# Patient Record
Sex: FEMALE | Race: WHITE | NOT HISPANIC OR LATINO | Employment: UNEMPLOYED | URBAN - METROPOLITAN AREA
[De-identification: names, ages, dates, MRNs, and addresses within clinical notes are randomized per-mention and may not be internally consistent; named-entity substitution may affect disease eponyms.]

---

## 2017-03-19 ENCOUNTER — HOSPITAL ENCOUNTER (OUTPATIENT)
Facility: HOSPITAL | Age: 42
Setting detail: OBSERVATION
Discharge: HOME | End: 2017-03-20
Attending: EMERGENCY MEDICINE | Admitting: INTERNAL MEDICINE

## 2017-03-19 ENCOUNTER — APPOINTMENT (OUTPATIENT)
Dept: MRI IMAGING | Facility: HOSPITAL | Age: 42
End: 2017-03-19

## 2017-03-19 ENCOUNTER — APPOINTMENT (OUTPATIENT)
Dept: GENERAL RADIOLOGY | Facility: HOSPITAL | Age: 42
End: 2017-03-19

## 2017-03-19 ENCOUNTER — APPOINTMENT (OUTPATIENT)
Dept: CT IMAGING | Facility: HOSPITAL | Age: 42
End: 2017-03-19

## 2017-03-19 DIAGNOSIS — Z74.09 IMPAIRED MOBILITY AND ADLS: ICD-10-CM

## 2017-03-19 DIAGNOSIS — R73.09 ELEVATED GLUCOSE: ICD-10-CM

## 2017-03-19 DIAGNOSIS — R53.1 ACUTE RIGHT-SIDED WEAKNESS: Primary | ICD-10-CM

## 2017-03-19 DIAGNOSIS — Z78.9 IMPAIRED MOBILITY AND ADLS: ICD-10-CM

## 2017-03-19 DIAGNOSIS — Z74.09 IMPAIRED FUNCTIONAL MOBILITY, BALANCE, GAIT, AND ENDURANCE: ICD-10-CM

## 2017-03-19 PROBLEM — K21.9 GERD (GASTROESOPHAGEAL REFLUX DISEASE): Status: ACTIVE | Noted: 2017-03-19

## 2017-03-19 PROBLEM — I34.1 MITRAL VALVE PROLAPSE: Status: ACTIVE | Noted: 2017-03-19

## 2017-03-19 PROBLEM — J45.909 ASTHMA: Status: ACTIVE | Noted: 2017-03-19

## 2017-03-19 PROBLEM — Z86.718 HISTORY OF DVT (DEEP VEIN THROMBOSIS): Status: ACTIVE | Noted: 2017-03-19

## 2017-03-19 PROBLEM — Z86.73 HISTORY OF TIA (TRANSIENT ISCHEMIC ATTACK): Status: ACTIVE | Noted: 2017-03-19

## 2017-03-19 PROBLEM — E11.9 TYPE 2 DIABETES MELLITUS: Status: ACTIVE | Noted: 2017-03-19

## 2017-03-19 LAB
ALT SERPL W P-5'-P-CCNC: 50 U/L (ref 7–40)
AMPHET+METHAMPHET UR QL: NEGATIVE
AMPHETAMINES UR QL: NEGATIVE
APTT PPP: 25 SECONDS (ref 24–31)
AST SERPL-CCNC: 22 U/L (ref 0–33)
BARBITURATES UR QL SCN: NEGATIVE
BASOPHILS # BLD AUTO: 0.05 10*3/MM3 (ref 0–0.2)
BASOPHILS NFR BLD AUTO: 0.5 % (ref 0–1)
BENZODIAZ UR QL SCN: NEGATIVE
BILIRUB UR QL STRIP: NEGATIVE
BUN BLDA-MCNC: 12 MG/DL (ref 8–26)
BUPRENORPHINE SERPL-MCNC: NEGATIVE NG/ML
CA-I BLDA-SCNC: 1.26 MMOL/L (ref 1.2–1.32)
CANNABINOIDS SERPL QL: NEGATIVE
CHLORIDE BLDA-SCNC: 101 MMOL/L (ref 98–109)
CLARITY UR: CLEAR
CO2 BLDA-SCNC: 26 MMOL/L (ref 24–29)
COCAINE UR QL: NEGATIVE
COLOR UR: YELLOW
CREAT BLDA-MCNC: 0.6 MG/DL (ref 0.6–1.3)
DEPRECATED RDW RBC AUTO: 38.6 FL (ref 37–54)
EOSINOPHIL # BLD AUTO: 0.28 10*3/MM3 (ref 0.1–0.3)
EOSINOPHIL NFR BLD AUTO: 2.6 % (ref 0–3)
ERYTHROCYTE [DISTWIDTH] IN BLOOD BY AUTOMATED COUNT: 12.8 % (ref 11.3–14.5)
GLUCOSE BLDC GLUCOMTR-MCNC: 268 MG/DL (ref 70–130)
GLUCOSE UR STRIP-MCNC: ABNORMAL MG/DL
HCT VFR BLD AUTO: 41.8 % (ref 34.5–44)
HCT VFR BLDA CALC: 43 % (ref 38–51)
HGB BLD-MCNC: 14 G/DL (ref 11.5–15.5)
HGB BLDA-MCNC: 14.6 G/DL (ref 12–17)
HGB UR QL STRIP.AUTO: NEGATIVE
HOLD SPECIMEN: NORMAL
HOLD SPECIMEN: NORMAL
IMM GRANULOCYTES # BLD: 0.02 10*3/MM3 (ref 0–0.03)
IMM GRANULOCYTES NFR BLD: 0.2 % (ref 0–0.6)
INR PPP: 1 (ref 0.8–1.2)
KETONES UR QL STRIP: NEGATIVE
LEUKOCYTE ESTERASE UR QL STRIP.AUTO: NEGATIVE
LYMPHOCYTES # BLD AUTO: 3.49 10*3/MM3 (ref 0.6–4.8)
LYMPHOCYTES NFR BLD AUTO: 32 % (ref 24–44)
MCH RBC QN AUTO: 28.2 PG (ref 27–31)
MCHC RBC AUTO-ENTMCNC: 33.5 G/DL (ref 32–36)
MCV RBC AUTO: 84.3 FL (ref 80–99)
METHADONE UR QL SCN: NEGATIVE
MONOCYTES # BLD AUTO: 0.62 10*3/MM3 (ref 0–1)
MONOCYTES NFR BLD AUTO: 5.7 % (ref 0–12)
NEUTROPHILS # BLD AUTO: 6.45 10*3/MM3 (ref 1.5–8.3)
NEUTROPHILS NFR BLD AUTO: 59 % (ref 41–71)
NITRITE UR QL STRIP: NEGATIVE
OPIATES UR QL: NEGATIVE
OXYCODONE UR QL SCN: NEGATIVE
PCP UR QL SCN: NEGATIVE
PH UR STRIP.AUTO: 6 [PH] (ref 5–8)
PLATELET # BLD AUTO: 316 10*3/MM3 (ref 150–450)
PMV BLD AUTO: 10.2 FL (ref 6–12)
POTASSIUM BLDA-SCNC: 4 MMOL/L (ref 3.5–4.9)
PROPOXYPH UR QL: NEGATIVE
PROT UR QL STRIP: NEGATIVE
PROTHROMBIN TIME: 12.3 SECONDS (ref 12.8–15.2)
RBC # BLD AUTO: 4.96 10*6/MM3 (ref 3.89–5.14)
SODIUM BLDA-SCNC: 141 MMOL/L (ref 138–146)
SP GR UR STRIP: 1.02 (ref 1–1.03)
TRICYCLICS UR QL SCN: NEGATIVE
TROPONIN I SERPL-MCNC: 0 NG/ML (ref 0–0.07)
UROBILINOGEN UR QL STRIP: ABNORMAL
WBC NRBC COR # BLD: 10.91 10*3/MM3 (ref 3.5–10.8)
WHOLE BLOOD HOLD SPECIMEN: NORMAL
WHOLE BLOOD HOLD SPECIMEN: NORMAL

## 2017-03-19 PROCEDURE — G0378 HOSPITAL OBSERVATION PER HR: HCPCS

## 2017-03-19 PROCEDURE — 96374 THER/PROPH/DIAG INJ IV PUSH: CPT

## 2017-03-19 PROCEDURE — 25010000002 ONDANSETRON PER 1 MG: Performed by: EMERGENCY MEDICINE

## 2017-03-19 PROCEDURE — 85730 THROMBOPLASTIN TIME PARTIAL: CPT | Performed by: EMERGENCY MEDICINE

## 2017-03-19 PROCEDURE — 70450 CT HEAD/BRAIN W/O DYE: CPT

## 2017-03-19 PROCEDURE — 80306 DRUG TEST PRSMV INSTRMNT: CPT | Performed by: EMERGENCY MEDICINE

## 2017-03-19 PROCEDURE — 81003 URINALYSIS AUTO W/O SCOPE: CPT | Performed by: EMERGENCY MEDICINE

## 2017-03-19 PROCEDURE — 80047 BASIC METABLC PNL IONIZED CA: CPT

## 2017-03-19 PROCEDURE — 99220 PR INITIAL OBSERVATION CARE/DAY 70 MINUTES: CPT | Performed by: INTERNAL MEDICINE

## 2017-03-19 PROCEDURE — 85610 PROTHROMBIN TIME: CPT

## 2017-03-19 PROCEDURE — 93005 ELECTROCARDIOGRAM TRACING: CPT | Performed by: EMERGENCY MEDICINE

## 2017-03-19 PROCEDURE — 85025 COMPLETE CBC W/AUTO DIFF WBC: CPT | Performed by: EMERGENCY MEDICINE

## 2017-03-19 PROCEDURE — 0042T HC CT CEREBRAL PERFUSION W/WO CONTRAST: CPT

## 2017-03-19 PROCEDURE — 70551 MRI BRAIN STEM W/O DYE: CPT

## 2017-03-19 PROCEDURE — 84450 TRANSFERASE (AST) (SGOT): CPT | Performed by: EMERGENCY MEDICINE

## 2017-03-19 PROCEDURE — 84484 ASSAY OF TROPONIN QUANT: CPT

## 2017-03-19 PROCEDURE — 99285 EMERGENCY DEPT VISIT HI MDM: CPT

## 2017-03-19 PROCEDURE — 0 IOPAMIDOL PER 1 ML: Performed by: EMERGENCY MEDICINE

## 2017-03-19 PROCEDURE — 85014 HEMATOCRIT: CPT

## 2017-03-19 PROCEDURE — 84460 ALANINE AMINO (ALT) (SGPT): CPT | Performed by: EMERGENCY MEDICINE

## 2017-03-19 PROCEDURE — 71010 HC CHEST PA OR AP: CPT

## 2017-03-19 RX ORDER — CITALOPRAM 40 MG/1
40 TABLET ORAL DAILY
Status: DISCONTINUED | OUTPATIENT
Start: 2017-03-20 | End: 2017-03-20 | Stop reason: HOSPADM

## 2017-03-19 RX ORDER — GABAPENTIN 600 MG/1
600 TABLET ORAL 3 TIMES DAILY
COMMUNITY
End: 2017-04-01 | Stop reason: HOSPADM

## 2017-03-19 RX ORDER — DEXTROSE MONOHYDRATE 25 G/50ML
25 INJECTION, SOLUTION INTRAVENOUS
Status: DISCONTINUED | OUTPATIENT
Start: 2017-03-19 | End: 2017-03-20 | Stop reason: HOSPADM

## 2017-03-19 RX ORDER — RANITIDINE 150 MG/1
150 TABLET ORAL 2 TIMES DAILY
COMMUNITY
End: 2019-02-13

## 2017-03-19 RX ORDER — ONDANSETRON 2 MG/ML
INJECTION INTRAMUSCULAR; INTRAVENOUS
Status: DISPENSED
Start: 2017-03-19 | End: 2017-03-20

## 2017-03-19 RX ORDER — SODIUM CHLORIDE 0.9 % (FLUSH) 0.9 %
1-10 SYRINGE (ML) INJECTION AS NEEDED
Status: DISCONTINUED | OUTPATIENT
Start: 2017-03-19 | End: 2017-03-20 | Stop reason: HOSPADM

## 2017-03-19 RX ORDER — ACETAMINOPHEN 325 MG/1
650 TABLET ORAL EVERY 4 HOURS PRN
Status: DISCONTINUED | OUTPATIENT
Start: 2017-03-19 | End: 2017-03-20 | Stop reason: HOSPADM

## 2017-03-19 RX ORDER — ONDANSETRON 4 MG/1
4 TABLET, FILM COATED ORAL EVERY 6 HOURS PRN
Status: DISCONTINUED | OUTPATIENT
Start: 2017-03-19 | End: 2017-03-20 | Stop reason: HOSPADM

## 2017-03-19 RX ORDER — ASPIRIN 325 MG
325 TABLET ORAL DAILY
COMMUNITY
End: 2019-09-18 | Stop reason: SDUPTHER

## 2017-03-19 RX ORDER — ASPIRIN 325 MG
325 TABLET ORAL DAILY
Status: DISCONTINUED | OUTPATIENT
Start: 2017-03-20 | End: 2017-03-20 | Stop reason: HOSPADM

## 2017-03-19 RX ORDER — ONDANSETRON 2 MG/ML
4 INJECTION INTRAMUSCULAR; INTRAVENOUS ONCE
Status: COMPLETED | OUTPATIENT
Start: 2017-03-19 | End: 2017-03-19

## 2017-03-19 RX ORDER — SODIUM CHLORIDE 0.9 % (FLUSH) 0.9 %
10 SYRINGE (ML) INJECTION AS NEEDED
Status: DISCONTINUED | OUTPATIENT
Start: 2017-03-19 | End: 2017-03-20 | Stop reason: HOSPADM

## 2017-03-19 RX ORDER — DOCUSATE SODIUM 100 MG/1
100 CAPSULE, LIQUID FILLED ORAL 2 TIMES DAILY PRN
Status: DISCONTINUED | OUTPATIENT
Start: 2017-03-19 | End: 2017-03-20 | Stop reason: HOSPADM

## 2017-03-19 RX ORDER — CALCIUM CARBONATE 200(500)MG
2 TABLET,CHEWABLE ORAL 2 TIMES DAILY PRN
Status: DISCONTINUED | OUTPATIENT
Start: 2017-03-19 | End: 2017-03-20 | Stop reason: HOSPADM

## 2017-03-19 RX ORDER — ONDANSETRON 2 MG/ML
4 INJECTION INTRAMUSCULAR; INTRAVENOUS EVERY 6 HOURS PRN
Status: DISCONTINUED | OUTPATIENT
Start: 2017-03-19 | End: 2017-03-20 | Stop reason: HOSPADM

## 2017-03-19 RX ORDER — NICOTINE POLACRILEX 4 MG
15 LOZENGE BUCCAL
Status: DISCONTINUED | OUTPATIENT
Start: 2017-03-19 | End: 2017-03-20 | Stop reason: HOSPADM

## 2017-03-19 RX ORDER — GABAPENTIN 300 MG/1
600 CAPSULE ORAL 3 TIMES DAILY
Status: DISCONTINUED | OUTPATIENT
Start: 2017-03-19 | End: 2017-03-20 | Stop reason: HOSPADM

## 2017-03-19 RX ORDER — CITALOPRAM 40 MG/1
40 TABLET ORAL DAILY
COMMUNITY
End: 2017-04-01 | Stop reason: HOSPADM

## 2017-03-19 RX ORDER — ATORVASTATIN CALCIUM 40 MG/1
80 TABLET, FILM COATED ORAL NIGHTLY
Status: DISCONTINUED | OUTPATIENT
Start: 2017-03-19 | End: 2017-03-20 | Stop reason: HOSPADM

## 2017-03-19 RX ORDER — PRAVASTATIN SODIUM 40 MG
40 TABLET ORAL DAILY
COMMUNITY
End: 2017-03-20 | Stop reason: HOSPADM

## 2017-03-19 RX ORDER — AZITHROMYCIN 250 MG/1
250 TABLET, FILM COATED ORAL DAILY
COMMUNITY
End: 2017-03-20 | Stop reason: HOSPADM

## 2017-03-19 RX ORDER — FAMOTIDINE 20 MG/1
20 TABLET, FILM COATED ORAL DAILY
Status: DISCONTINUED | OUTPATIENT
Start: 2017-03-20 | End: 2017-03-20 | Stop reason: HOSPADM

## 2017-03-19 RX ADMIN — GABAPENTIN 600 MG: 300 CAPSULE ORAL at 23:01

## 2017-03-19 RX ADMIN — ATORVASTATIN CALCIUM 80 MG: 40 TABLET, FILM COATED ORAL at 23:01

## 2017-03-19 RX ADMIN — IOPAMIDOL 40 ML: 755 INJECTION, SOLUTION INTRAVENOUS at 14:50

## 2017-03-19 RX ADMIN — ONDANSETRON 4 MG: 2 INJECTION INTRAMUSCULAR; INTRAVENOUS at 17:58

## 2017-03-19 NOTE — H&P
[facility] Medicine Services  HISTORY AND PHYSICAL    Primary Care Physician: No Known Provider    Subjective     Chief Complaint: Right-sided weakness    History of Present Illness:   Patient is a 42 year old female who was flown to [facility] Emergency Department from her work for complaints of right-sided numbness and weakness. Patient states she has had these episodes before, and has been diagnosed with four previous TIAs. Today, the patient states she began to have numbness to the right side of her face, right arm and right leg. She states she had difficulty moving her right leg, and had to drag it when she walked. The onset of symptoms were at 1200. Patient does have a past medical history positive for TIAs, Diabetes, Asthma, Mitral Valve Prolapse, GERD, and DVT. While in the Emergency Department the patient had a CT profusion and MRI, which were both normal. Patient will be admitted to the [facility] medicine service tonight for further evaluation and treatment.       Review of Systems   Constitutional: Negative for fatigue and fever.   HENT: Negative for drooling and trouble swallowing.    Respiratory: Negative for cough and shortness of breath.    Cardiovascular: Negative for chest pain, palpitations and leg swelling.   Gastrointestinal: Negative for abdominal pain, nausea and vomiting.   Genitourinary: Negative for dysuria, frequency and urgency.   Musculoskeletal: Positive for gait problem.   Skin: Negative for pallor, rash and wound.   Neurological: Positive for weakness and numbness. Negative for dizziness and syncope.   Psychiatric/Behavioral: Negative for confusion.      Otherwise complete ROS performed and negative except as mentioned in the HPI.    Past Medical History   Diagnosis Date   • Diabetes mellitus    • H/O blood clots    • Heart murmur    • Mitral valve prolapse    • TIA (transient ischemic attack)      4 TIMES       Past Surgical History   Procedure  "Laterality Date   • Knee arthroplasty     • Hysterectomy     • Appendectomy     • Cholecystectomy     • Finger fusion         History reviewed. No pertinent family history.    Social History     Social History   • Marital status:      Spouse name: N/A   • Number of children: N/A   • Years of education: N/A     Occupational History   • Not on file.     Social History Main Topics   • Smoking status: Never Smoker   • Smokeless tobacco: Not on file   • Alcohol use No   • Drug use: No   • Sexual activity: Not on file     Other Topics Concern   • Not on file     Social History Narrative    LIVES WITH HER  AND KIDS       Medications:  Reviewed on admission    Allergies:  Allergies   Allergen Reactions   • Novolog [Insulin Aspart]    • Penicillins          Objective     Physical Exam:  Vital Signs:   Visit Vitals   • /57   • Pulse 94   • Temp 98.3 °F (36.8 °C) (Oral)   • Resp 18   • Ht 64\" (162.6 cm)   • Wt 215 lb (97.5 kg)   • SpO2 96%   • BMI 36.9 kg/m2     Physical Exam   Constitutional: She is oriented to person, place, and time. She appears well-developed and well-nourished.   HENT:   Head: Normocephalic and atraumatic.   Eyes: EOM are normal. Pupils are equal, round, and reactive to light. No scleral icterus.   Neck: Normal range of motion. Neck supple. No JVD present.   Cardiovascular: Normal rate, regular rhythm and intact distal pulses.  Exam reveals no gallop and no friction rub.    Murmur heard.  Pulmonary/Chest: Effort normal and breath sounds normal. No respiratory distress. She has no wheezes. She has no rales. She exhibits no tenderness.   Abdominal: Soft. Bowel sounds are normal. She exhibits no distension. There is no tenderness.   Musculoskeletal: Normal range of motion. She exhibits no edema, tenderness or deformity.   Neurological: She is alert and oriented to person, place, and time. A sensory deficit is present. She exhibits abnormal muscle tone.   Patient has difficulty moving her " right arm and leg   Skin: Skin is warm and dry.   Psychiatric: She has a normal mood and affect. Her behavior is normal. Judgment and thought content normal.   Nursing note and vitals reviewed.    Results Reviewed:  Lab Results (last 24 hours)     Procedure Component Value Units Date/Time    POC CHEM 8 [[id number]]  (Abnormal) Collected:  03/19/17 1441    Specimen:  Blood Updated:  03/19/17 1450     Glucose 268 (H) mg/dL      BUN, Arterial 12 mg/dL      Creatinine 0.60 mg/dL      Sodium 141 mmol/L      Potassium 4.0 mmol/L      Chloride 101 mmol/L      Total CO2 26 mmol/L      Hemoglobin 14.6 g/dL       Serial Number: [id number]    : [id number]        Hematocrit 43 %      Ionized Calcium 1.26 mmol/L     CBC & Differential [[id number]] Collected:  03/19/17 1449    Specimen:  Blood Updated:  03/19/17 1457    Narrative:       The following orders were created for panel order CBC & Differential.  Procedure                               Abnormality         Status                     ---------                               -----------         ------                     CBC Auto Differential[[id number]]         Abnormal            Final result                 Please view results for these tests on the individual orders.    CBC Auto Differential [[id number]]  (Abnormal) Collected:  03/19/17 1449    Specimen:  Blood Updated:  03/19/17 1457     WBC 10.91 (H) 10*3/mm3      RBC 4.96 10*6/mm3      Hemoglobin 14.0 g/dL      Hematocrit 41.8 %      MCV 84.3 fL      MCH 28.2 pg      MCHC 33.5 g/dL      RDW 12.8 %      RDW-SD 38.6 fl      MPV 10.2 fL      Platelets 316 10*3/mm3      Neutrophil % 59.0 %      Lymphocyte % 32.0 %      Monocyte % 5.7 %      Eosinophil % 2.6 %      Basophil % 0.5 %      Immature Grans % 0.2 %      Neutrophils, Absolute 6.45 10*3/mm3      Lymphocytes, Absolute 3.49 10*3/mm3      Monocytes, Absolute 0.62 10*3/mm3      Eosinophils, Absolute 0.28 10*3/mm3      Basophils, Absolute 0.05 10*3/mm3      Immature Grans,  Absolute 0.02 10*3/mm3     POC Troponin, Rapid [[id number]]  (Normal) Collected:  03/19/17 1454    Specimen:  Blood Updated:  03/19/17 1510     Troponin I 0.00 ng/mL       Serial Number: [id number]    : [id number]       aPTT [[id number]]  (Normal) Collected:  03/19/17 1449    Specimen:  Blood Updated:  03/19/17 1514     PTT 25.0 seconds     Narrative:       PTT = The equivalent PTT values for the therapeutic range of heparin levels at 0.3 to 0.5 U/ml are 45 to 60 seconds.    AST [[id number]]  (Normal) Collected:  03/19/17 1449    Specimen:  Blood Updated:  03/19/17 1516     AST (SGOT) 22 U/L     ALT [[id number]]  (Abnormal) Collected:  03/19/17 1449    Specimen:  Blood Updated:  03/19/17 1516     ALT (SGPT) 50 (H) U/L     Urinalysis With / Culture If Indicated [[id number]]  (Abnormal) Collected:  03/19/17 1600    Specimen:  Urine from Urine, Clean Catch Updated:  03/19/17 1612     Color, UA Yellow      Appearance, UA Clear      pH, UA 6.0      Specific Gravity, UA 1.020      Glucose, UA >=1000 mg/dL (3+) (A)      Ketones, UA Negative      Bilirubin, UA Negative      Blood, UA Negative      Protein, UA Negative      Leuk Esterase, UA Negative      Nitrite, UA Negative      Urobilinogen, UA 0.2 E.U./dL     Narrative:       Urine microscopic not indicated.    Urine Drug Screen [[id number]]  (Normal) Collected:  03/19/17 1600    Specimen:  Urine from Urine, Clean Catch Updated:  03/19/17 1620     THC, Screen, Urine Negative      Phencyclidine (PCP), Urine Negative      Cocaine Screen, Urine Negative      Methamphetamine, Urine Negative      Opiate Screen Negative      Amphetamine Screen, Urine Negative      Benzodiazepine Screen, Urine Negative      Tricyclic Antidepressants Screen Negative      Methadone Screen, Urine Negative      Barbiturates Screen, Urine Negative      Oxycodone Screen, Urine Negative      Propoxyphene Screen Negative      Buprenorphine, Screen, Urine Negative     Narrative:       Cutoff For Drugs  Screened:    Amphetamines               500 ng/ml  Barbiturates               200 ng/ml  Benzodiazepines            150 ng/ml  Cocaine                    150 ng/ml  Methadone                  200 ng/ml  Opiates                    100 ng/ml  Phencyclidine               25 ng/ml  THC                            50 ng/ml  Methamphetamine            500 ng/ml  Tricyclic Antidepressants  300 ng/ml  Oxycodone                  100 ng/ml  Propoxyphene               300 ng/ml  Buprenorphine               10 ng/ml    The normal value for all drugs tested is negative. This report includes unconfirmed screening results, with the cutoff values listed, to be used for medical treatment purposes only.  Unconfirmed results must not be used for non-medical purposes such as employment or legal testing.  Clinical consideration should be applied to any drug of abuse test, particularly when unconfirmed results are used.      POC Protime / INR [[id number]]  (Abnormal) Collected:  03/19/17 1443    Specimen:  Blood Updated:  03/19/17 1850     Protime 12.3 (L) seconds      INR 1.0       Serial Number: [id number]    : [id number]       [place] Draw [[id number]] Collected:  03/19/17 1449    Specimen:  Blood Updated:  03/19/17 1901    Narrative:       The following orders were created for panel order [place] Draw.  Procedure                               Abnormality         Status                     ---------                               -----------         ------                     Light Blue Top[[id number]]                                    Final result               Green Top (Gel)[[id number]]                                   Final result               Lavender Top[[id number]]                                      Final result               Gold Top - SST[[id number]]                                    Final result               Green Top (No Gel)[[id number]]                                                             Please view results for these tests  on the individual orders.    Light Blue Top [[id number]] Collected:  03/19/17 1449    Specimen:  Blood Updated:  03/19/17 1901     Extra Tube hold for add-on       Auto resulted       Green Top (Gel) [[id number]] Collected:  03/19/17 1449    Specimen:  Blood Updated:  03/19/17 1901     Extra Tube Hold for add-ons.       Auto resulted.       Lavender Top [[id number]] Collected:  03/19/17 1449    Specimen:  Blood Updated:  03/19/17 1901     Extra Tube hold for add-on       Auto resulted       Gold Top - SST [[id number]] Collected:  03/19/17 1449    Specimen:  Blood Updated:  03/19/17 1901     Extra Tube Hold for add-ons.       Auto resulted.           Imaging Results (last 24 hours)     Procedure Component Value Units Date/Time    CT Cerebral Perfusion With & Without Contrast [[id number]] Collected:  03/19/17 1546     Updated:  03/19/17 1547    Narrative:       EXAMINATION: CT CEREBRAL PERFUSION W/WO CONTRAST - 03/19/2017      INDICATION: Stroke.      TECHNIQUE: Cerebral perfusion study, contrast enhanced with post  processing to include parametric maps of cerebral blood volume, cerebral  blood flow, mean transit time, and time to peak enhancement.     The radiation dose reduction device was turned on for each scan per the  ALARA (As Low as Reasonably Achievable) protocol.     COMPARISON: None.     FINDINGS: The cerebral blood volume and cerebral blood flow maps are  bilaterally symmetric and equally matched. There are no significant  perfusion anomalies. The mean transit time and time to peak enhancement  maps are grossly normal.       Impression:       Normal cerebral perfusion study.     DICTATED:     03/19/2017  EDITED:         03/19/2017          CT Head Without Contrast Stroke Protocol [[id number]] Collected:  03/19/17 1605     Updated:  03/19/17 1605    Narrative:       EXAMINATION: CT HEAD WO CONTRAST - 03/19/2017     INDICATION: Stroke, right-sided weakness, stroke protocol.      TECHNIQUE: Contiguous axial 5 mm sections  through the brain parenchyma  without intravenous contrast.     Code 19:  Time of Scan/Exam: 1421 hours   Interpretation Time:  1424 hours      The radiation dose reduction device was turned on for each scan per the  ALARA (As Low as Reasonably Achievable) protocol.     COMPARISON: 1/17/2015.     FINDINGS: The midline structures are central. There is no midline shift  or mass effect. The lateral, third, and fourth ventricles are grossly  unremarkable. The structures of the posterior fossa are normal. There is  no acute intra-axial or extra-axial hemorrhage.     The bony calvarium is intact. The paranasal sinuses and mastoid air  cells are well pneumatized.       Impression:       Normal brain CT.     DICTATED:     03/19/2017  EDITED:         03/19/2017          MRI Brain Without Contrast [[id number]] Collected:  03/19/17 1721     Updated:  03/19/17 1721    Narrative:       EXAMINATION: MRI BRAIN WO CONTRAST - 03/19/2017     INDICATION: Right-sided weakness.     TECHNIQUE: Multiplanar multiweighted MRI of the brain without  intravenous contrast.     COMPARISON: 1/17/2015.     FINDINGS: There are no areas of restricted diffusion. The midline  structures are central. There is no midline shift or mass effect. The  lateral, third, and fourth ventricles are grossly unremarkable. The  structures of the posterior fossa are normal.     T2 sequences reveal no abnormal areas of signal intensity. The globes  and orbits are normal. The paranasal sinuses demonstrate some mucosal  thickening on the right maxillary sinus. Normal flow voids are present  in the distal internal carotid arteries and basilar artery. The superior  sagittal sinus is widely patent.     The corpus callosum, pituitary gland, and foramen magnum are normal.       Impression:       No acute intracranial process.  Normal brain MRI.      DICTATED:     03/19/2017  EDITED:         03/19/2017       XR Chest 1 View [[id number]] Collected:  03/19/17 9952     Updated:   03/19/17 1755    Narrative:          EXAMINATION: XR CHEST, SINGLE VIEW - 03/19/2017     INDICATION: Stroke.      COMPARISON: 1/17/2015.     FINDINGS: The heart size is normal. There is no pneumothorax or  effusion. The lungs are free of opacities. The osseous structures of the  chest are normal.           Impression:       No acute chest pathology.     DICTATED:     03/19/2017  EDITED:         03/19/2017              I have personally reviewed and interpreted available lab data, radiology studies and ECG obtained at time of admission.     Assessment / Plan     Assessment/Problem List:   Principal Problem:    Acute right-sided weakness  Active Problems:    Type 2 diabetes mellitus    Asthma    Mitral valve prolapse    GERD (gastroesophageal reflux disease)    History of DVT (deep vein thrombosis)    History of TIA (transient ischemic attack)      Plan:  1) Acute Right-Sided Weakness  - Admit to telemetry  - VS q4h  - NIHSS qshift  - MRI, CT Profusion results above  - Carotid duplex tomorrow  - Echo tomorrow  - Consult to neurology  - Fall precautions  - AM labs    2) Diabetes  - A1c in AM  - FSBG AC/HS  - SSI    3) GERD  - Continue home medications as appropriate    4) Asthma  - DuoNebs PRN      DVT prophylaxis: TEDs/SCDs  Code Status: Full      Kiera DYKES Scott, APRN 03/19/17 7:39 PM      Brief Attending Note   I have seen and examined the patient, performing an independent face-to-face diagnostic evaluation with plan of care reviewed and developed with the advanced practice clinician (APC).      Brief Summary Statement/HPI:   Patient is a 42-year-old female with history of obesity, type 2 diabetes, and recurrent episodes of right-sided weakness who presents with acute onset of slurred speech, right facial droop, right-sided weakness.  This is consistent with prior episodes.  She was last in [place] 2015 at that time had a negative MRI and negative neurologic workup.  It is noted in the discharge summary that  there is concerns for somatoform disorder.  Workup in the emergency room currently does include a negative head CT, perfusion, study MRI.  Since arrival slurred speech and facial droop have improved but she still has some residual right-sided weakness.  She's been admitted for further evaluation.  She does take aspirin daily.      Attending Physical Exam:  Temp:  [98.3 °F (36.8 °C)] 98.3 °F (36.8 °C)  Heart Rate:  [92-98] 94  Resp:  [18] 18  BP: (101-130)/(57-88) 130/80  Constitutional: no acute distress, awake, alert  Eyes: PERRLA, sclerae anicteric, no conjunctival injection  Neck: supple, no thyromegaly, trachea midline  Respiratory: Clear to auscultation bilaterally, nonlabored respirations   Cardiovascular: RRR, no murmurs, rubs, or gallops,   Gastrointestinal: Positive bowel sounds, soft, nontender, nondistended, obese  Musculoskeletal: No bilateral ankle edema, no clubbing or cyanosis to bilateral lower extremities  Psychiatric: oriented x 3, appropriate affect, cooperative  Neurologic: Cranial nerves II through XII intact.  Sensation is grossly intact.  She has 3 out of 5 strength on right upper and lower extremity, 5 out of 5 on the left reflexes are normal      Brief Assessment/Plan :  Miss Bernstein presents with recurrent right-sided weakness.  At this time there is no evidence of an acute neurological event with negative MRI.  With recurrent episodes this does seem more likely to be consistent with a somatoform disorder.  However we'll complete the stroke protocol and have neurology see her in the morning.  PT and OT will be consulted.  Anticipate possible discharge home tomorrow if she is able to ambulate.  Plan of care discussed with the patient and  at bedside and all questions were answered.    See above for further detailed assessment and plan developed with APC which I have reviewed and/or edited.    I believe this patient meets OBSERVATION status, however if further evaluation or treatment  plans warrant, status may change.  Based upon current information, I predict patient's care encounter to be less than or equal to 2 midnights.      Saeid Garcia MD  03/19/17  8:30 PM

## 2017-03-19 NOTE — ED PROVIDER NOTES
Subjective   HPI Comments: Lilo Bernstein is a 42 y.o.female  with a hx of TIA x 4 who presents to the ED c/o right sided weakness and numbness, which onset suddenly at 1200. Pt reports she was at work when she developed her sx. Per EMS flight crew, pt complained of nausea and she was given zofran in flight. Upon examination in the ED, pt reports numbness to her right face, arm and leg, but denies any HA, vision change, rhinorrhea or other acute complaints. Her last TIA was in 2014. She is followed by her PCP, Dr. Garcia in [facility]. She denies any recent changes to her medication.    PSHx of hysterectomy.    PMHx of DM.    No hx of sleep apnea.    Patient is a 42 y.o. female presenting with neurologic complaint.   History provided by:  Patient and EMS personnel  Neurologic Problem   The patient's primary symptoms include focal sensory loss (Numbness in face, arm and leg.) and focal weakness. The patient's pertinent negatives include no visual change. This is a new problem. The current episode started today. The neurological problem developed suddenly. The last time the patient was known to be well was 3/19/2017 12:00 PM.  The problem has been gradually improving since onset. There was right-sided focality noted. Associated symptoms include nausea. Pertinent negatives include no headaches. Her past medical history is significant for a CVA.       Review of Systems   HENT: Negative for rhinorrhea.    Gastrointestinal: Positive for nausea.   Neurological: Positive for focal weakness. Negative for headaches.   All other systems reviewed and are negative.      Past Medical History   Diagnosis Date   • Diabetes mellitus    • H/O blood clots    • Heart murmur    • Mitral valve prolapse    • TIA (transient ischemic attack)      4 TIMES      1/18/2015  FINAL DIAGNOSES:   1. Transient ischemic attach versus somatoform disorder.   2. Leukocytosis.   3. Urinary tract infection.   4. Diabetes mellitus type 2.   5.  History of deep venous thrombosis.   6. History of methicillin resistant staphylococcus aureus abscess.     TIA versus somatoform disorder. The patient underwent MRI  and MRA of the brain as well as MRI of the neck. No acute findings were noted.  Echocardiogram showed an EF of 55% to 60% and carotid duplex showed no  significant occlusive disease. The patient progressed well during her stay and  regained significant use of her right upper and lower extremity. She was  walking unassisted, although complained of some right lower extremity weakness  at the time of discharge. The patient will follow-up with her PCP and may  follow-up with Neurology in 1-2 weeks if there are persistent symptoms.  Allergies   Allergen Reactions   • Novolog [Insulin Aspart]    • Penicillins        Past Surgical History   Procedure Laterality Date   • Knee arthroplasty     • Hysterectomy     • Appendectomy     • Cholecystectomy     • Finger fusion         History reviewed. No pertinent family history.    Social History     Social History   • Marital status:      Spouse name: N/A   • Number of children: N/A   • Years of education: N/A     Social History Main Topics   • Smoking status: Never Smoker   • Smokeless tobacco: None   • Alcohol use No   • Drug use: No   • Sexual activity: Not Asked     Other Topics Concern   • None     Social History Narrative    LIVES WITH HER  AND KIDS         Objective   Physical Exam   Constitutional: She is oriented to person, place, and time. She appears well-developed and well-nourished.   HENT:   Head: Normocephalic and atraumatic.   Right Ear: External ear normal.   Left Ear: External ear normal.   Nose: Nose normal.   Mouth/Throat: Oropharynx is clear and moist.   Eyes: Conjunctivae are normal.   Neck: Normal range of motion. Neck supple. Carotid bruit is not present.   Cardiovascular: Normal rate, regular rhythm and normal heart sounds.  Exam reveals no gallop and no friction rub.    No  murmur heard.  Pulmonary/Chest: Effort normal and breath sounds normal. No respiratory distress. She has no wheezes. She has no rales.   Abdominal: Soft. There is no tenderness.   Moderately obese.   Musculoskeletal: Normal range of motion.   Neurological: She is alert and oriented to person, place, and time. No sensory deficit. She exhibits abnormal muscle tone.   Face symmetric. Voice midline. Speech preserved. Moderate right upper extremity weakness. Her arm did drop arm once or twice but would smetimes resist movement at times. Her leg was weak as well, but not as much as her arm. Sensation is in intact. Plantar reflexes were downard bilaterally.   Skin: Skin is warm and dry.   Psychiatric: She has a normal mood and affect. Her behavior is normal. Judgment and thought content normal.   Nursing note and vitals reviewed.      Critical Care  Performed by: KAIDEN JIANG  Authorized by: KAIDEN JIANG   Total critical care time: 40 minutes  Critical care time was exclusive of separately billable procedures and treating other patients and teaching time.  Critical care was necessary to treat or prevent imminent or life-threatening deterioration of the following conditions: CNS failure or compromise.  Critical care was time spent personally by me on the following activities: blood draw for specimens, evaluation of patient's response to treatment, obtaining history from patient or surrogate, ordering and review of laboratory studies, pulse oximetry, re-evaluation of patient's condition, ordering and review of radiographic studies, examination of patient, review of old charts, ordering and performing treatments and interventions, development of treatment plan with patient or surrogate and discussions with consultants.               ED Course  ED Course   Comment By Time   Upon recheck, she remained weak on her right side. She thinks it is improved. She took an aspirin today before EMS arrival. She also took advil 30  minutes prior to onset of sx. Still no HA. Her sugars have been quite elevated. She has been taking a new insulin regiment. I discussed everything with her and family. She reports no increased stress in her life but notes she is taking long hours at work.  EIR Samuel Ag 03/19 1523       Recent Results (from the past 24 hour(s))   POC CHEM 8    Collection Time: 03/19/17  2:41 PM   Result Value Ref Range    Glucose 268 (H) 70 - 130 mg/dL    BUN, Arterial 12 8 - 26 mg/dL    Creatinine 0.60 0.60 - 1.30 mg/dL    Sodium 141 138 - 146 mmol/L    Potassium 4.0 3.5 - 4.9 mmol/L    Chloride 101 98 - 109 mmol/L    Total CO2 26 24 - 29 mmol/L    Hemoglobin 14.6 12.0 - 17.0 g/dL    Hematocrit 43 38 - 51 %    Ionized Calcium 1.26 1.20 - 1.32 mmol/L   aPTT    Collection Time: 03/19/17  2:49 PM   Result Value Ref Range    PTT 25.0 24.0 - 31.0 seconds   AST    Collection Time: 03/19/17  2:49 PM   Result Value Ref Range    AST (SGOT) 22 0 - 33 U/L   ALT    Collection Time: 03/19/17  2:49 PM   Result Value Ref Range    ALT (SGPT) 50 (H) 7 - 40 U/L   CBC Auto Differential    Collection Time: 03/19/17  2:49 PM   Result Value Ref Range    WBC 10.91 (H) 3.50 - 10.80 10*3/mm3    RBC 4.96 3.89 - 5.14 10*6/mm3    Hemoglobin 14.0 11.5 - 15.5 g/dL    Hematocrit 41.8 34.5 - 44.0 %    MCV 84.3 80.0 - 99.0 fL    MCH 28.2 27.0 - 31.0 pg    MCHC 33.5 32.0 - 36.0 g/dL    RDW 12.8 11.3 - 14.5 %    RDW-SD 38.6 37.0 - 54.0 fl    MPV 10.2 6.0 - 12.0 fL    Platelets 316 150 - 450 10*3/mm3    Neutrophil % 59.0 41.0 - 71.0 %    Lymphocyte % 32.0 24.0 - 44.0 %    Monocyte % 5.7 0.0 - 12.0 %    Eosinophil % 2.6 0.0 - 3.0 %    Basophil % 0.5 0.0 - 1.0 %    Immature Grans % 0.2 0.0 - 0.6 %    Neutrophils, Absolute 6.45 1.50 - 8.30 10*3/mm3    Lymphocytes, Absolute 3.49 0.60 - 4.80 10*3/mm3    Monocytes, Absolute 0.62 0.00 - 1.00 10*3/mm3    Eosinophils, Absolute 0.28 0.10 - 0.30 10*3/mm3    Basophils, Absolute 0.05 0.00 - 0.20 10*3/mm3     Immature Grans, Absolute 0.02 0.00 - 0.03 10*3/mm3   POC Troponin, Rapid    Collection Time: 03/19/17  2:54 PM   Result Value Ref Range    Troponin I 0.00 0.00 - 0.07 ng/mL   Urinalysis With / Culture If Indicated    Collection Time: 03/19/17  4:00 PM   Result Value Ref Range    Color, UA Yellow Yellow, Straw    Appearance, UA Clear Clear    pH, UA 6.0 5.0 - 8.0    Specific Gravity, UA 1.020 1.001 - 1.030    Glucose, UA >=1000 mg/dL (3+) (A) Negative    Ketones, UA Negative Negative    Bilirubin, UA Negative Negative    Blood, UA Negative Negative    Protein, UA Negative Negative    Leuk Esterase, UA Negative Negative    Nitrite, UA Negative Negative    Urobilinogen, UA 0.2 E.U./dL 0.2 - 1.0 E.U./dL   Urine Drug Screen    Collection Time: 03/19/17  4:00 PM   Result Value Ref Range    THC, Screen, Urine Negative Negative    Phencyclidine (PCP), Urine Negative Negative    Cocaine Screen, Urine Negative Negative    Methamphetamine, Urine Negative Negative    Opiate Screen Negative Negative    Amphetamine Screen, Urine Negative Negative    Benzodiazepine Screen, Urine Negative Negative    Tricyclic Antidepressants Screen Negative Negative    Methadone Screen, Urine Negative Negative    Barbiturates Screen, Urine Negative Negative    Oxycodone Screen, Urine Negative Negative    Propoxyphene Screen Negative Negative    Buprenorphine, Screen, Urine Negative Negative     Note: In addition to lab results from this visit, the labs listed above may include labs taken at another facility or during a different encounter within the last 24 hours. Please correlate lab times with ED admission and discharge times for further clarification of the services performed during this visit.    XR Chest 1 View   Preliminary Result   No acute chest pathology.       DICTATED:     03/19/2017   EDITED:         03/19/2017              MRI Brain Without Contrast   Preliminary Result   No acute intracranial process.   Normal brain MRI.         DICTATED:     03/19/2017   EDITED:         03/19/2017          CT Head Without Contrast Stroke Protocol   Preliminary Result   Normal brain CT.       DICTATED:     03/19/2017   EDITED:         03/19/2017              CT Cerebral Perfusion With & Without Contrast   Preliminary Result   Normal cerebral perfusion study.       DICTATED:     03/19/2017   EDITED:         03/19/2017                Vitals:    03/19/17 1701 03/19/17 1719 03/19/17 1730 03/19/17 1800   BP:   126/76 111/85   BP Location:       Patient Position:       Pulse:  98     Resp:       Temp:       TempSrc:       SpO2: 98%  96% 96%   Weight:       Height:         Medications   sodium chloride 0.9 % flush 10 mL (not administered)   iopamidol (ISOVUE-370) 76 % injection 100 mL (40 mL Intravenous Given 3/19/17 1450)   ondansetron (ZOFRAN) injection 4 mg (4 mg Intravenous Given 3/19/17 1755)     ECG/EMG Results (last 24 hours)     Procedure Component Value Units Date/Time    ECG 12 Lead [[id number]] Collected:  03/19/17 1455     Updated:  03/19/17 1500                      MDM  Number of Diagnoses or Management Options  Acute right-sided weakness:   Elevated glucose:   Diagnosis management comments:       I reviewed all available studies with the patient and her family at the bedside.  They are reassuring.  Her CT and CT perfusion scan were reviewed with Dr. Leon and are normal.    On reexamination here she continued to have right arm weakness but her exam was somewhat variable.    I personally reviewed her MRI scan. I'm waiting for Dr. Leon to review it. To my eyes she has no acute diffusion-weighted image abnormalities indicating this is not acute stroke.    She does not have a migraine headache with this.  It could be some sort of complex amigrainous migraine versus some form of conversion disorder versus a stroke that is not showing up on her MRI.  Fact she actually has had improvement since her onset of symptoms and a negative diffusion scan negative  perfusion scan makes me think that she is not a candidate for any type of acute intervention for IV TPA.    Limiting this is a bit unusual as apparently the patient does not take Advil and prior to this event about 30 minutes prior fact she took an Advil.  She recalls the last time she had something like this happen to her she also had taken an Advil for pain in her right knee.    Certainly does not seem like an allergic reaction.  Since she still has some weakness on her right side I think she'll need admission for serial exams and perhaps she'll need some therapy as well.    She had already taken aspirin before EMS transported here so I don't think she'll need additional anticoagulation.  Her blood pressure is normal and her EKG shows she is in normal sinus rhythm.  She does have difficult to control diabetes and this can be addressed by the admitting service.    I spoke with Dr. Montaño, on-call stroke, he agrees with the plan.    I spoke Dr. Nguyen, on-call hospital medicine service, she will admit the patient.  For serial exams and maximal risk factor modification.    All are agreeable with the plan       Amount and/or Complexity of Data Reviewed  Clinical lab tests: reviewed  Tests in the radiology section of CPT®: reviewed  Tests in the medicine section of CPT®: reviewed      EMR Dragon/Transcription disclaimer:   Much of this encounter note is an electronic transcription/translation of spoken language to printed text. The electronic translation of spoken language may permit erroneous, or at times, nonsensical words or phrases to be inadvertently transcribed; Although I have reviewed the note for such errors, some may still exist.     Final diagnoses:   Acute right-sided weakness   Elevated glucose       Documentation assistance provided by pb Ag.  Information recorded by the pb was done at my direction and has been verified and validated by me.     Samuel Ag  03/19/17  1527       Samuel Ag  03/19/17 1642       Isiah Sanchez MD  03/19/17 0927

## 2017-03-20 ENCOUNTER — APPOINTMENT (OUTPATIENT)
Dept: CARDIOLOGY | Facility: HOSPITAL | Age: 42
End: 2017-03-20

## 2017-03-20 VITALS
HEART RATE: 82 BPM | RESPIRATION RATE: 18 BRPM | WEIGHT: 223 LBS | OXYGEN SATURATION: 95 % | TEMPERATURE: 98.4 F | SYSTOLIC BLOOD PRESSURE: 119 MMHG | DIASTOLIC BLOOD PRESSURE: 74 MMHG | BODY MASS INDEX: 38.07 KG/M2 | HEIGHT: 64 IN

## 2017-03-20 LAB
ALBUMIN SERPL-MCNC: 3.8 G/DL (ref 3.2–4.8)
ALBUMIN/GLOB SERPL: 1.2 G/DL (ref 1.5–2.5)
ALP SERPL-CCNC: 100 U/L (ref 25–100)
ALT SERPL W P-5'-P-CCNC: 44 U/L (ref 7–40)
ANION GAP SERPL CALCULATED.3IONS-SCNC: 5 MMOL/L (ref 3–11)
ARTICHOKE IGE QN: 120 MG/DL (ref 0–130)
AST SERPL-CCNC: 18 U/L (ref 0–33)
BASOPHILS # BLD AUTO: 0.05 10*3/MM3 (ref 0–0.2)
BASOPHILS NFR BLD AUTO: 0.5 % (ref 0–1)
BH CV ECHO MEAS - AO MAX PG (FULL): 1.6 MMHG
BH CV ECHO MEAS - AO MAX PG: 8.6 MMHG
BH CV ECHO MEAS - AO ROOT AREA (BSA CORRECTED): 1.1
BH CV ECHO MEAS - AO ROOT AREA: 4 CM^2
BH CV ECHO MEAS - AO ROOT DIAM: 2.3 CM
BH CV ECHO MEAS - AO V2 MAX: 146.6 CM/SEC
BH CV ECHO MEAS - BSA(HAYCOCK): 2.2 M^2
BH CV ECHO MEAS - BSA: 2 M^2
BH CV ECHO MEAS - BZI_BMI: 38.3 KILOGRAMS/M^2
BH CV ECHO MEAS - BZI_METRIC_HEIGHT: 162.6 CM
BH CV ECHO MEAS - BZI_METRIC_WEIGHT: 101.2 KG
BH CV ECHO MEAS - CONTRAST EF (2CH): 62.8 ML/M^2
BH CV ECHO MEAS - CONTRAST EF 4CH: 65.7 ML/M^2
BH CV ECHO MEAS - EDV(CUBED): 81.9 ML
BH CV ECHO MEAS - EDV(MOD-SP2): 113 ML
BH CV ECHO MEAS - EDV(MOD-SP4): 99 ML
BH CV ECHO MEAS - EDV(TEICH): 85.1 ML
BH CV ECHO MEAS - EF(CUBED): 68.1 %
BH CV ECHO MEAS - EF(MOD-SP2): 62.8 %
BH CV ECHO MEAS - EF(MOD-SP4): 65.7 %
BH CV ECHO MEAS - EF(TEICH): 59.9 %
BH CV ECHO MEAS - ESV(CUBED): 26.2 ML
BH CV ECHO MEAS - ESV(MOD-SP2): 42 ML
BH CV ECHO MEAS - ESV(MOD-SP4): 34 ML
BH CV ECHO MEAS - ESV(TEICH): 34.1 ML
BH CV ECHO MEAS - FS: 31.7 %
BH CV ECHO MEAS - IVS/LVPW: 0.94
BH CV ECHO MEAS - IVSD: 1.2 CM
BH CV ECHO MEAS - LA DIMENSION: 3.3 CM
BH CV ECHO MEAS - LA/AO: 1.5
BH CV ECHO MEAS - LAT PEAK E' VEL: 15.9 CM/SEC
BH CV ECHO MEAS - LV DIASTOLIC VOL/BSA (35-75): 48.3 ML/M^2
BH CV ECHO MEAS - LV MASS(C)D: 174.1 GRAMS
BH CV ECHO MEAS - LV MASS(C)DI: 85 GRAMS/M^2
BH CV ECHO MEAS - LV MAX PG: 6.9 MMHG
BH CV ECHO MEAS - LV MEAN PG: 4 MMHG
BH CV ECHO MEAS - LV SYSTOLIC VOL/BSA (12-30): 16.6 ML/M^2
BH CV ECHO MEAS - LV V1 MAX: 131.8 CM/SEC
BH CV ECHO MEAS - LV V1 MEAN: 93.4 CM/SEC
BH CV ECHO MEAS - LV V1 VTI: 23.4 CM
BH CV ECHO MEAS - LVIDD: 4.5 CM
BH CV ECHO MEAS - LVIDS: 3 CM
BH CV ECHO MEAS - LVLD AP2: 7.8 CM
BH CV ECHO MEAS - LVLD AP4: 7.6 CM
BH CV ECHO MEAS - LVLS AP2: 6.1 CM
BH CV ECHO MEAS - LVLS AP4: 6 CM
BH CV ECHO MEAS - LVPWD: 1.2 CM
BH CV ECHO MEAS - MED PEAK E' VEL: 11.5 CM/SEC
BH CV ECHO MEAS - MV E MAX VEL: 86.9 CM/SEC
BH CV ECHO MEAS - PA ACC SLOPE: 655.3 CM/SEC^2
BH CV ECHO MEAS - PA ACC TIME: 0.12 SEC
BH CV ECHO MEAS - PA MAX PG: 5.7 MMHG
BH CV ECHO MEAS - PA PR(ACCEL): 23.5 MMHG
BH CV ECHO MEAS - PA V2 MAX: 118.9 CM/SEC
BH CV ECHO MEAS - RVDD: 2.5 CM
BH CV ECHO MEAS - SI(CUBED): 27.2 ML/M^2
BH CV ECHO MEAS - SI(MOD-SP2): 34.7 ML/M^2
BH CV ECHO MEAS - SI(MOD-SP4): 31.7 ML/M^2
BH CV ECHO MEAS - SI(TEICH): 24.9 ML/M^2
BH CV ECHO MEAS - SV(CUBED): 55.8 ML
BH CV ECHO MEAS - SV(MOD-SP2): 71 ML
BH CV ECHO MEAS - SV(MOD-SP4): 65 ML
BH CV ECHO MEAS - SV(TEICH): 51 ML
BH CV ECHO MEAS - TAPSE (>1.6): 2.1 CM2
BH CV XLRA - RV BASE: 3 CM
BH CV XLRA - RV LENGTH: 6.7 CM
BH CV XLRA - RV MID: 2.2 CM
BH CV XLRA - TDI S': 13 CM/SEC
BH CV XLRA MEAS LEFT CCA RATIO VEL: 78.9 CM/SEC
BH CV XLRA MEAS LEFT DIST CCA EDV: 33.6 CM/SEC
BH CV XLRA MEAS LEFT DIST CCA PSV: 79.4 CM/SEC
BH CV XLRA MEAS LEFT DIST ICA EDV: 39.2 CM/SEC
BH CV XLRA MEAS LEFT DIST ICA PSV: 97 CM/SEC
BH CV XLRA MEAS LEFT ICA RATIO VEL: 84.4 CM/SEC
BH CV XLRA MEAS LEFT ICA/CCA RATIO: 1.1
BH CV XLRA MEAS LEFT MID ICA EDV: 39.2 CM/SEC
BH CV XLRA MEAS LEFT MID ICA PSV: 84.9 CM/SEC
BH CV XLRA MEAS LEFT PROX CCA EDV: 32 CM/SEC
BH CV XLRA MEAS LEFT PROX CCA PSV: 82.7 CM/SEC
BH CV XLRA MEAS LEFT PROX ECA PSV: 109.2 CM/SEC
BH CV XLRA MEAS LEFT PROX ICA EDV: 26.5 CM/SEC
BH CV XLRA MEAS LEFT PROX ICA PSV: 59.6 CM/SEC
BH CV XLRA MEAS LEFT PROX SCLA PSV: 197.8 CM/SEC
BH CV XLRA MEAS LEFT VERTEBRAL A EDV: 17.8 CM/SEC
BH CV XLRA MEAS LEFT VERTEBRAL A PSV: 53.1 CM/SEC
BH CV XLRA MEAS RIGHT CCA RATIO VEL: 72.2 CM/SEC
BH CV XLRA MEAS RIGHT DIST CCA EDV: 26.5 CM/SEC
BH CV XLRA MEAS RIGHT DIST CCA PSV: 72.8 CM/SEC
BH CV XLRA MEAS RIGHT DIST ICA EDV: 35.3 CM/SEC
BH CV XLRA MEAS RIGHT DIST ICA PSV: 70 CM/SEC
BH CV XLRA MEAS RIGHT ICA RATIO VEL: 79.4 CM/SEC
BH CV XLRA MEAS RIGHT ICA/CCA RATIO: 1.1
BH CV XLRA MEAS RIGHT MID ICA EDV: 31.4 CM/SEC
BH CV XLRA MEAS RIGHT MID ICA PSV: 80 CM/SEC
BH CV XLRA MEAS RIGHT PROX CCA EDV: 28.1 CM/SEC
BH CV XLRA MEAS RIGHT PROX CCA PSV: 95.4 CM/SEC
BH CV XLRA MEAS RIGHT PROX ECA PSV: 90.4 CM/SEC
BH CV XLRA MEAS RIGHT PROX ICA EDV: 26.5 CM/SEC
BH CV XLRA MEAS RIGHT PROX ICA PSV: 70 CM/SEC
BH CV XLRA MEAS RIGHT PROX SCLA PSV: 111.6 CM/SEC
BH CV XLRA MEAS RIGHT VERTEBRAL A EDV: 23.2 CM/SEC
BH CV XLRA MEAS RIGHT VERTEBRAL A PSV: 46.3 CM/SEC
BILIRUB SERPL-MCNC: 0.4 MG/DL (ref 0.3–1.2)
BUN BLD-MCNC: 13 MG/DL (ref 9–23)
BUN/CREAT SERPL: 21.7 (ref 7–25)
CALCIUM SPEC-SCNC: 9.2 MG/DL (ref 8.7–10.4)
CHLORIDE SERPL-SCNC: 103 MMOL/L (ref 99–109)
CHOLEST SERPL-MCNC: 161 MG/DL (ref 0–200)
CO2 SERPL-SCNC: 30 MMOL/L (ref 20–31)
CREAT BLD-MCNC: 0.6 MG/DL (ref 0.6–1.3)
DEPRECATED RDW RBC AUTO: 39.7 FL (ref 37–54)
E/E' RATIO: 6
EOSINOPHIL # BLD AUTO: 0.29 10*3/MM3 (ref 0.1–0.3)
EOSINOPHIL NFR BLD AUTO: 2.9 % (ref 0–3)
ERYTHROCYTE [DISTWIDTH] IN BLOOD BY AUTOMATED COUNT: 12.8 % (ref 11.3–14.5)
GFR SERPL CREATININE-BSD FRML MDRD: 109 ML/MIN/1.73
GLOBULIN UR ELPH-MCNC: 3.3 GM/DL
GLUCOSE BLD-MCNC: 275 MG/DL (ref 70–100)
GLUCOSE BLDC GLUCOMTR-MCNC: 264 MG/DL (ref 70–130)
GLUCOSE BLDC GLUCOMTR-MCNC: 285 MG/DL (ref 70–130)
GLUCOSE BLDC GLUCOMTR-MCNC: 292 MG/DL (ref 70–130)
GLUCOSE BLDC GLUCOMTR-MCNC: 344 MG/DL (ref 70–130)
HBA1C MFR BLD: 10.8 % (ref 4.8–5.6)
HCT VFR BLD AUTO: 41.5 % (ref 34.5–44)
HDLC SERPL-MCNC: 40 MG/DL (ref 40–60)
HGB BLD-MCNC: 13.5 G/DL (ref 11.5–15.5)
IMM GRANULOCYTES # BLD: 0.02 10*3/MM3 (ref 0–0.03)
IMM GRANULOCYTES NFR BLD: 0.2 % (ref 0–0.6)
LEFT ATRIUM VOLUME INDEX: 18.5 ML/M2
LEFT ATRIUM VOLUME: 38 CM3
LYMPHOCYTES # BLD AUTO: 3.43 10*3/MM3 (ref 0.6–4.8)
LYMPHOCYTES NFR BLD AUTO: 33.8 % (ref 24–44)
MCH RBC QN AUTO: 27.7 PG (ref 27–31)
MCHC RBC AUTO-ENTMCNC: 32.5 G/DL (ref 32–36)
MCV RBC AUTO: 85.2 FL (ref 80–99)
MONOCYTES # BLD AUTO: 0.69 10*3/MM3 (ref 0–1)
MONOCYTES NFR BLD AUTO: 6.8 % (ref 0–12)
NEUTROPHILS # BLD AUTO: 5.68 10*3/MM3 (ref 1.5–8.3)
NEUTROPHILS NFR BLD AUTO: 55.8 % (ref 41–71)
PLATELET # BLD AUTO: 291 10*3/MM3 (ref 150–450)
PMV BLD AUTO: 10 FL (ref 6–12)
POTASSIUM BLD-SCNC: 4.3 MMOL/L (ref 3.5–5.5)
PROT SERPL-MCNC: 7.1 G/DL (ref 5.7–8.2)
RBC # BLD AUTO: 4.87 10*6/MM3 (ref 3.89–5.14)
SODIUM BLD-SCNC: 138 MMOL/L (ref 132–146)
T4 FREE SERPL-MCNC: 1.13 NG/DL (ref 0.89–1.76)
TRIGL SERPL-MCNC: 134 MG/DL (ref 0–150)
TSH SERPL DL<=0.05 MIU/L-ACNC: 0.88 MIU/ML (ref 0.35–5.35)
WBC NRBC COR # BLD: 10.16 10*3/MM3 (ref 3.5–10.8)

## 2017-03-20 PROCEDURE — 85025 COMPLETE CBC W/AUTO DIFF WBC: CPT | Performed by: NURSE PRACTITIONER

## 2017-03-20 PROCEDURE — 97165 OT EVAL LOW COMPLEX 30 MIN: CPT

## 2017-03-20 PROCEDURE — 97162 PT EVAL MOD COMPLEX 30 MIN: CPT

## 2017-03-20 PROCEDURE — G0108 DIAB MANAGE TRN  PER INDIV: HCPCS

## 2017-03-20 PROCEDURE — G8978 MOBILITY CURRENT STATUS: HCPCS

## 2017-03-20 PROCEDURE — 84443 ASSAY THYROID STIM HORMONE: CPT | Performed by: NURSE PRACTITIONER

## 2017-03-20 PROCEDURE — 99215 OFFICE O/P EST HI 40 MIN: CPT | Performed by: PSYCHIATRY & NEUROLOGY

## 2017-03-20 PROCEDURE — 93880 EXTRACRANIAL BILAT STUDY: CPT

## 2017-03-20 PROCEDURE — G0378 HOSPITAL OBSERVATION PER HR: HCPCS

## 2017-03-20 PROCEDURE — 80053 COMPREHEN METABOLIC PANEL: CPT | Performed by: NURSE PRACTITIONER

## 2017-03-20 PROCEDURE — G8987 SELF CARE CURRENT STATUS: HCPCS

## 2017-03-20 PROCEDURE — G8980 MOBILITY D/C STATUS: HCPCS

## 2017-03-20 PROCEDURE — 83036 HEMOGLOBIN GLYCOSYLATED A1C: CPT | Performed by: NURSE PRACTITIONER

## 2017-03-20 PROCEDURE — 93880 EXTRACRANIAL BILAT STUDY: CPT | Performed by: INTERNAL MEDICINE

## 2017-03-20 PROCEDURE — 97530 THERAPEUTIC ACTIVITIES: CPT

## 2017-03-20 PROCEDURE — 80061 LIPID PANEL: CPT | Performed by: NURSE PRACTITIONER

## 2017-03-20 PROCEDURE — 25010000002 SULFUR HEXAFLUORIDE MICROSPH 60.7-25 MG RECONSTITUTED SUSPENSION: Performed by: INTERNAL MEDICINE

## 2017-03-20 PROCEDURE — 84439 ASSAY OF FREE THYROXINE: CPT | Performed by: NURSE PRACTITIONER

## 2017-03-20 PROCEDURE — G8989 SELF CARE D/C STATUS: HCPCS

## 2017-03-20 PROCEDURE — 99217 PR OBSERVATION CARE DISCHARGE MANAGEMENT: CPT | Performed by: NURSE PRACTITIONER

## 2017-03-20 PROCEDURE — G8979 MOBILITY GOAL STATUS: HCPCS

## 2017-03-20 PROCEDURE — 82962 GLUCOSE BLOOD TEST: CPT

## 2017-03-20 PROCEDURE — C8929 TTE W OR WO FOL WCON,DOPPLER: HCPCS

## 2017-03-20 PROCEDURE — G8988 SELF CARE GOAL STATUS: HCPCS

## 2017-03-20 PROCEDURE — 93306 TTE W/DOPPLER COMPLETE: CPT | Performed by: INTERNAL MEDICINE

## 2017-03-20 RX ORDER — ATORVASTATIN CALCIUM 80 MG/1
80 TABLET, FILM COATED ORAL NIGHTLY
Qty: 30 TABLET | Refills: 0 | Status: SHIPPED | OUTPATIENT
Start: 2017-03-20 | End: 2018-07-10 | Stop reason: SDUPTHER

## 2017-03-20 RX ORDER — ERGOCALCIFEROL 1.25 MG/1
50000 CAPSULE ORAL WEEKLY
COMMUNITY
End: 2018-07-10

## 2017-03-20 RX ADMIN — FAMOTIDINE 20 MG: 20 TABLET ORAL at 09:51

## 2017-03-20 RX ADMIN — CITALOPRAM HYDROBROMIDE 40 MG: 40 TABLET ORAL at 09:51

## 2017-03-20 RX ADMIN — SULFUR HEXAFLUORIDE 2 ML: KIT at 09:25

## 2017-03-20 RX ADMIN — GABAPENTIN 600 MG: 300 CAPSULE ORAL at 09:51

## 2017-03-20 RX ADMIN — ASPIRIN 325 MG ORAL TABLET 325 MG: 325 PILL ORAL at 09:51

## 2017-03-20 NOTE — SIGNIFICANT NOTE
03/20/17 1549   SLP Discharge Summary   Reason for Discharge At baseline function;Patient/Caregiver request  (Order received per CVA pathway. Per chart, no evidece of CVA. Pt denied acute chngs in cog/comm skills & politely declined eval. Preparing to d/c home. Provided info re: OP SLP tx should issues arise. SLP will sign-off. Thanks.)   Discharge Destination Home

## 2017-03-20 NOTE — PLAN OF CARE
Problem: Patient Care Overview (Adult)  Goal: Plan of Care Review  Outcome: Ongoing (interventions implemented as appropriate)  Goal: Adult Individualization and Mutuality  Outcome: Ongoing (interventions implemented as appropriate)  Goal: Discharge Needs Assessment  Outcome: Ongoing (interventions implemented as appropriate)    Problem: Fall Risk (Adult)  Goal: Identify Related Risk Factors and Signs and Symptoms  Outcome: Ongoing (interventions implemented as appropriate)  Goal: Absence of Falls  Outcome: Ongoing (interventions implemented as appropriate)

## 2017-03-20 NOTE — PLAN OF CARE
"Problem: Patient Care Overview (Adult)  Goal: Plan of Care Review  Outcome: Ongoing (interventions implemented as appropriate)    03/20/17 1639   Patient Care Overview   Progress improving   Coping/Psychosocial Response Interventions   Plan Of Care Reviewed With patient   Outcome Evaluation   Outcome Summary/Follow up Plan pt reports ready to go home. pt reports \"right sided strength is better than yesterday\".        Goal: Adult Individualization and Mutuality  Outcome: Ongoing (interventions implemented as appropriate)    03/20/17 1639   Individualization   Patient Specific Preferences pt has no specific request          Problem: Fall Risk (Adult)  Goal: Identify Related Risk Factors and Signs and Symptoms  Outcome: Ongoing (interventions implemented as appropriate)    03/20/17 1639   Fall Risk   Fall Risk: Related Risk Factors history of falls;neuro disease/injury   Fall Risk: Signs and Symptoms presence of risk factors           "

## 2017-03-20 NOTE — PROGRESS NOTES
Discharge Planning Assessment  [facility]     Patient Name: Lilo Bernstein  MRN: [id number]  Today's Date: 3/20/2017    Admit Date: 3/19/2017          Discharge Needs Assessment     None            Discharge Plan       03/20/17 1054    Case Management/Social Work Plan    Plan Home    Patient/Family In Agreement With Plan yes    Additional Comments I met with pt and spouse in room, they live in [place]. Pt is independent with ADL's and mobility she still works. Pt denies having HH or DME. She does wear a right knee brace. Pt does have medicine coverage.        Discharge Placement     No information found                Demographic Summary       03/20/17 1049    Referral Information    Admission Type observation    Arrived From --   Pt was admitted from ED.    Referral Source admission list    Reason For Consult discharge planning    Record Reviewed clinical discipline documentation;history and physical    Contact Information    Permission Granted to Share Information With     Primary Care Physician Information    Name Ben Adan            Functional Status       03/20/17 1051    Functional Status Prior    Ambulation 0-->independent    Transferring 0-->independent    Toileting 0-->independent    Bathing 0-->independent    Dressing 0-->independent    Eating 0-->independent    Communication 0-->understands/communicates without difficulty    Swallowing 0-->swallows foods/liquids without difficulty    Prior Functional Level Comment --   Pt states she uses a knee brace on the right knee and still works.    IADL    Medications independent    Meal Preparation independent    Housekeeping independent    Laundry independent    Shopping independent    Oral Care independent    IADL Comments --   independe nt with ADL's and mobility.    Activity Tolerance    Usual Activity Tolerance good    Employment/Financial    Financial Concerns --   Pt has [employer] and has medicine coverage            Psychosocial      None            Abuse/Neglect     None            Legal     None            Substance Abuse     None            Patient Forms     None      Pt going home today with order for Rolling Walker, pt did not have preference I called [phone] and faxed information to [phone]. They will deliver the RW to pt's room. Pt is going to go to outpt  Physical Theraphy  Pt is going to set this up herself, she will need to take the order with her.    Laverne Willard RN

## 2017-03-20 NOTE — DISCHARGE INSTR - APPOINTMENTS
Call as soon as possible to schedule out patient physical therapy. Take PT and OT order to them. This order print out was provided with these papers.   Call as soon as possible to schedule a follow up appointment with your endocrinologist.   Call as soon as possible to schedule a follow up appointment to see your Primary Care Provider this week.

## 2017-03-20 NOTE — PLAN OF CARE
Problem: Patient Care Overview (Adult)  Goal: Plan of Care Review  Outcome: Ongoing (interventions implemented as appropriate)    03/20/17 0066   Coping/Psychosocial Response Interventions   Plan Of Care Reviewed With patient;spouse   Outcome Evaluation   Outcome Summary/Follow up Plan PT eval completed. No goals established as pt to d/c home today. Pt dem RLE weakness and gait abnormality, limiting independence with functional moblity and return to work. Pt amb 180 ft with RW with CGA, needing several standing and sitting rest breaks. PT recommends d/c home with outpatient PT services as well as RW.

## 2017-03-20 NOTE — DISCHARGE SUMMARY
[facility] Medicine Services  DISCHARGE SUMMARY       Date of Admission: 3/19/2017  Date of Discharge:  3/20/2017  Primary Care Physician: No Known Provider    Discharge Diagnoses:  Active Hospital Problems (** Indicates Principal Problem)    Diagnosis Date Noted   • **Acute right-sided weakness [M62.89] 03/19/2017   • Type 2 diabetes mellitus [E11.9] 03/19/2017   • Asthma [J45.909] 03/19/2017   • Mitral valve prolapse [I34.1] 03/19/2017   • History of DVT (deep vein thrombosis) [Z86.718] 03/19/2017   • History of TIA (transient ischemic attack) [Z86.73] 03/19/2017      Resolved Hospital Problems    Diagnosis Date Noted Date Resolved   No resolved problems to display.       Presenting Problem/History of Present Illness  Acute right-sided weakness [M62.89]     Chief Complaint on Day of Discharge:   Hospital follow up    History of Present Illness on Day of Discharge:   No issues overnight  Some improvement, per patient, in right sided weakness  Anxious for dc to return to work  Worked with PT/OT today    Hospital Course  Patient is a 42 y.o. female presented with sudden onset of right sided weakness.  She has had prior incident with same symptoms in 2014 that took her 3 months to return to baseline.  Stroke work up was unremarkable.  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  Order for outpatient PT/OT written as well as rolling walker.  Patient in agreement with this plan.  She is to follow up with PCP this week for re-eval and with her primary endocrinologist as scheduled.  A1C of 10.8 is better than it has been in the past per patient.    Procedures Performed  None    Consults:   Consults     Date and Time Order Name Status Description    3/19/2017 2108 Inpatient Consult to Neurology      3/19/2017 1445 Consult Interventional Neurologist and/or Stroke Team      3/19/2017 1449 Inpatient Consult to Neurology (on call physician/group)             Pertinent Test Results:  Component      Latest Ref Rng 3/19/2017 3/20/2017   WBC      3.50 - 10.80 10*3/mm3 10.91 (H) 10.16   RBC      3.89 - 5.14 10*6/mm3 4.96 4.87   Hemoglobin      11.5 - 15.5 g/dL 14.0 13.5   Hematocrit      34.5 - 44.0 % 41.8 41.5   MCV      80.0 - 99.0 fL 84.3 85.2   MCH      27.0 - 31.0 pg 28.2 27.7   MCHC      32.0 - 36.0 g/dL 33.5 32.5   RDW      11.3 - 14.5 % 12.8 12.8   RDW-SD      37.0 - 54.0 fl 38.6 39.7   MPV      6.0 - 12.0 fL 10.2 10.0   Platelets      150 - 450 10*3/mm3 316 291   Neutrophil %      41.0 - 71.0 % 59.0 55.8   Lymphocyte %      24.0 - 44.0 % 32.0 33.8   Monocyte %      0.0 - 12.0 % 5.7 6.8   Eosinophil %      0.0 - 3.0 % 2.6 2.9   Basophil %      0.0 - 1.0 % 0.5 0.5   Immature Grans %      0.0 - 0.6 % 0.2 0.2   Neutrophils, Absolute      1.50 - 8.30 10*3/mm3 6.45 5.68   Lymphocytes, Absolute      0.60 - 4.80 10*3/mm3 3.49 3.43   Monocytes, Absolute      0.00 - 1.00 10*3/mm3 0.62 0.69   Eosinophils, Absolute      0.10 - 0.30 10*3/mm3 0.28 0.29   Basophils, Absolute      0.00 - 0.20 10*3/mm3 0.05 0.05   Immature Grans, Absolute      0.00 - 0.03 10*3/mm3 0.02 0.02   Glucose      70 - 100 mg/dL  275 (H)   BUN      9 - 23 mg/dL  13   Creatinine      0.60 - 1.30 mg/dL  0.60   Sodium      132 - 146 mmol/L  138   Potassium      3.5 - 5.5 mmol/L  4.3   Chloride      99 - 109 mmol/L  103   CO2      20.0 - 31.0 mmol/L  30.0   Calcium      8.7 - 10.4 mg/dL  9.2   Total Protein      5.7 - 8.2 g/dL  7.1   Albumin      3.20 - 4.80 g/dL  3.80   ALT (SGPT)      7 - 40 U/L  44 (H)   AST (SGOT)      0 - 33 U/L  18   Alkaline Phosphatase      25 - 100 U/L  100   Total Bilirubin      0.3 - 1.2 mg/dL  0.4   eGFR Non African Amer      >60 mL/min/1.73  109   Globulin      gm/dL  3.3   A/G Ratio      1.5 - 2.5 g/dL  1.2 (L)   BUN/Creatinine Ratio      7.0 - 25.0  21.7   Anion Gap      3.0 - 11.0 mmol/L  5.0   THC Screen, Urine      Negative Negative    Phencyclidine (PCP), Urine       Negative Negative    Cocaine Screen, Urine      Negative Negative    Methamphetamine, Urine      Negative Negative    Opiate Screen, Urine      Negative Negative    Amphetamine Screen, Urine      Negative Negative    Benzodiazepine Screen, Urine      Negative Negative    Tricyclic Antidepressants Screen      Negative Negative    Methadone Screen , Urine      Negative Negative    Barbiturates Screen, Urine      Negative Negative    Oxycodone Screen, Urine      Negative Negative    Propoxyphene Screen      Negative Negative    Buprenorphine, Screen, Urine      Negative Negative    Color, UA      Yellow, Straw Yellow    Appearance, UA      Clear Clear    pH, UA      5.0 - 8.0 6.0    Specific Gravity, UA      1.001 - 1.030 1.020    Glucose, UA      Negative >=1000 mg/dL (3+) (A)    Ketones, UA      Negative Negative    Bilirubin, UA      Negative Negative    Blood, UA      Negative Negative    Protein, UA      Negative Negative    Leuk Esterase, UA      Negative Negative    Nitrite, UA      Negative Negative    Urobilinogen, UA      0.2 - 1.0 E.U./dL 0.2 E.U./dL    Total Cholesterol      0 - 200 mg/dL  161   Triglycerides      0 - 150 mg/dL  134   HDL Cholesterol      40 - 60 mg/dL  40   LDL Cholesterol       0 - 130 mg/dL  120   Hemoglobin A1C      4.80 - 5.60 %  10.80 (H)   Free T4      0.89 - 1.76 ng/dL  1.13   TSH Baseline      0.350 - 5.350 mIU/mL  0.877   Bilateral Carotid Duplex:  Right ICA Prox: Smooth heterogeneous plaque present.   • Right Vertebral: Antegrade flow noted.   • Left ICA Prox: Smooth heterogeneous plaque present.   • Left Vertebral: Antegrade flow noted  MRI:  No acute intracranial process.  Normal brain MRI.  CT perfusion:  Normal cerebral perfusion study.  ECHO:  · Left ventricular wall thickness is consistent with mild concentric hypertrophy.  · There is no evidence of pericardial effusion.  · Estimated right ventricular systolic pressure from tricuspid regurgitation is normal (<35  "mmHg).  · Saline test results are negative.  · Left ventricular diastolic function is normal.  · Left ventricular wall thickness is consistent with mild concentric hypertrophy  · Estimated EF appears to be in the range of 61 - 65%.  · No structural valvular abnormality demonstrated.    Condition on Discharge:  stable    Physical Exam on Discharge:  Visit Vitals   • /74 (BP Location: Left arm, Patient Position: Lying)   • Pulse 93   • Temp 98.4 °F (36.9 °C) (Oral)   • Resp 18   • Ht 64\" (162.6 cm)   • Wt 223 lb (101 kg)   • SpO2 96%   • BMI 38.28 kg/m2     Physical Exam   Constitutional: She is oriented to person, place, and time. She appears well-developed and well-nourished. No distress.   HENT:   Head: Normocephalic.   Eyes: No scleral icterus.   Neck: Neck supple.   Cardiovascular: Normal rate and regular rhythm.  Exam reveals no gallop and no friction rub.    No murmur heard.  Pulmonary/Chest: Effort normal and breath sounds normal. No respiratory distress. She has no wheezes.   Abdominal: Soft. Bowel sounds are normal.   Obese  Diffuse tenderness     Musculoskeletal: She exhibits no edema.   Neurological: She is alert and oriented to person, place, and time.   Minimal right sided weakness, lower > upper  No dysarthria   Skin: Skin is warm and dry.   Psychiatric: She has a normal mood and affect.   Vitals reviewed.        Discharge Disposition  Home or Self Care    Discharge Medications   Lilo Bernstein   Home Medication Instructions LUIZ:[id number]    Printed on:03/20/17 0546   Medication Information                      aspirin 325 MG tablet  Take 325 mg by mouth Daily.             atorvastatin (LIPITOR) 80 MG tablet  Take 1 tablet by mouth Every Night.             citalopram (CeleXA) 40 MG tablet  Take 40 mg by mouth Daily.             gabapentin (NEURONTIN) 600 MG tablet  Take 600 mg by mouth 3 (Three) Times a Day.             insulin regular (humuLIN R) 500 UNIT/ML CONCENTRATED injection  Inject  " under the skin 3 (Three) Times a Day Before Meals. 110 units am, 95 units pm, 95 units nightly             Liraglutide (VICTOZA) 18 MG/3ML solution pen-injector  Inject 1.8 mg under the skin Daily.             metFORMIN (GLUCOPHAGE) 1000 MG tablet  Take 1,000 mg by mouth 2 (Two) Times a Day With Meals.             raNITIdine (ZANTAC) 150 MG tablet  Take 150 mg by mouth 2 (Two) Times a Day.             vitamin D (ERGOCALCIFEROL) 41207 UNITS capsule capsule  Take 50,000 Units by mouth 1 (One) Time Per Week.                 Discharge Diet:   Diet Instructions     Diet: Regular, Consistent Carbohydrate, Cardiac; Thin Liquids, No Restrictions       Discharge Diet:   Regular  Consistent Carbohydrate  Cardiac      Fluid Consistency:  Thin Liquids, No Restrictions                 Discharge Care Plan / Instructions:    Activity at Discharge:   Activity Instructions     Activity as Tolerated                     Follow-up Appointments  No future appointments.  Additional Instructions for the Follow-ups that You Need to Schedule     Ambulatory Referral to Physical Therapy Evaluate and treat    As directed    Specialty modality needed?:  Evaluate and treat       Discharge Follow-Up With Specified Provider    As directed    To:  Primary endocrinologist as scheduled       Discharge Follow-up with PCP    As directed    Follow Up Details:  this week       Referral to Occupational Therapy    As directed                     NIDA Everett 03/20/17 3:31 PM    Time: Discharge 35 min    Please note that portions of this note may have been completed with a voice recognition program. Efforts were made to edit the dictations, but occasionally words are mistranscribed.

## 2017-03-20 NOTE — PLAN OF CARE
Problem: Patient Care Overview (Adult)  Goal: Plan of Care Review  Outcome: Outcome(s) achieved Date Met:  03/20/17 03/20/17 1503   Coping/Psychosocial Response Interventions   Plan Of Care Reviewed With patient   Outcome Evaluation   Outcome Summary/Follow up Plan OT ldal complete. Pt presents with decreased RUE sensation, strength and coordination. Pt and spouse educated in UE HEP to improve strength and coordination. Pt to be discharged home with spouse today. Recommend outpatient OT services to improve RUE function to support independence with ADL/IADLs         Problem: Inpatient Occupational Therapy  Goal: Patient Education Goal STG- OT  Outcome: Outcome(s) achieved Date Met:  03/20/17 03/20/17 1503   Patient Education OT STG   Patient Education OT STG, Date Established 03/20/17   Patient Education OT STG, Time to Achieve 1 day   Patient Education OT STG, Education Type written program;HEP;home safety   Patient Education OT STG, Education Understanding demonstrates adequately;verbalizes understanding   Patient Education OT STG Outcome goal met

## 2017-03-20 NOTE — CONSULTS
"Diabetes Education  Assessment/Teaching    Patient Name:  Lilo Bernstein  YOB: 1975  MRN: [id number]  Admit Date:  3/19/2017      Assessment Date:  3/20/2017       Most Recent Value    General Information      Referral From:  MD order    Height  5' 4\" (1.626 m)    Height Method  Stated    Weight  223 lb (101 kg)    Weight Method  Standing scale    Pregnancy Assessment     Diabetes History     What type of diabetes do you have?  Type 2    Length of Diabetes Diagnosis  10 + years [Dx in 1993]    Current DM knowledge  fair    Do you have any diabetes complications?  neuropathy, circulation problems    Education Preferences     What areas of diabetes would you like to learn about?  avoiding high blood sugar, avoiding low blood sugar, testing my blood sugar at home, understanding diabetes, medications for diabetes, diabetes complications    Nutrition Information     Assessment Topics     Healthy Eating - Assessment  Needs education    Being Active - Assessment  Needs education    Taking Medication - Assessment  Needs education    Problem Solving - Assessment  Needs education    Reducing Risk - Assessment  Needs education    Healthy Coping - Assessment  Competent    Monitoring - Assessment  Competent [Pt states she self-monitors 5 to 6 times per day]    DM Goals                Most Recent Value    DM Education Needs     Meter  Has own    Frequency of Testing  Other (comment)    Medication  Oral, Insulin, Other injectables, Actions, Side effects, Pen [Pt's home medications U-500, Victoza, and Metformin]    Problem Solving  Hypoglycemia, Hyperglycemia, Signs, Symptoms, Treatment    Reducing Risks  A1C testing, Eye exam, Foot care    Healthy Eating  Other (comment)    Physical Activity  -- [Encouraged pt to learn carbohydrate counting to help with her diabetes self management  and for future insulin pump use. ]    Physical Activity Frequency  Discussed exercise importance    Healthy Coping  Appropriate    " Discharge Plan  Home, Follow-up with endocrinolgoist [Pt states she see Dr. Shruthi Contreras in [facility]. ]    Motivation  Engaged    Teaching Method  Explanation, Discussion, Handouts, Teach back    Patient Response  Verbalized understanding            Other Comments:  20 minutes. Pt granted permission for education. Pt's  is present at her bedside. Pt is a 42 year old female with a history of poorly controlled Type 2 DM. Pt home meds are U-500 (approx. 1500 units per day), Vicotza 1.8mg daily, and Metformin 1000mg BID. Pt self reports she takes her insulin has prescribed and self-monitors 5 to 6 times per day. Pt is hopeful she will get an insulin pump in the future. Discussed and taught patient about type 2 diabetes self-management, and importance of blood glucose control to reduce complications. Target blood glucose readings and A1c goals per ADA were reviewed. Reviewed with patient current A1c of 10.8% and discussed its significance. Signs, symptoms and treatment of hyperglycemia and hypoglycemia were discussed. Lifestyle changes such as physical activity with MD approval and healthy eating were encouraged. S  Patient was encouraged to keep record of blood glucose readings to take to follow up appointment with PCP.  Pt was offered a free op follow up appointment however declined at this time.   Pt was then encouraged to go to her local health department to learn carbohydrate counting.         Electronically signed by:  Mariana Ag RN  03/20/17 2:49 PM

## 2017-03-20 NOTE — THERAPY DISCHARGE NOTE
Acute Care - Occupational Therapy Initial Eval/Discharge  [facility]     Patient Name: Lilo Bernstein  : 1975  MRN: [id number]  Today's Date: 3/20/2017  Onset of Illness/Injury or Date of Surgery Date: 17  Date of Referral to OT: 17  Referring Physician: NIDA Chavez      Admit Date: 3/19/2017       ICD-10-CM ICD-9-CM   1. Acute right-sided weakness M62.89 728.87   2. Elevated glucose R73.09 790.29   3. Impaired mobility and ADLs Z74.09 799.89   4. Impaired functional mobility, balance, gait, and endurance Z74.09 V49.89     Patient Active Problem List   Diagnosis   • Acute right-sided weakness   • Type 2 diabetes mellitus   • Asthma   • Mitral valve prolapse   • GERD (gastroesophageal reflux disease)   • History of DVT (deep vein thrombosis)   • History of TIA (transient ischemic attack)     Past Medical History   Diagnosis Date   • Diabetes mellitus    • H/O blood clots    • Heart murmur    • Hyperlipidemia    • Mitral valve prolapse    • Neuropathy    • TIA (transient ischemic attack)      4 TIMES     Past Surgical History   Procedure Laterality Date   • Knee arthroplasty     • Hysterectomy     • Appendectomy     • Cholecystectomy     • Finger fusion            OT ASSESSMENT FLOWSHEET (last 72 hours)      OT Evaluation       17 1509 17 1306 17 1305 17 1051 17 2107    Rehab Evaluation    Document Type  discharge evaluation/summary  -AC discharge evaluation/summary  -SJ      Subjective Information  agree to therapy;complains of;numbness  -AC no complaints;agree to therapy  -SJ      Patient Effort, Rehab Treatment  good  -AC good  -SJ      Symptoms Noted During/After Treatment   none  -SJ      General Information    Patient Profile Review  yes  -AC yes  -SJ      Onset of Illness/Injury or Date of Surgery Date  17  -AC 17  -SJ      Referring Physician  NIDA Chavez  -BRANDEN GAN      General Observations  Pt sleeping in bed.  in room.    -AC Pt supine in bed, asleep, wakes easily. Pt with tele,  present  -SJ      Pertinent History Of Current Problem  Pt admit with Acute R sided weakness/ numbness and c/o of nausea.  MRI (-) for acute infarct.  H/o  TIA x 4  -AC 42 y.o.F flown to ED due to R-sided weakness and numbness. both CT and MRI (-) for acute changes. Concern for somatoform disorder. Pt has hx of TIA's.  -SJ      Precautions/Limitations  fall precautions  -AC fall precautions;other (see comments)   wears knee brace to walk  -SJ      Prior Level of Function  independent:;all household mobility;gait;ADL's;driving;work  -AC independent:;all household mobility;community mobility;gait;transfer;bed mobility;wound care;work;driving  -SJ      Equipment Currently Used at Home  none  -AC --   knee brace  -SJ      Plans/Goals Discussed With  patient and family;agreed upon  -AC patient;spouse/S.O.;agreed upon  -SJ      Risks Reviewed  patient and family:;LOB  -AC patient:;spouse/S.O.:;LOB;dizziness;increased discomfort;change in vital signs  -SJ      Benefits Reviewed  patient and family:;improve function;increase independence;increase strength;increase balance;increase knowledge  -AC patient:;spouse/S.O.:;improve function;increase independence;increase strength;increase balance;increase knowledge  -SJ      Barriers to Rehab  --   pt reports it took 3 mo to recover from last simialr event  -AC previous functional deficit   states it took 3mos to recover after last similar event  -SJ      Living Environment    Lives With  child(romi), dependent;spouse  -AC spouse;child(romi), dependent  -SJ  spouse;child(romi), dependent  -MS    Living Arrangements  house  -AC house  -SJ  house  -MS    Home Accessibility  no concerns;ramps present at home  -AC no concerns;ramps present at home  -SJ  no concerns  -MS    Stair Railings at Home     none  -MS    Type of Financial/Environmental Concern     none  -MS    Transportation Available     car  -MS    Clinical  Impression    Date of Referral to OT  03/19/17  -       OT Diagnosis  Decreased R UE sensation, strength and coordination limting ind with self care   -       Patient/Family Goals Statement  Pt would like to return to work  -       Criteria for Skilled Therapeutic Interventions Met  yes;treatment indicated   1 visit as pt being discharged  -       Rehab Potential  good, to achieve stated therapy goals  -       Therapy Frequency  evaluation only  -       Anticipated Discharge Disposition home with assist;home with outpatient services  - home with assist;home with outpatient services  -       Functional Level Prior    Ambulation    0-->independent  -DK     Transferring    0-->independent  -DK     Toileting    0-->independent  -DK     Bathing    0-->independent  -DK     Dressing    0-->independent  -DK     Eating    0-->independent  -DK     Communication    0-->understands/communicates without difficulty  -DK     Swallowing    0-->swallows foods/liquids without difficulty  -DK     Prior Functional Level Comment    --   Pt states she uses a knee brace on the right knee and still works.  -DK     Vital Signs    Pre Systolic BP Rehab  126  -  -SJ      Pre Treatment Diastolic BP  92  -AC 92  -SJ      Post Systolic BP Rehab  109  -  -SJ      Post Treatment Diastolic BP  70  -AC 70  -SJ      Pretreatment Heart Rate (beats/min)  108  -  -SJ      Posttreatment Heart Rate (beats/min)  91  -AC 91  -SJ      Pre SpO2 (%)  97  -AC 97  -SJ      O2 Delivery Pre Treatment  room air  -AC room air  -SJ      Post SpO2 (%)  96  -AC 96  -SJ      O2 Delivery Post Treatment  room air  -AC room air  -SJ      Pre Patient Position   Supine  -SJ      Intra Patient Position   Standing  -SJ      Post Patient Position   Supine  -SJ      Pain Assessment    Pain Assessment  No/denies pain  - No/denies pain  -SJ      Vision Assessment/Intervention    Visual Impairment  WNL  -       Cognitive Assessment/Intervention     Current Cognitive/Communication Assessment  functional  -AC functional  -SJ      Orientation Status  oriented x 4  -AC oriented x 4  -SJ      Follows Commands/Answers Questions  100% of the time  -% of the time;able to follow multi-step instructions  -SJ      Personal Safety  WNL/WFL  -AC WNL/WFL  -SJ      Personal Safety Interventions  gait belt;nonskid shoes/slippers when out of bed;fall prevention program maintained  -AC gait belt;muscle strengthening facilitated;nonskid shoes/slippers when out of bed  -SJ      ROM (Range of Motion)    General ROM  upper extremity range of motion deficits identified;no range of motion deficits identified  -AC no range of motion deficits identified  -      MMT (Manual Muscle Testing)    General MMT Assessment  upper extremity strength deficits identified  -AC lower extremity strength deficits identified  -      General MMT Assessment Detail  RUE 3+/5; LUE 5/5  -AC RLE grossly 4-/5, LLE grossly 5/5  -SJ      Bed Mobility, Assessment/Treatment    Bed Mobility, Assistive Device  head of bed elevated  -AC head of bed elevated;bed rails  -SJ      Bed Mob, Supine to Sit, [place]  supervision required  -AC supervision required  -SJ      Bed Mob, Sit to Supine, [place]  supervision required  -AC supervision required  -SJ      Bed Mobility, Impairments  coordination impaired;strength decreased  -AC coordination impaired;strength decreased  -      Bed Mobility, Comment   no safety concerns  -SJ      Transfer Assessment/Treatment    Transfers, Sit-Stand [place]  supervision required  -AC supervision required  -SJ      Transfers, Stand-Sit [place]  supervision required  -AC supervision required  -SJ      Transfer, Safety Issues  weight-shifting ability decreased  -AC weight-shifting ability decreased  -      Transfer, Impairments  strength decreased;sensation decreased  -AC strength decreased;impaired balance  -      Functional Mobility    Functional  Mobility- Ind. Level  --   min without walker; CGA with walker  -       Functional Mobility- Device  rolling walker  -       Functional Mobility-Distance (Feet)  --   in hallway  -       Functional Mobility- Comment  leans toward left and drags right foot  -       Lower Body Dressing Assessment/Training    LB Dressing Assess/Train, Clothing Type  donning:;shoes   sliop on shoes  -       LB Dressing Assess/Train, Position  sitting  -       LB Dressing Assess/Train, [place]  set up required;supervision required  -       LB Dressing Assess/Train, Comment  pt used left foot to lift right off floor to place in shoe  -       Motor Skills/Interventions    Additional Documentation  Balance Skills Training (Group);Fine Motor Coordination Training (Group);Gross Motor Coordination Training (Group)  - Balance Skills Training (Group)  -      Balance Skills Training    Sitting-Level of Assistance  Independent  - Independent  -      Standing-Level of Assistance  Close supervision  - Close supervision  -      Static Standing Balance Support  assistive device  - assistive device  -      Gait Balance-Level of Assistance  Contact guard  - Contact guard  -      Gait Balance Support  assistive device   min assist without AD  - assistive device  -      Gross Motor Coordination Training    Gross Motor Skill, Impairments Detail  R UE impared with finger to nose and alternating hands  -       Fine Motor Coordination Training    Opposition  Right:;impaired  -       Sensory Assessment/Intervention    Sensory Impairment  numbness  -       Light Touch  RUE  -       RUE Light Touch  mild impairment  -       General Therapy Interventions    Planned Therapy Interventions  ADL retraining;home exercise program  -       Positioning and Restraints    Pre-Treatment Position  in bed  - in bed  -      Post Treatment Position  bed  - bed  -      In Bed  supine;call light within  reach;encouraged to call for assist;with family/caregiver   with APRN  -AC supine;call light within reach;encouraged to call for assist;with family/caregiver;with other staff;SCD pump applied  -        03/19/17 2101                General Information    Equipment Currently Used at Home other (see comments)   Knee Brace  -MS        Functional Level Prior    Ambulation 0-->independent  -MS        Transferring 0-->independent  -MS        Toileting 0-->independent  -MS        Bathing 0-->independent  -MS        Dressing 0-->independent  -MS        Eating 0-->independent  -MS        Communication 0-->understands/communicates without difficulty  -MS        Swallowing 0-->swallows foods/liquids without difficulty  -MS          User Key  (r) = Recorded By, (t) = Taken By, (c) = Cosigned By    Initials Name Effective Dates    AC Disha Cui, OT 06/23/15 -     SJ Doris Layne, PT 06/19/15 -     YOLANDE Willard, RN 03/14/16 -     MS Maliha Leon, RN 11/01/16 -           Occupational Therapy Education     Title: PT OT SLP Therapies (Done)     Topic: Occupational Therapy (Done)     Point: ADL training (Done)    Description: Instruct learner(s) on proper safety adaptation and remediation techniques during self care or transfers.   Instruct in proper use of assistive devices.    Learning Progress Summary    Learner Readiness Method Response Comment Documented by Status   Patient Acceptance E,D,H KEVIN GRIFFIN pt and spouse issued RUE strengthening and FMC/GMC HEP and pt and spouse verbalized understanding  03/20/17 1502 Done   Family Acceptance E,D,H KEVIN GRIFFIN pt and spouse issued RUE strengthening and FMC/GMC HEP and pt and spouse verbalized understanding  03/20/17 1502 Done               Point: Home exercise program (Done)    Description: Instruct learner(s) on appropriate technique for monitoring, assisting and/or progressing therapeutic exercises/activities.    Learning Progress Summary    Learner Readiness Method  Response Comment Documented by Status   Patient Acceptance E,D,H KEVIN GRIFFIN pt and spouse issued RUE strengthening and FMC/GMC HEP and pt and spouse verbalized understanding  03/20/17 1502 Done   Family Acceptance E,D,H KEVIN GRIFFIN pt and spouse issued RUE strengthening and FMC/GMC HEP and pt and spouse verbalized understanding  03/20/17 1502 Done                      User Key     Initials Effective Dates Name Provider Type Discipline     06/23/15 -  Disha Cui, OT Occupational Therapist OT                OT Recommendation and Plan  Anticipated Discharge Disposition: home with assist, home with outpatient services  Planned Therapy Interventions: ADL retraining, home exercise program  Therapy Frequency: evaluation only  Plan of Care Review  Plan Of Care Reviewed With: patient  Outcome Summary/Follow up Plan: OT eval complete.  Pt presents with decreased RUE sensation, strength and coordination.  Pt and spouse educated in UE HEP to improve strength and coordination.  Pt to be discharged home with spouse today.  Recommend outpatient OT services to  improve RUE function to support independence with ADL/IADLs           OT Goals       03/20/17 1503          Patient Education OT STG    Patient Education OT STG, Date Established 03/20/17  -      Patient Education OT STG, Time to Achieve 1 day  -      Patient Education OT STG, Education Type written program;HEP;home safety  -      Patient Education OT STG, Education Understanding demonstrates adequately;verbalizes understanding  -AC      Patient Education OT STG Outcome goal met  -AC        User Key  (r) = Recorded By, (t) = Taken By, (c) = Cosigned By    Initials Name Provider Type    AC Disha Cui, OT Occupational Therapist                Outcome Measures       03/20/17 1500 03/20/17 1306       How much help from another is currently needed...    Putting on and taking off regular lower body clothing?  3  -AC     Bathing (including washing, rinsing, and drying)  3   -AC     Toileting (which includes using toilet bed pan or urinal)  3  -AC     Putting on and taking off regular upper body clothing  3  -AC     Taking care of personal grooming (such as brushing teeth)  3  -AC     Eating meals  3  -AC     Score  18  -AC     Modified [place] Scale    Modified [place] Scale  3 - Moderate disability.  Requiring some help, but able to walk without assistance.  -AC     Functional Assessment    Outcome Measure Options AM-PAC 6 Clicks Daily Activity (OT)  -AC AM-PAC 6 Clicks Daily Activity (OT);Modified [place]  -AC       User Key  (r) = Recorded By, (t) = Taken By, (c) = Cosigned By    Initials Name Provider Type     Disha Cui OT Occupational Therapist          Time Calculation:         Time Calculation- OT       03/20/17 1509          Time Calculation- OT    OT Start Time 1306  -      Total Timed Code Minutes- OT 10 minute(s)  -      OT Received On 03/20/17  -AC        User Key  (r) = Recorded By, (t) = Taken By, (c) = Cosigned By    Initials Name Provider Type     Disha Cui OT Occupational Therapist          Therapy Charges for Today     Code Description Service Date Service Provider Modifiers Qty    42323082679  OT EVAL LOW COMPLEXITY 3 3/20/2017 Disha Cui OT GO 1    [id number]  OT THERAPEUTIC ACT EA 15 MIN 3/20/2017 Disha Cui OT GO 1    [id number]  OT SELFCARE CURRENT 3/20/2017 CARL Hwang CK 1    [id number]  OT SELFCARE PROJECTED 3/20/2017 CARL Hwang CK 1    [id number]  OT SELFCARE DISCHARGE 3/20/2017 CARL Hwang CK 1          OT G-codes  Functional Assessment Tool Used: impact 6clicks  Score: 18  Functional Limitation: Self care  Self Care Current Status (): At least 40 percent but less than 60 percent impaired, limited or restricted  Self Care Goal Status (): At least 40 percent but less than 60 percent impaired, limited or restricted  Self Care Discharge Status (): At least 40 percent but less  than 60 percent impaired, limited or restricted    OT Discharge Summary  Anticipated Discharge Disposition: home with assist, home with outpatient services  Reason for Discharge: All goals achieved  Outcomes Achieved: Able to achieve all goals within established timeline  Discharge Destination: Home with assist, Home with outpatient services    Disha Cui OT  3/20/2017

## 2017-03-20 NOTE — CONSULTS
Referring Provider: Dr Garcia  Reason for Consultation: right side weakness    Patient Care Team:  No Known Provider as PCP - General    Chief complaint right side weakness    Subjective .     History of present illness:  43 yo RH woman with diabetes, flown here yesterday after developing right side weakness and numbness while at work at about noon. Has h/o right side weakness in past, reports up to 3 months to resolve, with episode January 2015 with normal MRI and MRA with reported weakness. Had MRI yesterday (personally reviewed) that was unremarkable, and reports continued significant right side weakness now denies any headache with this episode or previous episodes (4 total).  Does feel nauseated.  Yesterday had some difficulty with slurred speech by her report, and vision seems a bit blurry throughout visual field.  Also feels numb on the right.  Had not been ill when this occurred.     Review of Systems  Pertinent items are noted in HPI, all other systems reviewed and negative    History  Past Medical History   Diagnosis Date   • Diabetes mellitus    • H/O blood clots    • Heart murmur    • Hyperlipidemia    • Mitral valve prolapse    • Neuropathy    • TIA (transient ischemic attack)      4 TIMES   ,   Past Surgical History   Procedure Laterality Date   • Knee arthroplasty     • Hysterectomy     • Appendectomy     • Cholecystectomy     • Finger fusion     , History reviewed. No pertinent family history.,   Social History   Substance Use Topics   • Smoking status: Never Smoker   • Smokeless tobacco: None   • Alcohol use No   ,   Prescriptions Prior to Admission   Medication Sig Dispense Refill Last Dose   • aspirin 325 MG tablet Take 325 mg by mouth Daily.   3/19/2017 at Unknown time   • azithromycin (ZITHROMAX) 250 MG tablet Take 250 mg by mouth Daily. Take 2 tablets the first day, then 1 tablet daily for 4 days.   3/19/2017 at Unknown time   • citalopram (CeleXA) 40 MG tablet Take 40 mg by mouth Daily.    "3/19/2017 at Unknown time   • gabapentin (NEURONTIN) 600 MG tablet Take 600 mg by mouth 3 (Three) Times a Day.   3/19/2017 at Unknown time   • insulin regular (humuLIN R) 500 UNIT/ML CONCENTRATED injection Inject  under the skin 3 (Three) Times a Day Before Meals. 110 units am, 95 units pm, 95 units nightly   3/19/2017 at Unknown time   • Liraglutide (VICTOZA) 18 MG/3ML solution pen-injector Inject 1.8 mg under the skin Daily.   3/18/2017 at Unknown time   • metFORMIN (GLUCOPHAGE) 1000 MG tablet Take 1,000 mg by mouth 2 (Two) Times a Day With Meals.   3/19/2017 at Unknown time   • pravastatin (PRAVACHOL) 40 MG tablet Take 40 mg by mouth Daily.      • raNITIdine (ZANTAC) 150 MG tablet Take 150 mg by mouth 2 (Two) Times a Day.   3/19/2017 at Unknown time   • vitamin D (ERGOCALCIFEROL) 01719 UNITS capsule capsule Take 50,000 Units by mouth 1 (One) Time Per Week.      , Scheduled Meds:    aspirin 325 mg Oral Daily   atorvastatin 80 mg Oral Nightly   citalopram 40 mg Oral Daily   famotidine 20 mg Oral Daily   gabapentin 600 mg Oral TID   , Continuous Infusions:   , PRN Meds:  •  acetaminophen  •  calcium carbonate  •  dextrose  •  dextrose  •  docusate sodium  •  glucagon (human recombinant)  •  ondansetron **OR** ondansetron  •  sodium chloride  •  sodium chloride and Allergies:  Novolog [insulin aspart] and Penicillins    Objective     Vital Signs   Blood pressure 131/85, pulse 87, temperature 98.3 °F (36.8 °C), temperature source Oral, resp. rate 18, height 64\" (162.6 cm), weight 223 lb (101 kg), SpO2 96 %.    Physical Exam:   Well-developed white woman lying comfortably in the hospital bed, fully alert, with normal language, attention, memory and fund of knowledge.  He pulse are 3 mm and reactive, optic disks benign, visual fields full to confrontation, extraocular movements intact, she reports diminished light touch in the entire right face, no facial asymmetry, hearing intact to finger rub, palate and shoulders " elevate symmetrically, tongue protrudes midline  Motor: With encouragement, right upper extremity strength is at least 4/5, she has downward drift of the arm with eyes closed but no pronation, right lower extremity can be held off the bed, proximal and distal strength at least 4 out of 5.  Left side is 5/5  Muscle tone is normal  Finger-nose-finger and heel-knee-shin are intact bilaterally  Reflexes are 2+ throughout and symmetric.  Toes downgoing on plantar stimulation  She reports diminished light touch in the entire right side and diminished vibration in right upper and lower extremity  Neck supple, no carotid bruit appreciated  Heart is regular rate and rhythm, no murmur appreciated  Abdomen soft, NT, ND    Results Review:   I reviewed the patient's new clinical results.  I reviewed the patient's new imaging results and agree with the interpretation.  I reviewed the patient's other test results and agree with the interpretation  Labs reviewed (A1c 10.8, cholesterol unremarkable except ), brain MRI reviewed as above, CT perfusion scan reviewed and agree unremarkable    Assessment/Plan     Principal Problem:    Acute right-sided weakness  Active Problems:    Type 2 diabetes mellitus    Asthma    Mitral valve prolapse    History of DVT (deep vein thrombosis)    History of TIA (transient ischemic attack)      Right side weakness-no evidence of ischemia.  Can discontinue NIH stroke scale.  With prolonged right-sided weakness and normal MRI as well as nonphysiologic elements on exam, concern for somatoform disorder rather than e.g. migraine.  Discussed with patient symptoms are likely to resolve soon.  Recommend PT and discharge home.    I discussed the patients findings and my recommendations with patient and family    Doris Bess MD  03/20/17  11:45 AM

## 2017-03-31 ENCOUNTER — APPOINTMENT (OUTPATIENT)
Dept: MRI IMAGING | Facility: HOSPITAL | Age: 42
End: 2017-03-31

## 2017-03-31 ENCOUNTER — HOSPITAL ENCOUNTER (INPATIENT)
Facility: HOSPITAL | Age: 42
LOS: 1 days | Discharge: HOME | End: 2017-04-01
Attending: INTERNAL MEDICINE | Admitting: INTERNAL MEDICINE

## 2017-03-31 DIAGNOSIS — Z86.73 HISTORY OF TIA (TRANSIENT ISCHEMIC ATTACK): ICD-10-CM

## 2017-03-31 DIAGNOSIS — Z78.9 IMPAIRED MOBILITY AND ADLS: Primary | ICD-10-CM

## 2017-03-31 DIAGNOSIS — Z74.09 IMPAIRED FUNCTIONAL MOBILITY, BALANCE, GAIT, AND ENDURANCE: ICD-10-CM

## 2017-03-31 DIAGNOSIS — Z74.09 IMPAIRED MOBILITY AND ADLS: Primary | ICD-10-CM

## 2017-03-31 DIAGNOSIS — R20.0 LEFT UPPER EXTREMITY NUMBNESS: ICD-10-CM

## 2017-03-31 PROBLEM — F32.A ANXIETY AND DEPRESSION: Status: ACTIVE | Noted: 2017-03-31

## 2017-03-31 PROBLEM — G45.9 TIA (TRANSIENT ISCHEMIC ATTACK): Status: ACTIVE | Noted: 2017-03-31

## 2017-03-31 PROBLEM — E66.9 OBESITY: Status: ACTIVE | Noted: 2017-03-31

## 2017-03-31 PROBLEM — E78.5 HYPERLIPIDEMIA: Status: ACTIVE | Noted: 2017-03-31

## 2017-03-31 PROBLEM — R11.0 NAUSEA: Status: ACTIVE | Noted: 2017-03-31

## 2017-03-31 PROBLEM — F41.9 ANXIETY AND DEPRESSION: Status: ACTIVE | Noted: 2017-03-31

## 2017-03-31 LAB
ALBUMIN SERPL-MCNC: 4 G/DL (ref 3.2–4.8)
ALBUMIN/GLOB SERPL: 1.3 G/DL (ref 1.5–2.5)
ALP SERPL-CCNC: 93 U/L (ref 25–100)
ALT SERPL W P-5'-P-CCNC: 37 U/L (ref 7–40)
ANION GAP SERPL CALCULATED.3IONS-SCNC: 6 MMOL/L (ref 3–11)
APTT PPP: 24.3 SECONDS (ref 24–31)
ARTICHOKE IGE QN: 125 MG/DL (ref 0–130)
AST SERPL-CCNC: 20 U/L (ref 0–33)
BASOPHILS # BLD AUTO: 0.05 10*3/MM3 (ref 0–0.2)
BASOPHILS NFR BLD AUTO: 0.5 % (ref 0–1)
BILIRUB SERPL-MCNC: 0.5 MG/DL (ref 0.3–1.2)
BUN BLD-MCNC: 9 MG/DL (ref 9–23)
BUN/CREAT SERPL: 15 (ref 7–25)
CALCIUM SPEC-SCNC: 9.6 MG/DL (ref 8.7–10.4)
CHLORIDE SERPL-SCNC: 101 MMOL/L (ref 99–109)
CHOLEST SERPL-MCNC: 179 MG/DL (ref 0–200)
CO2 SERPL-SCNC: 28 MMOL/L (ref 20–31)
CREAT BLD-MCNC: 0.6 MG/DL (ref 0.6–1.3)
DEPRECATED RDW RBC AUTO: 41.5 FL (ref 37–54)
EOSINOPHIL # BLD AUTO: 0.29 10*3/MM3 (ref 0.1–0.3)
EOSINOPHIL NFR BLD AUTO: 3.1 % (ref 0–3)
ERYTHROCYTE [DISTWIDTH] IN BLOOD BY AUTOMATED COUNT: 13.3 % (ref 11.3–14.5)
GFR SERPL CREATININE-BSD FRML MDRD: 109 ML/MIN/1.73
GLOBULIN UR ELPH-MCNC: 3.2 GM/DL
GLUCOSE BLD-MCNC: 317 MG/DL (ref 70–100)
GLUCOSE BLDC GLUCOMTR-MCNC: 263 MG/DL (ref 70–130)
GLUCOSE BLDC GLUCOMTR-MCNC: 263 MG/DL (ref 70–130)
GLUCOSE BLDC GLUCOMTR-MCNC: 338 MG/DL (ref 70–130)
GLUCOSE BLDC GLUCOMTR-MCNC: 351 MG/DL (ref 70–130)
GLUCOSE BLDC GLUCOMTR-MCNC: 399 MG/DL (ref 70–130)
HBA1C MFR BLD: 11.4 % (ref 4.8–5.6)
HCT VFR BLD AUTO: 40.8 % (ref 34.5–44)
HDLC SERPL-MCNC: 36 MG/DL (ref 40–60)
HGB BLD-MCNC: 13.3 G/DL (ref 11.5–15.5)
IMM GRANULOCYTES # BLD: 0.03 10*3/MM3 (ref 0–0.03)
IMM GRANULOCYTES NFR BLD: 0.3 % (ref 0–0.6)
INR PPP: 0.98
LYMPHOCYTES # BLD AUTO: 2.88 10*3/MM3 (ref 0.6–4.8)
LYMPHOCYTES NFR BLD AUTO: 31 % (ref 24–44)
MCH RBC QN AUTO: 28 PG (ref 27–31)
MCHC RBC AUTO-ENTMCNC: 32.6 G/DL (ref 32–36)
MCV RBC AUTO: 85.9 FL (ref 80–99)
MONOCYTES # BLD AUTO: 0.66 10*3/MM3 (ref 0–1)
MONOCYTES NFR BLD AUTO: 7.1 % (ref 0–12)
NEUTROPHILS # BLD AUTO: 5.37 10*3/MM3 (ref 1.5–8.3)
NEUTROPHILS NFR BLD AUTO: 58 % (ref 41–71)
PLATELET # BLD AUTO: 294 10*3/MM3 (ref 150–450)
PMV BLD AUTO: 10.2 FL (ref 6–12)
POTASSIUM BLD-SCNC: 4 MMOL/L (ref 3.5–5.5)
PROT SERPL-MCNC: 7.2 G/DL (ref 5.7–8.2)
PROTHROMBIN TIME: 10.7 SECONDS (ref 9.6–11.5)
RBC # BLD AUTO: 4.75 10*6/MM3 (ref 3.89–5.14)
SODIUM BLD-SCNC: 135 MMOL/L (ref 132–146)
TRIGL SERPL-MCNC: 133 MG/DL (ref 0–150)
TROPONIN I SERPL-MCNC: <0.006 NG/ML
TSH SERPL DL<=0.05 MIU/L-ACNC: 1.55 MIU/ML (ref 0.35–5.35)
WBC NRBC COR # BLD: 9.28 10*3/MM3 (ref 3.5–10.8)

## 2017-03-31 PROCEDURE — G0108 DIAB MANAGE TRN  PER INDIV: HCPCS | Performed by: REGISTERED NURSE

## 2017-03-31 PROCEDURE — 97166 OT EVAL MOD COMPLEX 45 MIN: CPT

## 2017-03-31 PROCEDURE — 63710000001 INSULIN REGULAR HUMAN PER 5 UNITS: Performed by: NURSE PRACTITIONER

## 2017-03-31 PROCEDURE — 92523 SPEECH SOUND LANG COMPREHEN: CPT

## 2017-03-31 PROCEDURE — 84443 ASSAY THYROID STIM HORMONE: CPT | Performed by: NURSE PRACTITIONER

## 2017-03-31 PROCEDURE — 82962 GLUCOSE BLOOD TEST: CPT

## 2017-03-31 PROCEDURE — 80053 COMPREHEN METABOLIC PANEL: CPT | Performed by: NURSE PRACTITIONER

## 2017-03-31 PROCEDURE — 63710000001 INSULIN DETEMIR PER 5 UNITS: Performed by: NURSE PRACTITIONER

## 2017-03-31 PROCEDURE — 85610 PROTHROMBIN TIME: CPT | Performed by: NURSE PRACTITIONER

## 2017-03-31 PROCEDURE — 99223 1ST HOSP IP/OBS HIGH 75: CPT | Performed by: INTERNAL MEDICINE

## 2017-03-31 PROCEDURE — 97162 PT EVAL MOD COMPLEX 30 MIN: CPT

## 2017-03-31 PROCEDURE — 83036 HEMOGLOBIN GLYCOSYLATED A1C: CPT | Performed by: NURSE PRACTITIONER

## 2017-03-31 PROCEDURE — 85730 THROMBOPLASTIN TIME PARTIAL: CPT | Performed by: NURSE PRACTITIONER

## 2017-03-31 PROCEDURE — 99254 IP/OBS CNSLTJ NEW/EST MOD 60: CPT | Performed by: PSYCHIATRY & NEUROLOGY

## 2017-03-31 PROCEDURE — 80061 LIPID PANEL: CPT | Performed by: NURSE PRACTITIONER

## 2017-03-31 PROCEDURE — 85025 COMPLETE CBC W/AUTO DIFF WBC: CPT | Performed by: NURSE PRACTITIONER

## 2017-03-31 PROCEDURE — 70551 MRI BRAIN STEM W/O DYE: CPT

## 2017-03-31 PROCEDURE — 84484 ASSAY OF TROPONIN QUANT: CPT | Performed by: NURSE PRACTITIONER

## 2017-03-31 PROCEDURE — 97530 THERAPEUTIC ACTIVITIES: CPT

## 2017-03-31 RX ORDER — SODIUM CHLORIDE 0.9 % (FLUSH) 0.9 %
1-10 SYRINGE (ML) INJECTION AS NEEDED
Status: DISCONTINUED | OUTPATIENT
Start: 2017-03-31 | End: 2017-04-01 | Stop reason: HOSPADM

## 2017-03-31 RX ORDER — ASPIRIN 300 MG/1
300 SUPPOSITORY RECTAL DAILY
Status: DISCONTINUED | OUTPATIENT
Start: 2017-04-01 | End: 2017-04-01 | Stop reason: HOSPADM

## 2017-03-31 RX ORDER — ERGOCALCIFEROL 1.25 MG/1
50000 CAPSULE ORAL WEEKLY
Status: DISCONTINUED | OUTPATIENT
Start: 2017-03-31 | End: 2017-04-01 | Stop reason: HOSPADM

## 2017-03-31 RX ORDER — GABAPENTIN 300 MG/1
600 CAPSULE ORAL EVERY 8 HOURS SCHEDULED
Status: DISCONTINUED | OUTPATIENT
Start: 2017-03-31 | End: 2017-03-31

## 2017-03-31 RX ORDER — CITALOPRAM 40 MG/1
40 TABLET ORAL DAILY
Status: DISCONTINUED | OUTPATIENT
Start: 2017-03-31 | End: 2017-04-01 | Stop reason: HOSPADM

## 2017-03-31 RX ORDER — ONDANSETRON 2 MG/ML
4 INJECTION INTRAMUSCULAR; INTRAVENOUS EVERY 6 HOURS PRN
Status: DISCONTINUED | OUTPATIENT
Start: 2017-03-31 | End: 2017-04-01 | Stop reason: HOSPADM

## 2017-03-31 RX ORDER — DULOXETIN HYDROCHLORIDE 30 MG/1
30 CAPSULE, DELAYED RELEASE ORAL EVERY 12 HOURS SCHEDULED
Status: DISCONTINUED | OUTPATIENT
Start: 2017-03-31 | End: 2017-04-01 | Stop reason: HOSPADM

## 2017-03-31 RX ORDER — NICOTINE POLACRILEX 4 MG
15 LOZENGE BUCCAL
Status: DISCONTINUED | OUTPATIENT
Start: 2017-03-31 | End: 2017-04-01 | Stop reason: HOSPADM

## 2017-03-31 RX ORDER — DOCUSATE SODIUM 100 MG/1
100 CAPSULE, LIQUID FILLED ORAL 2 TIMES DAILY PRN
Status: DISCONTINUED | OUTPATIENT
Start: 2017-03-31 | End: 2017-04-01 | Stop reason: HOSPADM

## 2017-03-31 RX ORDER — GABAPENTIN 300 MG/1
300 CAPSULE ORAL EVERY 8 HOURS SCHEDULED
Status: DISCONTINUED | OUTPATIENT
Start: 2017-03-31 | End: 2017-04-01 | Stop reason: HOSPADM

## 2017-03-31 RX ORDER — ATORVASTATIN CALCIUM 40 MG/1
80 TABLET, FILM COATED ORAL NIGHTLY
Status: DISCONTINUED | OUTPATIENT
Start: 2017-03-31 | End: 2017-04-01 | Stop reason: HOSPADM

## 2017-03-31 RX ORDER — ATORVASTATIN CALCIUM 40 MG/1
80 TABLET, FILM COATED ORAL NIGHTLY
Status: DISCONTINUED | OUTPATIENT
Start: 2017-03-31 | End: 2017-03-31

## 2017-03-31 RX ORDER — DEXTROSE MONOHYDRATE 25 G/50ML
25 INJECTION, SOLUTION INTRAVENOUS
Status: DISCONTINUED | OUTPATIENT
Start: 2017-03-31 | End: 2017-04-01 | Stop reason: HOSPADM

## 2017-03-31 RX ORDER — ASPIRIN 325 MG
325 TABLET ORAL DAILY
Status: DISCONTINUED | OUTPATIENT
Start: 2017-04-01 | End: 2017-04-01 | Stop reason: HOSPADM

## 2017-03-31 RX ORDER — ONDANSETRON 4 MG/1
4 TABLET, FILM COATED ORAL EVERY 6 HOURS PRN
Status: DISCONTINUED | OUTPATIENT
Start: 2017-03-31 | End: 2017-04-01 | Stop reason: HOSPADM

## 2017-03-31 RX ORDER — FAMOTIDINE 20 MG/1
20 TABLET, FILM COATED ORAL 2 TIMES DAILY
Status: DISCONTINUED | OUTPATIENT
Start: 2017-03-31 | End: 2017-04-01 | Stop reason: HOSPADM

## 2017-03-31 RX ORDER — ACETAMINOPHEN 325 MG/1
650 TABLET ORAL EVERY 4 HOURS PRN
Status: DISCONTINUED | OUTPATIENT
Start: 2017-03-31 | End: 2017-04-01 | Stop reason: HOSPADM

## 2017-03-31 RX ADMIN — FAMOTIDINE 20 MG: 20 TABLET, FILM COATED ORAL at 17:20

## 2017-03-31 RX ADMIN — INSULIN HUMAN 6 UNITS: 100 INJECTION, SOLUTION PARENTERAL at 12:02

## 2017-03-31 RX ADMIN — GABAPENTIN 300 MG: 300 CAPSULE ORAL at 21:56

## 2017-03-31 RX ADMIN — GABAPENTIN 600 MG: 300 CAPSULE ORAL at 06:24

## 2017-03-31 RX ADMIN — ONDANSETRON 4 MG: 4 TABLET, FILM COATED ORAL at 11:29

## 2017-03-31 RX ADMIN — GABAPENTIN 300 MG: 300 CAPSULE ORAL at 13:55

## 2017-03-31 RX ADMIN — DULOXETINE HYDROCHLORIDE 30 MG: 30 CAPSULE, DELAYED RELEASE ORAL at 12:06

## 2017-03-31 RX ADMIN — INSULIN DETEMIR 20 UNITS: 100 INJECTION, SOLUTION SUBCUTANEOUS at 21:56

## 2017-03-31 RX ADMIN — ERGOCALCIFEROL 50000 UNITS: 1.25 CAPSULE ORAL at 08:22

## 2017-03-31 RX ADMIN — INSULIN HUMAN 6 UNITS: 100 INJECTION, SOLUTION PARENTERAL at 08:22

## 2017-03-31 RX ADMIN — ATORVASTATIN CALCIUM 80 MG: 40 TABLET, FILM COATED ORAL at 21:56

## 2017-03-31 RX ADMIN — FAMOTIDINE 20 MG: 20 TABLET, FILM COATED ORAL at 08:21

## 2017-03-31 RX ADMIN — CITALOPRAM HYDROBROMIDE 40 MG: 40 TABLET ORAL at 08:21

## 2017-03-31 RX ADMIN — INSULIN HUMAN 8 UNITS: 100 INJECTION, SOLUTION PARENTERAL at 17:20

## 2017-03-31 RX ADMIN — ACETAMINOPHEN 650 MG: 325 TABLET, FILM COATED ORAL at 16:00

## 2017-03-31 RX ADMIN — DULOXETINE HYDROCHLORIDE 30 MG: 30 CAPSULE, DELAYED RELEASE ORAL at 21:56

## 2017-03-31 RX ADMIN — INSULIN HUMAN 6 UNITS: 100 INJECTION, SOLUTION PARENTERAL at 21:56

## 2017-03-31 RX ADMIN — INSULIN DETEMIR 20 UNITS: 100 INJECTION, SOLUTION SUBCUTANEOUS at 08:22

## 2017-04-01 ENCOUNTER — APPOINTMENT (OUTPATIENT)
Dept: MRI IMAGING | Facility: HOSPITAL | Age: 42
End: 2017-04-01

## 2017-04-01 VITALS
RESPIRATION RATE: 16 BRPM | TEMPERATURE: 98.1 F | SYSTOLIC BLOOD PRESSURE: 128 MMHG | OXYGEN SATURATION: 96 % | WEIGHT: 228.4 LBS | HEART RATE: 79 BPM | HEIGHT: 64 IN | DIASTOLIC BLOOD PRESSURE: 75 MMHG | BODY MASS INDEX: 38.99 KG/M2

## 2017-04-01 PROBLEM — R53.1 ACUTE LEFT-SIDED WEAKNESS: Status: ACTIVE | Noted: 2017-04-01

## 2017-04-01 PROBLEM — Z86.73 HISTORY OF TIA (TRANSIENT ISCHEMIC ATTACK): Status: RESOLVED | Noted: 2017-03-19 | Resolved: 2017-04-01

## 2017-04-01 PROBLEM — G45.9 TIA (TRANSIENT ISCHEMIC ATTACK): Status: RESOLVED | Noted: 2017-03-31 | Resolved: 2017-04-01

## 2017-04-01 LAB
GLUCOSE BLDC GLUCOMTR-MCNC: 305 MG/DL (ref 70–130)
GLUCOSE BLDC GLUCOMTR-MCNC: 347 MG/DL (ref 70–130)
GLUCOSE BLDC GLUCOMTR-MCNC: 390 MG/DL (ref 70–130)

## 2017-04-01 PROCEDURE — 72156 MRI NECK SPINE W/O & W/DYE: CPT

## 2017-04-01 PROCEDURE — 0 GADOBENATE DIMEGLUMINE 529 MG/ML SOLUTION: Performed by: INTERNAL MEDICINE

## 2017-04-01 PROCEDURE — 63710000001 INSULIN DETEMIR PER 5 UNITS: Performed by: NURSE PRACTITIONER

## 2017-04-01 PROCEDURE — 82962 GLUCOSE BLOOD TEST: CPT

## 2017-04-01 PROCEDURE — A9577 INJ MULTIHANCE: HCPCS | Performed by: INTERNAL MEDICINE

## 2017-04-01 PROCEDURE — 99232 SBSQ HOSP IP/OBS MODERATE 35: CPT | Performed by: PSYCHIATRY & NEUROLOGY

## 2017-04-01 PROCEDURE — 99239 HOSP IP/OBS DSCHRG MGMT >30: CPT | Performed by: NURSE PRACTITIONER

## 2017-04-01 RX ORDER — GABAPENTIN 300 MG/1
300 CAPSULE ORAL EVERY 8 HOURS SCHEDULED
Qty: 90 CAPSULE | Refills: 0 | Status: SHIPPED | OUTPATIENT
Start: 2017-04-01 | End: 2017-04-02

## 2017-04-01 RX ORDER — DULOXETIN HYDROCHLORIDE 30 MG/1
30 CAPSULE, DELAYED RELEASE ORAL EVERY 12 HOURS SCHEDULED
Qty: 60 CAPSULE | Refills: 0 | Status: SHIPPED | OUTPATIENT
Start: 2017-04-01 | End: 2018-07-10

## 2017-04-01 RX ADMIN — DULOXETINE HYDROCHLORIDE 30 MG: 30 CAPSULE, DELAYED RELEASE ORAL at 09:21

## 2017-04-01 RX ADMIN — GADOBENATE DIMEGLUMINE 20 ML: 529 INJECTION, SOLUTION INTRAVENOUS at 01:45

## 2017-04-01 RX ADMIN — CITALOPRAM HYDROBROMIDE 40 MG: 40 TABLET ORAL at 09:21

## 2017-04-01 RX ADMIN — ONDANSETRON 4 MG: 4 TABLET, FILM COATED ORAL at 01:32

## 2017-04-01 RX ADMIN — FAMOTIDINE 20 MG: 20 TABLET, FILM COATED ORAL at 09:22

## 2017-04-01 RX ADMIN — INSULIN HUMAN 7 UNITS: 100 INJECTION, SOLUTION PARENTERAL at 08:46

## 2017-04-01 RX ADMIN — INSULIN HUMAN 8 UNITS: 100 INJECTION, SOLUTION PARENTERAL at 11:35

## 2017-04-01 RX ADMIN — INSULIN DETEMIR 20 UNITS: 100 INJECTION, SOLUTION SUBCUTANEOUS at 08:50

## 2017-04-01 RX ADMIN — GABAPENTIN 300 MG: 300 CAPSULE ORAL at 05:49

## 2017-04-01 RX ADMIN — ASPIRIN 325 MG ORAL TABLET 325 MG: 325 PILL ORAL at 05:49

## 2017-04-01 NOTE — PROGRESS NOTES
Continued Stay Note  [facility]     Patient Name: Nivia Bernstein  MRN: [id number]  Today's Date: 4/1/2017    Admit Date: 3/31/2017          Discharge Plan       04/01/17 1247    Case Management/Social Work Plan    Plan spoke with patient and called referral to professional home health for PT/OT awaiting acceptance if not accepted will call [facility]. Patient can go home and I will follow up on Monday. RN notified and patient notified.    Patient/Family In Agreement With Plan yes    Additional Comments follow up Monday              Discharge Codes     None        Expected Discharge Date and Time     Expected Discharge Date Expected Discharge Time    Apr 1, 2017             Diann Gonzalez

## 2017-04-01 NOTE — DISCHARGE SUMMARY
[facility] Medicine Services  DISCHARGE SUMMARY       Date of Admission: 3/31/2017  Date of Discharge:  4/1/2017  Primary Care Physician: No Known Provider    Discharge Diagnoses:  Active Hospital Problems (** Indicates Principal Problem)    Diagnosis Date Noted   • **Acute left-sided weakness [M62.89] 04/01/2017   • Left upper extremity numbness [R20.0] 03/31/2017   • Hyperlipidemia [E78.5] 03/31/2017   • Anxiety and depression [F41.9, F32.9] 03/31/2017   • Obesity [E66.9] 03/31/2017   • Nausea [R11.0] 03/31/2017   • GERD (gastroesophageal reflux disease) [K21.9] 03/19/2017   • Type 2 diabetes mellitus [E11.9] 03/19/2017   • Mitral valve prolapse [I34.1] 03/19/2017      Resolved Hospital Problems    Diagnosis Date Noted Date Resolved   • TIA (transient ischemic attack) [G45.9] 03/31/2017 04/01/2017   • History of TIA (transient ischemic attack) [Z86.73] 03/19/2017 04/01/2017       Presenting Problem/History of Present Illness  TIA (transient ischemic attack) [G45.9]       History of Present Illness on Day of Discharge:   Patient is awake and alert sitting up on side of bed. Family at bedside.  Patient appears anxious for test results of neck scan.  She reports that she continues to have left sided weakness but slightly improved since yesterday.  She reports that she has been up walking with walker in hallways per PT/OT.  Denies any new or additional health concerns.    Hospital Course  Ms. Bernstein is a 42 year old female with PMH significant for multiple (4-5) previous TIAs, hx of stroke, hx of DVT, hyperlipidemia, type 2 diabetes associated with neuropathy, mitral valve prolapse, asthma, GERD, anxiety and depression who presented to [facility] on 3/31/17 as a direct admit transfer from [facility] where she initially presented there for c/o left sided weakness and numbness and nausea. Her  is present at the bedside and he reported that her symptoms started on 3/30/2017 at 1800 and  progressed to tingling of left upper extremity and pain that radiated into her shoulders. She presented to OSH at approximately 2000 on 3/30/2017. She was given 324 mg of ASA and 4 mg IV Zofran. Her CT brain without contrast showed no evidence of acute intracranial abnormality or focal parenchymal lesion. It did reveal patchy mucosal thickening in the right maxillary sinus. She was transferred to [facility] for higher level of care and to rule out TIA vs stroke.  The patient had a previous admission to [facility] on 3/19/2017 for complaints of right-sided numbness and weakness. Her stroke workup was unremarkable. 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 Her  reports that she still drags her right upper extremity and right lower extremity.      Neurology was consulted, saw the patient and ordered MRI of c-spine.  Pending mri of c-spine, neurology recommended tapering and discontinuing gabapentin and starting Cymbalta.  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  MRI revealed no abnormalities.  Labs did not reveal any significant abnormalities except A1C was significantly elevated at 11.4%.    The patient is medically stable for discharge home.  It is recommended that she followup with her PCP for further treatment of uncontrolled diabetes as well as continue PT/OT on outpatient basis and use walker for ambulation.  The patient will be discharged home via transport per family.      Procedures Performed     None    Consults:   Consults     Date and Time Order Name Status Description    3/31/2017 0439 Inpatient consult to Neurology Completed           Pertinent Test Results:  Component      Latest Ref Rng & Units 3/31/2017   Troponin I      <=0.039 ng/mL <0.006     Component      Latest Ref Rng & Units 3/31/2017   TSH Baseline       0.350 - 5.350 mIU/mL 1.549     Component      Latest Ref Rng & Units 3/31/2017   Protime      9.6 - 11.5 Seconds 10.7   INR       0.98     Component      Latest Ref Rng & Units 3/31/2017   aPTT      24.0 - 31.0 seconds 24.3     Component      Latest Ref Rng & Units 3/31/2017   Glucose      70 - 100 mg/dL 317 (H)   BUN      9 - 23 mg/dL 9   Creatinine      0.60 - 1.30 mg/dL 0.60   Sodium      132 - 146 mmol/L 135   Potassium      3.5 - 5.5 mmol/L 4.0   Chloride      99 - 109 mmol/L 101   CO2      20.0 - 31.0 mmol/L 28.0   Calcium      8.7 - 10.4 mg/dL 9.6   Total Protein      5.7 - 8.2 g/dL 7.2   Albumin      3.20 - 4.80 g/dL 4.00   ALT (SGPT)      7 - 40 U/L 37   AST (SGOT)      0 - 33 U/L 20   Alkaline Phosphatase      25 - 100 U/L 93   Total Bilirubin      0.3 - 1.2 mg/dL 0.5   eGFR Non African Amer      >60 mL/min/1.73 109   Globulin      gm/dL 3.2   A/G Ratio      1.5 - 2.5 g/dL 1.3 (L)   BUN/Creatinine Ratio      7.0 - 25.0 15.0   Anion Gap      3.0 - 11.0 mmol/L 6.0     Component      Latest Ref Rng & Units 3/31/2017   Total Cholesterol      0 - 200 mg/dL 179   Triglycerides      0 - 150 mg/dL 133   HDL Cholesterol      40 - 60 mg/dL 36 (L)   LDL Cholesterol       0 - 130 mg/dL 125     Component      Latest Ref Rng & Units 3/31/2017   Hemoglobin A1C      4.80 - 5.60 % 11.40 (H)     Component      Latest Ref Rng & Units 3/31/2017   WBC      3.50 - 10.80 10*3/mm3 9.28   RBC      3.89 - 5.14 10*6/mm3 4.75   Hemoglobin      11.5 - 15.5 g/dL 13.3   Hematocrit      34.5 - 44.0 % 40.8   MCV      80.0 - 99.0 fL 85.9   MCH      27.0 - 31.0 pg 28.0   MCHC      32.0 - 36.0 g/dL 32.6   RDW      11.3 - 14.5 % 13.3   RDW-SD      37.0 - 54.0 fl 41.5   MPV      6.0 - 12.0 fL 10.2   Platelets      150 - 450 10*3/mm3 294   Neutrophil %      41.0 - 71.0 % 58.0   Lymphocyte %      24.0 - 44.0 % 31.0   Monocyte %      0.0 - 12.0 % 7.1   Eosinophil %      0.0 - 3.0 % 3.1 (H)   Basophil %      0.0 - 1.0 % 0.5   Immature Grans %     "  0.0 - 0.6 % 0.3   Neutrophils, Absolute      1.50 - 8.30 10*3/mm3 5.37   Lymphocytes, Absolute      0.60 - 4.80 10*3/mm3 2.88   Monocytes, Absolute      0.00 - 1.00 10*3/mm3 0.66   Eosinophils, Absolute      0.10 - 0.30 10*3/mm3 0.29   Basophils, Absolute      0.00 - 0.20 10*3/mm3 0.05   Immature Grans, Absolute      0.00 - 0.03 10*3/mm3 0.03     MRI brain w/o contrast  IMPRESSION:  No evidence of acute infarct, hemorrhage, mass-effect or acute intracranial   abnormality at this time.  3/31/17    MRI cervical spine w/ & w/o contrast  IMPRESSION:  Negative MR datasets of the cervical spine without and with  contrast. Enhancing intramedullary lesion, epidural mass, pathologic  cord signal, high-grade dural sac defect or lateralizing impingement is  not identified. Little significant change has occurred since previous  datasets of 01/17/2015 and no acute findings are noted.      DICTATED: 04/01/2017  EDITED: 04/01/2017         THIS DOCUMENT HAS BEEN ELECTRONICALLY SIGNED BY TAN SRUESH JR. MD  Condition on Discharge:  Stable for discharge and further treatment on outpatient basis.     Physical Exam on Discharge:/75  Pulse 79  Temp 98.1 °F (36.7 °C) (Oral)   Resp 16  Ht 64\" (162.6 cm)  Wt 228 lb 6.4 oz (104 kg)  SpO2 96%  BMI 39.2 kg/m2  Physical Exam   Constitutional: She is oriented to person, place, and time. She appears well-developed and well-nourished. No distress.   HENT:   Head: Normocephalic.   Eyes: Pupils are equal, round, and reactive to light.   Neck: Normal range of motion.   Cardiovascular: Normal rate and regular rhythm.    Pulmonary/Chest: Effort normal and breath sounds normal. No respiratory distress. She has no wheezes. She exhibits no tenderness.   Abdominal: Soft. Bowel sounds are normal. She exhibits no distension. There is no tenderness.   Musculoskeletal: She exhibits no edema.   Neurological: She is alert and oriented to person, place, and time. She has normal reflexes.   Skin: " Skin is warm and dry.   Psychiatric: She has a normal mood and affect. Her behavior is normal.   Exhibiting anxiousness about results of c-spine scan during encounter today.          Discharge Disposition  Home or Self Care    Discharge Medications   Nivia Bernstein   Home Medication Instructions LUIZ:[id number]    Printed on:04/01/17 3235   Medication Information                      aspirin 325 MG tablet  Take 325 mg by mouth Daily.             atorvastatin (LIPITOR) 80 MG tablet  Take 1 tablet by mouth Every Night.             DULoxetine (CYMBALTA) 30 MG capsule  Take 1 capsule by mouth Every 12 (Twelve) Hours.             gabapentin (NEURONTIN) 300 MG capsule  Take 1 capsule by mouth Every 8 (Eight) Hours for 2 doses.             insulin regular (humuLIN R) 500 UNIT/ML CONCENTRATED injection  Inject  under the skin 3 (Three) Times a Day Before Meals. 110 units am, 95 units pm, 95 units nightly             Liraglutide (VICTOZA) 18 MG/3ML solution pen-injector  Inject 1.8 mg under the skin Daily.             metFORMIN (GLUCOPHAGE) 1000 MG tablet  Take 1,000 mg by mouth 2 (Two) Times a Day With Meals.             raNITIdine (ZANTAC) 150 MG tablet  Take 150 mg by mouth 2 (Two) Times a Day.             vitamin D (ERGOCALCIFEROL) 05083 UNITS capsule capsule  Take 50,000 Units by mouth 1 (One) Time Per Week.                 Discharge Diet:   Diet Instructions     Diet: Regular, Consistent Carbohydrate; Thin Liquids, No Restrictions       Discharge Diet:   Regular  Consistent Carbohydrate      Fluid Consistency:  Thin Liquids, No Restrictions                 Discharge Care Plan / Instructions:    Activity at Discharge:   Activity Instructions     Activity as Tolerated                     Follow-up Appointments  No future appointments.  Additional Instructions for the Follow-ups that You Need to Schedule     Discharge Follow-up with PCP    As directed    Follow Up Details:  followup with PCP in 1-2 weeks.  Neurology  recommend titrating cymbalta to 60mg BID                    Vicky Lindsey, NIDA 04/01/17 12:25 PM    Time: Discharge 35 min    Please note that portions of this note may have been completed with a voice recognition program. Efforts were made to edit the dictations, but occasionally words are mistranscribed.  I supervised care of the patient on day of discharge with direct care provided by the advanced care provider (APC).

## 2017-04-01 NOTE — PLAN OF CARE
Problem: Patient Care Overview (Adult)  Goal: Plan of Care Review  Outcome: Ongoing (interventions implemented as appropriate)    04/01/17 0434   Coping/Psychosocial Response Interventions   Plan Of Care Reviewed With patient;spouse   Patient Care Overview   Progress improving   Outcome Evaluation   Outcome Summary/Follow up Plan Patient went down for MRI of spine during the night. Patients NIH score was a 1 due to decreased sensation on left.          Problem: Stroke (Ischemic) (Adult)  Goal: Signs and Symptoms of Listed Potential Problems Will be Absent or Manageable (Stroke)  Outcome: Ongoing (interventions implemented as appropriate)    04/01/17 0434   Stroke (Ischemic)   Problems Assessed (Stroke (Ischemic)/TIA) all   Problems Present (Stroke (Ischemic)/TIA) motor/sensory impairment

## 2017-04-01 NOTE — PROGRESS NOTES
"   LOS: 1 day   Patient Care Team:  No Known Provider as PCP - General    Disclaimer: This dictation was created using Dragon voice recognition software.  Despite best efforts of proof reading and spellchecking, it can happen that software mis- identification of words persist. The most frequent errors concern mis-identification of \"his\" and \"her\". However contextual reading should help solve this probblem for the reader. The second most common misidentification is \"\" for \"the patient\"      Chief complaint is weakness and the patient is seen today in follow-up    The following portions of the patient's history were reviewed and updated as appropriate:  allergies, current medications, past family history, past medical history, past social history, past surgical history and problem list.  Face-to-face time spent with the patient was used to discuss the presumptive diagnosis and differential diagnosis, prognosis and treatment and management options       Interval HPI, past medical, family history, social history, ROS, general physical and neurological exam are unchanged from Dr. Garcia's note dated 03/19/2017, except as noted.      Subjective     History of Present Illness  The patient still has some subjective weakness over the right side but there has been some improvement. Her MRI of the brain and of the cervical spine were normal for age.  Subjective    History taken from: patient chart family    Objective     Vital Signs  Temp:  [97.3 °F (36.3 °C)-98.1 °F (36.7 °C)] 98.1 °F (36.7 °C)  Heart Rate:  [79-94] 79  Resp:  [16-18] 16  BP: (117-132)/(54-86) 128/75    Objective  General appearance showed the patient in no acute distress  Carotid pulses were palpable bilaterally  The ophthalmological exam showed the refractory media clear, no blurring of disc margins, there were no hemorrhages noted, blood vessels appeared normal.  The patient was alert and oriented to person, place and time  Speech was intact, memory " unimpaired  Cranial nerves II-XII appeared intact  Strength was 5/5 throughout  Reflexes were 2 throughout  There was no Babinski sign, no pathological reflexes  Muscle tone was normal  Sensation was normal for age  Station and gait were not tested    Results Review:     I reviewed the patient's new clinical results.  I reviewed the patient's new imaging results and agree with the interpretation.  I reviewed the patient's other test results and agree with the interpretation      Assessment/Plan     Principal Problem:    TIA (transient ischemic attack)  Active Problems:    Type 2 diabetes mellitus    Mitral valve prolapse    GERD (gastroesophageal reflux disease)    History of TIA (transient ischemic attack)    Left upper extremity numbness    Hyperlipidemia    Anxiety and depression    Obesity    Nausea      Assessment & Plan  The patient did not have a TIA or stroke at this point I do not see evidence for neurological disorder. In the past there was suspicion raised of a somatization disorder and I do recommend follow-up with a psychologist for evaluation and possibly cognitive behavioral therapy to try to avoid further readmissions. Since I have nothing to add from a neurological point of view the patient can be discharged from my point of view without follow-up with neurology and I will sign off.  Woody Johnston MD  04/01/17  9:53 AM

## 2017-04-01 NOTE — PLAN OF CARE
Problem: Patient Care Overview (Adult)  Goal: Plan of Care Review  Outcome: Outcome(s) achieved Date Met:  04/01/17

## 2017-04-04 NOTE — THERAPY DISCHARGE NOTE
Acute Care - Occupational Therapy Discharge Summary  [facility]     Patient Name: Nivia Bernstein  : 1975  MRN: [id number]    Today's Date: 2017  Onset of Illness/Injury or Date of Surgery Date: 17    Date of Referral to OT: 17  Referring Physician: COLTEN ALVA      Admit Date: 3/31/2017        OT Recommendation and Plan    Visit Dx:    ICD-10-CM ICD-9-CM   1. Impaired mobility and ADLs Z74.09 799.89   2. Impaired functional mobility, balance, gait, and endurance Z74.09 V49.89   3. History of TIA (transient ischemic attack) Z86.73 V12.54   4. Left upper extremity numbness R20.0 782.0                     OT Goals       17 1634          Transfer Training OT LTG    Transfer Training OT LTG, Date Established 17  -EL      Transfer Training OT LTG, Time to Achieve by discharge  -EL      Transfer Training OT LTG, Activity Type toilet;sit to stand/stand to sit  -EL      Transfer Training OT LTG, [place] Level conditional independence  -EL      Transfer Training OT LTG, Assist Device walker, rolling  -EL      Dynamic Standing Balance OT LTG    Dynamic Standing Balance OT LTG, Date Established 17  -EL      Dynamic Standing Balance OT LTG, Time to Achieve by discharge  -EL      Dynamic Standing Balance OT LTG, [place] Level supervision required  -EL      Dynamic Standing Balance OT LTG, Assist Device --   with UE support/ AD prn  -EL      Patient Education OT LTG    Patient Education OT LTG, Date Established 17  -EL      Patient Education OT LTG, Time to Achieve by discharge  -EL      Patient Education OT LTG, Education Type HEP;posture/body mechanics;home safety;aware of neuro deficits  -EL      Patient Education OT LTG, Education Understanding demonstrates adequately;verbalizes understanding  -EL      Coordination OT LTG    Coordination OT LTG, Date Established 17  -EL      Coordination OT LTG, Time to Achieve by discharge  -EL      Coordination OT LTG, Activity  Type GM written ex program;GM task  -EL      Coordination OT LTG, [place] Level independent  -EL      Functional Mobility OT LTG    Functional Mobility Goal OT LTG, Date Established 03/31/17  -EL      Functional Mobility Goal OT LTG, Time to Achieve by discharge  -EL      Functional Mobility Goal OT LTG, [place] Level supervision  -EL      Functional Mobility Goal OT LTG, Assist Device rolling walker  -EL      Functional Mobility Goal OT LTG, Distance to Achieve in hallway;to the bathroom  -EL        User Key  (r) = Recorded By, (t) = Taken By, (c) = Cosigned By    Initials Name Provider Type    YOLANDA Vieira, OT Occupational Therapist                Outcome Measures       04/04/17 1500          Modified [place] Scale    Modified [place] Scale 3 - Moderate disability.  Requiring some help, but able to walk without assistance.  -JR        User Key  (r) = Recorded By, (t) = Taken By, (c) = Cosigned By    Initials Name Provider Type    JR Rosalie Parra, OT Occupational Therapist              OT Discharge Summary  Reason for Discharge: Discharge from facility  Outcomes Achieved: Refer to plan of care for updates on goals achieved  Discharge Destination: Home      Rosalie Parra OT  4/4/2017

## 2017-04-05 NOTE — PROGRESS NOTES
Still trying to obtain precert for PT?OT from [facility] spoke with Rosalie and she is hoping to hear from [employer] today. Attempted to call to check on Lilo but no answer. Will try again later. Diann Gonzalez

## 2017-10-15 ENCOUNTER — HOSPITAL ENCOUNTER (EMERGENCY)
Facility: HOSPITAL | Age: 42
Discharge: ANOTHER HOSPITAL | End: 2017-10-15
Attending: EMERGENCY MEDICINE | Admitting: EMERGENCY MEDICINE

## 2017-10-15 ENCOUNTER — APPOINTMENT (OUTPATIENT)
Dept: CT IMAGING | Facility: HOSPITAL | Age: 42
End: 2017-10-15

## 2017-10-15 ENCOUNTER — APPOINTMENT (OUTPATIENT)
Dept: GENERAL RADIOLOGY | Facility: HOSPITAL | Age: 42
End: 2017-10-15

## 2017-10-15 VITALS
TEMPERATURE: 98.4 F | HEIGHT: 64 IN | SYSTOLIC BLOOD PRESSURE: 126 MMHG | RESPIRATION RATE: 16 BRPM | HEART RATE: 78 BPM | OXYGEN SATURATION: 98 % | BODY MASS INDEX: 34.15 KG/M2 | DIASTOLIC BLOOD PRESSURE: 82 MMHG | WEIGHT: 200 LBS

## 2017-10-15 DIAGNOSIS — F41.9 ANXIETY AND DEPRESSION: Chronic | ICD-10-CM

## 2017-10-15 DIAGNOSIS — F32.A ANXIETY AND DEPRESSION: Chronic | ICD-10-CM

## 2017-10-15 DIAGNOSIS — R41.0 ACUTE CONFUSION: Primary | ICD-10-CM

## 2017-10-15 LAB
6-ACETYL MORPHINE: NEGATIVE
ALBUMIN SERPL-MCNC: 4.2 G/DL (ref 3.5–5)
ALBUMIN/GLOB SERPL: 1.1 G/DL (ref 1.5–2.5)
ALP SERPL-CCNC: 151 U/L (ref 35–104)
ALT SERPL W P-5'-P-CCNC: 25 U/L (ref 10–36)
AMPHET+METHAMPHET UR QL: NEGATIVE
ANION GAP SERPL CALCULATED.3IONS-SCNC: 12.6 MMOL/L (ref 3.6–11.2)
APTT PPP: 24 SECONDS (ref 23.8–36.1)
AST SERPL-CCNC: 16 U/L (ref 10–30)
BARBITURATES UR QL SCN: NEGATIVE
BASOPHILS # BLD AUTO: 0.06 10*3/MM3 (ref 0–0.3)
BASOPHILS NFR BLD AUTO: 0.7 % (ref 0–2)
BENZODIAZ UR QL SCN: NEGATIVE
BILIRUB SERPL-MCNC: 0.4 MG/DL (ref 0.2–1.8)
BILIRUB UR QL STRIP: NEGATIVE
BUN BLD-MCNC: 9 MG/DL (ref 7–21)
BUN/CREAT SERPL: 10.7 (ref 7–25)
BUPRENORPHINE SERPL-MCNC: NEGATIVE NG/ML
CALCIUM SPEC-SCNC: 9.6 MG/DL (ref 7.7–10)
CANNABINOIDS SERPL QL: NEGATIVE
CHLORIDE SERPL-SCNC: 102 MMOL/L (ref 99–112)
CLARITY UR: CLEAR
CO2 SERPL-SCNC: 23.4 MMOL/L (ref 24.3–31.9)
COCAINE UR QL: NEGATIVE
COLOR UR: YELLOW
CREAT BLD-MCNC: 0.84 MG/DL (ref 0.43–1.29)
DEPRECATED RDW RBC AUTO: 37.7 FL (ref 37–54)
EOSINOPHIL # BLD AUTO: 0.15 10*3/MM3 (ref 0–0.7)
EOSINOPHIL NFR BLD AUTO: 1.7 % (ref 0–5)
ERYTHROCYTE [DISTWIDTH] IN BLOOD BY AUTOMATED COUNT: 12.8 % (ref 11.5–14.5)
ETHANOL BLD-MCNC: <10 MG/DL
ETHANOL UR QL: <0.01 %
GFR SERPL CREATININE-BSD FRML MDRD: 74 ML/MIN/1.73
GLOBULIN UR ELPH-MCNC: 3.7 GM/DL
GLUCOSE BLD-MCNC: 470 MG/DL (ref 70–110)
GLUCOSE UR STRIP-MCNC: ABNORMAL MG/DL
HCT VFR BLD AUTO: 44 % (ref 37–47)
HGB BLD-MCNC: 15.4 G/DL (ref 12–16)
HGB UR QL STRIP.AUTO: NEGATIVE
IMM GRANULOCYTES # BLD: 0.02 10*3/MM3 (ref 0–0.03)
IMM GRANULOCYTES NFR BLD: 0.2 % (ref 0–0.5)
INR PPP: 0.98 (ref 0.9–1.1)
KETONES UR QL STRIP: ABNORMAL
LEUKOCYTE ESTERASE UR QL STRIP.AUTO: NEGATIVE
LYMPHOCYTES # BLD AUTO: 3.42 10*3/MM3 (ref 1–3)
LYMPHOCYTES NFR BLD AUTO: 38.2 % (ref 21–51)
MAGNESIUM SERPL-MCNC: 2 MG/DL (ref 1.7–2.6)
MCH RBC QN AUTO: 28.5 PG (ref 27–33)
MCHC RBC AUTO-ENTMCNC: 35 G/DL (ref 33–37)
MCV RBC AUTO: 81.5 FL (ref 80–94)
METHADONE UR QL SCN: NEGATIVE
MONOCYTES # BLD AUTO: 0.45 10*3/MM3 (ref 0.1–0.9)
MONOCYTES NFR BLD AUTO: 5 % (ref 0–10)
NEUTROPHILS # BLD AUTO: 4.85 10*3/MM3 (ref 1.4–6.5)
NEUTROPHILS NFR BLD AUTO: 54.2 % (ref 30–70)
NITRITE UR QL STRIP: NEGATIVE
OPIATES UR QL: NEGATIVE
OSMOLALITY SERPL CALC.SUM OF ELEC: 295 MOSM/KG (ref 273–305)
OXYCODONE UR QL SCN: NEGATIVE
PCP UR QL SCN: NEGATIVE
PH UR STRIP.AUTO: <=5 [PH] (ref 5–8)
PLATELET # BLD AUTO: 127 10*3/MM3 (ref 130–400)
PMV BLD AUTO: 10.7 FL (ref 6–10)
POTASSIUM BLD-SCNC: 3.4 MMOL/L (ref 3.5–5.3)
PROT SERPL-MCNC: 7.9 G/DL (ref 6–8)
PROT UR QL STRIP: NEGATIVE
PROTHROMBIN TIME: 13.1 SECONDS (ref 11–15.4)
RBC # BLD AUTO: 5.4 10*6/MM3 (ref 4.2–5.4)
SODIUM BLD-SCNC: 138 MMOL/L (ref 135–153)
SP GR UR STRIP: >1.03 (ref 1–1.03)
TROPONIN I SERPL-MCNC: <0.006 NG/ML
TSH SERPL DL<=0.05 MIU/L-ACNC: 1.21 MIU/ML (ref 0.55–4.78)
UROBILINOGEN UR QL STRIP: ABNORMAL
WBC NRBC COR # BLD: 8.95 10*3/MM3 (ref 4.5–12.5)

## 2017-10-15 PROCEDURE — 81003 URINALYSIS AUTO W/O SCOPE: CPT | Performed by: EMERGENCY MEDICINE

## 2017-10-15 PROCEDURE — 80307 DRUG TEST PRSMV CHEM ANLYZR: CPT | Performed by: EMERGENCY MEDICINE

## 2017-10-15 PROCEDURE — 63710000001 INSULIN REGULAR HUMAN PER 5 UNITS: Performed by: EMERGENCY MEDICINE

## 2017-10-15 PROCEDURE — 85025 COMPLETE CBC W/AUTO DIFF WBC: CPT | Performed by: EMERGENCY MEDICINE

## 2017-10-15 PROCEDURE — 84484 ASSAY OF TROPONIN QUANT: CPT | Performed by: EMERGENCY MEDICINE

## 2017-10-15 PROCEDURE — 96361 HYDRATE IV INFUSION ADD-ON: CPT

## 2017-10-15 PROCEDURE — 84443 ASSAY THYROID STIM HORMONE: CPT | Performed by: EMERGENCY MEDICINE

## 2017-10-15 PROCEDURE — 70450 CT HEAD/BRAIN W/O DYE: CPT | Performed by: RADIOLOGY

## 2017-10-15 PROCEDURE — 93005 ELECTROCARDIOGRAM TRACING: CPT | Performed by: EMERGENCY MEDICINE

## 2017-10-15 PROCEDURE — 85610 PROTHROMBIN TIME: CPT | Performed by: EMERGENCY MEDICINE

## 2017-10-15 PROCEDURE — 93010 ELECTROCARDIOGRAM REPORT: CPT | Performed by: INTERNAL MEDICINE

## 2017-10-15 PROCEDURE — 83735 ASSAY OF MAGNESIUM: CPT | Performed by: EMERGENCY MEDICINE

## 2017-10-15 PROCEDURE — 80053 COMPREHEN METABOLIC PANEL: CPT | Performed by: EMERGENCY MEDICINE

## 2017-10-15 PROCEDURE — 71010 XR CHEST 1 VW: CPT | Performed by: RADIOLOGY

## 2017-10-15 PROCEDURE — 96360 HYDRATION IV INFUSION INIT: CPT

## 2017-10-15 PROCEDURE — 70450 CT HEAD/BRAIN W/O DYE: CPT

## 2017-10-15 PROCEDURE — 99285 EMERGENCY DEPT VISIT HI MDM: CPT

## 2017-10-15 PROCEDURE — 71010 HC CHEST PA OR AP: CPT

## 2017-10-15 PROCEDURE — 85730 THROMBOPLASTIN TIME PARTIAL: CPT | Performed by: EMERGENCY MEDICINE

## 2017-10-15 PROCEDURE — 36415 COLL VENOUS BLD VENIPUNCTURE: CPT

## 2017-10-15 RX ORDER — ASPIRIN 81 MG/1
324 TABLET, CHEWABLE ORAL ONCE
Status: COMPLETED | OUTPATIENT
Start: 2017-10-15 | End: 2017-10-15

## 2017-10-15 RX ORDER — SODIUM CHLORIDE 9 MG/ML
125 INJECTION, SOLUTION INTRAVENOUS CONTINUOUS
Status: DISCONTINUED | OUTPATIENT
Start: 2017-10-15 | End: 2017-10-16 | Stop reason: HOSPADM

## 2017-10-15 RX ADMIN — SODIUM CHLORIDE 125 ML/HR: 9 INJECTION, SOLUTION INTRAVENOUS at 19:27

## 2017-10-15 RX ADMIN — ASPIRIN 324 MG: 81 TABLET, CHEWABLE ORAL at 17:12

## 2017-10-15 RX ADMIN — HUMAN INSULIN 12 UNITS: 100 INJECTION, SOLUTION SUBCUTANEOUS at 18:18

## 2017-10-15 RX ADMIN — SODIUM CHLORIDE 1000 ML: 9 INJECTION, SOLUTION INTRAVENOUS at 17:12

## 2017-10-15 NOTE — ED PROVIDER NOTES
Subjective   HPI Comments: Patient is a 42-year-old white female brought by her  today complaining of an acute change in her mental status 1 hour prior to arriving here, consisting of an acute confusion.  She has had no numbness, weakness, any sort of focal neurological symptoms that I can ascertain from the .  The patient is not a reliable historian... In speaking with her, she does not know her name, where she is, why she is here; she is completely disoriented and she does not recognize her  or her son.  The  reports that she has a history of 14 TIAs.  She was hospitalized twice in March at [facility] with neurological symptoms and had negative neurological workups, with her symptoms felt to be due to conversion or somatoform disorder.  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    Patient is a 42 y.o. female presenting with altered mental status.   History provided by:  Spouse  Altered Mental Status   Presenting symptoms: confusion    Associated symptoms: no abdominal pain, normal movement, no bladder incontinence, no difficulty breathing, no fever, no headaches, no nausea, no seizures, no slurred speech, no suicidal behavior, no vomiting and no weakness        Review of Systems   Constitutional: Negative for chills and fever.   HENT: Negative for congestion, ear discharge, sinus pressure and sore throat.    Eyes: Negative for photophobia and pain.   Respiratory: Negative for shortness of breath, wheezing and stridor.    Cardiovascular: Negative for chest pain and leg swelling.   Gastrointestinal: Negative for abdominal pain, blood in stool, diarrhea, nausea and vomiting.   Endocrine: Negative for polydipsia and polyphagia.   Genitourinary: Negative for bladder incontinence, difficulty urinating and flank pain.   Musculoskeletal: Negative for  arthralgias, myalgias, neck pain and neck stiffness.   Skin: Negative for pallor.   Neurological: Negative for dizziness, seizures, syncope, weakness and headaches.   Psychiatric/Behavioral: Positive for confusion.   All other systems reviewed and are negative.      Past Medical History:   Diagnosis Date   • Anxiety and depression 3/31/2017   • Diabetes mellitus    • H/O blood clots    • Heart murmur    • Hyperlipidemia    • Mitral valve prolapse    • Neuropathy    • Obesity 3/31/2017   • TIA (transient ischemic attack)     4 TIMES       Allergies   Allergen Reactions   • Mobic [Meloxicam] Rash   • Novolog [Insulin Aspart] Hives   • Penicillins        Past Surgical History:   Procedure Laterality Date   • APPENDECTOMY     • CHOLECYSTECTOMY     • FINGER FUSION     • HYSTERECTOMY     • KNEE ARTHROPLASTY         History reviewed. No pertinent family history.    Social History     Social History   • Marital status:      Spouse name: N/A   • Number of children: N/A   • Years of education: N/A     Social History Main Topics   • Smoking status: Never Smoker   • Smokeless tobacco: None   • Alcohol use No   • Drug use: No   • Sexual activity: Defer     Other Topics Concern   • None     Social History Narrative    LIVES WITH HER  AND KIDS           Objective   Physical Exam   Constitutional: She appears well-developed and well-nourished. No distress.   HENT:   Head: Normocephalic and atraumatic.   Eyes: EOM are normal. Pupils are equal, round, and reactive to light. No scleral icterus.   Neck: Normal range of motion. Neck supple. No rigidity. No tracheal deviation present.   Cardiovascular: Normal rate, regular rhythm and intact distal pulses.    Pulmonary/Chest: Effort normal and breath sounds normal. No respiratory distress. She exhibits no tenderness.   Abdominal: Soft. Bowel sounds are normal. There is no tenderness. There is no rebound and no guarding.   Musculoskeletal: Normal range of motion. She exhibits  no tenderness.   Neurological: She is alert. She has normal strength. No sensory deficit. She exhibits normal muscle tone. Coordination normal. GCS eye subscore is 4. GCS verbal subscore is 5. GCS motor subscore is 6.   Patient is completely disoriented to person, place, time, situation.  Her  and her son are in the room with her; she states that she does not recognize either one of them.  No focal neurological deficits are appreciated.   Skin: Skin is warm and dry. She is not diaphoretic. No cyanosis. No pallor.   Psychiatric:   As described above, patient is completely disoriented.   Vitals reviewed.      Procedures  EKG shows sinus rhythm with a rate in 93.  No apparent acute ischemia or ectopy.  XR Chest 1 View   ED Interpretation   No apparent acute disease pending radiologist.      CT Head Without Contrast Stroke Protocol   ED Interpretation   Per virtual radiology, normal head/brain CT.        Results for orders placed or performed during the hospital encounter of 10/15/17   Comprehensive Metabolic Panel   Result Value Ref Range    Glucose 470 (H) 70 - 110 mg/dL    BUN 9 7 - 21 mg/dL    Creatinine 0.84 0.43 - 1.29 mg/dL    Sodium 138 135 - 153 mmol/L    Potassium 3.4 (L) 3.5 - 5.3 mmol/L    Chloride 102 99 - 112 mmol/L    CO2 23.4 (L) 24.3 - 31.9 mmol/L    Calcium 9.6 7.7 - 10.0 mg/dL    Total Protein 7.9 6.0 - 8.0 g/dL    Albumin 4.20 3.50 - 5.00 g/dL    ALT (SGPT) 25 10 - 36 U/L    AST (SGOT) 16 10 - 30 U/L    Alkaline Phosphatase 151 (H) 35 - 104 U/L    Total Bilirubin 0.4 0.2 - 1.8 mg/dL    eGFR Non African Amer 74 >60 mL/min/1.73    Globulin 3.7 gm/dL    A/G Ratio 1.1 (L) 1.5 - 2.5 g/dL    BUN/Creatinine Ratio 10.7 7.0 - 25.0    Anion Gap 12.6 (H) 3.6 - 11.2 mmol/L   aPTT   Result Value Ref Range    PTT 24.0 23.8 - 36.1 seconds   Protime-INR   Result Value Ref Range    Protime 13.1 11.0 - 15.4 Seconds    INR 0.98 0.90 - 1.10   Troponin   Result Value Ref Range    Troponin I <0.006 <=0.040 ng/mL    Urinalysis With / Culture If Indicated - Urine, Catheter   Result Value Ref Range    Color, UA Yellow Yellow, Straw    Appearance, UA Clear Clear    pH, UA <=5.0 5.0 - 8.0    Specific Gravity, UA >1.030 (H) 1.005 - 1.030    Glucose, UA >=1000 mg/dL (3+) (A) Negative    Ketones, UA Trace (A) Negative    Bilirubin, UA Negative Negative    Blood, UA Negative Negative    Protein, UA Negative Negative    Leuk Esterase, UA Negative Negative    Nitrite, UA Negative Negative    Urobilinogen, UA 0.2 E.U./dL 0.2 - 1.0 E.U./dL   TSH   Result Value Ref Range    TSH 1.205 0.550 - 4.780 mIU/mL   Magnesium   Result Value Ref Range    Magnesium 2.0 1.7 - 2.6 mg/dL   Ethanol   Result Value Ref Range    Ethanol <10 <=10 mg/dL    Ethanol % <0.010 %   Urine Drug Screen - Urine, Clean Catch   Result Value Ref Range    Amphetamine Screen, Urine Negative Negative    Barbiturates Screen, Urine Negative Negative    Benzodiazepine Screen, Urine Negative Negative    Cocaine Screen, Urine Negative Negative    Methadone Screen, Urine Negative Negative    Opiate Screen Negative Negative    Phencyclidine (PCP), Urine Negative Negative    THC, Screen, Urine Negative Negative    6-ACETYL MORPHINE Negative Negative    Buprenorphine, Screen, Urine Negative Negative    Oxycodone Screen, Urine Negative Negative   CBC Auto Differential   Result Value Ref Range    WBC 8.95 4.50 - 12.50 10*3/mm3    RBC 5.40 4.20 - 5.40 10*6/mm3    Hemoglobin 15.4 12.0 - 16.0 g/dL    Hematocrit 44.0 37.0 - 47.0 %    MCV 81.5 80.0 - 94.0 fL    MCH 28.5 27.0 - 33.0 pg    MCHC 35.0 33.0 - 37.0 g/dL    RDW 12.8 11.5 - 14.5 %    RDW-SD 37.7 37.0 - 54.0 fl    MPV 10.7 (H) 6.0 - 10.0 fL    Platelets 127 (L) 130 - 400 10*3/mm3    Neutrophil % 54.2 30.0 - 70.0 %    Lymphocyte % 38.2 21.0 - 51.0 %    Monocyte % 5.0 0.0 - 10.0 %    Eosinophil % 1.7 0.0 - 5.0 %    Basophil % 0.7 0.0 - 2.0 %    Immature Grans % 0.2 0.0 - 0.5 %    Neutrophils, Absolute 4.85 1.40 - 6.50 10*3/mm3     Lymphocytes, Absolute 3.42 (H) 1.00 - 3.00 10*3/mm3    Monocytes, Absolute 0.45 0.10 - 0.90 10*3/mm3    Eosinophils, Absolute 0.15 0.00 - 0.70 10*3/mm3    Basophils, Absolute 0.06 0.00 - 0.30 10*3/mm3    Immature Grans, Absolute 0.02 0.00 - 0.03 10*3/mm3   Osmolality, Calculated   Result Value Ref Range    Osmolality Calc 295.0 273.0 - 305.0 mOsm/kg              ED Course  ED Course   Comment By Time   I discussed the case with Dr. Garcia, hospitalist at [facility], at 4:45 PM... He is familiar with this patient and he accepts her in transfer.  We're working on transport arrangements; the weather is bad with low clouds and rain, so a helicopter flight is not an option currently.  [facility] is unable to provide us with an ambulance.  [facility] is unable to provide us with an ambulance.  [facility] advises us that they will have a 2-3 hours PTA.  Patient's status remains unchanged, still no focal neurological deficits, still disoriented.  I discussed all this with the , and he voices understanding and agreement with transfer to [facility]. Samuel Munoz MD 10/15 1806                  [facility]    Final diagnoses:   Acute confusion             Please note that portions of this note were completed with a voice recognition program. Efforts were made to edit the dictations, but occasionally words are mistranscribed.       Smauel Munoz MD  11/01/17 [id number]

## 2017-10-15 NOTE — ED NOTES
[facility] has no ALS truck would be availble for hours  S truck to accept but still would be a while.     Kenzie Claire  10/15/17 [id number]

## 2017-10-15 NOTE — ED NOTES
Called [facility] to see if they could do transfer. Spoke with Roland. He states yes they can take the patient BLS No ETA was given but he said they do have a free truck at this time. ESME Huitron RN is aware.      Heather Symes  10/15/17 1933

## 2017-10-15 NOTE — ED NOTES
Spoke with EMS no trucks aloud to go out of town at this time due to no medic and lack of trucks per C      Kenzie Claire  10/15/17 [id number]

## 2017-10-15 NOTE — ED NOTES
Called [facility] spoke with [place], canceled the truck for transfer to [facility].       Heather Symes  10/15/17 1933

## 2017-10-15 NOTE — NURSING NOTE
ACC REVIEW REPORT: [facility]        PATIENT NAME: Nivia Bernstein    PATIENT ID: [id number]    BED: S537    BED TYPE: Tele    BED GIVEN TO: Bonilla    HEMA BED GIVEN: 1656    YOB: 1975    AGE: 43 yo    GENDER: Female    PREVIOUS ADMIT TO [facility]:     PREVIOUS ADMISSION DATE:     PATIENT CLASS:     TODAY'S DATE: 10/15/2017    TRANSFER DATE: 10/15/2017    ETA:     TRANSFERRING FACILITY: [facility]    TRANSFERRING FACILITY PHONE # : [phone]    TRANSFERRING MD: Tammy    DATE/TIME REQUEST RECEIVED: 10/15/2017 at 1643    [facility] RN: Funmi Winn RN     REPORT FROM: Bonilla Donohue RN    TIME REPORT TAKEN: 1703    DIAGNOSIS: Acute Neuro Status Change    REASON FOR TRANSFER TO [facility]: Higher Level of Care    TRANSPORTATION: Ambulance    CLINICAL REASON FOR TRANSFER TO [facility]: Patient developed sudden onset confusion at around 1500 today. Patient brought to the ED where she still does not recognize her , know her name nor where she is. CT of Head negative for acuter process.       CLINICAL INFORMATION    HEIGHT: 64 inches    WEIGHT: 200 lbs    ALLERGIES: Mobic, Novolog, PCN    VARGAS: None    INFECTIOUS DISEASE: None    ISOLATION:     1ST VITAL SIGNS:   TIME:   TEMP:   PULSE:   B/P:   RESP:     LAST VITAL SIGNS:  TIME:   TEMP: 98.7  PULSE: 106  B/P: 130/89  RESP: 18    LAB INFORMATION: Labs pending. UDS pending    CULTURE INFORMATION:     MEDS/IV FLUIDS: See MAr sent with patient. Has a #18 LAC-SL. Received Asa 324 mg and 1 liter NS bolus.      CARDIAC SYSTEM:    CHEST PAIN:     RATE:     SCALE:     RHYTHM: SR/ST    Is patient taking or has patient been given any drugs that could increase bleeding? None  (Plavix, Brilinta, Effient, Eliquis, Xarelto, Warfarin, Integrilin, Angiomax)    DRUG:      DOSE/FREQUENCY:     CARDIAC ENZYMES:    DATE:   TIME:   CK:   CKMB:   FELICIANO:   TROP:     DATE:   TIME:   CK:   CKMB:   FELICIANO:   TROP:       HEART CATH:     HEART CATH DATE:     HEART CATH RESULTS:     LAD:   RCA:   CX:   LMAIN:    EF:     SWAN:     SITE:   SIZE:    DATE INSERTED:     ARTLINE:     SITE:   SIZE:   DATE INSERTED:     SHEATH:    SITE:   SIZE:   DATE INSERTED:         VASOSEAL:    SITE:   DATE INSERTED:     EXTERNAL PACEMAKER:     RATE:   EXT PACER ON:     MODE:    DATE INSERTED:   OUTPUT SETTING:   SENSITIVITY SETTING:   SENSITIVITY TYPE:     IABP:    SITE:   AUG PRESSURE:   DATE INSERTED:     CARDIAC NOTES:       RESPIRATORY SYSTEM:    LUNG SOUNDS:    CLEAR: Yes  CRACKLES:   WHEEZES:   RHONCHI:   DIMINISHED:     ABG DATE:         ABG TIME:     ABG RESULTS:    PH:   PO2:   PCO2:   HCO3:   O2 SAT:       ETT:     ETT SIZE:     ORAL:     NASAL:     SECURED AT MEASUREMENT (CM):     ON VENTILATOR:    TV:   FI02:   RATE:   PEEP:     OXYGEN:  RA    O2 SAT: 100%    ADMINISTRATION ROUTE:     IMAGING FINDINGS:     PNEUMO LOCATION:     PNEUMO SIZE:     PNEUMO CHEST TUBE SEAL TYPE:     RADIOLOGY RESULTS:     RESPIRATORY STATUS:       CNS/MUSCULOSKELETAL    ALERT AND ORIENTED:    PERSON: No  PLACE: No  TIME: NO    INJURY:  WHERE:     LEVI COMA SCALE:    E:   M:   V:    STROKE SCALE: NIH-2    SIZE OF HEMORRHAGE:     SYMPTOMS: (CHOOSE APPROPRIATE)    ASPHASIA:   ATAXIA:   DYSARTHRIA:   DYSPHASIA:   HEADACHE:   PARALYSIS:   SEIZURE:   SYNCOPE:   VERTIGO:   VISION CHANGE:        EXTREMITY WEAKNESS:    LEFT ARM:   RIGHT ARM:   LEFT LEG:   RIGHT LEG:     CAT SCAN RESULTS:     MRI RESULTS:     CNS/MUSCULOSKELETAL NOTES: No other neurological symptoms noted      GI//GY      ABDOMINAL PAIN:     VOMITING:     DIARRHEA:     NAUSEA:     BOWEL SOUNDS:     OCCULT STOOL:     VAGINAL BLEEDING:     TESTICULAR PAIN:     HEMATURIA:     NG TUBE:    SIZE:   DATE INSERTED:       ULTRASOUND:     ULTRASOUND RESULTS:       ACUTE ABDOMEN:     ACUTE ABDOMEN RESULTS:       CT SCAN:     CT SCAN RESULTS:       GI//GY NOTES:     PAST MEDICAL HISTORY: TIAs, DM    OTHER SYMPTOM NOTES:     ADDITIONAL NOTES: Patient complains of rt sided WIGGINS.           Funmi Winn  RN  10/15/2017  5:10 PM

## 2017-10-15 NOTE — ED NOTES
Pt accepted at [facility] currently waiting a bed      Kenzie Claire  10/15/17 4572

## 2017-10-15 NOTE — ED NOTES
[facility] call back and declined states there is no way they have too many runs from [place]     Kenzie Claire  10/15/17 2583

## 2017-10-15 NOTE — ED NOTES
Barb Mejia NP notified of pt c/o and symptoms with v/o for ct stroke protocol      Bryanna Arreguin RN  10/15/17 9346

## 2017-10-15 NOTE — ED NOTES
pt c/o headache.  states that pt became confused 1 hour before arrival to ER. and that pt has had several TIAs in the past year. reports pt had an episode like this a couple of weeks ago but improved after an hour. pt does not recognize  who is at bedside.      Bonilla Donohue RN  10/15/17 [id number]

## 2017-10-16 ENCOUNTER — HOSPITAL ENCOUNTER (OUTPATIENT)
Facility: HOSPITAL | Age: 42
Setting detail: OBSERVATION
Discharge: HOME | End: 2017-10-17
Attending: INTERNAL MEDICINE | Admitting: INTERNAL MEDICINE

## 2017-10-16 ENCOUNTER — APPOINTMENT (OUTPATIENT)
Dept: CARDIOLOGY | Facility: HOSPITAL | Age: 42
End: 2017-10-16

## 2017-10-16 ENCOUNTER — APPOINTMENT (OUTPATIENT)
Dept: MRI IMAGING | Facility: HOSPITAL | Age: 42
End: 2017-10-16

## 2017-10-16 DIAGNOSIS — Z74.09 IMPAIRED FUNCTIONAL MOBILITY, BALANCE, GAIT, AND ENDURANCE: Primary | ICD-10-CM

## 2017-10-16 DIAGNOSIS — Z74.09 IMPAIRED MOBILITY AND ADLS: ICD-10-CM

## 2017-10-16 DIAGNOSIS — Z78.9 IMPAIRED MOBILITY AND ADLS: ICD-10-CM

## 2017-10-16 PROBLEM — J45.909 ASTHMA: Chronic | Status: ACTIVE | Noted: 2017-03-19

## 2017-10-16 PROBLEM — I34.1 MITRAL VALVE PROLAPSE: Chronic | Status: ACTIVE | Noted: 2017-03-19

## 2017-10-16 PROBLEM — F42.9 OCD (OBSESSIVE COMPULSIVE DISORDER): Chronic | Status: ACTIVE | Noted: 2017-10-16

## 2017-10-16 PROBLEM — E78.5 HYPERLIPIDEMIA: Chronic | Status: ACTIVE | Noted: 2017-03-31

## 2017-10-16 PROBLEM — F41.9 ANXIETY AND DEPRESSION: Chronic | Status: ACTIVE | Noted: 2017-03-31

## 2017-10-16 PROBLEM — N28.9 RENAL INSUFFICIENCY: Status: ACTIVE | Noted: 2017-10-16

## 2017-10-16 PROBLEM — E87.6 HYPOKALEMIA: Status: ACTIVE | Noted: 2017-10-16

## 2017-10-16 PROBLEM — M79.606 LEG PAIN: Chronic | Status: ACTIVE | Noted: 2017-10-16

## 2017-10-16 PROBLEM — F32.A ANXIETY AND DEPRESSION: Chronic | Status: ACTIVE | Noted: 2017-03-31

## 2017-10-16 PROBLEM — E11.9 TYPE 2 DIABETES MELLITUS: Chronic | Status: ACTIVE | Noted: 2017-03-19

## 2017-10-16 PROBLEM — K21.9 GERD (GASTROESOPHAGEAL REFLUX DISEASE): Chronic | Status: ACTIVE | Noted: 2017-03-19

## 2017-10-16 PROBLEM — R41.82 ALTERED MENTAL STATUS: Status: ACTIVE | Noted: 2017-10-16

## 2017-10-16 PROBLEM — D69.6 THROMBOCYTOPENIA (HCC): Status: ACTIVE | Noted: 2017-10-16

## 2017-10-16 PROBLEM — R74.8 ELEVATED ALKALINE PHOSPHATASE LEVEL: Status: ACTIVE | Noted: 2017-10-16

## 2017-10-16 PROBLEM — IMO0002 UNCONTROLLED TYPE II DIABETES MELLITUS: Chronic | Status: ACTIVE | Noted: 2017-03-19

## 2017-10-16 LAB
ALBUMIN SERPL-MCNC: 3.8 G/DL (ref 3.2–4.8)
ALBUMIN/GLOB SERPL: 1.5 G/DL (ref 1.5–2.5)
ALP SERPL-CCNC: 114 U/L (ref 25–100)
ALT SERPL W P-5'-P-CCNC: 24 U/L (ref 7–40)
ANION GAP SERPL CALCULATED.3IONS-SCNC: 7 MMOL/L (ref 3–11)
ARTICHOKE IGE QN: 150 MG/DL (ref 0–130)
AST SERPL-CCNC: 17 U/L (ref 0–33)
B-OH-BUTYR SERPL-SCNC: 0.63 MMOL/L
BASOPHILS # BLD AUTO: 0.03 10*3/MM3 (ref 0–0.2)
BASOPHILS NFR BLD AUTO: 0.4 % (ref 0–1)
BH CV ECHO MEAS - AO MAX PG (FULL): 4.2 MMHG
BH CV ECHO MEAS - AO MAX PG: 9.9 MMHG
BH CV ECHO MEAS - AO MEAN PG (FULL): 2.8 MMHG
BH CV ECHO MEAS - AO MEAN PG: 5.8 MMHG
BH CV ECHO MEAS - AO ROOT AREA (BSA CORRECTED): 1.5
BH CV ECHO MEAS - AO ROOT AREA: 6.4 CM^2
BH CV ECHO MEAS - AO ROOT DIAM: 2.8 CM
BH CV ECHO MEAS - AO V2 MAX: 157.5 CM/SEC
BH CV ECHO MEAS - AO V2 MEAN: 111 CM/SEC
BH CV ECHO MEAS - AO V2 VTI: 31.3 CM
BH CV ECHO MEAS - BSA(HAYCOCK): 2.1 M^2
BH CV ECHO MEAS - BSA(HAYCOCK): 2.1 M^2
BH CV ECHO MEAS - BSA: 2 M^2
BH CV ECHO MEAS - BSA: 2 M^2
BH CV ECHO MEAS - BZI_BMI: 34.3 KILOGRAMS/M^2
BH CV ECHO MEAS - BZI_BMI: 34.3 KILOGRAMS/M^2
BH CV ECHO MEAS - BZI_METRIC_HEIGHT: 162.6 CM
BH CV ECHO MEAS - BZI_METRIC_HEIGHT: 162.6 CM
BH CV ECHO MEAS - BZI_METRIC_WEIGHT: 90.7 KG
BH CV ECHO MEAS - BZI_METRIC_WEIGHT: 90.7 KG
BH CV ECHO MEAS - EDV(CUBED): 77.5 ML
BH CV ECHO MEAS - EDV(TEICH): 81.4 ML
BH CV ECHO MEAS - EF(CUBED): 73.4 %
BH CV ECHO MEAS - EF(TEICH): 65.5 %
BH CV ECHO MEAS - ESV(CUBED): 20.6 ML
BH CV ECHO MEAS - ESV(TEICH): 28.1 ML
BH CV ECHO MEAS - FS: 35.7 %
BH CV ECHO MEAS - IVS/LVPW: 1.1
BH CV ECHO MEAS - IVSD: 0.87 CM
BH CV ECHO MEAS - LA DIMENSION: 3.5 CM
BH CV ECHO MEAS - LA/AO: 1.2
BH CV ECHO MEAS - LV MASS(C)D: 109.6 GRAMS
BH CV ECHO MEAS - LV MASS(C)DI: 56 GRAMS/M^2
BH CV ECHO MEAS - LV MAX PG: 5.7 MMHG
BH CV ECHO MEAS - LV MEAN PG: 3 MMHG
BH CV ECHO MEAS - LV V1 MAX: 119.3 CM/SEC
BH CV ECHO MEAS - LV V1 MEAN: 79.2 CM/SEC
BH CV ECHO MEAS - LV V1 VTI: 22.6 CM
BH CV ECHO MEAS - LVIDD: 4.3 CM
BH CV ECHO MEAS - LVIDS: 2.7 CM
BH CV ECHO MEAS - LVPWD: 0.79 CM
BH CV ECHO MEAS - MV A MAX VEL: 76.8 CM/SEC
BH CV ECHO MEAS - MV DEC TIME: 0.12 SEC
BH CV ECHO MEAS - MV E MAX VEL: 74 CM/SEC
BH CV ECHO MEAS - MV E/A: 0.96
BH CV ECHO MEAS - PA ACC SLOPE: 665.9 CM/SEC^2
BH CV ECHO MEAS - PA ACC TIME: 0.14 SEC
BH CV ECHO MEAS - PA MAX PG: 7.7 MMHG
BH CV ECHO MEAS - PA PR(ACCEL): 15.6 MMHG
BH CV ECHO MEAS - PA V2 MAX: 139 CM/SEC
BH CV ECHO MEAS - RVDD: 2.3 CM
BH CV ECHO MEAS - SI(AO): 101.8 ML/M^2
BH CV ECHO MEAS - SI(CUBED): 29.1 ML/M^2
BH CV ECHO MEAS - SI(TEICH): 27.3 ML/M^2
BH CV ECHO MEAS - SV(AO): 199.1 ML
BH CV ECHO MEAS - SV(CUBED): 56.9 ML
BH CV ECHO MEAS - SV(TEICH): 53.3 ML
BH CV ECHO MEAS - TAPSE (>1.6): 2.6 CM2
BH CV ECHO MEAS - TV MAX PG: 1.7 MMHG
BH CV ECHO MEAS - TV V2 MAX: 65.6 CM/SEC
BH CV XLRA - RV BASE: 2.3 CM
BH CV XLRA - RV LENGTH: 5.4 CM
BH CV XLRA - RV MID: 2.2 CM
BH CV XLRA - TDI S': 16.4 CM/SEC
BH CV XLRA MEAS LEFT CCA RATIO VEL: 85.6 CM/SEC
BH CV XLRA MEAS LEFT DIST CCA EDV: 26.2 CM/SEC
BH CV XLRA MEAS LEFT DIST CCA PSV: 86.4 CM/SEC
BH CV XLRA MEAS LEFT DIST ICA EDV: 30.6 CM/SEC
BH CV XLRA MEAS LEFT DIST ICA PSV: 88.2 CM/SEC
BH CV XLRA MEAS LEFT ICA RATIO VEL: 81.2 CM/SEC
BH CV XLRA MEAS LEFT ICA/CCA RATIO: 0.95
BH CV XLRA MEAS LEFT MID CCA EDV: 27.9 CM/SEC
BH CV XLRA MEAS LEFT MID CCA PSV: 94.3 CM/SEC
BH CV XLRA MEAS LEFT MID ICA EDV: 35.8 CM/SEC
BH CV XLRA MEAS LEFT MID ICA PSV: 82.1 CM/SEC
BH CV XLRA MEAS LEFT PROX CCA EDV: 31.4 CM/SEC
BH CV XLRA MEAS LEFT PROX CCA PSV: 107.4 CM/SEC
BH CV XLRA MEAS LEFT PROX ECA EDV: 15.7 CM/SEC
BH CV XLRA MEAS LEFT PROX ECA PSV: 76 CM/SEC
BH CV XLRA MEAS LEFT PROX ICA EDV: 26.2 CM/SEC
BH CV XLRA MEAS LEFT PROX ICA PSV: 65.5 CM/SEC
BH CV XLRA MEAS LEFT PROX SCLA PSV: 161.5 CM/SEC
BH CV XLRA MEAS LEFT VERTEBRAL A EDV: 18.3 CM/SEC
BH CV XLRA MEAS LEFT VERTEBRAL A PSV: 43.7 CM/SEC
BH CV XLRA MEAS RIGHT CCA RATIO VEL: 89.6 CM/SEC
BH CV XLRA MEAS RIGHT DIST CCA EDV: 25.1 CM/SEC
BH CV XLRA MEAS RIGHT DIST CCA PSV: 90.4 CM/SEC
BH CV XLRA MEAS RIGHT DIST ICA EDV: 32.2 CM/SEC
BH CV XLRA MEAS RIGHT DIST ICA PSV: 73.9 CM/SEC
BH CV XLRA MEAS RIGHT ICA RATIO VEL: 108 CM/SEC
BH CV XLRA MEAS RIGHT ICA/CCA RATIO: 1.2
BH CV XLRA MEAS RIGHT MID CCA EDV: 25.9 CM/SEC
BH CV XLRA MEAS RIGHT MID CCA PSV: 96.6 CM/SEC
BH CV XLRA MEAS RIGHT MID ICA EDV: 30.6 CM/SEC
BH CV XLRA MEAS RIGHT MID ICA PSV: 80.9 CM/SEC
BH CV XLRA MEAS RIGHT PROX CCA EDV: 18.9 CM/SEC
BH CV XLRA MEAS RIGHT PROX CCA PSV: 86.4 CM/SEC
BH CV XLRA MEAS RIGHT PROX ECA EDV: 18.9 CM/SEC
BH CV XLRA MEAS RIGHT PROX ECA PSV: 128.1 CM/SEC
BH CV XLRA MEAS RIGHT PROX ICA EDV: 26.7 CM/SEC
BH CV XLRA MEAS RIGHT PROX ICA PSV: 109.2 CM/SEC
BH CV XLRA MEAS RIGHT PROX SCLA PSV: 163.4 CM/SEC
BH CV XLRA MEAS RIGHT VERTEBRAL A EDV: 19.6 CM/SEC
BH CV XLRA MEAS RIGHT VERTEBRAL A PSV: 47.1 CM/SEC
BILIRUB SERPL-MCNC: 0.7 MG/DL (ref 0.3–1.2)
BNP SERPL-MCNC: 6 PG/ML (ref 0–100)
BUN BLD-MCNC: 11 MG/DL (ref 9–23)
BUN/CREAT SERPL: 15.7 (ref 7–25)
CA-I SERPL ISE-MCNC: 1.28 MMOL/L (ref 1.12–1.32)
CALCIUM SPEC-SCNC: 8.8 MG/DL (ref 8.7–10.4)
CHLORIDE SERPL-SCNC: 106 MMOL/L (ref 99–109)
CHOLEST SERPL-MCNC: 206 MG/DL (ref 0–200)
CO2 SERPL-SCNC: 27 MMOL/L (ref 20–31)
CREAT BLD-MCNC: 0.7 MG/DL (ref 0.6–1.3)
CRP SERPL-MCNC: 0.86 MG/DL (ref 0–1)
D-LACTATE SERPL-SCNC: 0.6 MMOL/L (ref 0.5–2)
DEPRECATED RDW RBC AUTO: 37.9 FL (ref 37–54)
EOSINOPHIL # BLD AUTO: 0.17 10*3/MM3 (ref 0–0.3)
EOSINOPHIL NFR BLD AUTO: 2.2 % (ref 0–3)
ERYTHROCYTE [DISTWIDTH] IN BLOOD BY AUTOMATED COUNT: 12.6 % (ref 11.3–14.5)
ERYTHROCYTE [SEDIMENTATION RATE] IN BLOOD: 11 MM/HR (ref 0–20)
FOLATE SERPL-MCNC: 15.19 NG/ML (ref 3.2–20)
GFR SERPL CREATININE-BSD FRML MDRD: 91 ML/MIN/1.73
GLOBULIN UR ELPH-MCNC: 2.6 GM/DL
GLUCOSE BLD-MCNC: 294 MG/DL (ref 70–100)
GLUCOSE BLDC GLUCOMTR-MCNC: 230 MG/DL (ref 70–130)
GLUCOSE BLDC GLUCOMTR-MCNC: 275 MG/DL (ref 70–130)
GLUCOSE BLDC GLUCOMTR-MCNC: 307 MG/DL (ref 70–130)
GLUCOSE BLDC GLUCOMTR-MCNC: 385 MG/DL (ref 70–130)
HBA1C MFR BLD: 13.9 % (ref 4.8–5.6)
HCT VFR BLD AUTO: 39.8 % (ref 34.5–44)
HDLC SERPL-MCNC: 33 MG/DL (ref 40–60)
HGB BLD-MCNC: 13.7 G/DL (ref 11.5–15.5)
HIV1+2 AB SER QL: NORMAL
IMM GRANULOCYTES # BLD: 0.01 10*3/MM3 (ref 0–0.03)
IMM GRANULOCYTES NFR BLD: 0.1 % (ref 0–0.6)
LIPASE SERPL-CCNC: 34 U/L (ref 6–51)
LYMPHOCYTES # BLD AUTO: 2.66 10*3/MM3 (ref 0.6–4.8)
LYMPHOCYTES NFR BLD AUTO: 35 % (ref 24–44)
MAGNESIUM SERPL-MCNC: 1.9 MG/DL (ref 1.3–2.7)
MCH RBC QN AUTO: 28.2 PG (ref 27–31)
MCHC RBC AUTO-ENTMCNC: 34.4 G/DL (ref 32–36)
MCV RBC AUTO: 81.9 FL (ref 80–99)
MONOCYTES # BLD AUTO: 0.54 10*3/MM3 (ref 0–1)
MONOCYTES NFR BLD AUTO: 7.1 % (ref 0–12)
NEUTROPHILS # BLD AUTO: 4.18 10*3/MM3 (ref 1.5–8.3)
NEUTROPHILS NFR BLD AUTO: 55.2 % (ref 41–71)
PLATELET # BLD AUTO: 253 10*3/MM3 (ref 150–450)
PMV BLD AUTO: 10.2 FL (ref 6–12)
POTASSIUM BLD-SCNC: 3.3 MMOL/L (ref 3.5–5.5)
POTASSIUM BLD-SCNC: 4.6 MMOL/L (ref 3.5–5.5)
PREALB SERPL-MCNC: 13.8 MG/DL (ref 10–40)
PROT SERPL-MCNC: 6.4 G/DL (ref 5.7–8.2)
RBC # BLD AUTO: 4.86 10*6/MM3 (ref 3.89–5.14)
SODIUM BLD-SCNC: 140 MMOL/L (ref 132–146)
T4 FREE SERPL-MCNC: 1.12 NG/DL (ref 0.89–1.76)
TRIGL SERPL-MCNC: 220 MG/DL (ref 0–150)
TROPONIN I SERPL-MCNC: <0.006 NG/ML
VIT B12 BLD-MCNC: 348 PG/ML (ref 211–911)
WBC NRBC COR # BLD: 7.59 10*3/MM3 (ref 3.5–10.8)

## 2017-10-16 PROCEDURE — 82962 GLUCOSE BLOOD TEST: CPT

## 2017-10-16 PROCEDURE — G0379 DIRECT REFER HOSPITAL OBSERV: HCPCS

## 2017-10-16 PROCEDURE — 99220 PR INITIAL OBSERVATION CARE/DAY 70 MINUTES: CPT | Performed by: INTERNAL MEDICINE

## 2017-10-16 PROCEDURE — 70553 MRI BRAIN STEM W/O & W/DYE: CPT

## 2017-10-16 PROCEDURE — 96372 THER/PROPH/DIAG INJ SC/IM: CPT

## 2017-10-16 PROCEDURE — 97162 PT EVAL MOD COMPLEX 30 MIN: CPT

## 2017-10-16 PROCEDURE — 0 GADOBENATE DIMEGLUMINE 529 MG/ML SOLUTION: Performed by: INTERNAL MEDICINE

## 2017-10-16 PROCEDURE — 93306 TTE W/DOPPLER COMPLETE: CPT | Performed by: INTERNAL MEDICINE

## 2017-10-16 PROCEDURE — G8999 MOTOR SPEECH CURRENT STATUS: HCPCS

## 2017-10-16 PROCEDURE — 83880 ASSAY OF NATRIURETIC PEPTIDE: CPT | Performed by: NURSE PRACTITIONER

## 2017-10-16 PROCEDURE — 96360 HYDRATION IV INFUSION INIT: CPT

## 2017-10-16 PROCEDURE — G9169 MEMORY GOAL STATUS: HCPCS

## 2017-10-16 PROCEDURE — G0378 HOSPITAL OBSERVATION PER HR: HCPCS

## 2017-10-16 PROCEDURE — 84439 ASSAY OF FREE THYROXINE: CPT | Performed by: NURSE PRACTITIONER

## 2017-10-16 PROCEDURE — 85652 RBC SED RATE AUTOMATED: CPT | Performed by: NURSE PRACTITIONER

## 2017-10-16 PROCEDURE — 93880 EXTRACRANIAL BILAT STUDY: CPT | Performed by: INTERNAL MEDICINE

## 2017-10-16 PROCEDURE — 25010000002 ENOXAPARIN PER 10 MG

## 2017-10-16 PROCEDURE — 93306 TTE W/DOPPLER COMPLETE: CPT

## 2017-10-16 PROCEDURE — 84134 ASSAY OF PREALBUMIN: CPT | Performed by: NURSE PRACTITIONER

## 2017-10-16 PROCEDURE — 83036 HEMOGLOBIN GLYCOSYLATED A1C: CPT | Performed by: NURSE PRACTITIONER

## 2017-10-16 PROCEDURE — 63710000001 INSULIN DETEMIR PER 5 UNITS: Performed by: NURSE PRACTITIONER

## 2017-10-16 PROCEDURE — 80061 LIPID PANEL: CPT | Performed by: NURSE PRACTITIONER

## 2017-10-16 PROCEDURE — G9168 MEMORY CURRENT STATUS: HCPCS

## 2017-10-16 PROCEDURE — 93880 EXTRACRANIAL BILAT STUDY: CPT

## 2017-10-16 PROCEDURE — 85025 COMPLETE CBC W/AUTO DIFF WBC: CPT | Performed by: NURSE PRACTITIONER

## 2017-10-16 PROCEDURE — 84481 FREE ASSAY (FT-3): CPT | Performed by: NURSE PRACTITIONER

## 2017-10-16 PROCEDURE — 63710000001 INSULIN REGULAR HUMAN PER 5 UNITS: Performed by: NURSE PRACTITIONER

## 2017-10-16 PROCEDURE — G9158 MOTOR SPEECH D/C STATUS: HCPCS

## 2017-10-16 PROCEDURE — 83690 ASSAY OF LIPASE: CPT | Performed by: NURSE PRACTITIONER

## 2017-10-16 PROCEDURE — 96361 HYDRATE IV INFUSION ADD-ON: CPT

## 2017-10-16 PROCEDURE — A9577 INJ MULTIHANCE: HCPCS | Performed by: INTERNAL MEDICINE

## 2017-10-16 PROCEDURE — 80053 COMPREHEN METABOLIC PANEL: CPT | Performed by: NURSE PRACTITIONER

## 2017-10-16 PROCEDURE — 84484 ASSAY OF TROPONIN QUANT: CPT | Performed by: NURSE PRACTITIONER

## 2017-10-16 PROCEDURE — 92523 SPEECH SOUND LANG COMPREHEN: CPT

## 2017-10-16 PROCEDURE — G0432 EIA HIV-1/HIV-2 SCREEN: HCPCS | Performed by: NURSE PRACTITIONER

## 2017-10-16 PROCEDURE — 83605 ASSAY OF LACTIC ACID: CPT | Performed by: NURSE PRACTITIONER

## 2017-10-16 PROCEDURE — 82010 KETONE BODYS QUAN: CPT | Performed by: NURSE PRACTITIONER

## 2017-10-16 PROCEDURE — G9186 MOTOR SPEECH GOAL STATUS: HCPCS

## 2017-10-16 PROCEDURE — 83735 ASSAY OF MAGNESIUM: CPT | Performed by: NURSE PRACTITIONER

## 2017-10-16 PROCEDURE — 82607 VITAMIN B-12: CPT | Performed by: NURSE PRACTITIONER

## 2017-10-16 PROCEDURE — 82330 ASSAY OF CALCIUM: CPT | Performed by: NURSE PRACTITIONER

## 2017-10-16 PROCEDURE — 82746 ASSAY OF FOLIC ACID SERUM: CPT | Performed by: NURSE PRACTITIONER

## 2017-10-16 PROCEDURE — 86140 C-REACTIVE PROTEIN: CPT | Performed by: NURSE PRACTITIONER

## 2017-10-16 PROCEDURE — 99244 OFF/OP CNSLTJ NEW/EST MOD 40: CPT | Performed by: PSYCHIATRY & NEUROLOGY

## 2017-10-16 PROCEDURE — 84132 ASSAY OF SERUM POTASSIUM: CPT | Performed by: FAMILY MEDICINE

## 2017-10-16 RX ORDER — SODIUM CHLORIDE 0.9 % (FLUSH) 0.9 %
1-10 SYRINGE (ML) INJECTION AS NEEDED
Status: DISCONTINUED | OUTPATIENT
Start: 2017-10-16 | End: 2017-10-17 | Stop reason: HOSPADM

## 2017-10-16 RX ORDER — LIDOCAINE 50 MG/G
3 PATCH TOPICAL DAILY PRN
Status: DISCONTINUED | OUTPATIENT
Start: 2017-10-16 | End: 2017-10-17 | Stop reason: HOSPADM

## 2017-10-16 RX ORDER — POTASSIUM CHLORIDE 7.45 MG/ML
10 INJECTION INTRAVENOUS
Status: DISCONTINUED | OUTPATIENT
Start: 2017-10-16 | End: 2017-10-17 | Stop reason: HOSPADM

## 2017-10-16 RX ORDER — ASPIRIN 325 MG
325 TABLET ORAL DAILY
Status: DISCONTINUED | OUTPATIENT
Start: 2017-10-16 | End: 2017-10-17 | Stop reason: HOSPADM

## 2017-10-16 RX ORDER — MAGNESIUM SULFATE HEPTAHYDRATE 40 MG/ML
4 INJECTION, SOLUTION INTRAVENOUS AS NEEDED
Status: DISCONTINUED | OUTPATIENT
Start: 2017-10-16 | End: 2017-10-17 | Stop reason: HOSPADM

## 2017-10-16 RX ORDER — POTASSIUM CHLORIDE 750 MG/1
40 CAPSULE, EXTENDED RELEASE ORAL AS NEEDED
Status: DISCONTINUED | OUTPATIENT
Start: 2017-10-16 | End: 2017-10-17 | Stop reason: HOSPADM

## 2017-10-16 RX ORDER — GABAPENTIN 600 MG/1
600 TABLET ORAL DAILY
COMMUNITY
End: 2017-10-17 | Stop reason: HOSPADM

## 2017-10-16 RX ORDER — DEXTROSE MONOHYDRATE 25 G/50ML
25 INJECTION, SOLUTION INTRAVENOUS
Status: DISCONTINUED | OUTPATIENT
Start: 2017-10-16 | End: 2017-10-17 | Stop reason: HOSPADM

## 2017-10-16 RX ORDER — DULOXETIN HYDROCHLORIDE 30 MG/1
30 CAPSULE, DELAYED RELEASE ORAL EVERY 12 HOURS SCHEDULED
Status: DISCONTINUED | OUTPATIENT
Start: 2017-10-16 | End: 2017-10-17 | Stop reason: HOSPADM

## 2017-10-16 RX ORDER — DOCUSATE SODIUM 100 MG/1
100 CAPSULE, LIQUID FILLED ORAL 2 TIMES DAILY PRN
Status: DISCONTINUED | OUTPATIENT
Start: 2017-10-16 | End: 2017-10-17 | Stop reason: HOSPADM

## 2017-10-16 RX ORDER — ACETAMINOPHEN 325 MG/1
650 TABLET ORAL EVERY 4 HOURS PRN
Status: DISCONTINUED | OUTPATIENT
Start: 2017-10-16 | End: 2017-10-17 | Stop reason: HOSPADM

## 2017-10-16 RX ORDER — FAMOTIDINE 20 MG/1
20 TABLET, FILM COATED ORAL 2 TIMES DAILY
Status: DISCONTINUED | OUTPATIENT
Start: 2017-10-16 | End: 2017-10-17 | Stop reason: HOSPADM

## 2017-10-16 RX ORDER — ONDANSETRON 2 MG/ML
4 INJECTION INTRAMUSCULAR; INTRAVENOUS EVERY 6 HOURS PRN
Status: DISCONTINUED | OUTPATIENT
Start: 2017-10-16 | End: 2017-10-17 | Stop reason: HOSPADM

## 2017-10-16 RX ORDER — ATORVASTATIN CALCIUM 40 MG/1
80 TABLET, FILM COATED ORAL NIGHTLY
Status: DISCONTINUED | OUTPATIENT
Start: 2017-10-16 | End: 2017-10-17 | Stop reason: HOSPADM

## 2017-10-16 RX ORDER — NICOTINE POLACRILEX 4 MG
15 LOZENGE BUCCAL
Status: DISCONTINUED | OUTPATIENT
Start: 2017-10-16 | End: 2017-10-17 | Stop reason: HOSPADM

## 2017-10-16 RX ORDER — ACETAMINOPHEN 650 MG/1
650 SUPPOSITORY RECTAL EVERY 4 HOURS PRN
Status: DISCONTINUED | OUTPATIENT
Start: 2017-10-16 | End: 2017-10-17 | Stop reason: HOSPADM

## 2017-10-16 RX ORDER — POTASSIUM CHLORIDE 1.5 G/1.77G
40 POWDER, FOR SOLUTION ORAL AS NEEDED
Status: DISCONTINUED | OUTPATIENT
Start: 2017-10-16 | End: 2017-10-17 | Stop reason: HOSPADM

## 2017-10-16 RX ORDER — MAGNESIUM SULFATE HEPTAHYDRATE 40 MG/ML
2 INJECTION, SOLUTION INTRAVENOUS AS NEEDED
Status: DISCONTINUED | OUTPATIENT
Start: 2017-10-16 | End: 2017-10-17 | Stop reason: HOSPADM

## 2017-10-16 RX ORDER — SODIUM CHLORIDE 9 MG/ML
100 INJECTION, SOLUTION INTRAVENOUS CONTINUOUS
Status: DISCONTINUED | OUTPATIENT
Start: 2017-10-16 | End: 2017-10-16

## 2017-10-16 RX ORDER — BISACODYL 5 MG/1
5 TABLET, DELAYED RELEASE ORAL DAILY PRN
Status: DISCONTINUED | OUTPATIENT
Start: 2017-10-16 | End: 2017-10-17 | Stop reason: HOSPADM

## 2017-10-16 RX ADMIN — DULOXETINE HYDROCHLORIDE 30 MG: 30 CAPSULE, DELAYED RELEASE ORAL at 20:16

## 2017-10-16 RX ADMIN — INSULIN HUMAN 7 UNITS: 100 INJECTION, SOLUTION PARENTERAL at 12:30

## 2017-10-16 RX ADMIN — GADOBENATE DIMEGLUMINE 19 ML: 529 INJECTION, SOLUTION INTRAVENOUS at 04:45

## 2017-10-16 RX ADMIN — INSULIN HUMAN 6 UNITS: 100 INJECTION, SOLUTION PARENTERAL at 06:02

## 2017-10-16 RX ADMIN — SODIUM CHLORIDE 100 ML/HR: 9 INJECTION, SOLUTION INTRAVENOUS at 05:11

## 2017-10-16 RX ADMIN — MAGNESIUM SULFATE HEPTAHYDRATE 4 G: 40 INJECTION, SOLUTION INTRAVENOUS at 10:24

## 2017-10-16 RX ADMIN — FAMOTIDINE 20 MG: 20 TABLET, FILM COATED ORAL at 17:47

## 2017-10-16 RX ADMIN — ASPIRIN 325 MG ORAL TABLET 325 MG: 325 PILL ORAL at 09:19

## 2017-10-16 RX ADMIN — POTASSIUM CHLORIDE 40 MEQ: 750 CAPSULE, EXTENDED RELEASE ORAL at 13:02

## 2017-10-16 RX ADMIN — INSULIN DETEMIR 10 UNITS: 100 INJECTION, SOLUTION SUBCUTANEOUS at 20:16

## 2017-10-16 RX ADMIN — INSULIN HUMAN 8 UNITS: 100 INJECTION, SOLUTION PARENTERAL at 17:53

## 2017-10-16 RX ADMIN — DULOXETINE HYDROCHLORIDE 30 MG: 30 CAPSULE, DELAYED RELEASE ORAL at 09:19

## 2017-10-16 RX ADMIN — POTASSIUM CHLORIDE 40 MEQ: 750 CAPSULE, EXTENDED RELEASE ORAL at 09:29

## 2017-10-16 RX ADMIN — FAMOTIDINE 20 MG: 20 TABLET, FILM COATED ORAL at 09:19

## 2017-10-16 RX ADMIN — INSULIN DETEMIR 10 UNITS: 100 INJECTION, SOLUTION SUBCUTANEOUS at 09:18

## 2017-10-16 RX ADMIN — ENOXAPARIN SODIUM 40 MG: 40 INJECTION SUBCUTANEOUS at 09:19

## 2017-10-16 RX ADMIN — ATORVASTATIN CALCIUM 80 MG: 40 TABLET, FILM COATED ORAL at 20:16

## 2017-10-16 NOTE — CONSULTS
Consult for diabetes educator noted. Patient inappropriate for education at this time. Will continue to follow.

## 2017-10-16 NOTE — THERAPY EVALUATION
Acute Care - Speech Language Pathology Initial Evaluation  [facility]   Cognitive-Communication Evaluation     Patient Name: Nivia Bernstein  : 1975  MRN: [id number]  Today's Date: 10/16/2017  Onset of Illness/Injury or Date of Surgery Date: 10/16/17            Admit Date: 10/16/2017     Visit Dx:    ICD-10-CM ICD-9-CM   1. Impaired functional mobility, balance, gait, and endurance Z74.09 V49.89     Patient Active Problem List   Diagnosis   • Acute right-sided weakness   • Uncontrolled type II diabetes mellitus   • Asthma   • Mitral valve prolapse   • GERD (gastroesophageal reflux disease)   • History of DVT (deep vein thrombosis)   • History of TIAs   • Left upper extremity numbness   • Hyperlipidemia   • Anxiety and depression   • Obesity   • Nausea   • Acute left-sided weakness   • Altered mental status   • Hypokalemia   • Elevated alkaline phosphatase level   • Leg pain   • OCD (obsessive compulsive disorder)   • Renal insufficiency   • Thrombocytopenia     Past Medical History:   Diagnosis Date   • Altered mental status 10/16/2017   • Anxiety and depression 3/31/2017   • Asthma    • CHF (congestive heart failure)     mitral valve prolapse   • Diabetes mellitus    • Elevated alkaline phosphatase level 10/16/2017   • H/O blood clots    • Heart murmur    • Hyperlipidemia    • Hypokalemia 10/16/2017   • Leg pain 10/16/2017   • Mitral valve prolapse    • Neuropathy    • Obesity 3/31/2017   • OCD (obsessive compulsive disorder) 10/16/2017   • Renal insufficiency 10/16/2017   • Thrombocytopenia 10/16/2017   • TIA (transient ischemic attack)     4 TIMES     Past Surgical History:   Procedure Laterality Date   • APPENDECTOMY     • CHOLECYSTECTOMY     • FINGER FUSION     • HYSTERECTOMY     • KNEE ARTHROPLASTY            SLP EVALUATION (last 72 hours)      SLP Evaluation       10/16/17 1445                Rehab Evaluation    Document Type evaluation  -RD        Subjective Information agree to therapy  -RD         Patient Effort, Rehab Treatment good  -RD        General Information    Patient Profile Review yes  -RD        Onset of Illness/Injury 10/16/17  -RD        Subjective Patient Observations alert, cooperative, confused  -RD        Pertinent History Of Current Problem Adm w/ AMS, Hx of TIA, GERD, OCD, DM2. Passed RN dysphagia screen. Per stroke protocol.   -RD        Current Diet Limitations regular solid;thin liquids  -RD        Precautions/Limitations, Vision corrective lenses needed for reading  -RD        Precautions/Limitations, Hearing WFL  -RD        Prior Level of Function- Communication functional in all spheres  -RD        Prior Level of Function- Swallowing no diet consistency restrictions  -RD        Plans/Goals Discussed With patient;spouse/S.O.;agreed upon  -RD        Barriers to Rehab cognitive status  -RD        Living Environment    Lives With spouse;child(romi), dependent  -RD        Living Arrangements house  -RD        Clinical Impression    SLP Diagnosis Pt complaints of difficulty w/ memory. Pt disoriented to self & family members, time, and situation. Pt knew that she was at the hospital. No evidence of expressive or receptive language deficit. Reading and reading comprehension were WFL. Pt's writing appeared functional, although reports that she cannot spell which prevented her from writing a sentence on command. Reasoning and problem solving skills were functional. Pt's only errors noted were relative to memory recall. Errors were inconsistent and did not appear consistent w/ dx of stroke. No dysarthria. Speech was 100% intelligible in conversation.   -RD        Functional Level At Time Of Evaluation impaired  -RD        Patient's Goals For Discharge patient did not state  -RD        Family Goals For Discharge patient able to return to all previous activities/roles  -RD        Criteria for Skilled Therapeutic Interventions Met skilled criteria for cognitive linguistic intervention met  -RD         Rehab Potential good, to achieve stated therapy goals  -RD        Therapy Frequency PRN  -RD        Pain Assessment    Pain Assessment No/denies pain  -RD        Cognitive Assessment/Intervention    Current Cognitive/Communication Assessment impaired  -RD        Orientation Status oriented to;place;disoriented to;person;time;situation  -RD        Follows Commands/Answers Questions 100% of the time;able to follow multi-step instructions  -RD        Short/Long Term Memory decreased recall, biographical info;decreased recall, recent events  -RD        Additional Documentation Cognitive Assessment Intervention (Group)  -RD        Cognitive Assessment Intervention    Behavior/Mood Observations alert;confused;cooperative  -RD        Attention WNL/WFL  -RD        Pragmatics WNL/WFL  -RD        Problem Solving WNL/WFL  -RD        Reasoning WNL/WFL  -RD        Diffuse Language Characteristics no concerns, diffuse language characteristics  -RD        Communication Assessment/Intervention    Additional Documentation Auditory Comprehen Assess/Intervention (Group);Reading Assessment/Intervention (Group);Writing Assessment/Intervention (Group);Motor Speech Assessment/Intervention (Group);Verbal Expression Assess/Intervention (Group)  -RD        Auditory Comprehen Assess/Intervention    Auditory Comprehension WNL/WFL  -RD        Auditory Comprehen Assess/Intervention    Able to Identify Objects WNL/WFL  -RD        Able to Identify Pictures WNL/WFL;successful, multiple pictures  -RD        Answers Yes/No Questions WNL/WFL;successful, complex questions  -RD        Able to Follow Commands WNL/WFL;success, 4 or more step commands  -RD        Verbal Expression Assess/Intervention    Automatic Speech WNL/WFL  -RD        Speech Repetition WNL/WFL  -RD        Speech Fluency fluent speech  -RD        Conversational Speech WNL/WFL  -RD        Reading Assessment/Intervention    Reading Skills WNL/WFL  -RD        Oral Reading Ability  WNL/WFL  -RD        Reading Comprehension WNL/WFL  -RD        Writing Assessment/Intervention    Writing Skills unable/difficult to assess;other (see comments)   2' pt c/o new spelling difficulty'  -RD        Writing, Copying Ability WNL/WFL  -RD        Motor Speech Assess/Intervention    Motor Speech-Apraxia Observations no concerns  -RD        Motor Speech- Apraxia WNL/WFL  -RD        Motor Speech-Dysarthria Observations no concerns  -RD        Motor Speech- Dysarthria WNL/WFL  -RD          User Key  (r) = Recorded By, (t) = Taken By, (c) = Cosigned By    Initials Name Effective Dates    ELSIE Anay MCMANUS [place], MS CCC-SLP 09/27/17 -            EDUCATION  The patient has been educated in the following areas:   Cognitive Impairment Communication Impairment.    SLP Recommendation and Plan  SLP Diagnosis: Pt complaints of difficulty w/ memory. Pt disoriented to self & family members, time, and situation. Pt knew that she was at the hospital. No evidence of expressive or receptive language deficit. Reading and reading comprehension were WFL. Pt's writing appeared functional, although reports that she cannot spell which prevented her from writing a sentence on command. Reasoning and problem solving skills were functional. Pt's only errors noted were relative to memory recall. Errors were inconsistent and did not appear consistent w/ dx of stroke. No dysarthria. Speech was 100% intelligible in conversation.      Rehab Potential: good, to achieve stated therapy goals  Criteria for Skilled Therapeutic Interventions Met: skilled criteria for cognitive linguistic intervention met        Therapy Frequency: PRN          Plan of Care Review  Plan Of Care Reviewed With: patient, spouse  Progress:  (initial eval)  Outcome Summary/Follow up Plan: Cog/comm eval complete. Per stroke protocol. Pt complaints of difficulty w/ memory. Pt disoriented to self & family members, time, and situation. Pt knew that she was at the hospital. No  evidence of expressive or receptive language deficit. Reading and reading comprehension were WFL. Pt's writing appeared functional, although reports that she cannot spell which prevented her from writing a sentence on command. Reasoning and problem solving skills were functional. Pt's only errors noted were relative to memory recall. Errors were inconsistent and did not appear consistent w/ dx of stroke. No dysarthria. Speech was 100% intelligible in conversation. Pt's spouse reports pt w/ recent loss of friend and family member and feels as though this is impacting pt's confusion. RECS: F/u x1 for final neuro dx and to ensure pt back to baseline.            SLP Outcome Measures (last 72 hours)      SLP Outcome Measures       10/16/17 1500          SLP Outcome Measures    Outcome Measure Used? Adult NOMS  -RD      FCM Scores    FCM Chosen Memory;Motor Speech  -RD      Motor Speech FCM Score 7  -RD      Memory FCM Score 6  -RD        User Key  (r) = Recorded By, (t) = Taken By, (c) = Cosigned By    Initials Name Effective Dates    ELSIE Paulino MS CCC-RENEE 09/27/17 -           Time Calculation:         Time Calculation- SLP       10/16/17 1542          Time Calculation- SLP    SLP Start Time 1445  -RD      SLP Received On 10/16/17  -RD        User Key  (r) = Recorded By, (t) = Taken By, (c) = Cosigned By    Initials Name Provider Type    ELSIE Paulino MS CCC-SLP Speech and Language Pathologist          Therapy Charges for Today     Code Description Service Date Service Provider Modifiers Qty    16555899711 HC ST EVAL SPEECH AND PROD W LANG  5 10/16/2017 Anay Paulino MS CCC-SLP GN 1    43380979680 HC ST MOTOR SPEECH CURRENT 10/16/2017 Anay Paulino MS CCC-SLP GN, CH 1    61812050433 HC ST MOTOR SPEECH GOAL STATUS 10/16/2017 Anay Paulino MS CCC-SLP GN, CH 1    12356682244 HC ST MOTOR SPEECH DISCHARGE 10/16/2017 Anay Paulino MS CCC-SLP GN, CH 1    15866014968 HC ST MEMORY CURRENT  10/16/2017 Anay Paulino MS CCC-SLP GN, CI 1    53571201874 HC ST MEMORY PROJECTED 10/16/2017 Anay Paulino MS CCC-SLP GN, CH 1             ADULT NOMS (last 72 hours)      Adult NOMS       10/16/17 1500                FCM Scores    FCM Chosen Memory;Motor Speech  -RD        Motor Speech FCM Score 7  -RD        Memory FCM Score 6  -RD          User Key  (r) = Recorded By, (t) = Taken By, (c) = Cosigned By    Initials Name Effective Dates    RD Anay Paulino MS CCC-SLP 09/27/17 -         SLP G-Codes  SLP NOMS Used?: Yes  Functional Limitations: Motor speech, Memory  Motor Speech Current Status (): 0 percent impaired, limited or restricted  Motor Speech Goal Status (): 0 percent impaired, limited or restricted  Motor Speech Discharge Status (): 0 percent impaired, limited or restricted  Memory Current Status (): At least 1 percent but less than 20 percent impaired, limited or restricted  Memory Goal Status (): 0 percent impaired, limited or restricted      Anay MCMANUS [place] MS CCC-SLP  10/16/2017

## 2017-10-16 NOTE — THERAPY DISCHARGE NOTE
Acute Care - Physical Therapy Initial Eval/Discharge  [facility]     Patient Name: Nivia Bernstein  : 1975  MRN: [id number]  Today's Date: 10/16/2017   Onset of Illness/Injury or Date of Surgery Date: 10/16/17  Date of Referral to PT: 10/16/17  Referring Physician: Donald ALVA      Admit Date: 10/16/2017    Visit Dx:    ICD-10-CM ICD-9-CM   1. Impaired functional mobility, balance, gait, and endurance Z74.09 V49.89     Patient Active Problem List   Diagnosis   • Acute right-sided weakness   • Uncontrolled type II diabetes mellitus   • Asthma   • Mitral valve prolapse   • GERD (gastroesophageal reflux disease)   • History of DVT (deep vein thrombosis)   • History of TIAs   • Left upper extremity numbness   • Hyperlipidemia   • Anxiety and depression   • Obesity   • Nausea   • Acute left-sided weakness   • Altered mental status   • Hypokalemia   • Elevated alkaline phosphatase level   • Leg pain   • OCD (obsessive compulsive disorder)   • Renal insufficiency   • Thrombocytopenia     Past Medical History:   Diagnosis Date   • Altered mental status 10/16/2017   • Anxiety and depression 3/31/2017   • Asthma    • CHF (congestive heart failure)     mitral valve prolapse   • Diabetes mellitus    • Elevated alkaline phosphatase level 10/16/2017   • H/O blood clots    • Heart murmur    • Hyperlipidemia    • Hypokalemia 10/16/2017   • Leg pain 10/16/2017   • Mitral valve prolapse    • Neuropathy    • Obesity 3/31/2017   • OCD (obsessive compulsive disorder) 10/16/2017   • Renal insufficiency 10/16/2017   • Thrombocytopenia 10/16/2017   • TIA (transient ischemic attack)     4 TIMES     Past Surgical History:   Procedure Laterality Date   • APPENDECTOMY     • CHOLECYSTECTOMY     • FINGER FUSION     • HYSTERECTOMY     • KNEE ARTHROPLASTY            PT ASSESSMENT (last 72 hours)      PT Evaluation       10/16/17 1050 10/16/17 1026    Rehab Evaluation    Document Type evaluation  -SC     Subjective Information agree to  therapy   minimal speech, but fluent when she does  -SC     Patient Effort, Rehab Treatment good  -SC     Symptoms Noted During/After Treatment none  -SC     General Information    Patient Profile Review yes  -SC     Onset of Illness/Injury or Date of Surgery Date 10/16/17  -SC     Referring Physician Donald ALVA  -SC     General Observations face symmetrical  -SC     Pertinent History Of Current Problem Admitted with AMS and Headache  -SC     Precautions/Limitations no known precautions/limitations  -SC     Prior Level of Function independent:;ADL's;bed mobility;gait;community mobility   works outside the home  -SC     Equipment Currently Used at Home none  -SC none  -AW    Risks Reviewed patient:;nausea/vomiting;dizziness  -SC     Benefits Reviewed patient:;improve function  -SC     Barriers to Rehab medically complex  -SC     Living Environment    Lives With spouse;child(romi), dependent  -SC spouse;child(romi), dependent  -AW    Home Accessibility no concerns  -SC no concerns  -AW    Clinical Impression    Date of Referral to PT 10/16/17  -SC     PT Diagnosis cognitive impaired  -SC     Patient/Family Goals Statement unable to verbalize  -SC     Criteria for Skilled Therapeutic Interventions Met no;does not meet criteria for skilled intervention  -SC     Pathology/Pathophysiology Noted (Describe Specifically for Each System) other (see comments)   cognitive  -SC     Impairments Found (describe specific impairments) other (see comments)   knees painful  -SC     Rehab Potential good, to achieve stated therapy goals  -SC     Vital Signs    Post Systolic BP Rehab 123  -SC     Post Treatment Diastolic BP 94  -SC     Posttreatment Heart Rate (beats/min) 85  -SC     Pain Assessment    Pain Assessment Dasilva-Hol[place] FACES  -SC     Dasilva-Holcomb FACES Pain Rating 2  -SC     Post Pain Score 2  -SC     Pain Type Chronic pain  -SC     Pain Location Knee  -SC     Pain Orientation Right;Left  -SC     Pain Intervention(s)  Repositioned;Ambulation/increased activity  -SC     Response to Interventions tolerated  -SC     Vision Assessment/Intervention    Visual Impairment WNL  -SC     Visual Impairment Comment able to track  -SC     Cognitive Assessment/Intervention    Current Cognitive/Communication Assessment impaired  -SC     Orientation Status oriented to;person;place;unable/difficult to assess   knows she is in the hospital  -SC     Follows Commands/Answers Questions 100% of the time  -SC     Personal Safety moderate impairment;decreased insight to deficits  -SC     Personal Safety Interventions fall prevention program maintained  -SC     ROM (Range of Motion)    General ROM no range of motion deficits identified  -SC     MMT (Manual Muscle Testing)    General MMT Assessment no strength deficits identified  -SC     Muscle Tone Assessment    Muscle Tone Assessment --   normal  -SC     Bed Mobility, Assessment/Treatment    Bed Mob, Supine to Sit, [place] independent  -SC     Bed Mob, Sit to Supine, [place] independent  -SC     Transfer Assessment/Treatment    Transfers, Sit-Stand [place] independent  -SC     Transfers, Stand-Sit [place] independent  -SC     Transfer, Comment slow to move  -SC     Gait Assessment/Treatment    Gait, [place] Level supervision required  -SC     Gait, Distance (Feet) 350  -SC     Gait, Gait Pattern Analysis swing-through gait  -SC     Gait, Gait Deviations martita decreased  -SC     Gait, Comment slow but safe  -SC     Sensory Assessment/Intervention    Light Touch --   wnl  -SC     Positioning and Restraints    Pre-Treatment Position in bed  -SC     Post Treatment Position bed  -SC     In Bed supine;call light within reach;encouraged to call for assist  -SC       10/16/17 [facility] 10/16/17 0800    Muscle Tone Assessment    Muscle Tone Assessment Bilateral Upper Extremities;Bilateral Lower Extremities  - Bilateral Upper Extremities;Bilateral Lower Extremities  -    Bilateral Upper  Extremities Muscle Tone Assessment --   WNL  -MK --   WNL  -MK    Bilateral Lower Extremities Muscle Tone Assessment --   WNL  -MK --   WNL  -MK      10/16/17 0400 10/16/17 0124    Muscle Tone Assessment    Muscle Tone Assessment Bilateral Upper Extremities;Bilateral Lower Extremities  -TC Bilateral Upper Extremities;Bilateral Lower Extremities  -TC    Bilateral Upper Extremities Muscle Tone Assessment  --   WNL  -TC    Bilateral Lower Extremities Muscle Tone Assessment  --   WNL  -TC      10/16/17 0050       Living Environment    Lives With child(romi), adult;child(romi), dependent;spouse  -TC     Living Arrangements house  -TC     Home Accessibility no concerns  -TC     Stair Railings at Home none  -TC     Type of Financial/Environmental Concern none  -TC     Transportation Available car  -TC       User Key  (r) = Recorded By, (t) = Taken By, (c) = Cosigned By    Initials Name Provider Type    ISRAEL Ramirez, PT Physical Therapist    JESSICA King, RN Registered Nurse    ALBERTO Barnett     TC Anatoliy Patton, RN Registered Nurse          Physical Therapy Education     Title: PT OT SLP Therapies (Active)     Topic: Physical Therapy (Done)     Point: Mobility training (Done)    Learning Progress Summary    Learner Readiness Method Response Comment Documented by Status   Patient Acceptance E DU reviewed benefits of activity SC 10/16/17 1138 Done               Point: Home exercise program (Done)    Learning Progress Summary    Learner Readiness Method Response Comment Documented by Status   Patient Acceptance E DU reviewed benefits of activity SC 10/16/17 1138 Done               Point: Body mechanics (Done)    Learning Progress Summary    Learner Readiness Method Response Comment Documented by Status   Patient Acceptance E DU reviewed benefits of activity SC 10/16/17 1138 Done               Point: Precautions (Done)    Learning Progress Summary    Learner Readiness Method Response Comment  Documented by Status   Patient Acceptance E DU reviewed benefits of activity SC 10/16/17 1138 Done                      User Key     Initials Effective Dates Name Provider Type Discipline    SC 06/19/15 -  Bj Ramirez, PT Physical Therapist PT                PT Recommendation and Plan  Anticipated Discharge Disposition: home with assist  Planned Therapy Interventions: patient/family education  PT Frequency: evaluation only  Plan of Care Review  Plan Of Care Reviewed With: patient  Progress: progress toward functional goals as expected  Outcome Summary/Follow up Plan: Patient is mobilizing safely on floor with stable vitals. She remains  confused with minimal speech.  No skilled PT needed at this time.          IP PT Goals       10/16/17 1138          Gait Training PT LTG    Gait Training Goal PT LTG, Date Established 10/16/17  -SC      Gait Training Goal PT LTG, Time to Achieve 5 - 7 days  -SC      Gait Training Goal PT LTG, [place] Level supervision required  -SC      Gait Training Goal PT LTG, Distance to Achieve 350  -SC      Gait Training Goal PT LTG, Outcome goal met  -SC        User Key  (r) = Recorded By, (t) = Taken By, (c) = Cosigned By    Initials Name Provider Type    SC Bj Ramirez, PT Physical Therapist                Outcome Measures       10/16/17 1100          How much help from another person do you currently need...    Turning from your back to your side while in flat bed without using bedrails? 4  -SC      Moving from lying on back to sitting on the side of a flat bed without bedrails? 4  -SC      Moving to and from a bed to a chair (including a wheelchair)? 4  -SC      Standing up from a chair using your arms (e.g., wheelchair, bedside chair)? 4  -SC      Climbing 3-5 steps with a railing? 3  -SC      To walk in hospital room? 4  -SC      AM-PAC 6 Clicks Score 23  -SC      Modified Opheim Scale    Modified Opheim Scale 1 - No significant disability despite symptoms.  Able to carry out  all usual duties and activities.  -SC      Functional Assessment    Outcome Measure Options AM-PAC 6 Clicks Basic Mobility (PT);Modified [place]  -SC        User Key  (r) = Recorded By, (t) = Taken By, (c) = Cosigned By    Initials Name Provider Type    SC Bj Ramirez, PT Physical Therapist           Time Calculation:         PT Charges       10/16/17 1142          Time Calculation    Start Time 1050  -SC      PT Received On 10/16/17  -SC        User Key  (r) = Recorded By, (t) = Taken By, (c) = Cosigned By    Initials Name Provider Type    SC Bj Ramirez, PT Physical Therapist          Therapy Charges for Today     Code Description Service Date Service Provider Modifiers Qty    89858561944 HC PT EVAL MOD COMPLEXITY 4 10/16/2017 Bj Ramirez, PT GP 1          PT G-Codes  Outcome Measure Options: AM-PAC 6 Clicks Basic Mobility (PT), Modified [place]    PT Discharge Summary  Anticipated Discharge Disposition: home with assist  Reason for Discharge: All goals achieved  Outcomes Achieved: Able to achieve all goals within established timeline  Discharge Destination: Home with assist    Bj Ramirez, PT  10/16/2017

## 2017-10-16 NOTE — PROGRESS NOTES
H&P from early today's date performed by partner, Dr. Moyer.  Agree with completion  Of CVA protocol however given hx of similar previous events and no overt ischemic insult on current imaging, likely represents recurring psychosomatic event.  Awaiting Neuro assessment and completion of w/u, plan for likely d/c in am.     Rachna Mcintyre MD  1:17 PM  10/16/17

## 2017-10-16 NOTE — PLAN OF CARE
Problem: Patient Care Overview (Adult)  Goal: Plan of Care Review  Outcome: Outcome(s) achieved Date Met:  10/16/17    10/16/17 1138   Coping/Psychosocial Response Interventions   Plan Of Care Reviewed With patient   Patient Care Overview   Progress progress toward functional goals as expected   Outcome Evaluation   Outcome Summary/Follow up Plan Patient is mobilizing safely on floor with stable vitals. She remains confused with minimal speech. No skilled PT needed at this time.         Problem: Inpatient Physical Therapy  Goal: Gait Training Goal LTG- PT  Outcome: Outcome(s) achieved Date Met:  10/16/17    10/16/17 1138   Gait Training PT LTG   Gait Training Goal PT LTG, Date Established 10/16/17   Gait Training Goal PT LTG, Time to Achieve 5 - 7 days   Gait Training Goal PT LTG, [place] Level supervision required   Gait Training Goal PT LTG, Distance to Achieve 350   Gait Training Goal PT LTG, Outcome goal met

## 2017-10-16 NOTE — PLAN OF CARE
Problem: Stroke (Ischemic) (Adult)  Goal: Signs and Symptoms of Listed Potential Problems Will be Absent or Manageable (Stroke)  Outcome: Ongoing (interventions implemented as appropriate)  NIHSS score is 2. Could not answer questions appropriately.  Does not recall her name or the names of her family. Confused to time date year, president and spouse.

## 2017-10-16 NOTE — ED NOTES
Called to check status of truck. Dispatch advised to call back in 20 minutes.      Heather Symes  10/15/17 6026

## 2017-10-16 NOTE — CONSULTS
Neurology    Referring provider:   Saeid Garcia MD  [address]  [address] [place] [address]    Reason for Consultation: Possible stroke    Chief complaint: None offered    History of present illness:  This 42-year-old woman is seems request of Dr. Mcintyre for evaluation of the speech difficulty and amnesia.    She has had episodes of being mute.  This patient's family indicates that she is highly obsessive individual who has had several losses including her best friend who  and aunt and a cousin all of whom  in the last 2 or 3 weeks.    She apparently was having the episodes of mutism.    She now does not acknowledge recognizing any of her family.    She is conversational.    She is a type II diabetic and comes in with sugar out of control as well.        Review of Systems: Patient denies suicidal ideation.    She denies recognizing her family.  She does not know where she is.    System review is suspect but all other systems reviewed and are negative.        Home meds:   Prescriptions Prior to Admission   Medication Sig Dispense Refill Last Dose   • gabapentin (NEURONTIN) 600 MG tablet Take 600 mg by mouth Daily.      • aspirin 325 MG tablet Take 325 mg by mouth Daily.   3/19/2017 at Unknown time   • atorvastatin (LIPITOR) 80 MG tablet Take 1 tablet by mouth Every Night. (Patient not taking: Reported on 10/15/2017) 30 tablet 0 Not Taking at Unknown time   • DULoxetine (CYMBALTA) 30 MG capsule Take 1 capsule by mouth Every 12 (Twelve) Hours. (Patient not taking: Reported on 10/15/2017) 60 capsule 0 Not Taking at Unknown time   • insulin regular (humuLIN R) 500 UNIT/ML CONCENTRATED injection Inject  under the skin 3 (Three) Times a Day Before Meals. 110 units am, 95 units pm, 95 units nightly   3/19/2017 at Unknown time   • Liraglutide (VICTOZA) 18 MG/3ML solution pen-injector Inject 1.8 mg under the skin Daily.   3/18/2017 at Unknown time   • metFORMIN (GLUCOPHAGE) 1000 MG tablet Take 1,000 mg by  "mouth 2 (Two) Times a Day With Meals.   3/19/2017 at Unknown time   • raNITIdine (ZANTAC) 150 MG tablet Take 150 mg by mouth 2 (Two) Times a Day.   3/19/2017 at Unknown time   • vitamin D (ERGOCALCIFEROL) 39878 UNITS capsule capsule Take 50,000 Units by mouth 1 (One) Time Per Week.          History  Past Medical History:   Diagnosis Date   • Altered mental status 10/16/2017   • Anxiety and depression 3/31/2017   • Asthma    • CHF (congestive heart failure)     mitral valve prolapse   • Diabetes mellitus    • Elevated alkaline phosphatase level 10/16/2017   • H/O blood clots    • Heart murmur    • Hyperlipidemia    • Hypokalemia 10/16/2017   • Leg pain 10/16/2017   • Mitral valve prolapse    • Neuropathy    • Obesity 3/31/2017   • OCD (obsessive compulsive disorder) 10/16/2017   • Renal insufficiency 10/16/2017   • Thrombocytopenia 10/16/2017   • TIA (transient ischemic attack)     4 TIMES   ,   Past Surgical History:   Procedure Laterality Date   • APPENDECTOMY     • CHOLECYSTECTOMY     • FINGER FUSION     • HYSTERECTOMY     • KNEE ARTHROPLASTY     ,   Family History   Problem Relation Age of Onset   • Diabetes Mother    • Diabetes Father    • Heart disease Father    • No Known Problems Brother    • No Known Problems Daughter    ,   Social History   Substance Use Topics   • Smoking status: Never Smoker   • Smokeless tobacco: Never Used   • Alcohol use No    and Allergies:  Mobic [meloxicam]; Novolog [insulin aspart]; and Penicillins,    Vital Signs   Blood pressure 124/94, pulse 83, temperature 97.5 °F (36.4 °C), temperature source Oral, resp. rate 18, height 64\" (162.6 cm), weight 200 lb 2 oz (90.8 kg), SpO2 95 %, not currently breastfeeding.  Body mass index is 34.35 kg/(m^2).    Physical Exam:   General: Obese white female in no distress              Neck: No bruits              Resp: Normal breath sounds              Cor: Regular rhythm              Extr: No edema              Skin: Warm and dry               " Neuro: Patient is awake and alert she is calm and cooperative.  She'll follow the commands sequentially with no problem.  She is oriented only to person.  She has no evidence of thought disorder.    She does not hallucinate.    Speech is articulate with no word finding problem.    Coordination is normal finger to nose testing.    Cranial nerves show benign fundi equal pupils full eye movements.  Facial sensation and movement are normal.    Palate elevates normally tongue protrudes normally.    Reflexes are 2+ and equal bilaterally.    Motor testing shows normal power and tone in all muscle groups.    Sensory testing shows decreased sensation in the feet bilaterally.            Results Review: MRI of the brain was personally reviewed and is completely normal.    Echocardiogram is unremarkable including a negative bubble study.    Carotid duplex is negative.        Labs:  Lab Results (last 72 hours)     Procedure Component Value Units Date/Time    POC Glucose Fingerstick [[id number]]  (Abnormal) Collected:  10/16/17 0036    Specimen:  Blood Updated:  10/16/17 0037     Glucose 230 (H) mg/dL     Narrative:       Meter: [id number] : [id number] Matilde Espino    POC Glucose Fingerstick [[id number]]  (Abnormal) Collected:  10/16/17 0537    Specimen:  Blood Updated:  10/16/17 0538     Glucose 275 (H) mg/dL     Narrative:       Meter: [id number] : [id number] Matilde Espino    T3, Free [[id number]] Collected:  10/16/17 0623    Specimen:  Blood Updated:  10/16/17 0656    CBC & Differential [[id number]] Collected:  10/16/17 0623    Specimen:  Blood Updated:  10/16/17 0710    Narrative:       The following orders were created for panel order CBC & Differential.  Procedure                               Abnormality         Status                     ---------                               -----------         ------                     CBC Auto Differential[[id number]]        Normal              Final result                  Please view results for these tests on the individual orders.    CBC Auto Differential [[id number]]  (Normal) Collected:  10/16/17 0623    Specimen:  Blood Updated:  10/16/17 0710     WBC 7.59 10*3/mm3      RBC 4.86 10*6/mm3      Hemoglobin 13.7 g/dL      Hematocrit 39.8 %      MCV 81.9 fL      MCH 28.2 pg      MCHC 34.4 g/dL      RDW 12.6 %      RDW-SD 37.9 fl      MPV 10.2 fL      Platelets 253 10*3/mm3      Neutrophil % 55.2 %      Lymphocyte % 35.0 %      Monocyte % 7.1 %      Eosinophil % 2.2 %      Basophil % 0.4 %      Immature Grans % 0.1 %      Neutrophils, Absolute 4.18 10*3/mm3      Lymphocytes, Absolute 2.66 10*3/mm3      Monocytes, Absolute 0.54 10*3/mm3      Eosinophils, Absolute 0.17 10*3/mm3      Basophils, Absolute 0.03 10*3/mm3      Immature Grans, Absolute 0.01 10*3/mm3     Sedimentation Rate [[id number]]  (Normal) Collected:  10/16/17 0623    Specimen:  Blood Updated:  10/16/17 0716     Sed Rate 11 mm/hr     Lactic Acid, Plasma [[id number]]  (Normal) Collected:  10/16/17 0623    Specimen:  Blood Updated:  10/16/17 0718     Lactate 0.6 mmol/L       Falsely depressed results may occur on samples drawn from patients receiving N-Acetylcysteine (NAC) or Metamizole.       Calcium, Ionized [[id number]]  (Normal) Collected:  10/16/17 0623    Specimen:  Blood Updated:  10/16/17 0723     Ionized Calcium 1.28 mmol/L     T4, Free [[id number]]  (Normal) Collected:  10/16/17 0623    Specimen:  Blood Updated:  10/16/17 0745     Free T4 1.12 ng/dL     Magnesium [[id number]]  (Normal) Collected:  10/16/17 0623    Specimen:  Blood Updated:  10/16/17 0745     Magnesium 1.9 mg/dL     Lipase [[id number]]  (Normal) Collected:  10/16/17 0623    Specimen:  Blood Updated:  10/16/17 0745     Lipase 34 U/L     Lipid Panel [[id number]]  (Abnormal) Collected:  10/16/17 0623    Specimen:  Blood Updated:  10/16/17 0746     Total Cholesterol 206 (H) mg/dL      Triglycerides 220 (H) mg/dL      HDL Cholesterol 33 (L) mg/dL      LDL  Cholesterol  150 (H) mg/dL     Narrative:       Cholesterol Reference Ranges:   Desirable       < 200 mg/dL   Borderline    200-239 mg/dL   High Risk       > 239 mg/dL    Triglyceride Reference Ranges:   Normal          < 150 mg/dL   Borderline    150-199 mg/dL   High          200-499 mg/dL   Very High       > 499 mg/dL    HDL Reference Ranges:   Low              < 40 mg/dL   High             > 59 mg/dL    LDL Reference Ranges:   Optimal         < 100 mg/dL   Near Optimal  100-129 mg/dL   Borderline    130-159 mg/dL   High          160-189 mg/dL   Very High       > 189 mg/dL    Troponin [[id number]]  (Normal) Collected:  10/16/17 0623    Specimen:  Blood Updated:  10/16/17 0746     Troponin I <0.006 ng/mL     C-reactive Protein [[id number]]  (Normal) Collected:  10/16/17 0623    Specimen:  Blood Updated:  10/16/17 0747     C-Reactive Protein 0.86 mg/dL     Comprehensive Metabolic Panel [[id number]]  (Abnormal) Collected:  10/16/17 0623    Specimen:  Blood Updated:  10/16/17 0747     Glucose 294 (H) mg/dL      BUN 11 mg/dL      Creatinine 0.70 mg/dL      Sodium 140 mmol/L      Potassium 3.3 (L) mmol/L      Chloride 106 mmol/L      CO2 27.0 mmol/L      Calcium 8.8 mg/dL      Total Protein 6.4 g/dL      Albumin 3.80 g/dL      ALT (SGPT) 24 U/L      AST (SGOT) 17 U/L      Alkaline Phosphatase 114 (H) U/L      Total Bilirubin 0.7 mg/dL      eGFR Non African Amer 91 mL/min/1.73      Globulin 2.6 gm/dL      A/G Ratio 1.5 g/dL      BUN/Creatinine Ratio 15.7     Anion Gap 7.0 mmol/L     Narrative:       National Kid[employer] Guidelines    Stage     Description        GFR  1         Normal or High     90+  2         Mild decrease      60-89  3         Moderate decrease  30-59  4         Severe decrease    15-29  5         Kidney failure     <15    Hemoglobin A1c [[id number]]  (Abnormal) Collected:  10/16/17 0623    Specimen:  Blood Updated:  10/16/17 0747     Hemoglobin A1C 13.90 (H) %     Narrative:       The American  Diabetes Association recommends maintenance of Hemoglobin A1C at 7.0% or lower. Goals for Hemoglobin A1C reduction may need to be modified if hypoglycemia is a problem.    Beta Hydroxybutyrate Quantitative [[id number]]  (Abnormal) Collected:  10/16/17 0623    Specimen:  Blood Updated:  10/16/17 0802     Beta-Hydroxybutyrate Quant 0.630 (H) mmol/L     Narrative:       In the assessment of possible diabetic ketoacidosis, the test should be interpreted along with other clinical and laboratory findings.  A level greater than 1 mmol/L should require further evaluation and levels of more than 3 mmol/L require immediate medical review.    Prealbumin [[id number]]  (Normal) Collected:  10/16/17 0623    Specimen:  Blood Updated:  10/16/17 0802     Prealbumin 13.8 mg/dL     BNP [[id number]]  (Normal) Collected:  10/16/17 0623    Specimen:  Blood Updated:  10/16/17 0838     BNP 6.0 pg/mL     Vitamin B12 [[id number]]  (Normal) Collected:  10/16/17 0623    Specimen:  Blood Updated:  10/16/17 0908     Vitamin B-12 348 pg/mL     Folate [[id number]]  (Normal) Collected:  10/16/17 0623    Specimen:  Blood Updated:  10/16/17 0908     Folate 15.19 ng/mL     Narrative:         Folate Reference Ranges:    Deficient:            Less than 1.2 ng/mL  Indeterminant:        1.2-3.1 ng/mL  Normal:               3.2-20.0 ng/mL    HIV-1 / O / 2 Ag / Antibody 4th Generation [[id number]]  (Normal) Collected:  10/16/17 0623    Specimen:  Blood Updated:  10/16/17 0945     HIV-1/ HIV-2 Non-Reactive    Narrative:       The HIV antigen/antibody combo assay is a qualitative assay for HIV that includes the p24 antigen as well as antibodies to HIV types 1 and 2.  The assay is intended to be used as a screening assay in the diagnosis of HIV infection in pediatric and adult populations, but has not been validated for children under age 2.  A reactive result does not distinguish HIV-1 p24 antigen, HIV-1 antibody, HIV-2 antibody, and HIV-1 group O antibody.  A  positive result will be reflexed to a follow up immunoassay for antibody differentiation.    POC Glucose Fingerstick [[id number]]  (Abnormal) Collected:  10/16/17 1213    Specimen:  Blood Updated:  10/16/17 1231     Glucose 307 (H) mg/dL     Narrative:       Verify with Lab Meter: [id number] : [id number] Merline Garnica          Rads:  Imaging Results (last 72 hours)     Procedure Component Value Units Date/Time    MRI Brain With & Without Contrast [[id number]] Collected:  10/16/17 0221     Updated:  10/16/17 0524    Narrative:       EXAM:    MR Head Without and With Intravenous Contrast    CLINICAL HISTORY:    42 years, female; Signs and symptoms; Altered mental status/memory loss;   Confusion or disorientation; Patient HX: R/O stroke, negative CT. R/O seizure.   Confusion. Personality and behavior changes. 40 lb wt loss 1.5 months. Patient   developed sudden onset confusion at around 1500 10/15/2017. Patient brought to   the ed where she still does not recognize her , know her name nor where   she is. No HX of cancer no surgeries on head creat: 0.84 10/15/2017 gfr: 73.655   19 ml multihance; Additional info: R/O stroke, negative CT. R/O seizure.   Confusion. Personality and behavior changes. 40 lb wt loss 1.5 months.    TECHNIQUE:    Magnetic resonance images of the head/brain without and with intravenous   contrast in multiple planes.    CONTRAST:    19 mL of multihance administered intravenously.    COMPARISON:    MRI BRAIN WO CONTRAST 3/31/2017 5:33:25 AM    FINDINGS:    Brain:  Unremarkable.  Diffusion weighted imaging is negative for acute   infarct. No intracranial mass visualized.  No evidence of hemorrhage. No   abnormal enhancement.    Ventricles:  Unremarkable.  No ventriculomegaly.    Bones/joints:  No significant abnormalities.    Sinuses:  Unremarkable as visualized.  No acute sinusitis.    Mastoid air cells:  Unremarkable as visualized.  No mastoid effusion.    Orbits:  Unremarkable as  visualized.      Impression:         No acute intracranial findings visualized.    THIS DOCUMENT HAS BEEN ELECTRONICALLY SIGNED BY JENNIFER MCKEON MD            Assessment: Memory loss secondary to the psychiatric disorder    ?  Major depression    diabetic neuropathy.           Plan:    The patient has no evidence of stroke.    Suspect that the primary issue is psychiatric.  Once her blood sugars under control she can be evaluated by [facility].  She has good family support and denies suicidal thoughts.  She is likely a candidate for outpatient therapy.    continue the Cymbalta that she's taking it.    Comment:   Recurrent  symptoms of cerebral dysfunction secondary to stress.    I discussed the patients findings and my recommendations with patient and family      rBadley Avila MD  10/16/17  2:28 PM

## 2017-10-16 NOTE — ED NOTES
PT HA SNO SIGNS OF DISTRESS AT THIS TIME BREATHING IS EQUAL AND UNLABORED SKIN PWD     Kiera Huitron, RN  10/15/17 7723

## 2017-10-16 NOTE — ED NOTES
Spoke with one of the EMT's with EMS, he states that the truck is on its way.      Heather Symes  10/15/17 4137

## 2017-10-16 NOTE — PLAN OF CARE
Problem: Patient Care Overview (Adult)  Goal: Plan of Care Review  Outcome: Ongoing (interventions implemented as appropriate)    10/16/17 6050   Coping/Psychosocial Response Interventions   Plan Of Care Reviewed With patient;spouse   Patient Care Overview   Progress (initial eval)   Outcome Evaluation   Outcome Summary/Follow up Plan Cog/comm eval complete. Per stroke protocol. Pt complaints of difficulty w/ memory. Pt disoriented to self & family members, time, and situation. Pt knew that she was at the hospital. No evidence of expressive or receptive language deficit. Reading and reading comprehension were WFL. Pt's writing appeared functional, although reports that she cannot spell which prevented her from writing a sentence on command. Reasoning and problem solving skills were functional. Pt's only errors noted were relative to memory recall related to personal/biographical information. Errors were inconsistent and did not appear consistent w/ dx of stroke. No dysarthria. Speech was 100% intelligible in conversation. Pt's spouse reports pt w/ recent loss of friend and family member and feels as though this is impacting pt's confusion. Will f/u to ensure no further SLP needs. RECS: F/u x1 for final neuro dx and to ensure pt back to baseline.

## 2017-10-16 NOTE — PROGRESS NOTES
Pharmacokinetic Consult - Enoxaparin Dosing  Nivia Bernstein is a 42 y.o. female who has been consulted for enoxaparin dosing for VTE prophylaxis    Relevant clinical data and objective history reviewed:  Creatinine   Date Value Ref Range Status   10/15/2017 0.84 0.43 - 1.29 mg/dL Final   03/19/2017 0.60 0.60 - 1.30 mg/dL Final     Estimated Creatinine Clearance: 93.2 mL/min (by C-G formula based on Cr of 0.84).     Patient weight:      Diagnosis: VTE prophylaxis  Consulting Provider: NIDA Blair      Asessment/Plan  1. Enoxaparin 40 mg subcuteaneous q24h    2. Pharmacy will continue to monitor patient's renal function for further potential dose ajdustements.        Marco Begum [facility]  10/16/2017  2:57 AM

## 2017-10-16 NOTE — H&P
"    [facility] Medicine Services  HISTORY AND PHYSICAL    Primary Care Physician: Jesse Garcia MD   Endocrinology: unknown, [place]    Subjective     Chief Complaint: confusion    History of Present Illness:     Mrs. Bernstein was brought to [facility] by her spouse for confusion. Onset first noticed 10/15/17. Spouse reports 10/15/17 around 1500 she was sitting on the porch and had conversations that did not make sense such as \"waitin on Sissy and Heraclio to pick her up to go to the grocery\" when they were sitting inside, mixing up her sons, and then when they went to her parent's house that she didn't know who they were, and then not knowing who her spouse was when they were in the car on the way to the hospital. Constant. Severe. Spouse suspect worse with three recent deaths in the family.    Spouse reports she has chronic anxiety and depression but refuses to seek help despite encouragement. 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 He states there had been concern about psychological disorders as the cause past TIA vs CVA workups in past hospital stays. Mr. Bernstein states she is under heavy stress and thinks she may be having a breakdown with the deaths of three family members in the last two weeks. An aunt passed 2-3 weeks ago, and then her cousin 2 weeks ago that was her best friend, and then 1.5 weeks ago a friend \"that she is closer to than her dad\" passed away. He states she has been devastated as well by not being able to attend his  10/14 because work would not let her off, that this is all she will talk about if she talks.    Mrs. Bernstein has limited interaction with us during visit and looks away, occasionally will answer a few yes/no questions. Spouse notes that she has " not been eating well for some time, no appetite, 40 lb wt unintentional wt loss in the last 1.2 months, that she often complains of pain/discomfort to BLE, and psychiatric complaints. He denies any known seizure hx, motor changes/deficits, speech changes, weakness/paralysis, no incontinence. He stats she sleeps well at night, and does not know of any hallucinations or reckless activity. He reports no known hx of any suicidal/homicidal thoughts have been reported by pt in past.     Patient says she has headache, when asked if this has happened before she says sometimes.    Mrs. Bernstein has been transferred to [facility] to rule out stroke.    Review of Systems   Unable to perform ROS: Mental status change      Otherwise complete 10 system ROS performed and negative except as mentioned in the HPI.    Past Medical History:   Diagnosis Date   • Altered mental status 10/16/2017   • Anxiety and depression 3/31/2017   • Asthma    • CHF (congestive heart failure)     mitral valve prolapse   • Diabetes mellitus    • Elevated alkaline phosphatase level 10/16/2017   • H/O blood clots    • Heart murmur    • Hyperlipidemia    • Hypokalemia 10/16/2017   • Leg pain 10/16/2017   • Mitral valve prolapse    • Neuropathy    • Obesity 3/31/2017   • OCD (obsessive compulsive disorder) 10/16/2017   • TIA (transient ischemic attack)     4 TIMES       Past Surgical History:   Procedure Laterality Date   • APPENDECTOMY     • CHOLECYSTECTOMY     • FINGER FUSION     • HYSTERECTOMY     • KNEE ARTHROPLASTY         Family History   Problem Relation Age of Onset   • Diabetes Mother    • Diabetes Father    • Heart disease Father    • No Known Problems Brother    • No Known Problems Daughter        Social History     Social History   • Marital status:      Spouse name: N/A   • Number of children: N/A   • Years of education: N/A     Occupational History   • Not on file.     Social History Main Topics   • Smoking status: Never Smoker   • Smokeless  tobacco: Never Used   • Alcohol use No   • Drug use: No   • Sexual activity: Defer     Other Topics Concern   • Not on file     Social History Narrative    Mrs. Bernstein is a 42 year old white  female. They live in [facility] [place] [facility] with their 4 children. She has extended family local as well. She works as a  at "[employer] in [facility]. She does not have an advanced directive.       Medications:  Prescriptions Prior to Admission   Medication Sig Dispense Refill Last Dose   • aspirin 325 MG tablet Take 325 mg by mouth Daily.   3/19/2017 at Unknown time   • atorvastatin (LIPITOR) 80 MG tablet Take 1 tablet by mouth Every Night. (Patient not taking: Reported on 10/15/2017) 30 tablet 0 Not Taking at Unknown time   • DULoxetine (CYMBALTA) 30 MG capsule Take 1 capsule by mouth Every 12 (Twelve) Hours. (Patient not taking: Reported on 10/15/2017) 60 capsule 0 Not Taking at Unknown time   • insulin regular (humuLIN R) 500 UNIT/ML CONCENTRATED injection Inject  under the skin 3 (Three) Times a Day Before Meals. 110 units am, 95 units pm, 95 units nightly   3/19/2017 at Unknown time   • Liraglutide (VICTOZA) 18 MG/3ML solution pen-injector Inject 1.8 mg under the skin Daily.   3/18/2017 at Unknown time   • metFORMIN (GLUCOPHAGE) 1000 MG tablet Take 1,000 mg by mouth 2 (Two) Times a Day With Meals.   3/19/2017 at Unknown time   • raNITIdine (ZANTAC) 150 MG tablet Take 150 mg by mouth 2 (Two) Times a Day.   3/19/2017 at Unknown time   • vitamin D (ERGOCALCIFEROL) 44217 UNITS capsule capsule Take 50,000 Units by mouth 1 (One) Time Per Week.          Allergies:  Allergies   Allergen Reactions   • Mobic [Meloxicam] Rash   • Novolog [Insulin Aspart] Hives   • Penicillins          Objective     Physical Exam:  Vital Signs: /69 (BP Location: Right arm, Patient Position: Lying)  Temp 97.3 °F (36.3 °C) (Oral)   Resp 18  Physical Exam   Constitutional: She appears well-nourished. No distress.   Reports she is  hungry and would like something to eat.   HENT:   Head: Normocephalic and atraumatic.   Mouth/Throat: Oropharynx is clear and moist. No oropharyngeal exudate.   Eyes: Conjunctivae and EOM are normal. Pupils are equal, round, and reactive to light. Right eye exhibits no discharge. Left eye exhibits no discharge. No scleral icterus.   Pupils 2-3 mm.   Neck: No JVD present. No tracheal deviation present.   Cardiovascular: Normal rate, regular rhythm, normal heart sounds and intact distal pulses.    No murmur heard.  No edema. Unable to appreciate any murmur.   Pulmonary/Chest: Effort normal and breath sounds normal. No respiratory distress. She has no wheezes. She has no rales.   Abdominal: Soft. Bowel sounds are decreased. There is no tenderness. There is no rigidity, no guarding, no CVA tenderness and negative Johnson's sign.   Genitourinary:   Genitourinary Comments: Bladder non-distended, non-tender.   Musculoskeletal: She exhibits no edema or deformity.   Neurological: She is alert.   Pt oriented to herself and birthday. Says year is 2017 but nothing else. Does not know where we are. Does not know who her  is. Alert. No facial palsy/asymmetry, no extremity asymmetry. No ataxia or drift. Speech clear, no word finding/aphasia or dysarthria. No focal deficits noted. NIHSS clear other than her disorientation as described above.   Skin: Skin is warm and dry. No rash noted. She is not diaphoretic. No erythema. No pallor.   Psychiatric:   Flat, cooperative, quiet, disengaged.       Results Reviewed:    Lab Results (last 24 hours)     Procedure Component Value Units Date/Time    CBC & Differential [[id number]] Collected:  10/15/17 1658    Specimen:  Blood Updated:  10/15/17 1715    Narrative:       The following orders were created for panel order CBC & Differential.  Procedure                               Abnormality         Status                     ---------                               -----------          ------                     CBC Auto Differential[[id number]]        Abnormal            Final result                 Please view results for these tests on the individual orders.    Comprehensive Metabolic Panel [[id number]]  (Abnormal) Collected:  10/15/17 1658    Specimen:  Blood from Hand, Left Updated:  10/15/17 1729     Glucose 470 (H) mg/dL      BUN 9 mg/dL      Creatinine 0.84 mg/dL      Sodium 138 mmol/L      Potassium 3.4 (L) mmol/L      Chloride 102 mmol/L      CO2 23.4 (L) mmol/L      Calcium 9.6 mg/dL      Total Protein 7.9 g/dL      Albumin 4.20 g/dL      ALT (SGPT) 25 U/L      AST (SGOT) 16 U/L      Alkaline Phosphatase 151 (H) U/L       Note New Reference Ranges        Total Bilirubin 0.4 mg/dL      eGFR Non African Amer 74 mL/min/1.73      Globulin 3.7 gm/dL      A/G Ratio 1.1 (L) g/dL      BUN/Creatinine Ratio 10.7     Anion Gap 12.6 (H) mmol/L     Troponin [[id number]]  (Normal) Collected:  10/15/17 1658    Specimen:  Blood from Hand, Left Updated:  10/15/17 1735     Troponin I <0.006 ng/mL     Narrative:       Ultra Troponin I Reference Range:         <=0.039 ng/mL: Negative    0.04-0.779 ng/mL: Indeterminate Range. Suspicious of MI.  Clinical correlation required.       >=0.78  ng/mL: Consistent with myocardial injury.  Clinical correlation required.    TSH [[id number]]  (Normal) Collected:  10/15/17 1658    Specimen:  Blood from Hand, Left Updated:  10/15/17 1740     TSH 1.205 mIU/mL     Magnesium [[id number]]  (Normal) Collected:  10/15/17 1658    Specimen:  Blood from Hand, Left Updated:  10/15/17 1729     Magnesium 2.0 mg/dL     Ethanol [[id number]] Collected:  10/15/17 1658    Specimen:  Blood from Hand, Left Updated:  10/15/17 1731     Ethanol <10 mg/dL      Ethanol % <0.010 %     Narrative:       >/= 80.0 legally intoxicated    CBC Auto Differential [[id number]]  (Abnormal) Collected:  10/15/17 1658    Specimen:  Blood from Hand, Left Updated:  10/15/17 1715     WBC 8.95 10*3/mm3      RBC 5.40  10*6/mm3      Hemoglobin 15.4 g/dL      Hematocrit 44.0 %      MCV 81.5 fL      MCH 28.5 pg      MCHC 35.0 g/dL      RDW 12.8 %      RDW-SD 37.7 fl      MPV 10.7 (H) fL      Platelets 127 (L) 10*3/mm3      Neutrophil % 54.2 %      Lymphocyte % 38.2 %      Monocyte % 5.0 %      Eosinophil % 1.7 %      Basophil % 0.7 %      Immature Grans % 0.2 %      Neutrophils, Absolute 4.85 10*3/mm3      Lymphocytes, Absolute 3.42 (H) 10*3/mm3      Monocytes, Absolute 0.45 10*3/mm3      Eosinophils, Absolute 0.15 10*3/mm3      Basophils, Absolute 0.06 10*3/mm3      Immature Grans, Absolute 0.02 10*3/mm3     Urinalysis With / Culture If Indicated - Urine, Catheter [[id number]]  (Abnormal) Collected:  10/15/17 1723    Specimen:  Urine from Urine, Catheter Updated:  10/15/17 1732     Color, UA Yellow     Appearance, UA Clear     pH, UA <=5.0     Specific Gravity, UA >1.030 (H)     Glucose, UA >=1000 mg/dL (3+) (A)     Ketones, UA Trace (A)     Bilirubin, UA Negative     Blood, UA Negative     Protein, UA Negative     Leuk Esterase, UA Negative     Nitrite, UA Negative     Urobilinogen, UA 0.2 E.U./dL    Narrative:       Urine microscopic not indicated.    Urine Drug Screen - Urine, Clean Catch [[id number]]  (Normal) Collected:  10/15/17 1723    Specimen:  Urine from Urine, Clean Catch Updated:  10/15/17 1752     Amphetamine Screen, Urine Negative     Barbiturates Screen, Urine Negative     Benzodiazepine Screen, Urine Negative     Cocaine Screen, Urine Negative     Methadone Screen, Urine Negative     Opiate Screen Negative     Phencyclidine (PCP), Urine Negative     THC, Screen, Urine Negative     6-ACETYL MORPHINE Negative     Buprenorphine, Screen, Urine Negative     Oxycodone Screen, Urine Negative    Narrative:       Negative Thresholds For Drugs Screened:                  Amphetamines              1000 ng/ml               Barbiturates               200 ng/ml               Benzodiazepines            200 ng/ml              Cocaine                     300 ng/ml              Methadone                  300 ng/ml              Opiates                    300 ng/ml               Phencyclidine               25 ng/ml               THC                         50 ng/ml              6-Acetyl Morphine           10 ng/ml              Buprenorphine                5 ng/ml              Oxycodone                  300 ng/ml    The reference range for all drugs tested is negative. This report includes final unconfirmed qualitative results to be used for medical treatment purposes only. Unconfirmed results must not be used for non-medical purposes such as employment or legal testing. Clinical consideration should be applied to any drug of abuse test, especially when unconfirmed quantitative results are used.      aPTT [[id number]]  (Normal) Collected:  10/15/17 1730    Specimen:  Blood from Arm, Right Updated:  10/15/17 1753     PTT 24.0 seconds       Note new Reference Range       Narrative:       PTT Heparin Therapeutic Range:  59 - 95 seconds    Protime-INR [[id number]]  (Normal) Collected:  10/15/17 1730    Specimen:  Blood from Arm, Right Updated:  10/15/17 1753     Protime 13.1 Seconds       Note new Reference Range        INR 0.98    Narrative:       Suggested INR therapeutic range for stable oral anticoagulant therapy:    Low Intensity therapy:   1.5-2.0  Moderate Intensity therapy:   2.0-3.0  High Intensity therapy:   2.5-4.0    POC Glucose Fingerstick [[id number]]  (Abnormal) Collected:  10/16/17 0036    Specimen:  Blood Updated:  10/16/17 0037     Glucose 230 (H) mg/dL     Narrative:       Meter: [id number] : [id number] Matilde Espino        EXAMINATION: CT HEAD WO CONTRAST STROKE PROTOCOL-       CLINICAL INDICATION:     altered mental status      COMPARISON:    None      Technique: Multiple CT axial images were obtained through the level of  the brain without IV contrast administration. Reformatted images in the  coronal and/or sagittal plane(s)  were generated from the axial data set  to facilitate diagnostic accuracy and/or surgical planning.      Radiation dose reduction techniques were utilized per ALARA protocol.  Automated exposure control was initiated through either or [employer] or  [employer] software packages by  protocol.        DOSE (DLP mGy-cm): 1121.7 mGy.cm      FINDINGS:   No acute intracranial abnormality. No midline shift or mass  effect. No hydrocephalus or intracranial hemorrhage. There is no CT  evidence of acute vascular territory infarct.  Bone windows show no  acute osseous abnormality.      IMPRESSION:  No CT evidence of acute intracranial abnormality.    EXAMINATION: XR CHEST 1 VW-       CLINICAL INDICATION:     ams; R41.0-Disorientation, unspecified      TECHNIQUE:  XR CHEST 1 VW-       COMPARISON: NONE       FINDINGS:   Lungs are aerated.   Heart and mediastinal contours are unremarkable.   No pneumothorax.   No pleural effusion.   No acute osseous findings.          IMPRESSION:  No radiographic evidence of acute cardiac or pulmonary  Disease.    ECG  Vent. rate 93 BPM  TN interval 180 ms  QRS duration 80 ms  QT/QTc 374/465 ms  P-R-T axes 45 35 42  Normal sinus rhythm  Normal ECG  No previous ECGs available    I have personally reviewed and interpreted available lab data, radiology studies and ECG obtained at time of admission.     Assessment / Plan     Problem List:   Hospital Problem List     * (Principal)Altered mental status    Uncontrolled type II diabetes mellitus (Chronic)    Asthma (Chronic)    Mitral valve prolapse (Chronic)    GERD (gastroesophageal reflux disease) (Chronic)    History of DVT (deep vein thrombosis)    History of TIAs    Hyperlipidemia (Chronic)    Anxiety and depression (Chronic)    Hypokalemia    Elevated alkaline phosphatase level    Leg pain (Chronic)    OCD (obsessive compulsive disorder) (Chronic)          Assessment / Plan:    1. Altered mental status / (possible history of TIAs):  - R/o  TIA/CVA. Negative CT head at OSH. Prior reported 4-5 TIAs with negative workups and consideration for conversion/somatization disorder. Today NIHSS at 2 only r/t disorientation points that may be more consistent with encephalopathy vs psychiatric vs other pathophysiology.  - Consult neuro.  - On chronic aspirin and statin. Loaded with aspirin at OSH.  - MRI brain - none in chart with contrast. Will check with contrast r/t additional rule outs, personality/behavior changes, elevated alk phos, 40 lb wt loss in 1.5 months.  - Additional diagnostics.  - Electrolyte replacement as needed.  - Negative for UTI, CXR WNL. Negative ETOH level. Negative drug screen.    2. Uncontrolled diabetes mellitus II:  - Spouse reports average GLU 600s at home. Rash/hives to humalog reported, states none to other same insulin brand name, tolerates regular at home, has tolerated basal insulin before without adverse events.  -  at OSH. Received a 1L bolus and 12 units regular insulin SQ at OSH at 1818 with recheck here 230.  - Gap only 12.6. Has glucosuria, trace ketones in urine.  - Hold oral and victoza. Start with levemir 10 units BID, and moderate dose scale regular SSI q6h.  - A1C 3/2017 10.8 and 11.4 (only other comparison is 7.7 1/2015).  - Statin.  - Mild increase in Cr/renal insuffiencey today. IV fluids.  - Lost f/u to outpatient endocrinology in [place] this year.    3. Mitral valve prolapse:  - Check BNP. ECHO.  - NSR.  - Monitor.    4. Anxiety / depression / obsessive compulsive disorder:  - Needs psychiatric care/eval. Has refused outpatient prior per spouse.  - Might need inpatient care.  - Gabapentin changed to Cymbalta last stay during r/o TIA. Had c-spine imaging at that time as well.    5. Asthma:  - Add albuterol PRN.  - Non-smoker.    6. History of deep vein thrombosis:  - Hx of one after a knee surgery, remote?  - Not on any chronic anticoagulation.    7. Gastroesophageal reflux disease:  - H2 blocker.    8.  Hypokalemia:  - Replace.  - Mg normal.    9. Chronic leg pain:  - Neuropathy?  - Weaned off gabapentin and put on cymbalta 3/2017.  - Uncontrolled DM.    10. Renal insuffiencey:  - 1L bolus at OSH, continue maintenance fluids.  - Recheck in AM.    11. Elevated alkaline phosphatase level:  - Acute.  - Liver enzymes WNL. Normal albumin. Normal PT/INT/aPTT.  - Med change in March to cymbalta.  - As above.    12. Hyperlipidemia:  - Aspirin, statin.  - Improved on 3/2017 labs compared to 2015, HDL slightly reduced and note anorexia.    13. Trace thrombocytopenia:  - Only trace at 127. Some variation from baseline.  - Monitor.    DVT prophylaxis: Teds/scuds, lovenox SQ (monitor thrombocytopenia).    Code Status: Full code / full support.    Admission Status: Patient will be admitted to OBSERVATION status, however if further evaluation or treatment plans warrant, status may change.  Based upon current information, I predict patient's care encounter to be less than or equal to 2 midnights.     Rafia Bennett, APRN 10/16/17 3:04 AM    PHYSICIAN NOTE(ADDENDUM)     PM HX  Chronic anxiety/OCD  History of TIA in the past with no positive workup-echocardiogram normal, MR brain and MR C-spine done in March both where normal.   the last admission in March.?  Somatoform disorder.  DM 2  Hyperlipidemia  Mitral valve prolapse     HPI  42-year-old female presented from [place] ER secondary to episode of confusion.  Also has complain of weight loss with decreased appetite.     A/P  Patient was transferred here for further stroke workup-her NIH score was 2.  Again today unclear whether this is really a TIA versus a conversion disorder.  We'll complete the stroke workup  Neurologic consult in a.m.  EKG reviewed by me sinus rhythm with no acute changes.  DM 2 poor control-fingerstick 470 with trace ketones and bicarbonate of 24.  Continue aggressive subcutaneous insuring coverage.              I have independently seen and examined the  patient with ARNP and the note above reflects my changes and contributions. I have discussed with findings, diagnosis and plans with the patient and family.      Dilcia Moyer MD 10/16/17 5:00 AM

## 2017-10-16 NOTE — PROGRESS NOTES
"Adult Nutrition  Assessment/PES    Patient Name:  Nivia Bernstein  YOB: 1975  MRN: [id number]  Admit Date:  10/16/2017    Assessment Date:  10/16/2017          Reason for Assessment       10/16/17 1528    Reason for Assessment    Reason For Assessment/Visit identified at risk by screening criteria;education    Time Spent (min) 45    Diagnosis Diagnosis    Cardiac CHF    Endocrine DM Type 2   uncontrolled    Neurological AMS   no evidence of CVA per neurologist    Psychosocial Depression;Other (comment)   ? major depression   PMH: She  has a past medical history of Altered mental status (10/16/2017); Anxiety and depression (3/31/2017); Asthma; CHF (congestive heart failure); Diabetes mellitus; Elevated alkaline phosphatase level (10/16/2017); H/O blood clots; Heart murmur; Hyperlipidemia; Hypokalemia (10/16/2017); Leg pain (10/16/2017); Mitral valve prolapse; Neuropathy; Obesity (3/31/2017); OCD (obsessive compulsive disorder) (10/16/2017); Renal insufficiency (10/16/2017); Thrombocytopenia (10/16/2017); and TIA (transient ischemic attack).   PSxH: She  has a past surgical history that includes Knee Arthroplasty; Hysterectomy; Appendectomy; Cholecystectomy; and Finger Fusion.              Nutrition/Diet History       10/16/17 1544    Nutrition/Diet History    Meal/Snack Patterns Breakfast: 2 eggs, sausage, 1 piece of toast. L: tenderloin, steaks, or pulled pork. Snacks: fruit (2-3 bananas/day, 1 orange, 4-5 apples/week), chips, saltines. Minimal desserts. Drinks water and Dr. Wander Lester    Reported/Observed By Family;Patient   pt's     Appetite Good;Hungry    Other  reports that pt's appetite has been good prior to hospital admission and at this time            Anthropometrics       10/16/17 1545    Anthropometrics (Special Considerations)    Height Used for Calculations 1.626 m (5' 4\")    Weight Used for Calculations 90.7 kg (200 lb)   per standing scale on (10/16)    RD Calculated BMI " (kg/m2) 34.3    Usual Body Weight (UBW)    Weight Loss 18.1 kg (40 lb)    reports that wt loss has been intentional and unintentional, has been trying to lose weight- has been eating less and been more active at work; however pt did not mean to lose as much as she has over the past 2 months.     Weight Loss Time Frame 2 months            Labs/Tests/Procedures/Meds       10/16/17 1550    Labs/Tests/Procedures/Meds    Labs/Tests Review Reviewed;Glucose;Hgb A1C   A1C= 13.9%    Medication Review Reviewed, pertinent     Results from last 7 days  Lab Units 10/16/17  0623   SODIUM mmol/L 140   POTASSIUM mmol/L 3.3*   CHLORIDE mmol/L 106   CO2 mmol/L 27.0   BUN mg/dL 11   CREATININE mg/dL 0.70   CALCIUM mg/dL 8.8   BILIRUBIN mg/dL 0.7   ALK PHOS U/L 114*   ALT (SGPT) U/L 24   AST (SGOT) U/L 17   GLUCOSE mg/dL 294*                Nutrition Prescription Ordered       10/16/17 1550    Nutrition Prescription PO    Current PO Diet Regular    Common Modifiers Cardiac;Consistent Carbohydrate            Evaluation of Received Nutrient/Fluid Intake       10/16/17 1550    PO Evaluation    Number of Meals 2    % PO Intake 100            Problem/Interventions:        Problem 1       10/16/17 1550    Nutrition Diagnoses Problem 1    Problem 1 Knowledge Deficit    Etiology (related to) --   clinical condition    Signs/Symptoms (evidenced by) Biochemical    Specific Labs Noted Glucose;HgbA1C                    Intervention Goal       10/16/17 1550    Intervention Goal    General Nutrition support treatment    PO Establish PO            Nutrition Intervention       10/16/17 1551    Nutrition Intervention    RD/Tech Action Care plan reviewd;Follow Tx progress;Encourage intake;Interview for preference              Education/Evaluation       10/16/17 1551    Education    Education Provided education regarding;Education topics   DM nutrition edu provided to pt and pt's  and handouts provided.  reports that he does the  cooking and that he tries to cook healthy foods.     Provided education regarding Nutrition related factor    Education Topics Diabetes    Monitor/Evaluation    Monitor Per protocol;PO intake;Pertinent labs        Electronically signed by:  Deborah Ramirez MS RD/RYAN [facility]  10/16/17 3:53 PM

## 2017-10-16 NOTE — PROGRESS NOTES
Discharge Planning Assessment  [facility]     Patient Name: Nivia Bernstein  MRN: [id number]  Today's Date: 10/16/2017    Admit Date: 10/16/2017          Discharge Needs Assessment       10/16/17 1026    Living Environment    Lives With spouse;child(romi), dependent    Home Accessibility no concerns    Living Environment    Quality Of Family Relationships supportive    Able to Return to Prior Living Arrangements yes    Discharge Needs Assessment    Anticipated Changes Related to Illness none    Equipment Currently Used at Home none    Equipment Needed After Discharge none            Discharge Plan       10/16/17 1027    Case Management/Social Work Plan    Plan Social work spoke with Mrs. Bernstein's spouse in the hospital room.  He said they live together with their children at home.  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    Patient/Family In Agreement With Plan yes        Discharge Placement     No information found        Expected Discharge Date and Time     Expected Discharge Date Expected Discharge Time    Oct 23, 2017               Demographic Summary       10/16/17 1025    Primary Care Physician Information    Name Jesse Garcia            Functional Status       10/16/17 1026    Functional Status Current    Ambulation 0-->independent    Transferring 0-->independent    Toileting 0-->independent    Bathing 0-->independent    Dressing 0-->independent    Eating 0-->independent    Communication 0-->understands/communicates without difficulty    Swallowing (if score 2 or more for any item, consult Rehab Services) 0-->swallows foods/liquids without difficulty    Change in Functional Status Since Onset of Current Illness/Injury no    Functional Status Prior    Ambulation 0-->independent    Transferring 0-->independent    Toileting 0-->independent    Bathing 0-->independent    Dressing 0-->independent    Eating 0-->independent     Communication 0-->understands/communicates without difficulty    Swallowing 0-->swallows foods/liquids without difficulty    IADL    Medications independent    Meal Preparation independent    Housekeeping independent    Laundry independent    Shopping independent    Oral Care independent    Activity Tolerance    Current Activity Limitations none    Usual Activity Tolerance good    Current Activity Tolerance good    Cognitive/Perceptual/Developmental    Current Mental Status/Cognitive Functioning no deficits noted    Developmental Stage (Eriksson's Stages of Development) Stage 7 (35-65 years/Middle Adulthood) Generativity vs. Stagnation            Psychosocial     None            Abuse/Neglect     None            Legal     None            Substance Abuse     None            Patient Forms     None          ALBERTO Patterson

## 2017-10-17 VITALS
RESPIRATION RATE: 16 BRPM | OXYGEN SATURATION: 96 % | HEIGHT: 64 IN | TEMPERATURE: 98.3 F | SYSTOLIC BLOOD PRESSURE: 112 MMHG | WEIGHT: 200.13 LBS | BODY MASS INDEX: 34.17 KG/M2 | HEART RATE: 84 BPM | DIASTOLIC BLOOD PRESSURE: 82 MMHG

## 2017-10-17 PROBLEM — D69.6 THROMBOCYTOPENIA (HCC): Status: RESOLVED | Noted: 2017-10-16 | Resolved: 2017-10-17

## 2017-10-17 PROBLEM — N28.9 RENAL INSUFFICIENCY: Status: RESOLVED | Noted: 2017-10-16 | Resolved: 2017-10-17

## 2017-10-17 PROBLEM — E87.6 HYPOKALEMIA: Status: RESOLVED | Noted: 2017-10-16 | Resolved: 2017-10-17

## 2017-10-17 PROBLEM — R41.82 ALTERED MENTAL STATUS: Status: RESOLVED | Noted: 2017-10-16 | Resolved: 2017-10-17

## 2017-10-17 LAB
GLUCOSE BLDC GLUCOMTR-MCNC: 299 MG/DL (ref 70–130)
GLUCOSE BLDC GLUCOMTR-MCNC: 316 MG/DL (ref 70–130)
GLUCOSE BLDC GLUCOMTR-MCNC: 388 MG/DL (ref 70–130)
MAGNESIUM SERPL-MCNC: 2.1 MG/DL (ref 1.3–2.7)
T3FREE SERPL-MCNC: 2.9 PG/ML (ref 2–4.4)

## 2017-10-17 PROCEDURE — 63710000001 INSULIN DETEMIR PER 5 UNITS: Performed by: NURSE PRACTITIONER

## 2017-10-17 PROCEDURE — G0378 HOSPITAL OBSERVATION PER HR: HCPCS

## 2017-10-17 PROCEDURE — 97165 OT EVAL LOW COMPLEX 30 MIN: CPT

## 2017-10-17 PROCEDURE — 82962 GLUCOSE BLOOD TEST: CPT

## 2017-10-17 PROCEDURE — 99217 PR OBSERVATION CARE DISCHARGE MANAGEMENT: CPT | Performed by: NURSE PRACTITIONER

## 2017-10-17 PROCEDURE — 96372 THER/PROPH/DIAG INJ SC/IM: CPT

## 2017-10-17 PROCEDURE — 63710000001 INSULIN REGULAR HUMAN PER 5 UNITS: Performed by: NURSE PRACTITIONER

## 2017-10-17 PROCEDURE — 83735 ASSAY OF MAGNESIUM: CPT | Performed by: FAMILY MEDICINE

## 2017-10-17 PROCEDURE — G8989 SELF CARE D/C STATUS: HCPCS

## 2017-10-17 PROCEDURE — 25010000002 ENOXAPARIN PER 10 MG

## 2017-10-17 PROCEDURE — G8987 SELF CARE CURRENT STATUS: HCPCS

## 2017-10-17 PROCEDURE — G8988 SELF CARE GOAL STATUS: HCPCS

## 2017-10-17 RX ORDER — LIDOCAINE 50 MG/G
3 PATCH TOPICAL DAILY PRN
Qty: 30 PATCH | Refills: 0 | Status: SHIPPED | OUTPATIENT
Start: 2017-10-17 | End: 2017-10-20

## 2017-10-17 RX ADMIN — FAMOTIDINE 20 MG: 20 TABLET, FILM COATED ORAL at 08:26

## 2017-10-17 RX ADMIN — INSULIN HUMAN 4 UNITS: 100 INJECTION, SOLUTION PARENTERAL at 12:25

## 2017-10-17 RX ADMIN — INSULIN HUMAN 7 UNITS: 100 INJECTION, SOLUTION PARENTERAL at 06:21

## 2017-10-17 RX ADMIN — DULOXETINE HYDROCHLORIDE 30 MG: 30 CAPSULE, DELAYED RELEASE ORAL at 08:26

## 2017-10-17 RX ADMIN — INSULIN DETEMIR 15 UNITS: 100 INJECTION, SOLUTION SUBCUTANEOUS at 10:21

## 2017-10-17 RX ADMIN — INSULIN HUMAN 8 UNITS: 100 INJECTION, SOLUTION PARENTERAL at 00:38

## 2017-10-17 RX ADMIN — ASPIRIN 325 MG ORAL TABLET 325 MG: 325 PILL ORAL at 08:25

## 2017-10-17 RX ADMIN — ENOXAPARIN SODIUM 40 MG: 40 INJECTION SUBCUTANEOUS at 08:25

## 2017-10-17 NOTE — PROGRESS NOTES
Continued Stay Note  [facility]     Patient Name: Nivia Bernstein  MRN: [id number]  Today's Date: 10/17/2017    Admit Date: 10/16/2017          Discharge Plan       10/17/17 1327    Case Management/Social Work Plan    Plan Social work set up an outpatient appointment for [facility] at [address] [place] [address] Phone: [phone] on Monday Oct. 23rd at 1:00 pm and notified the patient.    Patient/Family In Agreement With Plan yes      10/17/17 1304    Case Management/Social Work Plan    Plan Home    Patient/Family In Agreement With Plan yes    Additional Comments Spoke with Rosalie at [facility], a new patient appt has been scheduled for patient on Wednesday, 11/8/17 @ 1000, w/ NIDA Guerrero. They will mail patient a new patient packet w/ forms to prefill out and appt info. SW to schedule outpatient psyche appt. CM will follow.               Discharge Codes       10/17/17 1307    Discharge Codes    Discharge Codes 01  Discharge to home        Expected Discharge Date and Time     Expected Discharge Date Expected Discharge Time    Oct 17, 2017             ALBERTO aPtterson

## 2017-10-17 NOTE — PROGRESS NOTES
Continued Stay Note  [facility]     Patient Name: Nivia Bernstein  MRN: [id number]  Today's Date: 10/17/2017    Admit Date: 10/16/2017          Discharge Plan       10/17/17 1005    Case Management/Social Work Plan    Plan [facility] will evaluate Mrs. Bernstein to provide outpatient information today.  Social is providing a list of mental health counselors that take her insurance.    Patient/Family In Agreement With Plan yes              Discharge Codes     None        Expected Discharge Date and Time     Expected Discharge Date Expected Discharge Time    Oct 23, 2017             ALBERTO Patterson

## 2017-10-17 NOTE — DISCHARGE INSTR - APPOINTMENTS
You have a follow up appointment scheduled with [facility] on October 23rd , 2017 at 1:00 PM.  The number is [phone].

## 2017-10-17 NOTE — PROGRESS NOTES
Continued Stay Note  [facility]     Patient Name: Nivia Bernstein  MRN: [id number]  Today's Date: 10/17/2017    Admit Date: 10/16/2017          Discharge Plan       10/17/17 1304    Case Management/Social Work Plan    Plan Home    Patient/Family In Agreement With Plan yes    Additional Comments Spoke with Rosalie at [facility], a new patient appt has been scheduled for patient on Wednesday, 11/8/17 @ 1000, w/ NIDA Guerrero. They will mail patient a new patient packet w/ forms to fill out and appt info prior to initial appt. SW to schedule outpatient psyche appt. CM will follow.               Discharge Codes       10/17/17 1307    Discharge Codes    Discharge Codes 01  Discharge to home        Expected Discharge Date and Time     Expected Discharge Date Expected Discharge Time    Oct 17, 2017             Lena Thurston RN

## 2017-10-17 NOTE — THERAPY DISCHARGE NOTE
Acute Care - Occupational Therapy Initial Eval/Discharge  [facility]     Patient Name: Nivia Bernstein  : 1975  MRN: [id number]  Today's Date: 10/17/2017  Onset of Illness/Injury or Date of Surgery Date: 10/16/17  Date of Referral to OT: 10/16/17  Referring Physician: NIDA Bennett      Admit Date: 10/16/2017       ICD-10-CM ICD-9-CM   1. Impaired functional mobility, balance, gait, and endurance Z74.09 V49.89   2. Impaired mobility and ADLs Z74.09 799.89     Patient Active Problem List   Diagnosis   • Acute right-sided weakness   • Uncontrolled type II diabetes mellitus   • Asthma   • Mitral valve prolapse   • GERD (gastroesophageal reflux disease)   • History of DVT (deep vein thrombosis)   • History of TIAs   • Left upper extremity numbness   • Hyperlipidemia   • Anxiety and depression   • Obesity   • Nausea   • Acute left-sided weakness   • Altered mental status   • Hypokalemia   • Elevated alkaline phosphatase level   • Leg pain   • OCD (obsessive compulsive disorder)   • Renal insufficiency   • Thrombocytopenia     Past Medical History:   Diagnosis Date   • Altered mental status 10/16/2017   • Anxiety and depression 3/31/2017   • Asthma    • CHF (congestive heart failure)     mitral valve prolapse   • Diabetes mellitus    • Elevated alkaline phosphatase level 10/16/2017   • H/O blood clots    • Heart murmur    • Hyperlipidemia    • Hypokalemia 10/16/2017   • Leg pain 10/16/2017   • Mitral valve prolapse    • Neuropathy    • Obesity 3/31/2017   • OCD (obsessive compulsive disorder) 10/16/2017   • Renal insufficiency 10/16/2017   • Thrombocytopenia 10/16/2017   • TIA (transient ischemic attack)     4 TIMES     Past Surgical History:   Procedure Laterality Date   • APPENDECTOMY     • CHOLECYSTECTOMY     • FINGER FUSION     • HYSTERECTOMY     • KNEE ARTHROPLASTY            OT ASSESSMENT FLOWSHEET (last 72 hours)      OT Evaluation       10/17/17 0855 10/17/17 0827 10/16/17 2000 10/16/17 1803 10/16/17 1611     Rehab Evaluation    Document Type  discharge evaluation/summary  -AN       Subjective Information  no complaints;agree to therapy  -AN       Patient Effort, Rehab Treatment  good  -AN       Symptoms Noted During/After Treatment  none  -AN       General Information    Patient Profile Review  yes  -AN       Onset of Illness/Injury or Date of Surgery Date  10/16/17  -AN       Referring Physician  NIDA Bennett  -AN       General Observations  Pt supine in bed, spouse present  -AN       Pertinent History Of Current Problem  Pt admitted with AMS, HA. Imaging (-). Hx of TIA's and recent multiple deaths in family.  -AN       Precautions/Limitations  other (see comments)   pt with intermittent confusion, hxof syncope per pt  -AN       Prior Level of Function  independent:;all household mobility;community mobility;gait;transfer;ADL's;home management;cleaning;driving;shopping;work   recently started working at retail store  -AN       Equipment Currently Used at Home  grab bar;raised toilet;ramp  -AN       Plans/Goals Discussed With  patient and family;agreed upon  -AN       Risks Reviewed  patient and family:;LOB;dizziness;increased discomfort;change in vital signs  -AN       Benefits Reviewed  patient and family:;improve function;increase independence;increase strength;increase balance  -AN       Barriers to Rehab  cognitive status  -AN       Living Environment    Lives With  spouse;child(romi), dependent  -AN       Living Arrangements  house  -AN       Home Accessibility  no concerns;ramps present at home  -AN       Stair Railings at Home  none  -AN       Clinical Impression    Date of Referral to OT  10/16/17  -AN       OT Diagnosis  No functional deficits; cognitive deficits  -AN       Impairments Found (describe specific impairments)  arousal, attention, and cognition  -AN       Patient/Family Goals Statement  Agreeable to OT eval  -AN       Criteria for Skilled Therapeutic Interventions Met  no  -AN       Rehab  Potential  other (see comments)  -AN       Therapy Frequency  evaluation only  -AN       Anticipated Discharge Disposition other (see comments)   services for cognitive issues  -AN --   CM has referred to [facility]  -AN       Vital Signs    Pre Systolic BP Rehab  --   vitals stable  -AN       Pain Assessment    Pain Assessment  No/denies pain  -AN       Vision Assessment/Intervention    Visual Impairment  WFL  -AN       Cognitive Assessment/Intervention    Current Cognitive/Communication Assessment  impaired  -AN       Orientation Status  disoriented to;situation;time;oriented to;person;place  -AN       Follows Commands/Answers Questions  100% of the time;able to follow single-step instructions  -AN       Personal Safety  mild impairment;decreased insight to deficits  -AN       Personal Safety Interventions  fall prevention program maintained  -AN       ROM (Range of Motion)    General ROM  no range of motion deficits identified  -AN       MMT (Manual Muscle Testing)    General MMT Assessment  no strength deficits identified  -AN       Muscle Tone Assessment    Muscle Tone Assessment   Bilateral Upper Extremities;Bilateral Lower Extremities  -ZARI Bilateral Upper Extremities;Bilateral Lower Extremities  -MK Bilateral Upper Extremities;Bilateral Lower Extremities  -MK    Bilateral Upper Extremities Muscle Tone Assessment    --   WNL  -MK --   WNL  -MK    Bilateral Lower Extremities Muscle Tone Assessment    --   WNL  -MK --   WNL  -MK    Bed Mobility, Assessment/Treatment    Bed Mobility, Assistive Device  head of bed elevated  -AN       Bed Mob, Supine to Sit, [place]  conditional independence  -AN       Bed Mob, Sit to Supine, [place]  conditional independence  -AN       Transfer Assessment/Treatment    Transfers, Sit-Stand [place]  independent  -AN       Transfers, Stand-Sit [place]  independent  -AN       Walk-In Shower Transfer, [place]  independent  -AN       Functional Mobility    Functional  Mobility- Ind. Level  independent  -AN       Functional Mobility-Distance (Feet)  50  -AN       ADL Assessment/Intervention    Additional Documentation  --   simulated or expected I; no deficits sequencing, safety,coor  -AN       Motor Skills/Interventions    Additional Documentation  Balance Skills Training (Group);Fine Motor Coordination Training (Group);Gross Motor Coordination Training (Group)  -AN       Balance Skills Training    Sitting-Level of Assistance  Independent  -AN       Standing-Level of Assistance  Independent  -AN       Standing-Balance Activities  Weight Shift A-P;Weight Shift R-L;Reaching for objects  -AN       Gross Motor Coordination Training    Gross Motor Skill, Impairments Detail  wFL  -AN       Fine Motor Coordination Training    Detail (Fine Motor Coordination Training)  WFL  -AN       Sensory Assessment/Intervention    Light Touch  LUE;RUE  -AN       LUE Light Touch  WNL  -AN       Positioning and Restraints    Pre-Treatment Position  in bed  -AN       Post Treatment Position  bed  -AN       In Bed  supine;call light within reach;encouraged to call for assist;with family/caregiver  -         10/16/17 1445 10/16/17 1402 10/16/17 1206 10/16/17 1050 10/16/17 1026    Rehab Evaluation    Document Type evaluation  -RD   evaluation  -SC     Subjective Information agree to therapy  -RD   agree to therapy   minimal speech, but fluent when she does  -SC     Patient Effort, Rehab Treatment good  -RD   good  -SC     Symptoms Noted During/After Treatment    none  -SC     General Information    Patient Profile Review    yes  -SC     Onset of Illness/Injury or Date of Surgery Date    10/16/17  -SC     Referring Physician    Donald ALVA  -SC     General Observations    face symmetrical  -SC     Pertinent History Of Current Problem    Admitted with AMS and Headache  -SC     Precautions/Limitations    no known precautions/limitations  -SC     Prior Level of Function    independent:;ADL's;bed  mobility;gait;community mobility   works outside the home  -SC     Equipment Currently Used at Home    none  -SC none  -AW    Risks Reviewed    patient:;nausea/vomiting;dizziness  -SC     Benefits Reviewed    patient:;improve function  -SC     Barriers to Rehab    medically complex  -SC     Living Environment    Lives With spouse;child(romi), dependent  -RD   spouse;child(romi), dependent  -SC spouse;child(romi), dependent  -AW    Living Arrangements house  -RD        Home Accessibility    no concerns  -SC no concerns  -AW    Functional Level Prior    Ambulation     0-->independent  -AW    Transferring     0-->independent  -AW    Toileting     0-->independent  -AW    Bathing     0-->independent  -AW    Dressing     0-->independent  -AW    Eating     0-->independent  -AW    Communication     0-->understands/communicates without difficulty  -AW    Swallowing     0-->swallows foods/liquids without difficulty  -AW    Vital Signs    Post Systolic BP Rehab    123  -SC     Post Treatment Diastolic BP    94  -SC     Posttreatment Heart Rate (beats/min)    85  -SC     Pain Assessment    Pain Assessment No/denies pain  -RD   Dasilva-Baker FACES  -SC     Dasilva-Holcomb FACES Pain Rating    2  -SC     Post Pain Score    2  -SC     Pain Type    Chronic pain  -SC     Pain Location    Knee  -SC     Pain Orientation    Right;Left  -SC     Pain Intervention(s)    Repositioned;Ambulation/increased activity  -SC     Response to Interventions    tolerated  -SC     Vision Assessment/Intervention    Visual Impairment    WNL  -SC     Visual Impairment Comment    able to track  -SC     Cognitive Assessment/Intervention    Current Cognitive/Communication Assessment impaired  -RD   impaired  -SC     Orientation Status oriented to;place;disoriented to;person;time;situation  -RD   oriented to;person;place;unable/difficult to assess   knows she is in the hospital  -SC     Follows Commands/Answers Questions 100% of the time;able to follow multi-step  instructions  -RD   100% of the time  -SC     Personal Safety    moderate impairment;decreased insight to deficits  -SC     Personal Safety Interventions    fall prevention program maintained  -SC     Short/Long Term Memory decreased recall, biographical info;decreased recall, recent events  -RD        Additional Documentation Cognitive Assessment Intervention (Group)  -RD        Cognitive Assessment Intervention    Behavior/Mood Observations alert;confused;cooperative  -        Attention WNL/WFL  -RD        Pragmatics WNL/WFL  -RD        Problem Solving WNL/WFL  -RD        Reasoning WNL/WFL  -RD        Diffuse Language Characteristics no concerns, diffuse language characteristics  -RD        ROM (Range of Motion)    General ROM    no range of motion deficits identified  -SC     MMT (Manual Muscle Testing)    General MMT Assessment    no strength deficits identified  -SC     Muscle Tone Assessment    Muscle Tone Assessment  Bilateral Upper Extremities;Bilateral Lower Extremities  -MK Bilateral Upper Extremities;Bilateral Lower Extremities  -MK --   normal  -SC     Bilateral Upper Extremities Muscle Tone Assessment  --   WNL  -MK --   WNL  -      Bilateral Lower Extremities Muscle Tone Assessment  --   WNL  -MK --   WNL  -MK      Bed Mobility, Assessment/Treatment    Bed Mob, Supine to Sit, [place]    independent  -SC     Bed Mob, Sit to Supine, [place]    independent  -SC     Transfer Assessment/Treatment    Transfers, Sit-Stand [place]    independent  -SC     Transfers, Stand-Sit [place]    independent  -SC     Transfer, Comment    slow to move  -SC     Sensory Assessment/Intervention    Light Touch    --   [facility]  -SC     Positioning and Restraints    Pre-Treatment Position    in bed  -SC     Post Treatment Position    bed  -SC     In Bed    supine;call light within reach;encouraged to call for assist  -SC       10/16/17 1007 10/16/17 0800 10/16/17 0400 10/16/17 0124 10/16/17 0050    Living  Environment    Lives With     child(romi), adult;child(romi), dependent;spouse  -TC    Living Arrangements     house  -TC    Home Accessibility     no concerns  -TC    Stair Railings at Home     none  -TC    Type of Financial/Environmental Concern     none  -TC    Transportation Available     car  -TC    Functional Level Prior    Ambulation     0-->independent  -TC    Transferring     0-->independent  -TC    Toileting     0-->independent  -TC    Bathing     0-->independent  -TC    Dressing     0-->independent  -TC    Eating     0-->independent  -TC    Communication     0-->understands/communicates without difficulty  -TC    Swallowing     0-->swallows foods/liquids without difficulty  -TC    Prior Functional Level Comment     independent  -TC    Muscle Tone Assessment    Muscle Tone Assessment Bilateral Upper Extremities;Bilateral Lower Extremities  -MK Bilateral Upper Extremities;Bilateral Lower Extremities  -MK Bilateral Upper Extremities;Bilateral Lower Extremities  -TC Bilateral Upper Extremities;Bilateral Lower Extremities  -TC     Bilateral Upper Extremities Muscle Tone Assessment --   WNL  -MK --   WNL  -MK  --   WNL  -TC     Bilateral Lower Extremities Muscle Tone Assessment --   WNL  -MK --   WNL  -MK  --   WNL  -TC       User Key  (r) = Recorded By, (t) = Taken By, (c) = Cosigned By    Initials Name Effective Dates    SC Bj Ramirez, PT 06/19/15 -     CHIP Viveros, OT 06/22/15 -     JESSICA King RN 06/16/16 -     LUCÍA Mccormick, ALBERTO 03/14/16 -     ZARI Villalobos RN 08/22/16 -     ELSIE Paulino, MS CCC-SLP 09/27/17 -     MERCEDES Patton RN 10/17/16 -               OT Recommendation and Plan  Anticipated Discharge Disposition: other (see comments) (services for cognitive issues)  Therapy Frequency: evaluation only  Plan of Care Review  Plan Of Care Reviewed With: patient, spouse  Outcome Summary/Follow up Plan: Pt does not demonstrate any physical coordination, balance or  sensory deficits. Pt cleaning shave difficulty with memory and orientation. Pt is continued pt of SLP and will be assessed by the [facility] if continues to agree. No further OT warranted at this time.               Outcome Measures       10/17/17 0827 10/16/17 1100       How much help from another person do you currently need...    Turning from your back to your side while in flat bed without using bedrails?  4  -SC     Moving from lying on back to sitting on the side of a flat bed without bedrails?  4  -SC     Moving to and from a bed to a chair (including a wheelchair)?  4  -SC     Standing up from a chair using your arms (e.g., wheelchair, bedside chair)?  4  -SC     Climbing 3-5 steps with a railing?  3  -SC     To walk in hospital room?  4  -SC     AM-PAC 6 Clicks Score  23  -SC     How much help from another is currently needed...    Putting on and taking off regular lower body clothing? 4  -AN      Bathing (including washing, rinsing, and drying) 4  -AN      Toileting (which includes using toilet bed pan or urinal) 4  -AN      Putting on and taking off regular upper body clothing 4  -AN      Taking care of personal grooming (such as brushing teeth) 4  -AN      Eating meals 4  -AN      Score 24  -AN      Modified Motley Scale    Modified Motley Scale 1 - No significant disability despite symptoms.  Able to carry out all usual duties and activities.  -AN 1 - No significant disability despite symptoms.  Able to carry out all usual duties and activities.  -SC     Functional Assessment    Outcome Measure Options AM-PAC 6 Clicks Daily Activity (OT);Modified Motley  -AN AM-PAC 6 Clicks Basic Mobility (PT);Modified Rashida  -SC       User Key  (r) = Recorded By, (t) = Taken By, (c) = Cosigned By    Initials Name Provider Type    ISRAEL Ramirez, PT Physical Therapist    CHIP Viveros OT Occupational Therapist          Time Calculation:         Time Calculation- OT       10/17/17 0856          Time Calculation- OT    OT  Start Time 0827  -AN      Total Timed Code Minutes- OT 0 minute(s)  -AN      OT Received On 10/17/17  -AN        User Key  (r) = Recorded By, (t) = Taken By, (c) = Cosigned By    Initials Name Provider Type    CHIP Viveros OT Occupational Therapist          Therapy Charges for Today     Code Description Service Date Service Provider Modifiers Qty    18717595761 HC OT SELFCARE CURRENT 10/17/2017 Jodie Viveros OT GO,  1    84786047233 HC OT SELFCARE PROJECTED 10/17/2017 Jodie Viveros OT GO,  1    [id number]  OT SELFCARE DISCHARGE 10/17/2017 Jodie Viveros OT GO,  1    [id number]  OT EVAL LOW COMPLEXITY 4 10/17/2017 Jodie Viveros OT GO 1          OT G-codes  OT Functional Scales Options: AM-PAC 6 Clicks Daily Activity (OT)  Score: 24  Functional Limitation: Self care  Self Care Current Status (): 0 percent impaired, limited or restricted  Self Care Goal Status (): 0 percent impaired, limited or restricted  Self Care Discharge Status (): 0 percent impaired, limited or restricted    OT Discharge Summary  Anticipated Discharge Disposition: other (see comments) (services for cognitive issues)  Reason for Discharge: At baseline function  Outcomes Achieved: Refer to plan of care for updates on goals achieved  Discharge Destination: other (comment)    Jodie Viveros OT  10/17/2017

## 2017-10-17 NOTE — DISCHARGE SUMMARY
[facility]  DISCHARGE SUMMARY       Date of Admission: 10/16/2017  Date of Discharge:  10/17/2017  Primary Care Physician: Jesse Garcia MD  Consulting Physician(s)     Provider Relationship Specialty    Bradley Avila MD Consulting Physician Neurology          Discharge Diagnoses:  Active Hospital Problems (** Indicates Principal Problem)    Diagnosis Date Noted   • Elevated alkaline phosphatase level [R74.8] 10/16/2017   • Leg pain [M79.606] 10/16/2017   • OCD (obsessive compulsive disorder) [F42.9] 10/16/2017   • Anxiety and depression [F41.8] 03/31/2017   • Hyperlipidemia [E78.5] 03/31/2017   • Uncontrolled type II diabetes mellitus [E11.65] 03/19/2017   • GERD (gastroesophageal reflux disease) [K21.9] 03/19/2017   • Asthma [J45.909] 03/19/2017   • Mitral valve prolapse [I34.1] 03/19/2017   • History of DVT (deep vein thrombosis) [Z86.718] 03/19/2017   • History of TIAs [Z86.73] 03/19/2017      Resolved Hospital Problems    Diagnosis Date Noted Date Resolved   • **Altered mental status [R41.82] 10/16/2017 10/17/2017   • Hypokalemia [E87.6] 10/16/2017 10/17/2017   • Renal insufficiency [N28.9] 10/16/2017 10/17/2017   • Thrombocytopenia [D69.6] 10/16/2017 10/17/2017       Presenting Problem/History of Present Illness  Altered mental status [R41.82]     Chief Complaint on Day of Discharge: confusion    Hospital Course  Patient is a 42 y.o. female  brought to [facility] by her spouse for confusion. Onset first noticed 10/15/17. Spouse reports 10/15/17 around 1500 she was sitting on the porch and had conversations that did not make sense.Spouse reports she has chronic anxiety and depression but refuses to seek help despite encouragement. Patient has had increased stressors recently including 3 friends/family members.  She was transferred to [facility] 10[facility]2017 for stroke workup/rule out.    Dr. Avila, neurology was also consulted.   Throughout workup there has been no evidence of stroke this admission.  She has no evidence of carotid artery stenosis.  Suspect patient's primary issue is psychiatric in due to external stressors.  She currently denies suicidal/homicidal thoughts.  Patient to be evaluated by the [facility] today for ongoing psychiatric care and outpatient therapy.  We will continue patient's current regimen for depression/anxiety.    Patient does have noted uncontrolled type 2 diabetes.  This is been discussed at length with patient and  who is at the bedside.  Patient states she has gone to outside hospital ER for an evaluation of hyperglycemia recently and medications were changed.  She did have stable blood sugars around 164 brief period of time.  When she followed up with primary care, she was unable to get refills of new medications.  Patient currently requesting change in primary care for follow-up of this admission.  Medications have been changed prior to discharge.  Patient has met with diabetes education/nutrition.  She will start Levemir every 12 hours with metformin and regular insulin with meals.  She is previously seen endocrinology for evaluation of insulin pump.  Not sure that patient is in the correct state of mind to continue with insulin pump at this point in time.  Would continue with subcutaneous insulin/Levemir for now and help with  for management.    Social work/case management has been involved with case and has given patient and family resources for behavioral health and psychiatric care.  Case management to also discuss new primary care provider for follow-up within the week following discharge.      Procedures Performed         Consults:   Consults     Date and Time Order Name Status Description    10/16/2017 0254 Inpatient Consult to Neurology Completed           Pertinent Test Results:   Collected: 10/17/17 0531        Lab Status: Final result Specimen: Blood Updated: 10/17/17 0610         Magnesium 2.1 mg/dL        Potassium [[id number]] (Normal) Collected: 10/16/17 1712       Lab Status: Final result Specimen: Blood Updated: 10/16/17 1747        Potassium 4.6 mmol/L        Hemoglobin A1c [[id number]] (Abnormal) Collected: 10/16/17 0623       Lab Status: Final result Specimen: Blood Updated: 10/16/17 0747        Hemoglobin A1C 13.90 (H) %        Narrative:         The American Diabetes Association recommends maintenance of Hemoglobin A1C at 7.0% or lower. Goals for Hemoglobin A1C reduction may need to be modified if hypoglycemia is a problem.       Lipid Panel [[id number]] (Abnormal) Collected: 10/16/17 0623       Lab Status: Final result Specimen: Blood Updated: 10/16/17 0746        Total Cholesterol 206 (H) mg/dL         Triglycerides 220 (H) mg/dL         HDL Cholesterol 33 (L) mg/dL         LDL Cholesterol  150 (H) mg/dL        Narrative:         Cholesterol Reference Ranges:   Desirable       < 200 mg/dL   Borderline    200-239 mg/dL   High Risk       > 239 mg/dL    Triglyceride Reference Ranges:   Normal          < 150 mg/dL   Borderline    150-199 mg/dL   High          200-499 mg/dL   Very High       > 499 mg/dL    HDL Reference Ranges:   Low              < 40 mg/dL   High             > 59 mg/dL    LDL Reference Ranges:   Optimal         < 100 mg/dL   Near Optimal  100-129 mg/dL   Borderline    130-159 mg/dL   High          160-189 mg/dL   Very High       > 189 mg/dL       Comprehensive Metabolic Panel [[id number]] (Abnormal) Collected: 10/16/17 0623       Lab Status: Final result Specimen: Blood Updated: 10/16/17 0747        Glucose 294 (H) mg/dL         BUN 11 mg/dL         Creatinine 0.70 mg/dL         Sodium 140 mmol/L         Potassium 3.3 (L) mmol/L         Chloride 106 mmol/L         CO2 27.0 mmol/L         Calcium 8.8 mg/dL         Total Protein 6.4 g/dL         Albumin 3.80 g/dL         ALT (SGPT) 24 U/L         AST (SGOT) 17 U/L         Alkaline Phosphatase 114 (H) U/L         Total  Bilirubin 0.7 mg/dL         eGFR Non African Amer 91 mL/min/1.73         Globulin 2.6 gm/dL         A/G Ratio 1.5 g/dL         BUN/Creatinine Ratio 15.7        Anion Gap 7.0 mmol/L        Narrative:         National Kidney Foundation Guidelines    Stage     Description        GFR  1         Normal or High     90+  2         Mild decrease      60-89  3         Moderate decrease  30-59  4         Severe decrease    15-29  5         Kidney failure     <15       Troponin [[id number]] (Normal) Collected: 10/16/17 0623       Lab Status: Final result Specimen: Blood Updated: 10/16/17 0746        Troponin I <0.006 ng/mL        Lactic Acid, Plasma [[id number]] (Normal) Collected: 10/16/17 0623       Lab Status: Final result Specimen: Blood Updated: 10/16/17 0718        Lactate 0.6 mmol/L        Sedimentation Rate [[id number]] (Normal) Collected: 10/16/17 0623       Lab Status: Final result Specimen: Blood Updated: 10/16/17 0716        Sed Rate 11 mm/hr        C-reactive Protein [[id number]] (Normal) Collected: 10/16/17 0623       Lab Status: Final result Specimen: Blood Updated: 10/16/17 0747        C-Reactive Protein 0.86 mg/dL        T3, Free [[id number]] Collected: 10/16/17 0623       Lab Status: Final result Specimen: Blood Updated: 10/17/17 0923        T3, Free 2.9 pg/mL        Narrative:         Performed at:  [address] [place] [id number]  [id number]  : Champ Palacio PhD, Phone:  [phone]       T4, Free [[id number]] (Normal) Collected: 10/16/17 0623       Lab Status: Final result Specimen: Blood Updated: 10/16/17 0745        Free T4 1.12 ng/dL        BNP [[id number]] (Normal) Collected: 10/16/17 0623       Lab Status: Final result Specimen: Blood Updated: 10/16/17 0838        BNP 6.0 pg/mL        Calcium, Ionized [[id number]] (Normal) Collected: 10/16/17 0623       Lab Status: Final result Specimen: Blood Updated: 10/16/17 0723        Ionized Calcium 1.28 mmol/L        Magnesium  [[id number]] (Normal) Collected: 10/16/17 0623       Lab Status: Final result Specimen: Blood Updated: 10/16/17 0745        Magnesium 1.9 mg/dL        Beta Hydroxybutyrate Quantitative [[id number]] (Abnormal) Collected: 10/16/17 0623       Lab Status: Final result Specimen: Blood Updated: 10/16/17 0802        Beta-Hydroxybutyrate Quant 0.630 (H) mmol/L        Narrative:         In the assessment of possible diabetic ketoacidosis, the test should be interpreted along with other clinical and laboratory findings.  A level greater than 1 mmol/L should require further evaluation and levels of more than 3 mmol/L require immediate medical review.       CBC Auto Differential [[id number]] (Normal) Collected: 10/16/17 0623       Lab Status: Final result Specimen: Blood Updated: 10/16/17 0710        WBC 7.59 10*3/mm3         RBC 4.86 10*6/mm3         Hemoglobin 13.7 g/dL         Hematocrit 39.8 %         MCV 81.9 fL         MCH 28.2 pg         MCHC 34.4 g/dL         RDW 12.6 %         RDW-SD 37.9 fl         MPV 10.2 fL         Platelets 253 10*3/mm3         Neutrophil % 55.2 %         Lymphocyte % 35.0 %         Monocyte % 7.1 %         Eosinophil % 2.2 %         Basophil % 0.4 %         Immature Grans % 0.1 %         Neutrophils, Absolute 4.18 10*3/mm3         Lymphocytes, Absolute 2.66 10*3/mm3         Monocytes, Absolute 0.54 10*3/mm3         Eosinophils, Absolute 0.17 10*3/mm3         Basophils, Absolute 0.03 10*3/mm3         Immature Grans, Absolute 0.01 10*3/mm3        HIV-1 / O / 2 Ag / Antibody 4th Generation [[id number]] (Normal) Collected: 10/16/17 0623       Lab Status: Final result Specimen: Blood Updated: 10/16/17 0945        HIV-1/ HIV-2 Non-Reactive       Narrative:         The HIV antigen/antibody combo assay is a qualitative assay for HIV that includes the p24 antigen as well as antibodies to HIV types 1 and 2.  The assay is intended to be used as a screening assay in the diagnosis of HIV infection in pediatric  and adult populations, but has not been validated for children under age 2.  A reactive result does not distinguish HIV-1 p24 antigen, HIV-1 antibody, HIV-2 antibody, and HIV-1 group O antibody.  A positive result will be reflexed to a follow up immunoassay for antibody differentiation.       Vitamin B12 [[id number]] (Normal) Collected: 10/16/17 0623       Lab Status: Final result Specimen: Blood Updated: 10/16/17 0908        Vitamin B-12 348 pg/mL        Folate [[id number]] (Normal) Collected: 10/16/17 0623       Lab Status: Final result Specimen: Blood Updated: 10/16/17 0908        Folate 15.19 ng/mL        Narrative:           Folate Reference Ranges:    Deficient:            Less than 1.2 ng/mL  Indeterminant:        1.2-3.1 ng/mL  Normal:               3.2-20.0 ng/mL       Lipase [[id number]] (Normal) Collected: 10/16/17 0623       Lab Status: Final result Specimen: Blood Updated: 10/16/17 0745        Lipase 34 U/L        Prealbumin [[id number]] (Normal) Collected: 10/16/17 0623       Lab Status: Final result Specimen: Blood Updated: 10/16/17 0802        Prealbumin 13.8 mg/dL        aPTT [[id number]] (Normal) Collected: 10/15/17 1730       Lab Status: Final result Specimen: Blood from Arm, Right Updated: 10/15/17 1753        PTT 24.0 seconds        Narrative:         PTT Heparin Therapeutic Range:  59 - 95 seconds       Protime-INR [[id number]] (Normal) Collected: 10/15/17 1730       Lab Status: Final result Specimen: Blood from Arm, Right Updated: 10/15/17 1753        Protime 13.1 Seconds         INR 0.98       Narrative:         Collected: 10/16/17 0944         Updated: 10/16/17 1319        BSA 2.0 m^2         Prox CCA .4 cm/sec         Prox CCA PSV 86.4 cm/sec         Prox CCA EDV 31.4 cm/sec         Prox CCA EDV 18.9 cm/sec         left Mid CCA PSV 94.3 cm/sec         Right Mid CCA PSV 96.6 cm/sec         left Mid CCA EDV 27.9 cm/sec         right Mid CCA EDV 25.9 cm/sec         Dist CCA PSV 86.4 cm/sec          Dist CCA PSV 90.4 cm/sec         Dist CCA EDV 26.2 cm/sec         Dist CCA EDV 25.1 cm/sec         CCA ratio ariela 85.6 cm/sec         CCA ratio ariela 89.6 cm/sec         Prox ECA PSV 76.0 cm/sec         Prox ECA .1 cm/sec         Prox ECA EDV 15.7 cm/sec         Prox ECA EDV 18.9 cm/sec         Prox ICA PSV 65.5 cm/sec         Prox ICA .2 cm/sec         Prox ICA EDV 26.2 cm/sec         Prox ICA EDV 26.7 cm/sec         Mid ICA PSV 82.1 cm/sec         Mid ICA PSV 80.9 cm/sec         Mid ICA EDV 35.8 cm/sec         Mid ICA EDV 30.6 cm/sec         Dist ICA PSV 88.2 cm/sec         Dist ICA PSV 73.9 cm/sec         Dist ICA EDV 30.6 cm/sec         Dist ICA EDV 32.2 cm/sec         ICA ratio ariela 81.2 cm/sec         ICA ratio ariela 108.0 cm/sec         Vertebral A PSV 43.7 cm/sec         Vertebral A PSV 47.1 cm/sec         Vertebral A EDV 18.3 cm/sec         Vertebral A EDV 19.6 cm/sec         ICA/CCA ratio 0.95        ICA/CCA ratio 1.2        Prox SCLA .5 cm/sec         Prox SCLA .4 cm/sec          CV ECHO FILOMENA - BZI_BMI 34.3 kilograms/m^2          CV ECHO FILOMENA - BSA(HAYCOCK) 2.1 m^2          CV ECHO FILOMENA - BZI_METRIC_WEIGHT 90.7 kg          CV ECHO FILOMENA - BZI_METRIC_HEIGHT 162.6 cm        Narrative:         · No obstructive carotid stenosis bilaterally  · Antegrade bilateral vertebral flow.          MRI Brain With & Without Contrast [[id number]] Not Reviewed       Order Status: Completed Collected: 10/16/17 0221        Updated: 10/16/17 0524       Narrative:         EXAM:    MR Head Without and With Intravenous Contrast    CLINICAL HISTORY:    42 years, female; Signs and symptoms; Altered mental status/memory loss;   Confusion or disorientation; Patient HX: R/O stroke, negative CT. R/O seizure.   Confusion. Personality and behavior changes. 40 lb wt loss 1.5 months. Patient   developed sudden onset confusion at around 1500 10/15/2017. Patient brought to   the ed where she still does not  recognize her , know her name nor where   she is. No HX of cancer no surgeries on head creat: 0.84 10/15/2017 gfr: 73.655   19 ml multihance; Additional info: R/O stroke, negative CT. R/O seizure.   Confusion. Personality and behavior changes. 40 lb wt loss 1.5 months.    TECHNIQUE:    Magnetic resonance images of the head/brain without and with intravenous   contrast in multiple planes.    CONTRAST:    19 mL of multihance administered intravenously.    COMPARISON:    MRI BRAIN WO CONTRAST 3/31/2017 5:33:25 AM    FINDINGS:    Brain:  Unremarkable.  Diffusion weighted imaging is negative for acute   infarct. No intracranial mass visualized.  No evidence of hemorrhage. No   abnormal enhancement.    Ventricles:  Unremarkable.  No ventriculomegaly.    Bones/joints:  No significant abnormalities.    Sinuses:  Unremarkable as visualized.  No acute sinusitis.    Mastoid air cells:  Unremarkable as visualized.  No mastoid effusion.    Orbits:  Unremarkable as visualized.         Impression:           No acute intracranial findings visualized.    THIS DOCUMENT HAS BEEN ELECTRONICALLY SIGNED BY JENNIFER MCKEON MD       XR Chest 1 View [[id number]] Not Reviewed       Order Status: Completed Collected: 10/15/17 1956        Updated: 10/15/17 1958       Narrative:         EXAMINATION: XR CHEST 1 VW-      CLINICAL INDICATION:     ams; R41.0-Disorientation, unspecified     TECHNIQUE:  XR CHEST 1 VW-      COMPARISON: NONE      FINDINGS:   Lungs are aerated.   Heart and mediastinal contours are unremarkable.   No pneumothorax.   No pleural effusion.   No acute osseous findings.               Impression:         No radiographic evidence of acute cardiac or pulmonary  disease.     This report was finalized on 10/15/2017 7:56 PM by Dr. Dax Cabral MD.          CT Head Without Contrast Stroke Protocol [[id number]] Not Reviewed       Order Status: Completed Collected: 10/15/17 1956        Updated: 10/15/17 1959       Narrative:          "EXAMINATION: CT HEAD WO CONTRAST STROKE PROTOCOL-      CLINICAL INDICATION:     altered mental status     COMPARISON:    None     Technique: Multiple CT axial images were obtained through the level of  the brain without IV contrast administration. Reformatted images in the  coronal and/or sagittal plane(s) were generated from the axial data set  to facilitate diagnostic accuracy and/or surgical planning.     Radiation dose reduction techniques were utilized per ALARA protocol.  Automated exposure control was initiated through either or Company or  [employer] software packages by  protocol.       DOSE (DLP mGy-cm): 1121.7 mGy.cm     FINDINGS:   No acute intracranial abnormality. No midline shift or mass  effect. No hydrocephalus or intracranial hemorrhage. There is no CT  evidence of acute vascular territory infarct.  Bone windows show no  acute osseous abnormality.          Impression:         No CT evidence of acute intracranial abnormality.     This report was finalized on 10/15/2017 7:57 PM by Dr. Dax Cabral MD.          Review All           Condition on Discharge: stable    Physical Exam on Discharge:/82 (BP Location: Right arm, Patient Position: Lying)  Pulse 84  Temp 98.3 °F (36.8 °C) (Oral)   Resp 16  Ht 64\" (162.6 cm)  Wt 200 lb 2 oz (90.8 kg)  SpO2 96%  BMI 34.35 kg/m2  Physical Exam   Constitutional: She is oriented to person, place, and time. She appears well-developed. No distress.   HENT:   Head: Normocephalic and atraumatic.   Eyes: Pupils are equal, round, and reactive to light. No scleral icterus.   Neck: Normal range of motion. No JVD present.   Cardiovascular: Normal rate, regular rhythm, normal heart sounds and intact distal pulses.  Exam reveals no friction rub.    No murmur heard.  Pulmonary/Chest: Effort normal and breath sounds normal. No respiratory distress. She has no wheezes. She has no rales.   Abdominal: Soft. Bowel sounds are normal. She exhibits no " distension. There is no tenderness.   Musculoskeletal: Normal range of motion. She exhibits no edema.   Neurological: She is alert and oriented to person, place, and time.   Slow response, but appropriate   Skin: Skin is warm and dry.   Psychiatric: Her behavior is normal. Judgment and thought content normal.   Flat affect         Discharge Disposition  Home or Self Care    Discharge Medications   Nivia Bernstein   Home Medication Instructions LUIZ:[id number]    Printed on:10/17/17 6635   Medication Information                      aspirin 325 MG tablet  Take 325 mg by mouth Daily.             atorvastatin (LIPITOR) 80 MG tablet  Take 1 tablet by mouth Every Night.             DULoxetine (CYMBALTA) 30 MG capsule  Take 1 capsule by mouth Every 12 (Twelve) Hours.             insulin detemir (LEVEMIR) 100 UNIT/ML injection  Inject 16 Units under the skin Every 12 (Twelve) Hours for 30 days.             insulin regular (humuLIN R) 500 UNIT/ML CONCENTRATED injection  Inject 1.4 unit marking on U-100 syringe under the skin 3 (Three) Times a Day Before Meals. 110 units am, 95 units pm, 95 units nightly             lidocaine (LIDODERM) 5 %  Place 3 patches on the skin Daily As Needed for Mild Pain  (apply 1-3 patches daily for neuropathy/pain) for up to 3 days.             metFORMIN (GLUCOPHAGE) 1000 MG tablet  Take 1,000 mg by mouth 2 (Two) Times a Day With Meals.             raNITIdine (ZANTAC) 150 MG tablet  Take 150 mg by mouth 2 (Two) Times a Day.             vitamin D (ERGOCALCIFEROL) 06842 UNITS capsule capsule  Take 50,000 Units by mouth 1 (One) Time Per Week.                 Discharge Diet:   Diet Instructions     Diet: Regular, Consistent Carbohydrate; Thin       Discharge Diet:   Regular  Consistent Carbohydrate      Fluid Consistency:  Thin                 Discharge Care Plan / Instructions:    Activity at Discharge:   Activity Instructions     Activity as Tolerated                     Follow-up Appointments  No  future appointments.  Additional Instructions for the Follow-ups that You Need to Schedule     Discharge Follow-up with PCP    As directed    Follow Up Details:  1 week- needs assistance to find new PCP       Discharge Follow-up with Specialty    As directed    Specialty:  Behavioral health next available- patient to be evaluated by [facility] today                 Test Results Pending at Discharge       Fariba Manjarrez, APRN 10/17/17 11:39 AM    Time: Discharge 40 min    Please note that portions of this note may have been completed with a voice recognition program. Efforts were made to edit the dictations, but occasionally words are mistranscribed.

## 2017-10-17 NOTE — PLAN OF CARE
Problem: Stroke (Ischemic) (Adult)  Goal: Signs and Symptoms of Listed Potential Problems Will be Absent or Manageable (Stroke)  Outcome: Outcome(s) achieved Date Met:  10/17/17    10/17/17 0852   Stroke (Ischemic)   Problems Assessed (Stroke (Ischemic)/TIA) communication impairment;motor/sensory impairment;cognitive impairment;muscle tone abnormal   Problems Present (Stroke (Ischemic)/TIA) cognitive impairment

## 2017-10-17 NOTE — PLAN OF CARE
Problem: Patient Care Overview (Adult)  Goal: Plan of Care Review  Outcome: Outcome(s) achieved Date Met:  10/17/17    10/17/17 0838   Coping/Psychosocial Response Interventions   Plan Of Care Reviewed With patient;spouse   Outcome Evaluation   Outcome Summary/Follow up Plan Pt does not demonstrate any physical coordination, balance or sensory deficits. Pt cleaning shave difficulty with memory and orientation. Pt is continued pt of SLP and will be assessed by [facility] if continues to agree. No further OT warranted at this time.         Problem: Stroke (Ischemic) (Adult)  Goal: Signs and Symptoms of Listed Potential Problems Will be Absent or Manageable (Stroke)  Outcome: Ongoing (interventions implemented as appropriate)    10/17/17 0841   Stroke (Ischemic)   Problems Assessed (Stroke (Ischemic)/TIA) communication impairment;motor/sensory impairment;cognitive impairment;muscle tone abnormal   Problems Present (Stroke (Ischemic)/TIA) cognitive impairment

## 2017-10-17 NOTE — CONSULTS
Noted that patient remains confused and education is not appropriate at this time. Thank you Jagruti Turner RN

## 2018-07-10 ENCOUNTER — OFFICE VISIT (OUTPATIENT)
Dept: NEUROLOGY | Facility: CLINIC | Age: 43
End: 2018-07-10

## 2018-07-10 VITALS
HEIGHT: 64 IN | DIASTOLIC BLOOD PRESSURE: 80 MMHG | SYSTOLIC BLOOD PRESSURE: 116 MMHG | WEIGHT: 200 LBS | BODY MASS INDEX: 34.15 KG/M2

## 2018-07-10 DIAGNOSIS — F44.0: Primary | ICD-10-CM

## 2018-07-10 PROCEDURE — 99244 OFF/OP CNSLTJ NEW/EST MOD 40: CPT | Performed by: PSYCHIATRY & NEUROLOGY

## 2018-07-10 RX ORDER — PEN NEEDLE, DIABETIC 31 GX5/16"
NEEDLE, DISPOSABLE MISCELLANEOUS
Refills: 10 | COMMUNITY
Start: 2018-06-25 | End: 2020-08-14

## 2018-07-10 RX ORDER — INSULIN LISPRO 100 [IU]/ML
INJECTION, SOLUTION INTRAVENOUS; SUBCUTANEOUS
Refills: 2 | COMMUNITY
Start: 2018-05-22 | End: 2019-04-01 | Stop reason: SDUPTHER

## 2018-07-10 RX ORDER — CETIRIZINE HYDROCHLORIDE 10 MG/1
10 TABLET ORAL
Refills: 2 | COMMUNITY
Start: 2018-06-08 | End: 2019-09-18 | Stop reason: SDUPTHER

## 2018-07-10 RX ORDER — CITALOPRAM 40 MG/1
40 TABLET ORAL DAILY
Refills: 2 | COMMUNITY
Start: 2018-06-25 | End: 2019-04-17 | Stop reason: ALTCHOICE

## 2018-07-10 RX ORDER — INSULIN GLARGINE 100 [IU]/ML
INJECTION, SOLUTION SUBCUTANEOUS
Refills: 2 | COMMUNITY
Start: 2018-06-25 | End: 2019-04-01 | Stop reason: ALTCHOICE

## 2018-07-10 RX ORDER — ATORVASTATIN CALCIUM 20 MG/1
20 TABLET, FILM COATED ORAL DAILY
Refills: 2 | COMMUNITY
Start: 2018-06-30 | End: 2019-09-18 | Stop reason: ALTCHOICE

## 2018-07-10 RX ORDER — HYDROXYZINE PAMOATE 25 MG/1
CAPSULE ORAL
Refills: 1 | COMMUNITY
Start: 2018-06-22 | End: 2019-10-04

## 2018-07-10 RX ORDER — IBUPROFEN 800 MG/1
800 TABLET ORAL 3 TIMES DAILY
Refills: 2 | COMMUNITY
Start: 2018-06-25 | End: 2019-09-18 | Stop reason: ALTCHOICE

## 2018-07-10 RX ORDER — BUSPIRONE HYDROCHLORIDE 5 MG/1
5 TABLET ORAL 2 TIMES DAILY
Refills: 1 | COMMUNITY
Start: 2018-06-22 | End: 2019-07-09

## 2018-07-10 NOTE — PROGRESS NOTES
Subjective:    CC: Nivia Bernstein is seen today in consultation at the request of NIDA Rangel for Transient Ischemic Attack       HPI:  A shin is a 43-year-old female with past medical history of type 2 diabetes, anxiety, depression, history of mitral valve prolapse, OCD, dyslipidemia or to the clinic for evaluation of questionable transient ischemic attack.  She is accompanied by her  who helped with the history.  As per the , she she has had 4-5 episodes where she has sudden onset of the difficulty recognizing family members and friends.  She gets confused and does not know what is going on around her.  Typically the episode last for 24 hours and then slowly she comes back to normal.  Last episode was in June 2018 and he reports that this time even few weeks later, she is struggling to recognize friends.  No other focal neurological signs or symptoms with these episodes.  All the episodes are usually preceded by extreme amount of stress.  She has had the MRI brain ×2, 2-D echocardiogram ×2, carotid Doppler ×2 and all have been normal.  She has not had EEG.  There is no family history of seizures.  She is currently working as a  at [employer] and even though with the difficulty with memory she is able to work last 2 or 3 weeks.  In the past also,  reports that once the episode is over, she can get back to her work without any problems.  She does report vivid dreams at night.  She has been on buspirone and Celexa for a while now and not sure if it is helping.  She is scheduled to see psychiatrist this coming Friday.    The following portions of the patient's history were reviewed today and updated as of 07/10/2018  : allergies, social history and problem list.  This document will be scanned to patient's chart.      Current Outpatient Prescriptions:   •  aspirin 325 MG tablet, Take 325 mg by mouth Daily., Disp: , Rfl:   •  atorvastatin (LIPITOR) 20 MG tablet, Take 20 mg  by mouth Daily., Disp: , Rfl: 2  •  busPIRone (BUSPAR) 5 MG tablet, Take 5 mg by mouth 2 (Two) Times a Day., Disp: , Rfl: 1  •  cetirizine (zyrTEC) 10 MG tablet, Take 10 mg by mouth every night at bedtime., Disp: , Rfl: 2  •  citalopram (CeleXA) 40 MG tablet, Take 40 mg by mouth Daily., Disp: , Rfl: 2  •  GLOBAL EASE INJECT PEN NEEDLES 31G X 8 MM misc, USE AS DIRECTED TO INJECT VICTOZA AND BASAGLAR DAILY, Disp: , Rfl: 10  •  HUMALOG 100 UNIT/ML injection, INJECT 50 UNITS AT BREAKFAST,LUNCH AND SUPPER, Disp: , Rfl: 2  •  HUMALOG KWIKPEN 100 UNIT/ML solution pen-injector, INJECT 45 UNITS SUBCUTANEOUS THREE TIMES A DAY AS NEEDED, Disp: , Rfl: 2  •  hydrOXYzine (VISTARIL) 25 MG capsule, TAKE 1 CAPSULE BY RANGEL EVERY 6 HOURS AS NEEDED, Disp: , Rfl: 1  •  ibuprofen (ADVIL,MOTRIN) 800 MG tablet, Take 800 mg by mouth 3 (Three) Times a Day., Disp: , Rfl: 2  •  Insulin Glargine (BASAGLAR KWIKPEN) 100 UNIT/ML injection pen, INJECT 100 UNITS TWO TIMES A DAY, Disp: , Rfl: 2  •  insulin regular (humuLIN R) 500 UNIT/ML CONCENTRATED injection, Inject 1.4 unit marking on U-100 syringe under the skin 3 (Three) Times a Day Before Meals. 110 units am, 95 units pm, 95 units nightly, Disp: , Rfl:   •  LYRICA 100 MG capsule, Take 100 mg by mouth 2 (Two) Times a Day., Disp: , Rfl: 2  •  metFORMIN (GLUCOPHAGE) 1000 MG tablet, Take 1,000 mg by mouth 2 (Two) Times a Day With Meals., Disp: , Rfl:   •  raNITIdine (ZANTAC) 150 MG tablet, Take 150 mg by mouth 2 (Two) Times a Day., Disp: , Rfl:    Past Medical History:   Diagnosis Date   • Altered mental status 10/16/2017   • Anxiety and depression 3/31/2017   • Asthma    • CHF (congestive heart failure) (CMS/HCC)     mitral valve prolapse   • Diabetes mellitus (CMS/HCC)    • Elevated alkaline phosphatase level 10/16/2017   • H/O blood clots    • Heart murmur    • Hyperlipidemia    • Hypokalemia 10/16/2017   • Leg pain 10/16/2017   • Mitral valve prolapse    • Neuropathy    • Obesity 3/31/2017   •  "OCD (obsessive compulsive disorder) 10/16/2017   • Renal insufficiency 10/16/2017   • Thrombocytopenia (CMS/HCC) 10/16/2017   • TIA (transient ischemic attack)     4 TIMES      Past Surgical History:   Procedure Laterality Date   • APPENDECTOMY     • CHOLECYSTECTOMY     • FINGER FUSION     • HYSTERECTOMY     • KNEE ARTHROPLASTY        Family History   Problem Relation Age of Onset   • Diabetes Mother    • Diabetes Father    • Heart disease Father    • Alcohol abuse Father    • Seizures Father    • No Known Problems Brother    • No Known Problems Daughter       Review of Systems   Constitutional: Positive for fatigue.   HENT: Negative.    Eyes: Negative.    Respiratory: Negative.    Cardiovascular: Negative.    Musculoskeletal: Positive for back pain, joint swelling, myalgias, neck pain and neck stiffness.   Skin: Negative.    Allergic/Immunologic: Negative.    Neurological: Positive for dizziness, weakness, light-headedness, numbness, headache and confusion.   Psychiatric/Behavioral: Positive for decreased concentration.       All other systems reviewed and are negative     Objective:    /80   Ht 162.6 cm (64.02\")   Wt 90.7 kg (200 lb)   BMI 34.31 kg/m²     Neurology Exam:    General apperance: NAD.     Mental status: Alert, awake and oriented to time place and person.    Recent and Remote memory: Can recall 3/3 objects at 5 minutes. Can recall historical events.     Attention span and Concentration: Serial 7s: Normal.     Fund of knowledge:  Normal.     Language and Speech: No aphasia or dysarthria.    Naming , Repitition and Comprehension:  Can name objects, repeat a sentence and follow commands. Speech is clear and fluent with good repetition, comprehension, and naming.    Cranial Nerves:   CN II: Visual fields are full. Intact. Fundi - Normal, No papillederma, Pupils - KIERRA  CN III, IV and VI: Extraocular movements are intact. Normal saccades.   CN V: Facial sensation is intact.   CN VII: Muscles of " facial expression reveal no asymmetry. Intact.   CN VIII: Hearing is intact. Whispered voice intact.   CN IX and X: Palate elevates symmetrically. Intact  CN XI: Shoulder shrug is intact.   CN XII: Tongue is midline without evidence of atrophy or fasciculation.     Motor:  Right UE muscle strength 5/5. Normal tone.     Left UE muscle strength 5/5. Normal tone.      Right LE muscle strength5/5. Normal tone.     Left LE muscle strength 5/5. Normal tone.      Sensory: Normal light touch, vibration and pinprick sensation bilaterally.    DTRs: 2+ bilaterally in upper and lower extremities.    Babinski: Negative bilaterally.    Co-ordination: Normal finger-to-nose, heel to shin B/L.    Rhomberg: Negative.    Gait: Normal.    Cardiovascular: Regular rate and rhythm without murmur, gallop or rub.    Assessment and Plan:  1. Psychogenic amnesia  Patient with history of for 3-4 episodes of sudden onset of loss of memory that typically returns in 24-48 hours.  Last episode was a 3-4 weeks ago but she reports that the she is still struggling to recognize and remember some of her friends and family members.  She has had extensive neurological workup ×2 in the past and has been unremarkable.  All these episodes are preceded by extreme stress.  This likely represents conversion disorder and I have advised her to follow-up with the psychiatrist as per schedule.  Since EEG has not been performed, it will be scheduled to complete the workup.  If EEG is normal, then she has to regularly follow up with psychiatrist.  She does have vascular risk factors including dyslipidemia and diabetes and hence I have advised her to continue with aspirin and atorvastatin on a regular  basis.    Return if symptoms worsen or fail to improve.     Sotero Reyes MD

## 2018-07-24 ENCOUNTER — TRANSCRIBE ORDERS (OUTPATIENT)
Dept: INFUSION THERAPY | Facility: HOSPITAL | Age: 43
End: 2018-07-24

## 2018-08-02 ENCOUNTER — HOSPITAL ENCOUNTER (OUTPATIENT)
Dept: INFUSION THERAPY | Facility: HOSPITAL | Age: 43
Discharge: HOME | End: 2018-08-02
Admitting: PSYCHIATRY & NEUROLOGY

## 2018-08-02 DIAGNOSIS — F44.0: ICD-10-CM

## 2018-08-02 PROCEDURE — 95813 EEG EXTND MNTR 61-119 MIN: CPT

## 2018-08-08 ENCOUNTER — TELEPHONE (OUTPATIENT)
Dept: NEUROLOGY | Facility: CLINIC | Age: 43
End: 2018-08-08

## 2018-08-08 NOTE — TELEPHONE ENCOUNTER
Contacted pts spouse notified him of Dr. Reyes's results, he verbalized understanding and will cb if needed.

## 2018-08-08 NOTE — TELEPHONE ENCOUNTER
----- Message from Sotero Reyes MD sent at 8/6/2018 10:31 AM EDT -----  Inform patient normal.  EEG is normal. No evidence of seizure activity.

## 2019-02-13 ENCOUNTER — OFFICE VISIT (OUTPATIENT)
Dept: FAMILY MEDICINE CLINIC | Facility: CLINIC | Age: 44
End: 2019-02-13

## 2019-02-13 VITALS
WEIGHT: 218 LBS | RESPIRATION RATE: 14 BRPM | HEIGHT: 64 IN | HEART RATE: 91 BPM | OXYGEN SATURATION: 98 % | BODY MASS INDEX: 37.22 KG/M2 | DIASTOLIC BLOOD PRESSURE: 70 MMHG | TEMPERATURE: 99 F | SYSTOLIC BLOOD PRESSURE: 110 MMHG

## 2019-02-13 DIAGNOSIS — E78.5 HYPERLIPIDEMIA, UNSPECIFIED HYPERLIPIDEMIA TYPE: Chronic | ICD-10-CM

## 2019-02-13 DIAGNOSIS — IMO0002 UNCONTROLLED TYPE 2 DIABETES WITH NEUROPATHY: Primary | Chronic | ICD-10-CM

## 2019-02-13 DIAGNOSIS — K21.9 GASTROESOPHAGEAL REFLUX DISEASE WITHOUT ESOPHAGITIS: Chronic | ICD-10-CM

## 2019-02-13 PROBLEM — N28.9 RENAL INSUFFICIENCY: Status: ACTIVE | Noted: 2017-10-16

## 2019-02-13 PROCEDURE — 99203 OFFICE O/P NEW LOW 30 MIN: CPT | Performed by: NURSE PRACTITIONER

## 2019-02-13 RX ORDER — ERGOCALCIFEROL 1.25 MG/1
50000 CAPSULE ORAL WEEKLY
COMMUNITY
End: 2019-09-18 | Stop reason: SDUPTHER

## 2019-02-13 RX ORDER — OMEPRAZOLE 20 MG/1
20 CAPSULE, DELAYED RELEASE ORAL DAILY
COMMUNITY
End: 2019-09-18 | Stop reason: SDUPTHER

## 2019-02-13 NOTE — PATIENT INSTRUCTIONS
"Carbohydrate Counting for Diabetes Mellitus, Adult  Carbohydrate counting is a method for keeping track of how many carbohydrates you eat. Eating carbohydrates naturally increases the amount of sugar (glucose) in the blood. Counting how many carbohydrates you eat helps keep your blood glucose within normal limits, which helps you manage your diabetes (diabetes mellitus).  It is important to know how many carbohydrates you can safely have in each meal. This is different for every person. A diet and nutrition specialist (registered dietitian) can help you make a meal plan and calculate how many carbohydrates you should have at each meal and snack.  Carbohydrates are found in the following foods:  · Grains, such as breads and cereals.  · Dried beans and soy products.  · Starchy vegetables, such as potatoes, peas, and corn.  · Fruit and fruit juices.  · Milk and yogurt.  · Sweets and snack foods, such as cake, cookies, candy, chips, and soft drinks.    How do I count carbohydrates?  There are two ways to count carbohydrates in food. You can use either of the methods or a combination of both.  Reading \"Nutrition Facts\" on packaged food  The \"Nutrition Facts\" list is included on the labels of almost all packaged foods and beverages in the [place] It includes:  · The serving size.  · Information about nutrients in each serving, including the grams (g) of carbohydrate per serving.    To use the “Nutrition Facts\":  · Decide how many servings you will have.  · Multiply the number of servings by the number of carbohydrates per serving.  · The resulting number is the total amount of carbohydrates that you will be having.    Learning standard serving sizes of other foods  When you eat foods containing carbohydrates that are not packaged or do not include \"Nutrition Facts\" on the label, you need to measure the servings in order to count the amount of carbohydrates:  · Measure the foods that you will eat with a food scale or " measuring cup, if needed.  · Decide how many standard-size servings you will eat.  · Multiply the number of servings by 15. Most carbohydrate-rich foods have about 15 g of carbohydrates per serving.  ? For example, if you eat 8 oz (170 g) of strawberries, you will have eaten 2 servings and 30 g of carbohydrates (2 servings x 15 g = 30 g).  · For foods that have more than one food mixed, such as soups and casseroles, you must count the carbohydrates in each food that is included.    The following list contains standard serving sizes of common carbohydrate-rich foods. Each of these servings has about 15 g of carbohydrates:  · ½ hamburger bun or ½ English muffin.  · ½ oz (15 mL) syrup.  · ½ oz (14 g) jelly.  · 1 slice of bread.  · 1 six-inch tortilla.  · 3 oz (85 g) cooked rice or pasta.  · 4 oz (113 g) cooked dried beans.  · 4 oz (113 g) starchy vegetable, such as peas, corn, or potatoes.  · 4 oz (113 g) hot cereal.  · 4 oz (113 g) mashed potatoes or ¼ of a large baked potato.  · 4 oz (113 g) canned or frozen fruit.  · 4 oz (120 mL) fruit juice.  · 4-6 crackers.  · 6 chicken nuggets.  · 6 oz (170 g) unsweetened dry cereal.  · 6 oz (170 g) plain fat-free yogurt or yogurt sweetened with artificial sweeteners.  · 8 oz (240 mL) milk.  · 8 oz (170 g) fresh fruit or one small piece of fruit.  · 24 oz (680 g) popped popcorn.    Example of carbohydrate counting  Sample meal  · 3 oz (85 g) chicken breast.  · 6 oz (170 g) brown rice.  · 4 oz (113 g) corn.  · 8 oz (240 mL) milk.  · 8 oz (170 g) strawberries with sugar-free whipped topping.  Carbohydrate calculation  1. Identify the foods that contain carbohydrates:  ? Rice.  ? Corn.  ? Milk.  ? Strawberries.  2. Calculate how many servings you have of each food:  ? 2 servings rice.  ? 1 serving corn.  ? 1 serving milk.  ? 1 serving strawberries.  3. Multiply each number of servings by 15 g:  ? 2 servings rice x 15 g = 30 g.  ? 1 serving corn x 15 g = 15 g.  ? 1 serving milk x 15  g = 15 g.  ? 1 serving strawberries x 15 g = 15 g.  4. Add together all of the amounts to find the total grams of carbohydrates eaten:  ? 30 g + 15 g + 15 g + 15 g = 75 g of carbohydrates total.  This information is not intended to replace advice given to you by your health care provider. Make sure you discuss any questions you have with your health care provider.  Document Released: 12/18/2006 Document Revised: 07/07/2017 Document Reviewed: 05/31/2017  [employer] Interactive Patient Education © 2018 [employer]  Insulin Pumps  An insulin pump is a small, computerized, battery-powered device that steadily (continuously) delivers insulin to the body. Insulin pumps may be used to treat type 1 diabetes (type 1 diabetes mellitus) or type 2 diabetes (type 2 diabetes mellitus). They may be used with rapid-acting insulin and short-acting insulin.  An insulin pump has a container (cartridge or reservoir) of insulin that must be refilled regularly. The cartridge is connected to a needle that is placed into the skin in any of these areas:  · Abdomen. Avoid any area that is less than 2 inches (5 cm) from the belly button (navel).  · Front and outer area of the upper thighs.  · Backs of the upper arms.  · Upper buttocks.    The area where the needle is inserted is called the infusion site. The needle is surrounded by a small plastic tube (cannula). The needle and cannula are referred to as the infusion set. After the infusion set is inserted under the skin, the needle is removed and the cannula stays in place to deliver insulin to the body.  What does an insulin pump do?  Insulin pumps deliver insulin to the body in two ways:  · Basal rate. This method gives small, continuous amounts of insulin 24 hours a day.  · Bolus dose. This is a larger dose of insulin that you can give yourself immediately. You may need this after eating a meal or when you have high blood sugar (glucose).    What are some advantages of using an insulin  pump?  The advantages of using an insulin pump vary depending on the type of diabetes you have. Advantages may include:  · Reduced need to give yourself multiple insulin injections. With an insulin pump, you need to insert a needle into your body every 2-3 days instead of every time that you need insulin.  · More control over your diabetes and more flexibility for scheduling meals, snacks, and exercise.  · Reduced risk of low blood glucose (hypoglycemia).  · Better management of increases in blood glucose in the early morning (alyse phenomenon).  · More precise insulin doses, which may mean an improvement in blood glucose levels.  · More predictable insulin absorption rate.  · Easier delivery of basal insulin.    What are some disadvantages of using an insulin pump?  · An insulin pump and its supplies might cost more than supplies for injections.  · You may need to check your blood glucose level more often when you have an insulin pump than when you give yourself injections.  · You have the pump attached to you wherever you go, for 24 hours a day.  What are the risks?  · The infusion site can get irritated or infected. To avoid this, care for your skin and periodically move your infusion site to a slightly different area.  · Pump failure. This can be caused by a malfunction, such as a blockage in the tubing, the needle moving out of place, or the pump running out of insulin.  · Most pumps have alarms to warn you of a malfunction. However, if the pump fails and you are unaware that it has failed, you may develop diabetic ketoacidosis. This is a life-threatening complication of diabetes that occurs due to lack of insulin.  · Blockage (occlusion). This can prevent your pump from delivering insulin properly. This can be caused by a bent cannula or a kink in the tubing.  What are some causes of high blood glucose when using an insulin pump?  Possible causes of high blood glucose (hyperglycemia) when using an insulin pump  include:  · Tubing that breaks, leaks, bends, or moves out of place.  · Low battery.  · Infection at the infusion site. Signs of infection may include:  ? Redness, swelling, or pain.  ? Cloudy fluid or blood.  ? Warmth.  ? Pus or a bad smell.  · A cartridge that is empty or incorrectly loaded into the pump.  · A basal rate that needs to be adjusted.  · Air bubbles in the tubing.  · Illness or stress.  · Changes in hormone levels, such as during the menstrual cycle.  · The pump delivering the wrong amount of insulin.  · An interruption in insulin delivery.  · Poor absorption at the infusion site, even if the site was recently changed.  · Using ineffective insulin. Insulin can become ineffective if it is outdated or exposed to extreme heat or cold.  · A change in normal activity levels.    How to care for your insulin pump    · Refill the insulin cartridge as often as needed. Do not let the cartridge get completely empty.  · Always keep extra pump supplies, syringes, and insulin with you.  · Store your insulin according to instructions on the packaging.  · If you have questions or a problem that you cannot solve, call your health care provider or the pump ’s customer service line.  How to manage your diabetes while using an insulin pump  · Check your blood glucose at least 4 times a day, or as often as directed.  ? Always check your blood glucose before changing your basal or bolus rates, and after changing your infusion set.  · Check your A1c (hemoglobin A1c) level as often as directed.  · Continue to manage your diabetes as directed. This includes:  ? Exercising regularly.  ? Eating healthy foods.  ? Managing your weight.  · If you give yourself a correction (supplemental) insulin dose and your blood glucose stays high, check your urine for ketones.  ? If you have negative ketones, check your pump to see if it is working properly. If your pump does not seem to be working properly, change your cartridge,  infusion set, and insertion site. Recheck your blood glucose in 1 hour. If your blood glucose is still high, give yourself another supplemental insulin dose with an injection using a syringe and needle.  ? If you have moderate or large ketones in your urine, give yourself another supplemental insulin dose with an injection using a syringe and needle.  Contact a health care provider if:  · You have any of the following at your infusion site:  ? Redness, swelling, or pain.  ? Cloudy fluid or blood.  ? Warmth.  ? Pus or a bad smell.  ? A red streak on your skin that leads away from the infusion site.  · You have a fever.  · Your pump is not working properly.  · Your blood glucose is higher or lower than the target range that was set by your health care provider.  This information is not intended to replace advice given to you by your health care provider. Make sure you discuss any questions you have with your health care provider.  Document Released: 03/26/2002 Document Revised: 05/31/2017 Document Reviewed: 01/20/2017  [employer] Interactive Patient Education © 2018 [employer]  PLEASE HAVE DR HASTINGS FAX:  C PEPTIDE AND CMP/BMP OF SAME DAY  A1C  WEB SITE: [facility] Insulin Pump

## 2019-02-13 NOTE — PROGRESS NOTES
"  History of Present Illness   Nivia presents to the clinic today at the request of her PCP, Dr Garcia, in regards to her DM, type 2. Lilo reports her glycemic control is \"out of control\" and she and Dr Garcia both feel she would benefit from insulin pump therapy.In addition, she has a H/O TIA with associated memory loss, HTN, Dyslipidemia,   Her  is with her today and he is in agreement to this plan.    Diabetes   She presents for her initial diabetic visit. She has type 2 diabetes mellitus. No MedicAlert identification noted. The initial diagnosis of diabetes was made 20 years ago. Her disease course has been worsening.  Associated symptoms include blurred vision, fatigue, foot paresthesias, polydipsia, polyuria and visual change. Pertinent negatives for diabetes include no foot ulcerations and no polyphagia. Pertinent negatives for hypoglycemia complications include no required glucagon injection. Symptoms are worsening. Diabetic complications include peripheral neuropathy. Risk factors for coronary artery disease include post-menopausal, stress and dyslipidemia. Current diabetic treatment includes multiple daily insulin injections.She has tried multiple oral agents including GLP-1 Agonists and Invokana  Without successful glycemic control  She monitors her blood sugars at least four to five times daily.  She is compliant with treatment all of the time. She is following a generally healthy diet. She has had a previous visit with a dietitian. She participates in exercise three times a week (3 miles). Her home blood glucose trend is increasing steadily with ranges from above 200 mg/dL to 400 mg/dL.  An ACE inhibitor/angiotensin II receptor blocker is being taken. Eye exam is current.   Refer to ROS for additional information.    Vitals:    02/13/19 1350   BP: 110/70   Pulse: 91   Resp: 14   Temp: 99 °F (37.2 °C)   TempSrc: Temporal   SpO2: 98%   Weight: 98.9 kg (218 lb)   Height: 162.6 cm (64\")      The " following portions of the patient's history were reviewed and updated as appropriate: allergies, current medications, past family history, past medical history, past social history, past surgical history and problem list.    Review of Systems   Constitutional: Negative for activity change, appetite change, chills, fatigue, fever and unexpected weight change.   Eyes: Positive for visual disturbance.   Respiratory: Negative for cough, chest tightness, shortness of breath and wheezing.    Cardiovascular: Positive for leg swelling. Negative for chest pain and palpitations.   Gastrointestinal: Positive for nausea. Negative for abdominal pain, constipation, diarrhea and vomiting.   Endocrine: Negative for cold intolerance, heat intolerance, polydipsia, polyphagia and polyuria.   Musculoskeletal: Positive for arthralgias.   Skin: Negative for color change and rash.   Neurological: Positive for numbness and headaches. Negative for dizziness, tremors, speech difficulty, weakness and light-headedness.   Hematological: Negative for adenopathy. Bruises/bleeds easily.   Psychiatric/Behavioral: Negative for confusion, decreased concentration and suicidal ideas. The patient is not nervous/anxious.    All other systems reviewed and are negative.    Physical Exam   Constitutional: She is oriented to person, place, and time. She appears well-developed and well-nourished. No distress.   HENT:   Head: Normocephalic.   Nose: Nose normal.   Mouth/Throat: Oropharynx is clear and moist.   Eyes: Conjunctivae are normal. Pupils are equal, round, and reactive to light. Right eye exhibits no discharge. Left eye exhibits no discharge. No scleral icterus.   Neck: Neck supple. No JVD present. No thyromegaly present.   Cardiovascular: Normal rate, regular rhythm and normal heart sounds. Exam reveals no friction rub.   No murmur heard.  Pulmonary/Chest: Effort normal and breath sounds normal. No respiratory distress. She has no wheezes. She has no  rales.   Abdominal: Soft. There is no tenderness. There is no rebound and no guarding.   Musculoskeletal: She exhibits no edema.   Lymphadenopathy:     She has no cervical adenopathy.   Neurological: She is alert and oriented to person, place, and time.   Skin: Skin is warm and dry. Capillary refill takes less than 2 seconds. No rash noted. No erythema.   Psychiatric: She has a normal mood and affect. Her behavior is normal. Judgment and thought content normal.   Vitals reviewed.    Assessment/Plan   Problems Addressed this Visit        Cardiovascular and Mediastinum    Hyperlipidemia (Chronic)       Digestive    GERD (gastroesophageal reflux disease) (Chronic)    Relevant Medications    omeprazole (priLOSEC) 20 MG capsule       Endocrine    Uncontrolled type 2 diabetes with neuropathy (CMS/HCC) - Primary        Findings and recommendations discussed with Nivia and her . Treatment options reviewed. Lifestyle modifications reviewed including nutrition and activity. Discussed criteria for insulin pump therapy including nutrition knowledge, follow up and diabetes education. Nivia and her  are very motivated to proceed with transitioning Nivia to insulin pump therapy. I will proceed with the process. Encouraged her to access the [employer] web site to begin learning about the insulin pump.   Follow up will be scheduled after information about insulin pump approval is granted.

## 2019-02-18 PROBLEM — IMO0002 UNCONTROLLED TYPE 2 DIABETES WITH NEUROPATHY: Status: ACTIVE | Noted: 2017-03-19

## 2019-02-18 PROBLEM — R53.1 ACUTE LEFT-SIDED WEAKNESS: Status: RESOLVED | Noted: 2017-04-01 | Resolved: 2019-02-18

## 2019-02-18 PROBLEM — R53.1 ACUTE RIGHT-SIDED WEAKNESS: Status: RESOLVED | Noted: 2017-03-19 | Resolved: 2019-02-18

## 2019-02-28 ENCOUNTER — TELEPHONE (OUTPATIENT)
Dept: FAMILY MEDICINE CLINIC | Facility: CLINIC | Age: 44
End: 2019-02-28

## 2019-02-28 NOTE — TELEPHONE ENCOUNTER
Pt is aware of this information.   ----- Message from NIDA Betancourt sent at 2/28/2019  2:13 PM EST -----  If you would let her know her FBS was 147 and her C Peptide was 1.8 and overall her panel was good...nothing concerning. I am unsure whether her C Peptide will impact her obtaining a pump or not. I spoke with Joanna this morning and she was going to f/u regarding the status.Either someone from [employer] will let her know or I will as soon as we confirm..thanks  ----- Message -----  From: Anne Paz MA  Sent: 2/28/2019   1:35 PM  To: NIDA Betancourt    Patient wanted to know if you could look over her labs and give her the results to them. She also wanted to know if you had heard anything about her pump?

## 2019-03-07 ENCOUNTER — LAB (OUTPATIENT)
Dept: FAMILY MEDICINE CLINIC | Facility: CLINIC | Age: 44
End: 2019-03-07

## 2019-03-07 DIAGNOSIS — IMO0002 UNCONTROLLED TYPE 2 DIABETES WITH NEUROPATHY: Primary | ICD-10-CM

## 2019-03-07 DIAGNOSIS — IMO0002 UNCONTROLLED TYPE 2 DIABETES WITH NEUROPATHY: ICD-10-CM

## 2019-03-07 LAB — HBA1C MFR BLD: 12.1 % (ref 4.5–5.7)

## 2019-03-07 PROCEDURE — 36415 COLL VENOUS BLD VENIPUNCTURE: CPT | Performed by: GENERAL PRACTICE

## 2019-03-13 DIAGNOSIS — Z79.4 OTHER SPECIFIED DIABETES MELLITUS WITH HYPERGLYCEMIA, WITH LONG-TERM CURRENT USE OF INSULIN (HCC): Primary | ICD-10-CM

## 2019-03-13 DIAGNOSIS — E13.65 OTHER SPECIFIED DIABETES MELLITUS WITH HYPERGLYCEMIA, WITH LONG-TERM CURRENT USE OF INSULIN (HCC): Primary | ICD-10-CM

## 2019-03-14 ENCOUNTER — LAB (OUTPATIENT)
Dept: FAMILY MEDICINE CLINIC | Facility: CLINIC | Age: 44
End: 2019-03-14

## 2019-03-14 DIAGNOSIS — E13.65 OTHER SPECIFIED DIABETES MELLITUS WITH HYPERGLYCEMIA, WITH LONG-TERM CURRENT USE OF INSULIN (HCC): ICD-10-CM

## 2019-03-14 DIAGNOSIS — Z79.4 OTHER SPECIFIED DIABETES MELLITUS WITH HYPERGLYCEMIA, WITH LONG-TERM CURRENT USE OF INSULIN (HCC): ICD-10-CM

## 2019-03-14 LAB
ALBUMIN SERPL-MCNC: 4.6 G/DL (ref 3.5–5.2)
ALBUMIN/GLOB SERPL: 1.6 G/DL
ALP SERPL-CCNC: 127 U/L (ref 39–117)
ALT SERPL W P-5'-P-CCNC: 40 U/L (ref 1–33)
ANION GAP SERPL CALCULATED.3IONS-SCNC: 14.2 MMOL/L
AST SERPL-CCNC: 26 U/L (ref 1–32)
BILIRUB SERPL-MCNC: 0.5 MG/DL (ref 0.1–1.2)
BUN BLD-MCNC: 11 MG/DL (ref 6–20)
BUN/CREAT SERPL: 19.3 (ref 7–25)
CALCIUM SPEC-SCNC: 9.6 MG/DL (ref 8.6–10.5)
CHLORIDE SERPL-SCNC: 99 MMOL/L (ref 98–107)
CO2 SERPL-SCNC: 26.8 MMOL/L (ref 22–29)
CREAT BLD-MCNC: 0.57 MG/DL (ref 0.57–1)
GFR SERPL CREATININE-BSD FRML MDRD: 114 ML/MIN/1.73
GLOBULIN UR ELPH-MCNC: 2.8 GM/DL
GLUCOSE BLD-MCNC: 236 MG/DL (ref 65–99)
POTASSIUM BLD-SCNC: 3.7 MMOL/L (ref 3.5–5.2)
PROT SERPL-MCNC: 7.4 G/DL (ref 6–8.5)
SODIUM BLD-SCNC: 140 MMOL/L (ref 136–145)

## 2019-03-14 PROCEDURE — 80053 COMPREHEN METABOLIC PANEL: CPT | Performed by: NURSE PRACTITIONER

## 2019-03-14 PROCEDURE — 84681 ASSAY OF C-PEPTIDE: CPT | Performed by: NURSE PRACTITIONER

## 2019-03-14 PROCEDURE — 36415 COLL VENOUS BLD VENIPUNCTURE: CPT

## 2019-03-15 LAB — C PEPTIDE SERPL-MCNC: 2.3 NG/ML (ref 1.1–4.4)

## 2019-03-28 ENCOUNTER — TELEPHONE (OUTPATIENT)
Dept: FAMILY MEDICINE CLINIC | Facility: CLINIC | Age: 44
End: 2019-03-28

## 2019-03-28 NOTE — TELEPHONE ENCOUNTER
----- Message from NIDA Betancourt sent at 3/28/2019 11:18 AM EDT -----  Regarding: RE: Insulin Change  Thank you  ----- Message -----  From: Anne Paz MA  Sent: 3/28/2019  10:59 AM  To: NIDA Betancourt  Subject: RE: Insulin Change                               I called and informed the patient of this infroamtion. She is going to come by today to  the medicine.   ----- Message -----  From: Deng Guerin APRN  Sent: 3/28/2019   9:37 AM  To: Anne Paz MA  Subject: Insulin Change                                   Anne,     If you would put the Toujeo back for Lilo and call her to let her know I would like for her to try this. She should be able just to change from her Basaglar to Toujeo without a dose adjustment. If she picks up today I would have her to start tomorrow. Tell her to let us know if she has any issues. If she would keep a log and I am going to work her in on my next day, April 10th...  Also, tell her she should use this only as a pen. Do not withdraw the insulin out to inject..deng

## 2019-04-01 DIAGNOSIS — IMO0002 UNCONTROLLED TYPE 2 DIABETES WITH NEUROPATHY: Primary | ICD-10-CM

## 2019-04-01 RX ORDER — INSULIN LISPRO 100 [IU]/ML
45 INJECTION, SOLUTION INTRAVENOUS; SUBCUTANEOUS 3 TIMES DAILY PRN
Qty: 15 PEN | Refills: 3 | Status: SHIPPED | OUTPATIENT
Start: 2019-04-01 | End: 2020-08-14

## 2019-04-02 ENCOUNTER — TELEPHONE (OUTPATIENT)
Dept: FAMILY MEDICINE CLINIC | Facility: CLINIC | Age: 44
End: 2019-04-02

## 2019-04-02 ENCOUNTER — PRIOR AUTHORIZATION (OUTPATIENT)
Dept: FAMILY MEDICINE CLINIC | Facility: CLINIC | Age: 44
End: 2019-04-02

## 2019-04-02 NOTE — TELEPHONE ENCOUNTER
----- Message from NIDA Betancourt sent at 4/1/2019  8:20 PM EDT -----  Will do..The only issue is she probably will need it PA'd  ----- Message -----  From: Anne Paz MA  Sent: 4/1/2019   2:43 PM  To: NIDA Betancourt    Patient stated that's he has done better with the toujeo. She checked it a few minutes before she called back and said it was 172. Could you send her in a prescription for it please?   ----- Message -----  From: Josh Guerin APRN  Sent: 4/1/2019  10:54 AM  To: Anne Paz MA    Would you call Nivia and check to see how she is doing on the Toujeo and if she has any questions.

## 2019-04-02 NOTE — TELEPHONE ENCOUNTER
----- Message from NIDA Betancourt sent at 4/1/2019  8:19 PM EDT -----  Will do  ----- Message -----  From: Anne Paz MA  Sent: 4/1/2019   4:03 PM  To: NIDA Betancourt    Patient called and stated that her humalog is suppose to be pens instead of the vials. Could you resend it as pens please?

## 2019-04-10 ENCOUNTER — OFFICE VISIT (OUTPATIENT)
Dept: FAMILY MEDICINE CLINIC | Facility: CLINIC | Age: 44
End: 2019-04-10

## 2019-04-10 DIAGNOSIS — E66.01 CLASS 2 SEVERE OBESITY WITH SERIOUS COMORBIDITY AND BODY MASS INDEX (BMI) OF 37.0 TO 37.9 IN ADULT, UNSPECIFIED OBESITY TYPE (HCC): ICD-10-CM

## 2019-04-10 DIAGNOSIS — F32.A ANXIETY AND DEPRESSION: Chronic | ICD-10-CM

## 2019-04-10 DIAGNOSIS — M79.672 FOOT PAIN, LEFT: ICD-10-CM

## 2019-04-10 DIAGNOSIS — F41.9 ANXIETY AND DEPRESSION: Chronic | ICD-10-CM

## 2019-04-10 DIAGNOSIS — IMO0002 UNCONTROLLED TYPE 2 DIABETES WITH NEUROPATHY: Primary | Chronic | ICD-10-CM

## 2019-04-10 DIAGNOSIS — E78.5 HYPERLIPIDEMIA, UNSPECIFIED HYPERLIPIDEMIA TYPE: Chronic | ICD-10-CM

## 2019-04-10 PROCEDURE — 99214 OFFICE O/P EST MOD 30 MIN: CPT | Performed by: NURSE PRACTITIONER

## 2019-04-10 NOTE — PATIENT INSTRUCTIONS
Type 2 Diabetes Mellitus, Self Care, Adult  Caring for yourself after you have been diagnosed with type 2 diabetes (type 2 diabetes mellitus) means keeping your blood sugar (glucose) under control with a balance of:  · Nutrition.  · Exercise.  · Lifestyle changes.  · Medicines or insulin, if necessary.  · Support from your team of health care providers and others.    The following information explains what you need to know to manage your diabetes at home.  What are the risks?  Having diabetes can put you at risk for other long-term (chronic) conditions, such as heart disease and kidney disease. Your health care provider may prescribe medicines to help prevent complications from diabetes. These medicines may include:  · Aspirin.  · Medicine to lower cholesterol.  · Medicine to control blood pressure.    How to monitor blood glucose  · Check your blood glucose every day, as often as told by your health care provider.  · Have your A1c (hemoglobin A1c) level checked two or more times a year, or as often as told by your health care provider.  Your health care provider will set individualized treatment goals for you. Generally, the goal of treatment is to maintain the following blood glucose levels:  · Before meals (preprandial):  mg/dL (4.4-7.2 mmol/L).  · After meals (postprandial): below 180 mg/dL (10 mmol/L).  · A1c level: less than 7%.    How to manage hyperglycemia and hypoglycemia  Hyperglycemia symptoms  Hyperglycemia, also called high blood glucose, occurs when blood glucose is too high. Make sure you know the early signs of hyperglycemia, such as:  · Increased thirst.  · Hunger.  · Feeling very tired.  · Needing to urinate more often than usual.  · Blurry vision.    Hypoglycemia symptoms  Hypoglycemia, also called low blood glucose, occurs with a blood glucose level at or below 70 mg/dL (3.9 mmol/L). The risk for hypoglycemia increases during or after exercise, during sleep, during illness, and when  skipping meals or not eating for a long time (fasting).  It is important to know the symptoms of hypoglycemia and treat it right away. Always have a 15-gram rapid-acting carbohydrate snack with you to treat low blood glucose. Family members and close friends should also know the symptoms and should understand how to treat hypoglycemia, in case you are not able to treat yourself. Symptoms may include:  · Hunger.  · Anxiety.  · Sweating and feeling clammy.  · Confusion.  · Dizziness or feeling light-headed.  · Sleepiness.  · Nausea.  · Increased heart rate.  · Headache.  · Blurry vision.  · Irritability.  · A change in coordination.  · Tingling or numbness around the mouth, lips, or tongue.  · Restless sleep.  · Fainting.  · Seizure.    Treating hypoglycemia  If you are alert and able to swallow safely, follow the 15:15 rule:  · Take 15 grams of a rapid-acting carbohydrate. Rapid-acting options include:  ? 1 tube of glucose gel.  ? 3 glucose pills.  ? 6-8 pieces of hard candy.  ? 4 oz (120 mL) of fruit juice.  ? 4 oz (120 mL) of regular (not diet) soda.  · Check your blood glucose 15 minutes after you take the carbohydrate.  · If the repeat blood glucose level is still at or below 70 mg/dL (3.9 mmol/L), take 15 grams of a carbohydrate again.  · If your blood glucose level does not increase above 70 mg/dL (3.9 mmol/L) after 3 tries, seek emergency medical care.  · After your blood glucose level returns to normal, eat a meal or a snack within 1 hour.    Treating severe hypoglycemia  Severe hypoglycemia is when your blood glucose level is at or below 54 mg/dL (3 mmol/L). Severe hypoglycemia is an emergency. Do not wait to see if the symptoms will go away. Get medical help right away. Call your local emergency services (911 in the [place]  If you have severe hypoglycemia and you cannot eat or drink, you may need an injection of glucagon. A family member or close friend should learn how to check your blood glucose and how  to give you a glucagon injection. Ask your health care provider if you need to have an emergency glucagon injection kit available.  Severe hypoglycemia may need to be treated in a hospital. The treatment may include getting glucose through an IV. You may also need treatment for the cause of your hypoglycemia.  Follow these instructions at home:  Take diabetes medicines as told  · If your health care provider prescribed insulin or diabetes medicines, take them every day.  · Do not run out of insulin or other diabetes medicines that you take. Plan ahead so you always have these available.  · If you use insulin, adjust your dosage based on how physically active you are and what foods you eat. Your health care provider will tell you how to adjust your dosage.  Make healthy food choices  The things that you eat and drink affect your blood glucose and your insulin dosage. Making good choices helps to control your diabetes and prevent other health problems. A healthy meal plan includes eating lean proteins, complex carbohydrates, fresh fruits and vegetables, low-fat dairy products, and healthy fats.  Make an appointment to see a diet and nutrition specialist (registered dietitian) to help you create an eating plan that is right for you. Make sure that you:  · Follow instructions from your health care provider about eating or drinking restrictions.  · Drink enough fluid to keep your urine pale yellow.  · Keep a record of the carbohydrates that you eat. Do this by reading food labels and learning the standard serving sizes of foods.  · Follow your sick day plan whenever you cannot eat or drink as usual. Make this plan in advance with your health care provider.    Stay active  Exercise regularly, as told by your health care provider. This may include:  · Stretching and doing strength exercises, such as yoga or weightlifting, 2 or more times a week.  · Doing 150 minutes or more of moderate-intensity or vigorous-intensity  exercise each week. This could be brisk walking, biking, or water aerobics.  ? Spread out your activity over 3 or more days of the week.  ? Do not go more than 2 days in a row without doing some kind of physical activity.    When you start a new exercise or activity, work with your health care provider to adjust your insulin, medicines, or food intake as needed.  Make healthy lifestyle choices  · Do not use any tobacco products, such as cigarettes, chewing tobacco, and e-cigarettes. If you need help quitting, ask your health care provider.  · If your health care provider says that alcohol is safe for you, limit alcohol intake to no more than 1 drink per day for nonpregnant women and 2 drinks per day for men. One drink equals 12 oz of beer, 5 oz of wine, or 1½ oz of hard liquor.  · Learn to manage stress. If you need help with this, ask your health care provider.  Care for your body  · Keep your immunizations up to date. In addition to getting vaccinations as told by your health care provider, it is recommended that you get vaccinated against the following illnesses:  ? The flu (influenza). Get a flu shot every year.  ? Pneumonia.  ? Hepatitis B.  · Schedule an eye exam soon after your diagnosis, and then one time every year after that.  · Check your skin and feet every day for cuts, bruises, redness, blisters, or sores. Schedule a foot exam with your health care provider once every year.  · Brush your teeth and gums two times a day, and floss one or more times a day. Visit your dentist one or more times every 6 months.  · Maintain a healthy weight.  General instructions  · Take over-the-counter and prescription medicines only as told by your health care provider.  · Share your diabetes management plan with people in your workplace, school, and household.  · Carry a medical alert card or wear medical alert jewelry.  · Keep all follow-up visits as told by your health care provider. This is important.  Questions to ask  your health care provider  · Do I need to meet with a diabetes educator?  · Where can I find a support group for people with diabetes?  Where to find more information  For more information about diabetes, visit:  · American Diabetes Association (ADA): www.diabetes.org  · American Association of Diabetes Educators (AADE): www.diabeteseducator.org    Summary  · Caring for yourself after you have been diagnosed with (type 2 diabetes mellitus) means keeping your blood sugar (glucose) under control with a balance of nutrition, exercise, lifestyle changes, and medicine.  · Check your blood glucose every day, as often as told by your health care provider.  · Having diabetes can put you at risk for other long-term (chronic) conditions, such as heart disease and kidney disease. Your health care provider may prescribe medicines to help prevent complications from diabetes.  · Keep all follow-up visits as told by your health care provider. This is important.  This information is not intended to replace advice given to you by your health care provider. Make sure you discuss any questions you have with your health care provider.  Document Released: 04/10/2017 Document Revised: 07/20/2018 Document Reviewed: 01/20/2017  [employer] Interactive Patient Education © 2019 [employer]

## 2019-04-13 VITALS
HEIGHT: 64 IN | RESPIRATION RATE: 14 BRPM | TEMPERATURE: 97.7 F | BODY MASS INDEX: 37.56 KG/M2 | SYSTOLIC BLOOD PRESSURE: 122 MMHG | DIASTOLIC BLOOD PRESSURE: 70 MMHG | HEART RATE: 88 BPM | OXYGEN SATURATION: 97 % | WEIGHT: 220 LBS

## 2019-04-13 NOTE — PROGRESS NOTES
History of Present Illness   Nivia presents to the clinic today  for follow up  in regards to her DM, type 2. Lilo reports her glycemic control has improved significantly since she has been on the Toujeo. Unfortunately, her insurance denied an insulin pump at this time.  In addition, she has a H/O TIA with associated memory loss, HTN, Dyslipidemia.         Diabetes   She presents for follow up diabetes visit. She has type 2 diabetes mellitus. No MedicAlert identification noted. The initial diagnosis of diabetes was made 20 years ago. Her disease course has been improving at this time.  Associated symptoms include blurred vision, fatigue, foot paresthesias, polydipsia, polyuria and visual change. Pertinent negatives for diabetes include no foot ulcerations and no polyphagia. Pertinent negatives for hypoglycemia complications include no required glucagon injection. Diabetic complications include peripheral neuropathy. Risk factors for coronary artery disease include post-menopausal, stress and dyslipidemia. Current diabetic treatment includes multiple daily insulin injections.She has tried multiple oral agents including GLP-1 Agonists and Invokana without successful glycemic control  She monitors her blood sugars at least four to five times daily.  She is compliant with treatment all of the time. She is following a generally healthy diet. She has had a previous visit with a dietitian. She participates in exercise at this time but finding it more difficult due to her Neuropathy and she is unable to get her Lyrica refilled. She walks 3 miles on a regular basis. .Her home blood glucose trend is decreasing steadily with ranges from above 150  to 200's with the highest 329  mg/dL  An ACE inhibitor/angiotensin II receptor blocker is being taken. Eye exam is current.   Refer to ROS for additional information.  Vitals:    04/10/19 1332   BP: 122/70   Pulse: 88   Resp: 14   Temp: 97.7 °F (36.5 °C)   TempSrc: Temporal  "  SpO2: 97%   Weight: 99.8 kg (220 lb)   Height: 162.6 cm (64\")      The following portions of the patient's history were reviewed and updated as appropriate: allergies, current medications, past family history, past medical history, past social history, past surgical history and problem list.    Review of Systems   Constitutional: Negative for activity change, appetite change, chills, fatigue, fever and unexpected weight change.   HENT: Negative.    Eyes: Positive for visual disturbance (With elevated blood sugars).   Respiratory: Negative for cough, chest tightness, shortness of breath and wheezing.    Cardiovascular: Positive for leg swelling. Negative for chest pain and palpitations.   Gastrointestinal: Negative for abdominal pain, constipation, diarrhea, nausea and vomiting.   Endocrine: Positive for polydipsia (Improving) and polyuria (Improving). Negative for cold intolerance, heat intolerance and polyphagia.   Musculoskeletal: Positive for arthralgias and gait problem (Due to painful neuropathy).   Skin: Negative for color change and rash.   Neurological: Positive for numbness. Negative for dizziness, tremors, speech difficulty, weakness, light-headedness and headaches.   Hematological: Negative for adenopathy. Bruises/bleeds easily.   Psychiatric/Behavioral: Negative for confusion, decreased concentration and suicidal ideas. The patient is not nervous/anxious.    All other systems reviewed and are negative.    Physical Exam   Constitutional: She is oriented to person, place, and time. She appears well-developed and well-nourished. No distress.   HENT:   Head: Normocephalic.   Nose: Nose normal.   Mouth/Throat: Oropharynx is clear and moist.   Eyes: Conjunctivae are normal. Pupils are equal, round, and reactive to light. Right eye exhibits no discharge. Left eye exhibits no discharge. No scleral icterus.   Neck: Neck supple. No JVD present. No thyromegaly present.   Cardiovascular: Normal rate, regular " "rhythm and normal heart sounds. Exam reveals no friction rub.   No murmur heard.  Pulmonary/Chest: Effort normal and breath sounds normal. No respiratory distress. She has no wheezes. She has no rales.   Abdominal: Soft. There is no tenderness. There is no rebound and no guarding.   Musculoskeletal: She exhibits no edema or tenderness.    Nivia had a diabetic foot exam performed today.  Vascular Status -  Her right foot exhibits normal foot vasculature  and no edema. Her left foot exhibits normal foot vasculature  and no edema.  Skin Integrity  -  Her right foot skin is intact.Her left foot skin is intact..     Lymphadenopathy:     She has no cervical adenopathy.   Neurological: She is alert and oriented to person, place, and time.   Skin: Skin is warm and dry. Capillary refill takes less than 2 seconds. No rash noted. No erythema.   Psychiatric: She has a normal mood and affect. Her behavior is normal. Judgment and thought content normal.   Vitals reviewed.    Assessment/Plan   Problems Addressed this Visit        Cardiovascular and Mediastinum    Hyperlipidemia (Chronic)       Digestive    Obesity       Endocrine    Uncontrolled type 2 diabetes with neuropathy (CMS/HCC) - Primary       Other    Anxiety and depression (Chronic)      Other Visit Diagnoses     Foot pain, left        Standing left foot xray ordered    Relevant Orders    XR Foot 3+ View Left        Findings and recommendations reviewed with Nivia. She will continue her current insulin regimen and encouraged her to continue her lifestyle modifications. An order for a left foot xray was provided to her. She has not taken Lyrica in over a month and at this time would like to consider an alternative to it. Discussed changing to Cymbalta because Gabapentin \"did not work for me\". She will f/u with me in May sooner if problems/concerns occur.                     "

## 2019-04-17 ENCOUNTER — TELEPHONE (OUTPATIENT)
Dept: FAMILY MEDICINE CLINIC | Facility: CLINIC | Age: 44
End: 2019-04-17

## 2019-04-17 DIAGNOSIS — IMO0002 UNCONTROLLED TYPE 2 DIABETES WITH NEUROPATHY: Primary | ICD-10-CM

## 2019-04-17 RX ORDER — DULOXETIN HYDROCHLORIDE 30 MG/1
30 CAPSULE, DELAYED RELEASE ORAL 2 TIMES DAILY
Qty: 60 CAPSULE | Refills: 1 | Status: SHIPPED | OUTPATIENT
Start: 2019-04-17 | End: 2019-05-22 | Stop reason: SDUPTHER

## 2019-04-17 NOTE — TELEPHONE ENCOUNTER
----- Message from NIDA Betancourt sent at 4/17/2019 11:02 AM EDT -----  Regarding: RE: Xray results and Medication option  I will send Cymbalta 60 mg in for her...  ----- Message -----  From: Anne Paz MA  Sent: 4/17/2019  10:45 AM  To: NIDA Betancourt  Subject: RE: Xray results and Medication option           Patient is aware of this information and stated that the Cymbalta was okay with her.   ----- Message -----  From: Josh Guerin APRN  Sent: 4/16/2019   5:49 PM  To: Anne Paz MA  Subject: Xray results and Medication option               Please let Nivia know that her xray showed Osteoarthritis in her foot. Also, the recommendation I got for the medication change was to change the Celexa to Cymbalta 60 mg. If she would like to try this then I can send a prescription in for her..

## 2019-05-22 ENCOUNTER — OFFICE VISIT (OUTPATIENT)
Dept: FAMILY MEDICINE CLINIC | Facility: CLINIC | Age: 44
End: 2019-05-22

## 2019-05-22 VITALS
HEART RATE: 106 BPM | TEMPERATURE: 98.5 F | BODY MASS INDEX: 38.41 KG/M2 | HEIGHT: 64 IN | OXYGEN SATURATION: 97 % | SYSTOLIC BLOOD PRESSURE: 126 MMHG | WEIGHT: 225 LBS | DIASTOLIC BLOOD PRESSURE: 70 MMHG

## 2019-05-22 DIAGNOSIS — E78.5 HYPERLIPIDEMIA, UNSPECIFIED HYPERLIPIDEMIA TYPE: Chronic | ICD-10-CM

## 2019-05-22 DIAGNOSIS — E66.01 CLASS 2 SEVERE OBESITY WITH SERIOUS COMORBIDITY AND BODY MASS INDEX (BMI) OF 37.0 TO 37.9 IN ADULT, UNSPECIFIED OBESITY TYPE (HCC): ICD-10-CM

## 2019-05-22 DIAGNOSIS — IMO0002 UNCONTROLLED TYPE 2 DIABETES WITH NEUROPATHY: Primary | Chronic | ICD-10-CM

## 2019-05-22 PROCEDURE — 99214 OFFICE O/P EST MOD 30 MIN: CPT | Performed by: NURSE PRACTITIONER

## 2019-05-22 RX ORDER — DULOXETIN HYDROCHLORIDE 30 MG/1
30 CAPSULE, DELAYED RELEASE ORAL 2 TIMES DAILY
Qty: 60 CAPSULE | Refills: 2 | Status: SHIPPED | OUTPATIENT
Start: 2019-05-22 | End: 2019-09-18 | Stop reason: SDUPTHER

## 2019-05-22 NOTE — PATIENT INSTRUCTIONS
Hypoglycemia  Hypoglycemia occurs when the level of sugar (glucose) in the blood is too low. Hypoglycemia can happen in people who do or do not have diabetes. It can develop quickly, and it can be a medical emergency. For most people with diabetes, a blood glucose level below 70 mg/dL (3.9 mmol/L) is considered hypoglycemia.  Glucose is a type of sugar that provides the body's main source of energy. Certain hormones (insulin and glucagon) control the level of glucose in the blood. Insulin lowers blood glucose, and glucagon raises blood glucose. Hypoglycemia can result from having too much insulin in the bloodstream, or from not eating enough food that contains glucose. You may also have reactive hypoglycemia, which happens within 4 hours after eating a meal.  What are the causes?  Hypoglycemia occurs most often in people who have diabetes and may be caused by:  · Diabetes medicine.  · Not eating enough, or not eating often enough.  · Increased physical activity.  · Drinking alcohol on an empty stomach.    If you do not have diabetes, hypoglycemia may be caused by:  · A tumor in the pancreas.  · Not eating enough, or not eating for long periods at a time (fasting).  · A severe infection or illness.  · Certain medicines.    What increases the risk?  Hypoglycemia is more likely to develop in:  · People who have diabetes and take medicines to lower blood glucose.  · People who abuse alcohol.  · People who have a severe illness.    What are the signs or symptoms?  Mild symptoms  Mild hypoglycemia may not cause any symptoms. If you do have symptoms, they may include:  · Hunger.  · Anxiety.  · Sweating and feeling clammy.  · Dizziness or feeling light-headed.  · Sleepiness.  · Nausea.  · Increased heart rate.  · Headache.  · Blurry vision.  · Irritability.  · Tingling or numbness around the mouth, lips, or tongue.  · A change in coordination.  · Restless sleep.    Moderate symptoms  Moderate hypoglycemia can  cause:  · Mental confusion and poor judgment.  · Behavior changes.  · Weakness.  · Irregular heartbeat.    Severe symptoms  Severe hypoglycemia is a medical emergency. It can cause:  · Fainting.  · Seizures.  · Loss of consciousness (coma).  · Death.    How is this diagnosed?  Hypoglycemia is diagnosed with a blood test to measure your blood glucose level. This blood test is done while you are having symptoms. Your health care provider may also do a physical exam and review your medical history.  How is this treated?  This condition can often be treated by immediately eating or drinking something that contains sugar, such as:  · Fruit juice, 4 oz (120 mL).  · Regular soda (not diet soda), 4 oz (120 mL).  · Low-fat milk, 4 oz (120 mL).  · Several pieces of hard candy.  · Sugar or honey, 1 Tbsp.    Treating hypoglycemia if you have diabetes  If you are alert and able to swallow safely, follow the 15:15 rule:  · Take 15 grams of a rapid-acting carbohydrate. Rapid-acting options include:  ? 1 tube of glucose gel.  ? 3 glucose pills.  ? 6-8 pieces of hard candy.  ? 4 oz (120 mL) of fruit juice.  ? 4 oz (120 ml) of regular (not diet) soda.  · Check your blood glucose 15 minutes after you take the carbohydrate.  · If the repeat blood glucose level is still at or below 70 mg/dL (3.9 mmol/L), take 15 grams of a carbohydrate again.  · If your blood glucose level does not increase above 70 mg/dL (3.9 mmol/L) after 3 tries, seek emergency medical care.  · After your blood glucose level returns to normal, eat a meal or a snack within 1 hour.    Treating severe hypoglycemia  Severe hypoglycemia is when your blood glucose level is at or below 54 mg/dL (3 mmol/L). Severe hypoglycemia is a medical emergency. Get medical help right away.  If you have severe hypoglycemia and you cannot eat or drink, you may need an injection of glucagon. A family member or close friend should learn how to check your blood glucose and how to give you a  glucagon injection. Ask your health care provider if you need to have an emergency glucagon injection kit available.  Severe hypoglycemia may need to be treated in a hospital. The treatment may include getting glucose through an IV. You may also need treatment for the cause of your hypoglycemia.  Follow these instructions at home:  General instructions  · Take over-the-counter and prescription medicines only as told by your health care provider.  · Monitor your blood glucose as told by your health care provider.  · Limit alcohol intake to no more than 1 drink a day for nonpregnant women and 2 drinks a day for men. One drink equals 12 oz of beer, 5 oz of wine, or 1½ oz of hard liquor.  · Keep all follow-up visits as told by your health care provider. This is important.  If you have diabetes:    · Always have a rapid-acting carbohydrate snack with you to treat low blood glucose.  · Follow your diabetes management plan as directed. Make sure you:  ? Know the symptoms of hypoglycemia. It is important to treat it right away to prevent it from becoming severe.  ? Take your medicines as directed.  ? Follow your exercise plan.  ? Follow your meal plan. Eat on time, and do not skip meals.  ? Check your blood glucose as often as directed. Always check before and after exercise.  ? Follow your sick day plan whenever you cannot eat or drink normally. Make this plan in advance with your health care provider.  · Share your diabetes management plan with people in your workplace, school, and household.  · Check your urine for ketones when you are ill and as told by your health care provider.  · Carry a medical alert card or wear medical alert jewelry.  Contact a health care provider if:  · You have problems keeping your blood glucose in your target range.  · You have frequent episodes of hypoglycemia.  Get help right away if:  · You continue to have hypoglycemia symptoms after eating or drinking something containing  glucose.  · Your blood glucose is at or below 54 mg/dL (3 mmol/L).  · You have a seizure.  · You faint.  These symptoms may represent a serious problem that is an emergency. Do not wait to see if the symptoms will go away. Get medical help right away. Call your local emergency services (911 in the [place]  Summary  · Hypoglycemia occurs when the level of sugar (glucose) in the blood is too low.  · Hypoglycemia can happen in people who do or do not have diabetes. It can develop quickly, and it can be a medical emergency.  · Make sure you know the symptoms of hypoglycemia and how to treat it.  · Always have a rapid-acting carbohydrate snack with you to treat low blood sugar.  This information is not intended to replace advice given to you by your health care provider. Make sure you discuss any questions you have with your health care provider.  Document Released: 12/18/2006 Document Revised: 07/18/2018 Document Reviewed: 01/20/2017  [employer] Interactive Patient Education © 2019 [employer]

## 2019-05-22 NOTE — PROGRESS NOTES
History of Present Illness   Nivia presents to the clinic today  for follow up  in regards to her DM, type 2. Lilo reports her glycemic control has improved significantly since she has been on the Toujeo. She does bring in lab results which she had done on May 8th, 2019. In addition, she has a H/O TIA with associated memory loss, Dyslipidemia.      Diabetes   She presents for follow up diabetes visit. She has type 2 diabetes mellitus. No MedicAlert identification noted. The initial diagnosis of diabetes was made 20 years ago. Her disease course has been improving at this time.  Associated symptoms include blurred vision, fatigue, foot paresthesias, polydipsia, polyuria and visual change. Pertinent negatives for diabetes include no foot ulcerations and no polyphagia. Pertinent negatives for hypoglycemia complications include no required glucagon injection. She does report she has had several hypoglycemic episodes with the lowest in the 50's. She generally treats with orange juice and peanut butter crackers.   Diabetic complications include peripheral neuropathy.She was changed to Cymbalta which she reports is helping although she does have breakthrough pain. She has recently quit Neurontin due to weight gain. Risk factors for coronary artery disease include post-menopausal, stress and dyslipidemia. Current diabetic treatment includes multiple daily insulin injections.She has tried multiple oral agents including GLP-1 Agonists and Invokana without successful glycemic control  She monitors her blood sugars at least four to five times daily.  She is compliant with treatment all of the time. She is following a generally healthy diet. She has had a previous visit with a dietitian. She participates in exercise at this time but finding it more difficult due to her Neuropathy. She walks 3 miles on a regular basis. Her home blood glucose trend is decreasing steadily  An ACE inhibitor/angiotensin II receptor blocker is  "not  being taken at this time. Eye exam is current.   Refer to ROS for additional information.    Vitals:    05/22/19 0919   BP: 126/70   Pulse: 106   Temp: 98.5 °F (36.9 °C)   TempSrc: Temporal   SpO2: 97%   Weight: 102 kg (225 lb)   Height: 162.6 cm (64\")      The following portions of the patient's history were reviewed and updated as appropriate: allergies, current medications, past family history, past medical history, past social history, past surgical history and problem list.    Review of Systems   Constitutional: Negative for activity change, appetite change, chills, fatigue (Improving), fever and unexpected weight change.   HENT: Negative.    Eyes: Positive for visual disturbance (Due to see Ophthalmologist).   Respiratory: Negative for cough, chest tightness, shortness of breath and wheezing.    Cardiovascular: Negative for chest pain, palpitations and leg swelling.   Gastrointestinal: Negative for abdominal pain, nausea and vomiting.   Endocrine: Positive for heat intolerance. Negative for cold intolerance, polydipsia, polyphagia and polyuria.   Musculoskeletal: Positive for arthralgias.   Skin: Negative for color change and rash.   Neurological: Positive for numbness. Negative for dizziness, tremors, speech difficulty, weakness, light-headedness and headaches.   Hematological: Negative for adenopathy.   Psychiatric/Behavioral: Positive for confusion (Improving). Negative for decreased concentration and suicidal ideas. The patient is not nervous/anxious.    All other systems reviewed and are negative.    Physical Exam   Constitutional: She is oriented to person, place, and time. She appears well-developed and well-nourished. No distress.   HENT:   Head: Normocephalic.   Nose: Nose normal.   Mouth/Throat: Oropharynx is clear and moist.   Eyes: Conjunctivae are normal. Pupils are equal, round, and reactive to light. Right eye exhibits no discharge. Left eye exhibits no discharge. No scleral icterus. "   Neck: Neck supple. No JVD present. No thyromegaly present.   Cardiovascular: Normal rate, regular rhythm and normal heart sounds. Exam reveals no friction rub.   No murmur heard.  Pulmonary/Chest: Effort normal and breath sounds normal. No respiratory distress. She has no wheezes. She has no rales.   Abdominal: Soft. She exhibits no distension. There is no tenderness. There is no rebound and no guarding.   Musculoskeletal: She exhibits no edema.   Lymphadenopathy:     She has no cervical adenopathy.   Neurological: She is alert and oriented to person, place, and time.   Skin: Skin is warm and dry. Capillary refill takes less than 2 seconds. No rash noted. No erythema.   Psychiatric: She has a normal mood and affect. Her behavior is normal. Judgment and thought content normal.   Vitals reviewed.    Assessment/Plan   Problems Addressed this Visit        Cardiovascular and Mediastinum    Hyperlipidemia (Chronic)       Digestive    Obesity       Endocrine    Uncontrolled type 2 diabetes with neuropathy (CMS/HCC) - Primary    Relevant Medications    DULoxetine (CYMBALTA) 30 MG capsule    glucagon (GLUCAGEN) 1 MG injection    Other Relevant Orders    Comprehensive Metabolic Panel    C-Peptide        Findings and recommendations discussed with Nivia. Reviewed the lab results which were completed on May 8th and will scanned into the chart. Marked improvement of her glycemic control with an A1C of 9 which is an improvement of 3%. EAV of 212 mg/dL.  Praise and encouragement given. Lipid panel reviewed. Reinforced lifestyle modifications including nutrition and activity. She is concern about her lack of weight loss.Strategies discussed including keeping a daily food journal to identify areas which could be impacting her weight. She will f/u with me on 6/19/2019. She is to have a repeat CMP and C Peptide prior to her appt.

## 2019-06-11 ENCOUNTER — LAB (OUTPATIENT)
Dept: FAMILY MEDICINE CLINIC | Facility: CLINIC | Age: 44
End: 2019-06-11

## 2019-06-11 ENCOUNTER — TELEPHONE (OUTPATIENT)
Dept: FAMILY MEDICINE CLINIC | Facility: CLINIC | Age: 44
End: 2019-06-11

## 2019-06-11 DIAGNOSIS — IMO0002 UNCONTROLLED TYPE 2 DIABETES WITH NEUROPATHY: Chronic | ICD-10-CM

## 2019-06-11 NOTE — TELEPHONE ENCOUNTER
[facility] is aware of this information.   ----- Message from NIDA Betancourt sent at 6/10/2019  8:55 PM EDT -----  That is okay. Let me know if I need to send it in for her..  ----- Message -----  From: Anne Paz MA  Sent: 6/10/2019  11:46 AM  To: NIDA Betancourt is not covered. They want to try admelog.

## 2019-06-12 ENCOUNTER — RESULTS ENCOUNTER (OUTPATIENT)
Dept: FAMILY MEDICINE CLINIC | Facility: CLINIC | Age: 44
End: 2019-06-12

## 2019-06-12 DIAGNOSIS — IMO0002 UNCONTROLLED TYPE 2 DIABETES WITH NEUROPATHY: Chronic | ICD-10-CM

## 2019-06-12 LAB
ALBUMIN SERPL-MCNC: 4.4 G/DL (ref 3.5–5.2)
ALBUMIN/GLOB SERPL: 1.5 G/DL
ALP SERPL-CCNC: 131 U/L (ref 39–117)
ALT SERPL-CCNC: 30 U/L (ref 1–33)
AST SERPL-CCNC: 12 U/L (ref 1–32)
BILIRUB SERPL-MCNC: 0.4 MG/DL (ref 0.2–1.2)
BUN SERPL-MCNC: 10 MG/DL (ref 6–20)
BUN/CREAT SERPL: 14.5 (ref 7–25)
C PEPTIDE SERPL-MCNC: 3.9 NG/ML (ref 1.1–4.4)
CALCIUM SERPL-MCNC: 9.4 MG/DL (ref 8.6–10.5)
CHLORIDE SERPL-SCNC: 101 MMOL/L (ref 98–107)
CO2 SERPL-SCNC: 28.6 MMOL/L (ref 22–29)
CREAT SERPL-MCNC: 0.69 MG/DL (ref 0.57–1)
GLOBULIN SER CALC-MCNC: 3 GM/DL
GLUCOSE SERPL-MCNC: 200 MG/DL (ref 65–99)
POTASSIUM SERPL-SCNC: 4.3 MMOL/L (ref 3.5–5.2)
PROT SERPL-MCNC: 7.4 G/DL (ref 6–8.5)
SODIUM SERPL-SCNC: 141 MMOL/L (ref 136–145)

## 2019-06-19 ENCOUNTER — OFFICE VISIT (OUTPATIENT)
Dept: FAMILY MEDICINE CLINIC | Facility: CLINIC | Age: 44
End: 2019-06-19

## 2019-06-19 VITALS
OXYGEN SATURATION: 97 % | TEMPERATURE: 98.1 F | WEIGHT: 224 LBS | HEIGHT: 64 IN | HEART RATE: 104 BPM | BODY MASS INDEX: 38.24 KG/M2 | DIASTOLIC BLOOD PRESSURE: 70 MMHG | SYSTOLIC BLOOD PRESSURE: 105 MMHG

## 2019-06-19 DIAGNOSIS — Z23 ENCOUNTER FOR IMMUNIZATION: ICD-10-CM

## 2019-06-19 DIAGNOSIS — E78.5 HYPERLIPIDEMIA, UNSPECIFIED HYPERLIPIDEMIA TYPE: Chronic | ICD-10-CM

## 2019-06-19 DIAGNOSIS — IMO0002 UNCONTROLLED TYPE 2 DIABETES WITH NEUROPATHY: Primary | Chronic | ICD-10-CM

## 2019-06-19 DIAGNOSIS — E66.01 CLASS 2 SEVERE OBESITY WITH SERIOUS COMORBIDITY AND BODY MASS INDEX (BMI) OF 37.0 TO 37.9 IN ADULT, UNSPECIFIED OBESITY TYPE (HCC): ICD-10-CM

## 2019-06-19 DIAGNOSIS — F32.A ANXIETY AND DEPRESSION: Chronic | ICD-10-CM

## 2019-06-19 DIAGNOSIS — F41.9 ANXIETY AND DEPRESSION: Chronic | ICD-10-CM

## 2019-06-19 PROCEDURE — 90715 TDAP VACCINE 7 YRS/> IM: CPT | Performed by: NURSE PRACTITIONER

## 2019-06-19 PROCEDURE — 90471 IMMUNIZATION ADMIN: CPT | Performed by: NURSE PRACTITIONER

## 2019-06-19 PROCEDURE — 99214 OFFICE O/P EST MOD 30 MIN: CPT | Performed by: NURSE PRACTITIONER

## 2019-06-19 NOTE — PATIENT INSTRUCTIONS
Living With Depression  Everyone experiences occasional disappointment, sadness, and loss in their lives. When you are feeling down, blue, or sad for at least 2 weeks in a row, it may mean that you have depression. Depression can affect your thoughts and feelings, relationships, daily activities, and physical health. It is caused by changes in the way your brain functions. If you receive a diagnosis of depression, your health care provider will tell you which type of depression you have and what treatment options are available to you.  If you are living with depression, there are ways to help you recover from it and also ways to prevent it from coming back.  How to cope with lifestyle changes  Coping with stress  Stress is your body’s reaction to life changes and events, both good and bad. Stressful situations may include:  · Getting .  · The death of a spouse.  · Losing a job.  · Retiring.  · Having a baby.    Stress can last just a few hours or it can be ongoing. Stress can play a major role in depression, so it is important to learn both how to cope with stress and how to think about it differently.  Talk with your health care provider or a counselor if you would like to learn more about stress reduction. He or she may suggest some stress reduction techniques, such as:  · Music therapy. This can include creating music or listening to music. Choose music that you enjoy and that inspires you.  · Mindfulness-based meditation. This kind of meditation can be done while sitting or walking. It involves being aware of your normal breaths, rather than trying to control your breathing.  · Centering prayer. This is a kind of meditation that involves focusing on a spiritual word or phrase. Choose a word, phrase, or sacred image that is meaningful to you and that brings you peace.  · Deep breathing. To do this, expand your stomach and inhale slowly through your nose. Hold your breath for 3-5 seconds, then exhale  slowly, allowing your stomach muscles to relax.  · Muscle relaxation. This involves intentionally tensing muscles then relaxing them.    Choose a stress reduction technique that fits your lifestyle and personality. Stress reduction techniques take time and practice to develop. Set aside 5-15 minutes a day to do them. Therapists can offer training in these techniques. The training may be covered by some insurance plans. Other things you can do to manage stress include:  · Keeping a stress diary. This can help you learn what triggers your stress and ways to control your response.  · Understanding what your limits are and saying no to requests or events that lead to a schedule that is too full.  · Thinking about how you respond to certain situations. You may not be able to control everything, but you can control how you react.  · Adding humor to your life by watching funny films or TV shows.  · Making time for activities that help you relax and not feeling guilty about spending your time this way.    Medicines  Your health care provider may suggest certain medicines if he or she feels that they will help improve your condition. Avoid using alcohol and other substances that may prevent your medicines from working properly (may interact). It is also important to:  · Talk with your pharmacist or health care provider about all the medicines that you take, their possible side effects, and what medicines are safe to take together.  · Make it your goal to take part in all treatment decisions (shared decision-making). This includes giving input on the side effects of medicines. It is best if shared decision-making with your health care provider is part of your total treatment plan.    If your health care provider prescribes a medicine, you may not notice the full benefits of it for 4-8 weeks. Most people who are treated for depression need to be on medicine for at least 6-12 months after they feel better. If you are taking  medicines as part of your treatment, do not stop taking medicines without first talking to your health care provider. You may need to have the medicine slowly decreased (tapered) over time to decrease the risk of harmful side effects.  Relationships  Your health care provider may suggest family therapy along with individual therapy and drug therapy. While there may not be family problems that are causing you to feel depressed, it is still important to make sure your family learns as much as they can about your mental health. Having your family’s support can help make your treatment successful.  How to recognize changes in your condition  Everyone has a different response to treatment for depression. Recovery from major depression happens when you have not had signs of major depression for two months. This may mean that you will start to:  · Have more interest in doing activities.  · Feel less hopeless than you did 2 months ago.  · Have more energy.  · Overeat less often, or have better or improving appetite.  · Have better concentration.    Your health care provider will work with you to decide the next steps in your recovery. It is also important to recognize when your condition is getting worse. Watch for these signs:  · Having fatigue or low energy.  · Eating too much or too little.  · Sleeping too much or too little.  · Feeling restless, agitated, or hopeless.  · Having trouble concentrating or making decisions.  · Having unexplained physical complaints.  · Feeling irritable, angry, or aggressive.    Get help as soon as you or your family members notice these symptoms coming back.  How to get support and help from others  How to talk with friends and family members about your condition  Talking to friends and family members about your condition can provide you with one way to get support and guidance. Reach out to trusted friends or family members, explain your symptoms to them, and let them know that you are  working with a health care provider to treat your depression.  Financial resources  Not all insurance plans cover mental health care, so it is important to check with your insurance carrier. If paying for co-pays or counseling services is a problem, search for a local or [facility] mental health care center. They may be able to offer public mental health care services at low or no cost when you are not able to see a private health care provider.  If you are taking medicine for depression, you may be able to get the generic form, which may be less expensive. Some makers of prescription medicines also offer help to patients who cannot afford the medicines they need.  Follow these instructions at home:  · Get the right amount and quality of sleep.  · Cut down on using caffeine, tobacco, alcohol, and other potentially harmful substances.  · Try to exercise, such as walking or lifting small weights.  · Take over-the-counter and prescription medicines only as told by your health care provider.  · Eat a healthy diet that includes plenty of vegetables, fruits, whole grains, low-fat dairy products, and lean protein. Do not eat a lot of foods that are high in solid fats, added sugars, or salt.  · Keep all follow-up visits as told by your health care provider. This is important.  Contact a health care provider if:  · You stop taking your antidepressant medicines, and you have any of these symptoms:  ? Nausea.  ? Headache.  ? Feeling lightheaded.  ? Chills and body aches.  ? Not being able to sleep (insomnia).  · You or your friends and family think your depression is getting worse.  Get help right away if:  · You have thoughts of hurting yourself or others.  If you ever feel like you may hurt yourself or others, or have thoughts about taking your own life, get help right away. You can go to your nearest emergency department or call:  · Your local emergency services (911 in the [place]  · A suicide crisis helpline, such as the  National Suicide Prevention Lifeline at 1-461.829.5996. This is open 24-hours a day.    Summary  · If you are living with depression, there are ways to help you recover from it and also ways to prevent it from coming back.  · Work with your health care team to create a management plan that includes counseling, stress management techniques, and healthy lifestyle habits.  This information is not intended to replace advice given to you by your health care provider. Make sure you discuss any questions you have with your health care provider.  Document Released: 11/20/2017 Document Revised: 11/20/2017 Document Reviewed: 11/20/2017  [employer] Patient Education © 2019 [employer]  Type 2 Diabetes Mellitus, Self Care, Adult  Caring for yourself after you have been diagnosed with type 2 diabetes (type 2 diabetes mellitus) means keeping your blood sugar (glucose) under control with a balance of:  · Nutrition.  · Exercise.  · Lifestyle changes.  · Medicines or insulin, if necessary.  · Support from your team of health care providers and others.    The following information explains what you need to know to manage your diabetes at home.  What are the risks?  Having diabetes can put you at risk for other long-term (chronic) conditions, such as heart disease and kidney disease. Your health care provider may prescribe medicines to help prevent complications from diabetes. These medicines may include:  · Aspirin.  · Medicine to lower cholesterol.  · Medicine to control blood pressure.    How to monitor blood glucose  · Check your blood glucose every day, as often as told by your health care provider.  · Have your A1c (hemoglobin A1c) level checked two or more times a year, or as often as told by your health care provider.  Your health care provider will set individualized treatment goals for you. Generally, the goal of treatment is to maintain the following blood glucose levels:  · Before meals (preprandial):   mg/dL (4.4-7.2 mmol/L).  · After meals (postprandial): below 180 mg/dL (10 mmol/L).  · A1c level: less than 7%.    How to manage hyperglycemia and hypoglycemia  Hyperglycemia symptoms  Hyperglycemia, also called high blood glucose, occurs when blood glucose is too high. Make sure you know the early signs of hyperglycemia, such as:  · Increased thirst.  · Hunger.  · Feeling very tired.  · Needing to urinate more often than usual.  · Blurry vision.    Hypoglycemia symptoms  Hypoglycemia, also called low blood glucose, occurs with a blood glucose level at or below 70 mg/dL (3.9 mmol/L). The risk for hypoglycemia increases during or after exercise, during sleep, during illness, and when skipping meals or not eating for a long time (fasting).  It is important to know the symptoms of hypoglycemia and treat it right away. Always have a 15-gram rapid-acting carbohydrate snack with you to treat low blood glucose. Family members and close friends should also know the symptoms and should understand how to treat hypoglycemia, in case you are not able to treat yourself. Symptoms may include:  · Hunger.  · Anxiety.  · Sweating and feeling clammy.  · Confusion.  · Dizziness or feeling light-headed.  · Sleepiness.  · Nausea.  · Increased heart rate.  · Headache.  · Blurry vision.  · Irritability.  · A change in coordination.  · Tingling or numbness around the mouth, lips, or tongue.  · Restless sleep.  · Fainting.  · Seizure.    Treating hypoglycemia  If you are alert and able to swallow safely, follow the 15:15 rule:  · Take 15 grams of a rapid-acting carbohydrate. Rapid-acting options include:  ? 1 tube of glucose gel.  ? 3 glucose pills.  ? 6-8 pieces of hard candy.  ? 4 oz (120 mL) of fruit juice.  ? 4 oz (120 mL) of regular (not diet) soda.  · Check your blood glucose 15 minutes after you take the carbohydrate.  · If the repeat blood glucose level is still at or below 70 mg/dL (3.9 mmol/L), take 15 grams of a carbohydrate  again.  · If your blood glucose level does not increase above 70 mg/dL (3.9 mmol/L) after 3 tries, seek emergency medical care.  · After your blood glucose level returns to normal, eat a meal or a snack within 1 hour.    Treating severe hypoglycemia  Severe hypoglycemia is when your blood glucose level is at or below 54 mg/dL (3 mmol/L). Severe hypoglycemia is an emergency. Do not wait to see if the symptoms will go away. Get medical help right away. Call your local emergency services (911 in the [place]  If you have severe hypoglycemia and you cannot eat or drink, you may need an injection of glucagon. A family member or close friend should learn how to check your blood glucose and how to give you a glucagon injection. Ask your health care provider if you need to have an emergency glucagon injection kit available.  Severe hypoglycemia may need to be treated in a hospital. The treatment may include getting glucose through an IV. You may also need treatment for the cause of your hypoglycemia.  Follow these instructions at home:  Take diabetes medicines as told  · If your health care provider prescribed insulin or diabetes medicines, take them every day.  · Do not run out of insulin or other diabetes medicines that you take. Plan ahead so you always have these available.  · If you use insulin, adjust your dosage based on how physically active you are and what foods you eat. Your health care provider will tell you how to adjust your dosage.  Make healthy food choices  The things that you eat and drink affect your blood glucose and your insulin dosage. Making good choices helps to control your diabetes and prevent other health problems. A healthy meal plan includes eating lean proteins, complex carbohydrates, fresh fruits and vegetables, low-fat dairy products, and healthy fats.  Make an appointment to see a diet and nutrition specialist (registered dietitian) to help you create an eating plan that is right for you. Make  sure that you:  · Follow instructions from your health care provider about eating or drinking restrictions.  · Drink enough fluid to keep your urine pale yellow.  · Keep a record of the carbohydrates that you eat. Do this by reading food labels and learning the standard serving sizes of foods.  · Follow your sick day plan whenever you cannot eat or drink as usual. Make this plan in advance with your health care provider.    Stay active  Exercise regularly, as told by your health care provider. This may include:  · Stretching and doing strength exercises, such as yoga or weightlifting, 2 or more times a week.  · Doing 150 minutes or more of moderate-intensity or vigorous-intensity exercise each week. This could be brisk walking, biking, or water aerobics.  ? Spread out your activity over 3 or more days of the week.  ? Do not go more than 2 days in a row without doing some kind of physical activity.    When you start a new exercise or activity, work with your health care provider to adjust your insulin, medicines, or food intake as needed.  Make healthy lifestyle choices  · Do not use any tobacco products, such as cigarettes, chewing tobacco, and e-cigarettes. If you need help quitting, ask your health care provider.  · If your health care provider says that alcohol is safe for you, limit alcohol intake to no more than 1 drink per day for nonpregnant women and 2 drinks per day for men. One drink equals 12 oz of beer, 5 oz of wine, or 1½ oz of hard liquor.  · Learn to manage stress. If you need help with this, ask your health care provider.  Care for your body  · Keep your immunizations up to date. In addition to getting vaccinations as told by your health care provider, it is recommended that you get vaccinated against the following illnesses:  ? The flu (influenza). Get a flu shot every year.  ? Pneumonia.  ? Hepatitis B.  · Schedule an eye exam soon after your diagnosis, and then one time every year after  that.  · Check your skin and feet every day for cuts, bruises, redness, blisters, or sores. Schedule a foot exam with your health care provider once every year.  · Brush your teeth and gums two times a day, and floss one or more times a day. Visit your dentist one or more times every 6 months.  · Maintain a healthy weight.  General instructions  · Take over-the-counter and prescription medicines only as told by your health care provider.  · Share your diabetes management plan with people in your workplace, school, and household.  · Carry a medical alert card or wear medical alert jewelry.  · Keep all follow-up visits as told by your health care provider. This is important.  Questions to ask your health care provider  · Do I need to meet with a diabetes educator?  · Where can I find a support group for people with diabetes?  Where to find more information  For more information about diabetes, visit:  · American Diabetes Association (ADA): www.diabetes.org  · American Association of Diabetes Educators (AADE): www.diabeteseducator.org    Summary  · Caring for yourself after you have been diagnosed with (type 2 diabetes mellitus) means keeping your blood sugar (glucose) under control with a balance of nutrition, exercise, lifestyle changes, and medicine.  · Check your blood glucose every day, as often as told by your health care provider.  · Having diabetes can put you at risk for other long-term (chronic) conditions, such as heart disease and kidney disease. Your health care provider may prescribe medicines to help prevent complications from diabetes.  · Keep all follow-up visits as told by your health care provider. This is important.  This information is not intended to replace advice given to you by your health care provider. Make sure you discuss any questions you have with your health care provider.  Document Released: 04/10/2017 Document Revised: 07/20/2018 Document Reviewed: 01/20/2017  [employer]  Patient Education © 2019 [employer]

## 2019-06-19 NOTE — PROGRESS NOTES
History of Present Illness   Nivia presents to the clinic today  for follow up  in regards to her DM, type 2. Lilo reports her glycemic control has improved significantly since she has been on the Toujeo.She did have labs completed recently and will be reviewed with her.   In addition, she has a H/O TIA and Dyslipidemia.      Diabetes   She presents for follow up diabetes visit. She has type 2 diabetes mellitus. The initial diagnosis of diabetes was made 20 years ago. Her disease course has been improving at this time.  Associated symptoms include blurred vision, fatigue, foot paresthesias, polydipsia, polyuria and visual change. Pertinent negatives for diabetes include no foot ulcerations and no polyphagia. She does report she has had several hypoglycemic episodes with the lowest in the 50's. She generally treats with orange juice and peanut butter crackers.   Diabetic complications include peripheral neuropathy.She was changed to Cymbalta which she reports is helping although she does have breakthrough pain. She has quit Neurontin due to weight gain. Risk factors for coronary artery disease include post-menopausal, stress and dyslipidemia. Current diabetic treatment includes multiple daily insulin injections.She has tried multiple oral agents including GLP-1 Agonists and Invokana without successful glycemic control  She monitors her blood sugars at least four to five times daily.  She is compliant with treatment all of the time. She is following a generally healthy diet. She has had a previous visit with a dietitian. She participates in exercise at this time but finding it more difficult due to her Neuropathy. She walks 3 miles on a regular basis. Her home blood glucose trend is decreasing steadily  An ACE inhibitor/angiotensin II receptor blocker is not  being taken at this time. Eye exam is current.   Refer to ROS for additional information    Vitals:    06/19/19 0838   BP: 105/70   Pulse: 104   Temp: 98.1  °F (36.7 °C)   SpO2: 97%     The following portions of the patient's history were reviewed and updated as appropriate: allergies, current medications, past family history, past medical history, past social history, past surgical history and problem list.    Review of Systems   Constitutional: Positive for fatigue. Negative for activity change, appetite change, chills, fever and unexpected weight change.   HENT: Negative.    Eyes: Negative for visual disturbance.   Respiratory: Negative for cough, chest tightness, shortness of breath and wheezing.    Cardiovascular: Positive for leg swelling. Negative for chest pain and palpitations.   Gastrointestinal: Negative for abdominal pain, constipation, diarrhea, nausea and vomiting.   Endocrine: Positive for cold intolerance, heat intolerance, polydipsia, polyphagia and polyuria.   Musculoskeletal: Positive for arthralgias.   Skin: Negative for color change and rash.   Neurological: Positive for numbness. Negative for dizziness, tremors, speech difficulty, weakness, light-headedness and headaches.   Hematological: Negative for adenopathy.   Psychiatric/Behavioral: Negative for confusion, decreased concentration and suicidal ideas. The patient is not nervous/anxious.    All other systems reviewed and are negative.    Physical Exam   Constitutional: She is oriented to person, place, and time. She appears well-developed and well-nourished. No distress.   HENT:   Head: Normocephalic.   Nose: Nose normal.   Mouth/Throat: Oropharynx is clear and moist. No oropharyngeal exudate.   Eyes: Conjunctivae and EOM are normal. Pupils are equal, round, and reactive to light. Left eye exhibits no discharge. No scleral icterus.   Neck: Neck supple. No JVD present. No thyromegaly present.   Cardiovascular: Normal rate, regular rhythm, normal heart sounds and intact distal pulses. Exam reveals no friction rub.   No murmur heard.  Pulmonary/Chest: Effort normal and breath sounds normal. No  respiratory distress. She has no wheezes. She has no rales.   Abdominal: Soft. Bowel sounds are normal. She exhibits no distension. There is no tenderness. There is no rebound and no guarding.   Musculoskeletal: She exhibits no edema.   Lymphadenopathy:     She has no cervical adenopathy.   Neurological: She is alert and oriented to person, place, and time.   Skin: Skin is warm and dry. Capillary refill takes less than 2 seconds. No rash noted. No erythema.   Psychiatric: She has a normal mood and affect. Her behavior is normal. Judgment and thought content normal.   Vitals reviewed.    Assessment/Plan     PHQ-9 Depression Screening  PHQ-9 Total Score: 25     Class 2 severe obesity with serious comorbidity and body mass index (BMI) of 37.0 to 37.9 in adult, unspecified obesity type (CMS/[facility])  Patient's Body mass index is 38.45 kg/m². BMI is above normal parameters. Recommendations include: nutrition counseling.   (Normal BMI:  18.5-24.9, OW 25-29.9, Obesity 30 or greater)    Fall Risk Assessment was completed, and Nivia Bernstein is at low risk for falls.    Problems Addressed this Visit        Cardiovascular and Mediastinum    Hyperlipidemia (Chronic)       Digestive    Obesity       Endocrine    Uncontrolled type 2 diabetes with neuropathy (CMS/[facility]) - Primary    Relevant Orders    Microalbumin / Creatinine Urine Ratio - Urine, Clean Catch       Other    Anxiety and depression (Chronic)      Other Visit Diagnoses     Encounter for immunization        TDAP given today    Relevant Orders    Tdap Vaccine Greater Than or Equal To 8yo IM (Completed)        Results for orders placed or performed in visit on 06/11/19   C-Peptide   Result Value Ref Range    C-Peptide 3.9 1.1 - 4.4 ng/mL   Comprehensive metabolic panel   Result Value Ref Range    Glucose 200 (H) 65 - 99 mg/dL    BUN 10 6 - 20 mg/dL    Creatinine 0.69 0.57 - 1.00 mg/dL    eGFR Non African Am 92 >60 mL/min/1.73    eGFR African Am 111 >60 mL/min/1.73     BUN/Creatinine Ratio 14.5 7.0 - 25.0    Sodium 141 136 - 145 mmol/L    Potassium 4.3 3.5 - 5.2 mmol/L    Chloride 101 98 - 107 mmol/L    Total CO2 28.6 22.0 - 29.0 mmol/L    Calcium 9.4 8.6 - 10.5 mg/dL    Total Protein 7.4 6.0 - 8.5 g/dL    Albumin 4.40 3.50 - 5.20 g/dL    Globulin 3.0 gm/dL    A/G Ratio 1.5 g/dL    Total Bilirubin 0.4 0.2 - 1.2 mg/dL    Alkaline Phosphatase 131 (H) 39 - 117 U/L    AST (SGOT) 12 1 - 32 U/L    ALT (SGPT) 30 1 - 33 U/L       Findings and recommendations discussed with Gabrielamaria dolorescollin. Reviewed her lab results which a CMP and C Peptide. Will request a CMG for her at this time. Lifestyle modifications including nutrition and exercise reinforced.Referral to Behavioral Health made.  She will f/u with me in July; sooner if problems/concerns occur.            This document has been electronically signed by NIDA Starr, FAY-BC, MARYE  June 19, 2019 5:49 PM

## 2019-06-20 LAB
ALBUMIN/CREAT UR: <2.2 MG/G CREAT (ref 0–30)
CREAT UR-MCNC: 137.4 MG/DL
MICROALBUMIN UR-MCNC: <3 UG/ML

## 2019-06-20 NOTE — PROGRESS NOTES
Sent letter.     -- Please call to let  Nivia know her Urine Microalbumin test yesterday was within normal range.

## 2019-06-22 NOTE — PLAN OF CARE
Problem: Patient Care Overview (Adult)  Goal: Discharge Needs Assessment  Outcome: Outcome(s) achieved Date Met:  04/01/17 04/01/17 1220   Discharge Needs Assessment   Concerns To Be Addressed no discharge needs identified   Readmission Within The Last 30 Days no previous admission in last 30 days   Equipment Needed After Discharge none   Current Health   Anticipated Changes Related to Illness none   Living Environment   Transportation Available car            no

## 2019-07-09 ENCOUNTER — OFFICE VISIT (OUTPATIENT)
Dept: PSYCHIATRY | Facility: CLINIC | Age: 44
End: 2019-07-09

## 2019-07-09 VITALS
SYSTOLIC BLOOD PRESSURE: 128 MMHG | DIASTOLIC BLOOD PRESSURE: 90 MMHG | TEMPERATURE: 98 F | HEART RATE: 104 BPM | BODY MASS INDEX: 38.17 KG/M2 | WEIGHT: 222.4 LBS | OXYGEN SATURATION: 96 %

## 2019-07-09 DIAGNOSIS — F41.1 GENERALIZED ANXIETY DISORDER: ICD-10-CM

## 2019-07-09 DIAGNOSIS — F33.3 SEVERE EPISODE OF RECURRENT MAJOR DEPRESSIVE DISORDER, WITH PSYCHOTIC FEATURES (HCC): Primary | ICD-10-CM

## 2019-07-09 PROCEDURE — 90792 PSYCH DIAG EVAL W/MED SRVCS: CPT | Performed by: NURSE PRACTITIONER

## 2019-07-09 RX ORDER — FLUOXETINE 10 MG/1
10 CAPSULE ORAL DAILY
Qty: 30 CAPSULE | Refills: 0 | Status: SHIPPED | OUTPATIENT
Start: 2019-07-09 | End: 2019-08-09
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
## 2019-07-17 ENCOUNTER — OFFICE VISIT (OUTPATIENT)
Dept: FAMILY MEDICINE CLINIC | Facility: CLINIC | Age: 44
End: 2019-07-17

## 2019-07-17 VITALS
OXYGEN SATURATION: 97 % | HEART RATE: 105 BPM | DIASTOLIC BLOOD PRESSURE: 84 MMHG | SYSTOLIC BLOOD PRESSURE: 118 MMHG | BODY MASS INDEX: 37.73 KG/M2 | TEMPERATURE: 96.7 F | HEIGHT: 64 IN | WEIGHT: 221 LBS

## 2019-07-17 DIAGNOSIS — E78.5 HYPERLIPIDEMIA, UNSPECIFIED HYPERLIPIDEMIA TYPE: Chronic | ICD-10-CM

## 2019-07-17 DIAGNOSIS — IMO0002 UNCONTROLLED TYPE 2 DIABETES WITH NEUROPATHY: Primary | Chronic | ICD-10-CM

## 2019-07-17 DIAGNOSIS — E66.01 CLASS 2 SEVERE OBESITY WITH SERIOUS COMORBIDITY AND BODY MASS INDEX (BMI) OF 37.0 TO 37.9 IN ADULT, UNSPECIFIED OBESITY TYPE (HCC): ICD-10-CM

## 2019-07-17 DIAGNOSIS — F32.A ANXIETY AND DEPRESSION: Chronic | ICD-10-CM

## 2019-07-17 DIAGNOSIS — F41.9 ANXIETY AND DEPRESSION: Chronic | ICD-10-CM

## 2019-07-17 PROCEDURE — 99214 OFFICE O/P EST MOD 30 MIN: CPT | Performed by: NURSE PRACTITIONER

## 2019-07-17 NOTE — PATIENT INSTRUCTIONS
"DASH Eating Plan  DASH stands for \"Dietary Approaches to Stop Hypertension.\" The DASH eating plan is a healthy eating plan that has been shown to reduce high blood pressure (hypertension). It may also reduce your risk for type 2 diabetes, heart disease, and stroke. The DASH eating plan may also help with weight loss.  What are tips for following this plan?  General guidelines  · Avoid eating more than 2,300 mg (milligrams) of salt (sodium) a day. If you have hypertension, you may need to reduce your sodium intake to 1,500 mg a day.  · Limit alcohol intake to no more than 1 drink a day for nonpregnant women and 2 drinks a day for men. One drink equals 12 oz of beer, 5 oz of wine, or 1½ oz of hard liquor.  · Work with your health care provider to maintain a healthy body weight or to lose weight. Ask what an ideal weight is for you.  · Get at least 30 minutes of exercise that causes your heart to beat faster (aerobic exercise) most days of the week. Activities may include walking, swimming, or biking.  · Work with your health care provider or diet and nutrition specialist (dietitian) to adjust your eating plan to your individual calorie needs.  Reading food labels  · Check food labels for the amount of sodium per serving. Choose foods with less than 5 percent of the Daily Value of sodium. Generally, foods with less than 300 mg of sodium per serving fit into this eating plan.  · To find whole grains, look for the word \"whole\" as the first word in the ingredient list.  Shopping  · Buy products labeled as \"low-sodium\" or \"no salt added.\"  · Buy fresh foods. Avoid canned foods and premade or frozen meals.  Cooking  · Avoid adding salt when cooking. Use salt-free seasonings or herbs instead of table salt or sea salt. Check with your health care provider or pharmacist before using salt substitutes.  · Do not barrera foods. Cook foods using healthy methods such as baking, boiling, grilling, and broiling instead.  · Cook with " heart-healthy oils, such as olive, canola, soybean, or sunflower oil.  Meal planning    · Eat a balanced diet that includes:  ? 5 or more servings of fruits and vegetables each day. At each meal, try to fill half of your plate with fruits and vegetables.  ? Up to 6-8 servings of whole grains each day.  ? Less than 6 oz of lean meat, poultry, or fish each day. A 3-oz serving of meat is about the same size as a deck of cards. One egg equals 1 oz.  ? 2 servings of low-fat dairy each day.  ? A serving of nuts, seeds, or beans 5 times each week.  ? Heart-healthy fats. Healthy fats called Omega-3 fatty acids are found in foods such as flaxseeds and coldwater fish, like sardines, salmon, and mackerel.  · Limit how much you eat of the following:  ? Canned or prepackaged foods.  ? Food that is high in trans fat, such as fried foods.  ? Food that is high in saturated fat, such as fatty meat.  ? Sweets, desserts, sugary drinks, and other foods with added sugar.  ? Full-fat dairy products.  · Do not salt foods before eating.  · Try to eat at least 2 vegetarian meals each week.  · Eat more home-cooked food and less restaurant, buffet, and fast food.  · When eating at a restaurant, ask that your food be prepared with less salt or no salt, if possible.  What foods are recommended?  The items listed may not be a complete list. Talk with your dietitian about what dietary choices are best for you.  Grains  Whole-grain or whole-wheat bread. Whole-grain or whole-wheat pasta. Brown rice. Oatmeal. Quinoa. Bulgur. Whole-grain and low-sodium cereals. Joy bread. Low-fat, low-sodium crackers. Whole-wheat flour tortillas.  Vegetables  Fresh or frozen vegetables (raw, steamed, roasted, or grilled). Low-sodium or reduced-sodium tomato and vegetable juice. Low-sodium or reduced-sodium tomato sauce and tomato paste. Low-sodium or reduced-sodium canned vegetables.  Fruits  All fresh, dried, or frozen fruit. Canned fruit in natural juice (without  added sugar).  Meat and other protein foods  Skinless chicken or turkey. Ground chicken or turkey. Pork with fat trimmed off. Fish and seafood. Egg whites. Dried beans, peas, or lentils. Unsalted nuts, nut butters, and seeds. Unsalted canned beans. Lean cuts of beef with fat trimmed off. Low-sodium, lean deli meat.  Dairy  Low-fat (1%) or fat-free (skim) milk. Fat-free, low-fat, or reduced-fat cheeses. Nonfat, low-sodium ricotta or cottage cheese. Low-fat or nonfat yogurt. Low-fat, low-sodium cheese.  Fats and oils  Soft margarine without trans fats. Vegetable oil. Low-fat, reduced-fat, or light mayonnaise and salad dressings (reduced-sodium). Canola, safflower, olive, soybean, and sunflower oils. Avocado.  Seasoning and other foods  Herbs. Spices. Seasoning mixes without salt. Unsalted popcorn and pretzels. Fat-free sweets.  What foods are not recommended?  The items listed may not be a complete list. Talk with your dietitian about what dietary choices are best for you.  Grains  Baked goods made with fat, such as croissants, muffins, or some breads. Dry pasta or rice meal packs.  Vegetables  Creamed or fried vegetables. Vegetables in a cheese sauce. Regular canned vegetables (not low-sodium or reduced-sodium). Regular canned tomato sauce and paste (not low-sodium or reduced-sodium). Regular tomato and vegetable juice (not low-sodium or reduced-sodium). Pickles. Olives.  Fruits  Canned fruit in a light or heavy syrup. Fried fruit. Fruit in cream or butter sauce.  Meat and other protein foods  Fatty cuts of meat. Ribs. Fried meat. Carroll. Sausage. Bologna and other processed lunch meats. Salami. Fatback. Hotdogs. Bratwurst. Salted nuts and seeds. Canned beans with added salt. Canned or smoked fish. Whole eggs or egg yolks. Chicken or turkey with skin.  Dairy  Whole or 2% milk, cream, and half-and-half. Whole or full-fat cream cheese. Whole-fat or sweetened yogurt. Full-fat cheese. Nondairy creamers. Whipped toppings.  Processed cheese and cheese spreads.  Fats and oils  Butter. Stick margarine. Lard. Shortening. Ghee. Carroll fat. Tropical oils, such as coconut, palm kernel, or palm oil.  Seasoning and other foods  Salted popcorn and pretzels. Onion salt, garlic salt, seasoned salt, table salt, and sea salt. Worcestershire sauce. Tartar sauce. Barbecue sauce. Teriyaki sauce. Soy sauce, including reduced-sodium. Steak sauce. Canned and packaged gravies. Fish sauce. Oyster sauce. Cocktail sauce. Horseradish that you find on the shelf. Ketchup. Mustard. Meat flavorings and tenderizers. Bouillon cubes. Hot sauce and Tabasco sauce. Premade or packaged marinades. Premade or packaged taco seasonings. Relishes. Regular salad dressings.  Where to find more information:  · National Heart, Lung, and Blood [place]: www.nhlbi.nih.gov  · American Heart Association: www.heart.org  Summary  · The DASH eating plan is a healthy eating plan that has been shown to reduce high blood pressure (hypertension). It may also reduce your risk for type 2 diabetes, heart disease, and stroke.  · With the DASH eating plan, you should limit salt (sodium) intake to 2,300 mg a day. If you have hypertension, you may need to reduce your sodium intake to 1,500 mg a day.  · When on the DASH eating plan, aim to eat more fresh fruits and vegetables, whole grains, lean proteins, low-fat dairy, and heart-healthy fats.  · Work with your health care provider or diet and nutrition specialist (dietitian) to adjust your eating plan to your individual calorie needs.  This information is not intended to replace advice given to you by your health care provider. Make sure you discuss any questions you have with your health care provider.  Document Released: 12/06/2012 Document Revised: 12/11/2017 Document Reviewed: 12/11/2017  [employer] Interactive Patient Education © 2019 [employer]  Exercising to Lose Weight  Exercising can help you to lose weight. In order to lose weight  through exercise, you need to do vigorous-intensity exercise. You can tell that you are exercising with vigorous intensity if you are breathing very hard and fast and cannot hold a conversation while exercising.  Moderate-intensity exercise helps to maintain your current weight. You can tell that you are exercising at a moderate level if you have a higher heart rate and faster breathing, but you are still able to hold a conversation.  How often should I exercise?  Choose an activity that you enjoy and set realistic goals. Your health care provider can help you to make an activity plan that works for you. Exercise regularly as directed by your health care provider. This may include:  · Doing resistance training twice each week, such as:  ? Push-ups.  ? Sit-ups.  ? Lifting weights.  ? Using resistance bands.  · Doing a given intensity of exercise for a given amount of time. Choose from these options:  ? 150 minutes of moderate-intensity exercise every week.  ? 75 minutes of vigorous-intensity exercise every week.  ? A mix of moderate-intensity and vigorous-intensity exercise every week.    Children, pregnant women, people who are out of shape, people who are overweight, and older adults may need to consult a health care provider for individual recommendations. If you have any sort of medical condition, be sure to consult your health care provider before starting a new exercise program.  What are some activities that can help me to lose weight?  · Walking at a rate of at least 4.5 miles an hour.  · Jogging or running at a rate of 5 miles per hour.  · Biking at a rate of at least 10 miles per hour.  · Lap swimming.  · Roller-skating or in-line skating.  · Cross-country skiing.  · Vigorous competitive sports, such as football, basketball, and soccer.  · Jumping rope.  · Aerobic dancing.  How can I be more active in my day-to-day activities?  · Use the stairs instead of the elevator.  · Take a walk during your lunch  break.  · If you drive, park your car farther away from work or school.  · If you take public transportation, get off one stop early and walk the rest of the way.  · Make all of your phone calls while standing up and walking around.  · Get up, stretch, and walk around every 30 minutes throughout the day.  What guidelines should I follow while exercising?  · Do not exercise so much that you hurt yourself, feel dizzy, or get very short of breath.  · Consult your health care provider prior to starting a new exercise program.  · Wear comfortable clothes and shoes with good support.  · Drink plenty of water while you exercise to prevent dehydration or heat stroke. Body water is lost during exercise and must be replaced.  · Work out until you breathe faster and your heart beats faster.  This information is not intended to replace advice given to you by your health care provider. Make sure you discuss any questions you have with your health care provider.  Document Released: 01/20/2012 Document Revised: 05/25/2017 Document Reviewed: 05/21/2015  [employer] Interactive Patient Education © 2019 [employer]  Type 2 Diabetes Mellitus, Self Care, Adult  When you have type 2 diabetes (type 2 diabetes mellitus), you must make sure your blood sugar (glucose) stays in a healthy range. You can do this with:  · Nutrition.  · Exercise.  · Lifestyle changes.  · Medicines or insulin, if needed.  · Support from your doctors and others.    How to stay aware of blood sugar  · Check your blood sugar level every day, as often as told.  · Have your A1c (hemoglobin A1c) level checked two or more times a year. Have it checked more often if your doctor tells you to.  Your doctor will set personal treatment goals for you. Generally, you should have these blood sugar levels:  · Before meals (preprandial):  mg/dL (4.4-7.2 mmol/L).  · After meals (postprandial): below 180 mg/dL (10 mmol/L).  · A1c level: less than 7%.    How to manage high and  low blood sugar  Signs of high blood sugar  High blood sugar is called hyperglycemia. Know the signs of high blood sugar. Signs may include:  · Feeling:  ? Thirsty.  ? Hungry.  ? Very tired.  · Needing to pee (urinate) more than usual.  · Blurry vision.    Signs of low blood sugar  Low blood sugar is called hypoglycemia. This is when blood sugar is at or below 70 mg/dL (3.9 mmol/L). Signs may include:  · Feeling:  ? Hungry.  ? Worried or nervous (anxious).  ? Sweaty and clammy.  ? Confused.  ? Dizzy.  ? Sleepy.  ? Sick to your stomach (nauseous).  · Having:  ? A fast heartbeat.  ? A headache.  ? A change in your vision.  ? Jerky movements that you cannot control (seizure).  ? Tingling or no feeling (numbness) around your mouth, lips, or tongue.  · Having trouble with:  ? Moving (coordination).  ? Sleeping.  ? Passing out (fainting).  ? Getting upset easily (irritability).    Treating low blood sugar  To treat low blood sugar, eat or drink something sugary right away. If you can think clearly and swallow safely, follow the 15:15 rule:  · Take 15 grams of a fast-acting carb (carbohydrate). Some fast-acting carbs are:  ? 1 tube of glucose gel.  ? 3 sugar tablets (glucose pills).  ? 6-8 pieces of hard candy.  ? 4 oz (120 mL) of fruit juice.  ? 4 oz (120 mL) regular (not diet) soda.  · Check your blood sugar 15 minutes after you take the carb.  · If your blood sugar is still at or below 70 mg/dL (3.9 mmol/L), take 15 grams of a carb again.  · If your blood sugar does not go above 70 mg/dL (3.9 mmol/L) after 3 tries, get help right away.  · After your blood sugar goes back to normal, eat a meal or a snack within 1 hour.    Treating very low blood sugar  If your blood sugar is at or below 54 mg/dL (3 mmol/L), you have very low blood sugar (severe hypoglycemia). This is an emergency. Do not wait to see if the symptoms will go away. Get medical help right away. Call your local emergency services (911 in the [place]  If you  have very low blood sugar and you cannot eat or drink, you may need a glucagon shot (injection). A family member or friend should learn how to check your blood sugar and how to give you a glucagon shot. Ask your doctor if you need to have a glucagon shot kit at home.  Follow these instructions at home:  Medicine  · Take insulin and diabetes medicines as told.  · If your doctor says you should take more or less insulin and medicines, do this exactly as told.  · Do not run out of insulin or medicines.  Having diabetes can raise your risk for other long-term conditions. These include heart disease and kidney disease. Your doctor may prescribe medicines to help you not have these problems.  Food  · Make healthy food choices. These include:  ? Chicken, fish, egg whites, and beans.  ? Oats, whole wheat, bulgur, brown rice, quinoa, and millet.  ? Fresh fruits and vegetables.  ? Low-fat dairy products.  ? Nuts, avocado, olive oil, and canola oil.  · Meet with a  (dietitian). He or she can help you make an eating plan that is right for you.  · Follow instructions from your doctor about what you cannot eat or drink.  · Drink enough fluid to keep your pee (urine) pale yellow.  · Keep track of carbs that you eat. Do this by reading food labels and learning food serving sizes.  · Follow your sick day plan when you cannot eat or drink normally. Make this plan with your doctor so it is ready to use.  Activity  · Exercise 3 or more times a week.  · Do not go more than 2 days without exercising.  · Talk with your doctor before you start a new exercise. Your doctor may need to tell you to change:  ? How much insulin or medicines you take.  ? How much food you eat.  Lifestyle  · Do not use any tobacco products. These include cigarettes, chewing tobacco, and e-cigarettes. If you need help quitting, ask your doctor.  · Ask your doctor how much alcohol is safe for you.  · Learn to deal with stress. If you need help with  this, ask your doctor.  Body care  · Stay up to date with your shots (immunizations).  · Have your eyes and feet checked by a doctor as often as told.  · Check your skin and feet every day. Check for cuts, bruises, redness, blisters, or sores.  · Brush your teeth and gums two times a day. Floss one or more times a day.  · Go to the dentist one or more times every 6 months.  · Stay at a healthy weight.  General instructions  · Take over-the-counter and prescription medicines only as told by your doctor.  · Share your diabetes care plan with:  ? Your work or school.  ? People you live with.  · Carry a card or wear jewelry that says you have diabetes.  · Keep all follow-up visits as told by your doctor. This is important.  Questions to ask your doctor  · Do I need to meet with a diabetes educator?  · Where can I find a support group for people with diabetes?  Where to find more information  To learn more about diabetes, visit:  · American Diabetes Association: www.diabetes.org  · American Association of Diabetes Educators: www.diabeteseducator.org    Summary  · When you have type 2 diabetes, you must make sure your blood sugar (glucose) stays in a healthy range.  · Check your blood sugar every day, as often as told.  · Having diabetes can raise your risk for other conditions. Your doctor may prescribe medicines to help you not have these problems.  · Keep all follow-up visits as told by your doctor. This is important.  This information is not intended to replace advice given to you by your health care provider. Make sure you discuss any questions you have with your health care provider.  Document Released: 04/10/2017 Document Revised: 07/20/2018 Document Reviewed: 01/20/2017  [employer] Interactive Patient Education © 2019 [employer]

## 2019-07-17 NOTE — PROGRESS NOTES
"  History of Present Illness   Nivia presents to the clinic today  for follow up  in regards to her DM, type 2. Lilo reports her glycemic control has improved  since she has been on the Toujeo. In addition, she has a H/O TIA ,Dyslipidemia and Obseity. She has decided she would like to begin the process for Bariatric Surgery since she has tried \"everyting she knows to do to lose weight\" without success. She feels this would improve her quality of life and she would be able to \"come off multiple medications.\"      Diabetes   She presents for follow up diabetes visit. She has type 2 diabetes mellitus. The initial diagnosis of diabetes was made 20 years ago. Her disease course has been improving at this time.  Associated symptoms include blurred vision, fatigue, foot paresthesias, polydipsia, polyuria and visual change. Pertinent negatives for diabetes include no foot ulcerations and no polyphagia. She does report she has had several hypoglycemic episodes with the lowest in the 50's. She generally treats with orange juice and peanut butter crackers.   Diabetic complications include peripheral neuropathy.She was changed to Cymbalta which she reports is helping although she does have breakthrough pain. She has quit Neurontin due to weight gain. Risk factors for coronary artery disease include post-menopausal, stress and dyslipidemia. Current diabetic treatment includes multiple daily insulin injections.She has tried multiple oral agents including GLP-1 Agonists and Invokana without successful glycemic control  She monitors her blood sugars at least four to five times daily.  She is compliant with treatment all of the time. She is following a generally healthy diet. She has had a previous visit with a dietitian. She participates in exercise at this time but finding it more difficult due to her Neuropathy. She walks 3 miles on a regular basis. Her home blood glucose trend is decreasing steadily  An ACE " "inhibitor/angiotensin II receptor blocker is not  being taken at this time. Eye exam is current.     Refer to ROS for additional information    Vitals:    07/17/19 0829   BP: 118/84   Pulse: 105   Temp: 96.7 °F (35.9 °C)   TempSrc: Oral   SpO2: 97%   Weight: 100 kg (221 lb)   Height: 162.2 cm (63.86\")      The following portions of the patient's history were reviewed and updated as appropriate: allergies, current medications, past family history, past medical history, past social history, past surgical history and problem list.    Review of Systems   Constitutional: Positive for fatigue. Negative for activity change, appetite change and unexpected weight change.   Eyes: Positive for visual disturbance.   Respiratory: Negative for chest tightness, shortness of breath and wheezing.    Cardiovascular: Positive for leg swelling. Negative for palpitations.   Gastrointestinal: Negative for constipation and diarrhea.   Endocrine: Positive for polydipsia. Negative for cold intolerance, heat intolerance, polyphagia and polyuria.   Musculoskeletal: Positive for arthralgias.   Skin: Negative for color change.   Neurological: Positive for numbness and headaches. Negative for dizziness, tremors, speech difficulty and light-headedness.   Hematological: Negative for adenopathy.   Psychiatric/Behavioral: Positive for sleep disturbance. Negative for confusion, decreased concentration and suicidal ideas. The patient is not nervous/anxious.    All other systems reviewed and are negative.    Physical Exam   Constitutional: She is oriented to person, place, and time. She appears well-developed and well-nourished. No distress.   HENT:   Head: Normocephalic.   Nose: Nose normal.   Mouth/Throat: Oropharynx is clear and moist. No oropharyngeal exudate.   Eyes: Conjunctivae and EOM are normal. Pupils are equal, round, and reactive to light. Right eye exhibits no discharge. Left eye exhibits no discharge. No scleral icterus.   Neck: Neck " supple. No JVD present. No thyromegaly present.   Cardiovascular: Normal rate, regular rhythm and normal heart sounds. Exam reveals no friction rub.   No murmur heard.  Pulmonary/Chest: Effort normal and breath sounds normal. No respiratory distress. She has no wheezes. She has no rales.   Abdominal: Soft. Bowel sounds are normal. She exhibits no distension. There is no tenderness. There is no rebound and no guarding.   Musculoskeletal: She exhibits no edema.   Lymphadenopathy:     She has no cervical adenopathy.   Neurological: She is alert and oriented to person, place, and time.   Skin: Skin is warm and dry. Capillary refill takes less than 2 seconds. No rash noted. No erythema.   Psychiatric: She has a normal mood and affect. Her behavior is normal. Judgment and thought content normal.   Vitals reviewed.      Assessment/Plan     Patient's Body mass index is 38.1 kg/m². BMI is above normal parameters. Recommendations include: exercise counseling and nutrition counseling.   (Normal BMI:  18.5-24.9, OW 25-29.9, Obesity 30 or greater)  Assessment/Plan (cont)  Problems Addressed this Visit        Cardiovascular and Mediastinum    Hyperlipidemia (Chronic)       Digestive    Obesity       Endocrine    Uncontrolled type 2 diabetes with neuropathy (CMS/HCC) - Primary       Other    Anxiety and depression (Chronic)        Findings and recommendations discussed with Gabrielamaria dolorescollin. She has received a CGM to assist her with her glycemic control. Continue Lipitor and lifestyle modifications including nutrition and exercise.   We discussed Bariatric Surgery pros/cons and she does wish to begin the process.Her current nutrition plan and exercise was reviewed with her. Initial Visit form completed and will be scanned into her chart.   Current nutrition plan:   Breakfast 8:30  Cereal Low fat milk fruit  Lunch  [place]  Supper  Roast   Grilled bake  Exercise  2 miles walking which is very difficult for her so she will try Pool walking  in evening  Motivation: Very motivated  She will f/u with me on July 26 th for initiation of her CMG.     This document has been electronically signed by NIDA Starr, FAY-BC, DOUG

## 2019-07-23 DIAGNOSIS — F32.A ANXIETY AND DEPRESSION: Primary | Chronic | ICD-10-CM

## 2019-07-23 DIAGNOSIS — F41.9 ANXIETY AND DEPRESSION: Primary | Chronic | ICD-10-CM

## 2019-07-26 ENCOUNTER — OFFICE VISIT (OUTPATIENT)
Dept: FAMILY MEDICINE CLINIC | Facility: CLINIC | Age: 44
End: 2019-07-26

## 2019-07-26 VITALS
SYSTOLIC BLOOD PRESSURE: 120 MMHG | HEART RATE: 101 BPM | WEIGHT: 223 LBS | TEMPERATURE: 98 F | BODY MASS INDEX: 38.07 KG/M2 | DIASTOLIC BLOOD PRESSURE: 70 MMHG | HEIGHT: 64 IN | OXYGEN SATURATION: 98 %

## 2019-07-26 DIAGNOSIS — E66.01 CLASS 2 SEVERE OBESITY WITH SERIOUS COMORBIDITY AND BODY MASS INDEX (BMI) OF 37.0 TO 37.9 IN ADULT, UNSPECIFIED OBESITY TYPE (HCC): ICD-10-CM

## 2019-07-26 DIAGNOSIS — E78.5 HYPERLIPIDEMIA, UNSPECIFIED HYPERLIPIDEMIA TYPE: Chronic | ICD-10-CM

## 2019-07-26 DIAGNOSIS — IMO0002 UNCONTROLLED TYPE 2 DIABETES WITH NEUROPATHY: Primary | Chronic | ICD-10-CM

## 2019-07-26 PROCEDURE — 99214 OFFICE O/P EST MOD 30 MIN: CPT | Performed by: NURSE PRACTITIONER

## 2019-07-26 NOTE — PROGRESS NOTES
"  History of Present Illness   Nivia is a 43 y/o female who presents to the clinic today  for follow up  in regards to her DM, type 2. Nivia reports her glycemic control has improved  since she has been on the Toujeo. She did receive her Continuous Glucose Monitor. Joanna Hobbs RN from [facility] is here to educate Nivia and her  on it.   In addition, she has a H/O TIA ,Dyslipidemia and Obseity. She has decided she would like to begin the process for Bariatric Surgery since she has tried \"everyting she knows to do to lose weight\" without success. She feels this would improve her quality of life and she would be able to \"come off multiple medications.\"      Diabetes   She presents for follow up diabetes visit. She has type 2 diabetes mellitus. The initial diagnosis of diabetes was made 20 years ago. Her disease course has been improving at this time.  Associated symptoms include blurred vision, fatigue, foot paresthesias, polydipsia, polyuria and visual change. Pertinent negatives for diabetes include no foot ulcerations and no polyphagia. She does report she has had several hypoglycemic episodes with the lowest in the 50's. She generally treats with orange juice and peanut butter crackers.   Diabetic complications include peripheral neuropathy.She was changed to Cymbalta which she reports is helping although she does have breakthrough pain. She has quit Neurontin due to weight gain. Risk factors for coronary artery disease include post-menopausal, stress and dyslipidemia. Current diabetic treatment includes multiple daily insulin injections.She has tried multiple oral agents including GLP-1 Agonists and Invokana without successful glycemic control  She monitors her blood sugars at least four to five times daily.  She is compliant with treatment all of the time. She is following a generally healthy diet. She has had a previous visit with a dietitian. She participates in exercise at this time but finding " "it more difficult due to her Neuropathy. She walks 3 miles on a regular basis. Her home blood glucose trend is decreasing steadily  An ACE inhibitor/angiotensin II receptor blocker is not  being taken at this time. Eye exam is current.      Lab Results   Component Value Date    HGBA1C 9.0 (H) 05/09/2019     Refer to ROS for additional information    Vitals:    07/26/19 0940   BP: 120/70   Pulse: 101   Temp: 98 °F (36.7 °C)   TempSrc: Temporal   SpO2: 98%   Weight: 101 kg (223 lb)   Height: 162.6 cm (64\")      The following portions of the patient's history were reviewed and updated as appropriate: allergies, current medications, past family history, past medical history, past social history, past surgical history and problem list.    Review of Systems   Constitutional: Negative for activity change, appetite change, chills, fatigue, fever and unexpected weight change.   Eyes: Positive for visual disturbance (Intermittent).   Respiratory: Negative for cough, chest tightness, shortness of breath and wheezing.    Cardiovascular: Negative for chest pain, palpitations and leg swelling.   Gastrointestinal: Negative for abdominal pain, constipation, diarrhea, nausea and vomiting.   Endocrine: Negative for cold intolerance, heat intolerance, polydipsia, polyphagia and polyuria.   Skin: Negative for color change and rash.   Neurological: Positive for numbness and headaches. Negative for dizziness, tremors, speech difficulty, weakness and light-headedness.   Hematological: Negative for adenopathy.   Psychiatric/Behavioral: Negative for confusion, decreased concentration and suicidal ideas. The patient is not nervous/anxious.    All other systems reviewed and are negative.    Physical Exam   Constitutional: She is oriented to person, place, and time. She appears well-developed and well-nourished. No distress.   HENT:   Head: Normocephalic.   Nose: Nose normal.   Mouth/Throat: Oropharynx is clear and moist.   Eyes: Conjunctivae " and EOM are normal. Pupils are equal, round, and reactive to light. Right eye exhibits no discharge. Left eye exhibits no discharge.   Neck: Normal range of motion. Neck supple. No JVD present. No thyromegaly present.   Cardiovascular: Normal rate, regular rhythm and normal heart sounds. Exam reveals no friction rub.   No murmur heard.  Pulmonary/Chest: Effort normal and breath sounds normal. No respiratory distress. She has no wheezes. She has no rales.   Abdominal: Soft. Bowel sounds are normal. She exhibits no distension. There is no tenderness. There is no rebound and no guarding.   Musculoskeletal: She exhibits no edema or tenderness.   Lymphadenopathy:     She has no cervical adenopathy.   Neurological: She is alert and oriented to person, place, and time.   Skin: Skin is warm and dry. Capillary refill takes less than 2 seconds. No rash noted. No erythema.   Psychiatric: She has a normal mood and affect. Her behavior is normal. Judgment and thought content normal.   Vitals reviewed.    Assessment/Plan   Patient's Body mass index is 38.28 kg/m². BMI is above normal parameters. Recommendations include: exercise counseling and nutrition counseling.   (Normal BMI:  18.5-24.9, OW 25-29.9, Obesity 30 or greater)  Assessment/Plan (cont)  Problems Addressed this Visit        Cardiovascular and Mediastinum    Hyperlipidemia (Chronic)       Digestive    Obesity       Endocrine    Uncontrolled type 2 diabetes with neuropathy (CMS/HCC) - Primary        Findings and recommendations discussed with Nivia and her . They were educated on the invino CGM system and parameters set. She did recently receive confirmation she would be getting an insulin pump and she is very excited about this.  Encouraged her to keep a log of her insulin use to calculate her need. She will need to change infusion sets every 1 1/2 to 2 days at least at first due to her high amount of daily insulin. She will continue to monitor her blood  sugars at least two to three times daily.  Lifestyle modifications including nutrition and exercise reinforced. She is scheduled to return on Friday, August 2nd for pump education; sooner if problems/concerns occur.                        This document has been electronically signed by NIDA Starr, RADHA, DOUG  July 26, 2019 5:46 PM

## 2019-07-26 NOTE — PATIENT INSTRUCTIONS
Type 2 Diabetes Mellitus, Self Care, Adult  Caring for yourself after you have been diagnosed with type 2 diabetes (type 2 diabetes mellitus) means keeping your blood sugar (glucose) under control with a balance of:  · Nutrition.  · Exercise.  · Lifestyle changes.  · Medicines or insulin, if necessary.  · Support from your team of health care providers and others.    The following information explains what you need to know to manage your diabetes at home.  What are the risks?  Having diabetes can put you at risk for other long-term (chronic) conditions, such as heart disease and kidney disease. Your health care provider may prescribe medicines to help prevent complications from diabetes. These medicines may include:  · Aspirin.  · Medicine to lower cholesterol.  · Medicine to control blood pressure.    How to monitor blood glucose  · Check your blood glucose every day, as often as told by your health care provider.  · Have your A1c (hemoglobin A1c) level checked two or more times a year, or as often as told by your health care provider.  Your health care provider will set individualized treatment goals for you. Generally, the goal of treatment is to maintain the following blood glucose levels:  · Before meals (preprandial):  mg/dL (4.4-7.2 mmol/L).  · After meals (postprandial): below 180 mg/dL (10 mmol/L).  · A1c level: less than 7%.    How to manage hyperglycemia and hypoglycemia  Hyperglycemia symptoms  Hyperglycemia, also called high blood glucose, occurs when blood glucose is too high. Make sure you know the early signs of hyperglycemia, such as:  · Increased thirst.  · Hunger.  · Feeling very tired.  · Needing to urinate more often than usual.  · Blurry vision.    Hypoglycemia symptoms  Hypoglycemia, also called low blood glucose, occurs with a blood glucose level at or below 70 mg/dL (3.9 mmol/L). The risk for hypoglycemia increases during or after exercise, during sleep, during illness, and when  skipping meals or not eating for a long time (fasting).  It is important to know the symptoms of hypoglycemia and treat it right away. Always have a 15-gram rapid-acting carbohydrate snack with you to treat low blood glucose. Family members and close friends should also know the symptoms and should understand how to treat hypoglycemia, in case you are not able to treat yourself. Symptoms may include:  · Hunger.  · Anxiety.  · Sweating and feeling clammy.  · Confusion.  · Dizziness or feeling light-headed.  · Sleepiness.  · Nausea.  · Increased heart rate.  · Headache.  · Blurry vision.  · Irritability.  · A change in coordination.  · Tingling or numbness around the mouth, lips, or tongue.  · Restless sleep.  · Fainting.  · Seizure.    Treating hypoglycemia  If you are alert and able to swallow safely, follow the 15:15 rule:  · Take 15 grams of a rapid-acting carbohydrate. Rapid-acting options include:  ? 1 tube of glucose gel.  ? 3 glucose pills.  ? 6-8 pieces of hard candy.  ? 4 oz (120 mL) of fruit juice.  ? 4 oz (120 mL) of regular (not diet) soda.  · Check your blood glucose 15 minutes after you take the carbohydrate.  · If the repeat blood glucose level is still at or below 70 mg/dL (3.9 mmol/L), take 15 grams of a carbohydrate again.  · If your blood glucose level does not increase above 70 mg/dL (3.9 mmol/L) after 3 tries, seek emergency medical care.  · After your blood glucose level returns to normal, eat a meal or a snack within 1 hour.    Treating severe hypoglycemia  Severe hypoglycemia is when your blood glucose level is at or below 54 mg/dL (3 mmol/L). Severe hypoglycemia is an emergency. Do not wait to see if the symptoms will go away. Get medical help right away. Call your local emergency services (911 in the [place]  If you have severe hypoglycemia and you cannot eat or drink, you may need an injection of glucagon. A family member or close friend should learn how to check your blood glucose and how  to give you a glucagon injection. Ask your health care provider if you need to have an emergency glucagon injection kit available.  Severe hypoglycemia may need to be treated in a hospital. The treatment may include getting glucose through an IV. You may also need treatment for the cause of your hypoglycemia.  Follow these instructions at home:  Take diabetes medicines as told  · If your health care provider prescribed insulin or diabetes medicines, take them every day.  · Do not run out of insulin or other diabetes medicines that you take. Plan ahead so you always have these available.  · If you use insulin, adjust your dosage based on how physically active you are and what foods you eat. Your health care provider will tell you how to adjust your dosage.  Make healthy food choices  The things that you eat and drink affect your blood glucose and your insulin dosage. Making good choices helps to control your diabetes and prevent other health problems. A healthy meal plan includes eating lean proteins, complex carbohydrates, fresh fruits and vegetables, low-fat dairy products, and healthy fats.  Make an appointment to see a diet and nutrition specialist (registered dietitian) to help you create an eating plan that is right for you. Make sure that you:  · Follow instructions from your health care provider about eating or drinking restrictions.  · Drink enough fluid to keep your urine pale yellow.  · Keep a record of the carbohydrates that you eat. Do this by reading food labels and learning the standard serving sizes of foods.  · Follow your sick day plan whenever you cannot eat or drink as usual. Make this plan in advance with your health care provider.    Stay active  Exercise regularly, as told by your health care provider. This may include:  · Stretching and doing strength exercises, such as yoga or weightlifting, 2 or more times a week.  · Doing 150 minutes or more of moderate-intensity or vigorous-intensity  exercise each week. This could be brisk walking, biking, or water aerobics.  ? Spread out your activity over 3 or more days of the week.  ? Do not go more than 2 days in a row without doing some kind of physical activity.    When you start a new exercise or activity, work with your health care provider to adjust your insulin, medicines, or food intake as needed.  Make healthy lifestyle choices  · Do not use any tobacco products, such as cigarettes, chewing tobacco, and e-cigarettes. If you need help quitting, ask your health care provider.  · If your health care provider says that alcohol is safe for you, limit alcohol intake to no more than 1 drink per day for nonpregnant women and 2 drinks per day for men. One drink equals 12 oz of beer, 5 oz of wine, or 1½ oz of hard liquor.  · Learn to manage stress. If you need help with this, ask your health care provider.  Care for your body  · Keep your immunizations up to date. In addition to getting vaccinations as told by your health care provider, it is recommended that you get vaccinated against the following illnesses:  ? The flu (influenza). Get a flu shot every year.  ? Pneumonia.  ? Hepatitis B.  · Schedule an eye exam soon after your diagnosis, and then one time every year after that.  · Check your skin and feet every day for cuts, bruises, redness, blisters, or sores. Schedule a foot exam with your health care provider once every year.  · Brush your teeth and gums two times a day, and floss one or more times a day. Visit your dentist one or more times every 6 months.  · Maintain a healthy weight.  General instructions  · Take over-the-counter and prescription medicines only as told by your health care provider.  · Share your diabetes management plan with people in your workplace, school, and household.  · Carry a medical alert card or wear medical alert jewelry.  · Keep all follow-up visits as told by your health care provider. This is important.  Questions to ask  your health care provider  · Do I need to meet with a diabetes educator?  · Where can I find a support group for people with diabetes?  Where to find more information  For more information about diabetes, visit:  · American Diabetes Association (ADA): www.diabetes.org  · American Association of Diabetes Educators (AADE): www.diabeteseducator.org    Summary  · Caring for yourself after you have been diagnosed with (type 2 diabetes mellitus) means keeping your blood sugar (glucose) under control with a balance of nutrition, exercise, lifestyle changes, and medicine.  · Check your blood glucose every day, as often as told by your health care provider.  · Having diabetes can put you at risk for other long-term (chronic) conditions, such as heart disease and kidney disease. Your health care provider may prescribe medicines to help prevent complications from diabetes.  · Keep all follow-up visits as told by your health care provider. This is important.  This information is not intended to replace advice given to you by your health care provider. Make sure you discuss any questions you have with your health care provider.  Document Released: 04/10/2017 Document Revised: 07/20/2018 Document Reviewed: 01/20/2017  [employer] Interactive Patient Education © 2019 [employer]  Insulin Pumps  An insulin pump is a small, computerized, battery-powered device that steadily (continuously) delivers insulin to the body. Insulin pumps may be used to treat type 1 diabetes or type 2 diabetes. They may be used with rapid-acting insulin or short-acting insulin.  An insulin pump has a container (cartridge or reservoir) of insulin that must be refilled regularly. The cartridge is connected to a needle that is placed into the tissue between skin and muscle in any of these areas:  · Abdomen. Avoid any area that is less than 2 inches (5 cm) from the belly button (navel).  · Front of thigh.  · Upper, outer side of thigh.  · Upper, outer part of  arm.  · Upper, outer part of buttock.    The area where the needle is inserted is called the infusion site. The needle is surrounded by a small plastic tube (cannula). Together, the needle and cannula are referred to as the infusion set. After the infusion set is inserted under the skin, the needle is removed and the cannula stays in place to deliver insulin to the body.  What does an insulin pump do?  Insulin pumps deliver insulin to the body in the following ways:  · Basal rate. This method gives small, continuous amounts of insulin 24 hours a day.  · Bolus dose. This is a specific amount of insulin that you can give yourself right away. You may need this after eating a meal or when you have high blood sugar (glucose). You will be instructed on the amount to give depending on your blood glucose level reading.    What are some advantages of using an insulin pump?  The advantages of using an insulin pump vary depending on which type of diabetes you have. Advantages may include:  · Reduced need to give yourself multiple insulin injections. With an insulin pump, you need to insert a needle into your body one time every 2-3 days instead of every time that you need insulin.  · More control over your diabetes and more flexibility for scheduling meals, snacks, and exercise.  · Reduced risk of low blood glucose (hypoglycemia).  · Better management of the increases in blood glucose in the early morning (alyse phenomenon).  · More precise insulin doses, which may mean an improvement in blood glucose levels.  · A more predictable insulin absorption rate.  · Easier delivery of basal rate insulin.    What are some disadvantages of using an insulin pump?  · An insulin pump and its supplies might cost more than the supplies for injections.  · You may need to check your blood glucose level more often than when you give yourself injections.  · You have the pump attached to you wherever you go, for 24 hours a day.  What are the  risks?  · The infusion site can get irritated or infected. To avoid those risks:  ? Care for your skin.  ? Change the infusion set as directed by your health care provider.  ? Periodically move your infusion site to a slightly different area.  · Pump failure. This can be caused by a malfunction, such as a blockage in the tubing, the needle moving out of place, or the pump running out of insulin.  · Most pumps have an alarm to warn you of a malfunction. However, if the pump fails and you are unaware that it has failed, you may develop diabetic ketoacidosis. This is a life-threatening complication of diabetes that can occur due to lack of insulin.  · Blockage (occlusion). This can prevent your pump from delivering insulin properly. This can be caused by a bent cannula or a kink in the tubing.  What may cause high blood glucose when using an insulin pump?  Possible causes of high blood glucose (hyperglycemia) when using an insulin pump include:  · Tubing that breaks, leaks, bends, or moves out of place.  · Low charge in a battery.  · Infection at the infusion site. Signs of infection may include:  ? Redness, swelling, or pain.  ? Blood or cloudy fluid.  ? Warmth.  ? Pus or a bad smell.  · A cartridge that is empty or incorrectly loaded into the pump.  · A basal rate that needs to be adjusted.  · Air bubbles in the tubing.  · Illness or stress.  · Changes in hormone levels, such as during a menstrual cycle.  · The pump delivering the wrong amount of insulin.  · An interruption in insulin delivery.  · Poor absorption at the infusion site, even if you recently moved the site.  · Using ineffective insulin. Insulin can become ineffective if it is outdated or exposed to extreme heat or cold.  · A change in your normal activity level.    How to care for your insulin pump  · Refill the insulin cartridge as often as needed. Do not let the cartridge get completely empty.  · Always keep extra pump supplies, syringes, and insulin  with you.  · Store your insulin according to instructions on the packaging.  · If you have questions or a problem that you cannot solve, call your health care provider or call the pump 's customer service line.  How to manage your diabetes while using an insulin pump  · Check your blood glucose 4 or more times a day, or as often as directed.  · Always check your blood glucose at the following times:  ? Before you change your basal or bolus rates.  ? After you change your infusion set.  · Check your A1c (hemoglobin A1c) level as often as directed.  · Continue to manage your diabetes as directed. This includes:  ? Exercising regularly.  ? Eating healthy foods.  ? Managing your weight.  · If you give yourself a correction (supplemental) insulin dose and your blood glucose stays high, check your urine for ketones. A supplemental dose is a specific amount of rapid-acting or short-acting insulin that is used to lower blood glucose if it is too high. You may be instructed to check your blood glucose at certain times of the day and to use corrective insulin as needed.  ? If you have negative ketones, check your pump to see if it is working properly. If your pump does not seem to be working properly, change your cartridge, infusion set, and insertion site. Recheck your blood glucose in 1 hour. If your blood glucose is still high, give yourself an additional supplemental insulin dose with an injection using a syringe and needle.  ? If you have moderate or large ketones in your urine, give yourself an additional supplemental insulin dose with an injection using a syringe and needle. Contact your health care provider for additional instructions.  Contact a health care provider if:  · You have any of the following at your infusion site:  ? Redness, swelling, or pain.  ? Blood or cloudy fluid.  ? Warmth.  ? Pus or a bad smell.  ? A red streak on your skin that leads away from the infusion site.  · You have a  fever.  · You have moderate or large ketone levels in your urine.  · Your pump is not working properly.  · Your blood glucose is higher or lower than the target range that your health care provider gave you.  Summary  · An insulin pump is a small, computerized, battery-powered device that steadily (continuously) delivers insulin to the body.  · Insulin pumps may be used to treat type 1 diabetes (type 1 diabetes mellitus) or type 2 diabetes (type 2 diabetes mellitus).  · After the infusion set is inserted under the skin, the needle is removed and the cannula stays in place to deliver insulin to the body.  · Always check your blood glucose before you change your basal or bolus rates, and after you change your infusion set.  · Refill the insulin cartridge as often as needed. Do not let the cartridge get completely empty. Always keep extra pump supplies, syringes, and insulin with you.  This information is not intended to replace advice given to you by your health care provider. Make sure you discuss any questions you have with your health care provider.  Document Released: 03/26/2002 Document Revised: 07/11/2018 Document Reviewed: 01/20/2017  [employer] Patient Education © 2019 [employer]

## 2019-07-29 DIAGNOSIS — F41.1 GENERALIZED ANXIETY DISORDER: ICD-10-CM

## 2019-07-29 DIAGNOSIS — F33.3 SEVERE EPISODE OF RECURRENT MAJOR DEPRESSIVE DISORDER, WITH PSYCHOTIC FEATURES (HCC): Primary | ICD-10-CM

## 2019-08-02 ENCOUNTER — OFFICE VISIT (OUTPATIENT)
Dept: FAMILY MEDICINE CLINIC | Facility: CLINIC | Age: 44
End: 2019-08-02

## 2019-08-02 VITALS
SYSTOLIC BLOOD PRESSURE: 126 MMHG | TEMPERATURE: 97.7 F | HEART RATE: 102 BPM | WEIGHT: 226 LBS | BODY MASS INDEX: 38.58 KG/M2 | DIASTOLIC BLOOD PRESSURE: 86 MMHG | HEIGHT: 64 IN | RESPIRATION RATE: 16 BRPM | OXYGEN SATURATION: 98 %

## 2019-08-02 DIAGNOSIS — F32.A ANXIETY AND DEPRESSION: Chronic | ICD-10-CM

## 2019-08-02 DIAGNOSIS — F41.9 ANXIETY AND DEPRESSION: Chronic | ICD-10-CM

## 2019-08-02 DIAGNOSIS — E78.5 DYSLIPIDEMIA: Chronic | ICD-10-CM

## 2019-08-02 DIAGNOSIS — IMO0002 UNCONTROLLED TYPE 2 DIABETES WITH NEUROPATHY: Primary | Chronic | ICD-10-CM

## 2019-08-02 PROCEDURE — 99214 OFFICE O/P EST MOD 30 MIN: CPT | Performed by: NURSE PRACTITIONER

## 2019-08-02 NOTE — PATIENT INSTRUCTIONS
Hypoglycemia  Hypoglycemia is when the sugar (glucose) level in your blood is too low. Signs of low blood sugar may include:  · Feeling:  ? Hungry.  ? Worried or nervous (anxious).  ? Sweaty and clammy.  ? Confused.  ? Dizzy.  ? Sleepy.  ? Sick to your stomach (nauseous).  · Having:  ? A fast heartbeat.  ? A headache.  ? A change in your vision.  ? Tingling or no feeling (numbness) around your mouth, lips, or tongue.  ? Jerky movements that you cannot control (seizure).  · Having trouble with:  ? Moving (coordination).  ? Sleeping.  ? Passing out (fainting).  ? Getting upset easily (irritability).  Low blood sugar can happen to people who have diabetes and people who do not have diabetes. Low blood sugar can happen quickly, and it can be an emergency.  Treating low blood sugar  Low blood sugar is often treated by eating or drinking something sugary right away. If you can think clearly and swallow safely, follow the 15:15 rule:  · Take 15 grams of a fast-acting carb (carbohydrate). Some fast-acting carbs are:  ? 1 tube of glucose gel.  ? 3 sugar tablets (glucose pills).  ? 6-8 pieces of hard candy.  ? 4 oz (120 mL) of fruit juice.  ? 4 oz (120 mL) of regular (not diet) soda.  · Check your blood sugar 15 minutes after you take the carb.  · If your blood sugar is still at or below 70 mg/dL (3.9 mmol/L), take 15 grams of a carb again.  · If your blood sugar does not go above 70 mg/dL (3.9 mmol/L) after 3 tries, get help right away.  · After your blood sugar goes back to normal, eat a meal or a snack within 1 hour.    Treating very low blood sugar  If your blood sugar is at or below 54 mg/dL (3 mmol/L), you have very low blood sugar (severe hypoglycemia). This may also cause:  · Passing out.  · Jerky movements you cannot control (seizure).  · Losing consciousness (coma).  This is an emergency. Do not wait to see if the symptoms will go away. Get medical help right away. Call your local emergency services (911 in the  [place] Do not drive yourself to the hospital.  If you have very low blood sugar and you cannot eat or drink, you may need a glucagon shot (injection). A family member or friend should learn how to check your blood sugar and how to give you a glucagon shot. Ask your doctor if you need to have a glucagon shot kit at home.  Follow these instructions at home:  General instructions  · Take over-the-counter and prescription medicines only as told by your doctor.  · Stay aware of your blood sugar as told by your doctor.  · Limit alcohol intake to no more than 1 drink a day for nonpregnant women and 2 drinks a day for men. One drink equals 12 oz of beer, 5 oz of wine, or 1½ oz of hard liquor.  · Keep all follow-up visits as told by your doctor. This is important.  If you have diabetes:    · Always keep a source of fast-acting carb with you, such as:  ? Glucose gel.  ? Sugar tablets.  ? Hard candy.  ? Fruit juice.  ? Regular soda (not diet soda).  · Follow your diabetes care plan as told by your doctor. Make sure you:  ? Know the signs of low blood sugar.  ? Take your medicines as told.  ? Follow your exercise and meal plan.  ? Eat on time. Do not skip meals.  ? Check your blood sugar as often as told by your doctor. Always check it before and after exercise.  ? Follow your sick day plan when you cannot eat or drink normally. Make this plan ahead of time with your doctor.  · Share your diabetes care plan with:  ? Your work or school.  ? People you live with.  · Check your pee (urine) for ketones:  ? When you are sick.  ? As told by your doctor.  · Carry a card or wear jewelry that says you have diabetes.  Contact a doctor if:  · You have trouble keeping your blood sugar in your target range.  · You have low blood sugar often.  Get help right away if:  · You still have symptoms after you eat or drink something sugary.  · Your blood sugar is at or below 54 mg/dL (3 mmol/L).  · You have jerky movements that you cannot  control.  · You pass out.  These symptoms may be an emergency. Do not wait to see if the symptoms will go away. Get medical help right away. Call your local emergency services (911 in the [place] Do not drive yourself to the hospital.  Summary  · Hypoglycemia happens when the level of sugar (glucose) in your blood is too low.  · Low blood sugar can happen to people who have diabetes and people who do not have diabetes. Low blood sugar can happen quickly, and it can be an emergency.  · Make sure you know the signs of low blood sugar and know how to treat it.  · Always keep a source of sugar (fast-acting carb) with you to treat low blood sugar.  This information is not intended to replace advice given to you by your health care provider. Make sure you discuss any questions you have with your health care provider.  Document Released: 03/14/2011 Document Revised: 07/18/2018 Document Reviewed: 01/20/2017  [employer] Interactive Patient Education © 2019 [employer]  Type 2 Diabetes Mellitus, Self Care, Adult  Caring for yourself after you have been diagnosed with type 2 diabetes (type 2 diabetes mellitus) means keeping your blood sugar (glucose) under control with a balance of:  · Nutrition.  · Exercise.  · Lifestyle changes.  · Medicines or insulin, if necessary.  · Support from your team of health care providers and others.  The following information explains what you need to know to manage your diabetes at home.  What are the risks?  Having diabetes can put you at risk for other long-term (chronic) conditions, such as heart disease and kidney disease. Your health care provider may prescribe medicines to help prevent complications from diabetes. These medicines may include:  · Aspirin.  · Medicine to lower cholesterol.  · Medicine to control blood pressure.  How to monitor blood glucose    · Check your blood glucose every day, as often as told by your health care provider.  · Have your A1c (hemoglobin A1c) level checked  two or more times a year, or as often as told by your health care provider.  Your health care provider will set individualized treatment goals for you. Generally, the goal of treatment is to maintain the following blood glucose levels:  · Before meals (preprandial):  mg/dL (4.4-7.2 mmol/L).  · After meals (postprandial): below 180 mg/dL (10 mmol/L).  · A1c level: less than 7%.  How to manage hyperglycemia and hypoglycemia  Hyperglycemia symptoms  Hyperglycemia, also called high blood glucose, occurs when blood glucose is too high. Make sure you know the early signs of hyperglycemia, such as:  · Increased thirst.  · Hunger.  · Feeling very tired.  · Needing to urinate more often than usual.  · Blurry vision.  Hypoglycemia symptoms  Hypoglycemia, also called low blood glucose, occurs with a blood glucose level at or below 70 mg/dL (3.9 mmol/L). The risk for hypoglycemia increases during or after exercise, during sleep, during illness, and when skipping meals or not eating for a long time (fasting).  It is important to know the symptoms of hypoglycemia and treat it right away. Always have a 15-gram rapid-acting carbohydrate snack with you to treat low blood glucose. Family members and close friends should also know the symptoms and should understand how to treat hypoglycemia, in case you are not able to treat yourself. Symptoms may include:  · Hunger.  · Anxiety.  · Sweating and feeling clammy.  · Confusion.  · Dizziness or feeling light-headed.  · Sleepiness.  · Nausea.  · Increased heart rate.  · Headache.  · Blurry vision.  · Irritability.  · A change in coordination.  · Tingling or numbness around the mouth, lips, or tongue.  · Restless sleep.  · Fainting.  · Seizure.  Treating hypoglycemia  If you are alert and able to swallow safely, follow the 15:15 rule:  · Take 15 grams of a rapid-acting carbohydrate. Rapid-acting options include:  ? 1 tube of glucose gel.  ? 3 glucose pills.  ? 6-8 pieces of hard  candy.  ? 4 oz (120 mL) of fruit juice.  ? 4 oz (120 mL) of regular (not diet) soda.  · Check your blood glucose 15 minutes after you take the carbohydrate.  · If the repeat blood glucose level is still at or below 70 mg/dL (3.9 mmol/L), take 15 grams of a carbohydrate again.  · If your blood glucose level does not increase above 70 mg/dL (3.9 mmol/L) after 3 tries, seek emergency medical care.  · After your blood glucose level returns to normal, eat a meal or a snack within 1 hour.  Treating severe hypoglycemia  Severe hypoglycemia is when your blood glucose level is at or below 54 mg/dL (3 mmol/L). Severe hypoglycemia is an emergency. Do not wait to see if the symptoms will go away. Get medical help right away. Call your local emergency services (911 in the [place]  If you have severe hypoglycemia and you cannot eat or drink, you may need an injection of glucagon. A family member or close friend should learn how to check your blood glucose and how to give you a glucagon injection. Ask your health care provider if you need to have an emergency glucagon injection kit available.  Severe hypoglycemia may need to be treated in a hospital. The treatment may include getting glucose through an IV. You may also need treatment for the cause of your hypoglycemia.  Follow these instructions at home:  Take diabetes medicines as told  · If your health care provider prescribed insulin or diabetes medicines, take them every day.  · Do not run out of insulin or other diabetes medicines that you take. Plan ahead so you always have these available.  · If you use insulin, adjust your dosage based on how physically active you are and what foods you eat. Your health care provider will tell you how to adjust your dosage.  Make healthy food choices    The things that you eat and drink affect your blood glucose and your insulin dosage. Making good choices helps to control your diabetes and prevent other health problems. A healthy meal plan  includes eating lean proteins, complex carbohydrates, fresh fruits and vegetables, low-fat dairy products, and healthy fats.  Make an appointment to see a diet and nutrition specialist (registered dietitian) to help you create an eating plan that is right for you. Make sure that you:  · Follow instructions from your health care provider about eating or drinking restrictions.  · Drink enough fluid to keep your urine pale yellow.  · Keep a record of the carbohydrates that you eat. Do this by reading food labels and learning the standard serving sizes of foods.  · Follow your sick day plan whenever you cannot eat or drink as usual. Make this plan in advance with your health care provider.    Stay active  Exercise regularly, as told by your health care provider. This may include:  · Stretching and doing strength exercises, such as yoga or weightlifting, 2 or more times a week.  · Doing 150 minutes or more of moderate-intensity or vigorous-intensity exercise each week. This could be brisk walking, biking, or water aerobics.  ? Spread out your activity over 3 or more days of the week.  ? Do not go more than 2 days in a row without doing some kind of physical activity.  When you start a new exercise or activity, work with your health care provider to adjust your insulin, medicines, or food intake as needed.  Make healthy lifestyle choices  · Do not use any tobacco products, such as cigarettes, chewing tobacco, and e-cigarettes. If you need help quitting, ask your health care provider.  · If your health care provider says that alcohol is safe for you, limit alcohol intake to no more than 1 drink per day for nonpregnant women and 2 drinks per day for men. One drink equals 12 oz of beer, 5 oz of wine, or 1½ oz of hard liquor.  · Learn to manage stress. If you need help with this, ask your health care provider.  Care for your body    · Keep your immunizations up to date. In addition to getting vaccinations as told by your  health care provider, it is recommended that you get vaccinated against the following illnesses:  ? The flu (influenza). Get a flu shot every year.  ? Pneumonia.  ? Hepatitis B.  · Schedule an eye exam soon after your diagnosis, and then one time every year after that.  · Check your skin and feet every day for cuts, bruises, redness, blisters, or sores. Schedule a foot exam with your health care provider once every year.  · Brush your teeth and gums two times a day, and floss one or more times a day. Visit your dentist one or more times every 6 months.  · Maintain a healthy weight.  General instructions  · Take over-the-counter and prescription medicines only as told by your health care provider.  · Share your diabetes management plan with people in your workplace, school, and household.  · Carry a medical alert card or wear medical alert jewelry.  · Keep all follow-up visits as told by your health care provider. This is important.  Questions to ask your health care provider  · Do I need to meet with a diabetes educator?  · Where can I find a support group for people with diabetes?  Where to find more information  For more information about diabetes, visit:  · American Diabetes Association (ADA): www.diabetes.org  · American Association of Diabetes Educators (AADE): www.diabeteseducator.org  Summary  · Caring for yourself after you have been diagnosed with (type 2 diabetes mellitus) means keeping your blood sugar (glucose) under control with a balance of nutrition, exercise, lifestyle changes, and medicine.  · Check your blood glucose every day, as often as told by your health care provider.  · Having diabetes can put you at risk for other long-term (chronic) conditions, such as heart disease and kidney disease. Your health care provider may prescribe medicines to help prevent complications from diabetes.  · Keep all follow-up visits as told by your health care provider. This is important.  This information is not  intended to replace advice given to you by your health care provider. Make sure you discuss any questions you have with your health care provider.  Document Released: 04/10/2017 Document Revised: 07/20/2018 Document Reviewed: 01/20/2017  [employer] Interactive Patient Education © 2019 [employer]  Glucagon injection  What is this medicine?  GLUCAGON (GLU ka thuy) occurs naturally in the body. It increases blood sugar. This medicine is used as an emergency treatment for severe low blood sugar in diabetic patients, especially if they are not able to take sugar by mouth. It is also used during diagnostic endoscopy or radiology examinations of the stomach and other digestive organs.  This medicine may be used for other purposes; ask your health care provider or pharmacist if you have questions.  COMMON BRAND NAME(S): GlucaGen, Glucagon  What should I tell my health care provider before I take this medicine?  They need to know if you have any of these conditions:  -adrenal disease  -eating less due to illness, surgery, dieting, or any other reason  -heart disease  -malnutrition  -pancreatic tumors  -pheochromocytoma  -an unusual or allergic reaction to glucagon, other medicines, foods, dyes, or preservatives  -pregnant or trying to get pregnant  -breast-feeding  How should I use this medicine?  This medicine is for injection into a muscle or under the skin. Sometimes this medicine is for injection into a vein if it is given by a health-care professional in a hospital or clinic setting. If you will need this medicine at home, you and the person(s) most likely to give you the injection will be taught how to prepare and give this medicine. Instructions for mixing and giving the injection are included in the package. Before an emergency arises, you and the person(s) most likely to give you the injection should read these instructions carefully. Use exactly as directed. Do not take your medicine more often than directed.  It  is important that you put your used needles and syringes in a special sharps container. Do not put them in a trash can. If you do not have a sharps container, call your pharmacist or healthcare provider to get one.  Talk to your pediatrician regarding the use of this medicine in children. While this medicine may be prescribed for selected conditions, precautions do apply.  Overdosage: If you think you have taken too much of this medicine contact a poison control center or emergency room at once.  NOTE: This medicine is only for you. Do not share this medicine with others.  What if I miss a dose?  This does not apply.  What may interact with this medicine?  This medicine is only used during an emergency. Significant drug interactions are not likely during that time.  This list may not describe all possible interactions. Give your health care provider a list of all the medicines, herbs, non-prescription drugs, or dietary supplements you use. Also tell them if you smoke, drink alcohol, or use illegal drugs. Some items may interact with your medicine.  What should I watch for while using this medicine?  If you receive this medicine as part of a diagnostic test, follow your health care providers advice for eating and drinking after the procedure.  If you have this kit to help treat low blood sugar:  Keep this kit with you at all times. Wear a medical identification bracelet or chain to say you have diabetes, and carry a card that lists all your medications.  Show your family members and others where you keep this kit. Make sure that you and your family or caregiver know how to use this kit the right way before you need it. They need to know how to use it before you need it.  Learn how to check your blood sugar. Learn the symptoms of low and high blood sugar and how to manage them.  Always carry a quick-source of sugar with you in case you have symptoms of low blood sugar. Examples include hard sugar candy or glucose  tablets. Make sure others know that you can choke if you eat or drink when you develop serious symptoms of low blood sugar, such as seizures or unconsciousness. They must get medical help at once. Also, remind others that they may need to give you this medicine injection before medical help is available. A repeat injection may be needed while waiting for medical help.  Always get immediate medical help after receiving an injection of this medicine. This is very important. Do this even if you respond to the medicine and are alert.  After you are alert and can swallow after an injection of this medicine, you should eat or drink some carbohydrates to prevent continued low blood sugar.  Do not drive or operate machinery until you are alert and have eaten sugar or a sugar-sweetened product such as a regular soft drink or fruit juice.  What side effects may I notice from receiving this medicine?  Side effects that you should report to your doctor or health care professional as soon as possible:  -allergic reactions like skin rash, itching or hives, swelling of the face, lips, or tongue)  -breathing problems  -chest pain  -dizziness  -fast, irregular heartbeat that does not go away  -high or low blood pressure  -redness, blistering, peeling or loosening of the skin, including inside the mouth  -signs and symptoms of low blood sugar such as feeling anxious; confusion; dizziness; increased hunger; unusually weak or tired; sweating; shakiness; cold; irritable; headache; blurred vision; fast heartbeat; loss of consciousness  Side effects that usually do not require medical attention (report to your doctor or health care professional if they continue or are bothersome):  -nausea, vomiting  -temporary fast heartbeat  This list may not describe all possible side effects. Call your doctor for medical advice about side effects. You may report side effects to FDA at 5-077-FDA-2239.  Where should I keep my medicine?  Keep out of the  reach of children.  Store unopened at room temperature between 20 and 25 degrees C (68 and 77 degrees F) for up to 24 months (2 years). Store in the original container. Protect from light. Do not freeze. Throw away any unused medicine after the expiration date or after 2 years, whichever comes first.  After you dissolve the powder in the diluting solution, use it immediately. Do not store for later use. Throw away any unused solution.  NOTE: This sheet is a summary. It may not cover all possible information. If you have questions about this medicine, talk to your doctor, pharmacist, or health care provider.  © 2019 [employer] (2018-07-12 16:37:36)

## 2019-08-02 NOTE — PROGRESS NOTES
"  Nivia is a 43 y/o female who presents to the clinic today  for follow up  in regards to her DM, type 2.  She has worn her Continuous Glucose Monitor for appx one week and she really liked using it. Today Joanna Hobbs RN from [employer] is here to educate Nivia and her  on Nivia's insulin pump.   In addition, Nivia has a H/O TIA ,Dyslipidemia and Obesity. She has decided she would like to begin the process for Bariatric Surgery since she has tried \"everything she knows to do to lose weight\" without success. She feels this would improve her quality of life and she would be able to \"come off multiple medications.\"  We will spend time today reviewing her progress from her initial visit.     Diabetes   She presents for follow up diabetes visit. She has type 2 diabetes mellitus. The initial diagnosis of diabetes was made 20 years ago. Her disease course has been improving at this time.  Associated symptoms include blurred vision, fatigue, foot paresthesias, polydipsia, polyuria and visual change. Pertinent negatives for diabetes include no foot ulcerations and no polyphagia. She does report she has had several hypoglycemic episodes with the lowest in the 50's. She generally treats with orange juice and peanut butter crackers.   Diabetic complications include peripheral neuropathy.She was changed to Cymbalta which she reports is helping although she does have breakthrough pain. She has quit Neurontin due to weight gain. Risk factors for coronary artery disease include post-menopausal, stress and dyslipidemia. Current diabetic treatment includes multiple daily insulin injections.She has tried multiple oral agents including GLP-1 Agonists and Invokana without successful glycemic control  She monitors her blood sugars at least four to five times daily.  She is compliant with treatment all of the time. She is following a generally healthy diet. She has had a previous visit with a dietitian. She participates " "in exercise at this time but finding it more difficult due to her Neuropathy. She walks 3 miles on a regular basis. Her home blood glucose trend is decreasing steadily  An ACE inhibitor/angiotensin II receptor blocker is not  being taken at this time. Eye exam is current.   Last A1C done on 05/08/2019 was 9 which was an improvement of 2 %.     Hyperlipidemia   The current episode started more than 1 year ago. Pertinent negatives include no chest pain or shortness of breath. Her last lipid panel was done at [facility] on 5/08/2019 was reviewed. She is taking Atorvastatin 20 mg.     Vitals:    08/02/19 1343   BP: 126/86   BP Location: Left arm   Patient Position: Sitting   Pulse: 102   Resp: 16   Temp: 97.7 °F (36.5 °C)   TempSrc: Temporal   SpO2: 98%   Weight: 103 kg (226 lb)   Height: 162.6 cm (64\")   PainSc:   7   PainLoc: Head      The following portions of the patient's history were reviewed and updated as appropriate: allergies, current medications, past family history, past medical history, past social history, past surgical history and problem list.    Review of Systems   Constitutional: Positive for activity change (increasing mowing yard with push mower) and fatigue. Negative for appetite change, chills, fever and unexpected weight change.   Eyes: Negative for visual disturbance.   Respiratory: Negative for cough, chest tightness, shortness of breath and wheezing.    Cardiovascular: Negative for chest pain, palpitations and leg swelling.   Gastrointestinal: Negative for abdominal pain, constipation, diarrhea, nausea and vomiting.   Endocrine: Negative for cold intolerance, heat intolerance, polydipsia, polyphagia and polyuria.   Skin: Negative for color change and rash.   Neurological: Positive for numbness and headaches. Negative for dizziness, tremors, speech difficulty, weakness and light-headedness.   Hematological: Negative for adenopathy.   Psychiatric/Behavioral: Negative for confusion, " decreased concentration and suicidal ideas. The patient is not nervous/anxious.    All other systems reviewed and are negative.    Physical Exam   Constitutional: She is oriented to person, place, and time. She appears well-developed and well-nourished. No distress.   HENT:   Head: Normocephalic.   Nose: Nose normal.   Mouth/Throat: Oropharynx is clear and moist and mucous membranes are normal. No oropharyngeal exudate.   Eyes: Conjunctivae are normal. Pupils are equal, round, and reactive to light. Right eye exhibits no discharge. Left eye exhibits no discharge. No scleral icterus.   Neck: Neck supple. No JVD present. No thyromegaly present.   Cardiovascular: Normal rate, regular rhythm and normal heart sounds. Exam reveals no friction rub.   No murmur heard.  Pulmonary/Chest: Effort normal and breath sounds normal. No respiratory distress. She has no decreased breath sounds. She has no wheezes. She has no rales.   Abdominal: Soft. Bowel sounds are normal. She exhibits no distension. There is no tenderness. There is no rebound and no guarding.   Lymphadenopathy:     She has no cervical adenopathy.   Neurological: She is alert and oriented to person, place, and time.   Skin: Skin is warm and dry. Capillary refill takes less than 2 seconds. No rash noted. No erythema.   Psychiatric: She has a normal mood and affect. Her behavior is normal. Judgment and thought content normal.   Vitals reviewed.    Assessment/Plan     Patient's Body mass index is 38.79 kg/m². BMI is above normal parameters. Recommendations include: exercise counseling and nutrition counseling.   (Normal BMI:  18.5-24.9, OW 25-29.9, Obesity 30 or greater)    Assessment/Plan (cont)  Problems Addressed this Visit        Endocrine    Uncontrolled type 2 diabetes with neuropathy (CMS/HCC) - Primary    Relevant Medications    insulin lispro (HUMALOG) 100 UNIT/ML injection       Other    Anxiety and depression (Chronic)    Dyslipidemia    BMI 38.0-38.9,adult       Findings and recommendations reviewed with Nivia and her . Insulin pump initiation today went well. Insulin pump parameters reviewed and on order sheet. Reinforced Diabetes Self Management skills including hypoglycemia s/s and treatment. Lifestyle modifications including nutrition and exercise recommendations reinforced. Nivia will f/u on August 23 rd; sooner if problems/concerns occur.     This document has been electronically signed by NIDA Starr, FAY-BC, DOUG

## 2019-08-09 ENCOUNTER — OFFICE VISIT (OUTPATIENT)
Dept: PSYCHIATRY | Facility: CLINIC | Age: 44
End: 2019-08-09

## 2019-08-09 VITALS
BODY MASS INDEX: 38.99 KG/M2 | WEIGHT: 228.4 LBS | HEART RATE: 102 BPM | HEIGHT: 64 IN | DIASTOLIC BLOOD PRESSURE: 72 MMHG | SYSTOLIC BLOOD PRESSURE: 138 MMHG

## 2019-08-09 DIAGNOSIS — F33.3 SEVERE EPISODE OF RECURRENT MAJOR DEPRESSIVE DISORDER, WITH PSYCHOTIC FEATURES (HCC): ICD-10-CM

## 2019-08-09 DIAGNOSIS — F41.1 GENERALIZED ANXIETY DISORDER: Primary | ICD-10-CM

## 2019-08-09 PROCEDURE — 99213 OFFICE O/P EST LOW 20 MIN: CPT | Performed by: NURSE PRACTITIONER

## 2019-08-09 RX ORDER — TRAZODONE HYDROCHLORIDE 50 MG/1
25-50 TABLET ORAL NIGHTLY
Qty: 30 TABLET | Refills: 0 | Status: SHIPPED | OUTPATIENT
Start: 2019-08-09 | End: 2019-09-05 | Stop reason: SDUPTHER

## 2019-08-09 RX ORDER — LAMOTRIGINE 25 MG/1
25 TABLET ORAL DAILY
Qty: 30 TABLET | Refills: 0 | Status: SHIPPED | OUTPATIENT
Start: 2019-08-09 | End: 2019-09-05 | Stop reason: SDUPTHER

## 2019-08-09 NOTE — PROGRESS NOTES
Subjective   Nivia Bernstein is a 44 y.o. female is here today for medication management follow-up.    Chief Complaint:  Depression and anxiety     History of Present Illness:  Patient presents today with  for follow-up for medication management.  Patient states she did begin taking the Prozac 10 mg however she has had a headache the entire month that she has been on it.  Patient states the headache will be really bad in the mornings and then eased off in the evenings.  Patient does not feel her anxiety or depression is changed any.  Patient rates her depression at a 8 on a 0-to-10 scale with 10 being the worst.  Patient states she is still having periods with decreased energy, decreased motivation, irritability, and depressed mood.  Patient states her anxiety is still at an 8 on a 0-to-10 scale with 10 being the worst.  Patient states she still having her OCD signs and symptoms regarding cleaning.  Patient states she is also having symptoms of anxiety of feeling nervous and overwhelmed.  Patient denies any panic attacks.  Patient states she is still not sleeping any better and is only getting 3 to 4 hours of sleep at night.  Patient states when she lays down her mind will not shut down for her to go to sleep.  Patient denies any nightmares.  Patient states she does have good dreams.  Patient reports no change in appetite and still eating 3 meals per day.  Denies any auditory or visual hallucinations.  Patient adamantly denies any SI or HI.  Patient denies medical changes since last visit.  The following portions of the patient's history were reviewed and updated as appropriate: allergies, current medications, past family history, past medical history, past social history, past surgical history and problem list.    Review of Systems   Constitutional: Negative.    Respiratory: Negative.    Cardiovascular: Negative.    Neurological: Positive for headaches.   Psychiatric/Behavioral: Positive for agitation,  "dysphoric mood and sleep disturbance. The patient is nervous/anxious.        Objective   Physical Exam   Constitutional: Vital signs are normal. She appears well-developed and well-nourished. She is cooperative.   Neurological: She is alert.   Psychiatric: Her speech is normal and behavior is normal. Judgment and thought content normal. Cognition and memory are normal. She exhibits a depressed mood.   Vitals reviewed.    Blood pressure 138/72, pulse 102, height 162.6 cm (64\"), weight 104 kg (228 lb 6.4 oz), not currently breastfeeding.Body mass index is 39.2 kg/m².    Allergies   Allergen Reactions   • Mobic [Meloxicam] Rash   • Novolog [Insulin Aspart] Hives   • Penicillins Angioedema       Medication List:   Current Outpatient Medications   Medication Sig Dispense Refill   • Albuterol Sulfate (VENTOLIN HFA IN) Inhale.     • aspirin 325 MG tablet Take 325 mg by mouth Daily.     • atorvastatin (LIPITOR) 20 MG tablet Take 20 mg by mouth Daily.  2   • cetirizine (zyrTEC) 10 MG tablet Take 10 mg by mouth every night at bedtime.  2   • DULoxetine (CYMBALTA) 30 MG capsule Take 1 capsule by mouth 2 (Two) Times a Day. 60 capsule 2   • GLOBAL EASE INJECT PEN NEEDLES 31G X 8 MM misc USE AS DIRECTED TO INJECT VICTOZA AND BASAGLAR DAILY  10   • glucagon (GLUCAGEN) 1 MG injection Inject 1 mg under the skin into the appropriate area as directed 1 (One) Time As Needed for Low Blood Sugar for up to 1 dose. 1 kit 3   • glucose blood test strip Use tid 100 each 12   • HUMALOG KWIKPEN 100 UNIT/ML solution pen-injector Inject 45 Units under the skin into the appropriate area as directed 3 (Three) Times a Day As Needed (For elevated blood sugars). 15 pen 3   • hydrOXYzine (VISTARIL) 25 MG capsule daily  1   • ibuprofen (ADVIL,MOTRIN) 800 MG tablet Take 800 mg by mouth 3 (Three) Times a Day.  2   • Insulin Glargine (TOUJEO SOLOSTAR) 300 UNIT/ML solution pen-injector Inject 100 Units under the skin into the appropriate area as directed 2 " (Two) Times a Day. 10 pen 5   • insulin lispro (HUMALOG) 100 UNIT/ML injection Per insulin pump. Maximum insulin dose 300 units daily 6 each 12   • lamoTRIgine (LAMICTAL) 25 MG tablet Take 1 tablet by mouth Daily. 30 tablet 0   • metFORMIN (GLUCOPHAGE) 1000 MG tablet Take 1,000 mg by mouth 2 (Two) Times a Day With Meals.     • omeprazole (priLOSEC) 20 MG capsule Take 20 mg by mouth Daily.     • traZODone (DESYREL) 50 MG tablet Take 0.5-1 tablets by mouth Every Night. 30 tablet 0   • vitamin D (ERGOCALCIFEROL) 36216 units capsule capsule Take 50,000 Units by mouth 1 (One) Time Per Week.       No current facility-administered medications for this visit.        Mental Status Exam:   Hygiene:   good  Cooperation:  Cooperative  Eye Contact:  Good  Psychomotor Behavior:  Appropriate  Affect:  Appropriate  Hopelessness: Denies  Speech:  Normal  Thought Process:  Goal directed and Linear  Thought Content:  Normal  Suicidal:  None  Homicidal:  None  Hallucinations:  None  Delusion:  None  Memory:  Intact  Orientation:  Person, Place, Time and Situation  Reliability:  fair  Insight:  Fair  Judgement:  Fair  Impulse Control:  Fair  Physical/Medical Issues:  No              Assessment/Plan   Diagnoses and all orders for this visit:    Generalized anxiety disorder  -     traZODone (DESYREL) 50 MG tablet; Take 0.5-1 tablets by mouth Every Night.    Severe episode of recurrent major depressive disorder, with psychotic features (CMS/HCC)  -     traZODone (DESYREL) 50 MG tablet; Take 0.5-1 tablets by mouth Every Night.    Other orders  -     lamoTRIgine (LAMICTAL) 25 MG tablet; Take 1 tablet by mouth Daily.        Discussed medication options with patient.  Discontinue Prozac.  Start Lamictal 25 mg.  Start trazodone 50 mg.  Discussed with patient and  risks, benefits, and side effects medications.  Patient and  acknowledged and verbally consented.  Discussed with patient and  risk of Beckford-Frankie syndrome.  Discussed with patient and  if patient develops any rash to notify the office immediately and stop medication.  Discussed with patient if having any questions or concerns to call the office.  Discussed with patient if having any SI or HI to call 911 or go to the nearest ER.  Patient and  agreeable to current treatment plan.       Follow up in four weeks.     Errors in dictation may reflect use of voice recognition software and not all errors in transcription may have been detected prior to signing.            This document has been electronically signed by NIDA Vasquez   August 9, 2019 11:09 AM

## 2019-08-23 ENCOUNTER — PRIOR AUTHORIZATION (OUTPATIENT)
Dept: FAMILY MEDICINE CLINIC | Facility: CLINIC | Age: 44
End: 2019-08-23

## 2019-08-23 ENCOUNTER — OFFICE VISIT (OUTPATIENT)
Dept: FAMILY MEDICINE CLINIC | Facility: CLINIC | Age: 44
End: 2019-08-23

## 2019-08-23 VITALS
HEIGHT: 64 IN | SYSTOLIC BLOOD PRESSURE: 120 MMHG | OXYGEN SATURATION: 96 % | DIASTOLIC BLOOD PRESSURE: 72 MMHG | HEART RATE: 98 BPM | TEMPERATURE: 97.8 F | BODY MASS INDEX: 39.13 KG/M2 | WEIGHT: 229.2 LBS

## 2019-08-23 DIAGNOSIS — K21.9 GASTROESOPHAGEAL REFLUX DISEASE WITHOUT ESOPHAGITIS: Chronic | ICD-10-CM

## 2019-08-23 DIAGNOSIS — N28.9 RENAL INSUFFICIENCY: ICD-10-CM

## 2019-08-23 DIAGNOSIS — J45.20 MILD INTERMITTENT ASTHMA WITHOUT COMPLICATION: Chronic | ICD-10-CM

## 2019-08-23 DIAGNOSIS — F41.9 ANXIETY AND DEPRESSION: Chronic | ICD-10-CM

## 2019-08-23 DIAGNOSIS — F32.A ANXIETY AND DEPRESSION: Chronic | ICD-10-CM

## 2019-08-23 DIAGNOSIS — E78.5 DYSLIPIDEMIA: Chronic | ICD-10-CM

## 2019-08-23 DIAGNOSIS — IMO0002 UNCONTROLLED TYPE 2 DIABETES WITH NEUROPATHY: Primary | Chronic | ICD-10-CM

## 2019-08-23 PROCEDURE — 99214 OFFICE O/P EST MOD 30 MIN: CPT | Performed by: NURSE PRACTITIONER

## 2019-08-23 NOTE — PROGRESS NOTES
"    History of Present Illness   Nivia is a 43 y/o female who presents to the clinic today  for follow up  in regards to her DM, type 2  with neuropathy and renal insufficiency treated  with an insulin pump. In addition, Nivia has  Dyslipidemia, GERD, and  Obesity. She decided she would like to have Bariatric Surgery since she has tried \"everything she knows to do to lose weight\" without success. She feels this would improve her quality of life and she would be able to \"come off multiple medications.\"  We will spend time today reviewing her progress from her initial visit.     Diabetes   She presents for follow up diabetes visit. She has type 2 diabetes mellitus. The initial diagnosis of diabetes was made 20 years ago. Her disease course has been improving at this time.  Associated symptoms include blurred vision, fatigue, foot paresthesias, polydipsia, polyuria and visual change. Pertinent negatives for diabetes include no foot ulcerations and no polyphagia. She does report she has had several hypoglycemic episodes with the lowest in the 50's. She generally treats with orange juice and peanut butter crackers.   Diabetic complications include peripheral neuropathy.She was changed to Cymbalta which she reports is helping although she does have breakthrough pain. She has quit Neurontin due to side effects. Risk factors for coronary artery disease include post-menopausal, stress and dyslipidemia. Current diabetic treatment includes insulin pump therapy. .She has tried multiple oral agents including GLP-1 Agonists and Invokana without successful glycemic control  She monitors her blood sugars at least four to five times daily.  She is compliant with treatment all of the time. She is following a generally healthy diet. She has had a previous visit with a dietitian. She participates in exercise at this time but finding it more difficult due to her Neuropathy. She walks 3 miles on a regular basis. Her home blood " "glucose trend is decreasing steadily  An ACE inhibitor/angiotensin II receptor blocker is not  being taken at this time. Eye exam is current.   Last A1C done on 05/08/2019 was 9 which was an improvement of 2 %.      Hyperlipidemia   The current episode started more than 1 year ago. Pertinent negatives include no chest pain or shortness of breath. Her last lipid panel was done at [facility] on 5/08/2019. She is taking Atorvastatin 20 mg.     Vitals:    08/23/19 0847   BP: 120/72   Pulse: 98   Temp: 97.8 °F (36.6 °C)   TempSrc: Oral   SpO2: 96%   Weight: 104 kg (229 lb 3.2 oz)   Height: 162.6 cm (64\")      The following portions of the patient's history were reviewed and updated as appropriate: allergies, current medications, past family history, past medical history, past social history, past surgical history and problem list.    Review of Systems   Constitutional: Positive for appetite change (Increasing) and fatigue. Negative for activity change, chills, fever and unexpected weight change.   HENT: Negative.    Eyes: Positive for visual disturbance.   Respiratory: Negative for cough, chest tightness, shortness of breath and wheezing.    Cardiovascular: Positive for leg swelling (Intermittent). Negative for chest pain and palpitations.   Gastrointestinal: Negative for abdominal pain, constipation, diarrhea, nausea and vomiting.   Endocrine: Negative for cold intolerance, heat intolerance, polydipsia, polyphagia and polyuria.   Musculoskeletal: Positive for arthralgias.   Skin: Negative for color change and rash.   Neurological: Positive for numbness. Negative for dizziness, tremors, speech difficulty, weakness, light-headedness and headaches.   Hematological: Negative for adenopathy.   Psychiatric/Behavioral: Positive for sleep disturbance. Negative for confusion, decreased concentration and suicidal ideas. The patient is not nervous/anxious.    All other systems reviewed and are negative.    Physical Exam "   Constitutional: She is oriented to person, place, and time. She appears well-developed and well-nourished. No distress.   HENT:   Head: Normocephalic.   Nose: Nose normal.   Mouth/Throat: Oropharynx is clear and moist and mucous membranes are normal. No oropharyngeal exudate.   Eyes: Conjunctivae and EOM are normal. Pupils are equal, round, and reactive to light. Right eye exhibits no discharge. Left eye exhibits no discharge. No scleral icterus.   Neck: Neck supple. No JVD present. No thyromegaly present.   Cardiovascular: Normal rate, regular rhythm and normal heart sounds. Exam reveals no friction rub.   No murmur heard.  Pulmonary/Chest: Effort normal and breath sounds normal. No respiratory distress. She has no decreased breath sounds. She has no wheezes. She has no rhonchi. She has no rales.   Abdominal: Soft. She exhibits no distension. There is no tenderness. There is no rebound and no guarding.   Musculoskeletal: She exhibits no edema.   Lymphadenopathy:     She has no cervical adenopathy.   Neurological: She is alert and oriented to person, place, and time. No cranial nerve deficit.   Skin: Skin is warm and dry. Capillary refill takes less than 2 seconds. No rash noted. No erythema.   Psychiatric: She has a normal mood and affect. Her speech is normal and behavior is normal. Judgment and thought content normal.   Nursing note and vitals reviewed.    Assessment/Plan     Patient's Body mass index is 39.34 kg/m². BMI is above normal parameters. Recommendations include: educational material, exercise counseling and nutrition counseling.   (Normal BMI:  18.5-24.9, OW 25-29.9, Obesity 30 or greater)    Problems Addressed this Visit        Respiratory    Asthma (Chronic)    Relevant Orders    CBC & Differential       Digestive    GERD (gastroesophageal reflux disease) (Chronic)    Relevant Orders    CBC & Differential       Endocrine    Uncontrolled type 2 diabetes with neuropathy (CMS/HCC) - Primary     Relevant Orders    Comprehensive Metabolic Panel    TSH    Hemoglobin A1c    Vitamin D 25 Hydroxy    MicroAlbumin, Urine, Random - Urine, Clean Catch    Vitamin B12       Genitourinary    Renal insufficiency    Relevant Orders    Comprehensive Metabolic Panel       Other    Anxiety and depression (Chronic)    Dyslipidemia    Relevant Orders    Lipid Panel    BMI 38.0-38.9,adult    Relevant Orders    TSH    Uric Acid        Findings and recommendations discussed with Nivia. Her insulin pump was downloaded and interpreted with the following changes to her parameters:  Sensitivity changed to 10  Target: 120-50 mg/dL  She was advised if she had any problems to contact me. We will confirm her previous Pneumococcal Vaccine history from Dr Garcia's notes and if needed will administer in September.  Lifestyle modifications including nutrition and exercise reinforced. May consider Ozempic to assist with glycemic control and weight loss  She will continue to f/u with Behavioral Health for Anxiety/Depression.  Ventolin Inhaler as needed will be continued for Asthma. Lifestyle modifications including nutrition and exercise reinforced.She is discouraged regarding her weight gain and  may consider Ozempic to assist with glycemic control and weight loss at next appointment. Prilosec will be continued for GERD.  Labs ordered for her to have done prior to her next appointment which will be September 18 th; sooner if problems/concerns occur.        This document has been electronically signed by NIDA Starr, FAY-BC, DOUG

## 2019-08-23 NOTE — PATIENT INSTRUCTIONS
Exercising to Lose Weight  Exercise is structured, repetitive physical activity to improve fitness and health. Getting regular exercise is important for everyone. It is especially important if you are overweight. Being overweight increases your risk of heart disease, stroke, diabetes, high blood pressure, and several types of cancer. Reducing your calorie intake and exercising can help you lose weight.  Exercise is usually categorized as moderate or vigorous intensity. To lose weight, most people need to do a certain amount of moderate-intensity or vigorous-intensity exercise each week.  Moderate-intensity exercise    Moderate-intensity exercise is any activity that gets you moving enough to burn at least three times more energy (calories) than if you were sitting.  Examples of moderate exercise include:  · Walking a mile in 15 minutes.  · Doing light yard work.  · Biking at an easy pace.  Most people should get at least 150 minutes (2 hours and 30 minutes) a week of moderate-intensity exercise to maintain their body weight.  Vigorous-intensity exercise  Vigorous-intensity exercise is any activity that gets you moving enough to burn at least six times more calories than if you were sitting. When you exercise at this intensity, you should be working hard enough that you are not able to carry on a conversation.  Examples of vigorous exercise include:  · Running.  · Playing a team sport, such as football, basketball, and soccer.  · Jumping rope.  Most people should get at least 75 minutes (1 hour and 15 minutes) a week of vigorous-intensity exercise to maintain their body weight.  How can exercise affect me?  When you exercise enough to burn more calories than you eat, you lose weight. Exercise also reduces body fat and builds muscle. The more muscle you have, the more calories you burn. Exercise also:  · Improves mood.  · Reduces stress and tension.  · Improves your overall fitness, flexibility, and  endurance.  · Increases bone strength.  The amount of exercise you need to lose weight depends on:  · Your age.  · The type of exercise.  · Any health conditions you have.  · Your overall physical ability.  Talk to your health care provider about how much exercise you need and what types of activities are safe for you.  What actions can I take to lose weight?  Nutrition    · Make changes to your diet as told by your health care provider or diet and nutrition specialist (dietitian). This may include:  ? Eating fewer calories.  ? Eating more protein.  ? Eating less unhealthy fats.  ? Eating a diet that includes fresh fruits and vegetables, whole grains, low-fat dairy products, and lean protein.  ? Avoiding foods with added fat, salt, and sugar.  · Drink plenty of water while you exercise to prevent dehydration or heat stroke.  Activity  · Choose an activity that you enjoy and set realistic goals. Your health care provider can help you make an exercise plan that works for you.  · Exercise at a moderate or vigorous intensity most days of the week.  ? The intensity of exercise may vary from person to person. You can tell how intense a workout is for you by paying attention to your breathing and heartbeat. Most people will notice their breathing and heartbeat get faster with more intense exercise.  · Do resistance training twice each week, such as:  ? Push-ups.  ? Sit-ups.  ? Lifting weights.  ? Using resistance bands.  · Getting short amounts of exercise can be just as helpful as long structured periods of exercise. If you have trouble finding time to exercise, try to include exercise in your daily routine.  ? Get up, stretch, and walk around every 30 minutes throughout the day.  ? Go for a walk during your lunch break.  ? Park your car farther away from your destination.  ? If you take public transportation, get off one stop early and walk the rest of the way.  ? Make phone calls while standing up and walking  around.  ? Take the stairs instead of elevators or escalators.  · Wear comfortable clothes and shoes with good support.  · Do not exercise so much that you hurt yourself, feel dizzy, or get very short of breath.  Where to find more information  · [place] Department of Health and Human Services: www.hhs.gov  · Centers for Disease Control and Prevention (CDC): www.cdc.gov  Contact a health care provider:  · Before starting a new exercise program.  · If you have questions or concerns about your weight.  · If you have a medical problem that keeps you from exercising.  Get help right away if you have any of the following while exercising:  · Injury.  · Dizziness.  · Difficulty breathing or shortness of breath that does not go away when you stop exercising.  · Chest pain.  · Rapid heartbeat.  Summary  · Being overweight increases your risk of heart disease, stroke, diabetes, high blood pressure, and several types of cancer.  · Losing weight happens when you burn more calories than you eat.  · Reducing the amount of calories you eat in addition to getting regular moderate or vigorous exercise each week helps you lose weight.  This information is not intended to replace advice given to you by your health care provider. Make sure you discuss any questions you have with your health care provider.  Document Released: 01/20/2012 Document Revised: 12/31/2018 Document Reviewed: 12/31/2018  [employer] Interactive Patient Education © 2019 [employer]  Type 2 Diabetes Mellitus, Self Care, Adult  Caring for yourself after you have been diagnosed with type 2 diabetes (type 2 diabetes mellitus) means keeping your blood sugar (glucose) under control with a balance of:  · Nutrition.  · Exercise.  · Lifestyle changes.  · Medicines or insulin, if necessary.  · Support from your team of health care providers and others.  The following information explains what you need to know to manage your diabetes at home.  What are the risks?  Having  diabetes can put you at risk for other long-term (chronic) conditions, such as heart disease and kidney disease. Your health care provider may prescribe medicines to help prevent complications from diabetes. These medicines may include:  · Aspirin.  · Medicine to lower cholesterol.  · Medicine to control blood pressure.  How to monitor blood glucose    · Check your blood glucose every day, as often as told by your health care provider.  · Have your A1c (hemoglobin A1c) level checked two or more times a year, or as often as told by your health care provider.  Your health care provider will set individualized treatment goals for you. Generally, the goal of treatment is to maintain the following blood glucose levels:  · Before meals (preprandial):  mg/dL (4.4-7.2 mmol/L).  · After meals (postprandial): below 180 mg/dL (10 mmol/L).  · A1c level: less than 7%.  How to manage hyperglycemia and hypoglycemia  Hyperglycemia symptoms  Hyperglycemia, also called high blood glucose, occurs when blood glucose is too high. Make sure you know the early signs of hyperglycemia, such as:  · Increased thirst.  · Hunger.  · Feeling very tired.  · Needing to urinate more often than usual.  · Blurry vision.  Hypoglycemia symptoms  Hypoglycemia, also called low blood glucose, occurs with a blood glucose level at or below 70 mg/dL (3.9 mmol/L). The risk for hypoglycemia increases during or after exercise, during sleep, during illness, and when skipping meals or not eating for a long time (fasting).  It is important to know the symptoms of hypoglycemia and treat it right away. Always have a 15-gram rapid-acting carbohydrate snack with you to treat low blood glucose. Family members and close friends should also know the symptoms and should understand how to treat hypoglycemia, in case you are not able to treat yourself. Symptoms may include:  · Hunger.  · Anxiety.  · Sweating and feeling clammy.  · Confusion.  · Dizziness or feeling  light-headed.  · Sleepiness.  · Nausea.  · Increased heart rate.  · Headache.  · Blurry vision.  · Irritability.  · A change in coordination.  · Tingling or numbness around the mouth, lips, or tongue.  · Restless sleep.  · Fainting.  · Seizure.  Treating hypoglycemia  If you are alert and able to swallow safely, follow the 15:15 rule:  · Take 15 grams of a rapid-acting carbohydrate. Rapid-acting options include:  ? 1 tube of glucose gel.  ? 3 glucose pills.  ? 6-8 pieces of hard candy.  ? 4 oz (120 mL) of fruit juice.  ? 4 oz (120 mL) of regular (not diet) soda.  · Check your blood glucose 15 minutes after you take the carbohydrate.  · If the repeat blood glucose level is still at or below 70 mg/dL (3.9 mmol/L), take 15 grams of a carbohydrate again.  · If your blood glucose level does not increase above 70 mg/dL (3.9 mmol/L) after 3 tries, seek emergency medical care.  · After your blood glucose level returns to normal, eat a meal or a snack within 1 hour.  Treating severe hypoglycemia  Severe hypoglycemia is when your blood glucose level is at or below 54 mg/dL (3 mmol/L). Severe hypoglycemia is an emergency. Do not wait to see if the symptoms will go away. Get medical help right away. Call your local emergency services (911 in the [place]  If you have severe hypoglycemia and you cannot eat or drink, you may need an injection of glucagon. A family member or close friend should learn how to check your blood glucose and how to give you a glucagon injection. Ask your health care provider if you need to have an emergency glucagon injection kit available.  Severe hypoglycemia may need to be treated in a hospital. The treatment may include getting glucose through an IV. You may also need treatment for the cause of your hypoglycemia.  Follow these instructions at home:  Take diabetes medicines as told  · If your health care provider prescribed insulin or diabetes medicines, take them every day.  · Do not run out of  insulin or other diabetes medicines that you take. Plan ahead so you always have these available.  · If you use insulin, adjust your dosage based on how physically active you are and what foods you eat. Your health care provider will tell you how to adjust your dosage.  Make healthy food choices    The things that you eat and drink affect your blood glucose and your insulin dosage. Making good choices helps to control your diabetes and prevent other health problems. A healthy meal plan includes eating lean proteins, complex carbohydrates, fresh fruits and vegetables, low-fat dairy products, and healthy fats.  Make an appointment to see a diet and nutrition specialist (registered dietitian) to help you create an eating plan that is right for you. Make sure that you:  · Follow instructions from your health care provider about eating or drinking restrictions.  · Drink enough fluid to keep your urine pale yellow.  · Keep a record of the carbohydrates that you eat. Do this by reading food labels and learning the standard serving sizes of foods.  · Follow your sick day plan whenever you cannot eat or drink as usual. Make this plan in advance with your health care provider.    Stay active  Exercise regularly, as told by your health care provider. This may include:  · Stretching and doing strength exercises, such as yoga or weightlifting, 2 or more times a week.  · Doing 150 minutes or more of moderate-intensity or vigorous-intensity exercise each week. This could be brisk walking, biking, or water aerobics.  ? Spread out your activity over 3 or more days of the week.  ? Do not go more than 2 days in a row without doing some kind of physical activity.  When you start a new exercise or activity, work with your health care provider to adjust your insulin, medicines, or food intake as needed.  Make healthy lifestyle choices  · Do not use any tobacco products, such as cigarettes, chewing tobacco, and e-cigarettes. If you need  help quitting, ask your health care provider.  · If your health care provider says that alcohol is safe for you, limit alcohol intake to no more than 1 drink per day for nonpregnant women and 2 drinks per day for men. One drink equals 12 oz of beer, 5 oz of wine, or 1½ oz of hard liquor.  · Learn to manage stress. If you need help with this, ask your health care provider.  Care for your body    · Keep your immunizations up to date. In addition to getting vaccinations as told by your health care provider, it is recommended that you get vaccinated against the following illnesses:  ? The flu (influenza). Get a flu shot every year.  ? Pneumonia.  ? Hepatitis B.  · Schedule an eye exam soon after your diagnosis, and then one time every year after that.  · Check your skin and feet every day for cuts, bruises, redness, blisters, or sores. Schedule a foot exam with your health care provider once every year.  · Brush your teeth and gums two times a day, and floss one or more times a day. Visit your dentist one or more times every 6 months.  · Maintain a healthy weight.  General instructions  · Take over-the-counter and prescription medicines only as told by your health care provider.  · Share your diabetes management plan with people in your workplace, school, and household.  · Carry a medical alert card or wear medical alert jewelry.  · Keep all follow-up visits as told by your health care provider. This is important.  Questions to ask your health care provider  · Do I need to meet with a diabetes educator?  · Where can I find a support group for people with diabetes?  Where to find more information  For more information about diabetes, visit:  · American Diabetes Association (ADA): www.diabetes.org  · American Association of Diabetes Educators (AADE): www.diabeteseducator.org  Summary  · Caring for yourself after you have been diagnosed with (type 2 diabetes mellitus) means keeping your blood sugar (glucose) under control  with a balance of nutrition, exercise, lifestyle changes, and medicine.  · Check your blood glucose every day, as often as told by your health care provider.  · Having diabetes can put you at risk for other long-term (chronic) conditions, such as heart disease and kidney disease. Your health care provider may prescribe medicines to help prevent complications from diabetes.  · Keep all follow-up visits as told by your health care provider. This is important.  This information is not intended to replace advice given to you by your health care provider. Make sure you discuss any questions you have with your health care provider.  Document Released: 04/10/2017 Document Revised: 07/20/2018 Document Reviewed: 01/20/2017  [employer] Interactive Patient Education © 2019 [employer]

## 2019-08-26 PROBLEM — R20.0 LEFT UPPER EXTREMITY NUMBNESS: Status: RESOLVED | Noted: 2017-03-31 | Resolved: 2019-08-26

## 2019-08-26 PROBLEM — R11.0 NAUSEA: Status: RESOLVED | Noted: 2017-03-31 | Resolved: 2019-08-26

## 2019-08-26 PROBLEM — M79.606 LEG PAIN: Chronic | Status: RESOLVED | Noted: 2017-10-16 | Resolved: 2019-08-26

## 2019-08-31 ENCOUNTER — RESULTS ENCOUNTER (OUTPATIENT)
Dept: FAMILY MEDICINE CLINIC | Facility: CLINIC | Age: 44
End: 2019-08-31

## 2019-08-31 DIAGNOSIS — K21.9 GASTROESOPHAGEAL REFLUX DISEASE WITHOUT ESOPHAGITIS: Chronic | ICD-10-CM

## 2019-08-31 DIAGNOSIS — J45.20 MILD INTERMITTENT ASTHMA WITHOUT COMPLICATION: Chronic | ICD-10-CM

## 2019-09-05 DIAGNOSIS — F33.3 SEVERE EPISODE OF RECURRENT MAJOR DEPRESSIVE DISORDER, WITH PSYCHOTIC FEATURES (HCC): ICD-10-CM

## 2019-09-05 DIAGNOSIS — F41.1 GENERALIZED ANXIETY DISORDER: ICD-10-CM

## 2019-09-05 RX ORDER — LAMOTRIGINE 25 MG/1
25 TABLET ORAL DAILY
Qty: 30 TABLET | Refills: 0 | OUTPATIENT
Start: 2019-09-05 | End: 2020-09-04

## 2019-09-05 RX ORDER — TRAZODONE HYDROCHLORIDE 50 MG/1
TABLET ORAL
Qty: 30 TABLET | Refills: 0 | OUTPATIENT
Start: 2019-09-05

## 2019-09-05 RX ORDER — LAMOTRIGINE 25 MG/1
25 TABLET ORAL DAILY
Qty: 30 TABLET | Refills: 0 | Status: SHIPPED | OUTPATIENT
Start: 2019-09-05 | End: 2019-09-06 | Stop reason: SDUPTHER

## 2019-09-05 RX ORDER — TRAZODONE HYDROCHLORIDE 50 MG/1
25-50 TABLET ORAL NIGHTLY
Qty: 30 TABLET | Refills: 0 | Status: SHIPPED | OUTPATIENT
Start: 2019-09-05 | End: 2019-09-06 | Stop reason: SDUPTHER

## 2019-09-06 ENCOUNTER — OFFICE VISIT (OUTPATIENT)
Dept: PSYCHIATRY | Facility: CLINIC | Age: 44
End: 2019-09-06

## 2019-09-06 VITALS
DIASTOLIC BLOOD PRESSURE: 70 MMHG | SYSTOLIC BLOOD PRESSURE: 134 MMHG | WEIGHT: 229.2 LBS | HEART RATE: 98 BPM | BODY MASS INDEX: 39.34 KG/M2

## 2019-09-06 DIAGNOSIS — F41.1 GENERALIZED ANXIETY DISORDER: ICD-10-CM

## 2019-09-06 DIAGNOSIS — F33.3 SEVERE EPISODE OF RECURRENT MAJOR DEPRESSIVE DISORDER, WITH PSYCHOTIC FEATURES (HCC): ICD-10-CM

## 2019-09-06 PROCEDURE — 99213 OFFICE O/P EST LOW 20 MIN: CPT | Performed by: NURSE PRACTITIONER

## 2019-09-06 RX ORDER — LAMOTRIGINE 25 MG/1
50 TABLET ORAL DAILY
Qty: 30 TABLET | Refills: 0 | Status: SHIPPED | OUTPATIENT
Start: 2019-09-06 | End: 2019-09-27 | Stop reason: SDUPTHER

## 2019-09-06 RX ORDER — TRAZODONE HYDROCHLORIDE 50 MG/1
50-100 TABLET ORAL NIGHTLY
Qty: 60 TABLET | Refills: 0 | Status: SHIPPED | OUTPATIENT
Start: 2019-09-06 | End: 2019-10-04

## 2019-09-06 NOTE — PROGRESS NOTES
Subjective   Nivia Bernstein is a 44 y.o. female is here today for medication management follow-up.    Chief Complaint:  Depression and anxiety     History of Present Illness:   Patient presents today with  for follow-up for medication management.  Patient states as far as the Lamictal goes she cannot really tell a huge difference.  Patient states she still having depression and rates her depression at a 8 on a 0-to-10 scale with 10 being the worst.  Patient states symptoms of depression of depressed mood, decreased energy, decreased motivation, and irritable.  Patient states her anxiety is about the same as well and rates it at a 9 or 10 on a 0-to-10 scale with 10 being the worst.  Patient states symptoms of anxiety of feeling nervous all the time.  Patient denies any panic attacks.  Patient reports her sleep has gotten some better and she is sleeping at least 6 hours a night.  Patient denies any nightmares.  Patient states she has been taking a whole pill of trazodone and it seems to be helping her.  Patient reports she still feels tired though and feels like she can get more sleep.  Patient states she has noticed an increase in her appetite but states she started a new insulin pump and they warned her that that could happen.  Patient denies any auditory or visual hallucinations.  Patient adamantly denies any SI or HI.  Patient states she had occasional passive thoughts a few days ago however no plan.  Patient denies any medical changes since last visit.  Patient denies any side effects to Lamictal or trazodone.  The following portions of the patient's history were reviewed and updated as appropriate: allergies, current medications, past family history, past medical history, past social history, past surgical history and problem list.    Review of Systems   Constitutional: Positive for appetite change.   Respiratory: Negative.    Cardiovascular: Negative.    Psychiatric/Behavioral: Positive for  agitation, dysphoric mood and sleep disturbance. The patient is nervous/anxious.        Objective   Physical Exam   Constitutional: Vital signs are normal. She appears well-developed and well-nourished. She is cooperative.   Neurological: She is alert.   Psychiatric: Her speech is normal. Judgment and thought content normal. She is agitated. Cognition and memory are normal. She exhibits a depressed mood.   Vitals reviewed.    Blood pressure 134/70, pulse 98, weight 104 kg (229 lb 3.2 oz), not currently breastfeeding. Body mass index is 39.34 kg/m².    Allergies   Allergen Reactions   • Mobic [Meloxicam] Rash   • Novolog [Insulin Aspart] Hives   • Penicillins Angioedema       Medication List:   Current Outpatient Medications   Medication Sig Dispense Refill   • Albuterol Sulfate (VENTOLIN HFA IN) Inhale.     • aspirin 325 MG tablet Take 325 mg by mouth Daily.     • atorvastatin (LIPITOR) 20 MG tablet Take 20 mg by mouth Daily.  2   • cetirizine (zyrTEC) 10 MG tablet Take 10 mg by mouth every night at bedtime.  2   • DULoxetine (CYMBALTA) 30 MG capsule Take 1 capsule by mouth 2 (Two) Times a Day. 60 capsule 2   • GLOBAL EASE INJECT PEN NEEDLES 31G X 8 MM misc USE AS DIRECTED TO INJECT VICTOZA AND BASAGLAR DAILY  10   • glucagon (GLUCAGEN) 1 MG injection Inject 1 mg under the skin into the appropriate area as directed 1 (One) Time As Needed for Low Blood Sugar for up to 1 dose. 1 kit 3   • glucose blood test strip Use tid 100 each 12   • HUMALOG KWIKPEN 100 UNIT/ML solution pen-injector Inject 45 Units under the skin into the appropriate area as directed 3 (Three) Times a Day As Needed (For elevated blood sugars). 15 pen 3   • hydrOXYzine (VISTARIL) 25 MG capsule daily  1   • ibuprofen (ADVIL,MOTRIN) 800 MG tablet Take 800 mg by mouth 3 (Three) Times a Day.  2   • Insulin Glargine (TOUJEO SOLOSTAR) 300 UNIT/ML solution pen-injector Inject 100 Units under the skin into the appropriate area as directed 2 (Two) Times a  Day. 10 pen 5   • insulin lispro (HUMALOG) 100 UNIT/ML injection Per insulin pump. Maximum insulin dose 300 units daily 6 each 12   • lamoTRIgine (LAMICTAL) 25 MG tablet Take 2 tablets by mouth Daily. 30 tablet 0   • metFORMIN (GLUCOPHAGE) 1000 MG tablet Take 1,000 mg by mouth 2 (Two) Times a Day With Meals.     • omeprazole (priLOSEC) 20 MG capsule Take 20 mg by mouth Daily.     • traZODone (DESYREL) 50 MG tablet Take 1-2 tablets by mouth Every Night. 60 tablet 0   • vitamin D (ERGOCALCIFEROL) 53856 units capsule capsule Take 50,000 Units by mouth 1 (One) Time Per Week.       No current facility-administered medications for this visit.        Mental Status Exam:   Hygiene:   good  Cooperation:  Cooperative  Eye Contact:  Good  Psychomotor Behavior:  Appropriate  Affect:  Appropriate  Hopelessness: Denies  Speech:  Normal and Minimal  Thought Process:  Goal directed and Linear  Thought Content:  Mood Congruent   Suicidal:  None  Homicidal:  None  Hallucinations:  None  Delusion:  None  Memory:  Intact  Orientation:  Person, Place, Time and Situation  Reliability:  fair  Insight:  Fair  Judgement:  Fair  Impulse Control:  Fair  Physical/Medical Issues:  No                  Assessment/Plan   Diagnoses and all orders for this visit:    Generalized anxiety disorder  -     traZODone (DESYREL) 50 MG tablet; Take 1-2 tablets by mouth Every Night.    Severe episode of recurrent major depressive disorder, with psychotic features (CMS/HCC)  -     traZODone (DESYREL) 50 MG tablet; Take 1-2 tablets by mouth Every Night.  -     lamoTRIgine (LAMICTAL) 25 MG tablet; Take 2 tablets by mouth Daily.            Discussed medication options.  Increase trazodone to 50 to100 mg.  Increase Lamictal to 50 mg for 2 weeks.  Discussed with patient if no side effects then will increase Lamictal to 100mg after 2 weeks.  Reviewed the risks, benefits, and side effects of the medications; patient and  acknowledged and verbally consented.   Discussed with patient risk of Beckford-Frankie syndrome with Lamictal and to monitor for any skin irritation or rash.  Patient is agreeable to call the office if having any questions, concerns, or worsening of symptoms.  Patient is aware to call 911 or go to the nearest ER should begin having SI/HI. Patient and  agreeable to current plan.        Follow up in four weeks.     Errors in dictation may reflect use of voice recognition software and not all errors in transcription may have been detected prior to signing.              This document has been electronically signed by NIDA Vasquez   September 6, 2019 8:49 AM

## 2019-09-10 ENCOUNTER — LAB (OUTPATIENT)
Dept: FAMILY MEDICINE CLINIC | Facility: CLINIC | Age: 44
End: 2019-09-10

## 2019-09-10 DIAGNOSIS — E78.5 DYSLIPIDEMIA: Chronic | ICD-10-CM

## 2019-09-10 DIAGNOSIS — N28.9 RENAL INSUFFICIENCY: ICD-10-CM

## 2019-09-10 DIAGNOSIS — E78.5 HYPERLIPIDEMIA, UNSPECIFIED HYPERLIPIDEMIA TYPE: Primary | ICD-10-CM

## 2019-09-10 DIAGNOSIS — IMO0002 UNCONTROLLED TYPE 2 DIABETES WITH NEUROPATHY: Chronic | ICD-10-CM

## 2019-09-10 LAB
BASOPHILS # BLD AUTO: 0.07 10*3/MM3 (ref 0–0.2)
BASOPHILS NFR BLD AUTO: 0.8 % (ref 0–1.5)
EOSINOPHIL # BLD AUTO: 0.15 10*3/MM3 (ref 0–0.4)
EOSINOPHIL NFR BLD AUTO: 1.8 % (ref 0.3–6.2)
ERYTHROCYTE [DISTWIDTH] IN BLOOD BY AUTOMATED COUNT: 13.4 % (ref 12.3–15.4)
HCT VFR BLD AUTO: 43.4 % (ref 34–46.6)
HGB BLD-MCNC: 13.7 G/DL (ref 12–15.9)
IMM GRANULOCYTES # BLD AUTO: 0.02 10*3/MM3 (ref 0–0.05)
IMM GRANULOCYTES NFR BLD AUTO: 0.2 % (ref 0–0.5)
LYMPHOCYTES # BLD AUTO: 2.83 10*3/MM3 (ref 0.7–3.1)
LYMPHOCYTES NFR BLD AUTO: 33.4 % (ref 19.6–45.3)
MCH RBC QN AUTO: 27.3 PG (ref 26.6–33)
MCHC RBC AUTO-ENTMCNC: 31.6 G/DL (ref 31.5–35.7)
MCV RBC AUTO: 86.6 FL (ref 79–97)
MONOCYTES # BLD AUTO: 0.66 10*3/MM3 (ref 0.1–0.9)
MONOCYTES NFR BLD AUTO: 7.8 % (ref 5–12)
NEUTROPHILS # BLD AUTO: 4.74 10*3/MM3 (ref 1.7–7)
NEUTROPHILS NFR BLD AUTO: 56 % (ref 42.7–76)
NRBC BLD AUTO-RTO: 0 /100 WBC (ref 0–0.2)
PLATELET # BLD AUTO: 385 10*3/MM3 (ref 140–450)
RBC # BLD AUTO: 5.01 10*6/MM3 (ref 3.77–5.28)
WBC # BLD AUTO: 8.47 10*3/MM3 (ref 3.4–10.8)

## 2019-09-17 LAB
25(OH)D3+25(OH)D2 SERPL-MCNC: 35.8 NG/ML (ref 30–100)
ALBUMIN SERPL-MCNC: 4.4 G/DL (ref 3.5–5.2)
ALBUMIN/GLOB SERPL: 1.6 G/DL
ALP SERPL-CCNC: 102 U/L (ref 39–117)
ALT SERPL-CCNC: 22 U/L (ref 1–33)
AST SERPL-CCNC: 14 U/L (ref 1–32)
BILIRUB SERPL-MCNC: 0.4 MG/DL (ref 0.2–1.2)
BUN SERPL-MCNC: 12 MG/DL (ref 6–20)
BUN/CREAT SERPL: 18.5 (ref 7–25)
CALCIUM SERPL-MCNC: 9.2 MG/DL (ref 8.6–10.5)
CHLORIDE SERPL-SCNC: 101 MMOL/L (ref 98–107)
CHOLEST SERPL-MCNC: 181 MG/DL (ref 0–200)
CO2 SERPL-SCNC: 25.1 MMOL/L (ref 22–29)
CREAT SERPL-MCNC: 0.65 MG/DL (ref 0.57–1)
GLOBULIN SER CALC-MCNC: 2.8 GM/DL
GLUCOSE SERPL-MCNC: 172 MG/DL (ref 65–99)
HBA1C MFR BLD: 9 % (ref 4.8–5.6)
HDLC SERPL-MCNC: 46 MG/DL (ref 40–60)
LDLC SERPL CALC-MCNC: 112 MG/DL (ref 0–100)
MICROALBUMIN UR-MCNC: 3.7 UG/ML
POTASSIUM SERPL-SCNC: 4.5 MMOL/L (ref 3.5–5.2)
PROT SERPL-MCNC: 7.2 G/DL (ref 6–8.5)
SODIUM SERPL-SCNC: 141 MMOL/L (ref 136–145)
TRIGL SERPL-MCNC: 113 MG/DL (ref 0–150)
TSH SERPL DL<=0.005 MIU/L-ACNC: 2 UIU/ML (ref 0.27–4.2)
URATE SERPL-MCNC: 5.2 MG/DL (ref 2.4–5.7)
VIT B12 SERPL-MCNC: 324 PG/ML (ref 211–946)
VLDLC SERPL CALC-MCNC: 22.6 MG/DL

## 2019-09-18 ENCOUNTER — OFFICE VISIT (OUTPATIENT)
Dept: FAMILY MEDICINE CLINIC | Facility: CLINIC | Age: 44
End: 2019-09-18

## 2019-09-18 VITALS
TEMPERATURE: 98.1 F | OXYGEN SATURATION: 96 % | SYSTOLIC BLOOD PRESSURE: 120 MMHG | WEIGHT: 232.8 LBS | BODY MASS INDEX: 39.75 KG/M2 | HEART RATE: 99 BPM | DIASTOLIC BLOOD PRESSURE: 78 MMHG | HEIGHT: 64 IN

## 2019-09-18 DIAGNOSIS — K21.9 GASTROESOPHAGEAL REFLUX DISEASE WITHOUT ESOPHAGITIS: Chronic | ICD-10-CM

## 2019-09-18 DIAGNOSIS — IMO0002 UNCONTROLLED TYPE 2 DIABETES WITH NEUROPATHY: Primary | Chronic | ICD-10-CM

## 2019-09-18 DIAGNOSIS — E78.5 DYSLIPIDEMIA: Chronic | ICD-10-CM

## 2019-09-18 DIAGNOSIS — E66.01 CLASS 2 SEVERE OBESITY WITH SERIOUS COMORBIDITY AND BODY MASS INDEX (BMI) OF 38.0 TO 38.9 IN ADULT, UNSPECIFIED OBESITY TYPE (HCC): ICD-10-CM

## 2019-09-18 PROCEDURE — 99214 OFFICE O/P EST MOD 30 MIN: CPT | Performed by: NURSE PRACTITIONER

## 2019-09-18 PROCEDURE — 95251 CONT GLUC MNTR ANALYSIS I&R: CPT | Performed by: NURSE PRACTITIONER

## 2019-09-18 RX ORDER — CETIRIZINE HYDROCHLORIDE 10 MG/1
10 TABLET ORAL
Qty: 30 TABLET | Refills: 5 | Status: SHIPPED | OUTPATIENT
Start: 2019-09-18 | End: 2022-01-20

## 2019-09-18 RX ORDER — ERGOCALCIFEROL 1.25 MG/1
50000 CAPSULE ORAL WEEKLY
Qty: 12 CAPSULE | Refills: 0 | Status: SHIPPED | OUTPATIENT
Start: 2019-09-18 | End: 2020-03-23

## 2019-09-18 RX ORDER — ASPIRIN 325 MG
325 TABLET ORAL DAILY
Qty: 30 TABLET | Refills: 5 | Status: SHIPPED | OUTPATIENT
Start: 2019-09-18 | End: 2019-10-18

## 2019-09-18 RX ORDER — DULOXETIN HYDROCHLORIDE 30 MG/1
30 CAPSULE, DELAYED RELEASE ORAL 2 TIMES DAILY
Qty: 60 CAPSULE | Refills: 5 | Status: SHIPPED | OUTPATIENT
Start: 2019-09-18 | End: 2020-01-14 | Stop reason: SDUPTHER

## 2019-09-18 RX ORDER — ROSUVASTATIN CALCIUM 20 MG/1
20 TABLET, COATED ORAL NIGHTLY
Qty: 30 TABLET | Refills: 5 | Status: SHIPPED | OUTPATIENT
Start: 2019-09-18 | End: 2020-01-28 | Stop reason: SDUPTHER

## 2019-09-18 RX ORDER — OMEPRAZOLE 20 MG/1
20 CAPSULE, DELAYED RELEASE ORAL 2 TIMES DAILY
Qty: 60 CAPSULE | Refills: 5 | Status: SHIPPED | OUTPATIENT
Start: 2019-09-18 | End: 2019-10-18

## 2019-09-18 NOTE — PROGRESS NOTES
"    History of Present Illness   Nivia is a 45 y/o female who presents to the clinic today  for follow up and to review labs pertaining to her DM, type 2  with neuropathy and treated  with an insulin pump. In addition, Nivia has  Dyslipidemia, GERD, Asthma and Obesity. She has been hospitalized for DVTs and TIAs.   She has decided she would like to have Bariatric Surgery since she has tried \"everything she knows to do to lose weight\" without success. She feels this would improve her quality of life and she would be able to \"come off multiple medications.\"  We will spend time today reviewing her progress from her initial visit. She is currently seeing NIDA Vasquez for anxiety/depression and OCD.   Lilo reports she went to [facility] ED recently were she was diagnosed with a UTI. Those records including her Urine Culture have been requested.      Diabetes   She presents for follow up diabetes visit. She has type 2 diabetes mellitus. The initial diagnosis of diabetes was made 20 years ago. Her disease course has been improving at this time.  Associated symptoms include blurred vision, fatigue, foot paresthesias, polydipsia, polyuria and visual change. Pertinent negatives for diabetes include no foot ulcerations and no polyphagia. She does report she has had several hypoglycemic episodes with the lowest in the 50's. She generally treats with orange juice and peanut butter crackers.   Diabetic complications include peripheral neuropathy.She was changed to Cymbalta which she reports is helping although she does have breakthrough pain. She has quit Neurontin due to side effects. Risk factors for coronary artery disease include post-menopausal, stress and dyslipidemia. Current diabetic treatment includes insulin pump therapy. .She has tried multiple oral agents including GLP-1 Agonists and Invokana without successful glycemic control  She monitors her blood sugars at least four to five times daily. She is currently " "utilizing insulin pump therapy and CGM which has greatly improved her glycemic management.  She is compliant with treatment all of the time. She is following a generally healthy diet. She has had a previous visit with a dietitian. She participates in exercise at this time but finding it more difficult due to her Neuropathy. She walks 3 miles on a regular basis. Her home blood glucose trend is decreasing steadily  An ACE inhibitor/angiotensin II receptor blocker is not  being taken at this time. Eye exam is current. Last A1C done on 09/10/2019 was 9 which was an improvement of 2 %.     Lab Results   Component Value Date    HGBA1C 9.00 (H) 09/10/2019      Hyperlipidemia   The current episode started more than 1 year ago. Pertinent negatives include no chest pain or shortness of breath. She is taking Atorvastatin 20 mg.   Lab Results   Component Value Date    CHOL 206 (H) 10/16/2017    CHLPL 181 09/10/2019    TRIG 113 09/10/2019    HDL 46 09/10/2019     (H) 09/10/2019     Vitals:    09/18/19 0928   BP: 120/78   Pulse: 99   Temp: 98.1 °F (36.7 °C)   TempSrc: Temporal   SpO2: 96%   Weight: 106 kg (232 lb 12.8 oz)   Height: 162.6 cm (64\")      The following portions of the patient's history were reviewed and updated as appropriate: allergies, current medications, past family history, past medical history, past social history, past surgical history and problem list.    Review of Systems   Constitutional: Positive for appetite change and fatigue. Negative for activity change, chills, fever and unexpected weight change.   HENT: Negative.    Eyes: Positive for visual disturbance.   Respiratory: Negative for cough, chest tightness, shortness of breath and wheezing.    Cardiovascular: Positive for leg swelling. Negative for chest pain and palpitations.   Gastrointestinal: Negative for abdominal pain, constipation, diarrhea, nausea and vomiting.   Endocrine: Positive for polydipsia and polyuria. Negative for cold " intolerance, heat intolerance and polyphagia.   Musculoskeletal: Positive for arthralgias.   Skin: Negative for color change and rash.   Neurological: Positive for numbness. Negative for dizziness, tremors, speech difficulty, weakness, light-headedness and headaches.   Hematological: Negative for adenopathy.   Psychiatric/Behavioral: Positive for sleep disturbance. Negative for confusion, decreased concentration and suicidal ideas. The patient is not nervous/anxious.    All other systems reviewed and are negative.    Physical Exam   Constitutional: She is oriented to person, place, and time. She appears well-developed and well-nourished. No distress.   HENT:   Head: Normocephalic.   Nose: Nose normal.   Mouth/Throat: Oropharynx is clear and moist.   Eyes: Conjunctivae and EOM are normal. Pupils are equal, round, and reactive to light. Right eye exhibits no discharge. Left eye exhibits no discharge.   Neck: Normal range of motion. Neck supple. No JVD present. No thyromegaly present.   Cardiovascular: Normal rate, regular rhythm and normal heart sounds. Exam reveals no friction rub.   No murmur heard.  Pulmonary/Chest: Effort normal and breath sounds normal. No respiratory distress. She has no wheezes. She has no rales.   Abdominal: Soft. Bowel sounds are normal. She exhibits no distension. There is no tenderness. There is no rebound and no guarding.   Musculoskeletal: She exhibits no edema.   Lymphadenopathy:     She has no cervical adenopathy.   Neurological: She is alert and oriented to person, place, and time.   Skin: Skin is warm and dry. Capillary refill takes less than 2 seconds. No rash noted. No erythema.   Psychiatric: She has a normal mood and affect. Her behavior is normal. Judgment and thought content normal.   Vitals reviewed.    Results for orders placed or performed in visit on 09/10/19   Lipid Panel   Result Value Ref Range    Total Cholesterol 181 0 - 200 mg/dL    Triglycerides 113 0 - 150 mg/dL     HDL Cholesterol 46 40 - 60 mg/dL    VLDL Cholesterol 22.6 mg/dL    LDL Cholesterol  112 (H) 0 - 100 mg/dL   Uric Acid   Result Value Ref Range    Uric Acid 5.2 2.4 - 5.7 mg/dL   Vitamin B12   Result Value Ref Range    Vitamin B-12 324 211 - 946 pg/mL   MicroAlbumin, Urine, Random -   Result Value Ref Range    Microalbumin, Urine 3.7 Not Estab. ug/mL   Vitamin D 25 Hydroxy   Result Value Ref Range    25 Hydroxy, Vitamin D 35.8 30.0 - 100.0 ng/ml   Hemoglobin A1c   Result Value Ref Range    Hemoglobin A1C 9.00 (H) 4.80 - 5.60 %   TSH   Result Value Ref Range    TSH 2.000 0.270 - 4.200 uIU/mL   Comprehensive Metabolic Panel   Result Value Ref Range    Glucose 172 (H) 65 - 99 mg/dL    BUN 12 6 - 20 mg/dL    Creatinine 0.65 0.57 - 1.00 mg/dL    eGFR Non African Am 98 >60 mL/min/1.73    eGFR African Am 119 >60 mL/min/1.73    BUN/Creatinine Ratio 18.5 7.0 - 25.0    Sodium 141 136 - 145 mmol/L    Potassium 4.5 3.5 - 5.2 mmol/L    Chloride 101 98 - 107 mmol/L    Total CO2 25.1 22.0 - 29.0 mmol/L    Calcium 9.2 8.6 - 10.5 mg/dL    Total Protein 7.2 6.0 - 8.5 g/dL    Albumin 4.40 3.50 - 5.20 g/dL    Globulin 2.8 gm/dL    A/G Ratio 1.6 g/dL    Total Bilirubin 0.4 0.2 - 1.2 mg/dL    Alkaline Phosphatase 102 39 - 117 U/L    AST (SGOT) 14 1 - 32 U/L    ALT (SGPT) 22 1 - 33 U/L   CBC w AUTO Differential   Result Value Ref Range    WBC 8.47 3.40 - 10.80 10*3/mm3    RBC 5.01 3.77 - 5.28 10*6/mm3    Hemoglobin 13.7 12.0 - 15.9 g/dL    Hematocrit 43.4 34.0 - 46.6 %    MCV 86.6 79.0 - 97.0 fL    MCH 27.3 26.6 - 33.0 pg    MCHC 31.6 31.5 - 35.7 g/dL    RDW 13.4 12.3 - 15.4 %    Platelets 385 140 - 450 10*3/mm3    Neutrophil Rel % 56.0 42.7 - 76.0 %    Lymphocyte Rel % 33.4 19.6 - 45.3 %    Monocyte Rel % 7.8 5.0 - 12.0 %    Eosinophil Rel % 1.8 0.3 - 6.2 %    Basophil Rel % 0.8 0.0 - 1.5 %    Neutrophils Absolute 4.74 1.70 - 7.00 10*3/mm3    Lymphocytes Absolute 2.83 0.70 - 3.10 10*3/mm3    Monocytes Absolute 0.66 0.10 - 0.90 10*3/mm3     Eosinophils Absolute 0.15 0.00 - 0.40 10*3/mm3    Basophils Absolute 0.07 0.00 - 0.20 10*3/mm3    Immature Granulocyte Rel % 0.2 0.0 - 0.5 %    Immature Grans Absolute 0.02 0.00 - 0.05 10*3/mm3    nRBC 0.0 0.0 - 0.2 /100 WBC     Assessment/Plan     Patient's Body mass index is 39.96 kg/m². BMI is above normal parameters. Recommendations include: educational material, exercise counseling and nutrition counseling.   (Normal BMI:  18.5-24.9, OW 25-29.9, Obesity 30 or greater)    Problems Addressed this Visit        Digestive    GERD (gastroesophageal reflux disease) (Chronic)    Relevant Medications    omeprazole (priLOSEC) 20 MG capsule    Class 2 severe obesity with serious comorbidity and body mass index (BMI) of 38.0 to 38.9 in adult (CMS/[facility])    Relevant Orders    Ambulatory Referral to Bariatric Surgery       Endocrine    Uncontrolled type 2 diabetes with neuropathy (CMS/[facility]) - Primary    Relevant Medications    metFORMIN (GLUCOPHAGE) 1000 MG tablet    DULoxetine (CYMBALTA) 30 MG capsule       Other    Dyslipidemia      Findings and recommendations discussed with Nivia. Reviewed the above labs with her which included an A1C of 9.Her insulin pump was downloaded, reviewed, and interpreted. Reinforced the need to enter meal bolus information. No changes made to her insulin pump parameters.   Referral made to Dr Barragan for Bariatric Evaluation.Lifestyle modifications reviewed including nutrition and exercise. Bariatric progress note completed.  Reviewed lipid panel and change Atorvastatin to Crestor. Continue Prilosec and lifestyle modifications for her GERD.  She will return on October 16 th sooner if problems/concerns occur.        This document has been electronically signed by NIDA Starr, FAY-BC, DOUG

## 2019-09-18 NOTE — PATIENT INSTRUCTIONS
"Carbohydrate Counting for Diabetes Mellitus, Adult    Carbohydrate counting is a method of keeping track of how many carbohydrates you eat. Eating carbohydrates naturally increases the amount of sugar (glucose) in the blood. Counting how many carbohydrates you eat helps keep your blood glucose within normal limits, which helps you manage your diabetes (diabetes mellitus).  It is important to know how many carbohydrates you can safely have in each meal. This is different for every person. A diet and nutrition specialist (registered dietitian) can help you make a meal plan and calculate how many carbohydrates you should have at each meal and snack.  Carbohydrates are found in the following foods:  · Grains, such as breads and cereals.  · Dried beans and soy products.  · Starchy vegetables, such as potatoes, peas, and corn.  · Fruit and fruit juices.  · Milk and yogurt.  · Sweets and snack foods, such as cake, cookies, candy, chips, and soft drinks.  How do I count carbohydrates?  There are two ways to count carbohydrates in food. You can use either of the methods or a combination of both.  Reading \"Nutrition Facts\" on packaged food  The \"Nutrition Facts\" list is included on the labels of almost all packaged foods and beverages in the [place] It includes:  · The serving size.  · Information about nutrients in each serving, including the grams (g) of carbohydrate per serving.  To use the “Nutrition Facts\":  · Decide how many servings you will have.  · Multiply the number of servings by the number of carbohydrates per serving.  · The resulting number is the total amount of carbohydrates that you will be having.  Learning standard serving sizes of other foods  When you eat carbohydrate foods that are not packaged or do not include \"Nutrition Facts\" on the label, you need to measure the servings in order to count the amount of carbohydrates:  · Measure the foods that you will eat with a food scale or measuring cup, if " needed.  · Decide how many standard-size servings you will eat.  · Multiply the number of servings by 15. Most carbohydrate-rich foods have about 15 g of carbohydrates per serving.  ? For example, if you eat 8 oz (170 g) of strawberries, you will have eaten 2 servings and 30 g of carbohydrates (2 servings x 15 g = 30 g).  · For foods that have more than one food mixed, such as soups and casseroles, you must count the carbohydrates in each food that is included.  The following list contains standard serving sizes of common carbohydrate-rich foods. Each of these servings has about 15 g of carbohydrates:  · ½ hamburger bun or ½ English muffin.  · ½ oz (15 mL) syrup.  · ½ oz (14 g) jelly.  · 1 slice of bread.  · 1 six-inch tortilla.  · 3 oz (85 g) cooked rice or pasta.  · 4 oz (113 g) cooked dried beans.  · 4 oz (113 g) starchy vegetable, such as peas, corn, or potatoes.  · 4 oz (113 g) hot cereal.  · 4 oz (113 g) mashed potatoes or ¼ of a large baked potato.  · 4 oz (113 g) canned or frozen fruit.  · 4 oz (120 mL) fruit juice.  · 4-6 crackers.  · 6 chicken nuggets.  · 6 oz (170 g) unsweetened dry cereal.  · 6 oz (170 g) plain fat-free yogurt or yogurt sweetened with artificial sweeteners.  · 8 oz (240 mL) milk.  · 8 oz (170 g) fresh fruit or one small piece of fruit.  · 24 oz (680 g) popped popcorn.  Example of carbohydrate counting  Sample meal  · 3 oz (85 g) chicken breast.  · 6 oz (170 g) brown rice.  · 4 oz (113 g) corn.  · 8 oz (240 mL) milk.  · 8 oz (170 g) strawberries with sugar-free whipped topping.  Carbohydrate calculation  1. Identify the foods that contain carbohydrates:  ? Rice.  ? Corn.  ? Milk.  ? Strawberries.  2. Calculate how many servings you have of each food:  ? 2 servings rice.  ? 1 serving corn.  ? 1 serving milk.  ? 1 serving strawberries.  3. Multiply each number of servings by 15 g:  ? 2 servings rice x 15 g = 30 g.  ? 1 serving corn x 15 g = 15 g.  ? 1 serving milk x 15 g = 15 g.  ? 1  serving strawberries x 15 g = 15 g.  4. Add together all of the amounts to find the total grams of carbohydrates eaten:  ? 30 g + 15 g + 15 g + 15 g = 75 g of carbohydrates total.  Summary  · Carbohydrate counting is a method of keeping track of how many carbohydrates you eat.  · Eating carbohydrates naturally increases the amount of sugar (glucose) in the blood.  · Counting how many carbohydrates you eat helps keep your blood glucose within normal limits, which helps you manage your diabetes.  · A diet and nutrition specialist (registered dietitian) can help you make a meal plan and calculate how many carbohydrates you should have at each meal and snack.  This information is not intended to replace advice given to you by your health care provider. Make sure you discuss any questions you have with your health care provider.  Document Released: 12/18/2006 Document Revised: 06/27/2018 Document Reviewed: 05/31/2017  [employer] Interactive Patient Education © 2019 [employer]

## 2019-09-21 PROBLEM — E66.812 CLASS 2 SEVERE OBESITY WITH SERIOUS COMORBIDITY AND BODY MASS INDEX (BMI) OF 38.0 TO 38.9 IN ADULT: Status: ACTIVE | Noted: 2017-03-31

## 2019-09-21 PROBLEM — E66.01 CLASS 2 SEVERE OBESITY WITH SERIOUS COMORBIDITY AND BODY MASS INDEX (BMI) OF 38.0 TO 38.9 IN ADULT: Status: ACTIVE | Noted: 2017-03-31

## 2019-09-27 ENCOUNTER — CLINICAL SUPPORT (OUTPATIENT)
Dept: FAMILY MEDICINE CLINIC | Facility: CLINIC | Age: 44
End: 2019-09-27

## 2019-09-27 ENCOUNTER — LAB (OUTPATIENT)
Dept: FAMILY MEDICINE CLINIC | Facility: CLINIC | Age: 44
End: 2019-09-27

## 2019-09-27 DIAGNOSIS — N39.0 URINARY TRACT INFECTION WITH HEMATURIA, SITE UNSPECIFIED: Primary | ICD-10-CM

## 2019-09-27 DIAGNOSIS — R31.9 URINARY TRACT INFECTION WITH HEMATURIA, SITE UNSPECIFIED: Primary | ICD-10-CM

## 2019-09-27 DIAGNOSIS — R30.0 DYSURIA: Primary | ICD-10-CM

## 2019-09-27 DIAGNOSIS — F33.3 SEVERE EPISODE OF RECURRENT MAJOR DEPRESSIVE DISORDER, WITH PSYCHOTIC FEATURES (HCC): ICD-10-CM

## 2019-09-27 LAB
BILIRUB BLD-MCNC: ABNORMAL MG/DL
CLARITY, POC: ABNORMAL
COLOR UR: ABNORMAL
GLUCOSE UR STRIP-MCNC: NEGATIVE MG/DL
KETONES UR QL: ABNORMAL
LEUKOCYTE EST, POC: ABNORMAL
NITRITE UR-MCNC: POSITIVE MG/ML
PH UR: 5.5 [PH] (ref 5–8)
PROT UR STRIP-MCNC: ABNORMAL MG/DL
RBC # UR STRIP: ABNORMAL /UL
SP GR UR: 1.03 (ref 1–1.03)
UROBILINOGEN UR QL: NORMAL

## 2019-09-27 PROCEDURE — 87086 URINE CULTURE/COLONY COUNT: CPT | Performed by: NURSE PRACTITIONER

## 2019-09-27 PROCEDURE — 81015 MICROSCOPIC EXAM OF URINE: CPT | Performed by: NURSE PRACTITIONER

## 2019-09-27 PROCEDURE — 96372 THER/PROPH/DIAG INJ SC/IM: CPT | Performed by: NURSE PRACTITIONER

## 2019-09-27 PROCEDURE — 81003 URINALYSIS AUTO W/O SCOPE: CPT | Performed by: NURSE PRACTITIONER

## 2019-09-27 RX ORDER — LAMOTRIGINE 25 MG/1
50 TABLET ORAL DAILY
Qty: 30 TABLET | Refills: 0 | Status: SHIPPED | OUTPATIENT
Start: 2019-09-27 | End: 2019-10-04 | Stop reason: SDUPTHER

## 2019-09-27 RX ORDER — SULFAMETHOXAZOLE AND TRIMETHOPRIM 800; 160 MG/1; MG/1
1 TABLET ORAL EVERY 12 HOURS
Qty: 20 TABLET | Refills: 0 | Status: SHIPPED | OUTPATIENT
Start: 2019-09-27 | End: 2019-10-07

## 2019-09-27 RX ORDER — GENTAMICIN SULFATE 40 MG/ML
80 INJECTION, SOLUTION INTRAMUSCULAR; INTRAVENOUS ONCE
Status: COMPLETED | OUTPATIENT
Start: 2019-09-27 | End: 2019-09-27

## 2019-09-27 RX ADMIN — GENTAMICIN SULFATE 80 MG: 40 INJECTION, SOLUTION INTRAMUSCULAR; INTRAVENOUS at 15:44

## 2019-09-27 NOTE — PROGRESS NOTES
Patient came by the office for a urine dip. She has a UTI and was treated with Macrobid but it did not help. The patient's dip showed that the UTI was still there. I showed rl, she explained to give the patient a gentamycin shot and that she would send her in some bactrim. I also told anupama that if her urine did not improve by tomorrow she would need to go to the ER.

## 2019-09-28 LAB
BACTERIA SPEC AEROBE CULT: NO GROWTH
BACTERIA UR QL AUTO: ABNORMAL /HPF
HYALINE CASTS UR QL AUTO: ABNORMAL /LPF
RBC # UR: ABNORMAL /HPF
REF LAB TEST METHOD: ABNORMAL
SQUAMOUS #/AREA URNS HPF: ABNORMAL /HPF
WBC UR QL AUTO: ABNORMAL /HPF

## 2019-09-30 ENCOUNTER — TELEPHONE (OUTPATIENT)
Dept: FAMILY MEDICINE CLINIC | Facility: CLINIC | Age: 44
End: 2019-09-30

## 2019-09-30 DIAGNOSIS — R31.9 URINARY TRACT INFECTION WITH HEMATURIA, SITE UNSPECIFIED: Primary | ICD-10-CM

## 2019-09-30 DIAGNOSIS — N39.0 URINARY TRACT INFECTION WITH HEMATURIA, SITE UNSPECIFIED: Primary | ICD-10-CM

## 2019-10-04 ENCOUNTER — OFFICE VISIT (OUTPATIENT)
Dept: PSYCHIATRY | Facility: CLINIC | Age: 44
End: 2019-10-04

## 2019-10-04 VITALS
HEART RATE: 100 BPM | SYSTOLIC BLOOD PRESSURE: 126 MMHG | BODY MASS INDEX: 40.2 KG/M2 | DIASTOLIC BLOOD PRESSURE: 74 MMHG | WEIGHT: 234.2 LBS

## 2019-10-04 DIAGNOSIS — F33.3 SEVERE EPISODE OF RECURRENT MAJOR DEPRESSIVE DISORDER, WITH PSYCHOTIC FEATURES (HCC): ICD-10-CM

## 2019-10-04 PROCEDURE — 99213 OFFICE O/P EST LOW 20 MIN: CPT | Performed by: NURSE PRACTITIONER

## 2019-10-04 RX ORDER — LAMOTRIGINE 100 MG/1
100 TABLET ORAL DAILY
Qty: 30 TABLET | Refills: 0 | Status: SHIPPED | OUTPATIENT
Start: 2019-10-04 | End: 2019-10-29 | Stop reason: SDUPTHER

## 2019-10-04 RX ORDER — FLUVOXAMINE MALEATE 50 MG/1
50 TABLET, COATED ORAL NIGHTLY
Qty: 30 TABLET | Refills: 0 | Status: SHIPPED | OUTPATIENT
Start: 2019-10-04 | End: 2019-10-29

## 2019-10-04 NOTE — PROGRESS NOTES
"      Raquel Bernstein is a 44 y.o. female is here today for medication management follow-up.    Chief Complaint:  Depression and anxiety     History of Present Illness:   Patient presents today for follow-up for medication management with .  Patient states she has still not noticed any difference with the Lamictal and has been taking the 50 mg.  Patient denies any side effects.  Patient states she is still very aggravated as well as frustrated constantly.  Patient states she is very depressed and there has been no change.  Patient still rates her depression at a 8 on a 0-to-10 scale with 10 being the worst.  Patient still has symptoms of depression of depressed mood, decreased energy, decreased motivation, and irritability.  Patient states her anxiety is still the same and is very high.  Patient still rates it at a 9 on a 0-to-10 scale with 10 being the worst.  Patient states symptoms of anxiety of feeling nervous all the time and constantly worrying.  Patient states she is still exhibiting symptoms of OCD with cleaning specifically.  Patient denies any panic attacks.  Patient states she has been taking the trazodone however it is not helping with her sleep.  Patient states her mind will not stop when she goes to lay down as well as waking up frequently throughout the night.  Patient states she does have a lot of dreams and some of them are good dreams while others are nightmares.  Patient states she does not want to start anything for the dreams due to enjoying the memories that she gets through some of the dreams.  Patient states she has noticed an increase in her appetite however she feels it is due to the insulin pump.  Patient states she does tend to \"emotional eat \"at times.  Patient denies any auditory or visual hallucinations.  Patient adamantly denies any SI or HI.  Patient denies any side effects to medications.  Patient denies any medical changes since last visit.    The following " portions of the patient's history were reviewed and updated as appropriate: allergies, current medications, past family history, past medical history, past social history, past surgical history and problem list.    Review of Systems   Constitutional: Positive for appetite change.   Respiratory: Negative.    Cardiovascular: Negative.    Gastrointestinal: Negative.    Neurological: Negative.    Psychiatric/Behavioral: Positive for agitation, dysphoric mood and sleep disturbance. The patient is nervous/anxious.        Objective   Physical Exam   Constitutional: Vital signs are normal. She appears well-developed and well-nourished. She is cooperative.   Neurological: She is alert.   Psychiatric: Her speech is normal. Judgment and thought content normal. Her mood appears anxious. She is agitated. Cognition and memory are normal. She exhibits a depressed mood.   Vitals reviewed.    Blood pressure 126/74, pulse 100, weight 106 kg (234 lb 3.2 oz), not currently breastfeeding. Body mass index is 40.2 kg/m².    Allergies   Allergen Reactions   • Mobic [Meloxicam] Rash   • Novolog [Insulin Aspart] Hives   • Penicillins Angioedema       Medication List:   Current Outpatient Medications   Medication Sig Dispense Refill   • Albuterol Sulfate (VENTOLIN HFA IN) Inhale.     • aspirin 325 MG tablet Take 1 tablet by mouth Daily for 30 days. 30 tablet 5   • cetirizine (zyrTEC) 10 MG tablet Take 1 tablet by mouth every night at bedtime for 30 days. 30 tablet 5   • DULoxetine (CYMBALTA) 30 MG capsule Take 1 capsule by mouth 2 (Two) Times a Day. 60 capsule 5   • fluvoxaMINE (LUVOX) 50 MG tablet Take 1 tablet by mouth Every Night. 30 tablet 0   • GLOBAL EASE INJECT PEN NEEDLES 31G X 8 MM misc USE AS DIRECTED TO INJECT VICTOZA AND BASAGLAR DAILY  10   • glucagon (GLUCAGEN) 1 MG injection Inject 1 mg under the skin into the appropriate area as directed 1 (One) Time As Needed for Low Blood Sugar for up to 1 dose. 1 kit 3   • glucose blood  test strip Use tid 100 each 12   • HUMALOG KWIKPEN 100 UNIT/ML solution pen-injector Inject 45 Units under the skin into the appropriate area as directed 3 (Three) Times a Day As Needed (For elevated blood sugars). 15 pen 3   • insulin lispro (HUMALOG) 100 UNIT/ML injection Per insulin pump. Maximum insulin dose 300 units daily 6 each 12   • lamoTRIgine (LaMICtal) 100 MG tablet Take 1 tablet by mouth Daily. 30 tablet 0   • metFORMIN (GLUCOPHAGE) 1000 MG tablet Take 1 tablet by mouth 2 (Two) Times a Day With Meals. 60 tablet 5   • omeprazole (priLOSEC) 20 MG capsule Take 1 capsule by mouth 2 (Two) Times a Day for 30 days. 60 capsule 5   • rosuvastatin (CRESTOR) 20 MG tablet Take 1 tablet by mouth Every Night. 30 tablet 5   • sulfamethoxazole-trimethoprim (BACTRIM DS,SEPTRA DS) 800-160 MG per tablet Take 1 tablet by mouth Every 12 (Twelve) Hours for 10 days. 20 tablet 0   • vitamin D (ERGOCALCIFEROL) 01718 units capsule capsule Take 1 capsule by mouth 1 (One) Time Per Week. 12 capsule 0     No current facility-administered medications for this visit.        Mental Status Exam:   Hygiene:   good  Cooperation:  Cooperative  Eye Contact:  Good  Psychomotor Behavior:  Aggitated  Affect:  Appropriate  Hopelessness: Denies  Speech:  Normal  Thought Process:  Goal directed and Linear  Thought Content:  Mood congruent  Suicidal:  None  Homicidal:  None  Hallucinations:  None  Delusion:  None  Memory:  Intact  Orientation:  Person, Place, Time and Situation  Reliability:  fair  Insight:  Fair  Judgement:  Fair  Impulse Control:  Fair  Physical/Medical Issues:  No                Assessment/Plan   Diagnoses and all orders for this visit:    Severe episode of recurrent major depressive disorder, with psychotic features (CMS/HCC)  -     lamoTRIgine (LaMICtal) 100 MG tablet; Take 1 tablet by mouth Daily.    Other orders  -     fluvoxaMINE (LUVOX) 50 MG tablet; Take 1 tablet by mouth Every Night.            Discussed medication  options with patient and .  Increase Lamictal to 100 mg.  Start Luvox 50 mg.  Discontinue trazodone.  Reviewed the risks, benefits, and side effects of the medications; patient and  acknowledged and verbally consented.  Discussed with patient to monitor for signs and symptoms of serotonin syndrome such as hand tremors, confusion, increased sweating, hyperthermia, and hyperreflexia.  Discussed with patient to continue to watch for any rash and discussed risk of Beckford-Frankie syndrome.  Discussed with patient if noticed any rash or skin irritation to stop the medication and notify the office.  Patient is agreeable to call the office with any questions, concerns, or worsening of symptoms.  Patient is aware to call 911 or go to the nearest ER should begin having SI/HI.          Follow up in four weeks.       Errors in dictation may reflect use of voice recognition software and not all errors in transcription may have been detected prior to signing.              This document has been electronically signed by NIDA Vasquez   October 4, 2019 9:28 AM

## 2019-10-15 ENCOUNTER — OFFICE VISIT (OUTPATIENT)
Dept: UROLOGY | Facility: CLINIC | Age: 44
End: 2019-10-15

## 2019-10-15 VITALS — HEIGHT: 64 IN | WEIGHT: 236 LBS | BODY MASS INDEX: 40.29 KG/M2

## 2019-10-15 DIAGNOSIS — N39.0 URINARY TRACT INFECTION WITHOUT HEMATURIA, SITE UNSPECIFIED: Primary | ICD-10-CM

## 2019-10-15 LAB
BILIRUB BLD-MCNC: NEGATIVE MG/DL
CLARITY, POC: CLEAR
COLOR UR: YELLOW
GLUCOSE UR STRIP-MCNC: NEGATIVE MG/DL
KETONES UR QL: NEGATIVE
LEUKOCYTE EST, POC: NEGATIVE
NITRITE UR-MCNC: NEGATIVE MG/ML
PH UR: 6 [PH] (ref 5–8)
PROT UR STRIP-MCNC: NEGATIVE MG/DL
RBC # UR STRIP: NEGATIVE /UL
SP GR UR: 1.02 (ref 1–1.03)
UROBILINOGEN UR QL: NORMAL

## 2019-10-15 PROCEDURE — 99203 OFFICE O/P NEW LOW 30 MIN: CPT | Performed by: UROLOGY

## 2019-10-15 RX ORDER — NITROFURANTOIN 25; 75 MG/1; MG/1
100 CAPSULE ORAL DAILY
Qty: 56 CAPSULE | Refills: 3 | Status: SHIPPED | OUTPATIENT
Start: 2019-10-15 | End: 2020-06-30

## 2019-10-15 NOTE — PROGRESS NOTES
Chief Complaint:          Chief Complaint   Patient presents with   • Urinary Tract Infection       HPI:   44 y.o. female.  Referred with recurrent urinary tract infections.  Apparently she developed a urinary tract infection with elevated white blood cell too numerous to count white cells with bacteria was placed on antibiotics and did great she is been on Macrobid, Bactrim, currently she has no dysuria, no frequency, no nocturia, no concerns I recommend probiotics.  We discussed this with her at length her last culture was negative.  She has normal bowel movements she is a poorly controlled diabetic on insulin pump A1c was 14 and now is 9 she has mitral valvular prolapse.  Exam is unremarkable with a soft flank I put her on Macrodantin prophylaxis I will see her back based on this if she breaks through she is to call immediately.    Past Medical History:        Past Medical History:   Diagnosis Date   • Altered mental status 10/16/2017   • Anxiety and depression 3/31/2017   • Asthma    • CHF (congestive heart failure) (CMS/[facility])     mitral valve prolapse   • Diabetes mellitus (CMS/[facility])    • Elevated alkaline phosphatase level 10/16/2017   • H/O blood clots    • Heart murmur    • Hyperlipidemia    • Hypokalemia 10/16/2017   • Leg pain 10/16/2017   • Mitral valve prolapse    • Neuropathy    • Obesity 3/31/2017   • OCD (obsessive compulsive disorder) 10/16/2017   • Renal insufficiency 10/16/2017   • Thrombocytopenia (CMS/HCC) 10/16/2017   • TIA (transient ischemic attack)     4 TIMES         Current Meds:     Current Outpatient Medications   Medication Sig Dispense Refill   • Albuterol Sulfate (VENTOLIN HFA IN) Inhale.     • aspirin 325 MG tablet Take 1 tablet by mouth Daily for 30 days. 30 tablet 5   • cetirizine (zyrTEC) 10 MG tablet Take 1 tablet by mouth every night at bedtime for 30 days. 30 tablet 5   • DULoxetine (CYMBALTA) 30 MG capsule Take 1 capsule by mouth 2 (Two) Times a Day. 60 capsule 5   • fluvoxaMINE  (LUVOX) 50 MG tablet Take 1 tablet by mouth Every Night. 30 tablet 0   • GLOBAL EASE INJECT PEN NEEDLES 31G X 8 MM misc USE AS DIRECTED TO INJECT VICTOZA AND BASAGLAR DAILY  10   • glucagon (GLUCAGEN) 1 MG injection Inject 1 mg under the skin into the appropriate area as directed 1 (One) Time As Needed for Low Blood Sugar for up to 1 dose. 1 kit 3   • glucose blood test strip Use tid 100 each 12   • HUMALOG KWIKPEN 100 UNIT/ML solution pen-injector Inject 45 Units under the skin into the appropriate area as directed 3 (Three) Times a Day As Needed (For elevated blood sugars). 15 pen 3   • insulin lispro (HUMALOG) 100 UNIT/ML injection Per insulin pump. Maximum insulin dose 300 units daily 6 each 12   • lamoTRIgine (LaMICtal) 100 MG tablet Take 1 tablet by mouth Daily. 30 tablet 0   • metFORMIN (GLUCOPHAGE) 1000 MG tablet Take 1 tablet by mouth 2 (Two) Times a Day With Meals. 60 tablet 5   • omeprazole (priLOSEC) 20 MG capsule Take 1 capsule by mouth 2 (Two) Times a Day for 30 days. 60 capsule 5   • rosuvastatin (CRESTOR) 20 MG tablet Take 1 tablet by mouth Every Night. 30 tablet 5   • vitamin D (ERGOCALCIFEROL) 25292 units capsule capsule Take 1 capsule by mouth 1 (One) Time Per Week. 12 capsule 0     No current facility-administered medications for this visit.         Allergies:      Allergies   Allergen Reactions   • Mobic [Meloxicam] Rash   • Novolog [Insulin Aspart] Hives   • Penicillins Angioedema        Past Surgical History:     Past Surgical History:   Procedure Laterality Date   • APPENDECTOMY     • CARPAL TUNNEL RELEASE     • CHOLECYSTECTOMY     • FINGER FUSION     • HYSTERECTOMY     • KNEE ARTHROPLASTY           Social History:     Social History     Socioeconomic History   • Marital status:      Spouse name: Not on file   • Number of children: Not on file   • Years of education: Not on file   • Highest education level: Not on file   Occupational History   • Occupation: Not Working   Social Needs    • Financial resource strain: Not hard at all   • Food insecurity:     Worry: Never true     Inability: Never true   • Transportation needs:     Medical: No     Non-medical: No   Tobacco Use   • Smoking status: Never Smoker   • Smokeless tobacco: Never Used   Substance and Sexual Activity   • Alcohol use: No   • Drug use: No   • Sexual activity: Yes     Partners: Male   Lifestyle   • Physical activity:     Days per week: 3 days     Minutes per session: 100 min   • Stress: Very much   Relationships   • Social connections:     Talks on phone: More than three times a week     Gets together: More than three times a week     Attends [employer] service: Never     Active member of club or organization: No     Attends meetings of clubs or organizations: Never     Relationship status:    Social History Narrative    Mrs. Bernstein is a 42 year old white  female. They live in [facility] [place] with their 4 children. She has extended family local as well. She works as a  at [employer] in [place]. She does not have an advanced directive.       Family History:     Family History   Problem Relation Age of Onset   • Diabetes Mother    • Diabetes Father    • Heart disease Father    • Alcohol abuse Father    • Seizures Father    • No Known Problems Brother    • No Known Problems Daughter    • Suicide Attempts Cousin        Review of Systems:     Review of Systems   Constitutional: Negative.  Negative for activity change, appetite change, chills, diaphoresis, fatigue and unexpected weight change.   HENT: Negative for congestion, dental problem, drooling, ear discharge, ear pain, facial swelling, hearing loss, mouth sores, nosebleeds, postnasal drip, rhinorrhea, sinus pressure, sneezing, sore throat, tinnitus, trouble swallowing and voice change.    Eyes: Negative.  Negative for photophobia, pain, discharge, redness, itching and visual disturbance.   Respiratory: Negative.  Negative for apnea, cough, choking,  chest tightness, shortness of breath, wheezing and stridor.    Cardiovascular: Negative.  Negative for chest pain, palpitations and leg swelling.   Gastrointestinal: Negative.  Negative for abdominal distention, abdominal pain, anal bleeding, blood in stool, constipation, diarrhea, nausea, rectal pain and vomiting.   Endocrine: Negative.  Negative for cold intolerance, heat intolerance, polydipsia, polyphagia and polyuria.   Musculoskeletal: Negative.  Negative for arthralgias, back pain, gait problem, joint swelling, myalgias, neck pain and neck stiffness.   Skin: Negative.  Negative for color change, pallor, rash and wound.   Allergic/Immunologic: Negative.  Negative for environmental allergies, food allergies and immunocompromised state.   Neurological: Negative.  Negative for dizziness, tremors, seizures, syncope, facial asymmetry, speech difficulty, weakness, light-headedness, numbness and headaches.   Hematological: Negative.  Negative for adenopathy. Does not bruise/bleed easily.   Psychiatric/Behavioral: Negative for agitation, behavioral problems, confusion, decreased concentration, dysphoric mood, hallucinations, self-injury, sleep disturbance and suicidal ideas. The patient is not nervous/anxious and is not hyperactive.    All other systems reviewed and are negative.      Physical Exam:     Physical Exam   Constitutional: She appears well-developed and well-nourished.   HENT:   Head: Normocephalic and atraumatic.   Right Ear: External ear normal.   Left Ear: External ear normal.   Mouth/Throat: Oropharynx is clear and moist.   Eyes: Conjunctivae are normal. Pupils are equal, round, and reactive to light.   Cardiovascular: Normal rate, regular rhythm, normal heart sounds and intact distal pulses.   Pulmonary/Chest: Effort normal and breath sounds normal.   Abdominal: Soft. Bowel sounds are normal. She exhibits no distension and no mass. There is no tenderness. There is no rebound and no guarding.    Genitourinary: No vaginal discharge found.   Musculoskeletal: Normal range of motion.   Neurological: She is alert. She has normal reflexes.   Skin: Skin is warm and dry.   Psychiatric: She has a normal mood and affect. Her behavior is normal. Judgment and thought content normal.       I have reviewed the following portions of the patient's history: allergies, current medications, past family history, past medical history, past social history, past surgical history, problem list and ROS and confirm it's accurate.      Procedure:       Assessment/Plan:   Recurrent urinary tract infections-patient has been referred and diagnosed with recurrent urinary tract infections.  We discussed the types of organisms that are found in the urinary tract indicating that the vast majority are results of the patient's own gastrointestinal lupe.  We discussed how many of the antibiotics that are utilized can actually exacerbate these infections by creating resistant organisms and there is only a very few antibiotics that are concentrated in the urine and do not affect the rectal reservoir nor cause recurrent yeast vaginitis.  We discussed the risk factors for recurrent infections being intercourse in younger patients and atrophic changes in older patients.  We discussed the symptoms that are found including pain, pressure, burning, frequency, urgency suprapubic pain and painful intercourse.  I discussed upper tract symptoms including fevers, chills, and indicated the workup would be much more aggressive if the patient were to present with recurrent infections in the face of upper tract symptomatology such as fever.  I discussed the history of vesicoureteral reflux in young patients and finally chronic renal scarring as a result of such.  I recommend concomitant probiotics with treatment with antibiotics to protect the rectal reservoir including over-the-counter yogurt preparations to jb oral pills containing the appropriate  probiotics.            Patient's Body mass index is 40.2 kg/m². BMI is above normal parameters. Recommendations include: educational material.              This document has been electronically signed by MARLEE LEACH MD October 15, 2019 8:11 AM

## 2019-10-16 ENCOUNTER — OFFICE VISIT (OUTPATIENT)
Dept: FAMILY MEDICINE CLINIC | Facility: CLINIC | Age: 44
End: 2019-10-16

## 2019-10-16 VITALS
OXYGEN SATURATION: 96 % | BODY MASS INDEX: 40.19 KG/M2 | SYSTOLIC BLOOD PRESSURE: 130 MMHG | RESPIRATION RATE: 14 BRPM | DIASTOLIC BLOOD PRESSURE: 70 MMHG | HEART RATE: 119 BPM | HEIGHT: 64 IN | TEMPERATURE: 97.7 F | WEIGHT: 235.4 LBS

## 2019-10-16 DIAGNOSIS — E66.01 CLASS 2 SEVERE OBESITY WITH SERIOUS COMORBIDITY AND BODY MASS INDEX (BMI) OF 38.0 TO 38.9 IN ADULT, UNSPECIFIED OBESITY TYPE (HCC): ICD-10-CM

## 2019-10-16 DIAGNOSIS — H66.90 EAR INFECTION: ICD-10-CM

## 2019-10-16 DIAGNOSIS — E78.5 DYSLIPIDEMIA: ICD-10-CM

## 2019-10-16 DIAGNOSIS — IMO0002 UNCONTROLLED TYPE 2 DIABETES WITH NEUROPATHY: Primary | ICD-10-CM

## 2019-10-16 DIAGNOSIS — J30.2 SEASONAL ALLERGIC RHINITIS, UNSPECIFIED TRIGGER: ICD-10-CM

## 2019-10-16 PROBLEM — N39.0 URINARY TRACT INFECTION WITHOUT HEMATURIA: Status: ACTIVE | Noted: 2019-10-16

## 2019-10-16 PROCEDURE — 99214 OFFICE O/P EST MOD 30 MIN: CPT | Performed by: NURSE PRACTITIONER

## 2019-10-16 RX ORDER — FLUTICASONE PROPIONATE 50 MCG
1 SPRAY, SUSPENSION (ML) NASAL 2 TIMES DAILY
Qty: 1 BOTTLE | Refills: 3 | Status: SHIPPED | OUTPATIENT
Start: 2019-10-16 | End: 2020-08-14

## 2019-10-16 NOTE — PATIENT INSTRUCTIONS
"DASH Eating Plan  DASH stands for \"Dietary Approaches to Stop Hypertension.\" The DASH eating plan is a healthy eating plan that has been shown to reduce high blood pressure (hypertension). It may also reduce your risk for type 2 diabetes, heart disease, and stroke. The DASH eating plan may also help with weight loss.  What are tips for following this plan?    General guidelines  · Avoid eating more than 2,300 mg (milligrams) of salt (sodium) a day. If you have hypertension, you may need to reduce your sodium intake to 1,500 mg a day.  · Limit alcohol intake to no more than 1 drink a day for nonpregnant women and 2 drinks a day for men. One drink equals 12 oz of beer, 5 oz of wine, or 1½ oz of hard liquor.  · Work with your health care provider to maintain a healthy body weight or to lose weight. Ask what an ideal weight is for you.  · Get at least 30 minutes of exercise that causes your heart to beat faster (aerobic exercise) most days of the week. Activities may include walking, swimming, or biking.  · Work with your health care provider or diet and nutrition specialist (dietitian) to adjust your eating plan to your individual calorie needs.  Reading food labels    · Check food labels for the amount of sodium per serving. Choose foods with less than 5 percent of the Daily Value of sodium. Generally, foods with less than 300 mg of sodium per serving fit into this eating plan.  · To find whole grains, look for the word \"whole\" as the first word in the ingredient list.  Shopping  · Buy products labeled as \"low-sodium\" or \"no salt added.\"  · Buy fresh foods. Avoid canned foods and premade or frozen meals.  Cooking  · Avoid adding salt when cooking. Use salt-free seasonings or herbs instead of table salt or sea salt. Check with your health care provider or pharmacist before using salt substitutes.  · Do not barrera foods. Cook foods using healthy methods such as baking, boiling, grilling, and broiling instead.  · Cook with " heart-healthy oils, such as olive, canola, soybean, or sunflower oil.  Meal planning  · Eat a balanced diet that includes:  ? 5 or more servings of fruits and vegetables each day. At each meal, try to fill half of your plate with fruits and vegetables.  ? Up to 6-8 servings of whole grains each day.  ? Less than 6 oz of lean meat, poultry, or fish each day. A 3-oz serving of meat is about the same size as a deck of cards. One egg equals 1 oz.  ? 2 servings of low-fat dairy each day.  ? A serving of nuts, seeds, or beans 5 times each week.  ? Heart-healthy fats. Healthy fats called Omega-3 fatty acids are found in foods such as flaxseeds and coldwater fish, like sardines, salmon, and mackerel.  · Limit how much you eat of the following:  ? Canned or prepackaged foods.  ? Food that is high in trans fat, such as fried foods.  ? Food that is high in saturated fat, such as fatty meat.  ? Sweets, desserts, sugary drinks, and other foods with added sugar.  ? Full-fat dairy products.  · Do not salt foods before eating.  · Try to eat at least 2 vegetarian meals each week.  · Eat more home-cooked food and less restaurant, buffet, and fast food.  · When eating at a restaurant, ask that your food be prepared with less salt or no salt, if possible.  What foods are recommended?  The items listed may not be a complete list. Talk with your dietitian about what dietary choices are best for you.  Grains  Whole-grain or whole-wheat bread. Whole-grain or whole-wheat pasta. Brown rice. Oatmeal. Quinoa. Bulgur. Whole-grain and low-sodium cereals. Joy bread. Low-fat, low-sodium crackers. Whole-wheat flour tortillas.  Vegetables  Fresh or frozen vegetables (raw, steamed, roasted, or grilled). Low-sodium or reduced-sodium tomato and vegetable juice. Low-sodium or reduced-sodium tomato sauce and tomato paste. Low-sodium or reduced-sodium canned vegetables.  Fruits  All fresh, dried, or frozen fruit. Canned fruit in natural juice (without  added sugar).  Meat and other protein foods  Skinless chicken or turkey. Ground chicken or turkey. Pork with fat trimmed off. Fish and seafood. Egg whites. Dried beans, peas, or lentils. Unsalted nuts, nut butters, and seeds. Unsalted canned beans. Lean cuts of beef with fat trimmed off. Low-sodium, lean deli meat.  Dairy  Low-fat (1%) or fat-free (skim) milk. Fat-free, low-fat, or reduced-fat cheeses. Nonfat, low-sodium ricotta or cottage cheese. Low-fat or nonfat yogurt. Low-fat, low-sodium cheese.  Fats and oils  Soft margarine without trans fats. Vegetable oil. Low-fat, reduced-fat, or light mayonnaise and salad dressings (reduced-sodium). Canola, safflower, olive, soybean, and sunflower oils. Avocado.  Seasoning and other foods  Herbs. Spices. Seasoning mixes without salt. Unsalted popcorn and pretzels. Fat-free sweets.  What foods are not recommended?  The items listed may not be a complete list. Talk with your dietitian about what dietary choices are best for you.  Grains  Baked goods made with fat, such as croissants, muffins, or some breads. Dry pasta or rice meal packs.  Vegetables  Creamed or fried vegetables. Vegetables in a cheese sauce. Regular canned vegetables (not low-sodium or reduced-sodium). Regular canned tomato sauce and paste (not low-sodium or reduced-sodium). Regular tomato and vegetable juice (not low-sodium or reduced-sodium). Pickles. Olives.  Fruits  Canned fruit in a light or heavy syrup. Fried fruit. Fruit in cream or butter sauce.  Meat and other protein foods  Fatty cuts of meat. Ribs. Fried meat. Carroll. Sausage. Bologna and other processed lunch meats. Salami. Fatback. Hotdogs. Bratwurst. Salted nuts and seeds. Canned beans with added salt. Canned or smoked fish. Whole eggs or egg yolks. Chicken or turkey with skin.  Dairy  Whole or 2% milk, cream, and half-and-half. Whole or full-fat cream cheese. Whole-fat or sweetened yogurt. Full-fat cheese. Nondairy creamers. Whipped toppings.  Processed cheese and cheese spreads.  Fats and oils  Butter. Stick margarine. Lard. Shortening. Ghee. Carroll fat. Tropical oils, such as coconut, palm kernel, or palm oil.  Seasoning and other foods  Salted popcorn and pretzels. Onion salt, garlic salt, seasoned salt, table salt, and sea salt. Worcestershire sauce. Tartar sauce. Barbecue sauce. Teriyaki sauce. Soy sauce, including reduced-sodium. Steak sauce. Canned and packaged gravies. Fish sauce. Oyster sauce. Cocktail sauce. Horseradish that you find on the shelf. Ketchup. Mustard. Meat flavorings and tenderizers. Bouillon cubes. Hot sauce and Tabasco sauce. Premade or packaged marinades. Premade or packaged taco seasonings. Relishes. Regular salad dressings.  Where to find more information:  · National Heart, Lung, and Blood [place]: www.nhlbi.nih.gov  · American Heart Association: www.heart.org  Summary  · The DASH eating plan is a healthy eating plan that has been shown to reduce high blood pressure (hypertension). It may also reduce your risk for type 2 diabetes, heart disease, and stroke.  · With the DASH eating plan, you should limit salt (sodium) intake to 2,300 mg a day. If you have hypertension, you may need to reduce your sodium intake to 1,500 mg a day.  · When on the DASH eating plan, aim to eat more fresh fruits and vegetables, whole grains, lean proteins, low-fat dairy, and heart-healthy fats.  · Work with your health care provider or diet and nutrition specialist (dietitian) to adjust your eating plan to your individual calorie needs.  This information is not intended to replace advice given to you by your health care provider. Make sure you discuss any questions you have with your health care provider.  Document Released: 12/06/2012 Document Revised: 12/11/2017 Document Reviewed: 12/11/2017  [employer] Patient Education © 2019 [employer]  Diabetes Mellitus and Nutrition, Adult  When you have diabetes (diabetes mellitus), it is very  important to have healthy eating habits because your blood sugar (glucose) levels are greatly affected by what you eat and drink. Eating healthy foods in the appropriate amounts, at about the same times every day, can help you:  · Control your blood glucose.  · Lower your risk of heart disease.  · Improve your blood pressure.  · Reach or maintain a healthy weight.  Every person with diabetes is different, and each person has different needs for a meal plan. Your health care provider may recommend that you work with a diet and nutrition specialist (dietitian) to make a meal plan that is best for you. Your meal plan may vary depending on factors such as:  · The calories you need.  · The medicines you take.  · Your weight.  · Your blood glucose, blood pressure, and cholesterol levels.  · Your activity level.  · Other health conditions you have, such as heart or kidney disease.  How do carbohydrates affect me?  Carbohydrates, also called carbs, affect your blood glucose level more than any other type of food. Eating carbs naturally raises the amount of glucose in your blood. Carb counting is a method for keeping track of how many carbs you eat. Counting carbs is important to keep your blood glucose at a healthy level, especially if you use insulin or take certain oral diabetes medicines.  It is important to know how many carbs you can safely have in each meal. This is different for every person. Your dietitian can help you calculate how many carbs you should have at each meal and for each snack.  Foods that contain carbs include:  · Bread, cereal, rice, pasta, and crackers.  · Potatoes and corn.  · Peas, beans, and lentils.  · Milk and yogurt.  · Fruit and juice.  · Desserts, such as cakes, cookies, ice cream, and candy.  How does alcohol affect me?  Alcohol can cause a sudden decrease in blood glucose (hypoglycemia), especially if you use insulin or take certain oral diabetes medicines. Hypoglycemia can be a  "life-threatening condition. Symptoms of hypoglycemia (sleepiness, dizziness, and confusion) are similar to symptoms of having too much alcohol.  If your health care provider says that alcohol is safe for you, follow these guidelines:  · Limit alcohol intake to no more than 1 drink per day for nonpregnant women and 2 drinks per day for men. One drink equals 12 oz of beer, 5 oz of wine, or 1½ oz of hard liquor.  · Do not drink on an empty stomach.  · Keep yourself hydrated with water, diet soda, or unsweetened iced tea.  · Keep in mind that regular soda, juice, and other mixers may contain a lot of sugar and must be counted as carbs.  What are tips for following this plan?    Reading food labels  · Start by checking the serving size on the \"Nutrition Facts\" label of packaged foods and drinks. The amount of calories, carbs, fats, and other nutrients listed on the label is based on one serving of the item. Many items contain more than one serving per package.  · Check the total grams (g) of carbs in one serving. You can calculate the number of servings of carbs in one serving by dividing the total carbs by 15. For example, if a food has 30 g of total carbs, it would be equal to 2 servings of carbs.  · Check the number of grams (g) of saturated and trans fats in one serving. Choose foods that have low or no amount of these fats.  · Check the number of milligrams (mg) of salt (sodium) in one serving. Most people should limit total sodium intake to less than 2,300 mg per day.  · Always check the nutrition information of foods labeled as \"low-fat\" or \"nonfat\". These foods may be higher in added sugar or refined carbs and should be avoided.  · Talk to your dietitian to identify your daily goals for nutrients listed on the label.  Shopping  · Avoid buying canned, premade, or processed foods. These foods tend to be high in fat, sodium, and added sugar.  · Shop around the outside edge of the grocery store. This includes fresh " fruits and vegetables, bulk grains, fresh meats, and fresh dairy.  Cooking  · Use low-heat cooking methods, such as baking, instead of high-heat cooking methods like deep frying.  · Cook using healthy oils, such as olive, canola, or sunflower oil.  · Avoid cooking with butter, cream, or high-fat meats.  Meal planning  · Eat meals and snacks regularly, preferably at the same times every day. Avoid going long periods of time without eating.  · Eat foods high in fiber, such as fresh fruits, vegetables, beans, and whole grains. Talk to your dietitian about how many servings of carbs you can eat at each meal.  · Eat 4-6 ounces (oz) of lean protein each day, such as lean meat, chicken, fish, eggs, or tofu. One oz of lean protein is equal to:  ? 1 oz of meat, chicken, or fish.  ? 1 egg.  ? ¼ cup of tofu.  · Eat some foods each day that contain healthy fats, such as avocado, nuts, seeds, and fish.  Lifestyle  · Check your blood glucose regularly.  · Exercise regularly as told by your health care provider. This may include:  ? 150 minutes of moderate-intensity or vigorous-intensity exercise each week. This could be brisk walking, biking, or water aerobics.  ? Stretching and doing strength exercises, such as yoga or weightlifting, at least 2 times a week.  · Take medicines as told by your health care provider.  · Do not use any products that contain nicotine or tobacco, such as cigarettes and e-cigarettes. If you need help quitting, ask your health care provider.  · Work with a counselor or diabetes educator to identify strategies to manage stress and any emotional and social challenges.  Questions to ask a health care provider  · Do I need to meet with a diabetes educator?  · Do I need to meet with a dietitian?  · What number can I call if I have questions?  · When are the best times to check my blood glucose?  Where to find more information:  · American Diabetes Association: diabetes.org  · Academy of Nutrition and  Dietetics: www.eatright.org  · National [place] of Diabetes and Digestive and Kidney Diseases (NIH): www.niddk.nih.gov  Summary  · A healthy meal plan will help you control your blood glucose and maintain a healthy lifestyle.  · Working with a diet and nutrition specialist (dietitian) can help you make a meal plan that is best for you.  · Keep in mind that carbohydrates (carbs) and alcohol have immediate effects on your blood glucose levels. It is important to count carbs and to use alcohol carefully.  This information is not intended to replace advice given to you by your health care provider. Make sure you discuss any questions you have with your health care provider.  Document Released: 09/14/2006 Document Revised: 07/18/2018 Document Reviewed: 01/22/2018  [employer] Patient Education © 2019 [employer]

## 2019-10-16 NOTE — PROGRESS NOTES
"  History of Present Illness   Nivia is a 45 y/o female who presents to the clinic today for follow up pertaining to her DM, type 2  with neuropathy and treated with an insulin pump. In addition, Nivia has  Dyslipidemia, GERD, Asthma and Obesity. She has been hospitalized for DVTs and TIAs.   She has decided she would like to have Bariatric Surgery since she has tried \"everything she knows to do to lose weight\" without success. She feels this would improve her quality of life and she would be able to \"come off multiple medications.\"  We will spend time today reviewing her progress from her previous visit.    In addition, Nivia is c/o upper respiratory symptoms which started appx one week ago and worsening. Symptoms include congestion, ear pain, cough and fatigue. She has tried her routine allergy medication without relief.      Diabetes   She presents for follow up diabetes visit. She has type 2 diabetes mellitus. The initial diagnosis of diabetes was made 20 years ago. Her disease course has been improving at this time.  Associated symptoms include blurred vision, fatigue, foot paresthesias, polydipsia, polyuria and visual change. Pertinent negatives for diabetes include no foot ulcerations and no polyphagia. She does report she has had several hypoglycemic episodes with the lowest in the 50's. She generally treats with orange juice and peanut butter crackers.   Diabetic complications include peripheral neuropathy.She was changed to Cymbalta which she reports is helping although she does have breakthrough pain. She has quit Neurontin due to side effects. Risk factors for coronary artery disease include post-menopausal, stress and dyslipidemia. Current diabetic treatment includes insulin pump therapy. .She has tried multiple oral agents including GLP-1 Agonists and Invokana without successful glycemic control  She monitors her blood sugars at least four to five times daily. She is currently utilizing insulin " "pump therapy and CGM which has greatly improved her glycemic management.  She is compliant with treatment all of the time. She is following a generally healthy diet. She has had a previous visit with a dietitian. She participates in exercise at this time but finding it more difficult due to her Neuropathy. She walks 3 miles on a regular basis. Her home blood glucose trend is decreasing steadily  An ACE inhibitor/angiotensin II receptor blocker is not  being taken at this time. Eye exam is current. Last A1C done on 09/10/2019 was 9 which was an improvement of 2 %.     Lab Results   Component Value Date    HGBA1C 9.00 (H) 09/10/2019      Hyperlipidemia   The current episode started more than 1 year ago. Pertinent negatives include no chest pain or shortness of breath. She is taking Atorvastatin 20 mg.   Lab Results   Component Value Date    CHOL 206 (H) 10/16/2017    CHLPL 181 09/10/2019    TRIG 113 09/10/2019    HDL 46 09/10/2019     (H) 09/10/2019     Vitals:    10/16/19 0858 10/16/19 0955   BP: 148/80 130/70   BP Location: Right arm    Patient Position: Sitting    Cuff Size: Adult    Pulse: 119    Resp: 14    Temp: 97.7 °F (36.5 °C)    TempSrc: Temporal    SpO2: 96%    Weight: 107 kg (235 lb 6.4 oz)    Height: 162.6 cm (64\")         The following portions of the patient's history were reviewed and updated as appropriate: allergies, current medications, past family history, past medical history, past social history, past surgical history and problem list.    Review of Systems   Constitutional: Positive for activity change. Negative for appetite change, chills, fatigue, fever and unexpected weight change.   HENT: Positive for congestion, ear pain, postnasal drip, rhinorrhea and sinus pressure. Negative for ear discharge, hearing loss, sinus pain, sore throat, tinnitus and trouble swallowing.    Eyes: Negative for discharge, redness and visual disturbance.   Respiratory: Positive for cough. Negative for chest " tightness, shortness of breath and wheezing.    Cardiovascular: Negative for chest pain, palpitations and leg swelling.   Gastrointestinal: Negative for abdominal pain, constipation, diarrhea, nausea and vomiting.   Endocrine: Negative for cold intolerance, heat intolerance, polydipsia, polyphagia and polyuria.   Skin: Negative for color change and rash.   Neurological: Positive for numbness. Negative for dizziness, tremors, speech difficulty, weakness, light-headedness and headaches.   Hematological: Negative for adenopathy.   Psychiatric/Behavioral: Negative for confusion, decreased concentration and suicidal ideas. The patient is not nervous/anxious.    All other systems reviewed and are negative.    Physical Exam   Constitutional: She is oriented to person, place, and time. She appears well-developed and well-nourished. No distress.   HENT:   Head: Normocephalic.   Right Ear: Tympanic membrane is erythematous and bulging. A middle ear effusion is present.   Left Ear: Tympanic membrane is erythematous and bulging. A middle ear effusion is present.   Nose: Rhinorrhea present. Right sinus exhibits maxillary sinus tenderness. Right sinus exhibits no frontal sinus tenderness. Left sinus exhibits maxillary sinus tenderness. Left sinus exhibits no frontal sinus tenderness.   Mouth/Throat: Oropharynx is clear and moist and mucous membranes are normal. No oropharyngeal exudate.   Eyes: Conjunctivae and EOM are normal. Pupils are equal, round, and reactive to light. Right eye exhibits no discharge. Left eye exhibits no discharge. No scleral icterus.   Neck: Normal range of motion. Neck supple. No JVD present. No thyromegaly present.   Cardiovascular: Normal rate, regular rhythm and normal heart sounds. Exam reveals no friction rub.   No murmur heard.  Pulmonary/Chest: Effort normal and breath sounds normal. No respiratory distress. She has no wheezes. She has no rales.   Abdominal: Soft. Bowel sounds are normal. She  exhibits no distension. There is no tenderness. There is no rebound and no guarding.   Musculoskeletal: She exhibits no edema or tenderness.   Lymphadenopathy:     She has no cervical adenopathy.   Neurological: She is alert and oriented to person, place, and time.   Skin: Skin is warm and dry. No rash noted. No erythema.   Psychiatric: She has a normal mood and affect. Her behavior is normal. Judgment and thought content normal.   Vitals reviewed.    Assessment/Plan     Patient's Body mass index is 40.41 kg/m². BMI is above normal parameters. Recommendations include: educational material, exercise counseling and nutrition counseling.   (Normal BMI:  18.5-24.9, OW 25-29.9, Obesity 30 or greater)    Problems Addressed this Visit        Digestive    Class 2 severe obesity with serious comorbidity and body mass index (BMI) of 38.0 to 38.9 in adult (CMS/[facility])       Endocrine    Uncontrolled type 2 diabetes with neuropathy (CMS/[facility]) - Primary    Relevant Medications    exenatide er (BYDUREON) 2 MG pen-injector injection       Other    Dyslipidemia      Other Visit Diagnoses     Seasonal allergic rhinitis, unspecified trigger        Relevant Medications    fluticasone (FLONASE) 50 MCG/ACT nasal spray    Ear infection        Relevant Medications    cefdinir (OMNICEF) 300 MG capsule        Findings and recommendations discussed with Gabrielamaria dolorescollin. Her insulin pump was downloaded, reviewed and  Interpreted.  Treatment options reviewed. At this time, will add Bydureon weekly to her diabetes regimen in hopes it will improve her glycemic control and reduce insulin need.No insulin pump changes were made due to this. Lifestyle modifications including nutrition and exercise reinforced. Lipid level is not at goal with an elevated LDL. At this time, reinforced lifestyle modifications and she will continue Crestor. She will f/u with me in November, sooner if problems/ concerns occur.     This document has been electronically signed by  NIDA Starr, FNP-BC, CDE  October 19, 2019 4:05 PM

## 2019-10-18 RX ORDER — CEFDINIR 300 MG/1
300 CAPSULE ORAL 2 TIMES DAILY
Qty: 20 CAPSULE | Refills: 0 | Status: SHIPPED | OUTPATIENT
Start: 2019-10-18 | End: 2019-10-28

## 2019-10-29 ENCOUNTER — OFFICE VISIT (OUTPATIENT)
Dept: PSYCHIATRY | Facility: CLINIC | Age: 44
End: 2019-10-29

## 2019-10-29 VITALS — BODY MASS INDEX: 40.85 KG/M2 | WEIGHT: 238 LBS | SYSTOLIC BLOOD PRESSURE: 130 MMHG | DIASTOLIC BLOOD PRESSURE: 76 MMHG

## 2019-10-29 DIAGNOSIS — Z02.83 ENCOUNTER FOR DRUG SCREENING: Primary | ICD-10-CM

## 2019-10-29 DIAGNOSIS — F33.3 SEVERE EPISODE OF RECURRENT MAJOR DEPRESSIVE DISORDER, WITH PSYCHOTIC FEATURES (HCC): ICD-10-CM

## 2019-10-29 PROCEDURE — 99213 OFFICE O/P EST LOW 20 MIN: CPT | Performed by: NURSE PRACTITIONER

## 2019-10-29 RX ORDER — DIAZEPAM 2 MG/1
2 TABLET ORAL EVERY 12 HOURS PRN
Qty: 30 TABLET | Refills: 0 | Status: SHIPPED | OUTPATIENT
Start: 2019-10-29 | End: 2019-11-12 | Stop reason: SDUPTHER

## 2019-10-29 RX ORDER — GLUCOSAM/CHON-MSM1/C/MANG/BOSW 500-416.6
1 TABLET ORAL 3 TIMES DAILY
Refills: 0 | COMMUNITY
Start: 2019-09-25 | End: 2020-08-14

## 2019-10-29 RX ORDER — LAMOTRIGINE 100 MG/1
50 TABLET ORAL DAILY
Qty: 4 TABLET | Refills: 0
Start: 2019-10-29 | End: 2019-11-12

## 2019-10-29 NOTE — PROGRESS NOTES
"      Raquel Bernstein is a 44 y.o. female is here today for medication management follow-up.    Chief Complaint:  Depression, anxiety, irritability     History of Present Illness:    Patient presents today for follow up for medication management with . Patient reports the new medication that she started at bedtime has not worked and she has still been up every hour or so throughout the night. Patient reports she is only getting about 2-3 hours of sleep a night. Patient states she is having frequent nightmares and good dreams. Patient states she is now having more nightmares than good dreams.  reports he can tell when she has a nightmare due to her waking up in a bad mood.  reports she has not woke up in a good mood in 3 weeks. Patient reports her anxiety is at a 10 on a 0-10 scale with 10 being the worst. Patient reports symptoms of anxiety of feeling overwhelmed and constantly worrying. Patient denies any panic attacks but states her anxiety gets so high that she gets very mad and irritable. Patient and  deny any physical aggression, but patient reports she is very irritable most of the time. Patient reports her depression is at a 10 on a 0-10 scale with 10 being the worst. Patient reports symptoms of depression of decreased energy, decreased motivation, irritability, and depressed mood. Patient denies any changes in appetite. Patient reports she has noticed no difference in her mood or anxiety since starting the Lamictal. Patient denies any auditory hallucinations, but states she has had a few visual hallucinations still of memories or recognizable things. Patient denies any current SI or HI. Patient reports she has had several random thoughts of \"it would be better off if I wasn't here\". Patient denies any plan. Patient denies any side effects to medications. Patient denies any medical changes since last visit.   The following portions of the patient's history were reviewed " and updated as appropriate: allergies, current medications, past family history, past medical history, past social history, past surgical history and problem list.    Review of Systems   Constitutional: Positive for fatigue.   Respiratory: Negative.    Cardiovascular: Negative.    Psychiatric/Behavioral: Positive for agitation, dysphoric mood, hallucinations and sleep disturbance. The patient is nervous/anxious.        Objective   Physical Exam   Constitutional: Vital signs are normal. She appears well-developed and well-nourished. She is cooperative.   Neurological: She is alert.   Psychiatric: Her speech is normal. Judgment and thought content normal. Her mood appears anxious. Her affect is angry. She is agitated. Cognition and memory are impaired.   Vitals reviewed.    Blood pressure 130/76, weight 108 kg (238 lb), not currently breastfeeding. Body mass index is 40.85 kg/m².    Allergies   Allergen Reactions   • Mobic [Meloxicam] Rash   • Novolog [Insulin Aspart] Hives   • Penicillins Angioedema       Medication List:   Current Outpatient Medications   Medication Sig Dispense Refill   • Albuterol Sulfate (VENTOLIN HFA IN) Inhale.     • DULoxetine (CYMBALTA) 30 MG capsule Take 1 capsule by mouth 2 (Two) Times a Day. 60 capsule 5   • exenatide er (BYDUREON) 2 MG pen-injector injection Inject 1 pen under the skin into the appropriate area as directed 1 (One) Time Per Week. 2 each 0   • fluticasone (FLONASE) 50 MCG/ACT nasal spray 1 spray into the nostril(s) as directed by provider 2 (Two) Times a Day. 1 bottle 3   • fluvoxaMINE (LUVOX) 50 MG tablet Take 1 tablet by mouth Every Night. 30 tablet 0   • GLOBAL EASE INJECT PEN NEEDLES 31G X 8 MM misc USE AS DIRECTED TO INJECT VICTOZA AND BASAGLAR DAILY  10   • glucagon (GLUCAGEN) 1 MG injection Inject 1 mg under the skin into the appropriate area as directed 1 (One) Time As Needed for Low Blood Sugar for up to 1 dose. 1 kit 3   • glucose blood test strip Use tid 100 each  12   • HUMALOG KWIKPEN 100 UNIT/ML solution pen-injector Inject 45 Units under the skin into the appropriate area as directed 3 (Three) Times a Day As Needed (For elevated blood sugars). 15 pen 3   • insulin lispro (HUMALOG) 100 UNIT/ML injection Per insulin pump. Maximum insulin dose 300 units daily 6 each 12   • lamoTRIgine (LaMICtal) 100 MG tablet Take 1 tablet by mouth Daily. 30 tablet 0   • metFORMIN (GLUCOPHAGE) 1000 MG tablet Take 1 tablet by mouth 2 (Two) Times a Day With Meals. 60 tablet 5   • nitrofurantoin, macrocrystal-monohydrate, (MACROBID) 100 MG capsule Take 1 capsule by mouth Daily. 56 capsule 3   • rosuvastatin (CRESTOR) 20 MG tablet Take 1 tablet by mouth Every Night. 30 tablet 5   • vitamin D (ERGOCALCIFEROL) 11137 units capsule capsule Take 1 capsule by mouth 1 (One) Time Per Week. 12 capsule 0     No current facility-administered medications for this visit.        Mental Status Exam:   Hygiene:   good  Cooperation:  Cooperative  Eye Contact:  Fair  Psychomotor Behavior:  Aggitated  Affect:  Appropriate  Hopelessness: Denies  Speech:  Normal and Minimal  Thought Process:  Goal directed and Linear  Thought Content:  Normal  Suicidal:  None  Homicidal:  None  Hallucinations:  None  Delusion:  None  Memory:  Intact  Orientation:  Person, Place, Time and Situation  Reliability:  fair  Insight:  Fair  Judgement:  Fair  Impulse Control:  Fair  Physical/Medical Issues:  No                Assessment/Plan   Diagnoses and all orders for this visit:    Encounter for drug screening  -     Urine Drug Screen - Urine, Clean Catch; Future    Severe episode of recurrent major depressive disorder, with psychotic features (CMS/HCC)  -     lamoTRIgine (LaMICtal) 100 MG tablet; Take 0.5 tablets by mouth Daily. For one week then discontinue    Other orders  -     diazePAM (VALIUM) 2 MG tablet; Take 1 tablet by mouth Every 12 (Twelve) Hours As Needed for Anxiety.  -     Vilazodone HCl (VIIBRYD STARTER PACK) 10 & 20 MG  kit; Take 10 mg by mouth Daily.            Discussed medication options with patient and . Decrease Lamictal to 50mg for one week then discontinue. Discontinue Luvox. Start Viibryd 10mg and Valium 2mg as needed. Reviewed the risks, benefits, and side effects of the medications; patient and  acknowledged and verbally consented.  Patient is being prescribed a controlled substance as part of treatment plan. Patient has been educated of appropriate use of the medications, including risk of somnolence, limited ability to drive and/or work safely, and potential for dependence, respiratory depression and overdose. Patient is also informed that the medication are to be used by the patient only- avoid any combined use of ETOH or other substances unless prescribed. AIDAN report reviewed # [id number]. UDS ordered. Patient is agreeable to call the office with any questions, concerns, or worsening of symptoms.  Patient is aware to call 911 or go to the nearest ER should begin having SI/HI.          Follow up in two weeks.       Errors in dictation may reflect use of voice recognition software and not all errors in transcription may have been detected prior to signing.              This document has been electronically signed by NIDA Vasquez   October 29, 2019 8:18 AM

## 2019-11-01 ENCOUNTER — TELEPHONE (OUTPATIENT)
Dept: FAMILY MEDICINE CLINIC | Facility: CLINIC | Age: 44
End: 2019-11-01

## 2019-11-01 NOTE — TELEPHONE ENCOUNTER
PA is complete and approved for bydureon.    ----- Message from Lilo Henson, [facility] sent at 10/29/2019  9:47 AM EDT -----  bydureon injection requires a PA with her ins... She is due for a shot  Tomorrow morning at 10 am

## 2019-11-12 ENCOUNTER — OFFICE VISIT (OUTPATIENT)
Dept: PSYCHIATRY | Facility: CLINIC | Age: 44
End: 2019-11-12

## 2019-11-12 VITALS
BODY MASS INDEX: 40.68 KG/M2 | DIASTOLIC BLOOD PRESSURE: 62 MMHG | SYSTOLIC BLOOD PRESSURE: 140 MMHG | WEIGHT: 237 LBS | HEART RATE: 106 BPM

## 2019-11-12 DIAGNOSIS — F41.1 GENERALIZED ANXIETY DISORDER: ICD-10-CM

## 2019-11-12 DIAGNOSIS — F33.3 SEVERE EPISODE OF RECURRENT MAJOR DEPRESSIVE DISORDER, WITH PSYCHOTIC FEATURES (HCC): Primary | ICD-10-CM

## 2019-11-12 PROCEDURE — 99213 OFFICE O/P EST LOW 20 MIN: CPT | Performed by: NURSE PRACTITIONER

## 2019-11-12 RX ORDER — DIAZEPAM 5 MG/1
5 TABLET ORAL EVERY 12 HOURS PRN
Qty: 30 TABLET | Refills: 0 | Status: SHIPPED | OUTPATIENT
Start: 2019-11-12 | End: 2019-11-27 | Stop reason: SDUPTHER

## 2019-11-12 RX ORDER — OMEPRAZOLE 20 MG/1
CAPSULE, DELAYED RELEASE ORAL
Refills: 5 | COMMUNITY
Start: 2019-11-04 | End: 2020-05-28

## 2019-11-12 RX ORDER — ASPIRIN 325 MG
325 TABLET, DELAYED RELEASE (ENTERIC COATED) ORAL DAILY
Refills: 5 | Status: ON HOLD | COMMUNITY
Start: 2019-11-04 | End: 2020-08-18

## 2019-11-12 NOTE — PROGRESS NOTES
Subjective   Nivia Bernstein is a 44 y.o. female is here today for medication management follow-up.    Chief Complaint:  Depression, anxiety, irritability    History of Present Illness:   Patient presents today for follow up for medication management for depression and anxiety with . Patient states her memory is starting to fade again and is having increased difficulty remembering things.  Patient reports currently depression is at a 10 on a 0-10 scale with 10 being the worst. Patient reports symptoms of depression of no energy, no motivation, irritability, and feeling aggravated. Patient reports anxiety is at a 10 on a 0-10 scale with 10 being the worst. Patient states anxiety is extremely high and feels like she is ready to explode. Patient states Valium doesn't work at all. Patient reports she did try taking two and nothing changed.  Patient reports having a couple  panic attacks. Patient reports panic attacks lasted more than an hour and during an attacks patient has high irritability. Patient reports sleeping at least 3-4 hours a night and patient denies any nightmares, but having crazy dreams. Patient reports appetite is good and eating at least 3 meals a day. Patient denies any auditory or visual hallucinations. Patient adamantly denies any SI or HI. Patient denies any side effects to medications. Patient denies any medical changes since last visit.   The following portions of the patient's history were reviewed and updated as appropriate: allergies, current medications, past family history, past medical history, past social history, past surgical history and problem list.    Review of Systems   Constitutional: Negative.    Respiratory: Negative.    Cardiovascular: Negative.    Gastrointestinal: Negative.    Psychiatric/Behavioral: Positive for agitation, dysphoric mood and sleep disturbance. The patient is nervous/anxious.        Objective   Physical Exam   Constitutional: Vital signs are normal.  She appears well-developed and well-nourished. She is cooperative.   Neurological: She is alert.   Psychiatric: Her speech is normal and behavior is normal. Judgment and thought content normal. Her mood appears anxious. Cognition and memory are normal. She exhibits a depressed mood.   Vitals reviewed.    Blood pressure 140/62, pulse 106, weight 108 kg (237 lb), not currently breastfeeding. Body mass index is 40.68 kg/m².    Allergies   Allergen Reactions   • Mobic [Meloxicam] Rash   • Novolog [Insulin Aspart] Hives   • Penicillins Angioedema       Medication List:   Current Outpatient Medications   Medication Sig Dispense Refill   • Albuterol Sulfate (VENTOLIN HFA IN) Inhale.     • aspirin  MG tablet   5   • diazePAM (VALIUM) 5 MG tablet Take 1 tablet by mouth Every 12 (Twelve) Hours As Needed for Anxiety. 30 tablet 0   • DULoxetine (CYMBALTA) 30 MG capsule Take 1 capsule by mouth 2 (Two) Times a Day. 60 capsule 5   • exenatide er (BYDUREON) 2 MG pen-injector injection Inject 1 pen under the skin into the appropriate area as directed 1 (One) Time Per Week. 2 each 0   • fluticasone (FLONASE) 50 MCG/ACT nasal spray 1 spray into the nostril(s) as directed by provider 2 (Two) Times a Day. 1 bottle 3   • GLOBAL EASE INJECT PEN NEEDLES 31G X 8 MM misc USE AS DIRECTED TO INJECT VICTOZA AND BASAGLAR DAILY  10   • glucagon (GLUCAGEN) 1 MG injection Inject 1 mg under the skin into the appropriate area as directed 1 (One) Time As Needed for Low Blood Sugar for up to 1 dose. 1 kit 3   • glucose blood test strip Use tid 100 each 12   • HUMALOG KWIKPEN 100 UNIT/ML solution pen-injector Inject 45 Units under the skin into the appropriate area as directed 3 (Three) Times a Day As Needed (For elevated blood sugars). 15 pen 3   • insulin lispro (HUMALOG) 100 UNIT/ML injection Per insulin pump. Maximum insulin dose 300 units daily 6 each 12   • metFORMIN (GLUCOPHAGE) 1000 MG tablet Take 1 tablet by mouth 2 (Two) Times a Day With  Meals. 60 tablet 5   • nitrofurantoin, macrocrystal-monohydrate, (MACROBID) 100 MG capsule Take 1 capsule by mouth Daily. 56 capsule 3   • omeprazole (priLOSEC) 20 MG capsule   5   • rosuvastatin (CRESTOR) 20 MG tablet Take 1 tablet by mouth Every Night. 30 tablet 5   • TRUEPLUS LANCETS 33G misc Apply 1 each topically to the appropriate area as directed 3 (Three) Times a Day. Test blood glucose  0   • Vilazodone HCl (VIIBRYD STARTER PACK) 10 & 20 MG kit Take 10 mg by mouth Daily. For one week then stop 1 kit 0   • vitamin D (ERGOCALCIFEROL) 39401 units capsule capsule Take 1 capsule by mouth 1 (One) Time Per Week. 12 capsule 0   • Vortioxetine HBr (TRINTELLIX) 10 MG tablet Take 10 mg by mouth Daily. 30 tablet 0     No current facility-administered medications for this visit.        Mental Status Exam:   Hygiene:   good  Cooperation:  Cooperative  Eye Contact:  Fair  Psychomotor Behavior:  Aggitated  Affect:  Angry  Hopelessness: Denies  Speech:  Normal and Minimal  Thought Process:  Goal directed and Linear  Thought Content:  Normal  Suicidal:  None  Homicidal:  None  Hallucinations:  None  Delusion:  None  Memory:  Intact  Orientation:  Person, Place, Time and Situation  Reliability:  fair  Insight:  Fair  Judgement:  Fair  Impulse Control:  Fair  Physical/Medical Issues:  No                  Assessment/Plan   Diagnoses and all orders for this visit:    Severe episode of recurrent major depressive disorder, with psychotic features (CMS/HCC)  -     Vortioxetine HBr (TRINTELLIX) 10 MG tablet; Take 10 mg by mouth Daily.  -     Vilazodone HCl (VIIBRYD STARTER PACK) 10 & 20 MG kit; Take 10 mg by mouth Daily. For one week then stop    Generalized anxiety disorder  -     diazePAM (VALIUM) 5 MG tablet; Take 1 tablet by mouth Every 12 (Twelve) Hours As Needed for Anxiety.            Discussed medication options with patient and . Decrease Viibryd to 10mg for one week then discontinue. Start Trintellix 10mg daily  after stopping Viibryd. Increase Valium to 5mg as needed. Reviewed the risks, benefits, and side effects of the medications; patient and  acknowledged and verbally consented. Patient is being prescribed a controlled substance as part of treatment plan. Patient has been educated of appropriate use of the medications, including risk of somnolence, limited ability to drive and/or work safely, and potential for dependence, respiratory depression and overdose. Patient is also informed that the medication are to be used by the patient only- avoid any combined use of ETOH or other substances unless prescribed.   Patient is agreeable to call the office with any questions, concerns, or worsening of symptoms.  Patient is aware to call 911 or go to the nearest ER should begin having SI/HI.        Follow up in four weeks.       Errors in dictation may reflect use of voice recognition software and not all errors in transcription may have been detected prior to signing.                This document has been electronically signed by NIDA Vasquez   November 12, 2019 10:03 AM

## 2019-11-13 ENCOUNTER — OFFICE VISIT (OUTPATIENT)
Dept: FAMILY MEDICINE CLINIC | Facility: CLINIC | Age: 44
End: 2019-11-13

## 2019-11-13 VITALS
HEART RATE: 105 BPM | BODY MASS INDEX: 40.56 KG/M2 | DIASTOLIC BLOOD PRESSURE: 72 MMHG | WEIGHT: 237.6 LBS | RESPIRATION RATE: 16 BRPM | TEMPERATURE: 99 F | SYSTOLIC BLOOD PRESSURE: 110 MMHG | OXYGEN SATURATION: 98 % | HEIGHT: 64 IN

## 2019-11-13 DIAGNOSIS — IMO0002 UNCONTROLLED TYPE 2 DIABETES WITH NEUROPATHY: Primary | Chronic | ICD-10-CM

## 2019-11-13 DIAGNOSIS — F41.9 ANXIETY AND DEPRESSION: Chronic | ICD-10-CM

## 2019-11-13 DIAGNOSIS — E78.5 DYSLIPIDEMIA: Chronic | ICD-10-CM

## 2019-11-13 DIAGNOSIS — F32.A ANXIETY AND DEPRESSION: Chronic | ICD-10-CM

## 2019-11-13 DIAGNOSIS — K21.9 GASTROESOPHAGEAL REFLUX DISEASE WITHOUT ESOPHAGITIS: Chronic | ICD-10-CM

## 2019-11-13 DIAGNOSIS — Z23 ENCOUNTER FOR IMMUNIZATION: ICD-10-CM

## 2019-11-13 DIAGNOSIS — E66.01 CLASS 2 SEVERE OBESITY WITH SERIOUS COMORBIDITY AND BODY MASS INDEX (BMI) OF 37.0 TO 37.9 IN ADULT, UNSPECIFIED OBESITY TYPE (HCC): ICD-10-CM

## 2019-11-13 DIAGNOSIS — H69.83 ACUTE DYSFUNCTION OF EUSTACHIAN TUBE, BILATERAL: ICD-10-CM

## 2019-11-13 PROCEDURE — 90471 IMMUNIZATION ADMIN: CPT | Performed by: NURSE PRACTITIONER

## 2019-11-13 PROCEDURE — 99214 OFFICE O/P EST MOD 30 MIN: CPT | Performed by: NURSE PRACTITIONER

## 2019-11-13 PROCEDURE — 90674 CCIIV4 VAC NO PRSV 0.5 ML IM: CPT | Performed by: NURSE PRACTITIONER

## 2019-11-13 PROCEDURE — 95251 CONT GLUC MNTR ANALYSIS I&R: CPT | Performed by: NURSE PRACTITIONER

## 2019-11-13 NOTE — PROGRESS NOTES
Nivia is a 45 y/o female who presents to the clinic today for follow up pertaining to her DM, type 2  with neuropathy and treated with an insulin pump. In addition, Nivia has  Dyslipidemia, GERD, Asthma and Obesity. She has been hospitalized for DVTs and TIAs.   She does continue to c/o ear pressure/pain which started weeks ago. She was prescribed  Cefdinir and Flonase which provided mild relief.   History of Present Illness  Diabetes   She presents for follow up diabetes visit. She has type 2 diabetes mellitus. The initial diagnosis of diabetes was made 20 years ago. Her disease course has been improving at this time.  Associated symptoms include blurred vision, fatigue, foot paresthesias, polydipsia, polyuria and visual change. Pertinent negatives for diabetes include no foot ulcerations and no polyphagia. She does reports she has had no hypoglycemic episodes. She generally treats lows with orange juice and peanut butter crackers.   Diabetic complications include peripheral neuropathy.She was changed to Cymbalta which she reports is helping although she does have breakthrough pain. She has quit Neurontin due to side effects. Risk factors for coronary artery disease include post-menopausal, stress and dyslipidemia. Current diabetic treatment includes insulin pump therapy. .She monitors her blood sugars at least four to five times daily. She is currently utilizing insulin pump therapy and CGM which has greatly improved her glycemic management. At her last visit, Bydureon was added to her regimen with the goal of improving her glycemic control. She shares she can not see a change. She is compliant with treatment all of the time. She is following a generally healthy diet.She does report she has been under a great deal of stress for the past month with several close friends/family going through health problems.  She has had a previous visit with a dietitian. She participates in exercise at this time but finding it  "more difficult due to her Neuropathy. She walks 3 miles on a regular basis. Her home blood glucose trend is decreasing steadily  An ACE inhibitor/angiotensin II receptor blocker is not  being taken at this time. Eye exam is current. Last A1C done on 09/10/2019 was 9 which was an improvement of 2 %.     Lab Results   Component Value Date    HGBA1C 9.00 (H) 09/10/2019      Hyperlipidemia   The current episode started more than 1 year ago. Pertinent negatives include no chest pain or shortness of breath. She is taking Atorvastatin 20 mg.   Lab Results   Component Value Date    CHLPL 181 09/10/2019    TRIG 113 09/10/2019    HDL 46 09/10/2019     (H) 09/10/2019   Obesity   The current episode started more than 1 year ago. The problem has been gradually worsening. Associated symptoms include arthralgias, coughing, fatigue, headaches, numbness and weakness. Pertinent negatives include no abdominal pain, anorexia, change in bowel habit, chest pain, chills, fever, nausea, rash or vomiting. Treatments tried: Exercise, Lifestyle changes.   She has decided she would like to have Bariatric Surgery. She has tried \"everything she knows to do to lose weight\" without success. She feels this would improve her quality of life and she would be able to \"come off multiple medications.\"  We will spend time today reviewing her progress from her previous visit.    Lab Results   Component Value Date    TSH 2.000 09/10/2019     Lab Results   Component Value Date    WBC 8.47 09/10/2019    HGB 13.7 09/10/2019    HCT 43.4 09/10/2019    MCV 86.6 09/10/2019     09/10/2019     Vitals:    11/13/19 0858   BP: 110/72   Pulse: 105   Resp: 16   Temp: 99 °F (37.2 °C)   TempSrc: Temporal   SpO2: 98%   Weight: 108 kg (237 lb 9.6 oz)   Height: 162.6 cm (64\")        The following portions of the patient's history were reviewed and updated as appropriate: allergies, current medications, past family history, past medical history, past social " history, past surgical history and problem list.    Review of Systems   Constitutional: Positive for activity change (Increase walking). Negative for appetite change and unexpected weight change.   Eyes: Negative for visual disturbance.   Respiratory: Negative for chest tightness, shortness of breath and wheezing.    Cardiovascular: Positive for leg swelling. Negative for palpitations.   Gastrointestinal: Negative for constipation and diarrhea.   Endocrine: Positive for polydipsia. Negative for cold intolerance, heat intolerance, polyphagia and polyuria.   Skin: Negative for color change.   Neurological: Negative for dizziness, tremors, speech difficulty and light-headedness.   Hematological: Negative for adenopathy. Bruises/bleeds easily.   Psychiatric/Behavioral: Positive for sleep disturbance. Negative for confusion, decreased concentration and suicidal ideas. The patient is not nervous/anxious.    All other systems reviewed and are negative.    Physical Exam   Constitutional: She is oriented to person, place, and time. She appears well-developed and well-nourished. No distress.   HENT:   Head: Normocephalic.   Right Ear: Tympanic membrane is bulging. Tympanic membrane is not erythematous and not retracted. A middle ear effusion is present.   Left Ear: Tympanic membrane is bulging. Tympanic membrane is not erythematous and not retracted. A middle ear effusion is present.   Nose: Mucosal edema and rhinorrhea present. Right sinus exhibits no maxillary sinus tenderness and no frontal sinus tenderness. Left sinus exhibits no maxillary sinus tenderness and no frontal sinus tenderness.   Mouth/Throat: Mucous membranes are normal. Oropharyngeal exudate (PND) and posterior oropharyngeal erythema present.   Eyes: Conjunctivae and EOM are normal. Pupils are equal, round, and reactive to light. Right eye exhibits no discharge. Left eye exhibits no discharge. No scleral icterus.   Neck: Neck supple. No JVD present. No  thyromegaly present.   Cardiovascular: Normal rate, regular rhythm and normal heart sounds. Exam reveals no friction rub.   No murmur heard.  Pulmonary/Chest: Effort normal and breath sounds normal. No respiratory distress. She has no decreased breath sounds. She has no wheezes. She has no rhonchi. She has no rales.   Abdominal: Soft. She exhibits no distension. There is no tenderness. There is no rebound and no guarding.   Musculoskeletal: She exhibits no edema.   Lymphadenopathy:     She has no cervical adenopathy.   Neurological: She is alert and oriented to person, place, and time.   Skin: Skin is warm and dry. Capillary refill takes less than 2 seconds. No rash noted. No erythema.   Psychiatric: She has a normal mood and affect. Her speech is normal and behavior is normal. Judgment and thought content normal. Cognition and memory are normal.   Nursing note and vitals reviewed.    Assessment/Plan     Patient's Body mass index is 40.78 kg/m². BMI is above normal parameters. Recommendations include: educational material, exercise counseling, nutrition counseling and Bariatric Surgical Consult.   (Normal BMI:  18.5-24.9, OW 25-29.9, Obesity 30 or greater)    Problems Addressed this Visit        Digestive    GERD (gastroesophageal reflux disease) (Chronic)    Relevant Orders    CBC & Differential       Endocrine    Uncontrolled type 2 diabetes with neuropathy (CMS/[facility]) - Primary    Relevant Medications    Semaglutide,0.25 or 0.5MG/DOS, (OZEMPIC, 0.25 OR 0.5 MG/DOSE,) 2 MG/1.5ML solution pen-injector    Other Relevant Orders    Comprehensive Metabolic Panel    TSH    Lipid Panel    Hemoglobin A1c    Vitamin D 25 Hydroxy    MicroAlbumin, Urine, Random - Urine, Clean Catch    Vitamin B12       Other    Anxiety and depression (Chronic)    Relevant Orders    CBC & Differential    Comprehensive Metabolic Panel    TSH    Dyslipidemia    Relevant Orders    Comprehensive Metabolic Panel    Lipid Panel      Other Visit  "Diagnoses     Class 2 severe obesity with serious comorbidity and body mass index (BMI) of 37.0 to 37.9 in adult, unspecified obesity type (CMS/[facility])        Referral to Bariatric Surgery    Relevant Orders    Ambulatory Referral to Bariatric Surgery    Acute dysfunction of Eustachian tube, bilateral        Referral to ENT    Relevant Orders    Ambulatory Referral to ENT (Otolaryngology)    Encounter for immunization        Influenza vaccination administered    Relevant Orders    Flucelvax Quad=>4Years ([id number]) (Completed)        Findings and recommendations discussed with Nivia. Her insulin pump was uploaded, reviewed and interpreted. Scanned into her chart. Her insulin pump parameters were changed.and placed in Medication section. Reinforced lifestyle modifications. She does report she has been under a great deal of stress over the past month and has turned to \"comfort foods\". Strategies reviewed for her to consider. Will refer to ENT for ongoing issues with her ears. She will f/u with NIDA Vasquez for Anxiety/Depression. Routine labs ordered for her to have done prior to next visit which will be in December; sooner if problems/concerns occur.         This document has been electronically signed by NIDA Starr, FAY-BC, MARYE  November 13, 2019 2:29 PM                       "

## 2019-11-13 NOTE — PATIENT INSTRUCTIONS
"DASH Eating Plan  DASH stands for \"Dietary Approaches to Stop Hypertension.\" The DASH eating plan is a healthy eating plan that has been shown to reduce high blood pressure (hypertension). It may also reduce your risk for type 2 diabetes, heart disease, and stroke. The DASH eating plan may also help with weight loss.  What are tips for following this plan?    General guidelines  · Avoid eating more than 2,300 mg (milligrams) of salt (sodium) a day. If you have hypertension, you may need to reduce your sodium intake to 1,500 mg a day.  · Limit alcohol intake to no more than 1 drink a day for nonpregnant women and 2 drinks a day for men. One drink equals 12 oz of beer, 5 oz of wine, or 1½ oz of hard liquor.  · Work with your health care provider to maintain a healthy body weight or to lose weight. Ask what an ideal weight is for you.  · Get at least 30 minutes of exercise that causes your heart to beat faster (aerobic exercise) most days of the week. Activities may include walking, swimming, or biking.  · Work with your health care provider or diet and nutrition specialist (dietitian) to adjust your eating plan to your individual calorie needs.  Reading food labels    · Check food labels for the amount of sodium per serving. Choose foods with less than 5 percent of the Daily Value of sodium. Generally, foods with less than 300 mg of sodium per serving fit into this eating plan.  · To find whole grains, look for the word \"whole\" as the first word in the ingredient list.  Shopping  · Buy products labeled as \"low-sodium\" or \"no salt added.\"  · Buy fresh foods. Avoid canned foods and premade or frozen meals.  Cooking  · Avoid adding salt when cooking. Use salt-free seasonings or herbs instead of table salt or sea salt. Check with your health care provider or pharmacist before using salt substitutes.  · Do not barrera foods. Cook foods using healthy methods such as baking, boiling, grilling, and broiling instead.  · Cook with " heart-healthy oils, such as olive, canola, soybean, or sunflower oil.  Meal planning  · Eat a balanced diet that includes:  ? 5 or more servings of fruits and vegetables each day. At each meal, try to fill half of your plate with fruits and vegetables.  ? Up to 6-8 servings of whole grains each day.  ? Less than 6 oz of lean meat, poultry, or fish each day. A 3-oz serving of meat is about the same size as a deck of cards. One egg equals 1 oz.  ? 2 servings of low-fat dairy each day.  ? A serving of nuts, seeds, or beans 5 times each week.  ? Heart-healthy fats. Healthy fats called Omega-3 fatty acids are found in foods such as flaxseeds and coldwater fish, like sardines, salmon, and mackerel.  · Limit how much you eat of the following:  ? Canned or prepackaged foods.  ? Food that is high in trans fat, such as fried foods.  ? Food that is high in saturated fat, such as fatty meat.  ? Sweets, desserts, sugary drinks, and other foods with added sugar.  ? Full-fat dairy products.  · Do not salt foods before eating.  · Try to eat at least 2 vegetarian meals each week.  · Eat more home-cooked food and less restaurant, buffet, and fast food.  · When eating at a restaurant, ask that your food be prepared with less salt or no salt, if possible.  What foods are recommended?  The items listed may not be a complete list. Talk with your dietitian about what dietary choices are best for you.  Grains  Whole-grain or whole-wheat bread. Whole-grain or whole-wheat pasta. Brown rice. Oatmeal. Quinoa. Bulgur. Whole-grain and low-sodium cereals. Joy bread. Low-fat, low-sodium crackers. Whole-wheat flour tortillas.  Vegetables  Fresh or frozen vegetables (raw, steamed, roasted, or grilled). Low-sodium or reduced-sodium tomato and vegetable juice. Low-sodium or reduced-sodium tomato sauce and tomato paste. Low-sodium or reduced-sodium canned vegetables.  Fruits  All fresh, dried, or frozen fruit. Canned fruit in natural juice (without  added sugar).  Meat and other protein foods  Skinless chicken or turkey. Ground chicken or turkey. Pork with fat trimmed off. Fish and seafood. Egg whites. Dried beans, peas, or lentils. Unsalted nuts, nut butters, and seeds. Unsalted canned beans. Lean cuts of beef with fat trimmed off. Low-sodium, lean deli meat.  Dairy  Low-fat (1%) or fat-free (skim) milk. Fat-free, low-fat, or reduced-fat cheeses. Nonfat, low-sodium ricotta or cottage cheese. Low-fat or nonfat yogurt. Low-fat, low-sodium cheese.  Fats and oils  Soft margarine without trans fats. Vegetable oil. Low-fat, reduced-fat, or light mayonnaise and salad dressings (reduced-sodium). Canola, safflower, olive, soybean, and sunflower oils. Avocado.  Seasoning and other foods  Herbs. Spices. Seasoning mixes without salt. Unsalted popcorn and pretzels. Fat-free sweets.  What foods are not recommended?  The items listed may not be a complete list. Talk with your dietitian about what dietary choices are best for you.  Grains  Baked goods made with fat, such as croissants, muffins, or some breads. Dry pasta or rice meal packs.  Vegetables  Creamed or fried vegetables. Vegetables in a cheese sauce. Regular canned vegetables (not low-sodium or reduced-sodium). Regular canned tomato sauce and paste (not low-sodium or reduced-sodium). Regular tomato and vegetable juice (not low-sodium or reduced-sodium). Pickles. Olives.  Fruits  Canned fruit in a light or heavy syrup. Fried fruit. Fruit in cream or butter sauce.  Meat and other protein foods  Fatty cuts of meat. Ribs. Fried meat. Carroll. Sausage. Bologna and other processed lunch meats. Salami. Fatback. Hotdogs. Bratwurst. Salted nuts and seeds. Canned beans with added salt. Canned or smoked fish. Whole eggs or egg yolks. Chicken or turkey with skin.  Dairy  Whole or 2% milk, cream, and half-and-half. Whole or full-fat cream cheese. Whole-fat or sweetened yogurt. Full-fat cheese. Nondairy creamers. Whipped toppings.  Processed cheese and cheese spreads.  Fats and oils  Butter. Stick margarine. Lard. Shortening. Ghee. Carroll fat. Tropical oils, such as coconut, palm kernel, or palm oil.  Seasoning and other foods  Salted popcorn and pretzels. Onion salt, garlic salt, seasoned salt, table salt, and sea salt. Worcestershire sauce. Tartar sauce. Barbecue sauce. Teriyaki sauce. Soy sauce, including reduced-sodium. Steak sauce. Canned and packaged gravies. Fish sauce. Oyster sauce. Cocktail sauce. Horseradish that you find on the shelf. Ketchup. Mustard. Meat flavorings and tenderizers. Bouillon cubes. Hot sauce and Tabasco sauce. Premade or packaged marinades. Premade or packaged taco seasonings. Relishes. Regular salad dressings.  Where to find more information:  · National Heart, Lung, and Blood [place]: www.nhlbi.nih.gov  · American Heart Association: www.heart.org  Summary  · The DASH eating plan is a healthy eating plan that has been shown to reduce high blood pressure (hypertension). It may also reduce your risk for type 2 diabetes, heart disease, and stroke.  · With the DASH eating plan, you should limit salt (sodium) intake to 2,300 mg a day. If you have hypertension, you may need to reduce your sodium intake to 1,500 mg a day.  · When on the DASH eating plan, aim to eat more fresh fruits and vegetables, whole grains, lean proteins, low-fat dairy, and heart-healthy fats.  · Work with your health care provider or diet and nutrition specialist (dietitian) to adjust your eating plan to your individual calorie needs.  This information is not intended to replace advice given to you by your health care provider. Make sure you discuss any questions you have with your health care provider.  Document Released: 12/06/2012 Document Revised: 12/11/2017 Document Reviewed: 12/11/2017  [employer] Patient Education © 2019 [employer]  Carbohydrate Counting for Diabetes Mellitus, Adult    Carbohydrate counting is a method of  "keeping track of how many carbohydrates you eat. Eating carbohydrates naturally increases the amount of sugar (glucose) in the blood. Counting how many carbohydrates you eat helps keep your blood glucose within normal limits, which helps you manage your diabetes (diabetes mellitus).  It is important to know how many carbohydrates you can safely have in each meal. This is different for every person. A diet and nutrition specialist (registered dietitian) can help you make a meal plan and calculate how many carbohydrates you should have at each meal and snack.  Carbohydrates are found in the following foods:  · Grains, such as breads and cereals.  · Dried beans and soy products.  · Starchy vegetables, such as potatoes, peas, and corn.  · Fruit and fruit juices.  · Milk and yogurt.  · Sweets and snack foods, such as cake, cookies, candy, chips, and soft drinks.  How do I count carbohydrates?  There are two ways to count carbohydrates in food. You can use either of the methods or a combination of both.  Reading \"Nutrition Facts\" on packaged food  The \"Nutrition Facts\" list is included on the labels of almost all packaged foods and beverages in the [place] It includes:  · The serving size.  · Information about nutrients in each serving, including the grams (g) of carbohydrate per serving.  To use the “Nutrition Facts\":  · Decide how many servings you will have.  · Multiply the number of servings by the number of carbohydrates per serving.  · The resulting number is the total amount of carbohydrates that you will be having.  Learning standard serving sizes of other foods  When you eat carbohydrate foods that are not packaged or do not include \"Nutrition Facts\" on the label, you need to measure the servings in order to count the amount of carbohydrates:  · Measure the foods that you will eat with a food scale or measuring cup, if needed.  · Decide how many standard-size servings you will eat.  · Multiply the number of servings " by 15. Most carbohydrate-rich foods have about 15 g of carbohydrates per serving.  ? For example, if you eat 8 oz (170 g) of strawberries, you will have eaten 2 servings and 30 g of carbohydrates (2 servings x 15 g = 30 g).  · For foods that have more than one food mixed, such as soups and casseroles, you must count the carbohydrates in each food that is included.  The following list contains standard serving sizes of common carbohydrate-rich foods. Each of these servings has about 15 g of carbohydrates:  · ½ hamburger bun or ½ English muffin.  · ½ oz (15 mL) syrup.  · ½ oz (14 g) jelly.  · 1 slice of bread.  · 1 six-inch tortilla.  · 3 oz (85 g) cooked rice or pasta.  · 4 oz (113 g) cooked dried beans.  · 4 oz (113 g) starchy vegetable, such as peas, corn, or potatoes.  · 4 oz (113 g) hot cereal.  · 4 oz (113 g) mashed potatoes or ¼ of a large baked potato.  · 4 oz (113 g) canned or frozen fruit.  · 4 oz (120 mL) fruit juice.  · 4-6 crackers.  · 6 chicken nuggets.  · 6 oz (170 g) unsweetened dry cereal.  · 6 oz (170 g) plain fat-free yogurt or yogurt sweetened with artificial sweeteners.  · 8 oz (240 mL) milk.  · 8 oz (170 g) fresh fruit or one small piece of fruit.  · 24 oz (680 g) popped popcorn.  Example of carbohydrate counting  Sample meal  · 3 oz (85 g) chicken breast.  · 6 oz (170 g) brown rice.  · 4 oz (113 g) corn.  · 8 oz (240 mL) milk.  · 8 oz (170 g) strawberries with sugar-free whipped topping.  Carbohydrate calculation  1. Identify the foods that contain carbohydrates:  ? Rice.  ? Corn.  ? Milk.  ? Strawberries.  2. Calculate how many servings you have of each food:  ? 2 servings rice.  ? 1 serving corn.  ? 1 serving milk.  ? 1 serving strawberries.  3. Multiply each number of servings by 15 g:  ? 2 servings rice x 15 g = 30 g.  ? 1 serving corn x 15 g = 15 g.  ? 1 serving milk x 15 g = 15 g.  ? 1 serving strawberries x 15 g = 15 g.  4. Add together all of the amounts to find the total grams of  carbohydrates eaten:  ? 30 g + 15 g + 15 g + 15 g = 75 g of carbohydrates total.  Summary  · Carbohydrate counting is a method of keeping track of how many carbohydrates you eat.  · Eating carbohydrates naturally increases the amount of sugar (glucose) in the blood.  · Counting how many carbohydrates you eat helps keep your blood glucose within normal limits, which helps you manage your diabetes.  · A diet and nutrition specialist (registered dietitian) can help you make a meal plan and calculate how many carbohydrates you should have at each meal and snack.  This information is not intended to replace advice given to you by your health care provider. Make sure you discuss any questions you have with your health care provider.  Document Released: 12/18/2006 Document Revised: 06/27/2018 Document Reviewed: 05/31/2017  [employer] Interactive Patient Education © 2019 [employer]

## 2019-11-27 DIAGNOSIS — F41.1 GENERALIZED ANXIETY DISORDER: ICD-10-CM

## 2019-11-27 RX ORDER — DIAZEPAM 5 MG/1
5 TABLET ORAL EVERY 12 HOURS PRN
Qty: 30 TABLET | Refills: 0 | Status: SHIPPED | OUTPATIENT
Start: 2019-11-27 | End: 2019-12-11 | Stop reason: SDUPTHER

## 2019-12-03 ENCOUNTER — TELEPHONE (OUTPATIENT)
Dept: FAMILY MEDICINE CLINIC | Facility: CLINIC | Age: 44
End: 2019-12-03

## 2019-12-03 NOTE — TELEPHONE ENCOUNTER
Patient called and stated that she got stuck with a dirty needle the other day. She went to [facility] and the doctor there gave her a prescription for medicine to take as a preventive measure and told her to follow up with her PCP in 10 days. She wanted to verify with Josh that this would be okay. Josh stated that she did need to follow the recommendations of the ER provider and then just follow up with her. Nivia is aware of this information.
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
## 2019-12-11 ENCOUNTER — OFFICE VISIT (OUTPATIENT)
Dept: PSYCHIATRY | Facility: CLINIC | Age: 44
End: 2019-12-11

## 2019-12-11 VITALS
BODY MASS INDEX: 40.47 KG/M2 | DIASTOLIC BLOOD PRESSURE: 88 MMHG | HEART RATE: 113 BPM | WEIGHT: 235.8 LBS | SYSTOLIC BLOOD PRESSURE: 142 MMHG

## 2019-12-11 DIAGNOSIS — F41.1 GENERALIZED ANXIETY DISORDER: ICD-10-CM

## 2019-12-11 PROCEDURE — 99213 OFFICE O/P EST LOW 20 MIN: CPT | Performed by: NURSE PRACTITIONER

## 2019-12-11 RX ORDER — DIAZEPAM 10 MG/1
10 TABLET ORAL EVERY 12 HOURS PRN
Qty: 30 TABLET | Refills: 0 | Status: SHIPPED | OUTPATIENT
Start: 2019-12-11 | End: 2020-01-02 | Stop reason: SDUPTHER

## 2019-12-11 RX ORDER — ARIPIPRAZOLE 5 MG/1
5 TABLET ORAL DAILY
Qty: 30 TABLET | Refills: 0 | Status: SHIPPED | OUTPATIENT
Start: 2019-12-11 | End: 2020-01-02 | Stop reason: SDUPTHER

## 2019-12-11 NOTE — PROGRESS NOTES
Subjective   Nivia Bernstein is a 44 y.o. female is here today for medication management follow-up.    Chief Complaint:  Mood swings, anger, anxiety    History of Present Illness:    Patient presents today for follow up for medication management for depression and anxiety. Patient states last week the valium seemed to help, but this week they havent helped as much. Patient reports still having a lot of aggitation and anger. Patient reports still having mood swings frequently. Patient denies any side effects from Trintellix, however no difference with the medication. Patient reports currently depression is at a 10 on a 0-10 scale with 10 being the worst. Patient reports symptoms of depression of irrtability, anger, mood swings, low mood, and no energy. Patient reports anxiety is at a 10 on a 0-10 scale with 10 being the worst. Patient reports having one panic attack. Patient reports panic attacks last 30 minutes and during an attacks patient has chest pain and trouble breathing. Patient reports sleeping at least 5-6 hours a night and patient reports having good and bad dreams.  reports waking up frequently throughout the night. Patient reports appetite is good  and eating at least 3 meals a day. Patient denies any auditory or visual hallucinations. Patient adamantly denies any SI or HI. Patient states she has had thoughts however no plan. Patient denies any side effects to medications. Patient denies any medical changes since last visit.   The following portions of the patient's history were reviewed and updated as appropriate: allergies, current medications, past family history, past medical history, past social history, past surgical history and problem list.    Review of Systems   Constitutional: Negative.    Respiratory: Negative.    Cardiovascular: Negative.    Gastrointestinal: Negative.    Psychiatric/Behavioral: Positive for agitation, dysphoric mood and sleep disturbance. The patient is  nervous/anxious.        Objective   Physical Exam   Constitutional: Vital signs are normal. She appears well-developed and well-nourished. She is cooperative.   Neurological: She is alert.   Psychiatric: Her speech is normal. Judgment and thought content normal. She is agitated. Cognition and memory are normal. She exhibits a depressed mood.   Vitals reviewed.    Blood pressure 142/88, pulse 113, weight 107 kg (235 lb 12.8 oz), not currently breastfeeding. Body mass index is 40.47 kg/m².    Allergies   Allergen Reactions   • Mobic [Meloxicam] Rash   • Novolog [Insulin Aspart] Hives   • Penicillins Angioedema       Medication List:   Current Outpatient Medications   Medication Sig Dispense Refill   • Albuterol Sulfate (VENTOLIN HFA IN) Inhale.     • ARIPiprazole (ABILIFY) 5 MG tablet Take 1 tablet by mouth Daily. 30 tablet 0   • aspirin  MG tablet   5   • diazePAM (VALIUM) 10 MG tablet Take 1 tablet by mouth Every 12 (Twelve) Hours As Needed for Anxiety. 30 tablet 0   • DULoxetine (CYMBALTA) 30 MG capsule Take 1 capsule by mouth 2 (Two) Times a Day. 60 capsule 5   • fluticasone (FLONASE) 50 MCG/ACT nasal spray 1 spray into the nostril(s) as directed by provider 2 (Two) Times a Day. 1 bottle 3   • GLOBAL EASE INJECT PEN NEEDLES 31G X 8 MM misc USE AS DIRECTED TO INJECT VICTOZA AND BASAGLAR DAILY  10   • glucagon (GLUCAGEN) 1 MG injection Inject 1 mg under the skin into the appropriate area as directed 1 (One) Time As Needed for Low Blood Sugar for up to 1 dose. 1 kit 3   • glucose blood test strip Use tid 100 each 12   • HUMALOG KWIKPEN 100 UNIT/ML solution pen-injector Inject 45 Units under the skin into the appropriate area as directed 3 (Three) Times a Day As Needed (For elevated blood sugars). 15 pen 3   • insulin lispro (HUMALOG) 100 UNIT/ML injection Per insulin pump. Maximum insulin dose 300 units daily 6 each 12   • metFORMIN (GLUCOPHAGE) 1000 MG tablet Take 1 tablet by mouth 2 (Two) Times a Day With  Meals. 60 tablet 5   • nitrofurantoin, macrocrystal-monohydrate, (MACROBID) 100 MG capsule Take 1 capsule by mouth Daily. 56 capsule 3   • omeprazole (priLOSEC) 20 MG capsule   5   • rosuvastatin (CRESTOR) 20 MG tablet Take 1 tablet by mouth Every Night. 30 tablet 5   • TRUEPLUS LANCETS 33G misc Apply 1 each topically to the appropriate area as directed 3 (Three) Times a Day. Test blood glucose  0   • vitamin D (ERGOCALCIFEROL) 75403 units capsule capsule Take 1 capsule by mouth 1 (One) Time Per Week. 12 capsule 0     No current facility-administered medications for this visit.        Mental Status Exam:   Hygiene:   good  Cooperation:  Guarded  Eye Contact:  Fair  Psychomotor Behavior:  Aggitated  Affect:  Appropriate  Hopelessness: Denies  Speech:  Normal  Thought Process:  Goal directed and Linear  Thought Content:  Normal  Suicidal:  None  Homicidal:  None  Hallucinations:  None  Delusion:  None  Memory:  Intact  Orientation:  Person, Place, Time and Situation  Reliability:  fair  Insight:  Fair  Judgement:  Fair  Impulse Control:  Fair  Physical/Medical Issues:  Yes DM               Assessment/Plan   Diagnoses and all orders for this visit:    Generalized anxiety disorder  -     diazePAM (VALIUM) 10 MG tablet; Take 1 tablet by mouth Every 12 (Twelve) Hours As Needed for Anxiety.    Other orders  -     ARIPiprazole (ABILIFY) 5 MG tablet; Take 1 tablet by mouth Daily.            Discussed medication options with patient. Discont. Trintellix 5mg. Start Abilify 5mg daily. Increase Valium to 10mg twice daily as needed. Reviewed the risks, benefits, and side effects of the medications; patient acknowledged and verbally consented. Patient was instructed on medication side effects, benefits, and also of no treatment.  Patient was given an explanation regarding potential for increased risk of diabetes, lipids, and weight gain.  Labs will be assessed as clinically indicated.  Diet was discussed especially healthy diet  choices and increasing activity and exercise.  Patient was strongly urged to continue weight maintance or weight loss efforts.  Patient reported verbalized understanding of instructions. Patient is being prescribed a controlled substance as part of treatment plan. Patient has been educated of appropriate use of the medications, including risk of somnolence, limited ability to drive and/or work safely, and potential for dependence, respiratory depression and overdose. Patient is also informed that the medication are to be used by the patient only- avoid any combined use of ETOH or other substances unless prescribed.      Patient is agreeable to call the office with any questions, concerns, or worsening of symptoms.  Patient is aware to call 911 or go to the nearest ER should begin having SI/HI.          Follow up in four weeks      Errors in dictation may reflect use of voice recognition software and not all errors in transcription may have been detected prior to signing.                This document has been electronically signed by NIDA Vasquez   December 12, 2019 3:12 PM

## 2019-12-12 ENCOUNTER — OFFICE VISIT (OUTPATIENT)
Dept: PSYCHIATRY | Facility: CLINIC | Age: 44
End: 2019-12-12

## 2019-12-12 DIAGNOSIS — F41.1 GENERALIZED ANXIETY DISORDER: Primary | ICD-10-CM

## 2019-12-12 DIAGNOSIS — F33.3 SEVERE EPISODE OF RECURRENT MAJOR DEPRESSIVE DISORDER, WITH PSYCHOTIC FEATURES (HCC): ICD-10-CM

## 2019-12-12 PROCEDURE — 99213 OFFICE O/P EST LOW 20 MIN: CPT | Performed by: NURSE PRACTITIONER

## 2019-12-12 NOTE — PROGRESS NOTES
Subjective   Nivia Bernstein is a 44 y.o. female is here for an urgent visit.    Chief Complaint:  Severe depression    History of Present Illness:    Patient presents today by  due to worsening depression and feeling of overwhelmed.  reports he is worried about her and feels she needs to go to a facility for a break. Patient states she feels depressed and very aggravated. Patient reports increase in irritability and feels like she hates the world. Patient and  deny any aggression or anger outbursts. Patient reports her depression is the same as it was yesterday and at a 10 on a 0-10 scale with 10 being the worst. Patient reports symptoms of depression of don't care anymore, stressed, irritable, and no energy. Patient states anxiety is the same as yesterday as well.  Patient reports anxiety is still at a 10 on a 0-to-10 scale with 10 being the worst. Patient reports she did take the higher dose of Valium this morning and has noticed no difference. Patient states she also started the Abilify today. Patient denies any side effects to either medication.  Patient denies any panic attacks.  Patient states she just feels like she needs a break and needs to get away.  Patient states she does not know if she needs to go to a facility or to go stay with her brother for a little while.  Patient reports no changes in appetite.  Patient states sleep is the same and still having frequent dreams.  Patient states some dreams are good dreams and some more bad however she does not want to lose the dreams due to those are the only good memories she has left.  Patient denies any auditory or visual hallucinations.  Patient adamantly denies any suicidal plan or intent.  Patient denies any SI or HI.  Patient states she does have frequent thoughts of not wanting to be here anymore.  Patient denies any changes since last visit. Patient refused to have vital signs obtained.   The following portions of the patient's  history were reviewed and updated as appropriate: allergies, current medications, past family history, past medical history, past social history, past surgical history and problem list.    Review of Systems   Constitutional: Negative.    Respiratory: Negative.    Cardiovascular: Negative.    Gastrointestinal: Negative.    Neurological: Negative.    Psychiatric/Behavioral: Positive for agitation, dysphoric mood and sleep disturbance. The patient is nervous/anxious.        Objective   Physical Exam   Constitutional: Vital signs are normal. She appears well-developed and well-nourished. She is cooperative.   Neurological: She is alert.   Psychiatric: Her speech is normal. Judgment and thought content normal. Her affect is angry. She is agitated. Cognition and memory are impaired.   Vitals reviewed.    not currently breastfeeding. There is no height or weight on file to calculate BMI.     Allergies   Allergen Reactions   • Mobic [Meloxicam] Rash   • Novolog [Insulin Aspart] Hives   • Penicillins Angioedema       Medication List:   Current Outpatient Medications   Medication Sig Dispense Refill   • Albuterol Sulfate (VENTOLIN HFA IN) Inhale.     • ARIPiprazole (ABILIFY) 5 MG tablet Take 1 tablet by mouth Daily. 30 tablet 0   • aspirin  MG tablet   5   • diazePAM (VALIUM) 10 MG tablet Take 1 tablet by mouth Every 12 (Twelve) Hours As Needed for Anxiety. 30 tablet 0   • DULoxetine (CYMBALTA) 30 MG capsule Take 1 capsule by mouth 2 (Two) Times a Day. 60 capsule 5   • fluticasone (FLONASE) 50 MCG/ACT nasal spray 1 spray into the nostril(s) as directed by provider 2 (Two) Times a Day. 1 bottle 3   • GLOBAL EASE INJECT PEN NEEDLES 31G X 8 MM misc USE AS DIRECTED TO INJECT VICTOZA AND BASAGLAR DAILY  10   • glucagon (GLUCAGEN) 1 MG injection Inject 1 mg under the skin into the appropriate area as directed 1 (One) Time As Needed for Low Blood Sugar for up to 1 dose. 1 kit 3   • glucose blood test strip Use tid 100 each 12    • HUMALOG KWIKPEN 100 UNIT/ML solution pen-injector Inject 45 Units under the skin into the appropriate area as directed 3 (Three) Times a Day As Needed (For elevated blood sugars). 15 pen 3   • insulin lispro (HUMALOG) 100 UNIT/ML injection Per insulin pump. Maximum insulin dose 300 units daily 6 each 12   • metFORMIN (GLUCOPHAGE) 1000 MG tablet Take 1 tablet by mouth 2 (Two) Times a Day With Meals. 60 tablet 5   • nitrofurantoin, macrocrystal-monohydrate, (MACROBID) 100 MG capsule Take 1 capsule by mouth Daily. 56 capsule 3   • omeprazole (priLOSEC) 20 MG capsule   5   • rosuvastatin (CRESTOR) 20 MG tablet Take 1 tablet by mouth Every Night. 30 tablet 5   • TRUEPLUS LANCETS 33G misc Apply 1 each topically to the appropriate area as directed 3 (Three) Times a Day. Test blood glucose  0   • vitamin D (ERGOCALCIFEROL) 53974 units capsule capsule Take 1 capsule by mouth 1 (One) Time Per Week. 12 capsule 0     No current facility-administered medications for this visit.        Mental Status Exam:   Hygiene:   good  Cooperation:  Cooperative  Eye Contact:  Fair  Psychomotor Behavior:  Aggitated  Affect:  Blunted  Hopelessness: Denies  Speech:  Normal  Thought Process:  Goal directed and Linear  Thought Content:  Normal  Suicidal:  None  Homicidal:  None  Hallucinations:  None  Delusion:  None  Memory:  Intact  Orientation:  Person, Place, Time and Situation  Reliability:  fair  Insight:  Fair  Judgement:  Fair  Impulse Control:  Fair  Physical/Medical Issues:  No                  Assessment/Plan   Diagnoses and all orders for this visit:    Generalized anxiety disorder    Severe episode of recurrent major depressive disorder, with psychotic features (CMS/HCC)            Discussed medication options with patient. Discussed with patient options regarding facilities such as [facility]. Patient agreed she would go to [facility] if accepted and have bed. Discussed with patient process regarding placement into [facility]  house.  and patient acknowledged and agreed.     Completed referral paperwork for [facility] and faxed 12/12/19 at 3pm. Spoke with Coordinator from [facility] 12/12/19 at 3:23pm and patient was accepted for placement for 12/13/19 at 10 am.     12/12/19 at 3:30pm contacted patient and . Spoke with Andrew regarding patient being able to operate Insulin pump, bringing appropriate testing for glucose, bringing all medications, guardianship paperwork, and 2-3 days worth of clothing and toiletry items.  agreed to have her at [facility] at 10 am on 12/13/19.    Patient and  is agreeable to call the office with any questions, concerns, or worsening of symptoms.  Patient is aware to call 911 or go to the nearest ER should begin having SI/HI.          Follow up in four weeks or sooner if needed    Errors in dictation may reflect use of voice recognition software and not all errors in transcription may have been detected prior to signing.                 This document has been electronically signed by NIDA Vasquez   December 12, 2019 3:42 PM

## 2019-12-13 ENCOUNTER — LAB (OUTPATIENT)
Dept: FAMILY MEDICINE CLINIC | Facility: CLINIC | Age: 44
End: 2019-12-13
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
## 2019-12-13 DIAGNOSIS — F41.9 ANXIETY AND DEPRESSION: Chronic | ICD-10-CM

## 2019-12-13 DIAGNOSIS — F32.A ANXIETY AND DEPRESSION: Chronic | ICD-10-CM

## 2019-12-13 DIAGNOSIS — IMO0002 UNCONTROLLED TYPE 2 DIABETES WITH NEUROPATHY: Chronic | ICD-10-CM

## 2019-12-13 DIAGNOSIS — E78.5 DYSLIPIDEMIA: Chronic | ICD-10-CM

## 2019-12-13 DIAGNOSIS — K21.9 GASTROESOPHAGEAL REFLUX DISEASE WITHOUT ESOPHAGITIS: Chronic | ICD-10-CM

## 2019-12-15 LAB
25(OH)D3+25(OH)D2 SERPL-MCNC: 38.1 NG/ML (ref 30–100)
ALBUMIN SERPL-MCNC: 4.4 G/DL (ref 3.5–5.2)
ALBUMIN/GLOB SERPL: 1.4 G/DL
ALP SERPL-CCNC: 105 U/L (ref 39–117)
ALT SERPL-CCNC: 19 U/L (ref 1–33)
AST SERPL-CCNC: 15 U/L (ref 1–32)
BASOPHILS # BLD AUTO: 0.07 10*3/MM3 (ref 0–0.2)
BASOPHILS NFR BLD AUTO: 0.6 % (ref 0–1.5)
BILIRUB SERPL-MCNC: 0.2 MG/DL (ref 0.2–1.2)
BUN SERPL-MCNC: 12 MG/DL (ref 6–20)
BUN/CREAT SERPL: 18.5 (ref 7–25)
CALCIUM SERPL-MCNC: 9.3 MG/DL (ref 8.6–10.5)
CHLORIDE SERPL-SCNC: 103 MMOL/L (ref 98–107)
CHOLEST SERPL-MCNC: 176 MG/DL (ref 0–200)
CO2 SERPL-SCNC: 24.8 MMOL/L (ref 22–29)
CREAT SERPL-MCNC: 0.65 MG/DL (ref 0.57–1)
EOSINOPHIL # BLD AUTO: 0.18 10*3/MM3 (ref 0–0.4)
EOSINOPHIL NFR BLD AUTO: 1.4 % (ref 0.3–6.2)
ERYTHROCYTE [DISTWIDTH] IN BLOOD BY AUTOMATED COUNT: 12.7 % (ref 12.3–15.4)
GLOBULIN SER CALC-MCNC: 3.2 GM/DL
GLUCOSE SERPL-MCNC: 187 MG/DL (ref 65–99)
HBA1C MFR BLD: 7.5 % (ref 4.8–5.6)
HCT VFR BLD AUTO: 40.1 % (ref 34–46.6)
HDLC SERPL-MCNC: 37 MG/DL (ref 40–60)
HGB BLD-MCNC: 13.6 G/DL (ref 12–15.9)
HIV1 RNA SERPL QL NAA+PROBE: NEGATIVE
IMM GRANULOCYTES # BLD AUTO: 0.05 10*3/MM3 (ref 0–0.05)
IMM GRANULOCYTES NFR BLD AUTO: 0.4 % (ref 0–0.5)
LDLC SERPL CALC-MCNC: 102 MG/DL (ref 0–100)
LYMPHOCYTES # BLD AUTO: 3.52 10*3/MM3 (ref 0.7–3.1)
LYMPHOCYTES NFR BLD AUTO: 28.3 % (ref 19.6–45.3)
MCH RBC QN AUTO: 27.8 PG (ref 26.6–33)
MCHC RBC AUTO-ENTMCNC: 33.9 G/DL (ref 31.5–35.7)
MCV RBC AUTO: 82 FL (ref 79–97)
MICROALBUMIN UR-MCNC: 13.6 UG/ML
MONOCYTES # BLD AUTO: 0.51 10*3/MM3 (ref 0.1–0.9)
MONOCYTES NFR BLD AUTO: 4.1 % (ref 5–12)
NEUTROPHILS # BLD AUTO: 8.1 10*3/MM3 (ref 1.7–7)
NEUTROPHILS NFR BLD AUTO: 65.2 % (ref 42.7–76)
NRBC BLD AUTO-RTO: 0 /100 WBC (ref 0–0.2)
PLATELET # BLD AUTO: 394 10*3/MM3 (ref 140–450)
POTASSIUM SERPL-SCNC: 4.2 MMOL/L (ref 3.5–5.2)
PROT SERPL-MCNC: 7.6 G/DL (ref 6–8.5)
RBC # BLD AUTO: 4.89 10*6/MM3 (ref 3.77–5.28)
SODIUM SERPL-SCNC: 141 MMOL/L (ref 136–145)
TRIGL SERPL-MCNC: 186 MG/DL (ref 0–150)
TSH SERPL DL<=0.005 MIU/L-ACNC: 2.02 UIU/ML (ref 0.27–4.2)
VIT B12 SERPL-MCNC: 411 PG/ML (ref 211–946)
VLDLC SERPL CALC-MCNC: 37.2 MG/DL
WBC # BLD AUTO: 12.43 10*3/MM3 (ref 3.4–10.8)

## 2019-12-18 ENCOUNTER — TELEPHONE (OUTPATIENT)
Dept: FAMILY MEDICINE CLINIC | Facility: CLINIC | Age: 44
End: 2019-12-18

## 2019-12-18 ENCOUNTER — OFFICE VISIT (OUTPATIENT)
Dept: FAMILY MEDICINE CLINIC | Facility: CLINIC | Age: 44
End: 2019-12-18

## 2019-12-18 DIAGNOSIS — Z77.21 ACCIDENTAL HYPODERMIC NEEDLESTICK INJURY WITH EXPOSURE TO BODY FLUID: ICD-10-CM

## 2019-12-18 DIAGNOSIS — E66.01 CLASS 2 SEVERE OBESITY WITH SERIOUS COMORBIDITY AND BODY MASS INDEX (BMI) OF 39.0 TO 39.9 IN ADULT, UNSPECIFIED OBESITY TYPE (HCC): ICD-10-CM

## 2019-12-18 DIAGNOSIS — F41.9 ANXIETY AND DEPRESSION: Chronic | ICD-10-CM

## 2019-12-18 DIAGNOSIS — F32.A ANXIETY AND DEPRESSION: Chronic | ICD-10-CM

## 2019-12-18 DIAGNOSIS — E11.40 CONTROLLED TYPE 2 DIABETES WITH NEUROPATHY (HCC): Primary | Chronic | ICD-10-CM

## 2019-12-18 DIAGNOSIS — E78.5 DYSLIPIDEMIA: Chronic | ICD-10-CM

## 2019-12-18 DIAGNOSIS — K21.9 GASTROESOPHAGEAL REFLUX DISEASE WITHOUT ESOPHAGITIS: ICD-10-CM
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
## 2019-12-18 DIAGNOSIS — M25.562 POSTERIOR LEFT KNEE PAIN: ICD-10-CM

## 2019-12-18 PROCEDURE — 95251 CONT GLUC MNTR ANALYSIS I&R: CPT | Performed by: NURSE PRACTITIONER

## 2019-12-18 PROCEDURE — 99214 OFFICE O/P EST MOD 30 MIN: CPT | Performed by: NURSE PRACTITIONER

## 2019-12-18 RX ORDER — EMTRICITABINE AND TENOFOVIR DISOPROXIL FUMARATE 200; 300 MG/1; MG/1
TABLET, FILM COATED ORAL DAILY
Refills: 0 | COMMUNITY
Start: 2019-12-03 | End: 2020-01-28

## 2019-12-18 NOTE — PATIENT INSTRUCTIONS
Insulin Pumps    An insulin pump is a small, computerized, battery-powered device that steadily (continuously) delivers insulin to the body. Insulin pumps may be used to treat type 1 diabetes or type 2 diabetes. They may be used with rapid-acting insulin or short-acting insulin.  An insulin pump has a container (cartridge or reservoir) of insulin that must be refilled regularly. The cartridge is connected to a needle that is placed into the tissue between skin and muscle in any of these areas:  · Abdomen. Avoid any area that is less than 2 inches (5 cm) from the belly button (navel).  · Front of thigh.  · Upper, outer side of thigh.  · Upper, outer part of arm.  · Upper, outer part of buttock.  The area where the needle is inserted is called the infusion site. The needle is surrounded by a small plastic tube (cannula). Together, the needle and cannula are referred to as the infusion set. After the infusion set is inserted under the skin, the needle is removed and the cannula stays in place to deliver insulin to the body.  What does an insulin pump do?  Insulin pumps deliver insulin to the body in the following ways:  · Basal rate. This method gives small, continuous amounts of insulin 24 hours a day.  · Bolus dose. This is a specific amount of insulin that you can give yourself right away. You may need this after eating a meal or when you have high blood sugar (glucose). You will be instructed on the amount to give depending on your blood glucose level reading.  What are some advantages of using an insulin pump?  The advantages of using an insulin pump vary depending on which type of diabetes you have. Advantages may include:  · Reduced need to give yourself multiple insulin injections. With an insulin pump, you need to insert a needle into your body one time every 2-3 days instead of every time that you need insulin.  · More control over your diabetes and more flexibility for scheduling meals, snacks, and  exercise.  · Reduced risk of low blood glucose (hypoglycemia).  · Better management of the increases in blood glucose in the early morning (alyse phenomenon).  · More precise insulin doses, which may mean an improvement in blood glucose levels.  · A more predictable insulin absorption rate.  · Easier delivery of basal rate insulin.  What are some disadvantages of using an insulin pump?  · An insulin pump and its supplies might cost more than the supplies for injections.  · You may need to check your blood glucose level more often than when you give yourself injections.  · You have the pump attached to you wherever you go, for 24 hours a day.  What are the risks?  · The infusion site can get irritated or infected. To avoid those risks:  ? Care for your skin.  ? Change the infusion set as directed by your health care provider.  ? Periodically move your infusion site to a slightly different area.  · Pump failure. This can be caused by a malfunction, such as a blockage in the tubing, the needle moving out of place, or the pump running out of insulin.  · Most pumps have an alarm to warn you of a malfunction. However, if the pump fails and you are unaware that it has failed, you may develop diabetic ketoacidosis. This is a life-threatening complication of diabetes that can occur due to lack of insulin.  · Blockage (occlusion). This can prevent your pump from delivering insulin properly. This can be caused by a bent cannula or a kink in the tubing.  What may cause high blood glucose when using an insulin pump?  Possible causes of high blood glucose (hyperglycemia) when using an insulin pump include:  · Tubing that breaks, leaks, bends, or moves out of place.  · Low charge in a battery.  · Infection at the infusion site. Signs of infection may include:  ? Redness, swelling, or pain.  ? Blood or cloudy fluid.  ? Warmth.  ? Pus or a bad smell.  · A cartridge that is empty or incorrectly loaded into the pump.  · A basal rate  that needs to be adjusted.  · Air bubbles in the tubing.  · Illness or stress.  · Changes in hormone levels, such as during a menstrual cycle.  · The pump delivering the wrong amount of insulin.  · An interruption in insulin delivery.  · Poor absorption at the infusion site, even if you recently moved the site.  · Using ineffective insulin. Insulin can become ineffective if it is outdated or exposed to extreme heat or cold.  · A change in your normal activity level.  How to care for your insulin pump  · Refill the insulin cartridge as often as needed. Do not let the cartridge get completely empty.  · Always keep extra pump supplies, syringes, and insulin with you.  · Store your insulin according to instructions on the packaging.  · If you have questions or a problem that you cannot solve, call your health care provider or call the pump 's customer service line.  How to manage your diabetes while using an insulin pump    · Check your blood glucose 4 or more times a day, or as often as directed.  · Always check your blood glucose at the following times:  ? Before you change your basal or bolus rates.  ? After you change your infusion set.  · Check your A1c (hemoglobin A1c) level as often as directed.  · Continue to manage your diabetes as directed. This includes:  ? Exercising regularly.  ? Eating healthy foods.  ? Managing your weight.  · If you give yourself a correction (supplemental) insulin dose and your blood glucose stays high, check your urine for ketones. A supplemental dose is a specific amount of rapid-acting or short-acting insulin that is used to lower blood glucose if it is too high. You may be instructed to check your blood glucose at certain times of the day and to use corrective insulin as needed.  ? If you have negative ketones, check your pump to see if it is working properly. If your pump does not seem to be working properly, change your cartridge, infusion set, and insertion site.  "Recheck your blood glucose in 1 hour. If your blood glucose is still high, give yourself an additional supplemental insulin dose with an injection using a syringe and needle.  ? If you have moderate or large ketones in your urine, give yourself an additional supplemental insulin dose with an injection using a syringe and needle. Contact your health care provider for additional instructions.  Contact a health care provider if:  · You have any of the following at your infusion site:  ? Redness, swelling, or pain.  ? Blood or cloudy fluid.  ? Warmth.  ? Pus or a bad smell.  ? A red streak on your skin that leads away from the infusion site.  · You have a fever.  · You have moderate or large ketone levels in your urine.  · Your pump is not working properly.  · Your blood glucose is higher or lower than the target range that your health care provider gave you.  Summary  · An insulin pump is a small, computerized, battery-powered device that steadily (continuously) delivers insulin to the body.  · Insulin pumps may be used to treat type 1 diabetes (type 1 diabetes mellitus) or type 2 diabetes (type 2 diabetes mellitus).  · After the infusion set is inserted under the skin, the needle is removed and the cannula stays in place to deliver insulin to the body.  · Always check your blood glucose before you change your basal or bolus rates, and after you change your infusion set.  · Refill the insulin cartridge as often as needed. Do not let the cartridge get completely empty. Always keep extra pump supplies, syringes, and insulin with you.  This information is not intended to replace advice given to you by your health care provider. Make sure you discuss any questions you have with your health care provider.  Document Released: 03/26/2002 Document Revised: 07/11/2018 Document Reviewed: 01/20/2017  [employer] Interactive Patient Education © 2019 [employer]  DASH Eating Plan  DASH stands for \"Dietary Approaches to Stop " "Hypertension.\" The DASH eating plan is a healthy eating plan that has been shown to reduce high blood pressure (hypertension). It may also reduce your risk for type 2 diabetes, heart disease, and stroke. The DASH eating plan may also help with weight loss.  What are tips for following this plan?    General guidelines  · Avoid eating more than 2,300 mg (milligrams) of salt (sodium) a day. If you have hypertension, you may need to reduce your sodium intake to 1,500 mg a day.  · Limit alcohol intake to no more than 1 drink a day for nonpregnant women and 2 drinks a day for men. One drink equals 12 oz of beer, 5 oz of wine, or 1½ oz of hard liquor.  · Work with your health care provider to maintain a healthy body weight or to lose weight. Ask what an ideal weight is for you.  · Get at least 30 minutes of exercise that causes your heart to beat faster (aerobic exercise) most days of the week. Activities may include walking, swimming, or biking.  · Work with your health care provider or diet and nutrition specialist (dietitian) to adjust your eating plan to your individual calorie needs.  Reading food labels    · Check food labels for the amount of sodium per serving. Choose foods with less than 5 percent of the Daily Value of sodium. Generally, foods with less than 300 mg of sodium per serving fit into this eating plan.  · To find whole grains, look for the word \"whole\" as the first word in the ingredient list.  Shopping  · Buy products labeled as \"low-sodium\" or \"no salt added.\"  · Buy fresh foods. Avoid canned foods and premade or frozen meals.  Cooking  · Avoid adding salt when cooking. Use salt-free seasonings or herbs instead of table salt or sea salt. Check with your health care provider or pharmacist before using salt substitutes.  · Do not barrera foods. Cook foods using healthy methods such as baking, boiling, grilling, and broiling instead.  · Cook with heart-healthy oils, such as olive, canola, soybean, or " sunflower oil.  Meal planning  · Eat a balanced diet that includes:  ? 5 or more servings of fruits and vegetables each day. At each meal, try to fill half of your plate with fruits and vegetables.  ? Up to 6-8 servings of whole grains each day.  ? Less than 6 oz of lean meat, poultry, or fish each day. A 3-oz serving of meat is about the same size as a deck of cards. One egg equals 1 oz.  ? 2 servings of low-fat dairy each day.  ? A serving of nuts, seeds, or beans 5 times each week.  ? Heart-healthy fats. Healthy fats called Omega-3 fatty acids are found in foods such as flaxseeds and coldwater fish, like sardines, salmon, and mackerel.  · Limit how much you eat of the following:  ? Canned or prepackaged foods.  ? Food that is high in trans fat, such as fried foods.  ? Food that is high in saturated fat, such as fatty meat.  ? Sweets, desserts, sugary drinks, and other foods with added sugar.  ? Full-fat dairy products.  · Do not salt foods before eating.  · Try to eat at least 2 vegetarian meals each week.  · Eat more home-cooked food and less restaurant, buffet, and fast food.  · When eating at a restaurant, ask that your food be prepared with less salt or no salt, if possible.  What foods are recommended?  The items listed may not be a complete list. Talk with your dietitian about what dietary choices are best for you.  Grains  Whole-grain or whole-wheat bread. Whole-grain or whole-wheat pasta. Brown rice. Oatmeal. Quinoa. Bulgur. Whole-grain and low-sodium cereals. Joy bread. Low-fat, low-sodium crackers. Whole-wheat flour tortillas.  Vegetables  Fresh or frozen vegetables (raw, steamed, roasted, or grilled). Low-sodium or reduced-sodium tomato and vegetable juice. Low-sodium or reduced-sodium tomato sauce and tomato paste. Low-sodium or reduced-sodium canned vegetables.  Fruits  All fresh, dried, or frozen fruit. Canned fruit in natural juice (without added sugar).  Meat and other protein foods  Skinless  chicken or turkey. Ground chicken or turkey. Pork with fat trimmed off. Fish and seafood. Egg whites. Dried beans, peas, or lentils. Unsalted nuts, nut butters, and seeds. Unsalted canned beans. Lean cuts of beef with fat trimmed off. Low-sodium, lean deli meat.  Dairy  Low-fat (1%) or fat-free (skim) milk. Fat-free, low-fat, or reduced-fat cheeses. Nonfat, low-sodium ricotta or cottage cheese. Low-fat or nonfat yogurt. Low-fat, low-sodium cheese.  Fats and oils  Soft margarine without trans fats. Vegetable oil. Low-fat, reduced-fat, or light mayonnaise and salad dressings (reduced-sodium). Canola, safflower, olive, soybean, and sunflower oils. Avocado.  Seasoning and other foods  Herbs. Spices. Seasoning mixes without salt. Unsalted popcorn and pretzels. Fat-free sweets.  What foods are not recommended?  The items listed may not be a complete list. Talk with your dietitian about what dietary choices are best for you.  Grains  Baked goods made with fat, such as croissants, muffins, or some breads. Dry pasta or rice meal packs.  Vegetables  Creamed or fried vegetables. Vegetables in a cheese sauce. Regular canned vegetables (not low-sodium or reduced-sodium). Regular canned tomato sauce and paste (not low-sodium or reduced-sodium). Regular tomato and vegetable juice (not low-sodium or reduced-sodium). Pickles. Olives.  Fruits  Canned fruit in a light or heavy syrup. Fried fruit. Fruit in cream or butter sauce.  Meat and other protein foods  Fatty cuts of meat. Ribs. Fried meat. Carroll. Sausage. Bologna and other processed lunch meats. Salami. Fatback. Hotdogs. Bratwurst. Salted nuts and seeds. Canned beans with added salt. Canned or smoked fish. Whole eggs or egg yolks. Chicken or turkey with skin.  Dairy  Whole or 2% milk, cream, and half-and-half. Whole or full-fat cream cheese. Whole-fat or sweetened yogurt. Full-fat cheese. Nondairy creamers. Whipped toppings. Processed cheese and cheese spreads.  Fats and  oils  Butter. Stick margarine. Lard. Shortening. Ghee. Carroll fat. Tropical oils, such as coconut, palm kernel, or palm oil.  Seasoning and other foods  Salted popcorn and pretzels. Onion salt, garlic salt, seasoned salt, table salt, and sea salt. Worcestershire sauce. Tartar sauce. Barbecue sauce. Teriyaki sauce. Soy sauce, including reduced-sodium. Steak sauce. Canned and packaged gravies. Fish sauce. Oyster sauce. Cocktail sauce. Horseradish that you find on the shelf. Ketchup. Mustard. Meat flavorings and tenderizers. Bouillon cubes. Hot sauce and Tabasco sauce. Premade or packaged marinades. Premade or packaged taco seasonings. Relishes. Regular salad dressings.  Where to find more information:  · National Heart, Lung, and Blood [place]: www.nhlbi.nih.gov  · American Heart Association: www.heart.org  Summary  · The DASH eating plan is a healthy eating plan that has been shown to reduce high blood pressure (hypertension). It may also reduce your risk for type 2 diabetes, heart disease, and stroke.  · With the DASH eating plan, you should limit salt (sodium) intake to 2,300 mg a day. If you have hypertension, you may need to reduce your sodium intake to 1,500 mg a day.  · When on the DASH eating plan, aim to eat more fresh fruits and vegetables, whole grains, lean proteins, low-fat dairy, and heart-healthy fats.  · Work with your health care provider or diet and nutrition specialist (dietitian) to adjust your eating plan to your individual calorie needs.  This information is not intended to replace advice given to you by your health care provider. Make sure you discuss any questions you have with your health care provider.  Document Released: 12/06/2012 Document Revised: 12/11/2017 Document Reviewed: 12/11/2017  [employer] Interactive Patient Education © 2019 [employer]  Type 2 Diabetes Mellitus, Self Care, Adult  When you have type 2 diabetes (type 2 diabetes mellitus), you must make sure your blood sugar  (glucose) stays in a healthy range. You can do this with:  · Nutrition.  · Exercise.  · Lifestyle changes.  · Medicines or insulin, if needed.  · Support from your doctors and others.  How to stay aware of blood sugar    · Check your blood sugar level every day, as often as told.  · Have your A1c (hemoglobin A1c) level checked two or more times a year. Have it checked more often if your doctor tells you to.  Your doctor will set personal treatment goals for you. Generally, you should have these blood sugar levels:  · Before meals (preprandial):  mg/dL (4.4-7.2 mmol/L).  · After meals (postprandial): below 180 mg/dL (10 mmol/L).  · A1c level: less than 7%.  How to manage high and low blood sugar  Signs of high blood sugar  High blood sugar is called hyperglycemia. Know the signs of high blood sugar. Signs may include:  · Feeling:  ? Thirsty.  ? Hungry.  ? Very tired.  · Needing to pee (urinate) more than usual.  · Blurry vision.  Signs of low blood sugar  Low blood sugar is called hypoglycemia. This is when blood sugar is at or below 70 mg/dL (3.9 mmol/L). Signs may include:  · Feeling:  ? Hungry.  ? Worried or nervous (anxious).  ? Sweaty and clammy.  ? Confused.  ? Dizzy.  ? Sleepy.  ? Sick to your stomach (nauseous).  · Having:  ? A fast heartbeat.  ? A headache.  ? A change in your vision.  ? Jerky movements that you cannot control (seizure).  ? Tingling or no feeling (numbness) around your mouth, lips, or tongue.  · Having trouble with:  ? Moving (coordination).  ? Sleeping.  ? Passing out (fainting).  ? Getting upset easily (irritability).  Treating low blood sugar  To treat low blood sugar, eat or drink something sugary right away. If you can think clearly and swallow safely, follow the 15:15 rule:  · Take 15 grams of a fast-acting carb (carbohydrate). Talk with your doctor about how much you should take.  · Some fast-acting carbs are:  ? Sugar tablets (glucose pills). Take 3-4 pills.  ? 6-8 pieces of  hard candy.  ? 4-6 oz (120-150 mL) of fruit juice.  ? 4-6 oz (120-150 mL) of regular (not diet) soda.  ? 1 Tbsp (15 mL) honey or sugar.  · Check your blood sugar 15 minutes after you take the carb.  · If your blood sugar is still at or below 70 mg/dL (3.9 mmol/L), take 15 grams of a carb again.  · If your blood sugar does not go above 70 mg/dL (3.9 mmol/L) after 3 tries, get help right away.  · After your blood sugar goes back to normal, eat a meal or a snack within 1 hour.  Treating very low blood sugar  If your blood sugar is at or below 54 mg/dL (3 mmol/L), you have very low blood sugar (severe hypoglycemia). This is an emergency. Do not wait to see if the symptoms will go away. Get medical help right away. Call your local emergency services (911 in the [place]  If you have very low blood sugar and you cannot eat or drink, you may need a glucagon shot (injection). A family member or friend should learn how to check your blood sugar and how to give you a glucagon shot. Ask your doctor if you need to have a glucagon shot kit at home.  Follow these instructions at home:  Medicine  · Take insulin and diabetes medicines as told.  · If your doctor says you should take more or less insulin and medicines, do this exactly as told.  · Do not run out of insulin or medicines.  Having diabetes can raise your risk for other long-term conditions. These include heart disease and kidney disease. Your doctor may prescribe medicines to help you not have these problems.  Food    · Make healthy food choices. These include:  ? Chicken, fish, egg whites, and beans.  ? Oats, whole wheat, bulgur, brown rice, quinoa, and millet.  ? Fresh fruits and vegetables.  ? Low-fat dairy products.  ? Nuts, avocado, olive oil, and canola oil.  · Meet with a  (dietitian). He or she can help you make an eating plan that is right for you.  · Follow instructions from your doctor about what you cannot eat or drink.  · Drink enough fluid to  keep your pee (urine) pale yellow.  · Keep track of carbs that you eat. Do this by reading food labels and learning food serving sizes.  · Follow your sick day plan when you cannot eat or drink normally. Make this plan with your doctor so it is ready to use.  Activity  · Exercise 3 or more times a week.  · Do not go more than 2 days without exercising.  · Talk with your doctor before you start a new exercise. Your doctor may need to tell you to change:  ? How much insulin or medicines you take.  ? How much food you eat.  Lifestyle  · Do not use any tobacco products. These include cigarettes, chewing tobacco, and e-cigarettes. If you need help quitting, ask your doctor.  · Ask your doctor how much alcohol is safe for you.  · Learn to deal with stress. If you need help with this, ask your doctor.  Body care    · Stay up to date with your shots (immunizations).  · Have your eyes and feet checked by a doctor as often as told.  · Check your skin and feet every day. Check for cuts, bruises, redness, blisters, or sores.  · Brush your teeth and gums two times a day. Floss one or more times a day.  · Go to the dentist one or more times every 6 months.  · Stay at a healthy weight.  General instructions  · Take over-the-counter and prescription medicines only as told by your doctor.  · Share your diabetes care plan with:  ? Your work or school.  ? People you live with.  · Carry a card or wear jewelry that says you have diabetes.  · Keep all follow-up visits as told by your doctor. This is important.  Questions to ask your doctor  · Do I need to meet with a diabetes educator?  · Where can I find a support group for people with diabetes?  Where to find more information  To learn more about diabetes, visit:  · American Diabetes Association: www.diabetes.org  · American Association of Diabetes Educators: www.diabeteseducator.org  Summary  · When you have type 2 diabetes, you must make sure your blood sugar (glucose) stays in a  healthy range.  · Check your blood sugar every day, as often as told.  · Having diabetes can raise your risk for other conditions. Your doctor may prescribe medicines to help you not have these problems.  · Keep all follow-up visits as told by your doctor. This is important.  This information is not intended to replace advice given to you by your health care provider. Make sure you discuss any questions you have with your health care provider.  Document Released: 04/10/2017 Document Revised: 06/10/2019 Document Reviewed: 01/20/2017  [employer] Interactive Patient Education © 2019 [employer]

## 2019-12-18 NOTE — TELEPHONE ENCOUNTER
Spoke w/ pt regarding her xray and doppler. Let her know that both of them looked good and no indication of a Holcomb's cyst. Josh will be referring her to PT.

## 2019-12-18 NOTE — PROGRESS NOTES
"   Nivia is a 43 y/o female who presents to the clinic today for follow up and to review lab results pertaining to her DM, type 2,  Dyslipidemia, GERD, Asthma and Obesity. She has been hospitalized for DVTs and TIAs in th past.  She has decided she would like to have Bariatric Surgery since she has tried \"everything she knows to do to lose weight\" without success. She feels this would improve her quality of life and she would be able to \"come off multiple medications.\"  We will spend time today reviewing her progress from her previous visits. She is c/o left posterior knee pain which started two to three days ago.     Diabetes   She presents for follow up diabetes visit. She has type 2 diabetes mellitus. The initial diagnosis of diabetes was made 20 years ago. Her disease course has been improving at this time.  Associated symptoms include blurred vision, fatigue, peripheral neuropathy, and visual change. Pertinent negatives for diabetes include no foot ulcerations and no polyphagia.   Diabetic complications include peripheral neuropathy. She has quit Neurontin due to side effects and is taking Cymbalta.  Risk factors for coronary artery disease include post-menopausal, stress and dyslipidemia.  She monitors her blood sugars at least four to five times daily. She is currently utilizing insulin pump therapy and CGM which has greatly improved her glycemic management. In addition, Ozempic was recently added which she reports has made an additional difference.  She is compliant with treatment all of the time. She is following a generally healthy diet. She has had a previous visit with a dietitian. She participates in exercise at this time but finding it more difficult due to her Neuropathy. She walks 3 miles on a regular basis. Her home blood glucose trend is decreasing steadily  An ACE inhibitor/angiotensin II receptor blocker is not  being taken at this time. Eye exam is current.      Lab Results   Component Value " "Date    HGBA1C 7.50 (H) 12/13/2019        Hyperlipidemia   The current episode started more than 1 year ago. Pertinent negatives include no chest pain or shortness of breath. She is taking Atorvastatin 20 mg.   Lab Results   Component Value Date    CHLPL 176 12/13/2019    TRIG 186 (H) 12/13/2019    HDL 37 (L) 12/13/2019     (H) 12/13/2019     Obesity   The current episode started more than 1 year ago. The problem has been waxing and waning. Associated symptoms include arthralgias, congestion, fatigue, headaches and weakness. Pertinent negatives include no chest pain, chills, coughing, fever, nausea, numbness, rash or vomiting. She has tried walking (\"Everything\") for the symptoms.   Knee Pain    There was no injury mechanism. The pain is present in the left knee. The quality of the pain is described as aching. The pain is moderate. Pertinent negatives include no inability to bear weight, loss of motion, loss of sensation, muscle weakness, numbness or tingling. She reports no foreign bodies present. The symptoms are aggravated by movement. She has tried acetaminophen, NSAIDs and heat for the symptoms. The treatment provided mild relief.      Vitals:    12/18/19 0850 12/18/19 0931   BP: 140/82 122/78   BP Location: Left leg Left arm   Patient Position: Sitting Sitting   Cuff Size: Adult Adult   Pulse: 117 92   Resp:  14   Temp: 98 °F (36.7 °C) 98 °F (36.7 °C)   TempSrc: Temporal Temporal   SpO2: 93% 95%   Weight: 105 kg (232 lb) 105 kg (232 lb)   Height: 162.6 cm (64\") 162.6 cm (64\")      The following portions of the patient's history were reviewed and updated as appropriate: allergies, current medications, past family history, past medical history, past social history, past surgical history and problem list.    Review of Systems   Constitutional: Positive for appetite change (Ozempic is helping) and fatigue. Negative for activity change, chills, fever and unexpected weight change.   HENT: Positive for " congestion and rhinorrhea. Negative for ear pain, sinus pressure and sinus pain.    Eyes: Positive for visual disturbance. Negative for discharge and redness.   Respiratory: Negative for cough, chest tightness, shortness of breath and wheezing.    Cardiovascular: Positive for leg swelling. Negative for chest pain and palpitations.   Gastrointestinal: Negative for nausea and vomiting.   Endocrine: Negative for cold intolerance, heat intolerance, polydipsia, polyphagia and polyuria.   Musculoskeletal: Positive for arthralgias.   Skin: Negative for color change and rash.   Neurological: Positive for weakness and headaches. Negative for dizziness, tingling, tremors, speech difficulty, light-headedness and numbness.   Hematological: Negative for adenopathy.   Psychiatric/Behavioral: Positive for sleep disturbance. Negative for confusion, decreased concentration and suicidal ideas. The patient is not nervous/anxious.    All other systems reviewed and are negative.    Physical Exam   Constitutional: She is oriented to person, place, and time. She appears well-developed and well-nourished. No distress.   HENT:   Head: Normocephalic.   Mouth/Throat: Oropharynx is clear and moist. No oropharyngeal exudate.   Eyes: Pupils are equal, round, and reactive to light. Conjunctivae are normal. Right eye exhibits no discharge. Left eye exhibits no discharge. No scleral icterus.   Neck: Neck supple. No JVD present. No thyromegaly present.   Cardiovascular: Normal rate, regular rhythm and normal heart sounds. Exam reveals no friction rub.   No murmur heard.  Pulmonary/Chest: Effort normal and breath sounds normal. No respiratory distress. She has no wheezes. She has no rales.   Abdominal: Soft. Bowel sounds are normal. She exhibits no distension. There is no tenderness. There is no rebound and no guarding.   Musculoskeletal: She exhibits no edema.        Left knee: She exhibits no swelling, no effusion, no ecchymosis, no erythema and  normal patellar mobility. Tenderness found.   Lymphadenopathy:     She has no cervical adenopathy.   Neurological: She is alert and oriented to person, place, and time.   Skin: Skin is warm and dry. Capillary refill takes less than 2 seconds. No rash noted. No erythema.   Psychiatric: She has a normal mood and affect. Her behavior is normal. Judgment and thought content normal.   Nursing note and vitals reviewed.    Results for orders placed or performed in visit on 12/13/19   Vitamin B12   Result Value Ref Range    Vitamin B-12 411 211 - 946 pg/mL   MicroAlbumin, Urine, Random -   Result Value Ref Range    Microalbumin, Urine 13.6 Not Estab. ug/mL   Vitamin D 25 Hydroxy   Result Value Ref Range    25 Hydroxy, Vitamin D 38.1 30.0 - 100.0 ng/ml   Hemoglobin A1c   Result Value Ref Range    Hemoglobin A1C 7.50 (H) 4.80 - 5.60 %   Lipid Panel   Result Value Ref Range    Total Cholesterol 176 0 - 200 mg/dL    Triglycerides 186 (H) 0 - 150 mg/dL    HDL Cholesterol 37 (L) 40 - 60 mg/dL    VLDL Cholesterol 37.2 mg/dL    LDL Cholesterol  102 (H) 0 - 100 mg/dL   TSH   Result Value Ref Range    TSH 2.020 0.270 - 4.200 uIU/mL   Comprehensive Metabolic Panel   Result Value Ref Range    Glucose 187 (H) 65 - 99 mg/dL    BUN 12 6 - 20 mg/dL    Creatinine 0.65 0.57 - 1.00 mg/dL    eGFR Non African Am 98 >60 mL/min/1.73    eGFR African Am 119 >60 mL/min/1.73    BUN/Creatinine Ratio 18.5 7.0 - 25.0    Sodium 141 136 - 145 mmol/L    Potassium 4.2 3.5 - 5.2 mmol/L    Chloride 103 98 - 107 mmol/L    Total CO2 24.8 22.0 - 29.0 mmol/L    Calcium 9.3 8.6 - 10.5 mg/dL    Total Protein 7.6 6.0 - 8.5 g/dL    Albumin 4.40 3.50 - 5.20 g/dL    Globulin 3.2 gm/dL    A/G Ratio 1.4 g/dL    Total Bilirubin 0.2 0.2 - 1.2 mg/dL    Alkaline Phosphatase 105 39 - 117 U/L    AST (SGOT) 15 1 - 32 U/L    ALT (SGPT) 19 1 - 33 U/L   HIV 1 RNA Qualitative   Result Value Ref Range    HIV-1 RNA Qualitative Negative Negative   CBC & Differential   Result Value Ref  Range    WBC 12.43 (H) 3.40 - 10.80 10*3/mm3    RBC 4.89 3.77 - 5.28 10*6/mm3    Hemoglobin 13.6 12.0 - 15.9 g/dL    Hematocrit 40.1 34.0 - 46.6 %    MCV 82.0 79.0 - 97.0 fL    MCH 27.8 26.6 - 33.0 pg    MCHC 33.9 31.5 - 35.7 g/dL    RDW 12.7 12.3 - 15.4 %    Platelets 394 140 - 450 10*3/mm3    Neutrophil Rel % 65.2 42.7 - 76.0 %    Lymphocyte Rel % 28.3 19.6 - 45.3 %    Monocyte Rel % 4.1 (L) 5.0 - 12.0 %    Eosinophil Rel % 1.4 0.3 - 6.2 %    Basophil Rel % 0.6 0.0 - 1.5 %    Neutrophils Absolute 8.10 (H) 1.70 - 7.00 10*3/mm3    Lymphocytes Absolute 3.52 (H) 0.70 - 3.10 10*3/mm3    Monocytes Absolute 0.51 0.10 - 0.90 10*3/mm3    Eosinophils Absolute 0.18 0.00 - 0.40 10*3/mm3    Basophils Absolute 0.07 0.00 - 0.20 10*3/mm3    Immature Granulocyte Rel % 0.4 0.0 - 0.5 %    Immature Grans Absolute 0.05 0.00 - 0.05 10*3/mm3    nRBC 0.0 0.0 - 0.2 /100 WBC     Assessment/Plan     Patient's Body mass index is 39.82 kg/m². BMI is above normal parameters. Recommendations include: educational material, exercise counseling and nutrition counseling.  Problems Addressed this Visit        Digestive    GERD (gastroesophageal reflux disease) (Chronic)    Class 2 severe obesity with serious comorbidity and body mass index (BMI) of 38.0 to 38.9 in adult (CMS/[facility])       Endocrine    Uncontrolled type 2 diabetes with neuropathy (CMS/[facility]) - Primary    Relevant Medications    Semaglutide, 1 MG/DOSE, (OZEMPIC, 1 MG/DOSE,) 2 MG/1.5ML solution pen-injector       Other    Anxiety and depression (Chronic)    Dyslipidemia      Other Visit Diagnoses     Posterior left knee pain        Xray and Venous Doppler ordered    Relevant Orders    XR Knee 3 View Left    Ultrasound Doppler Venous Leg - Left    Ambulatory Referral to Physical Therapy Evaluate and treat      Findings and recommendations discussed with Nivia. Reviewed above lab results with her including an A1C of 7.5!. Praise given to her for such an improvement. Her insulin pump was  uploaded, reviewed and interpreted. No changes at this time. She will be increasing Ozempic to 1 mg weekly. Encouraged her to  Call the office if she has any issues with this increase. Reviewed lipid panel with elevated Triglycerides and LDL. Lifestyle modifications including nutrition and exercise recommendations reinforced. She will continue to f/u with Behavioral Health. Venous Doppler and Left knee x ray were ordered and results discussed with Nivia. She would like a referral for Physical Therapy and referral was made. She will f/u with me on 01/15/2020 sooner if problems/concerns occur.       This document has been electronically signed by NIDA Starr, FAY-BC, CDE  December 19, 2019 1:28 PM

## 2019-12-19 VITALS
BODY MASS INDEX: 39.61 KG/M2 | WEIGHT: 232 LBS | OXYGEN SATURATION: 95 % | DIASTOLIC BLOOD PRESSURE: 78 MMHG | TEMPERATURE: 98 F | SYSTOLIC BLOOD PRESSURE: 122 MMHG | RESPIRATION RATE: 14 BRPM | HEART RATE: 92 BPM | HEIGHT: 64 IN
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
## 2019-12-19 PROBLEM — Z77.21: Status: ACTIVE | Noted: 2019-12-19

## 2019-12-31 ENCOUNTER — TELEPHONE (OUTPATIENT)
Dept: FAMILY MEDICINE CLINIC | Facility: CLINIC | Age: 44
End: 2019-12-31

## 2019-12-31 NOTE — TELEPHONE ENCOUNTER
----- Message from NIDA Vasquez sent at 12/31/2019  8:58 AM EST -----  Ok thank you I will change it to something else  ----- Message -----  From: Anne Paz MA  Sent: 12/30/2019   1:49 PM EST  To: NIDA Vasquez    Patient called and stated that the valium and Abilify are no longer working for her and wanted to know if you could change those to something different. I did let anupama know that you were out of the office until Thursday and that it may be then before I could get back to her with a answer. She stated that would be okay.

## 2020-01-02 ENCOUNTER — TELEPHONE (OUTPATIENT)
Dept: PSYCHIATRY | Facility: CLINIC | Age: 45
End: 2020-01-02

## 2020-01-02 ENCOUNTER — TELEPHONE (OUTPATIENT)
Dept: FAMILY MEDICINE CLINIC | Facility: CLINIC | Age: 45
End: 2020-01-02

## 2020-01-02 DIAGNOSIS — F33.3 SEVERE EPISODE OF RECURRENT MAJOR DEPRESSIVE DISORDER, WITH PSYCHOTIC FEATURES (HCC): Primary | ICD-10-CM

## 2020-01-02 DIAGNOSIS — F41.1 GENERALIZED ANXIETY DISORDER: ICD-10-CM

## 2020-01-02 RX ORDER — ARIPIPRAZOLE 10 MG/1
10 TABLET ORAL DAILY
Qty: 30 TABLET | Refills: 0 | Status: SHIPPED | OUTPATIENT
Start: 2020-01-02 | End: 2020-01-14 | Stop reason: SDUPTHER

## 2020-01-02 RX ORDER — DIAZEPAM 10 MG/1
5 TABLET ORAL EVERY 12 HOURS PRN
Qty: 15 TABLET | Refills: 0 | Status: SHIPPED | OUTPATIENT
Start: 2020-01-02 | End: 2020-01-14

## 2020-01-02 NOTE — TELEPHONE ENCOUNTER
CALLED AND MADE PATIENT AWARE THAT NIDA MELVIN HAD CHANGED HER VALIUM TO A LOWER DOSE OF HALF A TABLET Q 12 HOURS AS NEEDED. ALSO MADE PT AWARE HER ABILIFY DOSE HAD BEEN RAISED AND THAT SHE WOULD NEED TO  A NEW RX.    PT STATED SHE UNDERSTOOD. MADE PT AWARE TO SEEK ED FOR ANY EMERGENT OR AFTER HOURS SYMPTOMS OF SI OR HI.

## 2020-01-02 NOTE — TELEPHONE ENCOUNTER
----- Message from NIDA Vasquez sent at 1/2/2020  1:06 PM EST -----  Yes I will look over the chart and send in medication  ----- Message -----  From: Anne Paz MA  Sent: 1/2/2020  12:16 PM EST  To: NIDA Vasquez    Patient had to leave before you could see her due to personal reason's today. Is there anyway you could change her medicine still?

## 2020-01-14 ENCOUNTER — OFFICE VISIT (OUTPATIENT)
Dept: PSYCHIATRY | Facility: CLINIC | Age: 45
End: 2020-01-14

## 2020-01-14 VITALS
BODY MASS INDEX: 40.17 KG/M2 | DIASTOLIC BLOOD PRESSURE: 84 MMHG | SYSTOLIC BLOOD PRESSURE: 128 MMHG | WEIGHT: 234 LBS | HEART RATE: 97 BPM

## 2020-01-14 DIAGNOSIS — IMO0002 UNCONTROLLED TYPE 2 DIABETES WITH NEUROPATHY: Chronic | ICD-10-CM

## 2020-01-14 DIAGNOSIS — F41.1 GENERALIZED ANXIETY DISORDER: Primary | ICD-10-CM

## 2020-01-14 DIAGNOSIS — F33.3 SEVERE EPISODE OF RECURRENT MAJOR DEPRESSIVE DISORDER, WITH PSYCHOTIC FEATURES (HCC): ICD-10-CM

## 2020-01-14 PROCEDURE — 99213 OFFICE O/P EST LOW 20 MIN: CPT | Performed by: NURSE PRACTITIONER

## 2020-01-14 RX ORDER — DESVENLAFAXINE SUCCINATE 50 MG/1
50 TABLET, EXTENDED RELEASE ORAL DAILY
Qty: 30 TABLET | Refills: 0 | Status: SHIPPED | OUTPATIENT
Start: 2020-01-14 | End: 2020-01-23 | Stop reason: RX

## 2020-01-14 RX ORDER — DULOXETIN HYDROCHLORIDE 30 MG/1
30 CAPSULE, DELAYED RELEASE ORAL DAILY
Qty: 60 CAPSULE | Refills: 0
Start: 2020-01-14 | End: 2020-01-28

## 2020-01-14 RX ORDER — ARIPIPRAZOLE 10 MG/1
5 TABLET ORAL DAILY
Qty: 30 TABLET | Refills: 0
Start: 2020-01-14 | End: 2020-01-16

## 2020-01-14 NOTE — PROGRESS NOTES
Subjective   Nivia Bernstein is a 44 y.o. female is here today for medication management follow-up.    Chief Complaint:  Severe depression and anxiety     History of Present Illness:    Patient presents today for follow up for medication management for depression and anxiety with . Patient reports she thought the valium was causing her to be too low so she wean off it and then we increased the abilify. Patient reports since starting abilify she has been really tired and had issues with being quick to snap. Patient reports a lot of irritability. Patient states she stayed 3-4 days in [facility] and did well with it. Patient states during the stay she realized she has issues getting over her uncle and grandfathers death.  Patient reports she is having difficulty with trying to stop the dreams and memories due to that is the only memory she has of her uncle and grandfather.   reports due to her stay in [facility] the children are now coming in calling more and this is helped some. Patient states her kids are coming around now and talking to her more. Patient reports currently depression is at a 8-9 on a 0-10 scale with 10 being the worst. Patient reports symptoms of depression of no energy, no interest in things, out of control, and down mood. Patient reports anxiety is at a 8-9 on a 0-10 scale with 10 being the worst.  Patient reports symptoms of anxiety of constantly worrying mainly regarding her children.  Patient denies any panic attacks. Patient reports sleeping at least 3-4 hours a night and patient reports still having some nightmares. Patient reports appetite is about the same and eating at least 2-3 meals a day. Patient denies any auditory or visual hallucinations. Patient adamantly denies any SI or HI. Patient denies any side effects to medications. Patient denies any medical changes since last visit.   The following portions of the patient's history were reviewed and updated as  appropriate: allergies, current medications, past family history, past medical history, past social history, past surgical history and problem list.    Review of Systems   Constitutional: Positive for fatigue.   Respiratory: Negative.    Cardiovascular: Negative.    Gastrointestinal: Negative.    Psychiatric/Behavioral: Positive for agitation, dysphoric mood and sleep disturbance. The patient is nervous/anxious.        Objective   Physical Exam   Constitutional: Vital signs are normal. She appears well-developed and well-nourished. She is cooperative.   Neurological: She is alert.   Psychiatric: Her speech is normal. Judgment and thought content normal. Her affect is angry. She is agitated. Cognition and memory are normal. She exhibits a depressed mood.   Vitals reviewed.    Blood pressure 128/84, pulse 97, weight 106 kg (234 lb), not currently breastfeeding. Body mass index is 40.17 kg/m².    Allergies   Allergen Reactions   • Mobic [Meloxicam] Rash   • Novolog [Insulin Aspart] Hives   • Penicillins Angioedema       Medication List:   Current Outpatient Medications   Medication Sig Dispense Refill   • Albuterol Sulfate (VENTOLIN HFA IN) Inhale.     • ARIPiprazole (ABILIFY) 10 MG tablet Take 0.5 tablets by mouth Daily. For one week then discontinue 30 tablet 0   • aspirin  MG tablet   5   • desvenlafaxine (PRISTIQ) 50 MG 24 hr tablet Take 1 tablet by mouth Daily. 30 tablet 0   • DULoxetine (CYMBALTA) 30 MG capsule Take 1 capsule by mouth Daily. For one week then stop 60 capsule 0   • fluticasone (FLONASE) 50 MCG/ACT nasal spray 1 spray into the nostril(s) as directed by provider 2 (Two) Times a Day. 1 bottle 3   • GLOBAL EASE INJECT PEN NEEDLES 31G X 8 MM misc USE AS DIRECTED TO INJECT VICTOZA AND BASAGLAR DAILY  10   • glucagon (GLUCAGEN) 1 MG injection Inject 1 mg under the skin into the appropriate area as directed 1 (One) Time As Needed for Low Blood Sugar for up to 1 dose. 1 kit 3   • glucose blood test  strip Use tid 100 each 12   • HUMALOG KWIKPEN 100 UNIT/ML solution pen-injector Inject 45 Units under the skin into the appropriate area as directed 3 (Three) Times a Day As Needed (For elevated blood sugars). 15 pen 3   • insulin lispro (HUMALOG) 100 UNIT/ML injection Per insulin pump. Maximum insulin dose 300 units daily 6 each 12   • metFORMIN (GLUCOPHAGE) 1000 MG tablet Take 1 tablet by mouth 2 (Two) Times a Day With Meals. 60 tablet 5   • nitrofurantoin, macrocrystal-monohydrate, (MACROBID) 100 MG capsule Take 1 capsule by mouth Daily. 56 capsule 3   • omeprazole (priLOSEC) 20 MG capsule   5   • ondansetron (ZOFRAN) 4 MG tablet Take 1 tablet by mouth Every 8 (Eight) Hours As Needed for Nausea or Vomiting. 39 tablet 1   • rosuvastatin (CRESTOR) 20 MG tablet Take 1 tablet by mouth Every Night. 30 tablet 5   • TRUEPLUS LANCETS 33G misc Apply 1 each topically to the appropriate area as directed 3 (Three) Times a Day. Test blood glucose  0   • TRUVADA 200-300 MG per tablet Take  by mouth Daily.  0   • vitamin D (ERGOCALCIFEROL) 68935 units capsule capsule Take 1 capsule by mouth 1 (One) Time Per Week. 12 capsule 0     No current facility-administered medications for this visit.        Mental Status Exam:   Hygiene:   good  Cooperation:  Cooperative  Eye Contact:  Fair  Psychomotor Behavior:  Slow  Affect:  Appropriate  Hopelessness: Denies  Speech:  Normal  Thought Process:  Goal directed and Linear  Thought Content:  Normal  Suicidal:  None  Homicidal:  None  Hallucinations:  None  Delusion:  None  Memory:  Intact  Orientation:  Person, Place, Time and Situation  Reliability:  fair  Insight:  Fair  Judgement:  Fair  Impulse Control:  Fair  Physical/Medical Issues:  No                   Assessment/Plan   Diagnoses and all orders for this visit:    Generalized anxiety disorder  -     desvenlafaxine (PRISTIQ) 50 MG 24 hr tablet; Take 1 tablet by mouth Daily.    Severe episode of recurrent major depressive disorder,  with psychotic features (CMS/HCC)  -     ARIPiprazole (ABILIFY) 10 MG tablet; Take 0.5 tablets by mouth Daily. For one week then discontinue  -     desvenlafaxine (PRISTIQ) 50 MG 24 hr tablet; Take 1 tablet by mouth Daily.    Uncontrolled type 2 diabetes with neuropathy (CMS/HCC)  Comments:  Insulin pump downloaded and reviewed with her. Will continue Cymbalta for now.   Orders:  -     DULoxetine (CYMBALTA) 30 MG capsule; Take 1 capsule by mouth Daily. For one week then stop            Discussed medication options with patient. Discont. Valium. Decrease Abilify to 5mg daily for one week then discont. Decrease Cymbalta 30mg daily for one week then stop. Start Pristiq 50mg daily for worsening depression and anxiety. Reviewed the risks, benefits, and side effects of the medications; patient acknowledged and verbally consented. Patient was instructed on medication side effects, benefits, and also of no treatment.  Patient was given an explanation regarding potential for increased risk of diabetes, lipids, and weight gain.  Labs will be assessed as clinically indicated.  Diet was discussed especially healthy diet choices and increasing activity and exercise.  Patient was strongly urged to continue weight maintance or weight loss efforts.  Patient reported verbalized understanding of instructions.   Patient is agreeable to call the office with any questions, concerns, or worsening of symptoms.  Patient is aware to call 911 or go to the nearest ER should begin having SI/HI.          Follow up in two weeks      Errors in dictation may reflect use of voice recognition software and not all errors in transcription may have been detected prior to signing.                This document has been electronically signed by NIDA Vasquez   January 16, 2020 10:04 AM   no

## 2020-01-15 ENCOUNTER — OFFICE VISIT (OUTPATIENT)
Dept: FAMILY MEDICINE CLINIC | Facility: CLINIC | Age: 45
End: 2020-01-15

## 2020-01-15 VITALS
HEART RATE: 92 BPM | DIASTOLIC BLOOD PRESSURE: 70 MMHG | TEMPERATURE: 97.5 F | HEIGHT: 64 IN | WEIGHT: 234.2 LBS | BODY MASS INDEX: 39.98 KG/M2 | SYSTOLIC BLOOD PRESSURE: 120 MMHG | OXYGEN SATURATION: 95 %

## 2020-01-15 DIAGNOSIS — K21.9 GASTROESOPHAGEAL REFLUX DISEASE WITHOUT ESOPHAGITIS: Chronic | ICD-10-CM

## 2020-01-15 DIAGNOSIS — R11.0 NAUSEA: ICD-10-CM

## 2020-01-15 DIAGNOSIS — F32.A ANXIETY AND DEPRESSION: Chronic | ICD-10-CM

## 2020-01-15 DIAGNOSIS — Z77.21 ACCIDENTAL HYPODERMIC NEEDLESTICK INJURY WITH EXPOSURE TO BODY FLUID: ICD-10-CM

## 2020-01-15 DIAGNOSIS — H65.493 CHRONIC MIDDLE EAR EFFUSION, BILATERAL: Chronic | ICD-10-CM

## 2020-01-15 DIAGNOSIS — F41.9 ANXIETY AND DEPRESSION: Chronic | ICD-10-CM
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
## 2020-01-15 DIAGNOSIS — E66.01 CLASS 2 SEVERE OBESITY WITH SERIOUS COMORBIDITY AND BODY MASS INDEX (BMI) OF 38.0 TO 38.9 IN ADULT, UNSPECIFIED OBESITY TYPE (HCC): ICD-10-CM

## 2020-01-15 DIAGNOSIS — E78.5 DYSLIPIDEMIA: Chronic | ICD-10-CM

## 2020-01-15 DIAGNOSIS — J45.20 MILD INTERMITTENT ASTHMA WITHOUT COMPLICATION: Chronic | ICD-10-CM

## 2020-01-15 DIAGNOSIS — M25.562 ACUTE PAIN OF LEFT KNEE: ICD-10-CM

## 2020-01-15 DIAGNOSIS — E11.42 DM TYPE 2 WITH DIABETIC PERIPHERAL NEUROPATHY (HCC): Primary | Chronic | ICD-10-CM

## 2020-01-15 PROCEDURE — 95251 CONT GLUC MNTR ANALYSIS I&R: CPT | Performed by: NURSE PRACTITIONER

## 2020-01-15 PROCEDURE — 99214 OFFICE O/P EST MOD 30 MIN: CPT | Performed by: NURSE PRACTITIONER

## 2020-01-15 RX ORDER — ONDANSETRON 4 MG/1
4 TABLET, FILM COATED ORAL EVERY 8 HOURS PRN
Qty: 39 TABLET | Refills: 1 | Status: SHIPPED | OUTPATIENT
Start: 2020-01-15 | End: 2020-09-24 | Stop reason: DRUGHIGH

## 2020-01-15 NOTE — PROGRESS NOTES
"  History of Present Illness      Nivia is a 43 y/o female who presents to the clinic today for follow up pertaining to her DM, type 2,  Dyslipidemia, GERD, Asthma and Obesity. She has been hospitalized for DVTs and TIAs in th past.  She has decided she would like to have Bariatric Surgery since she has tried \"everything she knows to do to lose weight\" without success. She feels this would improve her quality of life and she would be able to \"come off multiple medications.\"  We will spend time today reviewing her progress from her previous visits. Also, she continue to  c/o left posterior knee pain which started last month. She was sent to [facility] for Venous Doppler and X-ray which were unremarkable.     Diabetes   She presents for follow up diabetes visit. She has type 2 diabetes mellitus. The initial diagnosis of diabetes was made 20 years ago. Her disease course has been improving at this time.  Associated symptoms include blurred vision, fatigue, peripheral neuropathy, and visual change. Pertinent negatives for diabetes include no foot ulcerations. Diabetic complications include peripheral neuropathy. She has quit Neurontin due to side effects and is currently reducing her Cymbalta due to a medication change.  Risk factors for coronary artery disease include post-menopausal, stress and dyslipidemia.  She monitors her blood sugars at least four to five times daily. She is currently utilizing insulin pump therapy and CGM which has greatly improved her glycemic management. In addition, she is taking weekly Ozempic.  She is compliant with treatment all of the time. She is following a generally healthy diet. She has had a previous visit with a dietitian. She participates in exercise at this time but finding it more difficult due to her Neuropathy. She walks 3 miles on a regular basis. Her home blood glucose trend is decreasing steadily  An ACE inhibitor/angiotensin II receptor blocker is not  being taken at this " "time. Eye exam is current.      Lab Results   Component Value Date    HGBA1C 7.50 (H) 12/13/2019      Hyperlipidemia   The current episode started more than 1 year ago. Pertinent negatives include no chest pain or shortness of breath. She is taking Atorvastatin 20 mg.   Lab Results   Component Value Date    CHLPL 176 12/13/2019    TRIG 186 (H) 12/13/2019    HDL 37 (L) 12/13/2019     (H) 12/13/2019     Obesity   The current episode started more than 1 year ago. The problem has been waxing and waning. Associated symptoms include arthralgias, congestion, fatigue, headaches and weakness. Pertinent negatives include no chest pain, chills, coughing, fever, nausea, numbness, rash or vomiting. She has tried walking (\"Everything\") for the symptoms.   Lab Results   Component Value Date    TSH 2.020 12/13/2019     Vitals:    01/15/20 0837 01/15/20 0906   BP: 130/84 120/70   BP Location:  Left arm   Patient Position:  Sitting   Cuff Size:  Adult   Pulse: 109 92   Temp: 97.5 °F (36.4 °C)    TempSrc: Temporal    SpO2: 95%    Weight: 106 kg (234 lb 3.2 oz)    Height: 162.6 cm (64\")         The following portions of the patient's history were reviewed and updated as appropriate: allergies, current medications, past family history, past medical history, past social history, past surgical history and problem list.    Review of Systems   Constitutional: Positive for fatigue. Negative for activity change, appetite change, chills, fever and unexpected weight change.   Eyes: Positive for visual disturbance.   Respiratory: Negative for cough, chest tightness, shortness of breath and wheezing.    Cardiovascular: Positive for leg swelling. Negative for chest pain and palpitations.   Gastrointestinal: Negative for abdominal pain, constipation, diarrhea, nausea and vomiting.   Endocrine: Negative for cold intolerance, heat intolerance, polydipsia, polyphagia and polyuria.   Musculoskeletal: Positive for arthralgias.   Skin: Negative " for color change and rash.   Neurological: Positive for numbness and headaches. Negative for dizziness, tremors, speech difficulty, weakness and light-headedness.   Hematological: Negative for adenopathy.   Psychiatric/Behavioral: Positive for sleep disturbance. Negative for confusion, decreased concentration and suicidal ideas. The patient is not nervous/anxious.    All other systems reviewed and are negative.    Physical Exam   Constitutional: She is oriented to person, place, and time. She appears well-developed and well-nourished. No distress.   HENT:   Head: Normocephalic.   Right Ear: Tympanic membrane is not perforated and not bulging. A middle ear effusion is present.   Left Ear: Tympanic membrane is not perforated and not bulging. A middle ear effusion is present.   Nose: Nose normal.   Mouth/Throat: Oropharynx is clear and moist and mucous membranes are normal. No oropharyngeal exudate.   Eyes: Pupils are equal, round, and reactive to light. Conjunctivae are normal. Right eye exhibits no discharge. Left eye exhibits no discharge. No scleral icterus.   Neck: Neck supple. No JVD present.   Cardiovascular: Normal rate, regular rhythm and normal heart sounds. Exam reveals no friction rub.   No murmur heard.  Pulmonary/Chest: Effort normal and breath sounds normal. No respiratory distress. She has no wheezes. She has no rales.   Abdominal: Soft. Bowel sounds are normal. She exhibits no distension. There is no tenderness. There is no rebound and no guarding.   Musculoskeletal: She exhibits no edema.   Lymphadenopathy:     She has no cervical adenopathy.   Neurological: She is alert and oriented to person, place, and time.   Skin: Skin is warm and dry. Capillary refill takes less than 2 seconds. No rash noted. No erythema.   Psychiatric: She has a normal mood and affect. Her behavior is normal. Judgment and thought content normal.   Nursing note and vitals reviewed.    Assessment/Plan     Problems Addressed this  Visit        Respiratory    Asthma (Chronic)       Digestive    GERD (gastroesophageal reflux disease) (Chronic)    Class 2 severe obesity with serious comorbidity and body mass index (BMI) of 38.0 to 38.9 in adult (CMS/[facility])       Endocrine    DM type 2 with diabetic peripheral neuropathy (CMS/[facility]) - Primary       Musculoskeletal and Integument    Chronic pain of left knee (Chronic)       Other    Anxiety and depression (Chronic)    Dyslipidemia    Accidental hypodermic needlestick injury with exposure to body fluid    Relevant Orders    HIV-1 / O / 2 Ag / Antibody 4th Generation      Other Visit Diagnoses     Chronic middle ear effusion, bilateral  (Chronic)       Missed ENT appt. to reschedule    Nausea        Zofran prescription sent    Relevant Medications    ondansetron (ZOFRAN) 4 MG tablet        Findings and recommendations discussed with Nivia. Her insulin pump was uploaded, reviewed and interpreted. Noted a very low daily Carbohydrate amount. Discussed importance  accurate food boluses and fingerstick checks with her Sensor. She had been without her Sensor for several days due to a battery issue. She is now up and running and blood sugars are stabilizing.Lifestyle modifications including nutrition and activity encouraged. She will continue prn Ventolin HFA for Asthma management.  Behavioral Health for Anxiety/Depression.   Continue Prilosec for GERD.Was referred to PT which she has not received an appt. Referral resent .Lifestyle modifications including nutrition and activity encouraged. Has had Hep B vaccinations. HIV f/u testing ordered. Continue to monitor        This document has been electronically signed by NIDA Starr, FAY-BC, CDE  January 16, 2020 2:10 PM

## 2020-01-15 NOTE — PATIENT INSTRUCTIONS
"DASH Eating Plan  DASH stands for \"Dietary Approaches to Stop Hypertension.\" The DASH eating plan is a healthy eating plan that has been shown to reduce high blood pressure (hypertension). It may also reduce your risk for type 2 diabetes, heart disease, and stroke. The DASH eating plan may also help with weight loss.  What are tips for following this plan?    General guidelines  · Avoid eating more than 2,300 mg (milligrams) of salt (sodium) a day. If you have hypertension, you may need to reduce your sodium intake to 1,500 mg a day.  · Limit alcohol intake to no more than 1 drink a day for nonpregnant women and 2 drinks a day for men. One drink equals 12 oz of beer, 5 oz of wine, or 1½ oz of hard liquor.  · Work with your health care provider to maintain a healthy body weight or to lose weight. Ask what an ideal weight is for you.  · Get at least 30 minutes of exercise that causes your heart to beat faster (aerobic exercise) most days of the week. Activities may include walking, swimming, or biking.  · Work with your health care provider or diet and nutrition specialist (dietitian) to adjust your eating plan to your individual calorie needs.  Reading food labels    · Check food labels for the amount of sodium per serving. Choose foods with less than 5 percent of the Daily Value of sodium. Generally, foods with less than 300 mg of sodium per serving fit into this eating plan.  · To find whole grains, look for the word \"whole\" as the first word in the ingredient list.  Shopping  · Buy products labeled as \"low-sodium\" or \"no salt added.\"  · Buy fresh foods. Avoid canned foods and premade or frozen meals.  Cooking  · Avoid adding salt when cooking. Use salt-free seasonings or herbs instead of table salt or sea salt. Check with your health care provider or pharmacist before using salt substitutes.  · Do not barrera foods. Cook foods using healthy methods such as baking, boiling, grilling, and broiling instead.  · Cook with " heart-healthy oils, such as olive, canola, soybean, or sunflower oil.  Meal planning  · Eat a balanced diet that includes:  ? 5 or more servings of fruits and vegetables each day. At each meal, try to fill half of your plate with fruits and vegetables.  ? Up to 6-8 servings of whole grains each day.  ? Less than 6 oz of lean meat, poultry, or fish each day. A 3-oz serving of meat is about the same size as a deck of cards. One egg equals 1 oz.  ? 2 servings of low-fat dairy each day.  ? A serving of nuts, seeds, or beans 5 times each week.  ? Heart-healthy fats. Healthy fats called Omega-3 fatty acids are found in foods such as flaxseeds and coldwater fish, like sardines, salmon, and mackerel.  · Limit how much you eat of the following:  ? Canned or prepackaged foods.  ? Food that is high in trans fat, such as fried foods.  ? Food that is high in saturated fat, such as fatty meat.  ? Sweets, desserts, sugary drinks, and other foods with added sugar.  ? Full-fat dairy products.  · Do not salt foods before eating.  · Try to eat at least 2 vegetarian meals each week.  · Eat more home-cooked food and less restaurant, buffet, and fast food.  · When eating at a restaurant, ask that your food be prepared with less salt or no salt, if possible.  What foods are recommended?  The items listed may not be a complete list. Talk with your dietitian about what dietary choices are best for you.  Grains  Whole-grain or whole-wheat bread. Whole-grain or whole-wheat pasta. Brown rice. Oatmeal. Quinoa. Bulgur. Whole-grain and low-sodium cereals. Joy bread. Low-fat, low-sodium crackers. Whole-wheat flour tortillas.  Vegetables  Fresh or frozen vegetables (raw, steamed, roasted, or grilled). Low-sodium or reduced-sodium tomato and vegetable juice. Low-sodium or reduced-sodium tomato sauce and tomato paste. Low-sodium or reduced-sodium canned vegetables.  Fruits  All fresh, dried, or frozen fruit. Canned fruit in natural juice (without  added sugar).  Meat and other protein foods  Skinless chicken or turkey. Ground chicken or turkey. Pork with fat trimmed off. Fish and seafood. Egg whites. Dried beans, peas, or lentils. Unsalted nuts, nut butters, and seeds. Unsalted canned beans. Lean cuts of beef with fat trimmed off. Low-sodium, lean deli meat.  Dairy  Low-fat (1%) or fat-free (skim) milk. Fat-free, low-fat, or reduced-fat cheeses. Nonfat, low-sodium ricotta or cottage cheese. Low-fat or nonfat yogurt. Low-fat, low-sodium cheese.  Fats and oils  Soft margarine without trans fats. Vegetable oil. Low-fat, reduced-fat, or light mayonnaise and salad dressings (reduced-sodium). Canola, safflower, olive, soybean, and sunflower oils. Avocado.  Seasoning and other foods  Herbs. Spices. Seasoning mixes without salt. Unsalted popcorn and pretzels. Fat-free sweets.  What foods are not recommended?  The items listed may not be a complete list. Talk with your dietitian about what dietary choices are best for you.  Grains  Baked goods made with fat, such as croissants, muffins, or some breads. Dry pasta or rice meal packs.  Vegetables  Creamed or fried vegetables. Vegetables in a cheese sauce. Regular canned vegetables (not low-sodium or reduced-sodium). Regular canned tomato sauce and paste (not low-sodium or reduced-sodium). Regular tomato and vegetable juice (not low-sodium or reduced-sodium). Pickles. Olives.  Fruits  Canned fruit in a light or heavy syrup. Fried fruit. Fruit in cream or butter sauce.  Meat and other protein foods  Fatty cuts of meat. Ribs. Fried meat. Carroll. Sausage. Bologna and other processed lunch meats. Salami. Fatback. Hotdogs. Bratwurst. Salted nuts and seeds. Canned beans with added salt. Canned or smoked fish. Whole eggs or egg yolks. Chicken or turkey with skin.  Dairy  Whole or 2% milk, cream, and half-and-half. Whole or full-fat cream cheese. Whole-fat or sweetened yogurt. Full-fat cheese. Nondairy creamers. Whipped toppings.  Processed cheese and cheese spreads.  Fats and oils  Butter. Stick margarine. Lard. Shortening. Ghee. Carroll fat. Tropical oils, such as coconut, palm kernel, or palm oil.  Seasoning and other foods  Salted popcorn and pretzels. Onion salt, garlic salt, seasoned salt, table salt, and sea salt. Worcestershire sauce. Tartar sauce. Barbecue sauce. Teriyaki sauce. Soy sauce, including reduced-sodium. Steak sauce. Canned and packaged gravies. Fish sauce. Oyster sauce. Cocktail sauce. Horseradish that you find on the shelf. Ketchup. Mustard. Meat flavorings and tenderizers. Bouillon cubes. Hot sauce and Tabasco sauce. Premade or packaged marinades. Premade or packaged taco seasonings. Relishes. Regular salad dressings.  Where to find more information:  · National Heart, Lung, and Blood [place]: www.nhlbi.nih.gov  · American Heart Association: www.heart.org  Summary  · The DASH eating plan is a healthy eating plan that has been shown to reduce high blood pressure (hypertension). It may also reduce your risk for type 2 diabetes, heart disease, and stroke.  · With the DASH eating plan, you should limit salt (sodium) intake to 2,300 mg a day. If you have hypertension, you may need to reduce your sodium intake to 1,500 mg a day.  · When on the DASH eating plan, aim to eat more fresh fruits and vegetables, whole grains, lean proteins, low-fat dairy, and heart-healthy fats.  · Work with your health care provider or diet and nutrition specialist (dietitian) to adjust your eating plan to your individual calorie needs.  This information is not intended to replace advice given to you by your health care provider. Make sure you discuss any questions you have with your health care provider.  Document Released: 12/06/2012 Document Revised: 12/11/2017 Document Reviewed: 12/11/2017  [employer] Interactive Patient Education © 2019 [employer]  Carbohydrate Counting for Diabetes Mellitus, Adult    Carbohydrate counting is a method of  "keeping track of how many carbohydrates you eat. Eating carbohydrates naturally increases the amount of sugar (glucose) in the blood. Counting how many carbohydrates you eat helps keep your blood glucose within normal limits, which helps you manage your diabetes (diabetes mellitus).  It is important to know how many carbohydrates you can safely have in each meal. This is different for every person. A diet and nutrition specialist (registered dietitian) can help you make a meal plan and calculate how many carbohydrates you should have at each meal and snack.  Carbohydrates are found in the following foods:  · Grains, such as breads and cereals.  · Dried beans and soy products.  · Starchy vegetables, such as potatoes, peas, and corn.  · Fruit and fruit juices.  · Milk and yogurt.  · Sweets and snack foods, such as cake, cookies, candy, chips, and soft drinks.  How do I count carbohydrates?  There are two ways to count carbohydrates in food. You can use either of the methods or a combination of both.  Reading \"Nutrition Facts\" on packaged food  The \"Nutrition Facts\" list is included on the labels of almost all packaged foods and beverages in the [place] It includes:  · The serving size.  · Information about nutrients in each serving, including the grams (g) of carbohydrate per serving.  To use the “Nutrition Facts\":  · Decide how many servings you will have.  · Multiply the number of servings by the number of carbohydrates per serving.  · The resulting number is the total amount of carbohydrates that you will be having.  Learning standard serving sizes of other foods  When you eat carbohydrate foods that are not packaged or do not include \"Nutrition Facts\" on the label, you need to measure the servings in order to count the amount of carbohydrates:  · Measure the foods that you will eat with a food scale or measuring cup, if needed.  · Decide how many standard-size servings you will eat.  · Multiply the number of servings " by 15. Most carbohydrate-rich foods have about 15 g of carbohydrates per serving.  ? For example, if you eat 8 oz (170 g) of strawberries, you will have eaten 2 servings and 30 g of carbohydrates (2 servings x 15 g = 30 g).  · For foods that have more than one food mixed, such as soups and casseroles, you must count the carbohydrates in each food that is included.  The following list contains standard serving sizes of common carbohydrate-rich foods. Each of these servings has about 15 g of carbohydrates:  · ½ hamburger bun or ½ English muffin.  · ½ oz (15 mL) syrup.  · ½ oz (14 g) jelly.  · 1 slice of bread.  · 1 six-inch tortilla.  · 3 oz (85 g) cooked rice or pasta.  · 4 oz (113 g) cooked dried beans.  · 4 oz (113 g) starchy vegetable, such as peas, corn, or potatoes.  · 4 oz (113 g) hot cereal.  · 4 oz (113 g) mashed potatoes or ¼ of a large baked potato.  · 4 oz (113 g) canned or frozen fruit.  · 4 oz (120 mL) fruit juice.  · 4-6 crackers.  · 6 chicken nuggets.  · 6 oz (170 g) unsweetened dry cereal.  · 6 oz (170 g) plain fat-free yogurt or yogurt sweetened with artificial sweeteners.  · 8 oz (240 mL) milk.  · 8 oz (170 g) fresh fruit or one small piece of fruit.  · 24 oz (680 g) popped popcorn.  Example of carbohydrate counting  Sample meal  · 3 oz (85 g) chicken breast.  · 6 oz (170 g) brown rice.  · 4 oz (113 g) corn.  · 8 oz (240 mL) milk.  · 8 oz (170 g) strawberries with sugar-free whipped topping.  Carbohydrate calculation  1. Identify the foods that contain carbohydrates:  ? Rice.  ? Corn.  ? Milk.  ? Strawberries.  2. Calculate how many servings you have of each food:  ? 2 servings rice.  ? 1 serving corn.  ? 1 serving milk.  ? 1 serving strawberries.  3. Multiply each number of servings by 15 g:  ? 2 servings rice x 15 g = 30 g.  ? 1 serving corn x 15 g = 15 g.  ? 1 serving milk x 15 g = 15 g.  ? 1 serving strawberries x 15 g = 15 g.  4. Add together all of the amounts to find the total grams of  carbohydrates eaten:  ? 30 g + 15 g + 15 g + 15 g = 75 g of carbohydrates total.  Summary  · Carbohydrate counting is a method of keeping track of how many carbohydrates you eat.  · Eating carbohydrates naturally increases the amount of sugar (glucose) in the blood.  · Counting how many carbohydrates you eat helps keep your blood glucose within normal limits, which helps you manage your diabetes.  · A diet and nutrition specialist (registered dietitian) can help you make a meal plan and calculate how many carbohydrates you should have at each meal and snack.  This information is not intended to replace advice given to you by your health care provider. Make sure you discuss any questions you have with your health care provider.  Document Released: 12/18/2006 Document Revised: 06/27/2018 Document Reviewed: 05/31/2017  [employer] Interactive Patient Education © 2019 [employer]

## 2020-01-16 PROBLEM — E11.42 DM TYPE 2 WITH DIABETIC PERIPHERAL NEUROPATHY: Status: ACTIVE | Noted: 2017-03-19

## 2020-01-16 PROBLEM — G89.29 CHRONIC PAIN OF LEFT KNEE: Chronic | Status: ACTIVE | Noted: 2017-10-16

## 2020-01-16 PROBLEM — M25.562 CHRONIC PAIN OF LEFT KNEE: Chronic | Status: ACTIVE | Noted: 2017-10-16
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
## 2020-01-18 DIAGNOSIS — Z77.21 ACCIDENTAL HYPODERMIC NEEDLESTICK INJURY WITH EXPOSURE TO BODY FLUID: Primary | ICD-10-CM

## 2020-01-20 ENCOUNTER — TELEPHONE (OUTPATIENT)
Dept: FAMILY MEDICINE CLINIC | Facility: CLINIC | Age: 45
End: 2020-01-20

## 2020-01-20 ENCOUNTER — LAB (OUTPATIENT)
Dept: FAMILY MEDICINE CLINIC | Facility: CLINIC | Age: 45
End: 2020-01-20
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
## 2020-01-20 DIAGNOSIS — Z77.21 ACCIDENTAL HYPODERMIC NEEDLESTICK INJURY WITH EXPOSURE TO BODY FLUID: ICD-10-CM

## 2020-01-20 DIAGNOSIS — F33.3 SEVERE EPISODE OF RECURRENT MAJOR DEPRESSIVE DISORDER, WITH PSYCHOTIC FEATURES (HCC): Primary | ICD-10-CM

## 2020-01-20 DIAGNOSIS — Z77.21 EXPOSURE TO BLOODBORNE PATHOGEN: Primary | ICD-10-CM

## 2020-01-20 RX ORDER — BUPROPION HYDROCHLORIDE 75 MG/1
75 TABLET ORAL DAILY
Qty: 30 TABLET | Refills: 0 | Status: SHIPPED | OUTPATIENT
Start: 2020-01-20 | End: 2020-01-23 | Stop reason: SINTOL

## 2020-01-20 NOTE — TELEPHONE ENCOUNTER
----- Message from NIDA Vasquez sent at 1/20/2020 10:20 AM EST -----  Yes I will look through and see what I can do  ----- Message -----  From: Anne Paz MA  Sent: 1/20/2020  10:12 AM EST  To: NIDA Vasquez    Patient called back and stated that she was unable to pay for the medicine. She wanted to know if you could just change it to something different?

## 2020-01-20 NOTE — TELEPHONE ENCOUNTER
Prescription has been canceled through [facility].     ----- Message from NIDA Vasquez sent at 1/20/2020  4:36 PM EST -----  Ok will you call the pharmacy and cancel the prescription. I will look over and evaluate the next step in medication.   ----- Message -----  From: Anne Paz MA  Sent: 1/20/2020   4:25 PM EST  To: NIDA Vasquez    Patient called back and stated that she has taken the WELLBUTRIN in the past and it did not help her. She said she was willing to try it again if you wanted her to.

## 2020-01-20 NOTE — TELEPHONE ENCOUNTER
Pt is aware of this information.   ----- Message from NIDA Vasquez sent at 1/20/2020 11:26 AM EST -----  Let patient know I will send in some Wellbutrin for her to take instead of the pristiq. Tell the patient to take in the morning and to notify the office if she has any increased agitation, anxiety, or panic. If she begins having any SI or HI to go to the hospital. Let her know to keep the scheduled appointment in two weeks.

## 2020-01-23 ENCOUNTER — TELEPHONE (OUTPATIENT)
Dept: PSYCHIATRY | Facility: CLINIC | Age: 45
End: 2020-01-23

## 2020-01-23 DIAGNOSIS — F33.3 SEVERE EPISODE OF RECURRENT MAJOR DEPRESSIVE DISORDER, WITH PSYCHOTIC FEATURES (HCC): Primary | ICD-10-CM

## 2020-01-23 LAB
HAV AB SER QL IA: NEGATIVE
HBV CORE AB SERPL QL IA: NEGATIVE
HBV SURFACE AB SER QL: NON REACTIVE
HBV SURFACE AG SERPL QL IA: NEGATIVE
HCV AB S/CO SERPL IA: <0.1 S/CO RATIO (ref 0–0.9)
HIV 1+2 AB+HIV1 P24 AG SERPL QL IA: NON REACTIVE
HIV1 RNA SERPL QL NAA+PROBE: NEGATIVE

## 2020-01-23 RX ORDER — DEXTROAMPHETAMINE SACCHARATE, AMPHETAMINE ASPARTATE, DEXTROAMPHETAMINE SULFATE AND AMPHETAMINE SULFATE 1.25; 1.25; 1.25; 1.25 MG/1; MG/1; MG/1; MG/1
5 TABLET ORAL DAILY
Qty: 15 TABLET | Refills: 0 | Status: SHIPPED | OUTPATIENT
Start: 2020-01-23 | End: 2020-01-31 | Stop reason: SDUPTHER

## 2020-01-23 RX ORDER — NYSTATIN 100000 [USP'U]/G
POWDER TOPICAL 3 TIMES DAILY
Qty: 60 G | Refills: 1 | Status: SHIPPED | OUTPATIENT
Start: 2020-01-23 | End: 2020-03-06

## 2020-01-23 NOTE — TELEPHONE ENCOUNTER
SPOKE WITH PT AND MADE HER AWARE THAT [facility] HAD SENT IN NEW MEDICATION. TOLD PT TO MAKE SURE SHE MONITORED HER BP AND PULSE AND TO DISCONTINUE MEDICATION IF SHE BEGAN HAVING TACHYCARDIA OR HYPERTENSION.     MADE PT AWARE THAT SHE WOULD NEED TO SEEK ED IF SHE BEGAN TO HAVE CHEST PAINS ON THIS MEDICATION. PT UNDERSTOOD AND AGREED SHE WOULD KEEP HER FOLLOW UP APPOINTMENT SCHEDULED FOR 01/31/2020

## 2020-01-28 ENCOUNTER — OFFICE VISIT (OUTPATIENT)
Dept: FAMILY MEDICINE CLINIC | Facility: CLINIC | Age: 45
End: 2020-01-28

## 2020-01-28 DIAGNOSIS — Z86.718 HISTORY OF DVT (DEEP VEIN THROMBOSIS): ICD-10-CM

## 2020-01-28 DIAGNOSIS — K21.9 GASTROESOPHAGEAL REFLUX DISEASE WITHOUT ESOPHAGITIS: Chronic | ICD-10-CM

## 2020-01-28 DIAGNOSIS — I65.23 ATHEROSCLEROSIS OF BOTH CAROTID ARTERIES: ICD-10-CM

## 2020-01-28 DIAGNOSIS — J30.9 CHRONIC ALLERGIC RHINITIS: ICD-10-CM

## 2020-01-28 DIAGNOSIS — J45.20 MILD INTERMITTENT ASTHMA WITHOUT COMPLICATION: ICD-10-CM

## 2020-01-28 DIAGNOSIS — E78.2 MIXED HYPERLIPIDEMIA: ICD-10-CM

## 2020-01-28 DIAGNOSIS — Z00.00 HEALTHCARE MAINTENANCE: ICD-10-CM

## 2020-01-28 DIAGNOSIS — Z12.31 ENCOUNTER FOR SCREENING MAMMOGRAM FOR BREAST CANCER: ICD-10-CM

## 2020-01-28 DIAGNOSIS — I34.1 MITRAL VALVE PROLAPSE: Primary | Chronic | ICD-10-CM

## 2020-01-28 DIAGNOSIS — E11.42 DM TYPE 2 WITH DIABETIC PERIPHERAL NEUROPATHY (HCC): ICD-10-CM

## 2020-01-28 DIAGNOSIS — Z86.73 HISTORY OF TIAS: ICD-10-CM

## 2020-01-28 PROBLEM — N39.0 URINARY TRACT INFECTION WITHOUT HEMATURIA: Status: RESOLVED | Noted: 2019-10-16 | Resolved: 2020-01-28

## 2020-01-28 PROCEDURE — 90472 IMMUNIZATION ADMIN EACH ADD: CPT | Performed by: GENERAL PRACTICE

## 2020-01-28 PROCEDURE — 90471 IMMUNIZATION ADMIN: CPT | Performed by: GENERAL PRACTICE

## 2020-01-28 PROCEDURE — 90732 PPSV23 VACC 2 YRS+ SUBQ/IM: CPT | Performed by: GENERAL PRACTICE

## 2020-01-28 PROCEDURE — 90746 HEPB VACCINE 3 DOSE ADULT IM: CPT | Performed by: GENERAL PRACTICE

## 2020-01-28 PROCEDURE — 99214 OFFICE O/P EST MOD 30 MIN: CPT | Performed by: GENERAL PRACTICE

## 2020-01-28 RX ORDER — ROSUVASTATIN CALCIUM 40 MG/1
40 TABLET, COATED ORAL NIGHTLY
Qty: 30 TABLET | Refills: 5 | Status: SHIPPED | OUTPATIENT
Start: 2020-01-28 | End: 2020-08-14

## 2020-01-28 RX ORDER — SEMAGLUTIDE 1.34 MG/ML
INJECTION, SOLUTION SUBCUTANEOUS
COMMUNITY
Start: 2020-01-16 | End: 2020-03-06

## 2020-01-29 VITALS
HEART RATE: 94 BPM | HEIGHT: 64 IN | TEMPERATURE: 98.2 F | SYSTOLIC BLOOD PRESSURE: 105 MMHG | RESPIRATION RATE: 12 BRPM | WEIGHT: 231 LBS | OXYGEN SATURATION: 90 % | BODY MASS INDEX: 39.44 KG/M2 | DIASTOLIC BLOOD PRESSURE: 62 MMHG

## 2020-01-29 PROBLEM — J30.9 CHRONIC ALLERGIC RHINITIS: Status: ACTIVE | Noted: 2020-01-29

## 2020-01-29 PROBLEM — J45.20 MILD INTERMITTENT ASTHMA WITHOUT COMPLICATION: Status: ACTIVE | Noted: 2017-03-19

## 2020-01-29 PROBLEM — I34.1 MITRAL VALVE PROLAPSE: Chronic | Status: RESOLVED | Noted: 2017-03-19 | Resolved: 2020-01-29

## 2020-01-29 NOTE — PROGRESS NOTES
Subjective   Nivia Bernstein is a 44 y.o. female.     History of Present Illness     History of TIA  She gives a history of previous TIA.  MRI of the brain performed on 10/16/2017 was unremarkable.  Duplex Doppler ultrasound of the carotid arteries performed the same day revealed homogenous plaque bilaterally but no evidence of stenosis.  Echocardiogram performed the same day was reported as showing trace mitral, aortic, and pulmonic valve regurgitation but no significant abnormalities with an estimated EF of 56 to 60% and a negative saline test for intracardiac shunting.  She has been maintained on  daily.  She gives a history of a previous right DVT following arthroscopic knee surgery on that side.  There is no family history of DVT or PE    Diabetes  Current symptoms include paresthesia of the feet and hypoglycemia - occasional mild. Patient denies visual disturbances, polydipsia, polyuria and foot ulcerations. Evaluation to date has been: fasting blood sugar and hemoglobin A1C. Home sugars: BGs consistently in an acceptable range. Current treatments: metformin, GLP agonist - semaglutide, and an insulin pump.  Within the last month she has developed a sense of postprandial fullness and nausea.  There is no history of any difficulty swallowing, vomiting, or abdominal pain.  She has longstanding constipation but this has not changed and she denies any hematochezia, or melena.  She was tried on empagliflozin in the past with an apparent lack of effect.  Most recent hemoglobin A1c   Lab Results   Component Value Date    HGBA1C 7.50 (H) 12/13/2019    HGBA1C 9.00 (H) 09/10/2019    HGBA1C 12.10 (H) 03/07/2019    HGBA1C 13.90 (H) 10/16/2017    HGBA1C 11.40 (H) 03/31/2017    HGBA1C 10.80 (H) 03/20/2017      Dyslipidemia  Compliance with treatment has been good. The patient exercises intermittently. She is currently being prescribed the following medication for her dyslipidemia - rosuvastatin. Patient denies side  effects associated with her medications. Most recent lipids include  Lab Results   Component Value Date    TRIG 186 (H) 12/13/2019    TRIG 113 09/10/2019    TRIG 220 (H) 10/16/2017    TRIG 133 03/31/2017    HDL 37 (L) 12/13/2019    HDL 46 09/10/2019    HDL 33 (L) 10/16/2017    HDL 36 (L) 03/31/2017     (H) 12/13/2019     (H) 09/10/2019     (H) 10/16/2017     03/31/2017     The following portions of the patient's history were reviewed and updated as appropriate: allergies, current medications, past family history, past medical history, past social history, past surgical history and problem list.    Review of Systems   Constitutional: Positive for appetite change (decreased) and fatigue. Negative for activity change, chills, fever and unexpected weight change.   HENT: Positive for congestion and rhinorrhea. Negative for ear pain, sneezing, sore throat and voice change.    Eyes: Positive for visual disturbance.   Respiratory: Negative for cough, chest tightness, shortness of breath and wheezing.    Cardiovascular: Positive for leg swelling. Negative for chest pain and palpitations.   Gastrointestinal: Positive for constipation and nausea. Negative for abdominal pain, blood in stool, diarrhea and vomiting.   Endocrine: Negative for polydipsia and polyuria.   Genitourinary: Negative for difficulty urinating, dysuria, frequency, hematuria and urgency.   Musculoskeletal: Positive for arthralgias. Negative for back pain, joint swelling, myalgias and neck pain.   Skin: Negative for rash.   Neurological: Positive for numbness and headaches. Negative for dizziness, speech difficulty, weakness and light-headedness.   Psychiatric/Behavioral: Positive for sleep disturbance. Negative for dysphoric mood. The patient is not nervous/anxious.      Objective   Physical Exam   Constitutional: She is oriented to person, place, and time. No distress.   Accompanied by her  and their granddaughter.  Bright  and in good spirits. No apparent distress. No pallor, jaundice, diaphoresis, or cyanosis.     HENT:   Head: Atraumatic.   Right Ear: Tympanic membrane, external ear and ear canal normal.   Left Ear: Tympanic membrane, external ear and ear canal normal.   Nose: Nose normal.   Mouth/Throat: Oropharynx is clear and moist. Mucous membranes are not pale and not cyanotic.   Eyes: Pupils are equal, round, and reactive to light. EOM are normal. No scleral icterus.   Neck: No JVD present. Carotid bruit is not present. No tracheal deviation present. No thyromegaly present.   Cardiovascular: Normal rate, regular rhythm, S1 normal, S2 normal and intact distal pulses. Exam reveals no gallop, no S3 and no S4.   No murmur heard.  Pulses:       Dorsalis pedis pulses are 2+ on the right side, and 2+ on the left side.        Posterior tibial pulses are 2+ on the right side, and 2+ on the left side.   Pulmonary/Chest: Breath sounds normal. No stridor. No respiratory distress.   Abdominal: Soft. Normal aorta and bowel sounds are normal. She exhibits no distension, no abdominal bruit and no mass. There is no hepatosplenomegaly. There is no tenderness. No hernia.   Musculoskeletal: She exhibits no tenderness or deformity.    Nivia had a diabetic foot exam performed today.  Vascular Status -  Her right foot exhibits no edema. Her left foot exhibits no edema.  Lymphadenopathy:        Head (right side): No submandibular adenopathy present.        Head (left side): No submandibular adenopathy present.     She has no cervical adenopathy.   Neurological: She is alert and oriented to person, place, and time. A sensory deficit (decreased vibration sense both feet) is present. No cranial nerve deficit. She exhibits normal muscle tone. Coordination normal.   Skin: Skin is warm and dry. No rash noted. She is not diaphoretic. No cyanosis. No pallor. Nails show no clubbing.   Psychiatric: She has a normal mood and affect.     Assessment/Plan    Problems Addressed this Visit        Cardiovascular and Mediastinum        Mixed hyperlipidemia  With LDL goal of less than 70   Encouraged to continue to work on her diet and exercise plan.  Rosuvastatin will be titrated to 40 daily    Relevant Medications    rosuvastatin (CRESTOR) 40 MG tablet    Atherosclerosis of both carotid arteries  Reminded regarding the importance of risk factor modification.  Continue current dose of ASA for now       Respiratory    Mild intermittent asthma without complication    Chronic allergic rhinitis       Digestive    GERD (gastroesophageal reflux disease) (Chronic)   Symptoms are currently well controlled.  Reminded regarding lifestyle modification.  Continue current medication.       Endocrine    DM type 2 with diabetic peripheral neuropathy (CMS/HCC)  Diabetes mellitus Type II, under much better control.  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  Semaglutide will be reduced to 0.5 weekly as this is likely the cause of her GI symptoms.  Patient will report if any worse or if any new symptoms in the meantime  We will consider another trial of empagliflozin given her history of atherosclerosis  Follow up with MELL Starr    Relevant Medications    OZEMPIC, 1 MG/DOSE, 2 MG/1.5ML solution pen-injector    Other Relevant Orders    Pneumococcal Polysaccharide Vaccine 23-Valent Greater Than or Equal To 3yo Subcutaneous / IM (Completed)    Hepatitis B Vaccine Adult IM (Completed)       Other    History of DVT (deep vein thrombosis)  Following arthroscopic surgery of the right knee    History of TIAs      Other Visit Diagnoses     Healthcare maintenance      Recommended a Pneumovax 23 along with hepatitis B immunization  Patient has received hepatitis A #1 and is aware to obtain a second dose six months afterward.  We will arrange an  updated mammogram along with a DEXA scan    Relevant Orders    Mammo Screening Digital Tomosynthesis Bilateral With CAD    DEXA Bone Density Axial    Pneumococcal Polysaccharide Vaccine 23-Valent Greater Than or Equal To 3yo Subcutaneous / IM (Completed)    Hepatitis B Vaccine Adult IM (Completed)

## 2020-01-31 DIAGNOSIS — F33.3 SEVERE EPISODE OF RECURRENT MAJOR DEPRESSIVE DISORDER, WITH PSYCHOTIC FEATURES (HCC): ICD-10-CM

## 2020-01-31 RX ORDER — DEXTROAMPHETAMINE SACCHARATE, AMPHETAMINE ASPARTATE, DEXTROAMPHETAMINE SULFATE AND AMPHETAMINE SULFATE 2.5; 2.5; 2.5; 2.5 MG/1; MG/1; MG/1; MG/1
10 TABLET ORAL DAILY
Qty: 30 TABLET | Refills: 0 | Status: SHIPPED | OUTPATIENT
Start: 2020-01-31 | End: 2020-02-17 | Stop reason: SDUPTHER

## 2020-02-08 ENCOUNTER — OFFICE VISIT (OUTPATIENT)
Dept: FAMILY MEDICINE CLINIC | Facility: CLINIC | Age: 45
End: 2020-02-08

## 2020-02-08 VITALS
HEIGHT: 64 IN | DIASTOLIC BLOOD PRESSURE: 68 MMHG | TEMPERATURE: 98.6 F | BODY MASS INDEX: 39.44 KG/M2 | SYSTOLIC BLOOD PRESSURE: 120 MMHG | WEIGHT: 231 LBS | HEART RATE: 102 BPM | OXYGEN SATURATION: 98 % | RESPIRATION RATE: 14 BRPM

## 2020-02-08 DIAGNOSIS — Z20.828 EXPOSURE TO INFLUENZA: ICD-10-CM

## 2020-02-08 DIAGNOSIS — E11.42 DM TYPE 2 WITH DIABETIC PERIPHERAL NEUROPATHY (HCC): Chronic | ICD-10-CM

## 2020-02-08 DIAGNOSIS — J06.9 UPPER RESPIRATORY TRACT INFECTION, UNSPECIFIED TYPE: ICD-10-CM

## 2020-02-08 DIAGNOSIS — J02.9 SORE THROAT: ICD-10-CM

## 2020-02-08 DIAGNOSIS — H66.001 ACUTE SUPPURATIVE OTITIS MEDIA OF RIGHT EAR WITHOUT SPONTANEOUS RUPTURE OF TYMPANIC MEMBRANE, RECURRENCE NOT SPECIFIED: Primary | ICD-10-CM

## 2020-02-08 LAB
EXPIRATION DATE: NORMAL
EXPIRATION DATE: NORMAL
FLUAV AG NPH QL: NEGATIVE
FLUBV AG NPH QL: NEGATIVE
INTERNAL CONTROL: NORMAL
INTERNAL CONTROL: NORMAL
Lab: NORMAL
Lab: NORMAL
S PYO AG THROAT QL: NEGATIVE

## 2020-02-08 PROCEDURE — 87804 INFLUENZA ASSAY W/OPTIC: CPT | Performed by: NURSE PRACTITIONER

## 2020-02-08 PROCEDURE — 99214 OFFICE O/P EST MOD 30 MIN: CPT | Performed by: NURSE PRACTITIONER

## 2020-02-08 PROCEDURE — 87880 STREP A ASSAY W/OPTIC: CPT | Performed by: NURSE PRACTITIONER

## 2020-02-08 RX ORDER — OFLOXACIN 3 MG/ML
5 SOLUTION AURICULAR (OTIC) 2 TIMES DAILY
Qty: 5 ML | Refills: 0 | Status: SHIPPED | OUTPATIENT
Start: 2020-02-08 | End: 2020-02-15

## 2020-02-08 RX ORDER — CEFDINIR 300 MG/1
300 CAPSULE ORAL 2 TIMES DAILY
Qty: 20 CAPSULE | Refills: 0 | Status: SHIPPED | OUTPATIENT
Start: 2020-02-08 | End: 2020-02-18

## 2020-02-08 RX ORDER — OSELTAMIVIR PHOSPHATE 75 MG/1
75 CAPSULE ORAL DAILY
Qty: 10 CAPSULE | Refills: 0 | Status: SHIPPED | OUTPATIENT
Start: 2020-02-08 | End: 2020-02-18

## 2020-02-08 RX ORDER — FLUCONAZOLE 150 MG/1
150 TABLET ORAL DAILY
Qty: 2 TABLET | Refills: 0 | Status: SHIPPED | OUTPATIENT
Start: 2020-02-08 | End: 2020-03-06

## 2020-02-08 NOTE — PROGRESS NOTES
Nivia Bernstein is a 45 y/o female who presents to the clinic today c/o upper respiratory symptoms which started yesterday and rapidly worsening. Associated symptoms include congestion, coughing, ear pain, headaches, nausea, rhinorrhea and a sore throat. Pertinent negatives include no chest pain, sinus pain, sneezing, swollen glands, vomiting or wheezing. She has tried nothing for the symptoms. She does have Diabetes which is treated with an insulin pump and CGM. She reports her blood sugars have continued to be close to target.      URI    The current episode started yesterday. The problem has been rapidly worsening. Maximum temperature: Unsure. Associated symptoms include congestion, coughing, ear pain, headaches, nausea, rhinorrhea and a sore throat. Pertinent negatives include no chest pain, sinus pain, sneezing, swollen glands, vomiting or wheezing. She has tried nothing for the symptoms.        The following portions of the patient's history were reviewed and updated as appropriate: allergies, current medications, past family history, past medical history, past social history, past surgical history and problem list.    Current Outpatient Medications:   •  Albuterol Sulfate (VENTOLIN HFA IN), Inhale., Disp: , Rfl:   •  amphetamine-dextroamphetamine (ADDERALL) 10 MG tablet, Take 1 tablet by mouth Daily., Disp: 30 tablet, Rfl: 0  •  aspirin  MG tablet, , Disp: , Rfl: 5  •  fluticasone (FLONASE) 50 MCG/ACT nasal spray, 1 spray into the nostril(s) as directed by provider 2 (Two) Times a Day., Disp: 1 bottle, Rfl: 3  •  GLOBAL EASE INJECT PEN NEEDLES 31G X 8 MM misc, USE AS DIRECTED TO INJECT VICTOZA AND BASAGLAR DAILY, Disp: , Rfl: 10  •  glucagon (GLUCAGEN) 1 MG injection, Inject 1 mg under the skin into the appropriate area as directed 1 (One) Time As Needed for Low Blood Sugar for up to 1 dose., Disp: 1 kit, Rfl: 3  •  glucose blood test strip, Use tid, Disp: 100 each, Rfl: 12  •  HUMALOG KWIKPEN 100  UNIT/ML solution pen-injector, Inject 45 Units under the skin into the appropriate area as directed 3 (Three) Times a Day As Needed (For elevated blood sugars)., Disp: 15 pen, Rfl: 3  •  insulin lispro (HUMALOG) 100 UNIT/ML injection, Per insulin pump. Maximum insulin dose 300 units daily, Disp: 6 each, Rfl: 12  •  metFORMIN (GLUCOPHAGE) 1000 MG tablet, Take 1 tablet by mouth 2 (Two) Times a Day With Meals., Disp: 60 tablet, Rfl: 5  •  nitrofurantoin, macrocrystal-monohydrate, (MACROBID) 100 MG capsule, Take 1 capsule by mouth Daily., Disp: 56 capsule, Rfl: 3  •  nystatin (MYCOSTATIN) 458994 UNIT/GM powder, Apply  topically to the appropriate area as directed 3 (Three) Times a Day., Disp: 60 g, Rfl: 1  •  omeprazole (priLOSEC) 20 MG capsule, , Disp: , Rfl: 5  •  ondansetron (ZOFRAN) 4 MG tablet, Take 1 tablet by mouth Every 8 (Eight) Hours As Needed for Nausea or Vomiting., Disp: 39 tablet, Rfl: 1  •  OZEMPIC, 1 MG/DOSE, 2 MG/1.5ML solution pen-injector, , Disp: , Rfl:   •  rosuvastatin (CRESTOR) 40 MG tablet, Take 1 tablet by mouth Every Night., Disp: 30 tablet, Rfl: 5  •  TRUEPLUS LANCETS 33G misc, Apply 1 each topically to the appropriate area as directed 3 (Three) Times a Day. Test blood glucose, Disp: , Rfl: 0  •  vitamin D (ERGOCALCIFEROL) 32227 units capsule capsule, Take 1 capsule by mouth 1 (One) Time Per Week., Disp: 12 capsule, Rfl: 0  •  cefdinir (OMNICEF) 300 MG capsule, Take 1 capsule by mouth 2 (Two) Times a Day for 10 days., Disp: 20 capsule, Rfl: 0  •  fluconazole (DIFLUCAN) 150 MG tablet, Take 1 tablet by mouth Daily., Disp: 2 tablet, Rfl: 0  •  ofloxacin (FLOXIN OTIC) 0.3 % otic solution, Administer 5 drops to the right ear 2 (Two) Times a Day for 7 days., Disp: 5 mL, Rfl: 0  •  oseltamivir (TAMIFLU) 75 MG capsule, Take 1 capsule by mouth Daily for 10 days., Disp: 10 capsule, Rfl: 0    Allergies   Allergen Reactions   • Mobic [Meloxicam] Rash   • Novolog [Insulin Aspart] Hives   • Penicillins  "Angioedema       Review of Systems   Constitutional: Positive for activity change, appetite change and fatigue.   HENT: Positive for congestion, ear pain, postnasal drip, rhinorrhea and sore throat. Negative for sinus pressure, sinus pain, sneezing and trouble swallowing.    Eyes: Negative for discharge and redness.   Respiratory: Positive for cough and chest tightness. Negative for shortness of breath and wheezing.    Cardiovascular: Negative for chest pain.   Gastrointestinal: Positive for nausea. Negative for vomiting.   Musculoskeletal: Positive for myalgias.   Neurological: Positive for headaches.   Hematological: Negative for adenopathy.   All other systems reviewed and are negative.    Visit Vitals  /68   Pulse 102   Temp 98.6 °F (37 °C) (Temporal)   Resp 14   Ht 162.6 cm (64\")   Wt 105 kg (231 lb)   SpO2 98%   BMI 39.65 kg/m²     Physical Exam   Constitutional: She is oriented to person, place, and time. She appears well-developed and well-nourished. No distress.   HENT:   Head: Normocephalic.   Right Ear: Ear canal normal. There is swelling and tenderness. There is mastoid tenderness. Tympanic membrane is bulging. Tympanic membrane is not erythematous. A middle ear effusion is present.   Left Ear: Ear canal normal. No swelling or tenderness. No mastoid tenderness. Tympanic membrane is not bulging. A middle ear effusion is present.   Nose: Nose normal.   Mouth/Throat: Oropharynx is clear and moist and mucous membranes are normal. No oropharyngeal exudate.   Eyes: Pupils are equal, round, and reactive to light. Conjunctivae are normal. Right eye exhibits no discharge. Left eye exhibits no discharge. No scleral icterus.   Neck: Neck supple.   Cardiovascular: Normal rate, regular rhythm and normal heart sounds. Exam reveals no friction rub.   No murmur heard.  Pulmonary/Chest: Effort normal and breath sounds normal. No respiratory distress. She has no decreased breath sounds. She has no wheezes. She has " no rhonchi. She has no rales.   Musculoskeletal: She exhibits no edema.   Lymphadenopathy:        Head (right side): No tonsillar adenopathy present.        Head (left side): No tonsillar adenopathy present.     She has no cervical adenopathy.   Neurological: She is alert and oriented to person, place, and time.   Skin: Skin is warm and dry. Capillary refill takes less than 2 seconds. No rash noted. No erythema.   Psychiatric: She has a normal mood and affect. Her behavior is normal. Judgment and thought content normal.   Nursing note and vitals reviewed.      Lab Results (last 24 hours)     Procedure Component Value Units Date/Time    POCT rapid strep A [[id number]] Collected:  02/08/20 1021    Specimen:  Swab Updated:  02/08/20 1021     Rapid Strep A Screen Negative     Internal Control Passed     Lot Number [id number]     Expiration Date 02/28/2021    POCT Influenza A/B [[id number]] Collected:  02/08/20 1021    Specimen:  Swab Updated:  02/08/20 1022     Rapid Influenza A Ag Negative     Rapid Influenza B Ag Negative     Internal Control Passed     Lot Number [id number]     Expiration Date 09/30/2020        Assessment/Plan   Diagnoses and all orders for this visit:    Acute suppurative otitis media of right ear without spontaneous rupture of tympanic membrane, recurrence not specified  Comments:  Findings and recommendations discussed with Nivia. Treatment options reviewed.   Orders:  -     POCT Influenza A/B    DM type 2 with diabetic peripheral neuropathy (CMS/HCC)  Comments:  Diabetes sick day management reviewed.     Upper respiratory tract infection, unspecified type  Comments:  Reviewed results of her Influenza A&B and Strep tests which were negative.  Orders:  -     POCT rapid strep A  -     POCT Influenza A/B    Sore throat  Comments:  Reviewed results of her Strep test which was negative.  Orders:  -     POCT rapid strep A    Exposure to influenza  Comments:  Tamiflu daily prescribed.  Orders:  -      oseltamivir (TAMIFLU) 75 MG capsule; Take 1 capsule by mouth Daily for 10 days.    Other orders  -     cefdinir (OMNICEF) 300 MG capsule; Take 1 capsule by mouth 2 (Two) Times a Day for 10 days.  -     ofloxacin (FLOXIN OTIC) 0.3 % otic solution; Administer 5 drops to the right ear 2 (Two) Times a Day for 7 days.  -     fluconazole (DIFLUCAN) 150 MG tablet; Take 1 tablet by mouth Daily.      Findings and recommendations discussed with Nivia. Reviewed results of her Influenza A&B and Strep tests which were negative. Treatment options reviewed. Counseled regarding supportive care measures. Diabetes sick day management reviewed.  Encouraged her to seek further medical evaluation if symptoms worsen or do not improve within 48-72 hours.          This document has been electronically signed by NIDA Starr, RADHA, DOUG  February 8, 2020 3:16 PM

## 2020-02-08 NOTE — PATIENT INSTRUCTIONS
Otitis Media, Adult    Otitis media means that the middle ear is red and swollen (inflamed) and full of fluid. The condition usually goes away on its own.  Follow these instructions at home:  · Take over-the-counter and prescription medicines only as told by your doctor.  · If you were prescribed an antibiotic medicine, take it as told by your doctor. Do not stop taking the antibiotic even if you start to feel better.  · Keep all follow-up visits as told by your doctor. This is important.  Contact a doctor if:  · You have bleeding from your nose.  · There is a lump on your neck.  · You are not getting better in 5 days.  · You feel worse instead of better.  Get help right away if:  · You have pain that is not helped with medicine.  · You have swelling, redness, or pain around your ear.  · You get a stiff neck.  · You cannot move part of your face (paralyzed).  · You notice that the bone behind your ear hurts when you touch it.  · You get a very bad headache.  Summary  · Otitis media means that the middle ear is red, swollen, and full of fluid.  · This condition usually goes away on its own. In some cases, treatment may be needed.  · If you were prescribed an antibiotic medicine, take it as told by your doctor.  This information is not intended to replace advice given to you by your health care provider. Make sure you discuss any questions you have with your health care provider.  Document Released: 06/05/2009 Document Revised: 01/08/2018 Document Reviewed: 01/08/2018  "[employer] Interactive Patient Education © 2019 "[employer] Inc.  Diabetes Mellitus and Sick Day Management  Blood sugar (glucose) can be difficult to control when you are sick. Common illnesses that can cause problems for people with diabetes (diabetes mellitus) include colds, fever, flu (influenza), nausea, vomiting, and diarrhea. These illnesses can cause stress and loss of body fluids (dehydration), and those issues can cause blood glucose levels to  increase. Because of this, it is very important to take your insulin and diabetes medicines and eat some form of carbohydrate when you are sick.  You should make a plan for days when you are sick (sick day plan) as part of your diabetes management plan. You and your health care provider should make this plan in advance. The following guidelines are intended to help you manage an illness that lasts for about 24 hours or less. Your health care provider may also give you more specific instructions.  What do I need to do to manage my blood glucose?    · Check your blood glucose every 2-4 hours, or as often as told by your health care provider.  · Know your sick day treatment goals. Your target blood glucose levels may be different when you are sick.  · If you use insulin, take your usual dose.  ? If your blood glucose continues to be too high, you may need to take an additional insulin dose as told by your health care provider.  · If you use oral diabetes medicine, you may need to stop taking it if you are not able to eat or drink normally. Ask your health care provider about whether you need to stop taking these medicines while you are sick.  · If you use injectable hormone medicines other than insulin to control your diabetes, ask your health care provider about whether you need to stop taking these medicines while you are sick.  What else can I do to manage my diabetes when I am sick?  Check your ketones  · If you have type 1 diabetes, check your urine ketones every 4 hours.  · If you have type 2 diabetes, check your urine ketones as often as told by your health care provider.  Drink fluids  · Drink enough fluid to keep your urine clear or pale yellow. This is especially important if you have a fever, vomiting, or diarrhea. Those symptoms can lead to dehydration.  · Follow any instructions from your health care provider about beverages to avoid.  ? Do not drink alcohol, caffeine, or drinks that contain a lot of  sugar.  Take medicines as directed  · Take-over-the-counter and prescription medicines only as told by your health care provider.  · Check medicine labels for added sugars. Some medicines may contain sugar or types of sugars that can raise your blood glucose level.  What foods can I eat when I am sick?    You need to eat some form of carbohydrates when you are sick. You should eat 45-50 grams (45-50 g) of carbohydrates every 3-4 hours until you feel better.  All of the food choices below contain about 15 g of carbohydrates. Plan ahead and keep some of these foods around so you have them if you get sick.  · 4-6 oz (120-177 mL) carbonated beverage that contains sugar, such as regular (not diet) soda. You may be able to drink carbonated beverages more easily if you open the beverage and let it sit at room temperature for a few minutes before drinking.  · ½ of a twin frozen ice pop.  · 4 oz (120 g) regular gelatin.  · 4 oz (120 mL) fruit juice.  · 4 oz (120 g) ice cream or frozen yogurt.  · 2 oz (60 g) sherbet.  · 8 oz (240 mL) clear broth or soup.  · 4 oz (120 g) regular custard.  · 4 oz (120 g) regular pudding.  · 8 oz (240 g) plain yogurt.  · 1 slice bread or toast.  · 6 saltine crackers.  · 5 vanilla wafers.  Questions to ask your health care provider  Consider asking the following questions so you know what to do on days when you are sick:  · Should I adjust my diabetes medicines?  · How often do I need to check my blood glucose?  · What supplies do I need to manage my diabetes at home when I am sick?  · What number can I call if I have questions?  · What foods and drinks should I avoid?  Contact a health care provider if:  · You develop symptoms of diabetic ketoacidosis, such as:  ? Fatigue.  ? Weight loss.  ? Excessive thirst.  ? Light-headedness.  ? Fruity or sweet-smelling breath.  ? Excessive urination.  ? Vision changes.  ? Confusion or irritability.  ? Nausea.  ? Vomiting.  ? Rapid breathing.  ? Pain in the  abdomen.  ? Feeling flushed.  · You are unable to drink fluids without vomiting.  · You have any of the following for more than 6 hours:  ? Nausea.  ? Vomiting.  ? Diarrhea.  · Your blood glucose is at or above 240 mg/dL (13.3 mmol/L), even after you take an additional insulin dose.  · You have a change in how you think, feel, or act (mental status).  · You develop another serious illness.  · You have been sick or have had a fever for 2 days or longer and you are not getting better.  Get help right away if:  · Your blood glucose is lower than 54 mg/dL (3.0 mmol/L).  · You have difficulty breathing.  · You have moderate or high ketone levels in your urine.  · You used emergency glucagon to treat low blood glucose.  Summary  · Blood sugar (glucose) can be difficult to control when you are sick. Common illnesses that can cause problems for people with diabetes (diabetes mellitus) include colds, fever, flu (influenza), nausea, vomiting, and diarrhea.  · Illnesses can cause stress and loss of body fluids (dehydration), and those issues can cause blood glucose levels to increase.  · Make a plan for days when you are sick (sick day plan) as part of your diabetes management plan. You and your health care provider should make this plan in advance.  · It is very important to take your insulin and diabetes medicines and to eat some form of carbohydrate when you are sick.  · Contact your health care provider if have problems managing your blood glucose levels when you are sick, or if you have been sick or had a fever for 2 days or longer and are not getting better.  This information is not intended to replace advice given to you by your health care provider. Make sure you discuss any questions you have with your health care provider.  Document Released: 12/20/2004 Document Revised: 09/15/2017 Document Reviewed: 09/15/2017  [employer] Interactive Patient Education © 2019 [employer]

## 2020-02-17 ENCOUNTER — OFFICE VISIT (OUTPATIENT)
Dept: PSYCHIATRY | Facility: CLINIC | Age: 45
End: 2020-02-17

## 2020-02-17 VITALS
OXYGEN SATURATION: 97 % | WEIGHT: 230 LBS | BODY MASS INDEX: 39.27 KG/M2 | DIASTOLIC BLOOD PRESSURE: 80 MMHG | HEART RATE: 92 BPM | SYSTOLIC BLOOD PRESSURE: 102 MMHG | HEIGHT: 64 IN

## 2020-02-17 DIAGNOSIS — F33.3 SEVERE EPISODE OF RECURRENT MAJOR DEPRESSIVE DISORDER, WITH PSYCHOTIC FEATURES (HCC): ICD-10-CM

## 2020-02-17 PROCEDURE — 99213 OFFICE O/P EST LOW 20 MIN: CPT | Performed by: NURSE PRACTITIONER

## 2020-02-17 RX ORDER — DEXTROAMPHETAMINE SACCHARATE, AMPHETAMINE ASPARTATE, DEXTROAMPHETAMINE SULFATE AND AMPHETAMINE SULFATE 2.5; 2.5; 2.5; 2.5 MG/1; MG/1; MG/1; MG/1
TABLET ORAL
Qty: 45 TABLET | Refills: 0 | Status: SHIPPED | OUTPATIENT
Start: 2020-02-17 | End: 2020-03-12 | Stop reason: SDUPTHER

## 2020-02-17 NOTE — PROGRESS NOTES
Subjective   Nivia Bernstein is a 44 y.o. female is here today for medication management follow-up.    Chief Complaint:  depression    History of Present Illness:    Patient presents today for follow up for medication management for depression and anxiety. Patient states she notices some difference in the morning after she takes the medication. Patient states still having some nervousness and irritable. Patient states the medication is not making the anxiety any worse. Patient states it takes about an hour after she takes it then her mood levels out. Patient states it is wearing off in the afternoon though and by the evening she is back to being irritable and hateful. Patient reports currently depression is at a 7 on a 0-10 scale with 10 being the worst with the medication. Patient states after medicine wears off she the depression gets worse. Patient reports symptoms of depression of crying spells, irritability, decreased energy, and depressed mood. Patient reports anxiety is at a 10 on a 0-10 scale with 10 being the worst. Patient states the smallest thing can set her off. Patient denies any panic attacks. Patient states sleep is all over the place due to mind doesn't want to shut down. Patient reports sleeping at least 4-5 hours a night and patient reports having some nightmares that are from past. Patient reports appetite is decreased some and eating at least 2-3 meals a day. Patient denies any auditory or visual hallucinations. Patient adamantly denies any SI or HI. Patient denies any side effects to medications. Patient denies any medical changes since last visit.   The following portions of the patient's history were reviewed and updated as appropriate: allergies, current medications, past family history, past medical history, past social history, past surgical history and problem list.    Review of Systems   Constitutional: Positive for appetite change.   Respiratory: Negative.    Cardiovascular:  "Negative.    Gastrointestinal: Negative.    Neurological: Negative.    Psychiatric/Behavioral: Positive for agitation, dysphoric mood and sleep disturbance. The patient is nervous/anxious.        Objective   Physical Exam   Constitutional: Vital signs are normal. She appears well-developed and well-nourished. She is cooperative.   Neurological: She is alert.   Psychiatric: Her speech is normal and behavior is normal. Judgment and thought content normal. Her mood appears anxious. Cognition and memory are normal.   Vitals reviewed.    Blood pressure 102/80, pulse 92, height 162.6 cm (64\"), weight 104 kg (230 lb), SpO2 97 %, not currently breastfeeding. Body mass index is 39.48 kg/m².    Allergies   Allergen Reactions   • Mobic [Meloxicam] Rash   • Novolog [Insulin Aspart] Hives   • Penicillins Angioedema       Medication List:   Current Outpatient Medications   Medication Sig Dispense Refill   • Albuterol Sulfate (VENTOLIN HFA IN) Inhale.     • amphetamine-dextroamphetamine (ADDERALL) 10 MG tablet Take 1 tablet by mouth Daily. 30 tablet 0   • aspirin  MG tablet   5   • cefdinir (OMNICEF) 300 MG capsule Take 1 capsule by mouth 2 (Two) Times a Day for 10 days. 20 capsule 0   • fluconazole (DIFLUCAN) 150 MG tablet Take 1 tablet by mouth Daily. 2 tablet 0   • fluticasone (FLONASE) 50 MCG/ACT nasal spray 1 spray into the nostril(s) as directed by provider 2 (Two) Times a Day. 1 bottle 3   • GLOBAL EASE INJECT PEN NEEDLES 31G X 8 MM misc USE AS DIRECTED TO INJECT VICTOZA AND BASAGLAR DAILY  10   • glucagon (GLUCAGEN) 1 MG injection Inject 1 mg under the skin into the appropriate area as directed 1 (One) Time As Needed for Low Blood Sugar for up to 1 dose. 1 kit 3   • glucose blood test strip Use tid 100 each 12   • HUMALOG KWIKPEN 100 UNIT/ML solution pen-injector Inject 45 Units under the skin into the appropriate area as directed 3 (Three) Times a Day As Needed (For elevated blood sugars). 15 pen 3   • insulin " lispro (HUMALOG) 100 UNIT/ML injection Per insulin pump. Maximum insulin dose 300 units daily 6 each 12   • metFORMIN (GLUCOPHAGE) 1000 MG tablet Take 1 tablet by mouth 2 (Two) Times a Day With Meals. 60 tablet 5   • nitrofurantoin, macrocrystal-monohydrate, (MACROBID) 100 MG capsule Take 1 capsule by mouth Daily. 56 capsule 3   • nystatin (MYCOSTATIN) 847179 UNIT/GM powder Apply  topically to the appropriate area as directed 3 (Three) Times a Day. 60 g 1   • omeprazole (priLOSEC) 20 MG capsule   5   • ondansetron (ZOFRAN) 4 MG tablet Take 1 tablet by mouth Every 8 (Eight) Hours As Needed for Nausea or Vomiting. 39 tablet 1   • oseltamivir (TAMIFLU) 75 MG capsule Take 1 capsule by mouth Daily for 10 days. 10 capsule 0   • OZEMPIC, 1 MG/DOSE, 2 MG/1.5ML solution pen-injector      • rosuvastatin (CRESTOR) 40 MG tablet Take 1 tablet by mouth Every Night. 30 tablet 5   • TRUEPLUS LANCETS 33G misc Apply 1 each topically to the appropriate area as directed 3 (Three) Times a Day. Test blood glucose  0   • vitamin D (ERGOCALCIFEROL) 38056 units capsule capsule Take 1 capsule by mouth 1 (One) Time Per Week. 12 capsule 0     No current facility-administered medications for this visit.        Mental Status Exam:   Hygiene:   good  Cooperation:  Cooperative  Eye Contact:  Good  Psychomotor Behavior:  Appropriate  Affect:  Appropriate  Hopelessness: Denies  Speech:  Normal  Thought Process:  Goal directed and Linear  Thought Content:  Normal  Suicidal:  None  Homicidal:  None  Hallucinations:  None  Delusion:  None  Memory:  Intact  Orientation:  Person, Place, Time and Situation  Reliability:  fair  Insight:  Fair  Judgement:  Fair  Impulse Control:  Fair  Physical/Medical Issues:  No               Assessment/Plan   Diagnoses and all orders for this visit:    Severe episode of recurrent major depressive disorder, with psychotic features (CMS/HCC)  -     amphetamine-dextroamphetamine (ADDERALL) 10 MG tablet; Take 1 tablet by  mouth Every Morning AND 0.5 tablets Daily With Lunch. Second dose no later than 3pm.            Discussed medication options with patient. Increase Adderall to 10mg in am and 5mg in the afternoon for worsening depression. Reviewed the risks, benefits, and side effects of the medications; patient and guardian acknowledged and verbally consented. Patient was provided education regarding treatment and treatment options.  Extensive education is provided regarding stimulants. Cardiac risk, risk of growth delay, weight loss, and cardiac issues. Patient is being prescribed a controlled substance as part of treatment plan. [facility] report reviewed #[id number].    Patient is agreeable to call the office with any questions, concerns, or worsening of symptoms. Patient is aware to call 911 or go to the nearest ER should begin having SI/HI.          Follow up in three weeks        Errors in dictation may reflect use of voice recognition software and not all errors in transcription may have been detected prior to signing.              This document has been electronically signed by NIDA Vasquez   February 17, 2020 8:12 AM

## 2020-03-02 ENCOUNTER — APPOINTMENT (OUTPATIENT)
Dept: MAMMOGRAPHY | Facility: HOSPITAL | Age: 45
End: 2020-03-02

## 2020-03-02 ENCOUNTER — APPOINTMENT (OUTPATIENT)
Dept: BONE DENSITY | Facility: HOSPITAL | Age: 45
End: 2020-03-02

## 2020-03-06 ENCOUNTER — OFFICE VISIT (OUTPATIENT)
Dept: FAMILY MEDICINE CLINIC | Facility: CLINIC | Age: 45
End: 2020-03-06

## 2020-03-06 VITALS
DIASTOLIC BLOOD PRESSURE: 70 MMHG | HEIGHT: 64 IN | TEMPERATURE: 97.8 F | WEIGHT: 234 LBS | BODY MASS INDEX: 39.95 KG/M2 | HEART RATE: 96 BPM | OXYGEN SATURATION: 97 % | SYSTOLIC BLOOD PRESSURE: 120 MMHG

## 2020-03-06 DIAGNOSIS — N95.1 MENOPAUSAL SYMPTOMS: ICD-10-CM

## 2020-03-06 DIAGNOSIS — E11.42 DM TYPE 2 WITH DIABETIC PERIPHERAL NEUROPATHY (HCC): Primary | Chronic | ICD-10-CM

## 2020-03-06 DIAGNOSIS — J45.20 MILD INTERMITTENT ASTHMA WITHOUT COMPLICATION: Chronic | ICD-10-CM

## 2020-03-06 DIAGNOSIS — R05.9 COUGH: ICD-10-CM

## 2020-03-06 DIAGNOSIS — J30.9 CHRONIC ALLERGIC RHINITIS: Chronic | ICD-10-CM

## 2020-03-06 DIAGNOSIS — M25.50 ARTHRALGIA, UNSPECIFIED JOINT: Chronic | ICD-10-CM

## 2020-03-06 DIAGNOSIS — E78.2 MIXED HYPERLIPIDEMIA: Chronic | ICD-10-CM

## 2020-03-06 DIAGNOSIS — R60.1 GENERALIZED EDEMA: ICD-10-CM

## 2020-03-06 DIAGNOSIS — E66.01 CLASS 3 SEVERE OBESITY WITH SERIOUS COMORBIDITY AND BODY MASS INDEX (BMI) OF 40.0 TO 44.9 IN ADULT, UNSPECIFIED OBESITY TYPE (HCC): ICD-10-CM

## 2020-03-06 PROCEDURE — 99214 OFFICE O/P EST MOD 30 MIN: CPT | Performed by: NURSE PRACTITIONER

## 2020-03-06 RX ORDER — BENZONATATE 200 MG/1
200 CAPSULE ORAL 3 TIMES DAILY PRN
Qty: 30 CAPSULE | Refills: 1 | Status: SHIPPED | OUTPATIENT
Start: 2020-03-06 | End: 2020-06-30

## 2020-03-06 RX ORDER — MONTELUKAST SODIUM 10 MG/1
10 TABLET ORAL NIGHTLY
Qty: 30 TABLET | Refills: 5 | Status: SHIPPED | OUTPATIENT
Start: 2020-03-06 | End: 2020-04-05

## 2020-03-06 RX ORDER — CETIRIZINE HYDROCHLORIDE 5 MG/1
5 TABLET ORAL NIGHTLY PRN
Qty: 30 TABLET | Refills: 1 | Status: SHIPPED | OUTPATIENT
Start: 2020-03-06 | End: 2020-03-07

## 2020-03-06 NOTE — PROGRESS NOTES
"  History of Present Illness   Nivia is a 43 y/o female who presents to the clinic today for follow up pertaining to her DM, type 2,  Dyslipidemia, GERD, Asthma and Obesity. She has been hospitalized for DVTs and TIAs in th past.  She has decided she would like to have Bariatric Surgery since she has tried \"everything she knows to do to lose weight\" without success. She feels this would improve her quality of life and she would be able to \"come off multiple medications.\"  We will spend time today reviewing her progress from her previous visits    Diabetes   She presents for follow up diabetes visit. She has type 2 diabetes mellitus. The initial diagnosis of diabetes was made 20 years ago. Her disease course has been worsening.  Associated symptoms include blurred vision, fatigue, peripheral neuropathy, and visual change. Pertinent negatives for diabetes include no foot ulcerations. Diabetic complications include peripheral neuropathy.  Risk factors for coronary artery disease include post-menopausal, stress and dyslipidemia. She is currently utilizing insulin pump therapy and CGM which had greatly improved her glycemic management. She did stop weekly Ozempic due to nausea.  She is following a generally healthy diet. She has had a previous visit with a dietitian. She participates in exercise at this time but finding it more difficult due to her Neuropathy. She continues to try to walk on a regular basis. Her home blood glucose trend is increasing.  An ACE inhibitor/angiotensin II receptor blocker is not  being taken at this time. Eye exam is current.      Lab Results   Component Value Date    HGBA1C 7.50 (H) 12/13/2019      Dyslipidemia   The current episode started more than 1 year ago. Pertinent negatives include no chest pain or shortness of breath. She is taking Atorvastatin 20 mg.   Lab Results   Component Value Date    CHLPL 176 12/13/2019    TRIG 186 (H) 12/13/2019    HDL 37 (L) 12/13/2019     (H) " "12/13/2019     Obesity   The current episode started more than 1 year ago. The problem has been waxing and waning. Associated symptoms include arthralgias, fatigue, headaches and weakness. Pertinent negatives include no chest pain, chills, coughing, fever, nausea, numbness, rash or vomiting. She has tried \"Everything\" for the symptoms.   Lab Results   Component Value Date    TSH 2.020 12/13/2019     Vitals:    03/06/20 0928   BP: 120/70   Pulse: 96   Temp: 97.8 °F (36.6 °C)   TempSrc: Temporal   SpO2: 97%   Weight: 106 kg (234 lb)   Height: 162.6 cm (64\")        The following portions of the patient's history were reviewed and updated as appropriate: allergies, current medications, past family history, past medical history, past social history, past surgical history and problem list.    Review of Systems   Constitutional: Positive for activity change, appetite change and fatigue. Negative for chills, fever and unexpected weight change.   HENT: Positive for ear pain. Negative for ear discharge, hearing loss, sinus pressure, sinus pain, tinnitus and trouble swallowing.    Eyes: Positive for visual disturbance.   Respiratory: Positive for cough. Negative for chest tightness, shortness of breath and wheezing.    Cardiovascular: Positive for leg swelling. Negative for chest pain and palpitations.   Gastrointestinal: Positive for nausea. Negative for diarrhea and vomiting.   Endocrine: Positive for polydipsia. Negative for cold intolerance, heat intolerance, polyphagia and polyuria.   Musculoskeletal: Positive for arthralgias, gait problem, joint swelling and myalgias.   Skin: Negative for color change and rash.   Neurological: Positive for headaches. Negative for dizziness, tremors, speech difficulty, weakness and light-headedness.   Hematological: Bruises/bleeds easily.   Psychiatric/Behavioral: Positive for confusion and sleep disturbance. Negative for decreased concentration and suicidal ideas. The patient is not " nervous/anxious.    All other systems reviewed and are negative.    Physical Exam   Constitutional: She is oriented to person, place, and time. She appears well-developed and well-nourished. No distress.   HENT:   Head: Normocephalic.   Nose: Nose normal.   Mouth/Throat: Oropharynx is clear and moist. No oropharyngeal exudate.   Eyes: Pupils are equal, round, and reactive to light. Conjunctivae are normal. Right eye exhibits no discharge. Left eye exhibits no discharge. No scleral icterus.   Neck: Neck supple. No JVD present. No thyromegaly present.   Cardiovascular: Normal rate, regular rhythm and normal heart sounds. Exam reveals no friction rub.   No murmur heard.  Pulmonary/Chest: Effort normal and breath sounds normal. No respiratory distress. She has no wheezes. She has no rales.   Abdominal: Soft. Bowel sounds are normal. There is no tenderness. There is no guarding.   Musculoskeletal: She exhibits edema (Hands and Lower Extremities) and tenderness (Generalized).   Lymphadenopathy:     She has no cervical adenopathy.   Neurological: She is alert and oriented to person, place, and time.   Skin: Skin is warm and dry. Capillary refill takes less than 2 seconds. No rash noted. No erythema.   Psychiatric: Her speech is normal and behavior is normal. Judgment and thought content normal. Her mood appears anxious.   Nursing note and vitals reviewed.    Assessment/Plan     Patient's Body mass index is 40.17 kg/m². BMI is above normal parameters. Recommendations include: educational material, exercise counseling and nutrition counseling.   (Normal BMI:  18.5-24.9, OW 25-29.9, Obesity 30 or greater)    Problems Addressed this Visit        Cardiovascular and Mediastinum    Mixed hyperlipidemia       Respiratory    Mild intermittent asthma without complication    Relevant Medications    montelukast (SINGULAIR) 10 MG tablet    Chronic allergic rhinitis    Relevant Medications    cetirizine (zyrTEC) 5 MG tablet        Digestive    Class 3 severe obesity with serious comorbidity and body mass index (BMI) of 40.0 to 44.9 in adult (CMS/[facility])       Endocrine    DM type 2 with diabetic peripheral neuropathy (CMS/[facility]) - Primary       Genitourinary    Menopausal symptoms      Other Visit Diagnoses     Cough        Tessalon Perles prescribed    Relevant Medications    benzonatate (TESSALON) 200 MG capsule    Arthralgia, unspecified joint  (Chronic)       Labs ordered    Relevant Orders    CBC & Differential    Comprehensive Metabolic Panel    Sedimentation Rate    C-reactive Protein    Rheumatoid Factor    RANJIT    CK    Generalized edema        Labs ordered        Findings and recommendations discussed with Nivia. Her insulin pump was uploaded, reviewed and interpreted. Noted she has not been doing her Carb bolus which she reports she forgets.   Will restart Singulair for her Asthma.   Low dose antihistamine and continue Flonase. Avoidance of irritants for Chronic AR.   Labs ordered for her joint pain and edema.   Discussed nonpharmacologic treatments for her Menopausal symptoms. May consider Lexapro or Wellbutrin for her symptoms but would want to discuss with NIDA Vasquez.   She has received a packet from the [facility] and will complete Lifestyle modifications including nutrition and exercise recommendations discussed.   She will f/u with me on March 18 th ; sooner if problems/concerns occur.     This document has been electronically signed by NIDA Starr, FAY-BC, DOUG  March 7, 2020 9:09 AM

## 2020-03-06 NOTE — PATIENT INSTRUCTIONS
"Carbohydrate Counting for Diabetes Mellitus, Adult    Carbohydrate counting is a method of keeping track of how many carbohydrates you eat. Eating carbohydrates naturally increases the amount of sugar (glucose) in the blood. Counting how many carbohydrates you eat helps keep your blood glucose within normal limits, which helps you manage your diabetes (diabetes mellitus).  It is important to know how many carbohydrates you can safely have in each meal. This is different for every person. A diet and nutrition specialist (registered dietitian) can help you make a meal plan and calculate how many carbohydrates you should have at each meal and snack.  Carbohydrates are found in the following foods:  · Grains, such as breads and cereals.  · Dried beans and soy products.  · Starchy vegetables, such as potatoes, peas, and corn.  · Fruit and fruit juices.  · Milk and yogurt.  · Sweets and snack foods, such as cake, cookies, candy, chips, and soft drinks.  How do I count carbohydrates?  There are two ways to count carbohydrates in food. You can use either of the methods or a combination of both.  Reading \"Nutrition Facts\" on packaged food  The \"Nutrition Facts\" list is included on the labels of almost all packaged foods and beverages in the [place] It includes:  · The serving size.  · Information about nutrients in each serving, including the grams (g) of carbohydrate per serving.  To use the “Nutrition Facts\":  · Decide how many servings you will have.  · Multiply the number of servings by the number of carbohydrates per serving.  · The resulting number is the total amount of carbohydrates that you will be having.  Learning standard serving sizes of other foods  When you eat carbohydrate foods that are not packaged or do not include \"Nutrition Facts\" on the label, you need to measure the servings in order to count the amount of carbohydrates:  · Measure the foods that you will eat with a food scale or measuring cup, if " needed.  · Decide how many standard-size servings you will eat.  · Multiply the number of servings by 15. Most carbohydrate-rich foods have about 15 g of carbohydrates per serving.  ? For example, if you eat 8 oz (170 g) of strawberries, you will have eaten 2 servings and 30 g of carbohydrates (2 servings x 15 g = 30 g).  · For foods that have more than one food mixed, such as soups and casseroles, you must count the carbohydrates in each food that is included.  The following list contains standard serving sizes of common carbohydrate-rich foods. Each of these servings has about 15 g of carbohydrates:  · ½ hamburger bun or ½ English muffin.  · ½ oz (15 mL) syrup.  · ½ oz (14 g) jelly.  · 1 slice of bread.  · 1 six-inch tortilla.  · 3 oz (85 g) cooked rice or pasta.  · 4 oz (113 g) cooked dried beans.  · 4 oz (113 g) starchy vegetable, such as peas, corn, or potatoes.  · 4 oz (113 g) hot cereal.  · 4 oz (113 g) mashed potatoes or ¼ of a large baked potato.  · 4 oz (113 g) canned or frozen fruit.  · 4 oz (120 mL) fruit juice.  · 4-6 crackers.  · 6 chicken nuggets.  · 6 oz (170 g) unsweetened dry cereal.  · 6 oz (170 g) plain fat-free yogurt or yogurt sweetened with artificial sweeteners.  · 8 oz (240 mL) milk.  · 8 oz (170 g) fresh fruit or one small piece of fruit.  · 24 oz (680 g) popped popcorn.  Example of carbohydrate counting  Sample meal  · 3 oz (85 g) chicken breast.  · 6 oz (170 g) brown rice.  · 4 oz (113 g) corn.  · 8 oz (240 mL) milk.  · 8 oz (170 g) strawberries with sugar-free whipped topping.  Carbohydrate calculation  1. Identify the foods that contain carbohydrates:  ? Rice.  ? Corn.  ? Milk.  ? Strawberries.  2. Calculate how many servings you have of each food:  ? 2 servings rice.  ? 1 serving corn.  ? 1 serving milk.  ? 1 serving strawberries.  3. Multiply each number of servings by 15 g:  ? 2 servings rice x 15 g = 30 g.  ? 1 serving corn x 15 g = 15 g.  ? 1 serving milk x 15 g = 15 g.  ? 1  serving strawberries x 15 g = 15 g.  4. Add together all of the amounts to find the total grams of carbohydrates eaten:  ? 30 g + 15 g + 15 g + 15 g = 75 g of carbohydrates total.  Summary  · Carbohydrate counting is a method of keeping track of how many carbohydrates you eat.  · Eating carbohydrates naturally increases the amount of sugar (glucose) in the blood.  · Counting how many carbohydrates you eat helps keep your blood glucose within normal limits, which helps you manage your diabetes.  · A diet and nutrition specialist (registered dietitian) can help you make a meal plan and calculate how many carbohydrates you should have at each meal and snack.  This information is not intended to replace advice given to you by your health care provider. Make sure you discuss any questions you have with your health care provider.  Document Released: 12/18/2006 Document Revised: 01/14/2020 Document Reviewed: 05/31/2017  [employer] Patient Education © 2020 [employer]  Menopause  Menopause is the normal time of life when menstrual periods stop completely. It is usually confirmed by 12 months without a menstrual period. The transition to menopause (perimenopause) most often happens between the ages of 45 and 55. During perimenopause, hormone levels change in your body, which can cause symptoms and affect your health. Menopause may increase your risk for:  · Loss of bone (osteoporosis), which causes bone breaks (fractures).  · Depression.  · Hardening and narrowing of the arteries (atherosclerosis), which can cause heart attacks and strokes.  What are the causes?  This condition is usually caused by a natural change in hormone levels that happens as you get older. The condition may also be caused by surgery to remove both ovaries (bilateral oophorectomy).  What increases the risk?  This condition is more likely to start at an earlier age if you have certain medical conditions or treatments, including:  · A tumor  of the pituitary gland in the brain.  · A disease that affects the ovaries and hormone production.  · Radiation treatment for cancer.  · Certain cancer treatments, such as chemotherapy or hormone (anti-estrogen) therapy.  · Heavy smoking and excessive alcohol use.  · Family history of early menopause.  This condition is also more likely to develop earlier in women who are very thin.  What are the signs or symptoms?  Symptoms of this condition include:  · Hot flashes.  · Irregular menstrual periods.  · Night sweats.  · Changes in feelings about sex. This could be a decrease in sex drive or an increased comfort around your sexuality.  · Vaginal dryness and thinning of the vaginal walls. This may cause painful intercourse.  · Dryness of the skin and development of wrinkles.  · Headaches.  · Problems sleeping (insomnia).  · Mood swings or irritability.  · Memory problems.  · Weight gain.  · Hair growth on the face and chest.  · Bladder infections or problems with urinating.  How is this diagnosed?  This condition is diagnosed based on your medical history, a physical exam, your age, your menstrual history, and your symptoms. Hormone tests may also be done.  How is this treated?  In some cases, no treatment is needed. You and your health care provider should make a decision together about whether treatment is necessary. Treatment will be based on your individual condition and preferences. Treatment for this condition focuses on managing symptoms. Treatment may include:  · Menopausal hormone therapy (MHT).  · Medicines to treat specific symptoms or complications.  · Acupuncture.  · Vitamin or herbal supplements.  Before starting treatment, make sure to let your health care provider know if you have a personal or family history of:  · Heart disease.  · Breast cancer.  · Blood clots.  · Diabetes.  · Osteoporosis.  Follow these instructions at home:  Lifestyle  · Do not use any products that contain nicotine or tobacco, such  as cigarettes and e-cigarettes. If you need help quitting, ask your health care provider.  · Get at least 30 minutes of physical activity on 5 or more days each week.  · Avoid alcoholic and caffeinated beverages, as well as spicy foods. This may help prevent hot flashes.  · Get 7-8 hours of sleep each night.  · If you have hot flashes, try:  ? Dressing in layers.  ? Avoiding things that may trigger hot flashes, such as spicy food, warm places, or stress.  ? Taking slow, deep breaths when a hot flash starts.  ? Keeping a fan in your home and office.  · Find ways to manage stress, such as deep breathing, meditation, or journaling.  · Consider going to group therapy with other women who are having menopause symptoms. Ask your health care provider about recommended group therapy meetings.  Eating and drinking  · Eat a healthy, balanced diet that contains whole grains, lean protein, low-fat dairy, and plenty of fruits and vegetables.  · Your health care provider may recommend adding more soy to your diet. Foods that contain soy include tofu, tempeh, and soy milk.  · Eat plenty of foods that contain calcium and vitamin D for bone health. Items that are rich in calcium include low-fat milk, yogurt, beans, almonds, sardines, broccoli, and kale.  Medicines  · Take over-the-counter and prescription medicines only as told by your health care provider.  · Talk with your health care provider before starting any herbal supplements. If prescribed, take vitamins and supplements as told by your health care provider. These may include:  ? Calcium. Women age 51 and older should get 1,200 mg (milligrams) of calcium every day.  ? Vitamin D. Women need 600-800 International Units of vitamin D each day.  ? Vitamins B12 and B6. Aim for 50 micrograms of B12 and 1.5 mg of B6 each day.  General instructions  · Keep track of your menstrual periods, including:  ? When they occur.  ? How heavy they are and how long they last.  ? How much time  passes between periods.  · Keep track of your symptoms, noting when they start, how often you have them, and how long they last.  · Use vaginal lubricants or moisturizers to help with vaginal dryness and improve comfort during sex.  · Keep all follow-up visits as told by your health care provider. This is important. This includes any group therapy or counseling.  Contact a health care provider if:  · You are still having menstrual periods after age 55.  · You have pain during sex.  · You have not had a period for 12 months and you develop vaginal bleeding.  Get help right away if:  · You have:  ? Severe depression.  ? Excessive vaginal bleeding.  ? Pain when you urinate.  ? A fast or irregular heart beat (palpitations).  ? Severe headaches.  ? Abdomen (abdominal) pain or severe indigestion.  · You fell and you think you have a broken bone.  · You develop leg or chest pain.  · You develop vision problems.  · You feel a lump in your breast.  Summary  · Menopause is the normal time of life when menstrual periods stop completely. It is usually confirmed by 12 months without a menstrual period.  · The transition to menopause (perimenopause) most often happens between the ages of 45 and 55.  · Symptoms can be managed through medicines, lifestyle changes, and complementary therapies such as acupuncture.  · Eat a balanced diet that is rich in nutrients to promote bone health and heart health and to manage symptoms during menopause.  This information is not intended to replace advice given to you by your health care provider. Make sure you discuss any questions you have with your health care provider.  Document Released: 03/09/2005 Document Revised: 01/20/2018 Document Reviewed: 01/20/2018  [employer] Patient Education © 2020 [employer]

## 2020-03-07 ENCOUNTER — OFFICE VISIT (OUTPATIENT)
Dept: FAMILY MEDICINE CLINIC | Facility: CLINIC | Age: 45
End: 2020-03-07

## 2020-03-07 DIAGNOSIS — J30.9 CHRONIC ALLERGIC RHINITIS: Chronic | ICD-10-CM

## 2020-03-07 DIAGNOSIS — K21.9 GASTROESOPHAGEAL REFLUX DISEASE WITHOUT ESOPHAGITIS: Chronic | ICD-10-CM

## 2020-03-07 DIAGNOSIS — E78.2 MIXED HYPERLIPIDEMIA: Chronic | ICD-10-CM

## 2020-03-07 DIAGNOSIS — E66.01 CLASS 3 SEVERE OBESITY WITH SERIOUS COMORBIDITY AND BODY MASS INDEX (BMI) OF 40.0 TO 44.9 IN ADULT, UNSPECIFIED OBESITY TYPE (HCC): ICD-10-CM

## 2020-03-07 DIAGNOSIS — N95.1 MENOPAUSAL SYMPTOMS: ICD-10-CM

## 2020-03-07 DIAGNOSIS — M25.50 ARTHRALGIA, UNSPECIFIED JOINT: ICD-10-CM

## 2020-03-07 DIAGNOSIS — E11.42 DM TYPE 2 WITH DIABETIC PERIPHERAL NEUROPATHY (HCC): Primary | Chronic | ICD-10-CM

## 2020-03-07 PROBLEM — E66.813 CLASS 3 SEVERE OBESITY WITH SERIOUS COMORBIDITY AND BODY MASS INDEX (BMI) OF 40.0 TO 44.9 IN ADULT: Status: ACTIVE | Noted: 2017-03-31

## 2020-03-07 PROCEDURE — 99214 OFFICE O/P EST MOD 30 MIN: CPT | Performed by: NURSE PRACTITIONER

## 2020-03-07 RX ORDER — CETIRIZINE HYDROCHLORIDE 10 MG/1
10 TABLET ORAL DAILY PRN
Qty: 30 TABLET | Refills: 5 | Status: SHIPPED | OUTPATIENT
Start: 2020-03-07 | End: 2020-08-14

## 2020-03-07 NOTE — PATIENT INSTRUCTIONS
"Carbohydrate Counting for Diabetes Mellitus, Adult    Carbohydrate counting is a method of keeping track of how many carbohydrates you eat. Eating carbohydrates naturally increases the amount of sugar (glucose) in the blood. Counting how many carbohydrates you eat helps keep your blood glucose within normal limits, which helps you manage your diabetes (diabetes mellitus).  It is important to know how many carbohydrates you can safely have in each meal. This is different for every person. A diet and nutrition specialist (registered dietitian) can help you make a meal plan and calculate how many carbohydrates you should have at each meal and snack.  Carbohydrates are found in the following foods:  · Grains, such as breads and cereals.  · Dried beans and soy products.  · Starchy vegetables, such as potatoes, peas, and corn.  · Fruit and fruit juices.  · Milk and yogurt.  · Sweets and snack foods, such as cake, cookies, candy, chips, and soft drinks.  How do I count carbohydrates?  There are two ways to count carbohydrates in food. You can use either of the methods or a combination of both.  Reading \"Nutrition Facts\" on packaged food  The \"Nutrition Facts\" list is included on the labels of almost all packaged foods and beverages in the [place] It includes:  · The serving size.  · Information about nutrients in each serving, including the grams (g) of carbohydrate per serving.  To use the “Nutrition Facts\":  · Decide how many servings you will have.  · Multiply the number of servings by the number of carbohydrates per serving.  · The resulting number is the total amount of carbohydrates that you will be having.  Learning standard serving sizes of other foods  When you eat carbohydrate foods that are not packaged or do not include \"Nutrition Facts\" on the label, you need to measure the servings in order to count the amount of carbohydrates:  · Measure the foods that you will eat with a food scale or measuring cup, if " needed.  · Decide how many standard-size servings you will eat.  · Multiply the number of servings by 15. Most carbohydrate-rich foods have about 15 g of carbohydrates per serving.  ? For example, if you eat 8 oz (170 g) of strawberries, you will have eaten 2 servings and 30 g of carbohydrates (2 servings x 15 g = 30 g).  · For foods that have more than one food mixed, such as soups and casseroles, you must count the carbohydrates in each food that is included.  The following list contains standard serving sizes of common carbohydrate-rich foods. Each of these servings has about 15 g of carbohydrates:  · ½ hamburger bun or ½ English muffin.  · ½ oz (15 mL) syrup.  · ½ oz (14 g) jelly.  · 1 slice of bread.  · 1 six-inch tortilla.  · 3 oz (85 g) cooked rice or pasta.  · 4 oz (113 g) cooked dried beans.  · 4 oz (113 g) starchy vegetable, such as peas, corn, or potatoes.  · 4 oz (113 g) hot cereal.  · 4 oz (113 g) mashed potatoes or ¼ of a large baked potato.  · 4 oz (113 g) canned or frozen fruit.  · 4 oz (120 mL) fruit juice.  · 4-6 crackers.  · 6 chicken nuggets.  · 6 oz (170 g) unsweetened dry cereal.  · 6 oz (170 g) plain fat-free yogurt or yogurt sweetened with artificial sweeteners.  · 8 oz (240 mL) milk.  · 8 oz (170 g) fresh fruit or one small piece of fruit.  · 24 oz (680 g) popped popcorn.  Example of carbohydrate counting  Sample meal  · 3 oz (85 g) chicken breast.  · 6 oz (170 g) brown rice.  · 4 oz (113 g) corn.  · 8 oz (240 mL) milk.  · 8 oz (170 g) strawberries with sugar-free whipped topping.  Carbohydrate calculation  1. Identify the foods that contain carbohydrates:  ? Rice.  ? Corn.  ? Milk.  ? Strawberries.  2. Calculate how many servings you have of each food:  ? 2 servings rice.  ? 1 serving corn.  ? 1 serving milk.  ? 1 serving strawberries.  3. Multiply each number of servings by 15 g:  ? 2 servings rice x 15 g = 30 g.  ? 1 serving corn x 15 g = 15 g.  ? 1 serving milk x 15 g = 15 g.  ? 1  "serving strawberries x 15 g = 15 g.  4. Add together all of the amounts to find the total grams of carbohydrates eaten:  ? 30 g + 15 g + 15 g + 15 g = 75 g of carbohydrates total.  Summary  · Carbohydrate counting is a method of keeping track of how many carbohydrates you eat.  · Eating carbohydrates naturally increases the amount of sugar (glucose) in the blood.  · Counting how many carbohydrates you eat helps keep your blood glucose within normal limits, which helps you manage your diabetes.  · A diet and nutrition specialist (registered dietitian) can help you make a meal plan and calculate how many carbohydrates you should have at each meal and snack.  This information is not intended to replace advice given to you by your health care provider. Make sure you discuss any questions you have with your health care provider.  Document Released: 12/18/2006 Document Revised: 01/14/2020 Document Reviewed: 05/31/2017  [employer] Interactive Patient Education © 2020 [employer]  DASH Eating Plan  DASH stands for \"Dietary Approaches to Stop Hypertension.\" The DASH eating plan is a healthy eating plan that has been shown to reduce high blood pressure (hypertension). It may also reduce your risk for type 2 diabetes, heart disease, and stroke. The DASH eating plan may also help with weight loss.  What are tips for following this plan?    General guidelines  · Avoid eating more than 2,300 mg (milligrams) of salt (sodium) a day. If you have hypertension, you may need to reduce your sodium intake to 1,500 mg a day.  · Limit alcohol intake to no more than 1 drink a day for nonpregnant women and 2 drinks a day for men. One drink equals 12 oz of beer, 5 oz of wine, or 1½ oz of hard liquor.  · Work with your health care provider to maintain a healthy body weight or to lose weight. Ask what an ideal weight is for you.  · Get at least 30 minutes of exercise that causes your heart to beat faster (aerobic exercise) most days of the " "week. Activities may include walking, swimming, or biking.  · Work with your health care provider or diet and nutrition specialist (dietitian) to adjust your eating plan to your individual calorie needs.  Reading food labels    · Check food labels for the amount of sodium per serving. Choose foods with less than 5 percent of the Daily Value of sodium. Generally, foods with less than 300 mg of sodium per serving fit into this eating plan.  · To find whole grains, look for the word \"whole\" as the first word in the ingredient list.  Shopping  · Buy products labeled as \"low-sodium\" or \"no salt added.\"  · Buy fresh foods. Avoid canned foods and premade or frozen meals.  Cooking  · Avoid adding salt when cooking. Use salt-free seasonings or herbs instead of table salt or sea salt. Check with your health care provider or pharmacist before using salt substitutes.  · Do not barrera foods. Cook foods using healthy methods such as baking, boiling, grilling, and broiling instead.  · Cook with heart-healthy oils, such as olive, canola, soybean, or sunflower oil.  Meal planning  · Eat a balanced diet that includes:  ? 5 or more servings of fruits and vegetables each day. At each meal, try to fill half of your plate with fruits and vegetables.  ? Up to 6-8 servings of whole grains each day.  ? Less than 6 oz of lean meat, poultry, or fish each day. A 3-oz serving of meat is about the same size as a deck of cards. One egg equals 1 oz.  ? 2 servings of low-fat dairy each day.  ? A serving of nuts, seeds, or beans 5 times each week.  ? Heart-healthy fats. Healthy fats called Omega-3 fatty acids are found in foods such as flaxseeds and coldwater fish, like sardines, salmon, and mackerel.  · Limit how much you eat of the following:  ? Canned or prepackaged foods.  ? Food that is high in trans fat, such as fried foods.  ? Food that is high in saturated fat, such as fatty meat.  ? Sweets, desserts, sugary drinks, and other foods with added " sugar.  ? Full-fat dairy products.  · Do not salt foods before eating.  · Try to eat at least 2 vegetarian meals each week.  · Eat more home-cooked food and less restaurant, buffet, and fast food.  · When eating at a restaurant, ask that your food be prepared with less salt or no salt, if possible.  What foods are recommended?  The items listed may not be a complete list. Talk with your dietitian about what dietary choices are best for you.  Grains  Whole-grain or whole-wheat bread. Whole-grain or whole-wheat pasta. Brown rice. Oatmeal. Quinoa. Bulgur. Whole-grain and low-sodium cereals. Joy bread. Low-fat, low-sodium crackers. Whole-wheat flour tortillas.  Vegetables  Fresh or frozen vegetables (raw, steamed, roasted, or grilled). Low-sodium or reduced-sodium tomato and vegetable juice. Low-sodium or reduced-sodium tomato sauce and tomato paste. Low-sodium or reduced-sodium canned vegetables.  Fruits  All fresh, dried, or frozen fruit. Canned fruit in natural juice (without added sugar).  Meat and other protein foods  Skinless chicken or turkey. Ground chicken or turkey. Pork with fat trimmed off. Fish and seafood. Egg whites. Dried beans, peas, or lentils. Unsalted nuts, nut butters, and seeds. Unsalted canned beans. Lean cuts of beef with fat trimmed off. Low-sodium, lean deli meat.  Dairy  Low-fat (1%) or fat-free (skim) milk. Fat-free, low-fat, or reduced-fat cheeses. Nonfat, low-sodium ricotta or cottage cheese. Low-fat or nonfat yogurt. Low-fat, low-sodium cheese.  Fats and oils  Soft margarine without trans fats. Vegetable oil. Low-fat, reduced-fat, or light mayonnaise and salad dressings (reduced-sodium). Canola, safflower, olive, soybean, and sunflower oils. Avocado.  Seasoning and other foods  Herbs. Spices. Seasoning mixes without salt. Unsalted popcorn and pretzels. Fat-free sweets.  What foods are not recommended?  The items listed may not be a complete list. Talk with your dietitian about what  dietary choices are best for you.  Grains  Baked goods made with fat, such as croissants, muffins, or some breads. Dry pasta or rice meal packs.  Vegetables  Creamed or fried vegetables. Vegetables in a cheese sauce. Regular canned vegetables (not low-sodium or reduced-sodium). Regular canned tomato sauce and paste (not low-sodium or reduced-sodium). Regular tomato and vegetable juice (not low-sodium or reduced-sodium). Pickles. Olives.  Fruits  Canned fruit in a light or heavy syrup. Fried fruit. Fruit in cream or butter sauce.  Meat and other protein foods  Fatty cuts of meat. Ribs. Fried meat. Carroll. Sausage. Bologna and other processed lunch meats. Salami. Fatback. Hotdogs. Bratwurst. Salted nuts and seeds. Canned beans with added salt. Canned or smoked fish. Whole eggs or egg yolks. Chicken or turkey with skin.  Dairy  Whole or 2% milk, cream, and half-and-half. Whole or full-fat cream cheese. Whole-fat or sweetened yogurt. Full-fat cheese. Nondairy creamers. Whipped toppings. Processed cheese and cheese spreads.  Fats and oils  Butter. Stick margarine. Lard. Shortening. Ghee. Carroll fat. Tropical oils, such as coconut, palm kernel, or palm oil.  Seasoning and other foods  Salted popcorn and pretzels. Onion salt, garlic salt, seasoned salt, table salt, and sea salt. Worcestershire sauce. Tartar sauce. Barbecue sauce. Teriyaki sauce. Soy sauce, including reduced-sodium. Steak sauce. Canned and packaged gravies. Fish sauce. Oyster sauce. Cocktail sauce. Horseradish that you find on the shelf. Ketchup. Mustard. Meat flavorings and tenderizers. Bouillon cubes. Hot sauce and Tabasco sauce. Premade or packaged marinades. Premade or packaged taco seasonings. Relishes. Regular salad dressings.  Where to find more information:  · National Heart, Lung, and Blood [place]: www.nhlbi.nih.gov  · American Heart Association: www.heart.org  Summary  · The DASH eating plan is a healthy eating plan that has been shown to reduce  high blood pressure (hypertension). It may also reduce your risk for type 2 diabetes, heart disease, and stroke.  · With the DASH eating plan, you should limit salt (sodium) intake to 2,300 mg a day. If you have hypertension, you may need to reduce your sodium intake to 1,500 mg a day.  · When on the DASH eating plan, aim to eat more fresh fruits and vegetables, whole grains, lean proteins, low-fat dairy, and heart-healthy fats.  · Work with your health care provider or diet and nutrition specialist (dietitian) to adjust your eating plan to your individual calorie needs.  This information is not intended to replace advice given to you by your health care provider. Make sure you discuss any questions you have with your health care provider.  Document Released: 12/06/2012 Document Revised: 12/11/2017 Document Reviewed: 12/11/2017  [employer] Interactive Patient Education © 2020 [employer]

## 2020-03-07 NOTE — PROGRESS NOTES
"  History of Present Illness   Nivia is a 45 y/o female who presents to the clinic today for follow up pertaining to her DM, type 2,  Dyslipidemia, GERD, Asthma and Obesity. She has been hospitalized for DVTs and TIAs in th past.  She has decided she would like to have Bariatric Surgery since she has tried \"everything she knows to do to lose weight\" without success. She feels this would improve her quality of life and she would be able to \"come off multiple medications.\"  We will spend time today reviewing her progress from her previous visits.  She was seen yesterday for her elevated blood sugars, increase in joint pain, and edema. Labs were ordered, collected but results are not available.     Diabetes   She presents for follow up diabetes visit. She has type 2 diabetes mellitus. The initial diagnosis of diabetes was made 20 years ago. Her disease course has been worsening.  Associated symptoms include blurred vision, fatigue, peripheral neuropathy, and visual change. Pertinent negatives for diabetes include no foot ulcerations. Diabetic complications include peripheral neuropathy.  Risk factors for coronary artery disease include post-menopausal, stress and dyslipidemia. She is currently utilizing insulin pump therapy and CGM which had greatly improved her glycemic management. She did stop weekly Ozempic due to nausea.  She is following a generally healthy diet. She has had a previous visit with a dietitian. She participates in exercise at this time but finding it more difficult due to her Neuropathy. She continues to try to walk on a regular basis. Her home blood glucose trend is increasing.  An ACE inhibitor/angiotensin II receptor blocker is not  being taken at this time. Eye exam is current.      Lab Results   Component Value Date    HGBA1C 7.50 (H) 12/13/2019      Dyslipidemia   The current episode started more than 1 year ago. Pertinent negatives include no chest pain or shortness of breath. She is taking " "Atorvastatin 20 mg.   Lab Results   Component Value Date    CHLPL 176 12/13/2019    TRIG 186 (H) 12/13/2019    HDL 37 (L) 12/13/2019     (H) 12/13/2019     Obesity   The current episode started more than 1 year ago. The problem has been waxing and waning. Associated symptoms include arthralgias, fatigue, headaches and weakness. Pertinent negatives include no chest pain, chills, coughing, fever, nausea, numbness, rash or vomiting. She has tried \"Everything\"    Lab Results   Component Value Date    TSH 2.020 12/13/2019     Vitals:    03/07/20 1029   BP: 130/84   Pulse: 97   Resp: 14   Temp: 97.9 °F (36.6 °C)   TempSrc: Temporal   SpO2: 98%   Weight: 106 kg (234 lb)   Height: 162.6 cm (64\")      The following portions of the patient's history were reviewed and updated as appropriate: allergies, current medications, past family history, past medical history, past social history, past surgical history and problem list.    Review of Systems   Constitutional: Positive for fatigue. Negative for activity change, appetite change, chills, fever and unexpected weight change.   HENT: Negative.    Eyes: Negative for visual disturbance.   Respiratory: Negative for cough, chest tightness, shortness of breath and wheezing.    Cardiovascular: Negative for chest pain, palpitations and leg swelling.   Gastrointestinal: Negative for abdominal pain, constipation, diarrhea, nausea and vomiting.   Endocrine: Negative for cold intolerance, heat intolerance, polydipsia, polyphagia and polyuria.   Musculoskeletal: Positive for arthralgias, joint swelling and myalgias.   Skin: Negative for color change and rash.   Neurological: Positive for numbness. Negative for dizziness, tremors, speech difficulty, weakness, light-headedness and headaches.   Hematological: Negative for adenopathy.   Psychiatric/Behavioral: Positive for sleep disturbance. Negative for confusion, decreased concentration and suicidal ideas. The patient is not " nervous/anxious.    All other systems reviewed and are negative.    Physical Exam   Constitutional: She is oriented to person, place, and time. She appears well-developed and well-nourished. No distress.   HENT:   Head: Normocephalic.   Nose: Nose normal.   Mouth/Throat: Oropharynx is clear and moist.   Eyes: Pupils are equal, round, and reactive to light. Conjunctivae are normal. Right eye exhibits no discharge. Left eye exhibits no discharge. No scleral icterus.   Neck: Neck supple. No JVD present.   Cardiovascular: Normal rate, regular rhythm and normal heart sounds. Exam reveals no friction rub.   No murmur heard.  Pulmonary/Chest: Effort normal and breath sounds normal. No respiratory distress. She has no decreased breath sounds. She has no wheezes. She has no rales.   Abdominal: Soft. There is no tenderness. There is no rebound and no guarding.   Musculoskeletal: She exhibits tenderness (Especially hands and knees). Edema: Hands and lower extremities have improved.        Right hand: She exhibits decreased range of motion, tenderness and swelling.        Left hand: She exhibits decreased range of motion, tenderness and swelling.   Lymphadenopathy:     She has no cervical adenopathy.   Neurological: She is alert and oriented to person, place, and time.   Skin: Skin is warm and dry. Capillary refill takes less than 2 seconds. No rash noted. No erythema.   Psychiatric: She has a normal mood and affect. Her speech is normal and behavior is normal. Judgment and thought content normal. Cognition and memory are normal.   Nursing note and vitals reviewed.    Assessment/Plan   Patient's Body mass index is 40.17 kg/m². BMI is above normal parameters. Recommendations include: educational material, exercise counseling and nutrition counseling.   (Normal BMI:  18.5-24.9, OW 25-29.9, Obesity 30 or greater)    Problems Addressed this Visit        Cardiovascular and Mediastinum    Mixed hyperlipidemia       Respiratory     Chronic allergic rhinitis    Relevant Medications    cetirizine (zyrTEC) 10 MG tablet       Digestive    GERD (gastroesophageal reflux disease) (Chronic)    Class 3 severe obesity with serious comorbidity and body mass index (BMI) of 40.0 to 44.9 in adult (CMS/[facility])       Endocrine    DM type 2 with diabetic peripheral neuropathy (CMS/[facility]) - Primary       Genitourinary    Menopausal symptoms      Other Visit Diagnoses     Arthralgia, unspecified joint        Trial of Voltaren Gel     Relevant Medications    diclofenac (VOLTAREN) 1 % gel gel      Findings and recommendations discussed with Nivia. Diabetes self management skills reinforced. She will continue Crestor for cardiovascular risk reduction. For her GERD she will continue Prilosec.  Added Zyrtec 10 mg because 5 mg was not covered by her insurance for Chronic AR. For her menopausal symptoms, we discussed medications which maybe beneficial. She will continue nonpharmacologic therapies at this time. Trial of Voltaren Gel for her joint pain. In regards to Obesity, lifestyle modifications including nutrition and activity recommendations discussed. She has had to decrease her exercise due to pain. She will f/u with me on March 18th; sooner if problems/concerns occur.       This document has been electronically signed by NIDA Starr, RADHA, DOUG  March 8, 2020 3:17 PM

## 2020-03-08 VITALS
HEART RATE: 97 BPM | SYSTOLIC BLOOD PRESSURE: 130 MMHG | DIASTOLIC BLOOD PRESSURE: 84 MMHG | OXYGEN SATURATION: 98 % | WEIGHT: 234 LBS | RESPIRATION RATE: 14 BRPM | TEMPERATURE: 97.9 F | BODY MASS INDEX: 39.95 KG/M2 | HEIGHT: 64 IN

## 2020-03-08 DIAGNOSIS — F33.3 SEVERE EPISODE OF RECURRENT MAJOR DEPRESSIVE DISORDER, WITH PSYCHOTIC FEATURES (HCC): ICD-10-CM

## 2020-03-09 LAB
ALBUMIN SERPL-MCNC: 4.2 G/DL (ref 3.5–5.2)
ALBUMIN/GLOB SERPL: 1.4 G/DL
ALP SERPL-CCNC: 99 U/L (ref 39–117)
ALT SERPL-CCNC: 22 U/L (ref 1–33)
ANA SER QL: NEGATIVE
AST SERPL-CCNC: 12 U/L (ref 1–32)
BASOPHILS # BLD AUTO: 0.08 10*3/MM3 (ref 0–0.2)
BASOPHILS NFR BLD AUTO: 0.8 % (ref 0–1.5)
BILIRUB SERPL-MCNC: 0.3 MG/DL (ref 0.2–1.2)
BUN SERPL-MCNC: 12 MG/DL (ref 6–20)
BUN/CREAT SERPL: 19 (ref 7–25)
CALCIUM SERPL-MCNC: 9.3 MG/DL (ref 8.6–10.5)
CHLORIDE SERPL-SCNC: 104 MMOL/L (ref 98–107)
CK SERPL-CCNC: 75 U/L (ref 20–180)
CO2 SERPL-SCNC: 21.8 MMOL/L (ref 22–29)
CREAT SERPL-MCNC: 0.63 MG/DL (ref 0.57–1)
CRP SERPL-MCNC: 0.71 MG/DL (ref 0–0.5)
EOSINOPHIL # BLD AUTO: 0.18 10*3/MM3 (ref 0–0.4)
EOSINOPHIL NFR BLD AUTO: 1.9 % (ref 0.3–6.2)
ERYTHROCYTE [DISTWIDTH] IN BLOOD BY AUTOMATED COUNT: 12.9 % (ref 12.3–15.4)
ERYTHROCYTE [SEDIMENTATION RATE] IN BLOOD BY WESTERGREN METHOD: 18 MM/HR (ref 0–20)
GLOBULIN SER CALC-MCNC: 3 GM/DL
GLUCOSE SERPL-MCNC: 181 MG/DL (ref 65–99)
HCT VFR BLD AUTO: 37.8 % (ref 34–46.6)
HGB BLD-MCNC: 12.6 G/DL (ref 12–15.9)
IMM GRANULOCYTES # BLD AUTO: 0.02 10*3/MM3 (ref 0–0.05)
IMM GRANULOCYTES NFR BLD AUTO: 0.2 % (ref 0–0.5)
LYMPHOCYTES # BLD AUTO: 3.14 10*3/MM3 (ref 0.7–3.1)
LYMPHOCYTES NFR BLD AUTO: 32.7 % (ref 19.6–45.3)
MCH RBC QN AUTO: 26.9 PG (ref 26.6–33)
MCHC RBC AUTO-ENTMCNC: 33.3 G/DL (ref 31.5–35.7)
MCV RBC AUTO: 80.8 FL (ref 79–97)
MONOCYTES # BLD AUTO: 0.64 10*3/MM3 (ref 0.1–0.9)
MONOCYTES NFR BLD AUTO: 6.7 % (ref 5–12)
NEUTROPHILS # BLD AUTO: 5.53 10*3/MM3 (ref 1.7–7)
NEUTROPHILS NFR BLD AUTO: 57.7 % (ref 42.7–76)
NRBC BLD AUTO-RTO: 0 /100 WBC (ref 0–0.2)
PLATELET # BLD AUTO: 372 10*3/MM3 (ref 140–450)
POTASSIUM SERPL-SCNC: 3.9 MMOL/L (ref 3.5–5.2)
PROT SERPL-MCNC: 7.2 G/DL (ref 6–8.5)
RBC # BLD AUTO: 4.68 10*6/MM3 (ref 3.77–5.28)
SODIUM SERPL-SCNC: 141 MMOL/L (ref 136–145)
WBC # BLD AUTO: 9.59 10*3/MM3 (ref 3.4–10.8)

## 2020-03-12 ENCOUNTER — OFFICE VISIT (OUTPATIENT)
Dept: PSYCHIATRY | Facility: CLINIC | Age: 45
End: 2020-03-12

## 2020-03-12 ENCOUNTER — OFFICE VISIT (OUTPATIENT)
Dept: FAMILY MEDICINE CLINIC | Facility: CLINIC | Age: 45
End: 2020-03-12

## 2020-03-12 VITALS
BODY MASS INDEX: 39.95 KG/M2 | TEMPERATURE: 99 F | WEIGHT: 234 LBS | HEART RATE: 102 BPM | HEIGHT: 64 IN | OXYGEN SATURATION: 97 % | DIASTOLIC BLOOD PRESSURE: 78 MMHG | RESPIRATION RATE: 14 BRPM | SYSTOLIC BLOOD PRESSURE: 120 MMHG

## 2020-03-12 VITALS
SYSTOLIC BLOOD PRESSURE: 138 MMHG | BODY MASS INDEX: 40.51 KG/M2 | WEIGHT: 236 LBS | HEART RATE: 89 BPM | DIASTOLIC BLOOD PRESSURE: 76 MMHG

## 2020-03-12 DIAGNOSIS — R60.1 GENERALIZED EDEMA: ICD-10-CM

## 2020-03-12 DIAGNOSIS — E78.2 MIXED HYPERLIPIDEMIA: Chronic | ICD-10-CM

## 2020-03-12 DIAGNOSIS — J30.9 CHRONIC ALLERGIC RHINITIS: Chronic | ICD-10-CM

## 2020-03-12 DIAGNOSIS — F33.3 SEVERE EPISODE OF RECURRENT MAJOR DEPRESSIVE DISORDER, WITH PSYCHOTIC FEATURES (HCC): ICD-10-CM

## 2020-03-12 DIAGNOSIS — H66.003 ACUTE SUPPURATIVE OTITIS MEDIA OF BOTH EARS WITHOUT SPONTANEOUS RUPTURE OF TYMPANIC MEMBRANES, RECURRENCE NOT SPECIFIED: ICD-10-CM

## 2020-03-12 DIAGNOSIS — E11.42 DM TYPE 2 WITH DIABETIC PERIPHERAL NEUROPATHY (HCC): Chronic | ICD-10-CM

## 2020-03-12 DIAGNOSIS — J45.901 MODERATE ASTHMA WITH EXACERBATION, UNSPECIFIED WHETHER PERSISTENT: Primary | Chronic | ICD-10-CM

## 2020-03-12 DIAGNOSIS — E66.01 CLASS 3 SEVERE OBESITY WITH SERIOUS COMORBIDITY AND BODY MASS INDEX (BMI) OF 40.0 TO 44.9 IN ADULT, UNSPECIFIED OBESITY TYPE (HCC): ICD-10-CM

## 2020-03-12 PROCEDURE — 99213 OFFICE O/P EST LOW 20 MIN: CPT | Performed by: NURSE PRACTITIONER

## 2020-03-12 PROCEDURE — 99214 OFFICE O/P EST MOD 30 MIN: CPT | Performed by: NURSE PRACTITIONER

## 2020-03-12 RX ORDER — AZITHROMYCIN 250 MG/1
TABLET, FILM COATED ORAL
Qty: 6 TABLET | Refills: 0 | Status: SHIPPED | OUTPATIENT
Start: 2020-03-12 | End: 2020-04-15

## 2020-03-12 RX ORDER — HYDROCHLOROTHIAZIDE 12.5 MG/1
12.5 TABLET ORAL EVERY MORNING
Qty: 20 TABLET | Refills: 0 | Status: SHIPPED | OUTPATIENT
Start: 2020-03-12 | End: 2020-08-14

## 2020-03-12 RX ORDER — FLUCONAZOLE 150 MG/1
150 TABLET ORAL DAILY
Qty: 2 TABLET | Refills: 0 | Status: SHIPPED | OUTPATIENT
Start: 2020-03-12 | End: 2020-08-13

## 2020-03-12 RX ORDER — DEXTROAMPHETAMINE SACCHARATE, AMPHETAMINE ASPARTATE MONOHYDRATE, DEXTROAMPHETAMINE SULFATE AND AMPHETAMINE SULFATE 2.5; 2.5; 2.5; 2.5 MG/1; MG/1; MG/1; MG/1
10 CAPSULE, EXTENDED RELEASE ORAL DAILY
Qty: 30 CAPSULE | Refills: 0 | Status: SHIPPED | OUTPATIENT
Start: 2020-03-12 | End: 2020-04-09

## 2020-03-12 NOTE — PROGRESS NOTES
Subjective   Nivia Bernstein is a 45 y.o. female is here today for medication management follow-up.    Chief Complaint:  Depression     History of Present Illness:    Patient presents today for follow up for medication management for depression and anxiety. Patient reports currently depression is at a 7 on a 0-10 scale with 10 being the worst. Patient reports symptoms of depression of decreased energy, fatigue, tired, depressed mood, and irritability. Patient reports anxiety is at a 10 on a 0-10 scale with 10 being the worst just were she is sick right now. Patient states besides being sick her anxiety and mood has been better. Patient denies any panic attacks. Patient reports sleeping at least 3-4 hours a night and patient denies any nightmares. Patient reports still having difficulty with getting enough sleep. Patient reports appetite is good and eating at least 2-3 meals a day. Patient denies any auditory or visual hallucinations. Patient adamantly denies any SI or HI. Patient denies any side effects to medications. Patient denies any medical changes since last visit.   The following portions of the patient's history were reviewed and updated as appropriate: allergies, current medications, past family history, past medical history, past social history, past surgical history and problem list.    Review of Systems   Constitutional: Positive for fatigue.   Respiratory: Negative.    Cardiovascular: Negative.    Gastrointestinal: Negative.    Neurological: Negative.    Psychiatric/Behavioral: Positive for agitation, dysphoric mood and sleep disturbance. The patient is nervous/anxious.        Objective   Physical Exam   Constitutional: Vital signs are normal. She appears well-developed and well-nourished. She is cooperative.   Neurological: She is alert.   Psychiatric: Her speech is normal and behavior is normal. Judgment and thought content normal. Cognition and memory are normal. She exhibits a depressed mood.      Vitals reviewed.    Blood pressure 138/76, pulse 89, weight 107 kg (236 lb), not currently breastfeeding. Body mass index is 40.51 kg/m².    Allergies   Allergen Reactions   • Mobic [Meloxicam] Rash   • Novolog [Insulin Aspart] Hives   • Penicillins Angioedema       Medication List:   Current Outpatient Medications   Medication Sig Dispense Refill   • Albuterol Sulfate (VENTOLIN HFA IN) Inhale.     • amphetamine-dextroamphetamine XR (ADDERALL XR) 10 MG 24 hr capsule Take 1 capsule by mouth Daily 30 capsule 0   • aspirin  MG tablet   5   • azithromycin (ZITHROMAX Z-HÉCTOR) 250 MG tablet Take 2 tablets the first day, then 1 tablet daily for 4 days. 6 tablet 0   • benzonatate (TESSALON) 200 MG capsule Take 1 capsule by mouth 3 (Three) Times a Day As Needed for Cough. 30 capsule 1   • cetirizine (zyrTEC) 10 MG tablet Take 1 tablet by mouth Daily As Needed for Allergies. 30 tablet 5   • diclofenac (VOLTAREN) 1 % gel gel Apply 4 g topically to the appropriate area as directed 4 (Four) Times a Day As Needed (joint pain). 100 g 0   • fluconazole (DIFLUCAN) 150 MG tablet Take 1 tablet by mouth Daily. 2 tablet 0   • fluticasone (FLONASE) 50 MCG/ACT nasal spray 1 spray into the nostril(s) as directed by provider 2 (Two) Times a Day. 1 bottle 3   • GLOBAL EASE INJECT PEN NEEDLES 31G X 8 MM misc USE AS DIRECTED TO INJECT VICTOZA AND BASAGLAR DAILY  10   • glucagon (GLUCAGEN) 1 MG injection Inject 1 mg under the skin into the appropriate area as directed 1 (One) Time As Needed for Low Blood Sugar for up to 1 dose. 1 kit 3   • glucose blood test strip Use tid 100 each 12   • HUMALOG KWIKPEN 100 UNIT/ML solution pen-injector Inject 45 Units under the skin into the appropriate area as directed 3 (Three) Times a Day As Needed (For elevated blood sugars). 15 pen 3   • hydroCHLOROthiazide (HYDRODIURIL) 12.5 MG tablet Take 1 tablet by mouth Every Morning. 20 tablet 0   • insulin lispro (HUMALOG) 100 UNIT/ML injection Per insulin  pump. Maximum insulin dose 300 units daily 6 each 12   • ipratropium-albuterol (COMBIVENT RESPIMAT)  MCG/ACT inhaler Inhale 1 puff 4 (Four) Times a Day As Needed for Wheezing. 1 inhaler 2   • metFORMIN (GLUCOPHAGE) 1000 MG tablet Take 1 tablet by mouth 2 (Two) Times a Day With Meals. 60 tablet 5   • montelukast (SINGULAIR) 10 MG tablet Take 1 tablet by mouth Every Night for 30 days. 30 tablet 5   • nitrofurantoin, macrocrystal-monohydrate, (MACROBID) 100 MG capsule Take 1 capsule by mouth Daily. 56 capsule 3   • omeprazole (priLOSEC) 20 MG capsule   5   • ondansetron (ZOFRAN) 4 MG tablet Take 1 tablet by mouth Every 8 (Eight) Hours As Needed for Nausea or Vomiting. 39 tablet 1   • rosuvastatin (CRESTOR) 40 MG tablet Take 1 tablet by mouth Every Night. 30 tablet 5   • TRUEPLUS LANCETS 33G misc Apply 1 each topically to the appropriate area as directed 3 (Three) Times a Day. Test blood glucose  0   • vitamin D (ERGOCALCIFEROL) 38810 units capsule capsule Take 1 capsule by mouth 1 (One) Time Per Week. 12 capsule 0     No current facility-administered medications for this visit.        Mental Status Exam:   Hygiene:   good  Cooperation:  Cooperative  Eye Contact:  Good  Psychomotor Behavior:  Appropriate  Affect:  Appropriate  Hopelessness: Denies  Speech:  Normal  Thought Process:  Goal directed and Linear  Thought Content:  Normal  Suicidal:  None  Homicidal:  None  Hallucinations:  None  Delusion:  None  Memory:  Intact  Orientation:  Person, Place, Time and Situation  Reliability:  fair  Insight:  Fair  Judgement:  Fair  Impulse Control:  Fair  Physical/Medical Issues:  No                 Assessment/Plan   Diagnoses and all orders for this visit:    Severe episode of recurrent major depressive disorder, with psychotic features (CMS/HCC)  -     amphetamine-dextroamphetamine XR (ADDERALL XR) 10 MG 24 hr capsule; Take 1 capsule by mouth Daily            Discussed medication options with patient. Increase Adderall  to XR 10mg daily for worsening of treatment resistant depression. Reviewed the risks, benefits, and side effects of the medications; patient acknowledged and verbally consented. Patient was provided education regarding treatment and treatment options.  Extensive education is provided regarding stimulants. Cardiac risk, risk of growth delay, weight loss, and cardiac issues.Patient is being prescribed a controlled substance as part of treatment plan.  Nishant report reviewed #[id number].   Patient is agreeable to call the office with any questions, concerns, or worsening of symptoms. Patient is aware to call 911 or go to the nearest ER should begin having SI/HI.          Follow up in four weeks      Errors in dictation may reflect use of voice recognition software and not all errors in transcription may have been detected prior to signing.              This document has been electronically signed by NIDA Vasquez   March 18, 2020 14:07

## 2020-03-12 NOTE — PATIENT INSTRUCTIONS
Asthma, Adult    Asthma is a long-term (chronic) condition in which the airways get tight and narrow. The airways are the breathing passages that lead from the nose and mouth down into the lungs. A person with asthma will have times when symptoms get worse. These are called asthma attacks. They can cause coughing, whistling sounds when you breathe (wheezing), shortness of breath, and chest pain. They can make it hard to breathe. There is no cure for asthma, but medicines and lifestyle changes can help control it.  There are many things that can bring on an asthma attack or make asthma symptoms worse (triggers). Common triggers include:  · Mold.  · Dust.  · Cigarette smoke.  · Cockroaches.  · Things that can cause allergy symptoms (allergens). These include animal skin flakes (dander) and pollen from trees or grass.  · Things that pollute the air. These may include household , wood smoke, smog, or chemical odors.  · Cold air, weather changes, and wind.  · Crying or laughing hard.  · Stress.  · Certain medicines or drugs.  · Certain foods such as dried fruit, potato chips, and grape juice.  · Infections, such as a cold or the flu.  · Certain medical conditions or diseases.  · Exercise or tiring activities.  Asthma may be treated with medicines and by staying away from the things that cause asthma attacks. Types of medicines may include:  · Controller medicines. These help prevent asthma symptoms. They are usually taken every day.  · Fast-acting reliever or rescue medicines. These quickly relieve asthma symptoms. They are used as needed and provide short-term relief.  · Allergy medicines if your attacks are brought on by allergens.  · Medicines to help control the body's defense (immune) system.  Follow these instructions at home:  Avoiding triggers in your home  · Change your heating and air conditioning filter often.  · Limit your use of fireplaces and wood stoves.  · Get rid of pests (such as roaches and  mice) and their droppings.  · Throw away plants if you see mold on them.  · Clean your floors. Dust regularly. Use cleaning products that do not smell.  · Have someone vacuum when you are not home. Use a vacuum  with a HEPA filter if possible.  · Replace carpet with wood, tile, or vinyl dominique. Carpet can trap animal skin flakes and dust.  · Use allergy-proof pillows, mattress covers, and box spring covers.  · Wash bed sheets and blankets every week in hot water. Dry them in a dryer.  · Keep your bedroom free of any triggers.   · Avoid pets and keep windows closed when things that cause allergy symptoms are in the air.  · Use blankets that are made of polyester or cotton.  · Clean bathrooms and sabine with bleach. If possible, have someone repaint the walls in these rooms with mold-resistant paint. Keep out of the rooms that are being cleaned and painted.  · Wash your hands often with soap and water. If soap and water are not available, use hand .  · Do not allow anyone to smoke in your home.  General instructions  · Take over-the-counter and prescription medicines only as told by your doctor.  ? Talk with your doctor if you have questions about how or when to take your medicines.  ? Make note if you need to use your medicines more often than usual.  · Do not use any products that contain nicotine or tobacco, such as cigarettes and e-cigarettes. If you need help quitting, ask your doctor.  · Stay away from secondhand smoke.  · Avoid doing things outdoors when allergen counts are high and when air quality is low.  · Wear a ski mask when doing outdoor activities in the winter. The mask should cover your nose and mouth. Exercise indoors on cold days if you can.  · Warm up before you exercise. Take time to cool down after exercise.  · Use a peak flow meter as told by your doctor. A peak flow meter is a tool that measures how well the lungs are working.  · Keep track of the peak flow meter's readings.  Write them down.  · Follow your asthma action plan. This is a written plan for taking care of your asthma and treating your attacks.  · Make sure you get all the shots (vaccines) that your doctor recommends. Ask your doctor about a flu shot and a pneumonia shot.  · Keep all follow-up visits as told by your doctor. This is important.  Contact a doctor if:  · You have wheezing, shortness of breath, or a cough even while taking medicine to prevent attacks.  · The mucus you cough up (sputum) is thicker than usual.  · The mucus you cough up changes from clear or white to yellow, green, gray, or bloody.  · You have problems from the medicine you are taking, such as:  ? A rash.  ? Itching.  ? Swelling.  ? Trouble breathing.  · You need reliever medicines more than 2-3 times a week.  · Your peak flow reading is still at 50-79% of your personal best after following the action plan for 1 hour.  · You have a fever.  Get help right away if:  · You seem to be worse and are not responding to medicine during an asthma attack.  · You are short of breath even at rest.  · You get short of breath when doing very little activity.  · You have trouble eating, drinking, or talking.  · You have chest pain or tightness.  · You have a fast heartbeat.  · Your lips or fingernails start to turn blue.  · You are light-headed or dizzy, or you faint.  · Your peak flow is less than 50% of your personal best.  · You feel too tired to breathe normally.  Summary  · Asthma is a long-term (chronic) condition in which the airways get tight and narrow. An asthma attack can make it hard to breathe.  · Asthma cannot be cured, but medicines and lifestyle changes can help control it.  · Make sure you understand how to avoid triggers and how and when to use your medicines.  This information is not intended to replace advice given to you by your health care provider. Make sure you discuss any questions you have with your health care provider.  Document  Released: 06/05/2009 Document Revised: 01/22/2018 Document Reviewed: 01/22/2018  [employer] Interactive Patient Education © 2020 [employer]    Type 2 Diabetes Mellitus, Self Care, Adult  When you have type 2 diabetes (type 2 diabetes mellitus), you must make sure your blood sugar (glucose) stays in a healthy range. You can do this with:  · Nutrition.  · Exercise.  · Lifestyle changes.  · Medicines or insulin, if needed.  · Support from your doctors and others.  How to stay aware of blood sugar    · Check your blood sugar level every day, as often as told.  · Have your A1c (hemoglobin A1c) level checked two or more times a year. Have it checked more often if your doctor tells you to.  Your doctor will set personal treatment goals for you. Generally, you should have these blood sugar levels:  · Before meals (preprandial):  mg/dL (4.4-7.2 mmol/L).  · After meals (postprandial): below 180 mg/dL (10 mmol/L).  · A1c level: less than 7%.  How to manage high and low blood sugar  Signs of high blood sugar  High blood sugar is called hyperglycemia. Know the signs of high blood sugar. Signs may include:  · Feeling:  ? Thirsty.  ? Hungry.  ? Very tired.  · Needing to pee (urinate) more than usual.  · Blurry vision.  Signs of low blood sugar  Low blood sugar is called hypoglycemia. This is when blood sugar is at or below 70 mg/dL (3.9 mmol/L). Signs may include:  · Feeling:  ? Hungry.  ? Worried or nervous (anxious).  ? Sweaty and clammy.  ? Confused.  ? Dizzy.  ? Sleepy.  ? Sick to your stomach (nauseous).  · Having:  ? A fast heartbeat.  ? A headache.  ? A change in your vision.  ? Jerky movements that you cannot control (seizure).  ? Tingling or no feeling (numbness) around your mouth, lips, or tongue.  · Having trouble with:  ? Moving (coordination).  ? Sleeping.  ? Passing out (fainting).  ? Getting upset easily (irritability).  Treating low blood sugar  To treat low blood sugar, eat or drink something sugary right  away. If you can think clearly and swallow safely, follow the 15:15 rule:  · Take 15 grams of a fast-acting carb (carbohydrate). Talk with your doctor about how much you should take.  · Some fast-acting carbs are:  ? Sugar tablets (glucose pills). Take 3-4 pills.  ? 6-8 pieces of hard candy.  ? 4-6 oz (120-150 mL) of fruit juice.  ? 4-6 oz (120-150 mL) of regular (not diet) soda.  ? 1 Tbsp (15 mL) honey or sugar.  · Check your blood sugar 15 minutes after you take the carb.  · If your blood sugar is still at or below 70 mg/dL (3.9 mmol/L), take 15 grams of a carb again.  · If your blood sugar does not go above 70 mg/dL (3.9 mmol/L) after 3 tries, get help right away.  · After your blood sugar goes back to normal, eat a meal or a snack within 1 hour.  Treating very low blood sugar  If your blood sugar is at or below 54 mg/dL (3 mmol/L), you have very low blood sugar (severe hypoglycemia). This is an emergency. Do not wait to see if the symptoms will go away. Get medical help right away. Call your local emergency services (911 in the [place]  If you have very low blood sugar and you cannot eat or drink, you may need a glucagon shot (injection). A family member or friend should learn how to check your blood sugar and how to give you a glucagon shot. Ask your doctor if you need to have a glucagon shot kit at home.  Follow these instructions at home:  Medicine  · Take insulin and diabetes medicines as told.  · If your doctor says you should take more or less insulin and medicines, do this exactly as told.  · Do not run out of insulin or medicines.  Having diabetes can raise your risk for other long-term conditions. These include heart disease and kidney disease. Your doctor may prescribe medicines to help you not have these problems.  Food    · Make healthy food choices. These include:  ? Chicken, fish, egg whites, and beans.  ? Oats, whole wheat, bulgur, brown rice, quinoa, and millet.  ? Fresh fruits and  vegetables.  ? Low-fat dairy products.  ? Nuts, avocado, olive oil, and canola oil.  · Meet with a  (dietitian). He or she can help you make an eating plan that is right for you.  · Follow instructions from your doctor about what you cannot eat or drink.  · Drink enough fluid to keep your pee (urine) pale yellow.  · Keep track of carbs that you eat. Do this by reading food labels and learning food serving sizes.  · Follow your sick day plan when you cannot eat or drink normally. Make this plan with your doctor so it is ready to use.  Activity  · Exercise 3 or more times a week.  · Do not go more than 2 days without exercising.  · Talk with your doctor before you start a new exercise. Your doctor may need to tell you to change:  ? How much insulin or medicines you take.  ? How much food you eat.  Lifestyle  · Do not use any tobacco products. These include cigarettes, chewing tobacco, and e-cigarettes. If you need help quitting, ask your doctor.  · Ask your doctor how much alcohol is safe for you.  · Learn to deal with stress. If you need help with this, ask your doctor.  Body care    · Stay up to date with your shots (immunizations).  · Have your eyes and feet checked by a doctor as often as told.  · Check your skin and feet every day. Check for cuts, bruises, redness, blisters, or sores.  · Brush your teeth and gums two times a day. Floss one or more times a day.  · Go to the dentist one or more times every 6 months.  · Stay at a healthy weight.  General instructions  · Take over-the-counter and prescription medicines only as told by your doctor.  · Share your diabetes care plan with:  ? Your work or school.  ? People you live with.  · Carry a card or wear jewelry that says you have diabetes.  · Keep all follow-up visits as told by your doctor. This is important.  Questions to ask your doctor  · Do I need to meet with a diabetes educator?  · Where can I find a support group for people with  diabetes?  Where to find more information  To learn more about diabetes, visit:  · American Diabetes Association: www.diabetes.org  · American Association of Diabetes Educators: www.diabeteseducator.org  Summary  · When you have type 2 diabetes, you must make sure your blood sugar (glucose) stays in a healthy range.  · Check your blood sugar every day, as often as told.  · Having diabetes can raise your risk for other conditions. Your doctor may prescribe medicines to help you not have these problems.  · Keep all follow-up visits as told by your doctor. This is important.  This information is not intended to replace advice given to you by your health care provider. Make sure you discuss any questions you have with your health care provider.  Document Released: 04/10/2017 Document Revised: 06/10/2019 Document Reviewed: 01/20/2017  [employer] Interactive Patient Education © 2020 [employer]

## 2020-03-16 NOTE — PROGRESS NOTES
History of Present Illness   Nivia is a 45 y/o female who presents to the clinic today c/o upper respiratory symptoms which started over a week ago.   In addition, she has DM, type 2,  Dyslipidemia, GERD, Asthma and Obesity. She has been hospitalized for DVTs and TIAs in th past.      Diabetes   She has type 2 diabetes mellitus. The initial diagnosis of diabetes was made 20 years ago. Her disease course has been worsening.  Associated symptoms include blurred vision, fatigue, peripheral neuropathy, and visual change. Pertinent negatives for diabetes include no foot ulcerations. Diabetic complications include peripheral neuropathy.  Risk factors for coronary artery disease include post-menopausal, stress and dyslipidemia. She is currently utilizing insulin pump therapy and CGM which had greatly improved her glycemic management. She did stop weekly Ozempic due to nausea.  She is following a generally healthy diet. She has had a previous visit with a dietitian. She participates in exercise at this time but finding it more difficult due to her Neuropathy. She continues to try to walk on a regular basis. Her home blood glucose trend is increasing.  An ACE inhibitor/angiotensin II receptor blocker is not  being taken at this time. Eye exam is current.      Lab Results   Component Value Date    HGBA1C 7.50 (H) 12/13/2019      Dyslipidemia   The current episode started more than 1 year ago. Pertinent negatives include no chest pain or shortness of breath. She is taking Atorvastatin 20 mg.   Lab Results   Component Value Date    CHLPL 176 12/13/2019    TRIG 186 (H) 12/13/2019    HDL 37 (L) 12/13/2019     (H) 12/13/2019     Obesity   The current episode started more than 1 year ago. The problem has been waxing and waning. Associated symptoms include arthralgias, fatigue, headaches and weakness. Pertinent negatives include no chest pain, chills, coughing, fever, nausea, numbness, rash or vomiting. She has tried  "\"Everything\"    Lab Results   Component Value Date    TSH 2.020 12/13/2019     Vitals:    03/12/20 1239   BP: 120/78   Pulse: 102   Resp: 14   Temp: 99 °F (37.2 °C)   TempSrc: Temporal   SpO2: 97%   Weight: 106 kg (234 lb)   Height: 162.6 cm (64\")        The following portions of the patient's history were reviewed and updated as appropriate: allergies, current medications, past family history, past medical history, past social history, past surgical history and problem list.    Review of Systems   Constitutional: Positive for activity change and fatigue. Negative for appetite change, chills, fever (Low grade) and unexpected weight change.   HENT: Positive for congestion, ear pain and postnasal drip. Negative for ear discharge, rhinorrhea, sinus pressure, sinus pain, sneezing, sore throat, tinnitus and trouble swallowing.    Eyes: Negative for discharge and visual disturbance.   Respiratory: Positive for cough, chest tightness and wheezing. Negative for shortness of breath.    Cardiovascular: Negative for chest pain, palpitations and leg swelling.   Gastrointestinal: Negative for nausea and vomiting.   Endocrine: Negative for cold intolerance, heat intolerance, polydipsia, polyphagia and polyuria.   Skin: Negative for color change and rash.   Neurological: Positive for numbness and headaches. Negative for dizziness, tremors, speech difficulty, weakness and light-headedness.   Hematological: Negative for adenopathy.   Psychiatric/Behavioral: Positive for sleep disturbance. Negative for confusion, decreased concentration and suicidal ideas. The patient is not nervous/anxious.    All other systems reviewed and are negative.    Physical Exam   Constitutional: She is oriented to person, place, and time. She appears well-developed and well-nourished. No distress.   HENT:   Head: Normocephalic.   Right Ear: Tympanic membrane is erythematous (Mild) and bulging. A middle ear effusion is present.   Left Ear: Tympanic membrane " is erythematous (Mild) and bulging. A middle ear effusion is present.   Nose: Mucosal edema and rhinorrhea present. Right sinus exhibits no maxillary sinus tenderness and no frontal sinus tenderness. Left sinus exhibits no maxillary sinus tenderness and no frontal sinus tenderness.   Mouth/Throat: Oropharynx is clear and moist and mucous membranes are normal. No oropharyngeal exudate.   Eyes: Pupils are equal, round, and reactive to light. Conjunctivae and EOM are normal. Right eye exhibits no discharge. Left eye exhibits no discharge. No scleral icterus.   Neck: Neck supple. No JVD present. No thyromegaly present.   Cardiovascular: Normal rate, regular rhythm and normal heart sounds. Exam reveals no friction rub.   No murmur heard.  Pulmonary/Chest: Effort normal. No respiratory distress. She has no wheezes. She has no rales.   Abdominal: Soft. Bowel sounds are normal. There is no tenderness. There is no guarding.   Musculoskeletal: She exhibits edema (Lower extremities).   Lymphadenopathy:     She has no cervical adenopathy.   Neurological: She is alert and oriented to person, place, and time.   Skin: Skin is warm and dry. Capillary refill takes less than 2 seconds. No rash noted. No erythema.   Psychiatric: She has a normal mood and affect. Her behavior is normal. Judgment and thought content normal.   Nursing note and vitals reviewed.    Assessment/Plan   Patient's Body mass index is 40.17 kg/m². BMI is above normal parameters. Recommendations include: educational material, exercise counseling and nutrition counseling.   (Normal BMI:  18.5-24.9, OW 25-29.9, Obesity 30 or greater)    Problems Addressed this Visit        Cardiovascular and Mediastinum    Mixed hyperlipidemia       Respiratory    Chronic allergic rhinitis       Digestive    Class 3 severe obesity with serious comorbidity and body mass index (BMI) of 40.0 to 44.9 in adult (CMS/[facility])       Endocrine    DM type 2 with diabetic peripheral neuropathy  (CMS/[facility])      Other Visit Diagnoses     Moderate asthma with exacerbation, unspecified whether persistent  (Chronic)   -  Primary    Findings and recommendations discussed with Nivia. Treatment options reviewed.  We would like to avoid steroids if possible    Relevant Medications    ipratropium-albuterol (COMBIVENT RESPIMAT)  MCG/ACT inhaler    Acute suppurative otitis media of both ears without spontaneous rupture of tympanic membranes, recurrence not specified        Recently given Cefdinir; Will prescribe Zithromax. Had referred to ENT but she could not keep her appt. She will reschedule    Relevant Medications    azithromycin (ZITHROMAX Z-HÉCTOR) 250 MG tablet    Generalized edema        Counseled regarding supportive care measures. HCTZ prescribed    Relevant Medications    hydroCHLOROthiazide (HYDRODIURIL) 12.5 MG tablet      Findings and recommendations discussed with Nivia. Treatment options reviewed.  Counseled regarding supportive care measures. Recently has taken Cefdinir;I will prescribe Zithromax. She will continue Singulair and Flonase  Had referred to ENT but she could not keep her appt. She will reschedule this appointment. Diabetes sick day management. She will continue Crestor for cardiovascular risk reduction. S/S of concern reviewed and if occur to seek further medical evaluation. She will f/u with me on 03/18/2020; sooner if problems/concerns occur.     This document has been electronically signed by NIDA Starr, RADHA, DOUG  March 12, 2020 17:13

## 2020-03-18 NOTE — TREATMENT PLAN
Multi-Disciplinary Problems (from Behavioral Health Treatment Plan)    Active Problems     Problem: Assessment/EAP  Start Date: 03/18/20    Problem Details:  The patient self-scales this problem as a 3 with 10 being the worst.       Goal Priority Start Date Expected End Date End Date    Patient will utilize appropriate treatment services. -- 03/18/20 -- --    Goal Details:  Progress toward goal:  Not appropriate to rate progress toward goal since this is the initial treatment plan.         Goal Intervention Frequency Start Date End Date    Assist patient in developing a plan of action Weekly 03/18/20 --    Intervention Details:  Duration of treatment until until remission of symptoms.             Problem: Anxiety  Start Date: 03/18/20    Problem Details:  The patient self-scales this problem as a 10 with 10 being the worst.       Goal Priority Start Date Expected End Date End Date    Patient will develop and implement behavioral and cognitive strategies to reduce anxiety and irrational fears. -- 03/18/20 -- --    Goal Details:  Progress toward goal:  Not appropriate to rate progress toward goal since this is the initial treatment plan.       Goal Intervention Frequency Start Date End Date    Help patient explore past emotional issues in relation to present anxiety. Weekly 03/18/20 --    Intervention Details:  Duration of treatment until until remission of symptoms.       Goal Intervention Frequency Start Date End Date    Help patient develop an awareness of their cognitive and physical responses to anxiety. Weekly 03/18/20 --    Intervention Details:  Duration of treatment until until remission of symptoms.             Problem: Depression  Start Date: 03/18/20    Problem Details:  The patient self-scales this problem as a 7 with 10 being the worst.       Goal Priority Start Date Expected End Date End Date    Patient will demonstrate the ability to initiate new constructive life skills outside of sessions on a  consistent basis. -- 03/18/20 -- --    Goal Details:  Progress toward goal:  Not appropriate to rate progress toward goal since this is the initial treatment plan.       Goal Intervention Frequency Start Date End Date    Assist patient in setting attainable activities of daily living goals. PRN 03/18/20 --    Goal Intervention Frequency Start Date End Date    Provide education about depression Weekly 03/18/20 --    Intervention Details:  Duration of treatment until until remission of symptoms.       Goal Intervention Frequency Start Date End Date    Assist patient in developing healthy coping strategies. Weekly 03/18/20 --    Intervention Details:  Duration of treatment until until remission of symptoms.                          I have discussed and reviewed this treatment plan with the patient.

## 2020-03-23 RX ORDER — ERGOCALCIFEROL 1.25 MG/1
CAPSULE ORAL
Qty: 12 CAPSULE | Refills: 0 | Status: SHIPPED | OUTPATIENT
Start: 2020-03-23 | End: 2020-05-28

## 2020-04-01 DIAGNOSIS — M25.50 ARTHRALGIA, UNSPECIFIED JOINT: ICD-10-CM

## 2020-04-02 ENCOUNTER — TELEPHONE (OUTPATIENT)
Dept: FAMILY MEDICINE CLINIC | Facility: CLINIC | Age: 45
End: 2020-04-02

## 2020-04-02 NOTE — TELEPHONE ENCOUNTER
Pt is aware of this information.       ----- Message from NIDA Betancourt sent at 4/1/2020  5:51 PM EDT -----  I have put Estrellitadianna at the  and would you call her in a refill of the [place] Cough syrup. please  ----- Message -----  From: Anne Paz MA  Sent: 4/1/2020   8:24 AM EDT  To: NIDA Betancourt    Patient called and stated that she still has a bad cough. No other symptoms with it. She has taken the cough medicine that you prescribed but it has not helped. Is there anything else she can do?

## 2020-04-09 ENCOUNTER — OFFICE VISIT (OUTPATIENT)
Dept: PSYCHIATRY | Facility: CLINIC | Age: 45
End: 2020-04-09

## 2020-04-09 DIAGNOSIS — F33.3 SEVERE EPISODE OF RECURRENT MAJOR DEPRESSIVE DISORDER, WITH PSYCHOTIC FEATURES (HCC): ICD-10-CM

## 2020-04-09 PROCEDURE — 99212 OFFICE O/P EST SF 10 MIN: CPT | Performed by: NURSE PRACTITIONER

## 2020-04-09 RX ORDER — DEXTROAMPHETAMINE SACCHARATE, AMPHETAMINE ASPARTATE, DEXTROAMPHETAMINE SULFATE AND AMPHETAMINE SULFATE 2.5; 2.5; 2.5; 2.5 MG/1; MG/1; MG/1; MG/1
10 TABLET ORAL 2 TIMES DAILY
Qty: 60 TABLET | Refills: 0 | Status: SHIPPED | OUTPATIENT
Start: 2020-04-09 | End: 2020-05-07 | Stop reason: SDUPTHER

## 2020-04-09 RX ORDER — PROMETHAZINE HYDROCHLORIDE 6.25 MG/5ML
SYRUP ORAL
COMMUNITY
Start: 2020-03-12 | End: 2020-08-14

## 2020-04-09 RX ORDER — DEXTROMETHORPHAN HBR, GUAIFENESIN 20; 200 MG/10ML; MG/10ML
SOLUTION ORAL
COMMUNITY
Start: 2020-03-12 | End: 2020-04-15

## 2020-04-09 NOTE — PROGRESS NOTES
You have chosen to receive care through a telephone visit today. Do you consent to use a telephone visit for your medical care today? Yes        Subjective   Nivia Bernstein is a 45 y.o. female is here today for medication management follow-up.    This provider is located at [facility] at [address] [place] [address]. The provider identified herself as well as her credentials. The Patient and  is at/in home by herself/himself, using her/his phone because problems with video connection. The patient's condition being diagnosed/treated is appropriate for telemedicine. The patient gave consent to be seen remotely, and when consent is given they understand that the consent allows for patient identifiable information to be sent to a third party as needed.   They may refuse to be seen remotely at any time. The electronic data is encrypted and password protected, and the patient has been advised of the potential risks to privacy not withstanding such measures.      Chief Complaint:  Severe depression    History of Present Illness:   Patient presents today for follow up for medication management for severe depression with . Patient states the XR did not work and feels worse again. Patient states mind wont stop racing and slow down. Patient reports currently depression is at a 10 on a 0-10 scale with 10 being the worst. Patient reports symptoms of depression of irritability, depressed mood, decreased energy, insomnia, and decreased motivation. Patient reports anxiety is at a 10 on a 0-10 scale with 10 being the worst. Patient denies any panic attacks. Patient reports sleeping at least 3-3.5 hours a night and patient reports having nightmares almost every night. Patient reports appetite is good and eating at least 2-3 meals a day. Patient denies any auditory or visual hallucinations. Patient adamantly denies any SI or HI. Patient denies any side effects to medications. Patient denies any medical  changes since last visit.   The following portions of the patient's history were reviewed and updated as appropriate: allergies, current medications, past family history, past medical history, past social history, past surgical history and problem list.    Review of Systems   Constitutional: Negative.    Respiratory: Negative.    Cardiovascular: Negative.    Gastrointestinal: Negative.    Neurological: Negative.    Psychiatric/Behavioral: Positive for agitation, dysphoric mood and sleep disturbance. The patient is nervous/anxious.        Objective   Physical Exam   Constitutional: She is cooperative.   Neurological: She is alert.   Psychiatric: Her speech is normal. She exhibits abnormal remote memory.     not currently breastfeeding. There is no height or weight on file to calculate BMI.  Unable to obtain vitals due to telephone visit.     Allergies   Allergen Reactions   • Mobic [Meloxicam] Rash   • Novolog [Insulin Aspart] Hives   • Penicillins Angioedema       Medication List:   Current Outpatient Medications   Medication Sig Dispense Refill   • Albuterol Sulfate (VENTOLIN HFA IN) Inhale.     • amphetamine-dextroamphetamine XR (ADDERALL XR) 10 MG 24 hr capsule Take 1 capsule by mouth Daily 30 capsule 0   • aspirin  MG tablet   5   • azithromycin (ZITHROMAX Z-HÉCTOR) 250 MG tablet Take 2 tablets the first day, then 1 tablet daily for 4 days. 6 tablet 0   • benzonatate (TESSALON) 200 MG capsule Take 1 capsule by mouth 3 (Three) Times a Day As Needed for Cough. 30 capsule 1   • cetirizine (zyrTEC) 10 MG tablet Take 1 tablet by mouth Daily As Needed for Allergies. 30 tablet 5   • diclofenac (VOLTAREN) 1 % gel gel Apply 4 g topically to the appropriate area as directed 4 (Four) Times a Day As Needed (joint pain). 500 g 5   • fluconazole (DIFLUCAN) 150 MG tablet Take 1 tablet by mouth Daily. 2 tablet 0   • fluticasone (FLONASE) 50 MCG/ACT nasal spray 1 spray into the nostril(s) as directed by provider 2 (Two) Times  a Day. 1 bottle 3   • GLOBAL EASE INJECT PEN NEEDLES 31G X 8 MM misc USE AS DIRECTED TO INJECT VICTOZA AND BASAGLAR DAILY  10   • glucagon (GLUCAGEN) 1 MG injection Inject 1 mg under the skin into the appropriate area as directed 1 (One) Time As Needed for Low Blood Sugar for up to 1 dose. 1 kit 3   • glucose blood test strip Use tid 100 each 12   • HUMALOG KWIKPEN 100 UNIT/ML solution pen-injector Inject 45 Units under the skin into the appropriate area as directed 3 (Three) Times a Day As Needed (For elevated blood sugars). 15 pen 3   • hydroCHLOROthiazide (HYDRODIURIL) 12.5 MG tablet Take 1 tablet by mouth Every Morning. 20 tablet 0   • insulin lispro (HUMALOG) 100 UNIT/ML injection Per insulin pump. Maximum insulin dose 300 units daily 6 each 12   • ipratropium-albuterol (COMBIVENT RESPIMAT)  MCG/ACT inhaler Inhale 1 puff 4 (Four) Times a Day As Needed for Wheezing. 1 inhaler 2   • metFORMIN (GLUCOPHAGE) 1000 MG tablet Take 1 tablet by mouth 2 (Two) Times a Day With Meals. 60 tablet 5   • nitrofurantoin, macrocrystal-monohydrate, (MACROBID) 100 MG capsule Take 1 capsule by mouth Daily. 56 capsule 3   • omeprazole (priLOSEC) 20 MG capsule   5   • ondansetron (ZOFRAN) 4 MG tablet Take 1 tablet by mouth Every 8 (Eight) Hours As Needed for Nausea or Vomiting. 39 tablet 1   • rosuvastatin (CRESTOR) 40 MG tablet Take 1 tablet by mouth Every Night. 30 tablet 5   • TRUEPLUS LANCETS 33G misc Apply 1 each topically to the appropriate area as directed 3 (Three) Times a Day. Test blood glucose  0   • vitamin D (ERGOCALCIFEROL) 1.25 MG (90994 UT) capsule capsule TAKE 1 CAPSULE BY MOUTH ONCE WEEKLY 12 capsule 0     No current facility-administered medications for this visit.        Mental Status Exam:   Hygiene:   unable to evaluate  Cooperation:  Cooperative  Eye Contact:  unable to evaluate  Psychomotor Behavior:  unable to evaluate  Affect:  unable to evaluate  Hopelessness: Denies  Speech:  Normal  Thought Process:   Goal directed and Linear  Thought Content:  Normal  Suicidal:  None  Homicidal:  None  Hallucinations:  None  Delusion:  None  Memory:  Intact  Orientation:  Person, Place, Time and Situation  Reliability:  fair  Insight:  Fair  Judgement:  Fair  Impulse Control:  Fair  Physical/Medical Issues:  Yes DM            Assessment/Plan   Diagnoses and all orders for this visit:    Severe episode of recurrent major depressive disorder, with psychotic features (CMS/HCC)  -     amphetamine-dextroamphetamine (Adderall) 10 MG tablet; Take 1 tablet by mouth 2 (Two) Times a Day. Second dose no later than 3pm.            Discussed medication options with patient and . Discont. Adderall XR. Restart Adderall 10mg one tablet twice daily with second dose no later than 3 PM for treatment resistant depression.  Reviewed the risks, benefits, and side effects of the medications; patient and  acknowledged and verbally consented. Patient was provided education regarding treatment and treatment options.  Extensive education is provided regarding stimulants and cardiac risk, risk of growth delay, weight loss, and cardiac issues. Patient is being prescribed a controlled substance as part of treatment plan.     Patient and  is agreeable to call the office with any questions, concerns, or worsening of symptoms.  Patient and  is aware to call 911 or go to the nearest ER should begin having SI/HI.        Follow up in four weeks.       Errors in dictation may reflect use of voice recognition software and not all errors in transcription may have been detected prior to signing.        This visit has been rescheduled as a phone visit to comply with patient safety concerns in accordance with CDC recommendations. Total time of discussion was 11 minutes.                 This document has been electronically signed by NIDA Vasquez   April 9, 2020 08:02

## 2020-04-15 ENCOUNTER — OFFICE VISIT (OUTPATIENT)
Dept: FAMILY MEDICINE CLINIC | Facility: CLINIC | Age: 45
End: 2020-04-15

## 2020-04-15 DIAGNOSIS — E53.8 VITAMIN B 12 DEFICIENCY: ICD-10-CM

## 2020-04-15 DIAGNOSIS — E78.2 MIXED HYPERLIPIDEMIA: Chronic | ICD-10-CM

## 2020-04-15 DIAGNOSIS — F41.9 ANXIETY AND DEPRESSION: Chronic | ICD-10-CM

## 2020-04-15 DIAGNOSIS — J30.9 CHRONIC ALLERGIC RHINITIS: Chronic | ICD-10-CM

## 2020-04-15 DIAGNOSIS — F32.A ANXIETY AND DEPRESSION: Chronic | ICD-10-CM

## 2020-04-15 DIAGNOSIS — E11.42 DM TYPE 2 WITH DIABETIC PERIPHERAL NEUROPATHY (HCC): Primary | Chronic | ICD-10-CM

## 2020-04-15 DIAGNOSIS — E55.9 VITAMIN D DEFICIENCY: ICD-10-CM

## 2020-04-15 DIAGNOSIS — J45.20 MILD INTERMITTENT ASTHMA WITHOUT COMPLICATION: Chronic | ICD-10-CM

## 2020-04-15 DIAGNOSIS — E66.01 CLASS 3 SEVERE OBESITY WITH SERIOUS COMORBIDITY AND BODY MASS INDEX (BMI) OF 40.0 TO 44.9 IN ADULT, UNSPECIFIED OBESITY TYPE (HCC): ICD-10-CM

## 2020-04-15 PROCEDURE — 99443 PR PHYS/QHP TELEPHONE EVALUATION 21-30 MIN: CPT | Performed by: NURSE PRACTITIONER

## 2020-04-15 PROCEDURE — 95251 CONT GLUC MNTR ANALYSIS I&R: CPT | Performed by: NURSE PRACTITIONER

## 2020-04-15 RX ORDER — MAGNESIUM OXIDE 400 MG/1
400 TABLET ORAL DAILY
Qty: 30 TABLET | Refills: 0 | Status: SHIPPED | OUTPATIENT
Start: 2020-04-15 | End: 2020-05-13

## 2020-04-15 NOTE — PROGRESS NOTES
You have chosen to receive care through a telephone visit. Do you consent to use a telephone visit for your medical care today? Yes  History of Present Illness   Nivia is a 46 y/o female who has DM, type 2,  Dyslipidemia, GERD, Asthma and Obesity. She has been hospitalized for DVTs and TIAs in th past.    She has recently had worsening Asthma symptoms and was started on Breo which she reports has helped her significantly.    Diabetes   She has type 2 diabetes mellitus. The initial diagnosis of diabetes was made 20 years ago. Her disease course has been fluctuating.  Associated symptoms include blurred vision, fatigue, peripheral neuropathy, and visual change. Pertinent negatives for diabetes include no foot ulcerations. Diabetic complications include peripheral neuropathy.  Risk factors for coronary artery disease include post-menopausal, stress and dyslipidemia. She is currently utilizing insulin pump therapy and CGM which had greatly improved her glycemic management. She did stop weekly Ozempic due to nausea. She is following a generally healthy diet and is Carb Counting.  She has had a previous visit with a dietitian. She participates in exercise at this time but finding it more difficult due to her Neuropathy.  Her home blood glucose trend is increasing.  An ACE inhibitor/angiotensin II receptor blocker is not  being taken at this time. Eye exam is current.      Lab Results   Component Value Date    HGBA1C 7.50 (H) 12/13/2019      Dyslipidemia   The current episode started more than 1 year ago. Pertinent negatives include no chest pain or shortness of breath. She is taking Crestor 40 mg.   Lab Results   Component Value Date    CHLPL 176 12/13/2019    TRIG 186 (H) 12/13/2019    HDL 37 (L) 12/13/2019     (H) 12/13/2019     Obesity   The current episode started more than 1 year ago. The problem has been waxing and waning. Associated symptoms include arthralgias, fatigue, headaches and weakness. Pertinent  "negatives include no chest pain, chills, coughing, fever, nausea, numbness, rash or vomiting. She has tried \"Everything\"  She is awaiting Bariatric Surgical consult.   Lab Results   Component Value Date    TSH 2.020 12/13/2019     The following portions of the patient's history were reviewed and updated as appropriate: allergies, current medications, past family history, past medical history, past social history, past surgical history and problem list.    Review of Systems   Constitutional: Positive for activity change, appetite change and fatigue. Negative for chills, fever and unexpected weight change.   HENT: Negative.    Eyes: Negative for visual disturbance.   Respiratory: Negative for cough, shortness of breath and wheezing.    Cardiovascular: Negative for chest pain, palpitations and leg swelling.   Gastrointestinal: Negative for nausea and vomiting.   Endocrine: Positive for polyphagia. Negative for cold intolerance, heat intolerance, polydipsia and polyuria.   Musculoskeletal: Positive for arthralgias.   Skin: Negative for color change and rash.   Allergic/Immunologic: Positive for environmental allergies.   Neurological: Positive for numbness. Negative for dizziness, tremors, speech difficulty, weakness, light-headedness and headaches.   Hematological: Negative for adenopathy.   Psychiatric/Behavioral: Negative for confusion, decreased concentration and suicidal ideas. The patient is nervous/anxious.    All other systems reviewed and are negative.    Assessment/Plan     Problems Addressed this Visit        Cardiovascular and Mediastinum    Mixed hyperlipidemia       Respiratory    Mild intermittent asthma without complication    Relevant Medications    Fluticasone Furoate-Vilanterol (Breo Ellipta) 200-25 MCG/INH inhaler    Chronic allergic rhinitis       Digestive    Class 3 severe obesity with serious comorbidity and body mass index (BMI) of 40.0 to 44.9 in adult (CMS/[facility])    Vitamin D deficiency       " Nervous and Auditory    DM type 2 with diabetic peripheral neuropathy (CMS/HCC) - Primary    Relevant Medications    Semaglutide,0.25 or 0.5MG/DOS, (Ozempic, 0.25 or 0.5 MG/DOSE,) 2 MG/1.5ML solution pen-injector       Other    Anxiety and depression (Chronic)      Other Visit Diagnoses     Vitamin B 12 deficiency        Low Vit B12 with neuropathy. Trial of Vit B12 injections.    Relevant Medications    Cyanocobalamin 1000 MCG/ML kit      Spent time discussing her symptoms and treatment options. She is in agreement to restart Ozempic at starting dose of 0.25 mg for one month. She will discontinue if nausea returns. If she is tolerating it, she will increase to 0.5 mg weekly.Her  did bring her insulin pump in for an upload. This was reviewed and interpreted. Noted generally less than 100 G of CHO daily and 14 units of insulin per Food intake average daily. Reinforced the need to enter food at every meal and with intake of over 15 Grams. At this time, will not make changes. She also is restarting Ozempic. Her labs were ordered and I reviewed these with her. A copy was scanned into her chart.  Noted increase of A1C to 8.5. Lipid panel reviewed which showed an improvement.She continue Rosuvastatin and cardiovascular risk reduction. For her Asthma, she will continue Breo and rescue inhaler/neb treatments as needed. May consider adding Singulair Lifestyle joanna cations including nutrition and exercise recommendations reinforced. Low Vit B12 of 223 with neuropathy. Trial of weekly  Vit B12 injections and will recheck in four weeks..Continue weekly Vit D for now. In preparation for her Bariatric Surgical evaluation, she will continue to focus on lifestyle joanna cations including nutrition and exercise recommendations reinforced.Follow up appointment with me will be May 13 th, 2020; sooner if problems/concerns occur.      This visit has been scheduled as a phone visit to comply with patient safety concerns in  accordance with CDC recommendations. Total time of discussion was 25 minutes.      This document has been electronically signed by NIDA Starr, FAY-BC, DOUG  April 15, 2020 08:27

## 2020-04-28 RX ORDER — IBUPROFEN 800 MG/1
TABLET ORAL
Qty: 30 TABLET | Refills: 2 | Status: SHIPPED | OUTPATIENT
Start: 2020-04-28 | End: 2020-05-13

## 2020-04-29 ENCOUNTER — TELEPHONE (OUTPATIENT)
Dept: FAMILY MEDICINE CLINIC | Facility: CLINIC | Age: 45
End: 2020-04-29

## 2020-04-29 NOTE — TELEPHONE ENCOUNTER
Left message for the patient with this information.     ----- Message from NIDA Betancourt sent at 4/28/2020  4:36 PM EDT -----  I will...She should take with food and do not take asa when taking Take cautiously..  ----- Message -----  From: Anne Paz MA  Sent: 4/28/2020   4:12 PM EDT  To: NIDA Betancourt    Pt called and stated that her legs have been hurting so bad the last week or so. She wanted to know if you could send her in some ibuprofen 800?

## 2020-05-07 ENCOUNTER — OFFICE VISIT (OUTPATIENT)
Dept: PSYCHIATRY | Facility: CLINIC | Age: 45
End: 2020-05-07

## 2020-05-07 DIAGNOSIS — F33.3 SEVERE EPISODE OF RECURRENT MAJOR DEPRESSIVE DISORDER, WITH PSYCHOTIC FEATURES (HCC): ICD-10-CM

## 2020-05-07 PROCEDURE — 99442 PR PHYS/QHP TELEPHONE EVALUATION 11-20 MIN: CPT | Performed by: NURSE PRACTITIONER

## 2020-05-07 RX ORDER — CYANOCOBALAMIN 1000 UG/ML
INJECTION, SOLUTION INTRAMUSCULAR; SUBCUTANEOUS
COMMUNITY
Start: 2020-04-18 | End: 2020-05-07 | Stop reason: SDUPTHER

## 2020-05-07 RX ORDER — DEXTROAMPHETAMINE SACCHARATE, AMPHETAMINE ASPARTATE, DEXTROAMPHETAMINE SULFATE AND AMPHETAMINE SULFATE 2.5; 2.5; 2.5; 2.5 MG/1; MG/1; MG/1; MG/1
TABLET ORAL
Qty: 60 TABLET | Refills: 0 | Status: SHIPPED | OUTPATIENT
Start: 2020-05-07 | End: 2020-05-28

## 2020-05-07 NOTE — PROGRESS NOTES
You have chosen to receive care through a telephone visit. Do you consent to use a telephone visit for your medical care today? Yes        Subjective   Nivia Bernstein is a 45 y.o. female is here today for medication management follow-up.    This provider is located at [facility] at [address] [place] [address]. The provider identified herself as well as her credentials. The Patient is at/in home by herself/himself, using her/his phone because problems with video connection. The patient's condition being diagnosed/treated is appropriate for telemedicine. The patient gave consent to be seen remotely, and when consent is given they understand that the consent allows for patient identifiable information to be sent to a third party as needed. They may refuse to be seen remotely at any time. The electronic data is encrypted and password protected, and the patient has been advised of the potential risks to privacy not withstanding such measures.      Chief Complaint:  Severe depression     History of Present Illness:   Patient presents today for follow up for medication management for depression. Patient states mind wont stop and not been able to sleep much. Patient states only getting about 3 hours a night. Patient states didn't go to sleep until 4 am last night then up around 7-8 am. Patient still having bad dreams. Patient states unable to fall asleep when lay down. Patient states dealing with a lot of pain as well. Patient states taking the last dose of adderall at 1pm. Patient states she has been irritable but depression isn't as bad as it was. Patient reports currently depression is at a 7-8 on a 0-10 scale with 10 being the worst. Patient reports symptoms of depression of irritable, one crying spell, depressed mood, and decreased energy. Patient reports anxiety is at a 7-8 on a 0-10 scale with 10 being the worst. Patient denies any panic attacks. Patient reports appetite is decreased some and eating  at least 2-3 meals a day. Patient denies any auditory or visual hallucinations. Patient adamantly denies any SI or HI. Patient denies any side effects to medications. Patient states her blood glucose has been running low as well. Patient states there have been a couple times it has dropped into the 30s. Patient states she has an upcoming appointment with PCP regarding diabetes.   The following portions of the patient's history were reviewed and updated as appropriate: allergies, current medications, past family history, past medical history, past social history, past surgical history and problem list.    Review of Systems   Constitutional: Positive for appetite change and fatigue.   Respiratory: Negative.    Cardiovascular: Negative.    Gastrointestinal: Negative.    Neurological: Negative.    Psychiatric/Behavioral: Positive for agitation, dysphoric mood and sleep disturbance. The patient is nervous/anxious.        Objective   Physical Exam   Constitutional: She is cooperative.   Neurological: She is alert.   Psychiatric: Her speech is normal. Cognition and memory are normal.     not currently breastfeeding. There is no height or weight on file to calculate BMI.  Unable to obtain vitals due to telephone visit.     Allergies   Allergen Reactions   • Mobic [Meloxicam] Rash   • Novolog [Insulin Aspart] Hives   • Penicillins Angioedema       Medication List:   Current Outpatient Medications   Medication Sig Dispense Refill   • Albuterol Sulfate (VENTOLIN HFA IN) Inhale.     • amphetamine-dextroamphetamine (Adderall) 10 MG tablet Take 1 tablet by mouth 2 (Two) Times a Day. Second dose no later than 3pm. 60 tablet 0   • aspirin  MG tablet   5   • benzonatate (TESSALON) 200 MG capsule Take 1 capsule by mouth 3 (Three) Times a Day As Needed for Cough. 30 capsule 1   • cetirizine (zyrTEC) 10 MG tablet Take 1 tablet by mouth Daily As Needed for Allergies. 30 tablet 5   • Cyanocobalamin 1000 MCG/ML kit Inject 1 mL as  directed 1 (One) Time Per Week for 10 doses. 1 kit 2   • diclofenac (VOLTAREN) 1 % gel gel Apply 4 g topically to the appropriate area as directed 4 (Four) Times a Day As Needed (joint pain). 500 g 5   • fluconazole (DIFLUCAN) 150 MG tablet Take 1 tablet by mouth Daily. 2 tablet 0   • fluticasone (FLONASE) 50 MCG/ACT nasal spray 1 spray into the nostril(s) as directed by provider 2 (Two) Times a Day. 1 bottle 3   • Fluticasone Furoate-Vilanterol (Breo Ellipta) 200-25 MCG/INH inhaler Inhale 1 puff Daily for 30 days. Rinse mouth well after using 1 inhaler 3   • GLOBAL EASE INJECT PEN NEEDLES 31G X 8 MM misc USE AS DIRECTED TO INJECT VICTOZA AND BASAGLAR DAILY  10   • glucagon (GLUCAGEN) 1 MG injection Inject 1 mg under the skin into the appropriate area as directed 1 (One) Time As Needed for Low Blood Sugar for up to 1 dose. 1 kit 3   • glucose blood test strip Use tid 100 each 12   • HUMALOG KWIKPEN 100 UNIT/ML solution pen-injector Inject 45 Units under the skin into the appropriate area as directed 3 (Three) Times a Day As Needed (For elevated blood sugars). 15 pen 3   • hydroCHLOROthiazide (HYDRODIURIL) 12.5 MG tablet Take 1 tablet by mouth Every Morning. 20 tablet 0   • ibuprofen (ADVIL,MOTRIN) 800 MG tablet Take 1/2 to 1 tablet every 8 hours with food if needed for pain. 30 tablet 2   • insulin lispro (HUMALOG) 100 UNIT/ML injection Per insulin pump. Maximum insulin dose 300 units daily 6 each 12   • magnesium oxide (MAG-OX) 400 MG tablet Take 1 tablet by mouth Daily. 30 tablet 0   • metFORMIN (GLUCOPHAGE) 1000 MG tablet Take 1 tablet by mouth 2 (Two) Times a Day With Meals. 60 tablet 5   • nitrofurantoin, macrocrystal-monohydrate, (MACROBID) 100 MG capsule Take 1 capsule by mouth Daily. 56 capsule 3   • omeprazole (priLOSEC) 20 MG capsule   5   • ondansetron (ZOFRAN) 4 MG tablet Take 1 tablet by mouth Every 8 (Eight) Hours As Needed for Nausea or Vomiting. 39 tablet 1   • promethazine (PHENERGAN) 6.25 MG/5ML  syrup      • rosuvastatin (CRESTOR) 40 MG tablet Take 1 tablet by mouth Every Night. 30 tablet 5   • Semaglutide,0.25 or 0.5MG/DOS, (Ozempic, 0.25 or 0.5 MG/DOSE,) 2 MG/1.5ML solution pen-injector Inject 0.25 mg under the skin into the appropriate area as directed 1 (One) Time Per Week for 30 days. 1 pen 0   • TRUEPLUS LANCETS 33G misc Apply 1 each topically to the appropriate area as directed 3 (Three) Times a Day. Test blood glucose  0   • vitamin D (ERGOCALCIFEROL) 1.25 MG (57945 UT) capsule capsule TAKE 1 CAPSULE BY MOUTH ONCE WEEKLY 12 capsule 0     No current facility-administered medications for this visit.        Mental Status Exam:   Hygiene:   [place]  Cooperation:  Cooperative  Eye Contact:  [place]  Psychomotor Behavior:  [place]  Affect:  [place]  Hopelessness: Denies  Speech:  Normal  Thought Process:  Goal directed and Linear  Thought Content:  Normal  Suicidal:  None  Homicidal:  None  Hallucinations:  None  Delusion:  None  Memory:  Deficits  Orientation:  Person, Place, Time and Situation  Reliability:  fair  Insight:  Fair  Judgement:  Fair  Impulse Control:  Fair  Physical/Medical Issues:  Yes DM              Assessment/Plan   Diagnoses and all orders for this visit:    Severe episode of recurrent major depressive disorder, with psychotic features (CMS/HCC)  -     amphetamine-dextroamphetamine (Adderall) 10 MG tablet; Take 1.5 tablets by mouth Every Morning AND 0.5 tablets Daily With Lunch. Second dose no later than 3pm.            Discussed medication options with patient. Change Adderall to 15mg daily in the morning and 5mg in the afternoon with lunch. Reviewed the risks, benefits, and side effects of the medications; patient acknowledged and verbally consented. Patient was provided education regarding treatment and treatment options.  Extensive education is provided regarding stimulants. Cardiac risk, risk of growth delay, weight loss, and cardiac issues. Patient is being prescribed a controlled substance as  part of treatment plan.  Nishant report reviewed #[id number].   Patient is agreeable to call the office with any questions, concerns, or worsening of symptoms. Patient is aware to call 911 or go to the nearest ER should begin having SI/HI.        Follow up in four weeks      Errors in dictation may reflect use of voice recognition software and not all errors in transcription may have been detected prior to signing.        This visit has been rescheduled as a phone visit to comply with patient safety concerns in accordance with CDC recommendations. Total time of discussion was 13 minutes.              This document has been electronically signed by NIDA Vasquez   May 7, 2020 08:59

## 2020-05-11 ENCOUNTER — APPOINTMENT (OUTPATIENT)
Dept: BONE DENSITY | Facility: HOSPITAL | Age: 45
End: 2020-05-11

## 2020-05-11 ENCOUNTER — APPOINTMENT (OUTPATIENT)
Dept: MAMMOGRAPHY | Facility: HOSPITAL | Age: 45
End: 2020-05-11

## 2020-05-13 ENCOUNTER — PRIOR AUTHORIZATION (OUTPATIENT)
Dept: FAMILY MEDICINE CLINIC | Facility: CLINIC | Age: 45
End: 2020-05-13

## 2020-05-13 ENCOUNTER — OFFICE VISIT (OUTPATIENT)
Dept: FAMILY MEDICINE CLINIC | Facility: CLINIC | Age: 45
End: 2020-05-13

## 2020-05-13 VITALS
OXYGEN SATURATION: 97 % | TEMPERATURE: 97.1 F | HEART RATE: 89 BPM | SYSTOLIC BLOOD PRESSURE: 120 MMHG | BODY MASS INDEX: 40.46 KG/M2 | WEIGHT: 237 LBS | HEIGHT: 64 IN | RESPIRATION RATE: 14 BRPM | DIASTOLIC BLOOD PRESSURE: 80 MMHG

## 2020-05-13 DIAGNOSIS — E66.01 CLASS 3 SEVERE OBESITY WITH SERIOUS COMORBIDITY AND BODY MASS INDEX (BMI) OF 40.0 TO 44.9 IN ADULT, UNSPECIFIED OBESITY TYPE (HCC): ICD-10-CM

## 2020-05-13 DIAGNOSIS — E11.42 DM TYPE 2 WITH DIABETIC PERIPHERAL NEUROPATHY (HCC): Primary | Chronic | ICD-10-CM

## 2020-05-13 DIAGNOSIS — F32.A ANXIETY AND DEPRESSION: Chronic | ICD-10-CM

## 2020-05-13 DIAGNOSIS — E53.8 VITAMIN B 12 DEFICIENCY: ICD-10-CM

## 2020-05-13 DIAGNOSIS — K21.9 GASTROESOPHAGEAL REFLUX DISEASE WITHOUT ESOPHAGITIS: Chronic | ICD-10-CM

## 2020-05-13 DIAGNOSIS — F41.9 ANXIETY AND DEPRESSION: Chronic | ICD-10-CM

## 2020-05-13 DIAGNOSIS — E78.2 MIXED HYPERLIPIDEMIA: Chronic | ICD-10-CM

## 2020-05-13 DIAGNOSIS — E55.9 VITAMIN D DEFICIENCY: ICD-10-CM

## 2020-05-13 DIAGNOSIS — J45.20 MILD INTERMITTENT ASTHMA WITHOUT COMPLICATION: Chronic | ICD-10-CM

## 2020-05-13 PROCEDURE — 95251 CONT GLUC MNTR ANALYSIS I&R: CPT | Performed by: NURSE PRACTITIONER

## 2020-05-13 PROCEDURE — 99214 OFFICE O/P EST MOD 30 MIN: CPT | Performed by: NURSE PRACTITIONER

## 2020-05-13 RX ORDER — MONTELUKAST SODIUM 10 MG/1
10 TABLET ORAL NIGHTLY
COMMUNITY
End: 2020-08-14

## 2020-05-13 NOTE — PATIENT INSTRUCTIONS
"DASH Eating Plan  DASH stands for \"Dietary Approaches to Stop Hypertension.\" The DASH eating plan is a healthy eating plan that has been shown to reduce high blood pressure (hypertension). It may also reduce your risk for type 2 diabetes, heart disease, and stroke. The DASH eating plan may also help with weight loss.  What are tips for following this plan?    General guidelines  · Avoid eating more than 2,300 mg (milligrams) of salt (sodium) a day. If you have hypertension, you may need to reduce your sodium intake to 1,500 mg a day.  · Limit alcohol intake to no more than 1 drink a day for nonpregnant women and 2 drinks a day for men. One drink equals 12 oz of beer, 5 oz of wine, or 1½ oz of hard liquor.  · Work with your health care provider to maintain a healthy body weight or to lose weight. Ask what an ideal weight is for you.  · Get at least 30 minutes of exercise that causes your heart to beat faster (aerobic exercise) most days of the week. Activities may include walking, swimming, or biking.  · Work with your health care provider or diet and nutrition specialist (dietitian) to adjust your eating plan to your individual calorie needs.  Reading food labels    · Check food labels for the amount of sodium per serving. Choose foods with less than 5 percent of the Daily Value of sodium. Generally, foods with less than 300 mg of sodium per serving fit into this eating plan.  · To find whole grains, look for the word \"whole\" as the first word in the ingredient list.  Shopping  · Buy products labeled as \"low-sodium\" or \"no salt added.\"  · Buy fresh foods. Avoid canned foods and premade or frozen meals.  Cooking  · Avoid adding salt when cooking. Use salt-free seasonings or herbs instead of table salt or sea salt. Check with your health care provider or pharmacist before using salt substitutes.  · Do not barrera foods. Cook foods using healthy methods such as baking, boiling, grilling, and broiling instead.  · Cook with " heart-healthy oils, such as olive, canola, soybean, or sunflower oil.  Meal planning  · Eat a balanced diet that includes:  ? 5 or more servings of fruits and vegetables each day. At each meal, try to fill half of your plate with fruits and vegetables.  ? Up to 6-8 servings of whole grains each day.  ? Less than 6 oz of lean meat, poultry, or fish each day. A 3-oz serving of meat is about the same size as a deck of cards. One egg equals 1 oz.  ? 2 servings of low-fat dairy each day.  ? A serving of nuts, seeds, or beans 5 times each week.  ? Heart-healthy fats. Healthy fats called Omega-3 fatty acids are found in foods such as flaxseeds and coldwater fish, like sardines, salmon, and mackerel.  · Limit how much you eat of the following:  ? Canned or prepackaged foods.  ? Food that is high in trans fat, such as fried foods.  ? Food that is high in saturated fat, such as fatty meat.  ? Sweets, desserts, sugary drinks, and other foods with added sugar.  ? Full-fat dairy products.  · Do not salt foods before eating.  · Try to eat at least 2 vegetarian meals each week.  · Eat more home-cooked food and less restaurant, buffet, and fast food.  · When eating at a restaurant, ask that your food be prepared with less salt or no salt, if possible.  What foods are recommended?  The items listed may not be a complete list. Talk with your dietitian about what dietary choices are best for you.  Grains  Whole-grain or whole-wheat bread. Whole-grain or whole-wheat pasta. Brown rice. Oatmeal. Quinoa. Bulgur. Whole-grain and low-sodium cereals. Joy bread. Low-fat, low-sodium crackers. Whole-wheat flour tortillas.  Vegetables  Fresh or frozen vegetables (raw, steamed, roasted, or grilled). Low-sodium or reduced-sodium tomato and vegetable juice. Low-sodium or reduced-sodium tomato sauce and tomato paste. Low-sodium or reduced-sodium canned vegetables.  Fruits  All fresh, dried, or frozen fruit. Canned fruit in natural juice (without  added sugar).  Meat and other protein foods  Skinless chicken or turkey. Ground chicken or turkey. Pork with fat trimmed off. Fish and seafood. Egg whites. Dried beans, peas, or lentils. Unsalted nuts, nut butters, and seeds. Unsalted canned beans. Lean cuts of beef with fat trimmed off. Low-sodium, lean deli meat.  Dairy  Low-fat (1%) or fat-free (skim) milk. Fat-free, low-fat, or reduced-fat cheeses. Nonfat, low-sodium ricotta or cottage cheese. Low-fat or nonfat yogurt. Low-fat, low-sodium cheese.  Fats and oils  Soft margarine without trans fats. Vegetable oil. Low-fat, reduced-fat, or light mayonnaise and salad dressings (reduced-sodium). Canola, safflower, olive, soybean, and sunflower oils. Avocado.  Seasoning and other foods  Herbs. Spices. Seasoning mixes without salt. Unsalted popcorn and pretzels. Fat-free sweets.  What foods are not recommended?  The items listed may not be a complete list. Talk with your dietitian about what dietary choices are best for you.  Grains  Baked goods made with fat, such as croissants, muffins, or some breads. Dry pasta or rice meal packs.  Vegetables  Creamed or fried vegetables. Vegetables in a cheese sauce. Regular canned vegetables (not low-sodium or reduced-sodium). Regular canned tomato sauce and paste (not low-sodium or reduced-sodium). Regular tomato and vegetable juice (not low-sodium or reduced-sodium). Pickles. Olives.  Fruits  Canned fruit in a light or heavy syrup. Fried fruit. Fruit in cream or butter sauce.  Meat and other protein foods  Fatty cuts of meat. Ribs. Fried meat. [place]. Sausage. Bologna and other processed lunch meats. Salami. Fatback. Hotdogs. Bratwurst. Salted nuts and seeds. Canned beans with added salt. Canned or smoked fish. Whole eggs or egg yolks. Chicken or turkey with skin.  Dairy  Whole or 2% milk, cream, and half-and-half. Whole or full-fat cream cheese. Whole-fat or sweetened yogurt. Full-fat cheese. Nondairy creamers. Whipped toppings.  Processed cheese and cheese spreads.  Fats and oils  Butter. Stick margarine. Lard. Shortening. Ghee. Carroll fat. Tropical oils, such as coconut, palm kernel, or palm oil.  Seasoning and other foods  Salted popcorn and pretzels. Onion salt, garlic salt, seasoned salt, table salt, and sea salt. Worcestershire sauce. Tartar sauce. Barbecue sauce. Teriyaki sauce. Soy sauce, including reduced-sodium. Steak sauce. Canned and packaged gravies. Fish sauce. Oyster sauce. Cocktail sauce. Horseradish that you find on the shelf. Ketchup. Mustard. Meat flavorings and tenderizers. Bouillon cubes. Hot sauce and Tabasco sauce. Premade or packaged marinades. Premade or packaged taco seasonings. Relishes. Regular salad dressings.  Where to find more information:  · National Heart, Lung, and Blood [employer]: www.nhlbi.nih.gov  · American Heart Association: www.heart.org  Summary  · The DASH eating plan is a healthy eating plan that has been shown to reduce high blood pressure (hypertension). It may also reduce your risk for type 2 diabetes, heart disease, and stroke.  · With the DASH eating plan, you should limit salt (sodium) intake to 2,300 mg a day. If you have hypertension, you may need to reduce your sodium intake to 1,500 mg a day.  · When on the DASH eating plan, aim to eat more fresh fruits and vegetables, whole grains, lean proteins, low-fat dairy, and heart-healthy fats.  · Work with your health care provider or diet and nutrition specialist (dietitian) to adjust your eating plan to your individual calorie needs.  This information is not intended to replace advice given to you by your health care provider. Make sure you discuss any questions you have with your health care provider.  Document Released: 12/06/2012 Document Revised: 12/11/2017 Document Reviewed: 12/11/2017  [employer] Interactive Patient Education © 2020 [employer]

## 2020-05-13 NOTE — PROGRESS NOTES
"  History of Present Illness   Nivia is a 46 y/o female who has DM, type 2,  Dyslipidemia, GERD, Asthma and Obesity. She has been hospitalized for DVTs and TIAs in the past.  She has recently had worsening Asthma symptoms and was started on Breo which she reports has helped her significantly.    Diabetes   She has type 2 diabetes mellitus. The initial diagnosis of diabetes was made 20 years ago. Her disease course has been fluctuating.  Associated symptoms include blurred vision, fatigue, peripheral neuropathy, and visual change. Pertinent negatives for diabetes include no foot ulcerations. Diabetic complications include peripheral neuropathy.  Risk factors for coronary artery disease include post-menopausal, stress and dyslipidemia. She is currently utilizing insulin pump therapy and CGM which had greatly improved her glycemic management. She did stop weekly Ozempic due to nausea. She is following a generally healthy diet and is Carb Counting.  She has had a previous visit with a dietitian. She participates in exercise at this time but finding it more difficult due to her Neuropathy.  Her home blood glucose trend is increasing.  An ACE inhibitor/angiotensin II receptor blocker is not  being taken at this time. Eye exam is current.      Lab Results   Component Value Date    HGBA1C 8.20 (H) 05/13/2020      Dyslipidemia   The current episode started more than 1 year ago. Pertinent negatives include no chest pain or shortness of breath. She is taking Crestor 40 mg.   Lab Results   Component Value Date    CHLPL 168 05/13/2020    TRIG 130 05/13/2020    HDL 39 (L) 05/13/2020     (H) 05/13/2020     Obesity   The current episode started more than 1 year ago. The problem has been waxing and waning. Associated symptoms include arthralgias, fatigue, headaches and weakness. Pertinent negatives include no chest pain, chills, coughing, fever, nausea, numbness, rash or vomiting. She has tried \"Everything\"  She is awaiting " "Bariatric Surgical consult.   Lab Results   Component Value Date    TSH 1.730 05/13/2020     Vitals:    05/13/20 1003   BP: 120/80   Pulse: 89   Resp: 14   Temp: 97.1 °F (36.2 °C)   TempSrc: Temporal   SpO2: 97%   Weight: 108 kg (237 lb)   Height: 162.6 cm (64\")        The following portions of the patient's history were reviewed and updated as appropriate: allergies, current medications, past family history, past medical history, past social history, past surgical history and problem list.    Review of Systems   Constitutional: Positive for activity change (Back to work) and fatigue. Negative for appetite change, chills, fever and unexpected weight change.   HENT: Positive for congestion, postnasal drip and rhinorrhea.    Eyes: Positive for visual disturbance. Negative for discharge and redness.   Respiratory: Positive for cough. Negative for shortness of breath and wheezing.         Much improved   Cardiovascular: Positive for leg swelling (Improving). Negative for chest pain and palpitations.   Gastrointestinal: Positive for constipation and nausea (Even with low dose of Ozempic). Negative for vomiting.   Endocrine: Negative for cold intolerance, heat intolerance, polydipsia, polyphagia and polyuria.   Musculoskeletal: Positive for arthralgias.   Skin: Negative for color change and rash.   Neurological: Positive for numbness and headaches. Negative for dizziness, tremors, speech difficulty, weakness and light-headedness.   Hematological: Negative for adenopathy.   Psychiatric/Behavioral: Positive for sleep disturbance. Negative for confusion, decreased concentration and suicidal ideas. The patient is not nervous/anxious.    All other systems reviewed and are negative.    Physical Exam   Constitutional: She is oriented to person, place, and time. She appears well-developed and well-nourished. No distress.   HENT:   Head: Normocephalic.   Nose: Nose normal.   Eyes: Pupils are equal, round, and reactive to light. " Conjunctivae are normal. Right eye exhibits no discharge. Left eye exhibits no discharge. No scleral icterus.   Neck: Neck supple. No JVD present. No thyromegaly present.   Cardiovascular: Normal rate, regular rhythm and normal heart sounds. Exam reveals no friction rub.   No murmur heard.  Pulmonary/Chest: Effort normal and breath sounds normal. No respiratory distress. She has no wheezes. She has no rales.   Abdominal: Soft. Bowel sounds are normal. She exhibits no distension. There is no tenderness. There is no rebound and no guarding.   Musculoskeletal: She exhibits edema (Trace bilateral lower extremities) and tenderness.   Lymphadenopathy:     She has no cervical adenopathy.   Neurological: She is alert and oriented to person, place, and time.   Skin: Skin is warm and dry. Capillary refill takes less than 2 seconds. No rash noted. No erythema.   Psychiatric: She has a normal mood and affect. Her behavior is normal. Judgment and thought content normal.   Nursing note and vitals reviewed.    Results for orders placed or performed in visit on 05/13/20   Comprehensive Metabolic Panel   Result Value Ref Range    Glucose 130 (H) 65 - 99 mg/dL    BUN 12 6 - 20 mg/dL    Creatinine 0.59 0.57 - 1.00 mg/dL    eGFR Non African Am 109 >60 mL/min/1.73    eGFR African Am 132 >60 mL/min/1.73    BUN/Creatinine Ratio 20.3 7.0 - 25.0    Sodium 143 136 - 145 mmol/L    Potassium 4.3 3.5 - 5.2 mmol/L    Chloride 106 98 - 107 mmol/L    Total CO2 25.2 22.0 - 29.0 mmol/L    Calcium 9.3 8.6 - 10.5 mg/dL    Total Protein 7.0 6.0 - 8.5 g/dL    Albumin 4.30 3.50 - 5.20 g/dL    Globulin 2.7 gm/dL    A/G Ratio 1.6 g/dL    Total Bilirubin 0.3 0.2 - 1.2 mg/dL    Alkaline Phosphatase 89 39 - 117 U/L    AST (SGOT) 14 1 - 32 U/L    ALT (SGPT) 20 1 - 33 U/L   Lipid Panel   Result Value Ref Range    Total Cholesterol 168 0 - 200 mg/dL    Triglycerides 130 0 - 150 mg/dL    HDL Cholesterol 39 (L) 40 - 60 mg/dL    VLDL Cholesterol 26 5 - 40 mg/dL     LDL Cholesterol  103 (H) 0 - 100 mg/dL   TSH   Result Value Ref Range    TSH 1.730 0.270 - 4.200 uIU/mL   Hemoglobin A1c   Result Value Ref Range    Hemoglobin A1C 8.20 (H) 4.80 - 5.60 %   Vitamin D 25 Hydroxy   Result Value Ref Range    25 Hydroxy, Vitamin D 40.9 30.0 - 100.0 ng/ml   Vitamin B12   Result Value Ref Range    Vitamin B-12 411 211 - 946 pg/mL   CBC & Differential   Result Value Ref Range    WBC 9.21 3.40 - 10.80 10*3/mm3    RBC 4.69 3.77 - 5.28 10*6/mm3    Hemoglobin 12.5 12.0 - 15.9 g/dL    Hematocrit 38.1 34.0 - 46.6 %    MCV 81.2 79.0 - 97.0 fL    MCH 26.7 26.6 - 33.0 pg    MCHC 32.8 31.5 - 35.7 g/dL    RDW 13.4 12.3 - 15.4 %    Platelets 332 140 - 450 10*3/mm3    Neutrophil Rel % 53.7 42.7 - 76.0 %    Lymphocyte Rel % 37.6 19.6 - 45.3 %    Monocyte Rel % 5.8 5.0 - 12.0 %    Eosinophil Rel % 2.0 0.3 - 6.2 %    Basophil Rel % 0.8 0.0 - 1.5 %    Neutrophils Absolute 4.96 1.70 - 7.00 10*3/mm3    Lymphocytes Absolute 3.46 (H) 0.70 - 3.10 10*3/mm3    Monocytes Absolute 0.53 0.10 - 0.90 10*3/mm3    Eosinophils Absolute 0.18 0.00 - 0.40 10*3/mm3    Basophils Absolute 0.07 0.00 - 0.20 10*3/mm3    Immature Granulocyte Rel % 0.1 0.0 - 0.5 %    Immature Grans Absolute 0.01 0.00 - 0.05 10*3/mm3    nRBC 0.1 0.0 - 0.2 /100 WBC     Assessment/Plan     Patient's Body mass index is 40.68 kg/m². BMI is above normal parameters. Recommendations include: educational material, exercise counseling and nutrition counseling.   (Normal BMI:  18.5-24.9, OW 25-29.9, Obesity 30 or greater) Referral to Bariatric Surgery is pending    Problems Addressed this Visit        Cardiovascular and Mediastinum    Mixed hyperlipidemia    Relevant Orders    Comprehensive Metabolic Panel (Completed)    Lipid Panel (Completed)       Respiratory    Mild intermittent asthma without complication    Relevant Medications    montelukast (SINGULAIR) 10 MG tablet    Other Relevant Orders    CBC & Differential (Completed)       Digestive    GERD  (gastroesophageal reflux disease) (Chronic)    Class 3 severe obesity with serious comorbidity and body mass index (BMI) of 40.0 to 44.9 in adult (CMS/[facility])    Vitamin D deficiency    Relevant Orders    Vitamin D 25 Hydroxy (Completed)       Nervous and Auditory    DM type 2 with diabetic peripheral neuropathy (CMS/[facility]) - Primary    Relevant Medications    Dulaglutide (Trulicity) 0.75 MG/0.5ML solution pen-injector    Other Relevant Orders    Comprehensive Metabolic Panel (Completed)    Lipid Panel (Completed)    TSH (Completed)    Hemoglobin A1c (Completed)       Other    Anxiety and depression (Chronic)      Other Visit Diagnoses     Vitamin B 12 deficiency        Continue Vit B12 injections    Relevant Orders    Vitamin B12 (Completed)      Findings and recommendations discussed with Gabrielamaria dolorescollin. Reviewed above labs with her with noted slightly increase in her A1C. 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 She does feel the Ozempic even at lowest dose is causing her nausea. Will change to Trulicity. Provided her four 0.75 mg pens for her to try. Her insulin pump was uploaded/interpreted and reviewed with her. No changes to her parameters at this time. Lifestyle modifications including nutrition and exercise reinforced. She will f/u with me in June; sooner if problems/concerns occur.       This document has been electronically signed by NIDA Starr, FAY-BC, MRAYE  May 15, 2020 11:10

## 2020-05-14 LAB
25(OH)D3+25(OH)D2 SERPL-MCNC: 40.9 NG/ML (ref 30–100)
ALBUMIN SERPL-MCNC: 4.3 G/DL (ref 3.5–5.2)
ALBUMIN/GLOB SERPL: 1.6 G/DL
ALP SERPL-CCNC: 89 U/L (ref 39–117)
ALT SERPL-CCNC: 20 U/L (ref 1–33)
AST SERPL-CCNC: 14 U/L (ref 1–32)
BASOPHILS # BLD AUTO: 0.07 10*3/MM3 (ref 0–0.2)
BASOPHILS NFR BLD AUTO: 0.8 % (ref 0–1.5)
BILIRUB SERPL-MCNC: 0.3 MG/DL (ref 0.2–1.2)
BUN SERPL-MCNC: 12 MG/DL (ref 6–20)
BUN/CREAT SERPL: 20.3 (ref 7–25)
CALCIUM SERPL-MCNC: 9.3 MG/DL (ref 8.6–10.5)
CHLORIDE SERPL-SCNC: 106 MMOL/L (ref 98–107)
CHOLEST SERPL-MCNC: 168 MG/DL (ref 0–200)
CO2 SERPL-SCNC: 25.2 MMOL/L (ref 22–29)
CREAT SERPL-MCNC: 0.59 MG/DL (ref 0.57–1)
EOSINOPHIL # BLD AUTO: 0.18 10*3/MM3 (ref 0–0.4)
EOSINOPHIL NFR BLD AUTO: 2 % (ref 0.3–6.2)
ERYTHROCYTE [DISTWIDTH] IN BLOOD BY AUTOMATED COUNT: 13.4 % (ref 12.3–15.4)
GLOBULIN SER CALC-MCNC: 2.7 GM/DL
GLUCOSE SERPL-MCNC: 130 MG/DL (ref 65–99)
HBA1C MFR BLD: 8.2 % (ref 4.8–5.6)
HCT VFR BLD AUTO: 38.1 % (ref 34–46.6)
HDLC SERPL-MCNC: 39 MG/DL (ref 40–60)
HGB BLD-MCNC: 12.5 G/DL (ref 12–15.9)
IMM GRANULOCYTES # BLD AUTO: 0.01 10*3/MM3 (ref 0–0.05)
IMM GRANULOCYTES NFR BLD AUTO: 0.1 % (ref 0–0.5)
LDLC SERPL CALC-MCNC: 103 MG/DL (ref 0–100)
LYMPHOCYTES # BLD AUTO: 3.46 10*3/MM3 (ref 0.7–3.1)
LYMPHOCYTES NFR BLD AUTO: 37.6 % (ref 19.6–45.3)
MCH RBC QN AUTO: 26.7 PG (ref 26.6–33)
MCHC RBC AUTO-ENTMCNC: 32.8 G/DL (ref 31.5–35.7)
MCV RBC AUTO: 81.2 FL (ref 79–97)
MONOCYTES # BLD AUTO: 0.53 10*3/MM3 (ref 0.1–0.9)
MONOCYTES NFR BLD AUTO: 5.8 % (ref 5–12)
NEUTROPHILS # BLD AUTO: 4.96 10*3/MM3 (ref 1.7–7)
NEUTROPHILS NFR BLD AUTO: 53.7 % (ref 42.7–76)
NRBC BLD AUTO-RTO: 0.1 /100 WBC (ref 0–0.2)
PLATELET # BLD AUTO: 332 10*3/MM3 (ref 140–450)
POTASSIUM SERPL-SCNC: 4.3 MMOL/L (ref 3.5–5.2)
PROT SERPL-MCNC: 7 G/DL (ref 6–8.5)
RBC # BLD AUTO: 4.69 10*6/MM3 (ref 3.77–5.28)
SODIUM SERPL-SCNC: 143 MMOL/L (ref 136–145)
TRIGL SERPL-MCNC: 130 MG/DL (ref 0–150)
TSH SERPL DL<=0.005 MIU/L-ACNC: 1.73 UIU/ML (ref 0.27–4.2)
VIT B12 SERPL-MCNC: 411 PG/ML (ref 211–946)
VLDLC SERPL CALC-MCNC: 26 MG/DL (ref 5–40)
WBC # BLD AUTO: 9.21 10*3/MM3 (ref 3.4–10.8)

## 2020-05-21 ENCOUNTER — TELEPHONE (OUTPATIENT)
Dept: PSYCHIATRY | Facility: CLINIC | Age: 45
End: 2020-05-21

## 2020-05-21 ENCOUNTER — PRIOR AUTHORIZATION (OUTPATIENT)
Dept: FAMILY MEDICINE CLINIC | Facility: CLINIC | Age: 45
End: 2020-05-21

## 2020-05-21 NOTE — TELEPHONE ENCOUNTER
"Patient called back just a few minutes after initial call and stated that she \"lookked up\" the side effects of the medication and did not feel that she wanted to start it. Tried to reassure patient that Gale felt the benefit of the medicine was stronger than risk of side effects. Patient however stated she did not want to start it for fear that she would experience side effects. Gale was made aware.   "

## 2020-05-22 DIAGNOSIS — M25.50 ARTHRALGIA, UNSPECIFIED JOINT: Primary | ICD-10-CM

## 2020-05-28 ENCOUNTER — TELEPHONE (OUTPATIENT)
Dept: PSYCHIATRY | Facility: CLINIC | Age: 45
End: 2020-05-28

## 2020-05-28 DIAGNOSIS — E53.8 VITAMIN B 12 DEFICIENCY: ICD-10-CM

## 2020-05-28 DIAGNOSIS — F41.1 GENERALIZED ANXIETY DISORDER: Primary | ICD-10-CM

## 2020-05-28 DIAGNOSIS — F33.3 SEVERE EPISODE OF RECURRENT MAJOR DEPRESSIVE DISORDER, WITH PSYCHOTIC FEATURES (HCC): ICD-10-CM

## 2020-05-28 RX ORDER — OMEPRAZOLE 20 MG/1
CAPSULE, DELAYED RELEASE ORAL
Qty: 60 CAPSULE | Refills: 5 | Status: SHIPPED | OUTPATIENT
Start: 2020-05-28 | End: 2020-11-13 | Stop reason: SDUPTHER

## 2020-05-28 RX ORDER — VENLAFAXINE 37.5 MG/1
37.5 TABLET ORAL DAILY
Qty: 30 TABLET | Refills: 0 | Status: SHIPPED | OUTPATIENT
Start: 2020-05-28 | End: 2020-06-05 | Stop reason: SINTOL

## 2020-05-28 RX ORDER — CYANOCOBALAMIN 1000 UG/ML
INJECTION, SOLUTION INTRAMUSCULAR; SUBCUTANEOUS
Qty: 1 ML | Refills: 2 | Status: ON HOLD | OUTPATIENT
Start: 2020-05-28 | End: 2020-08-18

## 2020-05-28 RX ORDER — ERGOCALCIFEROL 1.25 MG/1
CAPSULE ORAL
Qty: 12 CAPSULE | Refills: 0 | Status: SHIPPED | OUTPATIENT
Start: 2020-05-28 | End: 2020-08-14

## 2020-05-28 NOTE — TELEPHONE ENCOUNTER
Called to make patient aware that she did not need to take the the Effexor and the adderall together.    Patient did not answer so left her a message letting her know she need to STOP the adderall.

## 2020-06-05 ENCOUNTER — OFFICE VISIT (OUTPATIENT)
Dept: PSYCHIATRY | Facility: CLINIC | Age: 45
End: 2020-06-05

## 2020-06-05 VITALS
WEIGHT: 235 LBS | HEIGHT: 64 IN | OXYGEN SATURATION: 96 % | TEMPERATURE: 96.9 F | DIASTOLIC BLOOD PRESSURE: 72 MMHG | HEART RATE: 84 BPM | SYSTOLIC BLOOD PRESSURE: 110 MMHG | BODY MASS INDEX: 40.12 KG/M2

## 2020-06-05 DIAGNOSIS — F41.1 GENERALIZED ANXIETY DISORDER: Primary | ICD-10-CM

## 2020-06-05 DIAGNOSIS — F33.3 SEVERE EPISODE OF RECURRENT MAJOR DEPRESSIVE DISORDER, WITH PSYCHOTIC FEATURES (HCC): ICD-10-CM

## 2020-06-05 PROCEDURE — 99213 OFFICE O/P EST LOW 20 MIN: CPT | Performed by: NURSE PRACTITIONER

## 2020-06-05 PROCEDURE — 90833 PSYTX W PT W E/M 30 MIN: CPT | Performed by: NURSE PRACTITIONER

## 2020-06-05 RX ORDER — AMITRIPTYLINE HYDROCHLORIDE 25 MG/1
25 TABLET, FILM COATED ORAL NIGHTLY
Qty: 15 TABLET | Refills: 0 | Status: SHIPPED | OUTPATIENT
Start: 2020-06-05 | End: 2020-06-19 | Stop reason: SDUPTHER

## 2020-06-05 NOTE — PROGRESS NOTES
Subjective   Nivia Bernstein is a 45 y.o. female is here today for medication management follow-up.    Chief Complaint:  Severe depression     History of Present Illness:    Patient presents today for follow up for medication management for severe depression with . Patient states she did try the Effexor but unable to take it. Patient states she got sick to stomach and heart rate increased. Patient states she did vomit. Patient states she took the Effexor for two days. Patient states the second day she did not feel good at all. Patient states her irritability has been the worst. Patient states she did start working again to help keep her busy some. Patient states she just gets aggravated easily and has no energy. Patient reports currently depression is at a 8 on a 0-10 scale with 10 being the worst. Patient reports symptoms of depression of irritability, decreased energy, insomnia, depressed mood, and occasional crying spell. Patient reports anxiety is at a 8 on a 0-10 scale with 10 being the worst. Patient denies any panic attacks. Patient reports sleeping not the best and patient denies any nightmares. Patient states she is up and down all night. Patient states a lot of leg pain. Patient reports appetite is still decreased and eating at least 2-3 meals a day. Patient denies any auditory or visual hallucinations. Patient adamantly denies any SI or HI. Patient denies any side effects to medications. Patient denies any medical changes since last visit.   The following portions of the patient's history were reviewed and updated as appropriate: allergies, current medications, past family history, past medical history, past social history, past surgical history and problem list.    Review of Systems   Constitutional: Positive for appetite change.   Respiratory: Negative.    Cardiovascular: Negative.    Gastrointestinal: Negative.    Neurological: Negative.    Psychiatric/Behavioral: Positive for agitation,  "dysphoric mood and sleep disturbance. The patient is nervous/anxious.        Objective   Physical Exam   Constitutional: Vital signs are normal. She appears well-developed and well-nourished. She is cooperative.   Neurological: She is alert.   Psychiatric: She has a normal mood and affect. Her speech is normal. Judgment and thought content normal. She is agitated. Cognition and memory are normal.   Vitals reviewed.    Blood pressure 110/72, pulse 84, temperature 96.9 °F (36.1 °C), height 162.6 cm (64\"), weight 107 kg (235 lb), SpO2 96 %, not currently breastfeeding. Body mass index is 40.34 kg/m².     Allergies   Allergen Reactions   • Mobic [Meloxicam] Rash   • Novolog [Insulin Aspart] Hives   • Penicillins Angioedema       Medication List:   Current Outpatient Medications   Medication Sig Dispense Refill   • Albuterol Sulfate (VENTOLIN HFA IN) Inhale.     • amitriptyline (ELAVIL) 25 MG tablet Take 1 tablet by mouth Every Night. 15 tablet 0   • aspirin  MG tablet   5   • benzonatate (TESSALON) 200 MG capsule Take 1 capsule by mouth 3 (Three) Times a Day As Needed for Cough. 30 capsule 1   • cetirizine (zyrTEC) 10 MG tablet Take 1 tablet by mouth Daily As Needed for Allergies. 30 tablet 5   • cyanocobalamin 1000 MCG/ML injection INJECT 1ML ONCE WEEKLY FOR 10 DOSES 1 mL 2   • diclofenac (VOLTAREN) 1 % gel gel Apply 4 g topically to the appropriate area as directed 4 (Four) Times a Day As Needed (joint pain). 500 g 5   • fluconazole (DIFLUCAN) 150 MG tablet Take 1 tablet by mouth Daily. 2 tablet 0   • fluticasone (FLONASE) 50 MCG/ACT nasal spray 1 spray into the nostril(s) as directed by provider 2 (Two) Times a Day. 1 bottle 3   • GLOBAL EASE INJECT PEN NEEDLES 31G X 8 MM misc USE AS DIRECTED TO INJECT VICTOZA AND BASAGLAR DAILY  10   • glucagon (GLUCAGEN) 1 MG injection Inject 1 mg under the skin into the appropriate area as directed 1 (One) Time As Needed for Low Blood Sugar for up to 1 dose. 1 kit 3   • " glucose blood test strip Use tid 100 each 12   • HUMALOG KWIKPEN 100 UNIT/ML solution pen-injector Inject 45 Units under the skin into the appropriate area as directed 3 (Three) Times a Day As Needed (For elevated blood sugars). 15 pen 3   • hydroCHLOROthiazide (HYDRODIURIL) 12.5 MG tablet Take 1 tablet by mouth Every Morning. 20 tablet 0   • insulin lispro (HUMALOG) 100 UNIT/ML injection Per insulin pump. Maximum insulin dose 300 units daily 6 each 12   • metFORMIN (GLUCOPHAGE) 1000 MG tablet Take 1 tablet by mouth 2 (Two) Times a Day With Meals. 60 tablet 5   • montelukast (SINGULAIR) 10 MG tablet Take 10 mg by mouth Every Night.     • nitrofurantoin, macrocrystal-monohydrate, (MACROBID) 100 MG capsule Take 1 capsule by mouth Daily. 56 capsule 3   • omeprazole (priLOSEC) 20 MG capsule TAKE 1 CAPSULE BY MOUTH TWO TIMES A DAY 60 capsule 5   • ondansetron (ZOFRAN) 4 MG tablet Take 1 tablet by mouth Every 8 (Eight) Hours As Needed for Nausea or Vomiting. 39 tablet 1   • promethazine (PHENERGAN) 6.25 MG/5ML syrup      • rosuvastatin (CRESTOR) 40 MG tablet Take 1 tablet by mouth Every Night. 30 tablet 5   • TRUEPLUS LANCETS 33G misc Apply 1 each topically to the appropriate area as directed 3 (Three) Times a Day. Test blood glucose  0   • vitamin D (ERGOCALCIFEROL) 1.25 MG (08995 UT) capsule capsule TAKE ONE CAPSULE BY MOUTH ONCE WEEKLY 12 capsule 0     No current facility-administered medications for this visit.        Mental Status Exam:   Hygiene:   good  Cooperation:  Cooperative  Eye Contact:  Good  Psychomotor Behavior:  Appropriate  Affect:  Appropriate  Hopelessness: Denies  Speech:  Normal  Thought Process:  Goal directed and Linear  Thought Content:  Normal  Suicidal:  None  Homicidal:  None  Hallucinations:  None  Delusion:  None  Memory:  Intact  Orientation:  Person, Place, Time and Situation  Reliability:  good  Insight:  Fair  Judgement:  Fair  Impulse Control:  Fair  Physical/Medical Issues:  Yes  DM                Assessment/Plan   Diagnoses and all orders for this visit:    Generalized anxiety disorder  -     amitriptyline (ELAVIL) 25 MG tablet; Take 1 tablet by mouth Every Night.    Severe episode of recurrent major depressive disorder, with psychotic features (CMS/HCC)  -     amitriptyline (ELAVIL) 25 MG tablet; Take 1 tablet by mouth Every Night.            SUPPORTIVE PSYCHOTHERAPY for TRISH and MDD severe 08:15- 08:35: continuing efforts to promote the therapeutic alliance, address the patient’s issues, and strengthen self awareness, insights, and coping skills. Assisted patient in processing above session content; acknowledged and normalized patient’s thoughts, feelings, and concerns.  Applied  positive coping skills and behavior management in session.  Allowed patient to freely discuss issues without interruption or judgment. Provided safe, confidential environment to facilitate the development of positive therapeutic relationship and encourage open, honest communication. Assisted patient in identifying risk factors which would indicate the need for higher level of care including thoughts to harm self or others and/or self-harming behavior and encouraged patient to contact this office, call 911, or present to the nearest emergency room should any of these events occur. Discussed crisis intervention services and means to access.  Patient adamantly and convincingly denies current suicidal or homicidal ideation or perceptual disturbance.        Discussed medication options with patient.  Discontinue Effexor. Start amitriptyline 25 mg at night for depression and anxiety.  Reviewed the risks, benefits, and side effects of the medications; patient and  acknowledged and verbally consented.  Nishant report reviewed #[id number]. Patient and  is agreeable to call the office with any questions, concerns, or worsening of symptoms.  Patient is aware to call 911 or go to the nearest ER should begin having  SI/HI.            Follow up in four weeks      Errors in dictation may reflect use of voice recognition software and not all errors in transcription may have been detected prior to signing.              This document has been electronically signed by NIDA Vasquez   June 12, 2020 14:25

## 2020-06-18 ENCOUNTER — APPOINTMENT (OUTPATIENT)
Dept: BONE DENSITY | Facility: HOSPITAL | Age: 45
End: 2020-06-18

## 2020-06-18 ENCOUNTER — APPOINTMENT (OUTPATIENT)
Dept: MAMMOGRAPHY | Facility: HOSPITAL | Age: 45
End: 2020-06-18

## 2020-06-19 ENCOUNTER — HOSPITAL ENCOUNTER (OUTPATIENT)
Dept: BONE DENSITY | Facility: HOSPITAL | Age: 45
Discharge: HOME | End: 2020-06-19

## 2020-06-19 ENCOUNTER — HOSPITAL ENCOUNTER (OUTPATIENT)
Dept: MAMMOGRAPHY | Facility: HOSPITAL | Age: 45
Discharge: HOME | End: 2020-06-19
Admitting: GENERAL PRACTICE

## 2020-06-19 DIAGNOSIS — F41.1 GENERALIZED ANXIETY DISORDER: ICD-10-CM

## 2020-06-19 DIAGNOSIS — Z00.00 HEALTHCARE MAINTENANCE: ICD-10-CM

## 2020-06-19 DIAGNOSIS — Z12.31 ENCOUNTER FOR SCREENING MAMMOGRAM FOR BREAST CANCER: ICD-10-CM

## 2020-06-19 DIAGNOSIS — F33.3 SEVERE EPISODE OF RECURRENT MAJOR DEPRESSIVE DISORDER, WITH PSYCHOTIC FEATURES (HCC): ICD-10-CM

## 2020-06-19 PROCEDURE — 77063 BREAST TOMOSYNTHESIS BI: CPT

## 2020-06-19 PROCEDURE — 77080 DXA BONE DENSITY AXIAL: CPT

## 2020-06-19 PROCEDURE — 77067 SCR MAMMO BI INCL CAD: CPT

## 2020-06-19 PROCEDURE — 77067 SCR MAMMO BI INCL CAD: CPT | Performed by: RADIOLOGY

## 2020-06-19 PROCEDURE — 77063 BREAST TOMOSYNTHESIS BI: CPT | Performed by: RADIOLOGY

## 2020-06-19 PROCEDURE — 77080 DXA BONE DENSITY AXIAL: CPT | Performed by: RADIOLOGY

## 2020-06-19 RX ORDER — AMITRIPTYLINE HYDROCHLORIDE 25 MG/1
25 TABLET, FILM COATED ORAL NIGHTLY
Qty: 15 TABLET | Refills: 0 | Status: SHIPPED | OUTPATIENT
Start: 2020-06-19 | End: 2020-07-13 | Stop reason: SDUPTHER

## 2020-06-30 ENCOUNTER — OFFICE VISIT (OUTPATIENT)
Dept: FAMILY MEDICINE CLINIC | Facility: CLINIC | Age: 45
End: 2020-06-30

## 2020-06-30 ENCOUNTER — LAB (OUTPATIENT)
Dept: LAB | Facility: HOSPITAL | Age: 45
End: 2020-06-30

## 2020-06-30 DIAGNOSIS — R50.9 FEVER, UNSPECIFIED FEVER CAUSE: Primary | ICD-10-CM

## 2020-06-30 DIAGNOSIS — R50.9 FEVER, UNSPECIFIED FEVER CAUSE: ICD-10-CM

## 2020-06-30 DIAGNOSIS — H66.93 ACUTE INFECTION OF BOTH EARS: ICD-10-CM

## 2020-06-30 PROCEDURE — U0003 INFECTIOUS AGENT DETECTION BY NUCLEIC ACID (DNA OR RNA); SEVERE ACUTE RESPIRATORY SYNDROME CORONAVIRUS 2 (SARS-COV-2) (CORONAVIRUS DISEASE [COVID-19]), AMPLIFIED PROBE TECHNIQUE, MAKING USE OF HIGH THROUGHPUT TECHNOLOGIES AS DESCRIBED BY CMS-2020-01-R: HCPCS

## 2020-06-30 PROCEDURE — 99442 PR PHYS/QHP TELEPHONE EVALUATION 11-20 MIN: CPT | Performed by: NURSE PRACTITIONER

## 2020-06-30 RX ORDER — CEFDINIR 300 MG/1
300 CAPSULE ORAL 2 TIMES DAILY
Qty: 20 CAPSULE | Refills: 0 | Status: SHIPPED | OUTPATIENT
Start: 2020-06-30 | End: 2020-08-13

## 2020-06-30 NOTE — PROGRESS NOTES
You have chosen to receive care through a telephone visit. Do you consent to use a telephone visit for your medical care today? Yes  Nivia Bernstein is a 45 y.o. female who is c/o upper respiratory symptoms which started two days ago and worsening. .      URI    The current episode started in the past 7 days. The problem has been gradually worsening. The maximum temperature recorded prior to her arrival was 102 - 102.9 F ([place]). The fever has been present for 1 to 2 days. Associated symptoms include congestion, coughing, ear pain, nausea, sinus pain and a sore throat. Pertinent negatives include no chest pain, rhinorrhea or wheezing. She has tried NSAIDs and acetaminophen (Flonase) for the symptoms.      The following portions of the patient's history were reviewed and updated as appropriate: allergies, current medications, past family history, past medical history, past social history, past surgical history and problem list.    Current Outpatient Medications:   •  Albuterol Sulfate (VENTOLIN HFA IN), Inhale., Disp: , Rfl:   •  amitriptyline (ELAVIL) 25 MG tablet, Take 1 tablet by mouth Every Night., Disp: 15 tablet, Rfl: 0  •  aspirin  MG tablet, , Disp: , Rfl: 5  •  cetirizine (zyrTEC) 10 MG tablet, Take 1 tablet by mouth Daily As Needed for Allergies., Disp: 30 tablet, Rfl: 5  •  cyanocobalamin 1000 MCG/ML injection, INJECT 1ML ONCE WEEKLY FOR 10 DOSES, Disp: 1 mL, Rfl: 2  •  diclofenac (VOLTAREN) 1 % gel gel, Apply 4 g topically to the appropriate area as directed 4 (Four) Times a Day As Needed (joint pain)., Disp: 500 g, Rfl: 5  •  fluconazole (DIFLUCAN) 150 MG tablet, Take 1 tablet by mouth Daily., Disp: 2 tablet, Rfl: 0  •  fluticasone (FLONASE) 50 MCG/ACT nasal spray, 1 spray into the nostril(s) as directed by provider 2 (Two) Times a Day., Disp: 1 bottle, Rfl: 3  •  GLOBAL EASE INJECT PEN NEEDLES 31G X 8 MM misc, USE AS DIRECTED TO INJECT VICTOZA AND BASAGLAR DAILY, Disp: , Rfl: 10  •  glucagon  (GLUCAGEN) 1 MG injection, Inject 1 mg under the skin into the appropriate area as directed 1 (One) Time As Needed for Low Blood Sugar for up to 1 dose., Disp: 1 kit, Rfl: 3  •  glucose blood test strip, Use tid, Disp: 100 each, Rfl: 12  •  HUMALOG KWIKPEN 100 UNIT/ML solution pen-injector, Inject 45 Units under the skin into the appropriate area as directed 3 (Three) Times a Day As Needed (For elevated blood sugars)., Disp: 15 pen, Rfl: 3  •  hydroCHLOROthiazide (HYDRODIURIL) 12.5 MG tablet, Take 1 tablet by mouth Every Morning., Disp: 20 tablet, Rfl: 0  •  insulin lispro (HUMALOG) 100 UNIT/ML injection, Per insulin pump. Maximum insulin dose 300 units daily, Disp: 6 each, Rfl: 12  •  metFORMIN (GLUCOPHAGE) 1000 MG tablet, Take 1 tablet by mouth 2 (Two) Times a Day With Meals., Disp: 60 tablet, Rfl: 5  •  montelukast (SINGULAIR) 10 MG tablet, Take 10 mg by mouth Every Night., Disp: , Rfl:   •  omeprazole (priLOSEC) 20 MG capsule, TAKE 1 CAPSULE BY MOUTH TWO TIMES A DAY, Disp: 60 capsule, Rfl: 5  •  ondansetron (ZOFRAN) 4 MG tablet, Take 1 tablet by mouth Every 8 (Eight) Hours As Needed for Nausea or Vomiting., Disp: 39 tablet, Rfl: 1  •  promethazine (PHENERGAN) 6.25 MG/5ML syrup, , Disp: , Rfl:   •  rosuvastatin (CRESTOR) 40 MG tablet, Take 1 tablet by mouth Every Night., Disp: 30 tablet, Rfl: 5  •  TRUEPLUS LANCETS 33G misc, Apply 1 each topically to the appropriate area as directed 3 (Three) Times a Day. Test blood glucose, Disp: , Rfl: 0  •  vitamin D (ERGOCALCIFEROL) 1.25 MG (13745 UT) capsule capsule, TAKE ONE CAPSULE BY MOUTH ONCE WEEKLY, Disp: 12 capsule, Rfl: 0  •  cefdinir (OMNICEF) 300 MG capsule, Take 1 capsule by mouth 2 (Two) Times a Day., Disp: 20 capsule, Rfl: 0    Allergies   Allergen Reactions   • Mobic [Meloxicam] Rash   • Novolog [Insulin Aspart] Hives   • Penicillins Angioedema     Review of Systems   Constitutional: Positive for appetite change, fatigue and fever.   HENT: Positive for  congestion, ear pain, postnasal drip, sinus pressure, sinus pain and sore throat. Negative for rhinorrhea.    Respiratory: Positive for cough and chest tightness. Negative for wheezing.    Cardiovascular: Negative for chest pain.   Gastrointestinal: Positive for nausea.   Hematological: Positive for adenopathy.   All other systems reviewed and are negative.    Physical Exam  Deferred    Assessment/Plan   Nivia was seen today for fever, diarrhea and cough.    Diagnoses and all orders for this visit:    Fever, unspecified fever cause  -     COVID-19,[facility] ROUTINE, NP/OP SWAB IN TRANSPORT MEDIA OR ESWAB 72 HR TAT - Swab, Nasopharynx; Future    Acute infection of both ears  -     cefdinir (OMNICEF) 300 MG capsule; Take 1 capsule by mouth 2 (Two) Times a Day.    Discussed Nivia's symptoms and treatment options. Recommended she be COVID 19 tested. She will be notified when results are available. Counseled her regarding Self Quarantine until test results are available. Order for her son, Kaushal, to be tested due to his fever.  Will start her on Cefdinir as well due to ear and sinus pain. Counseled regarding supportive care measures. Encouraged her to seek further medical evaluation if symptoms worsen or do not improve within 48-72 hours.   This visit has been scheduled as a phone visit to comply with patient safety concerns in accordance with CDC recommendations. Total time of discussion was 15 minutes.        This document has been electronically signed by NIDA Starr, RADHA, DOUG  June 30, 2020 14:18

## 2020-07-03 ENCOUNTER — TELEPHONE (OUTPATIENT)
Dept: FAMILY MEDICINE CLINIC | Facility: CLINIC | Age: 45
End: 2020-07-03

## 2020-07-03 LAB — SARS-COV-2 RNA RESP QL NAA+PROBE: NOT DETECTED

## 2020-07-03 NOTE — TELEPHONE ENCOUNTER
Discussed results of her and Amandeep's COVID 19 test results which were negative and that she may return to work. She report she is off until Monday. Letter for her to return to work on Monday will be done. She does report she continues to have temperature of above 100 with myalgias. Offered to order labs for her to have done at [facility]. At this time, she would like to postpone. S/S of concern reviewed and if occur to seek further medical evaluation.

## 2020-07-13 DIAGNOSIS — F33.3 SEVERE EPISODE OF RECURRENT MAJOR DEPRESSIVE DISORDER, WITH PSYCHOTIC FEATURES (HCC): ICD-10-CM

## 2020-07-13 DIAGNOSIS — F41.1 GENERALIZED ANXIETY DISORDER: ICD-10-CM

## 2020-07-13 RX ORDER — AMITRIPTYLINE HYDROCHLORIDE 25 MG/1
TABLET, FILM COATED ORAL
Qty: 45 TABLET | Refills: 0 | Status: SHIPPED | OUTPATIENT
Start: 2020-07-13 | End: 2020-08-10

## 2020-07-14 ENCOUNTER — HOSPITAL ENCOUNTER (OUTPATIENT)
Dept: MAMMOGRAPHY | Facility: HOSPITAL | Age: 45
Discharge: HOME | End: 2020-07-14
Admitting: RADIOLOGY

## 2020-07-14 ENCOUNTER — HOSPITAL ENCOUNTER (OUTPATIENT)
Dept: ULTRASOUND IMAGING | Facility: HOSPITAL | Age: 45
Discharge: HOME | End: 2020-07-14

## 2020-07-14 DIAGNOSIS — R92.8 ABNORMAL MAMMOGRAM: ICD-10-CM

## 2020-07-14 DIAGNOSIS — R92.8 ABNORMAL MAMMOGRAM OF RIGHT BREAST: ICD-10-CM

## 2020-07-14 PROCEDURE — 77065 DX MAMMO INCL CAD UNI: CPT

## 2020-07-14 PROCEDURE — G0279 TOMOSYNTHESIS, MAMMO: HCPCS

## 2020-07-14 PROCEDURE — 77061 BREAST TOMOSYNTHESIS UNI: CPT | Performed by: RADIOLOGY

## 2020-07-14 PROCEDURE — 77065 DX MAMMO INCL CAD UNI: CPT | Performed by: RADIOLOGY

## 2020-07-14 PROCEDURE — 76642 ULTRASOUND BREAST LIMITED: CPT

## 2020-07-21 ENCOUNTER — HOSPITAL ENCOUNTER (OUTPATIENT)
Dept: ULTRASOUND IMAGING | Facility: HOSPITAL | Age: 45
Discharge: HOME | End: 2020-07-21
Admitting: RADIOLOGY

## 2020-07-21 ENCOUNTER — HOSPITAL ENCOUNTER (OUTPATIENT)
Dept: MAMMOGRAPHY | Facility: HOSPITAL | Age: 45
Discharge: HOME | End: 2020-07-21

## 2020-07-21 DIAGNOSIS — R92.8 ABNORMAL MAMMOGRAM OF RIGHT BREAST: ICD-10-CM

## 2020-07-21 PROCEDURE — A4648 IMPLANTABLE TISSUE MARKER: HCPCS

## 2020-07-21 PROCEDURE — 19083 BX BREAST 1ST LESION US IMAG: CPT | Performed by: RADIOLOGY

## 2020-07-21 PROCEDURE — 77065 DX MAMMO INCL CAD UNI: CPT | Performed by: RADIOLOGY

## 2020-07-27 ENCOUNTER — TELEPHONE (OUTPATIENT)
Dept: MAMMOGRAPHY | Facility: HOSPITAL | Age: 45
End: 2020-07-27

## 2020-07-27 LAB
LAB AP CASE REPORT: NORMAL
PATH REPORT.FINAL DX SPEC: NORMAL

## 2020-07-27 NOTE — TELEPHONE ENCOUNTER
Patient given biopsy results and surgical consultation appointment made with Dr Garza for 7/31 @ 11am    ----- Message from Johana Gamboa MD sent at 7/27/2020 11:19 AM EDT -----  PATHOLOGY:    Invasive ductal carcinoma.    The pathology result is concordant with the imaging findings. Recommend surgical consultation.    The patient will be notified of the results and recommendations by our breast care nurse.

## 2020-07-31 ENCOUNTER — OFFICE VISIT (OUTPATIENT)
Dept: SURGERY | Facility: CLINIC | Age: 45
End: 2020-07-31

## 2020-07-31 ENCOUNTER — DOCUMENTATION (OUTPATIENT)
Dept: ONCOLOGY | Facility: HOSPITAL | Age: 45
End: 2020-07-31

## 2020-07-31 VITALS
BODY MASS INDEX: 40.84 KG/M2 | HEART RATE: 93 BPM | HEIGHT: 64 IN | WEIGHT: 239.2 LBS | DIASTOLIC BLOOD PRESSURE: 92 MMHG | SYSTOLIC BLOOD PRESSURE: 122 MMHG

## 2020-07-31 DIAGNOSIS — Z17.1 MALIGNANT NEOPLASM OF UPPER-OUTER QUADRANT OF RIGHT BREAST IN FEMALE, ESTROGEN RECEPTOR NEGATIVE (HCC): Primary | ICD-10-CM

## 2020-07-31 DIAGNOSIS — C50.111 MALIGNANT NEOPLASM OF CENTRAL PORTION OF RIGHT FEMALE BREAST, UNSPECIFIED ESTROGEN RECEPTOR STATUS (HCC): Primary | ICD-10-CM

## 2020-07-31 DIAGNOSIS — C50.411 MALIGNANT NEOPLASM OF UPPER-OUTER QUADRANT OF RIGHT BREAST IN FEMALE, ESTROGEN RECEPTOR NEGATIVE (HCC): Primary | ICD-10-CM

## 2020-07-31 PROCEDURE — 93702 BIS XTRACELL FLUID ANALYSIS: CPT | Performed by: SURGERY

## 2020-07-31 PROCEDURE — 99203 OFFICE O/P NEW LOW 30 MIN: CPT | Performed by: SURGERY

## 2020-07-31 RX ORDER — IBUPROFEN 800 MG/1
800 TABLET ORAL EVERY 8 HOURS PRN
COMMUNITY
Start: 2020-06-30 | End: 2020-11-13

## 2020-07-31 NOTE — PROGRESS NOTES
Subjective   Nivia Bernstein is a 45 y.o. female is being seen for consultation today for possible breast cancer self referral.    History of Present Illness  Ms. Bernstein was seen in the office along with her  for her new diagnosis of right breast cancer.  The patient underwent a screening mammogram on 6/19/2020 which demonstrated a density in the right breast.  On 7/14/2020 she underwent a diagnostic right mammogram and ultrasound which demonstrated a suspicious hypoechoic nodule.  She underwent a core biopsy on 7/21/2020 which demonstrated invasive ductal carcinoma, ER negative, [place] positive, HER-2 negative.  Patient denies palpable abnormality in the breast.  She denies nipple discharge.  There is no prior history of breast biopsy or cyst aspiration.  As far as risk factors go, Lilo was 19 at the time that she had her first child, with an onset of menses at age 9.  Patient reports family history of breast cancer in an aunt diagnosed in her late 50s, a maternal and paternal aunt with ovarian cancer as well as other malignancies within the family.  The patient was postmenopausal in 2013 and was never on hormone replacement therapy.    Allergies   Allergen Reactions   • Mobic [Meloxicam] Rash   • Novolog [Insulin Aspart] Hives   • Penicillins Angioedema     Current Outpatient Medications   Medication Sig Dispense Refill   • Albuterol Sulfate (VENTOLIN HFA IN) Inhale.     • amitriptyline (ELAVIL) 25 MG tablet Take 1 tablet by mouth Every Night for 7 days, THEN 2 tablets Every Night for 21 days. 45 tablet 0   • aspirin  MG tablet   5   • cetirizine (zyrTEC) 10 MG tablet Take 1 tablet by mouth Daily As Needed for Allergies. 30 tablet 5   • cyanocobalamin 1000 MCG/ML injection INJECT 1ML ONCE WEEKLY FOR 10 DOSES 1 mL 2   • diclofenac (VOLTAREN) 1 % gel gel Apply 4 g topically to the appropriate area as directed 4 (Four) Times a Day As Needed (joint pain). 500 g 5   • GLOBAL EASE INJECT PEN NEEDLES 31G X 8 MM  misc USE AS DIRECTED TO INJECT VICTOZA AND BASAGLAR DAILY  10   • glucagon (GLUCAGEN) 1 MG injection Inject 1 mg under the skin into the appropriate area as directed 1 (One) Time As Needed for Low Blood Sugar for up to 1 dose. 1 kit 3   • glucose blood test strip Use tid 100 each 12   • insulin lispro (HUMALOG) 100 UNIT/ML injection Per insulin pump. Maximum insulin dose 300 units daily 6 each 12   • omeprazole (priLOSEC) 20 MG capsule TAKE 1 CAPSULE BY MOUTH TWO TIMES A DAY 60 capsule 5   • ondansetron (ZOFRAN) 4 MG tablet Take 1 tablet by mouth Every 8 (Eight) Hours As Needed for Nausea or Vomiting. 39 tablet 1   • rosuvastatin (CRESTOR) 40 MG tablet Take 1 tablet by mouth Every Night. 30 tablet 5   • TRUEPLUS LANCETS 33G misc Apply 1 each topically to the appropriate area as directed 3 (Three) Times a Day. Test blood glucose  0   • vitamin D (ERGOCALCIFEROL) 1.25 MG (70295 UT) capsule capsule TAKE ONE CAPSULE BY MOUTH ONCE WEEKLY 12 capsule 0   • BREO ELLIPTA 200-25 MCG/INH inhaler      • cefdinir (OMNICEF) 300 MG capsule Take 1 capsule by mouth 2 (Two) Times a Day. 20 capsule 0   • fluconazole (DIFLUCAN) 150 MG tablet Take 1 tablet by mouth Daily. 2 tablet 0   • fluticasone (FLONASE) 50 MCG/ACT nasal spray 1 spray into the nostril(s) as directed by provider 2 (Two) Times a Day. 1 bottle 3   • HUMALOG KWIKPEN 100 UNIT/ML solution pen-injector Inject 45 Units under the skin into the appropriate area as directed 3 (Three) Times a Day As Needed (For elevated blood sugars). 15 pen 3   • hydroCHLOROthiazide (HYDRODIURIL) 12.5 MG tablet Take 1 tablet by mouth Every Morning. 20 tablet 0   • ibuprofen (ADVIL,MOTRIN) 800 MG tablet      • metFORMIN (GLUCOPHAGE) 1000 MG tablet Take 1 tablet by mouth 2 (Two) Times a Day With Meals. 60 tablet 5   • montelukast (SINGULAIR) 10 MG tablet Take 10 mg by mouth Every Night.     • promethazine (PHENERGAN) 6.25 MG/5ML syrup        No current facility-administered medications for this  "visit.      Past Medical History:   Diagnosis Date   • Altered mental status 10/16/2017   • Anxiety and depression 3/31/2017   • Asthma    • CHF (congestive heart failure) (CMS/HCC)     mitral valve prolapse   • Diabetes mellitus (CMS/HCC)    • Elevated alkaline phosphatase level 10/16/2017   • H/O blood clots    • Heart murmur    • Hyperlipidemia    • Hypokalemia 10/16/2017   • Leg pain 10/16/2017   • Mitral valve prolapse    • Neuropathy    • Obesity 3/31/2017   • OCD (obsessive compulsive disorder) 10/16/2017   • Renal insufficiency 10/16/2017   • Thrombocytopenia (CMS/HCC) 10/16/2017   • TIA (transient ischemic attack)     4 TIMES     Past Surgical History:   Procedure Laterality Date   • APPENDECTOMY     • CARPAL TUNNEL RELEASE     • CHOLECYSTECTOMY     • FINGER FUSION     • HYSTERECTOMY     • KNEE ARTHROPLASTY       Review of Systems  General: negative  Integumentary: negative  Eyes: negative  ENT: negative  Respiratory: negative  Gastrointestinal: nausea/vomiting, heartburn and diarrhea  Cardiovascular: negative  Neurological: numbness and headaches  Psychiatric: anxiety and depression  Hematologic/Lymphatic: history of blood clots  Genitourinary: negative  Musculoskeletal: painful joints, back pain and joint stiffness  Endocrine: hot flashes  Breasts: breast pain        Objective   Ht 162.6 cm (64\")   Wt 109 kg (239 lb 3.2 oz)   BMI 41.06 kg/m²   Physical Exam  General:  This is a WD WN morbidly obese female in no acute distress  Vital signs stable, afebrile  HEENT exam:  WNL. Sclera are anicteric.  EOMI  Neck:  supple, FROM.  No JVD.  Trachea midline.  No palpable thyroid nodules. No supraclavicular adenopathy  Lungs:  Respiratory effort normal. Auscultation: Clear, without wheezes, rhonchi, rales  Heart:  Regular rate and rhythm, without murmur, gallop, rub.  No pedal edema  Breasts: On visual inspection the breasts are symmetrical.  Examination of the right breast demonstrates no discrete mass, skin " change, or axillary adenopathy.  Examination of the left breast demonstrates no discrete mass, skin change, or axillary adenopathy.  Abdomen: Nontender, without hepatosplenomegaly  Musculoskeletal:  muscle strength/tone is normal.    Psyc:  alert, oriented x 3.  Mood and affect are appropriate  skin:  Warm with good turgor.  Without rash or lesion  extremities:  Examination of the extremities revealed no cyanosis, clubbing or edema.    Results/Data  Mammogram and ultrasound reports and images as well as pathology report were reviewed.  I agree with the assessment.    Procedures   L Dex was performed and was -2.1    Assessment/Plan   Clinical T1N0 mammary carcinoma of the right breast, ER negative, CA positive, HER-2 negative.    Plan: Genetic testing drawn today.  MRI, bilateral  Return to office post MRI and genetic testing available to discuss results and schedule surgery.       Discussion/Summary: Nurse navigator Kailee also spent time with patient and  and provided additional information.    Patient's Body mass index is 41.06 kg/m². BMI is above normal parameters. Recommendations include: educational material.       Future Appointments   Date Time Provider Department Center   8/7/2020  8:00 AM Gale Aguilar APRN [facility] None         Please note that portions of this note were completed with a voice recognition program.

## 2020-07-31 NOTE — PROGRESS NOTES
Nurse Navigator met with patient and patient's spouse during office visit with Dr. Garza.  Breast Cancer Treatment Handbook and educational material provided.  Questions encouraged and answered.  Nurse Navigator contact information given, pt aware to call with any questions or concerns.  Nurse Navigator will follow and assist as needed.

## 2020-08-03 DIAGNOSIS — IMO0002 UNCONTROLLED TYPE 2 DIABETES WITH NEUROPATHY: Chronic | ICD-10-CM

## 2020-08-06 ENCOUNTER — TELEPHONE (OUTPATIENT)
Dept: SURGERY | Facility: CLINIC | Age: 45
End: 2020-08-06

## 2020-08-06 NOTE — TELEPHONE ENCOUNTER
ZULAY CALLED THE OFFICE THIS MORNING TO SEE IF HER GENERIC TESTING WAS BACK. SHE STATED THAT MEDICAL RECORDS TOLD HER THAT HER GENETIC TESTING WAS BACK BUT THEY WASN'T ABLE TO SEE IT.    I LET HER KNOW THAT HER GENETIC TESTING WAS NOT BACK YET AND HOPEFULLY IT WOULD BE BACK BEFORE HER FOLLOW-UP APPOINTMENT WITH  ON AUGUST 14TH. I DID LET HER KNOW THAT IT USUALLY TAKES 2 WEEKS FOR THE RESULTS TO COME BACK.      SHE VERBALIZED UNDERSTANDING.      I SPOKE WITH G ABOUT THIS MATTER.         DL 8/6/2020

## 2020-08-11 ENCOUNTER — HOSPITAL ENCOUNTER (OUTPATIENT)
Dept: MRI IMAGING | Facility: HOSPITAL | Age: 45
Discharge: HOME | End: 2020-08-11
Admitting: SURGERY

## 2020-08-11 DIAGNOSIS — C50.111 MALIGNANT NEOPLASM OF CENTRAL PORTION OF RIGHT FEMALE BREAST, UNSPECIFIED ESTROGEN RECEPTOR STATUS (HCC): ICD-10-CM

## 2020-08-11 LAB — CREAT BLDA-MCNC: 0.6 MG/DL (ref 0.6–1.3)

## 2020-08-11 PROCEDURE — 0 GADOBENATE DIMEGLUMINE 529 MG/ML SOLUTION: Performed by: SURGERY

## 2020-08-11 PROCEDURE — 77049 MRI BREAST C-+ W/CAD BI: CPT | Performed by: RADIOLOGY

## 2020-08-11 PROCEDURE — 82565 ASSAY OF CREATININE: CPT

## 2020-08-11 PROCEDURE — A9577 INJ MULTIHANCE: HCPCS | Performed by: SURGERY

## 2020-08-11 PROCEDURE — 77049 MRI BREAST C-+ W/CAD BI: CPT

## 2020-08-11 RX ADMIN — GADOBENATE DIMEGLUMINE 20 ML: 529 INJECTION, SOLUTION INTRAVENOUS at 10:40

## 2020-08-12 ENCOUNTER — TELEPHONE (OUTPATIENT)
Dept: FAMILY MEDICINE CLINIC | Facility: CLINIC | Age: 45
End: 2020-08-12

## 2020-08-12 NOTE — TELEPHONE ENCOUNTER
Called pt and let her know. She will try to be seen when Gale comes back.     ----- Message from NIDA Vasquez sent at 8/11/2020 10:47 PM EDT -----  Let patient and  know they can stop Elavil however I will have to discuss the Valium with them at next appointment. I can not send Valium in until I see them in office.   ----- Message -----  From: Ginette Ramachandran LPN  Sent: 8/11/2020   4:44 PM EDT  To: NIDA Vasquez    The call earlier from Andrew was actually regarding Lilo. He says she is not doing well on Elavil. They are wanting to know if she can be switched back over to valium. He says she still had some left over from the last prescription she was given and has stopped taking the Elavil to finish the valium. He says she is crazy acting on the Elavil.

## 2020-08-13 ENCOUNTER — OFFICE VISIT (OUTPATIENT)
Dept: FAMILY MEDICINE CLINIC | Facility: CLINIC | Age: 45
End: 2020-08-13

## 2020-08-13 ENCOUNTER — PRIOR AUTHORIZATION (OUTPATIENT)
Dept: FAMILY MEDICINE CLINIC | Facility: CLINIC | Age: 45
End: 2020-08-13

## 2020-08-13 DIAGNOSIS — F32.A ANXIETY AND DEPRESSION: Chronic | ICD-10-CM

## 2020-08-13 DIAGNOSIS — Z86.718 HISTORY OF DVT (DEEP VEIN THROMBOSIS): ICD-10-CM

## 2020-08-13 DIAGNOSIS — F41.9 ANXIETY AND DEPRESSION: Chronic | ICD-10-CM

## 2020-08-13 DIAGNOSIS — Z17.1 MALIGNANT NEOPLASM OF UPPER-OUTER QUADRANT OF RIGHT BREAST IN FEMALE, ESTROGEN RECEPTOR NEGATIVE (HCC): ICD-10-CM

## 2020-08-13 DIAGNOSIS — C50.411 MALIGNANT NEOPLASM OF UPPER-OUTER QUADRANT OF RIGHT BREAST IN FEMALE, ESTROGEN RECEPTOR NEGATIVE (HCC): ICD-10-CM

## 2020-08-13 DIAGNOSIS — J45.20 MILD INTERMITTENT ASTHMA WITHOUT COMPLICATION: ICD-10-CM

## 2020-08-13 DIAGNOSIS — M85.89 OSTEOPENIA OF MULTIPLE SITES: ICD-10-CM

## 2020-08-13 DIAGNOSIS — J30.9 CHRONIC ALLERGIC RHINITIS: ICD-10-CM

## 2020-08-13 DIAGNOSIS — E11.42 DM TYPE 2 WITH DIABETIC PERIPHERAL NEUROPATHY (HCC): ICD-10-CM

## 2020-08-13 DIAGNOSIS — Z00.00 HEALTHCARE MAINTENANCE: ICD-10-CM

## 2020-08-13 DIAGNOSIS — E66.01 CLASS 3 SEVERE OBESITY WITH SERIOUS COMORBIDITY AND BODY MASS INDEX (BMI) OF 40.0 TO 44.9 IN ADULT, UNSPECIFIED OBESITY TYPE (HCC): ICD-10-CM

## 2020-08-13 DIAGNOSIS — E11.42 DM TYPE 2 WITH DIABETIC PERIPHERAL NEUROPATHY (HCC): Primary | ICD-10-CM

## 2020-08-13 DIAGNOSIS — Z86.73 HISTORY OF TIAS: ICD-10-CM

## 2020-08-13 DIAGNOSIS — I65.23 ATHEROSCLEROSIS OF BOTH CAROTID ARTERIES: ICD-10-CM

## 2020-08-13 DIAGNOSIS — E78.2 MIXED HYPERLIPIDEMIA: Primary | ICD-10-CM

## 2020-08-13 DIAGNOSIS — E55.9 VITAMIN D DEFICIENCY: ICD-10-CM

## 2020-08-13 DIAGNOSIS — K21.9 GASTROESOPHAGEAL REFLUX DISEASE WITHOUT ESOPHAGITIS: Chronic | ICD-10-CM

## 2020-08-13 DIAGNOSIS — N28.9 RENAL INSUFFICIENCY: ICD-10-CM

## 2020-08-13 PROBLEM — C50.419 MALIGNANT NEOPLASM OF UPPER OUTER QUADRANT OF BREAST IN FEMALE, ESTROGEN RECEPTOR NEGATIVE: Status: ACTIVE | Noted: 2020-08-13

## 2020-08-13 PROBLEM — C50.911 RIGHT BREAST CANCER WITH MALIGNANT CELLS IN REGIONAL LYMPH NODES NO GREATER THAN 0.2 MM AND NO MORE THAN 200 CELLS: Status: ACTIVE | Noted: 2020-08-13

## 2020-08-13 PROBLEM — C77.9 RIGHT BREAST CANCER WITH MALIGNANT CELLS IN REGIONAL LYMPH NODES NO GREATER THAN 0.2 MM AND NO MORE THAN 200 CELLS: Status: ACTIVE | Noted: 2020-08-13

## 2020-08-13 PROCEDURE — 99214 OFFICE O/P EST MOD 30 MIN: CPT | Performed by: GENERAL PRACTICE

## 2020-08-13 RX ORDER — DIAZEPAM 5 MG/1
5 TABLET ORAL DAILY
Qty: 30 TABLET | Refills: 0 | Status: SHIPPED | OUTPATIENT
Start: 2020-08-13 | End: 2020-09-15 | Stop reason: SDUPTHER

## 2020-08-13 NOTE — PROGRESS NOTES
Subjective   Nivia Bernstein is a 45 y.o. female.     History of Present Illness     You have chosen to receive care through a telehealth visit.  Do you consent to use a video/audio connection for your medical care today? Yes    I did not complete this video visit with the patient using [employer] but rather FaceTime    Breast Cancer  Underwent an ultrasound guided biopsy of a right upper outer quadrant breast mass on 7/21/2019.  Pathology was consistent with an invasive ductal carcinoma and hormonal markers eventually returned ER negative [place] positive and HER-2 negative.  Genetic testing is pending and she has an appointment with Dr. Garza tomorrow.    Chronic Anxiety  She has a history of OCD and admits to increased nervousness, worrying, and difficulty sleeping.  While greenberg at times she denies any persistent depression or loss of interest in activities and has had no suicidal ideation.  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  She is scheduled to undergo a reassessment with Gale ALVA on 8/24/2020 and would like to resume diazepam in the meantime.    History of TIA  She gives a history of previous TIA.  MRI of the brain performed on 10/16/2017 was unremarkable.  Duplex Doppler ultrasound of the carotid arteries performed the same day revealed homogenous plaque bilaterally but no evidence of stenosis.  Echocardiogram performed the same day was reported as showing trace mitral, aortic, and pulmonic valve regurgitation but no significant abnormalities with an estimated EF of 56 to 60% and a negative saline test for intracardiac shunting.  She remains on  daily.  She gives a history of a previous right DVT following arthroscopic knee surgery on that side.  There is no family history of DVT or PE    Diabetes  Current symptoms include paresthesia of the feet and hypoglycemia - occasional mild. Patient denies visual disturbances, polydipsia, polyuria or  foot ulcerations. Evaluation to date has been: fasting blood sugar and hemoglobin A1C. Home sugars: BGs consistently in an acceptable range. Current treatments: metformin, and an insulin pump.  She had to discontinue semaglutide due to postprandial fullness and nausea with a prompt resolution of the symptoms.  There is no history of any difficulty swallowing, vomiting, or abdominal pain.  She has longstanding constipation but this has not changed and she denies any hematochezia, or melena.  She was tried on empagliflozin in the past with an apparent lack of effect.    Lab Results   Component Value Date    HGBA1C 8.20 (H) 05/13/2020      Dyslipidemia  Compliance with treatment has been good. The patient exercises intermittently. She is currently being prescribed the following medication for her dyslipidemia - rosuvastatin. Patient denies side effects associated with her medications.   Lab Results   Component Value Date    CHOL 206 (H) 10/16/2017    CHLPL 168 05/13/2020    TRIG 130 05/13/2020    HDL 39 (L) 05/13/2020     (H) 05/13/2020     Lab Results   Component Value Date    GLUCOSE 236 (H) 03/14/2019    BUN 12 05/13/2020    CREATININE 0.60 08/11/2020    EGFRIFNONA 109 05/13/2020    EGFRIFAFRI 132 05/13/2020    BCR 20.3 05/13/2020    K 4.3 05/13/2020    CO2 25.2 05/13/2020    CALCIUM 9.3 05/13/2020    PROTENTOTREF 7.0 05/13/2020    ALBUMIN 4.30 05/13/2020    LABIL2 1.6 05/13/2020    AST 14 05/13/2020    ALT 20 05/13/2020     Labs  Most recent vitamin D 40.9 with a B12 of 411    Imaging  DEXA performed on 6/19/20 returned with a T score a slow as -1.9    The following portions of the patient's history were reviewed and updated as appropriate: allergies, current medications, past medical history, past social history and problem list.    Review of Systems   Constitutional: Positive for fatigue. Negative for activity change, appetite change, chills, fever and unexpected weight change.   HENT: Positive for  congestion and rhinorrhea. Negative for ear pain, sneezing and sore throat.    Eyes: Positive for visual disturbance.   Respiratory: Negative for cough, chest tightness, shortness of breath and wheezing.    Cardiovascular: Positive for leg swelling. Negative for chest pain and palpitations.   Gastrointestinal: Positive for constipation and nausea. Negative for abdominal pain, blood in stool, diarrhea and vomiting.   Endocrine: Negative for polydipsia and polyuria.   Genitourinary: Negative for difficulty urinating, dysuria, frequency, hematuria and urgency.   Musculoskeletal: Positive for arthralgias and back pain. Negative for joint swelling, myalgias and neck pain.   Skin: Negative for rash.   Neurological: Positive for numbness and headaches. Negative for dizziness, speech difficulty, weakness and light-headedness.   Psychiatric/Behavioral: Positive for sleep disturbance. Negative for dysphoric mood. The patient is nervous/anxious.      Objective   Physical Exam   Constitutional:   Alert and in fair spirits. No apparent distress. No pallor, jaundice, diaphoresis, or cyanosis.   Pulmonary/Chest:   Normal respiratory effort   Skin: No rash noted.   Psychiatric: She has a normal mood and affect. Her speech is normal and behavior is normal. Thought content normal. She expresses no suicidal ideation.     Assessment/Plan   Problems Addressed this Visit        Cardiovascular and Mediastinum    Atherosclerosis of both carotid arteries  With history of TIA  Reminded regarding the importance of risk factor modification  Continue ASA    Mixed hyperlipidemia   Encouraged to continue to work on her diet and exercise plan.  Continue current medication  If her next LDL is above 70 will add ezetimibe       Respiratory    Chronic allergic rhinitis    Mild intermittent asthma without complication       Digestive    GERD (gastroesophageal reflux disease) (Chronic)    Vitamin D deficiency  Continue supplementation with monitoring.        Endocrine    Type 2 diabetes mellitus  Diabetes mellitus Type II, under fair control.   Encouraged to continue to pursue ADA diet  Encouraged aerobic exercise.  If her insurance permits will replace metformin with trijardy       Genitourinary    Renal insufficiency       Other    Osteopenia  Advised regarding the importance of weightbearing exercise  Will plan on updating a DEXA scan in 1 to 2 years  Anxiety and depression (Chronic)  Significant situational component.   Supportive therapy.   We will resume diazepam short-term  Follow up with psychiatry    Relevant Medications    diazePAM (VALIUM) 5 MG tablet    Class 3 severe obesity with serious comorbidity and body mass index (BMI) of 40.0 to 44.9 in adult (CMS/[facility])    Healthcare maintenance  Patient received hepatitis A #2 at her pharmacy  Reminded to get a flu shot when available    History of DVT (deep vein thrombosis)    History of TIAs    Malignant neoplasm of upper-outer quadrant of right breast in female, estrogen receptor negative (CMS/[facility])  Clinically T1N0 ER negative [place] positive HER-2 negative  Supportive therapy  Follow-up with general surgery

## 2020-08-14 ENCOUNTER — APPOINTMENT (OUTPATIENT)
Dept: PREADMISSION TESTING | Facility: HOSPITAL | Age: 45
End: 2020-08-14

## 2020-08-14 ENCOUNTER — LAB (OUTPATIENT)
Dept: LAB | Facility: HOSPITAL | Age: 45
End: 2020-08-14

## 2020-08-14 ENCOUNTER — OFFICE VISIT (OUTPATIENT)
Dept: SURGERY | Facility: CLINIC | Age: 45
End: 2020-08-14

## 2020-08-14 ENCOUNTER — TELEPHONE (OUTPATIENT)
Dept: FAMILY MEDICINE CLINIC | Facility: CLINIC | Age: 45
End: 2020-08-14

## 2020-08-14 VITALS
DIASTOLIC BLOOD PRESSURE: 77 MMHG | SYSTOLIC BLOOD PRESSURE: 119 MMHG | HEIGHT: 64 IN | BODY MASS INDEX: 41.18 KG/M2 | HEART RATE: 77 BPM | WEIGHT: 241.2 LBS

## 2020-08-14 DIAGNOSIS — Z17.1 MALIGNANT NEOPLASM OF UPPER-OUTER QUADRANT OF RIGHT BREAST IN FEMALE, ESTROGEN RECEPTOR NEGATIVE (HCC): Primary | ICD-10-CM

## 2020-08-14 DIAGNOSIS — C50.411 MALIGNANT NEOPLASM OF UPPER-OUTER QUADRANT OF RIGHT BREAST IN FEMALE, ESTROGEN RECEPTOR NEGATIVE (HCC): ICD-10-CM

## 2020-08-14 DIAGNOSIS — C50.411 MALIGNANT NEOPLASM OF UPPER-OUTER QUADRANT OF RIGHT BREAST IN FEMALE, ESTROGEN RECEPTOR NEGATIVE (HCC): Primary | ICD-10-CM

## 2020-08-14 DIAGNOSIS — Z17.1 MALIGNANT NEOPLASM OF UPPER-OUTER QUADRANT OF RIGHT BREAST IN FEMALE, ESTROGEN RECEPTOR NEGATIVE (HCC): ICD-10-CM

## 2020-08-14 LAB
ALBUMIN SERPL-MCNC: 4.09 G/DL (ref 3.5–5.2)
ALBUMIN/GLOB SERPL: 1.3 G/DL
ALP SERPL-CCNC: 100 U/L (ref 39–117)
ALT SERPL W P-5'-P-CCNC: 23 U/L (ref 1–33)
ANION GAP SERPL CALCULATED.3IONS-SCNC: 10.1 MMOL/L (ref 5–15)
AST SERPL-CCNC: 15 U/L (ref 1–32)
BASOPHILS # BLD AUTO: 0.06 10*3/MM3 (ref 0–0.2)
BASOPHILS NFR BLD AUTO: 0.6 % (ref 0–1.5)
BILIRUB SERPL-MCNC: 0.3 MG/DL (ref 0–1.2)
BUN SERPL-MCNC: 14 MG/DL (ref 6–20)
BUN/CREAT SERPL: 21.9 (ref 7–25)
CALCIUM SPEC-SCNC: 8.9 MG/DL (ref 8.6–10.5)
CHLORIDE SERPL-SCNC: 105 MMOL/L (ref 98–107)
CO2 SERPL-SCNC: 25.9 MMOL/L (ref 22–29)
CREAT SERPL-MCNC: 0.64 MG/DL (ref 0.57–1)
DEPRECATED RDW RBC AUTO: 39.6 FL (ref 37–54)
EOSINOPHIL # BLD AUTO: 0.21 10*3/MM3 (ref 0–0.4)
EOSINOPHIL NFR BLD AUTO: 2.2 % (ref 0.3–6.2)
ERYTHROCYTE [DISTWIDTH] IN BLOOD BY AUTOMATED COUNT: 13.1 % (ref 12.3–15.4)
GFR SERPL CREATININE-BSD FRML MDRD: 99 ML/MIN/1.73
GLOBULIN UR ELPH-MCNC: 3.1 GM/DL
GLUCOSE SERPL-MCNC: 167 MG/DL (ref 65–99)
HCT VFR BLD AUTO: 39.4 % (ref 34–46.6)
HGB BLD-MCNC: 12.3 G/DL (ref 12–15.9)
IMM GRANULOCYTES # BLD AUTO: 0.02 10*3/MM3 (ref 0–0.05)
IMM GRANULOCYTES NFR BLD AUTO: 0.2 % (ref 0–0.5)
LYMPHOCYTES # BLD AUTO: 3.76 10*3/MM3 (ref 0.7–3.1)
LYMPHOCYTES NFR BLD AUTO: 39.5 % (ref 19.6–45.3)
MCH RBC QN AUTO: 26.5 PG (ref 26.6–33)
MCHC RBC AUTO-ENTMCNC: 31.2 G/DL (ref 31.5–35.7)
MCV RBC AUTO: 84.7 FL (ref 79–97)
MONOCYTES # BLD AUTO: 0.62 10*3/MM3 (ref 0.1–0.9)
MONOCYTES NFR BLD AUTO: 6.5 % (ref 5–12)
NEUTROPHILS NFR BLD AUTO: 4.86 10*3/MM3 (ref 1.7–7)
NEUTROPHILS NFR BLD AUTO: 51 % (ref 42.7–76)
NRBC BLD AUTO-RTO: 0 /100 WBC (ref 0–0.2)
PLATELET # BLD AUTO: 286 10*3/MM3 (ref 140–450)
PMV BLD AUTO: 9.9 FL (ref 6–12)
POTASSIUM SERPL-SCNC: 4.1 MMOL/L (ref 3.5–5.2)
PROT SERPL-MCNC: 7.2 G/DL (ref 6–8.5)
RBC # BLD AUTO: 4.65 10*6/MM3 (ref 3.77–5.28)
SODIUM SERPL-SCNC: 141 MMOL/L (ref 136–145)
WBC # BLD AUTO: 9.53 10*3/MM3 (ref 3.4–10.8)

## 2020-08-14 PROCEDURE — 85025 COMPLETE CBC W/AUTO DIFF WBC: CPT | Performed by: SURGERY

## 2020-08-14 PROCEDURE — U0002 COVID-19 LAB TEST NON-CDC: HCPCS

## 2020-08-14 PROCEDURE — 36415 COLL VENOUS BLD VENIPUNCTURE: CPT

## 2020-08-14 PROCEDURE — 93010 ELECTROCARDIOGRAM REPORT: CPT | Performed by: SPECIALIST

## 2020-08-14 PROCEDURE — 99213 OFFICE O/P EST LOW 20 MIN: CPT | Performed by: SURGERY

## 2020-08-14 PROCEDURE — U0004 COV-19 TEST NON-CDC HGH THRU: HCPCS

## 2020-08-14 PROCEDURE — 80053 COMPREHEN METABOLIC PANEL: CPT | Performed by: SURGERY

## 2020-08-14 PROCEDURE — 93005 ELECTROCARDIOGRAM TRACING: CPT

## 2020-08-14 PROCEDURE — C9803 HOPD COVID-19 SPEC COLLECT: HCPCS | Performed by: SURGERY

## 2020-08-14 RX ORDER — KETOROLAC TROMETHAMINE 10 MG/1
10 TABLET, FILM COATED ORAL EVERY 6 HOURS PRN
Status: ON HOLD | COMMUNITY
End: 2020-08-18

## 2020-08-14 RX ORDER — FLUTICASONE PROPIONATE 50 MCG
2 SPRAY, SUSPENSION (ML) NASAL DAILY PRN
COMMUNITY
End: 2020-11-13 | Stop reason: SDUPTHER

## 2020-08-14 RX ORDER — CLINDAMYCIN PHOSPHATE 900 MG/50ML
900 INJECTION INTRAVENOUS ONCE
Status: CANCELLED | OUTPATIENT
Start: 2020-08-18 | End: 2020-08-14

## 2020-08-14 RX ORDER — ERGOCALCIFEROL 1.25 MG/1
50000 CAPSULE ORAL WEEKLY
COMMUNITY
End: 2020-10-05

## 2020-08-14 NOTE — H&P (VIEW-ONLY)
Subjective   Nivia Bernstein is a 45 y.o. female is here today for follow-up for MRI results.    History of Present Illness  Ms. Bernstein was seen in the office today for breast cancer follow-up.  She was initially seen on 7/31/2020.  She presents today to discuss the results of her MRI as well as genetic testing.  The MRI did not show any contralateral disease or other abnormalities in the right breast.  Genetic testing should reported out today.  Patient denies any changes in her history or exam since her last visit of 7/31/2020  Allergies   Allergen Reactions   • Mobic [Meloxicam] Rash   • Novolog [Insulin Aspart] Hives   • Penicillins Angioedema       Current Outpatient Medications   Medication Sig Dispense Refill   • Albuterol Sulfate (VENTOLIN HFA IN) Inhale.     • aspirin  MG tablet   5   • BREO ELLIPTA 200-25 MCG/INH inhaler      • cetirizine (zyrTEC) 10 MG tablet Take 1 tablet by mouth Daily As Needed for Allergies. 30 tablet 5   • cyanocobalamin 1000 MCG/ML injection INJECT 1ML ONCE WEEKLY FOR 10 DOSES 1 mL 2   • diclofenac (VOLTAREN) 1 % gel gel Apply 4 g topically to the appropriate area as directed 4 (Four) Times a Day As Needed (joint pain). 500 g 5   • Empagliflozin-Linaglip-Metform 12.5-2.5-1000 MG tablet sustained-release 24 hour Take 2 tablets by mouth Daily. 60 tablet 5   • fluticasone (FLONASE) 50 MCG/ACT nasal spray 1 spray into the nostril(s) as directed by provider 2 (Two) Times a Day. 1 bottle 3   • GLOBAL EASE INJECT PEN NEEDLES 31G X 8 MM misc USE AS DIRECTED TO INJECT VICTOZA AND BASAGLAR DAILY  10   • glucagon (GLUCAGEN) 1 MG injection Inject 1 mg under the skin into the appropriate area as directed 1 (One) Time As Needed for Low Blood Sugar for up to 1 dose. 1 kit 3   • glucose blood test strip Use tid 100 each 12   • HUMALOG KWIKPEN 100 UNIT/ML solution pen-injector Inject 45 Units under the skin into the appropriate area as directed 3 (Three) Times a Day As Needed (For elevated blood  sugars). 15 pen 3   • hydroCHLOROthiazide (HYDRODIURIL) 12.5 MG tablet Take 1 tablet by mouth Every Morning. 20 tablet 0   • ibuprofen (ADVIL,MOTRIN) 800 MG tablet      • insulin lispro (humaLOG) 100 UNIT/ML injection USE PER INSULIN PUMP. UNITS 60 mL 12   • montelukast (SINGULAIR) 10 MG tablet Take 10 mg by mouth Every Night.     • omeprazole (priLOSEC) 20 MG capsule TAKE 1 CAPSULE BY MOUTH TWO TIMES A DAY 60 capsule 5   • ondansetron (ZOFRAN) 4 MG tablet Take 1 tablet by mouth Every 8 (Eight) Hours As Needed for Nausea or Vomiting. 39 tablet 1   • promethazine (PHENERGAN) 6.25 MG/5ML syrup      • rosuvastatin (CRESTOR) 40 MG tablet Take 1 tablet by mouth Every Night. 30 tablet 5   • TRUEPLUS LANCETS 33G misc Apply 1 each topically to the appropriate area as directed 3 (Three) Times a Day. Test blood glucose  0   • vitamin D (ERGOCALCIFEROL) 1.25 MG (10642 UT) capsule capsule TAKE ONE CAPSULE BY MOUTH ONCE WEEKLY 12 capsule 0   • diazePAM (VALIUM) 5 MG tablet Take 1 tablet by mouth Daily. 30 tablet 0     No current facility-administered medications for this visit.      Past Medical History:   Diagnosis Date   • Altered mental status 10/16/2017   • Anxiety and depression 3/31/2017   • Asthma    • CHF (congestive heart failure) (CMS/HCC)     mitral valve prolapse   • Diabetes mellitus (CMS/HCC)    • Elevated alkaline phosphatase level 10/16/2017   • H/O blood clots    • Heart murmur    • Hyperlipidemia    • Hypokalemia 10/16/2017   • Leg pain 10/16/2017   • Mitral valve prolapse    • Neuropathy    • Obesity 3/31/2017   • OCD (obsessive compulsive disorder) 10/16/2017   • Renal insufficiency 10/16/2017   • Right breast cancer with malignant cells in regional lymph nodes no greater than 0.2 mm and no more than 200 cells (CMS/HCC) 8/13/2020   • Thrombocytopenia (CMS/HCC) 10/16/2017   • TIA (transient ischemic attack)     4 TIMES     Past Surgical History:   Procedure Laterality Date   • APPENDECTOMY     • CARPAL  "TUNNEL RELEASE     • CHOLECYSTECTOMY     • FINGER FUSION     • HYSTERECTOMY     • KNEE ARTHROPLASTY           The following portions of the patient's history were reviewed and updated as appropriate: allergies, current medications, past family history, past medical history, past social history, past surgical history and problem list.    Review of systems:  Unchanged from prior except HPI    Objective   /77 (BP Location: Left arm)   Pulse 77   Ht 162.6 cm (64\")   Wt 109 kg (241 lb 3.2 oz)   BMI 41.40 kg/m²    Physical Exam  General:  This is a WD WN morbidly obese female in no acute distress  Vital signs stable, afebrile  HEENT exam:  WNL. Sclera are anicteric.  EOMI  Neck:  supple, FROM.  No JVD.  Trachea midline.  No palpable thyroid nodules. No supraclavicular adenopathy  Lungs:  Respiratory effort normal. Auscultation: Clear, without wheezes, rhonchi, rales  Heart:  Regular rate and rhythm, without murmur, gallop, rub.  No pedal edema  Breasts: On visual inspection the breasts are symmetrical.  Examination of the right breast demonstrates no discrete mass, skin change, or axillary adenopathy.  Examination of the left breast demonstrates no discrete mass, skin change, or axillary adenopathy.  Abdomen: Nontender, without hepatosplenomegaly  Musculoskeletal:  muscle strength/tone is normal.    Psyc:  alert, oriented x 3.  Mood and affect are appropriate  skin:  Warm with good turgor.  Without rash or lesion  extremities:  Examination of the extremities revealed no cyanosis, clubbing or edema.    Results/Data  MRI reports and images were reviewed and discussed with the patient and     Procedures     Assessment/Plan   Clinical T1N0 mammary carcinoma of the right breast, ER negative, [place] positive, HER-2 negative.     Proceed with right mastectomy with sentinel node biopsy, contralateral left mastectomy           Discussion/Summary: The patient states that she has read the information provided and " thought about the issues discussed.  She understands the results of the MRI.  She did have a perception about local recurrence based upon anecdotal information that friends and family provided her and we discussed this.  Even after understanding that the risk of local recurrence is very low the patient states that she wants to proceed with mastectomy rather than breast conservation.  She is aware that this does not affect her local or distant recurrence rate.  She is aware that it will likely allow her to avoid radiation but that the decision regarding chemotherapy is independent.  Based upon the patient's large breast size and being lopsided with mastectomy she has also decided to proceed with contralateral mastectomy.  20 minutes spent with patient and  in discussion of the above issues.    Patient's Body mass index is 41.4 kg/m². BMI is above normal parameters. Recommendations include: educational material.         Future Appointments   Date Time Provider Department Center   8/14/2020 10:00 AM Sheila Garza MD [facility] None   8/24/2020  8:00 AM Gale Aguilar APRN [facility] None   11/13/2020 11:30 AM Markus Menjivar MD [facility] None         Please note that portions of this note were completed with a voice recognition program.

## 2020-08-14 NOTE — PROGRESS NOTES
Subjective   Nivia Bernstein is a 45 y.o. female is here today for follow-up for MRI results.    History of Present Illness  Ms. Bernstein was seen in the office today for breast cancer follow-up.  She was initially seen on 7/31/2020.  She presents today to discuss the results of her MRI as well as genetic testing.  The MRI did not show any contralateral disease or other abnormalities in the right breast.  Genetic testing should reported out today.  Patient denies any changes in her history or exam since her last visit of 7/31/2020  Allergies   Allergen Reactions   • Mobic [Meloxicam] Rash   • Novolog [Insulin Aspart] Hives   • Penicillins Angioedema       Current Outpatient Medications   Medication Sig Dispense Refill   • Albuterol Sulfate (VENTOLIN HFA IN) Inhale.     • aspirin  MG tablet   5   • BREO ELLIPTA 200-25 MCG/INH inhaler      • cetirizine (zyrTEC) 10 MG tablet Take 1 tablet by mouth Daily As Needed for Allergies. 30 tablet 5   • cyanocobalamin 1000 MCG/ML injection INJECT 1ML ONCE WEEKLY FOR 10 DOSES 1 mL 2   • diclofenac (VOLTAREN) 1 % gel gel Apply 4 g topically to the appropriate area as directed 4 (Four) Times a Day As Needed (joint pain). 500 g 5   • Empagliflozin-Linaglip-Metform 12.5-2.5-1000 MG tablet sustained-release 24 hour Take 2 tablets by mouth Daily. 60 tablet 5   • fluticasone (FLONASE) 50 MCG/ACT nasal spray 1 spray into the nostril(s) as directed by provider 2 (Two) Times a Day. 1 bottle 3   • GLOBAL EASE INJECT PEN NEEDLES 31G X 8 MM misc USE AS DIRECTED TO INJECT VICTOZA AND BASAGLAR DAILY  10   • glucagon (GLUCAGEN) 1 MG injection Inject 1 mg under the skin into the appropriate area as directed 1 (One) Time As Needed for Low Blood Sugar for up to 1 dose. 1 kit 3   • glucose blood test strip Use tid 100 each 12   • HUMALOG KWIKPEN 100 UNIT/ML solution pen-injector Inject 45 Units under the skin into the appropriate area as directed 3 (Three) Times a Day As Needed (For elevated blood  sugars). 15 pen 3   • hydroCHLOROthiazide (HYDRODIURIL) 12.5 MG tablet Take 1 tablet by mouth Every Morning. 20 tablet 0   • ibuprofen (ADVIL,MOTRIN) 800 MG tablet      • insulin lispro (humaLOG) 100 UNIT/ML injection USE PER INSULIN PUMP. UNITS 60 mL 12   • montelukast (SINGULAIR) 10 MG tablet Take 10 mg by mouth Every Night.     • omeprazole (priLOSEC) 20 MG capsule TAKE 1 CAPSULE BY MOUTH TWO TIMES A DAY 60 capsule 5   • ondansetron (ZOFRAN) 4 MG tablet Take 1 tablet by mouth Every 8 (Eight) Hours As Needed for Nausea or Vomiting. 39 tablet 1   • promethazine (PHENERGAN) 6.25 MG/5ML syrup      • rosuvastatin (CRESTOR) 40 MG tablet Take 1 tablet by mouth Every Night. 30 tablet 5   • TRUEPLUS LANCETS 33G misc Apply 1 each topically to the appropriate area as directed 3 (Three) Times a Day. Test blood glucose  0   • vitamin D (ERGOCALCIFEROL) 1.25 MG (20408 UT) capsule capsule TAKE ONE CAPSULE BY MOUTH ONCE WEEKLY 12 capsule 0   • diazePAM (VALIUM) 5 MG tablet Take 1 tablet by mouth Daily. 30 tablet 0     No current facility-administered medications for this visit.      Past Medical History:   Diagnosis Date   • Altered mental status 10/16/2017   • Anxiety and depression 3/31/2017   • Asthma    • CHF (congestive heart failure) (CMS/HCC)     mitral valve prolapse   • Diabetes mellitus (CMS/HCC)    • Elevated alkaline phosphatase level 10/16/2017   • H/O blood clots    • Heart murmur    • Hyperlipidemia    • Hypokalemia 10/16/2017   • Leg pain 10/16/2017   • Mitral valve prolapse    • Neuropathy    • Obesity 3/31/2017   • OCD (obsessive compulsive disorder) 10/16/2017   • Renal insufficiency 10/16/2017   • Right breast cancer with malignant cells in regional lymph nodes no greater than 0.2 mm and no more than 200 cells (CMS/HCC) 8/13/2020   • Thrombocytopenia (CMS/HCC) 10/16/2017   • TIA (transient ischemic attack)     4 TIMES     Past Surgical History:   Procedure Laterality Date   • APPENDECTOMY     • CARPAL  "TUNNEL RELEASE     • CHOLECYSTECTOMY     • FINGER FUSION     • HYSTERECTOMY     • KNEE ARTHROPLASTY           The following portions of the patient's history were reviewed and updated as appropriate: allergies, current medications, past family history, past medical history, past social history, past surgical history and problem list.    Review of systems:  Unchanged from prior except HPI    Objective   /77 (BP Location: Left arm)   Pulse 77   Ht 162.6 cm (64\")   Wt 109 kg (241 lb 3.2 oz)   BMI 41.40 kg/m²    Physical Exam  General:  This is a WD WN morbidly obese female in no acute distress  Vital signs stable, afebrile  HEENT exam:  WNL. Sclera are anicteric.  EOMI  Neck:  supple, FROM.  No JVD.  Trachea midline.  No palpable thyroid nodules. No supraclavicular adenopathy  Lungs:  Respiratory effort normal. Auscultation: Clear, without wheezes, rhonchi, rales  Heart:  Regular rate and rhythm, without murmur, gallop, rub.  No pedal edema  Breasts: On visual inspection the breasts are symmetrical.  Examination of the right breast demonstrates no discrete mass, skin change, or axillary adenopathy.  Examination of the left breast demonstrates no discrete mass, skin change, or axillary adenopathy.  Abdomen: Nontender, without hepatosplenomegaly  Musculoskeletal:  muscle strength/tone is normal.    Psyc:  alert, oriented x 3.  Mood and affect are appropriate  skin:  Warm with good turgor.  Without rash or lesion  extremities:  Examination of the extremities revealed no cyanosis, clubbing or edema.    Results/Data  MRI reports and images were reviewed and discussed with the patient and     Procedures     Assessment/Plan   Clinical T1N0 mammary carcinoma of the right breast, ER negative, [place] positive, HER-2 negative.     Proceed with right mastectomy with sentinel node biopsy, contralateral left mastectomy           Discussion/Summary: The patient states that she has read the information provided and " thought about the issues discussed.  She understands the results of the MRI.  She did have a perception about local recurrence based upon anecdotal information that friends and family provided her and we discussed this.  Even after understanding that the risk of local recurrence is very low the patient states that she wants to proceed with mastectomy rather than breast conservation.  She is aware that this does not affect her local or distant recurrence rate.  She is aware that it will likely allow her to avoid radiation but that the decision regarding chemotherapy is independent.  Based upon the patient's large breast size and being lopsided with mastectomy she has also decided to proceed with contralateral mastectomy.  20 minutes spent with patient and  in discussion of the above issues.    Patient's Body mass index is 41.4 kg/m². BMI is above normal parameters. Recommendations include: educational material.         Future Appointments   Date Time Provider Department Center   8/14/2020 10:00 AM Sheila Garza MD [facility] None   8/24/2020  8:00 AM Gale Aguilar APRN [facility] None   11/13/2020 11:30 AM Markus Menjivar MD [facility] None         Please note that portions of this note were completed with a voice recognition program.

## 2020-08-14 NOTE — TELEPHONE ENCOUNTER
Spoke with pt about the following per Dr. Menjivar.       ----- Message from Markus Menjivar MD sent at 8/13/2020  4:55 PM EDT -----  Perfect  Please let her know that this will take the place of her metformin and that julia emailed a script to [facility]    ----- Message -----  From: Trinity Mejia MA  Sent: 8/13/2020   3:36 PM EDT  To: Markus Menjivar MD    Approved       ----- Message -----  From: Markus Menjivar MD  Sent: 8/13/2020  10:16 AM EDT  To: Trinity Mejia MA    Please try to pa trijardy  Without it I will have to start her on jardiance and probably either tradjenta or rybelsus separately

## 2020-08-14 NOTE — DISCHARGE INSTRUCTIONS

## 2020-08-16 LAB
REF LAB TEST METHOD: NORMAL
SARS-COV-2 RNA RESP QL NAA+PROBE: NOT DETECTED

## 2020-08-17 ENCOUNTER — TELEPHONE (OUTPATIENT)
Dept: SURGERY | Facility: CLINIC | Age: 45
End: 2020-08-17

## 2020-08-18 ENCOUNTER — ANESTHESIA EVENT (OUTPATIENT)
Dept: PERIOP | Facility: HOSPITAL | Age: 45
End: 2020-08-18

## 2020-08-18 ENCOUNTER — HOSPITAL ENCOUNTER (OUTPATIENT)
Facility: HOSPITAL | Age: 45
Discharge: HOME | End: 2020-08-19
Attending: SURGERY | Admitting: SURGERY

## 2020-08-18 ENCOUNTER — HOSPITAL ENCOUNTER (OUTPATIENT)
Dept: NUCLEAR MEDICINE | Facility: HOSPITAL | Age: 45
Discharge: HOME | End: 2020-08-18

## 2020-08-18 ENCOUNTER — ANESTHESIA (OUTPATIENT)
Dept: PERIOP | Facility: HOSPITAL | Age: 45
End: 2020-08-18

## 2020-08-18 ENCOUNTER — DOCUMENTATION (OUTPATIENT)
Dept: ONCOLOGY | Facility: HOSPITAL | Age: 45
End: 2020-08-18

## 2020-08-18 DIAGNOSIS — Z17.1 MALIGNANT NEOPLASM OF UPPER-OUTER QUADRANT OF RIGHT BREAST IN FEMALE, ESTROGEN RECEPTOR NEGATIVE (HCC): ICD-10-CM

## 2020-08-18 DIAGNOSIS — C50.411 MALIGNANT NEOPLASM OF UPPER-OUTER QUADRANT OF RIGHT BREAST IN FEMALE, ESTROGEN RECEPTOR NEGATIVE (HCC): ICD-10-CM

## 2020-08-18 LAB
GLUCOSE BLDC GLUCOMTR-MCNC: 163 MG/DL (ref 70–130)
GLUCOSE BLDC GLUCOMTR-MCNC: 175 MG/DL (ref 70–130)
GLUCOSE BLDC GLUCOMTR-MCNC: 176 MG/DL (ref 70–130)
GLUCOSE BLDC GLUCOMTR-MCNC: 215 MG/DL (ref 70–130)
GLUCOSE BLDC GLUCOMTR-MCNC: 233 MG/DL (ref 70–130)

## 2020-08-18 PROCEDURE — 38525 BIOPSY/REMOVAL LYMPH NODES: CPT | Performed by: SURGERY

## 2020-08-18 PROCEDURE — 82962 GLUCOSE BLOOD TEST: CPT

## 2020-08-18 PROCEDURE — 25010000002 DEXAMETHASONE PER 1 MG: Performed by: ANESTHESIOLOGY

## 2020-08-18 PROCEDURE — 25010000002 SUCCINYLCHOLINE PER 20 MG: Performed by: NURSE ANESTHETIST, CERTIFIED REGISTERED

## 2020-08-18 PROCEDURE — 88342 IMHCHEM/IMCYTCHM 1ST ANTB: CPT | Performed by: SURGERY

## 2020-08-18 PROCEDURE — 25010000002 ROPIVACAINE PER 1 MG: Performed by: ANESTHESIOLOGY

## 2020-08-18 PROCEDURE — 25010000002 MIDAZOLAM PER 1 MG: Performed by: NURSE ANESTHETIST, CERTIFIED REGISTERED

## 2020-08-18 PROCEDURE — 38792 RA TRACER ID OF SENTINL NODE: CPT

## 2020-08-18 PROCEDURE — 25010000002 KETOROLAC TROMETHAMINE PER 15 MG: Performed by: SURGERY

## 2020-08-18 PROCEDURE — 88341 IMHCHEM/IMCYTCHM EA ADD ANTB: CPT | Performed by: SURGERY

## 2020-08-18 PROCEDURE — 25010000002 ONDANSETRON PER 1 MG: Performed by: NURSE ANESTHETIST, CERTIFIED REGISTERED

## 2020-08-18 PROCEDURE — 38900 IO MAP OF SENT LYMPH NODE: CPT | Performed by: SURGERY

## 2020-08-18 PROCEDURE — 25010000002 FENTANYL CITRATE (PF) 100 MCG/2ML SOLUTION: Performed by: NURSE ANESTHETIST, CERTIFIED REGISTERED

## 2020-08-18 PROCEDURE — G0378 HOSPITAL OBSERVATION PER HR: HCPCS

## 2020-08-18 PROCEDURE — 88331 PATH CONSLTJ SURG 1 BLK 1SPC: CPT | Performed by: SURGERY

## 2020-08-18 PROCEDURE — 19303 MAST SIMPLE COMPLETE: CPT | Performed by: SURGERY

## 2020-08-18 PROCEDURE — 88307 TISSUE EXAM BY PATHOLOGIST: CPT | Performed by: SURGERY

## 2020-08-18 PROCEDURE — 25010000002 PROPOFOL 10 MG/ML EMULSION: Performed by: NURSE ANESTHETIST, CERTIFIED REGISTERED

## 2020-08-18 PROCEDURE — 0 TECHNETIUM FILTERED SULFUR COLLOID: Performed by: SURGERY

## 2020-08-18 PROCEDURE — A9541 TC99M SULFUR COLLOID: HCPCS | Performed by: SURGERY

## 2020-08-18 DEVICE — LIGACLIP EXTRA LIGATING CLIP CARTRIDGES: 6 TITANIUM CLIPS/ CARTRIDGE (SMALL)
Type: IMPLANTABLE DEVICE | Status: FUNCTIONAL
Brand: LIGACLIP

## 2020-08-18 DEVICE — LIGACLIP EXTRA LIGATING CLIP CARTRIDGES: 6 TITANIUM CLIPS/ CARTRIDGE (MEDIUM)
Type: IMPLANTABLE DEVICE | Status: FUNCTIONAL
Brand: LIGACLIP

## 2020-08-18 RX ORDER — IPRATROPIUM BROMIDE AND ALBUTEROL SULFATE 2.5; .5 MG/3ML; MG/3ML
3 SOLUTION RESPIRATORY (INHALATION) ONCE AS NEEDED
Status: DISCONTINUED | OUTPATIENT
Start: 2020-08-18 | End: 2020-08-18 | Stop reason: HOSPADM

## 2020-08-18 RX ORDER — SODIUM CHLORIDE 0.9 % (FLUSH) 0.9 %
3 SYRINGE (ML) INJECTION EVERY 12 HOURS SCHEDULED
Status: DISCONTINUED | OUTPATIENT
Start: 2020-08-18 | End: 2020-08-19 | Stop reason: HOSPADM

## 2020-08-18 RX ORDER — MIDAZOLAM HYDROCHLORIDE 1 MG/ML
1 INJECTION INTRAMUSCULAR; INTRAVENOUS
Status: DISCONTINUED | OUTPATIENT
Start: 2020-08-18 | End: 2020-08-18 | Stop reason: HOSPADM

## 2020-08-18 RX ORDER — SUCCINYLCHOLINE CHLORIDE 20 MG/ML
INJECTION INTRAMUSCULAR; INTRAVENOUS AS NEEDED
Status: DISCONTINUED | OUTPATIENT
Start: 2020-08-18 | End: 2020-08-18 | Stop reason: SURG

## 2020-08-18 RX ORDER — SCOLOPAMINE TRANSDERMAL SYSTEM 1 MG/1
PATCH, EXTENDED RELEASE TRANSDERMAL AS NEEDED
Status: DISCONTINUED | OUTPATIENT
Start: 2020-08-18 | End: 2020-08-18 | Stop reason: SURG

## 2020-08-18 RX ORDER — KETOROLAC TROMETHAMINE 30 MG/ML
15 INJECTION, SOLUTION INTRAMUSCULAR; INTRAVENOUS EVERY 6 HOURS
Status: DISCONTINUED | OUTPATIENT
Start: 2020-08-18 | End: 2020-08-19 | Stop reason: HOSPADM

## 2020-08-18 RX ORDER — POLYETHYLENE GLYCOL 3350 17 G/17G
17 POWDER, FOR SOLUTION ORAL 2 TIMES DAILY
Status: DISCONTINUED | OUTPATIENT
Start: 2020-08-18 | End: 2020-08-19 | Stop reason: HOSPADM

## 2020-08-18 RX ORDER — HEPARIN SODIUM 5000 [USP'U]/ML
5000 INJECTION, SOLUTION INTRAVENOUS; SUBCUTANEOUS EVERY 12 HOURS SCHEDULED
Status: DISCONTINUED | OUTPATIENT
Start: 2020-08-19 | End: 2020-08-19 | Stop reason: HOSPADM

## 2020-08-18 RX ORDER — ISOSULFAN BLUE 50 MG/5ML
INJECTION, SOLUTION SUBCUTANEOUS AS NEEDED
Status: DISCONTINUED | OUTPATIENT
Start: 2020-08-18 | End: 2020-08-18 | Stop reason: HOSPADM

## 2020-08-18 RX ORDER — OXYCODONE HYDROCHLORIDE 5 MG/1
5 TABLET ORAL EVERY 4 HOURS PRN
Status: DISCONTINUED | OUTPATIENT
Start: 2020-08-18 | End: 2020-08-19 | Stop reason: HOSPADM

## 2020-08-18 RX ORDER — KETAMINE HYDROCHLORIDE 100 MG/ML
INJECTION INTRAMUSCULAR; INTRAVENOUS AS NEEDED
Status: DISCONTINUED | OUTPATIENT
Start: 2020-08-18 | End: 2020-08-18 | Stop reason: SURG

## 2020-08-18 RX ORDER — DIAZEPAM 5 MG/1
5 TABLET ORAL DAILY
Status: DISCONTINUED | OUTPATIENT
Start: 2020-08-18 | End: 2020-08-19 | Stop reason: HOSPADM

## 2020-08-18 RX ORDER — MEPERIDINE HYDROCHLORIDE 25 MG/ML
12.5 INJECTION INTRAMUSCULAR; INTRAVENOUS; SUBCUTANEOUS
Status: DISCONTINUED | OUTPATIENT
Start: 2020-08-18 | End: 2020-08-18 | Stop reason: HOSPADM

## 2020-08-18 RX ORDER — ONDANSETRON 4 MG/1
4 TABLET, FILM COATED ORAL EVERY 8 HOURS PRN
Status: DISCONTINUED | OUTPATIENT
Start: 2020-08-18 | End: 2020-08-19 | Stop reason: HOSPADM

## 2020-08-18 RX ORDER — MAGNESIUM HYDROXIDE 1200 MG/15ML
LIQUID ORAL AS NEEDED
Status: DISCONTINUED | OUTPATIENT
Start: 2020-08-18 | End: 2020-08-18 | Stop reason: HOSPADM

## 2020-08-18 RX ORDER — SODIUM CHLORIDE, SODIUM LACTATE, POTASSIUM CHLORIDE, CALCIUM CHLORIDE 600; 310; 30; 20 MG/100ML; MG/100ML; MG/100ML; MG/100ML
100 INJECTION, SOLUTION INTRAVENOUS CONTINUOUS
Status: DISCONTINUED | OUTPATIENT
Start: 2020-08-18 | End: 2020-08-19 | Stop reason: HOSPADM

## 2020-08-18 RX ORDER — BUDESONIDE AND FORMOTEROL FUMARATE DIHYDRATE 160; 4.5 UG/1; UG/1
2 AEROSOL RESPIRATORY (INHALATION)
Status: DISCONTINUED | OUTPATIENT
Start: 2020-08-18 | End: 2020-08-19 | Stop reason: HOSPADM

## 2020-08-18 RX ORDER — CLINDAMYCIN PHOSPHATE 900 MG/50ML
900 INJECTION INTRAVENOUS ONCE
Status: COMPLETED | OUTPATIENT
Start: 2020-08-18 | End: 2020-08-18

## 2020-08-18 RX ORDER — CYANOCOBALAMIN 1000 UG/ML
1000 INJECTION, SOLUTION INTRAMUSCULAR; SUBCUTANEOUS WEEKLY
Status: CANCELLED | OUTPATIENT
Start: 2020-08-18

## 2020-08-18 RX ORDER — DOCUSATE SODIUM 100 MG/1
100 CAPSULE, LIQUID FILLED ORAL 2 TIMES DAILY
Status: DISCONTINUED | OUTPATIENT
Start: 2020-08-18 | End: 2020-08-19 | Stop reason: HOSPADM

## 2020-08-18 RX ORDER — ACETAMINOPHEN 500 MG
1000 TABLET ORAL EVERY 6 HOURS
Status: DISCONTINUED | OUTPATIENT
Start: 2020-08-18 | End: 2020-08-19 | Stop reason: HOSPADM

## 2020-08-18 RX ORDER — ROCURONIUM BROMIDE 10 MG/ML
INJECTION, SOLUTION INTRAVENOUS AS NEEDED
Status: DISCONTINUED | OUTPATIENT
Start: 2020-08-18 | End: 2020-08-18 | Stop reason: SURG

## 2020-08-18 RX ORDER — LIDOCAINE HYDROCHLORIDE 20 MG/ML
INJECTION, SOLUTION INFILTRATION; PERINEURAL AS NEEDED
Status: DISCONTINUED | OUTPATIENT
Start: 2020-08-18 | End: 2020-08-18 | Stop reason: SURG

## 2020-08-18 RX ORDER — PANTOPRAZOLE SODIUM 40 MG/1
40 TABLET, DELAYED RELEASE ORAL EVERY MORNING
Status: DISCONTINUED | OUTPATIENT
Start: 2020-08-18 | End: 2020-08-19 | Stop reason: HOSPADM

## 2020-08-18 RX ORDER — DEXTROSE MONOHYDRATE 25 G/50ML
25 INJECTION, SOLUTION INTRAVENOUS
Status: DISCONTINUED | OUTPATIENT
Start: 2020-08-18 | End: 2020-08-19 | Stop reason: HOSPADM

## 2020-08-18 RX ORDER — NICOTINE POLACRILEX 4 MG
15 LOZENGE BUCCAL
Status: DISCONTINUED | OUTPATIENT
Start: 2020-08-18 | End: 2020-08-19 | Stop reason: HOSPADM

## 2020-08-18 RX ORDER — ROPIVACAINE HYDROCHLORIDE 5 MG/ML
INJECTION, SOLUTION EPIDURAL; INFILTRATION; PERINEURAL
Status: COMPLETED | OUTPATIENT
Start: 2020-08-18 | End: 2020-08-18

## 2020-08-18 RX ORDER — MORPHINE SULFATE 2 MG/ML
2 INJECTION, SOLUTION INTRAMUSCULAR; INTRAVENOUS
Status: DISCONTINUED | OUTPATIENT
Start: 2020-08-18 | End: 2020-08-19 | Stop reason: HOSPADM

## 2020-08-18 RX ORDER — FAMOTIDINE 10 MG/ML
INJECTION, SOLUTION INTRAVENOUS AS NEEDED
Status: DISCONTINUED | OUTPATIENT
Start: 2020-08-18 | End: 2020-08-18 | Stop reason: SURG

## 2020-08-18 RX ORDER — ONDANSETRON 2 MG/ML
INJECTION INTRAMUSCULAR; INTRAVENOUS AS NEEDED
Status: DISCONTINUED | OUTPATIENT
Start: 2020-08-18 | End: 2020-08-18 | Stop reason: SURG

## 2020-08-18 RX ORDER — FENTANYL CITRATE 50 UG/ML
50 INJECTION, SOLUTION INTRAMUSCULAR; INTRAVENOUS
Status: DISCONTINUED | OUTPATIENT
Start: 2020-08-18 | End: 2020-08-18 | Stop reason: HOSPADM

## 2020-08-18 RX ORDER — SODIUM CHLORIDE, SODIUM LACTATE, POTASSIUM CHLORIDE, CALCIUM CHLORIDE 600; 310; 30; 20 MG/100ML; MG/100ML; MG/100ML; MG/100ML
125 INJECTION, SOLUTION INTRAVENOUS CONTINUOUS
Status: DISCONTINUED | OUTPATIENT
Start: 2020-08-18 | End: 2020-08-18 | Stop reason: HOSPADM

## 2020-08-18 RX ORDER — ONDANSETRON 2 MG/ML
4 INJECTION INTRAMUSCULAR; INTRAVENOUS AS NEEDED
Status: DISCONTINUED | OUTPATIENT
Start: 2020-08-18 | End: 2020-08-18 | Stop reason: HOSPADM

## 2020-08-18 RX ORDER — IBUPROFEN 800 MG/1
800 TABLET ORAL EVERY 8 HOURS PRN
Status: CANCELLED | OUTPATIENT
Start: 2020-08-18

## 2020-08-18 RX ORDER — CYANOCOBALAMIN 1000 UG/ML
1000 INJECTION, SOLUTION INTRAMUSCULAR; SUBCUTANEOUS WEEKLY
COMMUNITY
End: 2020-11-13

## 2020-08-18 RX ORDER — PROPOFOL 10 MG/ML
VIAL (ML) INTRAVENOUS AS NEEDED
Status: DISCONTINUED | OUTPATIENT
Start: 2020-08-18 | End: 2020-08-18 | Stop reason: SURG

## 2020-08-18 RX ORDER — ONDANSETRON 2 MG/ML
4 INJECTION INTRAMUSCULAR; INTRAVENOUS EVERY 4 HOURS PRN
Status: DISCONTINUED | OUTPATIENT
Start: 2020-08-18 | End: 2020-08-19 | Stop reason: HOSPADM

## 2020-08-18 RX ORDER — SODIUM CHLORIDE 0.9 % (FLUSH) 0.9 %
10 SYRINGE (ML) INJECTION EVERY 12 HOURS SCHEDULED
Status: DISCONTINUED | OUTPATIENT
Start: 2020-08-18 | End: 2020-08-18 | Stop reason: HOSPADM

## 2020-08-18 RX ORDER — MIDAZOLAM HYDROCHLORIDE 1 MG/ML
INJECTION INTRAMUSCULAR; INTRAVENOUS AS NEEDED
Status: DISCONTINUED | OUTPATIENT
Start: 2020-08-18 | End: 2020-08-18 | Stop reason: SURG

## 2020-08-18 RX ORDER — SODIUM CHLORIDE 0.9 % (FLUSH) 0.9 %
10 SYRINGE (ML) INJECTION AS NEEDED
Status: DISCONTINUED | OUTPATIENT
Start: 2020-08-18 | End: 2020-08-18 | Stop reason: HOSPADM

## 2020-08-18 RX ORDER — FLUTICASONE PROPIONATE 50 MCG
2 SPRAY, SUSPENSION (ML) NASAL DAILY PRN
Status: DISCONTINUED | OUTPATIENT
Start: 2020-08-18 | End: 2020-08-19 | Stop reason: HOSPADM

## 2020-08-18 RX ORDER — FENTANYL CITRATE 50 UG/ML
INJECTION, SOLUTION INTRAMUSCULAR; INTRAVENOUS AS NEEDED
Status: DISCONTINUED | OUTPATIENT
Start: 2020-08-18 | End: 2020-08-18 | Stop reason: SURG

## 2020-08-18 RX ORDER — CLINDAMYCIN PHOSPHATE 900 MG/50ML
900 INJECTION INTRAVENOUS EVERY 8 HOURS
Status: COMPLETED | OUTPATIENT
Start: 2020-08-18 | End: 2020-08-19

## 2020-08-18 RX ORDER — SODIUM CHLORIDE 0.9 % (FLUSH) 0.9 %
10 SYRINGE (ML) INJECTION AS NEEDED
Status: DISCONTINUED | OUTPATIENT
Start: 2020-08-18 | End: 2020-08-19 | Stop reason: HOSPADM

## 2020-08-18 RX ORDER — OXYCODONE HYDROCHLORIDE AND ACETAMINOPHEN 5; 325 MG/1; MG/1
1 TABLET ORAL ONCE AS NEEDED
Status: DISCONTINUED | OUTPATIENT
Start: 2020-08-18 | End: 2020-08-18 | Stop reason: HOSPADM

## 2020-08-18 RX ORDER — DEXAMETHASONE SODIUM PHOSPHATE 4 MG/ML
INJECTION, SOLUTION INTRA-ARTICULAR; INTRALESIONAL; INTRAMUSCULAR; INTRAVENOUS; SOFT TISSUE
Status: COMPLETED | OUTPATIENT
Start: 2020-08-18 | End: 2020-08-18

## 2020-08-18 RX ORDER — ALBUTEROL SULFATE 90 UG/1
2 AEROSOL, METERED RESPIRATORY (INHALATION) EVERY 4 HOURS PRN
Status: DISCONTINUED | OUTPATIENT
Start: 2020-08-18 | End: 2020-08-19 | Stop reason: HOSPADM

## 2020-08-18 RX ADMIN — DIAZEPAM 5 MG: 5 TABLET ORAL at 16:46

## 2020-08-18 RX ADMIN — SUCCINYLCHOLINE CHLORIDE 100 MG: 20 INJECTION, SOLUTION INTRAMUSCULAR; INTRAVENOUS at 08:10

## 2020-08-18 RX ADMIN — SODIUM CHLORIDE, POTASSIUM CHLORIDE, SODIUM LACTATE AND CALCIUM CHLORIDE 125 ML/HR: 600; 310; 30; 20 INJECTION, SOLUTION INTRAVENOUS at 07:37

## 2020-08-18 RX ADMIN — MIDAZOLAM HYDROCHLORIDE 1 MG: 1 INJECTION, SOLUTION INTRAMUSCULAR; INTRAVENOUS at 09:47

## 2020-08-18 RX ADMIN — ACETAMINOPHEN 1000 MG: 500 TABLET ORAL at 16:45

## 2020-08-18 RX ADMIN — ROCURONIUM BROMIDE 10 MG: 10 SOLUTION INTRAVENOUS at 08:10

## 2020-08-18 RX ADMIN — PROPOFOL 200 MG: 10 INJECTION, EMULSION INTRAVENOUS at 08:10

## 2020-08-18 RX ADMIN — FENTANYL CITRATE 50 MCG: 50 INJECTION INTRAMUSCULAR; INTRAVENOUS at 10:53

## 2020-08-18 RX ADMIN — KETOROLAC TROMETHAMINE 15 MG: 30 INJECTION, SOLUTION INTRAMUSCULAR; INTRAVENOUS at 14:54

## 2020-08-18 RX ADMIN — ONDANSETRON 4 MG: 2 INJECTION INTRAMUSCULAR; INTRAVENOUS at 11:48

## 2020-08-18 RX ADMIN — CLINDAMYCIN PHOSPHATE 900 MG: 900 INJECTION, SOLUTION INTRAVENOUS at 19:54

## 2020-08-18 RX ADMIN — CLINDAMYCIN PHOSPHATE 900 MG: 900 INJECTION, SOLUTION INTRAVENOUS at 08:06

## 2020-08-18 RX ADMIN — PANTOPRAZOLE SODIUM 40 MG: 40 TABLET, DELAYED RELEASE ORAL at 17:58

## 2020-08-18 RX ADMIN — TECHNETIUM TC 99M SULFUR COLLOID 1 DOSE: KIT at 08:12

## 2020-08-18 RX ADMIN — MIDAZOLAM HYDROCHLORIDE 1 MG: 1 INJECTION, SOLUTION INTRAMUSCULAR; INTRAVENOUS at 08:06

## 2020-08-18 RX ADMIN — SCOPALAMINE 1 PATCH: 1 PATCH, EXTENDED RELEASE TRANSDERMAL at 08:06

## 2020-08-18 RX ADMIN — ROPIVACAINE HYDROCHLORIDE 250 MG: 5 INJECTION, SOLUTION EPIDURAL; INFILTRATION; PERINEURAL at 09:12

## 2020-08-18 RX ADMIN — PROPOFOL 75 MCG/KG/MIN: 10 INJECTION, EMULSION INTRAVENOUS at 08:46

## 2020-08-18 RX ADMIN — ACETAMINOPHEN 1000 MG: 500 TABLET ORAL at 21:06

## 2020-08-18 RX ADMIN — FENTANYL CITRATE 50 MCG: 50 INJECTION INTRAMUSCULAR; INTRAVENOUS at 08:06

## 2020-08-18 RX ADMIN — KETOROLAC TROMETHAMINE 15 MG: 30 INJECTION, SOLUTION INTRAMUSCULAR; INTRAVENOUS at 21:06

## 2020-08-18 RX ADMIN — KETAMINE HYDROCHLORIDE 5 MG: 100 INJECTION, SOLUTION, CONCENTRATE INTRAMUSCULAR; INTRAVENOUS at 10:24

## 2020-08-18 RX ADMIN — FENTANYL CITRATE 50 MCG: 50 INJECTION INTRAMUSCULAR; INTRAVENOUS at 09:56

## 2020-08-18 RX ADMIN — FAMOTIDINE 20 MG: 10 INJECTION INTRAVENOUS at 09:47

## 2020-08-18 RX ADMIN — LIDOCAINE HYDROCHLORIDE 40 MG: 20 INJECTION, SOLUTION INFILTRATION; PERINEURAL at 08:10

## 2020-08-18 RX ADMIN — DEXAMETHASONE SODIUM PHOSPHATE 2 MG: 4 INJECTION, SOLUTION INTRAMUSCULAR; INTRAVENOUS at 09:12

## 2020-08-18 RX ADMIN — ONDANSETRON 4 MG: 2 INJECTION INTRAMUSCULAR; INTRAVENOUS at 13:26

## 2020-08-18 RX ADMIN — FENTANYL CITRATE 50 MCG: 50 INJECTION INTRAMUSCULAR; INTRAVENOUS at 08:38

## 2020-08-18 RX ADMIN — SODIUM CHLORIDE, POTASSIUM CHLORIDE, SODIUM LACTATE AND CALCIUM CHLORIDE 100 ML/HR: 600; 310; 30; 20 INJECTION, SOLUTION INTRAVENOUS at 17:56

## 2020-08-18 NOTE — ANESTHESIA PROCEDURE NOTES
Airway  Urgency: elective    Date/Time: 8/18/2020 8:10 AM  End Time:8/18/2020 8:10 AM  Airway not difficult    General Information and Staff    Patient location during procedure: OR  Anesthesiologist: Lane Sunshine DO  CRNA: Nat Pina CRNA    Indications and Patient Condition  Indications for airway management: airway protection    Preoxygenated: yes  MILS maintained throughout  Mask difficulty assessment: 1 - vent by mask    Final Airway Details  Final airway type: endotracheal airway      Successful airway: ETT  Cuffed: yes   Successful intubation technique: direct laryngoscopy  Facilitating devices/methods: intubating stylet and cricoid pressure (removed prior to moving through cords)  Endotracheal tube insertion site: oral  Blade: Edward  Blade size: 3  ETT size (mm): 7.0  Cormack-Lehane Classification: grade I - full view of glottis  Placement verified by: chest auscultation and capnometry   Cuff volume (mL): 10  Measured from: teeth  ETT/EBT  to teeth (cm): 21  Number of attempts at approach: 1  Assessment: lips, teeth, and gum same as pre-op and atraumatic intubation    Additional Comments  AOI x 1 PASS, +ETCO2, +R/F/C. MOUTH TEETH GUMS SAME AS PREOP

## 2020-08-18 NOTE — ANESTHESIA PREPROCEDURE EVALUATION
Anesthesia Evaluation     Patient summary reviewed and Nursing notes reviewed   history of anesthetic complications:               Airway   Mallampati: II  TM distance: >3 FB  Neck ROM: limited  No difficulty expected  Dental - normal exam     Pulmonary - normal exam   (+) asthma,  Cardiovascular - normal exam  Exercise tolerance: good (4-7 METS)    NYHA Classification: II    (+) valvular problems/murmurs, hyperlipidemia,  carotid artery disease      Neuro/Psych  (+) TIA, numbness, psychiatric history,     GI/Hepatic/Renal/Endo    (+) obesity, morbid obesity, GERD,  liver disease fatty liver disease, renal disease, diabetes mellitus,     Musculoskeletal     Abdominal  - normal exam    Bowel sounds: normal.   Substance History - negative use     OB/GYN negative ob/gyn ROS         Other   arthritis,    history of cancer                    Anesthesia Plan    ASA 3     general     intravenous induction     Anesthetic plan, all risks, benefits, and alternatives have been provided, discussed and informed consent has been obtained with: patient.

## 2020-08-18 NOTE — PROGRESS NOTES
Nurse Navigator at bedside, patient status post bilateral breast mastectomy.  Pt groggy, opens eyes to verbal stimuli.  Pt's  at bedside.  Pt denies any complaints.  Provided patient with Mastectomy, Lymphedema and exercises after breast surgery educational material.  Two support pillows also provided for comfort.  Pt and pt's  has Nurse Navigator contact information.  Nurse Navigator will follow and assist as needed.

## 2020-08-18 NOTE — OP NOTE
Pre-op:  right breast carcinoma    Post-op:  Same    Procedure:  Isotope and lymphazurin injection  [place] node bx with frozen section  right MASTECTOMY    Surgeon:  Sheila Garza M.D., ELISHA    Assistant;  Benny Arciniega    Anesthesia:  general    Indications: right breast carcinoma       Procedure Details   After obtaining informed consent and with venous compression boots in place and receiving preoperative antibiotics the patient was taken to the operating room and placed under anesthesia.  In the retro-areola location approximately 500 µCi of filtered technetium labeled sulfur colloid were injected and the breast was massaged.  5 ml of Lymphazurin blue was then injected into the subcutaneous areola lymphatic plexus and the breast was again massaged.  The right breast and axilla were prepped and draped in a sterile fashion.    An elliptical incision was made in the breast.  The superior lateral aspect of the incision was utilized to perform the sentinel node.  With use of the visual of the blue dye, the neoprobe, and palpation, sentinel lymph nodes were identified and sent to pathology for frozen section evaluation. All sentinel nodes were deep axillary lymph nodes.  Hemostasis was obtained with the cautery and with hemoclips.  Following identification of the sentinel lymph nodes counts were obtained of the nodes and the tumor bed.  After obtaining hemostasis the mastectomy was performed. Flaps were raised superiorly toward the clavicle, medially towards the sternum and inferiorly toward the inframammary fold.  Once the borders of dissection were delineated the breast was freed off the pectoralis fascia taking care to excise the pectoralis fascia.  The breast was freed off the axillary tissue removed and orientation stitch was placed.  DEREK drain was placed under the breast flap and the breast was closed in a 2 layer closure of interrupted 3-0 Vicryl subdermal sutures followed by 4-0 Monocryl sutures.     Steri-Strips, dressing, Surgi-Bra were placed.  Patient tolerated the procedure well and was taken to the recovery room in stable condition    Findings:  Tumor bed  [id number]  [place] node #1, count of 2, negative for malignancy  [place] node #2, count of 240, negative for malignancy  [place] node #3, count of 367, negative for malignancy  [place] node #4, count of 1693, negative for malignancy  [place] node #5, count of 0, negative for malignancy  [place] node #6, count of 49, negative for malignancy  [place] node #7, count of 1, negative for malignancy  [place] node #8, count of 2, negative for malignancy  [place] node #9, count of 3, negative for malignancy    Estimated Blood Loss:  Minimal    Blood administered:  none           Drains: 10 flat DEREK           Specimens:   ID Type Source Tests Collected by Time   A : RIGHT #1 Tissue [place] Lymph Node SURGICAL PATHOLOGY EXAM Sheila Garza MD 8/18/2020 0908   B : RIGHT #2 Tissue [place] Lymph Node SURGICAL PATHOLOGY EXAM Sheila Garza MD 8/18/2020 0909   C : RIGHT #3 Tissue [place] Lymph Node SURGICAL PATHOLOGY EXAM Sheila Garza MD 8/18/2020 0909   D : RIGHT #4 Tissue [place] Lymph Node SURGICAL PATHOLOGY EXAM Sheila Garza MD 8/18/2020 0910   E : RIGHT #5 Tissue [place] Lymph Node SURGICAL PATHOLOGY EXAM Sheila Garza MD 8/18/2020 0913   F : RIGHT #6 Tissue [place] Lymph Node SURGICAL PATHOLOGY EXAM Sheila Garza MD 8/18/2020 0917   G : RIGHT #7 Tissue [place] Lymph Node SURGICAL PATHOLOGY EXAM Sheila Garza MD 8/18/2020 0917   H : RIGHT #8 Tissue [place] Lymph Node SURGICAL PATHOLOGY EXAM Sheila Garza MD 8/18/2020 0922   I : RIGHT #9 Tissue [place] Lymph Node SURGICAL PATHOLOGY EXAM Sheila Garza MD 8/18/2020 0923   J : STITCH @ 12 O'CLOCK Tissue Breast, Right TISSUE PATHOLOGY EXAM Sheila Garza MD 8/18/2020 1001   K : STITCH @ 12 O'CLOCK Tissue Breast, Left TISSUE PATHOLOGY EXAM Sheila Garza  MD 8/18/2020 1108        Grafts and Implants: None        Complications:  none           Disposition: PACU - hemodynamically stable.           Condition: stable

## 2020-08-18 NOTE — OP NOTE
Left mastectomy    Pre-op: Right breast cancer    Post-op:  Same    Surgeon:  Sheila Garza M.D., F.A.C.S.    Assistant:  Benny Arciniega    Anesthesia:  general      Indications: Patient with right breast malignancy.  Negative genetic testing.  Patient has selected right mastectomy and due to large breasted size and concerns were being lopsided wishes to have the left breast removed at the same time       Procedure Details   After obtaining informed consent and receiving preoperative antibiotics and with venous compression boots in place, the patient was taken to the operating room and placed under anesthesia. The left breast was prepped and draped in sterile fashion.  An elliptical incision was made in the breast and carried down into the breast tissue.  Flaps were raised superiorly towards the clavicle, medially to the sternum and inferiorly to the inframammary fold.  Once the borders of dissection were completed the breast was freed off the pectoralis fascia.  Dissection was carried out between the axilla and the breast.  The breast was removed and marking stitch was placed at 12:00.  The wound was irrigated and hemostasis was obtained.  A 10 flat DEREK drain was placed and sutured with a 2-0 silk suture.  The incision was closed in a 2 layer closure of interrupted 3-0 Vicryl subdermal sutures, followed by 4-0 Monocryl subcuticular stitch.  Steri-Strips, dressing, surgical bra were placed.  Patient tolerated the procedure well and was taken to the recovery room in stable condition      Findings:    Estimated Blood Loss:  Minimal    Blood Administered: none           Drains: none           Specimens:   ID Type Source Tests Collected by Time   A : RIGHT #1 Tissue [facility] Lymph Node SURGICAL PATHOLOGY EXAM Sheila Garza MD 8/18/2020 0908   B : RIGHT #2 Tissue [facility] SURGICAL PATHOLOGY EXAM Sheila Garza MD 8/18/2020 0909   C : RIGHT #3 Tissue [facility] SURGICAL PATHOLOGY EXAM Greg  Sheila HINES MD 8/18/2020 0909   D : RIGHT #4 Tissue [place] [facility] Node SURGICAL PATHOLOGY EXAM Sheila Garza MD 8/18/2020 0910   E : RIGHT #5 Tissue [place] [facility] Node SURGICAL PATHOLOGY EXAM Sheila Garza MD 8/18/2020 0913   F : RIGHT #6 Tissue [place] [facility] Node SURGICAL PATHOLOGY EXAM Sheila Garza MD 8/18/2020 0917   G : RIGHT #7 Tissue [place] [facility] Node SURGICAL PATHOLOGY EXAM Sheila Garza MD 8/18/2020 0917   H : RIGHT #8 Tissue [place] [facility] Node SURGICAL PATHOLOGY EXAM Sheila Garza MD 8/18/2020 0922   I : RIGHT #9 Tissue [place] [facility] Node SURGICAL PATHOLOGY EXAM Sheila Garza MD 8/18/2020 0923   J : STITCH @ 12 O'CLOCK Tissue Breast, Right TISSUE PATHOLOGY EXAM Sheila Garza MD 8/18/2020 1001   K : STITCH @ 12 O'CLOCK Tissue Breast, Left TISSUE PATHOLOGY EXAM Sheila Garza MD 8/18/2020 1108        Grafts and Implants: No       Complications:  none           Disposition: PACU - hemodynamically stable.           Condition: stable

## 2020-08-18 NOTE — ANESTHESIA POSTPROCEDURE EVALUATION
Patient: Nivia Bernstein    Procedure Summary     Date:  08/18/20 Room / Location:  [facility] OR  /  [facility] OR    Anesthesia Start:  0806 Anesthesia Stop:  1246    Procedures:       Right BREAST MASTECTOMY WITH SENTINEL NODE BIOPSY (Right Breast)      Left mastectomy (Left Breast) Diagnosis:       Malignant neoplasm of upper-outer quadrant of right breast in female, estrogen receptor negative (CMS/HCC)      (Malignant neoplasm of upper-outer quadrant of right breast in female, estrogen receptor negative (CMS/HCC) [C50.411, Z17.1])    Surgeon:  Sheila Garza MD Provider:  Lane Sunshine DO    Anesthesia Type:  general ASA Status:  3          Anesthesia Type: general    Vitals  Vitals Value Taken Time   /76 8/18/2020  1:46 PM   Temp 97.8 °F (36.6 °C) 8/18/2020  1:26 PM   Pulse 76 8/18/2020  1:46 PM   Resp 20 8/18/2020  1:46 PM   SpO2 100 % 8/18/2020  1:36 PM           Post Anesthesia Care and Evaluation    Patient location during evaluation: PHASE II  Patient participation: complete - patient participated  Level of consciousness: awake and alert  Pain score: 1  Pain management: adequate  Airway patency: patent  Anesthetic complications: No anesthetic complications  PONV Status: controlled  Cardiovascular status: acceptable  Respiratory status: acceptable  Hydration status: acceptable

## 2020-08-18 NOTE — PLAN OF CARE
Received from pacu after bilateral mastectomy to room 233. VSS seen and assessed as charted, island dsg site clean and intact. Bilateral laurie drains in place to gravity sx. Pt resting well with no c/o at this time.  at bedside.

## 2020-08-18 NOTE — ANESTHESIA PROCEDURE NOTES
Peripheral Block      Patient location during procedure: OR  Start time: 8/18/2020 8:34 AM  Stop time: 8/18/2020 8:40 AM  Reason for block: at surgeon's request and post-op pain management  Performed by  Anesthesiologist: Lane Sunshine DO  CRNA: Nat Pina CRNA  Assisted by: Deion Zhu CRNA  Preanesthetic Checklist  Completed: patient identified, site marked, surgical consent, pre-op evaluation, timeout performed, IV checked, risks and benefits discussed and monitors and equipment checked  Prep:  Pt Position: supine  Sterile barriers:cap, gloves, mask and sterile barriers  Prep: DuraPrep  Patient monitoring: blood pressure monitoring, continuous pulse oximetry and EKG  Procedure  Sedation:yes (General Anesthesia)  Performed under: general  Guidance:ultrasound guided and landmark technique  Images:still images obtained, printed/placed on chart    Laterality:Bilateral  Block Type:PECS I and PECS II (PECS)  Injection Technique:single-shot  Needle Type:short-bevel  Needle Gauge:20 G  Resistance on Injection: none    Medications Used: dexamethasone (DECADRON) injection, 2 mg  ropivacaine (NAROPIN) injection 0.5 %, 250 mg      Medications  Preservative Free Saline:10ml  Comment:Block Injection: 20ml of LA mix injected at each PECS 2 site, 10ml of LA mix injected at each PECS 1 site          Post Assessment  Injection Assessment: negative aspiration for heme, incremental injection and no paresthesia on injection  Patient Tolerance:comfortable throughout block  Complications:no  Additional Notes  The pt. was placed in the Supine Position and was placed under GA.     The insertion site was prepped  and Ultrasound guidance with In-Plane technique was  a 4inch BBraun 360 degree echogenic needle was visualized.  Normal Saline PSF was utilized for hydrodissection of tissue. PECS 2-  Pectoralis Minor and Serratus muscle where identified and the needle was advanced laterally in-plane with the 4th rib as a  backstop, pleura was monitored.  LA was injected between SA and PmM at the level of 4th rib( 20ml). PECS 2 Block- Pectoralis Major and Minor where identified and LA was injected between PMM and PmM at the level of the 3rd Rib(10ml)  LA injection spread was visualized, injection was incremental 1-5ml, normal or low injection pressure, no intravascular injection, no pneumothorax appreciated. Thank You.  Right side performed by Bonilla Zhu CRNA and Left side performed by Nat Pina CRNA

## 2020-08-18 NOTE — ADDENDUM NOTE
Addendum  created 08/18/20 1416 by Nat Pina CRNA    Visit Navigator Flowsheet section accepted

## 2020-08-19 ENCOUNTER — READMISSION MANAGEMENT (OUTPATIENT)
Dept: CALL CENTER | Facility: HOSPITAL | Age: 45
End: 2020-08-19

## 2020-08-19 VITALS
DIASTOLIC BLOOD PRESSURE: 58 MMHG | OXYGEN SATURATION: 99 % | HEIGHT: 64 IN | HEART RATE: 74 BPM | BODY MASS INDEX: 40.29 KG/M2 | RESPIRATION RATE: 18 BRPM | TEMPERATURE: 98.3 F | WEIGHT: 236 LBS | SYSTOLIC BLOOD PRESSURE: 96 MMHG

## 2020-08-19 LAB — GLUCOSE BLDC GLUCOMTR-MCNC: 167 MG/DL (ref 70–130)

## 2020-08-19 PROCEDURE — 82962 GLUCOSE BLOOD TEST: CPT

## 2020-08-19 PROCEDURE — 25010000003 HEPARIN LOCK FLUSH PER 10 UNITS

## 2020-08-19 PROCEDURE — 25010000002 HEPARIN (PORCINE) PER 1000 UNITS: Performed by: SURGERY

## 2020-08-19 PROCEDURE — 99024 POSTOP FOLLOW-UP VISIT: CPT | Performed by: SURGERY

## 2020-08-19 PROCEDURE — G0378 HOSPITAL OBSERVATION PER HR: HCPCS

## 2020-08-19 PROCEDURE — 25010000002 KETOROLAC TROMETHAMINE PER 15 MG: Performed by: SURGERY

## 2020-08-19 RX ORDER — SULFAMETHOXAZOLE AND TRIMETHOPRIM 800; 160 MG/1; MG/1
1 TABLET ORAL 2 TIMES DAILY
Qty: 20 TABLET | Refills: 0 | Status: SHIPPED | OUTPATIENT
Start: 2020-08-19 | End: 2020-08-29

## 2020-08-19 RX ORDER — HEPARIN SODIUM,PORCINE/PF 1 UNIT/ML
500 SYRINGE (ML) INTRAVENOUS ONCE
Status: DISCONTINUED | OUTPATIENT
Start: 2020-08-19 | End: 2020-08-19 | Stop reason: HOSPADM

## 2020-08-19 RX ORDER — HEPARIN SODIUM (PORCINE) LOCK FLUSH IV SOLN 100 UNIT/ML 100 UNIT/ML
SOLUTION INTRAVENOUS
Status: COMPLETED
Start: 2020-08-19 | End: 2020-08-19

## 2020-08-19 RX ORDER — PSEUDOEPHEDRINE HCL 30 MG
100 TABLET ORAL 2 TIMES DAILY
COMMUNITY
Start: 2020-08-19 | End: 2022-05-18

## 2020-08-19 RX ORDER — POLYETHYLENE GLYCOL 3350 17 G/17G
17 POWDER, FOR SOLUTION ORAL 2 TIMES DAILY
Qty: 510 G | Refills: 0 | Status: SHIPPED | OUTPATIENT
Start: 2020-08-19 | End: 2020-09-03

## 2020-08-19 RX ORDER — OXYCODONE HYDROCHLORIDE AND ACETAMINOPHEN 5; 325 MG/1; MG/1
1 TABLET ORAL EVERY 6 HOURS PRN
Qty: 20 TABLET | Refills: 0 | Status: SHIPPED | OUTPATIENT
Start: 2020-08-19 | End: 2020-11-25

## 2020-08-19 RX ADMIN — KETOROLAC TROMETHAMINE 15 MG: 30 INJECTION, SOLUTION INTRAMUSCULAR; INTRAVENOUS at 03:12

## 2020-08-19 RX ADMIN — POLYETHYLENE GLYCOL 3350 17 G: 17 POWDER, FOR SOLUTION ORAL at 08:10

## 2020-08-19 RX ADMIN — KETOROLAC TROMETHAMINE 15 MG: 30 INJECTION, SOLUTION INTRAMUSCULAR; INTRAVENOUS at 08:10

## 2020-08-19 RX ADMIN — ACETAMINOPHEN 1000 MG: 500 TABLET ORAL at 08:10

## 2020-08-19 RX ADMIN — PANTOPRAZOLE SODIUM 40 MG: 40 TABLET, DELAYED RELEASE ORAL at 08:28

## 2020-08-19 RX ADMIN — ACETAMINOPHEN 1000 MG: 500 TABLET ORAL at 03:12

## 2020-08-19 RX ADMIN — DOCUSATE SODIUM 100 MG: 100 CAPSULE ORAL at 08:10

## 2020-08-19 RX ADMIN — HEPARIN SODIUM 5000 UNITS: 5000 INJECTION INTRAVENOUS; SUBCUTANEOUS at 11:31

## 2020-08-19 RX ADMIN — Medication 500 UNITS: at 11:31

## 2020-08-19 RX ADMIN — CLINDAMYCIN PHOSPHATE 900 MG: 900 INJECTION, SOLUTION INTRAVENOUS at 04:00

## 2020-08-19 NOTE — ANESTHESIA POSTPROCEDURE EVALUATION
Patient: Nivia Bernstein    Procedure Summary     Date:  08/18/20 Room / Location:  [facility] OR  /  [facility] OR    Anesthesia Start:  0806 Anesthesia Stop:  1246    Procedures:       Right BREAST MASTECTOMY WITH SENTINEL NODE BIOPSY (Right Breast)      Left mastectomy (Left Breast) Diagnosis:       Malignant neoplasm of upper-outer quadrant of right breast in female, estrogen receptor negative (CMS/HCC)      (Malignant neoplasm of upper-outer quadrant of right breast in female, estrogen receptor negative (CMS/HCC) [C50.411, Z17.1])    Surgeon:  Sheila Garza MD Provider:  Lane Sunshine DO    Anesthesia Type:  general ASA Status:  3          Anesthesia Type: general    Vitals  Vitals Value Taken Time   /76 8/18/2020  1:46 PM   Temp 97.8 °F (36.6 °C) 8/18/2020  1:26 PM   Pulse 76 8/18/2020  1:46 PM   Resp 20 8/18/2020  1:46 PM   SpO2 100 % 8/18/2020  1:36 PM           Post Anesthesia Care and Evaluation    Patient location during evaluation: bedside  Patient participation: complete - patient cannot participate  Level of consciousness: awake and alert  Pain score: 3  Pain management: adequate  Airway patency: patent  Anesthetic complications: No anesthetic complications  PONV Status: none  Cardiovascular status: acceptable  Respiratory status: acceptable  Hydration status: acceptable  Post Neuraxial Block status: Motor and sensory function returned to baseline and No signs or symptoms of PDPH  Comments: Saw patient 8/18 about 0845 pm.  Awake, Alert in bed.  Minimal pain, nausea.  Sleepy.  Been up to bathroom a couple times.  Ate full dinner without nausea.  Drinking.  NARC

## 2020-08-19 NOTE — DISCHARGE SUMMARY
[facility] GENERAL SURGERY DISCHARGE SUMMARY < 48 HOURS    Patient Identification:  Name:  Nivia Bernstein  Age:  45 y.o.  Sex:  female  :  1975  MRN:  [id number]    Date of Admission: 2020  Date of Discharge:  2020     ADMISSION DIAGNOSIS: right breast cancer    DISCHARGE DIAGNOSIS:  Same    PROCEDURES PERFORMED:  Procedure(s):  Right BREAST MASTECTOMY WITH SENTINEL NODE BIOPSY  Left mastectomy       HOSPITAL COURSE  Patient is a 45 y.o. female admitted to [facility] for post op care after surgery.  Please see the admitting history and physical for further details.  Postop the patient did well.  She is voiding and tolerating a diet.  Glucose is under good control.  Patient states she is sore but her pain has actually been reasonably well-controlled with the scheduled Toradol and Tylenol  On inspection site JPs are draining well.  No evidence of hematoma.    CONDITION AT DISCHARGE:  Improved    DISCHARGE DISPOSITION   Home    DISCHARGE MEDICATIONS:     Discharge Medications      New Medications      Instructions Start Date   docusate sodium 100 MG capsule   100 mg, Oral, 2 Times Daily      oxyCODONE-acetaminophen 5-325 MG per tablet  Commonly known as:  Percocet   1 tablet, Oral, Every 6 Hours PRN      polyethylene glycol 17 g pack packet  Commonly known as:  MIRALAX   17 g, Oral, 2 Times Daily      sulfamethoxazole-trimethoprim 800-160 MG per tablet  Commonly known as:  Bactrim DS   160 mg, Oral, 2 Times Daily         Continue These Medications      Instructions Start Date   Breo Ellipta 200-25 MCG/INH inhaler  Generic drug:  Fluticasone Furoate-Vilanterol   1 puff, Inhalation, Daily PRN      cyanocobalamin 1000 MCG/ML injection   1,000 mcg, Intramuscular, Weekly      diazePAM 5 MG tablet  Commonly known as:  VALIUM   5 mg, Oral, Daily      diclofenac 1 % gel gel  Commonly known as:  VOLTAREN   4 g, Topical, 4 Times Daily PRN      Empagliflozin-Linaglip-Metform 12.5-2.5-1000 MG  tablet sustained-release 24 hour   2 tablets, Oral, Daily      fluticasone 50 MCG/ACT nasal spray  Commonly known as:  FLONASE   2 sprays, Nasal, Daily PRN      ibuprofen 800 MG tablet  Commonly known as:  ADVIL,MOTRIN   800 mg, Oral, Every 8 Hours PRN      insulin lispro 100 UNIT/ML patient supplied pump  Commonly known as:  HumaLOG   Subcutaneous, Continuous, Basil rate: 2.95/hr Carb ratio: 4.0       omeprazole 20 MG capsule  Commonly known as:  priLOSEC   TAKE 1 CAPSULE BY MOUTH TWO TIMES A DAY      ondansetron 4 MG tablet  Commonly known as:  ZOFRAN   4 mg, Oral, Every 8 Hours PRN      VENTOLIN HFA IN   2 puffs, Inhalation, Every 4 Hours PRN      vitamin D 1.25 MG (61809 UT) capsule capsule  Commonly known as:  ERGOCALCIFEROL   50,000 Units, Oral, Weekly, Prior to [facility] Admission, Patient was on: wednesdays              FOLLOW-UP:  Dr. Garza in 8 days    Activity and diet instructions given to the patient

## 2020-08-19 NOTE — OUTREACH NOTE
Prep Survey      Responses   [facility] patient discharged from?  [place]   Is LACE score < 7 ?  No   Eligibility  [facility]   Hospital  [place]   Date of Admission  08/18/20   Date of Discharge  08/19/20   Discharge Disposition  Home or Self Care   Discharge diagnosis  Right breast mastectomy this visit   COVID-19 Test Status  Negative   Does the patient have one of the following disease processes/diagnoses(primary or secondary)?  General Surgery   Does the patient have Home health ordered?  No   Is there a DME ordered?  No   Prep survey completed?  Yes          Phoebe Shea RN

## 2020-08-19 NOTE — PLAN OF CARE
Problem: Patient Care Overview  Goal: Plan of Care Review  Outcome: Ongoing (interventions implemented as appropriate)  Flowsheets  Taken 8/19/2020 0421  Progress: improving  Taken 8/18/2020 2000  Plan of Care Reviewed With: patient  Goal: Discharge Needs Assessment  Outcome: Ongoing (interventions implemented as appropriate)  Flowsheets (Taken 8/19/2020 0421)  Equipment Needed After Discharge: none  Equipment Currently Used at Home: none  Anticipated Changes Related to Illness: none  Transportation Concerns: car, none  Concerns to be Addressed: no discharge needs identified  Readmission Within the Last 30 Days: no previous admission in last 30 days  Patient/Family Anticipated Services at Transition: none  Patient/Family Anticipates Transition to: home     Problem: Breast Surgery/Reconstruction (Adult)  Goal: Signs and Symptoms of Listed Potential Problems Will be Absent, Minimized or Managed (Breast Surgery/Reconstruction)  Outcome: Ongoing (interventions implemented as appropriate)  Flowsheets (Taken 8/19/2020 0421)  Problems Assessed (Breast Surgery (Including Reconstruction)): all  Problems Present (Breast Surgery/Recon): none  Note:   Patient vitals wnl, [place] drains in place to gravity, pt resting no complaints at this time

## 2020-08-19 NOTE — PLAN OF CARE
Problem: Patient Care Overview  Goal: Plan of Care Review  Outcome: Outcome(s) achieved  Flowsheets  Taken 8/19/2020 0421 by Bryanna De La Fuente RN  Progress: improving  Taken 8/19/2020 0810 by Lizbeth Parish RN  Plan of Care Reviewed With: patient  Taken 8/18/2020 1234 by Nika Molina RN  Outcome Summary: INTRA OP CARE COMPLETE  Goal: Individualization and Mutuality  Outcome: Outcome(s) achieved  Goal: Discharge Needs Assessment  Outcome: Outcome(s) achieved  Goal: Interprofessional Rounds/Family Conf  Outcome: Outcome(s) achieved     Problem: Breast Surgery/Reconstruction (Adult)  Goal: Signs and Symptoms of Listed Potential Problems Will be Absent, Minimized or Managed (Breast Surgery/Reconstruction)  Outcome: Outcome(s) achieved  Flowsheets (Taken 8/19/2020 1200)  Problems Assessed (Breast Surgery (Including Reconstruction)): all  Problems Present (Breast Surgery/Recon): none  Note:   Patient is doing well, PO medication for discomfort.   Goal: Anesthesia/Sedation Recovery  Outcome: Outcome(s) achieved

## 2020-08-20 ENCOUNTER — TRANSITIONAL CARE MANAGEMENT TELEPHONE ENCOUNTER (OUTPATIENT)
Dept: CALL CENTER | Facility: HOSPITAL | Age: 45
End: 2020-08-20

## 2020-08-20 DIAGNOSIS — E11.42 DM TYPE 2 WITH DIABETIC PERIPHERAL NEUROPATHY (HCC): ICD-10-CM

## 2020-08-20 NOTE — OUTREACH NOTE
Call Center TCM Note      Responses   [facility] patient discharged from?  [place]   Does the patient have one of the following disease processes/diagnoses(primary or secondary)?  General Surgery   TCM attempt successful?  Yes   Call start time  0905   Call end time  0909   Discharge diagnosis  Right breast mastectomy this visit   Is patient permission given to speak with other caregiver?  Yes   Person spoke with today (if not patient) and relationship  / Andrew   Meds reviewed with patient/caregiver?  Yes   Is the patient having any side effects they believe may be caused by any medication additions or changes?  No   Does the patient have all medications related to this admission filled (includes all antibiotics, pain medications, etc.)  Yes   Is the patient taking all medications as directed (includes completed medication regime)?  Yes   Does the patient have a follow up appointment scheduled with their surgeon?  Yes   Has home health visited the patient within 72 hours of discharge?  N/A   Psychosocial issues?  No   Comments   reports she is having some pain today. Usinf pin meds as needed.   Did the patient receive a copy of their discharge instructions?  Yes   Nursing interventions  Reviewed instructions with patient   What is the patient's perception of their health status since discharge?  Improving   Nursing interventions  Nurse provided patient education   Is the patient /caregiver able to teach back basic post-op care?  Practice 'cough and deep breath', Continue use of incentive spirometry at least 1 week post discharge, Drive as instructed by MD in discharge instructions, Take showers only when approved by MD-sponge bathe until then, No tub bath, swimming, or hot tub until instructed by MD, Keep incision areas clean,dry and protected, Lifting as instructed by MD in discharge instructions   Is the patient/caregiver able to teach back signs and symptoms of incisional infection?  Increased  redness, swelling or pain at the incisonal site, Fever, Incisional warmth, Increased drainage or bleeding, Pus or odor from incision   Is the patient/caregiver able to teach back steps to recovery at home?  Set small, achievable goals for return to baseline health, Rest and rebuild strength, gradually increase activity, Make a list of questions for surgeon's appointment   Is the patient/caregiver able to teach back the hierarchy of who to call/visit for symptoms/problems? PCP, Specialist, Home health nurse, Urgent Care, ED, 911  Yes   TCM call completed?  Yes          Conner Mast RN    8/20/2020, 09:10

## 2020-08-21 ENCOUNTER — TELEPHONE (OUTPATIENT)
Dept: ONCOLOGY | Facility: HOSPITAL | Age: 45
End: 2020-08-21

## 2020-08-21 LAB
LAB AP CASE REPORT: NORMAL
Lab: NORMAL
PATH REPORT.FINAL DX SPEC: NORMAL
PATH REPORT.GROSS SPEC: NORMAL

## 2020-08-21 NOTE — TELEPHONE ENCOUNTER
Nurse Navigator spoke with patient on this date to follow up, Bilateral Breast Mastectomy performed on 8/18/2020.  Pt stated she was doing well.  Pt c/o soreness and occasional pain, denies any other complaints.  Pt aware of Post-op  Appointment on 8/28/20 with Dr. Garza.  Pt aware to call the Nurse navigator with any questions or concerns.  Nurse Navigator will follow up and assist as needed.

## 2020-08-24 ENCOUNTER — OFFICE VISIT (OUTPATIENT)
Dept: PSYCHIATRY | Facility: CLINIC | Age: 45
End: 2020-08-24

## 2020-08-24 DIAGNOSIS — F41.1 GENERALIZED ANXIETY DISORDER: Primary | ICD-10-CM

## 2020-08-24 DIAGNOSIS — F33.3 SEVERE EPISODE OF RECURRENT MAJOR DEPRESSIVE DISORDER, WITH PSYCHOTIC FEATURES (HCC): ICD-10-CM

## 2020-08-24 PROCEDURE — 99213 OFFICE O/P EST LOW 20 MIN: CPT | Performed by: NURSE PRACTITIONER

## 2020-08-24 NOTE — PROGRESS NOTES
You have chosen to receive care through a telephone visit. Do you consent to use a telephone visit for your medical care today? Yes    Subjective   Nivia Bernstein is a 45 y.o. female is here today for medication management follow-up.    This provider is located at [facility] at [address] [place] [address]. The provider identified herself as well as her credentials. The Patient is at/in home by herself/himself, using her/his phone because problems with video connection. The patient's condition being diagnosed/treated is appropriate for telemedicine. The patient gave consent to be seen remotely, and when consent is given they understand that the consent allows for patient identifiable information to be sent to a third party as needed.   They may refuse to be seen remotely at any time. The electronic data is encrypted and password protected, and the patient has been advised of the potential risks to privacy not withstanding such measures.      Chief Complaint: depression anxiety     History of Present Illness:   Patient presents today for follow up for medication management for depression and anxiety. Patient states not taking the amitriptyline. Patient states she had some of her valium left and took them when stopping the amitriptyline and found out she had cancer. Patient reports she started the Valium back due to getting so overwhelmed with everything and irritability had gotten a lot worse. Patient states she did run out of it and took a few of her husbands due to running out and unable to come into the office. Patient states mood has not been doing good right now. Patient states she just had double mastectomy due to cancer. Patient states had her surgery last Tuesday. Patient states she goes back for follow up on the 27th. Patient reports currently depression is at a 10 on a 0-10 scale with 10 being the worst. Patient reports symptoms of depression of irritability, mood swings, decreased energy,  decreased motivation, and depressed mood. Patient reports anxiety is at a 10 on a 0-10 scale with 10 being the worst. Patient denies any panic attacks. Patient states unable to sleep due to can't sleep on her back and with drains in unable to sleep on side. Patient reports sleeping 3 hours a night and patient denies any nightmares. Patient reports appetite is good and eating 2 meals a day. Patient states getting sick to her stomach after eat but no vomiting. Patient denies any auditory or visual hallucinations. Patient adamantly denies any SI or HI. Patient denies any side effects to medications. Patient denies any medical changes since last visit.   The following portions of the patient's history were reviewed and updated as appropriate: allergies, current medications, past family history, past medical history, past social history, past surgical history and problem list.    Review of Systems   Constitutional: Negative.    Respiratory: Negative.    Cardiovascular: Negative.    Gastrointestinal: Negative.    Neurological: Negative.    Psychiatric/Behavioral: Positive for agitation, dysphoric mood and sleep disturbance. The patient is nervous/anxious.        Objective   Physical Exam   Constitutional: She is cooperative.   Neurological: She is alert.   Psychiatric: Her speech is normal. Cognition and memory are normal.     not currently breastfeeding. There is no height or weight on file to calculate BMI.  Unable to obtain vitals due to telephone visit.     Allergies   Allergen Reactions   • Mobic [Meloxicam] Rash   • Novolog [Insulin Aspart] Hives   • Penicillins Angioedema       Medication List:   Current Outpatient Medications   Medication Sig Dispense Refill   • Albuterol Sulfate (VENTOLIN HFA IN) Inhale 2 puffs Every 4 (Four) Hours As Needed (shortness of breath).     • BREO ELLIPTA 200-25 MCG/INH inhaler Inhale 1 puff Daily As Needed (shortness of air).     • cyanocobalamin 1000 MCG/ML injection Inject 1,000 mcg  into the appropriate muscle as directed by prescriber 1 (One) Time Per Week.     • diazePAM (VALIUM) 5 MG tablet Take 1 tablet by mouth Daily. 30 tablet 0   • diclofenac (VOLTAREN) 1 % gel gel Apply 4 g topically to the appropriate area as directed 4 (Four) Times a Day As Needed (joint pain). 500 g 5   • docusate sodium 100 MG capsule Take 100 mg by mouth 2 (Two) Times a Day.     • Empagliflozin-Linaglip-Metform 12.5-2.5-1000 MG tablet sustained-release 24 hour Take 2 tablets by mouth Daily. 60 tablet 5   • fluticasone (FLONASE) 50 MCG/ACT nasal spray 2 sprays into the nostril(s) as directed by provider Daily As Needed for Rhinitis or Allergies.     • ibuprofen (ADVIL,MOTRIN) 800 MG tablet Take 800 mg by mouth Every 8 (Eight) Hours As Needed for Mild Pain .     • insulin lispro (HumaLOG) 100 UNIT/ML patient supplied pump Inject  under the skin into the appropriate area as directed Continuous. Basil rate: 2.95/hr  Carb ratio: 4.0     • omeprazole (priLOSEC) 20 MG capsule TAKE 1 CAPSULE BY MOUTH TWO TIMES A DAY 60 capsule 5   • ondansetron (ZOFRAN) 4 MG tablet Take 1 tablet by mouth Every 8 (Eight) Hours As Needed for Nausea or Vomiting. 39 tablet 1   • oxyCODONE-acetaminophen (Percocet) 5-325 MG per tablet Take 1 tablet by mouth Every 6 (Six) Hours As Needed for Moderate Pain  for up to 20 doses. 20 tablet 0   • polyethylene glycol (MIRALAX) 17 GM/SCOOP powder Mix 1 capful (17 g) in 4 to 8 ounces liquid and drink by mouth 2 (Two) Times a Day for 6 days. 510 g 0   • sulfamethoxazole-trimethoprim (Bactrim DS) 800-160 MG per tablet Take 1 tablet by mouth 2 (Two) Times a Day for 10 days. 20 tablet 0   • vitamin D (ERGOCALCIFEROL) 1.25 MG (46784 UT) capsule capsule Take 50,000 Units by mouth 1 (One) Time Per Week. Prior to [facility] Admission, Patient was on: wednesdays       No current facility-administered medications for this visit.        Mental Status Exam:   Hygiene:   STEPHANIE  Cooperation:  Cooperative  Eye Contact:   [place]  Psychomotor Behavior:  [place]  Affect:  [place]  Hopelessness: Denies  Speech:  Normal  Thought Process:  Goal directed and Linear  Thought Content:  Mood congruent  Suicidal:  None  Homicidal:  None  Hallucinations:  None  Delusion:  None  Memory:  Intact  Orientation:  Person, Place, Time and Situation  Reliability:  fair  Insight:  Poor  Judgement:  Fair  Impulse Control:  Fair  Physical/Medical Issues:  Yes breast cancer, DM              Assessment/Plan   Diagnoses and all orders for this visit:    Generalized anxiety disorder    Severe episode of recurrent major depressive disorder, with psychotic features (CMS/HCC)            Discussed medication options with patient. Discussed with patient will look through history regarding medication failures. Discussed with patient will try a mood stabilizer as next option and will call patient to discuss over the phone the best option due to side effect risks. Patient acknowledged and agreed. Reviewed the risks, benefits, and side effects of the medications; patient acknowledged and verbally consented. AIDAN report reviewed #[id number]. Patient is agreeable to call the office with any questions, concerns, or worsening of symptoms. Patient is aware to call 911 or go to the nearest ER should begin having SI/HI.        Follow up in four weeks       Errors in dictation may reflect use of voice recognition software and not all errors in transcription may have been detected prior to signing.      This visit has been rescheduled as a phone visit to comply with patient safety concerns in accordance with CDC recommendations. Total time of discussion was 15 minutes.                This document has been electronically signed by NIDA Vasquez   August 24, 2020 08:09

## 2020-08-27 ENCOUNTER — OFFICE VISIT (OUTPATIENT)
Dept: SURGERY | Facility: CLINIC | Age: 45
End: 2020-08-27

## 2020-08-27 VITALS
HEIGHT: 55 IN | BODY MASS INDEX: 54.62 KG/M2 | DIASTOLIC BLOOD PRESSURE: 82 MMHG | SYSTOLIC BLOOD PRESSURE: 125 MMHG | HEART RATE: 86 BPM | WEIGHT: 236 LBS

## 2020-08-27 DIAGNOSIS — C50.411 MALIGNANT NEOPLASM OF UPPER-OUTER QUADRANT OF RIGHT BREAST IN FEMALE, ESTROGEN RECEPTOR NEGATIVE (HCC): Primary | ICD-10-CM

## 2020-08-27 DIAGNOSIS — Z09 POSTOP CHECK: ICD-10-CM

## 2020-08-27 DIAGNOSIS — Z17.1 MALIGNANT NEOPLASM OF UPPER-OUTER QUADRANT OF RIGHT BREAST IN FEMALE, ESTROGEN RECEPTOR NEGATIVE (HCC): Primary | ICD-10-CM

## 2020-08-27 PROCEDURE — 99024 POSTOP FOLLOW-UP VISIT: CPT | Performed by: SURGERY

## 2020-08-27 NOTE — PROGRESS NOTES
Subjective   Nivia Bernstein is a 45 y.o. female here today for post op and pathology report.    History of Present Illness  Ms. Bernstein was seen in the office today for her first postoperative visit following right mastectomy with sentinel node biopsy and a contralateral left mastectomy on 8/18/2020 final pathology demonstrated a T1BN0 grade 2 ductal carcinoma of the right breast, ER negative, PA positive, HER-2 negative.  No disease identified in the left breast.  Patient reports postoperative discomfort but overall is doing well.  Allergies   Allergen Reactions   • Mobic [Meloxicam] Rash   • Novolog [Insulin Aspart] Hives   • Penicillins Angioedema         Current Outpatient Medications   Medication Sig Dispense Refill   • Albuterol Sulfate (VENTOLIN HFA IN) Inhale 2 puffs Every 4 (Four) Hours As Needed (shortness of breath).     • BREO ELLIPTA 200-25 MCG/INH inhaler Inhale 1 puff Daily As Needed (shortness of air).     • cyanocobalamin 1000 MCG/ML injection Inject 1,000 mcg into the appropriate muscle as directed by prescriber 1 (One) Time Per Week.     • diazePAM (VALIUM) 5 MG tablet Take 1 tablet by mouth Daily. 30 tablet 0   • diclofenac (VOLTAREN) 1 % gel gel Apply 4 g topically to the appropriate area as directed 4 (Four) Times a Day As Needed (joint pain). 500 g 5   • docusate sodium 100 MG capsule Take 100 mg by mouth 2 (Two) Times a Day.     • Empagliflozin-Linaglip-Metform 12.5-2.5-1000 MG tablet sustained-release 24 hour Take 2 tablets by mouth Daily. 60 tablet 5   • fluticasone (FLONASE) 50 MCG/ACT nasal spray 2 sprays into the nostril(s) as directed by provider Daily As Needed for Rhinitis or Allergies.     • ibuprofen (ADVIL,MOTRIN) 800 MG tablet Take 800 mg by mouth Every 8 (Eight) Hours As Needed for Mild Pain .     • insulin lispro (HumaLOG) 100 UNIT/ML patient supplied pump Inject  under the skin into the appropriate area as directed Continuous. Basil rate: 2.95/hr  Carb ratio: 4.0     • omeprazole  "(priLOSEC) 20 MG capsule TAKE 1 CAPSULE BY MOUTH TWO TIMES A DAY 60 capsule 5   • ondansetron (ZOFRAN) 4 MG tablet Take 1 tablet by mouth Every 8 (Eight) Hours As Needed for Nausea or Vomiting. 39 tablet 1   • oxyCODONE-acetaminophen (Percocet) 5-325 MG per tablet Take 1 tablet by mouth Every 6 (Six) Hours As Needed for Moderate Pain  for up to 20 doses. 20 tablet 0   • polyethylene glycol (MIRALAX) 17 GM/SCOOP powder Mix 1 capful (17 g) in 4 to 8 ounces liquid and drink by mouth 2 (Two) Times a Day for 6 days. 510 g 0   • vitamin D (ERGOCALCIFEROL) 1.25 MG (27594 UT) capsule capsule Take 50,000 Units by mouth 1 (One) Time Per Week. Prior to [facility] Admission, Patient was on: wednesdays       No current facility-administered medications for this visit.        Objective   Ht 64 \"    Wt 107 kg (236 lb)   BMI 41.40 kg/m2    Physical Exam  On examination this is a well-developed well-nourished female in no acute distress  HEENT examination: Within normal limits.  Conjunctiva pink.  Nose and ears appear normal.  Neck: Supple, full range of motion.  No JVD.  Musculoskeletal: Full range of motion all extremities without focal weakness. Normal gait. No digital cyanosis.  Psych: Patient is alert, oriented x3. Mood and affect are appropriate.  Chest: Healing mastectomy incisions without erythema or drainage.  After review of DEREK output the DEREK drains were removed.    Results/Data  Pathology result was reviewed and discussed with the patient    Procedures     Assessment/Plan   Right breast T1BN0 grade 2 mammary carcinoma, ER negative, CT positive, HER-2 negative, status post left mastectomy with sentinel node biopsy  Left mastectomy    Plan: MammaPrint evaluation of tumor  Follow-up in 1 week       Discussion/Summary  Patient's Body mass index is 41.40 kg/m². BMI is above normal parameters. Recommendations include: educational material.    Future Appointments   Date Time Provider Department Center   9/3/2020  9:55 AM Greg, " Sheila HINES MD [facility] None   9/23/2020  8:00 AM Gale Aguilar APRN [facility] None   11/13/2020 11:30 AM Markus Menjivar MD [facility] None         Please note that portions of this note were completed with a voice recognition program.

## 2020-09-03 ENCOUNTER — OFFICE VISIT (OUTPATIENT)
Dept: SURGERY | Facility: CLINIC | Age: 45
End: 2020-09-03

## 2020-09-03 VITALS
BODY MASS INDEX: 40.26 KG/M2 | DIASTOLIC BLOOD PRESSURE: 91 MMHG | HEIGHT: 64 IN | HEART RATE: 90 BPM | WEIGHT: 235.8 LBS | SYSTOLIC BLOOD PRESSURE: 140 MMHG

## 2020-09-03 DIAGNOSIS — Z17.1 MALIGNANT NEOPLASM OF UPPER-OUTER QUADRANT OF RIGHT BREAST IN FEMALE, ESTROGEN RECEPTOR NEGATIVE (HCC): Primary | ICD-10-CM

## 2020-09-03 DIAGNOSIS — Z09 POSTOP CHECK: ICD-10-CM

## 2020-09-03 DIAGNOSIS — C50.411 MALIGNANT NEOPLASM OF UPPER-OUTER QUADRANT OF RIGHT BREAST IN FEMALE, ESTROGEN RECEPTOR NEGATIVE (HCC): Primary | ICD-10-CM

## 2020-09-03 PROCEDURE — 99024 POSTOP FOLLOW-UP VISIT: CPT | Performed by: SURGERY

## 2020-09-03 NOTE — PROGRESS NOTES
Subjective   Nivia Bernstein is a 45 y.o. female here today for follow up post op.    History of Present Illness  Ms. Bernstein was seen in the office today for her first postoperative visit following right mastectomy with sentinel node biopsy and a contralateral left mastectomy on 8/18/2020 final pathology demonstrated a T1BN0 grade 2 ductal carcinoma of the right breast, ER negative, UT positive, HER-2 negative.  No disease identified in the left breast.  Patient reports postoperative discomfort but overall is doing well.  Drains were removed last week.  Allergies   Allergen Reactions   • Mobic [Meloxicam] Rash   • Novolog [Insulin Aspart] Hives   • Penicillins Angioedema         Current Outpatient Medications   Medication Sig Dispense Refill   • Albuterol Sulfate (VENTOLIN HFA IN) Inhale 2 puffs Every 4 (Four) Hours As Needed (shortness of breath).     • BREO ELLIPTA 200-25 MCG/INH inhaler Inhale 1 puff Daily As Needed (shortness of air).     • cyanocobalamin 1000 MCG/ML injection Inject 1,000 mcg into the appropriate muscle as directed by prescriber 1 (One) Time Per Week.     • diazePAM (VALIUM) 5 MG tablet Take 1 tablet by mouth Daily. 30 tablet 0   • diclofenac (VOLTAREN) 1 % gel gel Apply 4 g topically to the appropriate area as directed 4 (Four) Times a Day As Needed (joint pain). 500 g 5   • docusate sodium 100 MG capsule Take 100 mg by mouth 2 (Two) Times a Day.     • Empagliflozin-Linaglip-Metform 12.5-2.5-1000 MG tablet sustained-release 24 hour Take 2 tablets by mouth Daily. 60 tablet 5   • fluticasone (FLONASE) 50 MCG/ACT nasal spray 2 sprays into the nostril(s) as directed by provider Daily As Needed for Rhinitis or Allergies.     • ibuprofen (ADVIL,MOTRIN) 800 MG tablet Take 800 mg by mouth Every 8 (Eight) Hours As Needed for Mild Pain .     • insulin lispro (HumaLOG) 100 UNIT/ML patient supplied pump Inject  under the skin into the appropriate area as directed Continuous. Basil rate: 2.95/hr  Carb ratio:  "4.0     • omeprazole (priLOSEC) 20 MG capsule TAKE 1 CAPSULE BY MOUTH TWO TIMES A DAY 60 capsule 5   • ondansetron (ZOFRAN) 4 MG tablet Take 1 tablet by mouth Every 8 (Eight) Hours As Needed for Nausea or Vomiting. 39 tablet 1   • oxyCODONE-acetaminophen (Percocet) 5-325 MG per tablet Take 1 tablet by mouth Every 6 (Six) Hours As Needed for Moderate Pain  for up to 20 doses. 20 tablet 0   • vitamin D (ERGOCALCIFEROL) 1.25 MG (19486 UT) capsule capsule Take 50,000 Units by mouth 1 (One) Time Per Week. Prior to [facility] Admission, Patient was on: wednesdays       No current facility-administered medications for this visit.              Objective   /91 (BP Location: Left arm)   Pulse 90   Ht 162.6 cm (64\")   Wt 107 kg (235 lb 12.8 oz)   BMI 40.47 kg/m²    Physical Exam  On examination this is a well-developed well-nourished female in no acute distress  HEENT examination: Within normal limits.  Conjunctiva pink.  Nose and ears appear normal.  Neck: Supple, full range of motion.  No JVD.  Musculoskeletal: Full range of motion all extremities without focal weakness. Normal gait. No digital cyanosis.  Psych: Patient is alert, oriented x3. Mood and affect are appropriate.  Chest wall: Healing mastectomy scars.  No hematoma or seroma    Results/Data      Procedures     Assessment/Plan   Right breast T1BN0 grade 2 mammary carcinoma, ER negative, AR positive, HER-2 negative, status post left mastectomy with sentinel node biopsy  Left mastectomy    Plan: Follow-up in 1 week to review MammaPrint results       Discussion/Summary  Patient's Body mass index is 40.47 kg/m². BMI is above normal parameters. Recommendations include: educational material.    Future Appointments   Date Time Provider Department Center   9/10/2020 10:00 AM Sheila Garza MD [facility] None   9/30/2020  8:00 AM Gale Aguilar APRN [facility] None   11/13/2020 11:30 AM Markus Menjivar MD [facility] None         Please note that " portions of this note were completed with a voice recognition program.

## 2020-09-04 LAB
LAB AP CASE REPORT: NORMAL
PATH REPORT.FINAL DX SPEC: NORMAL

## 2020-09-09 DIAGNOSIS — C50.411 MALIGNANT NEOPLASM OF UPPER-OUTER QUADRANT OF RIGHT BREAST IN FEMALE, ESTROGEN RECEPTOR NEGATIVE (HCC): Primary | ICD-10-CM

## 2020-09-09 DIAGNOSIS — Z17.1 MALIGNANT NEOPLASM OF UPPER-OUTER QUADRANT OF RIGHT BREAST IN FEMALE, ESTROGEN RECEPTOR NEGATIVE (HCC): Primary | ICD-10-CM

## 2020-09-10 ENCOUNTER — OFFICE VISIT (OUTPATIENT)
Dept: SURGERY | Facility: CLINIC | Age: 45
End: 2020-09-10

## 2020-09-10 VITALS
BODY MASS INDEX: 40.12 KG/M2 | HEART RATE: 96 BPM | WEIGHT: 235 LBS | DIASTOLIC BLOOD PRESSURE: 93 MMHG | HEIGHT: 64 IN | SYSTOLIC BLOOD PRESSURE: 142 MMHG

## 2020-09-10 DIAGNOSIS — C50.411 MALIGNANT NEOPLASM OF UPPER-OUTER QUADRANT OF RIGHT BREAST IN FEMALE, ESTROGEN RECEPTOR NEGATIVE (HCC): Primary | ICD-10-CM

## 2020-09-10 DIAGNOSIS — Z17.1 MALIGNANT NEOPLASM OF UPPER-OUTER QUADRANT OF RIGHT BREAST IN FEMALE, ESTROGEN RECEPTOR NEGATIVE (HCC): Primary | ICD-10-CM

## 2020-09-10 DIAGNOSIS — Z09 POSTOP CHECK: ICD-10-CM

## 2020-09-10 PROCEDURE — 99024 POSTOP FOLLOW-UP VISIT: CPT | Performed by: SURGERY

## 2020-09-10 NOTE — PROGRESS NOTES
Subjective   Nivia Bernstein is a 45 y.o. female here today for post op.    History of Present Illness  Ms. Bernstein was seen in the office today for her third postoperative visit following right mastectomy with sentinel node biopsy and a contralateral left mastectomy on 8/18/2020 final pathology demonstrated a T1BN0 grade 2 ductal carcinoma of the right breast, ER negative, TN positive, HER-2 negative.  No disease identified in the left breast.  Patient reports postoperative discomfort but overall is doing well.  Drains were removed 2 weeks ago.  She comes in today to review the results of her MammaPrint and for a wound check.    Allergies   Allergen Reactions   • Mobic [Meloxicam] Rash   • Novolog [Insulin Aspart] Hives   • Penicillins Angioedema         Current Outpatient Medications   Medication Sig Dispense Refill   • Albuterol Sulfate (VENTOLIN HFA IN) Inhale 2 puffs Every 4 (Four) Hours As Needed (shortness of breath).     • BREO ELLIPTA 200-25 MCG/INH inhaler Inhale 1 puff Daily As Needed (shortness of air).     • cyanocobalamin 1000 MCG/ML injection Inject 1,000 mcg into the appropriate muscle as directed by prescriber 1 (One) Time Per Week.     • diazePAM (VALIUM) 5 MG tablet Take 1 tablet by mouth Daily. 30 tablet 0   • diclofenac (VOLTAREN) 1 % gel gel Apply 4 g topically to the appropriate area as directed 4 (Four) Times a Day As Needed (joint pain). 500 g 5   • docusate sodium 100 MG capsule Take 100 mg by mouth 2 (Two) Times a Day.     • Empagliflozin-Linaglip-Metform 12.5-2.5-1000 MG tablet sustained-release 24 hour Take 2 tablets by mouth Daily. 60 tablet 5   • fluticasone (FLONASE) 50 MCG/ACT nasal spray 2 sprays into the nostril(s) as directed by provider Daily As Needed for Rhinitis or Allergies.     • ibuprofen (ADVIL,MOTRIN) 800 MG tablet Take 800 mg by mouth Every 8 (Eight) Hours As Needed for Mild Pain .     • insulin lispro (HumaLOG) 100 UNIT/ML patient supplied pump Inject  under the skin into  "the appropriate area as directed Continuous. Basil rate: 2.95/hr  Carb ratio: 4.0     • omeprazole (priLOSEC) 20 MG capsule TAKE 1 CAPSULE BY MOUTH TWO TIMES A DAY 60 capsule 5   • ondansetron (ZOFRAN) 4 MG tablet Take 1 tablet by mouth Every 8 (Eight) Hours As Needed for Nausea or Vomiting. 39 tablet 1   • oxyCODONE-acetaminophen (Percocet) 5-325 MG per tablet Take 1 tablet by mouth Every 6 (Six) Hours As Needed for Moderate Pain  for up to 20 doses. 20 tablet 0   • vitamin D (ERGOCALCIFEROL) 1.25 MG (85302 UT) capsule capsule Take 50,000 Units by mouth 1 (One) Time Per Week. Prior to [facility] Admission, Patient was on: wednesdays       No current facility-administered medications for this visit.        Objective   Ht 162.6 cm (64\")   Wt 107 kg (235 lb)   BMI 40.34 kg/m²    Physical Exam  On examination this is a morbidly obese female in no acute distress  HEENT examination: Within normal limits.  Conjunctiva pink.  Nose and ears appear normal.  Neck: Supple, full range of motion.  No JVD.  Musculoskeletal: Full range of motion all extremities without focal weakness. Normal gait. No digital cyanosis.  Psych: Patient is alert, oriented x3. Mood and affect are appropriate.  Chest wall: Healing mastectomy incisions.  Left-sided seroma which was aspirated under sterile conditions.  Approximately 200 cc fluid were obtained    Results/Data  MammaPrint results which define the tumor as high risk were reviewed with the patient    Procedures     Assessment/Plan   Right breast T1BN0 grade 2 mammary carcinoma, ER negative, CO positive, HER-2 negative, status post left mastectomy with sentinel node biopsy  Left mastectomy    Medical oncology consultation to discuss consideration/need for chemotherapy       Discussion/Summary  Patient's Body mass index is 40.34 kg/m². BMI is above normal parameters. Recommendations include: educational material.    Future Appointments   Date Time Provider Department Center   9/10/2020 10:00 AM " Sheila Garza MD [facility] None   9/11/2020  9:30 AM Dejah Loco MD [facility]   9/30/2020  8:00 AM Gale Aguilar APRN [facility] None   11/13/2020 11:30 AM Markus Menjivar MD [facility] None         Please note that portions of this note were completed with a voice recognition program.

## 2020-09-11 ENCOUNTER — CONSULT (OUTPATIENT)
Dept: ONCOLOGY | Facility: CLINIC | Age: 45
End: 2020-09-11

## 2020-09-11 ENCOUNTER — DOCUMENTATION (OUTPATIENT)
Dept: ONCOLOGY | Facility: HOSPITAL | Age: 45
End: 2020-09-11

## 2020-09-11 VITALS
BODY MASS INDEX: 39.78 KG/M2 | DIASTOLIC BLOOD PRESSURE: 74 MMHG | RESPIRATION RATE: 18 BRPM | OXYGEN SATURATION: 98 % | HEIGHT: 64 IN | TEMPERATURE: 97.3 F | SYSTOLIC BLOOD PRESSURE: 117 MMHG | WEIGHT: 233 LBS | HEART RATE: 94 BPM

## 2020-09-11 DIAGNOSIS — Z17.1 MALIGNANT NEOPLASM OF UPPER-OUTER QUADRANT OF RIGHT BREAST IN FEMALE, ESTROGEN RECEPTOR NEGATIVE (HCC): Primary | ICD-10-CM

## 2020-09-11 DIAGNOSIS — C50.411 MALIGNANT NEOPLASM OF UPPER-OUTER QUADRANT OF RIGHT BREAST IN FEMALE, ESTROGEN RECEPTOR NEGATIVE (HCC): Primary | ICD-10-CM

## 2020-09-11 LAB
ALBUMIN SERPL-MCNC: 4.73 G/DL (ref 3.5–5.2)
ALBUMIN/GLOB SERPL: 1.5 G/DL
ALP SERPL-CCNC: 103 U/L (ref 39–117)
ALT SERPL W P-5'-P-CCNC: 24 U/L (ref 1–33)
ANION GAP SERPL CALCULATED.3IONS-SCNC: 16 MMOL/L (ref 5–15)
AST SERPL-CCNC: 16 U/L (ref 1–32)
BASOPHILS # BLD AUTO: 0.07 10*3/MM3 (ref 0–0.2)
BASOPHILS NFR BLD AUTO: 0.7 % (ref 0–1.5)
BILIRUB SERPL-MCNC: 0.2 MG/DL (ref 0–1.2)
BUN SERPL-MCNC: 20 MG/DL (ref 6–20)
BUN/CREAT SERPL: 27.8 (ref 7–25)
CALCIUM SPEC-SCNC: 9.5 MG/DL (ref 8.6–10.5)
CANCER AG15-3 SERPL-ACNC: 8.7 U/ML
CHLORIDE SERPL-SCNC: 105 MMOL/L (ref 98–107)
CO2 SERPL-SCNC: 21 MMOL/L (ref 22–29)
CREAT SERPL-MCNC: 0.72 MG/DL (ref 0.57–1)
DEPRECATED RDW RBC AUTO: 41.1 FL (ref 37–54)
EOSINOPHIL # BLD AUTO: 0.4 10*3/MM3 (ref 0–0.4)
EOSINOPHIL NFR BLD AUTO: 3.8 % (ref 0.3–6.2)
ERYTHROCYTE [DISTWIDTH] IN BLOOD BY AUTOMATED COUNT: 13.4 % (ref 12.3–15.4)
GFR SERPL CREATININE-BSD FRML MDRD: 87 ML/MIN/1.73
GLOBULIN UR ELPH-MCNC: 3.2 GM/DL
GLUCOSE SERPL-MCNC: 163 MG/DL (ref 65–99)
HCT VFR BLD AUTO: 43.5 % (ref 34–46.6)
HGB BLD-MCNC: 13.7 G/DL (ref 12–15.9)
IMM GRANULOCYTES # BLD AUTO: 0.02 10*3/MM3 (ref 0–0.05)
IMM GRANULOCYTES NFR BLD AUTO: 0.2 % (ref 0–0.5)
LYMPHOCYTES # BLD AUTO: 3.52 10*3/MM3 (ref 0.7–3.1)
LYMPHOCYTES NFR BLD AUTO: 33.5 % (ref 19.6–45.3)
MCH RBC QN AUTO: 26.4 PG (ref 26.6–33)
MCHC RBC AUTO-ENTMCNC: 31.5 G/DL (ref 31.5–35.7)
MCV RBC AUTO: 83.8 FL (ref 79–97)
MONOCYTES # BLD AUTO: 0.69 10*3/MM3 (ref 0.1–0.9)
MONOCYTES NFR BLD AUTO: 6.6 % (ref 5–12)
NEUTROPHILS NFR BLD AUTO: 5.81 10*3/MM3 (ref 1.7–7)
NEUTROPHILS NFR BLD AUTO: 55.2 % (ref 42.7–76)
NRBC BLD AUTO-RTO: 0 /100 WBC (ref 0–0.2)
PLATELET # BLD AUTO: 349 10*3/MM3 (ref 140–450)
PMV BLD AUTO: 9.7 FL (ref 6–12)
POTASSIUM SERPL-SCNC: 3.8 MMOL/L (ref 3.5–5.2)
PROT SERPL-MCNC: 7.9 G/DL (ref 6–8.5)
RBC # BLD AUTO: 5.19 10*6/MM3 (ref 3.77–5.28)
SODIUM SERPL-SCNC: 142 MMOL/L (ref 136–145)
WBC # BLD AUTO: 10.51 10*3/MM3 (ref 3.4–10.8)

## 2020-09-11 PROCEDURE — 85025 COMPLETE CBC W/AUTO DIFF WBC: CPT | Performed by: INTERNAL MEDICINE

## 2020-09-11 PROCEDURE — 90670 PCV13 VACCINE IM: CPT | Performed by: INTERNAL MEDICINE

## 2020-09-11 PROCEDURE — 80053 COMPREHEN METABOLIC PANEL: CPT | Performed by: INTERNAL MEDICINE

## 2020-09-11 PROCEDURE — 86300 IMMUNOASSAY TUMOR CA 15-3: CPT | Performed by: INTERNAL MEDICINE

## 2020-09-11 PROCEDURE — 90471 IMMUNIZATION ADMIN: CPT | Performed by: INTERNAL MEDICINE

## 2020-09-11 PROCEDURE — 99215 OFFICE O/P EST HI 40 MIN: CPT | Performed by: INTERNAL MEDICINE

## 2020-09-11 NOTE — PROGRESS NOTES
The patient and I discussed the results of her PHQ depression screening.    PHQ-9 Total Score:  13    Patient seen in office visit by provider.     Patient left without being seen by .    SS will follow.

## 2020-09-12 LAB — CANCER AG27-29 SERPL-ACNC: 4.2 U/ML (ref 0–38.6)

## 2020-09-14 DIAGNOSIS — F41.9 ANXIETY AND DEPRESSION: Chronic | ICD-10-CM

## 2020-09-14 DIAGNOSIS — F32.A ANXIETY AND DEPRESSION: Chronic | ICD-10-CM

## 2020-09-14 RX ORDER — DIPHENHYDRAMINE HYDROCHLORIDE 50 MG/ML
50 INJECTION INTRAMUSCULAR; INTRAVENOUS AS NEEDED
Status: CANCELLED | OUTPATIENT
Start: 2020-09-24

## 2020-09-14 RX ORDER — PALONOSETRON 0.05 MG/ML
0.25 INJECTION, SOLUTION INTRAVENOUS ONCE
Status: CANCELLED | OUTPATIENT
Start: 2020-09-24

## 2020-09-14 RX ORDER — DIAZEPAM 5 MG/1
5 TABLET ORAL DAILY
Qty: 30 TABLET | Refills: 0 | Status: CANCELLED | OUTPATIENT
Start: 2020-09-14

## 2020-09-14 RX ORDER — SODIUM CHLORIDE 9 MG/ML
250 INJECTION, SOLUTION INTRAVENOUS ONCE
Status: CANCELLED | OUTPATIENT
Start: 2020-09-24

## 2020-09-14 RX ORDER — FAMOTIDINE 10 MG/ML
20 INJECTION, SOLUTION INTRAVENOUS AS NEEDED
Status: CANCELLED | OUTPATIENT
Start: 2020-09-24

## 2020-09-15 NOTE — TELEPHONE ENCOUNTER
Gale is currently out of the office due and will not be back unless released by her physician. Could you refill this patient's medicine until we know for sure if gale will be returning?

## 2020-09-16 RX ORDER — DIAZEPAM 5 MG/1
5 TABLET ORAL DAILY
Qty: 30 TABLET | Refills: 0 | Status: SHIPPED | OUTPATIENT
Start: 2020-09-16 | End: 2020-10-16 | Stop reason: SDUPTHER

## 2020-09-17 ENCOUNTER — OFFICE VISIT (OUTPATIENT)
Dept: SURGERY | Facility: CLINIC | Age: 45
End: 2020-09-17

## 2020-09-17 VITALS
WEIGHT: 236.4 LBS | HEIGHT: 64 IN | SYSTOLIC BLOOD PRESSURE: 146 MMHG | HEART RATE: 89 BPM | DIASTOLIC BLOOD PRESSURE: 98 MMHG | BODY MASS INDEX: 40.36 KG/M2

## 2020-09-17 DIAGNOSIS — Z17.1 MALIGNANT NEOPLASM OF UPPER-OUTER QUADRANT OF RIGHT BREAST IN FEMALE, ESTROGEN RECEPTOR NEGATIVE (HCC): Primary | ICD-10-CM

## 2020-09-17 DIAGNOSIS — C50.411 MALIGNANT NEOPLASM OF UPPER-OUTER QUADRANT OF RIGHT BREAST IN FEMALE, ESTROGEN RECEPTOR NEGATIVE (HCC): Primary | ICD-10-CM

## 2020-09-17 DIAGNOSIS — Z09 POSTOP CHECK: ICD-10-CM

## 2020-09-17 PROCEDURE — 99024 POSTOP FOLLOW-UP VISIT: CPT | Performed by: SURGERY

## 2020-09-17 NOTE — PROGRESS NOTES
Subjective   Nivia Bernstein is a 45 y.o. female here today for follow up wound check.    History of Present Illness  Ms. Bernstein was seen in the office today for a postoperative visit following right mastectomy with sentinel node biopsy and a contralateral left mastectomy on 8/18/2020 final pathology demonstrated a T1BN0 grade 2 ductal carcinoma of the right breast, ER negative, CT positive, HER-2 negative.  No disease identified in the left breast.  Patient had a left seroma aspirated last week.  She did have a high risk MammaPrint and saw medical oncology who has recommended chemotherapy.  Fortunately patient has an existing port.    Allergies   Allergen Reactions   • Mobic [Meloxicam] Rash   • Penicillins Angioedema   • Novolog [Insulin Aspart] Hives         Current Outpatient Medications   Medication Sig Dispense Refill   • Albuterol Sulfate (VENTOLIN HFA IN) Inhale 2 puffs Every 4 (Four) Hours As Needed (shortness of breath).     • BREO ELLIPTA 200-25 MCG/INH inhaler Inhale 1 puff Daily As Needed (shortness of air).     • cyanocobalamin 1000 MCG/ML injection Inject 1,000 mcg into the appropriate muscle as directed by prescriber 1 (One) Time Per Week.     • diazePAM (VALIUM) 5 MG tablet Take 1 tablet by mouth Daily. 30 tablet 0   • diclofenac (VOLTAREN) 1 % gel gel Apply 4 g topically to the appropriate area as directed 4 (Four) Times a Day As Needed (joint pain). 500 g 5   • docusate sodium 100 MG capsule Take 100 mg by mouth 2 (Two) Times a Day.     • Empagliflozin-Linaglip-Metform 12.5-2.5-1000 MG tablet sustained-release 24 hour Take 2 tablets by mouth Daily. 60 tablet 5   • fluticasone (FLONASE) 50 MCG/ACT nasal spray 2 sprays into the nostril(s) as directed by provider Daily As Needed for Rhinitis or Allergies.     • ibuprofen (ADVIL,MOTRIN) 800 MG tablet Take 800 mg by mouth Every 8 (Eight) Hours As Needed for Mild Pain .     • insulin lispro (HumaLOG) 100 UNIT/ML patient supplied pump Inject  under the skin  "into the appropriate area as directed Continuous. Basil rate: 2.95/hr  Carb ratio: 4.0     • omeprazole (priLOSEC) 20 MG capsule TAKE 1 CAPSULE BY MOUTH TWO TIMES A DAY 60 capsule 5   • ondansetron (ZOFRAN) 4 MG tablet Take 1 tablet by mouth Every 8 (Eight) Hours As Needed for Nausea or Vomiting. 39 tablet 1   • oxyCODONE-acetaminophen (Percocet) 5-325 MG per tablet Take 1 tablet by mouth Every 6 (Six) Hours As Needed for Moderate Pain  for up to 20 doses. 20 tablet 0   • vitamin D (ERGOCALCIFEROL) 1.25 MG (21067 UT) capsule capsule Take 50,000 Units by mouth 1 (One) Time Per Week. Prior to [facility] Admission, Patient was on: wednesdays       No current facility-administered medications for this visit.          Objective   /98 (BP Location: Left arm)   Pulse 89   Ht 162.6 cm (64\")   Wt 107 kg (236 lb 6.4 oz)   BMI 40.58 kg/m²    Physical Exam  On examination this is a morbidly obese white female in no acute distress  Chest wall: Healing mastectomy scars.  Approximately 25 cc of serous fluid were aspirated from the left chest wall.    Results/Data      Procedures     Assessment/Plan   Right breast T1BN0 grade 2 mammary carcinoma, ER negative, OR positive, HER-2 negative, status post left mastectomy with sentinel node biopsy  Left mastectomy    Follow-up in 2 weeks       Discussion/Summary    Future Appointments   Date Time Provider Department Center   9/30/2020  8:00 AM Gale Aguilar APRN [facility] None   10/1/2020 10:00 AM Sheila Garza MD [facility] None   11/13/2020 11:30 AM Markus Menjivar MD [facility] None         Please note that portions of this note were completed with a voice recognition program.  "

## 2020-09-18 ENCOUNTER — TELEPHONE (OUTPATIENT)
Dept: ONCOLOGY | Facility: HOSPITAL | Age: 45
End: 2020-09-18

## 2020-09-18 RX ORDER — DEXAMETHASONE 4 MG/1
TABLET ORAL
Qty: 6 TABLET | Refills: 5 | Status: SHIPPED | OUTPATIENT
Start: 2020-09-18 | End: 2020-10-06 | Stop reason: ALTCHOICE

## 2020-09-21 RX ORDER — SULFAMETHOXAZOLE AND TRIMETHOPRIM 800; 160 MG/1; MG/1
1 TABLET ORAL 2 TIMES DAILY
Qty: 14 TABLET | Refills: 0 | Status: SHIPPED | OUTPATIENT
Start: 2020-09-21 | End: 2020-09-28

## 2020-09-22 ENCOUNTER — TELEPHONE (OUTPATIENT)
Dept: FAMILY MEDICINE CLINIC | Facility: CLINIC | Age: 45
End: 2020-09-22

## 2020-09-22 NOTE — TELEPHONE ENCOUNTER
Pt is aware of this information.       ----- Message from Markus Menjivar MD sent at 9/21/2020  6:31 PM EDT -----  Emailed script for bactrim  ----- Message -----  From: Anne Paz MA  Sent: 9/21/2020   3:32 PM EDT  To: Markus Menjivar MD    Patient called and wanted to know if you could send her in something for a UTI infection. She said she blood in her urine since she has let it go for so long. Cancer doctor does not want her to come in unless necessary.

## 2020-09-24 ENCOUNTER — INFUSION (OUTPATIENT)
Dept: ONCOLOGY | Facility: HOSPITAL | Age: 45
End: 2020-09-24

## 2020-09-24 ENCOUNTER — OFFICE VISIT (OUTPATIENT)
Dept: ONCOLOGY | Facility: CLINIC | Age: 45
End: 2020-09-24

## 2020-09-24 ENCOUNTER — DOCUMENTATION (OUTPATIENT)
Dept: ONCOLOGY | Facility: HOSPITAL | Age: 45
End: 2020-09-24

## 2020-09-24 VITALS
SYSTOLIC BLOOD PRESSURE: 141 MMHG | HEART RATE: 94 BPM | BODY MASS INDEX: 39.96 KG/M2 | OXYGEN SATURATION: 95 % | TEMPERATURE: 97.3 F | RESPIRATION RATE: 18 BRPM | DIASTOLIC BLOOD PRESSURE: 83 MMHG | WEIGHT: 232.8 LBS

## 2020-09-24 DIAGNOSIS — C50.411 MALIGNANT NEOPLASM OF UPPER-OUTER QUADRANT OF RIGHT BREAST IN FEMALE, ESTROGEN RECEPTOR NEGATIVE (HCC): Primary | ICD-10-CM

## 2020-09-24 DIAGNOSIS — C50.411 MALIGNANT NEOPLASM OF UPPER-OUTER QUADRANT OF RIGHT BREAST IN FEMALE, ESTROGEN RECEPTOR NEGATIVE (HCC): ICD-10-CM

## 2020-09-24 DIAGNOSIS — N39.0 URINARY TRACT INFECTION WITHOUT HEMATURIA, SITE UNSPECIFIED: ICD-10-CM

## 2020-09-24 DIAGNOSIS — Z51.81 ENCOUNTER FOR MONITORING CARDIOTOXIC DRUG THERAPY: Primary | ICD-10-CM

## 2020-09-24 DIAGNOSIS — T78.40XA ALLERGIC REACTION TO DRUG, INITIAL ENCOUNTER: ICD-10-CM

## 2020-09-24 DIAGNOSIS — Z17.1 MALIGNANT NEOPLASM OF UPPER-OUTER QUADRANT OF RIGHT BREAST IN FEMALE, ESTROGEN RECEPTOR NEGATIVE (HCC): ICD-10-CM

## 2020-09-24 DIAGNOSIS — Z79.899 ENCOUNTER FOR MONITORING CARDIOTOXIC DRUG THERAPY: Primary | ICD-10-CM

## 2020-09-24 DIAGNOSIS — Z17.1 MALIGNANT NEOPLASM OF UPPER-OUTER QUADRANT OF RIGHT BREAST IN FEMALE, ESTROGEN RECEPTOR NEGATIVE (HCC): Primary | ICD-10-CM

## 2020-09-24 DIAGNOSIS — Z95.828 PORT-A-CATH IN PLACE: ICD-10-CM

## 2020-09-24 LAB
ALBUMIN SERPL-MCNC: 4.43 G/DL (ref 3.5–5.2)
ALBUMIN/GLOB SERPL: 1.4 G/DL
ALP SERPL-CCNC: 110 U/L (ref 39–117)
ALT SERPL W P-5'-P-CCNC: 24 U/L (ref 1–33)
ANION GAP SERPL CALCULATED.3IONS-SCNC: 13.9 MMOL/L (ref 5–15)
AST SERPL-CCNC: 11 U/L (ref 1–32)
BASOPHILS # BLD AUTO: 0.02 10*3/MM3 (ref 0–0.2)
BASOPHILS NFR BLD AUTO: 0.1 % (ref 0–1.5)
BILIRUB SERPL-MCNC: 0.2 MG/DL (ref 0–1.2)
BUN SERPL-MCNC: 19 MG/DL (ref 6–20)
BUN/CREAT SERPL: 24.1 (ref 7–25)
CALCIUM SPEC-SCNC: 9.8 MG/DL (ref 8.6–10.5)
CHLORIDE SERPL-SCNC: 103 MMOL/L (ref 98–107)
CO2 SERPL-SCNC: 22.1 MMOL/L (ref 22–29)
CREAT SERPL-MCNC: 0.79 MG/DL (ref 0.57–1)
DEPRECATED RDW RBC AUTO: 40 FL (ref 37–54)
EOSINOPHIL # BLD AUTO: 0.02 10*3/MM3 (ref 0–0.4)
EOSINOPHIL NFR BLD AUTO: 0.1 % (ref 0.3–6.2)
ERYTHROCYTE [DISTWIDTH] IN BLOOD BY AUTOMATED COUNT: 13 % (ref 12.3–15.4)
GFR SERPL CREATININE-BSD FRML MDRD: 78 ML/MIN/1.73
GLOBULIN UR ELPH-MCNC: 3.2 GM/DL
GLUCOSE SERPL-MCNC: 223 MG/DL (ref 65–99)
HCT VFR BLD AUTO: 40.7 % (ref 34–46.6)
HGB BLD-MCNC: 12.6 G/DL (ref 12–15.9)
IMM GRANULOCYTES # BLD AUTO: 0.06 10*3/MM3 (ref 0–0.05)
IMM GRANULOCYTES NFR BLD AUTO: 0.3 % (ref 0–0.5)
LYMPHOCYTES # BLD AUTO: 2.13 10*3/MM3 (ref 0.7–3.1)
LYMPHOCYTES NFR BLD AUTO: 12 % (ref 19.6–45.3)
MCH RBC QN AUTO: 26 PG (ref 26.6–33)
MCHC RBC AUTO-ENTMCNC: 31 G/DL (ref 31.5–35.7)
MCV RBC AUTO: 84.1 FL (ref 79–97)
MONOCYTES # BLD AUTO: 0.85 10*3/MM3 (ref 0.1–0.9)
MONOCYTES NFR BLD AUTO: 4.8 % (ref 5–12)
NEUTROPHILS NFR BLD AUTO: 14.69 10*3/MM3 (ref 1.7–7)
NEUTROPHILS NFR BLD AUTO: 82.7 % (ref 42.7–76)
NRBC BLD AUTO-RTO: 0 /100 WBC (ref 0–0.2)
PLATELET # BLD AUTO: 366 10*3/MM3 (ref 140–450)
PMV BLD AUTO: 9.8 FL (ref 6–12)
POTASSIUM SERPL-SCNC: 4.2 MMOL/L (ref 3.5–5.2)
PROT SERPL-MCNC: 7.6 G/DL (ref 6–8.5)
RBC # BLD AUTO: 4.84 10*6/MM3 (ref 3.77–5.28)
SODIUM SERPL-SCNC: 139 MMOL/L (ref 136–145)
WBC # BLD AUTO: 17.77 10*3/MM3 (ref 3.4–10.8)

## 2020-09-24 PROCEDURE — 25010000002 DOCETAXEL 20 MG/ML SOLUTION 4 ML VIAL: Performed by: INTERNAL MEDICINE

## 2020-09-24 PROCEDURE — 96409 CHEMO IV PUSH SNGL DRUG: CPT

## 2020-09-24 PROCEDURE — 85025 COMPLETE CBC W/AUTO DIFF WBC: CPT

## 2020-09-24 PROCEDURE — 87077 CULTURE AEROBIC IDENTIFY: CPT | Performed by: NURSE PRACTITIONER

## 2020-09-24 PROCEDURE — 25010000002 DIPHENHYDRAMINE PER 50 MG

## 2020-09-24 PROCEDURE — 80053 COMPREHEN METABOLIC PANEL: CPT

## 2020-09-24 PROCEDURE — 87186 SC STD MICRODIL/AGAR DIL: CPT | Performed by: NURSE PRACTITIONER

## 2020-09-24 PROCEDURE — 25010000003 HEPARIN LOCK FLUSH PER 10 UNITS: Performed by: INTERNAL MEDICINE

## 2020-09-24 PROCEDURE — 81001 URINALYSIS AUTO W/SCOPE: CPT | Performed by: NURSE PRACTITIONER

## 2020-09-24 PROCEDURE — 96375 TX/PRO/DX INJ NEW DRUG ADDON: CPT

## 2020-09-24 PROCEDURE — 25010000002 PALONOSETRON PER 25 MCG: Performed by: INTERNAL MEDICINE

## 2020-09-24 PROCEDURE — 99215 OFFICE O/P EST HI 40 MIN: CPT | Performed by: NURSE PRACTITIONER

## 2020-09-24 PROCEDURE — 87086 URINE CULTURE/COLONY COUNT: CPT | Performed by: NURSE PRACTITIONER

## 2020-09-24 PROCEDURE — 25010000002 METHYLPREDNISOLONE PER 125 MG

## 2020-09-24 RX ORDER — METHYLPREDNISOLONE SODIUM SUCCINATE 125 MG/2ML
INJECTION, POWDER, LYOPHILIZED, FOR SOLUTION INTRAMUSCULAR; INTRAVENOUS
Status: COMPLETED
Start: 2020-09-24 | End: 2020-09-24

## 2020-09-24 RX ORDER — HEPARIN SODIUM (PORCINE) LOCK FLUSH IV SOLN 100 UNIT/ML 100 UNIT/ML
500 SOLUTION INTRAVENOUS AS NEEDED
Status: DISCONTINUED | OUTPATIENT
Start: 2020-09-24 | End: 2020-09-24 | Stop reason: HOSPADM

## 2020-09-24 RX ORDER — SODIUM CHLORIDE 0.9 % (FLUSH) 0.9 %
10 SYRINGE (ML) INJECTION AS NEEDED
Status: CANCELLED | OUTPATIENT
Start: 2020-10-06

## 2020-09-24 RX ORDER — DIPHENHYDRAMINE HYDROCHLORIDE 50 MG/ML
INJECTION INTRAMUSCULAR; INTRAVENOUS
Status: COMPLETED
Start: 2020-09-24 | End: 2020-09-24

## 2020-09-24 RX ORDER — METHYLPREDNISOLONE SODIUM SUCCINATE 125 MG/2ML
125 INJECTION, POWDER, LYOPHILIZED, FOR SOLUTION INTRAMUSCULAR; INTRAVENOUS ONCE
Status: COMPLETED | OUTPATIENT
Start: 2020-09-24 | End: 2020-09-24

## 2020-09-24 RX ORDER — SODIUM CHLORIDE 0.9 % (FLUSH) 0.9 %
10 SYRINGE (ML) INJECTION AS NEEDED
Status: DISCONTINUED | OUTPATIENT
Start: 2020-09-24 | End: 2020-09-24 | Stop reason: HOSPADM

## 2020-09-24 RX ORDER — DIPHENHYDRAMINE HYDROCHLORIDE 50 MG/ML
25 INJECTION INTRAMUSCULAR; INTRAVENOUS ONCE
Status: COMPLETED | OUTPATIENT
Start: 2020-09-24 | End: 2020-09-24

## 2020-09-24 RX ORDER — PROCHLORPERAZINE MALEATE 10 MG
10 TABLET ORAL EVERY 6 HOURS PRN
Qty: 30 TABLET | Refills: 5 | Status: SHIPPED | OUTPATIENT
Start: 2020-09-24 | End: 2022-05-18

## 2020-09-24 RX ORDER — HEPARIN SODIUM (PORCINE) LOCK FLUSH IV SOLN 100 UNIT/ML 100 UNIT/ML
500 SOLUTION INTRAVENOUS AS NEEDED
Status: CANCELLED | OUTPATIENT
Start: 2020-10-06

## 2020-09-24 RX ORDER — PALONOSETRON 0.05 MG/ML
0.25 INJECTION, SOLUTION INTRAVENOUS ONCE
Status: COMPLETED | OUTPATIENT
Start: 2020-09-24 | End: 2020-09-24

## 2020-09-24 RX ORDER — CIPROFLOXACIN 500 MG/1
500 TABLET, FILM COATED ORAL 2 TIMES DAILY
Qty: 20 TABLET | Refills: 0 | Status: SHIPPED | OUTPATIENT
Start: 2020-09-24 | End: 2020-10-04

## 2020-09-24 RX ORDER — SODIUM CHLORIDE 9 MG/ML
250 INJECTION, SOLUTION INTRAVENOUS ONCE
Status: COMPLETED | OUTPATIENT
Start: 2020-09-24 | End: 2020-09-24

## 2020-09-24 RX ORDER — ONDANSETRON HYDROCHLORIDE 8 MG/1
8 TABLET, FILM COATED ORAL 3 TIMES DAILY PRN
Qty: 30 TABLET | Refills: 5 | Status: SHIPPED | OUTPATIENT
Start: 2020-09-24 | End: 2021-02-12

## 2020-09-24 RX ADMIN — DIPHENHYDRAMINE HYDROCHLORIDE 25 MG: 50 INJECTION INTRAMUSCULAR; INTRAVENOUS at 10:43

## 2020-09-24 RX ADMIN — METHYLPREDNISOLONE SODIUM SUCCINATE 125 MG: 125 INJECTION, POWDER, FOR SOLUTION INTRAMUSCULAR; INTRAVENOUS at 10:46

## 2020-09-24 RX ADMIN — DOCETAXEL 155 MG: 20 INJECTION, SOLUTION, CONCENTRATE INTRAVENOUS at 10:30

## 2020-09-24 RX ADMIN — SODIUM CHLORIDE, PRESERVATIVE FREE 10 ML: 5 INJECTION INTRAVENOUS at 12:23

## 2020-09-24 RX ADMIN — SODIUM CHLORIDE 250 ML: 9 INJECTION, SOLUTION INTRAVENOUS at 09:59

## 2020-09-24 RX ADMIN — Medication 500 UNITS: at 12:24

## 2020-09-24 RX ADMIN — METHYLPREDNISOLONE SODIUM SUCCINATE 125 MG: 125 INJECTION, POWDER, LYOPHILIZED, FOR SOLUTION INTRAMUSCULAR; INTRAVENOUS at 10:46

## 2020-09-24 RX ADMIN — PALONOSETRON HYDROCHLORIDE 0.25 MG: 0.25 INJECTION INTRAVENOUS at 09:59

## 2020-09-24 RX ADMIN — DIPHENHYDRAMINE HYDROCHLORIDE 25 MG: 50 INJECTION, SOLUTION INTRAMUSCULAR; INTRAVENOUS at 10:43

## 2020-09-24 NOTE — PROGRESS NOTES
DATE:  9/24/2020    DIAGNOSIS:  Right breast cancer    CHIEF COMPLAINT:  Acute chest/lower back pain    Interval History:  Ms. Bernstein presented to clinic today for her first cycle of adjuvant chemotherapy (TC). Prior to starting treatment, noted WBC of 17.77. She is currently being treated for a UTI per PCP with Bactrim and has 3 days of antibiotic remaining. She reports having improvement in dysuria and hematuria. She denies any fevers/chills. Obtained U/A to further evaluate. Bilateral mastectomy sites are healed well.  We proceeded with treatment and approximately 8 minutes after starting Taxotere, patient developed sudden, severe pain to lower back, hips and chest 10/10. Taxotere was stopped and continued with IVF. O2 sat dropped to 83% on RA. She was placed 2L per NC and O2 sat improved to 98%. She was given Benadryl 25 mg IVP along with solumedrol 125 mg IVP. Her symptoms improved and vital signs were stable. Dr. Loco was then made aware.     PAST MEDICAL HISTORY:  Past Medical History:   Diagnosis Date   • Altered mental status 10/16/2017   • Anxiety and depression 3/31/2017   • Arthritis    • Asthma    • Diabetes mellitus (CMS/[facility])     wears insulin pump.    • DVT (deep venous thrombosis) (CMS/[facility]) 2003    x 1 in RLE following fall & surgery   • Elevated alkaline phosphatase level 10/16/2017   • Elevated cholesterol    • Enlarged liver    • GERD (gastroesophageal reflux disease)    • H/O blood clots    • Heart murmur    • Hyperlipidemia    • Hypokalemia 10/16/2017   • Leg pain 10/16/2017   • Mitral valve prolapse    • Neuropathy     related to diabetes   • Obesity 3/31/2017   • OCD (obsessive compulsive disorder) 10/16/2017   • Osteoporosis    • PONV (postoperative nausea and vomiting)    • Renal insufficiency 10/16/2017   • Right breast cancer with malignant cells in regional lymph nodes no greater than 0.2 mm and no more than 200 cells (CMS/HCC) 8/13/2020   • Thrombocytopenia (CMS/HCC) 10/16/2017   • TIA  (transient ischemic attack)     4 TIMES       PAST SURGICAL HISTORY:  Past Surgical History:   Procedure Laterality Date   • APPENDECTOMY     • CARPAL TUNNEL RELEASE     • CHOLECYSTECTOMY     • FINGER FUSION Left     3rd   • HYSTERECTOMY  2011    removed due to an ovarian cyst and dysmenorrhia. No cancer noted. Complete   • KNEE ARTHROSCOPY Right    • MASTECTOMY Left 8/18/2020    Procedure: Left mastectomy;  Surgeon: Sheila Garza MD;  Location: [facility];  Service: General;  Laterality: Left;   • MASTECTOMY WITH SENTINEL NODE BIOPSY AND AXILLARY NODE DISSECTION Right 8/18/2020    Procedure: Right BREAST MASTECTOMY WITH SENTINEL NODE BIOPSY;  Surgeon: Sheila Garza MD;  Location: [facility] OR;  Service: General;  Laterality: Right;   • PORTACATH PLACEMENT         SOCIAL HISTORY:  Social History     Socioeconomic History   • Marital status:      Spouse name: Not on file   • Number of children: Not on file   • Years of education: Not on file   • Highest education level: Not on file   Occupational History   • Occupation: Not Working   Social Needs   • Financial resource strain: Not hard at all   • Food insecurity     Worry: Never true     Inability: Never true   • Transportation needs     Medical: No     Non-medical: No   Tobacco Use   • Smoking status: Never Smoker   • Smokeless tobacco: Never Used   Substance and Sexual Activity   • Alcohol use: No   • Drug use: No   • Sexual activity: Yes     Partners: Male   Lifestyle   • Physical activity     Days per week: 3 days     Minutes per session: 100 min   • Stress: Very much   Relationships   • Social connections     Talks on phone: More than three times a week     Gets together: More than three times a week     Attends Taoist service: Never     Active member of club or organization: No     Attends meetings of clubs or organizations: Never     Relationship status:    Social History Narrative    Mrs. Bernstein is a 42 year old white  female.  They live in [facility] [place] [facility] [place] with their 4 children. She has extended family local as well. She works as a  at [employer] in [facility]. She does not have an advanced directive.       FAMILY HISTORY:  Family History   Problem Relation Age of Onset   • Diabetes Mother    • Hypertension Mother    • Diabetes Father    • Heart disease Father    • Alcohol abuse Father    • Seizures Father    • Hypertension Father    • No Known Problems Brother    • No Known Problems Daughter    • Bone cancer Son    • Suicide Attempts Cousin    • Stomach cancer Cousin         maternal first cousin   • Breast cancer Paternal Aunt 52   • Diabetes Maternal Grandmother    • Diabetes Maternal Grandfather    • Lung cancer Maternal Grandfather    • Heart disease Paternal Grandmother    • Diabetes Paternal Grandmother    • Heart disease Paternal Grandfather    • Diabetes Paternal Grandfather    • Ovarian cancer Maternal Aunt    • Lung cancer Maternal Uncle    • Colon cancer Maternal Uncle    • Cancer Maternal Uncle         unknown type       MEDICATIONS:  The current medication list was reviewed in the EMR    Current Outpatient Medications:   •  Albuterol Sulfate (VENTOLIN HFA IN), Inhale 2 puffs Every 4 (Four) Hours As Needed (shortness of breath)., Disp: , Rfl:   •  BREO ELLIPTA 200-25 MCG/INH inhaler, Inhale 1 puff Daily As Needed (shortness of air)., Disp: , Rfl:   •  ciprofloxacin (CIPRO) 500 MG tablet, Take 1 tablet by mouth 2 (Two) Times a Day for 10 days., Disp: 20 tablet, Rfl: 0  •  cyanocobalamin 1000 MCG/ML injection, Inject 1,000 mcg into the appropriate muscle as directed by prescriber 1 (One) Time Per Week., Disp: , Rfl:   •  dexamethasone (DECADRON) 4 MG tablet, Take 2 tabs by mouth, twice a day; the day before chemo, the day of chemo and the day after chemo, Disp: 6 tablet, Rfl: 5  •  diazePAM (VALIUM) 5 MG tablet, Take 1 tablet by mouth Daily., Disp: 30 tablet, Rfl: 0  •  diclofenac (VOLTAREN) 1 % gel gel, Apply 4  g topically to the appropriate area as directed 4 (Four) Times a Day As Needed (joint pain)., Disp: 500 g, Rfl: 5  •  docusate sodium 100 MG capsule, Take 100 mg by mouth 2 (Two) Times a Day., Disp: , Rfl:   •  Empagliflozin-Linaglip-Metform 12.5-2.5-1000 MG tablet sustained-release 24 hour, Take 2 tablets by mouth Daily., Disp: 60 tablet, Rfl: 5  •  fluticasone (FLONASE) 50 MCG/ACT nasal spray, 2 sprays into the nostril(s) as directed by provider Daily As Needed for Rhinitis or Allergies., Disp: , Rfl:   •  ibuprofen (ADVIL,MOTRIN) 800 MG tablet, Take 800 mg by mouth Every 8 (Eight) Hours As Needed for Mild Pain ., Disp: , Rfl:   •  insulin lispro (HumaLOG) 100 UNIT/ML patient supplied pump, Inject  under the skin into the appropriate area as directed Continuous. Basil rate: 2.95/hr Carb ratio: 4.0, Disp: , Rfl:   •  omeprazole (priLOSEC) 20 MG capsule, TAKE 1 CAPSULE BY MOUTH TWO TIMES A DAY, Disp: 60 capsule, Rfl: 5  •  ondansetron (ZOFRAN) 4 MG tablet, Take 1 tablet by mouth Every 8 (Eight) Hours As Needed for Nausea or Vomiting., Disp: 39 tablet, Rfl: 1  •  oxyCODONE-acetaminophen (Percocet) 5-325 MG per tablet, Take 1 tablet by mouth Every 6 (Six) Hours As Needed for Moderate Pain  for up to 20 doses., Disp: 20 tablet, Rfl: 0  •  sulfamethoxazole-trimethoprim (BACTRIM DS,SEPTRA DS) 800-160 MG per tablet, Take 1 tablet by mouth 2 (Two) Times a Day for 7 days., Disp: 14 tablet, Rfl: 0  •  vitamin D (ERGOCALCIFEROL) 1.25 MG (48955 UT) capsule capsule, Take 50,000 Units by mouth 1 (One) Time Per Week. Prior to [facility] Admission, Patient was on: wednesdays, Disp: , Rfl:   No current facility-administered medications for this visit.     Facility-Administered Medications Ordered in Other Visits:   •  diphenhydrAMINE (BENADRYL) 50 MG/ML injection  - ADS Override Pull, , , ,   •  heparin injection 500 Units, 500 Units, Intravenous, PRN, Dejah Loco MD  •  methylPREDNISolone sodium succinate (SOLU-Medrol) 125 MG  injection  - ADS Override Pull, , , ,   •  sodium chloride 0.9 % flush 10 mL, 10 mL, Intravenous, PRN, Dejah Loco MD    ALLERGIES:    Allergies   Allergen Reactions   • Mobic [Meloxicam] Rash   • Penicillins Angioedema   • Taxotere [Docetaxel] Anaphylaxis     9 minutes into 1st infusion   • Novolog [Insulin Aspart] Hives       REVIEW OF SYSTEMS:    A comprehensive 14 point review of systems was performed.  Significant findings as mentioned above.  All other systems reviewed and are negative.        Physical Exam   Vital Signs: T 97.3, HR 94, RR 18, /83, O2 sat 98%-Please see RN note for further documentation of VS during reaction to Taxotere.   General:  Awake, alert and oriented, sl anxious  HEENT:  Pupils are equal, round and reactive to light and accommodation, Extra-ocular movements full, Oropharyx clear, mucous membranes moist  Neck:  No JVD, thyromegaly or lymphadenopathy  CV:  Regular rate and rhythm, no murmurs, rubs or gallops  Resp:  Lungs are clear to auscultation bilaterally  Breast:  S/p bilateral mastectomy and RSLNBx.  Surgical incisions c/d/i.  Small seroma present on Left.  No axillary adenopathy on either side.  Abd:  Soft, non-tender, non-distended, bowel sounds present, no organomegaly or masses  Ext:  No clubbing, cyanosis or edema  Neuro:  grossly non-focal exam     RECENT LABS:  Lab Results   Component Value Date    WBC 17.77 (H) 09/24/2020    HGB 12.6 09/24/2020    HCT 40.7 09/24/2020    MCV 84.1 09/24/2020    RDW 13.0 09/24/2020     09/24/2020    NEUTRORELPCT 82.7 (H) 09/24/2020    LYMPHORELPCT 12.0 (L) 09/24/2020    MONORELPCT 4.8 (L) 09/24/2020    EOSRELPCT 0.1 (L) 09/24/2020    BASORELPCT 0.1 09/24/2020    NEUTROABS 14.69 (H) 09/24/2020    LYMPHSABS 2.13 09/24/2020       Lab Results   Component Value Date     09/24/2020    K 4.2 09/24/2020    CO2 22.1 09/24/2020     09/24/2020    BUN 19 09/24/2020    CREATININE 0.79 09/24/2020    EGFRIFNONA 78 09/24/2020     EGFRIFAFRI 132 05/13/2020    GLUCOSE 223 (H) 09/24/2020    CALCIUM 9.8 09/24/2020    ALKPHOS 110 09/24/2020    AST 11 09/24/2020    ALT 24 09/24/2020    BILITOT 0.2 09/24/2020    ALBUMIN 4.43 09/24/2020    PROTEINTOT 7.6 09/24/2020    MG 2.1 10/17/2017     RBC, UA   None Seen, 0-2 /HPF 6-12Abnormal     WBC, UA   None Seen, 0-2 /HPF Too Numerous to CountAbnormal     Bacteria, UA   None Seen /HPF 4+Abnormal     Squamous Epithelial Cells, UA   None Seen, 0-2 /HPF 0-2    Hyaline Casts, UA   None Seen /LPF None Seen      Color, UA   Yellow, Straw Yellow    Appearance, UA   Clear CloudyAbnormal     pH, UA   5.0 - 8.0 6.0    Specific Gravity, UA   1.005 - 1.030 1.015    Glucose, UA   Negative >=1000 mg/dL (3+)Abnormal     Ketones, UA   Negative 15 mg/dL (1+)Abnormal     Bilirubin, UA   Negative Small (1+)Abnormal     Blood, UA   Negative Moderate (2+)Abnormal     Protein, UA   Negative Negative    Leuk Esterase, UA   Negative Moderate (2+)Abnormal     Nitrite, UA   Negative PositiveAbnormal     Urobilinogen, UA   0.2 - 1.0 E.U./dL 0.2 E.U./dL           ASSESSMENT & PLAN:  Nivia Bernstein is a very pleasant 45 y.o. female with    1.  Right breast cancer:  -Please see Dr. Loco' recent consultation note from 9/11/20 for full details.   -She was seen today in the chemo suite for an allergic reaction to Taxotere (see below).   -Changing treatment to DD AC as below. Will follow up with NP on day of C2. MD follow up with C3.     2. Allergic reaction to Taxotere:  -Approximately 8 minutes after starting Taxotere, patient developed sudden, severe pain to lower back, hips and chest 10/10. Taxotere was stopped and continued with IVF. O2 sat dropped to 83% on RA. She was placed 2L per NC and O2 sat improved to 98%. She was given Benadryl 25 mg IVP along with solumedrol 125 mg IVP. Her symptoms improved and vital signs were stable. Dr. Loco was made aware.  -Will discontinue Taxotere given allergic reaction and will change  treatment to DD AC. Dr. Loco d/w patient new treatment plan and potential side effects. Will start once approved by insurance. In the meantime, will obtain baseline ECHO.     3. UTI:  -Patient has been taking Bactrim per PCP with improvement in dysuria and hematuria. However, WBC is elevated today. Obtained repeat U/A today and shows 4+ bacteria (see above). Will give Rx for Cipro today and await c/s.     ACO / NII/Other  Quality measures  -  Nivia Bernstein did not receive 2020 flu vaccine. this was recommended.   -  Nivia Bernstein reports a pain score of 0.    -  Current outpatient and discharge medications have been reconciled for the patient.  Reviewed by: NIDA Driscoll      A total of 40 minutes were spent coordinating this patient’s care in clinic today; more than 50% of this time was face-to-face with the patient during chemo reaction/monitoring reviewing and counseling on the current treatment and followup plan. All questions were answered to her satisfaction.      Electronically Signed by: NIDA Driscoll ,  September 24, 2020 11:15 EDT       CC:   Markus Menjivar MD

## 2020-09-24 NOTE — PROGRESS NOTES
"SS initiated  assessment with patient.  Patient is 46 Y/O white female diagnosed with Breast Cancer. SS spoke with patient and spouse Andrew.  Patient lives at home with spouse and plans to return home with spouse.    Patient doesn't utilize any home health.      Patient has rolator with wheels and straight cane per unknown provider.    Patient has three children: Tj 25 Y/O, Kingston 27 Y/O, and Karla 21 Y/O.    Patient employed at [employer] as manager.  Patient currently not working and states that her job is being held for her if she is able to return to work.    Spouse provides transportation as needed.    The patient and I discussed the results of her PHQ depression screening.    PHQ-9 Total Score:  13    Patient completed NCCN Distress Screening and scored herself a 9 out of 10.    Patient states that she takes Valium.  Patient to be seen tomorrow in [place] at [facility] by Gale Aguilar [phone].    Patient declines boost due to being a diabetic.    Advance Care Planning:  The patient and I discussed care planning \"Conversations that Matter.\" This service was offered, free of charge, for development of advance directives with a certified ACP facilitator. The patient does not have an up-to-date advance directive. The patient is not interested in an appointment with one of our facilitators to create or update their advanced directives.    Spouse states that he is patient's legal guardian through the state due to her memory loss last year.    SS will follow as needed.    "

## 2020-09-24 NOTE — PROGRESS NOTES
"1030 - First dose Taxotere started.   1038 - Pt c/o sudden, severe pain to lower back, hips, and mid-chest, 10/10.  Taxotere stopped.  NS IV line made to wide open flush.   1041 - NIDA Tarango at chair side.  VS: 168/95, HR 99, 83% RA.  Pt placed on O2 2LNC.  NS 1Liter started.   1043 - Benadryl 25mg given IVP.  VS: 144/84, 96 HR, 98% O2 2LNC.   1045 - Pt states pain is \"gone\" and \"better\".  VS: 147/94, HR 97, 98% on O2 2LNC.   1046 - Solumedrol 125mg given IVP.    1048 - VS: 153/67, HR 98, 98% O2 2LNC.  Pt reports she feels \"completely better\".  Oxygen removed.   1055 - VS: 129/75, HS 93, 98% RA.     "

## 2020-09-24 NOTE — PROGRESS NOTES
1100 - Dr. Loco at chairside.  Orders received to hold remainder of Taxotere and full Cytoxan doses today.  (Pt received 32ml of total volume 258ml/155mg).  Pt's chemotherapy regimen will be changed to DD AC.  Orders to be placed for Echo to be completed asap, and begin new regimen there after.  Cipro antibiotic to be e-prescribed to pt's pharmacy for UTI.  Pt to be d/c'd to home after one more hour of monitoring.  1107 - IVF slowed.  VS - 97% RA, HR 95, 124/75.  Pt denies any complaints.  1115 - VS - 97% RA, 90 HR, 128/80.   1220 - VS - 97% RA, 96 HR, 148/80.  Pt is w/o any complaint.   1224 - Pt d/c'd to home, accompanied by her .

## 2020-09-25 ENCOUNTER — APPOINTMENT (OUTPATIENT)
Dept: ONCOLOGY | Facility: HOSPITAL | Age: 45
End: 2020-09-25

## 2020-09-25 ENCOUNTER — OFFICE VISIT (OUTPATIENT)
Dept: PSYCHIATRY | Facility: CLINIC | Age: 45
End: 2020-09-25

## 2020-09-25 DIAGNOSIS — F41.1 GENERALIZED ANXIETY DISORDER: Primary | ICD-10-CM

## 2020-09-25 DIAGNOSIS — F33.3 SEVERE EPISODE OF RECURRENT MAJOR DEPRESSIVE DISORDER, WITH PSYCHOTIC FEATURES (HCC): ICD-10-CM

## 2020-09-25 PROCEDURE — 99213 OFFICE O/P EST LOW 20 MIN: CPT | Performed by: NURSE PRACTITIONER

## 2020-09-25 RX ORDER — PAROXETINE 10 MG/1
10 TABLET, FILM COATED ORAL DAILY
Qty: 30 TABLET | Refills: 0 | Status: SHIPPED | OUTPATIENT
Start: 2020-09-25 | End: 2020-10-26 | Stop reason: SDUPTHER

## 2020-09-25 RX ORDER — ROSUVASTATIN CALCIUM 40 MG/1
TABLET, COATED ORAL
COMMUNITY
Start: 2020-08-27 | End: 2020-11-13 | Stop reason: SDUPTHER

## 2020-09-29 ENCOUNTER — HOSPITAL ENCOUNTER (OUTPATIENT)
Dept: CARDIOLOGY | Facility: HOSPITAL | Age: 45
Discharge: HOME | End: 2020-09-29
Admitting: NURSE PRACTITIONER

## 2020-09-29 DIAGNOSIS — Z79.899 ENCOUNTER FOR MONITORING CARDIOTOXIC DRUG THERAPY: ICD-10-CM

## 2020-09-29 DIAGNOSIS — Z51.81 ENCOUNTER FOR MONITORING CARDIOTOXIC DRUG THERAPY: ICD-10-CM

## 2020-09-29 LAB
BH CV ECHO MEAS - % IVS THICK: 14.9 %
BH CV ECHO MEAS - % LVPW THICK: 4 %
BH CV ECHO MEAS - ACS: 1.9 CM
BH CV ECHO MEAS - AO MAX PG: 12.1 MMHG
BH CV ECHO MEAS - AO MEAN PG: 5 MMHG
BH CV ECHO MEAS - AO ROOT AREA (BSA CORRECTED): 1.5
BH CV ECHO MEAS - AO ROOT AREA: 7.5 CM^2
BH CV ECHO MEAS - AO ROOT DIAM: 3.1 CM
BH CV ECHO MEAS - AO V2 MAX: 174 CM/SEC
BH CV ECHO MEAS - AO V2 MEAN: 104 CM/SEC
BH CV ECHO MEAS - AO V2 VTI: 25.4 CM
BH CV ECHO MEAS - BSA(HAYCOCK): 2.2 M^2
BH CV ECHO MEAS - BSA: 2.1 M^2
BH CV ECHO MEAS - BZI_BMI: 39.8 KILOGRAMS/M^2
BH CV ECHO MEAS - BZI_METRIC_HEIGHT: 162.6 CM
BH CV ECHO MEAS - BZI_METRIC_WEIGHT: 105.2 KG
BH CV ECHO MEAS - EDV(CUBED): 125.3 ML
BH CV ECHO MEAS - EDV(MOD-SP4): 65.4 ML
BH CV ECHO MEAS - EDV(TEICH): 118.4 ML
BH CV ECHO MEAS - EF(CUBED): 68.5 %
BH CV ECHO MEAS - EF(MOD-SP4): 67.3 %
BH CV ECHO MEAS - EF(TEICH): 59.9 %
BH CV ECHO MEAS - ESV(CUBED): 39.4 ML
BH CV ECHO MEAS - ESV(MOD-SP4): 21.4 ML
BH CV ECHO MEAS - ESV(TEICH): 47.5 ML
BH CV ECHO MEAS - FS: 32 %
BH CV ECHO MEAS - IVS/LVPW: 0.93
BH CV ECHO MEAS - IVSD: 1.1 CM
BH CV ECHO MEAS - IVSS: 1.3 CM
BH CV ECHO MEAS - LA DIMENSION: 3 CM
BH CV ECHO MEAS - LA/AO: 0.95
BH CV ECHO MEAS - LV DIASTOLIC VOL/BSA (35-75): 31.4 ML/M^2
BH CV ECHO MEAS - LV MASS(C)D: 216.5 GRAMS
BH CV ECHO MEAS - LV MASS(C)DI: 103.9 GRAMS/M^2
BH CV ECHO MEAS - LV MASS(C)S: 137.2 GRAMS
BH CV ECHO MEAS - LV MASS(C)SI: 65.9 GRAMS/M^2
BH CV ECHO MEAS - LV SYSTOLIC VOL/BSA (12-30): 10.3 ML/M^2
BH CV ECHO MEAS - LVIDD: 5 CM
BH CV ECHO MEAS - LVIDS: 3.4 CM
BH CV ECHO MEAS - LVLD AP4: 6.7 CM
BH CV ECHO MEAS - LVLS AP4: 5.1 CM
BH CV ECHO MEAS - LVOT AREA (M): 2.5 CM^2
BH CV ECHO MEAS - LVOT AREA: 2.5 CM^2
BH CV ECHO MEAS - LVOT DIAM: 1.8 CM
BH CV ECHO MEAS - LVPWD: 1.2 CM
BH CV ECHO MEAS - LVPWS: 1.2 CM
BH CV ECHO MEAS - MV A MAX VEL: 84.4 CM/SEC
BH CV ECHO MEAS - MV E MAX VEL: 68.1 CM/SEC
BH CV ECHO MEAS - MV E/A: 0.81
BH CV ECHO MEAS - PA ACC TIME: 0.1 SEC
BH CV ECHO MEAS - PA PR(ACCEL): 34.5 MMHG
BH CV ECHO MEAS - SI(AO): 92 ML/M^2
BH CV ECHO MEAS - SI(CUBED): 41.2 ML/M^2
BH CV ECHO MEAS - SI(MOD-SP4): 21.1 ML/M^2
BH CV ECHO MEAS - SI(TEICH): 34 ML/M^2
BH CV ECHO MEAS - SV(AO): 191.7 ML
BH CV ECHO MEAS - SV(CUBED): 85.8 ML
BH CV ECHO MEAS - SV(MOD-SP4): 44 ML
BH CV ECHO MEAS - SV(TEICH): 70.9 ML
MAXIMAL PREDICTED HEART RATE: 173 BPM
STRESS TARGET HR: 147 BPM

## 2020-09-29 PROCEDURE — 93306 TTE W/DOPPLER COMPLETE: CPT

## 2020-09-29 PROCEDURE — 93306 TTE W/DOPPLER COMPLETE: CPT | Performed by: INTERNAL MEDICINE

## 2020-10-01 ENCOUNTER — TELEPHONE (OUTPATIENT)
Dept: ONCOLOGY | Facility: HOSPITAL | Age: 45
End: 2020-10-01

## 2020-10-01 ENCOUNTER — OFFICE VISIT (OUTPATIENT)
Dept: SURGERY | Facility: CLINIC | Age: 45
End: 2020-10-01

## 2020-10-01 VITALS
BODY MASS INDEX: 35.16 KG/M2 | WEIGHT: 232 LBS | HEIGHT: 68 IN | SYSTOLIC BLOOD PRESSURE: 143 MMHG | HEART RATE: 83 BPM | DIASTOLIC BLOOD PRESSURE: 85 MMHG

## 2020-10-01 DIAGNOSIS — C50.411 MALIGNANT NEOPLASM OF UPPER-OUTER QUADRANT OF RIGHT BREAST IN FEMALE, ESTROGEN RECEPTOR NEGATIVE (HCC): Primary | ICD-10-CM

## 2020-10-01 DIAGNOSIS — Z17.1 MALIGNANT NEOPLASM OF UPPER-OUTER QUADRANT OF RIGHT BREAST IN FEMALE, ESTROGEN RECEPTOR NEGATIVE (HCC): Primary | ICD-10-CM

## 2020-10-01 DIAGNOSIS — Z09 POSTOP CHECK: ICD-10-CM

## 2020-10-01 PROCEDURE — 99024 POSTOP FOLLOW-UP VISIT: CPT | Performed by: SURGERY

## 2020-10-01 NOTE — TELEPHONE ENCOUNTER
Patient made aware she does have a UTI, however, it is susceptible to the Cipro she is taking, patient instructed to continue cipro.  Patient expresses understanding and agrees with plan of care.

## 2020-10-01 NOTE — TELEPHONE ENCOUNTER
----- Message from Ricardo Henson MA sent at 10/1/2020  9:23 AM EDT -----  Pt was wanting to know about her urine culture.  Thank you!!!

## 2020-10-01 NOTE — PROGRESS NOTES
Subjective   Nivia Bernstein is a 45 y.o. female here today for fluid check.    History of Present Illness  Ms. Bernstein was seen in the office today for a postoperative visit following right mastectomy with sentinel node biopsy and a contralateral left mastectomy on 8/18/2020 final pathology demonstrated a T1BN0 grade 2 ductal carcinoma of the right breast, ER negative, DE positive, HER-2 negative.  No disease identified in the left breast.  Patient did have a left-sided seroma and now has a small 2 to 3 mm area of skin breakdown through which seroma fluid is draining on the left.  Patient has had no seroma on the right.  She did have a high risk MammaPrint and has started chemotherapy.    Allergies   Allergen Reactions   • Mobic [Meloxicam] Rash   • Penicillins Angioedema   • Taxotere [Docetaxel] Anaphylaxis     9 minutes into 1st infusion   • Novolog [Insulin Aspart] Hives         Current Outpatient Medications   Medication Sig Dispense Refill   • Albuterol Sulfate (VENTOLIN HFA IN) Inhale 2 puffs Every 4 (Four) Hours As Needed (shortness of breath).     • BREO ELLIPTA 200-25 MCG/INH inhaler Inhale 1 puff Daily As Needed (shortness of air).     • ciprofloxacin (CIPRO) 500 MG tablet Take 1 tablet by mouth 2 (Two) Times a Day for 10 days. 20 tablet 0   • cyanocobalamin 1000 MCG/ML injection Inject 1,000 mcg into the appropriate muscle as directed by prescriber 1 (One) Time Per Week.     • dexamethasone (DECADRON) 4 MG tablet Take 2 tabs by mouth, twice a day; the day before chemo, the day of chemo and the day after chemo 6 tablet 5   • diazePAM (VALIUM) 5 MG tablet Take 1 tablet by mouth Daily. 30 tablet 0   • diclofenac (VOLTAREN) 1 % gel gel Apply 4 g topically to the appropriate area as directed 4 (Four) Times a Day As Needed (joint pain). 500 g 5   • docusate sodium 100 MG capsule Take 100 mg by mouth 2 (Two) Times a Day.     • Empagliflozin-Linaglip-Metform 12.5-2.5-1000 MG tablet sustained-release 24 hour Take 2  "tablets by mouth Daily. 60 tablet 5   • fluticasone (FLONASE) 50 MCG/ACT nasal spray 2 sprays into the nostril(s) as directed by provider Daily As Needed for Rhinitis or Allergies.     • ibuprofen (ADVIL,MOTRIN) 800 MG tablet Take 800 mg by mouth Every 8 (Eight) Hours As Needed for Mild Pain .     • insulin lispro (HumaLOG) 100 UNIT/ML patient supplied pump Inject  under the skin into the appropriate area as directed Continuous. Basil rate: 2.95/hr  Carb ratio: 4.0     • omeprazole (priLOSEC) 20 MG capsule TAKE 1 CAPSULE BY MOUTH TWO TIMES A DAY 60 capsule 5   • ondansetron (ZOFRAN) 8 MG tablet Take 1 tablet by mouth 3 (Three) Times a Day As Needed for Nausea or Vomiting. 30 tablet 5   • oxyCODONE-acetaminophen (Percocet) 5-325 MG per tablet Take 1 tablet by mouth Every 6 (Six) Hours As Needed for Moderate Pain  for up to 20 doses. 20 tablet 0   • PARoxetine (Paxil) 10 MG tablet Take 1 tablet by mouth Daily. 30 tablet 0   • prochlorperazine (COMPAZINE) 10 MG tablet Take 1 tablet by mouth Every 6 (Six) Hours As Needed for Nausea or Vomiting. 30 tablet 5   • rosuvastatin (CRESTOR) 40 MG tablet      • vitamin D (ERGOCALCIFEROL) 1.25 MG (04894 UT) capsule capsule Take 50,000 Units by mouth 1 (One) Time Per Week. Prior to [facility] Admission, Patient was on: wednesdays       No current facility-administered medications for this visit.        Objective   Ht 172.7 cm (68\")   Wt 105 kg (232 lb)   BMI 35.28 kg/m²    Physical Exam  On examination this is a well-developed well-nourished female in no acute distress  HEENT examination: Within normal limits.  Conjunctiva pink.  Nose and ears appear normal.  Neck: Supple, full range of motion.  No JVD.  Musculoskeletal: Full range of motion all extremities without focal weakness. Normal gait. No digital cyanosis.  Psych: Patient is alert, oriented x3. Mood and affect are appropriate.  Chest wall: Healing right and left mastectomy scars.  There is a small opening in the skin in the " left anterior axillary line at the incision site which is allowing seroma fluid to drain.  By history this is soaking 1 or two 4 x 4's a day.  There is no cellulitis.  Results/Data      Procedures     Assessment/Plan   Right breast T1BN0 grade 2 mammary carcinoma, ER negative, CA positive, HER-2 negative, status post left mastectomy with sentinel node biopsy  Left mastectomy with small postoperative seroma which is resolving    Follow-up in 3 months       Discussion/Summary  Patient's Body mass index is 35.28 kg/m². BMI is above normal parameters. Recommendations include: educational material.    Future Appointments   Date Time Provider Department Center   10/15/2020  9:00 AM Kenzie Churchill APRN [facility]   10/23/2020  8:30 AM Gale Aguilar APRN [facility] None   11/5/2020  8:30 AM [facility] NURSE [facility]   11/5/2020  9:00 AM Dejah Loco MD [facility]   11/13/2020 11:30 AM Markus Menjivar MD [facility] None         Please note that portions of this note were completed with a voice recognition program.

## 2020-10-05 RX ORDER — ERGOCALCIFEROL 1.25 MG/1
CAPSULE ORAL
Qty: 12 CAPSULE | Refills: 0 | Status: SHIPPED | OUTPATIENT
Start: 2020-10-05 | End: 2020-12-31

## 2020-10-06 ENCOUNTER — DOCUMENTATION (OUTPATIENT)
Dept: ONCOLOGY | Facility: HOSPITAL | Age: 45
End: 2020-10-06

## 2020-10-06 ENCOUNTER — INFUSION (OUTPATIENT)
Dept: ONCOLOGY | Facility: HOSPITAL | Age: 45
End: 2020-10-06

## 2020-10-06 VITALS
BODY MASS INDEX: 40.07 KG/M2 | OXYGEN SATURATION: 96 % | WEIGHT: 234.7 LBS | SYSTOLIC BLOOD PRESSURE: 136 MMHG | DIASTOLIC BLOOD PRESSURE: 82 MMHG | HEART RATE: 89 BPM | RESPIRATION RATE: 18 BRPM | HEIGHT: 64 IN | TEMPERATURE: 98.2 F

## 2020-10-06 DIAGNOSIS — C50.411 MALIGNANT NEOPLASM OF UPPER-OUTER QUADRANT OF RIGHT BREAST IN FEMALE, ESTROGEN RECEPTOR NEGATIVE (HCC): Primary | ICD-10-CM

## 2020-10-06 DIAGNOSIS — Z17.1 MALIGNANT NEOPLASM OF UPPER-OUTER QUADRANT OF RIGHT BREAST IN FEMALE, ESTROGEN RECEPTOR NEGATIVE (HCC): Primary | ICD-10-CM

## 2020-10-06 DIAGNOSIS — Z17.1 MALIGNANT NEOPLASM OF UPPER-OUTER QUADRANT OF RIGHT BREAST IN FEMALE, ESTROGEN RECEPTOR NEGATIVE (HCC): ICD-10-CM

## 2020-10-06 DIAGNOSIS — C50.411 MALIGNANT NEOPLASM OF UPPER-OUTER QUADRANT OF RIGHT BREAST IN FEMALE, ESTROGEN RECEPTOR NEGATIVE (HCC): ICD-10-CM

## 2020-10-06 DIAGNOSIS — Z95.828 PORT-A-CATH IN PLACE: ICD-10-CM

## 2020-10-06 LAB
ALBUMIN SERPL-MCNC: 4.13 G/DL (ref 3.5–5.2)
ALBUMIN/GLOB SERPL: 1.4 G/DL
ALP SERPL-CCNC: 113 U/L (ref 39–117)
ALT SERPL W P-5'-P-CCNC: 11 U/L (ref 1–33)
ANION GAP SERPL CALCULATED.3IONS-SCNC: 12.1 MMOL/L (ref 5–15)
AST SERPL-CCNC: 11 U/L (ref 1–32)
BASOPHILS # BLD AUTO: 0.07 10*3/MM3 (ref 0–0.2)
BASOPHILS NFR BLD AUTO: 0.6 % (ref 0–1.5)
BILIRUB SERPL-MCNC: 0.2 MG/DL (ref 0–1.2)
BUN SERPL-MCNC: 15 MG/DL (ref 6–20)
BUN/CREAT SERPL: 14.6 (ref 7–25)
CALCIUM SPEC-SCNC: 8.8 MG/DL (ref 8.6–10.5)
CHLORIDE SERPL-SCNC: 106 MMOL/L (ref 98–107)
CO2 SERPL-SCNC: 20.9 MMOL/L (ref 22–29)
CREAT SERPL-MCNC: 1.03 MG/DL (ref 0.57–1)
DEPRECATED RDW RBC AUTO: 38.1 FL (ref 37–54)
EOSINOPHIL # BLD AUTO: 0.21 10*3/MM3 (ref 0–0.4)
EOSINOPHIL NFR BLD AUTO: 1.7 % (ref 0.3–6.2)
ERYTHROCYTE [DISTWIDTH] IN BLOOD BY AUTOMATED COUNT: 12.7 % (ref 12.3–15.4)
GFR SERPL CREATININE-BSD FRML MDRD: 57 ML/MIN/1.73
GLOBULIN UR ELPH-MCNC: 2.9 GM/DL
GLUCOSE SERPL-MCNC: 305 MG/DL (ref 65–99)
HCT VFR BLD AUTO: 42.1 % (ref 34–46.6)
HGB BLD-MCNC: 13 G/DL (ref 12–15.9)
IMM GRANULOCYTES # BLD AUTO: 0.03 10*3/MM3 (ref 0–0.05)
IMM GRANULOCYTES NFR BLD AUTO: 0.2 % (ref 0–0.5)
LYMPHOCYTES # BLD AUTO: 2.77 10*3/MM3 (ref 0.7–3.1)
LYMPHOCYTES NFR BLD AUTO: 22.7 % (ref 19.6–45.3)
MCH RBC QN AUTO: 25.8 PG (ref 26.6–33)
MCHC RBC AUTO-ENTMCNC: 30.9 G/DL (ref 31.5–35.7)
MCV RBC AUTO: 83.5 FL (ref 79–97)
MONOCYTES # BLD AUTO: 0.66 10*3/MM3 (ref 0.1–0.9)
MONOCYTES NFR BLD AUTO: 5.4 % (ref 5–12)
NEUTROPHILS NFR BLD AUTO: 69.4 % (ref 42.7–76)
NEUTROPHILS NFR BLD AUTO: 8.48 10*3/MM3 (ref 1.7–7)
NRBC BLD AUTO-RTO: 0 /100 WBC (ref 0–0.2)
PLATELET # BLD AUTO: 357 10*3/MM3 (ref 140–450)
PMV BLD AUTO: 9.5 FL (ref 6–12)
POTASSIUM SERPL-SCNC: 4 MMOL/L (ref 3.5–5.2)
PROT SERPL-MCNC: 7 G/DL (ref 6–8.5)
RBC # BLD AUTO: 5.04 10*6/MM3 (ref 3.77–5.28)
SODIUM SERPL-SCNC: 139 MMOL/L (ref 136–145)
WBC # BLD AUTO: 12.22 10*3/MM3 (ref 3.4–10.8)

## 2020-10-06 PROCEDURE — 96411 CHEMO IV PUSH ADDL DRUG: CPT

## 2020-10-06 PROCEDURE — 25010000002 CYCLOPHOSPHAMIDE PER 100 MG: Performed by: INTERNAL MEDICINE

## 2020-10-06 PROCEDURE — 96375 TX/PRO/DX INJ NEW DRUG ADDON: CPT

## 2020-10-06 PROCEDURE — 96367 TX/PROPH/DG ADDL SEQ IV INF: CPT

## 2020-10-06 PROCEDURE — 96413 CHEMO IV INFUSION 1 HR: CPT

## 2020-10-06 PROCEDURE — 80053 COMPREHEN METABOLIC PANEL: CPT

## 2020-10-06 PROCEDURE — 25010000002 FOSAPREPITANT PER 1 MG: Performed by: INTERNAL MEDICINE

## 2020-10-06 PROCEDURE — 25010000002 DOXORUBICIN PER 10 MG: Performed by: INTERNAL MEDICINE

## 2020-10-06 PROCEDURE — 96417 CHEMO IV INFUS EACH ADDL SEQ: CPT

## 2020-10-06 PROCEDURE — 25010000002 PALONOSETRON PER 25 MCG: Performed by: INTERNAL MEDICINE

## 2020-10-06 PROCEDURE — 85025 COMPLETE CBC W/AUTO DIFF WBC: CPT

## 2020-10-06 PROCEDURE — 25010000002 DEXAMETHASONE SODIUM PHOSPHATE 20 MG/5ML SOLUTION: Performed by: INTERNAL MEDICINE

## 2020-10-06 RX ORDER — DEXAMETHASONE 4 MG/1
TABLET ORAL
Qty: 6 TABLET | Refills: 3 | Status: SHIPPED | OUTPATIENT
Start: 2020-10-06 | End: 2020-10-06 | Stop reason: SDUPTHER

## 2020-10-06 RX ORDER — PALONOSETRON 0.05 MG/ML
0.25 INJECTION, SOLUTION INTRAVENOUS ONCE
Status: COMPLETED | OUTPATIENT
Start: 2020-10-06 | End: 2020-10-06

## 2020-10-06 RX ORDER — SODIUM CHLORIDE 9 MG/ML
250 INJECTION, SOLUTION INTRAVENOUS ONCE
Status: CANCELLED | OUTPATIENT
Start: 2020-10-06

## 2020-10-06 RX ORDER — SODIUM CHLORIDE 9 MG/ML
250 INJECTION, SOLUTION INTRAVENOUS ONCE
Status: COMPLETED | OUTPATIENT
Start: 2020-10-06 | End: 2020-10-06

## 2020-10-06 RX ORDER — SODIUM CHLORIDE 0.9 % (FLUSH) 0.9 %
10 SYRINGE (ML) INJECTION AS NEEDED
Status: DISCONTINUED | OUTPATIENT
Start: 2020-10-06 | End: 2020-10-06 | Stop reason: HOSPADM

## 2020-10-06 RX ORDER — HEPARIN SODIUM (PORCINE) LOCK FLUSH IV SOLN 100 UNIT/ML 100 UNIT/ML
500 SOLUTION INTRAVENOUS AS NEEDED
Status: CANCELLED | OUTPATIENT
Start: 2020-10-07

## 2020-10-06 RX ORDER — DEXAMETHASONE 4 MG/1
TABLET ORAL
Qty: 6 TABLET | Refills: 3 | Status: SHIPPED | OUTPATIENT
Start: 2020-10-06 | End: 2020-11-25

## 2020-10-06 RX ORDER — HEPARIN SODIUM (PORCINE) LOCK FLUSH IV SOLN 100 UNIT/ML 100 UNIT/ML
500 SOLUTION INTRAVENOUS AS NEEDED
Status: DISCONTINUED | OUTPATIENT
Start: 2020-10-06 | End: 2020-10-06 | Stop reason: HOSPADM

## 2020-10-06 RX ORDER — PALONOSETRON 0.05 MG/ML
0.25 INJECTION, SOLUTION INTRAVENOUS ONCE
Status: CANCELLED | OUTPATIENT
Start: 2020-10-06

## 2020-10-06 RX ORDER — SODIUM CHLORIDE 0.9 % (FLUSH) 0.9 %
10 SYRINGE (ML) INJECTION AS NEEDED
Status: CANCELLED | OUTPATIENT
Start: 2020-10-07

## 2020-10-06 RX ORDER — DOXORUBICIN HYDROCHLORIDE 2 MG/ML
60 INJECTION, SOLUTION INTRAVENOUS ONCE
Status: CANCELLED | OUTPATIENT
Start: 2020-10-06

## 2020-10-06 RX ORDER — DOXORUBICIN HYDROCHLORIDE 2 MG/ML
60 INJECTION, SOLUTION INTRAVENOUS ONCE
Status: COMPLETED | OUTPATIENT
Start: 2020-10-06 | End: 2020-10-06

## 2020-10-06 RX ADMIN — DOXORUBICIN HYDROCHLORIDE 126 MG: 2 INJECTION, SOLUTION INTRAVENOUS at 11:21

## 2020-10-06 RX ADMIN — PALONOSETRON HYDROCHLORIDE 0.25 MG: 0.25 INJECTION INTRAVENOUS at 10:21

## 2020-10-06 RX ADMIN — SODIUM CHLORIDE 250 ML: 9 INJECTION, SOLUTION INTRAVENOUS at 10:20

## 2020-10-06 RX ADMIN — SODIUM CHLORIDE 150 MG: 9 INJECTION, SOLUTION INTRAVENOUS at 10:41

## 2020-10-06 RX ADMIN — DEXAMETHASONE SODIUM PHOSPHATE 12 MG: 4 INJECTION, SOLUTION INTRAMUSCULAR; INTRAVENOUS at 10:23

## 2020-10-06 RX ADMIN — CYCLOPHOSPHAMIDE 1250 MG: 1 INJECTION, POWDER, FOR SOLUTION INTRAVENOUS; ORAL at 11:48

## 2020-10-06 NOTE — PROGRESS NOTES
SS received call from patient's nurse BHARGAVI Saavedra who states that patient is here today for chemotherapy treatment.    The patient and I discussed the results of her PHQ depression screening.    PHQ-9 Total Score:  19    Patient completed [facility] Distress Thermometer and scored herself a 8 out of 10.      Patient recently started taking anti-depressant and it hasn't had time to start working yet.    SS will follow as needed.

## 2020-10-07 ENCOUNTER — INFUSION (OUTPATIENT)
Dept: ONCOLOGY | Facility: HOSPITAL | Age: 45
End: 2020-10-07

## 2020-10-07 ENCOUNTER — DOCUMENTATION (OUTPATIENT)
Dept: ONCOLOGY | Facility: HOSPITAL | Age: 45
End: 2020-10-07

## 2020-10-07 VITALS
OXYGEN SATURATION: 97 % | RESPIRATION RATE: 18 BRPM | WEIGHT: 234.2 LBS | BODY MASS INDEX: 40.2 KG/M2 | SYSTOLIC BLOOD PRESSURE: 134 MMHG | DIASTOLIC BLOOD PRESSURE: 79 MMHG | HEART RATE: 101 BPM | TEMPERATURE: 98.2 F

## 2020-10-07 DIAGNOSIS — Z17.1 MALIGNANT NEOPLASM OF UPPER-OUTER QUADRANT OF RIGHT BREAST IN FEMALE, ESTROGEN RECEPTOR NEGATIVE (HCC): Primary | ICD-10-CM

## 2020-10-07 DIAGNOSIS — C50.411 MALIGNANT NEOPLASM OF UPPER-OUTER QUADRANT OF RIGHT BREAST IN FEMALE, ESTROGEN RECEPTOR NEGATIVE (HCC): Primary | ICD-10-CM

## 2020-10-07 PROCEDURE — 96372 THER/PROPH/DIAG INJ SC/IM: CPT

## 2020-10-07 PROCEDURE — 25010000002 PEGFILGRASTIM-JMDB 6 MG/0.6ML SOLUTION PREFILLED SYRINGE: Performed by: INTERNAL MEDICINE

## 2020-10-07 RX ADMIN — PEGFILGRASTIM 6 MG: 6 INJECTION SUBCUTANEOUS at 12:58

## 2020-10-07 NOTE — PROGRESS NOTES
SS completed application for [facility] and mailed it this date with office visit note.  SS will follow.

## 2020-10-12 ENCOUNTER — DOCUMENTATION (OUTPATIENT)
Dept: ONCOLOGY | Facility: HOSPITAL | Age: 45
End: 2020-10-12

## 2020-10-12 RX ORDER — NITROFURANTOIN 25; 75 MG/1; MG/1
100 CAPSULE ORAL 2 TIMES DAILY
Qty: 20 CAPSULE | Refills: 0 | Status: SHIPPED | OUTPATIENT
Start: 2020-10-12 | End: 2020-11-13

## 2020-10-12 NOTE — PROGRESS NOTES
SS contacted patient at home [phone] to make aware of support group on October 20 th at 2:00 on second floor.  Patient states that she will be receiving chemotherapy that day and doesn't know if she will feel like coming or not.  SS will follow.

## 2020-10-13 ENCOUNTER — TELEPHONE (OUTPATIENT)
Dept: FAMILY MEDICINE CLINIC | Facility: CLINIC | Age: 45
End: 2020-10-13

## 2020-10-13 ENCOUNTER — TELEPHONE (OUTPATIENT)
Dept: ONCOLOGY | Facility: HOSPITAL | Age: 45
End: 2020-10-13

## 2020-10-13 NOTE — TELEPHONE ENCOUNTER
Nurse Navigator spoke with patient on this date regarding Cancer Support Group on 10/27/20 at 10:00.  Pt stated she as interested and would try to come if she felt like it.  Pt has Nurse Navigator contact information and is aware to call with any questions or concerns.

## 2020-10-13 NOTE — TELEPHONE ENCOUNTER
----- Message from Markus Menjivar MD sent at 10/12/2020  6:31 PM EDT -----  Emailed  ----- Message -----  From: Anne Paz MA  Sent: 10/12/2020   4:20 PM EDT  To: Markus Menjivar MD    Pt called and wanted to know if you could send her in some macrobid for a UTI infection.

## 2020-10-14 DIAGNOSIS — J32.0 MAXILLARY SINUSITIS, UNSPECIFIED CHRONICITY: Primary | ICD-10-CM

## 2020-10-14 DIAGNOSIS — R05.9 COUGH: ICD-10-CM

## 2020-10-14 RX ORDER — CEFDINIR 300 MG/1
300 CAPSULE ORAL 2 TIMES DAILY
Qty: 20 CAPSULE | Refills: 0 | Status: SHIPPED | OUTPATIENT
Start: 2020-10-14 | End: 2020-11-13

## 2020-10-15 ENCOUNTER — APPOINTMENT (OUTPATIENT)
Dept: ONCOLOGY | Facility: HOSPITAL | Age: 45
End: 2020-10-15

## 2020-10-16 DIAGNOSIS — F41.9 ANXIETY AND DEPRESSION: Chronic | ICD-10-CM

## 2020-10-16 DIAGNOSIS — F32.A ANXIETY AND DEPRESSION: Chronic | ICD-10-CM

## 2020-10-19 DIAGNOSIS — Z17.1 MALIGNANT NEOPLASM OF UPPER-OUTER QUADRANT OF RIGHT BREAST IN FEMALE, ESTROGEN RECEPTOR NEGATIVE (HCC): Primary | ICD-10-CM

## 2020-10-19 DIAGNOSIS — C50.411 MALIGNANT NEOPLASM OF UPPER-OUTER QUADRANT OF RIGHT BREAST IN FEMALE, ESTROGEN RECEPTOR NEGATIVE (HCC): Primary | ICD-10-CM

## 2020-10-19 RX ORDER — PALONOSETRON 0.05 MG/ML
0.25 INJECTION, SOLUTION INTRAVENOUS ONCE
Status: CANCELLED | OUTPATIENT
Start: 2020-10-20

## 2020-10-19 RX ORDER — DOXORUBICIN HYDROCHLORIDE 2 MG/ML
60 INJECTION, SOLUTION INTRAVENOUS ONCE
Status: CANCELLED | OUTPATIENT
Start: 2020-10-20

## 2020-10-19 RX ORDER — SODIUM CHLORIDE 9 MG/ML
250 INJECTION, SOLUTION INTRAVENOUS ONCE
Status: CANCELLED | OUTPATIENT
Start: 2020-10-20

## 2020-10-19 RX ORDER — DIAZEPAM 5 MG/1
5 TABLET ORAL DAILY
Qty: 30 TABLET | Refills: 0 | Status: SHIPPED | OUTPATIENT
Start: 2020-10-19 | End: 2020-11-13

## 2020-10-20 ENCOUNTER — OFFICE VISIT (OUTPATIENT)
Dept: ONCOLOGY | Facility: CLINIC | Age: 45
End: 2020-10-20

## 2020-10-20 ENCOUNTER — APPOINTMENT (OUTPATIENT)
Dept: ONCOLOGY | Facility: HOSPITAL | Age: 45
End: 2020-10-20

## 2020-10-20 ENCOUNTER — DOCUMENTATION (OUTPATIENT)
Dept: ONCOLOGY | Facility: HOSPITAL | Age: 45
End: 2020-10-20

## 2020-10-20 ENCOUNTER — INFUSION (OUTPATIENT)
Dept: ONCOLOGY | Facility: HOSPITAL | Age: 45
End: 2020-10-20

## 2020-10-20 VITALS
RESPIRATION RATE: 18 BRPM | DIASTOLIC BLOOD PRESSURE: 80 MMHG | WEIGHT: 232.4 LBS | DIASTOLIC BLOOD PRESSURE: 80 MMHG | TEMPERATURE: 97.5 F | WEIGHT: 232.4 LBS | RESPIRATION RATE: 18 BRPM | SYSTOLIC BLOOD PRESSURE: 134 MMHG | OXYGEN SATURATION: 97 % | OXYGEN SATURATION: 97 % | HEART RATE: 85 BPM | SYSTOLIC BLOOD PRESSURE: 134 MMHG | HEART RATE: 85 BPM | TEMPERATURE: 97.5 F | BODY MASS INDEX: 39.89 KG/M2 | BODY MASS INDEX: 39.89 KG/M2

## 2020-10-20 DIAGNOSIS — C50.411 MALIGNANT NEOPLASM OF UPPER-OUTER QUADRANT OF RIGHT BREAST IN FEMALE, ESTROGEN RECEPTOR NEGATIVE (HCC): ICD-10-CM

## 2020-10-20 DIAGNOSIS — Z17.1 MALIGNANT NEOPLASM OF UPPER-OUTER QUADRANT OF RIGHT BREAST IN FEMALE, ESTROGEN RECEPTOR NEGATIVE (HCC): Primary | ICD-10-CM

## 2020-10-20 DIAGNOSIS — Z17.1 MALIGNANT NEOPLASM OF UPPER-OUTER QUADRANT OF RIGHT BREAST IN FEMALE, ESTROGEN RECEPTOR NEGATIVE (HCC): ICD-10-CM

## 2020-10-20 DIAGNOSIS — C50.411 MALIGNANT NEOPLASM OF UPPER-OUTER QUADRANT OF RIGHT BREAST IN FEMALE, ESTROGEN RECEPTOR NEGATIVE (HCC): Primary | ICD-10-CM

## 2020-10-20 DIAGNOSIS — Z95.828 PORT-A-CATH IN PLACE: ICD-10-CM

## 2020-10-20 LAB
ALBUMIN SERPL-MCNC: 4.11 G/DL (ref 3.5–5.2)
ALBUMIN/GLOB SERPL: 1.5 G/DL
ALP SERPL-CCNC: 134 U/L (ref 39–117)
ALT SERPL W P-5'-P-CCNC: 29 U/L (ref 1–33)
ANION GAP SERPL CALCULATED.3IONS-SCNC: 9.8 MMOL/L (ref 5–15)
AST SERPL-CCNC: 17 U/L (ref 1–32)
BASOPHILS # BLD AUTO: 0.07 10*3/MM3 (ref 0–0.2)
BASOPHILS NFR BLD AUTO: 0.8 % (ref 0–1.5)
BILIRUB SERPL-MCNC: <0.2 MG/DL (ref 0–1.2)
BUN SERPL-MCNC: 12 MG/DL (ref 6–20)
BUN/CREAT SERPL: 18.5 (ref 7–25)
CALCIUM SPEC-SCNC: 8.8 MG/DL (ref 8.6–10.5)
CHLORIDE SERPL-SCNC: 107 MMOL/L (ref 98–107)
CO2 SERPL-SCNC: 25.2 MMOL/L (ref 22–29)
CREAT SERPL-MCNC: 0.65 MG/DL (ref 0.57–1)
DEPRECATED RDW RBC AUTO: 40.3 FL (ref 37–54)
EOSINOPHIL # BLD AUTO: 0.09 10*3/MM3 (ref 0–0.4)
EOSINOPHIL NFR BLD AUTO: 1 % (ref 0.3–6.2)
ERYTHROCYTE [DISTWIDTH] IN BLOOD BY AUTOMATED COUNT: 14 % (ref 12.3–15.4)
GFR SERPL CREATININE-BSD FRML MDRD: 98 ML/MIN/1.73
GLOBULIN UR ELPH-MCNC: 2.7 GM/DL
GLUCOSE SERPL-MCNC: 221 MG/DL (ref 65–99)
HCT VFR BLD AUTO: 38.4 % (ref 34–46.6)
HGB BLD-MCNC: 11.9 G/DL (ref 12–15.9)
IMM GRANULOCYTES # BLD AUTO: 0.35 10*3/MM3 (ref 0–0.05)
IMM GRANULOCYTES NFR BLD AUTO: 3.8 % (ref 0–0.5)
LYMPHOCYTES # BLD AUTO: 2.02 10*3/MM3 (ref 0.7–3.1)
LYMPHOCYTES NFR BLD AUTO: 21.7 % (ref 19.6–45.3)
MCH RBC QN AUTO: 25.9 PG (ref 26.6–33)
MCHC RBC AUTO-ENTMCNC: 31 G/DL (ref 31.5–35.7)
MCV RBC AUTO: 83.5 FL (ref 79–97)
MONOCYTES # BLD AUTO: 0.65 10*3/MM3 (ref 0.1–0.9)
MONOCYTES NFR BLD AUTO: 7 % (ref 5–12)
NEUTROPHILS NFR BLD AUTO: 6.14 10*3/MM3 (ref 1.7–7)
NEUTROPHILS NFR BLD AUTO: 65.7 % (ref 42.7–76)
NRBC BLD AUTO-RTO: 0 /100 WBC (ref 0–0.2)
PLATELET # BLD AUTO: 207 10*3/MM3 (ref 140–450)
PMV BLD AUTO: 9.7 FL (ref 6–12)
POTASSIUM SERPL-SCNC: 3.7 MMOL/L (ref 3.5–5.2)
PROT SERPL-MCNC: 6.8 G/DL (ref 6–8.5)
RBC # BLD AUTO: 4.6 10*6/MM3 (ref 3.77–5.28)
SODIUM SERPL-SCNC: 142 MMOL/L (ref 136–145)
WBC # BLD AUTO: 9.32 10*3/MM3 (ref 3.4–10.8)

## 2020-10-20 PROCEDURE — 25010000002 DOXORUBICIN PER 10 MG: Performed by: INTERNAL MEDICINE

## 2020-10-20 PROCEDURE — 25010000003 HEPARIN LOCK FLUSH PER 10 UNITS: Performed by: INTERNAL MEDICINE

## 2020-10-20 PROCEDURE — 25010000002 PALONOSETRON PER 25 MCG: Performed by: INTERNAL MEDICINE

## 2020-10-20 PROCEDURE — 96413 CHEMO IV INFUSION 1 HR: CPT

## 2020-10-20 PROCEDURE — 99214 OFFICE O/P EST MOD 30 MIN: CPT | Performed by: NURSE PRACTITIONER

## 2020-10-20 PROCEDURE — 80053 COMPREHEN METABOLIC PANEL: CPT | Performed by: INTERNAL MEDICINE

## 2020-10-20 PROCEDURE — 25010000002 DEXAMETHASONE SODIUM PHOSPHATE 20 MG/5ML SOLUTION: Performed by: INTERNAL MEDICINE

## 2020-10-20 PROCEDURE — 96367 TX/PROPH/DG ADDL SEQ IV INF: CPT

## 2020-10-20 PROCEDURE — 85025 COMPLETE CBC W/AUTO DIFF WBC: CPT | Performed by: INTERNAL MEDICINE

## 2020-10-20 PROCEDURE — 25010000002 FOSAPREPITANT PER 1 MG: Performed by: INTERNAL MEDICINE

## 2020-10-20 PROCEDURE — 25010000002 CYCLOPHOSPHAMIDE PER 100 MG: Performed by: INTERNAL MEDICINE

## 2020-10-20 PROCEDURE — 96375 TX/PRO/DX INJ NEW DRUG ADDON: CPT

## 2020-10-20 PROCEDURE — 96411 CHEMO IV PUSH ADDL DRUG: CPT

## 2020-10-20 RX ORDER — SODIUM CHLORIDE 0.9 % (FLUSH) 0.9 %
10 SYRINGE (ML) INJECTION AS NEEDED
Status: CANCELLED | OUTPATIENT
Start: 2020-10-21

## 2020-10-20 RX ORDER — DOXORUBICIN HYDROCHLORIDE 2 MG/ML
60 INJECTION, SOLUTION INTRAVENOUS ONCE
Status: COMPLETED | OUTPATIENT
Start: 2020-10-20 | End: 2020-10-20

## 2020-10-20 RX ORDER — SODIUM CHLORIDE 0.9 % (FLUSH) 0.9 %
10 SYRINGE (ML) INJECTION AS NEEDED
Status: DISCONTINUED | OUTPATIENT
Start: 2020-10-20 | End: 2020-10-20 | Stop reason: HOSPADM

## 2020-10-20 RX ORDER — SODIUM CHLORIDE 9 MG/ML
250 INJECTION, SOLUTION INTRAVENOUS ONCE
Status: COMPLETED | OUTPATIENT
Start: 2020-10-20 | End: 2020-10-20

## 2020-10-20 RX ORDER — HEPARIN SODIUM (PORCINE) LOCK FLUSH IV SOLN 100 UNIT/ML 100 UNIT/ML
500 SOLUTION INTRAVENOUS AS NEEDED
Status: DISCONTINUED | OUTPATIENT
Start: 2020-10-20 | End: 2020-10-20 | Stop reason: HOSPADM

## 2020-10-20 RX ORDER — PALONOSETRON 0.05 MG/ML
0.25 INJECTION, SOLUTION INTRAVENOUS ONCE
Status: COMPLETED | OUTPATIENT
Start: 2020-10-20 | End: 2020-10-20

## 2020-10-20 RX ORDER — AMOXICILLIN 250 MG
2 CAPSULE ORAL 2 TIMES DAILY
Qty: 120 TABLET | Refills: 4 | Status: SHIPPED | OUTPATIENT
Start: 2020-10-20 | End: 2022-05-18

## 2020-10-20 RX ORDER — HEPARIN SODIUM (PORCINE) LOCK FLUSH IV SOLN 100 UNIT/ML 100 UNIT/ML
500 SOLUTION INTRAVENOUS AS NEEDED
Status: CANCELLED | OUTPATIENT
Start: 2020-10-21

## 2020-10-20 RX ADMIN — Medication 500 UNITS: at 12:14

## 2020-10-20 RX ADMIN — SODIUM CHLORIDE 150 MG: 9 INJECTION, SOLUTION INTRAVENOUS at 10:05

## 2020-10-20 RX ADMIN — PALONOSETRON HYDROCHLORIDE 0.25 MG: 0.25 INJECTION INTRAVENOUS at 09:45

## 2020-10-20 RX ADMIN — DOXORUBICIN HYDROCHLORIDE 126 MG: 2 INJECTION, SOLUTION INTRAVENOUS at 10:45

## 2020-10-20 RX ADMIN — CYCLOPHOSPHAMIDE 1250 MG: 1 INJECTION, POWDER, FOR SOLUTION INTRAVENOUS; ORAL at 11:01

## 2020-10-20 RX ADMIN — SODIUM CHLORIDE 250 ML: 9 INJECTION, SOLUTION INTRAVENOUS at 09:44

## 2020-10-20 RX ADMIN — DEXAMETHASONE SODIUM PHOSPHATE 12 MG: 4 INJECTION, SOLUTION INTRAMUSCULAR; INTRAVENOUS at 09:47

## 2020-10-20 RX ADMIN — SODIUM CHLORIDE, PRESERVATIVE FREE 10 ML: 5 INJECTION INTRAVENOUS at 12:13

## 2020-10-20 NOTE — PROGRESS NOTES
SS received call from patient's nurse Emily who states that patient rated her distress a 10 out of 10.    SS spoke with patient this date to provide comfort and support.  Patient states that her father is with hospice care and has been given about a week to live.      Patient currently started taking Paxil but it hasn't had time to be effective.    SS will follow as needed.

## 2020-10-20 NOTE — PROGRESS NOTES
DATE:  10/20/2020    DIAGNOSIS: Breast cancer    CHIEF COMPLAINT:  Follow up Breast cancer/Toxicity check    TREATMENT HISTORY:  1. Initially planned to give 4 cycles to Taxotere/Cytoxan, during 1st infusion of Taxotere on 09/24/2020 patient developed allergic reaction. Therefore, Taxotere was discontinued and regimen changed to DD AC.    2.      HISTORY OF PRESENT ILLNESS:   Nivia Bernstein is a very pleasant 45 y.o. female who who is being seen today at the request of Sheila Garza MD for evaluation and treatment of breast cancer. She did not notice a palpable lump, but was recommended to have a screening mammogram by her PCP. A nodule in the right upper quadrant of the R breast was noted. The mass measured 0.8 cm on ultrasound. Biopsy was consistent with a moderately differentiated invasive ductal carcinoma, ER negative, UT weakly (15%) positive, and HER-2/cat negative. She underwent bilateral mastectomy with sentinel lymph node biopsy with Dr. Garza on 8-18-20. Pathology from this showed a moderately differentiated invasive ductal carcinoma with associated intermediate grade DCIS, negative margins.  0/10 /SLN involved.  Mammaprint was high risk. She was referred to our clinic and is here today with her  for discussion of further treatment options.      Ms. Bernstein is healing well from the surgery.  She did develop a seroma, which was drained by Dr. Garza yesterday. She reports muscular chest discomfort r/t the procedure, but otherwise denies any other symptoms including fevers, chills, significant weight changes, shortness of breath, abdominal pain, n/v/d/c, bony pain or headaches.    Interval History:  Ms. Bernstein presents today for follow up breast cancer and toxicity check. She has completed one cycle of DD AC to date and tolerated fairly well, with mild nausea as her only noticeable side effect. She does report after finishing up the Cytoxan with her initial treatment that she noticed some discomfort  and burning with her nose, this resolved within 15-20 minutes after completion of the treatment. She reports a fair appetite and hydrating well. She reports that her nausea is well controlled with zofran and compazine as needed. She does report that she has been struggling with constipation. She uses Miralax as needed which is helpful. She is tearful at times today and relates this to her father now being under the care of hospice due to complete organ failure. She is currently on Paxil per psych and Valium per PCP which helps. She is otherwise well and denies any specific complaints today.    The following portions of the patient's history were reviewed and updated as appropriate: allergies, current medications, past family history, past medical history, past social history, past surgical history and problem list.    PAST MEDICAL HISTORY:  Past Medical History:   Diagnosis Date   • Altered mental status 10/16/2017   • Anxiety and depression 3/31/2017   • Arthritis    • Asthma    • Diabetes mellitus (CMS/[facility])     wears insulin pump.    • DVT (deep venous thrombosis) (CMS/[facility]) 2003    x 1 in RLE following fall & surgery   • Elevated alkaline phosphatase level 10/16/2017   • Elevated cholesterol    • Enlarged liver    • GERD (gastroesophageal reflux disease)    • H/O blood clots    • Heart murmur    • Hyperlipidemia    • Hypokalemia 10/16/2017   • Leg pain 10/16/2017   • Mitral valve prolapse    • Neuropathy     related to diabetes   • Obesity 3/31/2017   • OCD (obsessive compulsive disorder) 10/16/2017   • Osteoporosis    • PONV (postoperative nausea and vomiting)    • Renal insufficiency 10/16/2017   • Right breast cancer with malignant cells in regional lymph nodes no greater than 0.2 mm and no more than 200 cells (CMS/HCC) 8/13/2020   • Thrombocytopenia (CMS/HCC) 10/16/2017   • TIA (transient ischemic attack)     4 TIMES       PAST SURGICAL HISTORY:  Past Surgical History:   Procedure Laterality Date   •  APPENDECTOMY     • CARPAL TUNNEL RELEASE     • CHOLECYSTECTOMY     • FINGER FUSION Left     3rd   • HYSTERECTOMY  2011    removed due to an ovarian cyst and dysmenorrhia. No cancer noted. Complete   • KNEE ARTHROSCOPY Right    • MASTECTOMY Left 8/18/2020    Procedure: Left mastectomy;  Surgeon: Sheila Garza MD;  Location: [facility];  Service: General;  Laterality: Left;   • MASTECTOMY WITH SENTINEL NODE BIOPSY AND AXILLARY NODE DISSECTION Right 8/18/2020    Procedure: Right BREAST MASTECTOMY WITH SENTINEL NODE BIOPSY;  Surgeon: Sheila Garza MD;  Location: [facility];  Service: General;  Laterality: Right;   • PORTACATH PLACEMENT         SOCIAL HISTORY:  Social History     Socioeconomic History   • Marital status:      Spouse name: Not on file   • Number of children: Not on file   • Years of education: Not on file   • Highest education level: Not on file   Occupational History   • Occupation: Not Working   Social Needs   • Financial resource strain: Not hard at all   • Food insecurity     Worry: Never true     Inability: Never true   • Transportation needs     Medical: No     Non-medical: No   Tobacco Use   • Smoking status: Never Smoker   • Smokeless tobacco: Never Used   Substance and Sexual Activity   • Alcohol use: No   • Drug use: No   • Sexual activity: Yes     Partners: Male   Lifestyle   • Physical activity     Days per week: 3 days     Minutes per session: 100 min   • Stress: Very much   Relationships   • Social connections     Talks on phone: More than three times a week     Gets together: More than three times a week     Attends Sikh service: Never     Active member of club or organization: No     Attends meetings of clubs or organizations: Never     Relationship status:    Social History Narrative    Mrs. Bernstein is a 42 year old white  female. They live in [facility] with their 4 children. She has extended family local as well. She works as a  at  [employer] in [employer] [facility]. She does not have an advanced directive.         FAMILY HISTORY:  Family History   Problem Relation Age of Onset   • Diabetes Mother    • Hypertension Mother    • Diabetes Father    • Heart disease Father    • Alcohol abuse Father    • Seizures Father    • Hypertension Father    • No Known Problems Brother    • No Known Problems Daughter    • Bone cancer Son    • Suicide Attempts Cousin    • Stomach cancer Cousin         maternal first cousin   • Breast cancer Paternal Aunt 52   • Diabetes Maternal Grandmother    • Diabetes Maternal Grandfather    • Lung cancer Maternal Grandfather    • Heart disease Paternal Grandmother    • Diabetes Paternal Grandmother    • Heart disease Paternal Grandfather    • Diabetes Paternal Grandfather    • Ovarian cancer Maternal Aunt    • Lung cancer Maternal Uncle    • Colon cancer Maternal Uncle    • Cancer Maternal Uncle         unknown type         MEDICATIONS:  The current medication list was reviewed in the EMR    Current Outpatient Medications:   •  albuterol (PROVENTIL,VENTOLIN) 2 MG/5ML syrup, Take 5-10 mL by mouth Every 6 (Six) Hours As Needed (cough). Mix with Tussin DM and promethazine syrup for 3 way cough, Disp: 60 mL, Rfl: 0  •  Albuterol Sulfate (VENTOLIN HFA IN), Inhale 2 puffs Every 4 (Four) Hours As Needed (shortness of breath)., Disp: , Rfl:   •  BREO ELLIPTA 200-25 MCG/INH inhaler, Inhale 1 puff Daily As Needed (shortness of air)., Disp: , Rfl:   •  cefdinir (OMNICEF) 300 MG capsule, Take 1 capsule by mouth 2 (Two) Times a Day., Disp: 20 capsule, Rfl: 0  •  cyanocobalamin 1000 MCG/ML injection, Inject 1,000 mcg into the appropriate muscle as directed by prescriber 1 (One) Time Per Week., Disp: , Rfl:   •  dexamethasone (DECADRON) 4 MG tablet, Take 2 tablets in the morning daily on Days 2, 3 & 4.  Take with food., Disp: 6 tablet, Rfl: 3  •  diazePAM (VALIUM) 5 MG tablet, Take 1 tablet by mouth Daily., Disp: 30 tablet, Rfl: 0  •  diclofenac  (VOLTAREN) 1 % gel gel, Apply 4 g topically to the appropriate area as directed 4 (Four) Times a Day As Needed (joint pain)., Disp: 500 g, Rfl: 5  •  docusate sodium 100 MG capsule, Take 100 mg by mouth 2 (Two) Times a Day., Disp: , Rfl:   •  Empagliflozin-Linaglip-Metform 12.5-2.5-1000 MG tablet sustained-release 24 hour, Take 2 tablets by mouth Daily., Disp: 60 tablet, Rfl: 5  •  fluticasone (FLONASE) 50 MCG/ACT nasal spray, 2 sprays into the nostril(s) as directed by provider Daily As Needed for Rhinitis or Allergies., Disp: , Rfl:   •  ibuprofen (ADVIL,MOTRIN) 800 MG tablet, Take 800 mg by mouth Every 8 (Eight) Hours As Needed for Mild Pain ., Disp: , Rfl:   •  insulin lispro (HumaLOG) 100 UNIT/ML patient supplied pump, Inject  under the skin into the appropriate area as directed Continuous. Basil rate: 2.95/hr Carb ratio: 4.0, Disp: , Rfl:   •  nitrofurantoin, macrocrystal-monohydrate, (MACROBID) 100 MG capsule, Take 1 capsule by mouth 2 (Two) Times a Day., Disp: 20 capsule, Rfl: 0  •  omeprazole (priLOSEC) 20 MG capsule, TAKE 1 CAPSULE BY MOUTH TWO TIMES A DAY, Disp: 60 capsule, Rfl: 5  •  ondansetron (ZOFRAN) 8 MG tablet, Take 1 tablet by mouth 3 (Three) Times a Day As Needed for Nausea or Vomiting., Disp: 30 tablet, Rfl: 5  •  oxyCODONE-acetaminophen (Percocet) 5-325 MG per tablet, Take 1 tablet by mouth Every 6 (Six) Hours As Needed for Moderate Pain  for up to 20 doses., Disp: 20 tablet, Rfl: 0  •  PARoxetine (Paxil) 10 MG tablet, Take 1 tablet by mouth Daily., Disp: 30 tablet, Rfl: 0  •  prochlorperazine (COMPAZINE) 10 MG tablet, Take 1 tablet by mouth Every 6 (Six) Hours As Needed for Nausea or Vomiting., Disp: 30 tablet, Rfl: 5  •  rosuvastatin (CRESTOR) 40 MG tablet, , Disp: , Rfl:   •  vitamin D (ERGOCALCIFEROL) 1.25 MG (01344 UT) capsule capsule, TAKE ONE CAPSULE BY MOUTH ONCE WEEKLY, Disp: 12 capsule, Rfl: 0  •  sennosides-docusate (senna-docusate sodium) 8.6-50 MG per tablet, Take 2 tablets by  mouth 2 (Two) Times a Day., Disp: 120 tablet, Rfl: 4  No current facility-administered medications for this visit.     Facility-Administered Medications Ordered in Other Visits:   •  heparin injection 500 Units, 500 Units, Intravenous, PRN, Dejah Loco MD  •  sodium chloride 0.9 % flush 10 mL, 10 mL, Intravenous, PRN, Dejah Loco MD    ALLERGIES:    Allergies   Allergen Reactions   • Mobic [Meloxicam] Rash   • Penicillins Angioedema   • Taxotere [Docetaxel] Anaphylaxis     9 minutes into 1st infusion   • Novolog [Insulin Aspart] Hives         REVIEW OF SYSTEMS:    A comprehensive 14 point review of systems was performed.  Significant findings as mentioned above.  All other systems reviewed and are negative.        Physical Exam   Vital Signs:   Vitals:    10/20/20 0837   BP: 134/80   Pulse: 85   Resp: 18   Temp: 97.5 °F (36.4 °C)   SpO2: 97%    Patient's Body mass index is 39.89 kg/m².     General: Well developed, well nourished, alert and oriented x 3, in no acute distress.   Head: ATNC   Eyes: PERRL, No evidence of conjunctivitis.   Nose: No nasal discharge.   Mouth: Oral mucosal membranes moist. No oral ulceration or hemorrhages.   Neck: Neck supple. No thyromegaly. No JVD.   Lungs: Clear in all fields to A&P without rales, rhonchi or wheezing.   Heart: S1, S2. Regular rate and rhythm. No murmurs, rubs, or gallops.   Breast: S/p bilateral mastectomy and RSLNBx.  Surgical incisions c/d/i.  Small seroma present on Left, no signs of infection.  No axillary adenopathy on either side.  Abdomen: Soft. Bowel sounds are normoactive. Nontender with palpation. No Hepatosplenomegaly can be appreciated.   Extremities: No cyanosis or edema.   Integumentary: Warm and dry.  Lymph Nodes: No palpable cervical, submandibular, supraclavicular, axillary or inguinal lymphadenopathy noted.   Neurologic: Grossly non-focal exam.    Pain Score:  Pain Score    10/20/20 0837   PainSc: 0-No pain      PATHOLOGY:              IMAGING:  Bilateral screening mammogram 06-19-20  FINDINGS:    The breast tissue is heterogeneously dense.  New focal nodularity right upper outer breast that is about 14 cm from  the nipple.  Otherwise, no suspicious findings.     IMPRESSION:  New focal nodularity right upper outer breast that is about  14 cm from the nipple.     RECOMMENDATION:  Spot compression imaging.     BI-RADS CAT 0, INCOMPLETE.  The patient needs additional imaging.        Diagnostic R mammogram with R breast US 07-14-20  FINDINGS:  Additional mammographic imaging images of persistent  partially obscured oval mass in the posterior right upper outer  quadrant.     Right breast ultrasound: Focused sonographic evaluation of the right  breast demonstrates a 0.8 cm irregular hypoechoic mass with ill-defined  borders located at 10:00, 13 cm from the nipple. An additional area was  initially noted by the technologist in the 9:00 region which represents  an isolated fat lobule.           IMPRESSION:  0.8 cm irregular mass in the right 10:00 region. Recommend  ultrasound-guided core biopsy.        BI-RADS CATEGORY:  4, SUSPICIOUS        RECOMMENDATION:  Recommend ultrasound guided core biopsy of the right  breast.        Bilateral breast MRI w/wo contrast 08-11-20  FINDINGS: There is minimal bilateral background contrast enhancement and  normal vascular enhancement.     There are no worrisome areas of contrast enhancement in the left breast.     In the right breast correlating to the biopsy proven malignancy is an  approximately 0.7 cm irregular rapidly enhancing mass in the posterior  right 10:00 region. Artifact from a postbiopsy marking clip is located  adjacent to this mass. A small linear area of enhancement extends  posterior to this mass approximately 1 cm. There are no additional  worrisome areas of contrast enhancement in the right breast.     There is no axillary adenopathy by MRI criteria and no chest  wall  abnormality is seen.        IMPRESSION:  1. Negative left breast MRI.        2. Biopsy-proven malignancy in the right 10:00 region with a small  linear area of enhancement extending posterior to the 0.7 cm mass.     RECOMMENDATIONS: Recommend surgical follow-up.     BI-RADS CATEGORY: 6, KNOWN BIOPSY PROVEN MALIGNANCY    ECHO 09/29/2020  · Poor quality echo and Doppler study  · Normal left ventricular cavity size and wall thickness noted.  · Left ventricular ejection fraction appears to be 61 - 65%. Left ventricular systolic function is normal.  · Left ventricular diastolic function is consistent with (grade I) impaired relaxation.  · Aortic and mitral valve are poorly visualized but appear to be normal,  · Tricuspid and pulmonic valve echoes are not visualized.  · There is no pericardial effusion noted.      RECENT LABS:  Lab Results   Component Value Date    WBC 9.32 10/20/2020    HGB 11.9 (L) 10/20/2020    HCT 38.4 10/20/2020    MCV 83.5 10/20/2020    RDW 14.0 10/20/2020     10/20/2020    NEUTRORELPCT 65.7 10/20/2020    LYMPHORELPCT 21.7 10/20/2020    MONORELPCT 7.0 10/20/2020    EOSRELPCT 1.0 10/20/2020    BASORELPCT 0.8 10/20/2020    NEUTROABS 6.14 10/20/2020    LYMPHSABS 2.02 10/20/2020       Lab Results   Component Value Date     10/20/2020    K 3.7 10/20/2020    CO2 25.2 10/20/2020     10/20/2020    BUN 12 10/20/2020    CREATININE 0.65 10/20/2020    EGFRIFNONA 98 10/20/2020    EGFRIFAFRI 132 05/13/2020    GLUCOSE 221 (H) 10/20/2020    CALCIUM 8.8 10/20/2020    ALKPHOS 134 (H) 10/20/2020    AST 17 10/20/2020    ALT 29 10/20/2020    BILITOT <0.2 10/20/2020    ALBUMIN 4.11 10/20/2020    PROTEINTOT 6.8 10/20/2020    MG 2.1 10/17/2017       Lab Results   Component Value Date    URICACID 5.2 09/10/2019       Lab Results   Component Value Date    IEFYDWHQ29 411 05/13/2020    FOLATE 15.19 10/16/2017      ASSESSMENT & PLAN:  Nivia Bernstein is a very pleasant 45 y.o. female with Stage IA  ([id number]) moderately differentiated invasive ductal carcinoma of the R breast with associated DCIS.  Tumor was 10 mm in maximal dimension.  0/10 SLN involved. ER negative, [place] 15% + (1+), HER-2/cat negative. Mammaprint high risk.     1.  R breast cancer:   -  S/p R mastectomy and SLNBx as well as prophylactic contralateral mastectomy performed y Dr. Garza 8-18-20.  - She is healing well from bilateral mastectomy. This was complicated by left seroma, drained by Dr. Garza yesterday. Incisions are healed well.   - Given high risk Mammaprint, Dr. Loco recommended adjuvant chemotherapy.  We discussed different possibilities for adjuvant therapy. Given high risk Mammaprint but early stage, node negative disease as well as comorbidities, recommended Taxotere/Cytoxan Q21d x 4 cycles with growth factor support. Discussed potential risks, benefits, and side effects and she would like to proceed.   - She had port placed several years ago due to poor peripheral access. This has been well cared for and maintained. Will continue to use this for her treatments.   - C1 Taxotere/Cytoxan was planned for 09/24/2020. During the initial infusion of Taxotere, approximately 8 minutes after starting Taxotere, patient developed sudden, severe pain to lower back, hips and chest 10/10. Taxotere was stopped and continued with IVF. O2 sat dropped to 83% on RA. She was placed on 2L per NC and O2 sat improved to 98%. She was given Benadryl 25 mg IVP along with solumedrol 125 mg IVP. Her symptoms improved and vital signs were stable. Dr. Loco was made aware and plan was to discontinue Taxotere given allergic reaction and will change treatment to DD AC. Dr. Loco d/w patient new treatment plan and potential side effects.   - Baseline ECHO obtained on 09/29/2020 and shows intact LVEF of 61-65%.  - She has received one cycle of DD AC to date and tolerated well with mild nausea as her only noticeable side effect. She does report that she  "noticed a burning pain and discomfort in her nose just prior to completion of Cytoxan with C1 which resolved within 15-20 minutes after completion of treatment. Discussed with patient that this burning pain and discomfort that she is experiencing with her nose can be a common side effect of Cytoxan and is termed \"Wasabi nose\". Spoke with pharmacy and they advised to lengthen the time of infusion to 45 minutes. If patient experiences \"Wasabi nose\" with today's treatment pharmacy will further dilute Cytoxan and extend infusion time to 2 hours with subsequent cycles. Exam and labs from today without cause for concern. Will proceed with C2 DD AC today as planned with extended infusion time of Cytoxan.      2. Extensive family history of malignancy:   - genetic testing was ordered by Dr. Garza and performed by [facility] with no mutations, specifically including BRCA 1/2, CHKE2, CDH1, PALB2 and others.    3. Nausea:  - Mild. Continue zofran and compazine as needed. Will monitor.    4. Constipation:  - RX provided today for Senna colace 2 tabs BID. Continue Miralax as needed. Will monitor.     5. Anxiety/Depression:  - Her father has recently been admitted to hospice for organ failure and dealing with her own diagnosis of breast cancer has exacerbated her anxiety/depression.  - She was recently started on Paxil 10 mg daily per Psych and she takes Valium per PCP.  - Although she is tearful at times today she reports that her mood has improved since starting the Paxil. She will notify Psych is symptoms worsen.      6. Prophylaxis:   -  She received Prevnar 13 vaccine.  -  She had 2020 flu vaccine per [facility].  -  M. Uncle had colon cancer at age 50.  Will need Colonoscopy once treatment for breast cancer is complete.    7. Follow up:  - With Dr. Loco and C3 DD AC as scheduled.    ACO / NII/Other  Quality measures  -  Nivia Bernstein received 2020 flu vaccine.  -  Nivia Bernstein reports a pain score of 0.  "   -  Current outpatient and discharge medications have been reconciled for the patient.  Reviewed by: NIDA Corbin          A total of 25 minutes were spent coordinating this patient’s care in clinic today; more than 50% of this time was face-to-face with the patient, reviewing her interim medical history and counseling on the current treatment and followup plan. All questions were answered to her satisfaction.          Electronically Signed by: NIDA Forrest APRN October 20, 2020 11:48 EDT       CC:   MD Otto Hager, Markus Garcia MD

## 2020-10-21 ENCOUNTER — INFUSION (OUTPATIENT)
Dept: ONCOLOGY | Facility: HOSPITAL | Age: 45
End: 2020-10-21

## 2020-10-21 VITALS
SYSTOLIC BLOOD PRESSURE: 134 MMHG | TEMPERATURE: 97.5 F | BODY MASS INDEX: 39.99 KG/M2 | HEART RATE: 100 BPM | DIASTOLIC BLOOD PRESSURE: 77 MMHG | RESPIRATION RATE: 18 BRPM | WEIGHT: 233 LBS | OXYGEN SATURATION: 97 %

## 2020-10-21 DIAGNOSIS — C50.411 MALIGNANT NEOPLASM OF UPPER-OUTER QUADRANT OF RIGHT BREAST IN FEMALE, ESTROGEN RECEPTOR NEGATIVE (HCC): Primary | ICD-10-CM

## 2020-10-21 DIAGNOSIS — Z17.1 MALIGNANT NEOPLASM OF UPPER-OUTER QUADRANT OF RIGHT BREAST IN FEMALE, ESTROGEN RECEPTOR NEGATIVE (HCC): Primary | ICD-10-CM

## 2020-10-21 PROCEDURE — 96372 THER/PROPH/DIAG INJ SC/IM: CPT

## 2020-10-21 PROCEDURE — 25010000002 PEGFILGRASTIM-JMDB 6 MG/0.6ML SOLUTION PREFILLED SYRINGE: Performed by: INTERNAL MEDICINE

## 2020-10-21 RX ADMIN — PEGFILGRASTIM 6 MG: 6 INJECTION SUBCUTANEOUS at 13:07

## 2020-10-26 DIAGNOSIS — F41.1 GENERALIZED ANXIETY DISORDER: ICD-10-CM

## 2020-10-26 DIAGNOSIS — F33.3 SEVERE EPISODE OF RECURRENT MAJOR DEPRESSIVE DISORDER, WITH PSYCHOTIC FEATURES (HCC): ICD-10-CM

## 2020-10-26 RX ORDER — PAROXETINE 10 MG/1
10 TABLET, FILM COATED ORAL DAILY
Qty: 30 TABLET | Refills: 0 | Status: SHIPPED | OUTPATIENT
Start: 2020-10-26 | End: 2020-11-13 | Stop reason: SDUPTHER

## 2020-10-30 ENCOUNTER — OFFICE VISIT (OUTPATIENT)
Dept: SURGERY | Facility: CLINIC | Age: 45
End: 2020-10-30

## 2020-10-30 VITALS
BODY MASS INDEX: 39.78 KG/M2 | HEIGHT: 64 IN | SYSTOLIC BLOOD PRESSURE: 125 MMHG | DIASTOLIC BLOOD PRESSURE: 90 MMHG | HEART RATE: 91 BPM | WEIGHT: 233 LBS

## 2020-10-30 DIAGNOSIS — L02.419 CUTANEOUS ABSCESS OF AXILLA, UNSPECIFIED LATERALITY: Primary | ICD-10-CM

## 2020-10-30 PROCEDURE — 87205 SMEAR GRAM STAIN: CPT | Performed by: SURGERY

## 2020-10-30 PROCEDURE — 87186 SC STD MICRODIL/AGAR DIL: CPT | Performed by: SURGERY

## 2020-10-30 PROCEDURE — 10060 I&D ABSCESS SIMPLE/SINGLE: CPT | Performed by: SURGERY

## 2020-10-30 PROCEDURE — S0260 H&P FOR SURGERY: HCPCS | Performed by: SURGERY

## 2020-10-30 PROCEDURE — 87077 CULTURE AEROBIC IDENTIFY: CPT | Performed by: SURGERY

## 2020-10-30 PROCEDURE — 87070 CULTURE OTHR SPECIMN AEROBIC: CPT | Performed by: SURGERY

## 2020-10-30 RX ORDER — DOXYCYCLINE HYCLATE 100 MG/1
100 CAPSULE ORAL 2 TIMES DAILY
Qty: 20 CAPSULE | Refills: 0 | Status: SHIPPED | OUTPATIENT
Start: 2020-10-30 | End: 2020-11-09

## 2020-11-01 LAB
BACTERIA SPEC AEROBE CULT: ABNORMAL
GRAM STN SPEC: ABNORMAL
GRAM STN SPEC: ABNORMAL

## 2020-11-02 DIAGNOSIS — Z17.1 MALIGNANT NEOPLASM OF UPPER-OUTER QUADRANT OF RIGHT BREAST IN FEMALE, ESTROGEN RECEPTOR NEGATIVE (HCC): Primary | ICD-10-CM

## 2020-11-02 DIAGNOSIS — C50.411 MALIGNANT NEOPLASM OF UPPER-OUTER QUADRANT OF RIGHT BREAST IN FEMALE, ESTROGEN RECEPTOR NEGATIVE (HCC): Primary | ICD-10-CM

## 2020-11-02 RX ORDER — DOXORUBICIN HYDROCHLORIDE 2 MG/ML
60 INJECTION, SOLUTION INTRAVENOUS ONCE
Status: CANCELLED | OUTPATIENT
Start: 2020-11-03

## 2020-11-02 RX ORDER — SODIUM CHLORIDE 9 MG/ML
250 INJECTION, SOLUTION INTRAVENOUS ONCE
Status: CANCELLED | OUTPATIENT
Start: 2020-11-03

## 2020-11-02 RX ORDER — PALONOSETRON 0.05 MG/ML
0.25 INJECTION, SOLUTION INTRAVENOUS ONCE
Status: CANCELLED | OUTPATIENT
Start: 2020-11-03

## 2020-11-03 ENCOUNTER — APPOINTMENT (OUTPATIENT)
Dept: ONCOLOGY | Facility: HOSPITAL | Age: 45
End: 2020-11-03

## 2020-11-03 ENCOUNTER — INFUSION (OUTPATIENT)
Dept: ONCOLOGY | Facility: HOSPITAL | Age: 45
End: 2020-11-03

## 2020-11-03 ENCOUNTER — OFFICE VISIT (OUTPATIENT)
Dept: ONCOLOGY | Facility: CLINIC | Age: 45
End: 2020-11-03

## 2020-11-03 VITALS
SYSTOLIC BLOOD PRESSURE: 134 MMHG | HEART RATE: 91 BPM | TEMPERATURE: 97.3 F | OXYGEN SATURATION: 97 % | WEIGHT: 235.8 LBS | BODY MASS INDEX: 40.47 KG/M2 | DIASTOLIC BLOOD PRESSURE: 80 MMHG | RESPIRATION RATE: 18 BRPM

## 2020-11-03 VITALS
HEART RATE: 91 BPM | WEIGHT: 235 LBS | TEMPERATURE: 97.3 F | OXYGEN SATURATION: 97 % | BODY MASS INDEX: 40.34 KG/M2 | RESPIRATION RATE: 18 BRPM | SYSTOLIC BLOOD PRESSURE: 134 MMHG | DIASTOLIC BLOOD PRESSURE: 80 MMHG

## 2020-11-03 DIAGNOSIS — Z51.81 ENCOUNTER FOR MONITORING CARDIOTOXIC DRUG THERAPY: Primary | ICD-10-CM

## 2020-11-03 DIAGNOSIS — Z95.828 PORT-A-CATH IN PLACE: ICD-10-CM

## 2020-11-03 DIAGNOSIS — Z79.899 ENCOUNTER FOR MONITORING CARDIOTOXIC DRUG THERAPY: Primary | ICD-10-CM

## 2020-11-03 DIAGNOSIS — Z17.1 MALIGNANT NEOPLASM OF UPPER-OUTER QUADRANT OF RIGHT BREAST IN FEMALE, ESTROGEN RECEPTOR NEGATIVE (HCC): Primary | ICD-10-CM

## 2020-11-03 DIAGNOSIS — Z17.1 MALIGNANT NEOPLASM OF UPPER-OUTER QUADRANT OF RIGHT BREAST IN FEMALE, ESTROGEN RECEPTOR NEGATIVE (HCC): ICD-10-CM

## 2020-11-03 DIAGNOSIS — C50.411 MALIGNANT NEOPLASM OF UPPER-OUTER QUADRANT OF RIGHT BREAST IN FEMALE, ESTROGEN RECEPTOR NEGATIVE (HCC): ICD-10-CM

## 2020-11-03 DIAGNOSIS — C50.411 MALIGNANT NEOPLASM OF UPPER-OUTER QUADRANT OF RIGHT BREAST IN FEMALE, ESTROGEN RECEPTOR NEGATIVE (HCC): Primary | ICD-10-CM

## 2020-11-03 LAB
ALBUMIN SERPL-MCNC: 3.98 G/DL (ref 3.5–5.2)
ALBUMIN/GLOB SERPL: 1.5 G/DL
ALP SERPL-CCNC: 150 U/L (ref 39–117)
ALT SERPL W P-5'-P-CCNC: 20 U/L (ref 1–33)
ANION GAP SERPL CALCULATED.3IONS-SCNC: 14.5 MMOL/L (ref 5–15)
AST SERPL-CCNC: 14 U/L (ref 1–32)
BASOPHILS # BLD AUTO: 0.08 10*3/MM3 (ref 0–0.2)
BASOPHILS NFR BLD AUTO: 0.8 % (ref 0–1.5)
BILIRUB SERPL-MCNC: <0.2 MG/DL (ref 0–1.2)
BUN SERPL-MCNC: 13 MG/DL (ref 6–20)
BUN/CREAT SERPL: 17.8 (ref 7–25)
CALCIUM SPEC-SCNC: 8.6 MG/DL (ref 8.6–10.5)
CHLORIDE SERPL-SCNC: 105 MMOL/L (ref 98–107)
CO2 SERPL-SCNC: 20.5 MMOL/L (ref 22–29)
CREAT SERPL-MCNC: 0.73 MG/DL (ref 0.57–1)
DEPRECATED RDW RBC AUTO: 42.5 FL (ref 37–54)
EOSINOPHIL # BLD AUTO: 0.04 10*3/MM3 (ref 0–0.4)
EOSINOPHIL NFR BLD AUTO: 0.4 % (ref 0.3–6.2)
ERYTHROCYTE [DISTWIDTH] IN BLOOD BY AUTOMATED COUNT: 14.6 % (ref 12.3–15.4)
GFR SERPL CREATININE-BSD FRML MDRD: 85 ML/MIN/1.73
GLOBULIN UR ELPH-MCNC: 2.6 GM/DL
GLUCOSE SERPL-MCNC: 272 MG/DL (ref 65–99)
HCT VFR BLD AUTO: 37.4 % (ref 34–46.6)
HGB BLD-MCNC: 11.5 G/DL (ref 12–15.9)
IMM GRANULOCYTES # BLD AUTO: 0.37 10*3/MM3 (ref 0–0.05)
IMM GRANULOCYTES NFR BLD AUTO: 3.8 % (ref 0–0.5)
LYMPHOCYTES # BLD AUTO: 2.1 10*3/MM3 (ref 0.7–3.1)
LYMPHOCYTES NFR BLD AUTO: 21.3 % (ref 19.6–45.3)
MCH RBC QN AUTO: 25.4 PG (ref 26.6–33)
MCHC RBC AUTO-ENTMCNC: 30.7 G/DL (ref 31.5–35.7)
MCV RBC AUTO: 82.7 FL (ref 79–97)
MONOCYTES # BLD AUTO: 0.7 10*3/MM3 (ref 0.1–0.9)
MONOCYTES NFR BLD AUTO: 7.1 % (ref 5–12)
NEUTROPHILS NFR BLD AUTO: 6.55 10*3/MM3 (ref 1.7–7)
NEUTROPHILS NFR BLD AUTO: 66.6 % (ref 42.7–76)
NRBC BLD AUTO-RTO: 0 /100 WBC (ref 0–0.2)
PLATELET # BLD AUTO: 230 10*3/MM3 (ref 140–450)
PMV BLD AUTO: 9.3 FL (ref 6–12)
POTASSIUM SERPL-SCNC: 3.7 MMOL/L (ref 3.5–5.2)
PROT SERPL-MCNC: 6.6 G/DL (ref 6–8.5)
RBC # BLD AUTO: 4.52 10*6/MM3 (ref 3.77–5.28)
SODIUM SERPL-SCNC: 140 MMOL/L (ref 136–145)
WBC # BLD AUTO: 9.84 10*3/MM3 (ref 3.4–10.8)

## 2020-11-03 PROCEDURE — 25010000003 HEPARIN LOCK FLUSH PER 10 UNITS: Performed by: INTERNAL MEDICINE

## 2020-11-03 PROCEDURE — 80053 COMPREHEN METABOLIC PANEL: CPT | Performed by: INTERNAL MEDICINE

## 2020-11-03 PROCEDURE — 25010000002 FOSAPREPITANT PER 1 MG: Performed by: INTERNAL MEDICINE

## 2020-11-03 PROCEDURE — 99214 OFFICE O/P EST MOD 30 MIN: CPT | Performed by: INTERNAL MEDICINE

## 2020-11-03 PROCEDURE — 96413 CHEMO IV INFUSION 1 HR: CPT

## 2020-11-03 PROCEDURE — 25010000002 CYCLOPHOSPHAMIDE PER 100 MG: Performed by: INTERNAL MEDICINE

## 2020-11-03 PROCEDURE — 96367 TX/PROPH/DG ADDL SEQ IV INF: CPT

## 2020-11-03 PROCEDURE — 96415 CHEMO IV INFUSION ADDL HR: CPT

## 2020-11-03 PROCEDURE — 36415 COLL VENOUS BLD VENIPUNCTURE: CPT | Performed by: INTERNAL MEDICINE

## 2020-11-03 PROCEDURE — 25010000002 PALONOSETRON PER 25 MCG: Performed by: INTERNAL MEDICINE

## 2020-11-03 PROCEDURE — 25010000002 DOXORUBICIN PER 10 MG: Performed by: INTERNAL MEDICINE

## 2020-11-03 PROCEDURE — 96411 CHEMO IV PUSH ADDL DRUG: CPT

## 2020-11-03 PROCEDURE — 25010000002 DEXAMETHASONE SODIUM PHOSPHATE 20 MG/5ML SOLUTION: Performed by: INTERNAL MEDICINE

## 2020-11-03 PROCEDURE — 96375 TX/PRO/DX INJ NEW DRUG ADDON: CPT

## 2020-11-03 PROCEDURE — 85025 COMPLETE CBC W/AUTO DIFF WBC: CPT | Performed by: INTERNAL MEDICINE

## 2020-11-03 RX ORDER — DOXYCYCLINE HYCLATE 100 MG/1
100 CAPSULE ORAL 2 TIMES DAILY
Qty: 20 CAPSULE | Refills: 0 | Status: SHIPPED | OUTPATIENT
Start: 2020-11-03 | End: 2020-11-13

## 2020-11-03 RX ORDER — DOXORUBICIN HYDROCHLORIDE 2 MG/ML
60 INJECTION, SOLUTION INTRAVENOUS ONCE
Status: COMPLETED | OUTPATIENT
Start: 2020-11-03 | End: 2020-11-03

## 2020-11-03 RX ORDER — HEPARIN SODIUM (PORCINE) LOCK FLUSH IV SOLN 100 UNIT/ML 100 UNIT/ML
500 SOLUTION INTRAVENOUS AS NEEDED
Status: DISCONTINUED | OUTPATIENT
Start: 2020-11-03 | End: 2020-11-03 | Stop reason: HOSPADM

## 2020-11-03 RX ORDER — PALONOSETRON 0.05 MG/ML
0.25 INJECTION, SOLUTION INTRAVENOUS ONCE
Status: COMPLETED | OUTPATIENT
Start: 2020-11-03 | End: 2020-11-03

## 2020-11-03 RX ORDER — HEPARIN SODIUM (PORCINE) LOCK FLUSH IV SOLN 100 UNIT/ML 100 UNIT/ML
500 SOLUTION INTRAVENOUS AS NEEDED
Status: CANCELLED | OUTPATIENT
Start: 2020-11-04

## 2020-11-03 RX ORDER — SODIUM CHLORIDE 9 MG/ML
250 INJECTION, SOLUTION INTRAVENOUS ONCE
Status: COMPLETED | OUTPATIENT
Start: 2020-11-03 | End: 2020-11-03

## 2020-11-03 RX ORDER — SODIUM CHLORIDE 0.9 % (FLUSH) 0.9 %
10 SYRINGE (ML) INJECTION AS NEEDED
Status: CANCELLED | OUTPATIENT
Start: 2020-11-04

## 2020-11-03 RX ADMIN — CYCLOPHOSPHAMIDE 1250 MG: 1 INJECTION, POWDER, FOR SOLUTION INTRAVENOUS; ORAL at 11:10

## 2020-11-03 RX ADMIN — SODIUM CHLORIDE 250 ML: 9 INJECTION, SOLUTION INTRAVENOUS at 10:05

## 2020-11-03 RX ADMIN — SODIUM CHLORIDE 150 MG: 9 INJECTION, SOLUTION INTRAVENOUS at 10:20

## 2020-11-03 RX ADMIN — PALONOSETRON HYDROCHLORIDE 0.25 MG: 0.25 INJECTION INTRAVENOUS at 10:05

## 2020-11-03 RX ADMIN — DOXORUBICIN HYDROCHLORIDE 126 MG: 2 INJECTION, SOLUTION INTRAVENOUS at 10:55

## 2020-11-03 RX ADMIN — SODIUM CHLORIDE, PRESERVATIVE FREE 500 UNITS: 5 INJECTION INTRAVENOUS at 13:35

## 2020-11-03 RX ADMIN — DEXAMETHASONE SODIUM PHOSPHATE 12 MG: 4 INJECTION INTRA-ARTICULAR; INTRALESIONAL; INTRAMUSCULAR; INTRAVENOUS; SOFT TISSUE at 10:05

## 2020-11-03 NOTE — PROGRESS NOTES
NAME: Nivia Bernstein    : 1975    DATE:  11/3/2020    DIAGNOSIS: Breast cancer    CHIEF COMPLAINT:  Follow up Breast cancer    TREATMENT HISTORY:  1. Initially planned to give 4 cycles to Taxotere/Cytoxan, during 1st infusion of Taxotere on 2020 patient developed allergic reaction. Therefore, Taxotere was discontinued and regimen changed to DD AC.    2.      HISTORY OF PRESENT ILLNESS:   Nivia Bernstein is a very pleasant 45 y.o. female who who is being seen today at the request of Sheila Garza MD for evaluation and treatment of breast cancer. She did not notice a palpable lump, but was recommended to have a screening mammogram by her PCP. A nodule in the right upper quadrant of the R breast was noted. The mass measured 0.8 cm on ultrasound. Biopsy was consistent with a moderately differentiated invasive ductal carcinoma, ER negative, OK weakly (15%) positive, and HER-2/cat negative. She underwent bilateral mastectomy with sentinel lymph node biopsy with Dr. Garza on 20. Pathology from this showed a moderately differentiated invasive ductal carcinoma with associated intermediate grade DCIS, negative margins.  0/10 /SLN involved.  Mammaprint was high risk. She was referred to our clinic and is here today with her  for discussion of further treatment options.      Ms. Bernstein is healing well from the surgery.  She did develop a seroma, which was drained by Dr. Garza yesterday. She reports muscular chest discomfort r/t the procedure, but otherwise denies any other symptoms including fevers, chills, significant weight changes, shortness of breath, abdominal pain, n/v/d/c, bony pain or headaches.    Interval History:  Ms. Bernstein is here today with her  for f/u of breast cancer.  She is overall doing well with her treatment. She did develop Wasabi nose with C2 treatment.  She continues to struggle with constipation.  She has been using Miralax once/day and other things sporadically trying  "to find something that works for her.  She lost her father to multisystem organ failure on 10-21 and fears her paternal aunt will die today.  It has been a hard year for her. She continues to take Paxil and Valium which are helping her. She is fatigued from the chemotherapy but denies other side effects. She saw Dr. Garza about a week ago with \"knots\" under her L arm which were draining.  This has cleared up with Doxycycline.  She has about 3-4 days left.          PAST MEDICAL HISTORY:  Past Medical History:   Diagnosis Date   • Altered mental status 10/16/2017   • Anxiety and depression 3/31/2017   • Arthritis    • Asthma    • Diabetes mellitus (CMS/HCC)     wears insulin pump.    • DVT (deep venous thrombosis) (CMS/HCC) 2003    x 1 in RLE following fall & surgery   • Elevated alkaline phosphatase level 10/16/2017   • Elevated cholesterol    • Enlarged liver    • GERD (gastroesophageal reflux disease)    • H/O blood clots    • Heart murmur    • Hyperlipidemia    • Hypokalemia 10/16/2017   • Leg pain 10/16/2017   • Mitral valve prolapse    • Neuropathy     related to diabetes   • Obesity 3/31/2017   • OCD (obsessive compulsive disorder) 10/16/2017   • Osteoporosis    • PONV (postoperative nausea and vomiting)    • Renal insufficiency 10/16/2017   • Right breast cancer with malignant cells in regional lymph nodes no greater than 0.2 mm and no more than 200 cells (CMS/HCC) 8/13/2020   • Thrombocytopenia (CMS/HCC) 10/16/2017   • TIA (transient ischemic attack)     4 TIMES       PAST SURGICAL HISTORY:  Past Surgical History:   Procedure Laterality Date   • APPENDECTOMY     • CARPAL TUNNEL RELEASE     • CHOLECYSTECTOMY     • FINGER FUSION Left     3rd   • HYSTERECTOMY  2011    removed due to an ovarian cyst and dysmenorrhia. No cancer noted. Complete   • KNEE ARTHROSCOPY Right    • MASTECTOMY Left 8/18/2020    Procedure: Left mastectomy;  Surgeon: Sheila Garza MD;  Location: [facility];  Service: General;  " Laterality: Left;   • MASTECTOMY WITH SENTINEL NODE BIOPSY AND AXILLARY NODE DISSECTION Right 8/18/2020    Procedure: Right BREAST MASTECTOMY WITH SENTINEL NODE BIOPSY;  Surgeon: Sheila Garza MD;  Location: [facility];  Service: General;  Laterality: Right;   • PORTACATH PLACEMENT         SOCIAL HISTORY:  Social History     Socioeconomic History   • Marital status:      Spouse name: Not on file   • Number of children: Not on file   • Years of education: Not on file   • Highest education level: Not on file   Occupational History   • Occupation: Not Working   Social Needs   • Financial resource strain: Not hard at all   • Food insecurity     Worry: Never true     Inability: Never true   • Transportation needs     Medical: No     Non-medical: No   Tobacco Use   • Smoking status: Never Smoker   • Smokeless tobacco: Never Used   Substance and Sexual Activity   • Alcohol use: No   • Drug use: No   • Sexual activity: Yes     Partners: Male   Lifestyle   • Physical activity     Days per week: 3 days     Minutes per session: 100 min   • Stress: Very much   Relationships   • Social connections     Talks on phone: More than three times a week     Gets together: More than three times a week     Attends [employer] service: Never     Active member of club or organization: No     Attends meetings of clubs or organizations: Never     Relationship status:    Social History Narrative    Mrs. Bernstein is a 42 year old white  female. They live in [place] with their 4 children. She has extended family local as well. She works as a  at [employer] in [facility] [facility] [employer]. She does not have an advanced directive.         FAMILY HISTORY:  Family History   Problem Relation Age of Onset   • Diabetes Mother    • Hypertension Mother    • Diabetes Father    • Heart disease Father    • Alcohol abuse Father    • Seizures Father    • Hypertension Father    • No Known Problems Brother    • No Known Problems  Daughter    • Bone cancer Son    • Suicide Attempts Cousin    • Stomach cancer Cousin         maternal first cousin   • Breast cancer Paternal Aunt 52   • Diabetes Maternal Grandmother    • Diabetes Maternal Grandfather    • Lung cancer Maternal Grandfather    • Heart disease Paternal Grandmother    • Diabetes Paternal Grandmother    • Heart disease Paternal Grandfather    • Diabetes Paternal Grandfather    • Ovarian cancer Maternal Aunt    • Lung cancer Maternal Uncle    • Colon cancer Maternal Uncle    • Cancer Maternal Uncle         unknown type         MEDICATIONS:  The current medication list was reviewed in the EMR    Current Outpatient Medications:   •  albuterol (PROVENTIL,VENTOLIN) 2 MG/5ML syrup, Take 5-10 mL by mouth Every 6 (Six) Hours As Needed (cough). Mix with Tussin DM and promethazine syrup for 3 way cough, Disp: 60 mL, Rfl: 0  •  Albuterol Sulfate (VENTOLIN HFA IN), Inhale 2 puffs Every 4 (Four) Hours As Needed (shortness of breath)., Disp: , Rfl:   •  BREO ELLIPTA 200-25 MCG/INH inhaler, Inhale 1 puff Daily As Needed (shortness of air)., Disp: , Rfl:   •  cefdinir (OMNICEF) 300 MG capsule, Take 1 capsule by mouth 2 (Two) Times a Day., Disp: 20 capsule, Rfl: 0  •  cyanocobalamin 1000 MCG/ML injection, Inject 1,000 mcg into the appropriate muscle as directed by prescriber 1 (One) Time Per Week., Disp: , Rfl:   •  dexamethasone (DECADRON) 4 MG tablet, Take 2 tablets in the morning daily on Days 2, 3 & 4.  Take with food., Disp: 6 tablet, Rfl: 3  •  diazePAM (VALIUM) 5 MG tablet, Take 1 tablet by mouth Daily., Disp: 30 tablet, Rfl: 0  •  diclofenac (VOLTAREN) 1 % gel gel, Apply 4 g topically to the appropriate area as directed 4 (Four) Times a Day As Needed (joint pain)., Disp: 500 g, Rfl: 5  •  docusate sodium 100 MG capsule, Take 100 mg by mouth 2 (Two) Times a Day., Disp: , Rfl:   •  doxycycline (VIBRAMYCIN) 100 MG capsule, Take 1 capsule by mouth 2 (Two) Times a Day for 10 days., Disp: 20 capsule,  Rfl: 0  •  Empagliflozin-Linaglip-Metform 12.5-2.5-1000 MG tablet sustained-release 24 hour, Take 2 tablets by mouth Daily., Disp: 60 tablet, Rfl: 5  •  fluticasone (FLONASE) 50 MCG/ACT nasal spray, 2 sprays into the nostril(s) as directed by provider Daily As Needed for Rhinitis or Allergies., Disp: , Rfl:   •  ibuprofen (ADVIL,MOTRIN) 800 MG tablet, Take 800 mg by mouth Every 8 (Eight) Hours As Needed for Mild Pain ., Disp: , Rfl:   •  insulin lispro (HumaLOG) 100 UNIT/ML patient supplied pump, Inject  under the skin into the appropriate area as directed Continuous. Basil rate: 2.95/hr Carb ratio: 4.0, Disp: , Rfl:   •  nitrofurantoin, macrocrystal-monohydrate, (MACROBID) 100 MG capsule, Take 1 capsule by mouth 2 (Two) Times a Day., Disp: 20 capsule, Rfl: 0  •  omeprazole (priLOSEC) 20 MG capsule, TAKE 1 CAPSULE BY MOUTH TWO TIMES A DAY, Disp: 60 capsule, Rfl: 5  •  ondansetron (ZOFRAN) 8 MG tablet, Take 1 tablet by mouth 3 (Three) Times a Day As Needed for Nausea or Vomiting., Disp: 30 tablet, Rfl: 5  •  oxyCODONE-acetaminophen (Percocet) 5-325 MG per tablet, Take 1 tablet by mouth Every 6 (Six) Hours As Needed for Moderate Pain  for up to 20 doses., Disp: 20 tablet, Rfl: 0  •  PARoxetine (Paxil) 10 MG tablet, Take 1 tablet by mouth Daily., Disp: 30 tablet, Rfl: 0  •  prochlorperazine (COMPAZINE) 10 MG tablet, Take 1 tablet by mouth Every 6 (Six) Hours As Needed for Nausea or Vomiting., Disp: 30 tablet, Rfl: 5  •  rosuvastatin (CRESTOR) 40 MG tablet, , Disp: , Rfl:   •  sennosides-docusate (senna-docusate sodium) 8.6-50 MG per tablet, Take 2 tablets by mouth 2 (Two) Times a Day., Disp: 120 tablet, Rfl: 4  •  vitamin D (ERGOCALCIFEROL) 1.25 MG (51147 UT) capsule capsule, TAKE ONE CAPSULE BY MOUTH ONCE WEEKLY, Disp: 12 capsule, Rfl: 0    ALLERGIES:    Allergies   Allergen Reactions   • Mobic [Meloxicam] Rash   • Penicillins Angioedema   • Taxotere [Docetaxel] Anaphylaxis     9 minutes into 1st infusion   • Novolog  [Insulin Aspart] Hives         REVIEW OF SYSTEMS:    A comprehensive 14 point review of systems was performed.  Significant findings as mentioned above.  All other systems reviewed and are negative.        Physical Exam  Vital Signs:   Visit Vitals  /80   Pulse 91   Temp 97.3 °F (36.3 °C) (Temporal)   Resp 18   Wt 107 kg (235 lb 12.8 oz)   SpO2 97%   BMI 40.47 kg/m²     Pain Score    11/03/20 0900   PainSc: 0-No pain     General: Well developed, well nourished, alert and oriented x 3, in no acute distress.   HEENT:  PERRLA, EOM full, OP clear  Eyes: PERRL, No evidence of conjunctivitis.   Neck: No thyromegaly. No JVD.   Lungs: Lungs are clear to auscultation bilaterally   Heart:  Regular rate and rhythm. No murmurs, rubs, or gallops.   Breast: S/p bilateral mastectomy and RSLNBx.  Surgical scars smooth and intact.  No axillary adenopathy on either side.  In the L axilla there is evidence of healed skin where pustules were previously present. No fluctuance, erythema or tenderness.   Abdomen: Soft, Non tender, non-distended, bowel sounds present. No organomegaly    Extremities: No cyanosis or edema.   Lymph Nodes: No palpable cervical, submandibular, supraclavicular, axillary or inguinal lymphadenopathy    Neurologic: Grossly non-focal exam.    PATHOLOGY:              IMAGING:  Bilateral screening mammogram 06-19-20  FINDINGS:    The breast tissue is heterogeneously dense.  New focal nodularity right upper outer breast that is about 14 cm from  the nipple.  Otherwise, no suspicious findings.     IMPRESSION:  New focal nodularity right upper outer breast that is about  14 cm from the nipple.     RECOMMENDATION:  Spot compression imaging.     BI-RADS CAT 0, INCOMPLETE.  The patient needs additional imaging.        Diagnostic R mammogram with R breast US 07-14-20  FINDINGS:  Additional mammographic imaging images of persistent  partially obscured oval mass in the posterior right upper outer  quadrant.     Right  breast ultrasound: Focused sonographic evaluation of the right  breast demonstrates a 0.8 cm irregular hypoechoic mass with ill-defined  borders located at 10:00, 13 cm from the nipple. An additional area was  initially noted by the technologist in the 9:00 region which represents  an isolated fat lobule.           IMPRESSION:  0.8 cm irregular mass in the right 10:00 region. Recommend  ultrasound-guided core biopsy.        BI-RADS CATEGORY:  4, SUSPICIOUS        RECOMMENDATION:  Recommend ultrasound guided core biopsy of the right  breast.        Bilateral breast MRI w/wo contrast 08-11-20  FINDINGS: There is minimal bilateral background contrast enhancement and  normal vascular enhancement.     There are no worrisome areas of contrast enhancement in the left breast.     In the right breast correlating to the biopsy proven malignancy is an  approximately 0.7 cm irregular rapidly enhancing mass in the posterior  right 10:00 region. Artifact from a postbiopsy marking clip is located  adjacent to this mass. A small linear area of enhancement extends  posterior to this mass approximately 1 cm. There are no additional  worrisome areas of contrast enhancement in the right breast.     There is no axillary adenopathy by MRI criteria and no chest wall  abnormality is seen.        IMPRESSION:  1. Negative left breast MRI.        2. Biopsy-proven malignancy in the right 10:00 region with a small  linear area of enhancement extending posterior to the 0.7 cm mass.     RECOMMENDATIONS: Recommend surgical follow-up.     BI-RADS CATEGORY: 6, KNOWN BIOPSY PROVEN MALIGNANCY    ECHO 09/29/2020  · Poor quality echo and Doppler study  · Normal left ventricular cavity size and wall thickness noted.  · Left ventricular ejection fraction appears to be 61 - 65%. Left ventricular systolic function is normal.  · Left ventricular diastolic function is consistent with (grade I) impaired relaxation.  · Aortic and mitral valve are poorly  visualized but appear to be normal,  · Tricuspid and pulmonic valve echoes are not visualized.  · There is no pericardial effusion noted.      RECENT LABS:  Lab Results   Component Value Date    WBC 9.32 10/20/2020    HGB 11.9 (L) 10/20/2020    HCT 38.4 10/20/2020    MCV 83.5 10/20/2020    RDW 14.0 10/20/2020     10/20/2020    NEUTRORELPCT 65.7 10/20/2020    LYMPHORELPCT 21.7 10/20/2020    MONORELPCT 7.0 10/20/2020    EOSRELPCT 1.0 10/20/2020    BASORELPCT 0.8 10/20/2020    NEUTROABS 6.14 10/20/2020    LYMPHSABS 2.02 10/20/2020       Lab Results   Component Value Date     10/20/2020    K 3.7 10/20/2020    CO2 25.2 10/20/2020     10/20/2020    BUN 12 10/20/2020    CREATININE 0.65 10/20/2020    EGFRIFNONA 98 10/20/2020    EGFRIFAFRI 132 05/13/2020    GLUCOSE 221 (H) 10/20/2020    CALCIUM 8.8 10/20/2020    ALKPHOS 134 (H) 10/20/2020    AST 17 10/20/2020    ALT 29 10/20/2020    BILITOT <0.2 10/20/2020    ALBUMIN 4.11 10/20/2020    PROTEINTOT 6.8 10/20/2020    MG 2.1 10/17/2017       Lab Results   Component Value Date    URICACID 5.2 09/10/2019       Lab Results   Component Value Date    [id number] 411 05/13/2020    FOLATE 15.19 10/16/2017      ASSESSMENT & PLAN:  Nivia Bernstein is a very pleasant 45 y.o. female with Stage IA ([id number]) moderately differentiated invasive ductal carcinoma of the R breast with associated DCIS.  Tumor was 10 mm in maximal dimension.  0/10 SLN involved. ER negative, MT 15% + (1+), HER-2/cat negative. Mammaprint high risk.     1.  R breast cancer:   -  S/p R mastectomy and SLNBx as well as prophylactic contralateral mastectomy performed y Dr. Garza 8-18-20.  - She is healing well from bilateral mastectomy. This was complicated by left seroma, drained by Dr. Garza yesterday. Incisions are healed well.   - Given high risk Mammaprint, Dr. Loco recommended adjuvant chemotherapy.  We discussed different possibilities for adjuvant therapy. Given high risk Mammaprint but early  stage, node negative disease as well as comorbidities, recommended Taxotere/Cytoxan Q21d x 4 cycles with growth factor support. Discussed potential risks, benefits, and side effects and she would like to proceed.   - She had port placed several years ago due to poor peripheral access. This has been well cared for and maintained. Will continue to use this for her treatments.   - C1 Taxotere/Cytoxan was planned for 09/24/2020. During the initial infusion of Taxotere, approximately 8 minutes after starting Taxotere, patient developed sudden, severe pain to lower back, hips and chest 10/10. Taxotere was stopped and continued with IVF. O2 sat dropped to 83% on RA. She was placed on 2L per NC and O2 sat improved to 98%. She was given Benadryl 25 mg IVP along with solumedrol 125 mg IVP. Her symptoms improved and vital signs were stable. Dr. oLco was made aware and plan was to discontinue Taxotere given allergic reaction and will change treatment to DD AC.   - Baseline ECHO obtained on 09/29/2020 and shows intact LVEF of 61-65%.  - She has received 2 cycles of DD AC to date and tolerated well with mild nausea as her only noticeable side effect. She developed Wasabi nose just prior to completion of Cytoxan with C1 which resolved within 15-20 minutes after completion of treatment.We  lengthened the time of infusion to 45 minutes. Will proceed with C3 today.      2. Extensive family history of malignancy:   - genetic testing was ordered by Dr. Garza and [email or web address] with no mutations, specifically including BRCA 1/2, CHKE2, CDH1, PALB2 and others.    3. Nausea:  - Mild. Continue zofran and compazine as needed. Will monitor.    4. Constipation:  - Recommended she take Senna colace 2 tabs BID and use Miralax to titrate to effect.  May use Magnesium Citrate one time to relieve refractory constipaton.    5. Anxiety/Depression:  - She lost her father on 10/21 and her paternal aunt is actively dying.  With this and her  cancer, she has been struggling with anxiety/depression.  -Her psychiatrist put her on  Paxil 10 mg daily and she takes Valium per PCP.  - She is doing as well as can be expected under the circumstances.     6. Prophylaxis:   -  She received Prevnar 13 vaccine.  -  She had 2020 flu vaccine per [facility].  -  M. Uncle had colon cancer at age 50.  Will need Colonoscopy once treatment for breast cancer is complete.    7. Follow up:  - NP toxicity check with cycle 4 of DD AC  - MD Follow up 4 weeks after she completes chemotherapy with echocardiogram prior.     8.  ACO / NII/Other  Quality measures  -  Nivia Bernstein received 2020 flu vaccine.  -  Nivia Bernstein reports a pain score of 0.    -  Current outpatient and discharge medications have been reconciled for the patient.  Reviewed by: Dejah Loco MD     This note was scribed for Dejah Loco MD by Chantell Rahman RN.    I, Dejah Loco MD, personally performed the services described in this documentation as scribed by the above named individual in my presence, and it is both accurate and complete.  11/03/2020         A total of 25 minutes were spent coordinating this patient’s care in clinic today; more than 50% of this time was face-to-face with the patient, reviewing her interim medical history and counseling on the current treatment and followup plan. All questions were answered to her satisfaction.          Electronically Signed by: Dejah Loco MD  November 3, 2020 08:23 EST       CC:   MD Otto Hager Paul Christen, MD

## 2020-11-04 ENCOUNTER — APPOINTMENT (OUTPATIENT)
Dept: ONCOLOGY | Facility: HOSPITAL | Age: 45
End: 2020-11-04

## 2020-11-04 ENCOUNTER — INFUSION (OUTPATIENT)
Dept: ONCOLOGY | Facility: HOSPITAL | Age: 45
End: 2020-11-04

## 2020-11-04 ENCOUNTER — TELEPHONE (OUTPATIENT)
Dept: SURGERY | Facility: CLINIC | Age: 45
End: 2020-11-04

## 2020-11-04 VITALS
SYSTOLIC BLOOD PRESSURE: 126 MMHG | DIASTOLIC BLOOD PRESSURE: 75 MMHG | BODY MASS INDEX: 40.61 KG/M2 | HEART RATE: 82 BPM | WEIGHT: 236.6 LBS | RESPIRATION RATE: 18 BRPM | OXYGEN SATURATION: 98 % | TEMPERATURE: 97.1 F

## 2020-11-04 DIAGNOSIS — C50.411 MALIGNANT NEOPLASM OF UPPER-OUTER QUADRANT OF RIGHT BREAST IN FEMALE, ESTROGEN RECEPTOR NEGATIVE (HCC): Primary | ICD-10-CM

## 2020-11-04 DIAGNOSIS — Z17.1 MALIGNANT NEOPLASM OF UPPER-OUTER QUADRANT OF RIGHT BREAST IN FEMALE, ESTROGEN RECEPTOR NEGATIVE (HCC): Primary | ICD-10-CM

## 2020-11-04 PROCEDURE — 25010000002 PEGFILGRASTIM-JMDB 6 MG/0.6ML SOLUTION PREFILLED SYRINGE: Performed by: INTERNAL MEDICINE

## 2020-11-04 PROCEDURE — 96372 THER/PROPH/DIAG INJ SC/IM: CPT

## 2020-11-04 PROCEDURE — 96411 CHEMO IV PUSH ADDL DRUG: CPT

## 2020-11-04 RX ADMIN — PEGFILGRASTIM 6 MG: 6 INJECTION SUBCUTANEOUS at 13:20

## 2020-11-04 NOTE — TELEPHONE ENCOUNTER
Call in Levofloxacin 750mg one po daily #7 no refills. Then called patient and told her Dr. Garza had checked her culture and wanted her to start this.

## 2020-11-13 ENCOUNTER — OFFICE VISIT (OUTPATIENT)
Dept: FAMILY MEDICINE CLINIC | Facility: CLINIC | Age: 45
End: 2020-11-13

## 2020-11-13 DIAGNOSIS — N28.9 RENAL INSUFFICIENCY: ICD-10-CM

## 2020-11-13 DIAGNOSIS — C50.411 MALIGNANT NEOPLASM OF UPPER-OUTER QUADRANT OF RIGHT BREAST IN FEMALE, ESTROGEN RECEPTOR NEGATIVE (HCC): ICD-10-CM

## 2020-11-13 DIAGNOSIS — N95.1 MENOPAUSAL SYMPTOMS: ICD-10-CM

## 2020-11-13 DIAGNOSIS — E11.42 DM TYPE 2 WITH DIABETIC PERIPHERAL NEUROPATHY (HCC): ICD-10-CM

## 2020-11-13 DIAGNOSIS — M85.89 OSTEOPENIA OF MULTIPLE SITES: ICD-10-CM

## 2020-11-13 DIAGNOSIS — K21.9 GASTROESOPHAGEAL REFLUX DISEASE WITHOUT ESOPHAGITIS: Chronic | ICD-10-CM

## 2020-11-13 DIAGNOSIS — Z17.1 MALIGNANT NEOPLASM OF UPPER-OUTER QUADRANT OF RIGHT BREAST IN FEMALE, ESTROGEN RECEPTOR NEGATIVE (HCC): ICD-10-CM

## 2020-11-13 DIAGNOSIS — F42.8 OTHER OBSESSIVE-COMPULSIVE DISORDERS: Chronic | ICD-10-CM

## 2020-11-13 DIAGNOSIS — F32.A ANXIETY AND DEPRESSION: Chronic | ICD-10-CM

## 2020-11-13 DIAGNOSIS — Z00.00 HEALTHCARE MAINTENANCE: ICD-10-CM

## 2020-11-13 DIAGNOSIS — F41.9 ANXIETY AND DEPRESSION: Chronic | ICD-10-CM

## 2020-11-13 DIAGNOSIS — Z86.73 HISTORY OF TIAS: ICD-10-CM

## 2020-11-13 DIAGNOSIS — E55.9 VITAMIN D DEFICIENCY: ICD-10-CM

## 2020-11-13 DIAGNOSIS — F33.3 SEVERE EPISODE OF RECURRENT MAJOR DEPRESSIVE DISORDER, WITH PSYCHOTIC FEATURES (HCC): ICD-10-CM

## 2020-11-13 DIAGNOSIS — K59.03 DRUG-INDUCED CONSTIPATION: ICD-10-CM

## 2020-11-13 DIAGNOSIS — F41.1 GENERALIZED ANXIETY DISORDER: ICD-10-CM

## 2020-11-13 DIAGNOSIS — J45.20 MILD INTERMITTENT ASTHMA WITHOUT COMPLICATION: ICD-10-CM

## 2020-11-13 DIAGNOSIS — M25.50 ARTHRALGIA, UNSPECIFIED JOINT: ICD-10-CM

## 2020-11-13 DIAGNOSIS — J30.9 CHRONIC ALLERGIC RHINITIS: ICD-10-CM

## 2020-11-13 DIAGNOSIS — Z86.718 HISTORY OF DVT (DEEP VEIN THROMBOSIS): ICD-10-CM

## 2020-11-13 DIAGNOSIS — I65.23 ATHEROSCLEROSIS OF BOTH CAROTID ARTERIES: ICD-10-CM

## 2020-11-13 DIAGNOSIS — E66.01 CLASS 3 SEVERE OBESITY WITH SERIOUS COMORBIDITY AND BODY MASS INDEX (BMI) OF 40.0 TO 44.9 IN ADULT, UNSPECIFIED OBESITY TYPE (HCC): ICD-10-CM

## 2020-11-13 DIAGNOSIS — E78.2 MIXED HYPERLIPIDEMIA: Primary | ICD-10-CM

## 2020-11-13 PROCEDURE — 99214 OFFICE O/P EST MOD 30 MIN: CPT | Performed by: GENERAL PRACTICE

## 2020-11-13 RX ORDER — PAROXETINE HYDROCHLORIDE 20 MG/1
20 TABLET, FILM COATED ORAL DAILY
Qty: 30 TABLET | Refills: 5 | Status: SHIPPED | OUTPATIENT
Start: 2020-11-13 | End: 2020-12-15

## 2020-11-13 RX ORDER — ROSUVASTATIN CALCIUM 40 MG/1
40 TABLET, COATED ORAL NIGHTLY
Qty: 30 TABLET | Refills: 5 | Status: SHIPPED | OUTPATIENT
Start: 2020-11-13 | End: 2021-10-04 | Stop reason: SDUPTHER

## 2020-11-13 RX ORDER — OMEPRAZOLE 20 MG/1
20 CAPSULE, DELAYED RELEASE ORAL 2 TIMES DAILY
Qty: 60 CAPSULE | Refills: 5 | Status: SHIPPED | OUTPATIENT
Start: 2020-11-13 | End: 2021-01-15 | Stop reason: SDUPTHER

## 2020-11-13 RX ORDER — FLUTICASONE PROPIONATE 50 MCG
2 SPRAY, SUSPENSION (ML) NASAL DAILY PRN
Qty: 1 BOTTLE | Refills: 5 | Status: SHIPPED | OUTPATIENT
Start: 2020-11-13 | End: 2021-09-10 | Stop reason: SDUPTHER

## 2020-11-13 RX ORDER — ALBUTEROL SULFATE 90 UG/1
2 AEROSOL, METERED RESPIRATORY (INHALATION) EVERY 4 HOURS PRN
Qty: 18 G | Refills: 5 | Status: SHIPPED | OUTPATIENT
Start: 2020-11-13 | End: 2021-12-03 | Stop reason: SDUPTHER

## 2020-11-13 RX ORDER — DIAZEPAM 5 MG/1
5 TABLET ORAL DAILY
Qty: 30 TABLET | Refills: 2 | Status: SHIPPED | OUTPATIENT
Start: 2020-11-13 | End: 2021-02-12

## 2020-11-13 NOTE — PROGRESS NOTES
Raquel Bernstein is a 45 y.o. female.     History of Present Illness     Breast Cancer  Underwent an ultrasound guided biopsy of a right upper outer quadrant breast mass on 7/21/2019.  Pathology was consistent with an invasive ductal carcinoma and hormonal markers eventually returned ER negative MS weakly positive and HER-2 negative.  Bilateral mastectomy with sentinel node biopsy was performed on 8/18/2020.  Pathology was consistent with a moderately differentiated invasive ductal carcinoma with associated intermediate grade DCIS but negative margins and lymph nodes.  MammaPrint was high risk.  She was started on Taxotere/Cytoxan but developed a significant reaction with the first infusion and has since been receiving DD AC every 2 weeks.  She admits to a dry mouth, constipation, and fatigue.  Echocardiogram performed on 9/29/2020 was a limited study but reported as showing grade 1 diastolic dysfunction but normal systolic function with an estimated EF of 61 to 65%    Chronic Anxiety  She has a history of OCD and admits to increased nervousness, worrying, and difficulty sleeping.  She has been under considerable stress and admits to intermittent depression.  She denies any loss of interest in activities and there is no history of any suicidal ideation.  She remains on paroxetine and diazepam.  She has a supportive family    History of TIA  She gives a history of previous TIA.  MRI of the brain performed on 10/16/2017 was unremarkable.  Duplex Doppler ultrasound of the carotid arteries performed the same day revealed homogenous plaque bilaterally but no evidence of stenosis.  Echocardiogram performed the same day was reported as showing trace mitral, aortic, and pulmonic valve regurgitation but no significant abnormalities with an estimated EF of 56 to 60% and a negative saline test for intracardiac shunting.  She is off ASA for the time being.  She gives a history of a previous right DVT following  arthroscopic knee surgery on that side.  There is no family history of DVT or PE    Diabetes  Current symptoms include paresthesia of the feet and hypoglycemia - occasional mild. Patient denies visual disturbances, polydipsia, polyuria or foot ulcerations. Evaluation to date has been: fasting blood sugar and hemoglobin A1C. Home sugars: BGs consistently in an acceptable range. Current treatments: metformin, linagliptin, empagliflozin, and an insulin pump.      Dyslipidemia  Compliance with treatment has been good. The patient exercises intermittently. She is currently being prescribed the following medication for her dyslipidemia - rosuvastatin. Patient denies side effects associated with her medications.     Labs  Lab Results   Component Value Date    WBC 9.84 11/03/2020    HGB 11.5 (L) 11/03/2020    HCT 37.4 11/03/2020    MCV 82.7 11/03/2020     11/03/2020     Lab Results   Component Value Date    GLUCOSE 272 (H) 11/03/2020    BUN 13 11/03/2020    CREATININE 0.73 11/03/2020    EGFRIFNONA 85 11/03/2020    EGFRIFAFRI 132 05/13/2020    BCR 17.8 11/03/2020    K 3.7 11/03/2020    CO2 20.5 (L) 11/03/2020    CALCIUM 8.6 11/03/2020    PROTENTOTREF 7.0 05/13/2020    ALBUMIN 3.98 11/03/2020    LABIL2 1.6 05/13/2020    AST 14 11/03/2020    ALT 20 11/03/2020     Imaging  DEXA performed on 6/19/20 returned with a T score a slow as -1.9    The following portions of the patient's history were reviewed and updated as appropriate: allergies, current medications, past medical history, past social history and problem list.    Review of Systems   Constitutional: Positive for fatigue. Negative for activity change, appetite change, chills, fever and unexpected weight change.   HENT: Positive for congestion and rhinorrhea. Negative for ear pain, sneezing and sore throat.    Eyes: Positive for visual disturbance.   Respiratory: Negative for cough, chest tightness, shortness of breath and wheezing.    Cardiovascular: Positive for  leg swelling. Negative for chest pain and palpitations.   Gastrointestinal: Positive for constipation. Negative for abdominal pain, blood in stool, diarrhea, nausea and vomiting.   Endocrine: Negative for polydipsia and polyuria.   Genitourinary: Negative for difficulty urinating, dysuria, frequency, hematuria and urgency.   Musculoskeletal: Positive for arthralgias and back pain. Negative for joint swelling, myalgias and neck pain.   Skin: Negative for rash.   Neurological: Positive for numbness and headaches. Negative for dizziness, speech difficulty, weakness and light-headedness.   Psychiatric/Behavioral: Positive for dysphoric mood and sleep disturbance. Negative for suicidal ideas. The patient is nervous/anxious.      Objective   Physical Exam  Constitutional:       General: She is not in acute distress.     Appearance: Normal appearance. She is well-developed. She is not diaphoretic.      Comments: Accompanied by her    HENT:      Head: Atraumatic.      Right Ear: Tympanic membrane, ear canal and external ear normal.      Left Ear: Tympanic membrane, ear canal and external ear normal.   Eyes:      Conjunctiva/sclera: Conjunctivae normal.   Neck:      Thyroid: No thyroid mass or thyromegaly.      Vascular: No carotid bruit or JVD.      Trachea: Trachea normal. No tracheal deviation.   Cardiovascular:      Rate and Rhythm: Normal rate and regular rhythm.      Heart sounds: Normal heart sounds, S1 normal and S2 normal. No murmur. No gallop.       Comments: No calf tenderness  Pulmonary:      Effort: Pulmonary effort is normal.      Breath sounds: Normal breath sounds.   Abdominal:      General: Bowel sounds are normal. There is no distension or abdominal bruit.      Palpations: Abdomen is soft. There is no hepatomegaly, splenomegaly or mass.      Tenderness: There is no abdominal tenderness.      Hernia: No hernia is present.   Musculoskeletal:      Right lower leg: No edema.      Left lower leg: No  edema.   Lymphadenopathy:      Head:      Right side of head: No submental, submandibular, tonsillar, preauricular, posterior auricular or occipital adenopathy.      Left side of head: No submental, submandibular, tonsillar, preauricular, posterior auricular or occipital adenopathy.      Cervical: No cervical adenopathy.      Upper Body:      Right upper body: No supraclavicular adenopathy.      Left upper body: No supraclavicular adenopathy.   Skin:     General: Skin is warm.      Coloration: Skin is not cyanotic, jaundiced or pale.      Findings: No rash.      Nails: There is no clubbing.     Neurological:      Mental Status: She is alert and oriented to person, place, and time.      Cranial Nerves: No cranial nerve deficit.      Sensory: Sensory deficit (decreased vibration sense toes of both feet) present.      Motor: No tremor.      Coordination: Coordination normal.      Gait: Gait normal.   Psychiatric:         Attention and Perception: Attention normal.         Mood and Affect: Mood normal.         Speech: Speech normal.         Behavior: Behavior normal.         Thought Content: Thought content normal. Thought content does not include suicidal ideation.       Assessment/Plan   Problems Addressed this Visit        Cardiovascular and Mediastinum    Atherosclerosis of both carotid arteries  Reminded regarding the importance of risk factor modification.    Mixed hyperlipidemia   Encouraged to continue to work on her diet and exercise plan.  Continue current medication    Relevant Medications    rosuvastatin (CRESTOR) 40 MG tablet       Respiratory    Chronic allergic rhinitis    Relevant Medications    fluticasone (FLONASE) 50 MCG/ACT nasal spray    Mild intermittent asthma without complication  Intermittent asthma. The patient is not currently having an exacerbation. In general, the patient's disease is well controlled.  Encouraged to report if this should change.  Continue current medication    Relevant  Medications    albuterol sulfate HFA (Ventolin HFA) 108 (90 Base) MCG/ACT inhaler    Breo Ellipta 200-25 MCG/INH inhaler       Digestive    Class 3 severe obesity with serious comorbidity and body mass index (BMI) of 40.0 to 44.9 in adult (CMS/[facility])    Drug-induced constipation  Reviewed options and agreed on a trial of Linzess  Encouraged to report if any worse or if any new symptoms or concerns.    Relevant Medications    linaclotide (LINZESS) 290 MCG capsule capsule    GERD (gastroesophageal reflux disease) (Chronic)    Relevant Medications    omeprazole (priLOSEC) 20 MG capsule    Vitamin D deficiency       Endocrine    DM type 2 with diabetic peripheral neuropathy (CMS/[facility])  Diabetes mellitus Type II, under unknown control.   Encouraged to continue to pursue ADA diet  Encouraged aerobic exercise.  Continue current medication  Updated labs will be drawn with those arranged by oncology    Relevant Medications    Empagliflozin-Linaglip-Metform 12.5-2.5-1000 MG tablet sustained-release 24 hour       Musculoskeletal and Integument    Osteopenia of multiple sites       Genitourinary    Menopausal symptoms    Renal insufficiency  Reminded to avoid any NSAIDs prescription or OTC  Will continue to monitor       Other    Healthcare maintenance  Patient has already received a flu shot fall.    History of DVT (deep vein thrombosis)    History of TIAs    Malignant neoplasm of upper-outer quadrant of right breast in female, estrogen receptor negative (CMS/[facility])  Supportive therapy  .fupn    OCD (obsessive compulsive disorder)  As above.  Paroxetine will be titrated to 20 daily    Relevant Medications    PARoxetine (PAXIL) 20 MG tablet      Diagnoses       Codes Comments    Mixed hyperlipidemia    -  Primary ICD-10-CM: E78.2  ICD-9-CM: 272.2     Atherosclerosis of both carotid arteries     ICD-10-CM: I65.23  ICD-9-CM: 433.10, 433.30     Mild intermittent asthma without complication     ICD-10-CM: J45.20  ICD-9-CM: 493.90      Chronic allergic rhinitis     ICD-10-CM: J30.9  ICD-9-CM: 477.9     Vitamin D deficiency     ICD-10-CM: E55.9  ICD-9-CM: 268.9     Gastroesophageal reflux disease without esophagitis     ICD-10-CM: K21.9  ICD-9-CM: 530.81     Class 3 severe obesity with serious comorbidity and body mass index (BMI) of 40.0 to 44.9 in adult, unspecified obesity type (CMS/HCC)     ICD-10-CM: E66.01, Z68.41  ICD-9-CM: 278.01, V85.41     DM type 2 with diabetic peripheral neuropathy (CMS/HCC)     ICD-10-CM: E11.42  ICD-9-CM: 250.60, 357.2     Osteopenia of multiple sites     ICD-10-CM: M85.89  ICD-9-CM: 733.90     Renal insufficiency     ICD-10-CM: N28.9  ICD-9-CM: 593.9     Menopausal symptoms     ICD-10-CM: N95.1  ICD-9-CM: 627.2     Other obsessive-compulsive disorders     ICD-10-CM: F42.8  ICD-9-CM: 300.3     Malignant neoplasm of upper-outer quadrant of right breast in female, estrogen receptor negative (CMS/HCC)     ICD-10-CM: C50.411, Z17.1  ICD-9-CM: 174.4, V86.1     History of TIAs     ICD-10-CM: Z86.73  ICD-9-CM: V12.54     History of DVT (deep vein thrombosis)     ICD-10-CM: Z86.718  ICD-9-CM: V12.51     Healthcare maintenance     ICD-10-CM: Z00.00  ICD-9-CM: V70.0     Arthralgia, unspecified joint     ICD-10-CM: M25.50  ICD-9-CM: 719.40 Trial of Voltaren Gel     Generalized anxiety disorder     ICD-10-CM: F41.1  ICD-9-CM: 300.02     Severe episode of recurrent major depressive disorder, with psychotic features (CMS/HCC)     ICD-10-CM: F33.3  ICD-9-CM: 296.34     Drug-induced constipation     ICD-10-CM: K59.03  ICD-9-CM: 564.09, E980.5

## 2020-11-14 VITALS
SYSTOLIC BLOOD PRESSURE: 122 MMHG | RESPIRATION RATE: 14 BRPM | HEART RATE: 62 BPM | HEIGHT: 64 IN | DIASTOLIC BLOOD PRESSURE: 62 MMHG | BODY MASS INDEX: 39.09 KG/M2 | WEIGHT: 229 LBS | TEMPERATURE: 97.1 F | OXYGEN SATURATION: 96 %

## 2020-11-15 RX ORDER — SODIUM CHLORIDE 9 MG/ML
250 INJECTION, SOLUTION INTRAVENOUS ONCE
Status: CANCELLED | OUTPATIENT
Start: 2020-11-17

## 2020-11-15 RX ORDER — PALONOSETRON 0.05 MG/ML
0.25 INJECTION, SOLUTION INTRAVENOUS ONCE
Status: CANCELLED | OUTPATIENT
Start: 2020-11-17

## 2020-11-15 RX ORDER — DOXORUBICIN HYDROCHLORIDE 2 MG/ML
60 INJECTION, SOLUTION INTRAVENOUS ONCE
Status: CANCELLED | OUTPATIENT
Start: 2020-11-17

## 2020-11-17 ENCOUNTER — DOCUMENTATION (OUTPATIENT)
Dept: ONCOLOGY | Facility: HOSPITAL | Age: 45
End: 2020-11-17

## 2020-11-17 ENCOUNTER — INFUSION (OUTPATIENT)
Dept: ONCOLOGY | Facility: HOSPITAL | Age: 45
End: 2020-11-17

## 2020-11-17 ENCOUNTER — RESULTS ENCOUNTER (OUTPATIENT)
Dept: FAMILY MEDICINE CLINIC | Facility: CLINIC | Age: 45
End: 2020-11-17

## 2020-11-17 ENCOUNTER — LAB (OUTPATIENT)
Dept: ONCOLOGY | Facility: CLINIC | Age: 45
End: 2020-11-17

## 2020-11-17 ENCOUNTER — OFFICE VISIT (OUTPATIENT)
Dept: ONCOLOGY | Facility: CLINIC | Age: 45
End: 2020-11-17

## 2020-11-17 VITALS
SYSTOLIC BLOOD PRESSURE: 130 MMHG | HEART RATE: 90 BPM | TEMPERATURE: 97.1 F | DIASTOLIC BLOOD PRESSURE: 81 MMHG | OXYGEN SATURATION: 98 % | WEIGHT: 231.7 LBS | RESPIRATION RATE: 18 BRPM | BODY MASS INDEX: 39.77 KG/M2

## 2020-11-17 VITALS
HEART RATE: 90 BPM | OXYGEN SATURATION: 98 % | WEIGHT: 231.7 LBS | BODY MASS INDEX: 39.77 KG/M2 | SYSTOLIC BLOOD PRESSURE: 130 MMHG | RESPIRATION RATE: 18 BRPM | DIASTOLIC BLOOD PRESSURE: 81 MMHG | TEMPERATURE: 97.1 F

## 2020-11-17 DIAGNOSIS — C50.411 MALIGNANT NEOPLASM OF UPPER-OUTER QUADRANT OF RIGHT BREAST IN FEMALE, ESTROGEN RECEPTOR NEGATIVE (HCC): Primary | ICD-10-CM

## 2020-11-17 DIAGNOSIS — K12.30 MUCOSITIS: ICD-10-CM

## 2020-11-17 DIAGNOSIS — Z17.1 MALIGNANT NEOPLASM OF UPPER-OUTER QUADRANT OF RIGHT BREAST IN FEMALE, ESTROGEN RECEPTOR NEGATIVE (HCC): ICD-10-CM

## 2020-11-17 DIAGNOSIS — Z17.1 MALIGNANT NEOPLASM OF UPPER-OUTER QUADRANT OF RIGHT BREAST IN FEMALE, ESTROGEN RECEPTOR NEGATIVE (HCC): Primary | ICD-10-CM

## 2020-11-17 DIAGNOSIS — K59.00 CONSTIPATION, UNSPECIFIED CONSTIPATION TYPE: ICD-10-CM

## 2020-11-17 DIAGNOSIS — N28.9 RENAL INSUFFICIENCY: ICD-10-CM

## 2020-11-17 DIAGNOSIS — F41.9 ANXIETY: ICD-10-CM

## 2020-11-17 DIAGNOSIS — E55.9 VITAMIN D DEFICIENCY: ICD-10-CM

## 2020-11-17 DIAGNOSIS — E78.2 MIXED HYPERLIPIDEMIA: ICD-10-CM

## 2020-11-17 DIAGNOSIS — N39.0 URINARY TRACT INFECTION WITHOUT HEMATURIA, SITE UNSPECIFIED: Primary | ICD-10-CM

## 2020-11-17 DIAGNOSIS — C50.411 MALIGNANT NEOPLASM OF UPPER-OUTER QUADRANT OF RIGHT BREAST IN FEMALE, ESTROGEN RECEPTOR NEGATIVE (HCC): ICD-10-CM

## 2020-11-17 DIAGNOSIS — Z95.828 PORT-A-CATH IN PLACE: ICD-10-CM

## 2020-11-17 DIAGNOSIS — F32.A DEPRESSION, UNSPECIFIED DEPRESSION TYPE: ICD-10-CM

## 2020-11-17 DIAGNOSIS — R11.0 NAUSEA: ICD-10-CM

## 2020-11-17 DIAGNOSIS — E11.42 DM TYPE 2 WITH DIABETIC PERIPHERAL NEUROPATHY (HCC): ICD-10-CM

## 2020-11-17 LAB
25(OH)D3 SERPL-MCNC: 41.6 NG/ML (ref 30–100)
ALBUMIN SERPL-MCNC: 4.33 G/DL (ref 3.5–5.2)
ALBUMIN UR-MCNC: <1.2 MG/DL
ALBUMIN/GLOB SERPL: 1.7 G/DL
ALP SERPL-CCNC: 168 U/L (ref 39–117)
ALT SERPL W P-5'-P-CCNC: 21 U/L (ref 1–33)
ANION GAP SERPL CALCULATED.3IONS-SCNC: 13.7 MMOL/L (ref 5–15)
AST SERPL-CCNC: 17 U/L (ref 1–32)
BASOPHILS # BLD AUTO: 0.07 10*3/MM3 (ref 0–0.2)
BASOPHILS NFR BLD AUTO: 0.8 % (ref 0–1.5)
BILIRUB SERPL-MCNC: 0.2 MG/DL (ref 0–1.2)
BUN SERPL-MCNC: 10 MG/DL (ref 6–20)
BUN/CREAT SERPL: 13.2 (ref 7–25)
CALCIUM SPEC-SCNC: 9.4 MG/DL (ref 8.6–10.5)
CHLORIDE SERPL-SCNC: 103 MMOL/L (ref 98–107)
CHOLEST SERPL-MCNC: 189 MG/DL (ref 0–200)
CO2 SERPL-SCNC: 23.3 MMOL/L (ref 22–29)
CREAT SERPL-MCNC: 0.76 MG/DL (ref 0.57–1)
DEPRECATED RDW RBC AUTO: 47.9 FL (ref 37–54)
EOSINOPHIL # BLD AUTO: 0.08 10*3/MM3 (ref 0–0.4)
EOSINOPHIL NFR BLD AUTO: 0.9 % (ref 0.3–6.2)
ERYTHROCYTE [DISTWIDTH] IN BLOOD BY AUTOMATED COUNT: 15.9 % (ref 12.3–15.4)
GFR SERPL CREATININE-BSD FRML MDRD: 82 ML/MIN/1.73
GLOBULIN UR ELPH-MCNC: 2.6 GM/DL
GLUCOSE SERPL-MCNC: 247 MG/DL (ref 65–99)
HBA1C MFR BLD: 9.6 % (ref 4.8–5.6)
HCT VFR BLD AUTO: 40.4 % (ref 34–46.6)
HDLC SERPL-MCNC: 36 MG/DL (ref 40–60)
HGB BLD-MCNC: 12.3 G/DL (ref 12–15.9)
IMM GRANULOCYTES # BLD AUTO: 0.18 10*3/MM3 (ref 0–0.05)
IMM GRANULOCYTES NFR BLD AUTO: 2.1 % (ref 0–0.5)
LDLC SERPL CALC-MCNC: 111 MG/DL (ref 0–100)
LDLC/HDLC SERPL: 2.92 {RATIO}
LYMPHOCYTES # BLD AUTO: 1.79 10*3/MM3 (ref 0.7–3.1)
LYMPHOCYTES NFR BLD AUTO: 20.6 % (ref 19.6–45.3)
MCH RBC QN AUTO: 25.7 PG (ref 26.6–33)
MCHC RBC AUTO-ENTMCNC: 30.4 G/DL (ref 31.5–35.7)
MCV RBC AUTO: 84.3 FL (ref 79–97)
MONOCYTES # BLD AUTO: 0.79 10*3/MM3 (ref 0.1–0.9)
MONOCYTES NFR BLD AUTO: 9.1 % (ref 5–12)
NEUTROPHILS NFR BLD AUTO: 5.77 10*3/MM3 (ref 1.7–7)
NEUTROPHILS NFR BLD AUTO: 66.5 % (ref 42.7–76)
NRBC BLD AUTO-RTO: 0 /100 WBC (ref 0–0.2)
PLATELET # BLD AUTO: 215 10*3/MM3 (ref 140–450)
PMV BLD AUTO: 10.2 FL (ref 6–12)
POTASSIUM SERPL-SCNC: 4 MMOL/L (ref 3.5–5.2)
PROT SERPL-MCNC: 6.9 G/DL (ref 6–8.5)
RBC # BLD AUTO: 4.79 10*6/MM3 (ref 3.77–5.28)
SODIUM SERPL-SCNC: 140 MMOL/L (ref 136–145)
TRIGL SERPL-MCNC: 240 MG/DL (ref 0–150)
TSH SERPL DL<=0.05 MIU/L-ACNC: 1.03 UIU/ML (ref 0.27–4.2)
VLDLC SERPL-MCNC: 42 MG/DL (ref 5–40)
WBC # BLD AUTO: 8.68 10*3/MM3 (ref 3.4–10.8)

## 2020-11-17 PROCEDURE — 82043 UR ALBUMIN QUANTITATIVE: CPT | Performed by: GENERAL PRACTICE

## 2020-11-17 PROCEDURE — 83036 HEMOGLOBIN GLYCOSYLATED A1C: CPT | Performed by: GENERAL PRACTICE

## 2020-11-17 PROCEDURE — 80053 COMPREHEN METABOLIC PANEL: CPT | Performed by: GENERAL PRACTICE

## 2020-11-17 PROCEDURE — 96417 CHEMO IV INFUS EACH ADDL SEQ: CPT

## 2020-11-17 PROCEDURE — 99214 OFFICE O/P EST MOD 30 MIN: CPT | Performed by: NURSE PRACTITIONER

## 2020-11-17 PROCEDURE — 96415 CHEMO IV INFUSION ADDL HR: CPT

## 2020-11-17 PROCEDURE — 25010000003 HEPARIN LOCK FLUSH PER 10 UNITS: Performed by: INTERNAL MEDICINE

## 2020-11-17 PROCEDURE — 80061 LIPID PANEL: CPT | Performed by: GENERAL PRACTICE

## 2020-11-17 PROCEDURE — 25010000002 CYCLOPHOSPHAMIDE PER 100 MG: Performed by: INTERNAL MEDICINE

## 2020-11-17 PROCEDURE — 25010000002 FOSAPREPITANT PER 1 MG: Performed by: INTERNAL MEDICINE

## 2020-11-17 PROCEDURE — 84443 ASSAY THYROID STIM HORMONE: CPT | Performed by: GENERAL PRACTICE

## 2020-11-17 PROCEDURE — 25010000002 PALONOSETRON PER 25 MCG: Performed by: INTERNAL MEDICINE

## 2020-11-17 PROCEDURE — 25010000002 DOXORUBICIN PER 10 MG: Performed by: INTERNAL MEDICINE

## 2020-11-17 PROCEDURE — 96375 TX/PRO/DX INJ NEW DRUG ADDON: CPT

## 2020-11-17 PROCEDURE — 25010000002 DEXAMETHASONE SODIUM PHOSPHATE 20 MG/5ML SOLUTION: Performed by: INTERNAL MEDICINE

## 2020-11-17 PROCEDURE — 85025 COMPLETE CBC W/AUTO DIFF WBC: CPT | Performed by: GENERAL PRACTICE

## 2020-11-17 PROCEDURE — 82306 VITAMIN D 25 HYDROXY: CPT | Performed by: GENERAL PRACTICE

## 2020-11-17 PROCEDURE — 96413 CHEMO IV INFUSION 1 HR: CPT

## 2020-11-17 RX ORDER — PALONOSETRON 0.05 MG/ML
0.25 INJECTION, SOLUTION INTRAVENOUS ONCE
Status: COMPLETED | OUTPATIENT
Start: 2020-11-17 | End: 2020-11-17

## 2020-11-17 RX ORDER — SODIUM CHLORIDE 0.9 % (FLUSH) 0.9 %
10 SYRINGE (ML) INJECTION AS NEEDED
Status: DISCONTINUED | OUTPATIENT
Start: 2020-11-17 | End: 2020-11-17 | Stop reason: HOSPADM

## 2020-11-17 RX ORDER — HEPARIN SODIUM (PORCINE) LOCK FLUSH IV SOLN 100 UNIT/ML 100 UNIT/ML
500 SOLUTION INTRAVENOUS AS NEEDED
Status: DISCONTINUED | OUTPATIENT
Start: 2020-11-17 | End: 2020-11-17 | Stop reason: HOSPADM

## 2020-11-17 RX ORDER — DOXORUBICIN HYDROCHLORIDE 2 MG/ML
60 INJECTION, SOLUTION INTRAVENOUS ONCE
Status: COMPLETED | OUTPATIENT
Start: 2020-11-17 | End: 2020-11-17

## 2020-11-17 RX ORDER — SODIUM CHLORIDE 0.9 % (FLUSH) 0.9 %
10 SYRINGE (ML) INJECTION AS NEEDED
Status: CANCELLED | OUTPATIENT
Start: 2020-11-18

## 2020-11-17 RX ORDER — SODIUM CHLORIDE 9 MG/ML
250 INJECTION, SOLUTION INTRAVENOUS ONCE
Status: COMPLETED | OUTPATIENT
Start: 2020-11-17 | End: 2020-11-17

## 2020-11-17 RX ORDER — HEPARIN SODIUM (PORCINE) LOCK FLUSH IV SOLN 100 UNIT/ML 100 UNIT/ML
500 SOLUTION INTRAVENOUS AS NEEDED
Status: CANCELLED | OUTPATIENT
Start: 2020-11-18

## 2020-11-17 RX ADMIN — Medication 500 UNITS: at 15:30

## 2020-11-17 RX ADMIN — DOXORUBICIN HYDROCHLORIDE 126 MG: 2 INJECTION, SOLUTION INTRAVENOUS at 12:50

## 2020-11-17 RX ADMIN — DEXAMETHASONE SODIUM PHOSPHATE 12 MG: 4 INJECTION, SOLUTION INTRA-ARTICULAR; INTRALESIONAL; INTRAMUSCULAR; INTRAVENOUS; SOFT TISSUE at 11:51

## 2020-11-17 RX ADMIN — SODIUM CHLORIDE 250 ML: 9 INJECTION, SOLUTION INTRAVENOUS at 11:49

## 2020-11-17 RX ADMIN — SODIUM CHLORIDE 150 MG: 9 INJECTION, SOLUTION INTRAVENOUS at 12:06

## 2020-11-17 RX ADMIN — PALONOSETRON HYDROCHLORIDE 0.25 MG: 0.25 INJECTION INTRAVENOUS at 11:50

## 2020-11-17 RX ADMIN — CYCLOPHOSPHAMIDE 1260 MG: 1 INJECTION, POWDER, FOR SOLUTION INTRAVENOUS; ORAL at 13:05

## 2020-11-17 RX ADMIN — SODIUM CHLORIDE, PRESERVATIVE FREE 10 ML: 5 INJECTION INTRAVENOUS at 15:30

## 2020-11-17 NOTE — PROGRESS NOTES
NAME: Nivia Bernstein    : 1975    DATE:  2020    DIAGNOSIS: Breast cancer    CHIEF COMPLAINT:  Follow up Breast cancer/toxicity check     TREATMENT HISTORY:  1. Initially planned to give 4 cycles to Taxotere/Cytoxan, during 1st infusion of Taxotere on 2020 patient developed allergic reaction. Therefore, Taxotere was discontinued and regimen changed to DD AC.    2.      HISTORY OF PRESENT ILLNESS:   Nivia Bernstein is a very pleasant 45 y.o. female who who is being seen today at the request of Sheila Garza MD for evaluation and treatment of breast cancer. She did not notice a palpable lump, but was recommended to have a screening mammogram by her PCP. A nodule in the right upper quadrant of the R breast was noted. The mass measured 0.8 cm on ultrasound. Biopsy was consistent with a moderately differentiated invasive ductal carcinoma, ER negative, OR weakly (15%) positive, and HER-2/cat negative. She underwent bilateral mastectomy with sentinel lymph node biopsy with Dr. Garza on 20. Pathology from this showed a moderately differentiated invasive ductal carcinoma with associated intermediate grade DCIS, negative margins.  0/10 /SLN involved.  Mammaprint was high risk. She was referred to our clinic and is here today with her  for discussion of further treatment options.      Ms. Bernstein is healing well from the surgery.  She did develop a seroma, which was drained by Dr. Garza yesterday. She reports muscular chest discomfort r/t the procedure, but otherwise denies any other symptoms including fevers, chills, significant weight changes, shortness of breath, abdominal pain, n/v/d/c, bony pain or headaches.    Interval History:  Ms. Bernstein is here today for follow up of breast cancer and toxicity check. She continues to receive DD AC and has completed three cycles to date. Overall, she is tolerating her treatment well. With cycle 3, she reports having fatigue, nausea (controlled with  zofran and compazine prn) and mucositis. She continues to struggle with intermittent constipation and takes senna colace BID and Miralax prn. She continues to take Paxil and Valium which is helping. She denies any other complaints today.     PAST MEDICAL HISTORY:  Past Medical History:   Diagnosis Date   • Altered mental status 10/16/2017   • Anxiety and depression 3/31/2017   • Arthritis    • Asthma    • Diabetes mellitus (CMS/HCC)     wears insulin pump.    • DVT (deep venous thrombosis) (CMS/HCC) 2003    x 1 in RLE following fall & surgery   • Elevated alkaline phosphatase level 10/16/2017   • Elevated cholesterol    • Enlarged liver    • GERD (gastroesophageal reflux disease)    • H/O blood clots    • Heart murmur    • Hyperlipidemia    • Hypokalemia 10/16/2017   • Leg pain 10/16/2017   • Mitral valve prolapse    • Neuropathy     related to diabetes   • Obesity 3/31/2017   • OCD (obsessive compulsive disorder) 10/16/2017   • Osteoporosis    • PONV (postoperative nausea and vomiting)    • Renal insufficiency 10/16/2017   • Right breast cancer with malignant cells in regional lymph nodes no greater than 0.2 mm and no more than 200 cells (CMS/HCC) 8/13/2020   • Thrombocytopenia (CMS/HCC) 10/16/2017   • TIA (transient ischemic attack)     4 TIMES       PAST SURGICAL HISTORY:  Past Surgical History:   Procedure Laterality Date   • APPENDECTOMY     • CARPAL TUNNEL RELEASE     • CHOLECYSTECTOMY     • FINGER FUSION Left     3rd   • HYSTERECTOMY  2011    removed due to an ovarian cyst and dysmenorrhia. No cancer noted. Complete   • KNEE ARTHROSCOPY Right    • MASTECTOMY Left 8/18/2020    Procedure: Left mastectomy;  Surgeon: Sheila Garza MD;  Location: [facility] OR;  Service: General;  Laterality: Left;   • MASTECTOMY WITH SENTINEL NODE BIOPSY AND AXILLARY NODE DISSECTION Right 8/18/2020    Procedure: Right BREAST MASTECTOMY WITH SENTINEL NODE BIOPSY;  Surgeon: Sheila Garza MD;  Location: [facility] OR;  Service:  General;  Laterality: Right;   • PORTACATH PLACEMENT         SOCIAL HISTORY:  Social History     Socioeconomic History   • Marital status:      Spouse name: Not on file   • Number of children: Not on file   • Years of education: Not on file   • Highest education level: Not on file   Occupational History   • Occupation: Not Working   Social Needs   • Financial resource strain: Not hard at all   • Food insecurity     Worry: Never true     Inability: Never true   • Transportation needs     Medical: No     Non-medical: No   Tobacco Use   • Smoking status: Never Smoker   • Smokeless tobacco: Never Used   Substance and Sexual Activity   • Alcohol use: No   • Drug use: No   • Sexual activity: Yes     Partners: Male   Lifestyle   • Physical activity     Days per week: 3 days     Minutes per session: 100 min   • Stress: Very much   Relationships   • Social connections     Talks on phone: More than three times a week     Gets together: More than three times a week     Attends Islam service: Never     Active member of club or organization: No     Attends meetings of clubs or organizations: Never     Relationship status:    Social History Narrative    Mrs. Bernstein is a 42 year old white  female. They live in [facility] with their 4 children. She has extended family local as well. She works as a  at [employer] in [facility]. She does not have an advanced directive.         FAMILY HISTORY:  Family History   Problem Relation Age of Onset   • Diabetes Mother    • Hypertension Mother    • Diabetes Father    • Heart disease Father    • Alcohol abuse Father    • Seizures Father    • Hypertension Father    • No Known Problems Brother    • No Known Problems Daughter    • Bone cancer Son    • Suicide Attempts Cousin    • Stomach cancer Cousin         maternal first cousin   • Breast cancer Paternal Aunt 52   • Diabetes Maternal Grandmother    • Diabetes Maternal Grandfather    • Lung cancer  Maternal Grandfather    • Heart disease Paternal Grandmother    • Diabetes Paternal Grandmother    • Heart disease Paternal Grandfather    • Diabetes Paternal Grandfather    • Ovarian cancer Maternal Aunt    • Lung cancer Maternal Uncle    • Colon cancer Maternal Uncle    • Cancer Maternal Uncle         unknown type     MEDICATIONS:  The current medication list was reviewed in the EMR    Current Outpatient Medications:   •  albuterol sulfate HFA (Ventolin HFA) 108 (90 Base) MCG/ACT inhaler, Inhale 2 puffs Every 4 (Four) Hours As Needed (shortness of breath)., Disp: 18 g, Rfl: 5  •  Breo Ellipta 200-25 MCG/INH inhaler, Inhale 1 puff Daily., Disp: 1 inhaler, Rfl: 5  •  dexamethasone (DECADRON) 4 MG tablet, Take 2 tablets in the morning daily on Days 2, 3 & 4.  Take with food., Disp: 6 tablet, Rfl: 3  •  diazePAM (VALIUM) 5 MG tablet, TAKE 1 TABLET BY MOUTH DAILY., Disp: 30 tablet, Rfl: 2  •  diclofenac (VOLTAREN) 1 % gel gel, Apply 4 g topically to the appropriate area as directed 4 (Four) Times a Day As Needed (joint pain)., Disp: 500 g, Rfl: 5  •  docusate sodium 100 MG capsule, Take 100 mg by mouth 2 (Two) Times a Day., Disp: , Rfl:   •  Empagliflozin-Linaglip-Metform 12.5-2.5-1000 MG tablet sustained-release 24 hour, Take 2 tablets by mouth Daily., Disp: 60 tablet, Rfl: 5  •  fluticasone (FLONASE) 50 MCG/ACT nasal spray, 2 sprays into the nostril(s) as directed by provider Daily As Needed for Rhinitis or Allergies., Disp: 1 bottle, Rfl: 5  •  insulin lispro (HumaLOG) 100 UNIT/ML patient supplied pump, Inject  under the skin into the appropriate area as directed Continuous. Basil rate: 2.95/hr Carb ratio: 4.0, Disp: , Rfl:   •  linaclotide (LINZESS) 290 MCG capsule capsule, Take 1 capsule by mouth Every Morning Before Breakfast., Disp: 30 capsule, Rfl: 5  •  omeprazole (priLOSEC) 20 MG capsule, Take 1 capsule by mouth 2 (Two) Times a Day., Disp: 60 capsule, Rfl: 5  •  ondansetron (ZOFRAN) 8 MG tablet, Take 1 tablet  by mouth 3 (Three) Times a Day As Needed for Nausea or Vomiting., Disp: 30 tablet, Rfl: 5  •  oxyCODONE-acetaminophen (Percocet) 5-325 MG per tablet, Take 1 tablet by mouth Every 6 (Six) Hours As Needed for Moderate Pain  for up to 20 doses., Disp: 20 tablet, Rfl: 0  •  PARoxetine (PAXIL) 20 MG tablet, Take 1 tablet by mouth Daily., Disp: 30 tablet, Rfl: 5  •  prochlorperazine (COMPAZINE) 10 MG tablet, Take 1 tablet by mouth Every 6 (Six) Hours As Needed for Nausea or Vomiting., Disp: 30 tablet, Rfl: 5  •  rosuvastatin (CRESTOR) 40 MG tablet, Take 1 tablet by mouth Every Night., Disp: 30 tablet, Rfl: 5  •  sennosides-docusate (senna-docusate sodium) 8.6-50 MG per tablet, Take 2 tablets by mouth 2 (Two) Times a Day., Disp: 120 tablet, Rfl: 4  •  vitamin D (ERGOCALCIFEROL) 1.25 MG (49888 UT) capsule capsule, TAKE ONE CAPSULE BY MOUTH ONCE WEEKLY, Disp: 12 capsule, Rfl: 0  No current facility-administered medications for this visit.     Facility-Administered Medications Ordered in Other Visits:   •  cyclophosphamide (CYTOXAN) 1,260 mg in sodium chloride 0.9 % 563 mL chemo IVPB, 1,260 mg, Intravenous, Once, Dejah Loco MD  •  DOXOrubicin (ADRIAMYCIN) chemo injection 126 mg, 60 mg/m2 (Treatment Plan Recorded), Intravenous, Once, Dejah Loco MD  •  heparin injection 500 Units, 500 Units, Intravenous, PRN, Dejah Loco MD  •  sodium chloride 0.9 % flush 10 mL, 10 mL, Intravenous, PRN, Dejah Loco MD    ALLERGIES:    Allergies   Allergen Reactions   • Mobic [Meloxicam] Rash   • Penicillins Angioedema   • Taxotere [Docetaxel] Anaphylaxis     9 minutes into 1st infusion   • Novolog [Insulin Aspart] Hives     REVIEW OF SYSTEMS:    A comprehensive 14 point review of systems was performed.  Significant findings as mentioned above.  All other systems reviewed and are negative.      Physical Exam  Vital Signs:   Visit Vitals  /81   Pulse 90   Temp 97.1 °F (36.2 °C) (Temporal)   Resp 18   Wt 105 kg  (231 lb 11.2 oz)   SpO2 98%   BMI 39.77 kg/m²     Pain Score    11/17/20 0959   PainSc:   6   PainLoc: Back     General: Well developed, well nourished, alert and oriented x 3, in no acute distress.   HEENT:  PERRLA, EOM full, OP clear, mmm  Eyes: PERRL, No evidence of conjunctivitis.   Neck: No thyromegaly. No JVD.   Lungs: Lungs are clear to auscultation bilaterally   Heart:  Regular rate and rhythm. No murmurs, rubs, or gallops.   Breast: Deferred today. Previously: S/p bilateral mastectomy and RSLNBx.  Surgical scars smooth and intact.  No axillary adenopathy on either side.  In the L axilla there is evidence of healed skin where pustules were previously present. No fluctuance, erythema or tenderness.   Abdomen: Soft, Non tender, non-distended, bowel sounds present. No organomegaly    Extremities: No cyanosis or edema.   Lymph Nodes: No palpable cervical, submandibular, supraclavicular, axillary lymphadenopathy    Neurologic: Grossly non-focal exam.    PATHOLOGY:              IMAGING:  Bilateral screening mammogram 06-19-20  FINDINGS:    The breast tissue is heterogeneously dense.  New focal nodularity right upper outer breast that is about 14 cm from  the nipple.  Otherwise, no suspicious findings.     IMPRESSION:  New focal nodularity right upper outer breast that is about  14 cm from the nipple.     RECOMMENDATION:  Spot compression imaging.     BI-RADS CAT 0, INCOMPLETE.  The patient needs additional imaging.        Diagnostic R mammogram with R breast US 07-14-20  FINDINGS:  Additional mammographic imaging images of persistent  partially obscured oval mass in the posterior right upper outer  quadrant.     Right breast ultrasound: Focused sonographic evaluation of the right  breast demonstrates a 0.8 cm irregular hypoechoic mass with ill-defined  borders located at 10:00, 13 cm from the nipple. An additional area was  initially noted by the technologist in the 9:00 region which represents  an isolated fat  lobule.           IMPRESSION:  0.8 cm irregular mass in the right 10:00 region. Recommend  ultrasound-guided core biopsy.        BI-RADS CATEGORY:  4, SUSPICIOUS        RECOMMENDATION:  Recommend ultrasound guided core biopsy of the right  breast.        Bilateral breast MRI w/wo contrast 08-11-20  FINDINGS: There is minimal bilateral background contrast enhancement and  normal vascular enhancement.     There are no worrisome areas of contrast enhancement in the left breast.     In the right breast correlating to the biopsy proven malignancy is an  approximately 0.7 cm irregular rapidly enhancing mass in the posterior  right 10:00 region. Artifact from a postbiopsy marking clip is located  adjacent to this mass. A small linear area of enhancement extends  posterior to this mass approximately 1 cm. There are no additional  worrisome areas of contrast enhancement in the right breast.     There is no axillary adenopathy by MRI criteria and no chest wall  abnormality is seen.        IMPRESSION:  1. Negative left breast MRI.        2. Biopsy-proven malignancy in the right 10:00 region with a small  linear area of enhancement extending posterior to the 0.7 cm mass.     RECOMMENDATIONS: Recommend surgical follow-up.     BI-RADS CATEGORY: 6, KNOWN BIOPSY PROVEN MALIGNANCY    ECHO 09/29/2020  · Poor quality echo and Doppler study  · Normal left ventricular cavity size and wall thickness noted.  · Left ventricular ejection fraction appears to be 61 - 65%. Left ventricular systolic function is normal.  · Left ventricular diastolic function is consistent with (grade I) impaired relaxation.  · Aortic and mitral valve are poorly visualized but appear to be normal,  · Tricuspid and pulmonic valve echoes are not visualized.  · There is no pericardial effusion noted.    RECENT LABS:  Lab Results   Component Value Date    WBC 8.68 11/17/2020    HGB 12.3 11/17/2020    HCT 40.4 11/17/2020    MCV 84.3 11/17/2020    RDW 15.9 (H)  11/17/2020     11/17/2020    NEUTRORELPCT 66.5 11/17/2020    LYMPHORELPCT 20.6 11/17/2020    MONORELPCT 9.1 11/17/2020    EOSRELPCT 0.9 11/17/2020    BASORELPCT 0.8 11/17/2020    NEUTROABS 5.77 11/17/2020    LYMPHSABS 1.79 11/17/2020       Lab Results   Component Value Date     11/17/2020    K 4.0 11/17/2020    CO2 23.3 11/17/2020     11/17/2020    BUN 10 11/17/2020    CREATININE 0.76 11/17/2020    EGFRIFNONA 82 11/17/2020    EGFRIFAFRI 132 05/13/2020    GLUCOSE 247 (H) 11/17/2020    CALCIUM 9.4 11/17/2020    ALKPHOS 168 (H) 11/17/2020    AST 17 11/17/2020    ALT 21 11/17/2020    BILITOT 0.2 11/17/2020    ALBUMIN 4.33 11/17/2020    PROTEINTOT 6.9 11/17/2020    MG 2.1 10/17/2017       Lab Results   Component Value Date    URICACID 5.2 09/10/2019       Lab Results   Component Value Date    TTOKNVEN61 411 05/13/2020    FOLATE 15.19 10/16/2017      ASSESSMENT & PLAN:  Nivia Bernstein is a very pleasant 45 y.o. female with Stage IA ([id number]) moderately differentiated invasive ductal carcinoma of the R breast with associated DCIS.  Tumor was 10 mm in maximal dimension.  0/10 SLN involved. ER negative, IL 15% + (1+), HER-2/cat negative. Mammaprint high risk.     1.  R breast cancer:   -  S/p R mastectomy and SLNBx as well as prophylactic contralateral mastectomy performed y Dr. Garza 8-18-20.  - She is healing well from bilateral mastectomy. This was complicated by left seroma, drained by Dr. Garza. Incisions are healed well.   - Given high risk Mammaprint, Dr. Loco recommended adjuvant chemotherapy.  We discussed different possibilities for adjuvant therapy. Given high risk Mammaprint but early stage, node negative disease as well as comorbidities, recommended Taxotere/Cytoxan Q21d x 4 cycles with growth factor support. Discussed potential risks, benefits, and side effects and she would like to proceed.   - She had port placed several years ago due to poor peripheral access. This has been well cared  for and maintained. Will continue to use this for her treatments.   - C1 Taxotere/Cytoxan was planned for 09/24/2020. Unfortunately, this had to be discontinued due to allergic reaction to Taxotere. Recommended change of treatment to DD AC.   - Baseline ECHO obtained on 09/29/2020 and showed intact LVEF of 61-65%.  - She has received 3 cycles of DD AC to date and overall, she has been tolerating this well.   -Exam/labs from today without cause for concern. Will proceed with cycle 4, day 1 today. She will follow up with MD in 4 weeks with post treatment ECHO prior.     2. Extensive family history of malignancy:   - genetic testing was ordered by Dr. Garza and performed by [employer] with no mutations, specifically including BRCA 1/2, CHKE2, CDH1, PALB2 and others.    3. Nausea:  - Currently denies. Continue zofran and compazine as needed. Will monitor.    4. Constipation:  -Continue with Senna colace 2 tabs BID and Miralax prn. She may also use Magnesium Citrate one time to relieve refractory constipaton.    5. Anxiety/Depression:  -Her psychiatrist put her on  Paxil 10 mg daily and she takes Valium per PCP.  - She is doing as well as can be expected under the circumstances.    6. Mucositis:  - Currently resolved. Will give Rx for magic mouth wash prn.      7. Prophylaxis:   -  She received Prevnar 13 vaccine.  -  She had 2020 flu vaccine per [email or web address].  -  M. Uncle had colon cancer at age 50.  Will need Colonoscopy once treatment for breast cancer is complete.    8. Follow up:  - MD Follow up in 4 weeks with echocardiogram prior.     ACO / NII/Other  Quality measures  -  Nivia Bernstein received 2020 flu vaccine.  -  Nivia Bernstein reports a pain score of 6.  Given her pain assessment as noted, treatment options were discussed and the following options were decided upon as a follow-up plan to address the patient's pain: continuation of current treatment plan for pain.  -  Current outpatient and discharge  medications have been reconciled for the patient.  Reviewed by: NIDA Driscoll      A total of 25 minutes were spent coordinating this patient’s care in clinic today; more than 50% of this time was face-to-face with the patient, reviewing her interim medical history and counseling on the current treatment and followup plan. All questions were answered to her satisfaction.      Electronically Signed by: NIDA Tarango  November 17, 2020 12:39 EST       CC:   MD Otto Hager, Markus Garcia MD

## 2020-11-17 NOTE — PROGRESS NOTES
SS spoke with patient's nurse BHARGAVI Paulino who states that patient is rating her distress a 10/10.      SS spoke with patient who states that she recently lost her dad and then lost her aunt this weekend.  Patient states that her aunt is being buried today.    Patient states that she takes Valium and Paxil but declines counseling at this time.    Patient states the provider is going to give her a break and she will not be back until Dec. 15.and will see doctor on that day.    SS will follow as needed.

## 2020-11-18 ENCOUNTER — INFUSION (OUTPATIENT)
Dept: ONCOLOGY | Facility: HOSPITAL | Age: 45
End: 2020-11-18

## 2020-11-18 VITALS
OXYGEN SATURATION: 97 % | RESPIRATION RATE: 18 BRPM | WEIGHT: 233.9 LBS | TEMPERATURE: 97.3 F | DIASTOLIC BLOOD PRESSURE: 69 MMHG | BODY MASS INDEX: 40.15 KG/M2 | SYSTOLIC BLOOD PRESSURE: 113 MMHG | HEART RATE: 82 BPM

## 2020-11-18 DIAGNOSIS — Z17.1 MALIGNANT NEOPLASM OF UPPER-OUTER QUADRANT OF RIGHT BREAST IN FEMALE, ESTROGEN RECEPTOR NEGATIVE (HCC): Primary | ICD-10-CM

## 2020-11-18 DIAGNOSIS — C50.411 MALIGNANT NEOPLASM OF UPPER-OUTER QUADRANT OF RIGHT BREAST IN FEMALE, ESTROGEN RECEPTOR NEGATIVE (HCC): Primary | ICD-10-CM

## 2020-11-18 PROCEDURE — 25010000002 PEGFILGRASTIM-JMDB 6 MG/0.6ML SOLUTION PREFILLED SYRINGE: Performed by: INTERNAL MEDICINE

## 2020-11-18 PROCEDURE — 96372 THER/PROPH/DIAG INJ SC/IM: CPT

## 2020-11-18 RX ADMIN — PEGFILGRASTIM 6 MG: 6 INJECTION SUBCUTANEOUS at 15:13

## 2020-11-25 ENCOUNTER — OFFICE VISIT (OUTPATIENT)
Dept: FAMILY MEDICINE CLINIC | Facility: CLINIC | Age: 45
End: 2020-11-25

## 2020-11-25 DIAGNOSIS — C50.411 MALIGNANT NEOPLASM OF UPPER-OUTER QUADRANT OF RIGHT BREAST IN FEMALE, ESTROGEN RECEPTOR NEGATIVE (HCC): ICD-10-CM

## 2020-11-25 DIAGNOSIS — Z17.1 MALIGNANT NEOPLASM OF UPPER-OUTER QUADRANT OF RIGHT BREAST IN FEMALE, ESTROGEN RECEPTOR NEGATIVE (HCC): ICD-10-CM

## 2020-11-25 DIAGNOSIS — E11.42 DM TYPE 2 WITH DIABETIC PERIPHERAL NEUROPATHY (HCC): Primary | Chronic | ICD-10-CM

## 2020-11-25 DIAGNOSIS — E78.2 MIXED HYPERLIPIDEMIA: Chronic | ICD-10-CM

## 2020-11-25 PROCEDURE — 99214 OFFICE O/P EST MOD 30 MIN: CPT | Performed by: NURSE PRACTITIONER

## 2020-11-25 NOTE — PROGRESS NOTES
History of Present Illness   Nivia is a 44 y/o female who presents to the clinic today pertaining to her DM, type 2 and Dyslipidemia.. She has been hospitalized for DVTs and TIAs in th past.  She was diagnosed with Breast Cancer in July 2020 and has been receiving Chemotherapy for the past months. She was interested in Bariatric Surgery prior to her diagnosis of Breast Cancer but this has been placed on hold.     Diabetes   She has type 2 diabetes mellitus. The initial diagnosis of diabetes was made 20 years ago. Associated symptoms include  fatigue and peripheral neuropathy. Pertinent negatives for diabetes include no foot ulcerations. Diabetic complications include peripheral neuropathy.  Risk factors for coronary artery disease include post-menopausal, stress and dyslipidemia. She is currently utilizing insulin pump therapy and CGM. She did stop weekly Ozempic due to nausea. She is currently taking Trijardy.  She is following a generally healthy diet. She has had a previous visit with a dietitian. She participates in exercise at this time but finding it more difficult due to her Neuropathy.  Her home blood glucose trend is increasing at times during chemotherapy.  An ACE inhibitor/angiotensin II receptor blocker is not  being taken at this time. Eye exam is not current. .      Lab Results   Component Value Date    HGBA1C 9.60 (H) 11/17/2020      Dyslipidemia   The current episode started more than 1 year ago. Pertinent negatives include no chest pain or shortness of breath. She is taking Crestor 40 mg.     Lab Results   Component Value Date    CHOL 189 11/17/2020    TRIG 240 (H) 11/17/2020    HDL 36 (L) 11/17/2020     (H) 11/17/2020       Vitals:    11/25/20 0823 11/25/20 1100   BP: 100/62 100/62   BP Location: Left arm Left arm   Patient Position: Sitting Sitting   Cuff Size: Adult Adult   Pulse: 88 88   Resp: 14 14   Temp: 97.5 °F (36.4 °C) 97.5 °F (36.4 °C)   TempSrc: Temporal Temporal   SpO2: 96%  "96%   Weight: 106 kg (233 lb) 101 kg (223 lb)   Height: 162.6 cm (64\") 162.6 cm (64\")      The following portions of the patient's history were reviewed and updated as appropriate: allergies, current medications, past family history, past medical history, past social history, past surgical history and problem list.    Review of Systems   Constitutional: Positive for activity change, appetite change and fatigue. Negative for chills, diaphoresis and fever.   HENT: Negative.    Eyes: Positive for visual disturbance. Negative for pain, discharge, redness and itching.   Respiratory: Positive for cough. Negative for shortness of breath and wheezing.    Cardiovascular: Positive for leg swelling. Negative for chest pain and palpitations.   Gastrointestinal: Positive for nausea and GERD. Negative for blood in stool and vomiting.   Endocrine: Negative for cold intolerance, heat intolerance, polydipsia, polyphagia and polyuria.   Musculoskeletal: Positive for arthralgias.   Skin: Negative for color change and rash.   Allergic/Immunologic: Positive for environmental allergies and immunocompromised state.   Neurological: Positive for dizziness, weakness, numbness and headache. Negative for speech difficulty.   Psychiatric/Behavioral: Positive for sleep disturbance, depressed mood and stress. Negative for suicidal ideas. The patient is nervous/anxious.       Physical Exam  Vitals signs reviewed.   Constitutional:       General: She is not in acute distress.     Appearance: She is well-developed.      Comments: Pleasant; in good spirits. Wearing appropriate face covering   HENT:      Head: Normocephalic.      Nose: Nose normal.   Eyes:      General: No scleral icterus.        Right eye: No discharge.         Left eye: No discharge.      Conjunctiva/sclera: Conjunctivae normal.   Neck:      Musculoskeletal: Normal range of motion and neck supple.      Thyroid: No thyromegaly.      Vascular: No JVD.   Cardiovascular:      Rate and " Rhythm: Normal rate and regular rhythm.      Heart sounds: Normal heart sounds. No murmur. No friction rub.   Pulmonary:      Effort: Pulmonary effort is normal. No respiratory distress.      Breath sounds: Normal breath sounds. No wheezing or rales.   Abdominal:      General: Bowel sounds are normal. There is no distension.      Palpations: Abdomen is soft.      Tenderness: There is no abdominal tenderness. There is no guarding or rebound.   Musculoskeletal:         General: No tenderness.      Right lower leg: No edema.      Left lower leg: No edema.   Lymphadenopathy:      Cervical: No cervical adenopathy.   Skin:     General: Skin is warm and dry.      Findings: No erythema or rash.   Neurological:      Mental Status: She is alert and oriented to person, place, and time.      Cranial Nerves: No cranial nerve deficit.   Psychiatric:         Mood and Affect: Mood and affect normal.         Speech: Speech normal.         Behavior: Behavior is cooperative.         Thought Content: Thought content normal.         Judgment: Judgment normal.       Results for orders placed or performed in visit on 11/17/20   Comprehensive Metabolic Panel    Specimen: Blood   Result Value Ref Range    Glucose 247 (H) 65 - 99 mg/dL    BUN 10 6 - 20 mg/dL    Creatinine 0.76 0.57 - 1.00 mg/dL    Sodium 140 136 - 145 mmol/L    Potassium 4.0 3.5 - 5.2 mmol/L    Chloride 103 98 - 107 mmol/L    CO2 23.3 22.0 - 29.0 mmol/L    Calcium 9.4 8.6 - 10.5 mg/dL    Total Protein 6.9 6.0 - 8.5 g/dL    Albumin 4.33 3.50 - 5.20 g/dL    ALT (SGPT) 21 1 - 33 U/L    AST (SGOT) 17 1 - 32 U/L    Alkaline Phosphatase 168 (H) 39 - 117 U/L    Total Bilirubin 0.2 0.0 - 1.2 mg/dL    eGFR Non African Amer 82 >60 mL/min/1.73    Globulin 2.6 gm/dL    A/G Ratio 1.7 g/dL    BUN/Creatinine Ratio 13.2 7.0 - 25.0    Anion Gap 13.7 5.0 - 15.0 mmol/L   Lipid Panel    Specimen: Blood   Result Value Ref Range    Total Cholesterol 189 0 - 200 mg/dL    Triglycerides 240 (H) 0 -  150 mg/dL    HDL Cholesterol 36 (L) 40 - 60 mg/dL    LDL Cholesterol  111 (H) 0 - 100 mg/dL    VLDL Cholesterol 42 (H) 5 - 40 mg/dL    LDL/HDL Ratio 2.92    TSH    Specimen: Blood   Result Value Ref Range    TSH 1.030 0.270 - 4.200 uIU/mL   Hemoglobin A1c    Specimen: Blood   Result Value Ref Range    Hemoglobin A1C 9.60 (H) 4.80 - 5.60 %   Vitamin D 25 Hydroxy    Specimen: Blood   Result Value Ref Range    25 Hydroxy, Vitamin D 41.6 30.0 - 100.0 ng/ml   CBC Auto Differential    Specimen: Blood   Result Value Ref Range    WBC 8.68 3.40 - 10.80 10*3/mm3    RBC 4.79 3.77 - 5.28 10*6/mm3    Hemoglobin 12.3 12.0 - 15.9 g/dL    Hematocrit 40.4 34.0 - 46.6 %    MCV 84.3 79.0 - 97.0 fL    MCH 25.7 (L) 26.6 - 33.0 pg    MCHC 30.4 (L) 31.5 - 35.7 g/dL    RDW 15.9 (H) 12.3 - 15.4 %    RDW-SD 47.9 37.0 - 54.0 fl    MPV 10.2 6.0 - 12.0 fL    Platelets 215 140 - 450 10*3/mm3    Neutrophil % 66.5 42.7 - 76.0 %    Lymphocyte % 20.6 19.6 - 45.3 %    Monocyte % 9.1 5.0 - 12.0 %    Eosinophil % 0.9 0.3 - 6.2 %    Basophil % 0.8 0.0 - 1.5 %    Immature Grans % 2.1 (H) 0.0 - 0.5 %    Neutrophils, Absolute 5.77 1.70 - 7.00 10*3/mm3    Lymphocytes, Absolute 1.79 0.70 - 3.10 10*3/mm3    Monocytes, Absolute 0.79 0.10 - 0.90 10*3/mm3    Eosinophils, Absolute 0.08 0.00 - 0.40 10*3/mm3    Basophils, Absolute 0.07 0.00 - 0.20 10*3/mm3    Immature Grans, Absolute 0.18 (H) 0.00 - 0.05 10*3/mm3    nRBC 0.0 0.0 - 0.2 /100 WBC     Assessment/Plan   Problems Addressed this Visit        Cardiovascular and Mediastinum    Mixed hyperlipidemia       Endocrine    DM type 2 with diabetic peripheral neuropathy (CMS/HCC) - Primary       Other    Malignant neoplasm of upper-outer quadrant of right breast in female, estrogen receptor negative (CMS/HCC)      Diagnoses       Codes Comments    DM type 2 with diabetic peripheral neuropathy (CMS/HCC)    -  Primary ICD-10-CM: E11.42  ICD-9-CM: 250.60, 357.2 She will continue her insulin pump regimen and Trijardy.     Mixed hyperlipidemia     ICD-10-CM: E78.2  ICD-9-CM: 272.2  Cardiovascular risk reduction modifications including continuing Crestor.    Malignant neoplasm of upper-outer quadrant of right breast in female, estrogen receptor negative (CMS/HCC)     ICD-10-CM: C50.411, Z17.1  ICD-9-CM: 174.4, V86.1 Keep f/u with her Oncologist and Surgeon.        Reviewed and discussed above lab results with Nivia including the increase in her A1C which she contributes to multiple issues. She not only has gone through the diagnosis of Cancer but the death of several family members including her father during this time. Attempted to upload her pump but was unable to do so due to software issues. She will bring her pump by and allow me to upload later. Her CGM was showing controlled blood sugars and Nivia reports they have been doing a lot better. She will continue her insulin pump regimen and Trijardy. Cardiovascular risk reduction modifications including continuing Crestor. Keep f/u with her Oncologist and Surgeon. Nivia will f/u with me in December; sooner if she has any problems/concerns.       This document has been electronically signed by NIDA Starr, RADHA, DOUG  November 25, 2020 11:11 EST

## 2020-11-28 VITALS
HEIGHT: 64 IN | TEMPERATURE: 97.5 F | HEART RATE: 88 BPM | BODY MASS INDEX: 38.07 KG/M2 | WEIGHT: 223 LBS | SYSTOLIC BLOOD PRESSURE: 100 MMHG | OXYGEN SATURATION: 96 % | RESPIRATION RATE: 14 BRPM | DIASTOLIC BLOOD PRESSURE: 62 MMHG

## 2020-12-02 ENCOUNTER — CLINICAL SUPPORT (OUTPATIENT)
Dept: FAMILY MEDICINE CLINIC | Facility: CLINIC | Age: 45
End: 2020-12-02

## 2020-12-02 DIAGNOSIS — R30.0 DYSURIA: Primary | ICD-10-CM

## 2020-12-02 DIAGNOSIS — R30.0 DYSURIA: ICD-10-CM

## 2020-12-02 LAB
BILIRUB BLD-MCNC: NEGATIVE MG/DL
CLARITY, POC: ABNORMAL
COLOR UR: YELLOW
GLUCOSE UR STRIP-MCNC: ABNORMAL MG/DL
KETONES UR QL: NEGATIVE
LEUKOCYTE EST, POC: NEGATIVE
NITRITE UR-MCNC: NEGATIVE MG/ML
PH UR: 6 [PH] (ref 5–8)
PROT UR STRIP-MCNC: ABNORMAL MG/DL
RBC # UR STRIP: ABNORMAL /UL
SP GR UR: 1.02 (ref 1–1.03)
UROBILINOGEN UR QL: NORMAL

## 2020-12-02 PROCEDURE — 87186 SC STD MICRODIL/AGAR DIL: CPT

## 2020-12-02 PROCEDURE — 81003 URINALYSIS AUTO W/O SCOPE: CPT | Performed by: GENERAL PRACTICE

## 2020-12-02 PROCEDURE — 81001 URINALYSIS AUTO W/SCOPE: CPT

## 2020-12-02 PROCEDURE — 87086 URINE CULTURE/COLONY COUNT: CPT

## 2020-12-02 PROCEDURE — 87088 URINE BACTERIA CULTURE: CPT

## 2020-12-03 LAB
BACTERIA UR QL AUTO: ABNORMAL /HPF
BILIRUB UR QL STRIP: NEGATIVE
CLARITY UR: ABNORMAL
COLOR UR: YELLOW
GLUCOSE UR STRIP-MCNC: ABNORMAL MG/DL
HGB UR QL STRIP.AUTO: ABNORMAL
HYALINE CASTS UR QL AUTO: ABNORMAL /LPF
KETONES UR QL STRIP: NEGATIVE
LEUKOCYTE ESTERASE UR QL STRIP.AUTO: NEGATIVE
NITRITE UR QL STRIP: NEGATIVE
PH UR STRIP.AUTO: 6 [PH] (ref 5–8)
PROT UR QL STRIP: ABNORMAL
RBC # UR: ABNORMAL /HPF
REF LAB TEST METHOD: ABNORMAL
SP GR UR STRIP: >=1.03 (ref 1–1.03)
SQUAMOUS #/AREA URNS HPF: ABNORMAL /HPF
UROBILINOGEN UR QL STRIP: ABNORMAL
WBC UR QL AUTO: ABNORMAL /HPF

## 2020-12-03 RX ORDER — NITROFURANTOIN 25; 75 MG/1; MG/1
100 CAPSULE ORAL 2 TIMES DAILY
Qty: 20 CAPSULE | Refills: 0 | Status: SHIPPED | OUTPATIENT
Start: 2020-12-03 | End: 2021-01-20

## 2020-12-03 NOTE — PROGRESS NOTES
She is having some pressure, frequency and pain when she voids.     -- Please let patient know that her urine has blood in it but no evidence of infection   What kind of symptoms is she having

## 2020-12-03 NOTE — PROGRESS NOTES
Spoke with pt about the following per Dr. Menjivar. VH    -- Please let her know that the urine microscopy done at the hospital was more consistent with a UTI then the initial dip   I have emailed a script for nitrofurantoin while we are waiting for the culture results

## 2020-12-05 LAB — BACTERIA SPEC AEROBE CULT: ABNORMAL

## 2020-12-08 ENCOUNTER — HOSPITAL ENCOUNTER (OUTPATIENT)
Dept: CARDIOLOGY | Facility: HOSPITAL | Age: 45
Discharge: HOME | End: 2020-12-08
Admitting: INTERNAL MEDICINE

## 2020-12-08 DIAGNOSIS — Z51.81 ENCOUNTER FOR MONITORING CARDIOTOXIC DRUG THERAPY: ICD-10-CM

## 2020-12-08 DIAGNOSIS — Z79.899 ENCOUNTER FOR MONITORING CARDIOTOXIC DRUG THERAPY: ICD-10-CM

## 2020-12-08 LAB
BH CV ECHO MEAS - % IVS THICK: 26.9 %
BH CV ECHO MEAS - % LVPW THICK: 30.1 %
BH CV ECHO MEAS - AO ROOT AREA (BSA CORRECTED): 1.3
BH CV ECHO MEAS - AO ROOT AREA: 5.7 CM^2
BH CV ECHO MEAS - AO ROOT DIAM: 2.7 CM
BH CV ECHO MEAS - BSA(HAYCOCK): 2.2 M^2
BH CV ECHO MEAS - BSA: 2 M^2
BH CV ECHO MEAS - BZI_BMI: 38.3 KILOGRAMS/M^2
BH CV ECHO MEAS - BZI_METRIC_HEIGHT: 162.6 CM
BH CV ECHO MEAS - BZI_METRIC_WEIGHT: 101.2 KG
BH CV ECHO MEAS - EDV(CUBED): 113 ML
BH CV ECHO MEAS - EDV(MOD-SP4): 108 ML
BH CV ECHO MEAS - EDV(TEICH): 109.4 ML
BH CV ECHO MEAS - EF(CUBED): 68.5 %
BH CV ECHO MEAS - EF(MOD-SP4): 62.1 %
BH CV ECHO MEAS - EF(TEICH): 59.9 %
BH CV ECHO MEAS - ESV(CUBED): 35.6 ML
BH CV ECHO MEAS - ESV(MOD-SP4): 40.9 ML
BH CV ECHO MEAS - ESV(TEICH): 43.8 ML
BH CV ECHO MEAS - FS: 32 %
BH CV ECHO MEAS - IVS/LVPW: 0.93
BH CV ECHO MEAS - IVSD: 0.91 CM
BH CV ECHO MEAS - IVSS: 1.2 CM
BH CV ECHO MEAS - LV DIASTOLIC VOL/BSA (35-75): 52.7 ML/M^2
BH CV ECHO MEAS - LV MASS(C)D: 158.8 GRAMS
BH CV ECHO MEAS - LV MASS(C)DI: 77.5 GRAMS/M^2
BH CV ECHO MEAS - LV MASS(C)S: 125.9 GRAMS
BH CV ECHO MEAS - LV MASS(C)SI: 61.4 GRAMS/M^2
BH CV ECHO MEAS - LV SYSTOLIC VOL/BSA (12-30): 20 ML/M^2
BH CV ECHO MEAS - LVIDD: 4.8 CM
BH CV ECHO MEAS - LVIDS: 3.3 CM
BH CV ECHO MEAS - LVLD AP4: 7.3 CM
BH CV ECHO MEAS - LVLS AP4: 6.4 CM
BH CV ECHO MEAS - LVPWD: 0.98 CM
BH CV ECHO MEAS - LVPWS: 1.3 CM
BH CV ECHO MEAS - SI(CUBED): 37.8 ML/M^2
BH CV ECHO MEAS - SI(MOD-SP4): 32.8 ML/M^2
BH CV ECHO MEAS - SI(TEICH): 32 ML/M^2
BH CV ECHO MEAS - SV(CUBED): 77.4 ML
BH CV ECHO MEAS - SV(MOD-SP4): 67.1 ML
BH CV ECHO MEAS - SV(TEICH): 65.5 ML
MAXIMAL PREDICTED HEART RATE: 173 BPM
STRESS TARGET HR: 147 BPM

## 2020-12-08 PROCEDURE — 93321 DOPPLER ECHO F-UP/LMTD STD: CPT

## 2020-12-08 PROCEDURE — 93321 DOPPLER ECHO F-UP/LMTD STD: CPT | Performed by: INTERNAL MEDICINE

## 2020-12-08 PROCEDURE — 93308 TTE F-UP OR LMTD: CPT | Performed by: INTERNAL MEDICINE

## 2020-12-08 PROCEDURE — 93308 TTE F-UP OR LMTD: CPT

## 2020-12-15 ENCOUNTER — INFUSION (OUTPATIENT)
Dept: ONCOLOGY | Facility: HOSPITAL | Age: 45
End: 2020-12-15

## 2020-12-15 ENCOUNTER — OFFICE VISIT (OUTPATIENT)
Dept: ONCOLOGY | Facility: CLINIC | Age: 45
End: 2020-12-15

## 2020-12-15 ENCOUNTER — LAB (OUTPATIENT)
Dept: ONCOLOGY | Facility: CLINIC | Age: 45
End: 2020-12-15

## 2020-12-15 VITALS
BODY MASS INDEX: 40.94 KG/M2 | WEIGHT: 238.5 LBS | RESPIRATION RATE: 18 BRPM | OXYGEN SATURATION: 97 % | HEART RATE: 88 BPM | TEMPERATURE: 97.1 F | DIASTOLIC BLOOD PRESSURE: 73 MMHG | SYSTOLIC BLOOD PRESSURE: 113 MMHG

## 2020-12-15 VITALS
BODY MASS INDEX: 40.94 KG/M2 | OXYGEN SATURATION: 97 % | SYSTOLIC BLOOD PRESSURE: 113 MMHG | RESPIRATION RATE: 18 BRPM | HEART RATE: 88 BPM | WEIGHT: 238.5 LBS | TEMPERATURE: 97.1 F | DIASTOLIC BLOOD PRESSURE: 73 MMHG

## 2020-12-15 DIAGNOSIS — C50.411 MALIGNANT NEOPLASM OF UPPER-OUTER QUADRANT OF RIGHT BREAST IN FEMALE, ESTROGEN RECEPTOR NEGATIVE (HCC): Primary | ICD-10-CM

## 2020-12-15 DIAGNOSIS — Z17.1 MALIGNANT NEOPLASM OF UPPER-OUTER QUADRANT OF RIGHT BREAST IN FEMALE, ESTROGEN RECEPTOR NEGATIVE (HCC): ICD-10-CM

## 2020-12-15 DIAGNOSIS — C50.411 MALIGNANT NEOPLASM OF UPPER-OUTER QUADRANT OF RIGHT BREAST IN FEMALE, ESTROGEN RECEPTOR NEGATIVE (HCC): ICD-10-CM

## 2020-12-15 DIAGNOSIS — F32.A DEPRESSION, UNSPECIFIED DEPRESSION TYPE: ICD-10-CM

## 2020-12-15 DIAGNOSIS — Z95.828 PORT-A-CATH IN PLACE: Primary | ICD-10-CM

## 2020-12-15 DIAGNOSIS — M85.89 OSTEOPENIA OF MULTIPLE SITES: ICD-10-CM

## 2020-12-15 DIAGNOSIS — Z17.1 MALIGNANT NEOPLASM OF UPPER-OUTER QUADRANT OF RIGHT BREAST IN FEMALE, ESTROGEN RECEPTOR NEGATIVE (HCC): Primary | ICD-10-CM

## 2020-12-15 LAB
ALBUMIN SERPL-MCNC: 4.1 G/DL (ref 3.5–5.2)
ALBUMIN/GLOB SERPL: 1.5 G/DL
ALP SERPL-CCNC: 106 U/L (ref 39–117)
ALT SERPL W P-5'-P-CCNC: 22 U/L (ref 1–33)
ANION GAP SERPL CALCULATED.3IONS-SCNC: 9.2 MMOL/L (ref 5–15)
AST SERPL-CCNC: 12 U/L (ref 1–32)
BASOPHILS # BLD AUTO: 0.08 10*3/MM3 (ref 0–0.2)
BASOPHILS NFR BLD AUTO: 0.9 % (ref 0–1.5)
BILIRUB SERPL-MCNC: 0.2 MG/DL (ref 0–1.2)
BUN SERPL-MCNC: 10 MG/DL (ref 6–20)
BUN/CREAT SERPL: 13.9 (ref 7–25)
CALCIUM SPEC-SCNC: 8.6 MG/DL (ref 8.6–10.5)
CHLORIDE SERPL-SCNC: 102 MMOL/L (ref 98–107)
CO2 SERPL-SCNC: 25.8 MMOL/L (ref 22–29)
CREAT SERPL-MCNC: 0.72 MG/DL (ref 0.57–1)
DEPRECATED RDW RBC AUTO: 47.9 FL (ref 37–54)
EOSINOPHIL # BLD AUTO: 0.26 10*3/MM3 (ref 0–0.4)
EOSINOPHIL NFR BLD AUTO: 2.8 % (ref 0.3–6.2)
ERYTHROCYTE [DISTWIDTH] IN BLOOD BY AUTOMATED COUNT: 15.7 % (ref 12.3–15.4)
GFR SERPL CREATININE-BSD FRML MDRD: 87 ML/MIN/1.73
GLOBULIN UR ELPH-MCNC: 2.7 GM/DL
GLUCOSE SERPL-MCNC: 304 MG/DL (ref 65–99)
HCT VFR BLD AUTO: 37.8 % (ref 34–46.6)
HGB BLD-MCNC: 11.8 G/DL (ref 12–15.9)
IMM GRANULOCYTES # BLD AUTO: 0.02 10*3/MM3 (ref 0–0.05)
IMM GRANULOCYTES NFR BLD AUTO: 0.2 % (ref 0–0.5)
LYMPHOCYTES # BLD AUTO: 2.76 10*3/MM3 (ref 0.7–3.1)
LYMPHOCYTES NFR BLD AUTO: 29.6 % (ref 19.6–45.3)
MCH RBC QN AUTO: 26 PG (ref 26.6–33)
MCHC RBC AUTO-ENTMCNC: 31.2 G/DL (ref 31.5–35.7)
MCV RBC AUTO: 83.4 FL (ref 79–97)
MONOCYTES # BLD AUTO: 0.92 10*3/MM3 (ref 0.1–0.9)
MONOCYTES NFR BLD AUTO: 9.9 % (ref 5–12)
NEUTROPHILS NFR BLD AUTO: 5.28 10*3/MM3 (ref 1.7–7)
NEUTROPHILS NFR BLD AUTO: 56.6 % (ref 42.7–76)
NRBC BLD AUTO-RTO: 0 /100 WBC (ref 0–0.2)
PLATELET # BLD AUTO: 316 10*3/MM3 (ref 140–450)
PMV BLD AUTO: 9.7 FL (ref 6–12)
POTASSIUM SERPL-SCNC: 3.7 MMOL/L (ref 3.5–5.2)
PROT SERPL-MCNC: 6.8 G/DL (ref 6–8.5)
RBC # BLD AUTO: 4.53 10*6/MM3 (ref 3.77–5.28)
SODIUM SERPL-SCNC: 137 MMOL/L (ref 136–145)
WBC # BLD AUTO: 9.32 10*3/MM3 (ref 3.4–10.8)

## 2020-12-15 PROCEDURE — 99214 OFFICE O/P EST MOD 30 MIN: CPT | Performed by: INTERNAL MEDICINE

## 2020-12-15 PROCEDURE — 25010000003 HEPARIN LOCK FLUSH PER 10 UNITS: Performed by: INTERNAL MEDICINE

## 2020-12-15 PROCEDURE — 36591 DRAW BLOOD OFF VENOUS DEVICE: CPT

## 2020-12-15 PROCEDURE — 85025 COMPLETE CBC W/AUTO DIFF WBC: CPT | Performed by: INTERNAL MEDICINE

## 2020-12-15 PROCEDURE — 96523 IRRIG DRUG DELIVERY DEVICE: CPT

## 2020-12-15 PROCEDURE — 80053 COMPREHEN METABOLIC PANEL: CPT | Performed by: INTERNAL MEDICINE

## 2020-12-15 RX ORDER — TAMOXIFEN CITRATE 20 MG/1
20 TABLET ORAL DAILY
Qty: 30 TABLET | Refills: 5 | Status: SHIPPED | OUTPATIENT
Start: 2020-12-15 | End: 2021-06-16 | Stop reason: SDUPTHER

## 2020-12-15 RX ORDER — SODIUM CHLORIDE 0.9 % (FLUSH) 0.9 %
10 SYRINGE (ML) INJECTION AS NEEDED
Status: DISCONTINUED | OUTPATIENT
Start: 2020-12-15 | End: 2020-12-15 | Stop reason: HOSPADM

## 2020-12-15 RX ORDER — HEPARIN SODIUM (PORCINE) LOCK FLUSH IV SOLN 100 UNIT/ML 100 UNIT/ML
500 SOLUTION INTRAVENOUS AS NEEDED
Status: DISCONTINUED | OUTPATIENT
Start: 2020-12-15 | End: 2020-12-15 | Stop reason: HOSPADM

## 2020-12-15 RX ORDER — ESCITALOPRAM OXALATE 10 MG/1
10 TABLET ORAL DAILY
Qty: 30 TABLET | Refills: 5 | Status: SHIPPED | OUTPATIENT
Start: 2020-12-15 | End: 2021-01-26 | Stop reason: DRUGHIGH

## 2020-12-15 RX ORDER — HEPARIN SODIUM (PORCINE) LOCK FLUSH IV SOLN 100 UNIT/ML 100 UNIT/ML
500 SOLUTION INTRAVENOUS AS NEEDED
Status: CANCELLED | OUTPATIENT
Start: 2021-01-26

## 2020-12-15 RX ORDER — SODIUM CHLORIDE 0.9 % (FLUSH) 0.9 %
10 SYRINGE (ML) INJECTION AS NEEDED
Status: CANCELLED | OUTPATIENT
Start: 2021-01-26

## 2020-12-15 RX ADMIN — Medication 500 UNITS: at 15:14

## 2020-12-15 RX ADMIN — SODIUM CHLORIDE, PRESERVATIVE FREE 10 ML: 5 INJECTION INTRAVENOUS at 15:14

## 2020-12-16 ENCOUNTER — OFFICE VISIT (OUTPATIENT)
Dept: FAMILY MEDICINE CLINIC | Facility: CLINIC | Age: 45
End: 2020-12-16

## 2020-12-16 VITALS
WEIGHT: 237 LBS | DIASTOLIC BLOOD PRESSURE: 70 MMHG | TEMPERATURE: 97.1 F | RESPIRATION RATE: 14 BRPM | BODY MASS INDEX: 40.46 KG/M2 | SYSTOLIC BLOOD PRESSURE: 110 MMHG | OXYGEN SATURATION: 96 % | HEIGHT: 64 IN | HEART RATE: 92 BPM

## 2020-12-16 DIAGNOSIS — F32.A ANXIETY AND DEPRESSION: Chronic | ICD-10-CM

## 2020-12-16 DIAGNOSIS — E78.2 MIXED HYPERLIPIDEMIA: Chronic | ICD-10-CM

## 2020-12-16 DIAGNOSIS — E11.42 DM TYPE 2 WITH DIABETIC PERIPHERAL NEUROPATHY (HCC): Primary | Chronic | ICD-10-CM

## 2020-12-16 DIAGNOSIS — F41.9 ANXIETY AND DEPRESSION: Chronic | ICD-10-CM

## 2020-12-16 PROCEDURE — 99214 OFFICE O/P EST MOD 30 MIN: CPT | Performed by: NURSE PRACTITIONER

## 2020-12-16 PROCEDURE — 95251 CONT GLUC MNTR ANALYSIS I&R: CPT | Performed by: NURSE PRACTITIONER

## 2020-12-16 NOTE — PATIENT INSTRUCTIONS
Insulin Pumps    An insulin pump is a small, computerized, battery-powered device that steadily (continuously) delivers insulin to the body. Insulin pumps may be used to treat type 1 diabetes or type 2 diabetes. They may be used with rapid-acting insulin or short-acting insulin.  An insulin pump has a container (cartridge or reservoir) of insulin that must be refilled regularly. The cartridge is connected to a needle that is placed into the tissue between skin and muscle in any of these areas:  · Abdomen. Avoid any area that is less than 2 inches (5 cm) from the belly button (navel).  · Front of thigh.  · Upper, outer side of thigh.  · Upper, outer part of arm.  · Upper, outer part of buttock.  The area where the needle is inserted is called the infusion site. The needle is surrounded by a small plastic tube (cannula). Together, the needle and cannula are referred to as the infusion set. After the infusion set is inserted under the skin, the needle is removed and the cannula stays in place to deliver insulin to the body.  What does an insulin pump do?  Insulin pumps deliver insulin to the body in the following ways:  · Basal rate. This method gives small, continuous amounts of insulin 24 hours a day.  · Bolus dose. This is a specific amount of insulin that you can give yourself right away. You may need this after eating a meal or when you have high blood sugar (glucose). You will be instructed on the amount to give depending on your blood glucose level reading.  What are some advantages of using an insulin pump?  The advantages of using an insulin pump vary depending on which type of diabetes you have. Advantages may include:  · Reduced need to give yourself multiple insulin injections. With an insulin pump, you need to insert a needle into your body one time every 2-3 days instead of every time that you need insulin.  · More control over your diabetes and more flexibility for scheduling meals, snacks, and  exercise.  · Reduced risk of low blood glucose (hypoglycemia).  · Better management of the increases in blood glucose in the early morning (alyse phenomenon).  · More precise insulin doses, which may mean an improvement in blood glucose levels.  · A more predictable insulin absorption rate.  · Easier delivery of basal rate insulin.  What are some disadvantages of using an insulin pump?  · An insulin pump and its supplies might cost more than the supplies for injections.  · You may need to check your blood glucose level more often than when you give yourself injections.  · You have the pump attached to you wherever you go, for 24 hours a day.  What are the risks?  · The infusion site can get irritated or infected. To avoid those risks:  ? Care for your skin.  ? Change the infusion set as directed by your health care provider.  ? Periodically move your infusion site to a slightly different area.  · Pump failure. This can be caused by a malfunction, such as a blockage in the tubing, the needle moving out of place, or the pump running out of insulin.  · Most pumps have an alarm to warn you of a malfunction. However, if the pump fails and you are unaware that it has failed, you may develop diabetic ketoacidosis. This is a life-threatening complication of diabetes that can occur due to lack of insulin.  · Blockage (occlusion). This can prevent your pump from delivering insulin properly. This can be caused by a bent cannula or a kink in the tubing.  What may cause high blood glucose when using an insulin pump?  Possible causes of high blood glucose (hyperglycemia) when using an insulin pump include:  · Tubing that breaks, leaks, bends, or moves out of place.  · Low charge in a battery.  · Infection at the infusion site. Signs of infection may include:  ? Redness, swelling, or pain.  ? Blood or cloudy fluid.  ? Warmth.  ? Pus or a bad smell.  · A cartridge that is empty or incorrectly loaded into the pump.  · A basal rate  that needs to be adjusted.  · Air bubbles in the tubing.  · Illness or stress.  · Changes in hormone levels, such as during a menstrual cycle.  · The pump delivering the wrong amount of insulin.  · An interruption in insulin delivery.  · Poor absorption at the infusion site, even if you recently moved the site.  · Using ineffective insulin. Insulin can become ineffective if it is outdated or exposed to extreme heat or cold.  · A change in your normal activity level.  How to care for your insulin pump  · Refill the insulin cartridge as often as needed. Do not let the cartridge get completely empty.  · Always keep extra pump supplies, syringes, and insulin with you.  · Store your insulin according to instructions on the packaging.  · If you have questions or a problem that you cannot solve, call your health care provider or call the pump 's customer service line.  How to manage your diabetes while using an insulin pump    · Check your blood glucose 4 or more times a day, or as often as directed.  · Always check your blood glucose at the following times:  ? Before you change your basal or bolus rates.  ? After you change your infusion set.  · Check your A1c (hemoglobin A1c) level as often as directed.  · Continue to manage your diabetes as directed. This includes:  ? Exercising regularly.  ? Eating healthy foods.  ? Managing your weight.  · If you give yourself a correction (supplemental) insulin dose and your blood glucose stays high, check your urine for ketones. A supplemental dose is a specific amount of rapid-acting or short-acting insulin that is used to lower blood glucose if it is too high. You may be instructed to check your blood glucose at certain times of the day and to use corrective insulin as needed.  ? If you have negative ketones, check your pump to see if it is working properly. If your pump does not seem to be working properly, change your cartridge, infusion set, and insertion site.  Recheck your blood glucose in 1 hour. If your blood glucose is still high, give yourself an additional supplemental insulin dose with an injection using a syringe and needle.  ? If you have moderate or large ketones in your urine, give yourself an additional supplemental insulin dose with an injection using a syringe and needle. Contact your health care provider for additional instructions.  Contact a health care provider if:  · You have any of the following at your infusion site:  ? Redness, swelling, or pain.  ? Blood or cloudy fluid.  ? Warmth.  ? Pus or a bad smell.  ? A red streak on your skin that leads away from the infusion site.  · You have a fever.  · You have moderate or large ketone levels in your urine.  · Your pump is not working properly.  · Your blood glucose is higher or lower than the target range that your health care provider gave you.  Summary  · An insulin pump is a small, computerized, battery-powered device that steadily (continuously) delivers insulin to the body.  · Insulin pumps may be used to treat type 1 diabetes (type 1 diabetes mellitus) or type 2 diabetes (type 2 diabetes mellitus).  · After the infusion set is inserted under the skin, the needle is removed and the cannula stays in place to deliver insulin to the body.  · Always check your blood glucose before you change your basal or bolus rates, and after you change your infusion set.  · Refill the insulin cartridge as often as needed. Do not let the cartridge get completely empty. Always keep extra pump supplies, syringes, and insulin with you.  This information is not intended to replace advice given to you by your health care provider. Make sure you discuss any questions you have with your health care provider.  Document Revised: 12/21/2018 Document Reviewed: 01/20/2017  Elsevier Patient Education © 2020 [employer]

## 2020-12-16 NOTE — PROGRESS NOTES
"  History of Present Illness   Nivia is a 44 y/o female who presents to the clinic today pertaining to her DM, type 2 and Dyslipidemia.. She has been hospitalized for DVTs and TIAs in th past.  She was diagnosed with Breast Cancer in July 2020 and has been receiving Chemotherapy for the past months. She was interested in Bariatric Surgery prior to her diagnosis of Breast Cancer but this has been placed on hold at this time.     Diabetes   She has type 2 diabetes mellitus. The initial diagnosis of diabetes was made 20 years ago. Associated symptoms include  fatigue and peripheral neuropathy. Pertinent negatives for diabetes include no foot ulcerations. Diabetic complications include peripheral neuropathy.  Risk factors for coronary artery disease include post-menopausal, stress and dyslipidemia. She is currently utilizing insulin pump therapy and CGM. She did stop weekly Ozempic due to nausea. She is currently taking Trijardy.  She has had a previous visit with a dietitian  Her home blood glucose trend is increasing at times during chemotherapy especially when she is taking steroids.  An ACE inhibitor/angiotensin II receptor blocker is not  being taken at this time. Eye exam is not current. .      Lab Results   Component Value Date    HGBA1C 9.60 (H) 11/17/2020      Dyslipidemia   The current episode started more than 1 year ago. Pertinent negatives include no chest pain or shortness of breath. She is taking Crestor 40 mg.     Lab Results   Component Value Date    CHOL 189 11/17/2020    TRIG 240 (H) 11/17/2020    HDL 36 (L) 11/17/2020     (H) 11/17/2020     Vitals:    12/16/20 1017   BP: 110/70   BP Location: Left arm   Patient Position: Sitting   Cuff Size: Adult   Pulse: 92   Resp: 14   Temp: 97.1 °F (36.2 °C)   TempSrc: Temporal   SpO2: 96%   Weight: 108 kg (237 lb)   Height: 162.6 cm (64.02\")        The following portions of the patient's history were reviewed and updated as appropriate: allergies, " current medications, past family history, past medical history, past social history, past surgical history and problem list.    Review of Systems   Constitutional: Positive for activity change, appetite change and fatigue. Negative for chills, diaphoresis, unexpected weight gain and unexpected weight loss.   HENT: Negative.    Eyes: Positive for visual disturbance.   Respiratory: Positive for cough. Negative for shortness of breath and wheezing.    Cardiovascular: Positive for leg swelling. Negative for chest pain and palpitations.   Gastrointestinal: Positive for nausea and GERD.   Endocrine: Negative for cold intolerance, heat intolerance, polydipsia, polyphagia and polyuria.   Musculoskeletal: Positive for arthralgias. Negative for gait problem.   Skin: Negative for color change and rash.   Allergic/Immunologic: Positive for immunocompromised state.   Neurological: Positive for weakness, numbness and memory problem. Negative for dizziness and light-headedness.   Psychiatric/Behavioral: Positive for sleep disturbance, depressed mood and stress. Negative for self-injury and suicidal ideas. The patient is nervous/anxious.       Physical Exam  Vitals signs reviewed.   Constitutional:       General: She is not in acute distress.     Appearance: She is well-developed.      Comments: Pleasant; in good spirits. Wearing appropriated face covering   HENT:      Head: Normocephalic.      Nose: Nose normal.   Eyes:      General: No scleral icterus.        Right eye: No discharge.         Left eye: No discharge.      Conjunctiva/sclera: Conjunctivae normal.      Pupils: Pupils are equal, round, and reactive to light.   Neck:      Musculoskeletal: Neck supple.      Vascular: No JVD.   Cardiovascular:      Rate and Rhythm: Normal rate and regular rhythm.      Heart sounds: Normal heart sounds. No murmur. No friction rub.   Pulmonary:      Effort: Pulmonary effort is normal. No respiratory distress.      Breath sounds: Normal  breath sounds. No decreased breath sounds, wheezing, rhonchi or rales.   Abdominal:      General: Bowel sounds are normal.      Palpations: Abdomen is soft.      Tenderness: There is no abdominal tenderness. There is no guarding or rebound.   Musculoskeletal:      Right lower leg: No edema.      Left lower leg: No edema.   Feet:      Right foot:      Protective Sensation: 10 sites tested. 5 sites sensed.      Skin integrity: Skin integrity normal.      Left foot:      Protective Sensation: 10 sites tested. 5 sites sensed.      Skin integrity: Skin integrity normal.      Comments: Nails have polish on them  Lymphadenopathy:      Cervical: No cervical adenopathy.   Skin:     General: Skin is warm and dry.      Capillary Refill: Capillary refill takes less than 2 seconds.      Findings: No erythema or rash.   Neurological:      Mental Status: She is alert and oriented to person, place, and time.   Psychiatric:         Mood and Affect: Mood normal.         Behavior: Behavior normal.         Thought Content: Thought content normal.         Judgment: Judgment normal.       Assessment/Plan     Problems Addressed this Visit        Cardiovascular and Mediastinum    Mixed hyperlipidemia       Endocrine    DM type 2 with diabetic peripheral neuropathy (CMS/HCC) - Primary       Other    Anxiety and depression (Chronic)      Diagnoses       Codes Comments    DM type 2 with diabetic peripheral neuropathy (CMS/HCC)    -  Primary ICD-10-CM: E11.42  ICD-9-CM: 250.60, 357.2 Insulin pump uploaded; reviewed and interpreted. Discussed with Nivia. She is currently on steroids so will postpone any changes until her next visit in Jan    Mixed hyperlipidemia     ICD-10-CM: E78.2  ICD-9-CM: 272.2 Cardiovascular risk reduction modifications reinforced. She has had a great deal of loss during the past months     Anxiety and depression     ICD-10-CM: F41.9, F32.9  ICD-9-CM: 300.00, 311 Continue to f/u with Behavioral Health        Findings  and recommendations discussed with Nivia. Uploaded her insulin pump, reviewed and interpreted it. Discussed with Nivia. At this time, no changes made due to steroid impact. She is in agreement to this. Cardiovascular risk reduction modifications reinforced. She has had a great deal of loss during the past months and supportive listening provided. She will continue to f/u with Behavioral Health. She will f/u with me in January; sooner if problems/concerns occur.     This document has been electronically signed by NIDA Starr, FAY-BC, CDE  December 16, 2020 14:53 EST

## 2020-12-31 RX ORDER — ERGOCALCIFEROL 1.25 MG/1
CAPSULE ORAL
Qty: 12 CAPSULE | Refills: 0 | Status: SHIPPED | OUTPATIENT
Start: 2020-12-31 | End: 2021-05-19 | Stop reason: SDUPTHER

## 2021-01-05 NOTE — PROGRESS NOTES
NAME: Nivia Bernstein    : 1975    DATE OF CONSULTATION:  2020    REASON FOR REFERRAL: Breast Cancer    REFERRING PHYSICIAN:  Sheila Garza MD    CHIEF COMPLAINT:  Breast cancer       HISTORY OF PRESENT ILLNESS:   Nivia Bernstein is a very pleasant 45 y.o. female who is being seen today at the request of Sheila Garza MD for evaluation and treatment of breast cancer. She did not notice a palpable lump, but was recommended to have a screening mammogram by her PCP. A nodule in the right upper quadrant of the R breast was noted. The mass measured 0.8 cm on ultrasound. Biopsy was consistent with a moderately differentiated invasive ductal carcinoma, ER negative, OH weakly (15%) positive, and HER-2/cat negative. She underwent bilateral mastectomy with sentinel lymph node biopsy with Dr. Garza on 20. Pathology from this showed a moderately differentiated invasive ductal carcinoma with associated intermediate grade DCIS, negative margins.  0/10 /SLN involved.  Mammaprint was high risk. She was referred to our clinic and is here today with her  for discussion of further treatment options.     Ms. Bernstein is healing well from the surgery.  She did develop a seroma, which was drained by Dr. Garza yesterday. She reports muscular chest discomfort r/t the procedure, but otherwise denies any other symptoms including fevers, chills, significant weight changes, shortness of breath, abdominal pain, n/v/d/c, bony pain or headaches.      PAST MEDICAL HISTORY:  Past Medical History:   Diagnosis Date   • Altered mental status 10/16/2017   • Anxiety and depression 3/31/2017   • Arthritis    • Asthma    • Diabetes mellitus (CMS/[facility])     wears insulin pump.    • DVT (deep venous thrombosis) (CMS/HCC) 2003    x 1 in leg following surgery   • Elevated alkaline phosphatase level 10/16/2017   • Elevated cholesterol    • Enlarged liver    • GERD (gastroesophageal reflux disease)    • H/O blood clots    • Heart  father/Parent(s) murmur    • Hyperlipidemia    • Hypokalemia 10/16/2017   • Leg pain 10/16/2017   • Mitral valve prolapse    • Neuropathy     related to diabetes   • Obesity 3/31/2017   • OCD (obsessive compulsive disorder) 10/16/2017   • Osteoporosis    • PONV (postoperative nausea and vomiting)    • Renal insufficiency 10/16/2017   • Right breast cancer with malignant cells in regional lymph nodes no greater than 0.2 mm and no more than 200 cells (CMS/HCC) 2020   • Thrombocytopenia (CMS/HCC) 10/16/2017   • TIA (transient ischemic attack)     4 TIMES     Risk Factors:  Age of Menarche: 9  Age of Menopause: with hysterectomy (around age 38)  Prior Breast Disease: None  Pregnancies:    Age of 1st pregnancy: 19  Family History of Breast Cancer: +see below family hx   Family History of Ovarian Cancer: + see below family history  History of HRT use: none       PAST SURGICAL HISTORY:  Past Surgical History:   Procedure Laterality Date   • APPENDECTOMY     • CARPAL TUNNEL RELEASE     • CHOLECYSTECTOMY     • FINGER FUSION Left     3rd   • HYSTERECTOMY  2011    removed due to an ovarian cyst and dysmenorrhia. No cancer noted. Complete   • KNEE ARTHROSCOPY Right    • MASTECTOMY Left 2020    Procedure: Left mastectomy;  Surgeon: Sheila Garza MD;  Location: [facility];  Service: General;  Laterality: Left;   • MASTECTOMY WITH SENTINEL NODE BIOPSY AND AXILLARY NODE DISSECTION Right 2020    Procedure: Right BREAST MASTECTOMY WITH SENTINEL NODE BIOPSY;  Surgeon: Sheila Garza MD;  Location: [facility];  Service: General;  Laterality: Right;   • PORTACATH PLACEMENT         FAMILY HISTORY:  Family History   Problem Relation Age of Onset   • Diabetes Mother    • Hypertension Mother    • Diabetes Father    • Heart disease Father    • Alcohol abuse Father    • Seizures Father    • Hypertension Father    • No Known Problems Brother    • No Known Problems Daughter    • Bone cancer Son    • Suicide Attempts Cousin    •  Stomach cancer Cousin         maternal first cousin   • Breast cancer Paternal Aunt 52   • Diabetes Maternal Grandmother    • Diabetes Maternal Grandfather    • Lung cancer Maternal Grandfather    • Heart disease Paternal Grandmother    • Diabetes Paternal Grandmother    • Heart disease Paternal Grandfather    • Diabetes Paternal Grandfather    • Ovarian cancer Maternal Aunt    • Lung cancer Maternal Uncle    • Colon cancer Maternal Uncle    • Cancer Maternal Uncle         unknown type         SOCIAL HISTORY:  Social History     Socioeconomic History   • Marital status:      Spouse name: Not on file   • Number of children: Not on file   • Years of education: Not on file   • Highest education level: Not on file   Occupational History   • Occupation: Not Working   Social Needs   • Financial resource strain: Not hard at all   • Food insecurity:     Worry: Never true     Inability: Never true   • Transportation needs:     Medical: No     Non-medical: No   Tobacco Use   • Smoking status: Never Smoker   • Smokeless tobacco: Never Used   Substance and Sexual Activity   • Alcohol use: No   • Drug use: No   • Sexual activity: Yes     Partners: Male   Lifestyle   • Physical activity:     Days per week: 3 days     Minutes per session: 100 min   • Stress: Very much   Relationships   • Social connections:     Talks on phone: More than three times a week     Gets together: More than three times a week     Attends [employer] service: Never     Active member of club or organization: No     Attends meetings of clubs or organizations: Never     Relationship status:    Social History Narrative    Mrs. Bernstein is a 44 year old white  female. [place] live in [facility] [facility] [place] with their 4 children. She has extended family local as well. She works as a  at [employer] in [facility]. She does not have an advanced directive.          REVIEW OF SYSTEMS:   A comprehensive 14 point review of systems was performed.   Significant findings as mentioned above.  All other systems reviewed and are negative.      MEDICATIONS:  The current medication list was reviewed in the EMR    Current Outpatient Medications:   •  Albuterol Sulfate (VENTOLIN HFA IN), Inhale 2 puffs Every 4 (Four) Hours As Needed (shortness of breath)., Disp: , Rfl:   •  BREO ELLIPTA 200-25 MCG/INH inhaler, Inhale 1 puff Daily As Needed (shortness of air)., Disp: , Rfl:   •  cyanocobalamin 1000 MCG/ML injection, Inject 1,000 mcg into the appropriate muscle as directed by prescriber 1 (One) Time Per Week., Disp: , Rfl:   •  diazePAM (VALIUM) 5 MG tablet, Take 1 tablet by mouth Daily., Disp: 30 tablet, Rfl: 0  •  diclofenac (VOLTAREN) 1 % gel gel, Apply 4 g topically to the appropriate area as directed 4 (Four) Times a Day As Needed (joint pain)., Disp: 500 g, Rfl: 5  •  docusate sodium 100 MG capsule, Take 100 mg by mouth 2 (Two) Times a Day., Disp: , Rfl:   •  Empagliflozin-Linaglip-Metform 12.5-2.5-1000 MG tablet sustained-release 24 hour, Take 2 tablets by mouth Daily., Disp: 60 tablet, Rfl: 5  •  fluticasone (FLONASE) 50 MCG/ACT nasal spray, 2 sprays into the nostril(s) as directed by provider Daily As Needed for Rhinitis or Allergies., Disp: , Rfl:   •  ibuprofen (ADVIL,MOTRIN) 800 MG tablet, Take 800 mg by mouth Every 8 (Eight) Hours As Needed for Mild Pain ., Disp: , Rfl:   •  insulin lispro (HumaLOG) 100 UNIT/ML patient supplied pump, Inject  under the skin into the appropriate area as directed Continuous. Basil rate: 2.95/hr Carb ratio: 4.0, Disp: , Rfl:   •  omeprazole (priLOSEC) 20 MG capsule, TAKE 1 CAPSULE BY MOUTH TWO TIMES A DAY, Disp: 60 capsule, Rfl: 5  •  ondansetron (ZOFRAN) 4 MG tablet, Take 1 tablet by mouth Every 8 (Eight) Hours As Needed for Nausea or Vomiting., Disp: 39 tablet, Rfl: 1  •  oxyCODONE-acetaminophen (Percocet) 5-325 MG per tablet, Take 1 tablet by mouth Every 6 (Six) Hours As Needed for Moderate Pain  for up to 20 doses., Disp: 20  tablet, Rfl: 0  •  vitamin D (ERGOCALCIFEROL) 1.25 MG (98520 UT) capsule capsule, Take 50,000 Units by mouth 1 (One) Time Per Week. Prior to [facility], Patient was on: wednesdays, Disp: , Rfl:     ALLERGIES:    Allergies   Allergen Reactions   • Mobic [Meloxicam] Rash   • Penicillins Angioedema   • Novolog [Insulin Aspart] Hives       PHYSICAL EXAM:  Vitals:    09/11/20 0942   BP: 117/74   Pulse: 94   Resp: 18   Temp: 97.3 °F (36.3 °C)   SpO2: 98%     Pain Score    09/11/20 0942   PainSc:   5   General:  Awake, alert and oriented, appears anxious but in no distress  HEENT:  Pupils are equal, round and reactive to light and accommodation, Extra-ocular movements full, Oropharyx clear, mucous membranes moist  Neck:  No JVD, thyromegaly or lymphadenopathy  CV:  Regular rate and rhythm, no murmurs, rubs or gallops  Resp:  Lungs are clear to auscultation bilaterally  Breast:  S/p bilateral mastectomy and RSLNBx.  Surgical incisions c/d/i.  Small seroma present on Left.  No axillary adenopathy on either side.  Abd:  Soft, non-tender, non-distended, bowel sounds present, no organomegaly or masses  Ext:  No clubbing, cyanosis or edema  Lymph:  No cervical, supraclavicular, axillary, inguinal or femoral adenopathy  Neuro:  MS as above, CN II-XII intact, grossly non-focal exam      PATHOLOGY:  07-21-20 08-18-20        Mammaprint 08-18-20          Genetic testing:        ENDOSCOPY:        IMAGING:  Bilateral screening mammogram 06-19-20  FINDINGS:    The breast tissue is heterogeneously dense.  New focal nodularity right upper outer breast that is about 14 cm from  the nipple.  Otherwise, no suspicious findings.     IMPRESSION:  New focal nodularity right upper outer breast that is about  14 cm from the nipple.     RECOMMENDATION:  Spot compression imaging.     BI-RADS CAT 0, INCOMPLETE.  The patient needs additional imaging.      Diagnostic R mammogram with R breast US 07-14-20  FINDINGS:  Additional  mammographic imaging images of persistent  partially obscured oval mass in the posterior right upper outer  quadrant.     Right breast ultrasound: Focused sonographic evaluation of the right  breast demonstrates a 0.8 cm irregular hypoechoic mass with ill-defined  borders located at 10:00, 13 cm from the nipple. An additional area was  initially noted by the technologist in the 9:00 region which represents  an isolated fat lobule.           IMPRESSION:  0.8 cm irregular mass in the right 10:00 region. Recommend  ultrasound-guided core biopsy.        BI-RADS CATEGORY:  4, SUSPICIOUS        RECOMMENDATION:  Recommend ultrasound guided core biopsy of the right  breast.      Bilateral breast MRI w/wo contrast 08-11-20  FINDINGS: There is minimal bilateral background contrast enhancement and  normal vascular enhancement.     There are no worrisome areas of contrast enhancement in the left breast.     In the right breast correlating to the biopsy proven malignancy is an  approximately 0.7 cm irregular rapidly enhancing mass in the posterior  right 10:00 region. Artifact from a postbiopsy marking clip is located  adjacent to this mass. A small linear area of enhancement extends  posterior to this mass approximately 1 cm. There are no additional  worrisome areas of contrast enhancement in the right breast.     There is no axillary adenopathy by MRI criteria and no chest wall  abnormality is seen.        IMPRESSION:  1. Negative left breast MRI.        2. Biopsy-proven malignancy in the right 10:00 region with a small  linear area of enhancement extending posterior to the 0.7 cm mass.     RECOMMENDATIONS: Recommend surgical follow-up.     BI-RADS CATEGORY: 6, KNOWN BIOPSY PROVEN MALIGNANCY      RECENT LABS:  Lab Results   Component Value Date    WBC 9.53 08/14/2020    HGB 12.3 08/14/2020    HCT 39.4 08/14/2020    MCV 84.7 08/14/2020    RDW 13.1 08/14/2020     08/14/2020    NEUTRORELPCT 51.0 08/14/2020    LYMPHORELPCT  39.5 08/14/2020    MONORELPCT 6.5 08/14/2020    EOSRELPCT 2.2 08/14/2020    BASORELPCT 0.6 08/14/2020    NEUTROABS 4.86 08/14/2020    LYMPHSABS 3.76 (H) 08/14/2020       Lab Results   Component Value Date     08/14/2020    K 4.1 08/14/2020    CO2 25.9 08/14/2020     08/14/2020    BUN 14 08/14/2020    CREATININE 0.64 08/14/2020    EGFRIFNONA 99 08/14/2020    EGFRIFAFRI 132 05/13/2020    GLUCOSE 167 (H) 08/14/2020    CALCIUM 8.9 08/14/2020    ALKPHOS 100 08/14/2020    AST 15 08/14/2020    ALT 23 08/14/2020    BILITOT 0.3 08/14/2020    ALBUMIN 4.09 08/14/2020    PROTEINTOT 7.2 08/14/2020    MG 2.1 10/17/2017       Lab Results   Component Value Date/Time    URICACID 5.2 09/10/2019 08:23 AM         ASSESSMENT & PLAN:  iNvia Bernstein is a very pleasant 45 y.o. female with Stage IA ([id number]) moderately differentiated invasive ductal carcinoma of the R breast with associated DCIS.  Tumor was 10 mm in maximal dimension.  0/10 SLN involved. ER negative, SD 15% + (1+), HER-2/cat negative. Mammaprin  high risk.     1.  R breast cancer:   -  S/p R mastectomy and SLNBx as well as prophylactic contralateral mastectomy performed y Dr. Garza 8-18-20.  - She is healing well from bilateral mastectomy. This was complicated by left seroma, drained by Dr. Garza yesterday. Incisions are healing well.   - Given high risk Mammaprint, I have recommended adjuvant chemotherapy.  We discussed different possibilities for adjuvant therapy. Given high risk Mammaprint but early stage, node negative disease as well as comorbidities,I recommended Taxotere/Cytoxan Q21d x 4 cycles with growth factor support. Discussed potential risks, benefits, and side effects and she would like to proceed.   - She had port placed several years ago due to poor peripheral access. This has been well cared for and maintained. Will use this for her treatments.   -  Baseline labwork today.  - Will plan to start treatment the week of 9/21 to allow her  incisions to fully heal.     2. Extensive family history of malignancy:   - genetic testing was ordered by Dr. Garza and performed by [facility] with no mutations, specifically including BRCA 1/2, CHKE2, CDH1, PALB2 and others.    3. Prophylaxis:   -  Will give Prevnar 13 vaccine today.  -  She will get 2020 influenza vaccine from PCP next week.  -  M. Uncle had colon cancer at age 50.  Will need Colonoscopy once treatment for breast cancer is complete.    4. ACO / NII/Other  Quality measures  -  Nivia Bernstein did not receive 2020 flu vaccine. She plans to get this at a visit with her PCP next week.  -  Nivia Bernstein reports a pain score of 5.  Given her pain assessment as noted, treatment options were discussed and the following options were decided upon as a follow-up plan to address the patient's pain: continuation of current treatment plan for pain.  -  Current outpatient and discharge medications have been reconciled for the patient.  Reviewed by: Dejah Loco MD     5.  F/u:    -  She will see NP with C2 TC.  -  I will see her back with C3.    This note was scribed for Dejah Loco MD by Chantell Ramhan RN.    I, Dejah Loco MD, personally performed the services described in this documentation as scribed by the above named individual in my presence, and it is both accurate and complete.  09/11/2020         I spent 60 minutes with Nivia Bernstein today.  In the office today, more than 50% of this time was spent face-to-face with her  in counseling / coordination of care, reviewing her medical history and counseling on the current treatment plan.  All questions were answered to her satisfaction      Electronically Signed by: Dejah Loco MD      CC:     MD Markus Hager MD

## 2021-01-07 ENCOUNTER — OFFICE VISIT (OUTPATIENT)
Dept: SURGERY | Facility: CLINIC | Age: 46
End: 2021-01-07

## 2021-01-07 VITALS
HEIGHT: 64 IN | BODY MASS INDEX: 41.01 KG/M2 | HEART RATE: 97 BPM | DIASTOLIC BLOOD PRESSURE: 82 MMHG | WEIGHT: 240.2 LBS | SYSTOLIC BLOOD PRESSURE: 154 MMHG

## 2021-01-07 DIAGNOSIS — Z17.1 MALIGNANT NEOPLASM OF UPPER-OUTER QUADRANT OF RIGHT BREAST IN FEMALE, ESTROGEN RECEPTOR NEGATIVE (HCC): ICD-10-CM

## 2021-01-07 DIAGNOSIS — C50.411 MALIGNANT NEOPLASM OF UPPER-OUTER QUADRANT OF RIGHT BREAST IN FEMALE, ESTROGEN RECEPTOR NEGATIVE (HCC): ICD-10-CM

## 2021-01-07 DIAGNOSIS — M85.89 OSTEOPENIA OF MULTIPLE SITES: Primary | ICD-10-CM

## 2021-01-07 DIAGNOSIS — Z95.828 PORT-A-CATH IN PLACE: ICD-10-CM

## 2021-01-07 PROCEDURE — 99213 OFFICE O/P EST LOW 20 MIN: CPT | Performed by: SURGERY

## 2021-01-07 PROCEDURE — 93702 BIS XTRACELL FLUID ANALYSIS: CPT | Performed by: SURGERY

## 2021-01-07 NOTE — PROGRESS NOTES
Subjective   Nivia Bernstein is a 45 y.o. female is here today for follow-up breast care.    History of Present Illness  Nivia was seen in the office today for breast cancer follow-up.  She is status post a right mastectomy with sentinel node biopsy and a contralateral left mastectomy on 8/18/2020 final pathology demonstrated a T1BN0 grade 2 ductal carcinoma of the right breast, ER negative, VT positive, HER-2 negative.  No disease identified in the left breast.   She completed adjuvant chemotherapy and was started on tamoxifen in mid December.  She is tolerating this well.  She is still fatigued and recovering from her chemotherapy but overall feels okay.  She was last seen by medical oncology on 12/15/2020.  Allergies   Allergen Reactions   • Mobic [Meloxicam] Rash   • Penicillins Angioedema   • Taxotere [Docetaxel] Anaphylaxis     9 minutes into 1st infusion   • Novolog [Insulin Aspart] Hives       Current Outpatient Medications   Medication Sig Dispense Refill   • albuterol sulfate HFA (Ventolin HFA) 108 (90 Base) MCG/ACT inhaler Inhale 2 puffs Every 4 (Four) Hours As Needed (shortness of breath). 18 g 5   • Breo Ellipta 200-25 MCG/INH inhaler Inhale 1 puff Daily. 1 inhaler 5   • diazePAM (VALIUM) 5 MG tablet TAKE 1 TABLET BY MOUTH DAILY. 30 tablet 2   • diclofenac (VOLTAREN) 1 % gel gel Apply 4 g topically to the appropriate area as directed 4 (Four) Times a Day As Needed (joint pain). 500 g 5   • docusate sodium 100 MG capsule Take 100 mg by mouth 2 (Two) Times a Day.     • escitalopram (LEXAPRO) 10 MG tablet Take 1 tablet by mouth Daily. 30 tablet 5   • fluticasone (FLONASE) 50 MCG/ACT nasal spray 2 sprays into the nostril(s) as directed by provider Daily As Needed for Rhinitis or Allergies. 1 bottle 5   • insulin lispro (HumaLOG) 100 UNIT/ML patient supplied pump Inject  under the skin into the appropriate area as directed Continuous. Basil rate: 2.95/hr  Carb ratio: 4.0     • linaclotide (LINZESS) 290 MCG  capsule capsule Take 1 capsule by mouth Every Morning Before Breakfast. 30 capsule 5   • magic mouthwash oral suspension Swish and swallow 5 mL four times  a day As Needed for Mucositis. 240 mL 1   • nitrofurantoin, macrocrystal-monohydrate, (MACROBID) 100 MG capsule Take 1 capsule by mouth 2 (Two) Times a Day. 20 capsule 0   • omeprazole (priLOSEC) 20 MG capsule Take 1 capsule by mouth 2 (Two) Times a Day. 60 capsule 5   • ondansetron (ZOFRAN) 8 MG tablet Take 1 tablet by mouth 3 (Three) Times a Day As Needed for Nausea or Vomiting. 30 tablet 5   • prochlorperazine (COMPAZINE) 10 MG tablet Take 1 tablet by mouth Every 6 (Six) Hours As Needed for Nausea or Vomiting. 30 tablet 5   • rosuvastatin (CRESTOR) 40 MG tablet Take 1 tablet by mouth Every Night. 30 tablet 5   • sennosides-docusate (senna-docusate sodium) 8.6-50 MG per tablet Take 2 tablets by mouth 2 (Two) Times a Day. 120 tablet 4   • tamoxifen (NOLVADEX) 20 MG chemo tablet Take 1 tablet by mouth Daily. 30 tablet 5   • vitamin D (ERGOCALCIFEROL) 1.25 MG (69961 UT) capsule capsule TAKE ONE CAPSULE BY MOUTH ONCE WEEKLY 12 capsule 0   • Empagliflozin-Linaglip-Metform 12.5-2.5-1000 MG tablet sustained-release 24 hour Take 2 tablets by mouth Daily. 60 tablet 5     No current facility-administered medications for this visit.      Past Medical History:   Diagnosis Date   • Altered mental status 10/16/2017   • Anxiety and depression 3/31/2017   • Arthritis    • Asthma    • DVT (deep venous thrombosis) (CMS/HCC) 2003    x 1 in RLE following fall & surgery   • Elevated alkaline phosphatase level 10/16/2017   • Enlarged liver    • GERD (gastroesophageal reflux disease)    • Heart murmur    • Hypokalemia 10/16/2017   • Leg pain 10/16/2017   • Mitral valve prolapse    • Neuropathy     related to diabetes   • Obesity 3/31/2017   • OCD (obsessive compulsive disorder) 10/16/2017   • Osteoporosis    • PONV (postoperative nausea and vomiting)    • Renal insufficiency  "10/16/2017   • Right breast cancer with malignant cells in regional lymph nodes no greater than 0.2 mm and no more than 200 cells (CMS/HCC) 8/13/2020   • Thrombocytopenia (CMS/HCC) 10/16/2017   • TIA (transient ischemic attack)     4 TIMES     Past Surgical History:   Procedure Laterality Date   • APPENDECTOMY     • CARPAL TUNNEL RELEASE     • CHOLECYSTECTOMY     • FINGER FUSION Left     3rd   • HYSTERECTOMY  2011    removed due to an ovarian cyst and dysmenorrhia. No cancer noted. Complete   • KNEE ARTHROSCOPY Right    • MASTECTOMY Left 8/18/2020    Procedure: Left mastectomy;  Surgeon: Sheila Garza MD;  Location: [facility];  Service: General;  Laterality: Left;   • MASTECTOMY WITH SENTINEL NODE BIOPSY AND AXILLARY NODE DISSECTION Right 8/18/2020    Procedure: Right BREAST MASTECTOMY WITH SENTINEL NODE BIOPSY;  Surgeon: Sheila Garza MD;  Location: [facility];  Service: General;  Laterality: Right;   • PORTACATH PLACEMENT         Pertinent Review of Systems  Negative except for history of present illness    Objective   Ht 162.6 cm (64\")   Wt 109 kg (240 lb 3.2 oz)   BMI 41.23 kg/m²    Physical Exam  Resolving alopecia secondary to chemotherapy  Patient has healing bilateral mastectomy scars.  There is no palpable mass or axillary adenopathy.    Procedures   L Dex was performed and was -1.8, with the prior on 7/31/2020 being -2.1    Results/Data:  Imaging:   Notes: Most recent note from medical oncology was reviewed  Lab:   Other:     Assessment/Plan   Right breast T1BN0 grade 2 mammary carcinoma, ER negative, MS positive, HER-2 negative, status post left mastectomy with sentinel node biopsy  Left mastectomy     Plan: 6-month follow-up with no studies  Prescription for mastectomy bras and prosthesis given  Patient had port prior to surgery for poor IV access and this will be left in       Discussion/Summary:    Time spent:     Patient's Body mass index is 41.23 kg/m². BMI is above normal parameters. " Recommendations include: educational material.         Future Appointments   Date Time Provider Department Center   1/20/2021 10:30 AM Josh Guerin APRN [facility]   1/26/2021  3:30 PM [facility]URELIO NURSE LAB [facility]   1/26/2021  3:30 PM [facility] INFUS CHAIR 21  [facility]   1/26/2021  4:00 PM Dejah Loco MD [facility]   2/12/2021  3:15 PM Markus Menjivar MD [facility]   3/12/2021  8:30 AM Gale Aguilar APRN [facility] None         Please note that portions of this note were completed with a voice recognition program.

## 2021-01-15 ENCOUNTER — PRIOR AUTHORIZATION (OUTPATIENT)
Dept: FAMILY MEDICINE CLINIC | Facility: CLINIC | Age: 46
End: 2021-01-15

## 2021-01-15 DIAGNOSIS — K21.9 GASTROESOPHAGEAL REFLUX DISEASE WITHOUT ESOPHAGITIS: Primary | Chronic | ICD-10-CM

## 2021-01-15 RX ORDER — OMEPRAZOLE 40 MG/1
40 CAPSULE, DELAYED RELEASE ORAL DAILY
Qty: 30 CAPSULE | Refills: 2 | Status: SHIPPED | OUTPATIENT
Start: 2021-01-15 | End: 2021-03-13

## 2021-01-20 ENCOUNTER — OFFICE VISIT (OUTPATIENT)
Dept: FAMILY MEDICINE CLINIC | Facility: CLINIC | Age: 46
End: 2021-01-20

## 2021-01-20 VITALS
WEIGHT: 242 LBS | HEIGHT: 64 IN | TEMPERATURE: 96.6 F | OXYGEN SATURATION: 96 % | SYSTOLIC BLOOD PRESSURE: 118 MMHG | DIASTOLIC BLOOD PRESSURE: 72 MMHG | BODY MASS INDEX: 41.32 KG/M2 | RESPIRATION RATE: 14 BRPM | HEART RATE: 92 BPM

## 2021-01-20 DIAGNOSIS — E55.9 VITAMIN D DEFICIENCY: ICD-10-CM

## 2021-01-20 DIAGNOSIS — E11.42 DM TYPE 2 WITH DIABETIC PERIPHERAL NEUROPATHY (HCC): Primary | Chronic | ICD-10-CM

## 2021-01-20 DIAGNOSIS — E78.2 MIXED HYPERLIPIDEMIA: Chronic | ICD-10-CM

## 2021-01-20 DIAGNOSIS — Z96.41 INSULIN PUMP IN PLACE: ICD-10-CM

## 2021-01-20 DIAGNOSIS — E53.8 VITAMIN B 12 DEFICIENCY: ICD-10-CM

## 2021-01-20 DIAGNOSIS — K21.9 GASTROESOPHAGEAL REFLUX DISEASE WITHOUT ESOPHAGITIS: Chronic | ICD-10-CM

## 2021-01-20 PROCEDURE — 99214 OFFICE O/P EST MOD 30 MIN: CPT | Performed by: NURSE PRACTITIONER

## 2021-01-20 PROCEDURE — 95251 CONT GLUC MNTR ANALYSIS I&R: CPT | Performed by: NURSE PRACTITIONER

## 2021-01-20 NOTE — PROGRESS NOTES
"  History of Present Illness   Nivia is a 44 y/o female who presents to the clinic today pertaining to her DM, type 2 and Dyslipidemia.. She has been hospitalized for DVTs and TIAs in th past.     Diabetes   She has type 2 diabetes mellitus. The initial diagnosis of diabetes was made 20 years ago. Associated symptoms include  fatigue and peripheral neuropathy. Pertinent negatives for diabetes include no foot ulcerations. Diabetic complications include peripheral neuropathy.  Risk factors for coronary artery disease include post-menopausal, stress and dyslipidemia. She is currently utilizing insulin pump therapy and CGM. She did stop weekly Ozempic due to nausea. She is currently taking Trijardy.  She has had a previous visit with a dietitian  Her home blood glucose trend is increasing at times during chemotherapy especially when she is taking steroids.  An ACE inhibitor/angiotensin II receptor blocker is not  being taken at this time. Eye exam is not current. .      Lab Results   Component Value Date    HGBA1C 9.60 (H) 11/17/2020      Dyslipidemia   The current episode started more than 1 year ago. Pertinent negatives include no chest pain or shortness of breath. She is taking Crestor 40 mg.     Lab Results   Component Value Date    CHOL 189 11/17/2020    TRIG 240 (H) 11/17/2020    HDL 36 (L) 11/17/2020     (H) 11/17/2020     Vitals:    01/20/21 1025   BP: 118/72   BP Location: Right arm   Patient Position: Sitting   Cuff Size: Adult   Pulse: 92   Resp: 14   Temp: 96.6 °F (35.9 °C)   TempSrc: Temporal   SpO2: 96%   Weight: 110 kg (242 lb)   Height: 162.6 cm (64.02\")        The following portions of the patient's history were reviewed and updated as appropriate: allergies, current medications, past family history, past medical history, past social history, past surgical history and problem list.    Review of Systems   Constitutional: Positive for fatigue. Negative for chills, diaphoresis, unexpected weight " gain and unexpected weight loss.   HENT: Negative.    Eyes: Positive for blurred vision and visual disturbance.   Respiratory: Negative for cough, shortness of breath and wheezing.    Cardiovascular: Positive for leg swelling. Negative for chest pain and palpitations.   Gastrointestinal: Positive for nausea and GERD.   Endocrine: Positive for heat intolerance. Negative for cold intolerance, polydipsia, polyphagia and polyuria.   Musculoskeletal: Positive for arthralgias. Negative for gait problem.   Skin: Negative for color change.   Allergic/Immunologic: Positive for immunocompromised state.   Neurological: Positive for weakness, numbness and memory problem. Negative for dizziness and light-headedness.   Psychiatric/Behavioral: Positive for sleep disturbance, depressed mood and stress. Negative for self-injury and suicidal ideas.      Physical Exam  Vitals signs reviewed.   Constitutional:       General: She is not in acute distress.     Appearance: She is well-developed.      Comments: Pleasant; Wearing appropriate face covering   HENT:      Head: Normocephalic.      Nose: Nose normal.   Eyes:      General: No scleral icterus.        Right eye: No discharge.         Left eye: No discharge.      Conjunctiva/sclera: Conjunctivae normal.   Neck:      Musculoskeletal: Neck supple.      Vascular: No JVD.   Cardiovascular:      Rate and Rhythm: Normal rate and regular rhythm.      Heart sounds: Normal heart sounds. No murmur. No friction rub.   Pulmonary:      Effort: Pulmonary effort is normal. No respiratory distress.      Breath sounds: Normal breath sounds. No decreased breath sounds, wheezing, rhonchi or rales.   Abdominal:      General: There is no distension.      Palpations: Abdomen is soft.      Tenderness: There is no abdominal tenderness. There is no guarding or rebound.   Musculoskeletal:      Right lower leg: No edema.      Left lower leg: No edema.   Lymphadenopathy:      Cervical: No cervical adenopathy.    Skin:     General: Skin is warm and dry.      Capillary Refill: Capillary refill takes less than 2 seconds.      Findings: No erythema.   Neurological:      Mental Status: She is alert and oriented to person, place, and time.   Psychiatric:         Mood and Affect: Mood normal.         Speech: Speech normal.         Behavior: Behavior is cooperative.         Thought Content: Thought content normal.       Assessment/Plan     Problems Addressed this Visit        Cardiac and Vasculature    Mixed hyperlipidemia    Relevant Orders    Comprehensive Metabolic Panel    Lipid Panel       Endocrine and Metabolic    DM type 2 with diabetic peripheral neuropathy (CMS/HCC) - Primary    Relevant Orders    Comprehensive Metabolic Panel    Lipid Panel    TSH    Hemoglobin A1c    MicroAlbumin, Urine, Random - Urine, Clean Catch    Vitamin D deficiency    Relevant Orders    Vitamin D 25 Hydroxy    Insulin pump in place       Gastrointestinal Abdominal     GERD (gastroesophageal reflux disease) (Chronic)    Relevant Orders    CBC & Differential    Comprehensive Metabolic Panel      Other Visit Diagnoses     Vitamin B 12 deficiency        Vit B12 level ordered for her to have done in March    Relevant Orders    Vitamin B12      Diagnoses       Codes Comments    DM type 2 with diabetic peripheral neuropathy (CMS/HCC)    -  Primary ICD-10-CM: E11.42  ICD-9-CM: 250.60, 357.2 Findings and recommendations discussed with Lilo.     Insulin pump in place     ICD-10-CM: Z96.41  ICD-9-CM: V45.85 Uploaded her insulin pump. Reviewed and Interpreted. No changes made at this time. Continue diabetes self management skills    Mixed hyperlipidemia     ICD-10-CM: E78.2  ICD-9-CM: 272.2 Continue Crestor    Gastroesophageal reflux disease without esophagitis     ICD-10-CM: K21.9  ICD-9-CM: 530.81 Stable with Prilosec    Vitamin D deficiency     ICD-10-CM: E55.9  ICD-9-CM: 268.9 Vit D level ordered for March    Vitamin B 12 deficiency     ICD-10-CM:  E53.8  ICD-9-CM: 266.2 Vit B12 level ordered for her to have done in March        Findings and recommendations reviewed with Nivia. Insulin pump uploaded, reviewed with Nivia. No changes made to her parameters. She will f/u with Dr Menjivar in February and with me in March with her labs planned prior to her appointment with me. Sooner if problems/concerns occur.       This document has been electronically signed by NIDA Starr, FAY-BC, DOUG  January 20, 2021 11:05 EST

## 2021-01-23 PROBLEM — Z96.41 INSULIN PUMP IN PLACE: Status: ACTIVE | Noted: 2021-01-23

## 2021-01-26 ENCOUNTER — CLINICAL SUPPORT (OUTPATIENT)
Dept: ONCOLOGY | Facility: CLINIC | Age: 46
End: 2021-01-26

## 2021-01-26 ENCOUNTER — APPOINTMENT (OUTPATIENT)
Dept: ONCOLOGY | Facility: HOSPITAL | Age: 46
End: 2021-01-26

## 2021-01-26 ENCOUNTER — INFUSION (OUTPATIENT)
Dept: ONCOLOGY | Facility: HOSPITAL | Age: 46
End: 2021-01-26

## 2021-01-26 VITALS
SYSTOLIC BLOOD PRESSURE: 118 MMHG | DIASTOLIC BLOOD PRESSURE: 75 MMHG | RESPIRATION RATE: 18 BRPM | BODY MASS INDEX: 41.62 KG/M2 | TEMPERATURE: 97.5 F | WEIGHT: 242.6 LBS | HEART RATE: 90 BPM | OXYGEN SATURATION: 97 %

## 2021-01-26 DIAGNOSIS — F32.A DEPRESSION, UNSPECIFIED DEPRESSION TYPE: ICD-10-CM

## 2021-01-26 DIAGNOSIS — M85.89 OSTEOPENIA OF MULTIPLE SITES: ICD-10-CM

## 2021-01-26 DIAGNOSIS — Z17.1 MALIGNANT NEOPLASM OF UPPER-OUTER QUADRANT OF RIGHT BREAST IN FEMALE, ESTROGEN RECEPTOR NEGATIVE (HCC): Primary | ICD-10-CM

## 2021-01-26 DIAGNOSIS — C50.411 MALIGNANT NEOPLASM OF UPPER-OUTER QUADRANT OF RIGHT BREAST IN FEMALE, ESTROGEN RECEPTOR NEGATIVE (HCC): Primary | ICD-10-CM

## 2021-01-26 DIAGNOSIS — Z79.810 LONG-TERM CURRENT USE OF TAMOXIFEN: ICD-10-CM

## 2021-01-26 DIAGNOSIS — Z95.828 PORT-A-CATH IN PLACE: Primary | ICD-10-CM

## 2021-01-26 DIAGNOSIS — R51.9 NEW ONSET OF HEADACHES: ICD-10-CM

## 2021-01-26 PROCEDURE — 25010000003 HEPARIN LOCK FLUSH PER 10 UNITS: Performed by: INTERNAL MEDICINE

## 2021-01-26 PROCEDURE — 36591 DRAW BLOOD OFF VENOUS DEVICE: CPT

## 2021-01-26 PROCEDURE — 85025 COMPLETE CBC W/AUTO DIFF WBC: CPT | Performed by: INTERNAL MEDICINE

## 2021-01-26 PROCEDURE — 99214 OFFICE O/P EST MOD 30 MIN: CPT | Performed by: INTERNAL MEDICINE

## 2021-01-26 PROCEDURE — 80053 COMPREHEN METABOLIC PANEL: CPT | Performed by: INTERNAL MEDICINE

## 2021-01-26 RX ORDER — SODIUM CHLORIDE 0.9 % (FLUSH) 0.9 %
10 SYRINGE (ML) INJECTION AS NEEDED
Status: DISCONTINUED | OUTPATIENT
Start: 2021-01-26 | End: 2021-01-26 | Stop reason: HOSPADM

## 2021-01-26 RX ORDER — ESCITALOPRAM OXALATE 20 MG/1
20 TABLET ORAL DAILY
Qty: 30 TABLET | Refills: 6 | Status: SHIPPED | OUTPATIENT
Start: 2021-01-26 | End: 2021-02-12

## 2021-01-26 RX ORDER — HEPARIN SODIUM (PORCINE) LOCK FLUSH IV SOLN 100 UNIT/ML 100 UNIT/ML
500 SOLUTION INTRAVENOUS AS NEEDED
Status: DISCONTINUED | OUTPATIENT
Start: 2021-01-26 | End: 2021-01-26 | Stop reason: HOSPADM

## 2021-01-26 RX ORDER — SODIUM CHLORIDE 0.9 % (FLUSH) 0.9 %
10 SYRINGE (ML) INJECTION AS NEEDED
Status: CANCELLED | OUTPATIENT
Start: 2021-03-09

## 2021-01-26 RX ORDER — HEPARIN SODIUM (PORCINE) LOCK FLUSH IV SOLN 100 UNIT/ML 100 UNIT/ML
500 SOLUTION INTRAVENOUS AS NEEDED
Status: CANCELLED | OUTPATIENT
Start: 2021-03-09

## 2021-01-26 RX ADMIN — Medication 500 UNITS: at 15:07

## 2021-01-26 RX ADMIN — SODIUM CHLORIDE, PRESERVATIVE FREE 10 ML: 5 INJECTION INTRAVENOUS at 15:06

## 2021-01-26 NOTE — PROGRESS NOTES
NAME: Nivia Bernstein    : 1975    DATE:  2021    DIAGNOSIS:   Stage IA ([id number]) moderately differentiated invasive ductal carcinoma of the R breast with associated DCIS.  Tumor was 10 mm in maximal dimension.  0/10 SLN involved. ER negative, IN 15% + (1+), HER-2/cat negative. Mammaprint high risk.    CHIEF COMPLAINT:  Follow up Breast cancer    TREATMENT HISTORY:  1. Initially planned to give 4 cycles to Taxotere/Cytoxan, during 1st infusion of Taxotere on 2020 patient developed allergic reaction. Therefore, Taxotere was discontinued and regimen changed to DD AC.    2.      3.  Started Tamoxifen 12-    HISTORY OF PRESENT ILLNESS:   Nivia Bernstein is a very pleasant 45 y.o. female who who is being seen today at the request of Sheila Garza MD for evaluation and treatment of breast cancer. She did not notice a palpable lump, but was recommended to have a screening mammogram by her PCP. A nodule in the right upper quadrant of the R breast was noted. The mass measured 0.8 cm on ultrasound. Biopsy was consistent with a moderately differentiated invasive ductal carcinoma, ER negative, IN weakly (15%) positive, and HER-2/cat negative. She underwent bilateral mastectomy with sentinel lymph node biopsy with Dr. Garza on 20. Pathology from this showed a moderately differentiated invasive ductal carcinoma with associated intermediate grade DCIS, negative margins.  0/10 /SLN involved.  Mammaprint was high risk. She was referred to our clinic and is here today with her  for discussion of further treatment options.      Ms. Bernstein is healing well from the surgery.  She did develop a seroma, which was drained by Dr. Garza yesterday. She reports muscular chest discomfort r/t the procedure, but otherwise denies any other symptoms including fevers, chills, significant weight changes, shortness of breath, abdominal pain, n/v/d/c, bony pain or headaches.    Interval History:  Ms. Bernstein is here  today for follow up of breast cancer. She completed all chemotherapy and is slowly recovering.  She says she still feels fatigued but is otherwise doing well.  Her hair is just starting to come back in with stubble over her scalp.  She is anxious about what comes next and has questions about this today.  She is otherwise well.     Ms. Bernstein is here today for follow up of breast cancer. She reports fatigue and says she does not feel any better with Lexapro 10 mg. She sleeps poorly at night and says she worries and this keeps her up at night.  She is taking Tamoxifen and tolerating it well. She reports new onset headaches that started about 2 months ago. The pain is across the frontal area.  She says she usually has the headache on awakening and it lasts all day. She has taken Tylenol, but it does not relieve the headaches. She denies photophonophobia but has associated nausea.  She reports chronic blurry vision.  This is older and may be related to needing glasses update but this isn't entirely clear.  No diplopia or focal neurologic deficits.     PAST MEDICAL HISTORY:  Past Medical History:   Diagnosis Date   • Altered mental status 10/16/2017   • Anxiety and depression 3/31/2017   • Arthritis    • Asthma    • DVT (deep venous thrombosis) (CMS/HCC) 2003    x 1 in RLE following fall & surgery   • Elevated alkaline phosphatase level 10/16/2017   • Enlarged liver    • GERD (gastroesophageal reflux disease)    • Heart murmur    • Hypokalemia 10/16/2017   • Leg pain 10/16/2017   • Mitral valve prolapse    • Neuropathy     related to diabetes   • Obesity 3/31/2017   • OCD (obsessive compulsive disorder) 10/16/2017   • Osteoporosis    • PONV (postoperative nausea and vomiting)    • Renal insufficiency 10/16/2017   • Right breast cancer with malignant cells in regional lymph nodes no greater than 0.2 mm and no more than 200 cells (CMS/HCC) 8/13/2020   • Thrombocytopenia (CMS/HCC) 10/16/2017   • TIA (transient ischemic  attack)     4 TIMES       PAST SURGICAL HISTORY:  Past Surgical History:   Procedure Laterality Date   • APPENDECTOMY     • CARPAL TUNNEL RELEASE     • CHOLECYSTECTOMY     • FINGER FUSION Left     3rd   • HYSTERECTOMY  2011    removed due to an ovarian cyst and dysmenorrhia. No cancer noted. Complete   • KNEE ARTHROSCOPY Right    • MASTECTOMY Left 8/18/2020    Procedure: Left mastectomy;  Surgeon: Sheila Garza MD;  Location: [facility];  Service: General;  Laterality: Left;   • MASTECTOMY WITH SENTINEL NODE BIOPSY AND AXILLARY NODE DISSECTION Right 8/18/2020    Procedure: Right BREAST MASTECTOMY WITH SENTINEL NODE BIOPSY;  Surgeon: Sheila Garza MD;  Location: [facility];  Service: General;  Laterality: Right;   • PORTACATH PLACEMENT         SOCIAL HISTORY:  Social History     Socioeconomic History   • Marital status:      Spouse name: Not on file   • Number of children: Not on file   • Years of education: Not on file   • Highest education level: Not on file   Occupational History   • Occupation: Not Working   Social Needs   • Financial resource strain: Not hard at all   • Food insecurity     Worry: Never true     Inability: Never true   • Transportation needs     Medical: No     Non-medical: No   Tobacco Use   • Smoking status: Never Smoker   • Smokeless tobacco: Never Used   Substance and Sexual Activity   • Alcohol use: No   • Drug use: No   • Sexual activity: Yes     Partners: Male   Lifestyle   • Physical activity     Days per week: 3 days     Minutes per session: 100 min   • Stress: Very much   Relationships   • Social connections     Talks on phone: More than three times a week     Gets together: More than three times a week     Attends Gnosticist service: Never     Active member of club or organization: No     Attends meetings of clubs or organizations: Never     Relationship status:    Social History Narrative    Mrs. Bernstein is a 42 year old white  female. They live in  [facility] with their 4 children. She has extended family local as well. She works as a  at [employer] in [place] [facility]. She does not have an advanced directive.         FAMILY HISTORY:  Family History   Problem Relation Age of Onset   • Diabetes Mother    • Hypertension Mother    • Diabetes Father    • Heart disease Father    • Alcohol abuse Father    • Seizures Father    • Hypertension Father    • No Known Problems Brother    • No Known Problems Daughter    • Bone cancer Son    • Suicide Attempts Cousin    • Stomach cancer Cousin         maternal first cousin   • Breast cancer Paternal Aunt 52   • Diabetes Maternal Grandmother    • Diabetes Maternal Grandfather    • Lung cancer Maternal Grandfather    • Heart disease Paternal Grandmother    • Diabetes Paternal Grandmother    • Heart disease Paternal Grandfather    • Diabetes Paternal Grandfather    • Ovarian cancer Maternal Aunt    • Lung cancer Maternal Uncle    • Colon cancer Maternal Uncle    • Cancer Maternal Uncle         unknown type     MEDICATIONS:  The current medication list was reviewed in the EMR    Current Outpatient Medications:   •  albuterol sulfate HFA (Ventolin HFA) 108 (90 Base) MCG/ACT inhaler, Inhale 2 puffs Every 4 (Four) Hours As Needed (shortness of breath)., Disp: 18 g, Rfl: 5  •  Breo Ellipta 200-25 MCG/INH inhaler, Inhale 1 puff Daily., Disp: 1 inhaler, Rfl: 5  •  diazePAM (VALIUM) 5 MG tablet, TAKE 1 TABLET BY MOUTH DAILY., Disp: 30 tablet, Rfl: 2  •  diclofenac (VOLTAREN) 1 % gel gel, Apply 4 g topically to the appropriate area as directed 4 (Four) Times a Day As Needed (joint pain)., Disp: 500 g, Rfl: 5  •  docusate sodium 100 MG capsule, Take 100 mg by mouth 2 (Two) Times a Day., Disp: , Rfl:   •  Empagliflozin-Linaglip-Metform 12.5-2.5-1000 MG tablet sustained-release 24 hour, Take 2 tablets by mouth Daily., Disp: 60 tablet, Rfl: 5  •  escitalopram (LEXAPRO) 10 MG tablet, Take 1 tablet by mouth Daily., Disp: 30  tablet, Rfl: 5  •  fluticasone (FLONASE) 50 MCG/ACT nasal spray, 2 sprays into the nostril(s) as directed by provider Daily As Needed for Rhinitis or Allergies., Disp: 1 bottle, Rfl: 5  •  insulin lispro (HumaLOG) 100 UNIT/ML patient supplied pump, Inject  under the skin into the appropriate area as directed Continuous. Basil rate: 2.95/hr Carb ratio: 4.0, Disp: , Rfl:   •  linaclotide (LINZESS) 290 MCG capsule capsule, Take 1 capsule by mouth Every Morning Before Breakfast., Disp: 30 capsule, Rfl: 5  •  magic mouthwash oral suspension, Swish and swallow 5 mL four times  a day As Needed for Mucositis., Disp: 240 mL, Rfl: 1  •  omeprazole (priLOSEC) 40 MG capsule, Take 1 capsule by mouth Daily., Disp: 30 capsule, Rfl: 2  •  ondansetron (ZOFRAN) 8 MG tablet, Take 1 tablet by mouth 3 (Three) Times a Day As Needed for Nausea or Vomiting., Disp: 30 tablet, Rfl: 5  •  prochlorperazine (COMPAZINE) 10 MG tablet, Take 1 tablet by mouth Every 6 (Six) Hours As Needed for Nausea or Vomiting., Disp: 30 tablet, Rfl: 5  •  rosuvastatin (CRESTOR) 40 MG tablet, Take 1 tablet by mouth Every Night., Disp: 30 tablet, Rfl: 5  •  sennosides-docusate (senna-docusate sodium) 8.6-50 MG per tablet, Take 2 tablets by mouth 2 (Two) Times a Day., Disp: 120 tablet, Rfl: 4  •  tamoxifen (NOLVADEX) 20 MG chemo tablet, Take 1 tablet by mouth Daily., Disp: 30 tablet, Rfl: 5  •  vitamin D (ERGOCALCIFEROL) 1.25 MG (69788 UT) capsule capsule, TAKE ONE CAPSULE BY MOUTH ONCE WEEKLY, Disp: 12 capsule, Rfl: 0  No current facility-administered medications for this visit.     Facility-Administered Medications Ordered in Other Visits:   •  heparin injection 500 Units, 500 Units, Intravenous, PRN, Dejah Loco MD, 500 Units at 01/26/21 1507  •  sodium chloride 0.9 % flush 10 mL, 10 mL, Intravenous, PRN, Dejah Loco MD, 10 mL at 01/26/21 1506    ALLERGIES:    Allergies   Allergen Reactions   • Mobic [Meloxicam] Rash   • Penicillins Angioedema   •  Taxotere [Docetaxel] Anaphylaxis     9 minutes into 1st infusion   • Novolog [Insulin Aspart] Hives     REVIEW OF SYSTEMS:    A comprehensive 14 point review of systems was performed.  Significant findings as mentioned above.  All other systems reviewed and are negative.      Physical Exam  Vital Signs:   There were no vitals taken for this visit.  There were no vitals filed for this visit.       General: Awake, alert and oriented.  Appears fatigued but otherwise well.  Hair is coming back in   HEENT:  PERRLA, EOM full, OP clear, mmm  Neck: No thyromegaly. No JVD.   Lungs: Lungs are clear to auscultation bilaterally, no crackles  Heart:  Regular rate and rhythm. No murmurs, rubs, or gallops.   Breast: S/p bilateral mastectomy and RSLNBx.  Surgical scars smooth and intact without nodularity..  No axillary adenopathy on either side.    Abdomen: Soft, Non tender, non-distended, bowel sounds present. No organomegaly    Extremities: No cyanosis or edema.   Lymph Nodes: No palpable cervical, submandibular, supraclavicular, axillary lymphadenopathy    Neurologic: MS as above. CN II-XII intact, strength 5/5 throughout BUE, BLE.  Light touch diffusely intact.    PATHOLOGY:              IMAGING:  Bilateral screening mammogram 06-19-20  FINDINGS:    The breast tissue is heterogeneously dense.  New focal nodularity right upper outer breast that is about 14 cm from  the nipple.  Otherwise, no suspicious findings.     IMPRESSION:  New focal nodularity right upper outer breast that is about  14 cm from the nipple.     RECOMMENDATION:  Spot compression imaging.     BI-RADS CAT 0, INCOMPLETE.  The patient needs additional imaging.        Diagnostic R mammogram with R breast US 07-14-20  FINDINGS:  Additional mammographic imaging images of persistent  partially obscured oval mass in the posterior right upper outer  quadrant.     Right breast ultrasound: Focused sonographic evaluation of the right  breast demonstrates a 0.8 cm  irregular hypoechoic mass with ill-defined  borders located at 10:00, 13 cm from the nipple. An additional area was  initially noted by the technologist in the 9:00 region which represents  an isolated fat lobule.           IMPRESSION:  0.8 cm irregular mass in the right 10:00 region. Recommend  ultrasound-guided core biopsy.        BI-RADS CATEGORY:  4, SUSPICIOUS        RECOMMENDATION:  Recommend ultrasound guided core biopsy of the right  breast.        Bilateral breast MRI w/wo contrast 08-11-20  FINDINGS: There is minimal bilateral background contrast enhancement and  normal vascular enhancement.     There are no worrisome areas of contrast enhancement in the left breast.     In the right breast correlating to the biopsy proven malignancy is an  approximately 0.7 cm irregular rapidly enhancing mass in the posterior  right 10:00 region. Artifact from a postbiopsy marking clip is located  adjacent to this mass. A small linear area of enhancement extends  posterior to this mass approximately 1 cm. There are no additional  worrisome areas of contrast enhancement in the right breast.     There is no axillary adenopathy by MRI criteria and no chest wall  abnormality is seen.        IMPRESSION:  1. Negative left breast MRI.        2. Biopsy-proven malignancy in the right 10:00 region with a small  linear area of enhancement extending posterior to the 0.7 cm mass.     RECOMMENDATIONS: Recommend surgical follow-up.     BI-RADS CATEGORY: 6, KNOWN BIOPSY PROVEN MALIGNANCY    Echo 10-16-17:  A two-dimensional transthoracic echocardiogram with color flow and Doppler was performed.   The study is technically good for diagnosis.Verbal consent was obtained from the patient to use saline contrast in order to optimize the study.  A total of 20 mL of agitated saline was administered to assess for cardiac shunting.   No adverse reaction to contrast was noted.   Echocardiogram Findings    Left Ventricle Left ventricular systolic  function is normal. Estimated EF appears to be in the range of 56 - 60%. Normal left ventricular cavity size and wall thickness noted. All left ventricular wall segments contract normally. Left ventricular diastolic function is normal.   Right Ventricle Normal right ventricular cavity size noted.   Left Atrium Normal left atrial size noted. Saline test results are negative.   Right Atrium Normal right atrial size noted.   Aortic Valve The aortic valve is structurally normal. Trace aortic valve regurgitation is present.   Mitral Valve The mitral valve is normal in structure. Trace mitral valve regurgitation is present.   Tricuspid Valve The tricuspid valve is normal.  Trace tricuspid valve regurgitation is present.   Pulmonic Valve The pulmonic valve is structurally normal. There is trace pulmonic valve regurgitation present.   Greater Vessels No dilation of the aortic root is present. No dilation of the sinuses of Valsalva is present.   Pericardium There is no evidence of pericardial effusion.           ECHO 09/29/2020  · Poor quality echo and Doppler study  · Normal left ventricular cavity size and wall thickness noted.  · Left ventricular ejection fraction appears to be 61 - 65%. Left ventricular systolic function is normal.  · Left ventricular diastolic function is consistent with (grade I) impaired relaxation.  · Aortic and mitral valve are poorly visualized but appear to be normal,  · Tricuspid and pulmonic valve echoes are not visualized.  · There is no pericardial effusion noted.    Echocardiogram 12-08-02  · Normal left ventricular cavity size and wall thickness noted. All left ventricular wall segments contract normally.  · Left ventricular ejection fraction appears to be 56 - 60%.  · The pericardium is normal. There is no evidence of pericardial effusion. .    RECENT LABS:  Lab Results   Component Value Date    WBC 7.78 01/26/2021    HGB 12.8 01/26/2021    HCT 41.8 01/26/2021    MCV 83.1 01/26/2021    RDW  12.9 01/26/2021     01/26/2021    NEUTRORELPCT 55.1 01/26/2021    LYMPHORELPCT 34.3 01/26/2021    MONORELPCT 7.6 01/26/2021    EOSRELPCT 1.9 01/26/2021    BASORELPCT 0.8 01/26/2021    NEUTROABS 4.29 01/26/2021    LYMPHSABS 2.67 01/26/2021       Lab Results   Component Value Date     (L) 01/26/2021    K 3.3 (L) 01/26/2021    CO2 24.6 01/26/2021     01/26/2021    BUN 13 01/26/2021    CREATININE 0.78 01/26/2021    EGFRIFNONA 79 01/26/2021    EGFRIFAFRI 132 05/13/2020    GLUCOSE 181 (H) 01/26/2021    CALCIUM 9.1 01/26/2021    ALKPHOS 133 (H) 01/26/2021    AST 20 01/26/2021    ALT 23 01/26/2021    BILITOT 0.2 01/26/2021    ALBUMIN 4.02 01/26/2021    PROTEINTOT 7.3 01/26/2021    MG 2.1 10/17/2017       Lab Results   Component Value Date    URICACID 5.2 09/10/2019       Lab Results   Component Value Date    [id number] 411 05/13/2020    FOLATE 15.19 10/16/2017      ASSESSMENT & PLAN:  Nivia Bernstein is a very pleasant 45 y.o. female with Stage IA ([id number]) moderately differentiated invasive ductal carcinoma of the R breast with associated DCIS.  Tumor was 10 mm in maximal dimension.  0/10 SLN involved. ER negative, CA 15% + (1+), HER-2/cat negative. Mammaprint high risk.     1.  R breast cancer:   -  S/p R mastectomy and SLNBx as well as prophylactic contralateral mastectomy performed y Dr. Garza 8-18-20.  - She is healing well from bilateral mastectomy. This was complicated by left seroma, drained by Dr. Garza. Incisions are healed well.   - Given high risk Mammaprint, Dr. Loco recommended adjuvant chemotherapy.  We discussed different possibilities for adjuvant therapy. Given high risk Mammaprint but early stage, node negative disease as well as comorbidities, recommended Taxotere/Cytoxan Q21d x 4 cycles with growth factor support. Discussed potential risks, benefits, and side effects and she would like to proceed.   - She had port placed several years ago due to poor peripheral access. This has  been well cared for and maintained. Will continue to use this for her treatments.   - C1 Taxotere/Cytoxan was planned for 09/24/2020. Unfortunately, this had to be discontinued due to allergic reaction to Taxotere. Recommended change of treatment to DD AC.   - She tolerated treatment well and aside from fatigue is recovering well. Echo is essentially unchanged.   -  Her tumor was ER neg but did have 15% 1+ positivity for progesterone receptor.  She is s/p complete hysterectomy and had evidence of osteopenia with T score -1.9 in June 2020.  Given all of this, I have recommended treatment with Tamoxifen over aromatase inhibitor. She is tolerating Tamoxifen well.  -  She does continue to struggle with depression.  She previously had been on Paxil 20 mg, but weaned her off Paxil and started Lexapro 10 mg because of interaction w/ Tamoxifen.  She says Lexapro 10 isn't controlling her symptoms.  Will increase Lexapro to 20 mg daily.     2.  New onset headaches:  -  Migranous in character, new over the last 2 months or so.    -  Physical exam is unremarkable.  -  Will check MRI brain to further evaluate.    3. Extensive family history of malignancy:   - genetic testing was ordered by Dr. Garza and performed by [facility] with no mutations, specifically including BRCA 1/2, CHKE2, CDH1, PALB2 and others.    4. Anxiety/Depression:  -Her psychiatrist prescribed Paxil 20 mg daily and she takes Valium per PCP.  She had been doing well on this.  Unfortunately Tamoxifen interacts with Paxil.  I recommended trial of Lexapro and she is currently taking 10 mg daily with incomplete control of symptoms.  Will increase to 20 mg daily.    5.  Insomnia:  -  Will increase Lexapro as above.  -  Recommended trial of Melatonin.     6. Prophylaxis:   -  She received Prevnar 13 vaccine.  -  She had 2020 flu vaccine per [facility].  -  M. Uncle had colon cancer at age 50.  Will need Colonoscopy once treatment for breast cancer is  complete.  We discussed this today but will hold off until she is better recovered from her treatment.    7. . Follow up:  - MRI brain  -  RTC for survivorship visit following MRI.    8.   ACO / NII/Other  Quality measures  -  Nivia Bernstein received 2020 flu vaccine.  -  Nivia Bernstein reports a pain score of .  Given her pain assessment as noted, treatment options were discussed and the following options were decided upon as a follow-up plan to address the patient's pain: continuation of current treatment plan for pain.  -  Current outpatient and discharge medications have been reconciled for the patient.  Reviewed by: Dejah Loco MD      This note was scribed for Dejah Loco MD by Chantell Rahman RN.    I, Dejah Loco MD, personally performed the services described in this documentation as scribed by the above named individual in my presence, and it is both accurate and complete.  01/26/2021          A total of 30 minutes were spent coordinating this patient’s care in clinic today; more than 50% of this time was face-to-face with the patient, reviewing her interim medical history and counseling on the current treatment and followup plan. All questions were answered to her satisfaction.      Electronically Signed by: NIDA Tarango  January 26, 2021 16:14 EST       CC:   MD Otto Hager Paul Christen, MD

## 2021-01-28 ENCOUNTER — HOSPITAL ENCOUNTER (OUTPATIENT)
Dept: MRI IMAGING | Facility: HOSPITAL | Age: 46
Discharge: HOME | End: 2021-01-28
Admitting: INTERNAL MEDICINE

## 2021-01-28 DIAGNOSIS — Z17.1 MALIGNANT NEOPLASM OF UPPER-OUTER QUADRANT OF RIGHT BREAST IN FEMALE, ESTROGEN RECEPTOR NEGATIVE (HCC): ICD-10-CM

## 2021-01-28 DIAGNOSIS — C50.411 MALIGNANT NEOPLASM OF UPPER-OUTER QUADRANT OF RIGHT BREAST IN FEMALE, ESTROGEN RECEPTOR NEGATIVE (HCC): ICD-10-CM

## 2021-01-28 DIAGNOSIS — R51.9 NEW ONSET OF HEADACHES: ICD-10-CM

## 2021-01-28 PROCEDURE — 0 GADOBENATE DIMEGLUMINE 529 MG/ML SOLUTION

## 2021-01-28 PROCEDURE — 0 GADOBENATE DIMEGLUMINE 529 MG/ML SOLUTION: Performed by: INTERNAL MEDICINE

## 2021-01-28 PROCEDURE — 25010000003 HEPARIN LOCK FLUSH PER 10 UNITS

## 2021-01-28 PROCEDURE — A9577 INJ MULTIHANCE: HCPCS

## 2021-01-28 PROCEDURE — A9577 INJ MULTIHANCE: HCPCS | Performed by: INTERNAL MEDICINE

## 2021-01-28 PROCEDURE — 70553 MRI BRAIN STEM W/O & W/DYE: CPT

## 2021-01-28 PROCEDURE — 70553 MRI BRAIN STEM W/O & W/DYE: CPT | Performed by: RADIOLOGY

## 2021-01-28 RX ADMIN — GADOBENATE DIMEGLUMINE 20 ML: 529 INJECTION, SOLUTION INTRAVENOUS at 12:15

## 2021-01-29 ENCOUNTER — TELEPHONE (OUTPATIENT)
Dept: FAMILY MEDICINE CLINIC | Facility: CLINIC | Age: 46
End: 2021-01-29

## 2021-01-29 NOTE — TELEPHONE ENCOUNTER
Interval History: no events overnight.    Review of Systems   Constitutional: Negative for appetite change, chills and fever.   HENT: Negative for congestion.    Eyes: Negative for pain and itching.   Respiratory: Negative for cough, chest tightness and shortness of breath.    Cardiovascular: Negative for palpitations and leg swelling.   Gastrointestinal: Negative for abdominal pain and nausea.   Endocrine: Negative for polyphagia and polyuria.   Genitourinary: Negative for dysuria and frequency.   Musculoskeletal: Negative for back pain.   Neurological: Negative for numbness and headaches.   Psychiatric/Behavioral: Negative for agitation and behavioral problems.     Objective:     Vital Signs (Most Recent):  Temp: 98.1 °F (36.7 °C) (10/23/20 0917)  Pulse: 77 (10/23/20 0917)  Resp: 17 (10/23/20 0917)  BP: 125/71 (10/23/20 0917)  SpO2: 98 % (10/23/20 0917) Vital Signs (24h Range):  Temp:  [98.1 °F (36.7 °C)-98.4 °F (36.9 °C)] 98.1 °F (36.7 °C)  Pulse:  [64-77] 77  Resp:  [16-20] 17  SpO2:  [98 %] 98 %  BP: (106-125)/(60-71) 125/71     Weight: 67.9 kg (149 lb 11.1 oz)  Body mass index is 25.69 kg/m².    Estimated Creatinine Clearance: 54.7 mL/min (based on SCr of 1.1 mg/dL).    Physical Exam  Constitutional:       Appearance: She is well-developed.   HENT:      Head: Normocephalic.   Cardiovascular:      Rate and Rhythm: Normal rate and regular rhythm.      Heart sounds: Normal heart sounds. No murmur.   Pulmonary:      Effort: Pulmonary effort is normal.      Breath sounds: Normal breath sounds.   Abdominal:      General: Bowel sounds are normal. There is no distension.      Palpations: Abdomen is soft.      Tenderness: There is no abdominal tenderness.   Musculoskeletal: Normal range of motion.   Neurological:      Mental Status: She is alert and oriented to person, place, and time.   Psychiatric:         Behavior: Behavior normal.         Significant Labs: All pertinent labs within the past 24 hours have been  [facility] ([facility]) CALLED STATING THAT THEY ARE NEEDING THE PATIENT'S CLINICAL NOTES FOR THEIR RECORDS. THEY ARE ASKING TO BE CONTACTED BACK WHEN POSSIBLE. THEY ALSO DIDN'T CLARIFY WHAT SPECIFIC NOTES OR FROM WHEN. PLEASE ADVISE.     CONTACT: [phone]   reviewed.    Significant Imaging: I have reviewed all pertinent imaging results/findings within the past 24 hours.

## 2021-02-10 DIAGNOSIS — F32.A ANXIETY AND DEPRESSION: Chronic | ICD-10-CM

## 2021-02-10 DIAGNOSIS — F41.9 ANXIETY AND DEPRESSION: Chronic | ICD-10-CM

## 2021-02-12 ENCOUNTER — OFFICE VISIT (OUTPATIENT)
Dept: FAMILY MEDICINE CLINIC | Facility: CLINIC | Age: 46
End: 2021-02-12

## 2021-02-12 DIAGNOSIS — N28.9 RENAL INSUFFICIENCY: ICD-10-CM

## 2021-02-12 DIAGNOSIS — E66.01 CLASS 3 SEVERE OBESITY WITH SERIOUS COMORBIDITY AND BODY MASS INDEX (BMI) OF 40.0 TO 44.9 IN ADULT, UNSPECIFIED OBESITY TYPE (HCC): ICD-10-CM

## 2021-02-12 DIAGNOSIS — Z00.00 HEALTHCARE MAINTENANCE: ICD-10-CM

## 2021-02-12 DIAGNOSIS — E55.9 VITAMIN D DEFICIENCY: ICD-10-CM

## 2021-02-12 DIAGNOSIS — E11.42 DM TYPE 2 WITH DIABETIC PERIPHERAL NEUROPATHY (HCC): ICD-10-CM

## 2021-02-12 DIAGNOSIS — K21.9 GASTROESOPHAGEAL REFLUX DISEASE WITHOUT ESOPHAGITIS: Chronic | ICD-10-CM

## 2021-02-12 DIAGNOSIS — E78.2 MIXED HYPERLIPIDEMIA: ICD-10-CM

## 2021-02-12 DIAGNOSIS — Z86.73 HISTORY OF TIAS: ICD-10-CM

## 2021-02-12 DIAGNOSIS — M85.89 OSTEOPENIA OF MULTIPLE SITES: ICD-10-CM

## 2021-02-12 DIAGNOSIS — Z86.718 HISTORY OF DVT (DEEP VEIN THROMBOSIS): ICD-10-CM

## 2021-02-12 DIAGNOSIS — J30.9 CHRONIC ALLERGIC RHINITIS: Primary | ICD-10-CM

## 2021-02-12 DIAGNOSIS — N95.1 MENOPAUSAL SYMPTOMS: ICD-10-CM

## 2021-02-12 DIAGNOSIS — Z17.1 MALIGNANT NEOPLASM OF UPPER-OUTER QUADRANT OF RIGHT BREAST IN FEMALE, ESTROGEN RECEPTOR NEGATIVE (HCC): ICD-10-CM

## 2021-02-12 DIAGNOSIS — F42.8 OTHER OBSESSIVE-COMPULSIVE DISORDERS: Chronic | ICD-10-CM

## 2021-02-12 DIAGNOSIS — J45.20 MILD INTERMITTENT ASTHMA WITHOUT COMPLICATION: ICD-10-CM

## 2021-02-12 DIAGNOSIS — C50.411 MALIGNANT NEOPLASM OF UPPER-OUTER QUADRANT OF RIGHT BREAST IN FEMALE, ESTROGEN RECEPTOR NEGATIVE (HCC): ICD-10-CM

## 2021-02-12 DIAGNOSIS — I65.23 ATHEROSCLEROSIS OF BOTH CAROTID ARTERIES: ICD-10-CM

## 2021-02-12 PROCEDURE — 99214 OFFICE O/P EST MOD 30 MIN: CPT | Performed by: GENERAL PRACTICE

## 2021-02-12 RX ORDER — VENLAFAXINE HYDROCHLORIDE 37.5 MG/1
CAPSULE, EXTENDED RELEASE ORAL
Qty: 60 CAPSULE | Refills: 1 | Status: SHIPPED | OUTPATIENT
Start: 2021-02-12 | End: 2021-03-12 | Stop reason: SDUPTHER

## 2021-02-12 RX ORDER — DIAZEPAM 5 MG/1
2.5-5 TABLET ORAL DAILY PRN
Qty: 5 TABLET | Refills: 0 | Status: SHIPPED | OUTPATIENT
Start: 2021-02-12 | End: 2021-02-22

## 2021-02-12 RX ORDER — ONDANSETRON HYDROCHLORIDE 8 MG/1
TABLET, FILM COATED ORAL
Qty: 30 TABLET | Refills: 5 | Status: SHIPPED | OUTPATIENT
Start: 2021-02-12 | End: 2022-05-18 | Stop reason: SDUPTHER

## 2021-02-12 NOTE — PROGRESS NOTES
Raquel Bernstein is a 45 y.o. female.     History of Present Illness     Breast Cancer  Underwent an ultrasound guided biopsy of a right upper outer quadrant breast mass on 7/21/2019.  Pathology was consistent with an invasive ductal carcinoma and hormonal markers eventually returned ER negative TX weakly positive and HER-2 negative.  Bilateral mastectomy with sentinel node biopsy was performed on 8/18/2020.  Pathology was consistent with a moderately differentiated invasive ductal carcinoma with associated intermediate grade DCIS but negative margins and lymph nodes.  MammaPrint was high risk.  She was started on Taxotere/Cytoxan but developed a significant reaction with the first infusion and was changed to DD AC.  She is now on tamoxifen.  She is scheduled to undergo an oncology reassessment with Dr. Byrnes on 3/9/2021 and will see Dr. Garza again on 7/8/2021    Chronic Anxiety  She has a history of OCD and admits to persistent nervousness, worrying, difficulty sleeping, and daytime fatigue.  She has been under considerable stress and admits to intermittent depression.  She denies any loss of interest in activities and there is no history of any suicidal ideation.  She was changed from escitalopram to paroxetine to after starting tamoxifen. She does not feel that it has been as effective as of yet. She remains on diazepam.  She is scheduled to undergo a psychiatric reassessment with Gale ALVA on 3/12/2021    History of TIA  She gives a history of previous TIA.  MRI of the brain performed on 10/16/2017 was unremarkable.  Duplex Doppler ultrasound of the carotid arteries performed the same day revealed homogenous plaque bilaterally but no evidence of stenosis.  Echocardiogram performed the same day was reported as showing trace mitral, aortic, and pulmonic valve regurgitation but no significant abnormalities with an estimated EF of 56 to 60% and a negative saline test for intracardiac shunting.  Echocardiogram performed on 9/29/2020 was a limited study but reported as showing grade 1 diastolic dysfunction but normal systolic function with an estimated EF of 61 to 65%. She has resumed ASA.  She gives a history of a previous right DVT following arthroscopic knee surgery on that side.  There is no family history of DVT or PE    Diabetes  Current symptoms include paresthesia of the feet. Patient denies visual disturbances, polydipsia, polyuria or foot ulcerations and has had no recent hypoglycemia. Evaluation to date has been: fasting blood sugar and hemoglobin A1C. Home sugars: BGs consistently in an acceptable range. Current treatments: metformin, linagliptin, empagliflozin (together as trijardy XR), and an insulin pump.  She is scheduled to undergo a reassessment with NIDA Starr/DOUG on 3/17/2021  Lab Results   Component Value Date    HGBA1C 9.60 (H) 11/17/2020     Dyslipidemia  Compliance with treatment has been fairly good. The patient exercises intermittently. She remains on rosuvastatin with no apparent side effects  Lab Results   Component Value Date    CHOL 189 11/17/2020    CHLPL 168 05/13/2020    TRIG 240 (H) 11/17/2020    HDL 36 (L) 11/17/2020     (H) 11/17/2020     Labs  Lab Results   Component Value Date    WBC 7.78 01/26/2021    HGB 12.8 01/26/2021    HCT 41.8 01/26/2021    MCV 83.1 01/26/2021     01/26/2021     Lab Results   Component Value Date    GLUCOSE 181 (H) 01/26/2021    BUN 13 01/26/2021    CREATININE 0.78 01/26/2021    EGFRIFNONA 79 01/26/2021    EGFRIFAFRI 132 05/13/2020    BCR 16.7 01/26/2021    K 3.3 (L) 01/26/2021    CO2 24.6 01/26/2021    CALCIUM 9.1 01/26/2021    PROTENTOTREF 7.0 05/13/2020    ALBUMIN 4.02 01/26/2021    LABIL2 1.6 05/13/2020    AST 20 01/26/2021    ALT 23 01/26/2021     The following portions of the patient's history were reviewed and updated as appropriate: allergies, current medications, past medical history, past social history and  problem list.    Review of Systems   Constitutional: Positive for fatigue. Negative for activity change, appetite change, chills, fever and unexpected weight change.   HENT: Positive for congestion and rhinorrhea. Negative for ear pain, sneezing and sore throat.    Eyes: Positive for visual disturbance.   Respiratory: Negative for cough, chest tightness, shortness of breath and wheezing.    Cardiovascular: Positive for leg swelling. Negative for chest pain and palpitations.   Gastrointestinal: Positive for constipation. Negative for abdominal pain, blood in stool, diarrhea, nausea and vomiting.   Endocrine: Negative for polydipsia and polyuria.   Genitourinary: Negative for difficulty urinating, dysuria, frequency, hematuria and urgency.   Musculoskeletal: Positive for arthralgias and back pain. Negative for joint swelling, myalgias and neck pain.   Skin: Negative for rash.   Neurological: Positive for numbness and headaches. Negative for dizziness, speech difficulty, weakness and light-headedness.   Psychiatric/Behavioral: Positive for dysphoric mood and sleep disturbance. Negative for suicidal ideas. The patient is nervous/anxious.      Objective   Physical Exam  Constitutional:       General: She is not in acute distress.     Appearance: Normal appearance. She is well-developed. She is not diaphoretic.      Comments: Accompanied by her . Bright and in fair spirits. No apparent distress. No pallor, jaundice, diaphoresis, or cyanosis.   HENT:      Head: Atraumatic.      Right Ear: Tympanic membrane, ear canal and external ear normal.      Left Ear: Tympanic membrane, ear canal and external ear normal.   Eyes:      Conjunctiva/sclera: Conjunctivae normal.   Neck:      Thyroid: No thyroid mass or thyromegaly.      Vascular: No carotid bruit or JVD.      Trachea: Trachea normal. No tracheal deviation.   Cardiovascular:      Rate and Rhythm: Normal rate and regular rhythm.      Heart sounds: Normal heart sounds,  S1 normal and S2 normal. No murmur. No gallop.       Comments: No calf tenderness  Pulmonary:      Effort: Pulmonary effort is normal.      Breath sounds: Normal breath sounds.   Abdominal:      General: Bowel sounds are normal. There is no distension.   Musculoskeletal:      Right lower leg: No edema.      Left lower leg: No edema.   Lymphadenopathy:      Head:      Right side of head: No submental, submandibular, tonsillar, preauricular, posterior auricular or occipital adenopathy.      Left side of head: No submental, submandibular, tonsillar, preauricular, posterior auricular or occipital adenopathy.      Cervical: No cervical adenopathy.      Upper Body:      Right upper body: No supraclavicular adenopathy.      Left upper body: No supraclavicular adenopathy.   Skin:     General: Skin is warm.      Coloration: Skin is not cyanotic, jaundiced or pale.      Findings: No rash.      Nails: There is no clubbing.     Neurological:      Mental Status: She is alert and oriented to person, place, and time.      Cranial Nerves: No cranial nerve deficit.      Sensory: Sensory deficit (decreased vibration sense toes of both feet) present.      Motor: No tremor.      Coordination: Coordination normal.      Gait: Gait normal.   Psychiatric:         Attention and Perception: Attention normal.         Mood and Affect: Mood normal.         Speech: Speech normal.         Behavior: Behavior normal.         Thought Content: Thought content normal. Thought content does not include suicidal ideation.       Assessment/Plan   Problems Addressed this Visit        Allergies and Adverse Reactions    Chronic allergic rhinitis       Cardiac and Vasculature    Atherosclerosis of both carotid arteries  Reminded regarding the importance of risk factor modification.  Continue current medication    Mixed hyperlipidemia  Encouraged to continue to work on her diet and exercise plan.  Continue current medication       Coag and Thromboembolic     History of DVT (deep vein thrombosis)       Endocrine and Metabolic    Class 3 severe obesity with serious comorbidity and body mass index (BMI) of 40.0 to 44.9 in adult (CMS/[facility])    DM type 2 with diabetic peripheral neuropathy (CMS/[facility])  Diabetes mellitus Type II, under inadequate control.   Encouraged to continue to pursue ADA diet  Encouraged aerobic exercise.  Continue current medication  Follow up with MELL Starr    Vitamin D deficiency       Gastrointestinal Abdominal     GERD (gastroesophageal reflux disease) (Chronic)       Genitourinary and Reproductive     Menopausal symptoms    Renal insufficiency       Health Encounters    Healthcare maintenance  Encouraged to obtain a COVID-19 immunization when available.       Hematology and Neoplasia    Malignant neoplasm of upper-outer quadrant of right breast in female, estrogen receptor negative (CMS/[facility])  Supportive therapy  Follow up with oncology and general surgery       Mental Health    OCD (obsessive compulsive disorder) (Chronic)  Significant situational component  Supportive therapy  Reviewed options and agreed on a trial of venlafaxine in place of escitalopram  Follow up with psychiatry  Encouraged report if any worse or if any new symptoms or concerns in the meantime    Relevant Medications    venlafaxine XR (EFFEXOR-XR) 37.5 MG 24 hr capsule       Musculoskeletal and Injuries    Osteopenia of multiple sites       Neuro    History of TIAs  As above.   Continue current medication.       Pulmonary and Pneumonias    Mild intermittent asthma without complication  Intermittent asthma. The patient is not currently having an exacerbation. In general, the patient's disease is well controlled.  Continue current medication  Encouraged to report if this should change.      Diagnoses       Codes Comments    Chronic allergic rhinitis    -  Primary ICD-10-CM: J30.9  ICD-9-CM: 477.9     Mixed hyperlipidemia     ICD-10-CM: E78.2  ICD-9-CM: 272.2      Atherosclerosis of both carotid arteries     ICD-10-CM: I65.23  ICD-9-CM: 433.10, 433.30     History of DVT (deep vein thrombosis)     ICD-10-CM: Z86.718  ICD-9-CM: V12.51     Vitamin D deficiency     ICD-10-CM: E55.9  ICD-9-CM: 268.9     DM type 2 with diabetic peripheral neuropathy (CMS/HCC)     ICD-10-CM: E11.42  ICD-9-CM: 250.60, 357.2     Class 3 severe obesity with serious comorbidity and body mass index (BMI) of 40.0 to 44.9 in adult, unspecified obesity type (CMS/HCC)     ICD-10-CM: E66.01, Z68.41  ICD-9-CM: 278.01, V85.41     Gastroesophageal reflux disease without esophagitis     ICD-10-CM: K21.9  ICD-9-CM: 530.81     Renal insufficiency     ICD-10-CM: N28.9  ICD-9-CM: 593.9     Menopausal symptoms     ICD-10-CM: N95.1  ICD-9-CM: 627.2     Healthcare maintenance     ICD-10-CM: Z00.00  ICD-9-CM: V70.0     Malignant neoplasm of upper-outer quadrant of right breast in female, estrogen receptor negative (CMS/[facility])     ICD-10-CM: C50.411, Z17.1  ICD-9-CM: 174.4, V86.1     Other obsessive-compulsive disorders     ICD-10-CM: F42.8  ICD-9-CM: 300.3     Osteopenia of multiple sites     ICD-10-CM: M85.89  ICD-9-CM: 733.90     History of TIAs     ICD-10-CM: Z86.73  ICD-9-CM: V12.54     Mild intermittent asthma without complication     ICD-10-CM: J45.20  ICD-9-CM: 493.90

## 2021-02-13 VITALS
OXYGEN SATURATION: 95 % | TEMPERATURE: 97.1 F | SYSTOLIC BLOOD PRESSURE: 98 MMHG | WEIGHT: 242 LBS | HEIGHT: 64 IN | BODY MASS INDEX: 41.32 KG/M2 | RESPIRATION RATE: 14 BRPM | DIASTOLIC BLOOD PRESSURE: 60 MMHG | HEART RATE: 94 BPM

## 2021-02-19 DIAGNOSIS — F41.9 ANXIETY AND DEPRESSION: Chronic | ICD-10-CM

## 2021-02-19 DIAGNOSIS — F32.A ANXIETY AND DEPRESSION: Chronic | ICD-10-CM

## 2021-02-22 ENCOUNTER — TELEPHONE (OUTPATIENT)
Dept: FAMILY MEDICINE CLINIC | Facility: CLINIC | Age: 46
End: 2021-02-22

## 2021-02-22 ENCOUNTER — LAB (OUTPATIENT)
Dept: FAMILY MEDICINE CLINIC | Facility: CLINIC | Age: 46
End: 2021-02-22

## 2021-02-22 DIAGNOSIS — Z79.899 HIGH RISK MEDICATION USE: Primary | ICD-10-CM

## 2021-02-22 DIAGNOSIS — Z79.899 HIGH RISK MEDICATION USE: ICD-10-CM

## 2021-02-22 RX ORDER — DIAZEPAM 5 MG/1
TABLET ORAL
Qty: 20 TABLET | Refills: 0 | Status: SHIPPED | OUTPATIENT
Start: 2021-02-22 | End: 2021-03-12 | Stop reason: SDUPTHER

## 2021-02-22 NOTE — TELEPHONE ENCOUNTER
Called and let the patient know that she would have to come back in for another urine drug screen. The one that was collected on the 12th was not sent out due to our office being closed and then the urine was no longer valid.

## 2021-03-08 ENCOUNTER — APPOINTMENT (OUTPATIENT)
Dept: ONCOLOGY | Facility: HOSPITAL | Age: 46
End: 2021-03-08

## 2021-03-09 ENCOUNTER — CLINICAL SUPPORT (OUTPATIENT)
Dept: ONCOLOGY | Facility: CLINIC | Age: 46
End: 2021-03-09

## 2021-03-09 ENCOUNTER — INFUSION (OUTPATIENT)
Dept: ONCOLOGY | Facility: HOSPITAL | Age: 46
End: 2021-03-09

## 2021-03-09 VITALS
BODY MASS INDEX: 41.34 KG/M2 | DIASTOLIC BLOOD PRESSURE: 79 MMHG | TEMPERATURE: 97.7 F | OXYGEN SATURATION: 98 % | HEART RATE: 93 BPM | WEIGHT: 241 LBS | RESPIRATION RATE: 18 BRPM | SYSTOLIC BLOOD PRESSURE: 132 MMHG

## 2021-03-09 VITALS
HEART RATE: 93 BPM | OXYGEN SATURATION: 98 % | TEMPERATURE: 97.7 F | BODY MASS INDEX: 41.34 KG/M2 | WEIGHT: 241 LBS | DIASTOLIC BLOOD PRESSURE: 79 MMHG | SYSTOLIC BLOOD PRESSURE: 132 MMHG | RESPIRATION RATE: 18 BRPM

## 2021-03-09 DIAGNOSIS — C50.411 MALIGNANT NEOPLASM OF UPPER-OUTER QUADRANT OF RIGHT BREAST IN FEMALE, ESTROGEN RECEPTOR NEGATIVE (HCC): Primary | ICD-10-CM

## 2021-03-09 DIAGNOSIS — K59.00 CONSTIPATION, UNSPECIFIED CONSTIPATION TYPE: Primary | ICD-10-CM

## 2021-03-09 DIAGNOSIS — R51.9 NEW ONSET OF HEADACHES: ICD-10-CM

## 2021-03-09 DIAGNOSIS — M75.01 ADHESIVE CAPSULITIS OF RIGHT SHOULDER: ICD-10-CM

## 2021-03-09 DIAGNOSIS — Z80.0 FAMILY HISTORY OF COLON CANCER: ICD-10-CM

## 2021-03-09 DIAGNOSIS — Z17.1 MALIGNANT NEOPLASM OF UPPER-OUTER QUADRANT OF RIGHT BREAST IN FEMALE, ESTROGEN RECEPTOR NEGATIVE (HCC): Primary | ICD-10-CM

## 2021-03-09 DIAGNOSIS — F32.A DEPRESSION, UNSPECIFIED DEPRESSION TYPE: ICD-10-CM

## 2021-03-09 PROCEDURE — 99214 OFFICE O/P EST MOD 30 MIN: CPT | Performed by: INTERNAL MEDICINE

## 2021-03-09 NOTE — PROGRESS NOTES
NAME: Nivia Bernstein    : 1975    DATE:  3/9/2021    DIAGNOSIS:   Stage IA ([id number]) moderately differentiated invasive ductal carcinoma of the R breast with associated DCIS.  Tumor was 10 mm in maximal dimension.  0/10 SLN involved. ER negative, MN 15% + (1+), HER-2/cat negative. Mammaprint high risk.    CHIEF COMPLAINT:  Follow up Breast cancer    TREATMENT HISTORY:  1. Initially planned to give 4 cycles to Taxotere/Cytoxan, during 1st infusion of Taxotere on 2020 patient developed allergic reaction. Therefore, Taxotere was discontinued and regimen changed to DD AC.    2.      3.  Started Tamoxifen 12-    HISTORY OF PRESENT ILLNESS:   Nivia Bernstein is a very pleasant 46 y.o. female who who is being seen today at the request of Sheila Garza MD for evaluation and treatment of breast cancer. She did not notice a palpable lump, but was recommended to have a screening mammogram by her PCP. A nodule in the right upper quadrant of the R breast was noted. The mass measured 0.8 cm on ultrasound. Biopsy was consistent with a moderately differentiated invasive ductal carcinoma, ER negative, MN weakly (15%) positive, and HER-2/cat negative. She underwent bilateral mastectomy with sentinel lymph node biopsy with Dr. Garza on 20. Pathology from this showed a moderately differentiated invasive ductal carcinoma with associated intermediate grade DCIS, negative margins.  0/10 /SLN involved.  Mammaprint was high risk. She was referred to our clinic and is here today with her  for discussion of further treatment options.      Ms. Bernstein is healing well from the surgery.  She did develop a seroma, which was drained by Dr. Garza yesterday. She reports muscular chest discomfort r/t the procedure, but otherwise denies any other symptoms including fevers, chills, significant weight changes, shortness of breath, abdominal pain, n/v/d/c, bony pain or headaches.    Interval History:  Ms. Jimenez is here  "today for follow up of breast cancer. She continues to report frequent headaches. She also reports soreness to her chest wall after picking up her grandchild. She is tolerating Tamoxifen well.  She reports struggling with her \"nerves being high.\"  Lexapro was ineffective, so her PCP ordered Effexor and has titrated up to 75 mg.  She has f/u with her psychiatrist on Friday.  She complains of pain about her R shoulder. We discussed in detail her survivorship plan today.    PAST MEDICAL HISTORY:  Past Medical History:   Diagnosis Date   • Altered mental status 10/16/2017   • Anxiety and depression 3/31/2017   • Arthritis    • Asthma    • DVT (deep venous thrombosis) (CMS/HCC) 2003    x 1 in RLE following fall & surgery   • Elevated alkaline phosphatase level 10/16/2017   • Enlarged liver    • GERD (gastroesophageal reflux disease)    • Heart murmur    • Hypokalemia 10/16/2017   • Leg pain 10/16/2017   • Mitral valve prolapse    • Neuropathy     related to diabetes   • Obesity 3/31/2017   • OCD (obsessive compulsive disorder) 10/16/2017   • Osteoporosis    • PONV (postoperative nausea and vomiting)    • Renal insufficiency 10/16/2017   • Right breast cancer with malignant cells in regional lymph nodes no greater than 0.2 mm and no more than 200 cells (CMS/HCC) 8/13/2020   • Thrombocytopenia (CMS/HCC) 10/16/2017   • TIA (transient ischemic attack)     4 TIMES       PAST SURGICAL HISTORY:  Past Surgical History:   Procedure Laterality Date   • APPENDECTOMY     • CARPAL TUNNEL RELEASE     • CHOLECYSTECTOMY     • FINGER FUSION Left     3rd   • HYSTERECTOMY  2011    removed due to an ovarian cyst and dysmenorrhia. No cancer noted. Complete   • KNEE ARTHROSCOPY Right    • MASTECTOMY Left 8/18/2020    Procedure: Left mastectomy;  Surgeon: Sheila Garza MD;  Location: [facility];  Service: General;  Laterality: Left;   • MASTECTOMY WITH SENTINEL NODE BIOPSY AND AXILLARY NODE DISSECTION Right 8/18/2020    Procedure: Right " BREAST MASTECTOMY WITH SENTINEL NODE BIOPSY;  Surgeon: Sheila Garza MD;  Location: [facility];  Service: General;  Laterality: Right;   • PORTACATH PLACEMENT         SOCIAL HISTORY:  Social History     Socioeconomic History   • Marital status:      Spouse name: Not on file   • Number of children: Not on file   • Years of education: Not on file   • Highest education level: Not on file   Tobacco Use   • Smoking status: Never Smoker   • Smokeless tobacco: Never Used   Vaping Use   • Vaping Use: Never used   Substance and Sexual Activity   • Alcohol use: No   • Drug use: No   • Sexual activity: Yes     Partners: Male         FAMILY HISTORY:  Family History   Problem Relation Age of Onset   • Diabetes Mother    • Hypertension Mother    • Diabetes Father    • Heart disease Father    • Alcohol abuse Father    • Seizures Father    • Hypertension Father    • No Known Problems Brother    • No Known Problems Daughter    • Bone cancer Son    • Suicide Attempts Cousin    • Stomach cancer Cousin         maternal first cousin   • Breast cancer Paternal Aunt 52   • Diabetes Maternal Grandmother    • Diabetes Maternal Grandfather    • Lung cancer Maternal Grandfather    • Heart disease Paternal Grandmother    • Diabetes Paternal Grandmother    • Heart disease Paternal Grandfather    • Diabetes Paternal Grandfather    • Ovarian cancer Maternal Aunt    • Lung cancer Maternal Uncle    • Colon cancer Maternal Uncle    • Cancer Maternal Uncle         unknown type     MEDICATIONS:  The current medication list was reviewed in the EMR    Current Outpatient Medications:   •  albuterol sulfate HFA (Ventolin HFA) 108 (90 Base) MCG/ACT inhaler, Inhale 2 puffs Every 4 (Four) Hours As Needed (shortness of breath)., Disp: 18 g, Rfl: 5  •  Breo Ellipta 200-25 MCG/INH inhaler, Inhale 1 puff Daily., Disp: 1 inhaler, Rfl: 5  •  diazePAM (VALIUM) 5 MG tablet, TAKE 1/2-1 TABLET BY MOUTH DAILY AS NEEDED FOR ANXIETY, Disp: 20 tablet, Rfl: 0  •   diclofenac (VOLTAREN) 1 % gel gel, Apply 4 g topically to the appropriate area as directed 4 (Four) Times a Day As Needed (joint pain)., Disp: 500 g, Rfl: 5  •  docusate sodium 100 MG capsule, Take 100 mg by mouth 2 (Two) Times a Day., Disp: , Rfl:   •  Empagliflozin-Linaglip-Metform 12.5-2.5-1000 MG tablet sustained-release 24 hour, Take 2 tablets by mouth Daily., Disp: 60 tablet, Rfl: 5  •  fluticasone (FLONASE) 50 MCG/ACT nasal spray, 2 sprays into the nostril(s) as directed by provider Daily As Needed for Rhinitis or Allergies., Disp: 1 bottle, Rfl: 5  •  insulin lispro (HumaLOG) 100 UNIT/ML patient supplied pump, Inject  under the skin into the appropriate area as directed Continuous. Basil rate: 2.95/hr Carb ratio: 4.0, Disp: , Rfl:   •  linaclotide (LINZESS) 290 MCG capsule capsule, Take 1 capsule by mouth Every Morning Before Breakfast., Disp: 30 capsule, Rfl: 5  •  magic mouthwash oral suspension, Swish and swallow 5 mL four times  a day As Needed for Mucositis., Disp: 240 mL, Rfl: 1  •  omeprazole (priLOSEC) 40 MG capsule, Take 1 capsule by mouth Daily., Disp: 30 capsule, Rfl: 2  •  ondansetron (ZOFRAN) 8 MG tablet, TAKE 1 TABLET BY MOUTH 3 TIMES A DAY AS NEEDED FOR NAUSEA OR VOMITING., Disp: 30 tablet, Rfl: 5  •  prochlorperazine (COMPAZINE) 10 MG tablet, Take 1 tablet by mouth Every 6 (Six) Hours As Needed for Nausea or Vomiting., Disp: 30 tablet, Rfl: 5  •  rosuvastatin (CRESTOR) 40 MG tablet, Take 1 tablet by mouth Every Night., Disp: 30 tablet, Rfl: 5  •  sennosides-docusate (senna-docusate sodium) 8.6-50 MG per tablet, Take 2 tablets by mouth 2 (Two) Times a Day., Disp: 120 tablet, Rfl: 4  •  tamoxifen (NOLVADEX) 20 MG chemo tablet, Take 1 tablet by mouth Daily., Disp: 30 tablet, Rfl: 5  •  venlafaxine XR (EFFEXOR-XR) 37.5 MG 24 hr capsule, 1 po daily for two weeks then two po daily afterward, Disp: 60 capsule, Rfl: 1  •  vitamin D (ERGOCALCIFEROL) 1.25 MG (17490 UT) capsule capsule, TAKE ONE  CAPSULE BY MOUTH ONCE WEEKLY, Disp: 12 capsule, Rfl: 0    ALLERGIES:    Allergies   Allergen Reactions   • Mobic [Meloxicam] Rash   • Penicillins Angioedema   • Taxotere [Docetaxel] Anaphylaxis     9 minutes into 1st infusion   • Novolog [Insulin Aspart] Hives     REVIEW OF SYSTEMS:    A comprehensive 14 point review of systems was performed.  Significant findings as mentioned above.  All other systems reviewed and are negative.      Physical Exam  Vital Signs:   Visit Vitals  /79   Pulse 93   Temp 97.7 °F (36.5 °C) (Temporal)   Resp 18   Wt 109 kg (241 lb)   SpO2 98%   BMI 41.34 kg/m²     Pain Score    03/09/21 1004   PainSc: 0-No pain      ECOG score: 0       General: Awake, alert and oriented.  Appears  well.  Hair is coming back in   HEENT:  PERRLA, EOM full, OP clear, mmm  Neck: No thyromegaly. No JVD.   Lungs: Lungs are clear to auscultation bilaterally, no wheezing  Heart:  Regular rate and rhythm. No murmurs, rubs, or gallops.   Breast: S/p bilateral mastectomy and RSLNBx.  Surgical scars smooth and intact without nodularity..  No axillary adenopathy on either side.    Abdomen: Soft, Non tender, non-distended, bowel sounds present. No organomegaly    Extremities: No cyanosis or edema.  She has limited ROM about her R shoulder.   Lymph Nodes: No palpable cervical, submandibular, supraclavicular, axillary lymphadenopathy    Neurologic: MS as above. CN II-XII intact, strength 5/5 throughout BUE, BLE.  Light touch diffusely intact.    PATHOLOGY:              IMAGING:  Bilateral screening mammogram 06-19-20  FINDINGS:    The breast tissue is heterogeneously dense.  New focal nodularity right upper outer breast that is about 14 cm from  the nipple.  Otherwise, no suspicious findings.     IMPRESSION:  New focal nodularity right upper outer breast that is about  14 cm from the nipple.     RECOMMENDATION:  Spot compression imaging.     BI-RADS CAT 0, INCOMPLETE.  The patient needs additional  imaging.        Diagnostic R mammogram with R breast US 07-14-20  FINDINGS:  Additional mammographic imaging images of persistent  partially obscured oval mass in the posterior right upper outer  quadrant.     Right breast ultrasound: Focused sonographic evaluation of the right  breast demonstrates a 0.8 cm irregular hypoechoic mass with ill-defined  borders located at 10:00, 13 cm from the nipple. An additional area was  initially noted by the technologist in the 9:00 region which represents  an isolated fat lobule.           IMPRESSION:  0.8 cm irregular mass in the right 10:00 region. Recommend  ultrasound-guided core biopsy.        BI-RADS CATEGORY:  4, SUSPICIOUS        RECOMMENDATION:  Recommend ultrasound guided core biopsy of the right  breast.        Bilateral breast MRI w/wo contrast 08-11-20  FINDINGS: There is minimal bilateral background contrast enhancement and  normal vascular enhancement.     There are no worrisome areas of contrast enhancement in the left breast.     In the right breast correlating to the biopsy proven malignancy is an  approximately 0.7 cm irregular rapidly enhancing mass in the posterior  right 10:00 region. Artifact from a postbiopsy marking clip is located  adjacent to this mass. A small linear area of enhancement extends  posterior to this mass approximately 1 cm. There are no additional  worrisome areas of contrast enhancement in the right breast.     There is no axillary adenopathy by MRI criteria and no chest wall  abnormality is seen.        IMPRESSION:  1. Negative left breast MRI.        2. Biopsy-proven malignancy in the right 10:00 region with a small  linear area of enhancement extending posterior to the 0.7 cm mass.     RECOMMENDATIONS: Recommend surgical follow-up.     BI-RADS CATEGORY: 6, KNOWN BIOPSY PROVEN MALIGNANCY    Echo 10-16-17:  A two-dimensional transthoracic echocardiogram with color flow and Doppler was performed.   The study is technically good for  diagnosis.Verbal consent was obtained from the patient to use saline contrast in order to optimize the study.  A total of 20 mL of agitated saline was administered to assess for cardiac shunting.   No adverse reaction to contrast was noted.   Echocardiogram Findings    Left Ventricle Left ventricular systolic function is normal. Estimated EF appears to be in the range of 56 - 60%. Normal left ventricular cavity size and wall thickness noted. All left ventricular wall segments contract normally. Left ventricular diastolic function is normal.   Right Ventricle Normal right ventricular cavity size noted.   Left Atrium Normal left atrial size noted. Saline test results are negative.   Right Atrium Normal right atrial size noted.   Aortic Valve The aortic valve is structurally normal. Trace aortic valve regurgitation is present.   Mitral Valve The mitral valve is normal in structure. Trace mitral valve regurgitation is present.   Tricuspid Valve The tricuspid valve is normal.  Trace tricuspid valve regurgitation is present.   Pulmonic Valve The pulmonic valve is structurally normal. There is trace pulmonic valve regurgitation present.   Greater Vessels No dilation of the aortic root is present. No dilation of the sinuses of Valsalva is present.   Pericardium There is no evidence of pericardial effusion.           ECHO 09/29/2020  · Poor quality echo and Doppler study  · Normal left ventricular cavity size and wall thickness noted.  · Left ventricular ejection fraction appears to be 61 - 65%. Left ventricular systolic function is normal.  · Left ventricular diastolic function is consistent with (grade I) impaired relaxation.  · Aortic and mitral valve are poorly visualized but appear to be normal,  · Tricuspid and pulmonic valve echoes are not visualized.  · There is no pericardial effusion noted.    Echocardiogram 12-08-02  · Normal left ventricular cavity size and wall thickness noted. All left ventricular wall segments  contract normally.  · Left ventricular ejection fraction appears to be 56 - 60%.  · The pericardium is normal. There is no evidence of pericardial effusion. .      MRI Brain w/wo contrast 01-28-21     FINDINGS:    Brain:  Unremarkable.  No mass.  No hemorrhage.  No acute infarct.    Ventricles:  Unremarkable.  No ventriculomegaly.    Bones/joints:  Unremarkable.    Sinuses:  Unremarkable as visualized.  No acute sinusitis.    Mastoid air cells:  Unremarkable as visualized.  No mastoid effusion.    Orbits:  Unremarkable as visualized.     IMPRESSION:    No acute findings in the head/brain.      RECENT LABS:  Lab Results   Component Value Date    WBC 7.78 01/26/2021    HGB 12.8 01/26/2021    HCT 41.8 01/26/2021    MCV 83.1 01/26/2021    RDW 12.9 01/26/2021     01/26/2021    NEUTRORELPCT 55.1 01/26/2021    LYMPHORELPCT 34.3 01/26/2021    MONORELPCT 7.6 01/26/2021    EOSRELPCT 1.9 01/26/2021    BASORELPCT 0.8 01/26/2021    NEUTROABS 4.29 01/26/2021    LYMPHSABS 2.67 01/26/2021       Lab Results   Component Value Date     (L) 01/26/2021    K 3.3 (L) 01/26/2021    CO2 24.6 01/26/2021     01/26/2021    BUN 13 01/26/2021    CREATININE 0.78 01/26/2021    EGFRIFNONA 79 01/26/2021    EGFRIFAFRI 132 05/13/2020    GLUCOSE 181 (H) 01/26/2021    CALCIUM 9.1 01/26/2021    ALKPHOS 133 (H) 01/26/2021    AST 20 01/26/2021    ALT 23 01/26/2021    BILITOT 0.2 01/26/2021    ALBUMIN 4.02 01/26/2021    PROTEINTOT 7.3 01/26/2021    MG 2.1 10/17/2017       Lab Results   Component Value Date    URICACID 5.2 09/10/2019       Lab Results   Component Value Date    [id number] 411 05/13/2020    FOLATE 15.19 10/16/2017      ASSESSMENT & PLAN:  Nivia Bernstein is a very pleasant 46 y.o. female with Stage IA ([id number]) moderately differentiated invasive ductal carcinoma of the R breast with associated DCIS.  Tumor was 10 mm in maximal dimension.  0/10 SLN involved. ER negative, SD 15% + (1+), HER-2/cat negative. Mammaprint high risk.      1.  R breast cancer:   -  S/p R mastectomy and SLNBx as well as prophylactic contralateral mastectomy performed y Dr. Garza 8-18-20.  - She is healing well from bilateral mastectomy. This was complicated by left seroma, drained by Dr. Garza. Incisions are healed well.   - Given high risk Mammaprint, Dr. Loco recommended adjuvant chemotherapy.  We discussed different possibilities for adjuvant therapy. Given high risk Mammaprint but early stage, node negative disease as well as comorbidities, recommended Taxotere/Cytoxan Q21d x 4 cycles with growth factor support. Discussed potential risks, benefits, and side effects and she would like to proceed.   - She had port placed several years ago due to poor peripheral access. This has been well cared for and maintained. Will continue to use this for her treatments.   - C1 Taxotere/Cytoxan was planned for 09/24/2020. Unfortunately, this had to be discontinued due to allergic reaction to Taxotere. Recommended change of treatment to DD AC.   - She tolerated treatment well and aside from fatigue is recovering well. Echo is essentially unchanged.   -  Her tumor was ER neg but did have 15% 1+ positivity for progesterone receptor.  She is s/p complete hysterectomy and had evidence of osteopenia with T score -1.9 in June 2020.  Given all of this, I have recommended treatment with Tamoxifen over aromatase inhibitor. She is tolerating Tamoxifen well.  -  Reviewed survivorship plan in detail today.  -  She does continue to struggle with depression.  She previously had been on Paxil 20 mg, but weaned her off Paxil and started Lexapro 10 mg because of interaction w/ Tamoxifen.  She says Lexapro 10 wasn't controlling her symptoms even after increase to 20 mg daily, so her PCP d/c'd Lexapro and started Effexor which has been titrated up to 75 mg. She has f/u with her psychiatrist on Friday.     2.  New onset headaches:  -  Migranous in character, new over the last 2 months or so.     -  Physical exam is unremarkable.  - MRI was without cause for concern. Will place referral to Dr. Oswald of Neurology for further evaluation    3. Extensive family history of malignancy:   - genetic testing was ordered by Dr. Garza and performed by [facility] with no mutations, specifically including BRCA 1/2, CHKE2, CDH1, PALB2 and others.    4. Anxiety/Depression:  -Her psychiatrist prescribed Paxil 20 mg daily and she takes Valium per PCP.  She had been doing well on this.  Unfortunately Tamoxifen interacts with Paxil.  I recommended trial of Lexapro which didn't seem to help her, so her PCP started Effexor. She is [id number] mg daily.  She will f/u with her psychiatrist on Friday.    5. Frozen shoulder on the Right:  -  She has limitation of ROM and discomfort.  Will refer to PT.    6. Prophylaxis:   -  She received Prevnar 13 vaccine.  -  She had 2020 flu vaccine per [facility].  -  M. Uncle had colon cancer at age 50.  Will refer to Dr. Aguilera for screening colonoscopy..    7. . Follow up:  -  Refer to PT for frozen shoulder  -  F/u with psychiatry later this week  -  Refer to Dr. Aguilera for screening colonoscopy  -  Refer to Dr. Oswald for evaluation of new onset migraines  -  Return to see me in 3 months with CBCD, CMP    8.   ACO / NII/Other  Quality measures  -  Nivia Bernstein received 2020 flu vaccine.  -  Nivia Bernstein reports a pain score of 0.  Given her pain assessment as noted, treatment options were discussed and the following options were decided upon as a follow-up plan to address the patient's pain: No intervention needed at this time..  -  Current outpatient and discharge medications have been reconciled for the patient.  Reviewed by: Dejah Loco MD        This note was scribed for Dejah Loco MD by Chantell Rahman RN.    I, Dejah Loco MD, personally performed the services described in this documentation as scribed by the above named individual in my presence,  and it is both accurate and complete.  03/09/2021       A total of 35 minutes were spent coordinating this patient’s care in clinic today; more than 50% of this time was face-to-face with the patient, reviewing her interim medical history and counseling on the current treatment and followup plan. All questions were answered to her satisfaction.      Electronically Signed by: NIDA Tarango  March 9, 2021 10:10 EST       CC:   MD Otto Hager, Markus Garcia MD

## 2021-03-12 ENCOUNTER — OFFICE VISIT (OUTPATIENT)
Dept: PSYCHIATRY | Facility: CLINIC | Age: 46
End: 2021-03-12

## 2021-03-12 ENCOUNTER — LAB (OUTPATIENT)
Dept: FAMILY MEDICINE CLINIC | Facility: CLINIC | Age: 46
End: 2021-03-12

## 2021-03-12 VITALS — HEART RATE: 98 BPM | OXYGEN SATURATION: 99 % | WEIGHT: 238 LBS | BODY MASS INDEX: 40.83 KG/M2

## 2021-03-12 DIAGNOSIS — K21.9 GASTROESOPHAGEAL REFLUX DISEASE WITHOUT ESOPHAGITIS: Chronic | ICD-10-CM

## 2021-03-12 DIAGNOSIS — E11.42 DM TYPE 2 WITH DIABETIC PERIPHERAL NEUROPATHY (HCC): Chronic | ICD-10-CM

## 2021-03-12 DIAGNOSIS — E53.8 VITAMIN B 12 DEFICIENCY: ICD-10-CM

## 2021-03-12 DIAGNOSIS — F41.1 GENERALIZED ANXIETY DISORDER: ICD-10-CM

## 2021-03-12 DIAGNOSIS — F33.1 MODERATE EPISODE OF RECURRENT MAJOR DEPRESSIVE DISORDER (HCC): ICD-10-CM

## 2021-03-12 DIAGNOSIS — E55.9 VITAMIN D DEFICIENCY: ICD-10-CM

## 2021-03-12 DIAGNOSIS — F42.8 OTHER OBSESSIVE-COMPULSIVE DISORDERS: Chronic | ICD-10-CM

## 2021-03-12 DIAGNOSIS — E78.2 MIXED HYPERLIPIDEMIA: Chronic | ICD-10-CM

## 2021-03-12 DIAGNOSIS — Z79.899 HIGH RISK MEDICATION USE: Primary | ICD-10-CM

## 2021-03-12 PROCEDURE — 99214 OFFICE O/P EST MOD 30 MIN: CPT | Performed by: NURSE PRACTITIONER

## 2021-03-12 RX ORDER — NAPROXEN 500 MG/1
TABLET ORAL
COMMUNITY
Start: 2021-03-10 | End: 2021-10-05 | Stop reason: ALTCHOICE

## 2021-03-12 RX ORDER — DIAZEPAM 5 MG/1
5 TABLET ORAL DAILY
Qty: 30 TABLET | Refills: 0 | Status: SHIPPED | OUTPATIENT
Start: 2021-03-12 | End: 2021-04-09 | Stop reason: SDUPTHER

## 2021-03-12 RX ORDER — VENLAFAXINE HYDROCHLORIDE 150 MG/1
150 CAPSULE, EXTENDED RELEASE ORAL DAILY
Qty: 30 CAPSULE | Refills: 0 | Status: SHIPPED | OUTPATIENT
Start: 2021-03-12 | End: 2021-04-09 | Stop reason: SDUPTHER

## 2021-03-12 NOTE — PROGRESS NOTES
Subjective   Nivia Bernstein is a 46 y.o. female is here today for medication management follow-up.    Chief Complaint:  Anxiety depression     History of Present Illness:    Patient presents today for follow up for medication management for depression and anxiety. Patient states having a hard time with dealing with cancer. Patient states stopped Paxil due to interaction with Chemo medication. Patient states they started Lexapro but PCP stopped it and started Effexor. Patient states she has been taking the Effexor XR 75mg for about three weeks now. Patient states more depression now. Patient reports currently depression is at a 10 on a 0-10 scale with 10 being the worst. Patient reports symptoms of depression of crying spells, aggravated, depressed mood, irritability, and dwelling on things. Patient states worrying about mom a lot. Patient states getting up everyday worrying about where the cancer is now.  Patient reports anxiety is at a 10 on a 0-10 scale with 10 being the worst. Patient denies any panic attacks. Patient reports sleeping 3-4 hours a night and patient reports having daily nightmares. Patient reports appetite is good and eating 2-3 meals a day. Patient denies any auditory or visual hallucinations. Patient adamantly denies any SI or HI. Patient denies any side effects to medications.   The following portions of the patient's history were reviewed and updated as appropriate: allergies, current medications, past family history, past medical history, past social history, past surgical history and problem list.    Review of Systems   Constitutional: Negative.    Respiratory: Negative.    Cardiovascular: Negative.    Gastrointestinal: Negative.    Neurological: Negative.    Psychiatric/Behavioral: Positive for agitation, dysphoric mood and sleep disturbance. The patient is nervous/anxious.        Objective   Physical Exam  Vitals reviewed.   Constitutional:       Appearance: Normal appearance. She is  well-developed and well-groomed.   Neurological:      Mental Status: She is alert.   Psychiatric:         Attention and Perception: Attention and perception normal.         Mood and Affect: Mood is depressed. Affect is tearful.         Speech: Speech normal.         Behavior: Behavior normal. Behavior is cooperative.         Thought Content: Thought content normal.         Cognition and Memory: Cognition and memory normal.         Judgment: Judgment normal.       Pulse 98, weight 108 kg (238 lb), SpO2 99 %, not currently breastfeeding. Body mass index is 40.83 kg/m².    Allergies   Allergen Reactions   • Mobic [Meloxicam] Rash   • Penicillins Angioedema   • Taxotere [Docetaxel] Anaphylaxis     9 minutes into 1st infusion   • Novolog [Insulin Aspart] Hives       Medication List:   Current Outpatient Medications   Medication Sig Dispense Refill   • albuterol sulfate HFA (Ventolin HFA) 108 (90 Base) MCG/ACT inhaler Inhale 2 puffs Every 4 (Four) Hours As Needed (shortness of breath). 18 g 5   • Breo Ellipta 200-25 MCG/INH inhaler Inhale 1 puff Daily. 1 inhaler 5   • diazePAM (VALIUM) 5 MG tablet Take 1 tablet by mouth Daily. 30 tablet 0   • diclofenac (VOLTAREN) 1 % gel gel Apply 4 g topically to the appropriate area as directed 4 (Four) Times a Day As Needed (joint pain). 500 g 5   • docusate sodium 100 MG capsule Take 100 mg by mouth 2 (Two) Times a Day.     • Empagliflozin-Linaglip-Metform 12.5-2.5-1000 MG tablet sustained-release 24 hour Take 2 tablets by mouth Daily. 60 tablet 5   • fluticasone (FLONASE) 50 MCG/ACT nasal spray 2 sprays into the nostril(s) as directed by provider Daily As Needed for Rhinitis or Allergies. 1 bottle 5   • insulin lispro (HumaLOG) 100 UNIT/ML patient supplied pump Inject  under the skin into the appropriate area as directed Continuous. Basil rate: 2.95/hr  Carb ratio: 4.0     • linaclotide (LINZESS) 290 MCG capsule capsule Take 1 capsule by mouth Every Morning Before Breakfast. 30  capsule 5   • magic mouthwash oral suspension Swish and swallow 5 mL four times  a day As Needed for Mucositis. 240 mL 1   • naproxen (NAPROSYN) 500 MG tablet      • omeprazole (priLOSEC) 40 MG capsule Take 1 capsule by mouth Daily. 30 capsule 2   • ondansetron (ZOFRAN) 8 MG tablet TAKE 1 TABLET BY MOUTH 3 TIMES A DAY AS NEEDED FOR NAUSEA OR VOMITING. 30 tablet 5   • prochlorperazine (COMPAZINE) 10 MG tablet Take 1 tablet by mouth Every 6 (Six) Hours As Needed for Nausea or Vomiting. 30 tablet 5   • rosuvastatin (CRESTOR) 40 MG tablet Take 1 tablet by mouth Every Night. 30 tablet 5   • sennosides-docusate (senna-docusate sodium) 8.6-50 MG per tablet Take 2 tablets by mouth 2 (Two) Times a Day. 120 tablet 4   • tamoxifen (NOLVADEX) 20 MG chemo tablet Take 1 tablet by mouth Daily. 30 tablet 5   • venlafaxine XR (EFFEXOR-XR) 150 MG 24 hr capsule Take 1 capsule by mouth Daily. 30 capsule 0   • vitamin D (ERGOCALCIFEROL) 1.25 MG (42121 UT) capsule capsule TAKE ONE CAPSULE BY MOUTH ONCE WEEKLY 12 capsule 0   • Diclofenac Sodium (VOLTAREN) 1 % gel gel        No current facility-administered medications for this visit.       Mental Status Exam:   Hygiene:   good  Cooperation:  Cooperative  Eye Contact:  Good  Psychomotor Behavior:  Appropriate  Affect:  Appropriate  Hopelessness: Denies  Speech:  Normal  Thought Process:  Goal directed and Linear  Thought Content:  Normal  Suicidal:  None  Homicidal:  None  Hallucinations:  None  Delusion:  None  Memory:  Intact  Orientation:  Person, Place, Time and Situation  Reliability:  fair  Insight:  Fair  Judgement:  Fair  Impulse Control:  Fair  Physical/Medical Issues:  No                 Assessment/Plan   Diagnoses and all orders for this visit:    1. High risk medication use (Primary)  -     Urine Drug Screen - Urine, Clean Catch; Future    2. Other obsessive-compulsive disorders  -     venlafaxine XR (EFFEXOR-XR) 150 MG 24 hr capsule; Take 1 capsule by mouth Daily.  Dispense:  30 capsule; Refill: 0    3. Generalized anxiety disorder  -     diazePAM (VALIUM) 5 MG tablet; Take 1 tablet by mouth Daily.  Dispense: 30 tablet; Refill: 0  -     venlafaxine XR (EFFEXOR-XR) 150 MG 24 hr capsule; Take 1 capsule by mouth Daily.  Dispense: 30 capsule; Refill: 0    4. Moderate episode of recurrent major depressive disorder (CMS/HCC)  -     venlafaxine XR (EFFEXOR-XR) 150 MG 24 hr capsule; Take 1 capsule by mouth Daily.  Dispense: 30 capsule; Refill: 0            Discussed medication options with patient. Increase Effexor XR to 150mg daily for worsening depression and anxiety. Cont. Valium 5mg daily as needed for anxiety. Reviewed the risks, benefits, and side effects of the medications; patient acknowledged and verbally consented. Patient is being prescribed a controlled substance as part of treatment plan. Patient has been educated of appropriate use of the medications, including risk of somnolence, limited ability to drive and/or work safely, and potential for dependence, respiratory depression and overdose. Patient is also informed that the medication are to be used by the patient only- avoid any combined use of ETOH or other substances unless prescribed. UDS ordered.   [facility] Patient Controlled Substance Report (from 3/12/2020 to 3/12/2021)    Dispensed  Strength Quantity Days Supply Provider Pharmacy   02/22/2021 Diazepam 5MG 20 each 20 [facility]   02/12/2021 Diazepam 5MG 5 each 5 [facility]   01/14/2021 Diazepam 5MG 30 each 30 [facility]   12/15/2020 Diazepam 5MG 30 each 30 [facility]   11/17/2020 Diazepam 5MG 30 each 30 [facility]   10/19/2020 Diazepam 5MG 30 each 30 [facility]   09/16/2020 [facility] 30 [facility]   08/19/2020 Oxycodone/Acetaminophen 325MG/5MG 20 each 5  [facility]   08/13/2020 Diazepam 5MG 30 each 30 [facility]   04/10/2020 Amphetamine/Dextroampheta 2.5MG/2.5MG/2.5MG/2.5MG 60 each 30 [facility]            Patient is agreeable to call the office with any questions, concerns, or worsening of symptoms.  Patient is aware to call 911 or go to the nearest ER should begin having SI/HI.        Follow up in four weeks      Errors in dictation may reflect use of voice recognition software and not all errors in transcription may have been detected prior to signing.              This document has been electronically signed by NIDA Vasquez   March 12, 2021 09:14 EST

## 2021-03-13 DIAGNOSIS — E55.9 VITAMIN D DEFICIENCY: ICD-10-CM

## 2021-03-13 DIAGNOSIS — K21.9 GASTROESOPHAGEAL REFLUX DISEASE WITHOUT ESOPHAGITIS: Primary | ICD-10-CM

## 2021-03-13 DIAGNOSIS — E11.42 DM TYPE 2 WITH DIABETIC PERIPHERAL NEUROPATHY (HCC): ICD-10-CM

## 2021-03-13 DIAGNOSIS — K59.03 DRUG-INDUCED CONSTIPATION: ICD-10-CM

## 2021-03-13 LAB
25(OH)D3+25(OH)D2 SERPL-MCNC: 46 NG/ML (ref 30–100)
ALBUMIN SERPL-MCNC: 4 G/DL (ref 3.5–5.2)
ALBUMIN/GLOB SERPL: 1.4 G/DL
ALP SERPL-CCNC: 109 U/L (ref 39–117)
ALT SERPL-CCNC: 17 U/L (ref 1–33)
AST SERPL-CCNC: 12 U/L (ref 1–32)
BASOPHILS # BLD AUTO: 0.05 10*3/MM3 (ref 0–0.2)
BASOPHILS NFR BLD AUTO: 0.7 % (ref 0–1.5)
BILIRUB SERPL-MCNC: 0.2 MG/DL (ref 0–1.2)
BUN SERPL-MCNC: 12 MG/DL (ref 6–20)
BUN/CREAT SERPL: 17.4 (ref 7–25)
CALCIUM SERPL-MCNC: 8.8 MG/DL (ref 8.6–10.5)
CHLORIDE SERPL-SCNC: 105 MMOL/L (ref 98–107)
CHOLEST SERPL-MCNC: 161 MG/DL (ref 0–200)
CO2 SERPL-SCNC: 24.1 MMOL/L (ref 22–29)
CREAT SERPL-MCNC: 0.69 MG/DL (ref 0.57–1)
EOSINOPHIL # BLD AUTO: 0.13 10*3/MM3 (ref 0–0.4)
EOSINOPHIL NFR BLD AUTO: 1.8 % (ref 0.3–6.2)
ERYTHROCYTE [DISTWIDTH] IN BLOOD BY AUTOMATED COUNT: 13.6 % (ref 12.3–15.4)
GLOBULIN SER CALC-MCNC: 2.9 GM/DL
GLUCOSE SERPL-MCNC: 164 MG/DL (ref 65–99)
HBA1C MFR BLD: 8.3 % (ref 4.8–5.6)
HCT VFR BLD AUTO: 43.3 % (ref 34–46.6)
HDLC SERPL-MCNC: 32 MG/DL (ref 40–60)
HGB BLD-MCNC: 14.1 G/DL (ref 12–15.9)
IMM GRANULOCYTES # BLD AUTO: 0.01 10*3/MM3 (ref 0–0.05)
IMM GRANULOCYTES NFR BLD AUTO: 0.1 % (ref 0–0.5)
LDLC SERPL CALC-MCNC: 93 MG/DL (ref 0–100)
LYMPHOCYTES # BLD AUTO: 2.29 10*3/MM3 (ref 0.7–3.1)
LYMPHOCYTES NFR BLD AUTO: 31.6 % (ref 19.6–45.3)
MCH RBC QN AUTO: 25.7 PG (ref 26.6–33)
MCHC RBC AUTO-ENTMCNC: 32.6 G/DL (ref 31.5–35.7)
MCV RBC AUTO: 79 FL (ref 79–97)
MICROALBUMIN UR-MCNC: 8.2 UG/ML
MONOCYTES # BLD AUTO: 0.49 10*3/MM3 (ref 0.1–0.9)
MONOCYTES NFR BLD AUTO: 6.8 % (ref 5–12)
NEUTROPHILS # BLD AUTO: 4.27 10*3/MM3 (ref 1.7–7)
NEUTROPHILS NFR BLD AUTO: 59 % (ref 42.7–76)
NRBC BLD AUTO-RTO: 0.1 /100 WBC (ref 0–0.2)
PLATELET # BLD AUTO: 317 10*3/MM3 (ref 140–450)
POTASSIUM SERPL-SCNC: 4.2 MMOL/L (ref 3.5–5.2)
PROT SERPL-MCNC: 6.9 G/DL (ref 6–8.5)
RBC # BLD AUTO: 5.48 10*6/MM3 (ref 3.77–5.28)
SODIUM SERPL-SCNC: 141 MMOL/L (ref 136–145)
TRIGL SERPL-MCNC: 211 MG/DL (ref 0–150)
TSH SERPL DL<=0.005 MIU/L-ACNC: 2.34 UIU/ML (ref 0.27–4.2)
VIT B12 SERPL-MCNC: 428 PG/ML (ref 211–946)
VLDLC SERPL CALC-MCNC: 36 MG/DL (ref 5–40)
WBC # BLD AUTO: 7.24 10*3/MM3 (ref 3.4–10.8)

## 2021-03-13 RX ORDER — EMPAGLIFLOZIN, LINAGLIPTIN, METFORMIN HYDROCHLORIDE 25; 5; 1000 MG/1; MG/1; MG/1
1 TABLET, EXTENDED RELEASE ORAL EVERY MORNING
Qty: 30 TABLET | Refills: 5 | Status: SHIPPED | OUTPATIENT
Start: 2021-03-13 | End: 2021-10-04

## 2021-03-13 RX ORDER — MELATONIN
2000 DAILY
Qty: 60 TABLET | Refills: 5 | Status: SHIPPED | OUTPATIENT
Start: 2021-03-13 | End: 2022-05-18 | Stop reason: SDUPTHER

## 2021-03-13 RX ORDER — LANSOPRAZOLE 30 MG/1
30 CAPSULE, DELAYED RELEASE ORAL DAILY
Qty: 90 CAPSULE | Refills: 3 | Status: SHIPPED | OUTPATIENT
Start: 2021-03-13 | End: 2022-08-17 | Stop reason: SDUPTHER

## 2021-03-17 ENCOUNTER — OFFICE VISIT (OUTPATIENT)
Dept: FAMILY MEDICINE CLINIC | Facility: CLINIC | Age: 46
End: 2021-03-17

## 2021-03-17 DIAGNOSIS — E11.42 DM TYPE 2 WITH DIABETIC PERIPHERAL NEUROPATHY (HCC): Primary | Chronic | ICD-10-CM

## 2021-03-17 DIAGNOSIS — E78.2 MIXED HYPERLIPIDEMIA: Chronic | ICD-10-CM

## 2021-03-17 DIAGNOSIS — F41.9 ANXIETY AND DEPRESSION: Chronic | ICD-10-CM

## 2021-03-17 DIAGNOSIS — F32.A ANXIETY AND DEPRESSION: Chronic | ICD-10-CM

## 2021-03-17 DIAGNOSIS — Z96.41 INSULIN PUMP IN PLACE: ICD-10-CM

## 2021-03-17 PROCEDURE — 99214 OFFICE O/P EST MOD 30 MIN: CPT | Performed by: NURSE PRACTITIONER

## 2021-03-17 PROCEDURE — 95251 CONT GLUC MNTR ANALYSIS I&R: CPT | Performed by: NURSE PRACTITIONER

## 2021-03-17 NOTE — PROGRESS NOTES
"  History of Present Illness   Nivia is a 45 y/o female who presents to the clinic today for recheck and to review lab results pertaining to her DM, type 2 and Dyslipidemia. In addition, she has been diagnosed with right breast Cancer and undergone bilateral mastectomy and chemotherapy.     Diabetes   She has type 2 diabetes mellitus. The initial diagnosis of diabetes was made 20 years ago. Associated symptoms include  fatigue and peripheral neuropathy. Pertinent negatives for diabetes include no foot ulcerations. Diabetic complications include peripheral neuropathy.  Risk factors for coronary artery disease include post-menopausal, stress and dyslipidemia. She is currently utilizing insulin pump therapy and CGM. She did stop weekly Ozempic due to nausea. She is currently taking Trijardy.  She has had a previous visit with a dietitian An ACE inhibitor/angiotensin II receptor blocker is not  being taken at this time. Eye exam is not current. .      Lab Results   Component Value Date    HGBA1C 9.60 (H) 11/17/2020     Lab Results   Component Value Date    HGBA1C 8.30 (H) 03/12/2021      Dyslipidemia   The current episode started more than 1 year ago. Pertinent negatives include no chest pain or shortness of breath. She is taking Crestor 40 mg.     Lab Results   Component Value Date    CHOL 189 11/17/2020    TRIG 240 (H) 11/17/2020    HDL 36 (L) 11/17/2020     (H) 11/17/2020     Lab Results   Component Value Date    CHLPL 161 03/12/2021    TRIG 211 (H) 03/12/2021    HDL 32 (L) 03/12/2021    LDL 93 03/12/2021     Vitals:    03/17/21 1100   BP: 110/60   BP Location: Right arm   Patient Position: Sitting   Cuff Size: Adult   Pulse: 94   Resp: 14   Temp: 96.9 °F (36.1 °C)   TempSrc: Temporal   SpO2: 96%   Weight: 109 kg (240 lb)   Height: 162.6 cm (64.02\")        The following portions of the patient's history were reviewed and updated as appropriate: allergies, current medications, past family history, past " medical history, past social history, past surgical history and problem list.    Review of Systems   Constitutional: Positive for fatigue. Negative for chills, diaphoresis, unexpected weight gain and unexpected weight loss.   HENT: Negative.    Eyes: Positive for blurred vision and visual disturbance.   Respiratory: Negative for cough, shortness of breath and wheezing.    Cardiovascular: Positive for leg swelling. Negative for chest pain and palpitations.   Gastrointestinal: Positive for GERD.   Endocrine: Positive for heat intolerance. Negative for cold intolerance, polydipsia, polyphagia and polyuria.   Musculoskeletal: Positive for arthralgias. Negative for gait problem.   Skin: Negative for color change.   Allergic/Immunologic: Positive for environmental allergies and immunocompromised state.   Neurological: Positive for weakness, numbness and memory problem. Negative for dizziness and light-headedness.   Psychiatric/Behavioral: Positive for sleep disturbance, depressed mood and stress. Negative for self-injury and suicidal ideas.      Physical Exam  Vitals reviewed.   Constitutional:       General: She is not in acute distress.     Appearance: She is well-developed.      Comments: Pleasant; In good spirits. Wearing appropriate face covering   HENT:      Head: Normocephalic.      Nose: Nose normal.   Eyes:      General: No scleral icterus.        Right eye: No discharge.         Left eye: No discharge.      Conjunctiva/sclera: Conjunctivae normal.   Neck:      Vascular: No JVD.   Cardiovascular:      Rate and Rhythm: Normal rate and regular rhythm.      Heart sounds: Normal heart sounds. No murmur heard.   No friction rub.   Pulmonary:      Effort: Pulmonary effort is normal. No respiratory distress.      Breath sounds: Normal breath sounds. No decreased breath sounds, wheezing, rhonchi or rales.   Abdominal:      General: There is no distension.      Palpations: Abdomen is soft.      Tenderness: There is no  abdominal tenderness. There is no guarding or rebound.   Musculoskeletal:      Cervical back: Neck supple.      Right lower leg: No edema.      Left lower leg: No edema.   Lymphadenopathy:      Cervical: No cervical adenopathy.   Skin:     General: Skin is warm and dry.      Capillary Refill: Capillary refill takes less than 2 seconds.      Findings: No erythema.   Neurological:      Mental Status: She is alert and oriented to person, place, and time.   Psychiatric:         Mood and Affect: Mood normal.         Speech: Speech normal.         Behavior: Behavior is cooperative.         Thought Content: Thought content normal.       Results for orders placed or performed in visit on 03/12/21   Vitamin B12    Specimen: Arm, Right; Blood    BLOOD  MANUAL DIFFEREN   Result Value Ref Range    Vitamin B-12 428 211 - 946 pg/mL   MicroAlbumin, Urine, Random - Urine, Random Void    Specimen: Urine, Random Void    URINE   Result Value Ref Range    Microalbumin, Urine 8.2 Not Estab. ug/mL   Vitamin D 25 Hydroxy    Specimen: Arm, Right; Blood    BLOOD  MANUAL DIFFEREN   Result Value Ref Range    25 Hydroxy, Vitamin D 46.0 30.0 - 100.0 ng/ml   Hemoglobin A1c    Specimen: Arm, Right; Blood    BLOOD  MANUAL DIFFEREN   Result Value Ref Range    Hemoglobin A1C 8.30 (H) 4.80 - 5.60 %   TSH    Specimen: Arm, Right; Blood    BLOOD  MANUAL DIFFEREN   Result Value Ref Range    TSH 2.340 0.270 - 4.200 uIU/mL   Lipid Panel    Specimen: Arm, Right; Blood    BLOOD  MANUAL DIFFEREN   Result Value Ref Range    Total Cholesterol 161 0 - 200 mg/dL    Triglycerides 211 (H) 0 - 150 mg/dL    HDL Cholesterol 32 (L) 40 - 60 mg/dL    VLDL Cholesterol Harley 36 5 - 40 mg/dL    LDL Chol Calc (NIH) 93 0 - 100 mg/dL   Comprehensive Metabolic Panel    Specimen: Arm, Right; Blood    BLOOD  MANUAL DIFFEREN   Result Value Ref Range    Glucose 164 (H) 65 - 99 mg/dL    BUN 12 6 - 20 mg/dL    Creatinine 0.69 0.57 - 1.00 mg/dL    eGFR Non African Am 91 >60 mL/min/1.73     eGFR African Am 110 >60 mL/min/1.73    BUN/Creatinine Ratio 17.4 7.0 - 25.0    Sodium 141 136 - 145 mmol/L    Potassium 4.2 3.5 - 5.2 mmol/L    Chloride 105 98 - 107 mmol/L    Total CO2 24.1 22.0 - 29.0 mmol/L    Calcium 8.8 8.6 - 10.5 mg/dL    Total Protein 6.9 6.0 - 8.5 g/dL    Albumin 4.00 3.50 - 5.20 g/dL    Globulin 2.9 gm/dL    A/G Ratio 1.4 g/dL    Total Bilirubin 0.2 0.0 - 1.2 mg/dL    Alkaline Phosphatase 109 39 - 117 U/L    AST (SGOT) 12 1 - 32 U/L    ALT (SGPT) 17 1 - 33 U/L   CBC & Differential    Specimen: Arm, Right; Blood    BLOOD  MANUAL DIFFEREN   Result Value Ref Range    WBC 7.24 3.40 - 10.80 10*3/mm3    RBC 5.48 (H) 3.77 - 5.28 10*6/mm3    Hemoglobin 14.1 12.0 - 15.9 g/dL    Hematocrit 43.3 34.0 - 46.6 %    MCV 79.0 79.0 - 97.0 fL    MCH 25.7 (L) 26.6 - 33.0 pg    MCHC 32.6 31.5 - 35.7 g/dL    RDW 13.6 12.3 - 15.4 %    Platelets 317 140 - 450 10*3/mm3    Neutrophil Rel % 59.0 42.7 - 76.0 %    Lymphocyte Rel % 31.6 19.6 - 45.3 %    Monocyte Rel % 6.8 5.0 - 12.0 %    Eosinophil Rel % 1.8 0.3 - 6.2 %    Basophil Rel % 0.7 0.0 - 1.5 %    Neutrophils Absolute 4.27 1.70 - 7.00 10*3/mm3    Lymphocytes Absolute 2.29 0.70 - 3.10 10*3/mm3    Monocytes Absolute 0.49 0.10 - 0.90 10*3/mm3    Eosinophils Absolute 0.13 0.00 - 0.40 10*3/mm3    Basophils Absolute 0.05 0.00 - 0.20 10*3/mm3    Immature Granulocyte Rel % 0.1 0.0 - 0.5 %    Immature Grans Absolute 0.01 0.00 - 0.05 10*3/mm3    nRBC 0.1 0.0 - 0.2 /100 WBC       Assessment/Plan     Problems Addressed this Visit        Cardiac and Vasculature    Mixed hyperlipidemia       Endocrine and Metabolic    DM type 2 with diabetic peripheral neuropathy (CMS/HCC) - Primary    Insulin pump in place       Mental Health    Anxiety and depression (Chronic)      Diagnoses       Codes Comments    DM type 2 with diabetic peripheral neuropathy (CMS/HCC)    -  Primary ICD-10-CM: E11.42  ICD-9-CM: 250.60, 357.2 Findings and recommendations discussed with Nivia.      Insulin pump in place     ICD-10-CM: Z96.41  ICD-9-CM: V45.85 Insulin pump uploaded, reviewd. No changes made     Mixed hyperlipidemia     ICD-10-CM: E78.2  ICD-9-CM: 272.2 Lipid panel reviewed.     Anxiety and depression     ICD-10-CM: F41.9, F32.9  ICD-9-CM: 300.00, 311 Continues to follow with Behavioral Health        Findings and recommendations discussed with Nivia.  Reviewed above lab results. Lifestyle including cardiovascular  modifications reinforced including nutrition and activity recommendations. She will follow up with me in May  sooner if problems/concerns occur.          This document has been electronically signed by NIDA Starr, FAY-BC, CDE  March 17, 2021 08:31 EDT

## 2021-03-17 NOTE — PATIENT INSTRUCTIONS
Type 2 Diabetes Mellitus, Self Care  When you have type 2 diabetes (type 2 diabetes mellitus), you must make sure your blood sugar (glucose) stays in a healthy range. You can do this with:  · Nutrition.  · Exercise.  · Lifestyle changes.  · Medicines or insulin, if needed.  · Support from your doctors and others.  How to stay aware of blood sugar    · Check your blood sugar level every day, as often as told.  · Have your A1c (hemoglobin A1c) level checked two or more times a year. Have it checked more often if your doctor tells you to.  Your doctor will set personal treatment goals for you. Generally, you should have these blood sugar levels:  · Before meals (preprandial):  mg/dL (4.4-7.2 mmol/L).  · After meals (postprandial): below 180 mg/dL (10 mmol/L).  · A1c level: less than 7%.  How to manage high and low blood sugar  Signs of high blood sugar  High blood sugar is called hyperglycemia. Know the signs of high blood sugar. Signs may include:  · Feeling:  ? Thirsty.  ? Hungry.  ? Very tired.  · Needing to pee (urinate) more than usual.  · Blurry vision.  Signs of low blood sugar  Low blood sugar is called hypoglycemia. This is when blood sugar is at or below 70 mg/dL (3.9 mmol/L). Signs may include:  · Feeling:  ? Hungry.  ? Worried or nervous (anxious).  ? Sweaty and clammy.  ? Confused.  ? Dizzy.  ? Sleepy.  ? Sick to your stomach (nauseous).  · Having:  ? A fast heartbeat.  ? A headache.  ? A change in your vision.  ? Jerky movements that you cannot control (seizure).  ? Tingling or no feeling (numbness) around your mouth, lips, or tongue.  · Having trouble with:  ? Moving (coordination).  ? Sleeping.  ? Passing out (fainting).  ? Getting upset easily (irritability).  Treating low blood sugar  To treat low blood sugar, eat or drink something sugary right away. If you can think clearly and swallow safely, follow the 15:15 rule:  · Take 15 grams of a fast-acting carb (carbohydrate). Talk with your doctor  about how much you should take.  · Some fast-acting carbs are:  ? Sugar tablets (glucose pills). Take 3-4 pills.  ? 6-8 pieces of hard candy.  ? 4-6 oz (120-150 mL) of fruit juice.  ? 4-6 oz (120-150 mL) of regular (not diet) soda.  ? 1 Tbsp (15 mL) honey or sugar.  · Check your blood sugar 15 minutes after you take the carb.  · If your blood sugar is still at or below 70 mg/dL (3.9 mmol/L), take 15 grams of a carb again.  · If your blood sugar does not go above 70 mg/dL (3.9 mmol/L) after 3 tries, get help right away.  · After your blood sugar goes back to normal, eat a meal or a snack within 1 hour.  Treating very low blood sugar  If your blood sugar is at or below 54 mg/dL (3 mmol/L), you have very low blood sugar (severe hypoglycemia). This is an emergency. Do not wait to see if the symptoms will go away. Get medical help right away. Call your local emergency services (911 in the [place]  If you have very low blood sugar and you cannot eat or drink, you may need a glucagon shot (injection). A family member or friend should learn how to check your blood sugar and how to give you a glucagon shot. Ask your doctor if you need to have a glucagon shot kit at home.  Follow these instructions at home:  Medicine  · Take insulin and diabetes medicines as told.  · If your doctor says you should take more or less insulin and medicines, do this exactly as told.  · Do not run out of insulin or medicines.  Having diabetes can raise your risk for other long-term conditions. These include heart disease and kidney disease. Your doctor may prescribe medicines to help you not have these problems.  Food    · Make healthy food choices. These include:  ? Chicken, fish, egg whites, and beans.  ? Oats, whole wheat, bulgur, brown rice, quinoa, and millet.  ? Fresh fruits and vegetables.  ? Low-fat dairy products.  ? Nuts, avocado, olive oil, and canola oil.  · Meet with a  (dietitian). He or she can help you make an eating  plan that is right for you.  · Follow instructions from your doctor about what you cannot eat or drink.  · Drink enough fluid to keep your pee (urine) pale yellow.  · Keep track of carbs that you eat. Do this by reading food labels and learning food serving sizes.  · Follow your sick day plan when you cannot eat or drink normally. Make this plan with your doctor so it is ready to use.  Activity  · Exercise 3 or more times a week.  · Do not go more than 2 days without exercising.  · Talk with your doctor before you start a new exercise. Your doctor may need to tell you to change:  ? How much insulin or medicines you take.  ? How much food you eat.  Lifestyle  · Do not use any tobacco products. These include cigarettes, chewing tobacco, and e-cigarettes. If you need help quitting, ask your doctor.  · Ask your doctor how much alcohol is safe for you.  · Learn to deal with stress. If you need help with this, ask your doctor.  Body care    · Stay up to date with your shots (immunizations).  · Have your eyes and feet checked by a doctor as often as told.  · Check your skin and feet every day. Check for cuts, bruises, redness, blisters, or sores.  · Brush your teeth and gums two times a day. Floss one or more times a day.  · Go to the dentist one or more times every 6 months.  · Stay at a healthy weight.  General instructions  · Take over-the-counter and prescription medicines only as told by your doctor.  · Share your diabetes care plan with:  ? Your work or school.  ? People you live with.  · Carry a card or wear jewelry that says you have diabetes.  · Keep all follow-up visits as told by your doctor. This is important.  Questions to ask your doctor  · Do I need to meet with a diabetes educator?  · Where can I find a support group for people with diabetes?  Where to find more information  To learn more about diabetes, visit:  · American Diabetes Association: www.diabetes.org  · American Association of Diabetes Educators:  www.diabeteseducator.org  Summary  · When you have type 2 diabetes, you must make sure your blood sugar (glucose) stays in a healthy range.  · Check your blood sugar every day, as often as told.  · Having diabetes can raise your risk for other conditions. Your doctor may prescribe medicines to help you not have these problems.  · Keep all follow-up visits as told by your doctor. This is important.  This information is not intended to replace advice given to you by your health care provider. Make sure you discuss any questions you have with your health care provider.  Document Revised: 06/10/2019 Document Reviewed: 01/20/2017  [employer]vier Patient Education © 2020 [employer]

## 2021-03-18 ENCOUNTER — BULK ORDERING (OUTPATIENT)
Dept: CASE MANAGEMENT | Facility: OTHER | Age: 46
End: 2021-03-18

## 2021-03-18 DIAGNOSIS — Z23 IMMUNIZATION DUE: ICD-10-CM

## 2021-03-20 VITALS
DIASTOLIC BLOOD PRESSURE: 60 MMHG | SYSTOLIC BLOOD PRESSURE: 110 MMHG | BODY MASS INDEX: 40.97 KG/M2 | TEMPERATURE: 96.9 F | HEART RATE: 94 BPM | WEIGHT: 240 LBS | OXYGEN SATURATION: 96 % | RESPIRATION RATE: 14 BRPM | HEIGHT: 64 IN

## 2021-03-29 ENCOUNTER — PRIOR AUTHORIZATION (OUTPATIENT)
Dept: FAMILY MEDICINE CLINIC | Facility: CLINIC | Age: 46
End: 2021-03-29

## 2021-04-09 ENCOUNTER — OFFICE VISIT (OUTPATIENT)
Dept: PSYCHIATRY | Facility: CLINIC | Age: 46
End: 2021-04-09

## 2021-04-09 VITALS
HEART RATE: 94 BPM | DIASTOLIC BLOOD PRESSURE: 80 MMHG | BODY MASS INDEX: 40.49 KG/M2 | WEIGHT: 237.2 LBS | SYSTOLIC BLOOD PRESSURE: 126 MMHG | HEIGHT: 64 IN | OXYGEN SATURATION: 97 %

## 2021-04-09 DIAGNOSIS — Z79.899 HIGH RISK MEDICATION USE: Primary | ICD-10-CM

## 2021-04-09 DIAGNOSIS — F42.8 OTHER OBSESSIVE-COMPULSIVE DISORDERS: Chronic | ICD-10-CM

## 2021-04-09 DIAGNOSIS — F41.1 GENERALIZED ANXIETY DISORDER: ICD-10-CM

## 2021-04-09 DIAGNOSIS — F33.1 MODERATE EPISODE OF RECURRENT MAJOR DEPRESSIVE DISORDER (HCC): ICD-10-CM

## 2021-04-09 PROCEDURE — 99214 OFFICE O/P EST MOD 30 MIN: CPT | Performed by: NURSE PRACTITIONER

## 2021-04-09 RX ORDER — VENLAFAXINE HYDROCHLORIDE 150 MG/1
150 CAPSULE, EXTENDED RELEASE ORAL DAILY
Qty: 30 CAPSULE | Refills: 0 | Status: SHIPPED | OUTPATIENT
Start: 2021-04-09 | End: 2021-05-07 | Stop reason: SDUPTHER

## 2021-04-09 RX ORDER — DIAZEPAM 5 MG/1
5 TABLET ORAL EVERY 12 HOURS PRN
Qty: 60 TABLET | Refills: 0 | Status: SHIPPED | OUTPATIENT
Start: 2021-04-09 | End: 2021-05-07 | Stop reason: SDUPTHER

## 2021-04-09 NOTE — PROGRESS NOTES
Subjective   Nivia Bernstein is a 46 y.o. female is here today for medication management follow-up.    Chief Complaint:  Depression anxiety     History of Present Illness:   Patient presents today for follow up for medication management for depression and anxiety. Patient states she got the second COVID vaccine and has had every symptom. Patient states she had a fever but it broke last night and all her bones are hurting. Patient states she is really tired after the vaccine. Patient states still taking the chemo pill, but getting hair back. Patient states a cousin that just got diagnosed with stage 4 cancer. Patient reports currently depression is at a 10 on a 0-10 scale with 10 being the worst. Patient reports symptoms of depression of crying spells, irritability, depressed mood, and feeling tired. Patient reports anxiety is at a 10 on a 0-10 scale with 10 being the worst. Patient denies any panic attacks. Patient states worrying all day and night. Patient states going to bed worrying and then waking up worrying. Patient reports sleeping 2-3 hours a night and patient denies any nightmares. Patient reports appetite is decreased and eating at least 2 meals a day. Patient denies any auditory or visual hallucinations. Patient adamantly denies any SI or HI. Patient denies any side effects to medications. Patient states she has been taking the Effexor at bedtime.   The following portions of the patient's history were reviewed and updated as appropriate: allergies, current medications, past family history, past medical history, past social history, past surgical history and problem list.    Review of Systems   Constitutional: Positive for appetite change.   Respiratory: Negative.    Cardiovascular: Negative.    Gastrointestinal: Negative.    Neurological: Negative.    Psychiatric/Behavioral: Positive for agitation, dysphoric mood and sleep disturbance. The patient is nervous/anxious.        Objective   Physical  "Exam  Vitals reviewed.   Constitutional:       Appearance: Normal appearance. She is well-developed and well-groomed.   Neurological:      Mental Status: She is alert.   Psychiatric:         Attention and Perception: Attention and perception normal.         Mood and Affect: Affect normal. Mood is depressed.         Speech: Speech normal.         Behavior: Behavior normal. Behavior is cooperative.         Thought Content: Thought content normal.         Cognition and Memory: Cognition and memory normal.         Judgment: Judgment normal.       Blood pressure 126/80, pulse 94, height 162.6 cm (64\"), weight 108 kg (237 lb 3.2 oz), SpO2 97 %, not currently breastfeeding. Body mass index is 40.72 kg/m².    Allergies   Allergen Reactions   • Mobic [Meloxicam] Rash   • Penicillins Angioedema   • Taxotere [Docetaxel] Anaphylaxis     9 minutes into 1st infusion   • Novolog [Insulin Aspart] Hives       Medication List:   Current Outpatient Medications   Medication Sig Dispense Refill   • albuterol sulfate HFA (Ventolin HFA) 108 (90 Base) MCG/ACT inhaler Inhale 2 puffs Every 4 (Four) Hours As Needed (shortness of breath). 18 g 5   • Breo Ellipta 200-25 MCG/INH inhaler Inhale 1 puff Daily. 1 inhaler 5   • cholecalciferol (Vitamin D) 25 MCG (1000 UT) tablet Take 2 tablets by mouth Daily. 60 tablet 5   • diazePAM (VALIUM) 5 MG tablet Take 1 tablet by mouth Daily. 30 tablet 0   • diclofenac (VOLTAREN) 1 % gel gel Apply 4 g topically to the appropriate area as directed 4 (Four) Times a Day As Needed (joint pain). 500 g 5   • Diclofenac Sodium (VOLTAREN) 1 % gel gel      • docusate sodium 100 MG capsule Take 100 mg by mouth 2 (Two) Times a Day.     • Empagliflozin-Linaglip-Metform (Trijardy XR) 25-5-1000 MG tablet sustained-release 24 hour Take 1 tablet by mouth Every Morning. 30 tablet 5   • fluticasone (FLONASE) 50 MCG/ACT nasal spray 2 sprays into the nostril(s) as directed by provider Daily As Needed for Rhinitis or Allergies. 1 " bottle 5   • insulin lispro (HumaLOG) 100 UNIT/ML patient supplied pump Inject  under the skin into the appropriate area as directed Continuous. Basil rate: 2.95/hr  Carb ratio: 4.0     • lansoprazole (PREVACID) 30 MG capsule Take 1 capsule by mouth Daily. 90 capsule 3   • linaclotide (LINZESS) 290 MCG capsule capsule Take 1 capsule by mouth Every Morning Before Breakfast. 30 capsule 5   • magic mouthwash oral suspension Swish and swallow 5 mL four times  a day As Needed for Mucositis. 240 mL 1   • naproxen (NAPROSYN) 500 MG tablet      • ondansetron (ZOFRAN) 8 MG tablet TAKE 1 TABLET BY MOUTH 3 TIMES A DAY AS NEEDED FOR NAUSEA OR VOMITING. 30 tablet 5   • prochlorperazine (COMPAZINE) 10 MG tablet Take 1 tablet by mouth Every 6 (Six) Hours As Needed for Nausea or Vomiting. 30 tablet 5   • rosuvastatin (CRESTOR) 40 MG tablet Take 1 tablet by mouth Every Night. 30 tablet 5   • sennosides-docusate (senna-docusate sodium) 8.6-50 MG per tablet Take 2 tablets by mouth 2 (Two) Times a Day. 120 tablet 4   • tamoxifen (NOLVADEX) 20 MG chemo tablet Take 1 tablet by mouth Daily. 30 tablet 5   • venlafaxine XR (EFFEXOR-XR) 150 MG 24 hr capsule Take 1 capsule by mouth Daily. 30 capsule 0   • vitamin D (ERGOCALCIFEROL) 1.25 MG (96138 UT) capsule capsule TAKE ONE CAPSULE BY MOUTH ONCE WEEKLY 12 capsule 0     No current facility-administered medications for this visit.       Mental Status Exam:   Hygiene:   good  Cooperation:  Cooperative  Eye Contact:  Good  Psychomotor Behavior:  Appropriate  Affect:  Appropriate  Hopelessness: Denies  Speech:  Normal  Thought Process:  Goal directed and Linear  Thought Content:  Normal  Suicidal:  None  Homicidal:  None  Hallucinations:  None  Delusion:  None  Memory:  Intact  Orientation:  Person, Place, Time and Situation  Reliability:  fair  Insight:  Fair  Judgement:  Fair  Impulse Control:  Fair  Physical/Medical Issues:  No             Assessment/Plan   Diagnoses and all orders for this  visit:    1. High risk medication use (Primary)  -     Urine Drug Screen - Urine, Clean Catch; Future    2. Generalized anxiety disorder  -     diazePAM (VALIUM) 5 MG tablet; Take 1 tablet by mouth Every 12 (Twelve) Hours As Needed for Anxiety.  Dispense: 60 tablet; Refill: 0  -     venlafaxine XR (EFFEXOR-XR) 150 MG 24 hr capsule; Take 1 capsule by mouth Daily.  Dispense: 30 capsule; Refill: 0    3. Other obsessive-compulsive disorders  -     venlafaxine XR (EFFEXOR-XR) 150 MG 24 hr capsule; Take 1 capsule by mouth Daily.  Dispense: 30 capsule; Refill: 0    4. Moderate episode of recurrent major depressive disorder (CMS/HCC)  -     venlafaxine XR (EFFEXOR-XR) 150 MG 24 hr capsule; Take 1 capsule by mouth Daily.  Dispense: 30 capsule; Refill: 0            Discussed medication options with patient.  Increase Valium to 5 mg every 12 hours as needed for worsening anxiety.  Continue Effexor  mg daily for depression and anxiety.  Discussed with patient to switch Effexor to a.m. dosing to evaluate it if helps with insomnia. Patient acknowledged and agreed.  Reviewed the risks, benefits, and side effects of the medications; patient acknowledged and verbally consented. Patient is being prescribed a controlled substance as part of treatment plan. Patient has been educated of appropriate use of the medications, including risk of somnolence, limited ability to drive and/or work safely, and potential for dependence, respiratory depression and overdose. Patient is also informed that the medication are to be used by the patient only- avoid any combined use of ETOH or other substances unless prescribed.  UDS ordered.  [facility] Patient Controlled Substance Report (from 4/9/2020 to 4/9/2021)    Dispensed  Strength Quantity Days Supply Provider Pharmacy   03/12/2021 Diazepam 5MG 30 each 30 [facility]   02/22/2021 Diazepam 5MG 20 each 20 [facility]   02/12/2021 Diazepam  5MG 5 each 5 [facility]   01/14/2021 Diazepam 5MG 30 each 30 [facility]   12/15/2020 Diazepam 5MG 30 each 30 [facility]   11/17/2020 Diazepam 5MG 30 each 30 [facility]   10/19/2020 Diazepam 5MG 30 each 30 [facility]   09/16/2020 Diazepam 5MG 30 each 30 QUINTON, [facility]PHANIE [facility]...   08/19/2020 [facility]   08/13/2020 Diazepam 5MG 30 each 30 [facility]   04/10/2020 Amphetamine/Dextroampheta 2.5MG/2.5MG/2.5MG/2.5MG 60 each 30 [facility]           Patient is agreeable to call the office with any questions, concerns, or worsening of symptoms.  Patient is aware to call 911 or go to the nearest ER should begin having SI/HI.        Follow up in four weeks        Errors in dictation may reflect use of voice recognition software and not all errors in transcription may have been detected prior to signing.            This document has been electronically signed by NIDA Vasquez   April 9, 2021 09:43 EDT

## 2021-04-14 ENCOUNTER — CONSULT (OUTPATIENT)
Dept: GASTROENTEROLOGY | Facility: CLINIC | Age: 46
End: 2021-04-14

## 2021-04-14 VITALS
DIASTOLIC BLOOD PRESSURE: 78 MMHG | HEIGHT: 64 IN | BODY MASS INDEX: 41.01 KG/M2 | OXYGEN SATURATION: 96 % | TEMPERATURE: 97 F | SYSTOLIC BLOOD PRESSURE: 120 MMHG | HEART RATE: 92 BPM | WEIGHT: 240.2 LBS

## 2021-04-14 DIAGNOSIS — Z01.818 PREOPERATIVE CLEARANCE: Primary | ICD-10-CM

## 2021-04-14 DIAGNOSIS — K59.01 SLOW TRANSIT CONSTIPATION: ICD-10-CM

## 2021-04-14 DIAGNOSIS — K62.5 BRBPR (BRIGHT RED BLOOD PER RECTUM): ICD-10-CM

## 2021-04-14 DIAGNOSIS — K62.5 BRBPR (BRIGHT RED BLOOD PER RECTUM): Primary | ICD-10-CM

## 2021-04-14 PROCEDURE — 99204 OFFICE O/P NEW MOD 45 MIN: CPT | Performed by: INTERNAL MEDICINE

## 2021-04-14 RX ORDER — POLYETHYLENE GLYCOL 3350 17 G/17G
POWDER, FOR SOLUTION ORAL
Qty: 510 G | Refills: 0 | Status: SHIPPED | OUTPATIENT
Start: 2021-04-14 | End: 2021-05-05 | Stop reason: HOSPADM

## 2021-04-14 NOTE — PROGRESS NOTES
Subjective     Nivia Bernstein is a 46 y.o. female who presents to the office today as a consultation from Dejah Garg MD for evaluation of Rectal Bleeding        History of Present Illness:  The patient presents today for rectal bleeding.  She states the bleeding has occurred off and on for the past year.  She states it is in the commode and on the toilet tissue.  The blood is mixed with the stool.  It occurs daily.  She has a bm once to twice daily occasionally she will not have a bm.  She can go 3-4 days without a bm. Her stool is a Gary score 1-4.  There is incomplete evacuation of the bowel.  She has straining with bowel movements.  In the past, she has taken Linzess.  It worked well for her.  She would have a bm daily. No weight loss. She has had history of breast cancer August last year.  She did undergo chemotherapy and is now on Tamoxifen.  She had an maternal uncle with colon cancer.  Her father had colon polyps.       Review of Systems:  Review of Systems   Constitutional: Negative for chills, fatigue and fever.   HENT: Negative for trouble swallowing.    Eyes: Negative.    Respiratory: Negative for chest tightness.    Cardiovascular: Negative for chest pain.   Gastrointestinal: Positive for abdominal distention, abdominal pain, anal bleeding, blood in stool, constipation and nausea. Negative for diarrhea and vomiting.   Endocrine: Negative.    Genitourinary: Negative for difficulty urinating.   Musculoskeletal: Positive for back pain. Negative for neck pain.   Skin: Negative.    Allergic/Immunologic: Negative for environmental allergies and food allergies.   Neurological: Positive for headaches. Negative for dizziness and light-headedness.   Hematological: Does not bruise/bleed easily.   Psychiatric/Behavioral: Negative.        Past Medical History:  Past Medical History:   Diagnosis Date   • Altered mental status 10/16/2017   • Anxiety and depression 3/31/2017   • Arthritis    • Asthma    • DVT  (deep venous thrombosis) (CMS/HCC) 2003    x 1 in RLE following fall & surgery   • Elevated alkaline phosphatase level 10/16/2017   • Enlarged liver    • GERD (gastroesophageal reflux disease)    • Heart murmur    • Hypokalemia 10/16/2017   • Leg pain 10/16/2017   • Mitral valve prolapse    • Neuropathy     related to diabetes   • Obesity 3/31/2017   • OCD (obsessive compulsive disorder) 10/16/2017   • Osteoporosis    • PONV (postoperative nausea and vomiting)    • Renal insufficiency 10/16/2017   • Right breast cancer with malignant cells in regional lymph nodes no greater than 0.2 mm and no more than 200 cells (CMS/HCC) 8/13/2020   • Thrombocytopenia (CMS/HCC) 10/16/2017   • TIA (transient ischemic attack)     4 TIMES       Past Surgical History:  Past Surgical History:   Procedure Laterality Date   • APPENDECTOMY     • CARPAL TUNNEL RELEASE     • CHOLECYSTECTOMY     • FINGER FUSION Left     3rd   • HYSTERECTOMY  2011    removed due to an ovarian cyst and dysmenorrhia. No cancer noted. Complete   • KNEE ARTHROSCOPY Right    • MASTECTOMY Left 8/18/2020    Procedure: Left mastectomy;  Surgeon: Sheila Garza MD;  Location: [facility];  Service: General;  Laterality: Left;   • MASTECTOMY WITH SENTINEL NODE BIOPSY AND AXILLARY NODE DISSECTION Right 8/18/2020    Procedure: Right BREAST MASTECTOMY WITH SENTINEL NODE BIOPSY;  Surgeon: Sheila Garza MD;  Location: [facility];  Service: General;  Laterality: Right;   • PORTACATH PLACEMENT         Family History:  Family History   Problem Relation Age of Onset   • Diabetes Mother    • Hypertension Mother    • Diabetes Father    • Heart disease Father    • Alcohol abuse Father    • Seizures Father    • Hypertension Father    • No Known Problems Brother    • No Known Problems Daughter    • Bone cancer Son    • Suicide Attempts Cousin    • Stomach cancer Cousin         maternal first cousin   • Breast cancer Paternal Aunt 52   • Diabetes Maternal Grandmother    •  Diabetes Maternal Grandfather    • Lung cancer Maternal Grandfather    • Heart disease Paternal Grandmother    • Diabetes Paternal Grandmother    • Heart disease Paternal Grandfather    • Diabetes Paternal Grandfather    • Ovarian cancer Maternal Aunt    • Lung cancer Maternal Uncle    • Colon cancer Maternal Uncle    • Cancer Maternal Uncle         unknown type       Social History:  Social History     Socioeconomic History   • Marital status:      Spouse name: Not on file   • Number of children: Not on file   • Years of education: Not on file   • Highest education level: Not on file   Tobacco Use   • Smoking status: Never Smoker   • Smokeless tobacco: Never Used   Vaping Use   • Vaping Use: Never used   Substance and Sexual Activity   • Alcohol use: No   • Drug use: No   • Sexual activity: Yes     Partners: Male       Current Medication List:    Current Outpatient Medications:   •  albuterol sulfate HFA (Ventolin HFA) 108 (90 Base) MCG/ACT inhaler, Inhale 2 puffs Every 4 (Four) Hours As Needed (shortness of breath)., Disp: 18 g, Rfl: 5  •  Breo Ellipta 200-25 MCG/INH inhaler, Inhale 1 puff Daily., Disp: 1 inhaler, Rfl: 5  •  cholecalciferol (Vitamin D) 25 MCG (1000 UT) tablet, Take 2 tablets by mouth Daily., Disp: 60 tablet, Rfl: 5  •  diazePAM (VALIUM) 5 MG tablet, Take 1 tablet by mouth Every 12 (Twelve) Hours As Needed for Anxiety., Disp: 60 tablet, Rfl: 0  •  diclofenac (VOLTAREN) 1 % gel gel, Apply 4 g topically to the appropriate area as directed 4 (Four) Times a Day As Needed (joint pain)., Disp: 500 g, Rfl: 5  •  Diclofenac Sodium (VOLTAREN) 1 % gel gel, , Disp: , Rfl:   •  docusate sodium 100 MG capsule, Take 100 mg by mouth 2 (Two) Times a Day., Disp: , Rfl:   •  Empagliflozin-Linaglip-Metform (Trijardy XR) 25-5-1000 MG tablet sustained-release 24 hour, Take 1 tablet by mouth Every Morning., Disp: 30 tablet, Rfl: 5  •  fluticasone (FLONASE) 50 MCG/ACT nasal spray, 2 sprays into the nostril(s) as  directed by provider Daily As Needed for Rhinitis or Allergies., Disp: 1 bottle, Rfl: 5  •  insulin lispro (HumaLOG) 100 UNIT/ML patient supplied pump, Inject  under the skin into the appropriate area as directed Continuous. Basil rate: 2.95/hr Carb ratio: 4.0, Disp: , Rfl:   •  lansoprazole (PREVACID) 30 MG capsule, Take 1 capsule by mouth Daily., Disp: 90 capsule, Rfl: 3  •  magic mouthwash oral suspension, Swish and swallow 5 mL four times  a day As Needed for Mucositis., Disp: 240 mL, Rfl: 1  •  naproxen (NAPROSYN) 500 MG tablet, , Disp: , Rfl:   •  ondansetron (ZOFRAN) 8 MG tablet, TAKE 1 TABLET BY MOUTH 3 TIMES A DAY AS NEEDED FOR NAUSEA OR VOMITING., Disp: 30 tablet, Rfl: 5  •  prochlorperazine (COMPAZINE) 10 MG tablet, Take 1 tablet by mouth Every 6 (Six) Hours As Needed for Nausea or Vomiting., Disp: 30 tablet, Rfl: 5  •  rosuvastatin (CRESTOR) 40 MG tablet, Take 1 tablet by mouth Every Night., Disp: 30 tablet, Rfl: 5  •  sennosides-docusate (senna-docusate sodium) 8.6-50 MG per tablet, Take 2 tablets by mouth 2 (Two) Times a Day., Disp: 120 tablet, Rfl: 4  •  tamoxifen (NOLVADEX) 20 MG chemo tablet, Take 1 tablet by mouth Daily., Disp: 30 tablet, Rfl: 5  •  venlafaxine XR (EFFEXOR-XR) 150 MG 24 hr capsule, Take 1 capsule by mouth Daily., Disp: 30 capsule, Rfl: 0  •  vitamin D (ERGOCALCIFEROL) 1.25 MG (65616 UT) capsule capsule, TAKE ONE CAPSULE BY MOUTH ONCE WEEKLY, Disp: 12 capsule, Rfl: 0  •  linaclotide (Linzess) 145 MCG capsule capsule, Take 1 capsule by mouth Daily. 30 minutes before meals on an empty stomach., Disp: 30 capsule, Rfl: 5  •  polyethylene glycol (MIRALAX) 17 GM/SCOOP powder, Take 255 g of MiraLAX with 32 ounces liquid then repeat 8 hours later for MiraLAX cleanout., Disp: 510 g, Rfl: 0    Allergies:   Mobic [meloxicam], Penicillins, Taxotere [docetaxel], and Novolog [insulin aspart]    Vitals:  /78 (BP Location: Left arm, Patient Position: Sitting, Cuff Size: Adult)   Pulse 92    "Temp 97 °F (36.1 °C)   Ht 162.6 cm (64\")   Wt 109 kg (240 lb 3.2 oz)   SpO2 96%   BMI 41.23 kg/m²     Physical Exam:  Physical Exam  Constitutional:       Appearance: She is obese.   HENT:      Head: Normocephalic and atraumatic.      Nose: Nose normal. No congestion or rhinorrhea.   Eyes:      General: No scleral icterus.     Extraocular Movements: Extraocular movements intact.      Conjunctiva/sclera: Conjunctivae normal.      Pupils: Pupils are equal, round, and reactive to light.   Cardiovascular:      Rate and Rhythm: Normal rate and regular rhythm.      Pulses: Normal pulses.      Heart sounds: Normal heart sounds.   Pulmonary:      Effort: Pulmonary effort is normal.      Breath sounds: Normal breath sounds.   Abdominal:      General: Abdomen is flat. Bowel sounds are normal. There is no distension.      Palpations: Abdomen is soft. There is no shifting dullness, fluid wave, hepatomegaly, splenomegaly, mass or pulsatile mass.      Tenderness: There is abdominal tenderness (LUQ). There is no guarding or rebound.      Hernia: No hernia is present.   Musculoskeletal:         General: No swelling or tenderness.      Cervical back: Normal range of motion and neck supple.   Skin:     General: Skin is warm and dry.      Coloration: Skin is not jaundiced.   Neurological:      General: No focal deficit present.      Mental Status: She is alert and oriented to person, place, and time.   Psychiatric:         Mood and Affect: Mood normal.         Behavior: Behavior normal.         Results Review:  Lab Results:   No visits with results within 1 Month(s) from this visit.   Latest known visit with results is:   Lab on 03/12/2021   Component Date Value Ref Range Status   • Vitamin B-12 03/12/2021 428  211 - 946 pg/mL Final    Results may be falsely increased if patient taking Biotin.   • Microalbumin, Urine 03/12/2021 8.2  Not Estab. ug/mL Final   • 25 Hydroxy, Vitamin D 03/12/2021 46.0  30.0 - 100.0 ng/ml Final    " Comment: Results may be falsely increased if patient taking Biotin.  Reference Range for Total Vitamin D 25(OH)  Deficiency <20.0 ng/mL  Insufficiency 21-29 ng/mL  Sufficiency  ng/mL  Toxicity >100 ng/ml     • Hemoglobin A1C 03/12/2021 8.30* 4.80 - 5.60 % Final    Comment: Hemoglobin A1C Ranges:  Increased Risk for Diabetes  5.7% to 6.4%  Diabetes                     >= 6.5%  Diabetic Goal                < 7.0%     • TSH 03/12/2021 2.340  0.270 - 4.200 uIU/mL Final   • Total Cholesterol 03/12/2021 161  0 - 200 mg/dL Final    Comment: Cholesterol Reference Ranges  (U.S. Department of Health and Human Services ATP III  Classifications)  Desirable          <200 mg/dL  Borderline High    200-239 mg/dL  High Risk          >240 mg/dL  Triglyceride Reference Ranges  (U.S. Department of Health and Human Services ATP III  Classifications)  Normal           <150 mg/dL  Borderline High  150-199 mg/dL  High             200-499 mg/dL  Very High        >500 mg/dL  HDL Reference Ranges  (U.S. Department of Health and Human Services ATP III  Classifcations)  Low     <40 mg/dl (major risk factor for CHD)  High    >60 mg/dl ('negative' risk factor for CHD)  LDL Reference Ranges  (U.S. Department of Health and Human Services ATP III  Classifcations)  Optimal          <100 mg/dL  Near Optimal     100-129 mg/dL  Borderline High  130-159 mg/dL  High             160-189 mg/dL  Very High        >189 mg/dL     • Triglycerides 03/12/2021 211* 0 - 150 mg/dL Final   • HDL Cholesterol 03/12/2021 32* 40 - 60 mg/dL Final   • VLDL Cholesterol Harley 03/12/2021 36  5 - 40 mg/dL Final   • LDL Chol Calc ([facility]) 03/12/2021 93  0 - 100 mg/dL Final   • Glucose 03/12/2021 164* 65 - 99 mg/dL Final   • BUN 03/12/2021 12  6 - 20 mg/dL Final   • Creatinine 03/12/2021 0.69  0.57 - 1.00 mg/dL Final   • eGFR Non  Am 03/12/2021 91  >60 mL/min/1.73 Final    Comment: GFR Normal >60  Chronic Kidney Disease <60  Kidney Failure <15     • eGFR  Am  03/12/2021 110  >60 mL/min/1.73 Final   • BUN/Creatinine Ratio 03/12/2021 17.4  7.0 - 25.0 Final   • Sodium 03/12/2021 141  136 - 145 mmol/L Final   • Potassium 03/12/2021 4.2  3.5 - 5.2 mmol/L Final   • Chloride 03/12/2021 105  98 - 107 mmol/L Final   • Total CO2 03/12/2021 24.1  22.0 - 29.0 mmol/L Final   • Calcium 03/12/2021 8.8  8.6 - 10.5 mg/dL Final   • Total Protein 03/12/2021 6.9  6.0 - 8.5 g/dL Final   • Albumin 03/12/2021 4.00  3.50 - 5.20 g/dL Final   • Globulin 03/12/2021 2.9  gm/dL Final   • A/G Ratio 03/12/2021 1.4  g/dL Final   • Total Bilirubin 03/12/2021 0.2  0.0 - 1.2 mg/dL Final   • Alkaline Phosphatase 03/12/2021 109  39 - 117 U/L Final   • AST (SGOT) 03/12/2021 12  1 - 32 U/L Final   • ALT (SGPT) 03/12/2021 17  1 - 33 U/L Final   • WBC 03/12/2021 7.24  3.40 - 10.80 10*3/mm3 Final   • RBC 03/12/2021 5.48* 3.77 - 5.28 10*6/mm3 Final   • Hemoglobin 03/12/2021 14.1  12.0 - 15.9 g/dL Final   • Hematocrit 03/12/2021 43.3  34.0 - 46.6 % Final   • MCV 03/12/2021 79.0  79.0 - 97.0 fL Final   • MCH 03/12/2021 25.7* 26.6 - 33.0 pg Final   • MCHC 03/12/2021 32.6  31.5 - 35.7 g/dL Final   • RDW 03/12/2021 13.6  12.3 - 15.4 % Final   • Platelets 03/12/2021 317  140 - 450 10*3/mm3 Final   • Neutrophil Rel % 03/12/2021 59.0  42.7 - 76.0 % Final   • Lymphocyte Rel % 03/12/2021 31.6  19.6 - 45.3 % Final   • Monocyte Rel % 03/12/2021 6.8  5.0 - 12.0 % Final   • Eosinophil Rel % 03/12/2021 1.8  0.3 - 6.2 % Final   • Basophil Rel % 03/12/2021 0.7  0.0 - 1.5 % Final   • Neutrophils Absolute 03/12/2021 4.27  1.70 - 7.00 10*3/mm3 Final   • Lymphocytes Absolute 03/12/2021 2.29  0.70 - 3.10 10*3/mm3 Final   • Monocytes Absolute 03/12/2021 0.49  0.10 - 0.90 10*3/mm3 Final   • Eosinophils Absolute 03/12/2021 0.13  0.00 - 0.40 10*3/mm3 Final   • Basophils Absolute 03/12/2021 0.05  0.00 - 0.20 10*3/mm3 Final   • Immature Granulocyte Rel % 03/12/2021 0.1  0.0 - 0.5 % Final   • Immature Grans Absolute 03/12/2021 0.01  0.00 -  0.05 10*3/mm3 Final   • [facility] 03/12/2021 0.1  0.0 - 0.2 /100 WBC Final       Assessment/Plan     Visit Diagnoses:    ICD-10-CM ICD-9-CM   1. BRBPR (bright red blood per rectum)  K62.5 569.3   2. Slow transit constipation  K59.01 564.01       Plan:  The patient will undergo colonoscopy due to BRBPR.  She complains of constipation with incomplete evacuation of the bowel.  She has tried Linzess in the past which was helpful.  I have given her samples of Linzess 145 mcg and have sent in a prescription.      COLONOSCOPY WITH BIOPSY (N/A)      MEDICATION ISSUES:  Discussed medication options and treatment plan of prescribed medication as well as the risks, benefits, and side effects including potential falls, possible impaired driving and metabolic adversities among others. Patient is agreeable to call the office with any worsening of symptoms or onset of side effects. Patient is agreeable to call 911 or go to the nearest ER should he/she begin having SI/HI.     MEDS ORDERED DURING VISIT:  New Medications Ordered This Visit   Medications   • linaclotide (Linzess) 145 MCG capsule capsule     Sig: Take 1 capsule by mouth Daily. 30 minutes before meals on an empty stomach.     Dispense:  30 capsule     Refill:  5   • polyethylene glycol (MIRALAX) 17 GM/SCOOP powder     Sig: Take 255 g of MiraLAX with 32 ounces liquid then repeat 8 hours later for MiraLAX cleanout.     Dispense:  510 g     Refill:  0       No follow-ups on file.             This document has been electronically signed by Vickei Herrera MD   April 14, 2021 10:18 EDT        Part of this note may be an electronic transcription/translation of spoken language to printed text using the Dragon Dictation System.

## 2021-04-19 ENCOUNTER — TELEPHONE (OUTPATIENT)
Dept: FAMILY MEDICINE CLINIC | Facility: CLINIC | Age: 46
End: 2021-04-19

## 2021-04-19 ENCOUNTER — APPOINTMENT (OUTPATIENT)
Dept: ONCOLOGY | Facility: HOSPITAL | Age: 46
End: 2021-04-19

## 2021-04-19 ENCOUNTER — TELEPHONE (OUTPATIENT)
Dept: UROLOGY | Facility: CLINIC | Age: 46
End: 2021-04-19

## 2021-04-19 ENCOUNTER — PRIOR AUTHORIZATION (OUTPATIENT)
Dept: FAMILY MEDICINE CLINIC | Facility: CLINIC | Age: 46
End: 2021-04-19

## 2021-04-19 NOTE — TELEPHONE ENCOUNTER
PT CALLED TO REQUEST A PRESCRIPTION TO GET HER PORT FLUSHED AT [facility].    PLEASE ADVISE.  CALL BACK:[phone]

## 2021-04-23 ENCOUNTER — TELEPHONE (OUTPATIENT)
Dept: ONCOLOGY | Facility: HOSPITAL | Age: 46
End: 2021-04-23

## 2021-04-23 NOTE — TELEPHONE ENCOUNTER
Called pt related to missed appointment. States she has a stomach virus and will call us back to reschedule.

## 2021-04-28 PROBLEM — K62.5 BRBPR (BRIGHT RED BLOOD PER RECTUM): Status: ACTIVE | Noted: 2021-04-28

## 2021-05-03 ENCOUNTER — LAB (OUTPATIENT)
Dept: LAB | Facility: HOSPITAL | Age: 46
End: 2021-05-03

## 2021-05-03 DIAGNOSIS — Z01.818 PREOPERATIVE CLEARANCE: ICD-10-CM

## 2021-05-03 DIAGNOSIS — K62.5 BRBPR (BRIGHT RED BLOOD PER RECTUM): ICD-10-CM

## 2021-05-03 DIAGNOSIS — K59.01 SLOW TRANSIT CONSTIPATION: ICD-10-CM

## 2021-05-03 PROCEDURE — U0004 COV-19 TEST NON-CDC HGH THRU: HCPCS

## 2021-05-03 PROCEDURE — C9803 HOPD COVID-19 SPEC COLLECT: HCPCS

## 2021-05-04 LAB — SARS-COV-2 RNA NOSE QL NAA+PROBE: NOT DETECTED

## 2021-05-05 ENCOUNTER — ANESTHESIA (OUTPATIENT)
Dept: PERIOP | Facility: HOSPITAL | Age: 46
End: 2021-05-05

## 2021-05-05 ENCOUNTER — HOSPITAL ENCOUNTER (OUTPATIENT)
Facility: HOSPITAL | Age: 46
Setting detail: HOSPITAL OUTPATIENT SURGERY
Discharge: HOME | End: 2021-05-05
Attending: INTERNAL MEDICINE | Admitting: INTERNAL MEDICINE

## 2021-05-05 ENCOUNTER — ANESTHESIA EVENT (OUTPATIENT)
Dept: PERIOP | Facility: HOSPITAL | Age: 46
End: 2021-05-05

## 2021-05-05 VITALS
HEIGHT: 64 IN | DIASTOLIC BLOOD PRESSURE: 82 MMHG | TEMPERATURE: 98.1 F | RESPIRATION RATE: 18 BRPM | OXYGEN SATURATION: 94 % | BODY MASS INDEX: 40.12 KG/M2 | WEIGHT: 235 LBS | SYSTOLIC BLOOD PRESSURE: 128 MMHG | HEART RATE: 86 BPM

## 2021-05-05 LAB — GLUCOSE BLDC GLUCOMTR-MCNC: 137 MG/DL (ref 70–130)

## 2021-05-05 PROCEDURE — 25010000002 PROPOFOL 10 MG/ML EMULSION: Performed by: NURSE ANESTHETIST, CERTIFIED REGISTERED

## 2021-05-05 PROCEDURE — 25010000002 ONDANSETRON PER 1 MG: Performed by: NURSE ANESTHETIST, CERTIFIED REGISTERED

## 2021-05-05 PROCEDURE — 45378 DIAGNOSTIC COLONOSCOPY: CPT | Performed by: INTERNAL MEDICINE

## 2021-05-05 PROCEDURE — 82962 GLUCOSE BLOOD TEST: CPT

## 2021-05-05 RX ORDER — MEPERIDINE HYDROCHLORIDE 25 MG/ML
12.5 INJECTION INTRAMUSCULAR; INTRAVENOUS; SUBCUTANEOUS
Status: DISCONTINUED | OUTPATIENT
Start: 2021-05-05 | End: 2021-05-05 | Stop reason: HOSPADM

## 2021-05-05 RX ORDER — OXYCODONE HYDROCHLORIDE AND ACETAMINOPHEN 5; 325 MG/1; MG/1
1 TABLET ORAL ONCE AS NEEDED
Status: DISCONTINUED | OUTPATIENT
Start: 2021-05-05 | End: 2021-05-05 | Stop reason: HOSPADM

## 2021-05-05 RX ORDER — ONDANSETRON 2 MG/ML
4 INJECTION INTRAMUSCULAR; INTRAVENOUS AS NEEDED
Status: DISCONTINUED | OUTPATIENT
Start: 2021-05-05 | End: 2021-05-05 | Stop reason: HOSPADM

## 2021-05-05 RX ORDER — IPRATROPIUM BROMIDE AND ALBUTEROL SULFATE 2.5; .5 MG/3ML; MG/3ML
3 SOLUTION RESPIRATORY (INHALATION) ONCE AS NEEDED
Status: DISCONTINUED | OUTPATIENT
Start: 2021-05-05 | End: 2021-05-05 | Stop reason: HOSPADM

## 2021-05-05 RX ORDER — SODIUM CHLORIDE, SODIUM LACTATE, POTASSIUM CHLORIDE, CALCIUM CHLORIDE 600; 310; 30; 20 MG/100ML; MG/100ML; MG/100ML; MG/100ML
125 INJECTION, SOLUTION INTRAVENOUS ONCE
Status: COMPLETED | OUTPATIENT
Start: 2021-05-05 | End: 2021-05-05

## 2021-05-05 RX ORDER — ONDANSETRON 2 MG/ML
INJECTION INTRAMUSCULAR; INTRAVENOUS AS NEEDED
Status: DISCONTINUED | OUTPATIENT
Start: 2021-05-05 | End: 2021-05-05 | Stop reason: SURG

## 2021-05-05 RX ORDER — PROPOFOL 10 MG/ML
VIAL (ML) INTRAVENOUS AS NEEDED
Status: DISCONTINUED | OUTPATIENT
Start: 2021-05-05 | End: 2021-05-05 | Stop reason: SURG

## 2021-05-05 RX ORDER — DROPERIDOL 2.5 MG/ML
0.62 INJECTION, SOLUTION INTRAMUSCULAR; INTRAVENOUS ONCE AS NEEDED
Status: DISCONTINUED | OUTPATIENT
Start: 2021-05-05 | End: 2021-05-05 | Stop reason: HOSPADM

## 2021-05-05 RX ORDER — SODIUM CHLORIDE 0.9 % (FLUSH) 0.9 %
10 SYRINGE (ML) INJECTION EVERY 12 HOURS SCHEDULED
Status: DISCONTINUED | OUTPATIENT
Start: 2021-05-05 | End: 2021-05-05 | Stop reason: HOSPADM

## 2021-05-05 RX ORDER — SODIUM CHLORIDE 0.9 % (FLUSH) 0.9 %
10 SYRINGE (ML) INJECTION AS NEEDED
Status: DISCONTINUED | OUTPATIENT
Start: 2021-05-05 | End: 2021-05-05 | Stop reason: HOSPADM

## 2021-05-05 RX ORDER — LIDOCAINE HYDROCHLORIDE 20 MG/ML
INJECTION, SOLUTION INFILTRATION; PERINEURAL AS NEEDED
Status: DISCONTINUED | OUTPATIENT
Start: 2021-05-05 | End: 2021-05-05 | Stop reason: SURG

## 2021-05-05 RX ORDER — FENTANYL CITRATE 50 UG/ML
50 INJECTION, SOLUTION INTRAMUSCULAR; INTRAVENOUS
Status: DISCONTINUED | OUTPATIENT
Start: 2021-05-05 | End: 2021-05-05 | Stop reason: HOSPADM

## 2021-05-05 RX ORDER — HYDROCORTISONE ACETATE 25 MG/1
25 SUPPOSITORY RECTAL 2 TIMES DAILY PRN
Qty: 30 SUPPOSITORY | Refills: 5 | Status: SHIPPED | OUTPATIENT
Start: 2021-05-05 | End: 2022-05-18

## 2021-05-05 RX ORDER — MIDAZOLAM HYDROCHLORIDE 1 MG/ML
1 INJECTION INTRAMUSCULAR; INTRAVENOUS
Status: DISCONTINUED | OUTPATIENT
Start: 2021-05-05 | End: 2021-05-05 | Stop reason: HOSPADM

## 2021-05-05 RX ORDER — SODIUM CHLORIDE, SODIUM LACTATE, POTASSIUM CHLORIDE, CALCIUM CHLORIDE 600; 310; 30; 20 MG/100ML; MG/100ML; MG/100ML; MG/100ML
100 INJECTION, SOLUTION INTRAVENOUS ONCE AS NEEDED
Status: DISCONTINUED | OUTPATIENT
Start: 2021-05-05 | End: 2021-05-05 | Stop reason: HOSPADM

## 2021-05-05 RX ADMIN — LIDOCAINE HYDROCHLORIDE 40 MG: 20 INJECTION, SOLUTION INFILTRATION; PERINEURAL at 07:39

## 2021-05-05 RX ADMIN — ONDANSETRON 4 MG: 2 INJECTION INTRAMUSCULAR; INTRAVENOUS at 07:45

## 2021-05-05 RX ADMIN — PROPOFOL 30 MG: 10 INJECTION, EMULSION INTRAVENOUS at 07:40

## 2021-05-05 RX ADMIN — PROPOFOL 150 MCG/KG/MIN: 10 INJECTION, EMULSION INTRAVENOUS at 07:40

## 2021-05-05 RX ADMIN — SODIUM CHLORIDE, POTASSIUM CHLORIDE, SODIUM LACTATE AND CALCIUM CHLORIDE: 600; 310; 30; 20 INJECTION, SOLUTION INTRAVENOUS at 07:40

## 2021-05-05 NOTE — ANESTHESIA PREPROCEDURE EVALUATION
Anesthesia Evaluation     Patient summary reviewed and Nursing notes reviewed   history of anesthetic complications: PONV               Airway   Mallampati: II  TM distance: >3 FB  Neck ROM: limited  No difficulty expected  Dental - normal exam     Pulmonary - normal exam   (+) asthma,  Cardiovascular - normal exam  Exercise tolerance: good (4-7 METS)    NYHA Classification: II    (+) valvular problems/murmurs, hyperlipidemia,  carotid artery disease      Neuro/Psych  (+) TIA, numbness, psychiatric history,     GI/Hepatic/Renal/Endo    (+) obesity, morbid obesity, GERD,  liver disease fatty liver disease, renal disease, diabetes mellitus,     Musculoskeletal     Abdominal  - normal exam    Bowel sounds: normal.   Substance History - negative use     OB/GYN negative ob/gyn ROS         Other   arthritis,    history of cancer                      Anesthesia Plan    ASA 3     general     intravenous induction     Anesthetic plan, all risks, benefits, and alternatives have been provided, discussed and informed consent has been obtained with: patient.

## 2021-05-05 NOTE — ANESTHESIA POSTPROCEDURE EVALUATION
Patient: Nivia Bernstein    Procedure Summary     Date: 05/05/21 Room / Location: [facility] OR  /  [facility] OR    Anesthesia Start: 0738 Anesthesia Stop: 0802    Procedure: COLONOSCOPY WITH BIOPSY (N/A ) Diagnosis:       BRBPR (bright red blood per rectum)      (BRBPR (bright red blood per rectum) [K62.5])    Surgeons: Vickie Herrera MD Provider: Austin Pa MD    Anesthesia Type: general ASA Status: 3          Anesthesia Type: general    Vitals  Vitals Value Taken Time   /82 05/05/21 0832   Temp 98.1 °F (36.7 °C) 05/05/21 0802   Pulse 86 05/05/21 0832   Resp 18 05/05/21 0832   SpO2 94 % 05/05/21 0832           Post Anesthesia Care and Evaluation    Patient location during evaluation: PHASE II  Patient participation: complete - patient participated  Level of consciousness: awake and alert  Pain score: 1  Pain management: adequate  Airway patency: patent  Anesthetic complications: No anesthetic complications  PONV Status: controlled  Cardiovascular status: acceptable  Respiratory status: acceptable  Hydration status: acceptable

## 2021-05-07 ENCOUNTER — OFFICE VISIT (OUTPATIENT)
Dept: PSYCHIATRY | Facility: CLINIC | Age: 46
End: 2021-05-07

## 2021-05-07 VITALS
WEIGHT: 238 LBS | HEART RATE: 98 BPM | BODY MASS INDEX: 40.85 KG/M2 | DIASTOLIC BLOOD PRESSURE: 86 MMHG | SYSTOLIC BLOOD PRESSURE: 136 MMHG

## 2021-05-07 DIAGNOSIS — F42.8 OTHER OBSESSIVE-COMPULSIVE DISORDERS: Chronic | ICD-10-CM

## 2021-05-07 DIAGNOSIS — F41.1 GENERALIZED ANXIETY DISORDER: ICD-10-CM

## 2021-05-07 DIAGNOSIS — F33.1 MODERATE EPISODE OF RECURRENT MAJOR DEPRESSIVE DISORDER (HCC): ICD-10-CM

## 2021-05-07 DIAGNOSIS — Z79.899 HIGH RISK MEDICATION USE: Primary | ICD-10-CM

## 2021-05-07 PROCEDURE — 99213 OFFICE O/P EST LOW 20 MIN: CPT | Performed by: NURSE PRACTITIONER

## 2021-05-07 RX ORDER — DIAZEPAM 10 MG/1
5-10 TABLET ORAL EVERY 12 HOURS PRN
Qty: 45 TABLET | Refills: 0 | Status: SHIPPED | OUTPATIENT
Start: 2021-05-07 | End: 2021-05-25 | Stop reason: SDUPTHER

## 2021-05-07 RX ORDER — VENLAFAXINE HYDROCHLORIDE 75 MG/1
225 CAPSULE, EXTENDED RELEASE ORAL DAILY
Qty: 90 CAPSULE | Refills: 0 | Status: SHIPPED | OUTPATIENT
Start: 2021-05-07 | End: 2021-05-25 | Stop reason: SDUPTHER

## 2021-05-11 ENCOUNTER — PRIOR AUTHORIZATION (OUTPATIENT)
Dept: FAMILY MEDICINE CLINIC | Facility: CLINIC | Age: 46
End: 2021-05-11

## 2021-05-19 RX ORDER — ERGOCALCIFEROL 1.25 MG/1
CAPSULE ORAL
Qty: 4 CAPSULE | Refills: 0 | Status: SHIPPED | OUTPATIENT
Start: 2021-05-19 | End: 2021-06-30

## 2021-05-25 DIAGNOSIS — F33.1 MODERATE EPISODE OF RECURRENT MAJOR DEPRESSIVE DISORDER (HCC): ICD-10-CM

## 2021-05-25 DIAGNOSIS — F42.8 OTHER OBSESSIVE-COMPULSIVE DISORDERS: Chronic | ICD-10-CM

## 2021-05-25 DIAGNOSIS — F41.1 GENERALIZED ANXIETY DISORDER: ICD-10-CM

## 2021-05-25 RX ORDER — VENLAFAXINE HYDROCHLORIDE 75 MG/1
225 CAPSULE, EXTENDED RELEASE ORAL DAILY
Qty: 90 CAPSULE | Refills: 0 | Status: SHIPPED | OUTPATIENT
Start: 2021-05-25 | End: 2021-06-03

## 2021-05-25 RX ORDER — DIAZEPAM 10 MG/1
5-10 TABLET ORAL EVERY 12 HOURS PRN
Qty: 45 TABLET | Refills: 0 | Status: SHIPPED | OUTPATIENT
Start: 2021-05-25 | End: 2021-07-01 | Stop reason: SDUPTHER

## 2021-05-27 ENCOUNTER — OFFICE VISIT (OUTPATIENT)
Dept: FAMILY MEDICINE CLINIC | Facility: CLINIC | Age: 46
End: 2021-05-27

## 2021-05-27 VITALS
BODY MASS INDEX: 40.63 KG/M2 | TEMPERATURE: 97.1 F | OXYGEN SATURATION: 93 % | SYSTOLIC BLOOD PRESSURE: 112 MMHG | HEIGHT: 64 IN | RESPIRATION RATE: 14 BRPM | DIASTOLIC BLOOD PRESSURE: 62 MMHG | WEIGHT: 238 LBS | HEART RATE: 96 BPM

## 2021-05-27 DIAGNOSIS — M54.81 OCCIPITAL NEURALGIA OF RIGHT SIDE: ICD-10-CM

## 2021-05-27 DIAGNOSIS — Z86.718 HISTORY OF DVT (DEEP VEIN THROMBOSIS): ICD-10-CM

## 2021-05-27 DIAGNOSIS — I65.23 ATHEROSCLEROSIS OF BOTH CAROTID ARTERIES: ICD-10-CM

## 2021-05-27 DIAGNOSIS — C50.411 MALIGNANT NEOPLASM OF UPPER-OUTER QUADRANT OF RIGHT BREAST IN FEMALE, ESTROGEN RECEPTOR NEGATIVE (HCC): ICD-10-CM

## 2021-05-27 DIAGNOSIS — E78.2 MIXED HYPERLIPIDEMIA: ICD-10-CM

## 2021-05-27 DIAGNOSIS — E55.9 VITAMIN D DEFICIENCY: ICD-10-CM

## 2021-05-27 DIAGNOSIS — Z00.00 HEALTHCARE MAINTENANCE: ICD-10-CM

## 2021-05-27 DIAGNOSIS — N95.1 MENOPAUSAL SYMPTOMS: ICD-10-CM

## 2021-05-27 DIAGNOSIS — E11.42 DM TYPE 2 WITH DIABETIC PERIPHERAL NEUROPATHY (HCC): ICD-10-CM

## 2021-05-27 DIAGNOSIS — F32.A ANXIETY AND DEPRESSION: Chronic | ICD-10-CM

## 2021-05-27 DIAGNOSIS — K21.9 GASTROESOPHAGEAL REFLUX DISEASE WITHOUT ESOPHAGITIS: Chronic | ICD-10-CM

## 2021-05-27 DIAGNOSIS — F41.9 ANXIETY AND DEPRESSION: Chronic | ICD-10-CM

## 2021-05-27 DIAGNOSIS — M85.89 OSTEOPENIA OF MULTIPLE SITES: ICD-10-CM

## 2021-05-27 DIAGNOSIS — E66.01 CLASS 3 SEVERE OBESITY WITH SERIOUS COMORBIDITY AND BODY MASS INDEX (BMI) OF 40.0 TO 44.9 IN ADULT, UNSPECIFIED OBESITY TYPE (HCC): ICD-10-CM

## 2021-05-27 DIAGNOSIS — R23.2 HOT FLASHES: ICD-10-CM

## 2021-05-27 DIAGNOSIS — Z17.1 MALIGNANT NEOPLASM OF UPPER-OUTER QUADRANT OF RIGHT BREAST IN FEMALE, ESTROGEN RECEPTOR NEGATIVE (HCC): ICD-10-CM

## 2021-05-27 DIAGNOSIS — J45.20 MILD INTERMITTENT ASTHMA WITHOUT COMPLICATION: ICD-10-CM

## 2021-05-27 DIAGNOSIS — J30.9 CHRONIC ALLERGIC RHINITIS: Primary | ICD-10-CM

## 2021-05-27 DIAGNOSIS — N28.9 RENAL INSUFFICIENCY: ICD-10-CM

## 2021-05-27 PROCEDURE — 64405 NJX AA&/STRD GR OCPL NRV: CPT | Performed by: GENERAL PRACTICE

## 2021-05-27 PROCEDURE — 99214 OFFICE O/P EST MOD 30 MIN: CPT | Performed by: GENERAL PRACTICE

## 2021-05-27 RX ORDER — LIDOCAINE HYDROCHLORIDE 10 MG/ML
2 INJECTION, SOLUTION INFILTRATION; PERINEURAL ONCE
Status: COMPLETED | OUTPATIENT
Start: 2021-05-27 | End: 2021-05-27

## 2021-05-27 RX ORDER — TRIAMCINOLONE ACETONIDE 40 MG/ML
40 INJECTION, SUSPENSION INTRA-ARTICULAR; INTRAMUSCULAR ONCE
Status: COMPLETED | OUTPATIENT
Start: 2021-05-27 | End: 2021-05-27

## 2021-05-27 RX ORDER — CLONIDINE HYDROCHLORIDE 0.1 MG/1
0.1 TABLET ORAL 2 TIMES DAILY
Qty: 60 TABLET | Refills: 5 | Status: SHIPPED | OUTPATIENT
Start: 2021-05-27 | End: 2021-12-24 | Stop reason: SDUPTHER

## 2021-05-27 RX ADMIN — LIDOCAINE HYDROCHLORIDE 2 ML: 10 INJECTION, SOLUTION INFILTRATION; PERINEURAL at 15:38

## 2021-05-27 RX ADMIN — TRIAMCINOLONE ACETONIDE 40 MG: 40 INJECTION, SUSPENSION INTRA-ARTICULAR; INTRAMUSCULAR at 15:39

## 2021-05-27 NOTE — PROGRESS NOTES
Subjective   Nivia Bernstein is a 46 y.o. female.     History of Present Illness     Headaches  She gives a more than 1 year history of intermittent headaches.  She feels that these started with chemotherapy.  They have been occurring daily and are described as a right occipital throb that soon extends to the right hemicranium.  To date, she has not experienced any warning symptoms however when severe her headaches have been associated with nausea and occasionally photophobia.  She denies any other associated symptoms and there is no history of any other visual disturbances, any changes in her strength or sensation, or any difficulty with her coordination or speech.  She denies any new joint or muscle pain and has had no rash, fever, or chills.  She has been unable to identify any precipitating or exacerbating factors.  MRI of the brain performed on 1/28/2021 was unremarkable.  She is scheduled to undergo a neurology assessment with Dr. De La O on 6/11/2021.    Breast Cancer  Underwent an ultrasound guided biopsy of a right upper outer quadrant breast mass on 7/21/2019.  Pathology was consistent with an invasive ductal carcinoma and hormonal markers eventually returned ER negative LA weakly positive and HER-2 negative.  Bilateral mastectomy with sentinel node biopsy was performed on 8/18/2020.  Pathology was consistent with a moderately differentiated invasive ductal carcinoma with associated intermediate grade DCIS but negative margins and lymph nodes.  MammaPrint was high risk.  She was started on Taxotere/Cytoxan but developed a significant reaction with the first infusion and was changed to DD AC.  She is now on tamoxifen.  She has experienced persistent hot flashes that interfere with her daytime activities and sleep.  She is scheduled to undergo an oncology reassessment with Dr. Byrnes on 6/8/2021 and will see Dr. Garza again on 7/8/2021    Chronic Anxiety  She has a history of OCD and admits to persistent  nervousness, worrying, difficulty sleeping, and daytime fatigue.  She has been under considerable stress and admits to intermittent depression.  She continues to deny any loss of interest in activities and there is no history of any suicidal ideation. She remains on venlafaxine and diazepam.  She is scheduled to undergo a psychiatric reassessment with Gale ALVA on 7/1/2021    History of TIA  She gives a history of previous TIA.  MRI of the brain performed on 10/16/2017 was unremarkable.  Duplex Doppler ultrasound of the carotid arteries performed the same day revealed homogenous plaque bilaterally but no evidence of stenosis.  Echocardiogram performed the same day was reported as showing trace mitral, aortic, and pulmonic valve regurgitation but no significant abnormalities with an estimated EF of 56 to 60% and a negative saline test for intracardiac shunting. Echocardiogram performed on 9/29/2020 was a limited study but reported as showing grade 1 diastolic dysfunction but normal systolic function with an estimated EF of 61 to 65%. She remains on ASA.  She gives a history of a previous right DVT following arthroscopic knee surgery on that side.  There is no family history of DVT or PE  Lab Results   Component Value Date    WBC 7.24 03/12/2021    HGB 14.1 03/12/2021    HCT 43.3 03/12/2021    MCV 79.0 03/12/2021     03/12/2021     Diabetes  Current symptoms include paresthesia of the feet. Patient denies visual disturbances, polydipsia, polyuria or foot ulcerations and has had no recent hypoglycemia. Evaluation to date has been: fasting blood sugar and hemoglobin A1C. Home sugars: BGs consistently in an acceptable range. Current treatments: metformin, linagliptin, empagliflozin (together as trijardy XR), and an insulin pump.  She is scheduled to undergo a reassessment with NIDA Starr/DOUG on 6/16/2021  Lab Results   Component Value Date    HGBA1C 8.30 (H) 03/12/2021     Lab Results    Component Value Date    MICROALBUR 8.2 03/12/2021     Dyslipidemia  Compliance with treatment has been fairly good. The patient exercises intermittently. She remains on rosuvastatin with no apparent side effects  Lab Results   Component Value Date    CHOL 189 11/17/2020    CHLPL 161 03/12/2021    TRIG 211 (H) 03/12/2021    HDL 32 (L) 03/12/2021    LDL 93 03/12/2021     Labs  Most recent vitamin D 46 with a B12 of 428    The following portions of the patient's history were reviewed and updated as appropriate: allergies, current medications, past medical history, past social history and problem list.    Review of Systems   Constitutional: Positive for fatigue. Negative for activity change, appetite change, chills, fever and unexpected weight change.   HENT: Positive for congestion and rhinorrhea. Negative for ear pain, sneezing and sore throat.    Eyes: Negative for visual disturbance.   Respiratory: Negative for cough, chest tightness, shortness of breath and wheezing.    Cardiovascular: Positive for leg swelling. Negative for chest pain and palpitations.   Gastrointestinal: Positive for constipation. Negative for abdominal pain, blood in stool, diarrhea, nausea and vomiting.   Endocrine: Negative for polydipsia and polyuria.   Genitourinary: Negative for difficulty urinating, dysuria, frequency, hematuria and urgency.   Musculoskeletal: Positive for arthralgias and back pain. Negative for joint swelling, myalgias and neck pain.   Skin: Negative for rash.   Neurological: Positive for numbness and headaches. Negative for dizziness, speech difficulty, weakness and light-headedness.   Psychiatric/Behavioral: Positive for dysphoric mood and sleep disturbance. Negative for suicidal ideas. The patient is nervous/anxious.      Objective   Physical Exam  Constitutional:       General: She is not in acute distress.     Appearance: Normal appearance. She is well-developed. She is not diaphoretic.      Comments: Accompanied  by her . Bright and in fair spirits. No apparent distress. No pallor, jaundice, diaphoresis, or cyanosis.   HENT:      Head: Atraumatic.      Right Ear: Tympanic membrane, ear canal and external ear normal.      Left Ear: Tympanic membrane, ear canal and external ear normal.   Eyes:      Conjunctiva/sclera: Conjunctivae normal.   Neck:      Thyroid: No thyroid mass or thyromegaly.      Vascular: No carotid bruit or JVD.      Trachea: Trachea normal. No tracheal deviation.   Cardiovascular:      Rate and Rhythm: Normal rate and regular rhythm.      Heart sounds: Normal heart sounds, S1 normal and S2 normal. No murmur heard.   No gallop.    Pulmonary:      Effort: Pulmonary effort is normal.      Breath sounds: Normal breath sounds.   Abdominal:      General: Bowel sounds are normal. There is no distension.   Musculoskeletal:      Cervical back: Tenderness (at insertion of the right trapezius at the occiput -reproduces pain associated with headaches) present. No swelling or deformity. Normal range of motion.      Right lower leg: No edema.      Left lower leg: No edema.   Lymphadenopathy:      Head:      Right side of head: No submental, submandibular, tonsillar, preauricular, posterior auricular or occipital adenopathy.      Left side of head: No submental, submandibular, tonsillar, preauricular, posterior auricular or occipital adenopathy.      Cervical: No cervical adenopathy.      Upper Body:      Right upper body: No supraclavicular adenopathy.      Left upper body: No supraclavicular adenopathy.   Skin:     General: Skin is warm.      Coloration: Skin is not cyanotic, jaundiced or pale.      Findings: No rash.      Nails: There is no clubbing.   Neurological:      Mental Status: She is alert and oriented to person, place, and time.      Cranial Nerves: No cranial nerve deficit.      Sensory: Sensory deficit (decreased vibration sense toes of both feet) present.      Motor: No tremor.      Coordination:  Coordination normal.      Gait: Gait normal.   Psychiatric:         Attention and Perception: Attention normal.         Mood and Affect: Mood normal.         Speech: Speech normal.         Behavior: Behavior normal.         Thought Content: Thought content normal. Thought content does not include suicidal ideation.       Assessment/Plan   Problems Addressed this Visit        Allergies and Adverse Reactions    Chronic allergic rhinitis        Cardiac and Vasculature    Atherosclerosis of both carotid arteries  Reminded regarding the importance of risk factor modification.  Continue current medication    Mixed hyperlipidemia  Encouraged to continue to work on her diet and exercise plan.  Continue current medication  Updated labs will be arranged at return.       Coag and Thromboembolic    History of DVT (deep vein thrombosis)       Endocrine and Metabolic    Class 3 severe obesity with serious comorbidity and body mass index (BMI) of 40.0 to 44.9 in adult (CMS/[facility])    DM type 2 with diabetic peripheral neuropathy (CMS/[facility])  As above.   Continue current medication.  Follow up with MELL Starr    Vitamin D deficiency       Gastrointestinal Abdominal     GERD (gastroesophageal reflux disease) (Chronic)       Genitourinary and Reproductive     Menopausal symptoms    Renal insufficiency       Health Encounters    Healthcare maintenance  Patient has received both doses of the COVID-19 vaccine.       Hematology and Neoplasia    Malignant neoplasm of upper-outer quadrant of right breast in female, estrogen receptor negative (CMS/HCC)  Follow up with oncology and general surgery       Mental Health    Anxiety and depression (Chronic)  Follow up with psychiatry       Musculoskeletal and Injuries    Occipital neuralgia of right side  Reviewed options and agreed on a occipital nerve injection  See procedure note  Follow up with neurology   Encouraged to report if any worse or if any new symptoms or concerns in the  meantime    Relevant Medications    lidocaine (XYLOCAINE) 1 % injection 2 mL (Completed)    triamcinolone acetonide (KENALOG-40) injection 40 mg (Completed)    Osteopenia of multiple sites  Encouraged to continue to pursue weight bearing activities while exercising joint protection.         Pulmonary and Pneumonias    Mild intermittent asthma without complication       Symptoms and Signs    Hot flashes  Associated with hormonal therapy  Reviewed options and agreed on a trial of clonidine    Relevant Medications    cloNIDine (CATAPRES) 0.1 MG tablet      Diagnoses       Codes Comments    Chronic allergic rhinitis    -  Primary ICD-10-CM: J30.9  ICD-9-CM: 477.9     Mixed hyperlipidemia     ICD-10-CM: E78.2  ICD-9-CM: 272.2     Atherosclerosis of both carotid arteries     ICD-10-CM: I65.23  ICD-9-CM: 433.10, 433.30     History of DVT (deep vein thrombosis)     ICD-10-CM: Z86.718  ICD-9-CM: V12.51     Vitamin D deficiency     ICD-10-CM: E55.9  ICD-9-CM: 268.9     DM type 2 with diabetic peripheral neuropathy (CMS/HCC)     ICD-10-CM: E11.42  ICD-9-CM: 250.60, 357.2     Class 3 severe obesity with serious comorbidity and body mass index (BMI) of 40.0 to 44.9 in adult, unspecified obesity type (CMS/HCC)     ICD-10-CM: E66.01, Z68.41  ICD-9-CM: 278.01, V85.41     Gastroesophageal reflux disease without esophagitis     ICD-10-CM: K21.9  ICD-9-CM: 530.81     Renal insufficiency     ICD-10-CM: N28.9  ICD-9-CM: 593.9     Menopausal symptoms     ICD-10-CM: N95.1  ICD-9-CM: 627.2     Healthcare maintenance     ICD-10-CM: Z00.00  ICD-9-CM: V70.0     Malignant neoplasm of upper-outer quadrant of right breast in female, estrogen receptor negative (CMS/HCC)     ICD-10-CM: C50.411, Z17.1  ICD-9-CM: 174.4, V86.1     Anxiety and depression     ICD-10-CM: F41.9, F32.9  ICD-9-CM: 300.00, 311     Osteopenia of multiple sites     ICD-10-CM: M85.89  ICD-9-CM: 733.90     Mild intermittent asthma without complication     ICD-10-CM:  J45.20  ICD-9-CM: 493.90     Hot flashes     ICD-10-CM: R23.2  ICD-9-CM: 782.62     Occipital neuralgia of right side     ICD-10-CM: M54.81  ICD-9-CM: 723.8

## 2021-05-27 NOTE — PROGRESS NOTES
Nerve injection     Pre-operative diagnosis: Right occipital neuralgia    Post-operative diagnosis: Same    After risks and benefits were explained including bleeding, infection, worsening of the pain, damage to the area being injected, weakness, allergic reaction to medications, vascular injection, and nerve damage, verbal consent was obtained.  All questions were answered.      The area of maximal tenderness at the insertion of the trapezius at the right occiput was identified and the skin prepped three times with alcohol and the alcohol allowed to dry.  Using 25 gauge 1 inch needle the area was injected with 20 mg of triamcinolone and 1 mL of lidocaine 1% without.    There were no apparent complications

## 2021-06-02 DIAGNOSIS — F41.1 GENERALIZED ANXIETY DISORDER: ICD-10-CM

## 2021-06-02 DIAGNOSIS — F33.1 MODERATE EPISODE OF RECURRENT MAJOR DEPRESSIVE DISORDER (HCC): ICD-10-CM

## 2021-06-02 DIAGNOSIS — F42.8 OTHER OBSESSIVE-COMPULSIVE DISORDERS: Chronic | ICD-10-CM

## 2021-06-03 RX ORDER — VENLAFAXINE HYDROCHLORIDE 75 MG/1
225 CAPSULE, EXTENDED RELEASE ORAL DAILY
Qty: 90 CAPSULE | Refills: 0 | Status: SHIPPED | OUTPATIENT
Start: 2021-06-03 | End: 2021-07-01 | Stop reason: SDUPTHER

## 2021-06-08 ENCOUNTER — APPOINTMENT (OUTPATIENT)
Dept: ONCOLOGY | Facility: HOSPITAL | Age: 46
End: 2021-06-08

## 2021-06-09 ENCOUNTER — TELEPHONE (OUTPATIENT)
Dept: FAMILY MEDICINE CLINIC | Facility: CLINIC | Age: 46
End: 2021-06-09

## 2021-06-09 DIAGNOSIS — J45.20 MILD INTERMITTENT ASTHMA WITHOUT COMPLICATION: ICD-10-CM

## 2021-06-09 RX ORDER — DOXYCYCLINE HYCLATE 100 MG/1
100 CAPSULE ORAL 2 TIMES DAILY
Qty: 20 CAPSULE | Refills: 0 | Status: SHIPPED | OUTPATIENT
Start: 2021-06-09 | End: 2021-06-19

## 2021-06-09 NOTE — TELEPHONE ENCOUNTER
Pt is aware of this information.         ----- Message from Markus Menjivar MD sent at 6/9/2021  9:44 AM EDT -----  We had a positive this week in an immunized individual   I will go ahead and send antibiotics however  ----- Message -----  From: Anne Haynes MA  Sent: 6/9/2021   9:38 AM EDT  To: Markus Menjivar MD    She said that they both have been vaccinated and she doesn't think they have covid.  ----- Message -----  From: Markus Menjivar MD  Sent: 6/9/2021   9:34 AM EDT  To: Anne Haynes MA    She and/or jeb want to be tested for covid 19?  ----- Message -----  From: Anne Haynes MA  Sent: 6/9/2021   8:13 AM EDT  To: Markus Menjivar MD    Pt called in and stated that she thinks she has bronchitis. She wanted to know if you could send her in some antibiotics and 3 way cough syrup?

## 2021-06-15 ENCOUNTER — OFFICE VISIT (OUTPATIENT)
Dept: ONCOLOGY | Facility: CLINIC | Age: 46
End: 2021-06-15

## 2021-06-15 ENCOUNTER — INFUSION (OUTPATIENT)
Dept: ONCOLOGY | Facility: HOSPITAL | Age: 46
End: 2021-06-15

## 2021-06-15 VITALS
RESPIRATION RATE: 18 BRPM | OXYGEN SATURATION: 97 % | BODY MASS INDEX: 40.82 KG/M2 | DIASTOLIC BLOOD PRESSURE: 79 MMHG | SYSTOLIC BLOOD PRESSURE: 117 MMHG | WEIGHT: 237.8 LBS | TEMPERATURE: 97.3 F | HEART RATE: 97 BPM

## 2021-06-15 VITALS
BODY MASS INDEX: 40.82 KG/M2 | OXYGEN SATURATION: 97 % | RESPIRATION RATE: 18 BRPM | TEMPERATURE: 97.3 F | HEART RATE: 97 BPM | SYSTOLIC BLOOD PRESSURE: 117 MMHG | WEIGHT: 237.8 LBS | DIASTOLIC BLOOD PRESSURE: 79 MMHG

## 2021-06-15 DIAGNOSIS — C50.411 MALIGNANT NEOPLASM OF UPPER-OUTER QUADRANT OF RIGHT BREAST IN FEMALE, ESTROGEN RECEPTOR NEGATIVE (HCC): ICD-10-CM

## 2021-06-15 DIAGNOSIS — R23.2 HOT FLASHES DUE TO TAMOXIFEN: Primary | ICD-10-CM

## 2021-06-15 DIAGNOSIS — T45.1X5A HOT FLASHES DUE TO TAMOXIFEN: Primary | ICD-10-CM

## 2021-06-15 DIAGNOSIS — Z17.1 MALIGNANT NEOPLASM OF UPPER-OUTER QUADRANT OF RIGHT BREAST IN FEMALE, ESTROGEN RECEPTOR NEGATIVE (HCC): Primary | ICD-10-CM

## 2021-06-15 DIAGNOSIS — Z95.828 PORT-A-CATH IN PLACE: ICD-10-CM

## 2021-06-15 DIAGNOSIS — F41.9 ANXIETY: ICD-10-CM

## 2021-06-15 DIAGNOSIS — M75.01 ADHESIVE CAPSULITIS OF RIGHT SHOULDER: ICD-10-CM

## 2021-06-15 DIAGNOSIS — R51.9 NEW ONSET OF HEADACHES: ICD-10-CM

## 2021-06-15 DIAGNOSIS — C50.411 MALIGNANT NEOPLASM OF UPPER-OUTER QUADRANT OF RIGHT BREAST IN FEMALE, ESTROGEN RECEPTOR NEGATIVE (HCC): Primary | ICD-10-CM

## 2021-06-15 DIAGNOSIS — Z17.1 MALIGNANT NEOPLASM OF UPPER-OUTER QUADRANT OF RIGHT BREAST IN FEMALE, ESTROGEN RECEPTOR NEGATIVE (HCC): ICD-10-CM

## 2021-06-15 DIAGNOSIS — F32.A DEPRESSION, UNSPECIFIED DEPRESSION TYPE: ICD-10-CM

## 2021-06-15 LAB
ALBUMIN SERPL-MCNC: 3.93 G/DL (ref 3.5–5.2)
ALBUMIN/GLOB SERPL: 1.2 G/DL
ALP SERPL-CCNC: 112 U/L (ref 39–117)
ALT SERPL W P-5'-P-CCNC: 17 U/L (ref 1–33)
ANION GAP SERPL CALCULATED.3IONS-SCNC: 8.2 MMOL/L (ref 5–15)
AST SERPL-CCNC: 15 U/L (ref 1–32)
BASOPHILS # BLD AUTO: 0.06 10*3/MM3 (ref 0–0.2)
BASOPHILS NFR BLD AUTO: 0.6 % (ref 0–1.5)
BILIRUB SERPL-MCNC: 0.3 MG/DL (ref 0–1.2)
BUN SERPL-MCNC: 15 MG/DL (ref 6–20)
BUN/CREAT SERPL: 20.5 (ref 7–25)
CALCIUM SPEC-SCNC: 8.8 MG/DL (ref 8.6–10.5)
CHLORIDE SERPL-SCNC: 105 MMOL/L (ref 98–107)
CO2 SERPL-SCNC: 25.8 MMOL/L (ref 22–29)
CREAT SERPL-MCNC: 0.73 MG/DL (ref 0.57–1)
DEPRECATED RDW RBC AUTO: 42.6 FL (ref 37–54)
EOSINOPHIL # BLD AUTO: 0.14 10*3/MM3 (ref 0–0.4)
EOSINOPHIL NFR BLD AUTO: 1.4 % (ref 0.3–6.2)
ERYTHROCYTE [DISTWIDTH] IN BLOOD BY AUTOMATED COUNT: 13.6 % (ref 12.3–15.4)
GFR SERPL CREATININE-BSD FRML MDRD: 85 ML/MIN/1.73
GLOBULIN UR ELPH-MCNC: 3.4 GM/DL
GLUCOSE SERPL-MCNC: 170 MG/DL (ref 65–99)
HCT VFR BLD AUTO: 44.9 % (ref 34–46.6)
HGB BLD-MCNC: 13.7 G/DL (ref 12–15.9)
IMM GRANULOCYTES # BLD AUTO: 0.03 10*3/MM3 (ref 0–0.05)
IMM GRANULOCYTES NFR BLD AUTO: 0.3 % (ref 0–0.5)
LYMPHOCYTES # BLD AUTO: 2.59 10*3/MM3 (ref 0.7–3.1)
LYMPHOCYTES NFR BLD AUTO: 26.4 % (ref 19.6–45.3)
MCH RBC QN AUTO: 26.1 PG (ref 26.6–33)
MCHC RBC AUTO-ENTMCNC: 30.5 G/DL (ref 31.5–35.7)
MCV RBC AUTO: 85.5 FL (ref 79–97)
MONOCYTES # BLD AUTO: 0.63 10*3/MM3 (ref 0.1–0.9)
MONOCYTES NFR BLD AUTO: 6.4 % (ref 5–12)
NEUTROPHILS NFR BLD AUTO: 6.36 10*3/MM3 (ref 1.7–7)
NEUTROPHILS NFR BLD AUTO: 64.9 % (ref 42.7–76)
NRBC BLD AUTO-RTO: 0 /100 WBC (ref 0–0.2)
PLATELET # BLD AUTO: 291 10*3/MM3 (ref 140–450)
PMV BLD AUTO: 9.6 FL (ref 6–12)
POTASSIUM SERPL-SCNC: 4.1 MMOL/L (ref 3.5–5.2)
PROT SERPL-MCNC: 7.3 G/DL (ref 6–8.5)
RBC # BLD AUTO: 5.25 10*6/MM3 (ref 3.77–5.28)
SODIUM SERPL-SCNC: 139 MMOL/L (ref 136–145)
WBC # BLD AUTO: 9.81 10*3/MM3 (ref 3.4–10.8)

## 2021-06-15 PROCEDURE — 85025 COMPLETE CBC W/AUTO DIFF WBC: CPT

## 2021-06-15 PROCEDURE — 25010000002 HEPARIN LOCK FLUSH PER 10 UNITS: Performed by: INTERNAL MEDICINE

## 2021-06-15 PROCEDURE — 36591 DRAW BLOOD OFF VENOUS DEVICE: CPT

## 2021-06-15 PROCEDURE — 80053 COMPREHEN METABOLIC PANEL: CPT

## 2021-06-15 PROCEDURE — 99214 OFFICE O/P EST MOD 30 MIN: CPT | Performed by: NURSE PRACTITIONER

## 2021-06-15 RX ORDER — GABAPENTIN 100 MG/1
100 CAPSULE ORAL 2 TIMES DAILY
Qty: 60 CAPSULE | Refills: 3 | Status: SHIPPED | OUTPATIENT
Start: 2021-06-15 | End: 2021-09-15 | Stop reason: SDUPTHER

## 2021-06-15 RX ORDER — HEPARIN SODIUM (PORCINE) LOCK FLUSH IV SOLN 100 UNIT/ML 100 UNIT/ML
500 SOLUTION INTRAVENOUS AS NEEDED
Status: DISCONTINUED | OUTPATIENT
Start: 2021-06-15 | End: 2021-06-15 | Stop reason: HOSPADM

## 2021-06-15 RX ORDER — HEPARIN SODIUM (PORCINE) LOCK FLUSH IV SOLN 100 UNIT/ML 100 UNIT/ML
500 SOLUTION INTRAVENOUS AS NEEDED
Status: CANCELLED | OUTPATIENT
Start: 2021-07-27

## 2021-06-15 RX ORDER — SODIUM CHLORIDE 0.9 % (FLUSH) 0.9 %
10 SYRINGE (ML) INJECTION AS NEEDED
Status: CANCELLED | OUTPATIENT
Start: 2021-07-27

## 2021-06-15 RX ORDER — SODIUM CHLORIDE 0.9 % (FLUSH) 0.9 %
10 SYRINGE (ML) INJECTION AS NEEDED
Status: DISCONTINUED | OUTPATIENT
Start: 2021-06-15 | End: 2021-06-15 | Stop reason: HOSPADM

## 2021-06-15 RX ADMIN — SODIUM CHLORIDE, PRESERVATIVE FREE 10 ML: 5 INJECTION INTRAVENOUS at 10:05

## 2021-06-15 RX ADMIN — SODIUM CHLORIDE, PRESERVATIVE FREE 500 UNITS: 5 INJECTION INTRAVENOUS at 10:05

## 2021-06-15 NOTE — PROGRESS NOTES
NAME: Nivia Bernstein    : 1975    DATE:  6/15/2021    DIAGNOSIS:   Stage IA (tC3hR5TU) moderately differentiated invasive ductal carcinoma of the R breast with associated DCIS.  Tumor was 10 mm in maximal dimension.  0/10 SLN involved. ER negative, SD 15% + (1+), HER-2/cat negative. Mammaprint high risk.    CHIEF COMPLAINT:  Follow up Breast cancer    TREATMENT HISTORY:  1. Initially planned to give 4 cycles to Taxotere/Cytoxan, during 1st infusion of Taxotere on 2020 patient developed allergic reaction. Therefore, Taxotere was discontinued and regimen changed to DD AC.    2.      3.  Started Tamoxifen 12-    HISTORY OF PRESENT ILLNESS:   Nivia Bernstein is a very pleasant 46 y.o. female who who is being seen today at the request of Sheila Garza MD for evaluation and treatment of breast cancer. She did not notice a palpable lump, but was recommended to have a screening mammogram by her PCP. A nodule in the right upper quadrant of the R breast was noted. The mass measured 0.8 cm on ultrasound. Biopsy was consistent with a moderately differentiated invasive ductal carcinoma, ER negative, SD weakly (15%) positive, and HER-2/cat negative. She underwent bilateral mastectomy with sentinel lymph node biopsy with Dr. Garza on 20. Pathology from this showed a moderately differentiated invasive ductal carcinoma with associated intermediate grade DCIS, negative margins.  0/10 /SLN involved.  Mammaprint was high risk. She was referred to our clinic and is here today with her  for discussion of further treatment options.      Ms. Bernstein is healing well from the surgery.  She did develop a seroma, which was drained by Dr. Garza yesterday. She reports muscular chest discomfort r/t the procedure, but otherwise denies any other symptoms including fevers, chills, significant weight changes, shortness of breath, abdominal pain, n/v/d/c, bony pain or headaches.    Interval History:  Ms. Bernstein is here  today for follow up of breast cancer. She remains on Tamoxifen which has been causing hot flashes and vaginal dryness. She is also taking Effexor which was recently increased to 225 mg daily per psychiatry for depression but does not feel this has helped with hot flashes. She is also taking Valium prn. She continues with depression and will follow up with psychiatry on 7/1. She continues to report frequent headaches. Therefore, she was referred to neurology which she was seen by yesterday and started on amitriptyline. She continues to have pain in her right shoulder and was therefore referred for physical therapy which she has been doing for ~3 weeks and has had slight improvement.  She has chronic fatigue and also complains of weight gain. She denies any other complaints today.     PAST MEDICAL HISTORY:  Past Medical History:   Diagnosis Date   • Altered mental status 10/16/2017   • Anxiety and depression 3/31/2017   • Arthritis    • Asthma    • Diabetes mellitus (CMS/HCC)    • DVT (deep venous thrombosis) (CMS/HCC) 2003    x 1 in RLE following fall & surgery   • Elevated alkaline phosphatase level 10/16/2017   • Elevated cholesterol    • Enlarged liver    • GERD (gastroesophageal reflux disease)    • Heart murmur    • Hypokalemia 10/16/2017   • Leg pain 10/16/2017   • Mitral valve prolapse    • Neuropathy     related to diabetes   • Obesity 3/31/2017   • OCD (obsessive compulsive disorder) 10/16/2017   • Osteoporosis    • PONV (postoperative nausea and vomiting)    • Renal insufficiency 10/16/2017   • Right breast cancer with malignant cells in regional lymph nodes no greater than 0.2 mm and no more than 200 cells (CMS/HCC) 8/13/2020   • Thrombocytopenia (CMS/HCC) 10/16/2017   • TIA (transient ischemic attack)     4 TIMES       PAST SURGICAL HISTORY:  Past Surgical History:   Procedure Laterality Date   • APPENDECTOMY     • CARPAL TUNNEL RELEASE     • CHOLECYSTECTOMY     • COLONOSCOPY N/A 5/5/2021    Procedure:  COLONOSCOPY WITH BIOPSY;  Surgeon: Vickie Herrera MD;  Location: [facility] OR;  Service: Gastroenterology;  Laterality: N/A;   • FINGER FUSION Left     3rd   • HYSTERECTOMY  2011    removed due to an ovarian cyst and dysmenorrhia. No cancer noted. Complete   • KNEE ARTHROSCOPY Right    • MASTECTOMY Left 8/18/2020    Procedure: Left mastectomy;  Surgeon: Sheila Garza MD;  Location: [facility] OR;  Service: General;  Laterality: Left;   • MASTECTOMY WITH SENTINEL NODE BIOPSY AND AXILLARY NODE DISSECTION Right 8/18/2020    Procedure: Right BREAST MASTECTOMY WITH SENTINEL NODE BIOPSY;  Surgeon: Sheila Garza MD;  Location: [facility] OR;  Service: General;  Laterality: Right;   • PORTACATH PLACEMENT         SOCIAL HISTORY:  Social History     Socioeconomic History   • Marital status:      Spouse name: Not on file   • Number of children: Not on file   • Years of education: Not on file   • Highest education level: Not on file   Tobacco Use   • Smoking status: Never Smoker   • Smokeless tobacco: Never Used   Vaping Use   • Vaping Use: Never used   Substance and Sexual Activity   • Alcohol use: No   • Drug use: No   • Sexual activity: Yes     Partners: Male         FAMILY HISTORY:  Family History   Problem Relation Age of Onset   • Diabetes Mother    • Hypertension Mother    • Diabetes Father    • Heart disease Father    • Alcohol abuse Father    • Seizures Father    • Hypertension Father    • No Known Problems Brother    • No Known Problems Daughter    • Bone cancer Son    • Suicide Attempts Cousin    • Stomach cancer Cousin         maternal first cousin   • Breast cancer Paternal Aunt 52   • Diabetes Maternal Grandmother    • Diabetes Maternal Grandfather    • Lung cancer Maternal Grandfather    • Heart disease Paternal Grandmother    • Diabetes Paternal Grandmother    • Heart disease Paternal Grandfather    • Diabetes Paternal Grandfather    • Ovarian cancer Maternal Aunt    • Lung cancer Maternal  Uncle    • Colon cancer Maternal Uncle    • Cancer Maternal Uncle         unknown type     MEDICATIONS:  The current medication list was reviewed in the EMR    Current Outpatient Medications:   •  albuterol sulfate HFA (Ventolin HFA) 108 (90 Base) MCG/ACT inhaler, Inhale 2 puffs Every 4 (Four) Hours As Needed (shortness of breath)., Disp: 18 g, Rfl: 5  •  Breo Ellipta 200-25 MCG/INH inhaler, Inhale 1 puff Daily., Disp: 1 each, Rfl: 5  •  cholecalciferol (Vitamin D) 25 MCG (1000 UT) tablet, Take 2 tablets by mouth Daily., Disp: 60 tablet, Rfl: 5  •  cloNIDine (CATAPRES) 0.1 MG tablet, Take 1 tablet by mouth 2 (Two) Times a Day., Disp: 60 tablet, Rfl: 5  •  diazePAM (VALIUM) 10 MG tablet, Take 0.5-1 tablets by mouth Every 12 (Twelve) Hours As Needed for Anxiety., Disp: 45 tablet, Rfl: 0  •  diclofenac (VOLTAREN) 1 % gel gel, Apply 4 g topically to the appropriate area as directed 4 (Four) Times a Day As Needed (joint pain)., Disp: 500 g, Rfl: 5  •  Diclofenac Sodium (VOLTAREN) 1 % gel gel, , Disp: , Rfl:   •  docusate sodium 100 MG capsule, Take 100 mg by mouth 2 (Two) Times a Day., Disp: , Rfl:   •  doxycycline (VIBRAMYCIN) 100 MG capsule, Take 1 capsule by mouth 2 (Two) Times a Day for 10 days., Disp: 20 capsule, Rfl: 0  •  Empagliflozin-Linaglip-Metform (Trijardy XR) 25-5-1000 MG tablet sustained-release 24 hour, Take 1 tablet by mouth Every Morning., Disp: 30 tablet, Rfl: 5  •  fluticasone (FLONASE) 50 MCG/ACT nasal spray, 2 sprays into the nostril(s) as directed by provider Daily As Needed for Rhinitis or Allergies., Disp: 1 bottle, Rfl: 5  •  hydrocortisone (ANUSOL-HC) 25 MG suppository, Insert 1 suppository into the rectum 2 (Two) Times a Day As Needed for Hemorrhoids (rectal discomfort)., Disp: 30 suppository, Rfl: 5  •  insulin lispro (HumaLOG) 100 UNIT/ML patient supplied pump, Inject  under the skin into the appropriate area as directed Continuous. Basil rate: 2.95/hr Carb ratio: 4.0, Disp: , Rfl:   •   lansoprazole (PREVACID) 30 MG capsule, Take 1 capsule by mouth Daily., Disp: 90 capsule, Rfl: 3  •  linaclotide (Linzess) 145 MCG capsule capsule, Take 1 capsule by mouth Daily. 30 minutes before meals on an empty stomach., Disp: 30 capsule, Rfl: 5  •  magic mouthwash oral suspension, Swish and swallow 5 mL four times  a day As Needed for Mucositis., Disp: 240 mL, Rfl: 1  •  naproxen (NAPROSYN) 500 MG tablet, , Disp: , Rfl:   •  ondansetron (ZOFRAN) 8 MG tablet, TAKE 1 TABLET BY MOUTH 3 TIMES A DAY AS NEEDED FOR NAUSEA OR VOMITING., Disp: 30 tablet, Rfl: 5  •  prochlorperazine (COMPAZINE) 10 MG tablet, Take 1 tablet by mouth Every 6 (Six) Hours As Needed for Nausea or Vomiting., Disp: 30 tablet, Rfl: 5  •  rosuvastatin (CRESTOR) 40 MG tablet, Take 1 tablet by mouth Every Night., Disp: 30 tablet, Rfl: 5  •  sennosides-docusate (senna-docusate sodium) 8.6-50 MG per tablet, Take 2 tablets by mouth 2 (Two) Times a Day., Disp: 120 tablet, Rfl: 4  •  tamoxifen (NOLVADEX) 20 MG chemo tablet, Take 1 tablet by mouth Daily., Disp: 30 tablet, Rfl: 5  •  venlafaxine XR (EFFEXOR-XR) 75 MG 24 hr capsule, TAKE 3 CAPSULES BY MOUTH DAILY., Disp: 90 capsule, Rfl: 0  •  vitamin D (ERGOCALCIFEROL) 1.25 MG (40276 UT) capsule capsule, TAKE ONE CAPSULE BY MOUTH ONCE WEEKLY, Disp: 4 capsule, Rfl: 0  No current facility-administered medications for this visit.    Facility-Administered Medications Ordered in Other Visits:   •  heparin injection 500 Units, 500 Units, Intravenous, PRN, Dejah Loco MD  •  sodium chloride 0.9 % flush 10 mL, 10 mL, Intravenous, PRN, Dejah Loco MD    ALLERGIES:    Allergies   Allergen Reactions   • Mobic [Meloxicam] Rash   • Penicillins Angioedema   • Taxotere [Docetaxel] Anaphylaxis     9 minutes into 1st infusion   • Novolog [Insulin Aspart] Hives     REVIEW OF SYSTEMS:    A comprehensive 14 point review of systems was performed.  Significant findings as mentioned above.  All other systems  reviewed and are negative.      Physical Exam  Vital Signs:   Visit Vitals  /79   Pulse 97   Temp 97.3 °F (36.3 °C) (Temporal)   Resp 18   Wt 108 kg (237 lb 12.8 oz)   SpO2 97%   BMI 40.82 kg/m²     Pain Score    06/15/21 0957   PainSc:   6   PainLoc: Head      ECOG score: 0   General: Awake, alert and oriented, in no acute distress.   HEENT:  PERRLA, EOM full, OP clear, mmm  Neck: No thyromegaly. No JVD.   Lungs: Lungs are clear to auscultation bilaterally, no wheezing  Heart:  Regular rate and rhythm. No murmurs, rubs, or gallops.   Breast: S/p bilateral mastectomy and RSLNBx.  Surgical scars smooth and intact without nodularity..  No axillary adenopathy on either side.    Abdomen: Soft, Non tender, non-distended, bowel sounds present.  Extremities: No cyanosis or edema.  She has limited ROM about her R shoulder.   Neurologic: MS as above. Grossly non focal exam    PATHOLOGY:              IMAGING:  Bilateral screening mammogram 06-19-20  FINDINGS:    The breast tissue is heterogeneously dense.  New focal nodularity right upper outer breast that is about 14 cm from  the nipple.  Otherwise, no suspicious findings.     IMPRESSION:  New focal nodularity right upper outer breast that is about  14 cm from the nipple.     RECOMMENDATION:  Spot compression imaging.     BI-RADS CAT 0, INCOMPLETE.  The patient needs additional imaging.        Diagnostic R mammogram with R breast US 07-14-20  FINDINGS:  Additional mammographic imaging images of persistent  partially obscured oval mass in the posterior right upper outer  quadrant.     Right breast ultrasound: Focused sonographic evaluation of the right  breast demonstrates a 0.8 cm irregular hypoechoic mass with ill-defined  borders located at 10:00, 13 cm from the nipple. An additional area was  initially noted by the technologist in the 9:00 region which represents  an isolated fat lobule.           IMPRESSION:  0.8 cm irregular mass in the right 10:00 region.  Recommend  ultrasound-guided core biopsy.        BI-RADS CATEGORY:  4, SUSPICIOUS        RECOMMENDATION:  Recommend ultrasound guided core biopsy of the right  breast.        Bilateral breast MRI w/wo contrast 08-11-20  FINDINGS: There is minimal bilateral background contrast enhancement and  normal vascular enhancement.     There are no worrisome areas of contrast enhancement in the left breast.     In the right breast correlating to the biopsy proven malignancy is an  approximately 0.7 cm irregular rapidly enhancing mass in the posterior  right 10:00 region. Artifact from a postbiopsy marking clip is located  adjacent to this mass. A small linear area of enhancement extends  posterior to this mass approximately 1 cm. There are no additional  worrisome areas of contrast enhancement in the right breast.     There is no axillary adenopathy by MRI criteria and no chest wall  abnormality is seen.        IMPRESSION:  1. Negative left breast MRI.        2. Biopsy-proven malignancy in the right 10:00 region with a small  linear area of enhancement extending posterior to the 0.7 cm mass.     RECOMMENDATIONS: Recommend surgical follow-up.     BI-RADS CATEGORY: 6, KNOWN BIOPSY PROVEN MALIGNANCY    Echo 10-16-17:  A two-dimensional transthoracic echocardiogram with color flow and Doppler was performed.   The study is technically good for diagnosis.Verbal consent was obtained from the patient to use saline contrast in order to optimize the study.  A total of 20 mL of agitated saline was administered to assess for cardiac shunting.   No adverse reaction to contrast was noted.   Echocardiogram Findings    Left Ventricle Left ventricular systolic function is normal. Estimated EF appears to be in the range of 56 - 60%. Normal left ventricular cavity size and wall thickness noted. All left ventricular wall segments contract normally. Left ventricular diastolic function is normal.   Right Ventricle Normal right ventricular cavity  size noted.   Left Atrium Normal left atrial size noted. Saline test results are negative.   Right Atrium Normal right atrial size noted.   Aortic Valve The aortic valve is structurally normal. Trace aortic valve regurgitation is present.   Mitral Valve The mitral valve is normal in structure. Trace mitral valve regurgitation is present.   Tricuspid Valve The tricuspid valve is normal.  Trace tricuspid valve regurgitation is present.   Pulmonic Valve The pulmonic valve is structurally normal. There is trace pulmonic valve regurgitation present.   Greater Vessels No dilation of the aortic root is present. No dilation of the sinuses of Valsalva is present.   Pericardium There is no evidence of pericardial effusion.           ECHO 09/29/2020  · Poor quality echo and Doppler study  · Normal left ventricular cavity size and wall thickness noted.  · Left ventricular ejection fraction appears to be 61 - 65%. Left ventricular systolic function is normal.  · Left ventricular diastolic function is consistent with (grade I) impaired relaxation.  · Aortic and mitral valve are poorly visualized but appear to be normal,  · Tricuspid and pulmonic valve echoes are not visualized.  · There is no pericardial effusion noted.    Echocardiogram 12-08-02  · Normal left ventricular cavity size and wall thickness noted. All left ventricular wall segments contract normally.  · Left ventricular ejection fraction appears to be 56 - 60%.  · The pericardium is normal. There is no evidence of pericardial effusion. .      MRI Brain w/wo contrast 01-28-21     FINDINGS:    Brain:  Unremarkable.  No mass.  No hemorrhage.  No acute infarct.    Ventricles:  Unremarkable.  No ventriculomegaly.    Bones/joints:  Unremarkable.    Sinuses:  Unremarkable as visualized.  No acute sinusitis.    Mastoid air cells:  Unremarkable as visualized.  No mastoid effusion.    Orbits:  Unremarkable as visualized.     IMPRESSION:    No acute findings in the  head/brain.    ENDOSCOPY:  COLONOSCOPY PROCEDURE NOTE     Nivia Bernstein  5/5/2021     GASTROENTEROLOGIST:  Vickie Herrera MD        PRE-PROCEDURE DIAGNOSIS:   BRBPR (bright red blood per rectum) [K62.5]     POST-PROCEDURE DIAGNOSIS:  1.  Normal colon  2.  Normal terminal ileum  3.  Internal hemorrhoids     PROCEDURE:  COLONOSCOPY       ANESTHESIA:  Propofol administered by anesthesia.  See anesthesia notes for ASA classification     STAFF  Circulator: Missy Canela RN  Endo Technician: Heraclio Gaona     Findings:  1.  Normal colonoscopy with small internal hemorrhoids.  2.  Normal terminal ileum     OPERATIVE PROCEDURE   After proper informed consent was obtained, the patient was taken the operating suite and placed in left lateral decubitus position.  An Olympus video colonoscope 180 series was inserted in the rectum and advanced to the terminal ileum under direct visualization.  Cecum and terminal ileum were identified by visualization of the appendiceal orifice and ileocecal valve.  The colonoscope was then slowly withdrawn from the cecum to the rectum and passed a second time from rectum to cecum.  The colonoscope was retroflexed in the cecum and rectum. Scope was then withdrawn. Patient tolerated the procedure well. There were no immediate complications. Cecal withdrawal time was 9 minutes.     ESTIMATED BLOOD LOSS  None      COMPLICATIONS  None     RECOMMENDATIONS:  1.  Repeat colonoscopy in 10 years for screening purposes.  2.  Continue Linzess.  3.  Anusol HC suppositories as needed for rectal bleeding.    RECENT LABS:  Lab Results   Component Value Date    WBC 9.81 06/15/2021    HGB 13.7 06/15/2021    HCT 44.9 06/15/2021    MCV 85.5 06/15/2021    RDW 13.6 06/15/2021     06/15/2021    NEUTRORELPCT 64.9 06/15/2021    LYMPHORELPCT 26.4 06/15/2021    MONORELPCT 6.4 06/15/2021    EOSRELPCT 1.4 06/15/2021    BASORELPCT 0.6 06/15/2021    NEUTROABS 6.36 06/15/2021    LYMPHSABS 2.59  06/15/2021       Lab Results   Component Value Date     06/15/2021    K 4.1 06/15/2021    CO2 25.8 06/15/2021     06/15/2021    BUN 15 06/15/2021    CREATININE 0.73 06/15/2021    EGFRIFNONA 85 06/15/2021    EGFRIFAFRI 110 03/12/2021    GLUCOSE 170 (H) 06/15/2021    CALCIUM 8.8 06/15/2021    ALKPHOS 112 06/15/2021    AST 15 06/15/2021    ALT 17 06/15/2021    BILITOT 0.3 06/15/2021    ALBUMIN 3.93 06/15/2021    PROTEINTOT 7.3 06/15/2021    MG 2.1 10/17/2017       Lab Results   Component Value Date    URICACID 5.2 09/10/2019       Lab Results   Component Value Date    MCVPYYIX72 428 03/12/2021    FOLATE 15.19 10/16/2017      ASSESSMENT & PLAN:  Nivia Bernstein is a very pleasant 46 y.o. female with Stage IA (yS1qR4BG) moderately differentiated invasive ductal carcinoma of the R breast with associated DCIS.  Tumor was 10 mm in maximal dimension.  0/10 SLN involved. ER negative, OK 15% + (1+), HER-2/cat negative. Mammaprint high risk.     1.  R breast cancer:   -  S/p R mastectomy and SLNBx as well as prophylactic contralateral mastectomy performed by Dr. Garza 8-18-20.  - She healed well from bilateral mastectomy. This was complicated by left seroma, drained by Dr. Garza. Incisions are healed well.   - Given high risk Mammaprint, Dr. Loco recommended adjuvant chemotherapy. Discussed different possibilities for adjuvant therapy. Given high risk Mammaprint but early stage, node negative disease as well as comorbidities, recommended Taxotere/Cytoxan Q21d x 4 cycles with growth factor support. Discussed potential risks, benefits, and side effects and she would like to proceed.   - She had port placed several years ago due to poor peripheral access. This has been well cared for and maintained.   - C1 Taxotere/Cytoxan was planned for 09/24/2020. Unfortunately, this had to be discontinued due to allergic reaction to Taxotere. Recommended change of treatment to DD AC.   - She tolerated treatment well and aside  from fatigue and recovered well. Echo is essentially unchanged.   -  Her tumor was ER neg but did have 15% 1+ positivity for progesterone receptor.  She is s/p complete hysterectomy and had evidence of osteopenia with T score -1.9 in June 2020.  Given all of this, recommended treatment with Tamoxifen over aromatase inhibitor. She was tolerating this well. However, she now reports worsening hot flashes and vaginal dryness. D/w Dr. Loco and doubt changing her treatment will help with current symptoms. Patient is on Effexor 225 mg PO daily for issue #4. Recommended she take this at night. Will also add Neurontin 100 mg BID to help with hotflashes. Recommended she try replens cream OTC or HYALO GYN cream to help with vaginal dryness. Hopefully these  will help improve her tolerability. Will plan to follow up in 3 months with repeat labs. In in interim, she will let us know if her symptoms should worsen and we will see her sooner.     2.  New onset headaches:  -  Migranous in character.   -  Physical exam is unremarkable.  - MRI was without cause for concern. Referral placed to Dr. Oswald of Neurology for further evaluation. She was seen yesterday and started on amitriptyline. Ongoing management per neurology.     3. Extensive family history of malignancy:   - Genetic testing was ordered by Dr. Garza and performed by [facility] with no mutations, specifically including BRCA 1/2, CHKE2, CDH1, PALB2 and others.    4. Anxiety/Depression:  -Her psychiatrist prescribed Paxil 20 mg daily and she takes Valium prn.  She had been doing well on this.  Unfortunately Tamoxifen interacts with Paxil.  Dr. Loco recommended trial of Lexapro which didn't seem to help her, so her PCP started Effexor 75 mg daily. She is following with psychiatry who increased Effexor to 225 mg PO daily. She continues with Valium prn. She will follow up with psychiatry on 7/1.      5. Frozen shoulder on the Right:  -  She has limitation of ROM and  discomfort.  Referred for PT which she has been doing for ~3 weeks with some slight improvement.     6. Prophylaxis:   -  She received Prevnar 13 vaccine.  -  She had 2020 flu vaccine per [facility].  -  M. Uncle had colon cancer at age 50.  Referred to Dr. Aguilera for screening colonoscopy which was unremarkable and repeat exam recommended in 10 years.   -She has had COVID vaccine x2.     7. Follow up:  -  MD follow up in 3 months with [facility], CMP      A total of 30 minutes were spent coordinating this patient’s care in clinic today; more than 50% of this time was face-to-face with the patient, reviewing her interim medical history and counseling on the current treatment and followup plan. All questions were answered to her satisfaction.      Electronically Signed by: NIDA Tarango  Lyla 15, 2021 10:09 EDT       CC:   MD Otto Hager, Markus Garcia MD

## 2021-06-16 RX ORDER — TAMOXIFEN CITRATE 20 MG/1
20 TABLET ORAL DAILY
Qty: 30 TABLET | Refills: 5 | Status: SHIPPED | OUTPATIENT
Start: 2021-06-16 | End: 2021-11-01

## 2021-06-16 NOTE — TELEPHONE ENCOUNTER
----- Message from Zenobia Hull sent at 6/16/2021  1:23 PM EDT -----  Patient called in today to get a refill on her tamoxifen. Patient ask that the script be sent to [facility] in [place]. Her phone number is [phone].  Thank you   Ginny

## 2021-06-30 RX ORDER — ERGOCALCIFEROL 1.25 MG/1
CAPSULE ORAL
Qty: 4 CAPSULE | Refills: 0 | Status: SHIPPED | OUTPATIENT
Start: 2021-06-30 | End: 2021-10-04

## 2021-07-01 ENCOUNTER — OFFICE VISIT (OUTPATIENT)
Dept: PSYCHIATRY | Facility: CLINIC | Age: 46
End: 2021-07-01

## 2021-07-01 VITALS
BODY MASS INDEX: 40.85 KG/M2 | DIASTOLIC BLOOD PRESSURE: 76 MMHG | SYSTOLIC BLOOD PRESSURE: 132 MMHG | WEIGHT: 238 LBS | HEART RATE: 89 BPM

## 2021-07-01 DIAGNOSIS — F42.8 OTHER OBSESSIVE-COMPULSIVE DISORDERS: Chronic | ICD-10-CM

## 2021-07-01 DIAGNOSIS — F41.1 GENERALIZED ANXIETY DISORDER: ICD-10-CM

## 2021-07-01 DIAGNOSIS — Z79.899 HIGH RISK MEDICATION USE: Primary | ICD-10-CM

## 2021-07-01 DIAGNOSIS — F33.1 MODERATE EPISODE OF RECURRENT MAJOR DEPRESSIVE DISORDER (HCC): ICD-10-CM

## 2021-07-01 PROCEDURE — 99213 OFFICE O/P EST LOW 20 MIN: CPT | Performed by: NURSE PRACTITIONER

## 2021-07-01 RX ORDER — DIAZEPAM 10 MG/1
10 TABLET ORAL EVERY 12 HOURS PRN
Qty: 60 TABLET | Refills: 0 | Status: SHIPPED | OUTPATIENT
Start: 2021-07-01 | End: 2021-08-05 | Stop reason: SDUPTHER

## 2021-07-01 RX ORDER — VENLAFAXINE HYDROCHLORIDE 75 MG/1
225 CAPSULE, EXTENDED RELEASE ORAL DAILY
Qty: 90 CAPSULE | Refills: 0 | Status: SHIPPED | OUTPATIENT
Start: 2021-07-01 | End: 2021-08-05 | Stop reason: SDUPTHER

## 2021-07-01 NOTE — PROGRESS NOTES
"      Raquel Bernstein is a 46 y.o. female is here today for medication management follow-up.    Chief Complaint:  Anxiety depression     History of Present Illness:    Patient presents today for follow up for medication management for depression and anxiety. Patient states dealing with a lot of deaths in the family recently. Patient states she did stop her medication for a day but started it back. Patient states she stopped her meds because she was \"done\" with it, but decided to start taking them again. Patient reports currently depression is at a 10 on a 0-10 scale with 10 being the worst. Patient reports symptoms of depression of agitated, depressed mood, insomnia, decreased energy, and decreased appetite. Patient states can still snap  head off for little things. Patient reports anxiety is at a 10 on a 0-10 scale with 10 being the worst. Patient reports constantly worried about the cancer and if it has spread. Patient denies any panic attacks. Patient reports sleeping 2-3 hours a night and patient reports still having daily nightmares. Patient reports appetite is decreased and eating at least 1-2 meals a day. Patient denies any auditory or visual hallucinations. Patient adamantly denies any SI or HI. Patient denies any side effects to medications. Patient denies any medical changes since last visit. Patient states she was started on Amitriptyline 10mg  for migraines.   The following portions of the patient's history were reviewed and updated as appropriate: allergies, current medications, past family history, past medical history, past social history, past surgical history and problem list.    Review of Systems   Constitutional: Positive for appetite change.   Respiratory: Negative.    Cardiovascular: Negative.    Gastrointestinal: Negative.    Neurological: Negative.    Psychiatric/Behavioral: Positive for agitation, dysphoric mood and sleep disturbance. The patient is nervous/anxious.  "       Objective   Physical Exam  Vitals reviewed.   Constitutional:       Appearance: Normal appearance. She is well-developed and well-groomed.   Neurological:      Mental Status: She is alert.   Psychiatric:         Attention and Perception: Attention and perception normal.         Mood and Affect: Mood is depressed. Affect is angry.         Speech: Speech normal.         Behavior: Behavior normal. Behavior is cooperative.         Thought Content: Thought content normal.         Cognition and Memory: Cognition and memory normal.         Judgment: Judgment normal.       Blood pressure 132/76, pulse 89, weight 108 kg (238 lb), not currently breastfeeding. Body mass index is 40.85 kg/m².    Allergies   Allergen Reactions   • Mobic [Meloxicam] Rash   • Penicillins Angioedema   • Taxotere [Docetaxel] Anaphylaxis     9 minutes into 1st infusion   • Novolog [Insulin Aspart] Hives       Medication List:   Current Outpatient Medications   Medication Sig Dispense Refill   • albuterol sulfate HFA (Ventolin HFA) 108 (90 Base) MCG/ACT inhaler Inhale 2 puffs Every 4 (Four) Hours As Needed (shortness of breath). 18 g 5   • Breo Ellipta 200-25 MCG/INH inhaler Inhale 1 puff Daily. 1 each 5   • cholecalciferol (Vitamin D) 25 MCG (1000 UT) tablet Take 2 tablets by mouth Daily. 60 tablet 5   • cloNIDine (CATAPRES) 0.1 MG tablet Take 1 tablet by mouth 2 (Two) Times a Day. 60 tablet 5   • diazePAM (VALIUM) 10 MG tablet Take 1 tablet by mouth Every 12 (Twelve) Hours As Needed for Anxiety. 60 tablet 0   • diclofenac (VOLTAREN) 1 % gel gel Apply 4 g topically to the appropriate area as directed 4 (Four) Times a Day As Needed (joint pain). 500 g 5   • Diclofenac Sodium (VOLTAREN) 1 % gel gel      • docusate sodium 100 MG capsule Take 100 mg by mouth 2 (Two) Times a Day.     • Empagliflozin-Linaglip-Metform (Trijardy XR) 25-5-1000 MG tablet sustained-release 24 hour Take 1 tablet by mouth Every Morning. 30 tablet 5   • fluticasone (FLONASE)  50 MCG/ACT nasal spray 2 sprays into the nostril(s) as directed by provider Daily As Needed for Rhinitis or Allergies. 1 bottle 5   • gabapentin (Neurontin) 100 MG capsule Take 1 capsule by mouth 2 (two) times a day. 60 capsule 3   • hydrocortisone (ANUSOL-HC) 25 MG suppository Insert 1 suppository into the rectum 2 (Two) Times a Day As Needed for Hemorrhoids (rectal discomfort). 30 suppository 5   • insulin lispro (HumaLOG) 100 UNIT/ML patient supplied pump Inject  under the skin into the appropriate area as directed Continuous. Basil rate: 2.95/hr  Carb ratio: 4.0     • lansoprazole (PREVACID) 30 MG capsule Take 1 capsule by mouth Daily. 90 capsule 3   • linaclotide (Linzess) 145 MCG capsule capsule Take 1 capsule by mouth Daily. 30 minutes before meals on an empty stomach. 30 capsule 5   • magic mouthwash oral suspension Swish and swallow 5 mL four times  a day As Needed for Mucositis. 240 mL 1   • naproxen (NAPROSYN) 500 MG tablet      • ondansetron (ZOFRAN) 8 MG tablet TAKE 1 TABLET BY MOUTH 3 TIMES A DAY AS NEEDED FOR NAUSEA OR VOMITING. 30 tablet 5   • prochlorperazine (COMPAZINE) 10 MG tablet Take 1 tablet by mouth Every 6 (Six) Hours As Needed for Nausea or Vomiting. 30 tablet 5   • rosuvastatin (CRESTOR) 40 MG tablet Take 1 tablet by mouth Every Night. 30 tablet 5   • sennosides-docusate (senna-docusate sodium) 8.6-50 MG per tablet Take 2 tablets by mouth 2 (Two) Times a Day. 120 tablet 4   • tamoxifen (NOLVADEX) 20 MG chemo tablet Take 1 tablet by mouth Daily. 30 tablet 5   • venlafaxine XR (EFFEXOR-XR) 75 MG 24 hr capsule Take 3 capsules by mouth Daily. 90 capsule 0   • vitamin D (ERGOCALCIFEROL) 1.25 MG (67348 UT) capsule capsule TAKE ONE CAPSULE BY MOUTH ONCE WEEKLY 4 capsule 0     No current facility-administered medications for this visit.       Mental Status Exam:   Hygiene:   good  Cooperation:  Cooperative  Eye Contact:  Fair  Psychomotor Behavior:  Appropriate  Affect:  Appropriate  Hopelessness:  Denies  Speech:  Normal  Thought Process:  Goal directed and Linear  Thought Content:  Normal  Suicidal:  None  Homicidal:  None  Hallucinations:  None  Delusion:  None  Memory:  Intact  Orientation:  Person, Place, Time and Situation  Reliability:  fair  Insight:  Fair  Judgement:  Fair  Impulse Control:  Fair  Physical/Medical Issues:  No                 Assessment/Plan   Diagnoses and all orders for this visit:    1. High risk medication use (Primary)  -     Urine Drug Screen - Urine, Clean Catch; Future    2. Generalized anxiety disorder  -     diazePAM (VALIUM) 10 MG tablet; Take 1 tablet by mouth Every 12 (Twelve) Hours As Needed for Anxiety.  Dispense: 60 tablet; Refill: 0  -     venlafaxine XR (EFFEXOR-XR) 75 MG 24 hr capsule; Take 3 capsules by mouth Daily.  Dispense: 90 capsule; Refill: 0    3. Other obsessive-compulsive disorders  -     venlafaxine XR (EFFEXOR-XR) 75 MG 24 hr capsule; Take 3 capsules by mouth Daily.  Dispense: 90 capsule; Refill: 0    4. Moderate episode of recurrent major depressive disorder (CMS/HCC)  -     venlafaxine XR (EFFEXOR-XR) 75 MG 24 hr capsule; Take 3 capsules by mouth Daily.  Dispense: 90 capsule; Refill: 0            Discussed medication options with patient. Cont. Effexor XR 75mg three capsules daily for depression and anxiety. Cont. Valium 10mg twice daily as needed for anxiety. Reviewed the risks, benefits, and side effects of the medications; patient acknowledged and verbally consented. Patient is being prescribed a controlled substance as part of treatment plan. Patient has been educated of appropriate use of the medications, including risk of somnolence, limited ability to drive and/or work safely, and potential for dependence, respiratory depression and overdose. Patient is also informed that the medication are to be used by the patient only- avoid any combined use of ETOH or other substances unless prescribed. UDS ordered.   [facility] Patient Controlled Substance  Report (from 7/7/2020 to 7/1/2021)    Dispensed  Strength Quantity Days Supply Provider Pharmacy   06/15/2021 Gabapentin 100MG 60 each 30 [facility]   06/10/2021 Diazepam 10MG 45 each 23 [facility], [facility]   05/11/2021 Diazepam 10MG 45 each 23 [facility]   04/09/2021 Diazepam 5MG 60 each 30 [facility]   03/12/2021 Diazepam 5MG 30 each 30 [facility]   02/22/2021 Diazepam 5MG 20 each 20 [facility]   02/12/2021 Diazepam 5MG 5 each 5 CLOUD, [facility]   01/14/2021 Diazepam 5MG 30 each 30 QUINTON, STEPHANIE [facility]   12/15/2020 Diazepam 5MG 30 each 30 QUINTON, STEPH[facility]IE [facility]   11/17/2020 Diazepam 5MG 30 each 30 QUINTON, [facility]IE [facility]   10/19/2020 Diazepam 5MG 30 each 30 [facility], [facility]   09/16/2020 Diazepam 5MG 30 each 30 QUINTON, STEPHANIE [facility]   08/19/2020 Oxycodone/Acetaminophen 325MG/5MG 20 each 5 [facility], [facility]   08/13/2020 Diazepam 5MG 30 each [facility]           Patient is agreeable to call the office with any questions, concerns, or worsening of symptoms. Patient is aware to call 911 or go to the nearest ER should begin having SI/HI.           Follow up in four weeks      Errors in dictation may reflect use of voice recognition software and not all errors in transcription may have been detected prior to signing.              This document has been electronically signed by NIDA Vasquez   July 7, 2021 19:17 EDT

## 2021-07-08 ENCOUNTER — OFFICE VISIT (OUTPATIENT)
Dept: SURGERY | Facility: CLINIC | Age: 46
End: 2021-07-08

## 2021-07-08 VITALS — BODY MASS INDEX: 40.94 KG/M2 | HEIGHT: 64 IN | WEIGHT: 239.8 LBS

## 2021-07-08 DIAGNOSIS — M85.80 OSTEOPENIA, UNSPECIFIED LOCATION: ICD-10-CM

## 2021-07-08 DIAGNOSIS — G89.18 CHEST WALL PAIN FOLLOWING SURGERY: ICD-10-CM

## 2021-07-08 DIAGNOSIS — Z17.1 MALIGNANT NEOPLASM OF UPPER-OUTER QUADRANT OF RIGHT BREAST IN FEMALE, ESTROGEN RECEPTOR NEGATIVE (HCC): Primary | ICD-10-CM

## 2021-07-08 DIAGNOSIS — R07.89 CHEST WALL PAIN FOLLOWING SURGERY: ICD-10-CM

## 2021-07-08 DIAGNOSIS — C50.411 MALIGNANT NEOPLASM OF UPPER-OUTER QUADRANT OF RIGHT BREAST IN FEMALE, ESTROGEN RECEPTOR NEGATIVE (HCC): Primary | ICD-10-CM

## 2021-07-08 PROCEDURE — 93702 BIS XTRACELL FLUID ANALYSIS: CPT | Performed by: SURGERY

## 2021-07-08 PROCEDURE — 99214 OFFICE O/P EST MOD 30 MIN: CPT | Performed by: SURGERY

## 2021-07-08 RX ORDER — GABAPENTIN 300 MG/1
300 CAPSULE ORAL 3 TIMES DAILY
Qty: 90 CAPSULE | Refills: 0 | Status: SHIPPED | OUTPATIENT
Start: 2021-07-08 | End: 2021-10-05 | Stop reason: SDUPTHER

## 2021-07-08 NOTE — PROGRESS NOTES
Subjective   Nivia Bernstein is a 46 y.o. female here today for 6 month follow up with no studies.    History of Present Illness  Nivia was seen in the office today for breast cancer follow-up.  She is status post a right mastectomy with sentinel node biopsy and a contralateral left mastectomy on 8/18/2020 final pathology demonstrated a T1BN0 grade 2 ductal carcinoma of the right breast, ER negative, MD positive, HER-2 negative.  No disease identified in the left breast.   She completed adjuvant chemotherapy and was started on tamoxifen in mid December.  She is tolerating this well.    She was last seen by medical oncology on 3/9/2021.  Patient's main complaint is pain in the right lateral chest and shoulder.  This does appear to be primarily in the chest and not shoulder.  She actually did start physical therapy for this and has been undergoing it for 6 weeks but does not feel that it has helped her.  Patient does have osteopenia with her last bone density on 6/9/2020 which demonstrated a T score of -1.9.  She is on vitamin D but not calcium  Allergies   Allergen Reactions   • Mobic [Meloxicam] Rash   • Penicillins Angioedema   • Taxotere [Docetaxel] Anaphylaxis     9 minutes into 1st infusion   • Novolog [Insulin Aspart] Hives       Current Outpatient Medications   Medication Sig Dispense Refill   • albuterol sulfate HFA (Ventolin HFA) 108 (90 Base) MCG/ACT inhaler Inhale 2 puffs Every 4 (Four) Hours As Needed (shortness of breath). 18 g 5   • Breo Ellipta 200-25 MCG/INH inhaler Inhale 1 puff Daily. 1 each 5   • cholecalciferol (Vitamin D) 25 MCG (1000 UT) tablet Take 2 tablets by mouth Daily. 60 tablet 5   • cloNIDine (CATAPRES) 0.1 MG tablet Take 1 tablet by mouth 2 (Two) Times a Day. 60 tablet 5   • diazePAM (VALIUM) 10 MG tablet Take 1 tablet by mouth Every 12 (Twelve) Hours As Needed for Anxiety. 60 tablet 0   • diclofenac (VOLTAREN) 1 % gel gel Apply 4 g topically to the appropriate area as directed 4 (Four)  Times a Day As Needed (joint pain). 500 g 5   • Diclofenac Sodium (VOLTAREN) 1 % gel gel      • docusate sodium 100 MG capsule Take 100 mg by mouth 2 (Two) Times a Day.     • Empagliflozin-Linaglip-Metform (Trijardy XR) 25-5-1000 MG tablet sustained-release 24 hour Take 1 tablet by mouth Every Morning. 30 tablet 5   • fluticasone (FLONASE) 50 MCG/ACT nasal spray 2 sprays into the nostril(s) as directed by provider Daily As Needed for Rhinitis or Allergies. 1 bottle 5   • gabapentin (Neurontin) 100 MG capsule Take 1 capsule by mouth 2 (two) times a day. 60 capsule 3   • hydrocortisone (ANUSOL-HC) 25 MG suppository Insert 1 suppository into the rectum 2 (Two) Times a Day As Needed for Hemorrhoids (rectal discomfort). 30 suppository 5   • insulin lispro (HumaLOG) 100 UNIT/ML patient supplied pump Inject  under the skin into the appropriate area as directed Continuous. Basil rate: 2.95/hr  Carb ratio: 4.0     • lansoprazole (PREVACID) 30 MG capsule Take 1 capsule by mouth Daily. 90 capsule 3   • linaclotide (Linzess) 145 MCG capsule capsule Take 1 capsule by mouth Daily. 30 minutes before meals on an empty stomach. 30 capsule 5   • ondansetron (ZOFRAN) 8 MG tablet TAKE 1 TABLET BY MOUTH 3 TIMES A DAY AS NEEDED FOR NAUSEA OR VOMITING. 30 tablet 5   • prochlorperazine (COMPAZINE) 10 MG tablet Take 1 tablet by mouth Every 6 (Six) Hours As Needed for Nausea or Vomiting. 30 tablet 5   • rosuvastatin (CRESTOR) 40 MG tablet Take 1 tablet by mouth Every Night. 30 tablet 5   • sennosides-docusate (senna-docusate sodium) 8.6-50 MG per tablet Take 2 tablets by mouth 2 (Two) Times a Day. 120 tablet 4   • tamoxifen (NOLVADEX) 20 MG chemo tablet Take 1 tablet by mouth Daily. 30 tablet 5   • venlafaxine XR (EFFEXOR-XR) 75 MG 24 hr capsule Take 3 capsules by mouth Daily. 90 capsule 0   • vitamin D (ERGOCALCIFEROL) 1.25 MG (46041 UT) capsule capsule TAKE ONE CAPSULE BY MOUTH ONCE WEEKLY 4 capsule 0   • magic mouthwash oral suspension  Swish and swallow 5 mL four times  a day As Needed for Mucositis. 240 mL 1   • naproxen (NAPROSYN) 500 MG tablet        No current facility-administered medications for this visit.     Past Medical History:   Diagnosis Date   • Altered mental status 10/16/2017   • Anxiety and depression 3/31/2017   • Arthritis    • Asthma    • Diabetes mellitus (CMS/HCC)    • DVT (deep venous thrombosis) (CMS/HCC) 2003    x 1 in RLE following fall & surgery   • Elevated alkaline phosphatase level 10/16/2017   • Elevated cholesterol    • Enlarged liver    • GERD (gastroesophageal reflux disease)    • Heart murmur    • Hypokalemia 10/16/2017   • Leg pain 10/16/2017   • Mitral valve prolapse    • Neuropathy     related to diabetes   • Obesity 3/31/2017   • OCD (obsessive compulsive disorder) 10/16/2017   • Osteoporosis    • PONV (postoperative nausea and vomiting)    • Renal insufficiency 10/16/2017   • Right breast cancer with malignant cells in regional lymph nodes no greater than 0.2 mm and no more than 200 cells (CMS/HCC) 8/13/2020   • Thrombocytopenia (CMS/HCC) 10/16/2017   • TIA (transient ischemic attack)     4 TIMES     Past Surgical History:   Procedure Laterality Date   • APPENDECTOMY     • CARPAL TUNNEL RELEASE     • CHOLECYSTECTOMY     • COLONOSCOPY N/A 5/5/2021    Procedure: COLONOSCOPY WITH BIOPSY;  Surgeon: Vickie Herrera MD;  Location: [facility];  Service: Gastroenterology;  Laterality: N/A;   • FINGER FUSION Left     3rd   • HYSTERECTOMY  2011    removed due to an ovarian cyst and dysmenorrhia. No cancer noted. Complete   • KNEE ARTHROSCOPY Right    • MASTECTOMY Left 8/18/2020    Procedure: Left mastectomy;  Surgeon: Sheila Garza MD;  Location: [facility];  Service: General;  Laterality: Left;   • MASTECTOMY WITH SENTINEL NODE BIOPSY AND AXILLARY NODE DISSECTION Right 8/18/2020    Procedure: Right BREAST MASTECTOMY WITH SENTINEL NODE BIOPSY;  Surgeon: Sheila Garza MD;  Location: [facility];   "Service: General;  Laterality: Right;   • PORTACATH PLACEMENT         Pertinent Review of Systems    Objective   Ht 162.6 cm (64\")   Wt 109 kg (239 lb 12.8 oz)   BMI 41.16 kg/m²    Physical Exam  Well-developed well-nourished female no acute distress  Neck:  No supraclavicular adenopathy  Lungs:  Respiratory effort normal. Auscultation: Clear, without wheezes, rhonchi, rales  Heart:  Regular rate and rhythm, without murmur, gallop, rub.  No pedal edema  Breasts: On visual inspection the breasts are surgically absent.  Examination of the right chest wall demonstrates no palpable mass or adenopathy.  Patient does report pain to palpation in the right lateral chest.  There is no associated clinical abnormality with this..  Patient left chest wall demonstrates no palpable mass or adenopathy.   Upper extremity without edema    Procedures   L Dex was performed and was -1.4, with the prior on 1/7/2021 being -1.8  Results/Data:  Imaging:   Notes: Records from medical oncology from 3/9/2021 were reviewed.  The patient's pain does appear to be more chest wall than it does to be a frozen shoulder.  Lab:   Other:     Assessment/Plan   Right breast T1BN0 grade 2 mammary carcinoma, ER negative, MS positive, HER-2 negative, status post left mastectomy with sentinel node biopsy  Left mastectomy     Plan: 6-month follow-up with no studies  Start calcium in addition to the vitamin D  Patient is on a low-dose Neurontin for her headaches.  We will try an increased dose to see if this helps her chest wall pain.       Discussion/Summary:    Time spent:     Patient's Body mass index is 41.16 kg/m². indicating that she is overweight (BMI 25-29.9). Obesity-related health conditions include the following: none. Obesity is newly identified. BMI is is above average; BMI management plan is completed. We discussed portion control and increasing exercise..         Future Appointments   Date Time Provider Department Center   7/27/2021 12:30 PM "  [facility] INFUS CHAIR 14  [facility]   8/5/2021  8:00 AM Gale Aguilar APRN [facility] None   8/11/2021  8:00 AM Josh Guerin APRN [facility]   9/15/2021  9:00 AM [facility]IO NURSE LAB [facility]   9/15/2021  9:30 AM Dejah Loco MD [facility]   9/30/2021 11:00 AM Markus Menjivar MD [facility]         Please note that portions of this note were completed with a voice recognition program.

## 2021-07-09 RX ORDER — PHENOL 1.4 %
600 AEROSOL, SPRAY (ML) MUCOUS MEMBRANE 2 TIMES DAILY WITH MEALS
Qty: 60 TABLET | Refills: 5 | Status: SHIPPED | OUTPATIENT
Start: 2021-07-09 | End: 2021-12-24 | Stop reason: SDUPTHER

## 2021-07-12 ENCOUNTER — TELEPHONE (OUTPATIENT)
Dept: FAMILY MEDICINE CLINIC | Facility: CLINIC | Age: 46
End: 2021-07-12

## 2021-07-12 NOTE — TELEPHONE ENCOUNTER
Pt is aware of this information.     ----- Message from Markus Menjivar MD sent at 7/9/2021  1:06 PM EDT -----  Emailed to [facility]  ----- Message -----  From: Anne Haynes MA  Sent: 7/9/2021  11:51 AM EDT  To: Markus Menjivar MD    Patient called and stated that her surgeon thinks she needs to be on a calcium pill. They wanted to see if you could prescribe this?

## 2021-07-27 ENCOUNTER — TELEPHONE (OUTPATIENT)
Dept: ONCOLOGY | Facility: HOSPITAL | Age: 46
End: 2021-07-27

## 2021-07-27 NOTE — TELEPHONE ENCOUNTER
Attempted to call pt regarding not showing up as scheduled for port flush today. No answer, left voicemail.

## 2021-08-05 DIAGNOSIS — F41.1 GENERALIZED ANXIETY DISORDER: ICD-10-CM

## 2021-08-05 DIAGNOSIS — F33.1 MODERATE EPISODE OF RECURRENT MAJOR DEPRESSIVE DISORDER (HCC): ICD-10-CM

## 2021-08-05 DIAGNOSIS — F42.8 OTHER OBSESSIVE-COMPULSIVE DISORDERS: Chronic | ICD-10-CM

## 2021-08-05 RX ORDER — DIAZEPAM 10 MG/1
10 TABLET ORAL EVERY 12 HOURS PRN
Qty: 60 TABLET | Refills: 0 | Status: SHIPPED | OUTPATIENT
Start: 2021-08-05 | End: 2021-09-03

## 2021-08-05 RX ORDER — VENLAFAXINE HYDROCHLORIDE 75 MG/1
225 CAPSULE, EXTENDED RELEASE ORAL DAILY
Qty: 90 CAPSULE | Refills: 0 | Status: SHIPPED | OUTPATIENT
Start: 2021-08-05 | End: 2021-09-07 | Stop reason: SDUPTHER

## 2021-08-09 DIAGNOSIS — M53.3 COCCYGEAL PAIN: Primary | ICD-10-CM

## 2021-08-11 ENCOUNTER — OFFICE VISIT (OUTPATIENT)
Dept: FAMILY MEDICINE CLINIC | Facility: CLINIC | Age: 46
End: 2021-08-11

## 2021-08-11 VITALS
BODY MASS INDEX: 41.15 KG/M2 | TEMPERATURE: 96.8 F | OXYGEN SATURATION: 96 % | HEIGHT: 64 IN | WEIGHT: 241 LBS | SYSTOLIC BLOOD PRESSURE: 100 MMHG | RESPIRATION RATE: 14 BRPM | HEART RATE: 92 BPM | DIASTOLIC BLOOD PRESSURE: 80 MMHG

## 2021-08-11 DIAGNOSIS — M25.562 CHRONIC PAIN OF LEFT KNEE: Chronic | ICD-10-CM

## 2021-08-11 DIAGNOSIS — E53.8 VITAMIN B 12 DEFICIENCY: ICD-10-CM

## 2021-08-11 DIAGNOSIS — E55.9 VITAMIN D DEFICIENCY: ICD-10-CM

## 2021-08-11 DIAGNOSIS — K21.9 GASTROESOPHAGEAL REFLUX DISEASE WITHOUT ESOPHAGITIS: Chronic | ICD-10-CM

## 2021-08-11 DIAGNOSIS — E11.42 DM TYPE 2 WITH DIABETIC PERIPHERAL NEUROPATHY (HCC): Primary | Chronic | ICD-10-CM

## 2021-08-11 DIAGNOSIS — E78.5 DYSLIPIDEMIA: Chronic | ICD-10-CM

## 2021-08-11 DIAGNOSIS — Z96.41 INSULIN PUMP IN PLACE: ICD-10-CM

## 2021-08-11 DIAGNOSIS — G89.29 CHRONIC PAIN OF LEFT KNEE: Chronic | ICD-10-CM

## 2021-08-11 PROBLEM — K62.5 BRBPR (BRIGHT RED BLOOD PER RECTUM): Status: RESOLVED | Noted: 2021-04-28 | Resolved: 2021-08-11

## 2021-08-11 PROCEDURE — 95251 CONT GLUC MNTR ANALYSIS I&R: CPT | Performed by: NURSE PRACTITIONER

## 2021-08-11 PROCEDURE — 99214 OFFICE O/P EST MOD 30 MIN: CPT | Performed by: NURSE PRACTITIONER

## 2021-08-11 NOTE — PROGRESS NOTES
History of Present Illness  Nivia is a 47 y/o female who presents to the clinic today for recheck and to review lab results pertaining to her DM, type 2 and Dyslipidemia. In addition, she has been diagnosed with right breast Cancer and has undergone bilateral mastectomy and chemotherapy.     Diabetes   She has type 2 diabetes mellitus. The initial diagnosis of diabetes was made 20 years ago. Associated symptoms include  fatigue and peripheral neuropathy. Pertinent negatives for diabetes include no foot ulcerations. Diabetic complications include peripheral neuropathy.  Risk factors for coronary artery disease include post-menopausal, stress and dyslipidemia. She is currently utilizing insulin pump therapy and CGM. She did stop weekly Ozempic due to nausea. She is currently taking Trijardy.  She has had a previous visit with a dietitian An ACE inhibitor/angiotensin II receptor blocker is not  being taken at this time. Eye exam is not current. .      Lab Results   Component Value Date    HGBA1C 9.60 (H) 11/17/2020     Lab Results   Component Value Date    HGBA1C 8.30 (H) 03/12/2021      Dyslipidemia   The current episode started more than 1 year ago. Pertinent negatives include no chest pain or shortness of breath. She is taking Crestor 40 mg.     Lab Results   Component Value Date    CHOL 189 11/17/2020    TRIG 240 (H) 11/17/2020    HDL 36 (L) 11/17/2020     (H) 11/17/2020     Lab Results   Component Value Date    CHLPL 161 03/12/2021    TRIG 211 (H) 03/12/2021    HDL 32 (L) 03/12/2021    LDL 93 03/12/2021     The following portions of the patient's history were reviewed and updated as appropriate: allergies, current medications, past family history, past medical history, past social history, past surgical history and problem list.    Review of Systems   Constitutional: Positive for fatigue. Negative for activity change, appetite change, chills, fever and unexpected weight change.   HENT: Negative.   "  Eyes: Negative for visual disturbance.   Respiratory: Negative for cough, shortness of breath and wheezing.    Cardiovascular: Positive for leg swelling (Intermittent). Negative for chest pain and palpitations.   Gastrointestinal: Negative for abdominal pain, constipation, diarrhea, nausea and vomiting.   Endocrine: Negative for cold intolerance, heat intolerance, polydipsia, polyphagia and polyuria.   Musculoskeletal: Positive for arthralgias and back pain.   Skin: Negative for color change and rash.   Allergic/Immunologic: Positive for environmental allergies.   Neurological: Positive for numbness. Negative for dizziness, tremors, speech difficulty, weakness, light-headedness and headaches.   Hematological: Negative for adenopathy.   Psychiatric/Behavioral: Positive for sleep disturbance. Negative for confusion and decreased concentration. The patient is not nervous/anxious.    All other systems reviewed and are negative.        Vital signs:  /80 (BP Location: Left arm, Patient Position: Sitting, Cuff Size: Adult)   Pulse 92   Temp 96.8 °F (36 °C) (Temporal)   Resp 14   Ht 162.6 cm (64\")   Wt 109 kg (241 lb)   SpO2 96%   BMI 41.37 kg/m²     Physical Exam  Vitals and nursing note reviewed.   Constitutional:       General: She is not in acute distress.     Appearance: She is well-developed.      Comments: Pleasant; in good spirits.    HENT:      Head: Normocephalic.      Nose: Nose normal.   Eyes:      General: No scleral icterus.        Right eye: No discharge.         Left eye: No discharge.      Conjunctiva/sclera: Conjunctivae normal.   Neck:      Thyroid: No thyromegaly.      Vascular: No JVD.   Cardiovascular:      Rate and Rhythm: Normal rate and regular rhythm.      Heart sounds: Normal heart sounds. No murmur heard.   No friction rub.   Pulmonary:      Effort: Pulmonary effort is normal. No respiratory distress.      Breath sounds: Normal breath sounds. No decreased breath sounds, wheezing, " rhonchi or rales.   Abdominal:      General: Bowel sounds are normal. There is no distension.      Palpations: Abdomen is soft.      Tenderness: There is no abdominal tenderness. There is no guarding or rebound.   Musculoskeletal:      Cervical back: Normal range of motion and neck supple.      Left knee: Tenderness present.   Lymphadenopathy:      Cervical: No cervical adenopathy.   Skin:     General: Skin is warm and dry.      Capillary Refill: Capillary refill takes less than 2 seconds.      Findings: No erythema or rash.   Neurological:      Mental Status: She is alert and oriented to person, place, and time.      Cranial Nerves: Cranial nerves are intact.      Gait: Gait is intact.   Psychiatric:         Mood and Affect: Mood and affect normal.         Speech: Speech normal.         Behavior: Behavior is cooperative.         Thought Content: Thought content normal.         Cognition and Memory: Cognition and memory normal.       Patient's Body mass index is 41.37 kg/m². indicating that she is obese (BMI >30). Obesity-related health conditions include the following: diabetes mellitus, dyslipidemias and GERD. Obesity is unchanged. BMI is is above average; BMI management plan is completed. Provided information regarding  portion control and increasing exercise..     Assessment/Plan     Diagnoses and all orders for this visit:    1. DM type 2 with diabetic peripheral neuropathy (CMS/HCC) (Primary)  Comments:  Findings and recommendations discussed with Nivia. Labs ordered  Orders:  -     Comprehensive Metabolic Panel; Future  -     TSH; Future  -     Lipid Panel; Future  -     Hemoglobin A1c; Future  -     MicroAlbumin, Urine, Random - Urine, Clean Catch; Future    2. Insulin pump in place  Comments:  Insulin pump uploaded and reviewed. No changes made. Noted 91% in range.      3. Dyslipidemia  Comments:  Continue Crestor. Lipid panel ordered  Orders:  -     TSH; Future  -     Lipid Panel; Future    4.  Gastroesophageal reflux disease without esophagitis  Comments:  Stable with Prevacid  Orders:  -     CBC & Differential; Future    5. Chronic pain of left knee  Comments:  Uric acid level ordered  Orders:  -     Uric Acid; Future    6. Vitamin D deficiency  Comments:  Labs ordered  Orders:  -     Vitamin D 25 Hydroxy; Future    7. Vitamin B 12 deficiency  Comments:  Labs ordered.   Orders:  -     Vitamin B12; Future      Follow Up With Dr Menjivar in September with me in November  Findings and recommendations discussed with Nivia. Insulin pump uploaded and reviewed. No changes made. Noted 91% in range.  Cardiovascular risk modifications reinforced including nutrition and activity recommendations. She will  follow up with me in November sooner if problems/concerns occur.  Nivia was given instructions and counseling regarding her condition or for health maintenance advice. Please see specific information pulled into the AVS if appropriate      This document has been electronically signed by:

## 2021-08-30 ENCOUNTER — TELEMEDICINE (OUTPATIENT)
Dept: PSYCHIATRY | Facility: CLINIC | Age: 46
End: 2021-08-30

## 2021-08-30 DIAGNOSIS — F42.8 OTHER OBSESSIVE-COMPULSIVE DISORDERS: ICD-10-CM

## 2021-08-30 DIAGNOSIS — F33.1 MODERATE EPISODE OF RECURRENT MAJOR DEPRESSIVE DISORDER (HCC): ICD-10-CM

## 2021-08-30 DIAGNOSIS — F41.1 GENERALIZED ANXIETY DISORDER: Primary | ICD-10-CM

## 2021-08-30 PROCEDURE — 99213 OFFICE O/P EST LOW 20 MIN: CPT | Performed by: NURSE PRACTITIONER

## 2021-08-30 RX ORDER — SUMATRIPTAN 50 MG/1
TABLET, FILM COATED ORAL
COMMUNITY
Start: 2021-07-20 | End: 2022-02-15 | Stop reason: SDUPTHER

## 2021-08-30 RX ORDER — AMITRIPTYLINE HYDROCHLORIDE 10 MG/1
TABLET, FILM COATED ORAL
COMMUNITY
Start: 2021-07-20 | End: 2021-09-30

## 2021-08-31 NOTE — PROGRESS NOTES
08/30/21 at 04:35pm attempted to call patient at number given during visit and no answer. Patient did not have a VM set up to leave a message. Will try again at a later time.       08/31/21 at 04:20pm Attempted to call patient back regarding medication options after consulting with Dr. Longo and did not receive an answer. No VM is set up so unable to leave a message. Will try again at a later time.

## 2021-09-03 DIAGNOSIS — F41.1 GENERALIZED ANXIETY DISORDER: ICD-10-CM

## 2021-09-03 RX ORDER — DIAZEPAM 10 MG/1
TABLET ORAL
Qty: 60 TABLET | Refills: 0 | Status: SHIPPED | OUTPATIENT
Start: 2021-09-03 | End: 2021-09-28 | Stop reason: SDUPTHER

## 2021-09-07 ENCOUNTER — TELEPHONE (OUTPATIENT)
Dept: PSYCHIATRY | Facility: CLINIC | Age: 46
End: 2021-09-07

## 2021-09-07 DIAGNOSIS — F42.8 OTHER OBSESSIVE-COMPULSIVE DISORDERS: Chronic | ICD-10-CM

## 2021-09-07 DIAGNOSIS — F33.1 MODERATE EPISODE OF RECURRENT MAJOR DEPRESSIVE DISORDER (HCC): ICD-10-CM

## 2021-09-07 DIAGNOSIS — F41.1 GENERALIZED ANXIETY DISORDER: ICD-10-CM

## 2021-09-07 RX ORDER — MIRTAZAPINE 15 MG/1
15 TABLET, FILM COATED ORAL NIGHTLY
Qty: 30 TABLET | Refills: 0 | Status: SHIPPED | OUTPATIENT
Start: 2021-09-07 | End: 2021-09-23

## 2021-09-07 RX ORDER — VENLAFAXINE HYDROCHLORIDE 150 MG/1
150 CAPSULE, EXTENDED RELEASE ORAL DAILY
Qty: 30 CAPSULE | Refills: 0 | Status: SHIPPED | OUTPATIENT
Start: 2021-09-07 | End: 2021-09-28 | Stop reason: SDUPTHER

## 2021-09-07 NOTE — TELEPHONE ENCOUNTER
----- Message from NIDA Vasquez sent at 9/7/2021  4:30 PM EDT -----  Can you call the patient and  to let them know to not take the Elavil or Amitriptyline with the Remeron please? Thanks

## 2021-09-07 NOTE — PROGRESS NOTES
09/07/21 at 4:21pm Spoke with patient and patient's  regarding medication changes. Discussed with patient had consulted with Dr. Longo and decided on Remeron. Discussed with patient will start Remeron 15mg daily at night and will decrease Effexor to 150mg daily. Discussed with patient side effects, risks, and benefits of medications. Discussed with patient and  to monitor closely for suicidal ideation.   stated firearms and medications were locked up and he was staying with her throughout the day.  Patient agreeable to notify  or office if begin having any suicidal thoughts.  Discussed with patient and  if having any questions, concerns, or worsening of symptoms to notify the office.  Discussed with patient if having any SI or HI to call 911 or go to the nearest ER.  Discussed with patient will keep appointment on 9/28/2021 to monitor effects.

## 2021-09-10 DIAGNOSIS — J30.9 CHRONIC ALLERGIC RHINITIS: ICD-10-CM

## 2021-09-10 RX ORDER — FLUTICASONE PROPIONATE 50 MCG
SPRAY, SUSPENSION (ML) NASAL
Qty: 16 G | Refills: 5 | Status: SHIPPED | OUTPATIENT
Start: 2021-09-10 | End: 2021-12-03

## 2021-09-14 NOTE — PROGRESS NOTES
NAME: Nivia Bernstein    : 1975    DATE:  9/15/2021    DIAGNOSIS:   Stage IA ([id number]) moderately differentiated invasive ductal carcinoma of the R breast with associated DCIS.  Tumor was 10 mm in maximal dimension.  0/10 SLN involved. ER negative, KY 15% + (1+), HER-2/cat negative. Mammaprint high risk.    CHIEF COMPLAINT:  Follow up Breast cancer    TREATMENT HISTORY:  1. Initially planned to give 4 cycles to Taxotere/Cytoxan, during 1st infusion of Taxotere on 2020 patient developed allergic reaction. Therefore, Taxotere was discontinued and regimen changed to DD AC.    2.      3.  Started Tamoxifen 12-    HISTORY OF PRESENT ILLNESS:   Nivia Bernstein is a very pleasant 46 y.o. female who who is being seen today at the request of Sheila Garza MD for evaluation and treatment of breast cancer. She did not notice a palpable lump, but was recommended to have a screening mammogram by her PCP. A nodule in the right upper quadrant of the R breast was noted. The mass measured 0.8 cm on ultrasound. Biopsy was consistent with a moderately differentiated invasive ductal carcinoma, ER negative, KY weakly (15%) positive, and HER-2/cat negative. She underwent bilateral mastectomy with sentinel lymph node biopsy with Dr. Garza on 20. Pathology from this showed a moderately differentiated invasive ductal carcinoma with associated intermediate grade DCIS, negative margins.  0/10 /SLN involved.  Mammaprint was high risk. She was referred to our clinic and is here today with her  for discussion of further treatment options.      Ms. Bernstein is healing well from the surgery.  She did develop a seroma, which was drained by Dr. Garza yesterday. She reports muscular chest discomfort r/t the procedure, but otherwise denies any other symptoms including fevers, chills, significant weight changes, shortness of breath, abdominal pain, n/v/d/c, bony pain or headaches.    Interval History:  Ms. Bernstein is here  today for follow up of breast cancer. She reports R arm pain. She has been working with PT for pain and decreased mobility. She says she has 1 session with PT remaining but says she isn't really getting better.. She also reports fatigue but is otherwise without complaint. She continues to take Tamoxifen and is tolerating it well. She is also taking Effexor for her mood and gabapentin for hot flashes both of which are working well.     PAST MEDICAL HISTORY:  Past Medical History:   Diagnosis Date   • Altered mental status 10/16/2017   • Anxiety and depression 3/31/2017   • Arthritis    • Asthma    • Elevated alkaline phosphatase level 10/16/2017   • Enlarged liver    • GERD (gastroesophageal reflux disease)    • Heart murmur    • Hypokalemia 10/16/2017   • Mitral valve prolapse    • Neuropathy     related to diabetes   • Obesity 3/31/2017   • OCD (obsessive compulsive disorder) 10/16/2017   • Osteoporosis    • PONV (postoperative nausea and vomiting)    • Renal insufficiency 10/16/2017   • Right breast cancer with malignant cells in regional lymph nodes no greater than 0.2 mm and no more than 200 cells (CMS/HCC) 8/13/2020   • TIA (transient ischemic attack)     4 TIMES       PAST SURGICAL HISTORY:  Past Surgical History:   Procedure Laterality Date   • APPENDECTOMY     • CARPAL TUNNEL RELEASE     • CHOLECYSTECTOMY     • COLONOSCOPY N/A 5/5/2021    Procedure: COLONOSCOPY WITH BIOPSY;  Surgeon: Vickie Herrera MD;  Location: [facility];  Service: Gastroenterology;  Laterality: N/A;   • FINGER FUSION Left     3rd   • HYSTERECTOMY  2011    removed due to an ovarian cyst and dysmenorrhia. No cancer noted. Complete   • KNEE ARTHROSCOPY Right    • MASTECTOMY Left 8/18/2020    Procedure: Left mastectomy;  Surgeon: Sheila Garza MD;  Location: [facility] OR;  Service: General;  Laterality: Left;   • MASTECTOMY WITH SENTINEL NODE BIOPSY AND AXILLARY NODE DISSECTION Right 8/18/2020    Procedure: Right BREAST  MASTECTOMY WITH SENTINEL NODE BIOPSY;  Surgeon: Sheila Garza MD;  Location: [facility];  Service: General;  Laterality: Right;   • PORTACATH PLACEMENT         SOCIAL HISTORY:  Social History     Socioeconomic History   • Marital status:      Spouse name: Not on file   • Number of children: Not on file   • Years of education: Not on file   • Highest education level: Not on file   Tobacco Use   • Smoking status: Never Smoker   • Smokeless tobacco: Never Used   Vaping Use   • Vaping Use: Never used   Substance and Sexual Activity   • Alcohol use: No   • Drug use: No   • Sexual activity: Yes     Partners: Male         FAMILY HISTORY:  Family History   Problem Relation Age of Onset   • Diabetes Mother    • Hypertension Mother    • Diabetes Father    • Heart disease Father    • Alcohol abuse Father    • Seizures Father    • Hypertension Father    • No Known Problems Brother    • No Known Problems Daughter    • Bone cancer Son    • Suicide Attempts Cousin    • Stomach cancer Cousin         maternal first cousin   • Breast cancer Paternal Aunt 52   • Diabetes Maternal Grandmother    • Diabetes Maternal Grandfather    • Lung cancer Maternal Grandfather    • Heart disease Paternal Grandmother    • Diabetes Paternal Grandmother    • Heart disease Paternal Grandfather    • Diabetes Paternal Grandfather    • Ovarian cancer Maternal Aunt    • Lung cancer Maternal Uncle    • Colon cancer Maternal Uncle    • Cancer Maternal Uncle         unknown type     MEDICATIONS:  The current medication list was reviewed in the EMR    Current Outpatient Medications:   •  albuterol sulfate HFA (Ventolin HFA) 108 (90 Base) MCG/ACT inhaler, Inhale 2 puffs Every 4 (Four) Hours As Needed (shortness of breath)., Disp: 18 g, Rfl: 5  •  amitriptyline (ELAVIL) 10 MG tablet, , Disp: , Rfl:   •  Breo Ellipta 200-25 MCG/INH inhaler, Inhale 1 puff Daily., Disp: 1 each, Rfl: 5  •  calcium carbonate (OS-JERROD) 600 MG tablet, Take 1 tablet by mouth 2  (Two) Times a Day With Meals., Disp: 60 tablet, Rfl: 5  •  cholecalciferol (Vitamin D) 25 MCG (1000 UT) tablet, Take 2 tablets by mouth Daily., Disp: 60 tablet, Rfl: 5  •  cloNIDine (CATAPRES) 0.1 MG tablet, Take 1 tablet by mouth 2 (Two) Times a Day., Disp: 60 tablet, Rfl: 5  •  diazePAM (VALIUM) 10 MG tablet, TAKE 1 TABLET BY MOUTH EVERY 12 HOURS AS NEEDED FOR ANIXETY, Disp: 60 tablet, Rfl: 0  •  diclofenac (VOLTAREN) 1 % gel gel, Apply 4 g topically to the appropriate area as directed 4 (Four) Times a Day As Needed (joint pain)., Disp: 500 g, Rfl: 5  •  Diclofenac Sodium (VOLTAREN) 1 % gel gel, , Disp: , Rfl:   •  docusate sodium 100 MG capsule, Take 100 mg by mouth 2 (Two) Times a Day., Disp: , Rfl:   •  Empagliflozin-Linaglip-Metform (Trijardy XR) 25-5-1000 MG tablet sustained-release 24 hour, Take 1 tablet by mouth Every Morning., Disp: 30 tablet, Rfl: 5  •  fluticasone (FLONASE) 50 MCG/ACT nasal spray, USE 2 SPRAYS IN EACH NOSTRIL DAILY AS NEEDED, Disp: 16 g, Rfl: 5  •  gabapentin (Neurontin) 100 MG capsule, Take 1 capsule by mouth 2 (two) times a day., Disp: 60 capsule, Rfl: 3  •  gabapentin (Neurontin) 300 MG capsule, Take 1 capsule by mouth 3 (Three) Times a Day for 30 days., Disp: 90 capsule, Rfl: 0  •  hydrocortisone (ANUSOL-HC) 25 MG suppository, Insert 1 suppository into the rectum 2 (Two) Times a Day As Needed for Hemorrhoids (rectal discomfort)., Disp: 30 suppository, Rfl: 5  •  insulin lispro (HumaLOG) 100 UNIT/ML patient supplied pump, Inject  under the skin into the appropriate area as directed Continuous. Basil rate: 2.95/hr Carb ratio: 4.0, Disp: , Rfl:   •  lansoprazole (PREVACID) 30 MG capsule, Take 1 capsule by mouth Daily., Disp: 90 capsule, Rfl: 3  •  linaclotide (Linzess) 145 MCG capsule capsule, Take 1 capsule by mouth Daily. 30 minutes before meals on an empty stomach., Disp: 30 capsule, Rfl: 5  •  magic mouthwash oral suspension, Swish and swallow 5 mL four times  a day As Needed for  Mucositis., Disp: 240 mL, Rfl: 1  •  mirtazapine (Remeron) 15 MG tablet, Take 1 tablet by mouth Every Night., Disp: 30 tablet, Rfl: 0  •  naproxen (NAPROSYN) 500 MG tablet, , Disp: , Rfl:   •  ondansetron (ZOFRAN) 8 MG tablet, TAKE 1 TABLET BY MOUTH 3 TIMES A DAY AS NEEDED FOR NAUSEA OR VOMITING., Disp: 30 tablet, Rfl: 5  •  prochlorperazine (COMPAZINE) 10 MG tablet, Take 1 tablet by mouth Every 6 (Six) Hours As Needed for Nausea or Vomiting., Disp: 30 tablet, Rfl: 5  •  rosuvastatin (CRESTOR) 40 MG tablet, Take 1 tablet by mouth Every Night., Disp: 30 tablet, Rfl: 5  •  sennosides-docusate (senna-docusate sodium) 8.6-50 MG per tablet, Take 2 tablets by mouth 2 (Two) Times a Day., Disp: 120 tablet, Rfl: 4  •  SUMAtriptan (IMITREX) 50 MG tablet, , Disp: , Rfl:   •  tamoxifen (NOLVADEX) 20 MG chemo tablet, Take 1 tablet by mouth Daily., Disp: 30 tablet, Rfl: 5  •  venlafaxine XR (EFFEXOR-XR) 150 MG 24 hr capsule, Take 1 capsule by mouth Daily., Disp: 30 capsule, Rfl: 0  •  vitamin D (ERGOCALCIFEROL) 1.25 MG (86134 UT) capsule capsule, TAKE ONE CAPSULE BY MOUTH ONCE WEEKLY, Disp: 4 capsule, Rfl: 0    ALLERGIES:    Allergies   Allergen Reactions   • Mobic [Meloxicam] Rash   • Penicillins Angioedema   • Taxotere [Docetaxel] Anaphylaxis     9 minutes into 1st infusion   • Novolog [Insulin Aspart] Hives     REVIEW OF SYSTEMS:    A comprehensive 14 point review of systems was performed.  Significant findings as mentioned above.  All other systems reviewed and are negative.      Physical Exam  Vital Signs:   Visit Vitals  /71   Pulse 100   Temp 96.9 °F (36.1 °C) (Temporal)   Resp 18   Wt 110 kg (241 lb 14.4 oz)   SpO2 97%   BMI 41.52 kg/m²     Pain Score    09/15/21 0903   PainSc:   5   PainLoc: Comment: right under arm          General: Awake, alert and oriented, in no acute distress.   HEENT:  PERRLA, EOM full, OP clear, mmm  Neck: No thyromegaly. No JVD.   Lungs: Lungs are clear to auscultation bilaterally, no  crackles  Heart:  Regular rate and rhythm. No murmurs, rubs, or gallops.   Breast: S/p bilateral mastectomy and RSLNBx.  Surgical scars smooth and intact without nodularity..  No axillary adenopathy on either side.    Abdomen: Soft, Non tender, non-distended, bowel sounds present.  Extremities: No cyanosis or edema.  She has very limited ROM about her R shoulder with decreased internal, external rotation as well as elevation and abduction.  Neurologic: MS as above. Grossly non focal exam    PATHOLOGY:              IMAGING:  Bilateral screening mammogram 06-19-20  FINDINGS:    The breast tissue is heterogeneously dense.  New focal nodularity right upper outer breast that is about 14 cm from  the nipple.  Otherwise, no suspicious findings.     IMPRESSION:  New focal nodularity right upper outer breast that is about  14 cm from the nipple.     RECOMMENDATION:  Spot compression imaging.     BI-RADS CAT 0, INCOMPLETE.  The patient needs additional imaging.        Diagnostic R mammogram with R breast US 07-14-20  FINDINGS:  Additional mammographic imaging images of persistent  partially obscured oval mass in the posterior right upper outer  quadrant.     Right breast ultrasound: Focused sonographic evaluation of the right  breast demonstrates a 0.8 cm irregular hypoechoic mass with ill-defined  borders located at 10:00, 13 cm from the nipple. An additional area was  initially noted by the technologist in the 9:00 region which represents  an isolated fat lobule.           IMPRESSION:  0.8 cm irregular mass in the right 10:00 region. Recommend  ultrasound-guided core biopsy.        BI-RADS CATEGORY:  4, SUSPICIOUS        RECOMMENDATION:  Recommend ultrasound guided core biopsy of the right  breast.        Bilateral breast MRI w/wo contrast 08-11-20  FINDINGS: There is minimal bilateral background contrast enhancement and  normal vascular enhancement.     There are no worrisome areas of contrast enhancement in the left  breast.     In the right breast correlating to the biopsy proven malignancy is an  approximately 0.7 cm irregular rapidly enhancing mass in the posterior  right 10:00 region. Artifact from a postbiopsy marking clip is located  adjacent to this mass. A small linear area of enhancement extends  posterior to this mass approximately 1 cm. There are no additional  worrisome areas of contrast enhancement in the right breast.     There is no axillary adenopathy by MRI criteria and no chest wall  abnormality is seen.        IMPRESSION:  1. Negative left breast MRI.        2. Biopsy-proven malignancy in the right 10:00 region with a small  linear area of enhancement extending posterior to the 0.7 cm mass.     RECOMMENDATIONS: Recommend surgical follow-up.     BI-RADS CATEGORY: 6, KNOWN BIOPSY PROVEN MALIGNANCY    Echo 10-16-17:  A two-dimensional transthoracic echocardiogram with color flow and Doppler was performed.   The study is technically good for diagnosis.Verbal consent was obtained from the patient to use saline contrast in order to optimize the study.  A total of 20 mL of agitated saline was administered to assess for cardiac shunting.   No adverse reaction to contrast was noted.   Echocardiogram Findings    Left Ventricle Left ventricular systolic function is normal. Estimated EF appears to be in the range of 56 - 60%. Normal left ventricular cavity size and wall thickness noted. All left ventricular wall segments contract normally. Left ventricular diastolic function is normal.   Right Ventricle Normal right ventricular cavity size noted.   Left Atrium Normal left atrial size noted. Saline test results are negative.   Right Atrium Normal right atrial size noted.   Aortic Valve The aortic valve is structurally normal. Trace aortic valve regurgitation is present.   Mitral Valve The mitral valve is normal in structure. Trace mitral valve regurgitation is present.   Tricuspid Valve The tricuspid valve is normal.   Trace tricuspid valve regurgitation is present.   Pulmonic Valve The pulmonic valve is structurally normal. There is trace pulmonic valve regurgitation present.   Greater Vessels No dilation of the aortic root is present. No dilation of the sinuses of Valsalva is present.   Pericardium There is no evidence of pericardial effusion.           ECHO 09/29/2020  · Poor quality echo and Doppler study  · Normal left ventricular cavity size and wall thickness noted.  · Left ventricular ejection fraction appears to be 61 - 65%. Left ventricular systolic function is normal.  · Left ventricular diastolic function is consistent with (grade I) impaired relaxation.  · Aortic and mitral valve are poorly visualized but appear to be normal,  · Tricuspid and pulmonic valve echoes are not visualized.  · There is no pericardial effusion noted.    Echocardiogram 12-08-02  · Normal left ventricular cavity size and wall thickness noted. All left ventricular wall segments contract normally.  · Left ventricular ejection fraction appears to be 56 - 60%.  · The pericardium is normal. There is no evidence of pericardial effusion. .      MRI Brain w/wo contrast 01-28-21     FINDINGS:    Brain:  Unremarkable.  No mass.  No hemorrhage.  No acute infarct.    Ventricles:  Unremarkable.  No ventriculomegaly.    Bones/joints:  Unremarkable.    Sinuses:  Unremarkable as visualized.  No acute sinusitis.    Mastoid air cells:  Unremarkable as visualized.  No mastoid effusion.    Orbits:  Unremarkable as visualized.     IMPRESSION:    No acute findings in the head/brain.    ENDOSCOPY:  COLONOSCOPY PROCEDURE NOTE     Nivia Bernstein  5/5/2021     GASTROENTEROLOGIST:  Vickie Herrera MD        PRE-PROCEDURE DIAGNOSIS:   BRBPR (bright red blood per rectum) [K62.5]     POST-PROCEDURE DIAGNOSIS:  1.  Normal colon  2.  Normal terminal ileum  3.  Internal hemorrhoids     PROCEDURE:  COLONOSCOPY       ANESTHESIA:  Propofol administered by  anesthesia.  See anesthesia notes for ASA classification     STAFF  Circulator: Missy Canela RN  Endo Technician: Heraclio Gaona     Findings:  1.  Normal colonoscopy with small internal hemorrhoids.  2.  Normal terminal ileum     OPERATIVE PROCEDURE   After proper informed consent was obtained, the patient was taken the operating suite and placed in left lateral decubitus position.  An Olympus video colonoscope 180 series was inserted in the rectum and advanced to the terminal ileum under direct visualization.  Cecum and terminal ileum were identified by visualization of the appendiceal orifice and ileocecal valve.  The colonoscope was then slowly withdrawn from the cecum to the rectum and passed a second time from rectum to cecum.  The colonoscope was retroflexed in the cecum and rectum. Scope was then withdrawn. Patient tolerated the procedure well. There were no immediate complications. Cecal withdrawal time was 9 minutes.     ESTIMATED BLOOD LOSS  None      COMPLICATIONS  None     RECOMMENDATIONS:  1.  Repeat colonoscopy in 10 years for screening purposes.  2.  Continue Linzess.  3.  Anusol HC suppositories as needed for rectal bleeding.    RECENT LABS:  Lab Results   Component Value Date    WBC 9.91 09/15/2021    HGB 13.7 09/15/2021    HCT 43.1 09/15/2021    MCV 84.0 09/15/2021    RDW 13.4 09/15/2021     09/15/2021    NEUTRORELPCT 65.7 09/15/2021    LYMPHORELPCT 26.8 09/15/2021    MONORELPCT 5.4 09/15/2021    EOSRELPCT 1.3 09/15/2021    BASORELPCT 0.6 09/15/2021    NEUTROABS 6.50 09/15/2021    LYMPHSABS 2.66 09/15/2021       Lab Results   Component Value Date     09/15/2021    K 3.8 09/15/2021    CO2 24.6 09/15/2021     09/15/2021    BUN 14 09/15/2021    CREATININE 0.83 09/15/2021    EGFRIFNONA 73 09/15/2021    EGFRIFAFRI 110 03/12/2021    GLUCOSE 192 (H) 09/15/2021    CALCIUM 9.1 09/15/2021    ALKPHOS 118 (H) 09/15/2021    AST 16 09/15/2021    ALT 19 09/15/2021    BILITOT 0.2  09/15/2021    ALBUMIN 4.03 09/15/2021    PROTEINTOT 7.2 09/15/2021    MG 2.1 10/17/2017       Lab Results   Component Value Date    URICACID 5.2 09/10/2019       Lab Results   Component Value Date    THPUSHZK29 428 03/12/2021    FOLATE 15.19 10/16/2017      ASSESSMENT & PLAN:  Nivia Bernstein is a very pleasant 46 y.o. female with Stage IA ([id number]) moderately differentiated invasive ductal carcinoma of the R breast with associated DCIS.  Tumor was 10 mm in maximal dimension.  0/10 SLN involved. ER negative, VT 15% + (1+), HER-2/cat negative. Mammaprint high risk.     1.  R breast cancer:   -  S/p R mastectomy and SLNBx as well as prophylactic contralateral mastectomy performed by Dr. Garza 8-18-20.  - She healed well from bilateral mastectomy. This was complicated by left seroma, drained by Dr. Garza. Incisions are healed well.   - Given high risk Mammaprint, Dr. Loco recommended adjuvant chemotherapy. Discussed different possibilities for adjuvant therapy. Given high risk Mammaprint but early stage, node negative disease as well as comorbidities, recommended Taxotere/Cytoxan Q21d x 4 cycles with growth factor support. Discussed potential risks, benefits, and side effects and she would like to proceed.   - She had port placed several years ago due to poor peripheral access. This has been well cared for and maintained.   - C1 Taxotere/Cytoxan was planned for 09/24/2020. Unfortunately, this had to be discontinued due to allergic reaction to Taxotere. Recommended change of treatment to DD AC.   - She tolerated treatment well and aside from fatigue and recovered well. Echo is essentially unchanged.   -  Her tumor was ER neg but did have 15% 1+ positivity for progesterone receptor.  She is s/p complete hysterectomy and had evidence of osteopenia with T score -1.9 in June 2020.  Given all of this, recommended treatment with Tamoxifen over aromatase inhibitor.   -  She continues to tolerate Tamoxifen well.  She has  used Neurontin to help with hot flahes and uses Replens / Hyalogyn which has helped with vaginal dryness.      She was tolerating this well. However, she now reports worsening hot flashes and vaginal dryness. D/w Dr. Loco and doubt changing her treatment will help with current symptoms. Patient is on Effexor 225 mg PO daily for issue #4. Recommended she take this at night. Will also add Neurontin 100 mg BID to help with hotflashes. Recommended she try replens cream OTC or HYALO GYN cream to help with vaginal dryness. Hopefully these  will help improve her tolerability. Will plan to follow up in 3 months with repeat labs. In in interim, she will let us know if her symptoms should worsen and we will see her sooner.     2. Extensive family history of malignancy:   - Genetic testing was ordered by Dr. Garza [facility] with no mutations, specifically including BRCA 1/2, CHKE2, CDH1, PALB2 and others.    3. Anxiety/Depression:  -Her psychiatrist previously prescribed Paxil 20 mg daily and she takes Valium prn.  She had been doing well on this.  Unfortunately Tamoxifen interacts with Paxil.  Dr. Loco recommended trial of Lexapro which didn't seem to help her, so her PCP started Effexor 75 mg daily. She is following with psychiatry who increased Effexor to 225 mg PO daily. She continues with Valium prn. This has been working well for her.     4. Frozen shoulder on the Right:  -  She has limitation of ROM and discomfort.  Referred for PT but she says this really hasn't helped much. On exam she continues to have very limited ROM. Will refer to orthopedics for further evaluation.    5. Prophylaxis:   -  She received Prevnar 13 vaccine.  -  She had 2020 flu vaccine per [facility].  Will give 2021 influenza vaccine today.  -  M. Uncle had colon cancer at age 50.  Referred to Dr. Aguilera for screening colonoscopy which was unremarkable and repeat exam recommended in 10 years.   -She has had  COVID vaccine x2.     6. Follow up:  - Refer to orthopedic surgery for Bilateral frozen shoulders, R>L  - will give 2021 influenza vaccine today   - NP visit in 3 months with exam and labs including CBCD, CMP  - MD follow up in 6 months with exam and the same labs prior.     This note was scribed for Dejah Loco MD by Chantell Rahman RN.    I, Dejah Loco MD, personally performed the services described in this documentation as scribed by the above named individual in my presence, and it is both accurate and complete.  09/15/2021       A total of 30 minutes were spent coordinating this patient’s care in clinic today; more than 50% of this time was face-to-face with the patient, reviewing her interim medical history and counseling on the current treatment and followup plan. All questions were answered to her satisfaction.      Electronically Signed by: Dejah Loco MD   September 14, 2021 09:39 EDT       CC:   MD Otto Hager, Markus Garcia MD

## 2021-09-15 ENCOUNTER — INFUSION (OUTPATIENT)
Dept: ONCOLOGY | Facility: HOSPITAL | Age: 46
End: 2021-09-15

## 2021-09-15 ENCOUNTER — OFFICE VISIT (OUTPATIENT)
Dept: ONCOLOGY | Facility: CLINIC | Age: 46
End: 2021-09-15

## 2021-09-15 VITALS
SYSTOLIC BLOOD PRESSURE: 117 MMHG | OXYGEN SATURATION: 97 % | TEMPERATURE: 96.9 F | DIASTOLIC BLOOD PRESSURE: 71 MMHG | RESPIRATION RATE: 18 BRPM | WEIGHT: 241.9 LBS | BODY MASS INDEX: 41.52 KG/M2 | HEART RATE: 100 BPM

## 2021-09-15 VITALS
OXYGEN SATURATION: 97 % | RESPIRATION RATE: 18 BRPM | TEMPERATURE: 96.9 F | DIASTOLIC BLOOD PRESSURE: 71 MMHG | BODY MASS INDEX: 41.52 KG/M2 | HEART RATE: 100 BPM | SYSTOLIC BLOOD PRESSURE: 117 MMHG | WEIGHT: 241.9 LBS

## 2021-09-15 DIAGNOSIS — C50.411 MALIGNANT NEOPLASM OF UPPER-OUTER QUADRANT OF RIGHT BREAST IN FEMALE, ESTROGEN RECEPTOR NEGATIVE (HCC): Primary | ICD-10-CM

## 2021-09-15 DIAGNOSIS — Z17.1 MALIGNANT NEOPLASM OF UPPER-OUTER QUADRANT OF RIGHT BREAST IN FEMALE, ESTROGEN RECEPTOR NEGATIVE (HCC): Primary | ICD-10-CM

## 2021-09-15 DIAGNOSIS — Z95.828 PORT-A-CATH IN PLACE: ICD-10-CM

## 2021-09-15 DIAGNOSIS — R23.2 HOT FLASHES DUE TO TAMOXIFEN: ICD-10-CM

## 2021-09-15 DIAGNOSIS — T45.1X5A HOT FLASHES DUE TO TAMOXIFEN: ICD-10-CM

## 2021-09-15 DIAGNOSIS — M75.01 ADHESIVE CAPSULITIS OF RIGHT SHOULDER: ICD-10-CM

## 2021-09-15 DIAGNOSIS — F32.A DEPRESSION, UNSPECIFIED DEPRESSION TYPE: ICD-10-CM

## 2021-09-15 LAB
ALBUMIN SERPL-MCNC: 4.03 G/DL (ref 3.5–5.2)
ALBUMIN/GLOB SERPL: 1.3 G/DL
ALP SERPL-CCNC: 118 U/L (ref 39–117)
ALT SERPL W P-5'-P-CCNC: 19 U/L (ref 1–33)
ANION GAP SERPL CALCULATED.3IONS-SCNC: 10.4 MMOL/L (ref 5–15)
AST SERPL-CCNC: 16 U/L (ref 1–32)
BASOPHILS # BLD AUTO: 0.06 10*3/MM3 (ref 0–0.2)
BASOPHILS NFR BLD AUTO: 0.6 % (ref 0–1.5)
BILIRUB SERPL-MCNC: 0.2 MG/DL (ref 0–1.2)
BUN SERPL-MCNC: 14 MG/DL (ref 6–20)
BUN/CREAT SERPL: 16.9 (ref 7–25)
CALCIUM SPEC-SCNC: 9.1 MG/DL (ref 8.6–10.5)
CHLORIDE SERPL-SCNC: 102 MMOL/L (ref 98–107)
CO2 SERPL-SCNC: 24.6 MMOL/L (ref 22–29)
CREAT SERPL-MCNC: 0.83 MG/DL (ref 0.57–1)
DEPRECATED RDW RBC AUTO: 41.3 FL (ref 37–54)
EOSINOPHIL # BLD AUTO: 0.13 10*3/MM3 (ref 0–0.4)
EOSINOPHIL NFR BLD AUTO: 1.3 % (ref 0.3–6.2)
ERYTHROCYTE [DISTWIDTH] IN BLOOD BY AUTOMATED COUNT: 13.4 % (ref 12.3–15.4)
GFR SERPL CREATININE-BSD FRML MDRD: 73 ML/MIN/1.73
GLOBULIN UR ELPH-MCNC: 3.2 GM/DL
GLUCOSE SERPL-MCNC: 192 MG/DL (ref 65–99)
HCT VFR BLD AUTO: 43.1 % (ref 34–46.6)
HGB BLD-MCNC: 13.7 G/DL (ref 12–15.9)
IMM GRANULOCYTES # BLD AUTO: 0.02 10*3/MM3 (ref 0–0.05)
IMM GRANULOCYTES NFR BLD AUTO: 0.2 % (ref 0–0.5)
LYMPHOCYTES # BLD AUTO: 2.66 10*3/MM3 (ref 0.7–3.1)
LYMPHOCYTES NFR BLD AUTO: 26.8 % (ref 19.6–45.3)
MCH RBC QN AUTO: 26.7 PG (ref 26.6–33)
MCHC RBC AUTO-ENTMCNC: 31.8 G/DL (ref 31.5–35.7)
MCV RBC AUTO: 84 FL (ref 79–97)
MONOCYTES # BLD AUTO: 0.54 10*3/MM3 (ref 0.1–0.9)
MONOCYTES NFR BLD AUTO: 5.4 % (ref 5–12)
NEUTROPHILS NFR BLD AUTO: 6.5 10*3/MM3 (ref 1.7–7)
NEUTROPHILS NFR BLD AUTO: 65.7 % (ref 42.7–76)
NRBC BLD AUTO-RTO: 0 /100 WBC (ref 0–0.2)
PLATELET # BLD AUTO: 303 10*3/MM3 (ref 140–450)
PMV BLD AUTO: 9.9 FL (ref 6–12)
POTASSIUM SERPL-SCNC: 3.8 MMOL/L (ref 3.5–5.2)
PROT SERPL-MCNC: 7.2 G/DL (ref 6–8.5)
RBC # BLD AUTO: 5.13 10*6/MM3 (ref 3.77–5.28)
SODIUM SERPL-SCNC: 137 MMOL/L (ref 136–145)
WBC # BLD AUTO: 9.91 10*3/MM3 (ref 3.4–10.8)

## 2021-09-15 PROCEDURE — 85025 COMPLETE CBC W/AUTO DIFF WBC: CPT

## 2021-09-15 PROCEDURE — 36591 DRAW BLOOD OFF VENOUS DEVICE: CPT

## 2021-09-15 PROCEDURE — 99214 OFFICE O/P EST MOD 30 MIN: CPT | Performed by: INTERNAL MEDICINE

## 2021-09-15 PROCEDURE — 80053 COMPREHEN METABOLIC PANEL: CPT

## 2021-09-15 PROCEDURE — 90686 IIV4 VACC NO PRSV 0.5 ML IM: CPT | Performed by: INTERNAL MEDICINE

## 2021-09-15 PROCEDURE — 90471 IMMUNIZATION ADMIN: CPT | Performed by: INTERNAL MEDICINE

## 2021-09-15 PROCEDURE — 25010000002 HEPARIN LOCK FLUSH PER 10 UNITS: Performed by: INTERNAL MEDICINE

## 2021-09-15 RX ORDER — HEPARIN SODIUM (PORCINE) LOCK FLUSH IV SOLN 100 UNIT/ML 100 UNIT/ML
500 SOLUTION INTRAVENOUS AS NEEDED
Status: DISCONTINUED | OUTPATIENT
Start: 2021-09-15 | End: 2021-09-15 | Stop reason: HOSPADM

## 2021-09-15 RX ORDER — SODIUM CHLORIDE 0.9 % (FLUSH) 0.9 %
10 SYRINGE (ML) INJECTION AS NEEDED
Status: DISCONTINUED | OUTPATIENT
Start: 2021-09-15 | End: 2021-09-15 | Stop reason: HOSPADM

## 2021-09-15 RX ORDER — SODIUM CHLORIDE 0.9 % (FLUSH) 0.9 %
10 SYRINGE (ML) INJECTION AS NEEDED
Status: CANCELLED | OUTPATIENT
Start: 2021-10-27

## 2021-09-15 RX ORDER — HEPARIN SODIUM (PORCINE) LOCK FLUSH IV SOLN 100 UNIT/ML 100 UNIT/ML
500 SOLUTION INTRAVENOUS AS NEEDED
Status: CANCELLED | OUTPATIENT
Start: 2021-10-27

## 2021-09-15 RX ORDER — GABAPENTIN 100 MG/1
100 CAPSULE ORAL 2 TIMES DAILY
Qty: 60 CAPSULE | Refills: 3 | Status: SHIPPED | OUTPATIENT
Start: 2021-09-15 | End: 2022-01-27 | Stop reason: SDUPTHER

## 2021-09-15 RX ADMIN — SODIUM CHLORIDE, PRESERVATIVE FREE 10 ML: 5 INJECTION INTRAVENOUS at 09:15

## 2021-09-15 RX ADMIN — Medication 500 UNITS: at 09:15

## 2021-09-23 ENCOUNTER — TELEPHONE (OUTPATIENT)
Dept: FAMILY MEDICINE CLINIC | Facility: CLINIC | Age: 46
End: 2021-09-23

## 2021-09-23 DIAGNOSIS — F41.1 GENERALIZED ANXIETY DISORDER: ICD-10-CM

## 2021-09-23 DIAGNOSIS — F33.3 SEVERE EPISODE OF RECURRENT MAJOR DEPRESSIVE DISORDER, WITH PSYCHOTIC FEATURES (HCC): ICD-10-CM

## 2021-09-23 DIAGNOSIS — F33.1 MODERATE EPISODE OF RECURRENT MAJOR DEPRESSIVE DISORDER (HCC): Primary | ICD-10-CM

## 2021-09-23 RX ORDER — BREXPIPRAZOLE 0.5 MG/1
0.5 TABLET ORAL DAILY
Qty: 30 TABLET | Refills: 0 | COMMUNITY
Start: 2021-09-23 | End: 2021-09-28 | Stop reason: SDUPTHER

## 2021-09-23 NOTE — TELEPHONE ENCOUNTER
----- Message from NIDA Vasquez sent at 9/23/2021  1:41 PM EDT -----  I will call patient back regarding medications.   ----- Message -----  From: Anne Haynes MA  Sent: 9/23/2021   1:33 PM EDT  To: NIDA Vasquez    Spoke to lilo. She said the Remeron medication is not working. She said she about to her breaking point and needs something that is going to help her.  ----- Message -----  From: Lilo Henson RegSched Rep  Sent: 9/23/2021   1:26 PM EDT  To: Anne Haynes MA    Please call regarding issues with medications [phone]     thanks

## 2021-09-24 ENCOUNTER — TELEPHONE (OUTPATIENT)
Dept: FAMILY MEDICINE CLINIC | Facility: CLINIC | Age: 46
End: 2021-09-24

## 2021-09-24 RX ORDER — NITROFURANTOIN 25; 75 MG/1; MG/1
100 CAPSULE ORAL 2 TIMES DAILY
Qty: 14 CAPSULE | Refills: 0 | Status: SHIPPED | OUTPATIENT
Start: 2021-09-24 | End: 2021-12-03

## 2021-09-24 NOTE — TELEPHONE ENCOUNTER
Caller: Steff Bernsteincollin    Relationship: Self    Best call back number: [phone]    What are your current symptoms:   ITCHING AND BURNING URINATION KIDNEY INFECTION     How long have you been experiencing symptoms: TWO DAYS     Have you had these symptoms before:    [x] Yes  [] No    Have you been treated for these symptoms before:   [x] Yes  [] No    If a prescription is needed, what is your preferred pharmacy and phone number:      [facility] - [place], [place] - [address] - [phone] [facility] [phone]   [phone]    Additional notes:  PATIENT STATED THAT SHE IS UNABLE TO COME INTO THE OFFICE DUE TO BEING SICK WITH A STOMACH VIRUS. PATIENT WOULD LIKE A MEDICATION CALLED INTO THE PHARMACY FOR A KIDNEY INFECTION

## 2021-09-28 ENCOUNTER — OFFICE VISIT (OUTPATIENT)
Dept: PSYCHIATRY | Facility: CLINIC | Age: 46
End: 2021-09-28

## 2021-09-28 ENCOUNTER — LAB (OUTPATIENT)
Dept: FAMILY MEDICINE CLINIC | Facility: CLINIC | Age: 46
End: 2021-09-28

## 2021-09-28 DIAGNOSIS — F42.8 OTHER OBSESSIVE-COMPULSIVE DISORDERS: Chronic | ICD-10-CM

## 2021-09-28 DIAGNOSIS — F33.3 SEVERE EPISODE OF RECURRENT MAJOR DEPRESSIVE DISORDER, WITH PSYCHOTIC FEATURES (HCC): ICD-10-CM

## 2021-09-28 DIAGNOSIS — G89.29 CHRONIC PAIN OF LEFT KNEE: Chronic | ICD-10-CM

## 2021-09-28 DIAGNOSIS — E11.42 DM TYPE 2 WITH DIABETIC PERIPHERAL NEUROPATHY (HCC): Chronic | ICD-10-CM

## 2021-09-28 DIAGNOSIS — R23.2 HOT FLASHES DUE TO TAMOXIFEN: ICD-10-CM

## 2021-09-28 DIAGNOSIS — M75.01 ADHESIVE CAPSULITIS OF RIGHT SHOULDER: ICD-10-CM

## 2021-09-28 DIAGNOSIS — Z17.1 MALIGNANT NEOPLASM OF UPPER-OUTER QUADRANT OF RIGHT BREAST IN FEMALE, ESTROGEN RECEPTOR NEGATIVE (HCC): ICD-10-CM

## 2021-09-28 DIAGNOSIS — M25.562 CHRONIC PAIN OF LEFT KNEE: Chronic | ICD-10-CM

## 2021-09-28 DIAGNOSIS — E53.8 VITAMIN B 12 DEFICIENCY: ICD-10-CM

## 2021-09-28 DIAGNOSIS — E55.9 VITAMIN D DEFICIENCY: ICD-10-CM

## 2021-09-28 DIAGNOSIS — E78.5 DYSLIPIDEMIA: Chronic | ICD-10-CM

## 2021-09-28 DIAGNOSIS — T45.1X5A HOT FLASHES DUE TO TAMOXIFEN: ICD-10-CM

## 2021-09-28 DIAGNOSIS — F41.1 GENERALIZED ANXIETY DISORDER: ICD-10-CM

## 2021-09-28 DIAGNOSIS — F32.A DEPRESSION, UNSPECIFIED DEPRESSION TYPE: ICD-10-CM

## 2021-09-28 DIAGNOSIS — F33.1 MODERATE EPISODE OF RECURRENT MAJOR DEPRESSIVE DISORDER (HCC): ICD-10-CM

## 2021-09-28 DIAGNOSIS — C50.411 MALIGNANT NEOPLASM OF UPPER-OUTER QUADRANT OF RIGHT BREAST IN FEMALE, ESTROGEN RECEPTOR NEGATIVE (HCC): ICD-10-CM

## 2021-09-28 PROCEDURE — 99213 OFFICE O/P EST LOW 20 MIN: CPT | Performed by: NURSE PRACTITIONER

## 2021-09-28 RX ORDER — BREXPIPRAZOLE 0.5 MG/1
0.5 TABLET ORAL DAILY
Qty: 30 TABLET | Refills: 0 | COMMUNITY
Start: 2021-09-28 | End: 2021-10-05 | Stop reason: SDUPTHER

## 2021-09-28 RX ORDER — DIAZEPAM 10 MG/1
10 TABLET ORAL EVERY 12 HOURS PRN
Qty: 60 TABLET | Refills: 0 | Status: SHIPPED | OUTPATIENT
Start: 2021-09-28 | End: 2021-10-05

## 2021-09-28 RX ORDER — VENLAFAXINE HYDROCHLORIDE 150 MG/1
150 CAPSULE, EXTENDED RELEASE ORAL DAILY
Qty: 30 CAPSULE | Refills: 0 | Status: SHIPPED | OUTPATIENT
Start: 2021-09-28 | End: 2021-10-04

## 2021-09-29 LAB
25(OH)D3+25(OH)D2 SERPL-MCNC: 43.5 NG/ML (ref 30–100)
ALBUMIN SERPL-MCNC: 4.2 G/DL (ref 3.8–4.8)
ALBUMIN/GLOB SERPL: 1.4 {RATIO} (ref 1.2–2.2)
ALP SERPL-CCNC: 131 IU/L (ref 44–121)
ALT SERPL-CCNC: 21 IU/L (ref 0–32)
AST SERPL-CCNC: 15 IU/L (ref 0–40)
BILIRUB SERPL-MCNC: <0.2 MG/DL (ref 0–1.2)
BUN SERPL-MCNC: 8 MG/DL (ref 6–24)
BUN/CREAT SERPL: 10 (ref 9–23)
CALCIUM SERPL-MCNC: 8.9 MG/DL (ref 8.7–10.2)
CHLORIDE SERPL-SCNC: 104 MMOL/L (ref 96–106)
CHOLEST SERPL-MCNC: 174 MG/DL (ref 100–199)
CO2 SERPL-SCNC: 17 MMOL/L (ref 20–29)
CREAT SERPL-MCNC: 0.81 MG/DL (ref 0.57–1)
GLOBULIN SER CALC-MCNC: 3 G/DL (ref 1.5–4.5)
GLUCOSE SERPL-MCNC: 246 MG/DL (ref 65–99)
HBA1C MFR BLD: 7.7 % (ref 4.8–5.6)
HDLC SERPL-MCNC: 28 MG/DL
LDLC SERPL CALC-MCNC: 84 MG/DL (ref 0–99)
POTASSIUM SERPL-SCNC: 4.1 MMOL/L (ref 3.5–5.2)
PROT SERPL-MCNC: 7.2 G/DL (ref 6–8.5)
SODIUM SERPL-SCNC: 140 MMOL/L (ref 134–144)
TRIGL SERPL-MCNC: 382 MG/DL (ref 0–149)
TSH SERPL DL<=0.005 MIU/L-ACNC: 2.26 UIU/ML (ref 0.45–4.5)
URATE SERPL-MCNC: 4.2 MG/DL (ref 2.6–6.2)
VIT B12 SERPL-MCNC: 497 PG/ML (ref 232–1245)
VLDLC SERPL CALC-MCNC: 62 MG/DL (ref 5–40)

## 2021-09-30 ENCOUNTER — TELEMEDICINE (OUTPATIENT)
Dept: FAMILY MEDICINE CLINIC | Facility: CLINIC | Age: 46
End: 2021-09-30

## 2021-09-30 DIAGNOSIS — J45.20 MILD INTERMITTENT ASTHMA WITHOUT COMPLICATION: ICD-10-CM

## 2021-09-30 DIAGNOSIS — Z17.1 MALIGNANT NEOPLASM OF UPPER-OUTER QUADRANT OF RIGHT BREAST IN FEMALE, ESTROGEN RECEPTOR NEGATIVE (HCC): ICD-10-CM

## 2021-09-30 DIAGNOSIS — E55.9 VITAMIN D DEFICIENCY: ICD-10-CM

## 2021-09-30 DIAGNOSIS — N95.1 MENOPAUSAL SYMPTOMS: ICD-10-CM

## 2021-09-30 DIAGNOSIS — I65.23 ATHEROSCLEROSIS OF BOTH CAROTID ARTERIES: ICD-10-CM

## 2021-09-30 DIAGNOSIS — Z86.73 HISTORY OF TIAS: ICD-10-CM

## 2021-09-30 DIAGNOSIS — Z86.718 HISTORY OF DVT (DEEP VEIN THROMBOSIS): ICD-10-CM

## 2021-09-30 DIAGNOSIS — K21.9 GASTROESOPHAGEAL REFLUX DISEASE WITHOUT ESOPHAGITIS: Chronic | ICD-10-CM

## 2021-09-30 DIAGNOSIS — F42.8 OTHER OBSESSIVE-COMPULSIVE DISORDERS: Chronic | ICD-10-CM

## 2021-09-30 DIAGNOSIS — E66.01 CLASS 3 SEVERE OBESITY WITH SERIOUS COMORBIDITY AND BODY MASS INDEX (BMI) OF 40.0 TO 44.9 IN ADULT, UNSPECIFIED OBESITY TYPE (HCC): ICD-10-CM

## 2021-09-30 DIAGNOSIS — N28.9 RENAL INSUFFICIENCY: ICD-10-CM

## 2021-09-30 DIAGNOSIS — M85.89 OSTEOPENIA OF MULTIPLE SITES: ICD-10-CM

## 2021-09-30 DIAGNOSIS — J30.9 CHRONIC ALLERGIC RHINITIS: Primary | ICD-10-CM

## 2021-09-30 DIAGNOSIS — E78.2 MIXED HYPERLIPIDEMIA: ICD-10-CM

## 2021-09-30 DIAGNOSIS — K59.03 DRUG-INDUCED CONSTIPATION: ICD-10-CM

## 2021-09-30 DIAGNOSIS — C50.411 MALIGNANT NEOPLASM OF UPPER-OUTER QUADRANT OF RIGHT BREAST IN FEMALE, ESTROGEN RECEPTOR NEGATIVE (HCC): ICD-10-CM

## 2021-09-30 DIAGNOSIS — Z00.00 HEALTHCARE MAINTENANCE: ICD-10-CM

## 2021-09-30 DIAGNOSIS — E11.42 DM TYPE 2 WITH DIABETIC PERIPHERAL NEUROPATHY (HCC): ICD-10-CM

## 2021-09-30 PROCEDURE — 99214 OFFICE O/P EST MOD 30 MIN: CPT | Performed by: GENERAL PRACTICE

## 2021-09-30 NOTE — PROGRESS NOTES
Subjective   Nivia Bernstein is a 46 y.o. female.     You have chosen to receive care through a telehealth visit.  Do you consent to use a video/audio connection for your medical care today? Yes    Unable to complete visit using a video connection to the patient. A phone visit was used to complete this visits. Total time of discussion was 15 minutes.    Chief Complaint  She returns for a scheduled reassessment of multiple medical problems including breast cancer, chronic anxiety, chronic headaches, type II DM, hyperlipidemia, and essential hypertension    History of Present Illness     Breast Cancer  Underwent an ultrasound guided biopsy of a right upper outer quadrant breast mass on 7/21/2019.  Pathology was consistent with an invasive ductal carcinoma and hormonal markers eventually returned ER negative AZ weakly positive and HER-2 negative.  Bilateral mastectomy with sentinel node biopsy was performed on 8/18/2020.  Pathology was consistent with a moderately differentiated invasive ductal carcinoma with associated intermediate grade DCIS but negative margins and lymph nodes.  MammaPrint was high risk.  She was started on Taxotere/Cytoxan but developed a significant reaction with the first infusion and was changed to DD AC.  She is now on tamoxifen.  She has experienced persistent hot flashes that interfere with her daytime activities and sleep.  These have improved some with clonidine and to date she has not experienced any side effects.  She is scheduled to undergo an oncology reassessment with Dr. Byrnes on 12/15/2021 and will see Dr. Garza again on 1/6/2022    Chronic Anxiety  She has a history of OCD and admits to persistent nervousness, worrying, difficulty sleeping, and daytime fatigue.  She has been under considerable stress and admits to intermittent depression.  She continues to deny any loss of interest in activities and there is no history of any suicidal ideation. She remains on venlafaxine and  diazepam.  She is scheduled to undergo a psychiatric reassessment with Gale ALVA on 10/5/2021    Headaches  She continues to have intermittent headaches.  She feels that these started with chemotherapy.  They have been 2-3 times weekly and are described as a right occipital throb that soon extends to the right hemicranium.  To date, she has not experienced any warning symptoms however when severe her headaches have been associated with nausea and occasionally photophobia.  She denies any other associated symptoms and there is no history of any other visual disturbances, any changes in her strength or sensation, or any difficulty with her coordination or speech.  She denies any new joint or muscle pain and has had no rash, fever, or chills.  She has been unable to identify any precipitating or exacerbating factors.  MRI of the brain performed on 1/28/2021 was unremarkable.  She is currently followed by neurology and was recently started on amitriptyline but had to discontinue it due to intolerable side effects    History of TIA  She gives a history of previous TIA.  MRI of the brain performed on 1/28/2021 was unremarkable.  Duplex Doppler ultrasound of the carotid arteries performed 10/16/2017 revealed homogenous plaque bilaterally but no evidence of stenosis.  Echocardiogram performed the same day was reported as showing trace mitral, aortic, and pulmonic valve regurgitation but no significant abnormalities with an estimated EF of 56 to 60% and a negative saline test for intracardiac shunting. Echocardiogram performed on 9/29/2020 was a limited study but reported as showing grade 1 diastolic dysfunction but normal systolic function with an estimated EF of 61 to 65%. She remains on ASA.  She gives a history of a previous right DVT following arthroscopic knee surgery on that side.  There is no family history of DVT or PE   Lab Results   Component Value Date    WBC 9.91 09/15/2021    HGB 13.7 09/15/2021    HCT  43.1 09/15/2021    MCV 84.0 09/15/2021     09/15/2021     Diabetes  Current symptoms include paresthesia of the feet.  She continues to deny any visual disturbances, polydipsia, polyuria or foot ulcerations and has had no recent hypoglycemia. Evaluation to date has been: fasting blood sugar and hemoglobin A1C. Home sugars: BGs consistently in an acceptable range. Current treatments: metformin, linagliptin, empagliflozin (together as trijardy XR), and an insulin pump.  She is scheduled to undergo a reassessment with ZELALEM Starr on 11/17/2021  Lab Results   Component Value Date    HGBA1C 7.7 (H) 09/28/2021     Dyslipidemia  Compliance with treatment has been fairly good. The patient exercises intermittently. She remains on rosuvastatin with no apparent side effects  Lab Results   Component Value Date    CHOL 189 11/17/2020    CHLPL 174 09/28/2021    TRIG 382 (H) 09/28/2021    HDL 28 (L) 09/28/2021    LDL 84 09/28/2021     Labs  Most recent vitamin D 43.5 with a B12 of 497  Lab Results   Component Value Date    URICACID 4.2 09/28/2021     The following portions of the patient's history were reviewed and updated as appropriate: allergies, current medications, past medical history, past social history and problem list.    Review of Systems   Constitutional: Positive for fatigue. Negative for activity change, appetite change, chills, fever and unexpected weight change.   HENT: Positive for congestion and rhinorrhea. Negative for ear pain, sneezing and sore throat.    Eyes: Negative for visual disturbance.   Respiratory: Negative for cough, chest tightness, shortness of breath and wheezing.    Cardiovascular: Positive for leg swelling. Negative for chest pain and palpitations.   Gastrointestinal: Positive for constipation. Negative for abdominal pain, blood in stool, diarrhea, nausea and vomiting.   Endocrine: Negative for polydipsia and polyuria.   Genitourinary: Negative for difficulty urinating,  dysuria, frequency, hematuria and urgency.   Musculoskeletal: Positive for arthralgias and back pain. Negative for joint swelling, myalgias and neck pain.   Skin: Negative for rash.   Neurological: Positive for numbness and headaches. Negative for dizziness, speech difficulty, weakness and light-headedness.   Psychiatric/Behavioral: Positive for dysphoric mood and sleep disturbance. Negative for suicidal ideas. The patient is nervous/anxious.      Objective   Physical Exam  Constitutional:       Comments: Bright and in fair spirits. No apparent distress. No pallor, jaundice, diaphoresis, or cyanosis.   Pulmonary:      Comments: Normal respiratory effort.  No audible cough or wheezing  Skin:     Findings: No rash.   Psychiatric:         Attention and Perception: Attention normal.         Mood and Affect: Mood normal.         Speech: Speech normal.         Behavior: Behavior normal.         Thought Content: Thought content normal.       Assessment/Plan   Problems Addressed this Visit        Allergies and Adverse Reactions    Chronic allergic rhinitis       Cardiac and Vasculature    Atherosclerosis of both carotid arteries  Reminded regarding the importance of risk factor modification.    Mixed hyperlipidemia  Encouraged to continue to work on her diet and exercise plan.  Continue current medication       Coag and Thromboembolic    History of DVT (deep vein thrombosis)       Endocrine and Metabolic    Class 3 severe obesity with serious comorbidity and body mass index (BMI) of 40.0 to 44.9 in adult (CMS/[facility])    DM type 2 with diabetic peripheral neuropathy (CMS/[facility])  Diabetes mellitus Type II, under good control.   Encouraged to continue to pursue ADA diet  Encouraged aerobic exercise.  Continue current medication  Follow up with MELL Starr    Vitamin D deficiency       Gastrointestinal Abdominal     Drug-induced constipation    GERD (gastroesophageal reflux disease) (Chronic)       Genitourinary and  Reproductive     Menopausal symptoms    Renal insufficiency  Reminded to avoid any NSAIDs prescription or OTC  Will continue to monitor       Health Encounters    Healthcare maintenance  Patient has already received a flu shot fall.  Recommended a third dose of the Moderna vaccine as she received the first 2 while undergoing chemotherapy       Hematology and Neoplasia    Malignant neoplasm of upper-outer quadrant of right breast in female, estrogen receptor negative (CMS/HCC)  Supportive therapy  Follow-up with oncology and general surgery       Mental Health    OCD (obsessive compulsive disorder) (Chronic)  Stable.  Supportive therapy.   Continue current medication.  Follow up with psychiatry       Musculoskeletal and Injuries    Osteopenia of multiple sites  Encouraged to continue to pursue weight bearing activities while exercising joint protection.  We will plan on updating a DEXA scan next year       Neuro    History of TIAs  As above.       Pulmonary and Pneumonias    Mild intermittent asthma without complication  Intermittent asthma. The patient is not currently having an exacerbation. In general, the patient's disease is well controlled.  Encouraged to report if this should change.      Diagnoses       Codes Comments    Chronic allergic rhinitis    -  Primary ICD-10-CM: J30.9  ICD-9-CM: 477.9     Mixed hyperlipidemia     ICD-10-CM: E78.2  ICD-9-CM: 272.2     Atherosclerosis of both carotid arteries     ICD-10-CM: I65.23  ICD-9-CM: 433.10, 433.30     History of DVT (deep vein thrombosis)     ICD-10-CM: Z86.718  ICD-9-CM: V12.51     DM type 2 with diabetic peripheral neuropathy (CMS/HCC)     ICD-10-CM: E11.42  ICD-9-CM: 250.60, 357.2     Class 3 severe obesity with serious comorbidity and body mass index (BMI) of 40.0 to 44.9 in adult, unspecified obesity type (HCC)     ICD-10-CM: E66.01, Z68.41  ICD-9-CM: 278.01, V85.41     Vitamin D deficiency     ICD-10-CM: E55.9  ICD-9-CM: 268.9     Gastroesophageal reflux  disease without esophagitis     ICD-10-CM: K21.9  ICD-9-CM: 530.81     Drug-induced constipation     ICD-10-CM: K59.03  ICD-9-CM: 564.09, E980.5     Renal insufficiency     ICD-10-CM: N28.9  ICD-9-CM: 593.9     Menopausal symptoms     ICD-10-CM: N95.1  ICD-9-CM: 627.2     Healthcare maintenance     ICD-10-CM: Z00.00  ICD-9-CM: V70.0     Malignant neoplasm of upper-outer quadrant of right breast in female, estrogen receptor negative (CMS/[facility])     ICD-10-CM: C50.411, Z17.1  ICD-9-CM: 174.4, V86.1     Other obsessive-compulsive disorders     ICD-10-CM: F42.8  ICD-9-CM: 300.3     Osteopenia of multiple sites     ICD-10-CM: M85.89  ICD-9-CM: 733.90     History of TIAs     ICD-10-CM: Z86.73  ICD-9-CM: V12.54     Mild intermittent asthma without complication     ICD-10-CM: J45.20  ICD-9-CM: 493.90

## 2021-10-01 ENCOUNTER — TELEPHONE (OUTPATIENT)
Dept: FAMILY MEDICINE CLINIC | Facility: CLINIC | Age: 46
End: 2021-10-01

## 2021-10-01 RX ORDER — SULFAMETHOXAZOLE AND TRIMETHOPRIM 800; 160 MG/1; MG/1
1 TABLET ORAL 2 TIMES DAILY
Qty: 10 TABLET | Refills: 0 | Status: SHIPPED | OUTPATIENT
Start: 2021-10-01 | End: 2021-10-06

## 2021-10-01 NOTE — TELEPHONE ENCOUNTER
PATIENT CALLED TO CHECK THE STATUS OF THE ORIGINAL MEDICATION REQUEST    PLEASE CONTACT PATIENT AT:    [phone]

## 2021-10-01 NOTE — TELEPHONE ENCOUNTER
Caller: Nivia Bernstein    Relationship: Self    Best call back number: [phone]    What medication are you requesting: BACTRIM    What are your current symptoms: KIDNEY INFECTION    How long have you been experiencing symptoms:     Have you had these symptoms before:    [x] Yes  [] No    Have you been treated for these symptoms before:   [x] Yes  [] No    If a prescription is needed, what is your preferred pharmacy and phone number: [facility] - [place], [place] - [address] [place] [address] [phone]  - [phone]     Additional notes: PATIENT STATES THAT THE OTHER MEDICATION(PATIENT DOESN'T KNOW THE NAME) IS NOT WORKING AND PATIENT IS REQUESTING BACTRIM OR AN ALTERNATIVE MEDICATION

## 2021-10-04 DIAGNOSIS — F33.1 MODERATE EPISODE OF RECURRENT MAJOR DEPRESSIVE DISORDER (HCC): ICD-10-CM

## 2021-10-04 DIAGNOSIS — E55.9 VITAMIN D DEFICIENCY: Primary | ICD-10-CM

## 2021-10-04 DIAGNOSIS — F42.8 OTHER OBSESSIVE-COMPULSIVE DISORDERS: Chronic | ICD-10-CM

## 2021-10-04 DIAGNOSIS — E11.42 DM TYPE 2 WITH DIABETIC PERIPHERAL NEUROPATHY (HCC): ICD-10-CM

## 2021-10-04 DIAGNOSIS — F41.1 GENERALIZED ANXIETY DISORDER: ICD-10-CM

## 2021-10-04 DIAGNOSIS — E78.2 MIXED HYPERLIPIDEMIA: ICD-10-CM

## 2021-10-04 RX ORDER — VENLAFAXINE HYDROCHLORIDE 150 MG/1
150 CAPSULE, EXTENDED RELEASE ORAL DAILY
Qty: 30 CAPSULE | Refills: 0 | Status: SHIPPED | OUTPATIENT
Start: 2021-10-04 | End: 2021-10-05 | Stop reason: SDUPTHER

## 2021-10-04 RX ORDER — ROSUVASTATIN CALCIUM 20 MG/1
20 TABLET, COATED ORAL NIGHTLY
Qty: 30 TABLET | Refills: 3 | Status: SHIPPED | OUTPATIENT
Start: 2021-10-04 | End: 2021-12-24 | Stop reason: SDUPTHER

## 2021-10-04 RX ORDER — MIRTAZAPINE 15 MG/1
15 TABLET, FILM COATED ORAL NIGHTLY
Qty: 30 TABLET | Refills: 0 | Status: SHIPPED | OUTPATIENT
Start: 2021-10-04 | End: 2021-10-05

## 2021-10-04 RX ORDER — VENLAFAXINE HYDROCHLORIDE 75 MG/1
225 CAPSULE, EXTENDED RELEASE ORAL DAILY
Qty: 90 CAPSULE | Refills: 0 | OUTPATIENT
Start: 2021-10-04

## 2021-10-04 RX ORDER — EMPAGLIFLOZIN, LINAGLIPTIN, METFORMIN HYDROCHLORIDE 25; 5; 1000 MG/1; MG/1; MG/1
1 TABLET, EXTENDED RELEASE ORAL EVERY MORNING
Qty: 30 TABLET | Refills: 5 | Status: SHIPPED | OUTPATIENT
Start: 2021-10-04 | End: 2022-04-13 | Stop reason: SDUPTHER

## 2021-10-04 RX ORDER — ERGOCALCIFEROL 1.25 MG/1
50000 CAPSULE ORAL WEEKLY
Qty: 4 CAPSULE | Refills: 3 | Status: SHIPPED | OUTPATIENT
Start: 2021-10-04 | End: 2022-02-18

## 2021-10-04 RX ORDER — ERGOCALCIFEROL 1.25 MG/1
CAPSULE ORAL
Qty: 4 CAPSULE | Refills: 0 | Status: SHIPPED | OUTPATIENT
Start: 2021-10-04 | End: 2021-10-04 | Stop reason: SDUPTHER

## 2021-10-05 ENCOUNTER — TELEMEDICINE (OUTPATIENT)
Dept: PSYCHIATRY | Facility: CLINIC | Age: 46
End: 2021-10-05

## 2021-10-05 VITALS
DIASTOLIC BLOOD PRESSURE: 76 MMHG | HEART RATE: 78 BPM | BODY MASS INDEX: 41.2 KG/M2 | SYSTOLIC BLOOD PRESSURE: 120 MMHG | WEIGHT: 240 LBS

## 2021-10-05 DIAGNOSIS — F42.8 OTHER OBSESSIVE-COMPULSIVE DISORDERS: Chronic | ICD-10-CM

## 2021-10-05 DIAGNOSIS — F33.3 SEVERE EPISODE OF RECURRENT MAJOR DEPRESSIVE DISORDER, WITH PSYCHOTIC FEATURES (HCC): ICD-10-CM

## 2021-10-05 DIAGNOSIS — F41.1 GENERALIZED ANXIETY DISORDER: ICD-10-CM

## 2021-10-05 DIAGNOSIS — M25.511 RIGHT SHOULDER PAIN, UNSPECIFIED CHRONICITY: Primary | ICD-10-CM

## 2021-10-05 PROCEDURE — 99214 OFFICE O/P EST MOD 30 MIN: CPT | Performed by: NURSE PRACTITIONER

## 2021-10-05 RX ORDER — BREXPIPRAZOLE 2 MG/1
2 TABLET ORAL DAILY
Qty: 30 TABLET | Refills: 0
Start: 2021-10-05 | End: 2021-10-21 | Stop reason: SDUPTHER

## 2021-10-05 RX ORDER — VENLAFAXINE HYDROCHLORIDE 150 MG/1
150 CAPSULE, EXTENDED RELEASE ORAL DAILY
Qty: 30 CAPSULE | Refills: 0 | Status: SHIPPED | OUTPATIENT
Start: 2021-10-05 | End: 2021-10-21 | Stop reason: SDUPTHER

## 2021-10-05 RX ORDER — LORAZEPAM 1 MG/1
1 TABLET ORAL 2 TIMES DAILY
Qty: 30 TABLET | Refills: 0 | Status: SHIPPED | OUTPATIENT
Start: 2021-10-05 | End: 2021-10-21 | Stop reason: SDUPTHER

## 2021-10-05 NOTE — PROGRESS NOTES
Subjective   Nivia Bernstein is a 46 y.o. female is here today for medication management follow-up.    Chief Complaint:  Depression anxiety     History of Present Illness:   Patient presents today for follow up for medication management for depression and anxiety. Patient states she hasn't really noticed any difference with the Rexulti. Patient states they switched the Elavil to Topamax. Patient states a lot of stress with son as well as caring for mom. Patient states its just her and her  are the only ones taking care of her mom. Patient reports currently depression is at a 10 on a 0-10 scale with 10 being the worst. Patient reports symptoms of depression of depressed mood, decreased appetite, insomnia, irritability, and isolating from everyone. Patient reports anxiety is at a 10 on a 0-10 scale with 10 being the worst. Patient reports having quite a few panic attacks. Patient reports panic attacks last more than 30 minutes and during an attacks patient has chest pain, irritability, heart racing, and short of breath. Patient states irritable all day from when wake up until going to bed.  Patient reports sleeping 2-3 hours a night and patient reports still having nightmares. Patient states woke up the other night fighting someone. Patient reports appetite is decreased and eating 1-2 meals a day. Patient denies any auditory or visual hallucinations. Patient adamantly denies any SI or HI. Patient denies any side effects to medications. Patient denies any medical changes since last visit.   The following portions of the patient's history were reviewed and updated as appropriate: allergies, current medications, past family history, past medical history, past social history, past surgical history and problem list.    Review of Systems   Constitutional: Positive for appetite change.   Respiratory: Negative.    Cardiovascular: Negative.    Gastrointestinal: Negative.    Neurological: Negative.     Psychiatric/Behavioral: Positive for agitation, dysphoric mood and sleep disturbance. The patient is nervous/anxious.        Objective   Physical Exam  Vitals reviewed.   Constitutional:       Appearance: Normal appearance. She is well-developed and well-groomed.   Neurological:      Mental Status: She is alert.   Psychiatric:         Attention and Perception: Attention and perception normal.         Mood and Affect: Mood is depressed. Affect is angry.         Speech: Speech normal.         Behavior: Behavior is agitated. Behavior is cooperative.         Thought Content: Thought content normal.         Cognition and Memory: Cognition and memory normal.       Blood pressure 120/76, pulse 78, weight 109 kg (240 lb), not currently breastfeeding. Body mass index is 41.2 kg/m².    Allergies   Allergen Reactions   • Mobic [Meloxicam] Rash   • Penicillins Angioedema   • Taxotere [Docetaxel] Anaphylaxis     9 minutes into 1st infusion   • Novolog [Insulin Aspart] Hives       Medication List:   Current Outpatient Medications   Medication Sig Dispense Refill   • albuterol sulfate HFA (Ventolin HFA) 108 (90 Base) MCG/ACT inhaler Inhale 2 puffs Every 4 (Four) Hours As Needed (shortness of breath). 18 g 5   • Breo Ellipta 200-25 MCG/INH inhaler Inhale 1 puff Daily. 1 each 5   • Brexpiprazole (Rexulti) 2 MG tablet Take 1 tablet by mouth Daily. 30 tablet 0   • calcium carbonate (OS-JERROD) 600 MG tablet Take 1 tablet by mouth 2 (Two) Times a Day With Meals. 60 tablet 5   • cholecalciferol (Vitamin D) 25 MCG (1000 UT) tablet Take 2 tablets by mouth Daily. 60 tablet 5   • cloNIDine (CATAPRES) 0.1 MG tablet Take 1 tablet by mouth 2 (Two) Times a Day. 60 tablet 5   • diclofenac (VOLTAREN) 1 % gel gel Apply 4 g topically to the appropriate area as directed 4 (Four) Times a Day As Needed (joint pain). 500 g 5   • Diclofenac Sodium (VOLTAREN) 1 % gel gel      • docusate sodium 100 MG capsule Take 100 mg by mouth 2 (Two) Times a Day.     •  fluticasone (FLONASE) 50 MCG/ACT nasal spray USE 2 SPRAYS IN EACH NOSTRIL DAILY AS NEEDED 16 g 5   • gabapentin (Neurontin) 100 MG capsule Take 1 capsule by mouth 2 (two) times a day. 60 capsule 3   • HumaLOG 100 UNIT/ML injection USE PER INSULIN PUMP. UNITS 40 mL 0   • hydrocortisone (ANUSOL-HC) 25 MG suppository Insert 1 suppository into the rectum 2 (Two) Times a Day As Needed for Hemorrhoids (rectal discomfort). 30 suppository 5   • lansoprazole (PREVACID) 30 MG capsule Take 1 capsule by mouth Daily. 90 capsule 3   • linaclotide (Linzess) 145 MCG capsule capsule Take 1 capsule by mouth Daily. 30 minutes before meals on an empty stomach. 30 capsule 5   • LORazepam (Ativan) 1 MG tablet Take 1 tablet by mouth 2 (Two) Times a Day. 30 tablet 0   • magic mouthwash oral suspension Swish and swallow 5 mL four times  a day As Needed for Mucositis. 240 mL 1   • nitrofurantoin, macrocrystal-monohydrate, (MACROBID) 100 MG capsule Take 1 capsule by mouth 2 (Two) Times a Day. 14 capsule 0   • ondansetron (ZOFRAN) 8 MG tablet TAKE 1 TABLET BY MOUTH 3 TIMES A DAY AS NEEDED FOR NAUSEA OR VOMITING. 30 tablet 5   • prochlorperazine (COMPAZINE) 10 MG tablet Take 1 tablet by mouth Every 6 (Six) Hours As Needed for Nausea or Vomiting. 30 tablet 5   • rosuvastatin (CRESTOR) 20 MG tablet Take 1 tablet by mouth Every Night. 30 tablet 3   • sennosides-docusate (senna-docusate sodium) 8.6-50 MG per tablet Take 2 tablets by mouth 2 (Two) Times a Day. 120 tablet 4   • SUMAtriptan (IMITREX) 50 MG tablet      • tamoxifen (NOLVADEX) 20 MG chemo tablet Take 1 tablet by mouth Daily. 30 tablet 5   • Trijardy XR 25-5-1000 MG tablet sustained-release 24 hour TAKE 1 TABLET BY MOUTH EVERY MORNING. 30 tablet 5   • venlafaxine XR (EFFEXOR-XR) 150 MG 24 hr capsule Take 1 capsule by mouth Daily. 30 capsule 0   • vitamin D (ERGOCALCIFEROL) 1.25 MG (76985 UT) capsule capsule Take 1 capsule by mouth 1 (One) Time Per Week. 4 capsule 3     No current  facility-administered medications for this visit.       Mental Status Exam:   Hygiene:   good  Cooperation:  Cooperative  Eye Contact:  Good  Psychomotor Behavior:  Aggitated  Affect:  Angry  Hopelessness: Denies  Speech:  Normal  Thought Process:  Goal directed and Linear  Thought Content:  Normal  Suicidal:  None  Homicidal:  None  Hallucinations:  None  Delusion:  None  Memory:  Intact  Orientation:  Person, Place, Time and Situation  Reliability:  fair  Insight:  Poor  Judgement:  Fair  Impulse Control:  Fair  Physical/Medical Issues:  No               Assessment/Plan   Diagnoses and all orders for this visit:    1. Severe episode of recurrent major depressive disorder, with psychotic features (HCC)  -     venlafaxine XR (EFFEXOR-XR) 150 MG 24 hr capsule; Take 1 capsule by mouth Daily.  Dispense: 30 capsule; Refill: 0  -     Brexpiprazole (Rexulti) 2 MG tablet; Take 1 tablet by mouth Daily.  Dispense: 30 tablet; Refill: 0    2. Generalized anxiety disorder  -     venlafaxine XR (EFFEXOR-XR) 150 MG 24 hr capsule; Take 1 capsule by mouth Daily.  Dispense: 30 capsule; Refill: 0  -     Brexpiprazole (Rexulti) 2 MG tablet; Take 1 tablet by mouth Daily.  Dispense: 30 tablet; Refill: 0  -     LORazepam (Ativan) 1 MG tablet; Take 1 tablet by mouth 2 (Two) Times a Day.  Dispense: 30 tablet; Refill: 0    3. Other obsessive-compulsive disorders  -     venlafaxine XR (EFFEXOR-XR) 150 MG 24 hr capsule; Take 1 capsule by mouth Daily.  Dispense: 30 capsule; Refill: 0            Discussed medication options with patient.  Discontinue Valium.  Start lorazepam 1 mg twice a day as needed for worsening anxiety.  Increase Rexulti to 2 mg daily for worsening depression and anxiety.  Continue Effexor  mg daily for depression, anxiety, and OCD. Reviewed the risks, benefits, and side effects of the medications; patient acknowledged and verbally consented. Patient is being prescribed a controlled substance as part of treatment  plan. Patient has been educated of appropriate use of the medications, including risk of somnolence, limited ability to drive and/or work safely, and potential for dependence, respiratory depression and overdose. Patient is also informed that the medication are to be used by the patient only- avoid any combined use of ETOH or other substances unless prescribed.   [facility] Patient Controlled Substance Report (from 10/5/2020 to 10/4/2021)    Dispensed  Strength Quantity Days Supply Provider Pharmacy   09/16/2021 Gabapentin 100MG 60 each 30 [facility]AD[facility]OBERTO[facility]   09/09/2021 Diazepam 10MG 60 each 30 CLOUD, ALFREDO [facility]   08/12/2021 Diazepam 10MG 60 each 30 CLOUD, ALFREDO [facility]   07/12/2021 Diazepam 10MG 60 each 30 CLOUD, ALFREDO [facility]   07/08/2021 Gabapentin 300MG 90 each 30 MICHNAHARPREET [facility]   06/15/2021 Gabapentin 100MG 60 each 30 [facility]   06/10/2021 Diazepam 10MG 45 each 23 CLOUD, ALFREDO [facility]   05/11/2021 Diazepam 10MG 45 each 23 CLOUD, ALFREDO [facility]   04/09/2021 Diazepam 5MG 60 each 30 CLOUD, ALFREDO [facility]   03/12/2021 Diazepam 5MG 30 each 30 CLOUD, ALFREDO [facility]   02/22/2021 Diazepam 5MG 20 each 20 CLOUD, ALFREDO [facility]   02/12/2021 Diazepam 5MG 5 each 5 CLOUD, ALFREDO [facility]   01/14/2021 Diazepam 5MG 30 each 30 QUINTON, STEPHANIE [facility]   12/15/2020 Diazepam 5MG 30 each 30 QUINTON, STEPHANIE [facility]   11/17/2020 Diazepam 5MG 30 each 30 QUINTON, STEPHANIE [facility]   10/19/2020 Diazepam 5MG 30 each 30 QUINTON, STEPHANIE [facility]           Patient is agreeable to call the office with any questions, concerns, or worsening of symptoms.  Patient is aware to call 911 or go to the nearest ER should begin having  SI/HI.          Follow up in two weeks        Errors in dictation may reflect use of voice recognition software and not all errors in transcription may have been detected prior to signing.              This document has been electronically signed by NIDA Vasquez   October 19, 2021 13:43 EDT

## 2021-10-21 ENCOUNTER — OFFICE VISIT (OUTPATIENT)
Dept: PSYCHIATRY | Facility: CLINIC | Age: 46
End: 2021-10-21

## 2021-10-21 VITALS
DIASTOLIC BLOOD PRESSURE: 84 MMHG | WEIGHT: 241 LBS | SYSTOLIC BLOOD PRESSURE: 130 MMHG | BODY MASS INDEX: 41.37 KG/M2 | HEART RATE: 104 BPM

## 2021-10-21 DIAGNOSIS — F32.A ANXIETY AND DEPRESSION: ICD-10-CM

## 2021-10-21 DIAGNOSIS — Z79.899 HIGH RISK MEDICATION USE: Primary | ICD-10-CM

## 2021-10-21 DIAGNOSIS — F41.1 GENERALIZED ANXIETY DISORDER: ICD-10-CM

## 2021-10-21 DIAGNOSIS — F33.3 SEVERE EPISODE OF RECURRENT MAJOR DEPRESSIVE DISORDER, WITH PSYCHOTIC FEATURES (HCC): ICD-10-CM

## 2021-10-21 DIAGNOSIS — F42.8 OTHER OBSESSIVE-COMPULSIVE DISORDERS: Chronic | ICD-10-CM

## 2021-10-21 DIAGNOSIS — F41.9 ANXIETY AND DEPRESSION: ICD-10-CM

## 2021-10-21 PROCEDURE — 99213 OFFICE O/P EST LOW 20 MIN: CPT | Performed by: NURSE PRACTITIONER

## 2021-10-21 RX ORDER — LORAZEPAM 1 MG/1
1 TABLET ORAL 3 TIMES DAILY PRN
Qty: 30 TABLET | Refills: 0 | Status: SHIPPED | OUTPATIENT
Start: 2021-10-21 | End: 2021-11-01 | Stop reason: SDUPTHER

## 2021-10-21 RX ORDER — VENLAFAXINE HYDROCHLORIDE 150 MG/1
150 CAPSULE, EXTENDED RELEASE ORAL DAILY
Qty: 30 CAPSULE | Refills: 0 | Status: SHIPPED | OUTPATIENT
Start: 2021-10-21 | End: 2021-11-18 | Stop reason: SDUPTHER

## 2021-10-21 RX ORDER — BREXPIPRAZOLE 3 MG/1
3 TABLET ORAL DAILY
Qty: 30 TABLET | Refills: 0 | Status: SHIPPED | OUTPATIENT
Start: 2021-10-21 | End: 2021-11-18 | Stop reason: SDUPTHER

## 2021-10-22 DIAGNOSIS — F33.3 SEVERE EPISODE OF RECURRENT MAJOR DEPRESSIVE DISORDER, WITH PSYCHOTIC FEATURES (HCC): Primary | ICD-10-CM

## 2021-10-22 DIAGNOSIS — Z79.899 HIGH RISK MEDICATION USE: ICD-10-CM

## 2021-10-22 DIAGNOSIS — F41.9 ANXIETY AND DEPRESSION: ICD-10-CM

## 2021-10-22 DIAGNOSIS — F32.A ANXIETY AND DEPRESSION: ICD-10-CM

## 2021-10-25 ENCOUNTER — OFFICE VISIT (OUTPATIENT)
Dept: ORTHOPEDIC SURGERY | Facility: CLINIC | Age: 46
End: 2021-10-25

## 2021-10-25 ENCOUNTER — HOSPITAL ENCOUNTER (OUTPATIENT)
Dept: GENERAL RADIOLOGY | Facility: HOSPITAL | Age: 46
Discharge: HOME | End: 2021-10-25
Admitting: ORTHOPAEDIC SURGERY

## 2021-10-25 VITALS
HEART RATE: 91 BPM | WEIGHT: 240.3 LBS | DIASTOLIC BLOOD PRESSURE: 71 MMHG | HEIGHT: 64 IN | BODY MASS INDEX: 41.03 KG/M2 | SYSTOLIC BLOOD PRESSURE: 119 MMHG

## 2021-10-25 DIAGNOSIS — M75.01 ADHESIVE CAPSULITIS OF RIGHT SHOULDER: Primary | ICD-10-CM

## 2021-10-25 DIAGNOSIS — M25.511 RIGHT SHOULDER PAIN, UNSPECIFIED CHRONICITY: ICD-10-CM

## 2021-10-25 PROCEDURE — 99203 OFFICE O/P NEW LOW 30 MIN: CPT | Performed by: ORTHOPAEDIC SURGERY

## 2021-10-25 PROCEDURE — 20610 DRAIN/INJ JOINT/BURSA W/O US: CPT | Performed by: ORTHOPAEDIC SURGERY

## 2021-10-25 PROCEDURE — 73030 X-RAY EXAM OF SHOULDER: CPT

## 2021-10-25 PROCEDURE — 73030 X-RAY EXAM OF SHOULDER: CPT | Performed by: RADIOLOGY

## 2021-10-25 RX ADMIN — LIDOCAINE HYDROCHLORIDE 2 ML: 20 INJECTION, SOLUTION INFILTRATION; PERINEURAL at 14:02

## 2021-10-25 RX ADMIN — METHYLPREDNISOLONE ACETATE 80 MG: 80 INJECTION, SUSPENSION INTRA-ARTICULAR; INTRALESIONAL; INTRAMUSCULAR; SOFT TISSUE at 14:02

## 2021-10-25 NOTE — PROGRESS NOTES
New Patient Visit      Patient: Nivia Bernstein  YOB: 1975  Date of Encounter: 10/25/2021        Chief Complaint:   Chief Complaint   Patient presents with   • Right Shoulder - Initial Evaluation           HPI:   Nivia Bernstein, 46 y.o. female, referred by Markus Menjivar MD presents evaluation of right shoulder pain which began approximately 1 month following bilateral mastectomy August 18 of 2021.  She complains of pain in her axillary region anterior aspect of her right shoulder and describes limited motion and inability to lie on her right shoulder.  She has attended physical therapy but reports no improvement.  Denies weakness or numbness to her right shoulder and denies problems with her right shoulder prior to current symptoms.  Her past medical history is remarkable for insulin-dependent diabetes also has history of DVT and TIAs.    Active Problem List:  Patient Active Problem List   Diagnosis   • DM type 2 with diabetic peripheral neuropathy (HCC)   • Mild intermittent asthma without complication   • GERD (gastroesophageal reflux disease)   • History of DVT (deep vein thrombosis)   • History of TIAs   • Mixed hyperlipidemia   • Anxiety and depression   • Class 3 severe obesity with serious comorbidity and body mass index (BMI) of 40.0 to 44.9 in adult ([facility])   • Elevated alkaline phosphatase level   • Chronic pain of left knee   • OCD (obsessive compulsive disorder)   • Renal insufficiency   • Accidental hypodermic needlestick injury with exposure to body fluid   • Atherosclerosis of both carotid arteries   • Chronic allergic rhinitis   • Menopausal symptoms   • Vitamin D deficiency   • Malignant neoplasm of upper-outer quadrant of right breast in female, estrogen receptor negative ([facility])   • Healthcare maintenance   • Osteopenia of multiple sites   • Port-A-Cath in place   • Drug-induced constipation   • Insulin pump in place   • Hot flashes   • Occipital neuralgia of right side   •  Adhesive capsulitis of right shoulder         Past Medical History:  Past Medical History:   Diagnosis Date   • Altered mental status 10/16/2017   • Anxiety and depression 3/31/2017   • Arthritis    • Asthma    • Elevated alkaline phosphatase level 10/16/2017   • Enlarged liver    • GERD (gastroesophageal reflux disease)    • Heart murmur    • Hypokalemia 10/16/2017   • Mitral valve prolapse    • Neuropathy     related to diabetes   • Obesity 3/31/2017   • OCD (obsessive compulsive disorder) 10/16/2017   • Osteoporosis    • PONV (postoperative nausea and vomiting)    • Renal insufficiency 10/16/2017   • Right breast cancer with malignant cells in regional lymph nodes no greater than 0.2 mm and no more than 200 cells (HCC) 8/13/2020   • TIA (transient ischemic attack)     4 TIMES         Past Surgical History:  Past Surgical History:   Procedure Laterality Date   • APPENDECTOMY     • CARPAL TUNNEL RELEASE     • CHOLECYSTECTOMY     • COLONOSCOPY N/A 5/5/2021    Procedure: COLONOSCOPY WITH BIOPSY;  Surgeon: Vickie Herrrea MD;  Location: [facility];  Service: Gastroenterology;  Laterality: N/A;   • FINGER FUSION Left     3rd   • HYSTERECTOMY  2011    removed due to an ovarian cyst and dysmenorrhia. No cancer noted. Complete   • KNEE ARTHROSCOPY Right    • MASTECTOMY Left 8/18/2020    Procedure: Left mastectomy;  Surgeon: Sheila Garza MD;  Location: [facility];  Service: General;  Laterality: Left;   • MASTECTOMY WITH SENTINEL NODE BIOPSY AND AXILLARY NODE DISSECTION Right 8/18/2020    Procedure: Right BREAST MASTECTOMY WITH SENTINEL NODE BIOPSY;  Surgeon: Sheila Garza MD;  Location: [facility];  Service: General;  Laterality: Right;   • PORTACATH PLACEMENT           Family History:  Family History   Problem Relation Age of Onset   • Diabetes Mother    • Hypertension Mother    • Diabetes Father    • Heart disease Father    • Alcohol abuse Father    • Seizures Father    • Hypertension Father    • No  Known Problems Brother    • No Known Problems Daughter    • Bone cancer Son    • Suicide Attempts Cousin    • Stomach cancer Cousin         maternal first cousin   • Breast cancer Paternal Aunt 52   • Diabetes Maternal Grandmother    • Diabetes Maternal Grandfather    • Lung cancer Maternal Grandfather    • Heart disease Paternal Grandmother    • Diabetes Paternal Grandmother    • Heart disease Paternal Grandfather    • Diabetes Paternal Grandfather    • Ovarian cancer Maternal Aunt    • Lung cancer Maternal Uncle    • Colon cancer Maternal Uncle    • Cancer Maternal Uncle         unknown type         Social History:  Social History     Socioeconomic History   • Marital status:    Tobacco Use   • Smoking status: Never Smoker   • Smokeless tobacco: Never Used   Vaping Use   • Vaping Use: Never used   Substance and Sexual Activity   • Alcohol use: No   • Drug use: No   • Sexual activity: Yes     Partners: Male     Body mass index is 41.23 kg/m².      Medications:  Current Outpatient Medications   Medication Sig Dispense Refill   • albuterol sulfate HFA (Ventolin HFA) 108 (90 Base) MCG/ACT inhaler Inhale 2 puffs Every 4 (Four) Hours As Needed (shortness of breath). 18 g 5   • Breo Ellipta 200-25 MCG/INH inhaler Inhale 1 puff Daily. 1 each 5   • Brexpiprazole (Rexulti) 3 MG tablet Take 1 tablet by mouth Daily. 30 tablet 0   • calcium carbonate (OS-JERROD) 600 MG tablet Take 1 tablet by mouth 2 (Two) Times a Day With Meals. 60 tablet 5   • cholecalciferol (Vitamin D) 25 MCG (1000 UT) tablet Take 2 tablets by mouth Daily. 60 tablet 5   • cloNIDine (CATAPRES) 0.1 MG tablet Take 1 tablet by mouth 2 (Two) Times a Day. 60 tablet 5   • diclofenac (VOLTAREN) 1 % gel gel Apply 4 g topically to the appropriate area as directed 4 (Four) Times a Day As Needed (joint pain). 500 g 5   • Diclofenac Sodium (VOLTAREN) 1 % gel gel      • docusate sodium 100 MG capsule Take 100 mg by mouth 2 (Two) Times a Day.     • fluticasone  (FLONASE) 50 MCG/ACT nasal spray USE 2 SPRAYS IN EACH NOSTRIL DAILY AS NEEDED 16 g 5   • gabapentin (Neurontin) 100 MG capsule Take 1 capsule by mouth 2 (two) times a day. 60 capsule 3   • HumaLOG 100 UNIT/ML injection USE PER INSULIN PUMP. UNITS 40 mL 0   • hydrocortisone (ANUSOL-HC) 25 MG suppository Insert 1 suppository into the rectum 2 (Two) Times a Day As Needed for Hemorrhoids (rectal discomfort). 30 suppository 5   • lansoprazole (PREVACID) 30 MG capsule Take 1 capsule by mouth Daily. 90 capsule 3   • linaclotide (Linzess) 145 MCG capsule capsule Take 1 capsule by mouth Daily. 30 minutes before meals on an empty stomach. 30 capsule 5   • LORazepam (Ativan) 1 MG tablet Take 1 tablet by mouth 3 (Three) Times a Day As Needed for Anxiety. 30 tablet 0   • magic mouthwash oral suspension Swish and swallow 5 mL four times  a day As Needed for Mucositis. 240 mL 1   • nitrofurantoin, macrocrystal-monohydrate, (MACROBID) 100 MG capsule Take 1 capsule by mouth 2 (Two) Times a Day. 14 capsule 0   • ondansetron (ZOFRAN) 8 MG tablet TAKE 1 TABLET BY MOUTH 3 TIMES A DAY AS NEEDED FOR NAUSEA OR VOMITING. 30 tablet 5   • prochlorperazine (COMPAZINE) 10 MG tablet Take 1 tablet by mouth Every 6 (Six) Hours As Needed for Nausea or Vomiting. 30 tablet 5   • rosuvastatin (CRESTOR) 20 MG tablet Take 1 tablet by mouth Every Night. 30 tablet 3   • sennosides-docusate (senna-docusate sodium) 8.6-50 MG per tablet Take 2 tablets by mouth 2 (Two) Times a Day. 120 tablet 4   • SUMAtriptan (IMITREX) 50 MG tablet      • tamoxifen (NOLVADEX) 20 MG chemo tablet Take 1 tablet by mouth Daily. 30 tablet 5   • Trijardy XR 25-5-1000 MG tablet sustained-release 24 hour TAKE 1 TABLET BY MOUTH EVERY MORNING. 30 tablet 5   • venlafaxine XR (EFFEXOR-XR) 150 MG 24 hr capsule Take 1 capsule by mouth Daily. 30 capsule 0   • vitamin D (ERGOCALCIFEROL) 1.25 MG (56381 UT) capsule capsule Take 1 capsule by mouth 1 (One) Time Per Week. 4 capsule 3  "    No current facility-administered medications for this visit.         Allergies:  Allergies   Allergen Reactions   • Mobic [Meloxicam] Rash   • Penicillins Angioedema   • Taxotere [Docetaxel] Anaphylaxis     9 minutes into 1st infusion   • Novolog [Insulin Aspart] Hives         Review of Systems:   Review of Systems   Constitutional: Negative.    HENT: Negative.    Eyes: Negative.    Respiratory: Negative.    Cardiovascular: Negative.    Gastrointestinal: Negative.    Endocrine: Negative.    Genitourinary: Negative.    Musculoskeletal: Positive for arthralgias.   Skin: Negative.    Allergic/Immunologic: Negative.    Neurological: Negative.    Hematological: Negative.    Psychiatric/Behavioral: Negative.          Physical Exam:   Physical Exam    GENERAL: 46 y.o. female, alert and oriented X 3 in no acute distress.   Visit Vitals  /71   Pulse 91   Ht 162.6 cm (64.02\")   Wt 109 kg (240 lb 4.8 oz)   BMI 41.23 kg/m²         Musculoskeletal:   Examination shoulder reveals normal muscle contour.  She has tenderness to palpation anteriorly medial to the bicipital groove.  She also has tenderness in the axillary region and lateral aspect of her right chest.  Is able to forward elevate and abduct to approximately 60 degrees.  She has decreased internal and external rotation compared to her contralateral shoulder.        Radiology/Labs:     XR Shoulder 2+ View Right    Result Date: 10/25/2021  No acute bony abnormality.  This report was finalized on 10/25/2021 9:18 AM by Dr. Armond Wing MD.      Review of radiographs right shoulder are unremarkable.      Assessment & Plan:   46 y.o. female presents this post bilateral mastectomy with stiffness and pain in her right shoulder with her clinical impression consistent with frozen shoulder right.  We discussed her options she was then provided intra-articular steroid injection 10 minutes following this injection she demonstrated abduction to 90 degrees and described 50% " reduction in her pain.  We discussed frozen shoulder she is encouraged to continue with aggressive physical therapy and will follow-up in 8 months.        ICD-10-CM ICD-9-CM   1. Adhesive capsulitis of right shoulder  M75.01 726.0     Large Joint Arthrocentesis: R glenohumeral  Date/Time: 10/25/2021 2:02 PM  Consent given by: patient  Site marked: site marked  Supporting Documentation  Indications: pain   Procedure Details  Location: shoulder - R glenohumeral  Preparation: Patient was prepped and draped in the usual sterile fashion  Needle size: 25 G  Approach: anterior  Medications administered: 2 mL lidocaine 2%; 80 mg methylPREDNISolone acetate 80 MG/ML          Cc:   Markus Menjivar MD                This document has been electronically signed by Royal Valdes MD   October 29, 2021 14:01 EDT

## 2021-10-26 ENCOUNTER — PRIOR AUTHORIZATION (OUTPATIENT)
Dept: FAMILY MEDICINE CLINIC | Facility: CLINIC | Age: 46
End: 2021-10-26

## 2021-10-27 ENCOUNTER — INFUSION (OUTPATIENT)
Dept: ONCOLOGY | Facility: HOSPITAL | Age: 46
End: 2021-10-27

## 2021-10-27 VITALS
TEMPERATURE: 97.1 F | OXYGEN SATURATION: 98 % | DIASTOLIC BLOOD PRESSURE: 87 MMHG | RESPIRATION RATE: 18 BRPM | WEIGHT: 243.8 LBS | HEART RATE: 96 BPM | SYSTOLIC BLOOD PRESSURE: 133 MMHG | BODY MASS INDEX: 41.83 KG/M2

## 2021-10-27 DIAGNOSIS — Z95.828 PORT-A-CATH IN PLACE: Primary | ICD-10-CM

## 2021-10-27 PROCEDURE — 25010000002 HEPARIN LOCK FLUSH PER 10 UNITS

## 2021-10-27 PROCEDURE — 96523 IRRIG DRUG DELIVERY DEVICE: CPT

## 2021-10-27 RX ORDER — SODIUM CHLORIDE 0.9 % (FLUSH) 0.9 %
10 SYRINGE (ML) INJECTION AS NEEDED
Status: DISCONTINUED | OUTPATIENT
Start: 2021-10-27 | End: 2021-10-27 | Stop reason: HOSPADM

## 2021-10-27 RX ORDER — HEPARIN SODIUM (PORCINE) LOCK FLUSH IV SOLN 100 UNIT/ML 100 UNIT/ML
500 SOLUTION INTRAVENOUS AS NEEDED
Status: DISCONTINUED | OUTPATIENT
Start: 2021-10-27 | End: 2021-10-27 | Stop reason: HOSPADM

## 2021-10-27 RX ORDER — HEPARIN SODIUM (PORCINE) LOCK FLUSH IV SOLN 100 UNIT/ML 100 UNIT/ML
SOLUTION INTRAVENOUS
Status: COMPLETED
Start: 2021-10-27 | End: 2021-10-27

## 2021-10-27 RX ADMIN — HEPARIN SODIUM (PORCINE) LOCK FLUSH IV SOLN 100 UNIT/ML 500 UNITS: 100 SOLUTION at 14:46

## 2021-10-27 RX ADMIN — Medication 500 UNITS: at 14:46

## 2021-10-27 RX ADMIN — SODIUM CHLORIDE, PRESERVATIVE FREE 10 ML: 5 INJECTION INTRAVENOUS at 14:45

## 2021-10-29 ENCOUNTER — TELEPHONE (OUTPATIENT)
Dept: FAMILY MEDICINE CLINIC | Facility: CLINIC | Age: 46
End: 2021-10-29

## 2021-10-29 RX ORDER — METHYLPREDNISOLONE ACETATE 80 MG/ML
80 INJECTION, SUSPENSION INTRA-ARTICULAR; INTRALESIONAL; INTRAMUSCULAR; SOFT TISSUE
Status: COMPLETED | OUTPATIENT
Start: 2021-10-25 | End: 2021-10-25

## 2021-10-29 RX ORDER — LIDOCAINE HYDROCHLORIDE 20 MG/ML
2 INJECTION, SOLUTION INFILTRATION; PERINEURAL
Status: COMPLETED | OUTPATIENT
Start: 2021-10-25 | End: 2021-10-25

## 2021-10-29 NOTE — TELEPHONE ENCOUNTER
PATIENT HAS CALLED AND STATED THE INSULIN PUMP DID NOT SHOW UP TODAY.  PATIENT IS REQUESTING A CALL BACK.    CALL BACK NUMBER [phone]

## 2021-11-01 ENCOUNTER — TELEPHONE (OUTPATIENT)
Dept: FAMILY MEDICINE CLINIC | Facility: CLINIC | Age: 46
End: 2021-11-01

## 2021-11-01 DIAGNOSIS — F41.1 GENERALIZED ANXIETY DISORDER: ICD-10-CM

## 2021-11-01 RX ORDER — TAMOXIFEN CITRATE 20 MG/1
20 TABLET ORAL DAILY
Qty: 30 TABLET | Refills: 1 | Status: SHIPPED | OUTPATIENT
Start: 2021-11-01 | End: 2022-01-21 | Stop reason: SDUPTHER

## 2021-11-01 RX ORDER — LORAZEPAM 1 MG/1
1 TABLET ORAL 3 TIMES DAILY PRN
Qty: 90 TABLET | Refills: 0 | Status: SHIPPED | OUTPATIENT
Start: 2021-11-01 | End: 2021-11-18 | Stop reason: SDUPTHER

## 2021-11-01 NOTE — TELEPHONE ENCOUNTER
Patient's  is calling and stating that patient got her new pump but did not get a sensor.....    Please advise    Andrew Bernstein [phone]

## 2021-11-09 ENCOUNTER — OFFICE VISIT (OUTPATIENT)
Dept: UROLOGY | Facility: CLINIC | Age: 46
End: 2021-11-09

## 2021-11-09 VITALS
BODY MASS INDEX: 41.52 KG/M2 | TEMPERATURE: 98 F | WEIGHT: 242 LBS | DIASTOLIC BLOOD PRESSURE: 80 MMHG | SYSTOLIC BLOOD PRESSURE: 122 MMHG

## 2021-11-09 DIAGNOSIS — N39.0 RECURRENT UTI: Primary | ICD-10-CM

## 2021-11-09 DIAGNOSIS — K62.5 BRBPR (BRIGHT RED BLOOD PER RECTUM): ICD-10-CM

## 2021-11-09 PROCEDURE — 99213 OFFICE O/P EST LOW 20 MIN: CPT | Performed by: UROLOGY

## 2021-11-09 RX ORDER — NITROFURANTOIN 25; 75 MG/1; MG/1
100 CAPSULE ORAL DAILY
Qty: 56 CAPSULE | Refills: 3 | Status: SHIPPED | OUTPATIENT
Start: 2021-11-09 | End: 2021-12-03

## 2021-11-09 NOTE — PROGRESS NOTES
Chief Complaint:          Chief Complaint   Patient presents with   • Urinary Tract Infection     follow up       HPI:   46 y.o. female Referred with recurrent urinary tract infections.  Apparently she developed a urinary tract infection with elevated white blood cell too numerous to count white cells with bacteria was placed on antibiotics and did great she is been on Macrobid, Bactrim, currently she has no dysuria, no frequency, no nocturia, no concerns I recommend probiotics.  We discussed this with her at length her last culture was negative.  She has normal bowel movements she is a poorly controlled diabetic on insulin pump A1c was 14 and now is 9 she has mitral valvular prolapse.  Exam is unremarkable with a soft flank I put her on Macrodantin prophylaxis I will see her back based on this if she breaks through she is to call immediately.  She returns today has been 2 years her urine is negative she is done well see her back in 12 months she'll continue her medication as previously      Past Medical History:        Past Medical History:   Diagnosis Date   • Altered mental status 10/16/2017   • Anxiety and depression 3/31/2017   • Arthritis    • Asthma    • Elevated alkaline phosphatase level 10/16/2017   • Enlarged liver    • GERD (gastroesophageal reflux disease)    • Heart murmur    • Hypokalemia 10/16/2017   • Mitral valve prolapse    • Neuropathy     related to diabetes   • Obesity 3/31/2017   • OCD (obsessive compulsive disorder) 10/16/2017   • Osteoporosis    • PONV (postoperative nausea and vomiting)    • Renal insufficiency 10/16/2017   • Right breast cancer with malignant cells in regional lymph nodes no greater than 0.2 mm and no more than 200 cells ([facility]) 8/13/2020   • TIA (transient ischemic attack)     4 TIMES         Current Meds:     Current Outpatient Medications   Medication Sig Dispense Refill   • albuterol sulfate HFA (Ventolin HFA) 108 (90 Base) MCG/ACT inhaler Inhale 2 puffs Every 4 (Four)  Hours As Needed (shortness of breath). 18 g 5   • Breo Ellipta 200-25 MCG/INH inhaler Inhale 1 puff Daily. 1 each 5   • Brexpiprazole (Rexulti) 3 MG tablet Take 1 tablet by mouth Daily. 30 tablet 0   • calcium carbonate (OS-JERROD) 600 MG tablet Take 1 tablet by mouth 2 (Two) Times a Day With Meals. 60 tablet 5   • cholecalciferol (Vitamin D) 25 MCG (1000 UT) tablet Take 2 tablets by mouth Daily. 60 tablet 5   • cloNIDine (CATAPRES) 0.1 MG tablet Take 1 tablet by mouth 2 (Two) Times a Day. 60 tablet 5   • diclofenac (VOLTAREN) 1 % gel gel Apply 4 g topically to the appropriate area as directed 4 (Four) Times a Day As Needed (joint pain). 500 g 5   • Diclofenac Sodium (VOLTAREN) 1 % gel gel      • docusate sodium 100 MG capsule Take 100 mg by mouth 2 (Two) Times a Day.     • fluticasone (FLONASE) 50 MCG/ACT nasal spray USE 2 SPRAYS IN EACH NOSTRIL DAILY AS NEEDED 16 g 5   • gabapentin (Neurontin) 100 MG capsule Take 1 capsule by mouth 2 (two) times a day. 60 capsule 3   • HumaLOG 100 UNIT/ML injection USE PER INSULIN PUMP. UNITS 40 mL 0   • hydrocortisone (ANUSOL-HC) 25 MG suppository Insert 1 suppository into the rectum 2 (Two) Times a Day As Needed for Hemorrhoids (rectal discomfort). 30 suppository 5   • lansoprazole (PREVACID) 30 MG capsule Take 1 capsule by mouth Daily. 90 capsule 3   • linaclotide (Linzess) 145 MCG capsule capsule Take 1 capsule by mouth Daily. 30 minutes before meals on an empty stomach. 30 capsule 5   • LORazepam (Ativan) 1 MG tablet Take 1 tablet by mouth 3 (Three) Times a Day As Needed for Anxiety. 90 tablet 0   • magic mouthwash oral suspension Swish and swallow 5 mL four times  a day As Needed for Mucositis. 240 mL 1   • nitrofurantoin, macrocrystal-monohydrate, (MACROBID) 100 MG capsule Take 1 capsule by mouth 2 (Two) Times a Day. 14 capsule 0   • ondansetron (ZOFRAN) 8 MG tablet TAKE 1 TABLET BY MOUTH 3 TIMES A DAY AS NEEDED FOR NAUSEA OR VOMITING. 30 tablet 5   • prochlorperazine  (COMPAZINE) 10 MG tablet Take 1 tablet by mouth Every 6 (Six) Hours As Needed for Nausea or Vomiting. 30 tablet 5   • rosuvastatin (CRESTOR) 20 MG tablet Take 1 tablet by mouth Every Night. 30 tablet 3   • sennosides-docusate (senna-docusate sodium) 8.6-50 MG per tablet Take 2 tablets by mouth 2 (Two) Times a Day. 120 tablet 4   • SUMAtriptan (IMITREX) 50 MG tablet      • tamoxifen (NOLVADEX) 20 MG chemo tablet TAKE 1 TABLET BY MOUTH DAILY. 30 tablet 1   • Trijardy XR 25-5-1000 MG tablet sustained-release 24 hour TAKE 1 TABLET BY MOUTH EVERY MORNING. 30 tablet 5   • venlafaxine XR (EFFEXOR-XR) 150 MG 24 hr capsule Take 1 capsule by mouth Daily. 30 capsule 0   • vitamin D (ERGOCALCIFEROL) 1.25 MG (25150 UT) capsule capsule Take 1 capsule by mouth 1 (One) Time Per Week. 4 capsule 3     No current facility-administered medications for this visit.        Allergies:      Allergies   Allergen Reactions   • Mobic [Meloxicam] Rash   • Penicillins Angioedema   • Taxotere [Docetaxel] Anaphylaxis     9 minutes into 1st infusion   • Novolog [Insulin Aspart] Hives        Past Surgical History:     Past Surgical History:   Procedure Laterality Date   • APPENDECTOMY     • CARPAL TUNNEL RELEASE     • CHOLECYSTECTOMY     • COLONOSCOPY N/A 5/5/2021    Procedure: COLONOSCOPY WITH BIOPSY;  Surgeon: Vickie Herrera MD;  Location: [facility];  Service: Gastroenterology;  Laterality: N/A;   • FINGER FUSION Left     3rd   • HYSTERECTOMY  2011    removed due to an ovarian cyst and dysmenorrhia. No cancer noted. Complete   • KNEE ARTHROSCOPY Right    • MASTECTOMY Left 8/18/2020    Procedure: Left mastectomy;  Surgeon: Sheila Garza MD;  Location: [facility];  Service: General;  Laterality: Left;   • MASTECTOMY WITH SENTINEL NODE BIOPSY AND AXILLARY NODE DISSECTION Right 8/18/2020    Procedure: Right BREAST MASTECTOMY WITH SENTINEL NODE BIOPSY;  Surgeon: Sheila Garza MD;  Location: [facility];  Service: General;   Laterality: Right;   • PORTACATH PLACEMENT           Social History:     Social History     Socioeconomic History   • Marital status:    Tobacco Use   • Smoking status: Never Smoker   • Smokeless tobacco: Never Used   Vaping Use   • Vaping Use: Never used   Substance and Sexual Activity   • Alcohol use: No   • Drug use: No   • Sexual activity: Yes     Partners: Male       Family History:     Family History   Problem Relation Age of Onset   • Diabetes Mother    • Hypertension Mother    • Diabetes Father    • Heart disease Father    • Alcohol abuse Father    • Seizures Father    • Hypertension Father    • No Known Problems Brother    • No Known Problems Daughter    • Bone cancer Son    • Suicide Attempts Cousin    • Stomach cancer Cousin         maternal first cousin   • Breast cancer Paternal Aunt 52   • Diabetes Maternal Grandmother    • Diabetes Maternal Grandfather    • Lung cancer Maternal Grandfather    • Heart disease Paternal Grandmother    • Diabetes Paternal Grandmother    • Heart disease Paternal Grandfather    • Diabetes Paternal Grandfather    • Ovarian cancer Maternal Aunt    • Lung cancer Maternal Uncle    • Colon cancer Maternal Uncle    • Cancer Maternal Uncle         unknown type       Review of Systems:     Review of Systems   Constitutional: Negative.  Negative for activity change, appetite change, chills, diaphoresis, fatigue and unexpected weight change.   HENT: Negative for congestion, dental problem, drooling, ear discharge, ear pain, facial swelling, hearing loss, mouth sores, nosebleeds, postnasal drip, rhinorrhea, sinus pressure, sneezing, sore throat, tinnitus, trouble swallowing and voice change.    Eyes: Negative.  Negative for photophobia, pain, discharge, redness, itching and visual disturbance.   Respiratory: Negative.  Negative for apnea, cough, choking, chest tightness, shortness of breath, wheezing and stridor.    Cardiovascular: Negative.  Negative for chest pain,  palpitations and leg swelling.   Gastrointestinal: Negative.  Negative for abdominal distention, abdominal pain, anal bleeding, blood in stool, constipation, diarrhea, nausea, rectal pain and vomiting.   Endocrine: Negative.  Negative for cold intolerance, heat intolerance, polydipsia, polyphagia and polyuria.   Musculoskeletal: Negative.  Negative for arthralgias, back pain, gait problem, joint swelling, myalgias, neck pain and neck stiffness.   Skin: Negative.  Negative for color change, pallor, rash and wound.   Allergic/Immunologic: Negative.  Negative for environmental allergies, food allergies and immunocompromised state.   Neurological: Negative.  Negative for dizziness, tremors, seizures, syncope, facial asymmetry, speech difficulty, weakness, light-headedness, numbness and headaches.   Hematological: Negative.  Negative for adenopathy. Does not bruise/bleed easily.   Psychiatric/Behavioral: Negative for agitation, behavioral problems, confusion, decreased concentration, dysphoric mood, hallucinations, self-injury, sleep disturbance and suicidal ideas. The patient is not nervous/anxious and is not hyperactive.    All other systems reviewed and are negative.      Physical Exam:     Physical Exam  Constitutional:       Appearance: She is well-developed.   HENT:      Head: Normocephalic and atraumatic.      Right Ear: External ear normal.      Left Ear: External ear normal.   Eyes:      Conjunctiva/sclera: Conjunctivae normal.      Pupils: Pupils are equal, round, and reactive to light.   Cardiovascular:      Rate and Rhythm: Normal rate and regular rhythm.      Heart sounds: Normal heart sounds.   Pulmonary:      Effort: Pulmonary effort is normal.      Breath sounds: Normal breath sounds.   Abdominal:      General: Bowel sounds are normal. There is no distension.      Palpations: Abdomen is soft. There is no mass.      Tenderness: There is no abdominal tenderness. There is no guarding or rebound.    Genitourinary:     Vagina: No vaginal discharge.   Musculoskeletal:         General: Normal range of motion.   Skin:     General: Skin is warm and dry.   Neurological:      Mental Status: She is alert.      Deep Tendon Reflexes: Reflexes are normal and symmetric.   Psychiatric:         Behavior: Behavior normal.         Thought Content: Thought content normal.         Judgment: Judgment normal.         I have reviewed the following portions of the patient's history: Allergies, current medications, past family history, past medical history, past social history, past surgical history, problem list, and ROS and confirm it is accurate.      Procedure:       Assessment/Plan:   Recurrent urinary tract infections-patient has been referred and diagnosed with recurrent urinary tract infections.  We discussed the types of organisms that are found in the urinary tract indicating that the vast majority are results of the patient's own gastrointestinal lupe.  We discussed how many of the antibiotics that are utilized can actually exacerbate these infections by creating resistant organisms and there is only very few antibiotics that are concentrated in the urine and do not affect the rectal reservoir nor cause recurrent yeast vaginitis.  We discussed the risk factors for recurrent infections being intercourse in younger patients and atrophic changes in older patients.  We discussed the symptoms that are found including pain, pressure, burning, frequency, urgency, suprapubic pain, and painful intercourse.  I discussed upper tract symptoms including fevers and chills and indicated the workup would be much more aggressive if the patient were to present with recurrent infections in the face of upper tract symptomatology such as fever.  I discussed the history of vesicoureteral reflux in young patients and finally chronic renal scarring as a result of such.  I recommend concomitant probiotics with treatment with antibiotics to  protect the rectal reservoir including over-the-counter yogurt preparations to jb oral pills containing the appropriate probiotics.  She is doing great I refilled her medication                  This document has been electronically signed by MARLEE LEACH MD November 9, 2021 13:21 EST

## 2021-11-16 PROBLEM — N39.0 RECURRENT UTI: Status: ACTIVE | Noted: 2021-11-16

## 2021-11-17 ENCOUNTER — OFFICE VISIT (OUTPATIENT)
Dept: FAMILY MEDICINE CLINIC | Facility: CLINIC | Age: 46
End: 2021-11-17

## 2021-11-17 VITALS
HEART RATE: 92 BPM | OXYGEN SATURATION: 96 % | DIASTOLIC BLOOD PRESSURE: 60 MMHG | BODY MASS INDEX: 41.66 KG/M2 | HEIGHT: 64 IN | TEMPERATURE: 96.6 F | SYSTOLIC BLOOD PRESSURE: 120 MMHG | WEIGHT: 244 LBS

## 2021-11-17 DIAGNOSIS — E78.2 MIXED HYPERLIPIDEMIA: Chronic | ICD-10-CM

## 2021-11-17 DIAGNOSIS — Z96.41 INSULIN PUMP IN PLACE: ICD-10-CM

## 2021-11-17 DIAGNOSIS — E11.42 DM TYPE 2 WITH DIABETIC PERIPHERAL NEUROPATHY (HCC): Primary | Chronic | ICD-10-CM

## 2021-11-17 PROCEDURE — 99214 OFFICE O/P EST MOD 30 MIN: CPT | Performed by: NURSE PRACTITIONER

## 2021-11-17 NOTE — PROGRESS NOTES
History of Present Illness  Nivia is a 45 y/o female who presents to the clinic today for follow up pertaining to her DM, type 2 and Dyslipidemia. In addition, she has been diagnosed with right breast Cancer and has undergone bilateral mastectomy and chemotherapy.     Diabetes   She has type 2 diabetes mellitus. The initial diagnosis of diabetes was made 20 years ago. Associated symptoms include  fatigue and peripheral neuropathy. Pertinent negatives for diabetes include no foot ulcerations. Diabetic complications include peripheral neuropathy.  Risk factors for coronary artery disease include post-menopausal, stress and dyslipidemia. She is currently utilizing insulin pump therapy and CGM. She did stop weekly Ozempic due to nausea. She is currently taking Trijardy.  She has had a previous visit with a dietitian An ACE inhibitor/angiotensin II receptor blocker is not  being taken at this time..      Lab Results   Component Value Date    HGBA1C 8.30 (H) 03/12/2021      Lab Results   Component Value Date    HGBA1C 7.7 (H) 09/28/2021     Dyslipidemia   The current episode started more than 1 year ago. Pertinent negatives include no chest pain or shortness of breath. She is taking Crestor 40 mg.   Lab Results   Component Value Date    CHLPL 174 09/28/2021    CHLPL 161 03/12/2021    CHLPL 168 05/13/2020     Lab Results   Component Value Date    TRIG 382 (H) 09/28/2021    TRIG 211 (H) 03/12/2021    TRIG 240 (H) 11/17/2020     Lab Results   Component Value Date    HDL 28 (L) 09/28/2021    HDL 32 (L) 03/12/2021    HDL 36 (L) 11/17/2020     Lab Results   Component Value Date    LDL 84 09/28/2021    LDL 93 03/12/2021     (H) 11/17/2020     The following portions of the patient's history were reviewed and updated as appropriate: allergies, current medications, past family history, past medical history, past social history, past surgical history and problem list.    Review of Systems   Constitutional: Positive for  "activity change, appetite change and fatigue. Negative for chills, fever and unexpected weight change.   Eyes: Positive for visual disturbance.   Respiratory: Negative for cough, shortness of breath and wheezing.    Cardiovascular: Positive for leg swelling. Negative for chest pain and palpitations.   Gastrointestinal: Positive for nausea. Negative for constipation, diarrhea and vomiting.   Endocrine: Positive for polydipsia and polyphagia. Negative for cold intolerance, heat intolerance and polyuria.   Musculoskeletal: Positive for arthralgias.   Skin: Negative for color change and rash.   Neurological: Positive for numbness and headaches. Negative for dizziness, tremors, speech difficulty, weakness and light-headedness.   Hematological: Negative for adenopathy.   Psychiatric/Behavioral: Negative for confusion, decreased concentration and suicidal ideas. The patient is not nervous/anxious.    All other systems reviewed and are negative.    Vital signs:  /60 (BP Location: Left arm, Patient Position: Sitting, Cuff Size: Adult)   Pulse 92   Temp 96.6 °F (35.9 °C) (Temporal)   Ht 162.6 cm (64.02\")   Wt 111 kg (244 lb)   SpO2 96%   BMI 41.86 kg/m²     Physical Exam  Vitals reviewed.   Constitutional:       General: She is not in acute distress.     Appearance: She is well-developed.   HENT:      Head: Normocephalic.      Nose: Nose normal.   Eyes:      General: No scleral icterus.        Right eye: No discharge.         Left eye: No discharge.      Conjunctiva/sclera: Conjunctivae normal.   Neck:      Thyroid: No thyromegaly.      Vascular: No JVD.   Cardiovascular:      Rate and Rhythm: Normal rate and regular rhythm.      Heart sounds: Normal heart sounds. No murmur heard.  No friction rub.   Pulmonary:      Effort: Pulmonary effort is normal. No respiratory distress.      Breath sounds: Normal breath sounds. No decreased breath sounds, wheezing, rhonchi or rales.   Abdominal:      Palpations: Abdomen is " soft.      Tenderness: There is no abdominal tenderness. There is no guarding or rebound.   Musculoskeletal:      Cervical back: Neck supple.      Right lower leg: No edema.      Left lower leg: No edema.   Lymphadenopathy:      Cervical: No cervical adenopathy.   Skin:     General: Skin is warm and dry.      Capillary Refill: Capillary refill takes less than 2 seconds.   Neurological:      Mental Status: She is alert and oriented to person, place, and time.      Cranial Nerves: Cranial nerves are intact.      Gait: Gait is intact.   Psychiatric:         Mood and Affect: Mood and affect normal.         Speech: Speech normal.         Behavior: Behavior is cooperative.         Thought Content: Thought content normal.         Cognition and Memory: Cognition and memory normal.       Result Review :  Results for orders placed or performed in visit on 09/28/21   Uric Acid    Specimen: Blood    BLOOD  RELEASE TO CANDI   Result Value Ref Range    Uric Acid 4.2 2.6 - 6.2 mg/dL   Vitamin B12    Specimen: Blood    BLOOD  RELEASE TO CANDI   Result Value Ref Range    Vitamin B-12 497 232 - 1,245 pg/mL   Vitamin D 25 Hydroxy    Specimen: Blood    BLOOD  RELEASE TO CANDI   Result Value Ref Range    25 Hydroxy, Vitamin D 43.5 30.0 - 100.0 ng/mL   Hemoglobin A1c    Specimen: Blood    BLOOD  RELEASE TO CANDI   Result Value Ref Range    Hemoglobin A1C 7.7 (H) 4.8 - 5.6 %   Lipid Panel    Specimen: Blood    BLOOD  RELEASE TO CANDI   Result Value Ref Range    Total Cholesterol 174 100 - 199 mg/dL    Triglycerides 382 (H) 0 - 149 mg/dL    HDL Cholesterol 28 (L) >39 mg/dL    VLDL Cholesterol Harley 62 (H) 5 - 40 mg/dL    LDL Chol Calc (NIH) 84 0 - 99 mg/dL   TSH    Specimen: Blood    BLOOD  RELEASE TO CANDI   Result Value Ref Range    TSH 2.260 0.450 - 4.500 uIU/mL   Comprehensive Metabolic Panel    Specimen: Blood    BLOOD  RELEASE TO CANDI   Result Value Ref Range    Glucose 246 (H) 65 - 99 mg/dL    BUN 8 6 - 24 mg/dL    Creatinine 0.81 0.57 -  1.00 mg/dL    eGFR Non African Am 86 >59 mL/min/1.73    eGFR African Am 99 >59 mL/min/1.73    BUN/Creatinine Ratio 10 9 - 23    Sodium 140 134 - 144 mmol/L    Potassium 4.1 3.5 - 5.2 mmol/L    Chloride 104 96 - 106 mmol/L    Total CO2 17 (L) 20 - 29 mmol/L    Calcium 8.9 8.7 - 10.2 mg/dL    Total Protein 7.2 6.0 - 8.5 g/dL    Albumin 4.2 3.8 - 4.8 g/dL    Globulin 3.0 1.5 - 4.5 g/dL    A/G Ratio 1.4 1.2 - 2.2    Total Bilirubin <0.2 0.0 - 1.2 mg/dL    Alkaline Phosphatase 131 (H) 44 - 121 IU/L    AST (SGOT) 15 0 - 40 IU/L    ALT (SGPT) 21 0 - 32 IU/L     Patient's Body mass index is 41.86 kg/m². indicating that she is obese (BMI >30). Obesity-related health conditions include the following: hypertension, diabetes mellitus, dyslipidemias and osteoarthritis. Obesity is unchanged. BMI is is above average; BMI management plan is completed. Provided information on  portion control and increasing exercise..     Assessment/Plan     Diagnoses and all orders for this visit:    1. DM type 2 with diabetic peripheral neuropathy (HCC) (Primary)  Comments:  Findings and recommendations discussed with Nivia. Reinforced DSMS with her and strategies for the holidays    2. Insulin pump in place  Comments:  Insulin pump upload reviewed. She will continue her current parameters.     3. Mixed hyperlipidemia  Comments:  Encouraged cardiovascular risk reduction modifications including nutrition and exercise recommendations Continue Crestor      Follow Up In January   Findings and recommendations discussed with Nivia. Reviewed insulin pump upload. Continue current regimen.  Lifestyle modifications reinforced including nutrition and activity recommendations. She will follow up in January  sooner if problems/concerns occur.  Nivia was given instructions and counseling regarding her condition or for health maintenance advice. Please see specific information pulled into the AVS if appropriate      This document has been electronically  signed by:

## 2021-11-18 ENCOUNTER — PRIOR AUTHORIZATION (OUTPATIENT)
Dept: FAMILY MEDICINE CLINIC | Facility: CLINIC | Age: 46
End: 2021-11-18

## 2021-11-18 ENCOUNTER — OFFICE VISIT (OUTPATIENT)
Dept: PSYCHIATRY | Facility: CLINIC | Age: 46
End: 2021-11-18

## 2021-11-18 VITALS
HEART RATE: 88 BPM | BODY MASS INDEX: 41.69 KG/M2 | DIASTOLIC BLOOD PRESSURE: 60 MMHG | WEIGHT: 243 LBS | SYSTOLIC BLOOD PRESSURE: 118 MMHG

## 2021-11-18 DIAGNOSIS — F41.1 GENERALIZED ANXIETY DISORDER: ICD-10-CM

## 2021-11-18 DIAGNOSIS — F33.3 SEVERE EPISODE OF RECURRENT MAJOR DEPRESSIVE DISORDER, WITH PSYCHOTIC FEATURES (HCC): ICD-10-CM

## 2021-11-18 DIAGNOSIS — Z79.899 HIGH RISK MEDICATION USE: Primary | ICD-10-CM

## 2021-11-18 DIAGNOSIS — F42.8 OTHER OBSESSIVE-COMPULSIVE DISORDERS: Chronic | ICD-10-CM

## 2021-11-18 PROCEDURE — 99213 OFFICE O/P EST LOW 20 MIN: CPT | Performed by: NURSE PRACTITIONER

## 2021-11-18 RX ORDER — VENLAFAXINE HYDROCHLORIDE 150 MG/1
150 CAPSULE, EXTENDED RELEASE ORAL DAILY
Qty: 30 CAPSULE | Refills: 0 | Status: SHIPPED | OUTPATIENT
Start: 2021-11-18 | End: 2021-12-17 | Stop reason: SDUPTHER

## 2021-11-18 RX ORDER — LORAZEPAM 1 MG/1
1 TABLET ORAL 3 TIMES DAILY PRN
Qty: 90 TABLET | Refills: 0 | Status: SHIPPED | OUTPATIENT
Start: 2021-11-18 | End: 2021-12-17 | Stop reason: SDUPTHER

## 2021-11-18 RX ORDER — BREXPIPRAZOLE 3 MG/1
3 TABLET ORAL DAILY
Qty: 30 TABLET | Refills: 0 | Status: SHIPPED | OUTPATIENT
Start: 2021-11-18 | End: 2021-12-17 | Stop reason: SDUPTHER

## 2021-11-18 NOTE — PROGRESS NOTES
"      Raquel Bernstein is a 46 y.o. female is here today for medication management follow-up.    Chief Complaint:  Depression anxiety     History of Present Illness:    Patient presents today for follow up for medication management for depression and anxiety. Patient states she is doing \"fair\" and she is a little calmer. Patient states she feels like we need to go up on meds just a little more and we may have it. Patient states she did get a letter from her insurance though that stated as of December they would not be covering the Effexor so we may have to figure out what to change now. Patient states she got a new insulin pump. Patient reports currently depression is at a 10 on a 0-10 scale with 10 being the worst. Patient reports symptoms of depression of depressed mood, irritability, insomnia, decreased appetite, and decreased energy. Patient states the irritability is still at an 8 on a 0-10 scale with 10 being the worst. Patient states \"not taking everyone's head off like she was\". Patient reports anxiety is at a 10 on a 0-10 scale with 10 being the worst. Patient reports having a couple panic attacks. Patient reports panic attacks last 5-10 minutes and during an attacks patient has anger, irritability, panic feeling, and heart racing. Patient reports sleeping 2-3 hours a night and patient reports having multiple nightmares. Patient reports appetite is decreased and eating 1-2 meals a day. Patient denies any auditory or visual hallucinations. Patient adamantly denies any SI or HI. Denies any plan, but having occasionally thoughts. Patient denies any side effects to medications. Patient denies any medical changes since last visit.   The following portions of the patient's history were reviewed and updated as appropriate: allergies, current medications, past family history, past medical history, past social history, past surgical history and problem list.    Review of Systems   Constitutional: Positive " for appetite change.   Respiratory: Negative.    Cardiovascular: Negative.    Gastrointestinal: Negative.    Neurological: Negative.    Psychiatric/Behavioral: Positive for agitation, dysphoric mood and sleep disturbance. The patient is nervous/anxious.        Objective   Physical Exam  Vitals reviewed.   Constitutional:       Appearance: Normal appearance. She is well-developed and well-groomed.   Neurological:      Mental Status: She is alert.   Psychiatric:         Attention and Perception: Attention and perception normal.         Mood and Affect: Mood is depressed. Affect is angry.         Speech: Speech normal.         Behavior: Behavior normal. Behavior is cooperative.         Thought Content: Thought content normal.         Cognition and Memory: Cognition and memory normal.         Judgment: Judgment normal.       Blood pressure 118/60, pulse 88, weight 110 kg (243 lb), not currently breastfeeding. Body mass index is 41.69 kg/m².    Allergies   Allergen Reactions   • Mobic [Meloxicam] Rash   • Penicillins Angioedema   • Taxotere [Docetaxel] Anaphylaxis     9 minutes into 1st infusion   • Novolog [Insulin Aspart] Hives       Medication List:   Current Outpatient Medications   Medication Sig Dispense Refill   • albuterol sulfate HFA (Ventolin HFA) 108 (90 Base) MCG/ACT inhaler Inhale 2 puffs Every 4 (Four) Hours As Needed (shortness of breath). 18 g 5   • Breo Ellipta 200-25 MCG/INH inhaler Inhale 1 puff Daily. 1 each 5   • Brexpiprazole (Rexulti) 3 MG tablet Take 1 tablet by mouth Daily. 30 tablet 0   • calcium carbonate (OS-JERROD) 600 MG tablet Take 1 tablet by mouth 2 (Two) Times a Day With Meals. 60 tablet 5   • cholecalciferol (Vitamin D) 25 MCG (1000 UT) tablet Take 2 tablets by mouth Daily. 60 tablet 5   • cloNIDine (CATAPRES) 0.1 MG tablet Take 1 tablet by mouth 2 (Two) Times a Day. 60 tablet 5   • diclofenac (VOLTAREN) 1 % gel gel Apply 4 g topically to the appropriate area as directed 4 (Four) Times a  Day As Needed (joint pain). 500 g 5   • Diclofenac Sodium (VOLTAREN) 1 % gel gel      • docusate sodium 100 MG capsule Take 100 mg by mouth 2 (Two) Times a Day.     • fluticasone (FLONASE) 50 MCG/ACT nasal spray USE 2 SPRAYS IN EACH NOSTRIL DAILY AS NEEDED 16 g 5   • gabapentin (Neurontin) 100 MG capsule Take 1 capsule by mouth 2 (two) times a day. 60 capsule 3   • HumaLOG 100 UNIT/ML injection USE PER INSULIN PUMP. UNITS 40 mL 0   • hydrocortisone (ANUSOL-HC) 25 MG suppository Insert 1 suppository into the rectum 2 (Two) Times a Day As Needed for Hemorrhoids (rectal discomfort). 30 suppository 5   • lansoprazole (PREVACID) 30 MG capsule Take 1 capsule by mouth Daily. 90 capsule 3   • linaclotide (Linzess) 145 MCG capsule capsule Take 1 capsule by mouth Daily. 30 minutes before meals on an empty stomach. 30 capsule 5   • LORazepam (Ativan) 1 MG tablet Take 1 tablet by mouth 3 (Three) Times a Day As Needed for Anxiety. 90 tablet 0   • magic mouthwash oral suspension Swish and swallow 5 mL four times  a day As Needed for Mucositis. 240 mL 1   • nitrofurantoin, macrocrystal-monohydrate, (MACROBID) 100 MG capsule Take 1 capsule by mouth 2 (Two) Times a Day. 14 capsule 0   • nitrofurantoin, macrocrystal-monohydrate, (Macrobid) 100 MG capsule Take 1 capsule by mouth Daily. 56 capsule 3   • ondansetron (ZOFRAN) 8 MG tablet TAKE 1 TABLET BY MOUTH 3 TIMES A DAY AS NEEDED FOR NAUSEA OR VOMITING. 30 tablet 5   • prochlorperazine (COMPAZINE) 10 MG tablet Take 1 tablet by mouth Every 6 (Six) Hours As Needed for Nausea or Vomiting. 30 tablet 5   • rosuvastatin (CRESTOR) 20 MG tablet Take 1 tablet by mouth Every Night. 30 tablet 3   • sennosides-docusate (senna-docusate sodium) 8.6-50 MG per tablet Take 2 tablets by mouth 2 (Two) Times a Day. 120 tablet 4   • SUMAtriptan (IMITREX) 50 MG tablet      • tamoxifen (NOLVADEX) 20 MG chemo tablet TAKE 1 TABLET BY MOUTH DAILY. 30 tablet 1   • Trijardy XR 25-5-1000 MG tablet  sustained-release 24 hour TAKE 1 TABLET BY MOUTH EVERY MORNING. 30 tablet 5   • venlafaxine XR (EFFEXOR-XR) 150 MG 24 hr capsule Take 1 capsule by mouth Daily. 30 capsule 0   • vitamin D (ERGOCALCIFEROL) 1.25 MG (31093 UT) capsule capsule Take 1 capsule by mouth 1 (One) Time Per Week. 4 capsule 3     No current facility-administered medications for this visit.       Mental Status Exam:   Hygiene:   good  Cooperation:  Cooperative  Eye Contact:  Good  Psychomotor Behavior:  Appropriate  Affect:  Appropriate  Hopelessness: Denies  Speech:  Normal  Thought Process:  Goal directed and Linear  Thought Content:  Normal  Suicidal:  None  Homicidal:  None  Hallucinations:  None  Delusion:  None  Memory:  Intact  Orientation:  Person, Place, Time and Situation  Reliability:  fair  Insight:  Fair  Judgement:  Fair  Impulse Control:  Fair  Physical/Medical Issues:  No       PHQ-9 Depression Screening  Little interest or pleasure in doing things? 2   Feeling down, depressed, or hopeless? 2   Trouble falling or staying asleep, or sleeping too much? 3   Feeling tired or having little energy? 3   Poor appetite or overeating? 3   Feeling bad about yourself - or that you are a failure or have let yourself or your family down? 3   Trouble concentrating on things, such as reading the newspaper or watching television? 3   Moving or speaking so slowly that other people could have noticed? Or the opposite - being so fidgety or restless that you have been moving around a lot more than usual? 3   Thoughts that you would be better off dead, or of hurting yourself in some way? 3   PHQ-9 Total Score 25   If you checked off any problems, how difficult have these problems made it for you to do your work, take care of things at home, or get along with other people? Extremely dIfficult               Assessment/Plan   Diagnoses and all orders for this visit:    1. High risk medication use (Primary)  -     Urine Drug Screen - Urine, Clean Catch;  Future    2. Severe episode of recurrent major depressive disorder, with psychotic features (HCC)  -     venlafaxine XR (EFFEXOR-XR) 150 MG 24 hr capsule; Take 1 capsule by mouth Daily.  Dispense: 30 capsule; Refill: 0  -     Brexpiprazole (Rexulti) 3 MG tablet; Take 1 tablet by mouth Daily.  Dispense: 30 tablet; Refill: 0    3. Generalized anxiety disorder  -     venlafaxine XR (EFFEXOR-XR) 150 MG 24 hr capsule; Take 1 capsule by mouth Daily.  Dispense: 30 capsule; Refill: 0  -     Brexpiprazole (Rexulti) 3 MG tablet; Take 1 tablet by mouth Daily.  Dispense: 30 tablet; Refill: 0  -     LORazepam (Ativan) 1 MG tablet; Take 1 tablet by mouth 3 (Three) Times a Day As Needed for Anxiety.  Dispense: 90 tablet; Refill: 0    4. Other obsessive-compulsive disorders  -     venlafaxine XR (EFFEXOR-XR) 150 MG 24 hr capsule; Take 1 capsule by mouth Daily.  Dispense: 30 capsule; Refill: 0            Discussed medication options with patient. Cont. Effexor XR 150mg daily for depression, anxiety, and OCD. Cont. Rexulti 3mg daily for depression and anxiety. Cont. Ativan 1mg three times daily as needed for anxiety. Discussed with patient to contact insurance or bring in letter from insurance regarding Effexor and we adjust meds as we need to. Patient acknowledged and agreed. Reviewed the risks, benefits, and side effects of the medications; patient acknowledged and verbally consented. Patient is being prescribed a controlled substance as part of treatment plan. Patient has been educated of appropriate use of the medications, including risk of somnolence, limited ability to drive and/or work safely, and potential for dependence, respiratory depression and overdose. Patient is also informed that the medication are to be used by the patient only- avoid any combined use of ETOH or other substances unless prescribed. UDS destiny.   [facility] Patient Controlled Substance Report (from 11/18/2020 to 11/18/2021)    Dispensed  Strength Quantity  Days Supply Provider Pharmacy   11/01/2021 Lorazepam 1MG 90 each 30 [facility]   10/21/2021 Gabapentin 100MG 60 each 30 URMILA, CEL[facility]   10/21/2021 Lorazepam 1MG 30 each 10 [facility]   10/05/2021 Lorazepam 1MG 30 each 15 [facility]   09/16/2021 Gabapentin 100MG 60 each 30 URMILA[facility]   09/09/2021 Diazepam 10MG 60 each 30 [facility]   08/12/2021 Diazepam 10MG 60 each 30 [facility]   07/12/2021 Diazepam 10MG 60 each 30 [facility]   07/08/2021 Gabapentin 300MG 90 each 30 MICHNAHARPREET [facility]   06/15/2021 Gabapentin 100MG 60 each 30 WASHINGTON, VINCENT [facility]   06/10/2021 Diazepam 10MG 45 each 23 [facility]OUD[facility]   05/11/2021 Diazepam 10MG 45 each 23 [facility]   04/09/2021 Diazepam 5MG 60 each 30 [facility]   03/12/2021 Diazepam 5MG 30 each 30 [facility]   02/22/2021 Diazepam 5MG 20 each 20 [facility]   02/12/2021 Diazepam 5MG 5 each 5 [facility]   01/14/2021 Diazepam 5MG 30 each 30 STEPHANIE ALCANTARA [facility]   12/15/2020 Diazepam 5MG 30 each 30 STEPHANIE [facility]CANTARA [facility]           Patient is agreeable to call the office with any questions, concerns, or worsening of symptoms. Patient is aware to call 911 or go to the nearest ER should begin having SI/HI.          Follow up in four weeks      Errors in dictation may reflect use of voice recognition software and not all errors in transcription may have been detected prior to signing.              This document has been electronically signed by NIDA Vasquez   November 18, 2021 09:38 EST

## 2021-11-22 ENCOUNTER — TELEPHONE (OUTPATIENT)
Dept: FAMILY MEDICINE CLINIC | Facility: CLINIC | Age: 46
End: 2021-11-22

## 2021-11-22 RX ORDER — SULFAMETHOXAZOLE AND TRIMETHOPRIM 800; 160 MG/1; MG/1
1 TABLET ORAL 2 TIMES DAILY
Qty: 20 TABLET | Refills: 0 | Status: SHIPPED | OUTPATIENT
Start: 2021-11-22 | End: 2021-12-03

## 2021-11-22 NOTE — TELEPHONE ENCOUNTER
Pt is aware of this information.       ----- Message from Markus Menjivar MD sent at 11/22/2021  2:06 PM EST -----  Emailed script for bactrim\  ----- Message -----  From: Anne Haynes MA  Sent: 11/22/2021   1:59 PM EST  To: Markus Menjivar MD    Pt called and stated that she has a sore throat and an ear infection. She wanted to know if you could send her in some antibiotics?

## 2021-11-29 DIAGNOSIS — F33.1 MODERATE EPISODE OF RECURRENT MAJOR DEPRESSIVE DISORDER (HCC): ICD-10-CM

## 2021-11-29 DIAGNOSIS — F41.1 GENERALIZED ANXIETY DISORDER: ICD-10-CM

## 2021-11-30 RX ORDER — MIRTAZAPINE 15 MG/1
TABLET, FILM COATED ORAL
Qty: 30 TABLET | Refills: 0 | Status: SHIPPED | OUTPATIENT
Start: 2021-11-30 | End: 2021-12-17

## 2021-12-03 ENCOUNTER — OFFICE VISIT (OUTPATIENT)
Dept: FAMILY MEDICINE CLINIC | Facility: CLINIC | Age: 46
End: 2021-12-03

## 2021-12-03 ENCOUNTER — HOSPITAL ENCOUNTER (OUTPATIENT)
Dept: CARDIOLOGY | Facility: HOSPITAL | Age: 46
Discharge: HOME | End: 2021-12-03
Admitting: NURSE PRACTITIONER

## 2021-12-03 VITALS
HEIGHT: 64 IN | TEMPERATURE: 97.7 F | DIASTOLIC BLOOD PRESSURE: 70 MMHG | HEART RATE: 92 BPM | SYSTOLIC BLOOD PRESSURE: 118 MMHG | BODY MASS INDEX: 41.66 KG/M2 | OXYGEN SATURATION: 97 % | WEIGHT: 244 LBS

## 2021-12-03 DIAGNOSIS — J45.20 MILD INTERMITTENT ASTHMA WITHOUT COMPLICATION: Chronic | ICD-10-CM

## 2021-12-03 DIAGNOSIS — E11.42 DM TYPE 2 WITH DIABETIC PERIPHERAL NEUROPATHY (HCC): Chronic | ICD-10-CM

## 2021-12-03 DIAGNOSIS — J30.9 CHRONIC ALLERGIC RHINITIS: Chronic | ICD-10-CM

## 2021-12-03 DIAGNOSIS — E78.5 DYSLIPIDEMIA: Chronic | ICD-10-CM

## 2021-12-03 DIAGNOSIS — R60.0 UNILATERAL EDEMA OF LOWER EXTREMITY: ICD-10-CM

## 2021-12-03 DIAGNOSIS — J32.4 PANSINUSITIS, UNSPECIFIED CHRONICITY: ICD-10-CM

## 2021-12-03 DIAGNOSIS — N39.0 RECURRENT UTI: ICD-10-CM

## 2021-12-03 DIAGNOSIS — Z86.718 HISTORY OF DVT (DEEP VEIN THROMBOSIS): ICD-10-CM

## 2021-12-03 DIAGNOSIS — R60.0 BILATERAL LEG EDEMA: Primary | ICD-10-CM

## 2021-12-03 PROCEDURE — 99214 OFFICE O/P EST MOD 30 MIN: CPT | Performed by: NURSE PRACTITIONER

## 2021-12-03 PROCEDURE — 93970 EXTREMITY STUDY: CPT

## 2021-12-03 PROCEDURE — 93970 EXTREMITY STUDY: CPT | Performed by: RADIOLOGY

## 2021-12-03 RX ORDER — AZELASTINE 1 MG/ML
SPRAY, METERED NASAL
Qty: 1 EACH | Refills: 2 | Status: SHIPPED | OUTPATIENT
Start: 2021-12-03 | End: 2022-01-20

## 2021-12-03 RX ORDER — NITROFURANTOIN 25; 75 MG/1; MG/1
100 CAPSULE ORAL DAILY
Qty: 56 CAPSULE | Refills: 3
Start: 2021-12-03 | End: 2022-08-17

## 2021-12-03 RX ORDER — FLUCONAZOLE 150 MG/1
150 TABLET ORAL DAILY
Qty: 3 TABLET | Refills: 0 | Status: SHIPPED | OUTPATIENT
Start: 2021-12-03 | End: 2021-12-06

## 2021-12-03 RX ORDER — ASPIRIN 81 MG/1
162 TABLET ORAL DAILY
Qty: 60 TABLET | Refills: 2 | Status: SHIPPED | OUTPATIENT
Start: 2021-12-03 | End: 2022-02-18

## 2021-12-03 RX ORDER — LEVOCETIRIZINE DIHYDROCHLORIDE 5 MG/1
5 TABLET, FILM COATED ORAL EVERY EVENING
Qty: 30 TABLET | Refills: 2 | Status: SHIPPED | OUTPATIENT
Start: 2021-12-03 | End: 2022-01-20

## 2021-12-03 RX ORDER — ALBUTEROL SULFATE 90 UG/1
2 AEROSOL, METERED RESPIRATORY (INHALATION) EVERY 4 HOURS PRN
Qty: 18 G | Refills: 5 | Status: SHIPPED | OUTPATIENT
Start: 2021-12-03 | End: 2022-05-09

## 2021-12-03 RX ORDER — CEFDINIR 300 MG/1
300 CAPSULE ORAL 2 TIMES DAILY
Qty: 20 CAPSULE | Refills: 0 | Status: SHIPPED | OUTPATIENT
Start: 2021-12-03 | End: 2021-12-13

## 2021-12-03 NOTE — PROGRESS NOTES
History of Present Illness  Nivia is a 45 y/o female who presents to the clinic today for follow up pertaining her recent [facility] ED visit on 12/01/2021 for dizziness. In addition, she has been diagnosed with DM, type 2 and Dyslipidemia. In addition, she has been diagnosed with right breast Cancer and has undergone bilateral mastectomy and chemotherapy. Nivia does report ear and sinus pain/pressure. She has been taking Bactrim which is not helping her. In addition, she has noticed her bilateral  lower leg with edema with left greater than right.      Upper Respiratory ISymptoms    The current episode started 1 to 4 weeks ago. The problem has been gradually worsening. There has been no fever. Associated symptoms include congestion, coughing, ear pain, rhinorrhea and sinus pain. Pertinent negatives include no diarrhea, headaches, nausea, sore throat or wheezing. She has tried antihistamine and Bactrim for the symptoms without adequate releif.     Diabetes   She has type 2 diabetes mellitus. The initial diagnosis of diabetes was made 20 years ago. Associated symptoms include  fatigue and peripheral neuropathy. Pertinent negatives for diabetes include no foot ulcerations. Diabetic complications include peripheral neuropathy.  Risk factors for coronary artery disease include post-menopausal, stress and dyslipidemia. She is currently utilizing insulin pump therapy and CGM. She did stop weekly Ozempic due to nausea. She is currently taking Trijardy.  She has had a previous visit with a dietitian An ACE inhibitor/angiotensin II receptor blocker is not  being taken at this time..      Lab Results   Component Value Date    HGBA1C 8.30 (H) 03/12/2021      Lab Results   Component Value Date    HGBA1C 7.7 (H) 09/28/2021     Dyslipidemia   The current episode started more than 1 year ago. Pertinent negatives include no chest pain or shortness of breath. She is taking Crestor 40 mg.   Lab Results   Component Value Date    CHLPL  174 09/28/2021    CHLPL 161 03/12/2021    CHLPL 168 05/13/2020     Lab Results   Component Value Date    TRIG 382 (H) 09/28/2021    TRIG 211 (H) 03/12/2021    TRIG 240 (H) 11/17/2020     Lab Results   Component Value Date    HDL 28 (L) 09/28/2021    HDL 32 (L) 03/12/2021    HDL 36 (L) 11/17/2020     Lab Results   Component Value Date    LDL 84 09/28/2021    LDL 93 03/12/2021     (H) 11/17/2020     The following portions of the patient's history were reviewed and updated as appropriate: allergies, current medications, past family history, past medical history, past social history, past surgical history and problem list.    Review of Systems   Constitutional: Positive for fatigue. Negative for activity change, appetite change, chills, fever and unexpected weight change.   HENT: Positive for congestion, ear pain, postnasal drip, rhinorrhea, sinus pressure and sinus pain. Negative for tinnitus and trouble swallowing.    Eyes: Negative for pain, discharge, redness and itching.   Respiratory: Positive for cough. Negative for shortness of breath and wheezing.    Cardiovascular: Positive for leg swelling (Left Lower). Negative for palpitations.   Gastrointestinal: Positive for nausea. Negative for vomiting.   Endocrine: Negative for cold intolerance, heat intolerance, polydipsia, polyphagia and polyuria.   Musculoskeletal: Positive for arthralgias and back pain.   Skin: Negative for color change.   Allergic/Immunologic: Positive for environmental allergies.   Neurological: Positive for dizziness and headaches. Negative for tremors, speech difficulty, weakness and light-headedness.   Hematological: Negative for adenopathy.   Psychiatric/Behavioral: Positive for dysphoric mood and sleep disturbance. Negative for confusion and decreased concentration. The patient is not nervous/anxious.    All other systems reviewed and are negative.    Vital signs:  /70 (BP Location: Right arm, Patient Position: Sitting, Cuff  "Size: Adult)   Pulse 92   Temp 97.7 °F (36.5 °C) (Temporal)   Ht 162.6 cm (64.02\")   Wt 111 kg (244 lb)   SpO2 97%   BMI 41.86 kg/m²     Physical Exam  Vitals reviewed.   Constitutional:       General: She is not in acute distress.     Appearance: She is well-developed.      Interventions: Face mask in place.   HENT:      Head: Normocephalic.      Right Ear: A middle ear effusion is present. Tympanic membrane is bulging. Tympanic membrane is not erythematous.      Left Ear: A middle ear effusion is present. Tympanic membrane is bulging. Tympanic membrane is not erythematous.      Nose: Congestion present.      Right Turbinates: Enlarged and swollen.      Left Turbinates: Enlarged and swollen.      Right Sinus: Maxillary sinus tenderness and frontal sinus tenderness present.      Left Sinus: Maxillary sinus tenderness and frontal sinus tenderness present.   Eyes:      General:         Right eye: No discharge.         Left eye: No discharge.      Conjunctiva/sclera: Conjunctivae normal.      Pupils: Pupils are equal, round, and reactive to light.   Neck:      Vascular: No JVD.   Cardiovascular:      Rate and Rhythm: Normal rate and regular rhythm.      Heart sounds: Normal heart sounds. No murmur heard.  No friction rub.   Pulmonary:      Effort: Pulmonary effort is normal. No respiratory distress.      Breath sounds: Normal breath sounds. No decreased breath sounds, wheezing, rhonchi or rales.   Abdominal:      General: Bowel sounds are normal. There is no distension.      Palpations: Abdomen is soft.      Tenderness: There is no abdominal tenderness. There is no guarding or rebound.   Musculoskeletal:         General: No tenderness.      Cervical back: Neck supple.      Right lower leg: Edema (Mild) present.      Left lower le+ Edema present.   Lymphadenopathy:      Cervical: No cervical adenopathy.   Skin:     General: Skin is warm and dry.   Neurological:      Mental Status: She is alert and oriented to " person, place, and time.      Cranial Nerves: Cranial nerves are intact.   Psychiatric:         Mood and Affect: Mood and affect normal.         Speech: Speech normal.         Behavior: Behavior is cooperative.         Thought Content: Thought content normal.         Cognition and Memory: Cognition and memory normal.         Judgment: Judgment normal.       Result Review : [facility] ED visit 12/01/2021  Patient's Body mass index is 41.86 kg/m². indicating that she is obese (BMI >30). Obesity-related health conditions include the following: hypertension, diabetes mellitus, dyslipidemias, GERD and osteoarthritis. Obesity is unchanged. BMI is is above average; BMI management plan is completed. We discussed portion control and increasing exercise..     Assessment/Plan     Diagnoses and all orders for this visit:    1. Bilateral leg edema (Primary)  Comments:  Stat Venous Doppler ordered which was negative for DVT She will restart ASA   Orders:  -     US Venous Doppler Lower Extremity Bilateral (duplex); Future  -     aspirin (aspirin) 81 MG EC tablet; Take 2 tablets by mouth Daily.  Dispense: 60 tablet; Refill: 2    2. Pansinusitis, unspecified chronicity  Comments:  Bactrim changed to Cefdinir   Orders:  -     cefdinir (OMNICEF) 300 MG capsule; Take 1 capsule by mouth 2 (Two) Times a Day for 10 days.  Dispense: 20 capsule; Refill: 0    3. DM type 2 with diabetic peripheral neuropathy (HCC)  Comments:  Stable with insulin pump and oral agents which she will continue     4. Dyslipidemia  Comments:  Continue Crestor     5. Mild intermittent asthma without complication  Comments:  Needing refills of her asthma medications  Orders:  -     albuterol sulfate HFA (Ventolin HFA) 108 (90 Base) MCG/ACT inhaler; Inhale 2 puffs Every 4 (Four) Hours As Needed (shortness of breath).  Dispense: 18 g; Refill: 5  -     Breo Ellipta 200-25 MCG/INH inhaler; Inhale 1 puff Daily.  Dispense: 1 each; Refill: 5    6. Chronic allergic  rhinitis  Comments:  Claritin changed to Xyzal   Orders:  -     levocetirizine (Xyzal) 5 MG tablet; Take 1 tablet by mouth Every Evening.  Dispense: 30 tablet; Refill: 2    7. History of DVT (deep vein thrombosis)  Comments:  Venous Doppler ordered due to concerns and history    8. Recurrent UTI  Comments:  Urologist had restarted daily Macrobid  Orders:  -     nitrofurantoin, macrocrystal-monohydrate, (Macrobid) 100 MG capsule; Take 1 capsule by mouth Daily.  Dispense: 56 capsule; Refill: 3    Other orders  -     azelastine (ASTELIN) 0.1 % nasal spray; 2 sprays both nostrils twice daily as needed  Dispense: 1 each; Refill: 2  -     fluconazole (DIFLUCAN) 150 MG tablet; Take 1 tablet by mouth Daily for 3 doses.  Dispense: 3 tablet; Refill: 0      Follow Up In January  Findings and recommendations discussed with Nviia. Reviewed treatment options. Counseled regarding supportive care measures. She will follow up in January sooner if problems/concerns occur.  Nivia was given instructions and counseling regarding her condition or for health maintenance advice. Please see specific information pulled into the AVS if appropriate    This document has been electronically signed by:

## 2021-12-08 ENCOUNTER — APPOINTMENT (OUTPATIENT)
Dept: ONCOLOGY | Facility: HOSPITAL | Age: 46
End: 2021-12-08

## 2021-12-15 ENCOUNTER — INFUSION (OUTPATIENT)
Dept: ONCOLOGY | Facility: HOSPITAL | Age: 46
End: 2021-12-15

## 2021-12-15 ENCOUNTER — OFFICE VISIT (OUTPATIENT)
Dept: ONCOLOGY | Facility: CLINIC | Age: 46
End: 2021-12-15

## 2021-12-15 VITALS
DIASTOLIC BLOOD PRESSURE: 77 MMHG | BODY MASS INDEX: 42.2 KG/M2 | OXYGEN SATURATION: 97 % | TEMPERATURE: 97.3 F | WEIGHT: 246 LBS | SYSTOLIC BLOOD PRESSURE: 118 MMHG | RESPIRATION RATE: 18 BRPM | HEART RATE: 94 BPM

## 2021-12-15 VITALS
TEMPERATURE: 97.3 F | RESPIRATION RATE: 18 BRPM | HEART RATE: 94 BPM | WEIGHT: 246 LBS | OXYGEN SATURATION: 97 % | SYSTOLIC BLOOD PRESSURE: 118 MMHG | DIASTOLIC BLOOD PRESSURE: 77 MMHG | BODY MASS INDEX: 42.2 KG/M2

## 2021-12-15 DIAGNOSIS — Z17.1 MALIGNANT NEOPLASM OF UPPER-OUTER QUADRANT OF RIGHT BREAST IN FEMALE, ESTROGEN RECEPTOR NEGATIVE (HCC): ICD-10-CM

## 2021-12-15 DIAGNOSIS — Z80.0 FAMILY HISTORY OF COLON CANCER: ICD-10-CM

## 2021-12-15 DIAGNOSIS — R23.2 HOT FLASHES DUE TO TAMOXIFEN: ICD-10-CM

## 2021-12-15 DIAGNOSIS — C50.411 MALIGNANT NEOPLASM OF UPPER-OUTER QUADRANT OF RIGHT BREAST IN FEMALE, ESTROGEN RECEPTOR NEGATIVE (HCC): ICD-10-CM

## 2021-12-15 DIAGNOSIS — M75.01 ADHESIVE CAPSULITIS OF RIGHT SHOULDER: ICD-10-CM

## 2021-12-15 DIAGNOSIS — Z95.828 PORT-A-CATH IN PLACE: Primary | ICD-10-CM

## 2021-12-15 DIAGNOSIS — I89.0 LYMPHEDEMA: Primary | ICD-10-CM

## 2021-12-15 DIAGNOSIS — T45.1X5A HOT FLASHES DUE TO TAMOXIFEN: ICD-10-CM

## 2021-12-15 DIAGNOSIS — F32.A DEPRESSION, UNSPECIFIED DEPRESSION TYPE: ICD-10-CM

## 2021-12-15 DIAGNOSIS — L98.9 SKIN LESION: ICD-10-CM

## 2021-12-15 DIAGNOSIS — F41.9 ANXIETY: ICD-10-CM

## 2021-12-15 LAB
ALBUMIN SERPL-MCNC: 4.04 G/DL (ref 3.5–5.2)
ALBUMIN/GLOB SERPL: 1.3 G/DL
ALP SERPL-CCNC: 112 U/L (ref 39–117)
ALT SERPL W P-5'-P-CCNC: 21 U/L (ref 1–33)
ANION GAP SERPL CALCULATED.3IONS-SCNC: 11.4 MMOL/L (ref 5–15)
AST SERPL-CCNC: 19 U/L (ref 1–32)
BASOPHILS # BLD AUTO: 0.07 10*3/MM3 (ref 0–0.2)
BASOPHILS NFR BLD AUTO: 0.6 % (ref 0–1.5)
BILIRUB SERPL-MCNC: 0.2 MG/DL (ref 0–1.2)
BUN SERPL-MCNC: 13 MG/DL (ref 6–20)
BUN/CREAT SERPL: 13.4 (ref 7–25)
CALCIUM SPEC-SCNC: 8.9 MG/DL (ref 8.6–10.5)
CHLORIDE SERPL-SCNC: 108 MMOL/L (ref 98–107)
CO2 SERPL-SCNC: 21.6 MMOL/L (ref 22–29)
CREAT SERPL-MCNC: 0.97 MG/DL (ref 0.57–1)
DEPRECATED RDW RBC AUTO: 42.7 FL (ref 37–54)
EOSINOPHIL # BLD AUTO: 0.14 10*3/MM3 (ref 0–0.4)
EOSINOPHIL NFR BLD AUTO: 1.3 % (ref 0.3–6.2)
ERYTHROCYTE [DISTWIDTH] IN BLOOD BY AUTOMATED COUNT: 13.7 % (ref 12.3–15.4)
GFR SERPL CREATININE-BSD FRML MDRD: 61 ML/MIN/1.73
GLOBULIN UR ELPH-MCNC: 3.1 GM/DL
GLUCOSE SERPL-MCNC: 197 MG/DL (ref 65–99)
HCT VFR BLD AUTO: 42.4 % (ref 34–46.6)
HGB BLD-MCNC: 13.3 G/DL (ref 12–15.9)
IMM GRANULOCYTES # BLD AUTO: 0.03 10*3/MM3 (ref 0–0.05)
IMM GRANULOCYTES NFR BLD AUTO: 0.3 % (ref 0–0.5)
LYMPHOCYTES # BLD AUTO: 3.83 10*3/MM3 (ref 0.7–3.1)
LYMPHOCYTES NFR BLD AUTO: 35.2 % (ref 19.6–45.3)
MCH RBC QN AUTO: 26.9 PG (ref 26.6–33)
MCHC RBC AUTO-ENTMCNC: 31.4 G/DL (ref 31.5–35.7)
MCV RBC AUTO: 85.7 FL (ref 79–97)
MONOCYTES # BLD AUTO: 0.65 10*3/MM3 (ref 0.1–0.9)
MONOCYTES NFR BLD AUTO: 6 % (ref 5–12)
NEUTROPHILS NFR BLD AUTO: 56.6 % (ref 42.7–76)
NEUTROPHILS NFR BLD AUTO: 6.16 10*3/MM3 (ref 1.7–7)
NRBC BLD AUTO-RTO: 0 /100 WBC (ref 0–0.2)
PLATELET # BLD AUTO: 309 10*3/MM3 (ref 140–450)
PMV BLD AUTO: 9.4 FL (ref 6–12)
POTASSIUM SERPL-SCNC: 3.5 MMOL/L (ref 3.5–5.2)
PROT SERPL-MCNC: 7.1 G/DL (ref 6–8.5)
RBC # BLD AUTO: 4.95 10*6/MM3 (ref 3.77–5.28)
SODIUM SERPL-SCNC: 141 MMOL/L (ref 136–145)
WBC NRBC COR # BLD: 10.88 10*3/MM3 (ref 3.4–10.8)

## 2021-12-15 PROCEDURE — 25010000002 HEPARIN LOCK FLUSH PER 10 UNITS: Performed by: INTERNAL MEDICINE

## 2021-12-15 PROCEDURE — 36591 DRAW BLOOD OFF VENOUS DEVICE: CPT

## 2021-12-15 PROCEDURE — 85025 COMPLETE CBC W/AUTO DIFF WBC: CPT

## 2021-12-15 PROCEDURE — 80053 COMPREHEN METABOLIC PANEL: CPT

## 2021-12-15 PROCEDURE — 99214 OFFICE O/P EST MOD 30 MIN: CPT | Performed by: NURSE PRACTITIONER

## 2021-12-15 RX ORDER — SODIUM CHLORIDE 0.9 % (FLUSH) 0.9 %
10 SYRINGE (ML) INJECTION AS NEEDED
Status: DISCONTINUED | OUTPATIENT
Start: 2021-12-15 | End: 2021-12-15 | Stop reason: HOSPADM

## 2021-12-15 RX ORDER — SODIUM CHLORIDE 0.9 % (FLUSH) 0.9 %
10 SYRINGE (ML) INJECTION AS NEEDED
Status: CANCELLED | OUTPATIENT
Start: 2022-02-03

## 2021-12-15 RX ORDER — HEPARIN SODIUM (PORCINE) LOCK FLUSH IV SOLN 100 UNIT/ML 100 UNIT/ML
500 SOLUTION INTRAVENOUS AS NEEDED
Status: DISCONTINUED | OUTPATIENT
Start: 2021-12-15 | End: 2021-12-15 | Stop reason: HOSPADM

## 2021-12-15 RX ORDER — HEPARIN SODIUM (PORCINE) LOCK FLUSH IV SOLN 100 UNIT/ML 100 UNIT/ML
500 SOLUTION INTRAVENOUS AS NEEDED
Status: CANCELLED | OUTPATIENT
Start: 2022-02-03

## 2021-12-15 RX ADMIN — SODIUM CHLORIDE, PRESERVATIVE FREE 500 UNITS: 5 INJECTION INTRAVENOUS at 14:50

## 2021-12-15 RX ADMIN — SODIUM CHLORIDE, PRESERVATIVE FREE 10 ML: 5 INJECTION INTRAVENOUS at 14:50

## 2021-12-15 NOTE — PROGRESS NOTES
NAME: Nivia Bernstein    : 1975    DATE:  12/15/2021    DIAGNOSIS:   Stage IA ([id number]) moderately differentiated invasive ductal carcinoma of the R breast with associated DCIS.  Tumor was 10 mm in maximal dimension.  0/10 SLN involved. ER negative, TX 15% + (1+), HER-2/cat negative. Mammaprint high risk.    CHIEF COMPLAINT:  Follow up Breast cancer    TREATMENT HISTORY:  1. Initially planned to give 4 cycles to Taxotere/Cytoxan, during 1st infusion of Taxotere on 2020 patient developed allergic reaction. Therefore, Taxotere was discontinued and regimen changed to DD AC.    2.      3.  Started Tamoxifen 12-    HISTORY OF PRESENT ILLNESS:   Nivia Bernstein is a very pleasant 46 y.o. female who who is being seen today at the request of Sheila Garza MD for evaluation and treatment of breast cancer. She did not notice a palpable lump, but was recommended to have a screening mammogram by her PCP. A nodule in the right upper quadrant of the R breast was noted. The mass measured 0.8 cm on ultrasound. Biopsy was consistent with a moderately differentiated invasive ductal carcinoma, ER negative, TX weakly (15%) positive, and HER-2/cat negative. She underwent bilateral mastectomy with sentinel lymph node biopsy with Dr. Garza on 20. Pathology from this showed a moderately differentiated invasive ductal carcinoma with associated intermediate grade DCIS, negative margins.  0/10 /SLN involved.  Mammaprint was high risk. She was referred to our clinic and is here today with her  for discussion of further treatment options.      Ms. Bernstein is healing well from the surgery.  She did develop a seroma, which was drained by Dr. Garza yesterday. She reports muscular chest discomfort r/t the procedure, but otherwise denies any other symptoms including fevers, chills, significant weight changes, shortness of breath, abdominal pain, n/v/d/c, bony pain or headaches.    Interval History:  Ms. Bernstein  presents today for follow up of breast cancer. She continues to take Tamoxifen daily and is tolerating this well without any noticeable side effects. She struggles with bilateral arm pain and was seen by ortho who recommended PT which she reports she completed and did not help. She states that the pain feels like her skin is being pulled tight and also irritates when her arms are at her sides. She has also developed a small skin lesion on upper left arm that is itchy. She has previously followed with dermatology and is requesting a referral today. She denies any other complaints today.      PAST MEDICAL HISTORY:  Past Medical History:   Diagnosis Date   • Altered mental status 10/16/2017   • Anxiety and depression 3/31/2017   • Arthritis    • Asthma    • Elevated alkaline phosphatase level 10/16/2017   • Enlarged liver    • GERD (gastroesophageal reflux disease)    • Heart murmur    • Hypokalemia 10/16/2017   • Mitral valve prolapse    • Neuropathy     related to diabetes   • Obesity 3/31/2017   • OCD (obsessive compulsive disorder) 10/16/2017   • Osteoporosis    • PONV (postoperative nausea and vomiting)    • Renal insufficiency 10/16/2017   • Right breast cancer with malignant cells in regional lymph nodes no greater than 0.2 mm and no more than 200 cells (HCC) 8/13/2020   • TIA (transient ischemic attack)     4 TIMES       PAST SURGICAL HISTORY:  Past Surgical History:   Procedure Laterality Date   • APPENDECTOMY     • CARPAL TUNNEL RELEASE     • CHOLECYSTECTOMY     • COLONOSCOPY N/A 5/5/2021    Procedure: COLONOSCOPY WITH BIOPSY;  Surgeon: Vickie Herrera MD;  Location: [facility];  Service: Gastroenterology;  Laterality: N/A;   • FINGER FUSION Left     3rd   • HYSTERECTOMY  2011    removed due to an ovarian cyst and dysmenorrhia. No cancer noted. Complete   • KNEE ARTHROSCOPY Right    • MASTECTOMY Left 8/18/2020    Procedure: Left mastectomy;  Surgeon: Sheila Garza MD;  Location: [facility] OR;   Service: General;  Laterality: Left;   • MASTECTOMY WITH SENTINEL NODE BIOPSY AND AXILLARY NODE DISSECTION Right 8/18/2020    Procedure: Right BREAST MASTECTOMY WITH SENTINEL NODE BIOPSY;  Surgeon: Sheila Garza MD;  Location: [facility];  Service: General;  Laterality: Right;   • PORTACATH PLACEMENT         SOCIAL HISTORY:  Social History     Socioeconomic History   • Marital status:    Tobacco Use   • Smoking status: Never Smoker   • Smokeless tobacco: Never Used   Vaping Use   • Vaping Use: Never used   Substance and Sexual Activity   • Alcohol use: No   • Drug use: No   • Sexual activity: Yes     Partners: Male         FAMILY HISTORY:  Family History   Problem Relation Age of Onset   • Diabetes Mother    • Hypertension Mother    • Diabetes Father    • Heart disease Father    • Alcohol abuse Father    • Seizures Father    • Hypertension Father    • No Known Problems Brother    • No Known Problems Daughter    • Bone cancer Son    • Suicide Attempts Cousin    • Stomach cancer Cousin         maternal first cousin   • Breast cancer Paternal Aunt 52   • Diabetes Maternal Grandmother    • Diabetes Maternal Grandfather    • Lung cancer Maternal Grandfather    • Heart disease Paternal Grandmother    • Diabetes Paternal Grandmother    • Heart disease Paternal Grandfather    • Diabetes Paternal Grandfather    • Ovarian cancer Maternal Aunt    • Lung cancer Maternal Uncle    • Colon cancer Maternal Uncle    • Cancer Maternal Uncle         unknown type     MEDICATIONS:  The current medication list was reviewed in the EMR    Current Outpatient Medications:   •  albuterol sulfate HFA (Ventolin HFA) 108 (90 Base) MCG/ACT inhaler, Inhale 2 puffs Every 4 (Four) Hours As Needed (shortness of breath)., Disp: 18 g, Rfl: 5  •  aspirin (aspirin) 81 MG EC tablet, Take 2 tablets by mouth Daily., Disp: 60 tablet, Rfl: 2  •  azelastine (ASTELIN) 0.1 % nasal spray, 2 sprays both nostrils twice daily as needed, Disp: 1 each, Rfl:  2  •  Breo Ellipta 200-25 MCG/INH inhaler, Inhale 1 puff Daily., Disp: 1 each, Rfl: 5  •  Brexpiprazole (Rexulti) 3 MG tablet, Take 1 tablet by mouth Daily., Disp: 30 tablet, Rfl: 0  •  calcium carbonate (OS-JERROD) 600 MG tablet, Take 1 tablet by mouth 2 (Two) Times a Day With Meals., Disp: 60 tablet, Rfl: 5  •  cholecalciferol (Vitamin D) 25 MCG (1000 UT) tablet, Take 2 tablets by mouth Daily., Disp: 60 tablet, Rfl: 5  •  cloNIDine (CATAPRES) 0.1 MG tablet, Take 1 tablet by mouth 2 (Two) Times a Day., Disp: 60 tablet, Rfl: 5  •  diclofenac (VOLTAREN) 1 % gel gel, Apply 4 g topically to the appropriate area as directed 4 (Four) Times a Day As Needed (joint pain)., Disp: 500 g, Rfl: 5  •  Diclofenac Sodium (VOLTAREN) 1 % gel gel, , Disp: , Rfl:   •  docusate sodium 100 MG capsule, Take 100 mg by mouth 2 (Two) Times a Day., Disp: , Rfl:   •  gabapentin (Neurontin) 100 MG capsule, Take 1 capsule by mouth 2 (two) times a day., Disp: 60 capsule, Rfl: 3  •  HumaLOG 100 UNIT/ML injection, USE PER INSULIN PUMP. MAX DAILY DOSE  UNITS, Disp: 40 mL, Rfl: 0  •  hydrocortisone (ANUSOL-HC) 25 MG suppository, Insert 1 suppository into the rectum 2 (Two) Times a Day As Needed for Hemorrhoids (rectal discomfort)., Disp: 30 suppository, Rfl: 5  •  lansoprazole (PREVACID) 30 MG capsule, Take 1 capsule by mouth Daily., Disp: 90 capsule, Rfl: 3  •  levocetirizine (Xyzal) 5 MG tablet, Take 1 tablet by mouth Every Evening., Disp: 30 tablet, Rfl: 2  •  linaclotide (Linzess) 145 MCG capsule capsule, Take 1 capsule by mouth Daily. 30 minutes before meals on an empty stomach., Disp: 30 capsule, Rfl: 5  •  LORazepam (Ativan) 1 MG tablet, Take 1 tablet by mouth 3 (Three) Times a Day As Needed for Anxiety., Disp: 90 tablet, Rfl: 0  •  magic mouthwash oral suspension, Swish and swallow 5 mL four times  a day As Needed for Mucositis., Disp: 240 mL, Rfl: 1  •  mirtazapine (REMERON) 15 MG tablet, TAKE ONE TABLET BY MOUTH EVERY NIGHT, Disp: 30  tablet, Rfl: 0  •  nitrofurantoin, macrocrystal-monohydrate, (Macrobid) 100 MG capsule, Take 1 capsule by mouth Daily., Disp: 56 capsule, Rfl: 3  •  ondansetron (ZOFRAN) 8 MG tablet, TAKE 1 TABLET BY MOUTH 3 TIMES A DAY AS NEEDED FOR NAUSEA OR VOMITING., Disp: 30 tablet, Rfl: 5  •  prochlorperazine (COMPAZINE) 10 MG tablet, Take 1 tablet by mouth Every 6 (Six) Hours As Needed for Nausea or Vomiting., Disp: 30 tablet, Rfl: 5  •  rosuvastatin (CRESTOR) 20 MG tablet, Take 1 tablet by mouth Every Night., Disp: 30 tablet, Rfl: 3  •  sennosides-docusate (senna-docusate sodium) 8.6-50 MG per tablet, Take 2 tablets by mouth 2 (Two) Times a Day., Disp: 120 tablet, Rfl: 4  •  SUMAtriptan (IMITREX) 50 MG tablet, , Disp: , Rfl:   •  tamoxifen (NOLVADEX) 20 MG chemo tablet, TAKE 1 TABLET BY MOUTH DAILY., Disp: 30 tablet, Rfl: 1  •  Trijardy XR 25-5-1000 MG tablet sustained-release 24 hour, TAKE 1 TABLET BY MOUTH EVERY MORNING., Disp: 30 tablet, Rfl: 5  •  venlafaxine XR (EFFEXOR-XR) 150 MG 24 hr capsule, Take 1 capsule by mouth Daily., Disp: 30 capsule, Rfl: 0  •  vitamin D (ERGOCALCIFEROL) 1.25 MG (72366 UT) capsule capsule, Take 1 capsule by mouth 1 (One) Time Per Week., Disp: 4 capsule, Rfl: 3  No current facility-administered medications for this visit.    Facility-Administered Medications Ordered in Other Visits:   •  heparin injection 500 Units, 500 Units, Intravenous, PRN, Dejah Loco MD, 500 Units at 12/15/21 1450  •  sodium chloride 0.9 % flush 10 mL, 10 mL, Intravenous, PRN, Dejah Loco MD, 10 mL at 12/15/21 1450    ALLERGIES:    Allergies   Allergen Reactions   • Mobic [Meloxicam] Rash   • Penicillins Angioedema   • Taxotere [Docetaxel] Anaphylaxis     9 minutes into 1st infusion   • Novolog [Insulin Aspart] Hives     REVIEW OF SYSTEMS:    A comprehensive 14 point review of systems was performed.  Significant findings as mentioned above.  All other systems reviewed and are negative.      Physical  Exam  Vital Signs:   Visit Vitals  /77   Pulse 94   Temp 97.3 °F (36.3 °C) (Temporal)   Resp 18   Wt 112 kg (246 lb)   SpO2 97%   BMI 42.20 kg/m²     Pain Score    12/15/21 1445   PainSc:   6   PainLoc: Leg      ECOG score: 0   General: Awake, alert and oriented, in no acute distress.   HEENT:  PERRLA, EOM full, OP clear, mmm  Neck: No thyromegaly. No JVD.   Lungs: Lungs are clear to auscultation bilaterally, no crackles  Heart:  Regular rate and rhythm. No murmurs, rubs, or gallops.   Breast: S/p bilateral mastectomy and RSLNBx.  Surgical scars smooth and intact without nodularity. No axillary adenopathy on either side. Lymphedema bilaterally.  Abdomen: Soft, Non tender, non-distended, bowel sounds present.  Extremities: No cyanosis or edema.  She has very limited ROM bilaterally with decreased internal, external rotation as well as elevation and abduction. 1 cm x 1 cm pale red skin lesion on left upper extremity.  Neurologic: MS as above. Grossly non focal exam    PATHOLOGY:              IMAGING:  Bilateral screening mammogram 06-19-20  FINDINGS:    The breast tissue is heterogeneously dense.  New focal nodularity right upper outer breast that is about 14 cm from  the nipple.  Otherwise, no suspicious findings.     IMPRESSION:  New focal nodularity right upper outer breast that is about  14 cm from the nipple.     RECOMMENDATION:  Spot compression imaging.     BI-RADS CAT 0, INCOMPLETE.  The patient needs additional imaging.        Diagnostic R mammogram with R breast US 07-14-20  FINDINGS:  Additional mammographic imaging images of persistent  partially obscured oval mass in the posterior right upper outer  quadrant.     Right breast ultrasound: Focused sonographic evaluation of the right  breast demonstrates a 0.8 cm irregular hypoechoic mass with ill-defined  borders located at 10:00, 13 cm from the nipple. An additional area was  initially noted by the technologist in the 9:00 region which represents  an  isolated fat lobule.           IMPRESSION:  0.8 cm irregular mass in the right 10:00 region. Recommend  ultrasound-guided core biopsy.        BI-RADS CATEGORY:  4, SUSPICIOUS        RECOMMENDATION:  Recommend ultrasound guided core biopsy of the right  breast.        Bilateral breast MRI w/wo contrast 08-11-20  FINDINGS: There is minimal bilateral background contrast enhancement and  normal vascular enhancement.     There are no worrisome areas of contrast enhancement in the left breast.     In the right breast correlating to the biopsy proven malignancy is an  approximately 0.7 cm irregular rapidly enhancing mass in the posterior  right 10:00 region. Artifact from a postbiopsy marking clip is located  adjacent to this mass. A small linear area of enhancement extends  posterior to this mass approximately 1 cm. There are no additional  worrisome areas of contrast enhancement in the right breast.     There is no axillary adenopathy by MRI criteria and no chest wall  abnormality is seen.        IMPRESSION:  1. Negative left breast MRI.        2. Biopsy-proven malignancy in the right 10:00 region with a small  linear area of enhancement extending posterior to the 0.7 cm mass.     RECOMMENDATIONS: Recommend surgical follow-up.     BI-RADS CATEGORY: 6, KNOWN BIOPSY PROVEN MALIGNANCY    Echo 10-16-17:  A two-dimensional transthoracic echocardiogram with color flow and Doppler was performed.   The study is technically good for diagnosis.Verbal consent was obtained from the patient to use saline contrast in order to optimize the study.  A total of 20 mL of agitated saline was administered to assess for cardiac shunting.   No adverse reaction to contrast was noted.   Echocardiogram Findings    Left Ventricle Left ventricular systolic function is normal. Estimated EF appears to be in the range of 56 - 60%. Normal left ventricular cavity size and wall thickness noted. All left ventricular wall segments contract normally. Left  ventricular diastolic function is normal.   Right Ventricle Normal right ventricular cavity size noted.   Left Atrium Normal left atrial size noted. Saline test results are negative.   Right Atrium Normal right atrial size noted.   Aortic Valve The aortic valve is structurally normal. Trace aortic valve regurgitation is present.   Mitral Valve The mitral valve is normal in structure. Trace mitral valve regurgitation is present.   Tricuspid Valve The tricuspid valve is normal.  Trace tricuspid valve regurgitation is present.   Pulmonic Valve The pulmonic valve is structurally normal. There is trace pulmonic valve regurgitation present.   Greater Vessels No dilation of the aortic root is present. No dilation of the sinuses of Valsalva is present.   Pericardium There is no evidence of pericardial effusion.           ECHO 09/29/2020  · Poor quality echo and Doppler study  · Normal left ventricular cavity size and wall thickness noted.  · Left ventricular ejection fraction appears to be 61 - 65%. Left ventricular systolic function is normal.  · Left ventricular diastolic function is consistent with (grade I) impaired relaxation.  · Aortic and mitral valve are poorly visualized but appear to be normal,  · Tricuspid and pulmonic valve echoes are not visualized.  · There is no pericardial effusion noted.    Echocardiogram 12-08-02  · Normal left ventricular cavity size and wall thickness noted. All left ventricular wall segments contract normally.  · Left ventricular ejection fraction appears to be 56 - 60%.  · The pericardium is normal. There is no evidence of pericardial effusion. .      MRI Brain w/wo contrast 01-28-21     FINDINGS:    Brain:  Unremarkable.  No mass.  No hemorrhage.  No acute infarct.    Ventricles:  Unremarkable.  No ventriculomegaly.    Bones/joints:  Unremarkable.    Sinuses:  Unremarkable as visualized.  No acute sinusitis.    Mastoid air cells:  Unremarkable as visualized.  No mastoid effusion.     Orbits:  Unremarkable as visualized.     IMPRESSION:    No acute findings in the head/brain.    ENDOSCOPY:  COLONOSCOPY PROCEDURE NOTE     Nivia Bernstein  5/5/2021     GASTROENTEROLOGIST:  Vickie Herrera MD        PRE-PROCEDURE DIAGNOSIS:   BRBPR (bright red blood per rectum) [K62.5]     POST-PROCEDURE DIAGNOSIS:  1.  Normal colon  2.  Normal terminal ileum  3.  Internal hemorrhoids     PROCEDURE:  COLONOSCOPY       ANESTHESIA:  Propofol administered by anesthesia.  See anesthesia notes for ASA classification     STAFF  Circulator: Missy Canela RN  Endo Technician: Heraclio Gaona     Findings:  1.  Normal colonoscopy with small internal hemorrhoids.  2.  Normal terminal ileum     OPERATIVE PROCEDURE   After proper informed consent was obtained, the patient was taken the operating suite and placed in left lateral decubitus position.  An Olympus video colonoscope 180 series was inserted in the rectum and advanced to the terminal ileum under direct visualization.  Cecum and terminal ileum were identified by visualization of the appendiceal orifice and ileocecal valve.  The colonoscope was then slowly withdrawn from the cecum to the rectum and passed a second time from rectum to cecum.  The colonoscope was retroflexed in the cecum and rectum. Scope was then withdrawn. Patient tolerated the procedure well. There were no immediate complications. Cecal withdrawal time was 9 minutes.     ESTIMATED BLOOD LOSS  None      COMPLICATIONS  None     RECOMMENDATIONS:  1.  Repeat colonoscopy in 10 years for screening purposes.  2.  Continue Linzess.  3.  Anusol HC suppositories as needed for rectal bleeding.    RECENT LABS:  Lab Results   Component Value Date    WBC 10.88 (H) 12/15/2021    HGB 13.3 12/15/2021    HCT 42.4 12/15/2021    MCV 85.7 12/15/2021    RDW 13.7 12/15/2021     12/15/2021    NEUTRORELPCT 56.6 12/15/2021    LYMPHORELPCT 35.2 12/15/2021    MONORELPCT 6.0 12/15/2021    EOSRELPCT 1.3  12/15/2021    BASORELPCT 0.6 12/15/2021    NEUTROABS 6.16 12/15/2021    LYMPHSABS 3.83 (H) 12/15/2021       Lab Results   Component Value Date     12/15/2021    K 3.5 12/15/2021    CO2 21.6 (L) 12/15/2021     (H) 12/15/2021    BUN 13 12/15/2021    CREATININE 0.97 12/15/2021    EGFRIFNONA 61 12/15/2021    EGFRIFAFRI 99 09/28/2021    GLUCOSE 197 (H) 12/15/2021    CALCIUM 8.9 12/15/2021    ALKPHOS 112 12/15/2021    AST 19 12/15/2021    ALT 21 12/15/2021    BILITOT 0.2 12/15/2021    ALBUMIN 4.04 12/15/2021    PROTEINTOT 7.1 12/15/2021    MG 2.1 10/17/2017       Lab Results   Component Value Date    URICACID 4.2 09/28/2021       Lab Results   Component Value Date    UMICKLON40 497 09/28/2021    FOLATE 15.19 10/16/2017      ASSESSMENT & PLAN:  Nivia Bernstein is a very pleasant 46 y.o. female with Stage IA ([id number]) moderately differentiated invasive ductal carcinoma of the R breast with associated DCIS.  Tumor was 10 mm in maximal dimension.  0/10 SLN involved. ER negative, [place] 15% + (1+), HER-2/cat negative. Mammaprint high risk.     1.  R breast cancer:   -  S/p R mastectomy and SLNBx as well as prophylactic contralateral mastectomy performed by Dr. Garza 8-18-20.  - She healed well from bilateral mastectomy. This was complicated by left seroma, drained by Dr. Garza. Incisions are healed well.   - Given high risk Mammaprint, Dr. Loco recommended adjuvant chemotherapy. Discussed different possibilities for adjuvant therapy. Given high risk Mammaprint but early stage, node negative disease as well as comorbidities, recommended Taxotere/Cytoxan Q21d x 4 cycles with growth factor support. Discussed potential risks, benefits, and side effects and she would like to proceed.   - She had port placed several years ago due to poor peripheral access. This has been well cared for and maintained.   - C1 Taxotere/Cytoxan was planned for 09/24/2020. Unfortunately, this had to be discontinued due to allergic reaction to  Taxotere. Recommended change of treatment to DD AC.   - She tolerated treatment well and aside from fatigue and recovered well. ECHO is essentially unchanged.   -  Her tumor was ER neg but did have 15% 1+ positivity for progesterone receptor.  She is s/p complete hysterectomy and had evidence of osteopenia with T score -1.9 in June 2020.  Given all of this, recommended treatment with Tamoxifen over aromatase inhibitor.   - Exam and labs from today without cause for concern. Will continue Tamoxifen as ordered.  - She will follow up with MD in 3 month with CBCD/CMP.    2. Extensive family history of malignancy:   - Genetic testing was ordered by Dr. Garza and performed by [employer] with no mutations, specifically including BRCA 1/2, CHKE2, CDH1, PALB2 and others.    3. Anxiety/Depression:  - Her psychiatrist previously prescribed Paxil 20 mg daily and she takes Valium prn.  She had been doing well on this. Unfortunately Tamoxifen interacts with Paxil.  Dr. Loco recommended trial of Lexapro which didn't seem to help her, so her PCP started Effexor 75 mg daily. She is following with psychiatry who increased Effexor to 225 mg PO daily, which has been working well for her. Ongoing management per psych.    4. Frozen shoulder Bilateral?:  -  She has limitation of ROM and discomfort.  She was referred to ortho and they also recommended PT which she completed and reports was not successful.   - She reports that it feels like her skin is pulled tight and she has irritation with arms at her side.  - On exam today she has significant bilateral lymphedema. Will arrange for referral to lymphedema clinic.    5. Skin lesion:  - 1 cm x 1 cm pale red lesion on left upper extremity with pruritus.  - Referral placed for dermatology today.    6. Prophylaxis:   -  She received Prevnar 13 vaccine.  -  She had 2021 flu vaccine.  -  M. Uncle had colon cancer at age 50.  Referred to Dr. Aguilera for screening colonoscopy which was  unremarkable and repeat exam recommended in 10 years.   - She has had COVID vaccine x2.     7. Follow up:  - With MD in 3 months with CBCD and CMP.      ACO / NII/Other  Quality measures  -  Nivia Bernstein received 2021 flu vaccine.  -  Nivia Bernstein reports a pain score of 6.  Given her pain assessment as noted, treatment options were discussed and the following options were decided upon as a follow-up plan to address the patient's pain: continuation of current treatment plan for pain.  -  Current outpatient and discharge medications have been reconciled for the patient.  Reviewed by: NIDA Corbin          A total of 30 minutes were spent coordinating this patient’s care in clinic today; more than 50% of this time was face-to-face with the patient, reviewing her interim medical history and counseling on the current treatment and followup plan. All questions were answered to her satisfaction.      Electronically Signed by: NIDA Corbin  December 15, 2021 15:47 EST       CC:   MD Otto Hager Paul Christen, MD

## 2021-12-17 ENCOUNTER — OFFICE VISIT (OUTPATIENT)
Dept: PSYCHIATRY | Facility: CLINIC | Age: 46
End: 2021-12-17

## 2021-12-17 VITALS
WEIGHT: 243 LBS | BODY MASS INDEX: 41.69 KG/M2 | DIASTOLIC BLOOD PRESSURE: 60 MMHG | HEART RATE: 84 BPM | SYSTOLIC BLOOD PRESSURE: 124 MMHG

## 2021-12-17 DIAGNOSIS — Z79.899 HIGH RISK MEDICATION USE: Primary | ICD-10-CM

## 2021-12-17 DIAGNOSIS — F42.8 OTHER OBSESSIVE-COMPULSIVE DISORDERS: Chronic | ICD-10-CM

## 2021-12-17 DIAGNOSIS — F33.3 SEVERE EPISODE OF RECURRENT MAJOR DEPRESSIVE DISORDER, WITH PSYCHOTIC FEATURES (HCC): ICD-10-CM

## 2021-12-17 DIAGNOSIS — F41.1 GENERALIZED ANXIETY DISORDER: ICD-10-CM

## 2021-12-17 PROCEDURE — 99214 OFFICE O/P EST MOD 30 MIN: CPT | Performed by: NURSE PRACTITIONER

## 2021-12-17 RX ORDER — VENLAFAXINE HYDROCHLORIDE 150 MG/1
150 CAPSULE, EXTENDED RELEASE ORAL DAILY
Qty: 30 CAPSULE | Refills: 0 | Status: SHIPPED | OUTPATIENT
Start: 2021-12-17 | End: 2022-02-15 | Stop reason: SDUPTHER

## 2021-12-17 RX ORDER — LORAZEPAM 1 MG/1
1 TABLET ORAL 3 TIMES DAILY PRN
Qty: 90 TABLET | Refills: 0 | Status: SHIPPED | OUTPATIENT
Start: 2021-12-17 | End: 2022-01-20

## 2021-12-17 RX ORDER — BREXPIPRAZOLE 3 MG/1
3 TABLET ORAL DAILY
Qty: 30 TABLET | Refills: 0 | Status: SHIPPED | OUTPATIENT
Start: 2021-12-17 | End: 2022-01-20

## 2021-12-17 RX ORDER — VENLAFAXINE HYDROCHLORIDE 37.5 MG/1
37.5 CAPSULE, EXTENDED RELEASE ORAL DAILY
Qty: 30 CAPSULE | Refills: 0 | Status: SHIPPED | OUTPATIENT
Start: 2021-12-17 | End: 2022-01-20

## 2021-12-17 RX ORDER — TOPIRAMATE 50 MG/1
TABLET, FILM COATED ORAL
COMMUNITY
Start: 2021-11-29 | End: 2022-05-18 | Stop reason: SDUPTHER

## 2021-12-17 RX ORDER — NITROFURANTOIN MACROCRYSTALS 100 MG/1
CAPSULE ORAL
COMMUNITY
Start: 2021-11-09 | End: 2021-12-17 | Stop reason: SDUPTHER

## 2021-12-17 NOTE — PROGRESS NOTES
Subjective   Nivia Bernstein is a 46 y.o. female is here today for medication management follow-up.    Chief Complaint:  Anxiety depression    History of Present Illness:    Patient presents today for follow up for medication management for depression and anxiety. Patient states she is already taking Topamax and worried about losing weight. Patient states she does feel some better. Patient states had a good thanksgiving. Patient states her mom fell yesterday and had to go to the ER. Patient reports currently depression is at a 10 on a 0-10 scale with 10 being the worst. Patient states the depression is more tolerable and not as irritable. Patient reports symptoms of depression of depressed mood, irritability, insomnia, appetite changes, and decreased energy. Patient reports anxiety is at a 10 on a 0-10 scale with 10 being the worst. Patient states having some anxiety attacks. Patient denies any panic attacks. Patient states did sleep all night a few nights ago. Patient reports sleeping 2-3 hours a night at one time and patient reports still having some nightmares. Patient states feels like eating more. Patient reports appetite is increased and eating 2-3 meals a day. Patient denies any auditory or visual hallucinations. Patient adamantly denies any SI or HI. Patient denies any side effects to medications. Patient denies any medical changes since last visit.   The following portions of the patient's history were reviewed and updated as appropriate: allergies, current medications, past family history, past medical history, past social history, past surgical history and problem list.    Review of Systems   Constitutional: Positive for appetite change.   Respiratory: Negative.    Cardiovascular: Negative.    Gastrointestinal: Negative.    Neurological: Negative.    Psychiatric/Behavioral: Positive for agitation, dysphoric mood and sleep disturbance. The patient is nervous/anxious.        Objective   Physical  Exam  Vitals reviewed.   Constitutional:       Appearance: Normal appearance. She is well-developed and well-groomed.   Neurological:      Mental Status: She is alert.   Psychiatric:         Attention and Perception: Attention and perception normal.         Mood and Affect: Affect normal. Mood is anxious.         Speech: Speech normal.         Behavior: Behavior normal. Behavior is cooperative.         Thought Content: Thought content normal.         Cognition and Memory: Cognition and memory normal.         Judgment: Judgment normal.       Blood pressure 124/60, pulse 84, weight 110 kg (243 lb), not currently breastfeeding. Body mass index is 41.69 kg/m².    Allergies   Allergen Reactions   • Mobic [Meloxicam] Rash   • Penicillins Angioedema   • Taxotere [Docetaxel] Anaphylaxis     9 minutes into 1st infusion   • Novolog [Insulin Aspart] Hives       Medication List:   Current Outpatient Medications   Medication Sig Dispense Refill   • albuterol sulfate HFA (Ventolin HFA) 108 (90 Base) MCG/ACT inhaler Inhale 2 puffs Every 4 (Four) Hours As Needed (shortness of breath). 18 g 5   • aspirin (aspirin) 81 MG EC tablet Take 2 tablets by mouth Daily. 60 tablet 2   • azelastine (ASTELIN) 0.1 % nasal spray 2 sprays both nostrils twice daily as needed 1 each 2   • Breo Ellipta 200-25 MCG/INH inhaler Inhale 1 puff Daily. 1 each 5   • Brexpiprazole (Rexulti) 3 MG tablet Take 1 tablet by mouth Daily. 30 tablet 0   • calcium carbonate (OS-JERROD) 600 MG tablet Take 1 tablet by mouth 2 (Two) Times a Day With Meals. 60 tablet 5   • cholecalciferol (Vitamin D) 25 MCG (1000 UT) tablet Take 2 tablets by mouth Daily. 60 tablet 5   • cloNIDine (CATAPRES) 0.1 MG tablet Take 1 tablet by mouth 2 (Two) Times a Day. 60 tablet 5   • diclofenac (VOLTAREN) 1 % gel gel Apply 4 g topically to the appropriate area as directed 4 (Four) Times a Day As Needed (joint pain). 500 g 5   • Diclofenac Sodium (VOLTAREN) 1 % gel gel      • docusate sodium 100  MG capsule Take 100 mg by mouth 2 (Two) Times a Day.     • gabapentin (Neurontin) 100 MG capsule Take 1 capsule by mouth 2 (two) times a day. 60 capsule 3   • HumaLOG 100 UNIT/ML injection USE PER INSULIN PUMP. MAX DAILY DOSE  UNITS 40 mL 0   • hydrocortisone (ANUSOL-HC) 25 MG suppository Insert 1 suppository into the rectum 2 (Two) Times a Day As Needed for Hemorrhoids (rectal discomfort). 30 suppository 5   • lansoprazole (PREVACID) 30 MG capsule Take 1 capsule by mouth Daily. 90 capsule 3   • levocetirizine (Xyzal) 5 MG tablet Take 1 tablet by mouth Every Evening. 30 tablet 2   • linaclotide (Linzess) 145 MCG capsule capsule Take 1 capsule by mouth Daily. 30 minutes before meals on an empty stomach. 30 capsule 5   • LORazepam (Ativan) 1 MG tablet Take 1 tablet by mouth 3 (Three) Times a Day As Needed for Anxiety. 90 tablet 0   • magic mouthwash oral suspension Swish and swallow 5 mL four times  a day As Needed for Mucositis. 240 mL 1   • nitrofurantoin, macrocrystal-monohydrate, (Macrobid) 100 MG capsule Take 1 capsule by mouth Daily. 56 capsule 3   • ondansetron (ZOFRAN) 8 MG tablet TAKE 1 TABLET BY MOUTH 3 TIMES A DAY AS NEEDED FOR NAUSEA OR VOMITING. 30 tablet 5   • prochlorperazine (COMPAZINE) 10 MG tablet Take 1 tablet by mouth Every 6 (Six) Hours As Needed for Nausea or Vomiting. 30 tablet 5   • rosuvastatin (CRESTOR) 20 MG tablet Take 1 tablet by mouth Every Night. 30 tablet 3   • sennosides-docusate (senna-docusate sodium) 8.6-50 MG per tablet Take 2 tablets by mouth 2 (Two) Times a Day. 120 tablet 4   • SUMAtriptan (IMITREX) 50 MG tablet      • tamoxifen (NOLVADEX) 20 MG chemo tablet TAKE 1 TABLET BY MOUTH DAILY. 30 tablet 1   • topiramate (TOPAMAX) 50 MG tablet      • Trijardy XR 25-5-1000 MG tablet sustained-release 24 hour TAKE 1 TABLET BY MOUTH EVERY MORNING. 30 tablet 5   • venlafaxine XR (Effexor XR) 37.5 MG 24 hr capsule Take 1 capsule by mouth Daily. Take with the Effexor XR 150mg capsule  for a total daily dose of 187.5mg 30 capsule 0   • venlafaxine XR (EFFEXOR-XR) 150 MG 24 hr capsule Take 1 capsule by mouth Daily. Take with the Effexor XR 37.5mg capsule for a total daily dose of 187.5mg. 30 capsule 0   • vitamin D (ERGOCALCIFEROL) 1.25 MG (00152 UT) capsule capsule Take 1 capsule by mouth 1 (One) Time Per Week. 4 capsule 3     No current facility-administered medications for this visit.       Mental Status Exam:   Hygiene:   good  Cooperation:  Cooperative  Eye Contact:  Good  Psychomotor Behavior:  Appropriate  Affect:  Appropriate  Hopelessness: Denies  Speech:  Normal  Thought Process:  Goal directed and Linear  Thought Content:  Normal  Suicidal:  None  Homicidal:  None  Hallucinations:  None  Delusion:  None  Memory:  Intact  Orientation:  Person, Place, Time and Situation  Reliability:  fair  Insight:  Fair  Judgement:  Fair  Impulse Control:  Fair  Physical/Medical Issues:  No               Assessment/Plan   Diagnoses and all orders for this visit:    1. High risk medication use (Primary)  -     Urine Drug Screen - Urine, Clean Catch; Future    2. Severe episode of recurrent major depressive disorder, with psychotic features (HCC)  -     Brexpiprazole (Rexulti) 3 MG tablet; Take 1 tablet by mouth Daily.  Dispense: 30 tablet; Refill: 0  -     venlafaxine XR (EFFEXOR-XR) 150 MG 24 hr capsule; Take 1 capsule by mouth Daily. Take with the Effexor XR 37.5mg capsule for a total daily dose of 187.5mg.  Dispense: 30 capsule; Refill: 0    3. Generalized anxiety disorder  -     Brexpiprazole (Rexulti) 3 MG tablet; Take 1 tablet by mouth Daily.  Dispense: 30 tablet; Refill: 0  -     LORazepam (Ativan) 1 MG tablet; Take 1 tablet by mouth 3 (Three) Times a Day As Needed for Anxiety.  Dispense: 90 tablet; Refill: 0  -     venlafaxine XR (EFFEXOR-XR) 150 MG 24 hr capsule; Take 1 capsule by mouth Daily. Take with the Effexor XR 37.5mg capsule for a total daily dose of 187.5mg.  Dispense: 30 capsule;  Refill: 0    4. Other obsessive-compulsive disorders  -     venlafaxine XR (EFFEXOR-XR) 150 MG 24 hr capsule; Take 1 capsule by mouth Daily. Take with the Effexor XR 37.5mg capsule for a total daily dose of 187.5mg.  Dispense: 30 capsule; Refill: 0    Other orders  -     venlafaxine XR (Effexor XR) 37.5 MG 24 hr capsule; Take 1 capsule by mouth Daily. Take with the Effexor XR 150mg capsule for a total daily dose of 187.5mg  Dispense: 30 capsule; Refill: 0            Discussed medication options with patient. Increase Effexor XR to 187.5mg daily for worsening depression and anxiety. Cont. Rexulti 3mg daily for depression and anxiety. Cont. Ativan 1mg three times daily as needed for anxiety. Reviewed the risks, benefits, and side effects of the medications; patient acknowledged and verbally consented. Patient is being prescribed a controlled substance as part of treatment plan. Patient has been educated of appropriate use of the medications, including risk of somnolence, limited ability to drive and/or work safely, and potential for dependence, respiratory depression and overdose. Patient is also informed that the medication are to be used by the patient only- avoid any combined use of ETOH or other substances unless prescribed. UDS ordered.   [facility] Patient Controlled Substance Report (from 12/17/2020 to 12/17/2021)    Dispensed  Strength Quantity Days Supply Provider Pharmacy   11/29/2021 Lorazepam 1MG 90 each 30 [facility]   11/17/2021 Gabapentin 100MG 60 each 30 URM[facility]   11/01/2021 Lorazepam 1MG 90 each 30 [facility]   10/21/2021 Gabapentin 100MG 60 each 30 [facility]RM[facility]   10/21/2021 Lorazepam 1MG 30 each 10 [facility]   10/05/2021 Lorazepam 1MG 30 each 15 [facility]   09/16/2021 Gabapentin 100MG 60 each 30 [facility]   09/09/2021 Diazepam 10MG 60 each 30 [facility]   08/12/2021 Diazepam 10MG 60 each 30 [facility]   07/12/2021 Diazepam 10MG 60 each 30 [facility]   07/08/2021 Gabapentin 300MG 90 each 30 SHARMILANAHARPREET [facility]   06/15/2021 Gabapentin 100MG 60 each 30 VINCENT W[facility]   06/10/2021 Diazepam 10MG 45 each 23 [facility]   05/11/2021 Diazepam 10MG 45 each 23 [facility]   04/09/2021 Diazepam 5MG 60 each 30 [facility]   03/12/2021 Diazepam 5MG 30 each 30 [facility]   02/22/2021 Diazepam 5MG 20 each 20 [facility]   02/12/2021 Diazepam 5MG 5 each 5 [facility]   01/14/2021 Diazepam 5MG 30 each 30 STEPHANIE ALCANTARA [facility]          Patient was instructed on medication side effects, benefits, and also of no treatment.  Patient was given an explanation regarding potential for increased risk of diabetes, lipids, and weight gain.  Labs will be assessed as clinically indicated.  Diet was discussed especially healthy diet choices and increasing activity and exercise.  Patient was strongly urged to continue weight maintance or weight loss efforts.  Patient reported verbalized understanding of instructions.   Patient is agreeable to call the office with any questions, concerns, or worsening of symptoms. Patient is aware to call 911 or go to the nearest ER should begin having SI/HI.          Follow up in four weeks      Errors in dictation may reflect use of voice recognition software and not all errors in transcription may have been detected prior to signing.              This document has been electronically signed by NIDA Vasquez   December 17, 2021 14:26 EST

## 2021-12-23 DIAGNOSIS — E78.2 MIXED HYPERLIPIDEMIA: ICD-10-CM

## 2021-12-23 DIAGNOSIS — R23.2 HOT FLASHES: ICD-10-CM

## 2021-12-24 RX ORDER — CLONIDINE HYDROCHLORIDE 0.1 MG/1
TABLET ORAL
Qty: 60 TABLET | Refills: 5 | Status: SHIPPED | OUTPATIENT
Start: 2021-12-24 | End: 2022-07-05 | Stop reason: SDUPTHER

## 2021-12-24 RX ORDER — ROSUVASTATIN CALCIUM 20 MG/1
TABLET, COATED ORAL
Qty: 30 TABLET | Refills: 3 | Status: SHIPPED | OUTPATIENT
Start: 2021-12-24 | End: 2022-05-09

## 2021-12-30 DIAGNOSIS — G89.29 CHRONIC PAIN OF LEFT KNEE: ICD-10-CM

## 2021-12-30 DIAGNOSIS — E11.42 DM TYPE 2 WITH DIABETIC PERIPHERAL NEUROPATHY (HCC): Primary | ICD-10-CM

## 2021-12-30 DIAGNOSIS — M25.562 CHRONIC PAIN OF LEFT KNEE: ICD-10-CM

## 2021-12-30 DIAGNOSIS — E55.9 VITAMIN D DEFICIENCY: ICD-10-CM

## 2021-12-30 DIAGNOSIS — E78.2 MIXED HYPERLIPIDEMIA: ICD-10-CM

## 2022-01-03 ENCOUNTER — TELEPHONE (OUTPATIENT)
Dept: FAMILY MEDICINE CLINIC | Facility: CLINIC | Age: 47
End: 2022-01-03

## 2022-01-03 NOTE — TELEPHONE ENCOUNTER
S/w [facility] patients , ask she come by any day before her apt for fasting labs tm     ----- Message from NIDA Betancourt sent at 12/30/2021  2:09 PM EST -----  I have ordered labs for her to have prior to her appt with me. Thanks so much  ----- Message -----  From: iLlo Henson RegSched Rep  Sent: 12/29/2021  11:03 AM EST  To: NIDA Betancourt    Has an apt coming up in [place] will she need labs before and if so can you place the orders

## 2022-01-05 RX ORDER — LOPERAMIDE HYDROCHLORIDE 2 MG/1
2 TABLET ORAL 4 TIMES DAILY PRN
Qty: 30 TABLET | Refills: 0 | Status: SHIPPED | OUTPATIENT
Start: 2022-01-05 | End: 2022-10-12

## 2022-01-11 ENCOUNTER — LAB (OUTPATIENT)
Dept: FAMILY MEDICINE CLINIC | Facility: CLINIC | Age: 47
End: 2022-01-11

## 2022-01-11 DIAGNOSIS — Z86.718 HISTORY OF DVT (DEEP VEIN THROMBOSIS): ICD-10-CM

## 2022-01-11 DIAGNOSIS — G89.29 CHRONIC PAIN OF LEFT KNEE: ICD-10-CM

## 2022-01-11 DIAGNOSIS — C50.411 MALIGNANT NEOPLASM OF UPPER-OUTER QUADRANT OF RIGHT BREAST IN FEMALE, ESTROGEN RECEPTOR NEGATIVE: ICD-10-CM

## 2022-01-11 DIAGNOSIS — E78.2 MIXED HYPERLIPIDEMIA: ICD-10-CM

## 2022-01-11 DIAGNOSIS — M25.562 CHRONIC PAIN OF LEFT KNEE: ICD-10-CM

## 2022-01-11 DIAGNOSIS — E11.42 DM TYPE 2 WITH DIABETIC PERIPHERAL NEUROPATHY: Primary | ICD-10-CM

## 2022-01-11 DIAGNOSIS — E55.9 VITAMIN D DEFICIENCY: ICD-10-CM

## 2022-01-11 DIAGNOSIS — Z17.1 MALIGNANT NEOPLASM OF UPPER-OUTER QUADRANT OF RIGHT BREAST IN FEMALE, ESTROGEN RECEPTOR NEGATIVE: ICD-10-CM

## 2022-01-11 DIAGNOSIS — N39.0 RECURRENT UTI: ICD-10-CM

## 2022-01-11 LAB
25(OH)D3+25(OH)D2 SERPL-MCNC: 44.9 NG/ML (ref 30–100)
ALBUMIN SERPL-MCNC: 4.4 G/DL (ref 3.5–5.2)
ALBUMIN/GLOB SERPL: 1.7 G/DL
ALP SERPL-CCNC: 101 U/L (ref 39–117)
ALT SERPL-CCNC: 23 U/L (ref 1–33)
AST SERPL-CCNC: 18 U/L (ref 1–32)
BILIRUB SERPL-MCNC: 0.2 MG/DL (ref 0–1.2)
BUN SERPL-MCNC: 12 MG/DL (ref 6–20)
BUN/CREAT SERPL: 17.1 (ref 7–25)
CALCIUM SERPL-MCNC: 9 MG/DL (ref 8.6–10.5)
CHLORIDE SERPL-SCNC: 109 MMOL/L (ref 98–107)
CHOLEST SERPL-MCNC: 194 MG/DL (ref 0–200)
CO2 SERPL-SCNC: 21.4 MMOL/L (ref 22–29)
CREAT SERPL-MCNC: 0.7 MG/DL (ref 0.57–1)
GLOBULIN SER CALC-MCNC: 2.6 GM/DL
GLUCOSE SERPL-MCNC: 131 MG/DL (ref 65–99)
HBA1C MFR BLD: 7.7 % (ref 4.8–5.6)
HDLC SERPL-MCNC: 39 MG/DL (ref 40–60)
LDLC SERPL CALC-MCNC: 131 MG/DL (ref 0–100)
POTASSIUM SERPL-SCNC: 4.1 MMOL/L (ref 3.5–5.2)
PROT SERPL-MCNC: 7 G/DL (ref 6–8.5)
SODIUM SERPL-SCNC: 141 MMOL/L (ref 136–145)
TRIGL SERPL-MCNC: 133 MG/DL (ref 0–150)
TSH SERPL DL<=0.005 MIU/L-ACNC: 2.74 UIU/ML (ref 0.27–4.2)
URATE SERPL-MCNC: 4.5 MG/DL (ref 2.4–5.7)
VIT B12 SERPL-MCNC: 394 PG/ML (ref 211–946)
VLDLC SERPL CALC-MCNC: 24 MG/DL (ref 5–40)

## 2022-01-12 ENCOUNTER — TELEMEDICINE (OUTPATIENT)
Dept: FAMILY MEDICINE CLINIC | Facility: CLINIC | Age: 47
End: 2022-01-12

## 2022-01-12 VITALS — WEIGHT: 246 LBS | HEIGHT: 64 IN | BODY MASS INDEX: 42 KG/M2

## 2022-01-12 DIAGNOSIS — R60.1 GENERALIZED EDEMA: ICD-10-CM

## 2022-01-12 DIAGNOSIS — E11.42 DM TYPE 2 WITH DIABETIC PERIPHERAL NEUROPATHY: Primary | Chronic | ICD-10-CM

## 2022-01-12 DIAGNOSIS — Z20.822 CLOSE EXPOSURE TO COVID-19 VIRUS: ICD-10-CM

## 2022-01-12 DIAGNOSIS — E78.2 MIXED HYPERLIPIDEMIA: Chronic | ICD-10-CM

## 2022-01-12 DIAGNOSIS — E53.8 VITAMIN B 12 DEFICIENCY: ICD-10-CM

## 2022-01-12 PROCEDURE — 99214 OFFICE O/P EST MOD 30 MIN: CPT | Performed by: NURSE PRACTITIONER

## 2022-01-12 RX ORDER — FUROSEMIDE 20 MG/1
20 TABLET ORAL DAILY PRN
Qty: 15 TABLET | Refills: 1 | Status: SHIPPED | OUTPATIENT
Start: 2022-01-12 | End: 2022-03-18

## 2022-01-12 NOTE — PATIENT INSTRUCTIONS
"https://www.nhlbi.nih.gov/files/docs/public/heart/dash_brief.pdf\">   DASH Eating Plan  DASH stands for Dietary Approaches to Stop Hypertension. The DASH eating plan is a healthy eating plan that has been shown to:  · Reduce high blood pressure (hypertension).  · Reduce your risk for type 2 diabetes, heart disease, and stroke.  · Help with weight loss.  What are tips for following this plan?  Reading food labels  · Check food labels for the amount of salt (sodium) per serving. Choose foods with less than 5 percent of the Daily Value of sodium. Generally, foods with less than 300 milligrams (mg) of sodium per serving fit into this eating plan.  · To find whole grains, look for the word \"whole\" as the first word in the ingredient list.  Shopping  · Buy products labeled as \"low-sodium\" or \"no salt added.\"  · Buy fresh foods. Avoid canned foods and pre-made or frozen meals.  Cooking  · Avoid adding salt when cooking. Use salt-free seasonings or herbs instead of table salt or sea salt. Check with your health care provider or pharmacist before using salt substitutes.  · Do not barrera foods. Cook foods using healthy methods such as baking, boiling, grilling, roasting, and broiling instead.  · Cook with heart-healthy oils, such as olive, canola, avocado, soybean, or sunflower oil.  Meal planning    · Eat a balanced diet that includes:  ? 4 or more servings of fruits and 4 or more servings of vegetables each day. Try to fill one-half of your plate with fruits and vegetables.  ? 6-8 servings of whole grains each day.  ? Less than 6 oz (170 g) of lean meat, poultry, or fish each day. A 3-oz (85-g) serving of meat is about the same size as a deck of cards. One egg equals 1 oz (28 g).  ? 2-3 servings of low-fat dairy each day. One serving is 1 cup (237 mL).  ? 1 serving of nuts, seeds, or beans 5 times each week.  ? 2-3 servings of heart-healthy fats. Healthy fats called omega-3 fatty acids are found in foods such as walnuts, " flaxseeds, fortified milks, and eggs. These fats are also found in cold-water fish, such as sardines, salmon, and mackerel.  · Limit how much you eat of:  ? Canned or prepackaged foods.  ? Food that is high in trans fat, such as some fried foods.  ? Food that is high in saturated fat, such as fatty meat.  ? Desserts and other sweets, sugary drinks, and other foods with added sugar.  ? Full-fat dairy products.  · Do not salt foods before eating.  · Do not eat more than 4 egg yolks a week.  · Try to eat at least 2 vegetarian meals a week.  · Eat more home-cooked food and less restaurant, buffet, and fast food.    Lifestyle  · When eating at a restaurant, ask that your food be prepared with less salt or no salt, if possible.  · If you drink alcohol:  ? Limit how much you use to:  § 0-1 drink a day for women who are not pregnant.  § 0-2 drinks a day for men.  ? Be aware of how much alcohol is in your drink. In the [place] one drink equals one 12 oz bottle of beer (355 mL), one 5 oz glass of wine (148 mL), or one 1½ oz glass of hard liquor (44 mL).  General information  · Avoid eating more than 2,300 mg of salt a day. If you have hypertension, you may need to reduce your sodium intake to 1,500 mg a day.  · Work with your health care provider to maintain a healthy body weight or to lose weight. Ask what an ideal weight is for you.  · Get at least 30 minutes of exercise that causes your heart to beat faster (aerobic exercise) most days of the week. Activities may include walking, swimming, or biking.  · Work with your health care provider or dietitian to adjust your eating plan to your individual calorie needs.  What foods should I eat?  Fruits  All fresh, dried, or frozen fruit. Canned fruit in natural juice (without added sugar).  Vegetables  Fresh or frozen vegetables (raw, steamed, roasted, or grilled). Low-sodium or reduced-sodium tomato and vegetable juice. Low-sodium or reduced-sodium tomato sauce and tomato paste.  Low-sodium or reduced-sodium canned vegetables.  Grains  Whole-grain or whole-wheat bread. Whole-grain or whole-wheat pasta. Brown rice. Oatmeal. Quinoa. Bulgur. Whole-grain and low-sodium cereals. Joy bread. Low-fat, low-sodium crackers. Whole-wheat flour tortillas.  Meats and other proteins  Skinless chicken or turkey. Ground chicken or turkey. Pork with fat trimmed off. Fish and seafood. Egg whites. Dried beans, peas, or lentils. Unsalted nuts, nut butters, and seeds. Unsalted canned beans. Lean cuts of beef with fat trimmed off. Low-sodium, lean precooked or cured meat, such as sausages or meat loaves.  Dairy  Low-fat (1%) or fat-free (skim) milk. Reduced-fat, low-fat, or fat-free cheeses. Nonfat, low-sodium ricotta or cottage cheese. Low-fat or nonfat yogurt. Low-fat, low-sodium cheese.  Fats and oils  Soft margarine without trans fats. Vegetable oil. Reduced-fat, low-fat, or light mayonnaise and salad dressings (reduced-sodium). Canola, safflower, olive, avocado, soybean, and sunflower oils. Avocado.  Seasonings and condiments  Herbs. Spices. Seasoning mixes without salt.  Other foods  Unsalted popcorn and pretzels. Fat-free sweets.  The items listed above may not be a complete list of foods and beverages you can eat. Contact a dietitian for more information.  What foods should I avoid?  Fruits  Canned fruit in a light or heavy syrup. Fried fruit. Fruit in cream or butter sauce.  Vegetables  Creamed or fried vegetables. Vegetables in a cheese sauce. Regular canned vegetables (not low-sodium or reduced-sodium). Regular canned tomato sauce and paste (not low-sodium or reduced-sodium). Regular tomato and vegetable juice (not low-sodium or reduced-sodium). Pickles. Olives.  Grains  Baked goods made with fat, such as croissants, muffins, or some breads. Dry pasta or rice meal packs.  Meats and other proteins  Fatty cuts of meat. Ribs. Fried meat. Carroll. Bologna, salami, and other precooked or cured meats, such as  sausages or meat loaves. Fat from the back of a pig (fatback). Bratwurst. Salted nuts and seeds. Canned beans with added salt. Canned or smoked fish. Whole eggs or egg yolks. Chicken or turkey with skin.  Dairy  Whole or 2% milk, cream, and half-and-half. Whole or full-fat cream cheese. Whole-fat or sweetened yogurt. Full-fat cheese. Nondairy creamers. Whipped toppings. Processed cheese and cheese spreads.  Fats and oils  Butter. Stick margarine. Lard. Shortening. Ghee. Carroll fat. Tropical oils, such as coconut, palm kernel, or palm oil.  Seasonings and condiments  Onion salt, garlic salt, seasoned salt, table salt, and sea salt. Worcestershire sauce. Tartar sauce. Barbecue sauce. Teriyaki sauce. Soy sauce, including reduced-sodium. Steak sauce. Canned and packaged gravies. Fish sauce. Oyster sauce. Cocktail sauce. Store-bought horseradish. Ketchup. Mustard. Meat flavorings and tenderizers. Bouillon cubes. Hot sauces. Pre-made or packaged marinades. Pre-made or packaged taco seasonings. Relishes. Regular salad dressings.  Other foods  Salted popcorn and pretzels.  The items listed above may not be a complete list of foods and beverages you should avoid. Contact a dietitian for more information.  Where to find more information  · National Heart, Lung, and Blood [place]: www.nhlbi.nih.gov  · American Heart Association: www.heart.org  · Academy of Nutrition and Dietetics: www.eatright.org  · National Kidney Foundation: www.kidney.org  Summary  · The DASH eating plan is a healthy eating plan that has been shown to reduce high blood pressure (hypertension). It may also reduce your risk for type 2 diabetes, heart disease, and stroke.  · When on the DASH eating plan, aim to eat more fresh fruits and vegetables, whole grains, lean proteins, low-fat dairy, and heart-healthy fats.  · With the DASH eating plan, you should limit salt (sodium) intake to 2,300 mg a day. If you have hypertension, you may need to reduce your  sodium intake to 1,500 mg a day.  · Work with your health care provider or dietitian to adjust your eating plan to your individual calorie needs.  This information is not intended to replace advice given to you by your health care provider. Make sure you discuss any questions you have with your health care provider.  Document Revised: 11/20/2020 Document Reviewed: 11/20/2020  [employer] Patient Education © 2021 [employer]

## 2022-01-12 NOTE — PROGRESS NOTES
You have chosen to receive care through a telehealth visit.  Do you consent to use a video/audio connection for your medical care today? Yes   History of Present Illness  Nivia is a 47 y/o female who presents to the clinic today via video for follow up to review recent lab results pertaining to her DM, type 2 and Dyslipidemia. In addition, she has been diagnosed with right breast Cancer and has undergone bilateral mastectomy and chemotherapy.  She does share she has had a recent close COVID exposure.     Diabetes   She has type 2 diabetes mellitus. The initial diagnosis of diabetes was made 20 years ago. Associated symptoms include  fatigue and peripheral neuropathy. Pertinent negatives for diabetes include no foot ulcerations. Diabetic complications include peripheral neuropathy.  Risk factors for coronary artery disease include post-menopausal, stress and dyslipidemia. She is currently utilizing insulin pump therapy and CGM. She is currently taking Trijardy. She has had a previous visit with a dietitian An ACE inhibitor/angiotensin II receptor blocker is not  being taken at this time..      Lab Results   Component Value Date    HGBA1C 8.30 (H) 03/12/2021      Lab Results   Component Value Date    HGBA1C 7.7 (H) 09/28/2021     Lab Results   Component Value Date    HGBA1C 7.70 (H) 01/11/2022     Dyslipidemia   The current episode started more than 1 year ago. Pertinent negatives include no chest pain or shortness of breath. She is taking Crestor 40 mg.   Lab Results   Component Value Date    CHLPL 194 01/11/2022    CHLPL 174 09/28/2021    CHLPL 161 03/12/2021     Lab Results   Component Value Date    TRIG 133 01/11/2022    TRIG 382 (H) 09/28/2021    TRIG 211 (H) 03/12/2021     Lab Results   Component Value Date    HDL 39 (L) 01/11/2022    HDL 28 (L) 09/28/2021    HDL 32 (L) 03/12/2021     Lab Results   Component Value Date     (H) 01/11/2022    LDL 84 09/28/2021    LDL 93 03/12/2021       The following  "portions of the patient's history were reviewed and updated as appropriate: allergies, current medications, past family history, past medical history, past social history, past surgical history and problem list.    Review of Systems   Constitutional: Positive for fatigue. Negative for activity change, appetite change, chills, fever and unexpected weight change.   HENT: Positive for congestion.    Eyes: Negative for visual disturbance.   Respiratory: Negative for cough, shortness of breath and wheezing.    Cardiovascular: Positive for leg swelling. Negative for chest pain and palpitations.   Gastrointestinal: Negative for abdominal pain, constipation, diarrhea, nausea and vomiting.   Endocrine: Negative for cold intolerance, heat intolerance, polydipsia, polyphagia and polyuria.   Musculoskeletal: Positive for arthralgias.   Skin: Negative for color change and rash.   Allergic/Immunologic: Positive for environmental allergies.   Neurological: Positive for numbness. Negative for dizziness, tremors, speech difficulty, weakness, light-headedness and headaches.   Hematological: Negative for adenopathy.   Psychiatric/Behavioral: Negative for confusion and decreased concentration. The patient is not nervous/anxious.    All other systems reviewed and are negative.    Vital signs:  Ht 162.6 cm (64.02\")   Wt 112 kg (246 lb)   BMI 42.20 kg/m²     Physical Exam  Vitals and nursing note reviewed.   Constitutional:       General: She is not in acute distress.     Appearance: She is well-developed.   HENT:      Head: Normocephalic.      Nose: Nose normal.   Eyes:      General: No scleral icterus.        Right eye: No discharge.         Left eye: No discharge.      Conjunctiva/sclera: Conjunctivae normal.   Neck:      Thyroid: No thyromegaly.      Vascular: No JVD.   Pulmonary:      Effort: Pulmonary effort is normal.      Breath sounds: Normal breath sounds.   Musculoskeletal:      Cervical back: Neck supple.   Neurological:     "  Mental Status: She is alert.   Psychiatric:         Mood and Affect: Mood and affect normal.         Speech: Speech normal.         Behavior: Behavior is cooperative.         Cognition and Memory: Cognition and memory normal.       Result Review :  Results for orders placed or performed in visit on 01/11/22   Vitamin B12    Specimen: Arm, Right; Blood    Blood  Release to genveieve   Result Value Ref Range    Vitamin B-12 394 211 - 946 pg/mL   Uric Acid    Specimen: Arm, Right; Blood    Blood  Release to genevieve   Result Value Ref Range    Uric Acid 4.5 2.4 - 5.7 mg/dL   Vitamin D 25 Hydroxy    Specimen: Arm, Right; Blood    Blood  Release to genevieve   Result Value Ref Range    25 Hydroxy, Vitamin D 44.9 30.0 - 100.0 ng/ml   Hemoglobin A1c    Specimen: Arm, Right; Blood    Blood  Release to genevieve   Result Value Ref Range    Hemoglobin A1C 7.70 (H) 4.80 - 5.60 %   TSH    Specimen: Arm, Right; Blood    Blood  Release to genevieve   Result Value Ref Range    TSH 2.740 0.270 - 4.200 uIU/mL   Lipid Panel    Specimen: Arm, Right; Blood    Blood  Release to genevieve   Result Value Ref Range    Total Cholesterol 194 0 - 200 mg/dL    Triglycerides 133 0 - 150 mg/dL    HDL Cholesterol 39 (L) 40 - 60 mg/dL    VLDL Cholesterol Harley 24 5 - 40 mg/dL    LDL Chol Calc (NIH) 131 (H) 0 - 100 mg/dL   Comprehensive Metabolic Panel    Specimen: Arm, Right; Blood    Blood  Release to genevieve   Result Value Ref Range    Glucose 131 (H) 65 - 99 mg/dL    BUN 12 6 - 20 mg/dL    Creatinine 0.70 0.57 - 1.00 mg/dL    eGFR Non African Am 89 >60 mL/min/1.73    eGFR African Am 108 >60 mL/min/1.73    BUN/Creatinine Ratio 17.1 7.0 - 25.0    Sodium 141 136 - 145 mmol/L    Potassium 4.1 3.5 - 5.2 mmol/L    Chloride 109 (H) 98 - 107 mmol/L    Total CO2 21.4 (L) 22.0 - 29.0 mmol/L    Calcium 9.0 8.6 - 10.5 mg/dL    Total Protein 7.0 6.0 - 8.5 g/dL    Albumin 4.40 3.50 - 5.20 g/dL    Globulin 2.6 gm/dL    A/G Ratio 1.7 g/dL    Total Bilirubin 0.2 0.0 - 1.2 mg/dL    Alkaline  Phosphatase 101 39 - 117 U/L    AST (SGOT) 18 1 - 32 U/L    ALT (SGPT) 23 1 - 33 U/L     Patient's Body mass index is 42.2 kg/m². indicating that she is obese (BMI >30). Obesity-related health conditions include the following: hypertension, diabetes mellitus and dyslipidemias.      Assessment/Plan     Diagnoses and all orders for this visit:    1. DM type 2 with diabetic peripheral neuropathy (HCC) (Primary)  Comments:  Reviewed labs with her. Continue current insulin pump parameters.    2. Mixed hyperlipidemia  Comments:  Continue Crestor and cardiovascular risk reduction modifications     3. Generalized edema  Comments:  Lasix prescribed and to use cautiously   Orders:  -     furosemide (LASIX) 20 MG tablet; Take 1 tablet by mouth Daily As Needed (edema).  Dispense: 15 tablet; Refill: 1    4. Vitamin B 12 deficiency  Comments:  Vitamin B12 injections  Orders:  -     Cyanocobalamin 1000 MCG/ML kit; Inject 1 mL as directed Every 30 (Thirty) Days.  Dispense: 1 kit; Refill: 5    5. Close exposure to COVID-19 virus  Comments:  Will come in on Friday which will be the 5th day post close exposure   Orders:  -     Respiratory Panel PCR w/COVID-19(SARS-CoV-2) [facility] In-House, NP Swab in UTM/VTM, 3-4 HR TAT - Swab, Nasopharynx; Future      Follow Up February 16 th   Findings and recommendations discussed with Nivia. Reviewed test results. Lifestyle modifications reinforced including nutrition and activity recommendations. She will follow up in February sooner if problems/concerns occur.    This document has been electronically signed by:

## 2022-01-14 ENCOUNTER — LAB (OUTPATIENT)
Dept: FAMILY MEDICINE CLINIC | Facility: CLINIC | Age: 47
End: 2022-01-14

## 2022-01-14 DIAGNOSIS — Z20.822 CLOSE EXPOSURE TO COVID-19 VIRUS: ICD-10-CM

## 2022-01-14 PROCEDURE — 0202U NFCT DS 22 TRGT SARS-COV-2: CPT | Performed by: NURSE PRACTITIONER

## 2022-01-18 DIAGNOSIS — J40 BRONCHITIS: ICD-10-CM

## 2022-01-18 DIAGNOSIS — J45.20 MILD INTERMITTENT ASTHMA WITHOUT COMPLICATION: Chronic | ICD-10-CM

## 2022-01-18 DIAGNOSIS — J30.9 CHRONIC ALLERGIC RHINITIS: ICD-10-CM

## 2022-01-18 DIAGNOSIS — J32.4 PANSINUSITIS, UNSPECIFIED CHRONICITY: Primary | ICD-10-CM

## 2022-01-18 RX ORDER — AZITHROMYCIN 250 MG/1
TABLET, FILM COATED ORAL
Qty: 6 TABLET | Refills: 0 | Status: SHIPPED | OUTPATIENT
Start: 2022-01-18 | End: 2022-02-16

## 2022-01-18 RX ORDER — CETIRIZINE HYDROCHLORIDE 5 MG/1
5 TABLET ORAL DAILY
Qty: 30 TABLET | Refills: 0 | Status: SHIPPED | OUTPATIENT
Start: 2022-01-18 | End: 2022-01-20

## 2022-01-18 RX ORDER — FLUCONAZOLE 150 MG/1
150 TABLET ORAL DAILY
Qty: 3 TABLET | Refills: 0 | Status: SHIPPED | OUTPATIENT
Start: 2022-01-18 | End: 2022-01-21

## 2022-01-20 DIAGNOSIS — F33.3 SEVERE EPISODE OF RECURRENT MAJOR DEPRESSIVE DISORDER, WITH PSYCHOTIC FEATURES: ICD-10-CM

## 2022-01-20 DIAGNOSIS — J30.9 CHRONIC ALLERGIC RHINITIS: Chronic | ICD-10-CM

## 2022-01-20 DIAGNOSIS — F41.1 GENERALIZED ANXIETY DISORDER: ICD-10-CM

## 2022-01-20 RX ORDER — VENLAFAXINE HYDROCHLORIDE 37.5 MG/1
CAPSULE, EXTENDED RELEASE ORAL
Qty: 30 CAPSULE | Refills: 0 | Status: SHIPPED | OUTPATIENT
Start: 2022-01-20 | End: 2022-02-15 | Stop reason: SDUPTHER

## 2022-01-20 RX ORDER — LEVOCETIRIZINE DIHYDROCHLORIDE 5 MG/1
TABLET, FILM COATED ORAL
Qty: 30 TABLET | Refills: 2 | Status: SHIPPED | OUTPATIENT
Start: 2022-01-20 | End: 2022-05-09

## 2022-01-20 RX ORDER — LORAZEPAM 1 MG/1
TABLET ORAL
Qty: 90 TABLET | Refills: 0 | Status: SHIPPED | OUTPATIENT
Start: 2022-01-20 | End: 2022-02-15 | Stop reason: SDUPTHER

## 2022-01-20 RX ORDER — AZELASTINE 1 MG/ML
SPRAY, METERED NASAL
Qty: 30 ML | Refills: 2 | Status: SHIPPED | OUTPATIENT
Start: 2022-01-20 | End: 2022-05-10

## 2022-01-20 RX ORDER — BREXPIPRAZOLE 3 MG/1
TABLET ORAL
Qty: 30 TABLET | Refills: 0 | Status: SHIPPED | OUTPATIENT
Start: 2022-01-20 | End: 2022-02-15

## 2022-01-21 RX ORDER — TAMOXIFEN CITRATE 20 MG/1
20 TABLET ORAL DAILY
Qty: 30 TABLET | Refills: 1 | Status: SHIPPED | OUTPATIENT
Start: 2022-01-21 | End: 2022-01-27 | Stop reason: SDUPTHER

## 2022-01-27 ENCOUNTER — APPOINTMENT (OUTPATIENT)
Dept: ONCOLOGY | Facility: HOSPITAL | Age: 47
End: 2022-01-27

## 2022-01-27 DIAGNOSIS — R23.2 HOT FLASHES DUE TO TAMOXIFEN: ICD-10-CM

## 2022-01-27 DIAGNOSIS — T45.1X5A HOT FLASHES DUE TO TAMOXIFEN: ICD-10-CM

## 2022-01-27 RX ORDER — TAMOXIFEN CITRATE 20 MG/1
20 TABLET ORAL DAILY
Qty: 30 TABLET | Refills: 11 | Status: SHIPPED | OUTPATIENT
Start: 2022-01-27 | End: 2022-03-22 | Stop reason: SDUPTHER

## 2022-01-27 RX ORDER — GABAPENTIN 100 MG/1
100 CAPSULE ORAL 2 TIMES DAILY
Qty: 60 CAPSULE | Refills: 3 | Status: SHIPPED | OUTPATIENT
Start: 2022-01-27 | End: 2022-04-28 | Stop reason: SDUPTHER

## 2022-02-03 ENCOUNTER — INFUSION (OUTPATIENT)
Dept: ONCOLOGY | Facility: HOSPITAL | Age: 47
End: 2022-02-03

## 2022-02-03 VITALS
OXYGEN SATURATION: 94 % | DIASTOLIC BLOOD PRESSURE: 82 MMHG | BODY MASS INDEX: 41.86 KG/M2 | SYSTOLIC BLOOD PRESSURE: 121 MMHG | TEMPERATURE: 96.8 F | WEIGHT: 244 LBS | RESPIRATION RATE: 18 BRPM | HEART RATE: 93 BPM

## 2022-02-03 DIAGNOSIS — Z95.828 PORT-A-CATH IN PLACE: Primary | ICD-10-CM

## 2022-02-03 PROCEDURE — 96523 IRRIG DRUG DELIVERY DEVICE: CPT

## 2022-02-03 PROCEDURE — 25010000002 HEPARIN LOCK FLUSH PER 10 UNITS: Performed by: INTERNAL MEDICINE

## 2022-02-03 RX ORDER — SODIUM CHLORIDE 0.9 % (FLUSH) 0.9 %
10 SYRINGE (ML) INJECTION AS NEEDED
Status: CANCELLED | OUTPATIENT
Start: 2022-03-22

## 2022-02-03 RX ORDER — SODIUM CHLORIDE 0.9 % (FLUSH) 0.9 %
10 SYRINGE (ML) INJECTION AS NEEDED
Status: DISCONTINUED | OUTPATIENT
Start: 2022-02-03 | End: 2022-02-03 | Stop reason: HOSPADM

## 2022-02-03 RX ORDER — HEPARIN SODIUM (PORCINE) LOCK FLUSH IV SOLN 100 UNIT/ML 100 UNIT/ML
500 SOLUTION INTRAVENOUS AS NEEDED
Status: CANCELLED | OUTPATIENT
Start: 2022-03-22

## 2022-02-03 RX ORDER — HEPARIN SODIUM (PORCINE) LOCK FLUSH IV SOLN 100 UNIT/ML 100 UNIT/ML
500 SOLUTION INTRAVENOUS AS NEEDED
Status: DISCONTINUED | OUTPATIENT
Start: 2022-02-03 | End: 2022-02-03 | Stop reason: HOSPADM

## 2022-02-03 RX ADMIN — HEPARIN 500 UNITS: 100 SYRINGE at 13:20

## 2022-02-03 RX ADMIN — SODIUM CHLORIDE, PRESERVATIVE FREE 10 ML: 5 INJECTION INTRAVENOUS at 13:20

## 2022-02-07 ENCOUNTER — OFFICE VISIT (OUTPATIENT)
Dept: SURGERY | Facility: CLINIC | Age: 47
End: 2022-02-07

## 2022-02-07 VITALS
HEIGHT: 66 IN | SYSTOLIC BLOOD PRESSURE: 128 MMHG | HEART RATE: 92 BPM | WEIGHT: 243.8 LBS | DIASTOLIC BLOOD PRESSURE: 86 MMHG | BODY MASS INDEX: 39.18 KG/M2

## 2022-02-07 DIAGNOSIS — M85.80 OSTEOPENIA, UNSPECIFIED LOCATION: ICD-10-CM

## 2022-02-07 DIAGNOSIS — Z17.1 MALIGNANT NEOPLASM OF UPPER-OUTER QUADRANT OF RIGHT BREAST IN FEMALE, ESTROGEN RECEPTOR NEGATIVE: Primary | ICD-10-CM

## 2022-02-07 DIAGNOSIS — C50.411 MALIGNANT NEOPLASM OF UPPER-OUTER QUADRANT OF RIGHT BREAST IN FEMALE, ESTROGEN RECEPTOR NEGATIVE: Primary | ICD-10-CM

## 2022-02-07 PROCEDURE — 93702 BIS XTRACELL FLUID ANALYSIS: CPT | Performed by: SURGERY

## 2022-02-07 PROCEDURE — 99213 OFFICE O/P EST LOW 20 MIN: CPT | Performed by: SURGERY

## 2022-02-07 NOTE — PROGRESS NOTES
Subjective   Nivia Bernstein is a 46 y.o. female here today for 6 month follow up with no studies.    History of Present Illness  Nivia was seen in the office today for breast cancer follow-up.  She is status post a right mastectomy with sentinel node biopsy and a contralateral left mastectomy on 8/18/2020 final pathology demonstrated a T1BN0 grade 2 ductal carcinoma of the right breast, ER negative, AK positive, HER-2 negative.  No disease identified in the left breast.   She completed adjuvant chemotherapy and was started on tamoxifen in mid December.  She is tolerating this well.    She was last seen by medical oncology on  12/15/2021.  Patient's main complaint is pain in the right lateral chest and shoulder.  This does appear to be primarily in the chest and not shoulder.  She actually had physical therapy treatments for a while.  Patient does have osteopenia with her last bone density on 6/9/2020 which demonstrated a T score of -1.9.  She is on vitamin D but not calcium  Patient denies any chest pain or shortness of breath.  She denies any symptoms of metastatic disease.  Allergies   Allergen Reactions   • Mobic [Meloxicam] Rash   • Penicillins Angioedema   • Taxotere [Docetaxel] Anaphylaxis     9 minutes into 1st infusion   • Novolog [Insulin Aspart] Hives       Current Outpatient Medications   Medication Sig Dispense Refill   • albuterol (PROVENTIL,VENTOLIN) 2 MG/5ML syrup Take 5-10 mL by mouth Every 6 (Six) Hours As Needed (cough). 60 mL 0   • albuterol sulfate HFA (Ventolin HFA) 108 (90 Base) MCG/ACT inhaler Inhale 2 puffs Every 4 (Four) Hours As Needed (shortness of breath). 18 g 5   • aspirin (aspirin) 81 MG EC tablet Take 2 tablets by mouth Daily. 60 tablet 2   • azelastine (ASTELIN) 0.1 % nasal spray USE 2 SPRAYS IN BOTH NOSTRILS TWO TIMES A DAY AS NEEDED 30 mL 2   • Breo Ellipta 200-25 MCG/INH inhaler Inhale 1 puff Daily. 1 each 5   • Calcium Carbonate 1500 (600 Ca) MG tablet TAKE 1 TABLET BY MOUTH TWO  TIMES A DAY WITH A MEAL 60 tablet 5   • cholecalciferol (Vitamin D) 25 MCG (1000 UT) tablet Take 2 tablets by mouth Daily. 60 tablet 5   • cloNIDine (CATAPRES) 0.1 MG tablet TAKE 1 TABLET BY MOUTH TWO TIMES A DAY 60 tablet 5   • Cyanocobalamin 1000 MCG/ML kit Inject 1 mL as directed Every 30 (Thirty) Days. 1 kit 5   • diclofenac (VOLTAREN) 1 % gel gel Apply 4 g topically to the appropriate area as directed 4 (Four) Times a Day As Needed (joint pain). 500 g 5   • Diclofenac Sodium (VOLTAREN) 1 % gel gel      • docusate sodium 100 MG capsule Take 100 mg by mouth 2 (Two) Times a Day.     • furosemide (LASIX) 20 MG tablet Take 1 tablet by mouth Daily As Needed (edema). 15 tablet 1   • gabapentin (Neurontin) 100 MG capsule Take 1 capsule by mouth 2 (Two) Times a Day. 60 capsule 3   • HumaLOG 100 UNIT/ML injection USE PER INSULIN PUMP. MAX DAILY DOSE  UNITS 40 mL 0   • hydrocortisone (ANUSOL-HC) 25 MG suppository Insert 1 suppository into the rectum 2 (Two) Times a Day As Needed for Hemorrhoids (rectal discomfort). 30 suppository 5   • lansoprazole (PREVACID) 30 MG capsule Take 1 capsule by mouth Daily. 90 capsule 3   • levocetirizine (XYZAL) 5 MG tablet TAKE ONE TABLET BY MOUTH EVERY EVENING 30 tablet 2   • linaclotide (Linzess) 145 MCG capsule capsule Take 1 capsule by mouth Daily. 30 minutes before meals on an empty stomach. 30 capsule 5   • loperamide (IMODIUM A-D) 2 MG tablet Take 1 tablet by mouth 4 (Four) Times a Day As Needed for Diarrhea. 30 tablet 0   • LORazepam (ATIVAN) 1 MG tablet TAKE ONE TABLET BY MOUTH THREE TIMES A DAY AS NEEDED FOR ANXIETY 90 tablet 0   • nitrofurantoin, macrocrystal-monohydrate, (Macrobid) 100 MG capsule Take 1 capsule by mouth Daily. 56 capsule 3   • ondansetron (ZOFRAN) 8 MG tablet TAKE 1 TABLET BY MOUTH 3 TIMES A DAY AS NEEDED FOR NAUSEA OR VOMITING. 30 tablet 5   • prochlorperazine (COMPAZINE) 10 MG tablet Take 1 tablet by mouth Every 6 (Six) Hours As Needed for Nausea or  Vomiting. 30 tablet 5   • Rexulti 3 MG tablet TAKE ONE TABLET BY MOUTH ONCE DAILY 30 tablet 0   • rosuvastatin (CRESTOR) 20 MG tablet TAKE ONE TABLET BY MOUTH EVERY NIGHT 30 tablet 3   • sennosides-docusate (senna-docusate sodium) 8.6-50 MG per tablet Take 2 tablets by mouth 2 (Two) Times a Day. 120 tablet 4   • SUMAtriptan (IMITREX) 50 MG tablet      • tamoxifen (NOLVADEX) 20 MG chemo tablet Take 1 tablet by mouth Daily. 30 tablet 11   • topiramate (TOPAMAX) 50 MG tablet      • Trijardy XR 25-5-1000 MG tablet sustained-release 24 hour TAKE 1 TABLET BY MOUTH EVERY MORNING. 30 tablet 5   • venlafaxine XR (EFFEXOR-XR) 150 MG 24 hr capsule Take 1 capsule by mouth Daily. Take with the Effexor XR 37.5mg capsule for a total daily dose of 187.5mg. 30 capsule 0   • vitamin D (ERGOCALCIFEROL) 1.25 MG (10998 UT) capsule capsule Take 1 capsule by mouth 1 (One) Time Per Week. 4 capsule 3   • azithromycin (Zithromax Z-Norbert) 250 MG tablet Take 2 tablets by mouth on day 1, then 1 tablet daily on days 2-5 6 tablet 0   • magic mouthwash oral suspension Swish and swallow 5 mL four times  a day As Needed for Mucositis. 240 mL 1   • venlafaxine XR (EFFEXOR-XR) 37.5 MG 24 hr capsule TAKE ONE CAPSULE BY MOUTH ONCE DAILY. TAKE ALONG WITH 150MG FOR A TOTAL DOSE .5MG 30 capsule 0     No current facility-administered medications for this visit.     Past Medical History:   Diagnosis Date   • Altered mental status 10/16/2017   • Anxiety and depression 3/31/2017   • Arthritis    • Asthma    • Elevated alkaline phosphatase level 10/16/2017   • Enlarged liver    • GERD (gastroesophageal reflux disease)    • Heart murmur    • Hypokalemia 10/16/2017   • Mitral valve prolapse    • Neuropathy     related to diabetes   • Obesity 3/31/2017   • OCD (obsessive compulsive disorder) 10/16/2017   • Osteoporosis    • PONV (postoperative nausea and vomiting)    • Renal insufficiency 10/16/2017   • Right breast cancer with malignant cells in regional  "lymph nodes no greater than 0.2 mm and no more than 200 cells (HCC) 8/13/2020   • TIA (transient ischemic attack)     4 TIMES     Past Surgical History:   Procedure Laterality Date   • APPENDECTOMY     • CARPAL TUNNEL RELEASE     • CHOLECYSTECTOMY     • COLONOSCOPY N/A 5/5/2021    Procedure: COLONOSCOPY WITH BIOPSY;  Surgeon: Vickie Herrera MD;  Location: [facility] OR;  Service: Gastroenterology;  Laterality: N/A;   • FINGER FUSION Left     3rd   • HYSTERECTOMY  2011    removed due to an ovarian cyst and dysmenorrhia. No cancer noted. Complete   • KNEE ARTHROSCOPY Right    • MASTECTOMY Left 8/18/2020    Procedure: Left mastectomy;  Surgeon: Sheila Garza MD;  Location: [facility] OR;  Service: General;  Laterality: Left;   • MASTECTOMY WITH SENTINEL NODE BIOPSY AND AXILLARY NODE DISSECTION Right 8/18/2020    Procedure: Right BREAST MASTECTOMY WITH SENTINEL NODE BIOPSY;  Surgeon: Sheila aGrza MD;  Location: [facility] OR;  Service: General;  Laterality: Right;   • PORTACATH PLACEMENT         Pertinent Review of Systems      Objective   /86 (BP Location: Left arm)   Pulse 92   Ht 167.6 cm (66\")   Wt 111 kg (243 lb 12.8 oz)   BMI 39.35 kg/m²    Physical Exam  Well-developed well-nourished female no acute distress  Neck:  No supraclavicular adenopathy  Lungs:  Respiratory effort normal. Auscultation: Clear, without wheezes, rhonchi, rales  Heart:  Regular rate and rhythm, without murmur, gallop, rub.  No pedal edema  Breasts: On visual inspection the breasts are surgically absent.  Examination of the right chest wall demonstrates no palpable mass or adenopathy.   Upper extremity: Without edema    Procedures   L dex was performed and was -3.7, with the prior on 7/8/2021 being -1 point    Results/Data:  Records from medical oncology from 12/15/2021 were reviewed    Assessment/Plan   Right breast T1BN0 grade 2 mammary carcinoma, ER negative, NV positive, HER-2 negative, status post left mastectomy " with sentinel node biopsy  Left mastectomy   Osteopenia    Follow-up in 1 year       Discussion/Summary:    Time spent:     Patient's Body mass index is 39.35 kg/m². indicating that she is obese (BMI >30). Obesity-related health conditions include the following: diabetes mellitus. Obesity is improving with lifestyle modifications. BMI is is above average; BMI management plan is completed. We discussed portion control and increasing exercise..         Future Appointments   Date Time Provider Department Center   2/15/2022  8:15 AM Gale Aguilar APRN [facility] None   2/16/2022 10:00 AM Josh Guerin APRN [facility]   3/22/2022  2:00 PM [facility] NURSE LAB [facility]   3/22/2022  2:00 PM  [facility] OP INFUS CHAIR 23  [facility] INF [facility]   3/22/2022  2:30 PM Dejah Loco MD [facility]   11/8/2022 11:00 AM Simon Hernandez MD [facility] [place]   11/10/2022  1:20 PM Simon Hernandez MD [facility]         Please note that portions of this note were completed with a voice recognition program.

## 2022-02-08 DIAGNOSIS — M25.519 ACUTE SHOULDER PAIN, UNSPECIFIED LATERALITY: Primary | ICD-10-CM

## 2022-02-15 ENCOUNTER — OFFICE VISIT (OUTPATIENT)
Dept: PSYCHIATRY | Facility: CLINIC | Age: 47
End: 2022-02-15

## 2022-02-15 VITALS
DIASTOLIC BLOOD PRESSURE: 70 MMHG | SYSTOLIC BLOOD PRESSURE: 118 MMHG | WEIGHT: 242 LBS | HEART RATE: 88 BPM | BODY MASS INDEX: 39.06 KG/M2

## 2022-02-15 DIAGNOSIS — F33.3 SEVERE EPISODE OF RECURRENT MAJOR DEPRESSIVE DISORDER, WITH PSYCHOTIC FEATURES: ICD-10-CM

## 2022-02-15 DIAGNOSIS — F42.8 OTHER OBSESSIVE-COMPULSIVE DISORDERS: Chronic | ICD-10-CM

## 2022-02-15 DIAGNOSIS — Z79.899 HIGH RISK MEDICATION USE: Primary | ICD-10-CM

## 2022-02-15 DIAGNOSIS — F41.1 GENERALIZED ANXIETY DISORDER: ICD-10-CM

## 2022-02-15 PROCEDURE — 99213 OFFICE O/P EST LOW 20 MIN: CPT | Performed by: NURSE PRACTITIONER

## 2022-02-15 RX ORDER — NITROFURANTOIN MACROCRYSTALS 100 MG/1
CAPSULE ORAL
COMMUNITY
Start: 2022-01-12 | End: 2022-02-15 | Stop reason: SDUPTHER

## 2022-02-15 RX ORDER — VENLAFAXINE HYDROCHLORIDE 150 MG/1
150 CAPSULE, EXTENDED RELEASE ORAL DAILY
Qty: 30 CAPSULE | Refills: 0 | Status: SHIPPED | OUTPATIENT
Start: 2022-02-15 | End: 2022-03-15 | Stop reason: SDUPTHER

## 2022-02-15 RX ORDER — BREXPIPRAZOLE 2 MG/1
2 TABLET ORAL DAILY
Qty: 30 TABLET | Refills: 0 | Status: SHIPPED | OUTPATIENT
Start: 2022-02-15 | End: 2022-03-15 | Stop reason: SDUPTHER

## 2022-02-15 RX ORDER — SUMATRIPTAN 100 MG/1
TABLET, FILM COATED ORAL
COMMUNITY
Start: 2022-01-20 | End: 2022-08-17 | Stop reason: SDUPTHER

## 2022-02-15 RX ORDER — VENLAFAXINE HYDROCHLORIDE 37.5 MG/1
37.5 CAPSULE, EXTENDED RELEASE ORAL DAILY
Qty: 30 CAPSULE | Refills: 0 | Status: SHIPPED | OUTPATIENT
Start: 2022-02-15 | End: 2022-03-15 | Stop reason: SDUPTHER

## 2022-02-15 RX ORDER — LORAZEPAM 1 MG/1
1 TABLET ORAL 3 TIMES DAILY PRN
Qty: 90 TABLET | Refills: 0 | Status: SHIPPED | OUTPATIENT
Start: 2022-02-15 | End: 2022-03-15 | Stop reason: SDUPTHER

## 2022-02-15 RX ORDER — CYANOCOBALAMIN 1000 UG/ML
INJECTION, SOLUTION INTRAMUSCULAR; SUBCUTANEOUS
COMMUNITY
Start: 2022-01-12 | End: 2022-02-15 | Stop reason: SDUPTHER

## 2022-02-15 NOTE — PROGRESS NOTES
"      Raquel Bernstein is a 46 y.o. female is here today for medication management follow-up.    Chief Complaint:  Anxiety depression     History of Present Illness:   Patient presents today for follow up for medication management for depression and anxiety. Patient staets she ended up with covid after catching it from  then mom ended up with it. Patient states got stir crazy due to not being able to leave the house for a month. Patient states still seeing kids a lot lately. Patient states still taking medication. Patient states she has noticed she has been feeling really restless on the inside and unsure if it is medicine related or not. Patient states its mainly in the morning but feeling jittery or restless. Patient states mood has been \"fair\". Patient reports currently depression is at a 8 on a 0-10 scale with 10 being the worst. Patient reports symptoms of depression of insomnia, depressed mood, decreased energy, irritability, and crying spells. Patient reports anxiety is at a 8 on a 0-10 scale with 10 being the worst. Patient denies any panic attacks. Patient reports sleeping at least 4-5 hours a night and patient reports having a few nightmares. Patient reports appetite is good and eating 2-3 meals a day. Patient states as soon as it warms up outside will start walking. Patient denies any auditory or visual hallucinations. Patient adamantly denies any SI or HI. Patient denies any side effects to medications.   The following portions of the patient's history were reviewed and updated as appropriate: allergies, current medications, past family history, past medical history, past social history, past surgical history and problem list.    Review of Systems   Constitutional: Negative.    Respiratory: Negative.    Cardiovascular: Negative.    Gastrointestinal: Negative.    Neurological: Negative.    Psychiatric/Behavioral: Positive for agitation, dysphoric mood and sleep disturbance. The patient is " nervous/anxious.        Objective   Physical Exam  Vitals reviewed.   Constitutional:       Appearance: Normal appearance. She is well-developed and well-groomed.   Neurological:      Mental Status: She is alert.   Psychiatric:         Attention and Perception: Attention and perception normal.         Mood and Affect: Affect normal. Mood is depressed.         Speech: Speech normal.         Behavior: Behavior normal. Behavior is cooperative.         Thought Content: Thought content normal.         Cognition and Memory: Cognition and memory normal.         Judgment: Judgment normal.       Blood pressure 118/70, pulse 88, weight 110 kg (242 lb), not currently breastfeeding. Body mass index is 39.06 kg/m².    Allergies   Allergen Reactions   • Mobic [Meloxicam] Rash   • Penicillins Angioedema   • Taxotere [Docetaxel] Anaphylaxis     9 minutes into 1st infusion   • Novolog [Insulin Aspart] Hives       Medication List:   Current Outpatient Medications   Medication Sig Dispense Refill   • albuterol (PROVENTIL,VENTOLIN) 2 MG/5ML syrup Take 5-10 mL by mouth Every 6 (Six) Hours As Needed (cough). 60 mL 0   • albuterol sulfate HFA (Ventolin HFA) 108 (90 Base) MCG/ACT inhaler Inhale 2 puffs Every 4 (Four) Hours As Needed (shortness of breath). 18 g 5   • aspirin (aspirin) 81 MG EC tablet Take 2 tablets by mouth Daily. 60 tablet 2   • azelastine (ASTELIN) 0.1 % nasal spray USE 2 SPRAYS IN BOTH NOSTRILS TWO TIMES A DAY AS NEEDED 30 mL 2   • azithromycin (Zithromax Z-Norbert) 250 MG tablet Take 2 tablets by mouth on day 1, then 1 tablet daily on days 2-5 6 tablet 0   • Breo Ellipta 200-25 MCG/INH inhaler Inhale 1 puff Daily. 1 each 5   • Brexpiprazole (Rexulti) 2 MG tablet Take 1 tablet by mouth Daily. 30 tablet 0   • Calcium Carbonate 1500 (600 Ca) MG tablet TAKE 1 TABLET BY MOUTH TWO TIMES A DAY WITH A MEAL 60 tablet 5   • cholecalciferol (Vitamin D) 25 MCG (1000 UT) tablet Take 2 tablets by mouth Daily. 60 tablet 5   • cloNIDine  (CATAPRES) 0.1 MG tablet TAKE 1 TABLET BY MOUTH TWO TIMES A DAY 60 tablet 5   • Cyanocobalamin 1000 MCG/ML kit Inject 1 mL as directed Every 30 (Thirty) Days. 1 kit 5   • diclofenac (VOLTAREN) 1 % gel gel Apply 4 g topically to the appropriate area as directed 4 (Four) Times a Day As Needed (joint pain). 500 g 5   • Diclofenac Sodium (VOLTAREN) 1 % gel gel      • docusate sodium 100 MG capsule Take 100 mg by mouth 2 (Two) Times a Day.     • furosemide (LASIX) 20 MG tablet Take 1 tablet by mouth Daily As Needed (edema). 15 tablet 1   • gabapentin (Neurontin) 100 MG capsule Take 1 capsule by mouth 2 (Two) Times a Day. 60 capsule 3   • HumaLOG 100 UNIT/ML injection USE PER INSULIN PUMP. MAX DAILY DOSE  UNITS 40 mL 0   • hydrocortisone (ANUSOL-HC) 25 MG suppository Insert 1 suppository into the rectum 2 (Two) Times a Day As Needed for Hemorrhoids (rectal discomfort). 30 suppository 5   • lansoprazole (PREVACID) 30 MG capsule Take 1 capsule by mouth Daily. 90 capsule 3   • levocetirizine (XYZAL) 5 MG tablet TAKE ONE TABLET BY MOUTH EVERY EVENING 30 tablet 2   • linaclotide (Linzess) 145 MCG capsule capsule Take 1 capsule by mouth Daily. 30 minutes before meals on an empty stomach. 30 capsule 5   • loperamide (IMODIUM A-D) 2 MG tablet Take 1 tablet by mouth 4 (Four) Times a Day As Needed for Diarrhea. 30 tablet 0   • LORazepam (ATIVAN) 1 MG tablet Take 1 tablet by mouth 3 (Three) Times a Day As Needed for Anxiety. for anxiety 90 tablet 0   • magic mouthwash oral suspension Swish and swallow 5 mL four times  a day As Needed for Mucositis. 240 mL 1   • nitrofurantoin, macrocrystal-monohydrate, (Macrobid) 100 MG capsule Take 1 capsule by mouth Daily. 56 capsule 3   • ondansetron (ZOFRAN) 8 MG tablet TAKE 1 TABLET BY MOUTH 3 TIMES A DAY AS NEEDED FOR NAUSEA OR VOMITING. 30 tablet 5   • prochlorperazine (COMPAZINE) 10 MG tablet Take 1 tablet by mouth Every 6 (Six) Hours As Needed for Nausea or Vomiting. 30 tablet 5   •  rosuvastatin (CRESTOR) 20 MG tablet TAKE ONE TABLET BY MOUTH EVERY NIGHT 30 tablet 3   • sennosides-docusate (senna-docusate sodium) 8.6-50 MG per tablet Take 2 tablets by mouth 2 (Two) Times a Day. 120 tablet 4   • SUMAtriptan (IMITREX) 100 MG tablet      • tamoxifen (NOLVADEX) 20 MG chemo tablet Take 1 tablet by mouth Daily. 30 tablet 11   • topiramate (TOPAMAX) 50 MG tablet      • Trijardy XR 25-5-1000 MG tablet sustained-release 24 hour TAKE 1 TABLET BY MOUTH EVERY MORNING. 30 tablet 5   • venlafaxine XR (EFFEXOR-XR) 150 MG 24 hr capsule Take 1 capsule by mouth Daily. Take with the Effexor XR 37.5mg capsule for a total daily dose of 187.5mg. 30 capsule 0   • venlafaxine XR (EFFEXOR-XR) 37.5 MG 24 hr capsule Take 1 capsule by mouth Daily. Take with the Effexor XR 150mg capsule for a total daily dose of 187.5mg. 30 capsule 0   • vitamin D (ERGOCALCIFEROL) 1.25 MG (35965 UT) capsule capsule Take 1 capsule by mouth 1 (One) Time Per Week. 4 capsule 3     No current facility-administered medications for this visit.       Mental Status Exam:   Hygiene:   good  Cooperation:  Cooperative  Eye Contact:  Good  Psychomotor Behavior:  Appropriate  Affect:  Appropriate  Hopelessness: Denies  Speech:  Normal  Thought Process:  Goal directed and Linear  Thought Content:  Normal  Suicidal:  None  Homicidal:  None  Hallucinations:  None  Delusion:  None  Memory:  Intact  Orientation:  Person, Place, Time and Situation  Reliability:  fair  Insight:  Fair  Judgement:  Fair  Impulse Control:  Fair  Physical/Medical Issues:  No               Assessment/Plan   Diagnoses and all orders for this visit:    1. High risk medication use (Primary)  -     Urine Drug Screen - Urine, Clean Catch; Future    2. Severe episode of recurrent major depressive disorder, with psychotic features (HCC)  -     Brexpiprazole (Rexulti) 2 MG tablet; Take 1 tablet by mouth Daily.  Dispense: 30 tablet; Refill: 0  -     venlafaxine XR (EFFEXOR-XR) 150 MG 24 hr  capsule; Take 1 capsule by mouth Daily. Take with the Effexor XR 37.5mg capsule for a total daily dose of 187.5mg.  Dispense: 30 capsule; Refill: 0  -     venlafaxine XR (EFFEXOR-XR) 37.5 MG 24 hr capsule; Take 1 capsule by mouth Daily. Take with the Effexor XR 150mg capsule for a total daily dose of 187.5mg.  Dispense: 30 capsule; Refill: 0    3. Generalized anxiety disorder  -     Brexpiprazole (Rexulti) 2 MG tablet; Take 1 tablet by mouth Daily.  Dispense: 30 tablet; Refill: 0  -     venlafaxine XR (EFFEXOR-XR) 150 MG 24 hr capsule; Take 1 capsule by mouth Daily. Take with the Effexor XR 37.5mg capsule for a total daily dose of 187.5mg.  Dispense: 30 capsule; Refill: 0  -     venlafaxine XR (EFFEXOR-XR) 37.5 MG 24 hr capsule; Take 1 capsule by mouth Daily. Take with the Effexor XR 150mg capsule for a total daily dose of 187.5mg.  Dispense: 30 capsule; Refill: 0  -     LORazepam (ATIVAN) 1 MG tablet; Take 1 tablet by mouth 3 (Three) Times a Day As Needed for Anxiety. for anxiety  Dispense: 90 tablet; Refill: 0    4. Other obsessive-compulsive disorders  -     Brexpiprazole (Rexulti) 2 MG tablet; Take 1 tablet by mouth Daily.  Dispense: 30 tablet; Refill: 0  -     venlafaxine XR (EFFEXOR-XR) 150 MG 24 hr capsule; Take 1 capsule by mouth Daily. Take with the Effexor XR 37.5mg capsule for a total daily dose of 187.5mg.  Dispense: 30 capsule; Refill: 0  -     venlafaxine XR (EFFEXOR-XR) 37.5 MG 24 hr capsule; Take 1 capsule by mouth Daily. Take with the Effexor XR 150mg capsule for a total daily dose of 187.5mg.  Dispense: 30 capsule; Refill: 0            Discussed medication options with patient. Decrease Rexulti to 2mg daily for depression and anxiety due to possible side effects. Cont. Effexor .5mg daily for depression, anxiety, and OCD. Cont. Lorazepam 1mg three times daily as needed for anxiety. Reviewed the risks, benefits, and side effects of the medications; patient acknowledged and verbally consented.  Patient is being prescribed a controlled substance as part of treatment plan. Patient has been educated of appropriate use of the medications, including risk of somnolence, limited ability to drive and/or work safely, and potential for dependence, respiratory depression and overdose. Patient is also informed that the medication are to be used by the patient only- avoid any combined use of ETOH or other substances unless prescribed. UDS ordered.   [facility] Patient Controlled Substance Report (from 2/15/2021 to 2/15/2022)    Dispensed  Strength Quantity Days Supply Provider Pharmacy   01/27/2022 Gabapentin 100MG 60 each 30 [facility]   01/20/2022 Lorazepam 1MG 90 each 30 [facility]   12/23/2021 Gabapentin 100MG 60 each 30 [facility]   12/23/2021 Lorazepam 1MG 90 each 30 [facility]   11/29/2021 Lorazepam 1MG 90 each 30 [facility]   11/17/2021 Gabapentin 100MG 60 each 30 [facility]   11/01/2021 Lorazepam 1MG 90 each 30 [facility]   10/21/2021 Gabapentin 100MG 60 each 30 [facility]   10/21/2021 Lorazepam 1MG 30 each 10 [facility]   10/05/2021 Lorazepam 1MG 30 each 15 [facility]   09/16/2021 Gabapentin 100MG 60 each 30 [facility]   09/09/2021 Diazepam 10MG 60 each 30 [facility]   08/12/2021 Diazepam 10MG 60 each 30 [facility]   07/12/2021 Diazepam 10MG 60 each 30 [facility]   07/08/2021 Gabapentin 300MG 90 each 30 [facility]PRE[facility]I [facility]   06/15/2021 Gabapentin 100MG 60 each 30 [facility]   06/10/2021 Diazepam 10MG 45 each 23 [facility]   05/11/2021 Diazepam 10MG 45 each 23 [facility]   04/09/2021 Diazepam 5MG 60 each 30 [facility]   03/12/2021 Diazepam 5MG 30 each 30 [facility]   02/22/2021 Diazepam 5MG 20 each 20 [facility]           Patient is agreeable to call the office with any questions, concerns, or worsening of symptoms. Patient is aware to call 911 or go to the nearest ER should begin having SI/HI.          Follow up in four weeks        Errors in dictation may reflect use of voice recognition software and not all errors in transcription may have been detected prior to signing.              This document has been electronically signed by NIDA Vasquez   February 15, 2022 12:20 EST

## 2022-02-16 ENCOUNTER — OFFICE VISIT (OUTPATIENT)
Dept: FAMILY MEDICINE CLINIC | Facility: CLINIC | Age: 47
End: 2022-02-16

## 2022-02-16 DIAGNOSIS — E55.9 VITAMIN D DEFICIENCY: ICD-10-CM

## 2022-02-16 DIAGNOSIS — K21.9 GASTROESOPHAGEAL REFLUX DISEASE WITHOUT ESOPHAGITIS: Chronic | ICD-10-CM

## 2022-02-16 DIAGNOSIS — E78.2 MIXED DYSLIPIDEMIA: Chronic | ICD-10-CM

## 2022-02-16 DIAGNOSIS — Z96.41 INSULIN PUMP IN PLACE: ICD-10-CM

## 2022-02-16 DIAGNOSIS — M85.89 OSTEOPENIA OF MULTIPLE SITES: Chronic | ICD-10-CM

## 2022-02-16 DIAGNOSIS — E11.42 DM TYPE 2 WITH DIABETIC PERIPHERAL NEUROPATHY: Primary | Chronic | ICD-10-CM

## 2022-02-16 PROCEDURE — 99214 OFFICE O/P EST MOD 30 MIN: CPT | Performed by: NURSE PRACTITIONER

## 2022-02-16 NOTE — PROGRESS NOTES
History of Present Illness  Nivia is a 45 y/o female who presents to the clinic today for follow up pertaining to her DM, type 2 and Dyslipidemia. In addition, she has been diagnosed with right breast Cancer and has undergone bilateral mastectomy and chemotherapy.      Diabetes   She has type 2 diabetes mellitus. The initial diagnosis of diabetes was made 20 years ago. Associated symptoms include  fatigue and peripheral neuropathy. Pertinent negatives for diabetes include no foot ulcerations. Diabetic complications include peripheral neuropathy. Risk factors for coronary artery disease include post-menopausal, stress and dyslipidemia. She is currently utilizing insulin pump therapy and CGM. She did stop weekly Ozempic due to nausea. She is currently taking Trijardy.  She has had a previous visit with a dietitian An ACE inhibitor/angiotensin II receptor blocker is not  being taken at this time..       Lab Results   Component Value Date    HGBA1C 7.7 (H) 09/28/2021     Lab Results   Component Value Date    HGBA1C 7.70 (H) 01/11/2022     Dyslipidemia   The current episode started more than 1 year ago. Pertinent negatives include no chest pain or shortness of breath. She is taking Crestor 40 mg.   Lab Results   Component Value Date    CHLPL 194 01/11/2022    CHLPL 174 09/28/2021    CHLPL 161 03/12/2021     Lab Results   Component Value Date    TRIG 133 01/11/2022    TRIG 382 (H) 09/28/2021    TRIG 211 (H) 03/12/2021     Lab Results   Component Value Date    HDL 39 (L) 01/11/2022    HDL 28 (L) 09/28/2021    HDL 32 (L) 03/12/2021     Lab Results   Component Value Date     (H) 01/11/2022    LDL 84 09/28/2021    LDL 93 03/12/2021     The following portions of the patient's history were reviewed and updated as appropriate: allergies, current medications, past family history, past medical history, past social history, past surgical history and problem list.    Review of Systems   Constitutional: Positive for  "fatigue. Negative for activity change, appetite change, chills, fever and unexpected weight change.   HENT: Positive for congestion.    Eyes: Positive for visual disturbance.   Respiratory: Negative for cough, shortness of breath and wheezing.    Cardiovascular: Positive for leg swelling (Intermittently). Negative for chest pain and palpitations.   Gastrointestinal: Negative for nausea and vomiting.   Endocrine: Negative for cold intolerance, heat intolerance, polydipsia, polyphagia and polyuria.   Musculoskeletal: Positive for arthralgias and back pain.   Skin: Negative for color change and rash.   Allergic/Immunologic: Positive for environmental allergies.   Neurological: Positive for numbness. Negative for dizziness, tremors, speech difficulty, weakness, light-headedness and headaches.   Hematological: Negative for adenopathy.   Psychiatric/Behavioral: Negative for confusion and decreased concentration. The patient is not nervous/anxious.    All other systems reviewed and are negative.    Vital signs:  /70 (BP Location: Left arm, Patient Position: Sitting, Cuff Size: Adult)   Pulse 91   Temp 97.3 °F (36.3 °C) (Temporal)   Resp 14   Ht 167.6 cm (65.98\")   Wt 110 kg (242 lb)   SpO2 98%   BMI 39.08 kg/m²     Physical Exam  Vitals and nursing note reviewed.   Constitutional:       General: She is not in acute distress.     Appearance: She is well-developed.   HENT:      Head: Normocephalic.      Nose: Nose normal.   Eyes:      General: No scleral icterus.        Right eye: No discharge.         Left eye: No discharge.      Conjunctiva/sclera: Conjunctivae normal.   Neck:      Thyroid: No thyromegaly.      Vascular: No JVD.   Cardiovascular:      Rate and Rhythm: Normal rate and regular rhythm.      Heart sounds: S1 normal and S2 normal.   Pulmonary:      Effort: Pulmonary effort is normal.      Breath sounds: Normal breath sounds.   Musculoskeletal:      Cervical back: Neck supple.   Feet:      Right " foot:      Protective Sensation: 10 sites tested. 8 sites sensed.      Skin integrity: Dry skin present. No ulcer or skin breakdown.      Left foot:      Protective Sensation: 10 sites tested. 8 sites sensed.      Skin integrity: Dry skin present. No ulcer or skin breakdown.   Neurological:      Mental Status: She is alert.   Psychiatric:         Mood and Affect: Mood and affect normal.         Speech: Speech normal.         Behavior: Behavior is cooperative.         Cognition and Memory: Cognition and memory normal.       Result Review : Insulin Pump Upload  Patient's Body mass index is 39.08 kg/m². indicating that she is obese (BMI >30). Obesity-related health conditions include the following: diabetes mellitus, dyslipidemias and GERD. Obesity is unchanged. BMI is above average; BMI management plan is completed. Provided information on  portion control and increasing exercise..     Assessment/Plan     Diagnoses and all orders for this visit:    1. DM type 2 with diabetic peripheral neuropathy (HCC) (Primary)  Comments:  Findings and recommendations discussed with Nivia. Encouraged continued glycemic control   Orders:  -     Comprehensive Metabolic Panel; Future  -     Lipid Panel; Future  -     TSH; Future  -     Hemoglobin A1c; Future  -     MicroAlbumin, Urine, Random - Urine, Clean Catch; Future  -     Vitamin B12; Future    2. Insulin pump in place  Comments:  Insulin pump upload reviewed. No changes made    3. Mixed dyslipidemia  Comments:  Continue Rosuvastatin   Orders:  -     Comprehensive Metabolic Panel; Future  -     Lipid Panel; Future  -     TSH; Future    4. Osteopenia of multiple sites  Comments:  Continue lifestyle modifications including weight bearing exercise and healthy nutrition plan rich in Calcium   Orders:  -     Uric Acid; Future    5. Gastroesophageal reflux disease without esophagitis  Comments:  Continue PPI  Orders:  -     CBC & Differential; Future    6. Vitamin D  deficiency  Comments:  Weekly Vit D   Orders:  -     Vitamin D 25 Hydroxy; Future    Follow Up In April   Findings and recommendations discussed with Nivia . Reviewed insulin pump upload. Lifestyle modifications reinforced including nutrition and activity recommendations. She will follow up in April  sooner if problems/concerns occur.  Nivia was given instructions and counseling regarding her condition or for health maintenance advice. Please see specific information pulled into the AVS if appropriate    This document has been electronically signed by:

## 2022-02-17 DIAGNOSIS — R60.0 BILATERAL LEG EDEMA: ICD-10-CM

## 2022-02-17 DIAGNOSIS — E55.9 VITAMIN D DEFICIENCY: ICD-10-CM

## 2022-02-18 RX ORDER — ERGOCALCIFEROL 1.25 MG/1
CAPSULE ORAL
Qty: 4 CAPSULE | Refills: 3 | Status: SHIPPED | OUTPATIENT
Start: 2022-02-18 | End: 2022-07-05

## 2022-02-18 RX ORDER — ASPIRIN 81 MG/1
TABLET, COATED ORAL
Qty: 60 TABLET | Refills: 2 | Status: SHIPPED | OUTPATIENT
Start: 2022-02-18 | End: 2022-05-18 | Stop reason: SDUPTHER

## 2022-02-19 VITALS
RESPIRATION RATE: 14 BRPM | HEIGHT: 66 IN | SYSTOLIC BLOOD PRESSURE: 110 MMHG | OXYGEN SATURATION: 98 % | BODY MASS INDEX: 38.89 KG/M2 | TEMPERATURE: 97.3 F | HEART RATE: 91 BPM | DIASTOLIC BLOOD PRESSURE: 70 MMHG | WEIGHT: 242 LBS

## 2022-02-24 ENCOUNTER — OFFICE VISIT (OUTPATIENT)
Dept: ORTHOPEDIC SURGERY | Facility: CLINIC | Age: 47
End: 2022-02-24

## 2022-02-24 ENCOUNTER — HOSPITAL ENCOUNTER (OUTPATIENT)
Dept: GENERAL RADIOLOGY | Facility: HOSPITAL | Age: 47
Discharge: HOME | End: 2022-02-24
Admitting: FAMILY MEDICINE

## 2022-02-24 VITALS
SYSTOLIC BLOOD PRESSURE: 116 MMHG | BODY MASS INDEX: 38.89 KG/M2 | HEART RATE: 84 BPM | HEIGHT: 66 IN | DIASTOLIC BLOOD PRESSURE: 72 MMHG | WEIGHT: 242 LBS

## 2022-02-24 DIAGNOSIS — M25.511 CHRONIC PAIN OF BOTH SHOULDERS: ICD-10-CM

## 2022-02-24 DIAGNOSIS — M54.2 CHRONIC NECK PAIN: Primary | ICD-10-CM

## 2022-02-24 DIAGNOSIS — M19.011 OSTEOARTHRITIS OF RIGHT ACROMIOCLAVICULAR JOINT: ICD-10-CM

## 2022-02-24 DIAGNOSIS — G89.29 CHRONIC PAIN OF BOTH SHOULDERS: ICD-10-CM

## 2022-02-24 DIAGNOSIS — M25.512 CHRONIC PAIN OF BOTH SHOULDERS: ICD-10-CM

## 2022-02-24 DIAGNOSIS — M50.30 DDD (DEGENERATIVE DISC DISEASE), CERVICAL: ICD-10-CM

## 2022-02-24 DIAGNOSIS — M75.01 ADHESIVE CAPSULITIS OF BOTH SHOULDERS: ICD-10-CM

## 2022-02-24 DIAGNOSIS — R29.3 POOR POSTURE: ICD-10-CM

## 2022-02-24 DIAGNOSIS — M19.011 PRIMARY OSTEOARTHRITIS OF RIGHT SHOULDER: ICD-10-CM

## 2022-02-24 DIAGNOSIS — M85.80 OSTEOPENIA, UNSPECIFIED LOCATION: ICD-10-CM

## 2022-02-24 DIAGNOSIS — M75.22 BICEPS TENDONITIS OF BOTH SHOULDERS: ICD-10-CM

## 2022-02-24 DIAGNOSIS — E11.42 DM TYPE 2 WITH DIABETIC PERIPHERAL NEUROPATHY: ICD-10-CM

## 2022-02-24 DIAGNOSIS — M75.02 ADHESIVE CAPSULITIS OF BOTH SHOULDERS: ICD-10-CM

## 2022-02-24 DIAGNOSIS — M75.21 BICEPS TENDONITIS OF BOTH SHOULDERS: ICD-10-CM

## 2022-02-24 DIAGNOSIS — G25.89 SCAPULAR DYSKINESIS: ICD-10-CM

## 2022-02-24 DIAGNOSIS — C50.919 MALIGNANT NEOPLASM OF FEMALE BREAST, UNSPECIFIED ESTROGEN RECEPTOR STATUS, UNSPECIFIED LATERALITY, UNSPECIFIED SITE OF BREAST: ICD-10-CM

## 2022-02-24 DIAGNOSIS — Z90.13 S/P MASTECTOMY, BILATERAL: ICD-10-CM

## 2022-02-24 DIAGNOSIS — G89.29 CHRONIC NECK PAIN: Primary | ICD-10-CM

## 2022-02-24 PROCEDURE — 72050 X-RAY EXAM NECK SPINE 4/5VWS: CPT

## 2022-02-24 PROCEDURE — 72050 X-RAY EXAM NECK SPINE 4/5VWS: CPT | Performed by: RADIOLOGY

## 2022-02-24 PROCEDURE — 99214 OFFICE O/P EST MOD 30 MIN: CPT | Performed by: FAMILY MEDICINE

## 2022-02-24 RX ORDER — NITROFURANTOIN MACROCRYSTALS 100 MG/1
CAPSULE ORAL
COMMUNITY
Start: 2022-02-16 | End: 2022-08-17

## 2022-02-24 RX ORDER — CYANOCOBALAMIN 1000 UG/ML
INJECTION, SOLUTION INTRAMUSCULAR; SUBCUTANEOUS
COMMUNITY
Start: 2022-02-17 | End: 2022-05-18 | Stop reason: SDUPTHER

## 2022-02-24 NOTE — PROGRESS NOTES
Follow Up Visit      Patient: Nivia Bernstein  YOB: 1975  Date of Encounter: 02/24/2022  PCP: Markus Menjivar MD  Referring Provider: No ref. provider found     Subjective   Nivia Bernstein is a 47 y.o. female who presents to the office today for evaluation of Pain, Follow-up, Edema, Numbness, and Tingling of the Right Shoulder      Chief Complaint   Patient presents with   • Right Shoulder - Pain, Follow-up, Edema, Numbness, Tingling       HPI    Patient is here for a follow-up. She is established patient to the office because she has seen Dr. Valdes before but has he never seen me before. Patient was seen on 10/25/2021 by Dr. Valdes for right shoulder pain and adhesive capsulitis after having a bilateral mastectomy and was given a glenohumeral shoulder joint injection in the office. Patient continue follows up for continued issues with the right shoulder. Notes having bilateral shoulder pain but the right is worse than the left. Her shoulder pain began after undergoing a complete mastectomy in 2020. Patient currently being treated for the cancer with tamoxifen. Still has a port in place. Did not have any radiation treatment. Notes that she has an area on the top of her right shoulder that usually swells and burns. Notes having stiffness in bilateral shoulders in all directions. Reports pain at rest but worse with movement. Reports having bilateral shoulder pain when sleeping. Also notes having parasthesias to bilateral fingers but attributes this to neuropathy due to diabetes. Takes gabapentin for the neuropathy prescribed by Dr. Garza. Does have weakness in the arms as well but improving. Patient has tried physical therapy prior to and after her steroid injections. Does not think the therapy helped her pain. Had to stop physical therapy in 12/2021 due to her mother breaking her hip. 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 Has diclofenac  cream which notes helps her pain a bit.     Reports having chronic neck pain but thinks it is due to osteoporosis in her spine. Denies having neck x-ray or workup in the past. Not being treated for the osteoporosis currently, however, is on calcium and vitamin D supplements. Had a bone density scan done when she was diagnosed with cancer. Patient had the scan on 06/19/2020 which showed osteopenia and mild arthritis. She will be having another DEXA scan done soon.     Her diabetes is well controlled. Most recent A1c was 7.7 Denies any kidney related issues with the diabetes.     Patient Active Problem List   Diagnosis   • DM type 2 with diabetic peripheral neuropathy ([facility])   • Mild intermittent asthma without complication   • GERD (gastroesophageal reflux disease)   • History of DVT (deep vein thrombosis)   • History of TIAs   • Mixed hyperlipidemia   • Anxiety and depression   • Class 3 severe obesity with serious comorbidity and body mass index (BMI) of 40.0 to 44.9 in adult ([facility])   • Elevated alkaline phosphatase level   • Chronic pain of left knee   • OCD (obsessive compulsive disorder)   • Renal insufficiency   • Accidental hypodermic needlestick injury with exposure to body fluid   • Atherosclerosis of both carotid arteries   • Chronic allergic rhinitis   • Menopausal symptoms   • Vitamin D deficiency   • Malignant neoplasm of upper-outer quadrant of right breast in female, estrogen receptor negative ([facility])   • Healthcare maintenance   • Osteopenia of multiple sites   • Port-A-Cath in place   • Drug-induced constipation   • Insulin pump in place   • Hot flashes   • Occipital neuralgia of right side   • Adhesive capsulitis of right shoulder   • Recurrent UTI   • S/P mastectomy, bilateral   • Osteoarthritis of right acromioclavicular joint   • Primary osteoarthritis of right shoulder   • Chronic pain of both shoulders   • Chronic neck pain       Past Medical History:   Diagnosis Date   • Altered mental status  "10/16/2017   • Anxiety and depression 3/31/2017   • Arthritis    • Asthma    • Elevated alkaline phosphatase level 10/16/2017   • Enlarged liver    • GERD (gastroesophageal reflux disease)    • Heart murmur    • Hypokalemia 10/16/2017   • Mitral valve prolapse    • Neuropathy     related to diabetes   • Obesity 3/31/2017   • OCD (obsessive compulsive disorder) 10/16/2017   • Osteoporosis    • PONV (postoperative nausea and vomiting)    • Renal insufficiency 10/16/2017   • Right breast cancer with malignant cells in regional lymph nodes no greater than 0.2 mm and no more than 200 cells (HCC) 8/13/2020   • TIA (transient ischemic attack)     4 TIMES       Allergies   Allergen Reactions   • Mobic [Meloxicam] Rash   • Penicillins Angioedema   • Taxotere [Docetaxel] Anaphylaxis     9 minutes into 1st infusion   • Novolog [Insulin Aspart] Hives       Review of Systems   Constitutional: Positive for activity change. Negative for fever.   Respiratory: Negative for shortness of breath and wheezing.    Cardiovascular: Negative for chest pain.   Musculoskeletal: Positive for arthralgias (bilateral shoulder pain ), myalgias and neck pain.   Skin: Negative for color change and wound.   Neurological: Negative for weakness and numbness.       Visit Vitals  /72   Pulse 84   Ht 167.6 cm (65.98\")   Wt 110 kg (242 lb)   BMI 39.08 kg/m²     47 y.o.female  Physical Exam  Vitals and nursing note reviewed.   Constitutional:       General: She is not in acute distress.     Appearance: Normal appearance.   Pulmonary:      Effort: Pulmonary effort is normal. No respiratory distress.   Musculoskeletal:      Right shoulder: Tenderness present. Decreased range of motion. Normal strength.      Left shoulder: Tenderness present. Decreased range of motion. Normal strength.      Cervical back: Tenderness and bony tenderness present.      Comments: Neck:  Tenderness to C-spine   Tenderness to cervical paraspinals and trapezius  Restricted " turning and side bending with pain in both directions  Patient cannot perform Spurling due to stiffness and pain    Right shoulder:  Tender across the collarbone, AC, biceps tendon, anterior joint, posterior joint and shoulder blade  2+ pulses bilaterally  Good  and pinch strength  Intact deep tendon reflexes  Pain on biceps testing without weakness worse on the right  No pain on infraspinatus, subscapularis, or triceps  Positive Speed's test  Pain on [place] test  Pain on supraspinatus/empty can test without weakness  Patient cannot elevate the arms up to 90 degrees  Passively can get the patient to 90 degrees of flexion with pain  Approximately 60 degrees of abduction actively 75 degrees passively with pain, slightly worse on the right  Extremely limited internal and external rotation with pain actively and passively, worse on the right  Patient cannot perform actively scratch   Patient cannot perform AC crossover on the right side due to pain  Positive impingement  Positive Neer worse on the right    Left shoulder:  Tenderness across the collarbone, AC, biceps tendon  2+ pulses bilaterally  Good  and pinch strength  Intact deep tendon reflexes  Pain on biceps testing without weakness worse on the right  No pain on infraspinatus, subscapularis, or triceps  Positive Speed's test  Pain on [place] test  Pain on supraspinatus/empty can test without weakness  Patient cannot elevate the arms up to 90 degrees   Passively can get the patient to 90 degrees of flexion with pain  Approximately 60 degrees of abduction actively 75 degrees passively with pain, slightly worse on the right  Extremely limited internal and external rotation with pain actively and passively, worse on the right  Patient cannot perform Apley's scratch   Patient can perform AC crossover on the left side with pain  Positive impingement  Positive Neer's worse on the right         Skin:     General: Skin is warm and dry.      Findings: No  erythema.   Neurological:      General: No focal deficit present.      Mental Status: She is alert.      Sensory: No sensory deficit.      Motor: No weakness.         Radiology Results:    XR Spine Cervical Complete 4 or 5 View    Result Date: 2/24/2022  Mild degenerative disc change throughout the cervical disc spaces with moderate facet joint osteoarthritis.  This report was finalized on 2/24/2022 11:01 AM by Dr. Kingston Slade II, MD.      XR SHOULDER 2+ VIEWS RIGHT-      CLINICAL INDICATION: Shoulder pain right; M25.511-Pain in right  shoulder.        COMPARISON: None available.     FINDINGS:  2 views of the right shoulder demonstrate no fracture or dislocation.     Arthritic change at the acromioclavicular joint.     IMPRESSION:  No acute bony abnormality.      This report was finalized on 10/25/2021 9:18 AM by Dr. Armond Wing MD.  Mild degenerative spurring at acromion and humeral head      DEXA Bone Density Axial - 06/19/2020    EXAMINATION: DEXA BONE DENSITY AXIAL-    Technique: Dual Femur FRAX system was utilized to calculate bone  density.      CLINICAL INDICATION:  screening; Z00.00-Encounter for general adult medical examination without abnormal findings     COMPARISON:NONE     FINDINGS:   The BMD measured at the right femur neck  is 0.771 gm/cm2 with a T-score of -1.9.      IMPRESSION:  The patient is considered to be osteopenic according to the World Health Organization criteria. Fracture risk is moderate.      This report was finalized on 6/19/2020 9:17 AM by Dr. Christian Alarcon MD.       Assessment/Plan   Diagnoses and all orders for this visit:    1. Chronic neck pain (Primary)  -     XR Spine Cervical Complete 4 or 5 View    2. Adhesive capsulitis of both shoulders  -     FL Guide For Pain Meds Inj; Future    3. Chronic pain of both shoulders  -     FL Guide For Pain Meds Inj; Future    4. Osteopenia, unspecified location    5. Biceps tendonitis of both shoulders    6. Primary osteoarthritis of  right shoulder  -     FL Guide For Pain Meds Inj; Future    7. Osteoarthritis of right acromioclavicular joint    8. DM type 2 with diabetic peripheral neuropathy (HCC)    9. Malignant neoplasm of female breast, unspecified estrogen receptor status, unspecified laterality, unspecified site of breast (HCC)    10. S/P mastectomy, bilateral    11. DDD (degenerative disc disease), cervical    12. Poor posture    13. Scapular dyskinesis         MEDS ORDERED DURING VISIT:  No orders of the defined types were placed in this encounter.    MEDICATION ISSUES:  Discussed medication options and treatment plan of prescribed medication as well as the risks, benefits, and side effects including potential falls, possible impaired driving and metabolic adversities among others. Patient is agreeable to call the office with any worsening of symptoms or onset of side effects. Patient is agreeable to call 911 or go to the nearest ER should he/she begin having SI/HI.     Discussion:  Patient has degenerative changes and significant muscle spasm in her neck.  I given her exercises for the neck and shoulder blade movement and instructed her to work on her posture.  Patient continues to have apparent adhesive capsulitis in both shoulders as well as biceps tendinitis and significantly restricted range of motion and pain.  I am referring patient for a fluoroscopy guided injection of the right shoulder and she will follow-up with me in [place] about a week after and consider further intervention.  Still needs to have further work-up of the left shoulder but the right one is much worse.  Patient should discuss with her PCP about possibly increasing her gabapentin dose to see if that is helpful.  Patient states she cannot take oral NSAIDs because they give her migraines.             This document has been electronically signed by Zenobia FREGOSO Rep   February 24, 2022 13:18 EST    Part of this note may be an electronic  transcription/translation of spoken language to printed text using the Dragon Dictation System.    Transcribed from ambient dictation for Guanaco Sheehan DO by VIJAYA GODWIN, Jak Rep.  02/24/22   13:21 EST    Patient verbalized consent to the visit recording.  I have personally performed the services described in this document as transcribed by the above individual, and it is both accurate and complete.  Guanaco Sheehan DO  2/24/2022  13:25 EST

## 2022-03-15 ENCOUNTER — OFFICE VISIT (OUTPATIENT)
Dept: PSYCHIATRY | Facility: CLINIC | Age: 47
End: 2022-03-15

## 2022-03-15 ENCOUNTER — LAB (OUTPATIENT)
Dept: FAMILY MEDICINE CLINIC | Facility: CLINIC | Age: 47
End: 2022-03-15

## 2022-03-15 VITALS
WEIGHT: 252 LBS | DIASTOLIC BLOOD PRESSURE: 76 MMHG | SYSTOLIC BLOOD PRESSURE: 130 MMHG | BODY MASS INDEX: 40.7 KG/M2 | HEART RATE: 84 BPM

## 2022-03-15 DIAGNOSIS — E78.2 MIXED DYSLIPIDEMIA: Chronic | ICD-10-CM

## 2022-03-15 DIAGNOSIS — F42.8 OTHER OBSESSIVE-COMPULSIVE DISORDERS: Chronic | ICD-10-CM

## 2022-03-15 DIAGNOSIS — F41.1 GENERALIZED ANXIETY DISORDER: ICD-10-CM

## 2022-03-15 DIAGNOSIS — K21.9 GASTROESOPHAGEAL REFLUX DISEASE WITHOUT ESOPHAGITIS: ICD-10-CM

## 2022-03-15 DIAGNOSIS — F33.3 SEVERE EPISODE OF RECURRENT MAJOR DEPRESSIVE DISORDER, WITH PSYCHOTIC FEATURES: ICD-10-CM

## 2022-03-15 DIAGNOSIS — M85.89 OSTEOPENIA OF MULTIPLE SITES: Chronic | ICD-10-CM

## 2022-03-15 DIAGNOSIS — E55.9 VITAMIN D DEFICIENCY: ICD-10-CM

## 2022-03-15 DIAGNOSIS — E11.42 DM TYPE 2 WITH DIABETIC PERIPHERAL NEUROPATHY: Chronic | ICD-10-CM

## 2022-03-15 LAB
25(OH)D3+25(OH)D2 SERPL-MCNC: 43.6 NG/ML (ref 30–100)
ALBUMIN SERPL-MCNC: 3.9 G/DL (ref 3.5–5.2)
ALBUMIN/GLOB SERPL: 1.2 G/DL
ALP SERPL-CCNC: 107 U/L (ref 39–117)
ALT SERPL-CCNC: 25 U/L (ref 1–33)
AST SERPL-CCNC: 18 U/L (ref 1–32)
BASOPHILS # BLD AUTO: 0.07 10*3/MM3 (ref 0–0.2)
BASOPHILS NFR BLD AUTO: 0.7 % (ref 0–1.5)
BILIRUB SERPL-MCNC: 0.3 MG/DL (ref 0–1.2)
BUN SERPL-MCNC: 14 MG/DL (ref 6–20)
BUN/CREAT SERPL: 17.3 (ref 7–25)
CALCIUM SERPL-MCNC: 8.9 MG/DL (ref 8.6–10.5)
CHLORIDE SERPL-SCNC: 108 MMOL/L (ref 98–107)
CHOLEST SERPL-MCNC: 175 MG/DL (ref 0–200)
CO2 SERPL-SCNC: 20.5 MMOL/L (ref 22–29)
CREAT SERPL-MCNC: 0.81 MG/DL (ref 0.57–1)
EGFRCR SERPLBLD CKD-EPI 2021: 89.1 ML/MIN/1.73
EOSINOPHIL # BLD AUTO: 0.13 10*3/MM3 (ref 0–0.4)
EOSINOPHIL NFR BLD AUTO: 1.4 % (ref 0.3–6.2)
ERYTHROCYTE [DISTWIDTH] IN BLOOD BY AUTOMATED COUNT: 13.2 % (ref 12.3–15.4)
GLOBULIN SER CALC-MCNC: 3.2 GM/DL
GLUCOSE SERPL-MCNC: 168 MG/DL (ref 65–99)
HBA1C MFR BLD: 7.7 % (ref 4.8–5.6)
HCT VFR BLD AUTO: 42.6 % (ref 34–46.6)
HDLC SERPL-MCNC: 31 MG/DL (ref 40–60)
HGB BLD-MCNC: 13.8 G/DL (ref 12–15.9)
IMM GRANULOCYTES # BLD AUTO: 0.04 10*3/MM3 (ref 0–0.05)
IMM GRANULOCYTES NFR BLD AUTO: 0.4 % (ref 0–0.5)
LDLC SERPL CALC-MCNC: 110 MG/DL (ref 0–100)
LYMPHOCYTES # BLD AUTO: 2.67 10*3/MM3 (ref 0.7–3.1)
LYMPHOCYTES NFR BLD AUTO: 27.9 % (ref 19.6–45.3)
MCH RBC QN AUTO: 26.7 PG (ref 26.6–33)
MCHC RBC AUTO-ENTMCNC: 32.4 G/DL (ref 31.5–35.7)
MCV RBC AUTO: 82.6 FL (ref 79–97)
MONOCYTES # BLD AUTO: 0.65 10*3/MM3 (ref 0.1–0.9)
MONOCYTES NFR BLD AUTO: 6.8 % (ref 5–12)
NEUTROPHILS # BLD AUTO: 6.01 10*3/MM3 (ref 1.7–7)
NEUTROPHILS NFR BLD AUTO: 62.8 % (ref 42.7–76)
NRBC BLD AUTO-RTO: 0 /100 WBC (ref 0–0.2)
PLATELET # BLD AUTO: 313 10*3/MM3 (ref 140–450)
POTASSIUM SERPL-SCNC: 4.1 MMOL/L (ref 3.5–5.2)
PROT SERPL-MCNC: 7.1 G/DL (ref 6–8.5)
RBC # BLD AUTO: 5.16 10*6/MM3 (ref 3.77–5.28)
SODIUM SERPL-SCNC: 141 MMOL/L (ref 136–145)
TRIGL SERPL-MCNC: 192 MG/DL (ref 0–150)
TSH SERPL DL<=0.005 MIU/L-ACNC: 2.35 UIU/ML (ref 0.27–4.2)
URATE SERPL-MCNC: 4.8 MG/DL (ref 2.4–5.7)
VIT B12 SERPL-MCNC: 410 PG/ML (ref 211–946)
VLDLC SERPL CALC-MCNC: 34 MG/DL (ref 5–40)
WBC # BLD AUTO: 9.57 10*3/MM3 (ref 3.4–10.8)

## 2022-03-15 PROCEDURE — 99214 OFFICE O/P EST MOD 30 MIN: CPT | Performed by: NURSE PRACTITIONER

## 2022-03-15 RX ORDER — BREXPIPRAZOLE 2 MG/1
2 TABLET ORAL DAILY
Qty: 30 TABLET | Refills: 0 | Status: SHIPPED | OUTPATIENT
Start: 2022-03-15 | End: 2022-04-12 | Stop reason: SDUPTHER

## 2022-03-15 RX ORDER — VENLAFAXINE HYDROCHLORIDE 37.5 MG/1
37.5 CAPSULE, EXTENDED RELEASE ORAL DAILY
Qty: 30 CAPSULE | Refills: 0 | Status: SHIPPED | OUTPATIENT
Start: 2022-03-15 | End: 2022-04-12 | Stop reason: SDUPTHER

## 2022-03-15 RX ORDER — LORAZEPAM 1 MG/1
1 TABLET ORAL 3 TIMES DAILY PRN
Qty: 90 TABLET | Refills: 0 | Status: SHIPPED | OUTPATIENT
Start: 2022-03-15 | End: 2022-04-12 | Stop reason: SDUPTHER

## 2022-03-15 RX ORDER — VENLAFAXINE HYDROCHLORIDE 150 MG/1
150 CAPSULE, EXTENDED RELEASE ORAL DAILY
Qty: 30 CAPSULE | Refills: 0 | Status: SHIPPED | OUTPATIENT
Start: 2022-03-15 | End: 2022-04-12 | Stop reason: SDUPTHER

## 2022-03-15 NOTE — PROGRESS NOTES
"      Raquel Bernstein is a 47 y.o. female is here today for medication management follow-up.    Chief Complaint: anxiety depression     History of Present Illness:    Patient presents today for follow up for medication management for depression and anxiety. Patient states \"doing alright\". Patient states 's best friend just passed away this morning and they went to  last night for cousin. Patient states waiting on whos next because it always comes in threes. Patient states the restlessness is better since decreasing the Rexulti and tolerating it well. Patient reports currently depression is at a 10 on a 0-10 scale with 10 being the worst. Patient reports symptoms of depression of depressed mood, irritability, decreased appetite, insomnia, and decreased motivation. Patient reports anxiety is at a 10 on a 0-10 scale with 10 being the worst. Patient reports having a couple panic attacks. Patient reports panic attacks last 10 minutes and during an attacks patient has anger, irritability, panic feeling, and heart racing. Patient states had been sleeping decent until last week. Patient reports sleeping at least four hours a night and patient reports still having nightmares. Patient reports appetite is better and eating at least two meals a day. Patient denies any auditory or visual hallucinations. Patient adamantly denies any SI or HI. Patient denies any side effects to medications. Patient denies any medical changes since last visit.   The following portions of the patient's history were reviewed and updated as appropriate: allergies, current medications, past family history, past medical history, past social history, past surgical history and problem list.    Review of Systems   Constitutional: Negative.    Respiratory: Negative.    Cardiovascular: Negative.    Gastrointestinal: Negative.    Neurological: Negative.    Psychiatric/Behavioral: Positive for agitation, dysphoric mood and sleep " disturbance. The patient is nervous/anxious.        Objective   Physical Exam  Vitals reviewed.   Constitutional:       Appearance: Normal appearance. She is well-developed and well-groomed.   Neurological:      Mental Status: She is alert.   Psychiatric:         Attention and Perception: Attention and perception normal.         Mood and Affect: Mood is depressed. Affect is angry.         Speech: Speech normal.         Behavior: Behavior normal. Behavior is cooperative.         Thought Content: Thought content normal.         Cognition and Memory: Cognition and memory normal.         Judgment: Judgment normal.       Blood pressure 130/76, pulse 84, weight 114 kg (252 lb), not currently breastfeeding. Body mass index is 40.7 kg/m².    Allergies   Allergen Reactions   • Mobic [Meloxicam] Rash   • Penicillins Angioedema   • Taxotere [Docetaxel] Anaphylaxis     9 minutes into 1st infusion   • Novolog [Insulin Aspart] Hives       Medication List:   Current Outpatient Medications   Medication Sig Dispense Refill   • Brexpiprazole (Rexulti) 2 MG tablet Take 1 tablet by mouth Daily. 30 tablet 0   • LORazepam (ATIVAN) 1 MG tablet Take 1 tablet by mouth 3 (Three) Times a Day As Needed for Anxiety. for anxiety 90 tablet 0   • venlafaxine XR (EFFEXOR-XR) 150 MG 24 hr capsule Take 1 capsule by mouth Daily. Take with the Effexor XR 37.5mg capsule for a total daily dose of 187.5mg. 30 capsule 0   • venlafaxine XR (EFFEXOR-XR) 37.5 MG 24 hr capsule Take 1 capsule by mouth Daily. Take with the Effexor XR 150mg capsule for a total daily dose of 187.5mg. 30 capsule 0   • albuterol (PROVENTIL,VENTOLIN) 2 MG/5ML syrup Take 5-10 mL by mouth Every 6 (Six) Hours As Needed (cough). 60 mL 0   • albuterol sulfate HFA (Ventolin HFA) 108 (90 Base) MCG/ACT inhaler Inhale 2 puffs Every 4 (Four) Hours As Needed (shortness of breath). 18 g 5   • Aspirin Low Dose 81 MG EC tablet TAKE TWO TABLETS BY MOUTH ONCE DAILY 60 tablet 2   • azelastine  (ASTELIN) 0.1 % nasal spray USE 2 SPRAYS IN BOTH NOSTRILS TWO TIMES A DAY AS NEEDED 30 mL 2   • Breo Ellipta 200-25 MCG/INH inhaler Inhale 1 puff Daily. 1 each 5   • Calcium Carbonate 1500 (600 Ca) MG tablet TAKE 1 TABLET BY MOUTH TWO TIMES A DAY WITH A MEAL 60 tablet 5   • cholecalciferol (Vitamin D) 25 MCG (1000 UT) tablet Take 2 tablets by mouth Daily. 60 tablet 5   • cloNIDine (CATAPRES) 0.1 MG tablet TAKE 1 TABLET BY MOUTH TWO TIMES A DAY 60 tablet 5   • cyanocobalamin 1000 MCG/ML injection      • Cyanocobalamin 1000 MCG/ML kit Inject 1 mL as directed Every 30 (Thirty) Days. 1 kit 5   • diclofenac (VOLTAREN) 1 % gel gel Apply 4 g topically to the appropriate area as directed 4 (Four) Times a Day As Needed (joint pain). 500 g 5   • Diclofenac Sodium (VOLTAREN) 1 % gel gel      • docusate sodium 100 MG capsule Take 100 mg by mouth 2 (Two) Times a Day.     • furosemide (LASIX) 20 MG tablet Take 1 tablet by mouth Daily As Needed (edema). 15 tablet 1   • gabapentin (Neurontin) 100 MG capsule Take 1 capsule by mouth 2 (Two) Times a Day. 60 capsule 3   • HumaLOG 100 UNIT/ML injection USE PER INSULIN PUMP. MAX DAILY DOSE  UNITS 40 mL 0   • hydrocortisone (ANUSOL-HC) 25 MG suppository Insert 1 suppository into the rectum 2 (Two) Times a Day As Needed for Hemorrhoids (rectal discomfort). 30 suppository 5   • lansoprazole (PREVACID) 30 MG capsule Take 1 capsule by mouth Daily. 90 capsule 3   • levocetirizine (XYZAL) 5 MG tablet TAKE ONE TABLET BY MOUTH EVERY EVENING 30 tablet 2   • linaclotide (Linzess) 145 MCG capsule capsule Take 1 capsule by mouth Daily. 30 minutes before meals on an empty stomach. 30 capsule 5   • loperamide (IMODIUM A-D) 2 MG tablet Take 1 tablet by mouth 4 (Four) Times a Day As Needed for Diarrhea. 30 tablet 0   • magic mouthwash oral suspension Swish and swallow 5 mL four times  a day As Needed for Mucositis. 240 mL 1   • nitrofurantoin (MACRODANTIN) 100 MG capsule      • nitrofurantoin,  macrocrystal-monohydrate, (Macrobid) 100 MG capsule Take 1 capsule by mouth Daily. 56 capsule 3   • ondansetron (ZOFRAN) 8 MG tablet TAKE 1 TABLET BY MOUTH 3 TIMES A DAY AS NEEDED FOR NAUSEA OR VOMITING. 30 tablet 5   • prochlorperazine (COMPAZINE) 10 MG tablet Take 1 tablet by mouth Every 6 (Six) Hours As Needed for Nausea or Vomiting. 30 tablet 5   • rosuvastatin (CRESTOR) 20 MG tablet TAKE ONE TABLET BY MOUTH EVERY NIGHT 30 tablet 3   • sennosides-docusate (senna-docusate sodium) 8.6-50 MG per tablet Take 2 tablets by mouth 2 (Two) Times a Day. 120 tablet 4   • SUMAtriptan (IMITREX) 100 MG tablet      • tamoxifen (NOLVADEX) 20 MG chemo tablet Take 1 tablet by mouth Daily. 30 tablet 11   • topiramate (TOPAMAX) 50 MG tablet      • Trijardy XR 25-5-1000 MG tablet sustained-release 24 hour TAKE 1 TABLET BY MOUTH EVERY MORNING. 30 tablet 5   • vitamin D (ERGOCALCIFEROL) 1.25 MG (55261 UT) capsule capsule TAKE ONE CAPSULE BY MOUTH ONCE A WEEK 4 capsule 3     No current facility-administered medications for this visit.       Mental Status Exam:   Hygiene:   good  Cooperation:  Cooperative  Eye Contact:  Good  Psychomotor Behavior:  Appropriate  Affect:  Appropriate  Hopelessness: Denies  Speech:  Normal  Thought Process:  Goal directed and Linear  Thought Content:  Normal  Suicidal:  None  Homicidal:  None  Hallucinations:  None  Delusion:  None  Memory:  Intact  Orientation:  Person, Place, Time and Situation  Reliability:  fair  Insight:  Fair  Judgement:  Fair  Impulse Control:  Fair  Physical/Medical Issues:  No               Assessment/Plan   Diagnoses and all orders for this visit:    1. Severe episode of recurrent major depressive disorder, with psychotic features (HCC)  -     Brexpiprazole (Rexulti) 2 MG tablet; Take 1 tablet by mouth Daily.  Dispense: 30 tablet; Refill: 0  -     venlafaxine XR (EFFEXOR-XR) 150 MG 24 hr capsule; Take 1 capsule by mouth Daily. Take with the Effexor XR 37.5mg capsule for a total  daily dose of 187.5mg.  Dispense: 30 capsule; Refill: 0  -     venlafaxine XR (EFFEXOR-XR) 37.5 MG 24 hr capsule; Take 1 capsule by mouth Daily. Take with the Effexor XR 150mg capsule for a total daily dose of 187.5mg.  Dispense: 30 capsule; Refill: 0    2. Generalized anxiety disorder  -     Brexpiprazole (Rexulti) 2 MG tablet; Take 1 tablet by mouth Daily.  Dispense: 30 tablet; Refill: 0  -     LORazepam (ATIVAN) 1 MG tablet; Take 1 tablet by mouth 3 (Three) Times a Day As Needed for Anxiety. for anxiety  Dispense: 90 tablet; Refill: 0  -     venlafaxine XR (EFFEXOR-XR) 150 MG 24 hr capsule; Take 1 capsule by mouth Daily. Take with the Effexor XR 37.5mg capsule for a total daily dose of 187.5mg.  Dispense: 30 capsule; Refill: 0  -     venlafaxine XR (EFFEXOR-XR) 37.5 MG 24 hr capsule; Take 1 capsule by mouth Daily. Take with the Effexor XR 150mg capsule for a total daily dose of 187.5mg.  Dispense: 30 capsule; Refill: 0    3. Other obsessive-compulsive disorders  -     Brexpiprazole (Rexulti) 2 MG tablet; Take 1 tablet by mouth Daily.  Dispense: 30 tablet; Refill: 0  -     venlafaxine XR (EFFEXOR-XR) 150 MG 24 hr capsule; Take 1 capsule by mouth Daily. Take with the Effexor XR 37.5mg capsule for a total daily dose of 187.5mg.  Dispense: 30 capsule; Refill: 0  -     venlafaxine XR (EFFEXOR-XR) 37.5 MG 24 hr capsule; Take 1 capsule by mouth Daily. Take with the Effexor XR 150mg capsule for a total daily dose of 187.5mg.  Dispense: 30 capsule; Refill: 0            Discussed medication options with patient. Cont. Rexulti 2mg daily for depression and anxiety. Cont. Effexor XR 150mg daily with 37.5mg capsule for a total daily dose of 187.5mg for depression and anxiety. Cont. Effexor XR 37.5mg daily with 150mg capsule for a total daily dose of 187.5mg for depression and anxiety. Cont. Ativan 1mg three times daily as needed for anxiety. Reviewed the risks, benefits, and side effects of the medications; patient  acknowledged and verbally consented. Patient is being prescribed a controlled substance as part of treatment plan. Patient has been educated of appropriate use of the medications, including risk of somnolence, limited ability to drive and/or work safely, and potential for dependence, respiratory depression and overdose. Patient is also informed that the medication are to be used by the patient only- avoid any combined use of ETOH or other substances unless prescribed. UDS ordered. Patient states unable to do urine due to insurance not paying for them. Patient states she has refusal letters at home from insurance. Discussed with patient to bring refusal letters from insurance into office. Patient acknowledged and agreed.     [facility] Patient Controlled Substance Report (from 3/15/2021 to 3/15/2022)    Dispensed  Strength Quantity Days Supply Provider Pharmacy   02/26/2022 Gabapentin 100MG 60 each 30 [facility]   02/17/2022 Lorazepam 1MG 90 each 30 [facility]   01/27/2022 Gabapentin 100MG 60 each 30 [facility]   01/20/2022 Lorazepam 1MG 90 each 30 [facility]   12/23/2021 Gabapentin 100MG 60 each 30 [facility]   12/23/2021 Lorazepam 1MG 90 each 30 [facility]   11/29/2021 Lorazepam 1MG 90 each 30 [facility]   11/17/2021 Gabapentin 100MG 60 each 30 [facility]   11/01/2021 Lorazepam 1MG 90 each 30 [facility]   10/21/2021 Gabapentin 100MG 60 each 30 [facility]   10/21/2021 Lorazepam 1MG 30 each 10 [facility]   10/05/2021 Lorazepam 1MG 30 each 15 [facility]   09/16/2021 Gabapentin 100MG 60 each 30 [facility]   09/09/2021 Diazepam 10MG 60  each 30 [facility]   08/12/2021 Diazepam 10MG 60 each 30 [facility]   07/12/2021 Diazepam 10MG 60 each 30 [facility]   07/08/2021 Gabapentin 300MG 90 each 30 SHARMILANA[facility]   06/15/2021 Gabapentin 100MG 60 each 30 PADMINI[facility]INC[facility]   06/10/2021 Diazepam 10MG 45 each 23 [facility]   05/11/2021 Diazepam 10MG 45 each 23 [facility]   04/09/2021 Diazepam 5MG 60 each 30 [facility]        Patient was instructed on medication side effects, benefits, and also of no treatment.  Patient was given an explanation regarding potential for increased risk of diabetes, lipids, and weight gain.  Labs will be assessed as clinically indicated.  Diet was discussed especially healthy diet choices and increasing activity and exercise.  Patient was strongly urged to continue weight maintance or weight loss efforts.  Patient reported verbalized understanding of instructions. Patient had lab work before appointment including CBC, Vit B12, Vit. D, A1C, Lipid panel, TSH, and CMP.   Patient is agreeable to call the office with any questions, concerns, or worsening of symptoms. Patient is aware to call 911 or go to the nearest ER should begin having SI/HI.        Follow up in four weeks        Errors in dictation may reflect use of voice recognition software and not all errors in transcription may have been detected prior to signing.              This document has been electronically signed by NIDA Vasquez   March 15, 2022 11:21 EDT

## 2022-03-16 LAB — MICROALBUMIN UR-MCNC: 3.2 UG/ML

## 2022-03-17 ENCOUNTER — TELEPHONE (OUTPATIENT)
Dept: INTERVENTIONAL RADIOLOGY/VASCULAR | Facility: HOSPITAL | Age: 47
End: 2022-03-17

## 2022-03-17 DIAGNOSIS — R60.1 GENERALIZED EDEMA: ICD-10-CM

## 2022-03-18 RX ORDER — FUROSEMIDE 20 MG/1
20 TABLET ORAL DAILY PRN
Qty: 15 TABLET | Refills: 1 | Status: SHIPPED | OUTPATIENT
Start: 2022-03-18 | End: 2022-05-10

## 2022-03-21 NOTE — PROGRESS NOTES
NAME: Nivia Bernstein    : 1975    DATE:  3/22/2022    DIAGNOSIS:   Stage IA ([id number]) moderately differentiated invasive ductal carcinoma of the R breast with associated DCIS.  Tumor was 10 mm in maximal dimension.  0/10 SLN involved. ER negative, WY 15% + (1+), HER-2/cat negative. Mammaprint high risk.    CHIEF COMPLAINT:  Follow up Breast cancer    TREATMENT HISTORY:  1. Initially planned to give 4 cycles to Taxotere/Cytoxan, during 1st infusion of Taxotere on 2020 patient developed allergic reaction. Therefore, Taxotere was discontinued and regimen changed to DD AC.    2.      3.  Started Tamoxifen 12-    HISTORY OF PRESENT ILLNESS:   Nivia Bernstein is a very pleasant 47 y.o. female who who is being seen today at the request of Sheila Garza MD for evaluation and treatment of breast cancer. She did not notice a palpable lump, but was recommended to have a screening mammogram by her PCP. A nodule in the right upper quadrant of the R breast was noted. The mass measured 0.8 cm on ultrasound. Biopsy was consistent with a moderately differentiated invasive ductal carcinoma, ER negative, WY weakly (15%) positive, and HER-2/cat negative. She underwent bilateral mastectomy with sentinel lymph node biopsy with Dr. Garza on 20. Pathology from this showed a moderately differentiated invasive ductal carcinoma with associated intermediate grade DCIS, negative margins.  0/10 /SLN involved.  Mammaprint was high risk. She was referred to our clinic and is here today with her  for discussion of further treatment options.      Ms. Bernstein is healing well from the surgery.  She did develop a seroma, which was drained by Dr. Garza yesterday. She reports muscular chest discomfort r/t the procedure, but otherwise denies any other symptoms including fevers, chills, significant weight changes, shortness of breath, abdominal pain, n/v/d/c, bony pain or headaches.    Interval History:  Ms. Bernstein is here  today for follow up of breast cancer. She continues to take and tolerate Tamoxifen. She reports she received a steroid injection into her R shoulder joint today for improved range of motion and she has f/u with Dr. Sheehan in about a month.  She hasn't yet seen dermatology about the skin lesion on her L upper arm b/c she has had multiple deaths in her family.  She asks about refills on Tamoxifen b/c she says her pharmacy lost them and asks also about refills on her Neurontin for rx of her diabetic neuropathy.      PAST MEDICAL HISTORY:  Past Medical History:   Diagnosis Date   • Altered mental status 10/16/2017   • Anxiety and depression 3/31/2017   • Arthritis    • Asthma    • Elevated alkaline phosphatase level 10/16/2017   • Enlarged liver    • GERD (gastroesophageal reflux disease)    • Heart murmur    • Hypokalemia 10/16/2017   • Mitral valve prolapse    • Neuropathy     related to diabetes   • Obesity 3/31/2017   • OCD (obsessive compulsive disorder) 10/16/2017   • Osteoporosis    • PONV (postoperative nausea and vomiting)    • Renal insufficiency 10/16/2017   • Right breast cancer with malignant cells in regional lymph nodes no greater than 0.2 mm and no more than 200 cells (HCC) 8/13/2020   • TIA (transient ischemic attack)     4 TIMES       PAST SURGICAL HISTORY:  Past Surgical History:   Procedure Laterality Date   • APPENDECTOMY     • CARPAL TUNNEL RELEASE     • CHOLECYSTECTOMY     • COLONOSCOPY N/A 5/5/2021    Procedure: COLONOSCOPY WITH BIOPSY;  Surgeon: Vickie Herrera MD;  Location: [facility];  Service: Gastroenterology;  Laterality: N/A;   • FINGER FUSION Left     3rd   • HYSTERECTOMY  2011    removed due to an ovarian cyst and dysmenorrhia. No cancer noted. Complete   • KNEE ARTHROSCOPY Right    • MASTECTOMY Left 8/18/2020    Procedure: Left mastectomy;  Surgeon: Sheila Garza MD;  Location: [facility] OR;  Service: General;  Laterality: Left;   • MASTECTOMY WITH SENTINEL NODE BIOPSY AND  AXILLARY NODE DISSECTION Right 8/18/2020    Procedure: Right BREAST MASTECTOMY WITH SENTINEL NODE BIOPSY;  Surgeon: Sheila Garza MD;  Location: [facility];  Service: General;  Laterality: Right;   • PORTACATH PLACEMENT         SOCIAL HISTORY:  Social History     Socioeconomic History   • Marital status:    Tobacco Use   • Smoking status: Never Smoker   • Smokeless tobacco: Never Used   Vaping Use   • Vaping Use: Never used   Substance and Sexual Activity   • Alcohol use: No   • Drug use: No   • Sexual activity: Yes     Partners: Male         FAMILY HISTORY:  Family History   Problem Relation Age of Onset   • Diabetes Mother    • Hypertension Mother    • Diabetes Father    • Heart disease Father    • Alcohol abuse Father    • Seizures Father    • Hypertension Father    • No Known Problems Brother    • No Known Problems Daughter    • Bone cancer Son    • Suicide Attempts Cousin    • Stomach cancer Cousin         maternal first cousin   • Breast cancer Paternal Aunt 52   • Diabetes Maternal Grandmother    • Diabetes Maternal Grandfather    • Lung cancer Maternal Grandfather    • Heart disease Paternal Grandmother    • Diabetes Paternal Grandmother    • Heart disease Paternal Grandfather    • Diabetes Paternal Grandfather    • Ovarian cancer Maternal Aunt    • Lung cancer Maternal Uncle    • Colon cancer Maternal Uncle    • Cancer Maternal Uncle         unknown type     MEDICATIONS:  The current medication list was reviewed in the EMR    Current Outpatient Medications:   •  albuterol (PROVENTIL,VENTOLIN) 2 MG/5ML syrup, Take 5-10 mL by mouth Every 6 (Six) Hours As Needed (cough)., Disp: 60 mL, Rfl: 0  •  albuterol sulfate HFA (Ventolin HFA) 108 (90 Base) MCG/ACT inhaler, Inhale 2 puffs Every 4 (Four) Hours As Needed (shortness of breath)., Disp: 18 g, Rfl: 5  •  Aspirin Low Dose 81 MG EC tablet, TAKE TWO TABLETS BY MOUTH ONCE DAILY, Disp: 60 tablet, Rfl: 2  •  azelastine (ASTELIN) 0.1 % nasal spray, USE 2  SPRAYS IN BOTH NOSTRILS TWO TIMES A DAY AS NEEDED, Disp: 30 mL, Rfl: 2  •  Breo Ellipta 200-25 MCG/INH inhaler, Inhale 1 puff Daily., Disp: 1 each, Rfl: 5  •  Brexpiprazole (Rexulti) 2 MG tablet, Take 1 tablet by mouth Daily., Disp: 30 tablet, Rfl: 0  •  Calcium Carbonate 1500 (600 Ca) MG tablet, TAKE 1 TABLET BY MOUTH TWO TIMES A DAY WITH A MEAL, Disp: 60 tablet, Rfl: 5  •  cholecalciferol (Vitamin D) 25 MCG (1000 UT) tablet, Take 2 tablets by mouth Daily., Disp: 60 tablet, Rfl: 5  •  cloNIDine (CATAPRES) 0.1 MG tablet, TAKE 1 TABLET BY MOUTH TWO TIMES A DAY, Disp: 60 tablet, Rfl: 5  •  cyanocobalamin 1000 MCG/ML injection, , Disp: , Rfl:   •  Cyanocobalamin 1000 MCG/ML kit, Inject 1 mL as directed Every 30 (Thirty) Days., Disp: 1 kit, Rfl: 5  •  diclofenac (VOLTAREN) 1 % gel gel, Apply 4 g topically to the appropriate area as directed 4 (Four) Times a Day As Needed (joint pain)., Disp: 500 g, Rfl: 5  •  Diclofenac Sodium (VOLTAREN) 1 % gel gel, , Disp: , Rfl:   •  docusate sodium 100 MG capsule, Take 100 mg by mouth 2 (Two) Times a Day., Disp: , Rfl:   •  furosemide (LASIX) 20 MG tablet, TAKE 1 TABLET BY MOUTH DAILY AS NEEDED (EDEMA)., Disp: 15 tablet, Rfl: 1  •  gabapentin (Neurontin) 100 MG capsule, Take 1 capsule by mouth 2 (Two) Times a Day., Disp: 60 capsule, Rfl: 3  •  HumaLOG 100 UNIT/ML injection, USE PER INSULIN PUMP. MAX DAILY DOSE  UNITS, Disp: 40 mL, Rfl: 0  •  hydrocortisone (ANUSOL-HC) 25 MG suppository, Insert 1 suppository into the rectum 2 (Two) Times a Day As Needed for Hemorrhoids (rectal discomfort)., Disp: 30 suppository, Rfl: 5  •  lansoprazole (PREVACID) 30 MG capsule, Take 1 capsule by mouth Daily., Disp: 90 capsule, Rfl: 3  •  levocetirizine (XYZAL) 5 MG tablet, TAKE ONE TABLET BY MOUTH EVERY EVENING, Disp: 30 tablet, Rfl: 2  •  linaclotide (Linzess) 145 MCG capsule capsule, Take 1 capsule by mouth Daily. 30 minutes before meals on an empty stomach., Disp: 30 capsule, Rfl: 5  •   loperamide (IMODIUM A-D) 2 MG tablet, Take 1 tablet by mouth 4 (Four) Times a Day As Needed for Diarrhea., Disp: 30 tablet, Rfl: 0  •  LORazepam (ATIVAN) 1 MG tablet, Take 1 tablet by mouth 3 (Three) Times a Day As Needed for Anxiety. for anxiety, Disp: 90 tablet, Rfl: 0  •  magic mouthwash oral suspension, Swish and swallow 5 mL four times  a day As Needed for Mucositis., Disp: 240 mL, Rfl: 1  •  nitrofurantoin (MACRODANTIN) 100 MG capsule, , Disp: , Rfl:   •  nitrofurantoin, macrocrystal-monohydrate, (Macrobid) 100 MG capsule, Take 1 capsule by mouth Daily., Disp: 56 capsule, Rfl: 3  •  ondansetron (ZOFRAN) 8 MG tablet, TAKE 1 TABLET BY MOUTH 3 TIMES A DAY AS NEEDED FOR NAUSEA OR VOMITING., Disp: 30 tablet, Rfl: 5  •  prochlorperazine (COMPAZINE) 10 MG tablet, Take 1 tablet by mouth Every 6 (Six) Hours As Needed for Nausea or Vomiting., Disp: 30 tablet, Rfl: 5  •  rosuvastatin (CRESTOR) 20 MG tablet, TAKE ONE TABLET BY MOUTH EVERY NIGHT, Disp: 30 tablet, Rfl: 3  •  sennosides-docusate (senna-docusate sodium) 8.6-50 MG per tablet, Take 2 tablets by mouth 2 (Two) Times a Day., Disp: 120 tablet, Rfl: 4  •  SUMAtriptan (IMITREX) 100 MG tablet, , Disp: , Rfl:   •  tamoxifen (NOLVADEX) 20 MG chemo tablet, Take 1 tablet by mouth Daily., Disp: 30 tablet, Rfl: 11  •  topiramate (TOPAMAX) 50 MG tablet, , Disp: , Rfl:   •  Trijardy XR 25-5-1000 MG tablet sustained-release 24 hour, TAKE 1 TABLET BY MOUTH EVERY MORNING., Disp: 30 tablet, Rfl: 5  •  venlafaxine XR (EFFEXOR-XR) 150 MG 24 hr capsule, Take 1 capsule by mouth Daily. Take with the Effexor XR 37.5mg capsule for a total daily dose of 187.5mg., Disp: 30 capsule, Rfl: 0  •  venlafaxine XR (EFFEXOR-XR) 37.5 MG 24 hr capsule, Take 1 capsule by mouth Daily. Take with the Effexor XR 150mg capsule for a total daily dose of 187.5mg., Disp: 30 capsule, Rfl: 0  •  vitamin D (ERGOCALCIFEROL) 1.25 MG (55905 UT) capsule capsule, TAKE ONE CAPSULE BY MOUTH ONCE A WEEK, Disp: 4  capsule, Rfl: 3  No current facility-administered medications for this visit.    Facility-Administered Medications Ordered in Other Visits:   •  heparin injection 500 Units, 500 Units, Intravenous, PRN, Dejah Loco MD  •  sodium chloride 0.9 % flush 10 mL, 10 mL, Intravenous, PRN, Dejah Loco MD    ALLERGIES:    Allergies   Allergen Reactions   • Mobic [Meloxicam] Rash   • Penicillins Angioedema   • Taxotere [Docetaxel] Anaphylaxis     9 minutes into 1st infusion   • Novolog [Insulin Aspart] Hives     REVIEW OF SYSTEMS:    A comprehensive 14 point review of systems was performed.  Significant findings as mentioned above.  All other systems reviewed and are negative.      Physical Exam  Vital Signs:   Visit Vitals  /81   Pulse 100   Temp 97.1 °F (36.2 °C) (Temporal)   Resp 18   Wt 111 kg (244 lb 9.6 oz)   SpO2 97%   BMI 41.99 kg/m²     Pain Score    03/22/22 1402   PainSc: 0-No pain      ECOG score: 0   General: Awake, alert and oriented, in no acute distress.   HEENT:  PERRLA, EOM full, OP clear, mmm  Neck: No thyromegaly. No JVD.   Lungs: Lungs are clear to auscultation bilaterally, no wheezing  Heart:  Regular rate and rhythm. No murmurs, rubs, or gallops.   Breast: S/p bilateral mastectomy and RSLNBx.  Surgical scars smooth and intact without nodularity. No axillary adenopathy on either side.   Abdomen: Soft, Non tender, non-distended, bowel sounds present.  Diabetic monitoring device in place.  Extremities: No cyanosis.  She has tr edema at the sock line only bilateraly.  She has very limited ROM bilaterally with decreased internal, external rotation as well as elevation and abduction of the LUE, stable from her last exam.. 1 cm x 1 cm pale red skin lesion on left upper extremity w/ excoriation surrounding.  Neurologic: MS as above. Grossly non focal exam    PATHOLOGY:              IMAGING:  Bilateral screening mammogram 06-19-20  FINDINGS:    The breast tissue is heterogeneously dense.  New  focal nodularity right upper outer breast that is about 14 cm from  the nipple.  Otherwise, no suspicious findings.     IMPRESSION:  New focal nodularity right upper outer breast that is about  14 cm from the nipple.     RECOMMENDATION:  Spot compression imaging.     BI-RADS CAT 0, INCOMPLETE.  The patient needs additional imaging.        Diagnostic R mammogram with R breast US 07-14-20  FINDINGS:  Additional mammographic imaging images of persistent  partially obscured oval mass in the posterior right upper outer  quadrant.     Right breast ultrasound: Focused sonographic evaluation of the right  breast demonstrates a 0.8 cm irregular hypoechoic mass with ill-defined  borders located at 10:00, 13 cm from the nipple. An additional area was  initially noted by the technologist in the 9:00 region which represents  an isolated fat lobule.           IMPRESSION:  0.8 cm irregular mass in the right 10:00 region. Recommend  ultrasound-guided core biopsy.        BI-RADS CATEGORY:  4, SUSPICIOUS        RECOMMENDATION:  Recommend ultrasound guided core biopsy of the right  breast.        Bilateral breast MRI w/wo contrast 08-11-20  FINDINGS: There is minimal bilateral background contrast enhancement and  normal vascular enhancement.     There are no worrisome areas of contrast enhancement in the left breast.     In the right breast correlating to the biopsy proven malignancy is an  approximately 0.7 cm irregular rapidly enhancing mass in the posterior  right 10:00 region. Artifact from a postbiopsy marking clip is located  adjacent to this mass. A small linear area of enhancement extends  posterior to this mass approximately 1 cm. There are no additional  worrisome areas of contrast enhancement in the right breast.     There is no axillary adenopathy by MRI criteria and no chest wall  abnormality is seen.        IMPRESSION:  1. Negative left breast MRI.        2. Biopsy-proven malignancy in the right 10:00 region with a  small  linear area of enhancement extending posterior to the 0.7 cm mass.     RECOMMENDATIONS: Recommend surgical follow-up.     BI-RADS CATEGORY: 6, KNOWN BIOPSY PROVEN MALIGNANCY    Echo 10-16-17:  A two-dimensional transthoracic echocardiogram with color flow and Doppler was performed.   The study is technically good for diagnosis.Verbal consent was obtained from the patient to use saline contrast in order to optimize the study.  A total of 20 mL of agitated saline was administered to assess for cardiac shunting.   No adverse reaction to contrast was noted.   Echocardiogram Findings    Left Ventricle Left ventricular systolic function is normal. Estimated EF appears to be in the range of 56 - 60%. Normal left ventricular cavity size and wall thickness noted. All left ventricular wall segments contract normally. Left ventricular diastolic function is normal.   Right Ventricle Normal right ventricular cavity size noted.   Left Atrium Normal left atrial size noted. Saline test results are negative.   Right Atrium Normal right atrial size noted.   Aortic Valve The aortic valve is structurally normal. Trace aortic valve regurgitation is present.   Mitral Valve The mitral valve is normal in structure. Trace mitral valve regurgitation is present.   Tricuspid Valve The tricuspid valve is normal.  Trace tricuspid valve regurgitation is present.   Pulmonic Valve The pulmonic valve is structurally normal. There is trace pulmonic valve regurgitation present.   Greater Vessels No dilation of the aortic root is present. No dilation of the sinuses of Valsalva is present.   Pericardium There is no evidence of pericardial effusion.           ECHO 09/29/2020  · Poor quality echo and Doppler study  · Normal left ventricular cavity size and wall thickness noted.  · Left ventricular ejection fraction appears to be 61 - 65%. Left ventricular systolic function is normal.  · Left ventricular diastolic function is consistent with (grade  I) impaired relaxation.  · Aortic and mitral valve are poorly visualized but appear to be normal,  · Tricuspid and pulmonic valve echoes are not visualized.  · There is no pericardial effusion noted.    Echocardiogram 12-08-02  · Normal left ventricular cavity size and wall thickness noted. All left ventricular wall segments contract normally.  · Left ventricular ejection fraction appears to be 56 - 60%.  · The pericardium is normal. There is no evidence of pericardial effusion. .      MRI Brain w/wo contrast 01-28-21     FINDINGS:    Brain:  Unremarkable.  No mass.  No hemorrhage.  No acute infarct.    Ventricles:  Unremarkable.  No ventriculomegaly.    Bones/joints:  Unremarkable.    Sinuses:  Unremarkable as visualized.  No acute sinusitis.    Mastoid air cells:  Unremarkable as visualized.  No mastoid effusion.    Orbits:  Unremarkable as visualized.     IMPRESSION:    No acute findings in the head/brain.    ENDOSCOPY:  COLONOSCOPY PROCEDURE NOTE     Gabrielamaria dolorescollin Bernstein  5/5/2021     GASTROENTEROLOGIST:  Vickie Herrera MD        PRE-PROCEDURE DIAGNOSIS:   BRBPR (bright red blood per rectum) [K62.5]     POST-PROCEDURE DIAGNOSIS:  1.  Normal colon  2.  Normal terminal ileum  3.  Internal hemorrhoids     PROCEDURE:  COLONOSCOPY       ANESTHESIA:  Propofol administered by anesthesia.  See anesthesia notes for ASA classification     STAFF  Circulator: Missy Canela RN  Endo Technician: Heraclio Gaona     Findings:  1.  Normal colonoscopy with small internal hemorrhoids.  2.  Normal terminal ileum     OPERATIVE PROCEDURE   After proper informed consent was obtained, the patient was taken the operating suite and placed in left lateral decubitus position.  An Olympus video colonoscope 180 series was inserted in the rectum and advanced to the terminal ileum under direct visualization.  Cecum and terminal ileum were identified by visualization of the appendiceal orifice and ileocecal valve.  The colonoscope  was then slowly withdrawn from the cecum to the rectum and passed a second time from rectum to cecum.  The colonoscope was retroflexed in the cecum and rectum. Scope was then withdrawn. Patient tolerated the procedure well. There were no immediate complications. Cecal withdrawal time was 9 minutes.     ESTIMATED BLOOD LOSS  None      COMPLICATIONS  None     RECOMMENDATIONS:  1.  Repeat colonoscopy in 10 years for screening purposes.  2.  Continue Linzess.  3.  Anusol HC suppositories as needed for rectal bleeding.    RECENT LABS:  Lab Results   Component Value Date    WBC 10.63 03/22/2022    HGB 13.4 03/22/2022    HCT 42.5 03/22/2022    MCV 83.7 03/22/2022    RDW 13.6 03/22/2022     03/22/2022    NEUTRORELPCT 73.0 03/22/2022    LYMPHORELPCT 20.9 03/22/2022    MONORELPCT 4.6 (L) 03/22/2022    EOSRELPCT 0.5 03/22/2022    BASORELPCT 0.6 03/22/2022    NEUTROABS 7.77 (H) 03/22/2022    LYMPHSABS 2.22 03/22/2022       Lab Results   Component Value Date     03/22/2022    K 3.9 03/22/2022    CO2 19.1 (L) 03/22/2022     03/22/2022    BUN 16 03/22/2022    CREATININE 0.87 03/22/2022    EGFRIFNONA 89 01/11/2022    EGFRIFAFRI 108 01/11/2022    GLUCOSE 249 (H) 03/22/2022    CALCIUM 8.8 03/22/2022    ALKPHOS 102 03/22/2022    AST 17 03/22/2022    ALT 21 03/22/2022    BILITOT 0.2 03/22/2022    ALBUMIN 4.00 03/22/2022    PROTEINTOT 7.3 03/22/2022    MG 2.1 10/17/2017       Lab Results   Component Value Date    URICACID 4.8 03/15/2022       Lab Results   Component Value Date    MMOGYCCO34 410 03/15/2022    FOLATE 15.19 10/16/2017      ASSESSMENT & PLAN:  Nivia Bernstein is a very pleasant 47 y.o. female with Stage IA ([id number]) moderately differentiated invasive ductal carcinoma of the R breast with associated DCIS.  Tumor was 10 mm in maximal dimension.  0/10 SLN involved. ER negative, [place] 15% + (1+), HER-2/cat negative. Mammaprint high risk.     1.  R breast cancer:   -  S/p R mastectomy and SLNBx as well as  prophylactic contralateral mastectomy performed by Dr. Garza 8-18-20.  - She healed well from bilateral mastectomy. This was complicated by left seroma, drained by Dr. Garza. Incisions are healed well.   - Given high risk Mammaprint, Dr. Loco recommended adjuvant chemotherapy. Discussed different possibilities for adjuvant therapy. Given high risk Mammaprint but early stage, node negative disease as well as comorbidities, recommended Taxotere/Cytoxan Q21d x 4 cycles with growth factor support. Discussed potential risks, benefits, and side effects and she would like to proceed.   - She had port placed several years ago due to poor peripheral access. This has been well cared for and maintained.   - C1 Taxotere/Cytoxan was planned for 09/24/2020. Unfortunately, this had to be discontinued due to allergic reaction to Taxotere. Recommended change of treatment to DD AC.   - She tolerated treatment well and aside from fatigue and recovered well. ECHO was essentially unchanged.   -  Her tumor was ER neg but did have 15% 1+ positivity for progesterone receptor.  She is s/p complete hysterectomy and had evidence of osteopenia with T score -1.9 in June 2020.  Given all of this, recommended treatment with Tamoxifen over aromatase inhibitor.   - Exam and labs from today without cause for concern. Will continue Tamoxifen.  Refilled today.  - She will follow up with APRN  in 3 month with CBCD/CMP and with me in 6 months with CBCD, CMP, TSH, VitD.    2. Extensive family history of malignancy:   - Genetic testing was ordered by Dr. Garza and performed by [facility] with no mutations, specifically including BRCA 1/2, CHKE2, CDH1, PALB2 and others.    3. Anxiety/Depression:  -  She continues to f/u with Psychiatry.  With medication adjustments she says she is doing really well in this regard.      4. Frozen shoulders Bilaterally:  -  She has limitation of ROM and discomfort.  She was referred to ortho and they also recommended PT  which she completed and reports was not successful.   - She has since had steroid into the shoulder joint to see if this might help.  Encouraged to continue exercises to help mobilize the joint.    5. Skin lesion:  - 1 cm x 1 cm pale red lesion on left upper extremity with pruritus.  - Referral placed for dermatology at her last visit. She had to delay appt b/c of conflicts (multiple deaths in her family).  Encouraged her to call for appt when she is able.      6. Prophylaxis:   -  She received Prevnar 13 vaccine.  -  She had 2021 flu vaccine.  -  M. Uncle had colon cancer at age 50.  Referred to Dr. Aguilera for screening colonoscopy which was unremarkable and repeat exam recommended in 10 years.   - She has had COVID vaccine x2. Recommneded booster.     7. ACO / NII/Other  Quality measures  -  Nivia Bernstein received 2021 flu vaccine.  -  Nivia Bernstein reports a pain score of .  Given her pain assessment as noted, treatment options were discussed and the following options were decided upon as a follow-up plan to address the patient's pain: continuation of current treatment plan for pain.  -  Current outpatient and discharge medications have been reconciled for the patient.  Reviewed by: Dejah Loco MD    8.  Follow up:  - Continue Tamoxifen  - Continue follow up with Ortho  - Continue PT exercises for her R shoulder  - Encouraged follow up with Dermatology for evaluation of LUE skin lesion.   - NP visit in 3 months with labs CBCD, CMP prior  - MD follow up in 6 months with CBCD, CMP, TSH, Vit Dprior  - Recommend Covid booster vaccine  -  Recommended she f/u with her PCP regarding treatment of diabetic neuropathy. Defer decisions regarding Neurontin to him.      This note was scribed for Dejah Loco MD by Chantell Rahman RN.    I, Dejah Loco MD, personally performed the services described in this documentation as scribed by the above named individual in my presence, and it is both accurate and  complete.  03/22/2022         A total of 30 minutes were spent coordinating this patient’s care in clinic today; more than 50% of this time was face-to-face with the patient, reviewing her interim medical history and counseling on the current treatment and followup plan. All questions were answered to her satisfaction.      Electronically Signed by: Dejah Loco MD       CC:   MD Otto Hager, Markus Garcia MD

## 2022-03-22 ENCOUNTER — HOSPITAL ENCOUNTER (OUTPATIENT)
Dept: GENERAL RADIOLOGY | Facility: HOSPITAL | Age: 47
Discharge: HOME | End: 2022-03-22
Admitting: FAMILY MEDICINE

## 2022-03-22 ENCOUNTER — INFUSION (OUTPATIENT)
Dept: ONCOLOGY | Facility: HOSPITAL | Age: 47
End: 2022-03-22

## 2022-03-22 ENCOUNTER — OFFICE VISIT (OUTPATIENT)
Dept: ONCOLOGY | Facility: CLINIC | Age: 47
End: 2022-03-22

## 2022-03-22 ENCOUNTER — LAB (OUTPATIENT)
Dept: ONCOLOGY | Facility: CLINIC | Age: 47
End: 2022-03-22

## 2022-03-22 VITALS
SYSTOLIC BLOOD PRESSURE: 134 MMHG | OXYGEN SATURATION: 97 % | WEIGHT: 244.6 LBS | BODY MASS INDEX: 41.99 KG/M2 | DIASTOLIC BLOOD PRESSURE: 81 MMHG | TEMPERATURE: 97.1 F | RESPIRATION RATE: 18 BRPM | HEART RATE: 100 BPM

## 2022-03-22 VITALS
DIASTOLIC BLOOD PRESSURE: 81 MMHG | SYSTOLIC BLOOD PRESSURE: 134 MMHG | HEART RATE: 100 BPM | BODY MASS INDEX: 41.99 KG/M2 | OXYGEN SATURATION: 97 % | WEIGHT: 244.6 LBS | RESPIRATION RATE: 18 BRPM | TEMPERATURE: 97.1 F

## 2022-03-22 VITALS
RESPIRATION RATE: 18 BRPM | DIASTOLIC BLOOD PRESSURE: 90 MMHG | WEIGHT: 239 LBS | SYSTOLIC BLOOD PRESSURE: 144 MMHG | HEART RATE: 97 BPM | BODY MASS INDEX: 40.8 KG/M2 | HEIGHT: 64 IN | OXYGEN SATURATION: 99 %

## 2022-03-22 DIAGNOSIS — Z95.828 PORT-A-CATH IN PLACE: Primary | ICD-10-CM

## 2022-03-22 DIAGNOSIS — Z17.1 MALIGNANT NEOPLASM OF UPPER-OUTER QUADRANT OF RIGHT BREAST IN FEMALE, ESTROGEN RECEPTOR NEGATIVE: Primary | ICD-10-CM

## 2022-03-22 DIAGNOSIS — Z17.1 MALIGNANT NEOPLASM OF UPPER-OUTER QUADRANT OF RIGHT BREAST IN FEMALE, ESTROGEN RECEPTOR NEGATIVE: ICD-10-CM

## 2022-03-22 DIAGNOSIS — C50.411 MALIGNANT NEOPLASM OF UPPER-OUTER QUADRANT OF RIGHT BREAST IN FEMALE, ESTROGEN RECEPTOR NEGATIVE: ICD-10-CM

## 2022-03-22 DIAGNOSIS — M19.011 PRIMARY OSTEOARTHRITIS OF RIGHT SHOULDER: ICD-10-CM

## 2022-03-22 DIAGNOSIS — M25.512 CHRONIC PAIN OF BOTH SHOULDERS: ICD-10-CM

## 2022-03-22 DIAGNOSIS — F32.A DEPRESSION, UNSPECIFIED DEPRESSION TYPE: ICD-10-CM

## 2022-03-22 DIAGNOSIS — G89.29 CHRONIC PAIN OF BOTH SHOULDERS: ICD-10-CM

## 2022-03-22 DIAGNOSIS — C50.411 MALIGNANT NEOPLASM OF UPPER-OUTER QUADRANT OF RIGHT BREAST IN FEMALE, ESTROGEN RECEPTOR NEGATIVE: Primary | ICD-10-CM

## 2022-03-22 DIAGNOSIS — M25.511 CHRONIC PAIN OF BOTH SHOULDERS: ICD-10-CM

## 2022-03-22 DIAGNOSIS — M75.01 ADHESIVE CAPSULITIS OF RIGHT SHOULDER: ICD-10-CM

## 2022-03-22 DIAGNOSIS — M75.01 ADHESIVE CAPSULITIS OF BOTH SHOULDERS: ICD-10-CM

## 2022-03-22 DIAGNOSIS — M75.02 ADHESIVE CAPSULITIS OF BOTH SHOULDERS: ICD-10-CM

## 2022-03-22 DIAGNOSIS — L98.9 SKIN LESION: ICD-10-CM

## 2022-03-22 DIAGNOSIS — M85.89 OSTEOPENIA OF MULTIPLE SITES: ICD-10-CM

## 2022-03-22 LAB
ALBUMIN SERPL-MCNC: 4 G/DL (ref 3.5–5.2)
ALBUMIN/GLOB SERPL: 1.2 G/DL
ALP SERPL-CCNC: 102 U/L (ref 39–117)
ALT SERPL W P-5'-P-CCNC: 21 U/L (ref 1–33)
ANION GAP SERPL CALCULATED.3IONS-SCNC: 13.9 MMOL/L (ref 5–15)
AST SERPL-CCNC: 17 U/L (ref 1–32)
BASOPHILS # BLD AUTO: 0.06 10*3/MM3 (ref 0–0.2)
BASOPHILS NFR BLD AUTO: 0.6 % (ref 0–1.5)
BILIRUB SERPL-MCNC: 0.2 MG/DL (ref 0–1.2)
BUN SERPL-MCNC: 16 MG/DL (ref 6–20)
BUN/CREAT SERPL: 18.4 (ref 7–25)
CALCIUM SPEC-SCNC: 8.8 MG/DL (ref 8.6–10.5)
CHLORIDE SERPL-SCNC: 105 MMOL/L (ref 98–107)
CO2 SERPL-SCNC: 19.1 MMOL/L (ref 22–29)
CREAT SERPL-MCNC: 0.87 MG/DL (ref 0.57–1)
DEPRECATED RDW RBC AUTO: 41.8 FL (ref 37–54)
EGFRCR SERPLBLD CKD-EPI 2021: 81.8 ML/MIN/1.73
EOSINOPHIL # BLD AUTO: 0.05 10*3/MM3 (ref 0–0.4)
EOSINOPHIL NFR BLD AUTO: 0.5 % (ref 0.3–6.2)
ERYTHROCYTE [DISTWIDTH] IN BLOOD BY AUTOMATED COUNT: 13.6 % (ref 12.3–15.4)
GLOBULIN UR ELPH-MCNC: 3.3 GM/DL
GLUCOSE SERPL-MCNC: 249 MG/DL (ref 65–99)
HCT VFR BLD AUTO: 42.5 % (ref 34–46.6)
HGB BLD-MCNC: 13.4 G/DL (ref 12–15.9)
IMM GRANULOCYTES # BLD AUTO: 0.04 10*3/MM3 (ref 0–0.05)
IMM GRANULOCYTES NFR BLD AUTO: 0.4 % (ref 0–0.5)
LYMPHOCYTES # BLD AUTO: 2.22 10*3/MM3 (ref 0.7–3.1)
LYMPHOCYTES NFR BLD AUTO: 20.9 % (ref 19.6–45.3)
MCH RBC QN AUTO: 26.4 PG (ref 26.6–33)
MCHC RBC AUTO-ENTMCNC: 31.5 G/DL (ref 31.5–35.7)
MCV RBC AUTO: 83.7 FL (ref 79–97)
MONOCYTES # BLD AUTO: 0.49 10*3/MM3 (ref 0.1–0.9)
MONOCYTES NFR BLD AUTO: 4.6 % (ref 5–12)
NEUTROPHILS NFR BLD AUTO: 7.77 10*3/MM3 (ref 1.7–7)
NEUTROPHILS NFR BLD AUTO: 73 % (ref 42.7–76)
NRBC BLD AUTO-RTO: 0 /100 WBC (ref 0–0.2)
PLATELET # BLD AUTO: 295 10*3/MM3 (ref 140–450)
PMV BLD AUTO: 9.7 FL (ref 6–12)
POTASSIUM SERPL-SCNC: 3.9 MMOL/L (ref 3.5–5.2)
PROT SERPL-MCNC: 7.3 G/DL (ref 6–8.5)
RBC # BLD AUTO: 5.08 10*6/MM3 (ref 3.77–5.28)
SODIUM SERPL-SCNC: 138 MMOL/L (ref 136–145)
WBC NRBC COR # BLD: 10.63 10*3/MM3 (ref 3.4–10.8)

## 2022-03-22 PROCEDURE — 36591 DRAW BLOOD OFF VENOUS DEVICE: CPT

## 2022-03-22 PROCEDURE — 77002 NEEDLE LOCALIZATION BY XRAY: CPT

## 2022-03-22 PROCEDURE — 85025 COMPLETE CBC W/AUTO DIFF WBC: CPT | Performed by: NURSE PRACTITIONER

## 2022-03-22 PROCEDURE — 99214 OFFICE O/P EST MOD 30 MIN: CPT | Performed by: INTERNAL MEDICINE

## 2022-03-22 PROCEDURE — 20610 DRAIN/INJ JOINT/BURSA W/O US: CPT | Performed by: RADIOLOGY

## 2022-03-22 PROCEDURE — 25010000002 TRIAMCINOLONE PER 10 MG: Performed by: RADIOLOGY

## 2022-03-22 PROCEDURE — 77002 NEEDLE LOCALIZATION BY XRAY: CPT | Performed by: RADIOLOGY

## 2022-03-22 PROCEDURE — 25010000002 IOPAMIDOL 61 % SOLUTION: Performed by: RADIOLOGY

## 2022-03-22 PROCEDURE — 25010000002 HEPARIN LOCK FLUSH PER 10 UNITS: Performed by: INTERNAL MEDICINE

## 2022-03-22 PROCEDURE — 80053 COMPREHEN METABOLIC PANEL: CPT | Performed by: NURSE PRACTITIONER

## 2022-03-22 RX ORDER — HEPARIN SODIUM (PORCINE) LOCK FLUSH IV SOLN 100 UNIT/ML 100 UNIT/ML
500 SOLUTION INTRAVENOUS AS NEEDED
Status: DISCONTINUED | OUTPATIENT
Start: 2022-03-22 | End: 2022-03-22 | Stop reason: HOSPADM

## 2022-03-22 RX ORDER — TAMOXIFEN CITRATE 20 MG/1
20 TABLET ORAL DAILY
Qty: 30 TABLET | Refills: 11 | Status: SHIPPED | OUTPATIENT
Start: 2022-03-22 | End: 2023-03-28 | Stop reason: SDUPTHER

## 2022-03-22 RX ORDER — HEPARIN SODIUM (PORCINE) LOCK FLUSH IV SOLN 100 UNIT/ML 100 UNIT/ML
500 SOLUTION INTRAVENOUS AS NEEDED
Status: CANCELLED | OUTPATIENT
Start: 2022-05-03

## 2022-03-22 RX ORDER — LIDOCAINE HYDROCHLORIDE 20 MG/ML
10 INJECTION, SOLUTION INFILTRATION; PERINEURAL ONCE
Status: COMPLETED | OUTPATIENT
Start: 2022-03-22 | End: 2022-03-22

## 2022-03-22 RX ORDER — SODIUM CHLORIDE 0.9 % (FLUSH) 0.9 %
10 SYRINGE (ML) INJECTION AS NEEDED
Status: DISCONTINUED | OUTPATIENT
Start: 2022-03-22 | End: 2022-03-22 | Stop reason: HOSPADM

## 2022-03-22 RX ORDER — BUPIVACAINE HYDROCHLORIDE 5 MG/ML
5 INJECTION, SOLUTION EPIDURAL; INTRACAUDAL ONCE
Status: COMPLETED | OUTPATIENT
Start: 2022-03-22 | End: 2022-03-22

## 2022-03-22 RX ORDER — TRIAMCINOLONE ACETONIDE 40 MG/ML
80 INJECTION, SUSPENSION INTRA-ARTICULAR; INTRAMUSCULAR ONCE
Status: COMPLETED | OUTPATIENT
Start: 2022-03-22 | End: 2022-03-22

## 2022-03-22 RX ORDER — SODIUM CHLORIDE 0.9 % (FLUSH) 0.9 %
10 SYRINGE (ML) INJECTION AS NEEDED
Status: CANCELLED | OUTPATIENT
Start: 2022-05-03

## 2022-03-22 RX ADMIN — LIDOCAINE HYDROCHLORIDE 10 ML: 20 INJECTION, SOLUTION INFILTRATION; PERINEURAL at 11:20

## 2022-03-22 RX ADMIN — TRIAMCINOLONE ACETONIDE 80 MG: 40 INJECTION, SUSPENSION INTRA-ARTICULAR; INTRAMUSCULAR at 11:23

## 2022-03-22 RX ADMIN — BUPIVACAINE HYDROCHLORIDE 5 ML: 5 INJECTION, SOLUTION EPIDURAL; INTRACAUDAL; PERINEURAL at 11:23

## 2022-03-22 RX ADMIN — Medication 500 UNITS: at 14:20

## 2022-03-22 RX ADMIN — IOPAMIDOL 3 ML: 612 INJECTION, SOLUTION INTRAVENOUS at 11:24

## 2022-03-22 RX ADMIN — Medication 10 ML: at 14:20

## 2022-03-23 ENCOUNTER — APPOINTMENT (OUTPATIENT)
Dept: GENERAL RADIOLOGY | Facility: HOSPITAL | Age: 47
End: 2022-03-23

## 2022-04-12 ENCOUNTER — OFFICE VISIT (OUTPATIENT)
Dept: PSYCHIATRY | Facility: CLINIC | Age: 47
End: 2022-04-12

## 2022-04-12 VITALS
BODY MASS INDEX: 41.54 KG/M2 | WEIGHT: 242 LBS | SYSTOLIC BLOOD PRESSURE: 136 MMHG | HEART RATE: 93 BPM | DIASTOLIC BLOOD PRESSURE: 70 MMHG

## 2022-04-12 DIAGNOSIS — Z79.899 HIGH RISK MEDICATION USE: Primary | ICD-10-CM

## 2022-04-12 DIAGNOSIS — F41.1 GENERALIZED ANXIETY DISORDER: ICD-10-CM

## 2022-04-12 DIAGNOSIS — F33.3 SEVERE EPISODE OF RECURRENT MAJOR DEPRESSIVE DISORDER, WITH PSYCHOTIC FEATURES: ICD-10-CM

## 2022-04-12 DIAGNOSIS — F42.8 OTHER OBSESSIVE-COMPULSIVE DISORDERS: Chronic | ICD-10-CM

## 2022-04-12 PROCEDURE — 99214 OFFICE O/P EST MOD 30 MIN: CPT | Performed by: NURSE PRACTITIONER

## 2022-04-12 NOTE — PROGRESS NOTES
"      Raquel Bernstein is a 47 y.o. female is here today for medication management follow-up.    Chief Complaint:  Depression, irritability anxiety     History of Present Illness:    Patient presents today for follow up for medication management for depression, irritability, and anxiety. Patient states the pharmacy keeps filling her elavil but she has not been taking it. Patient states she has told the pharmacy she is not taking it but still filling it. Patient states still taking medicine as prescribed. Patient states the irritability has been high lately. Patient states went to [place] and stayed for a week and three days. Patient reports currently depression is at a 10 on a 0-10 scale with 10 being the worst. Patient reports symptoms of depression of depressed mood, irritability, insomnia, decreased energy, and decreased motivation. Patient reports anxiety is at a 10 on a 0-10 scale with 10 being the worst. Patient reports having \"a couple\"  panic attacks. Patient states only had one panic attack in the last week. Patient reports panic attacks last no more than 20-30 minutes and during an attacks patient has panic feeling, irritability, shortness of breath, and heart racing. Patient reports sleeping about four hours a night and patient reports still having nightmares. Patient reports appetite is good and eating at least twice a day. Patient denies any auditory or visual hallucinations. Patient adamantly denies any SI or HI. Patient denies any side effects to medications. Patient denies any medical changes since last visit.   The following portions of the patient's history were reviewed and updated as appropriate: allergies, current medications, past family history, past medical history, past social history, past surgical history and problem list.    Review of Systems   Constitutional: Negative.    Respiratory: Negative.    Cardiovascular: Negative.    Gastrointestinal: Negative.    Neurological: " Negative.    Psychiatric/Behavioral: Positive for agitation, dysphoric mood and sleep disturbance. The patient is nervous/anxious.        Objective   Physical Exam  Vitals reviewed.   Constitutional:       Appearance: Normal appearance. She is well-developed and well-groomed.   Neurological:      Mental Status: She is alert.   Psychiatric:         Attention and Perception: Attention and perception normal.         Mood and Affect: Mood is depressed. Affect is angry.         Speech: Speech normal.         Behavior: Behavior is agitated. Behavior is cooperative.         Thought Content: Thought content normal.         Cognition and Memory: Cognition and memory normal.         Judgment: Judgment normal.       Blood pressure 136/70, pulse 93, weight 110 kg (242 lb), not currently breastfeeding. Body mass index is 41.54 kg/m².    Allergies   Allergen Reactions   • Mobic [Meloxicam] Rash   • Penicillins Angioedema   • Taxotere [Docetaxel] Anaphylaxis     9 minutes into 1st infusion   • Novolog [Insulin Aspart] Hives       Medication List:   Current Outpatient Medications   Medication Sig Dispense Refill   • Brexpiprazole (Rexulti) 2 MG tablet Take 1 tablet by mouth Daily. 30 tablet 0   • LORazepam (ATIVAN) 1 MG tablet Take 1 tablet by mouth 3 (Three) Times a Day As Needed for Anxiety. for anxiety 90 tablet 0   • venlafaxine XR (EFFEXOR-XR) 150 MG 24 hr capsule Take 1 capsule by mouth Daily. Take with the Effexor XR 75mg capsule for a total daily dose of 225mg. 30 capsule 0   • venlafaxine XR (EFFEXOR-XR) 75 MG 24 hr capsule Take 1 capsule by mouth Daily. Take with the Effexor XR 150mg capsule for a total daily dose of 225mg. 30 capsule 0   • albuterol (PROVENTIL,VENTOLIN) 2 MG/5ML syrup Take 5-10 mL by mouth Every 6 (Six) Hours As Needed (cough). 60 mL 0   • albuterol sulfate HFA (Ventolin HFA) 108 (90 Base) MCG/ACT inhaler Inhale 2 puffs Every 4 (Four) Hours As Needed (shortness of breath). 18 g 5   • Aspirin Low Dose 81  MG EC tablet TAKE TWO TABLETS BY MOUTH ONCE DAILY 60 tablet 2   • azelastine (ASTELIN) 0.1 % nasal spray USE 2 SPRAYS IN BOTH NOSTRILS TWO TIMES A DAY AS NEEDED 30 mL 2   • Breo Ellipta 200-25 MCG/INH inhaler Inhale 1 puff Daily. 1 each 5   • Calcium Carbonate 1500 (600 Ca) MG tablet TAKE 1 TABLET BY MOUTH TWO TIMES A DAY WITH A MEAL 60 tablet 5   • cholecalciferol (Vitamin D) 25 MCG (1000 UT) tablet Take 2 tablets by mouth Daily. 60 tablet 5   • cloNIDine (CATAPRES) 0.1 MG tablet TAKE 1 TABLET BY MOUTH TWO TIMES A DAY 60 tablet 5   • cyanocobalamin 1000 MCG/ML injection      • Cyanocobalamin 1000 MCG/ML kit Inject 1 mL as directed Every 30 (Thirty) Days. 1 kit 5   • diclofenac (VOLTAREN) 1 % gel gel Apply 4 g topically to the appropriate area as directed 4 (Four) Times a Day As Needed (joint pain). 500 g 5   • Diclofenac Sodium (VOLTAREN) 1 % gel gel      • docusate sodium 100 MG capsule Take 100 mg by mouth 2 (Two) Times a Day.     • furosemide (LASIX) 20 MG tablet TAKE 1 TABLET BY MOUTH DAILY AS NEEDED (EDEMA). 15 tablet 1   • gabapentin (Neurontin) 100 MG capsule Take 1 capsule by mouth 2 (Two) Times a Day. 60 capsule 3   • HumaLOG 100 UNIT/ML injection USE PER INSULIN PUMP. MAX DAILY DOSE  UNITS 40 mL 0   • HYDROcodone-acetaminophen (NORCO) 5-325 MG per tablet Take 1 tablet by mouth Every 8 (Eight) Hours As Needed for Severe Pain . 10 tablet 0   • hydrocortisone (ANUSOL-HC) 25 MG suppository Insert 1 suppository into the rectum 2 (Two) Times a Day As Needed for Hemorrhoids (rectal discomfort). 30 suppository 5   • lansoprazole (PREVACID) 30 MG capsule Take 1 capsule by mouth Daily. 90 capsule 3   • levocetirizine (XYZAL) 5 MG tablet TAKE ONE TABLET BY MOUTH EVERY EVENING 30 tablet 2   • linaclotide (Linzess) 145 MCG capsule capsule Take 1 capsule by mouth Daily. 30 minutes before meals on an empty stomach. 30 capsule 5   • loperamide (IMODIUM A-D) 2 MG tablet Take 1 tablet by mouth 4 (Four) Times a Day As  Needed for Diarrhea. 30 tablet 0   • magic mouthwash oral suspension Swish and swallow 5 mL four times  a day As Needed for Mucositis. 240 mL 1   • nitrofurantoin (MACRODANTIN) 100 MG capsule      • nitrofurantoin, macrocrystal-monohydrate, (Macrobid) 100 MG capsule Take 1 capsule by mouth Daily. 56 capsule 3   • ondansetron (ZOFRAN) 8 MG tablet TAKE 1 TABLET BY MOUTH 3 TIMES A DAY AS NEEDED FOR NAUSEA OR VOMITING. 30 tablet 5   • prochlorperazine (COMPAZINE) 10 MG tablet Take 1 tablet by mouth Every 6 (Six) Hours As Needed for Nausea or Vomiting. 30 tablet 5   • rosuvastatin (CRESTOR) 20 MG tablet TAKE ONE TABLET BY MOUTH EVERY NIGHT 30 tablet 3   • sennosides-docusate (senna-docusate sodium) 8.6-50 MG per tablet Take 2 tablets by mouth 2 (Two) Times a Day. 120 tablet 4   • SUMAtriptan (IMITREX) 100 MG tablet      • tamoxifen (NOLVADEX) 20 MG chemo tablet Take 1 tablet by mouth Daily. 30 tablet 11   • topiramate (TOPAMAX) 50 MG tablet      • Trijardy XR 25-5-1000 MG tablet sustained-release 24 hour TAKE ONE TABLET BY MOUTH EVERY MORNING 30 tablet 5   • vitamin D (ERGOCALCIFEROL) 1.25 MG (03125 UT) capsule capsule TAKE ONE CAPSULE BY MOUTH ONCE A WEEK 4 capsule 3     No current facility-administered medications for this visit.       Mental Status Exam:   Hygiene:   good  Cooperation:  Cooperative  Eye Contact:  Good  Psychomotor Behavior:  Appropriate  Affect:  Appropriate  Hopelessness: Denies  Speech:  Normal  Thought Process:  Goal directed and Linear  Thought Content:  Normal  Suicidal:  None  Homicidal:  None  Hallucinations:  None  Delusion:  None  Memory:  Intact  Orientation:  Person, Place, Time and Situation  Reliability:  fair  Insight:  Fair  Judgement:  Fair  Impulse Control:  Fair  Physical/Medical Issues:  No               Assessment/Plan   Diagnoses and all orders for this visit:    1. High risk medication use (Primary)  -     Urine Drug Screen - Urine, Clean Catch; Future    2. Severe episode of  recurrent major depressive disorder, with psychotic features (HCC)  -     Brexpiprazole (Rexulti) 2 MG tablet; Take 1 tablet by mouth Daily.  Dispense: 30 tablet; Refill: 0  -     venlafaxine XR (EFFEXOR-XR) 75 MG 24 hr capsule; Take 1 capsule by mouth Daily. Take with the Effexor XR 150mg capsule for a total daily dose of 225mg.  Dispense: 30 capsule; Refill: 0  -     venlafaxine XR (EFFEXOR-XR) 150 MG 24 hr capsule; Take 1 capsule by mouth Daily. Take with the Effexor XR 75mg capsule for a total daily dose of 225mg.  Dispense: 30 capsule; Refill: 0    3. Generalized anxiety disorder  -     Brexpiprazole (Rexulti) 2 MG tablet; Take 1 tablet by mouth Daily.  Dispense: 30 tablet; Refill: 0  -     LORazepam (ATIVAN) 1 MG tablet; Take 1 tablet by mouth 3 (Three) Times a Day As Needed for Anxiety. for anxiety  Dispense: 90 tablet; Refill: 0  -     venlafaxine XR (EFFEXOR-XR) 75 MG 24 hr capsule; Take 1 capsule by mouth Daily. Take with the Effexor XR 150mg capsule for a total daily dose of 225mg.  Dispense: 30 capsule; Refill: 0  -     venlafaxine XR (EFFEXOR-XR) 150 MG 24 hr capsule; Take 1 capsule by mouth Daily. Take with the Effexor XR 75mg capsule for a total daily dose of 225mg.  Dispense: 30 capsule; Refill: 0    4. Other obsessive-compulsive disorders  -     Brexpiprazole (Rexulti) 2 MG tablet; Take 1 tablet by mouth Daily.  Dispense: 30 tablet; Refill: 0  -     venlafaxine XR (EFFEXOR-XR) 75 MG 24 hr capsule; Take 1 capsule by mouth Daily. Take with the Effexor XR 150mg capsule for a total daily dose of 225mg.  Dispense: 30 capsule; Refill: 0  -     venlafaxine XR (EFFEXOR-XR) 150 MG 24 hr capsule; Take 1 capsule by mouth Daily. Take with the Effexor XR 75mg capsule for a total daily dose of 225mg.  Dispense: 30 capsule; Refill: 0            Discussed medication options with patient.  Increase Effexor to XR 75 mg daily with 150 mg capsules for a total daily dose of 225 mg for worsening depression and anxiety.   Continue Effexor  mg daily with 75 mg capsule for a total daily dose of 225 mg for depression and anxiety.  Continue Rexulti 2 mg daily for depression and anxiety.  Continue lorazepam 1 mg 3 times a day as needed for anxiety. Reviewed the risks, benefits, and side effects of the medications; patient acknowledged and verbally consented.   Patient is being prescribed a controlled substance as part of treatment plan. Patient has been educated of appropriate use of the medications, including risk of somnolence, limited ability to drive and/or work safely, and potential for dependence, respiratory depression and overdose. Patient is also informed that the medication are to be used by the patient only- avoid any combined use of ETOH or other substances unless prescribed. UDS ordered.   [facility] Patient Controlled Substance Report (from 4/12/2021 to 4/12/2022)    Dispensed  Strength Quantity Days Supply Provider Pharmacy   03/25/2022 Gabapentin 100MG 60 each 30 [facility]   03/17/2022 Lorazepam 1MG 90 each 30 [facility]OUD[facility]   02/26/2022 Gabapentin 100MG 60 each 30 [facility]   02/17/2022 Lorazepam 1MG 90 each 30 [facility]   01/27/2022 Gabapentin 100MG 60 each 30 [facility]   01/20/2022 Lorazepam 1MG 90 each 30 [facility]   12/23/2021 Gabapentin 100MG 60 each 30 [facility]   12/23/2021 Lorazepam 1MG 90 each 30 [facility]   11/29/2021 Lorazepam 1MG 90 each 30 [facility]   11/17/2021 Gabapentin 100MG 60 each 30 [facility]   11/01/2021 Lorazepam 1MG 90 each 30 [facility]   10/21/2021 Gabapentin 100MG 60 each 30 [facility]   10/21/2021 Lorazepam 1MG 30 each 10 [facility]   10/05/2021 Lorazepam 1MG 30 each 15 [facility]   09/16/2021 Gabapentin 100MG 60 each 30 [facility]   09/09/2021 Diazepam 10MG 60 each 30 [facility]   08/12/2021 Diazepam 10MG 60 each 30 [facility]   07/12/2021 Diazepam 10MG 60 each 30 [facility]   07/08/2021 Gabapentin 300MG 90 each 30 MICHNA[facility] HARPREET [facility]   06/15/2021 Gabapentin 100MG 60 each 30 WASHINGTON, VINC[facility]   06/10/2021 Diazepam 10MG 45 each 23 [facility]   05/11/2021 Diazepam 10MG 45 each 23 [facility]           Patient is agreeable to call the office with any questions, concerns, or worsening of symptoms. Patient is aware to call 911 or go to the nearest ER should begin having SI/HI.          Follow up in four weeks      Errors in dictation may reflect use of voice recognition software and not all errors in transcription may have been detected prior to signing.              This document has been electronically signed by NIDA Vasquez   April 28, 2022 13:14 EDT

## 2022-04-13 DIAGNOSIS — E11.42 DM TYPE 2 WITH DIABETIC PERIPHERAL NEUROPATHY: ICD-10-CM

## 2022-04-13 RX ORDER — EMPAGLIFLOZIN, LINAGLIPTIN, METFORMIN HYDROCHLORIDE 25; 5; 1000 MG/1; MG/1; MG/1
TABLET, EXTENDED RELEASE ORAL
Qty: 30 TABLET | Refills: 5 | Status: SHIPPED | OUTPATIENT
Start: 2022-04-13 | End: 2022-05-18 | Stop reason: SDUPTHER

## 2022-04-14 RX ORDER — LORAZEPAM 1 MG/1
1 TABLET ORAL 3 TIMES DAILY PRN
Qty: 90 TABLET | Refills: 0 | Status: SHIPPED | OUTPATIENT
Start: 2022-04-14 | End: 2022-05-10 | Stop reason: SDUPTHER

## 2022-04-14 RX ORDER — BREXPIPRAZOLE 2 MG/1
2 TABLET ORAL DAILY
Qty: 30 TABLET | Refills: 0 | Status: SHIPPED | OUTPATIENT
Start: 2022-04-14 | End: 2022-05-10 | Stop reason: SDUPTHER

## 2022-04-14 RX ORDER — VENLAFAXINE HYDROCHLORIDE 150 MG/1
150 CAPSULE, EXTENDED RELEASE ORAL DAILY
Qty: 30 CAPSULE | Refills: 0 | Status: SHIPPED | OUTPATIENT
Start: 2022-04-14 | End: 2022-05-10 | Stop reason: SDUPTHER

## 2022-04-14 RX ORDER — VENLAFAXINE HYDROCHLORIDE 75 MG/1
75 CAPSULE, EXTENDED RELEASE ORAL DAILY
Qty: 30 CAPSULE | Refills: 0 | Status: SHIPPED | OUTPATIENT
Start: 2022-04-14 | End: 2022-05-10 | Stop reason: SDUPTHER

## 2022-04-18 ENCOUNTER — APPOINTMENT (OUTPATIENT)
Dept: GENERAL RADIOLOGY | Facility: HOSPITAL | Age: 47
End: 2022-04-18

## 2022-04-18 ENCOUNTER — HOSPITAL ENCOUNTER (EMERGENCY)
Facility: HOSPITAL | Age: 47
Discharge: HOME | End: 2022-04-18
Attending: STUDENT IN AN ORGANIZED HEALTH CARE EDUCATION/TRAINING PROGRAM | Admitting: EMERGENCY MEDICINE

## 2022-04-18 VITALS
HEART RATE: 90 BPM | RESPIRATION RATE: 18 BRPM | OXYGEN SATURATION: 96 % | TEMPERATURE: 98.6 F | WEIGHT: 240 LBS | SYSTOLIC BLOOD PRESSURE: 133 MMHG | DIASTOLIC BLOOD PRESSURE: 77 MMHG | HEIGHT: 62 IN | BODY MASS INDEX: 44.16 KG/M2

## 2022-04-18 DIAGNOSIS — G89.29 CHRONIC PAIN OF BOTH SHOULDERS: ICD-10-CM

## 2022-04-18 DIAGNOSIS — M19.011 PRIMARY OSTEOARTHRITIS OF RIGHT SHOULDER: ICD-10-CM

## 2022-04-18 DIAGNOSIS — M25.512 CHRONIC PAIN OF BOTH SHOULDERS: ICD-10-CM

## 2022-04-18 DIAGNOSIS — Z90.13 S/P MASTECTOMY, BILATERAL: ICD-10-CM

## 2022-04-18 DIAGNOSIS — M19.011 OSTEOARTHRITIS OF RIGHT ACROMIOCLAVICULAR JOINT: ICD-10-CM

## 2022-04-18 DIAGNOSIS — M25.511 CHRONIC PAIN OF BOTH SHOULDERS: ICD-10-CM

## 2022-04-18 DIAGNOSIS — R07.9 NONSPECIFIC CHEST PAIN: Primary | ICD-10-CM

## 2022-04-18 LAB
ALBUMIN SERPL-MCNC: 3.71 G/DL (ref 3.5–5.2)
ALBUMIN/GLOB SERPL: 1.2 G/DL
ALP SERPL-CCNC: 108 U/L (ref 39–117)
ALT SERPL W P-5'-P-CCNC: 19 U/L (ref 1–33)
ANION GAP SERPL CALCULATED.3IONS-SCNC: 13.9 MMOL/L (ref 5–15)
APTT PPP: 60.3 SECONDS (ref 26.5–34.5)
AST SERPL-CCNC: 12 U/L (ref 1–32)
BASOPHILS # BLD AUTO: 0.06 10*3/MM3 (ref 0–0.2)
BASOPHILS NFR BLD AUTO: 0.5 % (ref 0–1.5)
BILIRUB SERPL-MCNC: <0.2 MG/DL (ref 0–1.2)
BUN SERPL-MCNC: 16 MG/DL (ref 6–20)
BUN/CREAT SERPL: 21.3 (ref 7–25)
CALCIUM SPEC-SCNC: 8.6 MG/DL (ref 8.6–10.5)
CHLORIDE SERPL-SCNC: 108 MMOL/L (ref 98–107)
CO2 SERPL-SCNC: 20.1 MMOL/L (ref 22–29)
CREAT SERPL-MCNC: 0.75 MG/DL (ref 0.57–1)
D DIMER PPP FEU-MCNC: 0.33 MCGFEU/ML (ref 0–0.5)
DEPRECATED RDW RBC AUTO: 43.3 FL (ref 37–54)
EGFRCR SERPLBLD CKD-EPI 2021: 97.7 ML/MIN/1.73
EOSINOPHIL # BLD AUTO: 0.17 10*3/MM3 (ref 0–0.4)
EOSINOPHIL NFR BLD AUTO: 1.5 % (ref 0.3–6.2)
ERYTHROCYTE [DISTWIDTH] IN BLOOD BY AUTOMATED COUNT: 14 % (ref 12.3–15.4)
FLUAV RNA RESP QL NAA+PROBE: NOT DETECTED
FLUBV RNA RESP QL NAA+PROBE: NOT DETECTED
GLOBULIN UR ELPH-MCNC: 3.1 GM/DL
GLUCOSE SERPL-MCNC: 208 MG/DL (ref 65–99)
HCT VFR BLD AUTO: 41.7 % (ref 34–46.6)
HGB BLD-MCNC: 13.2 G/DL (ref 12–15.9)
HOLD SPECIMEN: NORMAL
HOLD SPECIMEN: NORMAL
IMM GRANULOCYTES # BLD AUTO: 0.03 10*3/MM3 (ref 0–0.05)
IMM GRANULOCYTES NFR BLD AUTO: 0.3 % (ref 0–0.5)
INR PPP: 0.99 (ref 0.9–1.1)
LIPASE SERPL-CCNC: 57 U/L (ref 13–60)
LYMPHOCYTES # BLD AUTO: 4.1 10*3/MM3 (ref 0.7–3.1)
LYMPHOCYTES NFR BLD AUTO: 35.8 % (ref 19.6–45.3)
MCH RBC QN AUTO: 26.8 PG (ref 26.6–33)
MCHC RBC AUTO-ENTMCNC: 31.7 G/DL (ref 31.5–35.7)
MCV RBC AUTO: 84.6 FL (ref 79–97)
MONOCYTES # BLD AUTO: 0.73 10*3/MM3 (ref 0.1–0.9)
MONOCYTES NFR BLD AUTO: 6.4 % (ref 5–12)
NEUTROPHILS NFR BLD AUTO: 55.5 % (ref 42.7–76)
NEUTROPHILS NFR BLD AUTO: 6.36 10*3/MM3 (ref 1.7–7)
NRBC BLD AUTO-RTO: 0 /100 WBC (ref 0–0.2)
PLATELET # BLD AUTO: 269 10*3/MM3 (ref 140–450)
PMV BLD AUTO: 9.4 FL (ref 6–12)
POTASSIUM SERPL-SCNC: 3.7 MMOL/L (ref 3.5–5.2)
PROT SERPL-MCNC: 6.8 G/DL (ref 6–8.5)
PROTHROMBIN TIME: 13.3 SECONDS (ref 12.1–14.7)
RBC # BLD AUTO: 4.93 10*6/MM3 (ref 3.77–5.28)
SARS-COV-2 RNA RESP QL NAA+PROBE: NOT DETECTED
SODIUM SERPL-SCNC: 142 MMOL/L (ref 136–145)
TROPONIN T SERPL-MCNC: <0.01 NG/ML (ref 0–0.03)
TROPONIN T SERPL-MCNC: <0.01 NG/ML (ref 0–0.03)
WBC NRBC COR # BLD: 11.45 10*3/MM3 (ref 3.4–10.8)
WHOLE BLOOD HOLD SPECIMEN: NORMAL
WHOLE BLOOD HOLD SPECIMEN: NORMAL

## 2022-04-18 PROCEDURE — 25010000002 HEPARIN LOCK FLUSH PER 10 UNITS: Performed by: EMERGENCY MEDICINE

## 2022-04-18 PROCEDURE — 93010 ELECTROCARDIOGRAM REPORT: CPT | Performed by: INTERNAL MEDICINE

## 2022-04-18 PROCEDURE — 25010000002 ONDANSETRON PER 1 MG: Performed by: PHYSICIAN ASSISTANT

## 2022-04-18 PROCEDURE — 84484 ASSAY OF TROPONIN QUANT: CPT | Performed by: PHYSICIAN ASSISTANT

## 2022-04-18 PROCEDURE — 25010000002 MORPHINE PER 10 MG: Performed by: EMERGENCY MEDICINE

## 2022-04-18 PROCEDURE — 85025 COMPLETE CBC W/AUTO DIFF WBC: CPT | Performed by: PHYSICIAN ASSISTANT

## 2022-04-18 PROCEDURE — 85379 FIBRIN DEGRADATION QUANT: CPT | Performed by: PHYSICIAN ASSISTANT

## 2022-04-18 PROCEDURE — 93005 ELECTROCARDIOGRAM TRACING: CPT | Performed by: PHYSICIAN ASSISTANT

## 2022-04-18 PROCEDURE — 96375 TX/PRO/DX INJ NEW DRUG ADDON: CPT

## 2022-04-18 PROCEDURE — 99284 EMERGENCY DEPT VISIT MOD MDM: CPT

## 2022-04-18 PROCEDURE — 71045 X-RAY EXAM CHEST 1 VIEW: CPT

## 2022-04-18 PROCEDURE — 85730 THROMBOPLASTIN TIME PARTIAL: CPT | Performed by: PHYSICIAN ASSISTANT

## 2022-04-18 PROCEDURE — 80053 COMPREHEN METABOLIC PANEL: CPT | Performed by: PHYSICIAN ASSISTANT

## 2022-04-18 PROCEDURE — 36415 COLL VENOUS BLD VENIPUNCTURE: CPT

## 2022-04-18 PROCEDURE — 83690 ASSAY OF LIPASE: CPT | Performed by: PHYSICIAN ASSISTANT

## 2022-04-18 PROCEDURE — 96374 THER/PROPH/DIAG INJ IV PUSH: CPT

## 2022-04-18 PROCEDURE — 87636 SARSCOV2 & INF A&B AMP PRB: CPT | Performed by: PHYSICIAN ASSISTANT

## 2022-04-18 PROCEDURE — 85610 PROTHROMBIN TIME: CPT | Performed by: PHYSICIAN ASSISTANT

## 2022-04-18 RX ORDER — ASPIRIN 81 MG/1
324 TABLET, CHEWABLE ORAL ONCE
Status: COMPLETED | OUTPATIENT
Start: 2022-04-18 | End: 2022-04-18

## 2022-04-18 RX ORDER — SODIUM CHLORIDE 0.9 % (FLUSH) 0.9 %
10 SYRINGE (ML) INJECTION AS NEEDED
Status: DISCONTINUED | OUTPATIENT
Start: 2022-04-18 | End: 2022-04-18 | Stop reason: HOSPADM

## 2022-04-18 RX ORDER — MORPHINE SULFATE 2 MG/ML
2 INJECTION, SOLUTION INTRAMUSCULAR; INTRAVENOUS ONCE
Status: COMPLETED | OUTPATIENT
Start: 2022-04-18 | End: 2022-04-18

## 2022-04-18 RX ORDER — ONDANSETRON 2 MG/ML
4 INJECTION INTRAMUSCULAR; INTRAVENOUS ONCE
Status: COMPLETED | OUTPATIENT
Start: 2022-04-18 | End: 2022-04-18

## 2022-04-18 RX ORDER — HEPARIN SODIUM (PORCINE) LOCK FLUSH IV SOLN 100 UNIT/ML 100 UNIT/ML
300 SOLUTION INTRAVENOUS ONCE
Status: COMPLETED | OUTPATIENT
Start: 2022-04-18 | End: 2022-04-18

## 2022-04-18 RX ORDER — HYDROCODONE BITARTRATE AND ACETAMINOPHEN 5; 325 MG/1; MG/1
1 TABLET ORAL EVERY 8 HOURS PRN
Qty: 10 TABLET | Refills: 0 | Status: SHIPPED | OUTPATIENT
Start: 2022-04-18 | End: 2022-05-18

## 2022-04-18 RX ORDER — NITROGLYCERIN 0.4 MG/1
0.4 TABLET SUBLINGUAL
Status: DISCONTINUED | OUTPATIENT
Start: 2022-04-18 | End: 2022-04-18 | Stop reason: HOSPADM

## 2022-04-18 RX ADMIN — ONDANSETRON 4 MG: 2 INJECTION INTRAMUSCULAR; INTRAVENOUS at 19:54

## 2022-04-18 RX ADMIN — HEPARIN SODIUM (PORCINE) LOCK FLUSH IV SOLN 100 UNIT/ML 300 UNITS: 100 SOLUTION at 21:24

## 2022-04-18 RX ADMIN — MORPHINE SULFATE 2 MG: 2 INJECTION, SOLUTION INTRAMUSCULAR; INTRAVENOUS at 19:55

## 2022-04-18 RX ADMIN — ASPIRIN 324 MG: 81 TABLET, CHEWABLE ORAL at 18:59

## 2022-04-18 NOTE — ED PROVIDER NOTES
Subjective   47-year-old female who presents to the ED today for chest pain.  She reports intermittent chest pain for about 1 week.  This episode started about 3 hours ago and has been constant.  Nothing seems to make it worse or better.  She states the pain is in the middle of her chest and she describes it as sharp.  She states it radiates up into her neck and into her right arm.  She states it makes her right arm feel numb.  She denies any nausea or vomiting.  She denies any shortness of breath.  She states she does break out into a sweat often and feels this is due to hot flashes.  She states she did take an 81 mg aspirin this morning.  She denies any personal history of coronary artery disease.  She states she does have a family history of coronary artery disease.  She states her father started to have heart issues in his 40s.  She does have a past history of a DVT in 2003.  She states she is not currently on any blood thinners.  She did have a bilateral mastectomy about a year and a half ago due to breast cancer.  She states she is on a daily chemotherapy pill.      History provided by:  Patient  Chest Pain  Pain location:  Substernal area  Pain quality: sharp    Pain radiates to:  R arm and neck  Pain severity:  Moderate  Onset quality:  Sudden  Duration:  1 week  Timing:  Intermittent  Progression:  Worsening  Chronicity:  New  Context: at rest    Relieved by:  Nothing  Worsened by:  Nothing  Associated symptoms: diaphoresis    Associated symptoms: no abdominal pain, no altered mental status, no anorexia, no anxiety, no back pain, no cough, no dizziness, no dysphagia, no fatigue, no fever, no headache, no heartburn, no lower extremity edema, no nausea, no near-syncope, no palpitations, no shortness of breath, no vomiting and no weakness    Risk factors: diabetes mellitus, obesity and prior DVT/PE        Review of Systems   Constitutional: Positive for diaphoresis. Negative for fatigue and fever.   HENT:  Negative.  Negative for trouble swallowing.    Eyes: Negative.    Respiratory: Negative for cough and shortness of breath.    Cardiovascular: Positive for chest pain. Negative for palpitations and near-syncope.   Gastrointestinal: Negative for abdominal pain, anorexia, heartburn, nausea and vomiting.   Genitourinary: Negative.    Musculoskeletal: Negative for back pain.   Skin: Negative.    Neurological: Negative for dizziness, weakness and headaches.   Psychiatric/Behavioral: Negative.    All other systems reviewed and are negative.      Past Medical History:   Diagnosis Date   • Altered mental status 10/16/2017   • Anxiety and depression 3/31/2017   • Arthritis    • Asthma    • Elevated alkaline phosphatase level 10/16/2017   • Enlarged liver    • GERD (gastroesophageal reflux disease)    • Heart murmur    • Hypokalemia 10/16/2017   • Mitral valve prolapse    • Neuropathy     related to diabetes   • Obesity 3/31/2017   • OCD (obsessive compulsive disorder) 10/16/2017   • Osteoporosis    • PONV (postoperative nausea and vomiting)    • Renal insufficiency 10/16/2017   • Right breast cancer with malignant cells in regional lymph nodes no greater than 0.2 mm and no more than 200 cells (HCC) 8/13/2020   • TIA (transient ischemic attack)     4 TIMES       Allergies   Allergen Reactions   • Mobic [Meloxicam] Rash   • Penicillins Angioedema   • Taxotere [Docetaxel] Anaphylaxis     9 minutes into 1st infusion   • Novolog [Insulin Aspart] Hives       Past Surgical History:   Procedure Laterality Date   • APPENDECTOMY     • CARPAL TUNNEL RELEASE     • CHOLECYSTECTOMY     • COLONOSCOPY N/A 5/5/2021    Procedure: COLONOSCOPY WITH BIOPSY;  Surgeon: Vickie Herrera MD;  Location: [facility];  Service: Gastroenterology;  Laterality: N/A;   • FINGER FUSION Left     3rd   • HYSTERECTOMY  2011    removed due to an ovarian cyst and dysmenorrhia. No cancer noted. Complete   • KNEE ARTHROSCOPY Right    • MASTECTOMY Left  8/18/2020    Procedure: Left mastectomy;  Surgeon: Sheila Garza MD;  Location:  [facility];  Service: General;  Laterality: Left;   • MASTECTOMY WITH SENTINEL NODE BIOPSY AND AXILLARY NODE DISSECTION Right 8/18/2020    Procedure: Right BREAST MASTECTOMY WITH SENTINEL NODE BIOPSY;  Surgeon: Sheila Garza MD;  Location:  [facility];  Service: General;  Laterality: Right;   • PORTACATH PLACEMENT         Family History   Problem Relation Age of Onset   • Diabetes Mother    • Hypertension Mother    • Diabetes Father    • Heart disease Father    • Alcohol abuse Father    • Seizures Father    • Hypertension Father    • No Known Problems Brother    • No Known Problems Daughter    • Bone cancer Son    • Suicide Attempts Cousin    • Stomach cancer Cousin         maternal first cousin   • Breast cancer Paternal Aunt 52   • Diabetes Maternal Grandmother    • Diabetes Maternal Grandfather    • Lung cancer Maternal Grandfather    • Heart disease Paternal Grandmother    • Diabetes Paternal Grandmother    • Heart disease Paternal Grandfather    • Diabetes Paternal Grandfather    • Ovarian cancer Maternal Aunt    • Lung cancer Maternal Uncle    • Colon cancer Maternal Uncle    • Cancer Maternal Uncle         unknown type       Social History     Socioeconomic History   • Marital status:    Tobacco Use   • Smoking status: Never Smoker   • Smokeless tobacco: Never Used   Vaping Use   • Vaping Use: Never used   Substance and Sexual Activity   • Alcohol use: No   • Drug use: No   • Sexual activity: Yes     Partners: Male           Objective   Physical Exam  Vitals and nursing note reviewed.   Constitutional:       General: She is not in acute distress.     Appearance: She is well-developed. She is obese. She is not diaphoretic.   HENT:      Head: Normocephalic and atraumatic.   Eyes:      Extraocular Movements: Extraocular movements intact.      Pupils: Pupils are equal, round, and reactive to light.   Neck:      Vascular:  No JVD.      Trachea: No tracheal deviation.   Cardiovascular:      Rate and Rhythm: Normal rate and regular rhythm.      Heart sounds: Normal heart sounds.   Pulmonary:      Effort: Pulmonary effort is normal.      Breath sounds: Normal breath sounds.      Comments: Bilateral mastectomy  Chest:      Chest wall: Tenderness (mild tenderness in the center of her chest with palpation) present.   Abdominal:      General: Bowel sounds are normal.      Palpations: Abdomen is soft.      Tenderness: There is no abdominal tenderness.   Musculoskeletal:         General: Normal range of motion.      Cervical back: Normal range of motion and neck supple.   Lymphadenopathy:      Cervical: No cervical adenopathy.   Skin:     General: Skin is warm and dry.      Capillary Refill: Capillary refill takes less than 2 seconds.   Neurological:      General: No focal deficit present.      Mental Status: She is alert and oriented to person, place, and time.   Psychiatric:         Mood and Affect: Mood normal.         Procedures           ED Course  ED Course as of 04/20/22 0557   Mon Apr 18, 2022 1919 D-Dimer, Quant: 0.33 []   1930 XR Chest 1 View  FINDINGS:  Portable AP view(s) of the chest.  Indwelling venous access port overlying the right upper chest with distal tip mid SVC. Lungs are clear. No effusions or pneumothorax. Heart and mediastinum unremarkable. Surgical clips left axilla.     IMPRESSION:  No acute cardiopulmonary findings. []   1953 Heart Score: 4   []   1959 Endorsed to Dr Goncalves []      ED Course User Index  [] Disha Gamboa PA  [ES] Chivo Goncalves MD                                               HEART Score (for prediction of 6-week risk of major adverse cardiac event) reviewed and/or performed as part of the patient evaluation and treatment planning process.  The result associated with this review/performance is: 4       MDM  Number of Diagnoses or Management Options  Chronic pain of both  shoulders: new and requires workup  Nonspecific chest pain: new and requires workup  Osteoarthritis of right acromioclavicular joint: new and requires workup  Primary osteoarthritis of right shoulder: new and requires workup  S/P mastectomy, bilateral: new and requires workup     Amount and/or Complexity of Data Reviewed  Clinical lab tests: reviewed and ordered  Tests in the radiology section of CPT®: reviewed and ordered  Tests in the medicine section of CPT®: reviewed and ordered  Review and summarize past medical records: yes  Discuss the patient with other providers: yes  Independent visualization of images, tracings, or specimens: yes    Risk of Complications, Morbidity, and/or Mortality  Presenting problems: moderate  Diagnostic procedures: moderate  Management options: moderate    Patient Progress  Patient progress: stable      Final diagnoses:   Nonspecific chest pain   Chronic pain of both shoulders   Primary osteoarthritis of right shoulder   Osteoarthritis of right acromioclavicular joint   S/P mastectomy, bilateral   Electronically signed by LANIE Mcfarlane, 04/18/22, 8:08 PM EDT.    ED Disposition  ED Disposition     ED Disposition   Discharge    Condition   Stable    Comment   --             Ruben Erickson MD  [address] [facility] [address] [phone]    Schedule an appointment as soon as possible for a visit in 1 day  EVALUATE         Medication List      New Prescriptions    HYDROcodone-acetaminophen 5-325 MG per tablet  Commonly known as: NORCO  Take 1 tablet by mouth Every 8 (Eight) Hours As Needed for Severe Pain .           Where to Get Your Medications      These medications were sent to [facility] - [place], [place] - [address] - [phone]  - [phone] FX  [address] [facility] [address]    Phone: [phone]   · HYDROcodone-acetaminophen 5-325 MG per tablet          Chivo Goncalves MD  04/20/22 5440

## 2022-04-19 ENCOUNTER — TRANSCRIBE ORDERS (OUTPATIENT)
Dept: ADMINISTRATIVE | Facility: HOSPITAL | Age: 47
End: 2022-04-19

## 2022-04-19 DIAGNOSIS — R07.9 CHEST PAIN, UNSPECIFIED TYPE: Primary | ICD-10-CM

## 2022-04-19 LAB
QT INTERVAL: 326 MS
QTC INTERVAL: 443 MS

## 2022-04-20 ENCOUNTER — TRANSCRIBE ORDERS (OUTPATIENT)
Dept: ADMINISTRATIVE | Facility: HOSPITAL | Age: 47
End: 2022-04-20

## 2022-04-21 ENCOUNTER — PATIENT OUTREACH (OUTPATIENT)
Dept: CASE MANAGEMENT | Facility: OTHER | Age: 47
End: 2022-04-21

## 2022-04-21 NOTE — OUTREACH NOTE
AMBULATORY CASE MANAGEMENT NOTE    Name and Relationship of Patient/Support Person: Nivia Bernstein - Self    Patient Outreach    Role of ACM explained, service declined      MARY MCKEON  Ambulatory Case Management    4/21/2022, 14:11 EDT

## 2022-04-22 ENCOUNTER — APPOINTMENT (OUTPATIENT)
Dept: CARDIOLOGY | Facility: HOSPITAL | Age: 47
End: 2022-04-22

## 2022-04-22 ENCOUNTER — APPOINTMENT (OUTPATIENT)
Dept: NUCLEAR MEDICINE | Facility: HOSPITAL | Age: 47
End: 2022-04-22

## 2022-04-28 ENCOUNTER — TELEPHONE (OUTPATIENT)
Dept: FAMILY MEDICINE CLINIC | Facility: CLINIC | Age: 47
End: 2022-04-28

## 2022-04-28 DIAGNOSIS — T45.1X5A HOT FLASHES DUE TO TAMOXIFEN: ICD-10-CM

## 2022-04-28 DIAGNOSIS — R23.2 HOT FLASHES DUE TO TAMOXIFEN: ICD-10-CM

## 2022-04-28 RX ORDER — GABAPENTIN 100 MG/1
100 CAPSULE ORAL 2 TIMES DAILY
Qty: 60 CAPSULE | Refills: 3 | Status: SHIPPED | OUTPATIENT
Start: 2022-04-28 | End: 2022-10-03

## 2022-04-28 NOTE — TELEPHONE ENCOUNTER
Pt is aware of this information.       ----- Message from Markus Menjivar MD sent at 4/28/2022  1:16 PM EDT -----  Yes - refill emailed  Thanks    ----- Message -----  From: Anne Haynes MA  Sent: 4/28/2022   8:36 AM EDT  To: Markus Menjivar MD    Patient called and wanted to know if you could refill her gabapentin? She said Dr Heaton wanted her PCP to continue prescribing this because it is due to her neuropathy and not her cancer diagnosis. Is this something you could do?

## 2022-05-03 ENCOUNTER — INFUSION (OUTPATIENT)
Dept: ONCOLOGY | Facility: HOSPITAL | Age: 47
End: 2022-05-03

## 2022-05-03 VITALS
TEMPERATURE: 97.5 F | DIASTOLIC BLOOD PRESSURE: 78 MMHG | RESPIRATION RATE: 18 BRPM | OXYGEN SATURATION: 98 % | SYSTOLIC BLOOD PRESSURE: 124 MMHG | BODY MASS INDEX: 44.57 KG/M2 | HEART RATE: 85 BPM | WEIGHT: 243.7 LBS

## 2022-05-03 DIAGNOSIS — Z95.828 PORT-A-CATH IN PLACE: Primary | ICD-10-CM

## 2022-05-03 PROCEDURE — 25010000002 HEPARIN LOCK FLUSH PER 10 UNITS: Performed by: INTERNAL MEDICINE

## 2022-05-03 PROCEDURE — 96523 IRRIG DRUG DELIVERY DEVICE: CPT

## 2022-05-03 RX ORDER — SODIUM CHLORIDE 0.9 % (FLUSH) 0.9 %
10 SYRINGE (ML) INJECTION AS NEEDED
Status: CANCELLED | OUTPATIENT
Start: 2022-06-22

## 2022-05-03 RX ORDER — HEPARIN SODIUM (PORCINE) LOCK FLUSH IV SOLN 100 UNIT/ML 100 UNIT/ML
500 SOLUTION INTRAVENOUS AS NEEDED
Status: DISCONTINUED | OUTPATIENT
Start: 2022-05-03 | End: 2022-05-03 | Stop reason: HOSPADM

## 2022-05-03 RX ORDER — SODIUM CHLORIDE 0.9 % (FLUSH) 0.9 %
10 SYRINGE (ML) INJECTION AS NEEDED
Status: DISCONTINUED | OUTPATIENT
Start: 2022-05-03 | End: 2022-05-03 | Stop reason: HOSPADM

## 2022-05-03 RX ORDER — HEPARIN SODIUM (PORCINE) LOCK FLUSH IV SOLN 100 UNIT/ML 100 UNIT/ML
500 SOLUTION INTRAVENOUS AS NEEDED
Status: CANCELLED | OUTPATIENT
Start: 2022-06-22

## 2022-05-03 RX ADMIN — Medication 500 UNITS: at 13:40

## 2022-05-03 RX ADMIN — Medication 10 ML: at 13:40

## 2022-05-09 DIAGNOSIS — J30.9 CHRONIC ALLERGIC RHINITIS: Chronic | ICD-10-CM

## 2022-05-09 DIAGNOSIS — E78.2 MIXED HYPERLIPIDEMIA: ICD-10-CM

## 2022-05-09 DIAGNOSIS — R60.1 GENERALIZED EDEMA: ICD-10-CM

## 2022-05-09 DIAGNOSIS — J45.20 MILD INTERMITTENT ASTHMA WITHOUT COMPLICATION: Chronic | ICD-10-CM

## 2022-05-09 RX ORDER — LEVOCETIRIZINE DIHYDROCHLORIDE 5 MG/1
TABLET, FILM COATED ORAL
Qty: 30 TABLET | Refills: 2 | Status: SHIPPED | OUTPATIENT
Start: 2022-05-09 | End: 2022-05-18 | Stop reason: SDUPTHER

## 2022-05-09 RX ORDER — ROSUVASTATIN CALCIUM 20 MG/1
TABLET, COATED ORAL
Qty: 30 TABLET | Refills: 3 | Status: SHIPPED | OUTPATIENT
Start: 2022-05-09 | End: 2022-05-18 | Stop reason: SDUPTHER

## 2022-05-09 RX ORDER — INSULIN LISPRO 100 [IU]/ML
INJECTION, SOLUTION INTRAVENOUS; SUBCUTANEOUS
Qty: 40 ML | Refills: 0 | Status: SHIPPED | OUTPATIENT
Start: 2022-05-09 | End: 2022-05-18 | Stop reason: SDUPTHER

## 2022-05-10 ENCOUNTER — PRIOR AUTHORIZATION (OUTPATIENT)
Dept: FAMILY MEDICINE CLINIC | Facility: CLINIC | Age: 47
End: 2022-05-10

## 2022-05-10 ENCOUNTER — OFFICE VISIT (OUTPATIENT)
Dept: PSYCHIATRY | Facility: CLINIC | Age: 47
End: 2022-05-10

## 2022-05-10 VITALS
SYSTOLIC BLOOD PRESSURE: 132 MMHG | HEART RATE: 88 BPM | DIASTOLIC BLOOD PRESSURE: 80 MMHG | WEIGHT: 245.6 LBS | HEIGHT: 62 IN | BODY MASS INDEX: 45.19 KG/M2

## 2022-05-10 DIAGNOSIS — F33.3 SEVERE EPISODE OF RECURRENT MAJOR DEPRESSIVE DISORDER, WITH PSYCHOTIC FEATURES: ICD-10-CM

## 2022-05-10 DIAGNOSIS — F42.8 OTHER OBSESSIVE-COMPULSIVE DISORDERS: Chronic | ICD-10-CM

## 2022-05-10 DIAGNOSIS — F41.1 GENERALIZED ANXIETY DISORDER: ICD-10-CM

## 2022-05-10 PROCEDURE — 99213 OFFICE O/P EST LOW 20 MIN: CPT | Performed by: NURSE PRACTITIONER

## 2022-05-10 RX ORDER — FUROSEMIDE 20 MG/1
TABLET ORAL
Qty: 30 TABLET | Refills: 1 | Status: SHIPPED | OUTPATIENT
Start: 2022-05-10 | End: 2022-05-18 | Stop reason: SDUPTHER

## 2022-05-10 RX ORDER — BREXPIPRAZOLE 2 MG/1
2 TABLET ORAL DAILY
Qty: 30 TABLET | Refills: 0 | Status: SHIPPED | OUTPATIENT
Start: 2022-05-10 | End: 2022-06-06 | Stop reason: SDUPTHER

## 2022-05-10 RX ORDER — LORAZEPAM 1 MG/1
1 TABLET ORAL 3 TIMES DAILY PRN
Qty: 90 TABLET | Refills: 0 | Status: SHIPPED | OUTPATIENT
Start: 2022-05-10 | End: 2022-06-06 | Stop reason: SDUPTHER

## 2022-05-10 RX ORDER — AZELASTINE 1 MG/ML
SPRAY, METERED NASAL
Qty: 30 ML | Refills: 2 | Status: SHIPPED | OUTPATIENT
Start: 2022-05-10 | End: 2022-05-18 | Stop reason: SDUPTHER

## 2022-05-10 RX ORDER — VENLAFAXINE HYDROCHLORIDE 150 MG/1
150 CAPSULE, EXTENDED RELEASE ORAL DAILY
Qty: 30 CAPSULE | Refills: 0 | Status: SHIPPED | OUTPATIENT
Start: 2022-05-10 | End: 2022-06-06 | Stop reason: SDUPTHER

## 2022-05-10 RX ORDER — VENLAFAXINE HYDROCHLORIDE 75 MG/1
75 CAPSULE, EXTENDED RELEASE ORAL DAILY
Qty: 30 CAPSULE | Refills: 0 | Status: SHIPPED | OUTPATIENT
Start: 2022-05-10 | End: 2022-06-06 | Stop reason: SDUPTHER

## 2022-05-10 NOTE — PROGRESS NOTES
Subjective   Nivia Bernstein is a 47 y.o. female is here today for medication management follow-up.    Chief Complaint:  Depression anxiety    History of Present Illness:   Patient presents today for follow up for medication management for depression and anxiety. Patient states everything is going about the same. Patient states getting outside on the porch some. Patient reports currently depression is at a 9 on a 0-10 scale with 10 being the worst. Patient reports symptoms of depression of depressed mood, irritability, insomnia, decreased appetite, and decreased motivation. Patient reports anxiety is at a 9 on a 0-10 scale with 10 being the worst. Patient states had a couple outbursts with irritability. Patient denies any panic attacks. Patient reports sleeping 4 hours a night and patient reports still having nightmares. Patient reports appetite is about the same and eating about two meals a day. Patient states her glucose has been running good. Patient denies any auditory or visual hallucinations. Patient adamantly denies any SI or HI. Patient denies any side effects to medications. Patient denies any medical changes since last visit.   The following portions of the patient's history were reviewed and updated as appropriate: allergies, current medications, past family history, past medical history, past social history, past surgical history and problem list.    Review of Systems   Constitutional: Negative.    Respiratory: Negative.    Cardiovascular: Negative.    Gastrointestinal: Negative.    Neurological: Negative.    Psychiatric/Behavioral: Positive for agitation, dysphoric mood and sleep disturbance. The patient is nervous/anxious.        Objective   Physical Exam  Vitals reviewed.   Constitutional:       Appearance: Normal appearance. She is well-developed and well-groomed.   Neurological:      Mental Status: She is alert.   Psychiatric:         Attention and Perception: Attention and perception normal.     "     Mood and Affect: Affect normal. Mood is depressed.         Speech: Speech normal.         Behavior: Behavior normal. Behavior is cooperative.         Thought Content: Thought content normal.         Cognition and Memory: Cognition and memory normal.         Judgment: Judgment normal.       Blood pressure 132/80, pulse 88, height 157.5 cm (62\"), weight 111 kg (245 lb 9.6 oz), not currently breastfeeding. Body mass index is 44.92 kg/m².    Allergies   Allergen Reactions   • Mobic [Meloxicam] Rash   • Penicillins Angioedema   • Taxotere [Docetaxel] Anaphylaxis     9 minutes into 1st infusion   • Novolog [Insulin Aspart] Hives       Medication List:   Current Outpatient Medications   Medication Sig Dispense Refill   • Brexpiprazole (Rexulti) 2 MG tablet Take 1 tablet by mouth Daily. 30 tablet 0   • LORazepam (ATIVAN) 1 MG tablet Take 1 tablet by mouth 3 (Three) Times a Day As Needed for Anxiety. for anxiety 90 tablet 0   • venlafaxine XR (EFFEXOR-XR) 150 MG 24 hr capsule Take 1 capsule by mouth Daily. Take with the Effexor XR 75mg capsule for a total daily dose of 225mg. 30 capsule 0   • venlafaxine XR (EFFEXOR-XR) 75 MG 24 hr capsule Take 1 capsule by mouth Daily. Take with the Effexor XR 150mg capsule for a total daily dose of 225mg. 30 capsule 0   • albuterol (PROVENTIL,VENTOLIN) 2 MG/5ML syrup Take 5-10 mL by mouth Every 6 (Six) Hours As Needed (cough). 60 mL 0   • Aspirin Low Dose 81 MG EC tablet TAKE TWO TABLETS BY MOUTH ONCE DAILY 60 tablet 2   • azelastine (ASTELIN) 0.1 % nasal spray USE 2 SPRAYS IN BOTH NOSTRILS TWO TIMES A DAY AS NEEDED 30 mL 2   • Breo Ellipta 200-25 MCG/INH inhaler Inhale 1 puff Daily. 1 each 5   • Calcium Carbonate 1500 (600 Ca) MG tablet TAKE 1 TABLET BY MOUTH TWO TIMES A DAY WITH A MEAL 60 tablet 5   • cholecalciferol (Vitamin D) 25 MCG (1000 UT) tablet Take 2 tablets by mouth Daily. 60 tablet 5   • cloNIDine (CATAPRES) 0.1 MG tablet TAKE 1 TABLET BY MOUTH TWO TIMES A DAY 60 tablet 5 "   • cyanocobalamin 1000 MCG/ML injection      • Cyanocobalamin 1000 MCG/ML kit Inject 1 mL as directed Every 30 (Thirty) Days. 1 kit 5   • diclofenac (VOLTAREN) 1 % gel gel Apply 4 g topically to the appropriate area as directed 4 (Four) Times a Day As Needed (joint pain). 500 g 5   • Diclofenac Sodium (VOLTAREN) 1 % gel gel      • docusate sodium 100 MG capsule Take 100 mg by mouth 2 (Two) Times a Day.     • furosemide (LASIX) 20 MG tablet TAKE 1 TABLET BY MOUTH DAILY AS NEEDED (EDEMA). 15 tablet 1   • gabapentin (Neurontin) 100 MG capsule Take 1 capsule by mouth 2 (Two) Times a Day. 60 capsule 3   • HumaLOG 100 UNIT/ML injection USE PER INSULIN PUMP, MAX DAILY DOSE  UNITS 40 mL 0   • HYDROcodone-acetaminophen (NORCO) 5-325 MG per tablet Take 1 tablet by mouth Every 8 (Eight) Hours As Needed for Severe Pain . 10 tablet 0   • hydrocortisone (ANUSOL-HC) 25 MG suppository Insert 1 suppository into the rectum 2 (Two) Times a Day As Needed for Hemorrhoids (rectal discomfort). 30 suppository 5   • lansoprazole (PREVACID) 30 MG capsule Take 1 capsule by mouth Daily. 90 capsule 3   • levocetirizine (XYZAL) 5 MG tablet TAKE ONE TABLET BY MOUTH EVERY EVENING 30 tablet 2   • linaclotide (Linzess) 145 MCG capsule capsule Take 1 capsule by mouth Daily. 30 minutes before meals on an empty stomach. 30 capsule 5   • loperamide (IMODIUM A-D) 2 MG tablet Take 1 tablet by mouth 4 (Four) Times a Day As Needed for Diarrhea. 30 tablet 0   • magic mouthwash oral suspension Swish and swallow 5 mL four times  a day As Needed for Mucositis. 240 mL 1   • nitrofurantoin (MACRODANTIN) 100 MG capsule      • nitrofurantoin, macrocrystal-monohydrate, (Macrobid) 100 MG capsule Take 1 capsule by mouth Daily. 56 capsule 3   • ondansetron (ZOFRAN) 8 MG tablet TAKE 1 TABLET BY MOUTH 3 TIMES A DAY AS NEEDED FOR NAUSEA OR VOMITING. 30 tablet 5   • ProAir  (90 Base) MCG/ACT inhaler INHALE 2 PUFFS BY MOUTH EVERY 4 HOURS AS NEEDED FOR  SHORTNESS OF BREATH. 8.5 g 5   • prochlorperazine (COMPAZINE) 10 MG tablet Take 1 tablet by mouth Every 6 (Six) Hours As Needed for Nausea or Vomiting. 30 tablet 5   • rosuvastatin (CRESTOR) 20 MG tablet TAKE ONE TABLET BY MOUTH EVERY NIGHT 30 tablet 3   • sennosides-docusate (senna-docusate sodium) 8.6-50 MG per tablet Take 2 tablets by mouth 2 (Two) Times a Day. 120 tablet 4   • SUMAtriptan (IMITREX) 100 MG tablet      • tamoxifen (NOLVADEX) 20 MG chemo tablet Take 1 tablet by mouth Daily. 30 tablet 11   • topiramate (TOPAMAX) 50 MG tablet      • Trijardy XR 25-5-1000 MG tablet sustained-release 24 hour TAKE ONE TABLET BY MOUTH EVERY MORNING 30 tablet 5   • vitamin D (ERGOCALCIFEROL) 1.25 MG (70509 UT) capsule capsule TAKE ONE CAPSULE BY MOUTH ONCE A WEEK 4 capsule 3     No current facility-administered medications for this visit.       Mental Status Exam:   Hygiene:   good  Cooperation:  Cooperative  Eye Contact:  Good  Psychomotor Behavior:  Appropriate  Affect:  Appropriate  Hopelessness: Denies  Speech:  Normal  Thought Process:  Goal directed and Linear  Thought Content:  Normal  Suicidal:  None  Homicidal:  None  Hallucinations:  None  Delusion:  None  Memory:  Intact  Orientation:  Person, Place, Time and Situation  Reliability:  fair  Insight:  Fair  Judgement:  Fair  Impulse Control:  Fair  Physical/Medical Issues:  No                     Diagnoses and all orders for this visit:    1. Severe episode of recurrent major depressive disorder, with psychotic features (HCC)  -     venlafaxine XR (EFFEXOR-XR) 75 MG 24 hr capsule; Take 1 capsule by mouth Daily. Take with the Effexor XR 150mg capsule for a total daily dose of 225mg.  Dispense: 30 capsule; Refill: 0  -     venlafaxine XR (EFFEXOR-XR) 150 MG 24 hr capsule; Take 1 capsule by mouth Daily. Take with the Effexor XR 75mg capsule for a total daily dose of 225mg.  Dispense: 30 capsule; Refill: 0  -     Brexpiprazole (Rexulti) 2 MG tablet; Take 1 tablet by  mouth Daily.  Dispense: 30 tablet; Refill: 0    2. Generalized anxiety disorder  -     venlafaxine XR (EFFEXOR-XR) 75 MG 24 hr capsule; Take 1 capsule by mouth Daily. Take with the Effexor XR 150mg capsule for a total daily dose of 225mg.  Dispense: 30 capsule; Refill: 0  -     venlafaxine XR (EFFEXOR-XR) 150 MG 24 hr capsule; Take 1 capsule by mouth Daily. Take with the Effexor XR 75mg capsule for a total daily dose of 225mg.  Dispense: 30 capsule; Refill: 0  -     LORazepam (ATIVAN) 1 MG tablet; Take 1 tablet by mouth 3 (Three) Times a Day As Needed for Anxiety. for anxiety  Dispense: 90 tablet; Refill: 0  -     Brexpiprazole (Rexulti) 2 MG tablet; Take 1 tablet by mouth Daily.  Dispense: 30 tablet; Refill: 0    3. Other obsessive-compulsive disorders  -     venlafaxine XR (EFFEXOR-XR) 75 MG 24 hr capsule; Take 1 capsule by mouth Daily. Take with the Effexor XR 150mg capsule for a total daily dose of 225mg.  Dispense: 30 capsule; Refill: 0  -     venlafaxine XR (EFFEXOR-XR) 150 MG 24 hr capsule; Take 1 capsule by mouth Daily. Take with the Effexor XR 75mg capsule for a total daily dose of 225mg.  Dispense: 30 capsule; Refill: 0  -     Brexpiprazole (Rexulti) 2 MG tablet; Take 1 tablet by mouth Daily.  Dispense: 30 tablet; Refill: 0            Discussed medication options with patient. Cont. Rexulti 2mg daily for depression and anxiety. Cont. Effexor XR 75mg daily for depression and anxiety. Cont. Effexor XR 150mg daily for depression and anxiety. Cont. Lorazepam 1mg three times daily as needed for anxiety. Reviewed the risks, benefits, and side effects of the medications; patient acknowledged and verbally consented. Patient is being prescribed a controlled substance as part of treatment plan. Patient has been educated of appropriate use of the medications, including risk of somnolence, limited ability to drive and/or work safely, and potential for dependence, respiratory depression and overdose. Patient is also  informed that the medication are to be used by the patient only- avoid any combined use of ETOH or other substances unless prescribed. Patient declined UDS due to insurance not covering. Will obtain UDS at next in office visit.   [facility] Patient Controlled Substance Report (from 5/10/2021 to 5/9/2022)    Dispensed  Strength Quantity Days Supply Provider Pharmacy   05/06/2022 Gabapentin 100MG 60 each 30 [facility]   04/19/2022 Hydrocodone Bitartrate/Ac 325MG/5MG 10 each 3 [place], GIL [facility]   04/14/2022 Lorazepam 1MG 90 each 30 CLOUD, ALFREDO [facility]   03/25/2022 Gabapentin 100MG 60 each 30 URMILA[facility]   03/17/2022 Lorazepam 1MG 90 each 30 CLOUD[facility]   02/26/2022 Gabapentin 100MG 60 each 30 URMILA[facility]   02/17/2022 Lorazepam 1MG 90 each 30 CLOUD[facility]   01/27/2022 Gabapentin 100MG 60 each 30 URMILA[facility]   01/20/2022 Lorazepam 1MG 90 each 30 CLOUD[facility]   12/23/2021 Gabapentin 100MG 60 each 30 URMILA[facility]   12/23/2021 Lorazepam 1MG 90 each 30 CLOUD[facility]   11/29/2021 Lorazepam 1MG 90 each 30 CLOUD[facility]   11/17/2021 Gabapentin 100MG 60 each 30 URMILA[facility]   11/01/2021 Lorazepam 1MG 90 each 30 CLOUD[facility]   10/21/2021 Gabapentin 100MG 60 each 30 URMILA[facility]   10/21/2021 Lorazepam 1MG 30 each 10 CLOUD[facility]   10/05/2021 Lorazepam 1MG 30 each 15 [facility]   09/16/2021 Gabapentin 100MG 60 each [facility]   09/09/2021 Diazepam 10MG 60 each 30 CLOUD[facility]   08/12/2021 Diazepam 10MG  60 each 30 CLOUD[facility]   07/12/2021 Diazepam 10MG 60 each 30 CLOUD[facility]   07/08/2021 Gabapentin 300MG 90 each 30 HARPREET CHOI [facility]   06/15/2021 Gabapentin 100MG 60 each 30 [facility] [place]   06/10/2021 Diazepam 10MG 45 each 23 CLOUD, [facility]FRED[facility]   05/11/2021 Diazepam 10MG 45 each 23 CLOUD[facility]        Patient was instructed on medication side effects, benefits, and also of no treatment.  Patient was given an explanation regarding potential for increased risk of diabetes, lipids, and weight gain.  Labs will be assessed as clinically indicated.  Diet was discussed especially healthy diet choices and increasing activity and exercise.  Patient was strongly urged to continue weight maintance or weight loss efforts.  Patient reported verbalized understanding of instructions. Lab work reviewed from 04/18/22 including CMP and CBC. Lab work reviewed from 03/15/22 including TSH, A1C, and lipid panel.    Patient is agreeable to call the office with any questions, concerns, or worsening of symptoms. Patient is aware to call 911 or go to the nearest ER should begin having SI/HI.          Follow up in four weeks        Errors in dictation may reflect use of voice recognition software and not all errors in transcription may have been detected prior to signing.              This document has been electronically signed by NIDA Vasquez   May 10, 2022 08:47 EDT

## 2022-05-12 DIAGNOSIS — K62.5 BRBPR (BRIGHT RED BLOOD PER RECTUM): ICD-10-CM

## 2022-05-18 ENCOUNTER — OFFICE VISIT (OUTPATIENT)
Dept: FAMILY MEDICINE CLINIC | Facility: CLINIC | Age: 47
End: 2022-05-18

## 2022-05-18 DIAGNOSIS — J45.20 MILD INTERMITTENT ASTHMA WITHOUT COMPLICATION: Chronic | ICD-10-CM

## 2022-05-18 DIAGNOSIS — M85.89 OSTEOPENIA OF MULTIPLE SITES: Chronic | ICD-10-CM

## 2022-05-18 DIAGNOSIS — E78.2 MIXED DYSLIPIDEMIA: Chronic | ICD-10-CM

## 2022-05-18 DIAGNOSIS — E55.9 VITAMIN D DEFICIENCY: ICD-10-CM

## 2022-05-18 DIAGNOSIS — I65.23 ATHEROSCLEROSIS OF BOTH CAROTID ARTERIES: Chronic | ICD-10-CM

## 2022-05-18 DIAGNOSIS — M54.81 OCCIPITAL NEURALGIA OF RIGHT SIDE: Chronic | ICD-10-CM

## 2022-05-18 DIAGNOSIS — J30.9 CHRONIC ALLERGIC RHINITIS: Chronic | ICD-10-CM

## 2022-05-18 DIAGNOSIS — E53.8 VITAMIN B 12 DEFICIENCY: ICD-10-CM

## 2022-05-18 DIAGNOSIS — R60.1 GENERALIZED EDEMA: ICD-10-CM

## 2022-05-18 DIAGNOSIS — M19.011 PRIMARY OSTEOARTHRITIS OF RIGHT SHOULDER: Chronic | ICD-10-CM

## 2022-05-18 DIAGNOSIS — B35.1 ONYCHOMYCOSIS OF GREAT TOE: ICD-10-CM

## 2022-05-18 DIAGNOSIS — Z46.81 COUNSELING FOR INSULIN PUMP: ICD-10-CM

## 2022-05-18 DIAGNOSIS — K59.03 DRUG-INDUCED CONSTIPATION: ICD-10-CM

## 2022-05-18 DIAGNOSIS — E11.42 DM TYPE 2 WITH DIABETIC PERIPHERAL NEUROPATHY: Primary | Chronic | ICD-10-CM

## 2022-05-18 PROCEDURE — 99214 OFFICE O/P EST MOD 30 MIN: CPT | Performed by: NURSE PRACTITIONER

## 2022-05-18 RX ORDER — LEVOCETIRIZINE DIHYDROCHLORIDE 5 MG/1
5 TABLET, FILM COATED ORAL EVERY EVENING
Qty: 30 TABLET | Refills: 3 | Status: SHIPPED | OUTPATIENT
Start: 2022-05-18 | End: 2022-08-17 | Stop reason: SDUPTHER

## 2022-05-18 RX ORDER — TOPIRAMATE 50 MG/1
TABLET, FILM COATED ORAL
Status: CANCELLED | OUTPATIENT
Start: 2022-05-18

## 2022-05-18 RX ORDER — AZELASTINE 1 MG/ML
SPRAY, METERED NASAL
Qty: 30 ML | Refills: 3 | Status: SHIPPED | OUTPATIENT
Start: 2022-05-18

## 2022-05-18 RX ORDER — MELATONIN
2000 DAILY
Qty: 60 TABLET | Refills: 3 | Status: SHIPPED | OUTPATIENT
Start: 2022-05-18 | End: 2022-10-12

## 2022-05-18 RX ORDER — TOPIRAMATE 50 MG/1
50 TABLET, FILM COATED ORAL 2 TIMES DAILY
Qty: 60 TABLET | Refills: 1 | Status: SHIPPED | OUTPATIENT
Start: 2022-05-18 | End: 2022-08-17 | Stop reason: SDUPTHER

## 2022-05-18 RX ORDER — FUROSEMIDE 20 MG/1
20 TABLET ORAL DAILY PRN
Qty: 30 TABLET | Refills: 1 | Status: SHIPPED | OUTPATIENT
Start: 2022-05-18 | End: 2022-08-17 | Stop reason: SDUPTHER

## 2022-05-18 RX ORDER — INSULIN LISPRO 100 [IU]/ML
INJECTION, SOLUTION INTRAVENOUS; SUBCUTANEOUS
Qty: 40 ML | Refills: 3 | Status: SHIPPED | OUTPATIENT
Start: 2022-05-18 | End: 2022-08-17 | Stop reason: SDUPTHER

## 2022-05-18 RX ORDER — CYANOCOBALAMIN 1000 UG/ML
1000 INJECTION, SOLUTION INTRAMUSCULAR; SUBCUTANEOUS
Qty: 1 ML | Refills: 5 | Status: SHIPPED | OUTPATIENT
Start: 2022-05-18 | End: 2022-08-17 | Stop reason: SDUPTHER

## 2022-05-18 RX ORDER — ONDANSETRON HYDROCHLORIDE 8 MG/1
8 TABLET, FILM COATED ORAL EVERY 8 HOURS PRN
Qty: 30 TABLET | Refills: 3 | Status: SHIPPED | OUTPATIENT
Start: 2022-05-18 | End: 2022-08-17 | Stop reason: SDUPTHER

## 2022-05-18 RX ORDER — EMPAGLIFLOZIN, LINAGLIPTIN, METFORMIN HYDROCHLORIDE 25; 5; 1000 MG/1; MG/1; MG/1
1 TABLET, EXTENDED RELEASE ORAL EVERY MORNING
Qty: 30 TABLET | Refills: 3 | Status: SHIPPED | OUTPATIENT
Start: 2022-05-18 | End: 2022-08-17 | Stop reason: SDUPTHER

## 2022-05-18 RX ORDER — ROSUVASTATIN CALCIUM 20 MG/1
20 TABLET, COATED ORAL
Qty: 30 TABLET | Refills: 3 | Status: SHIPPED | OUTPATIENT
Start: 2022-05-18 | End: 2022-08-17

## 2022-05-18 RX ORDER — FLUCONAZOLE 150 MG/1
150 TABLET ORAL DAILY
Qty: 3 TABLET | Refills: 1 | Status: SHIPPED | OUTPATIENT
Start: 2022-05-18 | End: 2022-05-21

## 2022-05-18 RX ORDER — ASPIRIN 81 MG/1
162 TABLET ORAL DAILY
Qty: 60 TABLET | Refills: 3 | Status: SHIPPED | OUTPATIENT
Start: 2022-05-18 | End: 2022-08-17 | Stop reason: SDUPTHER

## 2022-05-18 NOTE — PROGRESS NOTES
History of Present Illness  Nivia is a 46 y/o female who presents to the clinic today for follow up pertaining to her DM, type 2 and Dyslipidemia. In addition, she has been diagnosed with right breast Cancer and has undergone bilateral mastectomy and chemotherapy.  She did have a [facility] ED visit in April for Chest Pain and is undergoing a Stress Test on May 23 rd. These notes and her Orthopedic and Oncology notes since her last visit have been reviewed.     Diabetes   She has type 2 diabetes mellitus. The initial diagnosis of diabetes was made 20 years ago. Diabetic complications include peripheral neuropathy. Risk factors for coronary artery disease include post-menopausal, stress and dyslipidemia. She is currently utilizing insulin pump therapy and CGM. She did stop weekly Ozempic due to nausea. She is currently taking Trijardy.  She has had a previous visit with a dietitian An ACE inhibitor/angiotensin II receptor blocker is not  being taken at this time..       Lab Results   Component Value Date    HGBA1C 7.70 (H) 01/11/2022     Lab Results   Component Value Date    HGBA1C 7.70 (H) 03/15/2022     Dyslipidemia   The current episode started more than 1 year ago. Pertinent negatives include no chest pain or shortness of breath. She is taking Crestor 40 mg.   Lab Results   Component Value Date    CHLPL 175 03/15/2022    CHLPL 194 01/11/2022    CHLPL 174 09/28/2021     Lab Results   Component Value Date    TRIG 192 (H) 03/15/2022    TRIG 133 01/11/2022    TRIG 382 (H) 09/28/2021     Lab Results   Component Value Date    HDL 31 (L) 03/15/2022    HDL 39 (L) 01/11/2022    HDL 28 (L) 09/28/2021     Lab Results   Component Value Date     (H) 03/15/2022     (H) 01/11/2022    LDL 84 09/28/2021     The following portions of the patient's history were reviewed and updated as appropriate: allergies, current medications, past family history, past medical history, past social history, past surgical history and  "problem list.    Review of Systems   Constitutional: Positive for fatigue. Negative for activity change, appetite change, chills, fever and unexpected weight change.   HENT: Negative for congestion.    Eyes: Positive for visual disturbance.   Respiratory: Positive for chest tightness. Negative for cough, shortness of breath and wheezing.    Cardiovascular: Positive for leg swelling (Intermittently). Negative for chest pain and palpitations.   Gastrointestinal: Negative for nausea and vomiting.   Endocrine: Negative for cold intolerance, heat intolerance, polydipsia, polyphagia and polyuria.   Musculoskeletal: Positive for arthralgias and back pain.   Skin: Negative for color change and rash.   Allergic/Immunologic: Positive for environmental allergies.   Neurological: Positive for numbness. Negative for dizziness, tremors, speech difficulty, weakness, light-headedness and headaches.   Hematological: Negative for adenopathy.   Psychiatric/Behavioral: Positive for sleep disturbance. Negative for confusion and decreased concentration. The patient is nervous/anxious.    All other systems reviewed and are negative.    Vital signs:  /70 (BP Location: Left arm, Patient Position: Sitting, Cuff Size: Adult)   Pulse 87   Temp 97.3 °F (36.3 °C) (Temporal)   Resp 14   Ht 157.5 cm (62.01\")   Wt 112 kg (246 lb)   SpO2 97%   BMI 44.98 kg/m²     Physical Exam  Vitals and nursing note reviewed.   Constitutional:       General: She is not in acute distress.     Appearance: She is well-developed.      Interventions: Face mask in place.   HENT:      Head: Normocephalic.      Nose: Nose normal.   Eyes:      General: No scleral icterus.        Right eye: No discharge.         Left eye: No discharge.      Conjunctiva/sclera: Conjunctivae normal.   Neck:      Thyroid: No thyromegaly.      Vascular: No JVD.   Cardiovascular:      Rate and Rhythm: Normal rate and regular rhythm.      Heart sounds: S1 normal and S2 normal. "   Pulmonary:      Effort: Pulmonary effort is normal.      Breath sounds: Normal breath sounds.   Abdominal:      Palpations: Abdomen is soft.      Tenderness: There is no abdominal tenderness. There is no guarding.   Musculoskeletal:      Cervical back: Neck supple.      Right lower leg: No edema.      Left lower leg: No edema.   Feet:      Right foot:      Toenail Condition: Fungal disease present.     Left foot:      Toenail Condition: Fungal disease present.     Comments: Bilateral thickened, yellow nails   Skin:     General: Skin is dry.      Capillary Refill: Capillary refill takes less than 2 seconds.   Neurological:      Mental Status: She is alert.      Cranial Nerves: Cranial nerves are intact.   Psychiatric:         Mood and Affect: Mood and affect normal.         Speech: Speech normal.         Behavior: Behavior is cooperative.         Cognition and Memory: Cognition and memory normal.       Result Review : Insulin pump upload reviewed and in range 94% of the time..Great job!  Class 3 Severe Obesity (BMI >=40). Obesity-related health conditions include the following: obstructive sleep apnea, diabetes mellitus, dyslipidemias, GERD and osteoarthritis. Obesity is unchanged. BMI  is above average; BMI management plan is completed. We discussed portion control and increasing exercise.     Assessment & Plan     Diagnoses and all orders for this visit:    1. DM type 2 with diabetic peripheral neuropathy (HCC) (Primary)  Comments:  Continue insulin pump/CMG and Trijardy  Orders:  -     HumaLOG 100 UNIT/ML injection; Maximum daily dose of 150 units  Dispense: 40 mL; Refill: 3  -     Empagliflozin-Linaglip-Metform (Trijardy XR) 25-5-1000 MG tablet sustained-release 24 hour; Take 1 tablet by mouth Every Morning.  Dispense: 30 tablet; Refill: 3  -     Comprehensive Metabolic Panel; Future  -     TSH; Future  -     Lipid Panel; Future  -     Hemoglobin A1c; Future  -     Vitamin B12; Future    2. Counseling for  insulin pump  Comments:  Upload reviewed and no changes made...94% in target    3. Mixed dyslipidemia  Comments:  Continue Rosuvastatin  Orders:  -     rosuvastatin (CRESTOR) 20 MG tablet; Take 1 tablet by mouth every night at bedtime.  Dispense: 30 tablet; Refill: 3  -     Comprehensive Metabolic Panel; Future  -     TSH; Future  -     Lipid Panel; Future    4. Mild intermittent asthma without complication  Comments:  Refills of her asthma medications which include Breo and Proair  Orders:  -     Breo Ellipta 200-25 MCG/INH inhaler; Inhale 1 puff Daily.  Dispense: 1 each; Refill: 3  -     ProAir  (90 Base) MCG/ACT inhaler; Inhale 2 puffs Every 4 (Four) Hours As Needed for Shortness of Air.  Dispense: 8.5 g; Refill: 3    5. Atherosclerosis of both carotid arteries  Comments:  Cardiovascular risk reduction modifications  Orders:  -     aspirin (Aspirin Low Dose) 81 MG EC tablet; Take 2 tablets by mouth Daily.  Dispense: 60 tablet; Refill: 3    6. Chronic allergic rhinitis  Comments:  Continue Xyzal   Orders:  -     azelastine (ASTELIN) 0.1 % nasal spray; Use in each nostril as directed  Dispense: 30 mL; Refill: 3  -     levocetirizine (XYZAL) 5 MG tablet; Take 1 tablet by mouth Every Evening.  Dispense: 30 tablet; Refill: 3  -     CBC & Differential; Future    7. Occipital neuralgia of right side  Comments:  Continue Topamax  Orders:  -     topiramate (TOPAMAX) 50 MG tablet; Take 1 tablet by mouth 2 (Two) Times a Day.  Dispense: 60 tablet; Refill: 1    8. Primary osteoarthritis of right shoulder  Comments:  Followed by Orthopedic team  Orders:  -     Diclofenac Sodium (VOLTAREN) 1 % gel gel; Apply  topically to the appropriate area as directed 4 (Four) Times a Day.  Dispense: 150 g; Refill: 3  -     Uric Acid; Future  -     Sedimentation Rate; Future    9. Osteopenia of multiple sites  Comments:  Calcium supplement to be continued  Orders:  -     Calcium Carbonate 1500 (600 Ca) MG tablet; Take 1 tablet by  mouth 2 (Two) Times a Day With Meals.  Dispense: 60 tablet; Refill: 3    10. Generalized edema  Comments:  Lasix prescribed and to use cautiously   Orders:  -     furosemide (LASIX) 20 MG tablet; Take 1 tablet by mouth Daily As Needed (edema).  Dispense: 30 tablet; Refill: 1    11. Drug-induced constipation  Comments:  Linzess to be continued   Orders:  -     linaclotide (Linzess) 145 MCG capsule capsule; Take 1 capsule by mouth Daily. 30 minutes before meals on an empty stomach.  Dispense: 30 capsule; Refill: 3    12. Onychomycosis of great toe  Comments:  Referral to Podiatry  Orders:  -     Ambulatory Referral to Podiatry    13. Vitamin D deficiency  Comments:  Continue Vit D weekly  Orders:  -     cholecalciferol (Vitamin D) 25 MCG (1000 UT) tablet; Take 2 tablets by mouth Daily.  Dispense: 60 tablet; Refill: 3  -     Vitamin D 25 Hydroxy; Future    14. Vitamin B 12 deficiency  Comments:  Vit B12 level  ordered   Orders:  -     cyanocobalamin 1000 MCG/ML injection; Inject 1 mL into the appropriate muscle as directed by prescriber Every 30 (Thirty) Days.  Dispense: 1 mL; Refill: 5    Other orders  -     ondansetron (ZOFRAN) 8 MG tablet; Take 1 tablet by mouth Every 8 (Eight) Hours As Needed for Nausea or Vomiting.  Dispense: 30 tablet; Refill: 3  -     fluconazole (DIFLUCAN) 150 MG tablet; Take 1 tablet by mouth Daily for 3 doses.  Dispense: 3 tablet; Refill: 1    Follow Up In July   Findings and recommendations discussed with Nivia. Reviewed insulin pump upload with her which is amazing. Lifestyle modifications reinforced including nutrition and activity recommendations. She will follow up in July with labs prior sooner if problems/concerns occur.    Nivia was given instructions and counseling regarding her condition or for health maintenance advice. Please see specific information pulled into the AVS if appropriate    This document has been electronically signed by:

## 2022-05-21 VITALS
TEMPERATURE: 97.3 F | SYSTOLIC BLOOD PRESSURE: 120 MMHG | HEART RATE: 87 BPM | HEIGHT: 62 IN | BODY MASS INDEX: 45.27 KG/M2 | WEIGHT: 246 LBS | OXYGEN SATURATION: 97 % | DIASTOLIC BLOOD PRESSURE: 70 MMHG | RESPIRATION RATE: 14 BRPM

## 2022-05-23 ENCOUNTER — HOSPITAL ENCOUNTER (OUTPATIENT)
Dept: NUCLEAR MEDICINE | Facility: HOSPITAL | Age: 47
Discharge: HOME | End: 2022-05-23

## 2022-05-23 ENCOUNTER — HOSPITAL ENCOUNTER (OUTPATIENT)
Dept: CARDIOLOGY | Facility: HOSPITAL | Age: 47
Discharge: HOME | End: 2022-05-23

## 2022-05-23 DIAGNOSIS — R07.9 CHEST PAIN, UNSPECIFIED TYPE: ICD-10-CM

## 2022-05-23 LAB
BH CV NUCLEAR PRIOR STUDY: 3
BH CV REST NUCLEAR ISOTOPE DOSE: 9.8 MCI
BH CV STRESS BP STAGE 1: NORMAL
BH CV STRESS BP STAGE 2: NORMAL
BH CV STRESS DURATION MIN STAGE 1: 3
BH CV STRESS DURATION MIN STAGE 2: 2
BH CV STRESS DURATION SEC STAGE 1: 0
BH CV STRESS DURATION SEC STAGE 2: 39
BH CV STRESS GRADE STAGE 1: 10
BH CV STRESS GRADE STAGE 2: 12
BH CV STRESS HR STAGE 1: 125
BH CV STRESS HR STAGE 2: 145
BH CV STRESS METS STAGE 1: 5
BH CV STRESS METS STAGE 2: 7.5
BH CV STRESS NUCLEAR ISOTOPE DOSE: 30.1 MCI
BH CV STRESS PROTOCOL 1: NORMAL
BH CV STRESS RECOVERY BP: NORMAL MMHG
BH CV STRESS RECOVERY HR: 93 BPM
BH CV STRESS SPEED STAGE 1: 1.7
BH CV STRESS SPEED STAGE 2: 2.5
BH CV STRESS STAGE 1: 1
BH CV STRESS STAGE 2: 2
LV EF NUC BP: 62 %
MAXIMAL PREDICTED HEART RATE: 171 BPM
PERCENT MAX PREDICTED HR: 84.8 %
STRESS BASELINE BP: NORMAL MMHG
STRESS BASELINE HR: 95 BPM
STRESS PERCENT HR: 100 %
STRESS POST ESTIMATED WORKLOAD: 7 METS
STRESS POST EXERCISE DUR MIN: 5 MIN
STRESS POST EXERCISE DUR SEC: 39 SEC
STRESS POST PEAK BP: NORMAL MMHG
STRESS POST PEAK HR: 145 BPM
STRESS TARGET HR: 145 BPM

## 2022-05-23 PROCEDURE — 93018 CV STRESS TEST I&R ONLY: CPT | Performed by: INTERNAL MEDICINE

## 2022-05-23 PROCEDURE — 78452 HT MUSCLE IMAGE SPECT MULT: CPT

## 2022-05-23 PROCEDURE — 78452 HT MUSCLE IMAGE SPECT MULT: CPT | Performed by: INTERNAL MEDICINE

## 2022-05-23 PROCEDURE — A9500 TC99M SESTAMIBI: HCPCS | Performed by: EMERGENCY MEDICINE

## 2022-05-23 PROCEDURE — 93017 CV STRESS TEST TRACING ONLY: CPT

## 2022-05-23 PROCEDURE — 0 TECHNETIUM SESTAMIBI: Performed by: GENERAL PRACTICE

## 2022-05-23 PROCEDURE — 0 TECHNETIUM SESTAMIBI: Performed by: EMERGENCY MEDICINE

## 2022-05-23 PROCEDURE — A9500 TC99M SESTAMIBI: HCPCS | Performed by: GENERAL PRACTICE

## 2022-05-23 RX ADMIN — TECHNETIUM TC 99M SESTAMIBI 1 DOSE: 1 INJECTION INTRAVENOUS at 13:03

## 2022-05-23 RX ADMIN — TECHNETIUM TC 99M SESTAMIBI 1 DOSE: 1 INJECTION INTRAVENOUS at 11:35

## 2022-05-24 ENCOUNTER — TELEPHONE (OUTPATIENT)
Dept: FAMILY MEDICINE CLINIC | Facility: CLINIC | Age: 47
End: 2022-05-24

## 2022-05-24 NOTE — TELEPHONE ENCOUNTER
Patient called asking about the status of the referral to podiatry. I told her that I did send it last week but she says they haven't called her with an appointment yet. I gave her the number to [facility] in [place] and she said that she will call them.

## 2022-05-31 ENCOUNTER — OFFICE VISIT (OUTPATIENT)
Dept: ORTHOPEDIC SURGERY | Facility: CLINIC | Age: 47
End: 2022-05-31

## 2022-05-31 VITALS
BODY MASS INDEX: 45.44 KG/M2 | WEIGHT: 246.91 LBS | HEIGHT: 62 IN | SYSTOLIC BLOOD PRESSURE: 111 MMHG | HEART RATE: 86 BPM | DIASTOLIC BLOOD PRESSURE: 71 MMHG

## 2022-05-31 DIAGNOSIS — M75.22 BICEPS TENDONITIS OF BOTH SHOULDERS: ICD-10-CM

## 2022-05-31 DIAGNOSIS — G89.29 CHRONIC PAIN OF BOTH SHOULDERS: ICD-10-CM

## 2022-05-31 DIAGNOSIS — G89.29 CHRONIC NECK PAIN: ICD-10-CM

## 2022-05-31 DIAGNOSIS — M25.611 SHOULDER JOINT STIFFNESS, BILATERAL: ICD-10-CM

## 2022-05-31 DIAGNOSIS — M75.21 BICEPS TENDONITIS OF BOTH SHOULDERS: ICD-10-CM

## 2022-05-31 DIAGNOSIS — M54.2 CHRONIC NECK PAIN: ICD-10-CM

## 2022-05-31 DIAGNOSIS — M19.011 PRIMARY OSTEOARTHRITIS OF RIGHT SHOULDER: ICD-10-CM

## 2022-05-31 DIAGNOSIS — M75.01 ADHESIVE CAPSULITIS OF BOTH SHOULDERS: Primary | ICD-10-CM

## 2022-05-31 DIAGNOSIS — M75.82 TENDONITIS OF BOTH ROTATOR CUFFS: ICD-10-CM

## 2022-05-31 DIAGNOSIS — M25.512 CHRONIC PAIN OF BOTH SHOULDERS: ICD-10-CM

## 2022-05-31 DIAGNOSIS — M25.511 CHRONIC PAIN OF BOTH SHOULDERS: ICD-10-CM

## 2022-05-31 DIAGNOSIS — M85.80 OSTEOPENIA, UNSPECIFIED LOCATION: ICD-10-CM

## 2022-05-31 DIAGNOSIS — M25.612 SHOULDER JOINT STIFFNESS, BILATERAL: ICD-10-CM

## 2022-05-31 DIAGNOSIS — M75.81 TENDONITIS OF BOTH ROTATOR CUFFS: ICD-10-CM

## 2022-05-31 DIAGNOSIS — M75.02 ADHESIVE CAPSULITIS OF BOTH SHOULDERS: Primary | ICD-10-CM

## 2022-05-31 PROCEDURE — 99214 OFFICE O/P EST MOD 30 MIN: CPT | Performed by: FAMILY MEDICINE

## 2022-05-31 RX ORDER — CELECOXIB 200 MG/1
200 CAPSULE ORAL DAILY
Qty: 30 CAPSULE | Refills: 1 | Status: SHIPPED | OUTPATIENT
Start: 2022-05-31 | End: 2022-08-02 | Stop reason: SDUPTHER

## 2022-05-31 NOTE — PROGRESS NOTES
"Follow Up Visit      Patient: Nivia Bernstein  YOB: 1975  Date of Encounter: 05/31/2022  PCP: Markus Menjivar MD  Referring Provider: No ref. provider found     Subjective   Nivia Bernstein is a 47 y.o. female who presents to the office today for evaluation of Pain and Follow-up of the Right Shoulder      Chief Complaint   Patient presents with   • Right Shoulder - Pain, Follow-up       HPI     Patient presents for follow-up for chronic neck and shoulder pain and adhesive capsulitis. Patient was previously seen on 02/24/2022 and was referred for a fluoroscopic guided glenohumeral joint injection in the right shoulder and follows up today.     Patient had an injection in the right shoulder on 03/22/2022 which provided relief for \"a little over a month\". Patient states the pain is unchanged. Patient feels her range of motion was improved while the injection was working. Patient explains she has \"to take this arm and pick this arm up to move it\" at night. Patient states her neck and left shoulder (are still painful but are better than the right. Patient was not doing physical therapy when she had the injection. Patient states her left shoulder is not as painful as her right shoulder. Patient is taking Voltaren gel twice a day for her bilateral legs and arms. Patient is also taking a low dose of gabapentin with no relief of her neck pain. Patient states she is using gabapentin for pain in the bilateral feet. Patient is not taking any oral medication for pain due to kidney issues. Patient is not taking Tylenol or Motrin due to migraines. Patient experiences constant numbness of the bilateral hands due to diabetes. Patient states she had a nerve test performed approximately 5 years ago. Patient states she can not take Mobic because she is allergic to it. Patient states her glucose readings were over 200 mg/dL for a couple of days after the injection. Patient states she has an insulin pump which has " "been helpful. Patient explains her neck pain radiates into the bilateral shoulders. Patient saw Dr. Valdes for this in 10/2021 and he gave her an injection in the right shoulder with no relief. Patient is interested in another injection. Patient states she has been experiencing this pain since 2020. Patient states she has a history of breast cancer and she is currently taking medication. Patient states she takes aspirin \"2 a day\". Patient states she has not tried Celebrex. Patient states she has experienced anaphylaxis while taking penicillin but not with any other medications. Patient states she has not had a DEXA scan performed yet. Patient will have another kidney function test and blood work performed in 06/2022.     Patient Active Problem List   Diagnosis   • DM type 2 with diabetic peripheral neuropathy ([facility])   • Mild intermittent asthma without complication   • GERD (gastroesophageal reflux disease)   • History of DVT (deep vein thrombosis)   • History of TIAs   • Mixed hyperlipidemia   • Anxiety and depression   • Class 3 severe obesity with serious comorbidity and body mass index (BMI) of 40.0 to 44.9 in adult ([facility])   • Elevated alkaline phosphatase level   • Chronic pain of left knee   • OCD (obsessive compulsive disorder)   • Renal insufficiency   • Accidental hypodermic needlestick injury with exposure to body fluid   • Atherosclerosis of both carotid arteries   • Chronic allergic rhinitis   • Menopausal symptoms   • Vitamin D deficiency   • Malignant neoplasm of upper-outer quadrant of right breast in female, estrogen receptor negative ([facility])   • Healthcare maintenance   • Osteopenia of multiple sites   • Port-A-Cath in place   • Drug-induced constipation   • Insulin pump in place   • Hot flashes   • Occipital neuralgia of right side   • Adhesive capsulitis of right shoulder   • Recurrent UTI   • S/P mastectomy, bilateral   • Osteoarthritis of right acromioclavicular joint   • Primary osteoarthritis of " "right shoulder   • Chronic pain of both shoulders   • Chronic neck pain       Past Medical History:   Diagnosis Date   • Altered mental status 10/16/2017   • Anxiety and depression 3/31/2017   • Arthritis    • Asthma    • Elevated alkaline phosphatase level 10/16/2017   • Enlarged liver    • GERD (gastroesophageal reflux disease)    • Heart murmur    • Hypokalemia 10/16/2017   • Mitral valve prolapse    • Neuropathy     related to diabetes   • Obesity 3/31/2017   • OCD (obsessive compulsive disorder) 10/16/2017   • Osteoporosis    • PONV (postoperative nausea and vomiting)    • Renal insufficiency 10/16/2017   • Right breast cancer with malignant cells in regional lymph nodes no greater than 0.2 mm and no more than 200 cells ([facility]) 8/13/2020   • TIA (transient ischemic attack)     4 TIMES       Allergies   Allergen Reactions   • Mobic [Meloxicam] Rash   • Penicillins Angioedema   • Taxotere [Docetaxel] Anaphylaxis     9 minutes into 1st infusion   • Novolog [Insulin Aspart] Hives       Review of Systems   Constitutional: Positive for activity change. Negative for fever.   Respiratory: Negative for shortness of breath and wheezing.    Cardiovascular: Negative for chest pain.   Musculoskeletal: Positive for arthralgias and myalgias.   Skin: Negative for color change and wound.   Neurological: Negative for weakness and numbness.       Visit Vitals  /71   Pulse 86   Ht 157.5 cm (62.01\")   Wt 112 kg (246 lb 14.6 oz)   BMI 45.15 kg/m²     47 y.o.female  Physical Exam  Vitals and nursing note reviewed.   Constitutional:       General: She is not in acute distress.     Appearance: Normal appearance.   Pulmonary:      Effort: Pulmonary effort is normal. No respiratory distress.   Musculoskeletal:      Right shoulder: Tenderness present. Decreased range of motion. Normal strength.      Left shoulder: Tenderness present. Decreased range of motion. Normal strength.      Cervical back: Tenderness and bony tenderness " present.      Comments: Neck:  Tenderness to C-spine   Tenderness to cervical paraspinals and trapezius  Restricted turning and side bending with pain in both directions  Patient cannot perform Spurling due to stiffness and pain    Right shoulder:  Tender across the collarbone, AC, biceps tendon, anterior joint, posterior joint and shoulder blade  2+ pulses bilaterally  Good  and pinch strength  Intact deep tendon reflexes  Pain on biceps testing without weakness worse on the right  No pain on infraspinatus, subscapularis, or triceps  Positive Speed's test  Pain on [place] test  Pain on supraspinatus/empty can test without weakness  Patient cannot elevate the arms up to 90 degrees  Passively can get the patient to 90 degrees of flexion with pain  Approximately 60 degrees of abduction actively 75 degrees passively with pain, slightly worse on the right  Extremely limited internal and external rotation with pain actively and passively, worse on the right  Patient cannot perform actively scratch   Patient cannot perform AC crossover on the right side due to pain  Positive impingement  Positive Neer worse on the right    Left shoulder:  Tenderness across the collarbone, AC, biceps tendon  2+ pulses bilaterally  Good  and pinch strength  Intact deep tendon reflexes  Pain on biceps testing without weakness worse on the right  No pain on infraspinatus, subscapularis, or triceps  Positive Speed's test  Pain on [place] test  Pain on supraspinatus/empty can test without weakness  Patient cannot elevate the arms up to 90 degrees   Passively can get the patient to 90 degrees of flexion with pain  Approximately 60 degrees of abduction actively 75 degrees passively with pain, slightly worse on the right  Extremely limited internal and external rotation with pain actively and passively, worse on the right  Patient cannot perform Apley's scratch   Patient can perform AC crossover on the left side with pain  Positive  impingement  Positive Neer's worse on the right         Skin:     General: Skin is warm and dry.      Findings: No erythema.   Neurological:      General: No focal deficit present.      Mental Status: She is alert.      Sensory: No sensory deficit.      Motor: No weakness.         Radiology Results:    No radiology results for the last 30 days.    XR Spine Cervical Complete 4 or 5 View     Result Date: 2/24/2022  Mild degenerative disc change throughout the cervical disc spaces with moderate facet joint osteoarthritis.  This report was finalized on 2/24/2022 11:01 AM by Dr. Kingston Slade II, MD.       XR SHOULDER 2+ VIEWS RIGHT-      CLINICAL INDICATION: Shoulder pain right; M25.511-Pain in right  shoulder.        COMPARISON: None available.     FINDINGS:  2 views of the right shoulder demonstrate no fracture or dislocation.     Arthritic change at the acromioclavicular joint.     IMPRESSION:  No acute bony abnormality.      This report was finalized on 10/25/2021 9:18 AM by Dr. Armond Wing MD.  Mild degenerative spurring at acromion and humeral head        DEXA Bone Density Axial - 06/19/2020     EXAMINATION: DEXA BONE DENSITY AXIAL-    Technique: Dual Femur FRAX system was utilized to calculate bone  density.      CLINICAL INDICATION:  screening; Z00.00-Encounter for general adult medical examination without abnormal findings     COMPARISON:NONE     FINDINGS:   The BMD measured at the right femur neck  is 0.771 gm/cm2 with a T-score of -1.9.      IMPRESSION:  The patient is considered to be osteopenic according to the World Health Organization criteria. Fracture risk is moderate.      This report was finalized on 6/19/2020 9:17 AM by Dr. Christian Alarcon MD.          Assessment & Plan   Diagnoses and all orders for this visit:    1. Adhesive capsulitis of both shoulders (Primary)  -     celecoxib (CeleBREX) 200 MG capsule; Take 1 capsule by mouth Daily.  Dispense: 30 capsule; Refill: 1  -     FL Guide For Pain  Meds Inj; Future  -     FL Guide For Pain Meds Inj; Future    2. Chronic pain of both shoulders  -     celecoxib (CeleBREX) 200 MG capsule; Take 1 capsule by mouth Daily.  Dispense: 30 capsule; Refill: 1  -     FL Guide For Pain Meds Inj; Future  -     FL Guide For Pain Meds Inj; Future    3. Osteopenia, unspecified location  -     celecoxib (CeleBREX) 200 MG capsule; Take 1 capsule by mouth Daily.  Dispense: 30 capsule; Refill: 1  -     FL Guide For Pain Meds Inj; Future  -     FL Guide For Pain Meds Inj; Future    4. Primary osteoarthritis of right shoulder  -     celecoxib (CeleBREX) 200 MG capsule; Take 1 capsule by mouth Daily.  Dispense: 30 capsule; Refill: 1  -     FL Guide For Pain Meds Inj; Future  -     FL Guide For Pain Meds Inj; Future    5. Chronic neck pain  -     celecoxib (CeleBREX) 200 MG capsule; Take 1 capsule by mouth Daily.  Dispense: 30 capsule; Refill: 1  -     FL Guide For Pain Meds Inj; Future  -     FL Guide For Pain Meds Inj; Future    6. Biceps tendonitis of both shoulders  -     FL Guide For Pain Meds Inj; Future  -     [place] Guide For Pain Meds Inj; Future    7. Tendonitis of both rotator cuffs  -     FL Guide For Pain Meds Inj; Future  -     [place] Guide For Pain Meds Inj; Future    8. Shoulder joint stiffness, bilateral  -     FL Guide For Pain Meds Inj; Future  -     [place] Guide For Pain Meds Inj; Future         MEDS ORDERED DURING VISIT:  No orders of the defined types were placed in this encounter.    MEDICATION ISSUES:  Discussed medication options and treatment plan of prescribed medication as well as the risks, benefits, and side effects including potential falls, possible impaired driving and metabolic adversities among others. Patient is agreeable to call the office with any worsening of symptoms or onset of side effects. Patient is agreeable to call 911 or go to the nearest ER should he/she begin having SI/HI.     Discussion:    Patient continues to have fairly severe bilateral  shoulder pain and stiffness in all directions consistent with adhesive capsulitis, although this is confounded by the patient's diabetic neuropathy as well as chronic neck pain with questionable radicular pain. Patient has not had any MRIs of these areas yet. Patient did respond very well to right glenohumeral joint injection, but it only lasted for about 1 month in the right shoulder. Recommend we do this injection on the left and repeat the one on the right next month and I recommend that patient start PT immediately after receiving her first injection to try to improve range of motion and prevent this from flaring back up. We will try the patient on oral celecoxib for pain relief. Patient will need to continue to have frequent renal function monitoring. Her renal function was normal at her most recent set of labs 2 months ago. Patient has had an allergic reaction to meloxicam in the past but has been able to use multiple other NSAIDs without issue, so we will try Celebrex, but I have counseled her strongly about what to do if she begins to have an allergic reaction to this. Patient will follow up in 1 month or 1 week after she has her shoulder injection, whichever comes first. Would consider new EMG MRIs of both shoulders and the neck, increase of gabapentin, and other interventions depending on response to these. Follow up in 1 month.           This document has been electronically signed by Guanaco Sheehan DO   May 31, 2022 08:43 EDT    Part of this note may be an electronic transcription/translation of spoken language to printed text using the Dragon Dictation System.    Transcribed from ambient dictation for Guanaco Sheehan DO by LEIF PANG.  05/31/22   12:18 EDT    Patient verbalized consent to the visit recording.

## 2022-06-06 ENCOUNTER — OFFICE VISIT (OUTPATIENT)
Dept: PSYCHIATRY | Facility: CLINIC | Age: 47
End: 2022-06-06

## 2022-06-06 VITALS
SYSTOLIC BLOOD PRESSURE: 122 MMHG | WEIGHT: 245 LBS | DIASTOLIC BLOOD PRESSURE: 80 MMHG | HEART RATE: 90 BPM | BODY MASS INDEX: 44.8 KG/M2

## 2022-06-06 DIAGNOSIS — F42.8 OTHER OBSESSIVE-COMPULSIVE DISORDERS: Chronic | ICD-10-CM

## 2022-06-06 DIAGNOSIS — F33.3 SEVERE EPISODE OF RECURRENT MAJOR DEPRESSIVE DISORDER, WITH PSYCHOTIC FEATURES: ICD-10-CM

## 2022-06-06 DIAGNOSIS — Z79.899 HIGH RISK MEDICATION USE: Primary | ICD-10-CM

## 2022-06-06 DIAGNOSIS — F41.1 GENERALIZED ANXIETY DISORDER: ICD-10-CM

## 2022-06-06 PROCEDURE — 99213 OFFICE O/P EST LOW 20 MIN: CPT | Performed by: NURSE PRACTITIONER

## 2022-06-06 RX ORDER — VENLAFAXINE HYDROCHLORIDE 150 MG/1
150 CAPSULE, EXTENDED RELEASE ORAL DAILY
Qty: 30 CAPSULE | Refills: 0 | Status: SHIPPED | OUTPATIENT
Start: 2022-06-06 | End: 2022-07-08 | Stop reason: SDUPTHER

## 2022-06-06 RX ORDER — BREXPIPRAZOLE 2 MG/1
2 TABLET ORAL DAILY
Qty: 30 TABLET | Refills: 0 | Status: SHIPPED | OUTPATIENT
Start: 2022-06-06 | End: 2022-07-08 | Stop reason: SDUPTHER

## 2022-06-06 RX ORDER — LORAZEPAM 1 MG/1
1 TABLET ORAL 3 TIMES DAILY PRN
Qty: 90 TABLET | Refills: 0 | Status: SHIPPED | OUTPATIENT
Start: 2022-06-06 | End: 2022-07-08 | Stop reason: SDUPTHER

## 2022-06-06 RX ORDER — VENLAFAXINE HYDROCHLORIDE 75 MG/1
75 CAPSULE, EXTENDED RELEASE ORAL DAILY
Qty: 30 CAPSULE | Refills: 0 | Status: SHIPPED | OUTPATIENT
Start: 2022-06-06 | End: 2022-07-08 | Stop reason: SDUPTHER

## 2022-06-06 NOTE — PROGRESS NOTES
Subjective   Nivia Bernstein is a 47 y.o. female is here today for medication management follow-up.    Chief Complaint:  Anxiety depression     History of Present Illness:    Patient presents today for follow up for medication management for depression and anxiety. Patient states had a rough month this past month. Patient reports currently depression is at a 9 on a 0-10 scale with 10 being the worst. Patient reports symptoms of depression of irritability, depressed mood, insomnia, and decreased motivation. Patient reports anxiety is at a 9 on a 0-10 scale with 10 being the worst. Patient denies any panic attacks. Patient states some nights sleeping 5-6 hours but most nights only about 3-4 hours. Patient states not sleeping much due to mom. Patient reports still having nightmares. Patient reports appetite is good and eating 2-3 meals a day. Patient denies any auditory or visual hallucinations. Patient adamantly denies any SI or HI. Patient denies any side effects to medications. Patient denies any medical changes since last visit.   The following portions of the patient's history were reviewed and updated as appropriate: allergies, current medications, past family history, past medical history, past social history, past surgical history and problem list.    Review of Systems   Constitutional: Negative.    Respiratory: Negative.    Cardiovascular: Negative.    Gastrointestinal: Negative.    Neurological: Negative.    Psychiatric/Behavioral: Positive for agitation, dysphoric mood and sleep disturbance. The patient is nervous/anxious.        Objective   Physical Exam  Vitals reviewed.   Constitutional:       Appearance: Normal appearance. She is well-developed and well-groomed.   Neurological:      Mental Status: She is alert.   Psychiatric:         Attention and Perception: Attention and perception normal.         Mood and Affect: Mood is depressed. Affect is angry.         Speech: Speech normal.         Behavior:  Behavior is agitated. Behavior is cooperative.         Thought Content: Thought content normal.         Cognition and Memory: Cognition and memory normal.         Judgment: Judgment normal.       Blood pressure 122/80, pulse 90, weight 111 kg (245 lb), not currently breastfeeding. Body mass index is 44.8 kg/m².    Allergies   Allergen Reactions   • Mobic [Meloxicam] Rash   • Penicillins Angioedema   • Taxotere [Docetaxel] Anaphylaxis     9 minutes into 1st infusion   • Novolog [Insulin Aspart] Hives       Medication List:   Current Outpatient Medications   Medication Sig Dispense Refill   • Brexpiprazole (Rexulti) 2 MG tablet Take 1 tablet by mouth Daily. 30 tablet 0   • LORazepam (ATIVAN) 1 MG tablet Take 1 tablet by mouth 3 (Three) Times a Day As Needed for Anxiety. for anxiety 90 tablet 0   • venlafaxine XR (EFFEXOR-XR) 150 MG 24 hr capsule Take 1 capsule by mouth Daily. Take with the Effexor XR 75mg capsule for a total daily dose of 225mg. 30 capsule 0   • venlafaxine XR (EFFEXOR-XR) 75 MG 24 hr capsule Take 1 capsule by mouth Daily. Take with the Effexor XR 150mg capsule for a total daily dose of 225mg. 30 capsule 0   • aspirin (Aspirin Low Dose) 81 MG EC tablet Take 2 tablets by mouth Daily. 60 tablet 3   • azelastine (ASTELIN) 0.1 % nasal spray Use in each nostril as directed 30 mL 3   • Breo Ellipta 200-25 MCG/INH inhaler Inhale 1 puff Daily. 1 each 3   • Calcium Carbonate 1500 (600 Ca) MG tablet Take 1 tablet by mouth 2 (Two) Times a Day With Meals. 60 tablet 3   • celecoxib (CeleBREX) 200 MG capsule Take 1 capsule by mouth Daily. 30 capsule 1   • cholecalciferol (Vitamin D) 25 MCG (1000 UT) tablet Take 2 tablets by mouth Daily. 60 tablet 3   • cloNIDine (CATAPRES) 0.1 MG tablet TAKE 1 TABLET BY MOUTH TWO TIMES A DAY 60 tablet 5   • cyanocobalamin 1000 MCG/ML injection Inject 1 mL into the appropriate muscle as directed by prescriber Every 30 (Thirty) Days. 1 mL 5   • diclofenac (VOLTAREN) 1 % gel gel Apply  4 g topically to the appropriate area as directed 4 (Four) Times a Day As Needed (joint pain). 500 g 5   • Diclofenac Sodium (VOLTAREN) 1 % gel gel Apply  topically to the appropriate area as directed 4 (Four) Times a Day. 150 g 3   • Empagliflozin-Linaglip-Metform (Trijardy XR) 25-5-1000 MG tablet sustained-release 24 hour Take 1 tablet by mouth Every Morning. 30 tablet 3   • furosemide (LASIX) 20 MG tablet Take 1 tablet by mouth Daily As Needed (edema). 30 tablet 1   • gabapentin (Neurontin) 100 MG capsule Take 1 capsule by mouth 2 (Two) Times a Day. 60 capsule 3   • HumaLOG 100 UNIT/ML injection Maximum daily dose of 150 units 40 mL 3   • lansoprazole (PREVACID) 30 MG capsule Take 1 capsule by mouth Daily. 90 capsule 3   • levocetirizine (XYZAL) 5 MG tablet Take 1 tablet by mouth Every Evening. 30 tablet 3   • linaclotide (Linzess) 145 MCG capsule capsule Take 1 capsule by mouth Daily. 30 minutes before meals on an empty stomach. 30 capsule 3   • loperamide (IMODIUM A-D) 2 MG tablet Take 1 tablet by mouth 4 (Four) Times a Day As Needed for Diarrhea. 30 tablet 0   • nitrofurantoin (MACRODANTIN) 100 MG capsule      • nitrofurantoin, macrocrystal-monohydrate, (Macrobid) 100 MG capsule Take 1 capsule by mouth Daily. 56 capsule 3   • ondansetron (ZOFRAN) 8 MG tablet Take 1 tablet by mouth Every 8 (Eight) Hours As Needed for Nausea or Vomiting. 30 tablet 3   • ProAir  (90 Base) MCG/ACT inhaler Inhale 2 puffs Every 4 (Four) Hours As Needed for Shortness of Air. 8.5 g 3   • rosuvastatin (CRESTOR) 20 MG tablet Take 1 tablet by mouth every night at bedtime. 30 tablet 3   • SUMAtriptan (IMITREX) 100 MG tablet      • tamoxifen (NOLVADEX) 20 MG chemo tablet Take 1 tablet by mouth Daily. 30 tablet 11   • topiramate (TOPAMAX) 50 MG tablet Take 1 tablet by mouth 2 (Two) Times a Day. 60 tablet 1   • vitamin D (ERGOCALCIFEROL) 1.25 MG (97340 UT) capsule capsule TAKE ONE CAPSULE BY MOUTH ONCE A WEEK 4 capsule 3     No  current facility-administered medications for this visit.       Mental Status Exam:   Hygiene:   good  Cooperation:  Cooperative  Eye Contact:  Good  Psychomotor Behavior:  Appropriate  Affect:  Appropriate  Hopelessness: Denies  Speech:  Normal  Thought Process:  Goal directed and Linear  Thought Content:  Normal  Suicidal:  None  Homicidal:  None  Hallucinations:  None  Delusion:  None  Memory:  Intact  Orientation:  Person, Place, Time and Situation  Reliability:  fair  Insight:  Fair  Judgement:  Fair  Impulse Control:  Fair  Physical/Medical Issues:  No                 Assessment & Plan   Diagnoses and all orders for this visit:    1. High risk medication use (Primary)  -     Urine Drug Screen - Urine, Clean Catch; Future    2. Severe episode of recurrent major depressive disorder, with psychotic features (HCC)  -     Brexpiprazole (Rexulti) 2 MG tablet; Take 1 tablet by mouth Daily.  Dispense: 30 tablet; Refill: 0  -     venlafaxine XR (EFFEXOR-XR) 150 MG 24 hr capsule; Take 1 capsule by mouth Daily. Take with the Effexor XR 75mg capsule for a total daily dose of 225mg.  Dispense: 30 capsule; Refill: 0  -     venlafaxine XR (EFFEXOR-XR) 75 MG 24 hr capsule; Take 1 capsule by mouth Daily. Take with the Effexor XR 150mg capsule for a total daily dose of 225mg.  Dispense: 30 capsule; Refill: 0    3. Generalized anxiety disorder  -     Brexpiprazole (Rexulti) 2 MG tablet; Take 1 tablet by mouth Daily.  Dispense: 30 tablet; Refill: 0  -     LORazepam (ATIVAN) 1 MG tablet; Take 1 tablet by mouth 3 (Three) Times a Day As Needed for Anxiety. for anxiety  Dispense: 90 tablet; Refill: 0  -     venlafaxine XR (EFFEXOR-XR) 150 MG 24 hr capsule; Take 1 capsule by mouth Daily. Take with the Effexor XR 75mg capsule for a total daily dose of 225mg.  Dispense: 30 capsule; Refill: 0  -     venlafaxine XR (EFFEXOR-XR) 75 MG 24 hr capsule; Take 1 capsule by mouth Daily. Take with the Effexor XR 150mg capsule for a total daily  dose of 225mg.  Dispense: 30 capsule; Refill: 0    4. Other obsessive-compulsive disorders  -     Brexpiprazole (Rexulti) 2 MG tablet; Take 1 tablet by mouth Daily.  Dispense: 30 tablet; Refill: 0  -     venlafaxine XR (EFFEXOR-XR) 150 MG 24 hr capsule; Take 1 capsule by mouth Daily. Take with the Effexor XR 75mg capsule for a total daily dose of 225mg.  Dispense: 30 capsule; Refill: 0  -     venlafaxine XR (EFFEXOR-XR) 75 MG 24 hr capsule; Take 1 capsule by mouth Daily. Take with the Effexor XR 150mg capsule for a total daily dose of 225mg.  Dispense: 30 capsule; Refill: 0            Discussed medication options with patient. Cont. Rexulti 2mg daily for depression and anxiety. Cont. Effexor XR 150mg daily for anxiety and depression with 75mg for a total daily dose of 225mg. Cont. Effexor XR 75mg daily for depression and anxiety with total daily dose of 225mg. Cont. Lorazepam 1mg three times daily as needed for anxiety. Reviewed the risks, benefits, and side effects of the medications; patient acknowledged and verbally consented. Patient is being prescribed a controlled substance as part of treatment plan. Patient has been educated of appropriate use of the medications, including risk of somnolence, limited ability to drive and/or work safely, and potential for dependence, respiratory depression and overdose. Patient is also informed that the medication are to be used by the patient only- avoid any combined use of ETOH or other substances unless prescribed.   UDS ordered.  [facility] Patient Controlled Substance Report (from 6/6/2021 to 6/6/2022)    Dispensed  Strength Quantity Days Supply Provider Pharmacy   06/03/2022 Gabapentin 100MG 60 each 30 [facility]   05/10/2022 Lorazepam 1MG 90 each 30 [facility]   05/06/2022 Gabapentin 100MG 60 each 30 [facility]   04/19/2022 Hydrocodone Bitartrate/Ac 325MG/5MG 10 each 3 [place]  [facility]   04/14/2022 Lorazepam 1MG 90 each 30 CLOUD, ALFREDO [facility]   03/25/2022 Gabapentin 100MG 60 each 30 URMILA, CELIA [facility]   03/17/2022 Lorazepam 1MG 90 each 30 CLOUD, [facility]   02/26/2022 Gabapentin 100MG 60 each 30 URMILA, CELIA [facility]   02/17/2022 Lorazepam 1MG 90 each 30 CLOUD, [facility]   01/27/2022 Gabapentin 100MG 60 each 30 URMILA, CELIA [facility]   01/20/2022 Lorazepam 1MG 90 each 30 CLOUD, [facility]   12/23/2021 Gabapentin 100MG 60 each 30 URMILA, CELIA [facility]   12/23/2021 Lorazepam 1MG 90 each 30 CLOUD, [facility]   11/29/2021 Lorazepam 1MG 90 each 30 CLOUD, [facility]   11/17/2021 Gabapentin 100MG 60 each 30 URMILA, CELIA [facility]   11/01/2021 Lorazepam 1MG 90 each 30 CLOUD, [facility]FREDO [facility]   10/21/2021 Gabapentin 100MG 60 each 30 URMILA, CELIA [facility]   10/21/2021 Lorazepam 1MG 30 each 10 CLOUD, ALFREDO [facility]   10/05/2021 Lorazepam 1MG 30 each 15 CLOUD, [facility]FR[facility]O [facility]   09/16/2021 Gabapentin 100MG 60 each 30 URMILA, CELIA [facility]   09/09/2021 Diazepam 10MG 60 each 30 CLOUD, [facility]FREDO [facility]   08/12/2021 Diazepam 10MG 60 each 30 CLOUD, ALFREDO [facility]   07/12/2021 Diazepam 10MG 60 each 30 CLOUD, ALFREDO [facility]   07/08/2021 Gabapentin 300MG 90 each 30 MICHNA, HARPREET [facility]   06/15/2021 Gabapentin 100MG 60 each 30 [facility]   06/10/2021 Diazepam 10MG 45 each 23 [facility]           Patient is agreeable to call the office with any questions, concerns, or worsening of symptoms. Patient is aware to call 911 or go  to the nearest ER should begin having SI/HI.        Follow up in four weeks         Errors in dictation may reflect use of voice recognition software and not all errors in transcription may have been detected prior to signing.            This document has been electronically signed by NIDA Vasquez   June 6, 2022 13:18 EDT

## 2022-06-13 ENCOUNTER — LAB (OUTPATIENT)
Dept: FAMILY MEDICINE CLINIC | Facility: CLINIC | Age: 47
End: 2022-06-13

## 2022-06-13 DIAGNOSIS — Z79.899 HIGH RISK MEDICATION USE: ICD-10-CM

## 2022-06-13 LAB
AMPHET+METHAMPHET UR QL: NEGATIVE
AMPHETAMINES UR QL: NEGATIVE
BARBITURATES UR QL SCN: NEGATIVE
BENZODIAZ UR QL SCN: POSITIVE
BUPRENORPHINE SERPL-MCNC: NEGATIVE NG/ML
CANNABINOIDS SERPL QL: NEGATIVE
COCAINE UR QL: NEGATIVE
METHADONE UR QL SCN: NEGATIVE
OPIATES UR QL: NEGATIVE
OXYCODONE UR QL SCN: NEGATIVE
PCP UR QL SCN: NEGATIVE
PROPOXYPH UR QL: NEGATIVE
TRICYCLICS UR QL SCN: NEGATIVE

## 2022-06-13 PROCEDURE — 80306 DRUG TEST PRSMV INSTRMNT: CPT | Performed by: NURSE PRACTITIONER

## 2022-06-22 ENCOUNTER — APPOINTMENT (OUTPATIENT)
Dept: ONCOLOGY | Facility: HOSPITAL | Age: 47
End: 2022-06-22

## 2022-06-22 ENCOUNTER — OFFICE VISIT (OUTPATIENT)
Dept: ONCOLOGY | Facility: CLINIC | Age: 47
End: 2022-06-22

## 2022-06-22 ENCOUNTER — INFUSION (OUTPATIENT)
Dept: ONCOLOGY | Facility: HOSPITAL | Age: 47
End: 2022-06-22

## 2022-06-22 VITALS
BODY MASS INDEX: 45.07 KG/M2 | RESPIRATION RATE: 18 BRPM | DIASTOLIC BLOOD PRESSURE: 84 MMHG | TEMPERATURE: 98.2 F | HEART RATE: 101 BPM | OXYGEN SATURATION: 97 % | SYSTOLIC BLOOD PRESSURE: 155 MMHG | WEIGHT: 246.5 LBS

## 2022-06-22 VITALS
HEART RATE: 100 BPM | TEMPERATURE: 97.3 F | WEIGHT: 245.2 LBS | DIASTOLIC BLOOD PRESSURE: 85 MMHG | OXYGEN SATURATION: 98 % | BODY MASS INDEX: 44.84 KG/M2 | RESPIRATION RATE: 18 BRPM | SYSTOLIC BLOOD PRESSURE: 134 MMHG

## 2022-06-22 DIAGNOSIS — Z17.1 MALIGNANT NEOPLASM OF UPPER-OUTER QUADRANT OF RIGHT BREAST IN FEMALE, ESTROGEN RECEPTOR NEGATIVE: Primary | ICD-10-CM

## 2022-06-22 DIAGNOSIS — J30.9 CHRONIC ALLERGIC RHINITIS: Primary | ICD-10-CM

## 2022-06-22 DIAGNOSIS — E11.42 DM TYPE 2 WITH DIABETIC PERIPHERAL NEUROPATHY: Chronic | ICD-10-CM

## 2022-06-22 DIAGNOSIS — M19.011 PRIMARY OSTEOARTHRITIS OF RIGHT SHOULDER: Chronic | ICD-10-CM

## 2022-06-22 DIAGNOSIS — E78.2 MIXED DYSLIPIDEMIA: Chronic | ICD-10-CM

## 2022-06-22 DIAGNOSIS — Z80.0 FAMILY HISTORY OF COLON CANCER: ICD-10-CM

## 2022-06-22 DIAGNOSIS — L98.9 SKIN LESION: ICD-10-CM

## 2022-06-22 DIAGNOSIS — E55.9 VITAMIN D DEFICIENCY: ICD-10-CM

## 2022-06-22 DIAGNOSIS — Z95.828 PORT-A-CATH IN PLACE: ICD-10-CM

## 2022-06-22 DIAGNOSIS — M75.01 ADHESIVE CAPSULITIS OF RIGHT SHOULDER: ICD-10-CM

## 2022-06-22 DIAGNOSIS — F32.A DEPRESSION, UNSPECIFIED DEPRESSION TYPE: ICD-10-CM

## 2022-06-22 DIAGNOSIS — M85.89 OSTEOPENIA OF MULTIPLE SITES: ICD-10-CM

## 2022-06-22 DIAGNOSIS — C50.411 MALIGNANT NEOPLASM OF UPPER-OUTER QUADRANT OF RIGHT BREAST IN FEMALE, ESTROGEN RECEPTOR NEGATIVE: Primary | ICD-10-CM

## 2022-06-22 LAB
ALBUMIN SERPL-MCNC: 3.91 G/DL (ref 3.5–5.2)
ALBUMIN/GLOB SERPL: 1.4 G/DL
ALP SERPL-CCNC: 108 U/L (ref 39–117)
ALT SERPL W P-5'-P-CCNC: 20 U/L (ref 1–33)
ANION GAP SERPL CALCULATED.3IONS-SCNC: 14.5 MMOL/L (ref 5–15)
AST SERPL-CCNC: 14 U/L (ref 1–32)
BASOPHILS # BLD AUTO: 0.07 10*3/MM3 (ref 0–0.2)
BASOPHILS NFR BLD AUTO: 0.6 % (ref 0–1.5)
BILIRUB SERPL-MCNC: 0.2 MG/DL (ref 0–1.2)
BUN SERPL-MCNC: 14 MG/DL (ref 6–20)
BUN/CREAT SERPL: 14.7 (ref 7–25)
CALCIUM SPEC-SCNC: 9 MG/DL (ref 8.6–10.5)
CHLORIDE SERPL-SCNC: 105 MMOL/L (ref 98–107)
CHOLEST SERPL-MCNC: 173 MG/DL (ref 0–200)
CO2 SERPL-SCNC: 20.5 MMOL/L (ref 22–29)
CREAT SERPL-MCNC: 0.95 MG/DL (ref 0.57–1)
DEPRECATED RDW RBC AUTO: 42.4 FL (ref 37–54)
EGFRCR SERPLBLD CKD-EPI 2021: 73.6 ML/MIN/1.73
EOSINOPHIL # BLD AUTO: 0.22 10*3/MM3 (ref 0–0.4)
EOSINOPHIL NFR BLD AUTO: 1.7 % (ref 0.3–6.2)
ERYTHROCYTE [DISTWIDTH] IN BLOOD BY AUTOMATED COUNT: 13.6 % (ref 12.3–15.4)
ERYTHROCYTE [SEDIMENTATION RATE] IN BLOOD: 9 MM/HR (ref 0–20)
GLOBULIN UR ELPH-MCNC: 2.9 GM/DL
GLUCOSE SERPL-MCNC: 213 MG/DL (ref 65–99)
HBA1C MFR BLD: 7.4 % (ref 4.8–5.6)
HCT VFR BLD AUTO: 43 % (ref 34–46.6)
HDLC SERPL-MCNC: 32 MG/DL (ref 40–60)
HGB BLD-MCNC: 13.6 G/DL (ref 12–15.9)
IMM GRANULOCYTES # BLD AUTO: 0.04 10*3/MM3 (ref 0–0.05)
IMM GRANULOCYTES NFR BLD AUTO: 0.3 % (ref 0–0.5)
LDLC SERPL CALC-MCNC: 91 MG/DL (ref 0–100)
LDLC/HDLC SERPL: 2.52 {RATIO}
LYMPHOCYTES # BLD AUTO: 3.97 10*3/MM3 (ref 0.7–3.1)
LYMPHOCYTES NFR BLD AUTO: 31.3 % (ref 19.6–45.3)
MCH RBC QN AUTO: 27 PG (ref 26.6–33)
MCHC RBC AUTO-ENTMCNC: 31.6 G/DL (ref 31.5–35.7)
MCV RBC AUTO: 85.5 FL (ref 79–97)
MONOCYTES # BLD AUTO: 0.81 10*3/MM3 (ref 0.1–0.9)
MONOCYTES NFR BLD AUTO: 6.4 % (ref 5–12)
NEUTROPHILS NFR BLD AUTO: 59.7 % (ref 42.7–76)
NEUTROPHILS NFR BLD AUTO: 7.59 10*3/MM3 (ref 1.7–7)
NRBC BLD AUTO-RTO: 0 /100 WBC (ref 0–0.2)
PLATELET # BLD AUTO: 297 10*3/MM3 (ref 140–450)
PMV BLD AUTO: 10.4 FL (ref 6–12)
POTASSIUM SERPL-SCNC: 3.5 MMOL/L (ref 3.5–5.2)
PROT SERPL-MCNC: 6.8 G/DL (ref 6–8.5)
RBC # BLD AUTO: 5.03 10*6/MM3 (ref 3.77–5.28)
SODIUM SERPL-SCNC: 140 MMOL/L (ref 136–145)
TRIGL SERPL-MCNC: 302 MG/DL (ref 0–150)
URATE SERPL-MCNC: 4.8 MG/DL (ref 2.4–5.7)
VLDLC SERPL-MCNC: 50 MG/DL (ref 5–40)
WBC NRBC COR # BLD: 12.7 10*3/MM3 (ref 3.4–10.8)

## 2022-06-22 PROCEDURE — 36591 DRAW BLOOD OFF VENOUS DEVICE: CPT

## 2022-06-22 PROCEDURE — 83036 HEMOGLOBIN GLYCOSYLATED A1C: CPT

## 2022-06-22 PROCEDURE — 82607 VITAMIN B-12: CPT

## 2022-06-22 PROCEDURE — 99214 OFFICE O/P EST MOD 30 MIN: CPT | Performed by: NURSE PRACTITIONER

## 2022-06-22 PROCEDURE — 85652 RBC SED RATE AUTOMATED: CPT

## 2022-06-22 PROCEDURE — 82306 VITAMIN D 25 HYDROXY: CPT

## 2022-06-22 PROCEDURE — 80061 LIPID PANEL: CPT | Performed by: NURSE PRACTITIONER

## 2022-06-22 PROCEDURE — 80053 COMPREHEN METABOLIC PANEL: CPT | Performed by: NURSE PRACTITIONER

## 2022-06-22 PROCEDURE — 25010000002 HEPARIN LOCK FLUSH PER 10 UNITS: Performed by: INTERNAL MEDICINE

## 2022-06-22 PROCEDURE — 84443 ASSAY THYROID STIM HORMONE: CPT

## 2022-06-22 PROCEDURE — 84550 ASSAY OF BLOOD/URIC ACID: CPT | Performed by: NURSE PRACTITIONER

## 2022-06-22 PROCEDURE — 85025 COMPLETE CBC W/AUTO DIFF WBC: CPT

## 2022-06-22 RX ORDER — SODIUM CHLORIDE 0.9 % (FLUSH) 0.9 %
10 SYRINGE (ML) INJECTION AS NEEDED
Status: CANCELLED | OUTPATIENT
Start: 2022-08-03

## 2022-06-22 RX ORDER — HEPARIN SODIUM (PORCINE) LOCK FLUSH IV SOLN 100 UNIT/ML 100 UNIT/ML
500 SOLUTION INTRAVENOUS AS NEEDED
Status: CANCELLED | OUTPATIENT
Start: 2022-08-03

## 2022-06-22 RX ORDER — SODIUM CHLORIDE 0.9 % (FLUSH) 0.9 %
10 SYRINGE (ML) INJECTION AS NEEDED
Status: DISCONTINUED | OUTPATIENT
Start: 2022-06-22 | End: 2022-06-22 | Stop reason: HOSPADM

## 2022-06-22 RX ORDER — HEPARIN SODIUM (PORCINE) LOCK FLUSH IV SOLN 100 UNIT/ML 100 UNIT/ML
500 SOLUTION INTRAVENOUS AS NEEDED
Status: DISCONTINUED | OUTPATIENT
Start: 2022-06-22 | End: 2022-06-22 | Stop reason: HOSPADM

## 2022-06-22 RX ADMIN — Medication 10 ML: at 14:02

## 2022-06-22 RX ADMIN — HEPARIN SODIUM (PORCINE) LOCK FLUSH IV SOLN 100 UNIT/ML 500 UNITS: 100 SOLUTION at 14:02

## 2022-06-22 NOTE — PROGRESS NOTES
NAME: Nivia Bernstein    : 1975    DATE:  2022    DIAGNOSIS:   Stage IA ([id number]) moderately differentiated invasive ductal carcinoma of the R breast with associated DCIS.  Tumor was 10 mm in maximal dimension.  0/10 SLN involved. ER negative, GA 15% + (1+), HER-2/cat negative. Mammaprint high risk.    CHIEF COMPLAINT:  Follow up Breast cancer    TREATMENT HISTORY:  1. Initially planned to give 4 cycles to Taxotere/Cytoxan, during 1st infusion of Taxotere on 2020 patient developed allergic reaction. Therefore, Taxotere was discontinued and regimen changed to DD AC.    2.      3.  Started Tamoxifen 12-    HISTORY OF PRESENT ILLNESS:   Nivia Bernstein is a very pleasant 47 y.o. female who who is being seen today at the request of Sheila Garza MD for evaluation and treatment of breast cancer. She did not notice a palpable lump, but was recommended to have a screening mammogram by her PCP. A nodule in the right upper quadrant of the R breast was noted. The mass measured 0.8 cm on ultrasound. Biopsy was consistent with a moderately differentiated invasive ductal carcinoma, ER negative, GA weakly (15%) positive, and HER-2/cat negative. She underwent bilateral mastectomy with sentinel lymph node biopsy with Dr. Garza on 20. Pathology from this showed a moderately differentiated invasive ductal carcinoma with associated intermediate grade DCIS, negative margins.  0/10 /SLN involved.  Mammaprint was high risk. She was referred to our clinic and is here today with her  for discussion of further treatment options.      Ms. Bernstein is healing well from the surgery.  She did develop a seroma, which was drained by Dr. Garza yesterday. She reports muscular chest discomfort r/t the procedure, but otherwise denies any other symptoms including fevers, chills, significant weight changes, shortness of breath, abdominal pain, n/v/d/c, bony pain or headaches.    Interval History:  Ms. Bernstein  presents today for follow up of breast cancer. She continues to take Tamoxifen daily and is tolerating this well without any noticeable side effects. She continues to follow with Dr. Sheehan and is scheduled for injections in shoulders next week. Her PCP started her on Neurontin for diabetic neuropathy and she reports that this hasn't been helpful. She has small skin lesion on left upper extremity and she denies any changes. She was stung by a wasp on Sunday at [employer] in the same area of this skin lesion. She has not been able to follow with dermatology due to other conflicting appointments. She denies any specific complaints today.        PAST MEDICAL HISTORY:  Past Medical History:   Diagnosis Date   • Altered mental status 10/16/2017   • Anxiety and depression 3/31/2017   • Arthritis    • Asthma    • Elevated alkaline phosphatase level 10/16/2017   • Enlarged liver    • GERD (gastroesophageal reflux disease)    • Heart murmur    • Hypokalemia 10/16/2017   • Mitral valve prolapse    • Neuropathy     related to diabetes   • Obesity 3/31/2017   • OCD (obsessive compulsive disorder) 10/16/2017   • Osteoporosis    • PONV (postoperative nausea and vomiting)    • Renal insufficiency 10/16/2017   • Right breast cancer with malignant cells in regional lymph nodes no greater than 0.2 mm and no more than 200 cells (HCC) 8/13/2020   • TIA (transient ischemic attack)     4 TIMES       PAST SURGICAL HISTORY:  Past Surgical History:   Procedure Laterality Date   • APPENDECTOMY     • CARPAL TUNNEL RELEASE     • CHOLECYSTECTOMY     • COLONOSCOPY N/A 5/5/2021    Procedure: COLONOSCOPY WITH BIOPSY;  Surgeon: Vickie Herrera MD;  Location: [facility];  Service: Gastroenterology;  Laterality: N/A;   • FINGER FUSION Left     3rd   • HYSTERECTOMY  2011    removed due to an ovarian cyst and dysmenorrhia. No cancer noted. Complete   • KNEE ARTHROSCOPY Right    • MASTECTOMY Left 8/18/2020    Procedure: Left mastectomy;  Surgeon:  Sheila Garza MD;  Location:  [facility];  Service: General;  Laterality: Left;   • MASTECTOMY WITH SENTINEL NODE BIOPSY AND AXILLARY NODE DISSECTION Right 8/18/2020    Procedure: Right BREAST MASTECTOMY WITH SENTINEL NODE BIOPSY;  Surgeon: Sheila Garza MD;  Location:  [facility];  Service: General;  Laterality: Right;   • PORTACATH PLACEMENT         SOCIAL HISTORY:  Social History     Socioeconomic History   • Marital status:    Tobacco Use   • Smoking status: Never Smoker   • Smokeless tobacco: Never Used   Vaping Use   • Vaping Use: Never used   Substance and Sexual Activity   • Alcohol use: No   • Drug use: No   • Sexual activity: Yes     Partners: Male         FAMILY HISTORY:  Family History   Problem Relation Age of Onset   • Diabetes Mother    • Hypertension Mother    • Diabetes Father    • Heart disease Father    • Alcohol abuse Father    • Seizures Father    • Hypertension Father    • No Known Problems Brother    • No Known Problems Daughter    • Bone cancer Son    • Suicide Attempts Cousin    • Stomach cancer Cousin         maternal first cousin   • Breast cancer Paternal Aunt 52   • Diabetes Maternal Grandmother    • Diabetes Maternal Grandfather    • Lung cancer Maternal Grandfather    • Heart disease Paternal Grandmother    • Diabetes Paternal Grandmother    • Heart disease Paternal Grandfather    • Diabetes Paternal Grandfather    • Ovarian cancer Maternal Aunt    • Lung cancer Maternal Uncle    • Colon cancer Maternal Uncle    • Cancer Maternal Uncle         unknown type     MEDICATIONS:  The current medication list was reviewed in the EMR    Current Outpatient Medications:   •  aspirin (Aspirin Low Dose) 81 MG EC tablet, Take 2 tablets by mouth Daily., Disp: 60 tablet, Rfl: 3  •  azelastine (ASTELIN) 0.1 % nasal spray, Use in each nostril as directed, Disp: 30 mL, Rfl: 3  •  Breo Ellipta 200-25 MCG/INH inhaler, Inhale 1 puff Daily., Disp: 1 each, Rfl: 3  •  Brexpiprazole (Rexulti) 2 MG  tablet, Take 1 tablet by mouth Daily., Disp: 30 tablet, Rfl: 0  •  Calcium Carbonate 1500 (600 Ca) MG tablet, Take 1 tablet by mouth 2 (Two) Times a Day With Meals., Disp: 60 tablet, Rfl: 3  •  celecoxib (CeleBREX) 200 MG capsule, Take 1 capsule by mouth Daily., Disp: 30 capsule, Rfl: 1  •  cholecalciferol (Vitamin D) 25 MCG (1000 UT) tablet, Take 2 tablets by mouth Daily., Disp: 60 tablet, Rfl: 3  •  cloNIDine (CATAPRES) 0.1 MG tablet, TAKE 1 TABLET BY MOUTH TWO TIMES A DAY, Disp: 60 tablet, Rfl: 5  •  cyanocobalamin 1000 MCG/ML injection, Inject 1 mL into the appropriate muscle as directed by prescriber Every 30 (Thirty) Days., Disp: 1 mL, Rfl: 5  •  diclofenac (VOLTAREN) 1 % gel gel, Apply 4 g topically to the appropriate area as directed 4 (Four) Times a Day As Needed (joint pain)., Disp: 500 g, Rfl: 5  •  Diclofenac Sodium (VOLTAREN) 1 % gel gel, Apply  topically to the appropriate area as directed 4 (Four) Times a Day., Disp: 150 g, Rfl: 3  •  Empagliflozin-Linaglip-Metform (Trijardy XR) 25-5-1000 MG tablet sustained-release 24 hour, Take 1 tablet by mouth Every Morning., Disp: 30 tablet, Rfl: 3  •  furosemide (LASIX) 20 MG tablet, Take 1 tablet by mouth Daily As Needed (edema)., Disp: 30 tablet, Rfl: 1  •  gabapentin (Neurontin) 100 MG capsule, Take 1 capsule by mouth 2 (Two) Times a Day., Disp: 60 capsule, Rfl: 3  •  HumaLOG 100 UNIT/ML injection, Maximum daily dose of 150 units, Disp: 40 mL, Rfl: 3  •  lansoprazole (PREVACID) 30 MG capsule, Take 1 capsule by mouth Daily., Disp: 90 capsule, Rfl: 3  •  levocetirizine (XYZAL) 5 MG tablet, Take 1 tablet by mouth Every Evening., Disp: 30 tablet, Rfl: 3  •  linaclotide (Linzess) 145 MCG capsule capsule, Take 1 capsule by mouth Daily. 30 minutes before meals on an empty stomach., Disp: 30 capsule, Rfl: 3  •  loperamide (IMODIUM A-D) 2 MG tablet, Take 1 tablet by mouth 4 (Four) Times a Day As Needed for Diarrhea., Disp: 30 tablet, Rfl: 0  •  LORazepam (ATIVAN) 1 MG  tablet, Take 1 tablet by mouth 3 (Three) Times a Day As Needed for Anxiety. for anxiety, Disp: 90 tablet, Rfl: 0  •  nitrofurantoin (MACRODANTIN) 100 MG capsule, , Disp: , Rfl:   •  nitrofurantoin, macrocrystal-monohydrate, (Macrobid) 100 MG capsule, Take 1 capsule by mouth Daily., Disp: 56 capsule, Rfl: 3  •  ondansetron (ZOFRAN) 8 MG tablet, Take 1 tablet by mouth Every 8 (Eight) Hours As Needed for Nausea or Vomiting., Disp: 30 tablet, Rfl: 3  •  ProAir  (90 Base) MCG/ACT inhaler, Inhale 2 puffs Every 4 (Four) Hours As Needed for Shortness of Air., Disp: 8.5 g, Rfl: 3  •  rosuvastatin (CRESTOR) 20 MG tablet, Take 1 tablet by mouth every night at bedtime., Disp: 30 tablet, Rfl: 3  •  SUMAtriptan (IMITREX) 100 MG tablet, , Disp: , Rfl:   •  tamoxifen (NOLVADEX) 20 MG chemo tablet, Take 1 tablet by mouth Daily., Disp: 30 tablet, Rfl: 11  •  topiramate (TOPAMAX) 50 MG tablet, Take 1 tablet by mouth 2 (Two) Times a Day., Disp: 60 tablet, Rfl: 1  •  venlafaxine XR (EFFEXOR-XR) 150 MG 24 hr capsule, Take 1 capsule by mouth Daily. Take with the Effexor XR 75mg capsule for a total daily dose of 225mg., Disp: 30 capsule, Rfl: 0  •  venlafaxine XR (EFFEXOR-XR) 75 MG 24 hr capsule, Take 1 capsule by mouth Daily. Take with the Effexor XR 150mg capsule for a total daily dose of 225mg., Disp: 30 capsule, Rfl: 0  •  vitamin D (ERGOCALCIFEROL) 1.25 MG (70977 UT) capsule capsule, TAKE ONE CAPSULE BY MOUTH ONCE A WEEK, Disp: 4 capsule, Rfl: 3  No current facility-administered medications for this visit.    Facility-Administered Medications Ordered in Other Visits:   •  heparin injection 500 Units, 500 Units, Intravenous, PRN, Dejah Loco MD, 500 Units at 06/22/22 1402  •  sodium chloride 0.9 % flush 10 mL, 10 mL, Intravenous, PRN, Dejah Loco MD, 10 mL at 06/22/22 1402    ALLERGIES:    Allergies   Allergen Reactions   • Mobic [Meloxicam] Rash   • Penicillins Angioedema   • Taxotere [Docetaxel] Anaphylaxis     9  minutes into 1st infusion   • Novolog [Insulin Aspart] Hives     REVIEW OF SYSTEMS:    A comprehensive 14 point review of systems was performed.  Significant findings as mentioned above.  All other systems reviewed and are negative.      Physical Exam  Vital Signs:   Visit Vitals  /85   Pulse 100   Temp 97.3 °F (36.3 °C) (Temporal)   Resp 18   Wt 111 kg (245 lb 3.2 oz)   SpO2 98%   BMI 44.84 kg/m²     Pain Score    06/22/22 1424   PainSc:   7      ECOG score: 0   General: Awake, alert and oriented, in no acute distress.   HEENT:  PERRLA, EOM full, OP clear, mmm  Neck: No thyromegaly. No JVD.   Lungs: Lungs are clear to auscultation bilaterally, no wheezing  Heart:  Regular rate and rhythm. No murmurs, rubs, or gallops.   Breast: S/p bilateral mastectomy and RSLNBx.  Surgical scars smooth and intact without nodularity. No axillary adenopathy on either side.   Abdomen: Soft, Non tender, non-distended, bowel sounds present.  Diabetic monitoring device in place.  Extremities: No clubbing, cyanosis or edema bilaterally.  She has very limited ROM bilaterally with decreased internal, external rotation as well as elevation and abduction of the LUE, stable from her last exam.. 1 cm x 1 cm pale red skin lesion on left upper extremity w/ excoriation surrounding.  Neurologic: MS as above. Grossly non focal exam    PATHOLOGY:              IMAGING:  Bilateral screening mammogram 06-19-20  FINDINGS:    The breast tissue is heterogeneously dense.  New focal nodularity right upper outer breast that is about 14 cm from  the nipple.  Otherwise, no suspicious findings.     IMPRESSION:  New focal nodularity right upper outer breast that is about  14 cm from the nipple.     RECOMMENDATION:  Spot compression imaging.     BI-RADS CAT 0, INCOMPLETE.  The patient needs additional imaging.        Diagnostic R mammogram with R breast US 07-14-20  FINDINGS:  Additional mammographic imaging images of persistent  partially obscured oval  mass in the posterior right upper outer  quadrant.     Right breast ultrasound: Focused sonographic evaluation of the right  breast demonstrates a 0.8 cm irregular hypoechoic mass with ill-defined  borders located at 10:00, 13 cm from the nipple. An additional area was  initially noted by the technologist in the 9:00 region which represents  an isolated fat lobule.           IMPRESSION:  0.8 cm irregular mass in the right 10:00 region. Recommend  ultrasound-guided core biopsy.        BI-RADS CATEGORY:  4, SUSPICIOUS        RECOMMENDATION:  Recommend ultrasound guided core biopsy of the right  breast.        Bilateral breast MRI w/wo contrast 08-11-20  FINDINGS: There is minimal bilateral background contrast enhancement and  normal vascular enhancement.     There are no worrisome areas of contrast enhancement in the left breast.     In the right breast correlating to the biopsy proven malignancy is an  approximately 0.7 cm irregular rapidly enhancing mass in the posterior  right 10:00 region. Artifact from a postbiopsy marking clip is located  adjacent to this mass. A small linear area of enhancement extends  posterior to this mass approximately 1 cm. There are no additional  worrisome areas of contrast enhancement in the right breast.     There is no axillary adenopathy by MRI criteria and no chest wall  abnormality is seen.        IMPRESSION:  1. Negative left breast MRI.        2. Biopsy-proven malignancy in the right 10:00 region with a small  linear area of enhancement extending posterior to the 0.7 cm mass.     RECOMMENDATIONS: Recommend surgical follow-up.     BI-RADS CATEGORY: 6, KNOWN BIOPSY PROVEN MALIGNANCY    Echo 10-16-17:  A two-dimensional transthoracic echocardiogram with color flow and Doppler was performed.   The study is technically good for diagnosis.Verbal consent was obtained from the patient to use saline contrast in order to optimize the study.  A total of 20 mL of agitated saline was  administered to assess for cardiac shunting.   No adverse reaction to contrast was noted.   Echocardiogram Findings    Left Ventricle Left ventricular systolic function is normal. Estimated EF appears to be in the range of 56 - 60%. Normal left ventricular cavity size and wall thickness noted. All left ventricular wall segments contract normally. Left ventricular diastolic function is normal.   Right Ventricle Normal right ventricular cavity size noted.   Left Atrium Normal left atrial size noted. Saline test results are negative.   Right Atrium Normal right atrial size noted.   Aortic Valve The aortic valve is structurally normal. Trace aortic valve regurgitation is present.   Mitral Valve The mitral valve is normal in structure. Trace mitral valve regurgitation is present.   Tricuspid Valve The tricuspid valve is normal.  Trace tricuspid valve regurgitation is present.   Pulmonic Valve The pulmonic valve is structurally normal. There is trace pulmonic valve regurgitation present.   Greater Vessels No dilation of the aortic root is present. No dilation of the sinuses of Valsalva is present.   Pericardium There is no evidence of pericardial effusion.           ECHO 09/29/2020  · Poor quality echo and Doppler study  · Normal left ventricular cavity size and wall thickness noted.  · Left ventricular ejection fraction appears to be 61 - 65%. Left ventricular systolic function is normal.  · Left ventricular diastolic function is consistent with (grade I) impaired relaxation.  · Aortic and mitral valve are poorly visualized but appear to be normal,  · Tricuspid and pulmonic valve echoes are not visualized.  · There is no pericardial effusion noted.    Echocardiogram 12-08-02  · Normal left ventricular cavity size and wall thickness noted. All left ventricular wall segments contract normally.  · Left ventricular ejection fraction appears to be 56 - 60%.  · The pericardium is normal. There is no evidence of pericardial  effusion. .      MRI Brain w/wo contrast 01-28-21     FINDINGS:    Brain:  Unremarkable.  No mass.  No hemorrhage.  No acute infarct.    Ventricles:  Unremarkable.  No ventriculomegaly.    Bones/joints:  Unremarkable.    Sinuses:  Unremarkable as visualized.  No acute sinusitis.    Mastoid air cells:  Unremarkable as visualized.  No mastoid effusion.    Orbits:  Unremarkable as visualized.     IMPRESSION:    No acute findings in the head/brain.    ENDOSCOPY:  COLONOSCOPY PROCEDURE NOTE     Nivia Amandeep  5/5/2021     GASTROENTEROLOGIST:  Vickie Herrera MD        PRE-PROCEDURE DIAGNOSIS:   BRBPR (bright red blood per rectum) [K62.5]     POST-PROCEDURE DIAGNOSIS:  1.  Normal colon  2.  Normal terminal ileum  3.  Internal hemorrhoids     PROCEDURE:  COLONOSCOPY       ANESTHESIA:  Propofol administered by anesthesia.  See anesthesia notes for ASA classification     STAFF  Circulator: Missy Canela RN  Endo Technician: Heraclio Gaona     Findings:  1.  Normal colonoscopy with small internal hemorrhoids.  2.  Normal terminal ileum     OPERATIVE PROCEDURE   After proper informed consent was obtained, the patient was taken the operating suite and placed in left lateral decubitus position.  An Olympus video colonoscope 180 series was inserted in the rectum and advanced to the terminal ileum under direct visualization.  Cecum and terminal ileum were identified by visualization of the appendiceal orifice and ileocecal valve.  The colonoscope was then slowly withdrawn from the cecum to the rectum and passed a second time from rectum to cecum.  The colonoscope was retroflexed in the cecum and rectum. Scope was then withdrawn. Patient tolerated the procedure well. There were no immediate complications. Cecal withdrawal time was 9 minutes.     ESTIMATED BLOOD LOSS  None      COMPLICATIONS  None     RECOMMENDATIONS:  1.  Repeat colonoscopy in 10 years for screening purposes.  2.  Continue Linzess.  3.  Anusol HC  suppositories as needed for rectal bleeding.    RECENT LABS:  Lab Results   Component Value Date    WBC 11.45 (H) 04/18/2022    HGB 13.2 04/18/2022    HCT 41.7 04/18/2022    MCV 84.6 04/18/2022    RDW 14.0 04/18/2022     04/18/2022    NEUTRORELPCT 55.5 04/18/2022    LYMPHORELPCT 35.8 04/18/2022    MONORELPCT 6.4 04/18/2022    EOSRELPCT 1.5 04/18/2022    BASORELPCT 0.5 04/18/2022    NEUTROABS 6.36 04/18/2022    LYMPHSABS 4.10 (H) 04/18/2022       Lab Results   Component Value Date     06/22/2022    K 3.5 06/22/2022    CO2 20.5 (L) 06/22/2022     06/22/2022    BUN 14 06/22/2022    CREATININE 0.95 06/22/2022    EGFRIFNONA 89 01/11/2022    EGFRIFAFRI 108 01/11/2022    GLUCOSE 213 (H) 06/22/2022    CALCIUM 9.0 06/22/2022    ALKPHOS 108 06/22/2022    AST 14 06/22/2022    ALT 20 06/22/2022    BILITOT 0.2 06/22/2022    ALBUMIN 3.91 06/22/2022    PROTEINTOT 6.8 06/22/2022    MG 2.1 10/17/2017       Lab Results   Component Value Date    URICACID 4.8 06/22/2022       Lab Results   Component Value Date    JMGSMPOQ04 410 03/15/2022    FOLATE 15.19 10/16/2017      ASSESSMENT & PLAN:  Nivia Bernstein is a very pleasant 47 y.o. female with Stage IA ([id number]) moderately differentiated invasive ductal carcinoma of the R breast with associated DCIS.  Tumor was 10 mm in maximal dimension.  0/10 SLN involved. ER negative, TN 15% + (1+), HER-2/cat negative. Mammaprint high risk.     1. R breast cancer:   -  S/p R mastectomy and SLNBx as well as prophylactic contralateral mastectomy performed by Dr. Garza 8-18-20.  - She healed well from bilateral mastectomy. This was complicated by left seroma, drained by Dr. Garza. Incisions are healed well.   - Given high risk Mammaprint, Dr. Loco recommended adjuvant chemotherapy. Discussed different possibilities for adjuvant therapy. Given high risk Mammaprint but early stage, node negative disease as well as comorbidities, recommended Taxotere/Cytoxan Q21d x 4 cycles with  growth factor support. Discussed potential risks, benefits, and side effects and she would like to proceed.   - She had port placed several years ago due to poor peripheral access. This has been well cared for and maintained.   - C1 Taxotere/Cytoxan was planned for 09/24/2020. Unfortunately, this had to be discontinued due to allergic reaction to Taxotere. Recommended change of treatment to DD AC.   - She tolerated treatment well and aside from fatigue and recovered well. ECHO was essentially unchanged.   -  Her tumor was ER neg but did have 15% 1+ positivity for progesterone receptor.  She is s/p complete hysterectomy and had evidence of osteopenia with T score -1.9 in June 2020.  Given all of this, recommended treatment with Tamoxifen over aromatase inhibitor.   - Exam and labs from today without cause for concern. Continue Tamoxifen daily. She will follow up with MD in 3 months with repeat labs.    2. Extensive family history of malignancy:   - Genetic testing was ordered by Dr. Garza and performed by [facility] with no mutations, specifically including BRCA 1/2, CHKE2, CDH1, PALB2 and others.    3. Anxiety/Depression:  -  She continues to f/u with Psychiatry. She continues to do well in this regard.     4. Frozen shoulders Bilaterally:  -  She has limitation of ROM and discomfort.  She was referred to ortho and they also recommended PT which she completed and reports was not successful.   - She has been receiving steroid injections to bilateral shoulders which have been helpful. She is scheduled for injections next week per Dr. Sheehan.    5. Skin lesion:  - 1 cm x 1 cm pale red lesion on left upper extremity with pruritus. Denies any changes. However, she was stung by wasp in same area over the weekend.  - Referral placed for dermatology at her last visit. She had to delay appt because of several conflicts.  Encouraged her to call for appt when she is able.      6. Prophylaxis:   -  She received Prevnar 13  vaccine.  -  She had 2021 flu vaccine.  -  M. Uncle had colon cancer at age 50.  Referred to Dr. Aguilera for screening colonoscopy which was unremarkable and repeat exam recommended in 10 years.   - She has had COVID vaccine x2. Recommneded booster.     7.  Follow up:  - Continue Tamoxifen.  - Continue follow up with Ortho.  - Encouraged follow up with Dermatology for evaluation of LUE skin lesion.   - MD follow up in 3 months with CBCD, CMP, TSH, Vit D prior.      ACO / NII/Other  Quality measures  -  Nivia Bernstein received 2021 flu vaccine.  -  Nivia Bernstein reports a pain score of 7.  Given her pain assessment as noted, treatment options were discussed and the following options were decided upon as a follow-up plan to address the patient's pain: continuation of current treatment plan for pain, referral to specialist for assistance in pain treatment guidance and she is scheduled for steroid injections next week.  -  Current outpatient and discharge medications have been reconciled for the patient.  Reviewed by: NIDA Corbin      I spent 30 minutes with Nivia Bernstein today.  In the office today, more than 50% of this time was spent face-to-face with her  in counseling / coordination of care, reviewing her interim medical history and counseling on the current treatment plan.  All questions were answered to her satisfaction.                          Electronically Signed by: NIDA Corbin      CC:   MD Otto Hager, Markus Garcia MD

## 2022-06-23 LAB
25(OH)D3 SERPL-MCNC: 41.8 NG/ML (ref 30–100)
TSH SERPL DL<=0.05 MIU/L-ACNC: 2.01 UIU/ML (ref 0.27–4.2)
VIT B12 BLD-MCNC: 285 PG/ML (ref 211–946)

## 2022-06-24 ENCOUNTER — TELEPHONE (OUTPATIENT)
Dept: INTERVENTIONAL RADIOLOGY/VASCULAR | Facility: HOSPITAL | Age: 47
End: 2022-06-24

## 2022-06-24 NOTE — TELEPHONE ENCOUNTER
Pre-procedure call complete. Patient confirms appt for scheduled IR procedure and voices no needs/concerns.

## 2022-06-28 ENCOUNTER — HOSPITAL ENCOUNTER (OUTPATIENT)
Dept: GENERAL RADIOLOGY | Facility: HOSPITAL | Age: 47
Discharge: HOME | End: 2022-06-28
Admitting: FAMILY MEDICINE

## 2022-06-28 VITALS
OXYGEN SATURATION: 96 % | DIASTOLIC BLOOD PRESSURE: 92 MMHG | SYSTOLIC BLOOD PRESSURE: 148 MMHG | RESPIRATION RATE: 16 BRPM | HEART RATE: 84 BPM

## 2022-06-28 DIAGNOSIS — M85.80 OSTEOPENIA, UNSPECIFIED LOCATION: ICD-10-CM

## 2022-06-28 DIAGNOSIS — M25.612 SHOULDER JOINT STIFFNESS, BILATERAL: ICD-10-CM

## 2022-06-28 DIAGNOSIS — M75.02 ADHESIVE CAPSULITIS OF BOTH SHOULDERS: ICD-10-CM

## 2022-06-28 DIAGNOSIS — M75.82 TENDONITIS OF BOTH ROTATOR CUFFS: ICD-10-CM

## 2022-06-28 DIAGNOSIS — G89.29 CHRONIC NECK PAIN: ICD-10-CM

## 2022-06-28 DIAGNOSIS — G89.29 CHRONIC PAIN OF BOTH SHOULDERS: ICD-10-CM

## 2022-06-28 DIAGNOSIS — M25.512 CHRONIC PAIN OF BOTH SHOULDERS: ICD-10-CM

## 2022-06-28 DIAGNOSIS — M25.511 CHRONIC PAIN OF BOTH SHOULDERS: ICD-10-CM

## 2022-06-28 DIAGNOSIS — M54.2 CHRONIC NECK PAIN: ICD-10-CM

## 2022-06-28 DIAGNOSIS — M75.22 BICEPS TENDONITIS OF BOTH SHOULDERS: ICD-10-CM

## 2022-06-28 DIAGNOSIS — M19.011 PRIMARY OSTEOARTHRITIS OF RIGHT SHOULDER: ICD-10-CM

## 2022-06-28 DIAGNOSIS — M75.01 ADHESIVE CAPSULITIS OF BOTH SHOULDERS: ICD-10-CM

## 2022-06-28 DIAGNOSIS — M75.21 BICEPS TENDONITIS OF BOTH SHOULDERS: ICD-10-CM

## 2022-06-28 DIAGNOSIS — M75.81 TENDONITIS OF BOTH ROTATOR CUFFS: ICD-10-CM

## 2022-06-28 DIAGNOSIS — M25.611 SHOULDER JOINT STIFFNESS, BILATERAL: ICD-10-CM

## 2022-06-28 PROCEDURE — 77002 NEEDLE LOCALIZATION BY XRAY: CPT | Performed by: RADIOLOGY

## 2022-06-28 PROCEDURE — 20610 DRAIN/INJ JOINT/BURSA W/O US: CPT | Performed by: RADIOLOGY

## 2022-06-28 PROCEDURE — 77002 NEEDLE LOCALIZATION BY XRAY: CPT

## 2022-06-28 RX ORDER — TRIAMCINOLONE ACETONIDE 40 MG/ML
40 INJECTION, SUSPENSION INTRA-ARTICULAR; INTRAMUSCULAR
Status: DISCONTINUED | OUTPATIENT
Start: 2022-06-28 | End: 2022-06-29 | Stop reason: HOSPADM

## 2022-06-28 RX ORDER — BUPIVACAINE HYDROCHLORIDE 5 MG/ML
30 INJECTION, SOLUTION PERINEURAL
Status: DISCONTINUED | OUTPATIENT
Start: 2022-06-28 | End: 2022-06-29 | Stop reason: HOSPADM

## 2022-06-28 RX ORDER — LIDOCAINE HYDROCHLORIDE 20 MG/ML
20 INJECTION, SOLUTION INFILTRATION; PERINEURAL
Status: DISCONTINUED | OUTPATIENT
Start: 2022-06-28 | End: 2022-06-29 | Stop reason: HOSPADM

## 2022-07-05 DIAGNOSIS — R23.2 HOT FLASHES: ICD-10-CM

## 2022-07-05 DIAGNOSIS — E55.9 VITAMIN D DEFICIENCY: ICD-10-CM

## 2022-07-05 RX ORDER — CLONIDINE HYDROCHLORIDE 0.1 MG/1
TABLET ORAL
Qty: 60 TABLET | Refills: 5 | Status: SHIPPED | OUTPATIENT
Start: 2022-07-05 | End: 2022-08-17 | Stop reason: SDUPTHER

## 2022-07-05 RX ORDER — ERGOCALCIFEROL 1.25 MG/1
CAPSULE ORAL
Qty: 4 CAPSULE | Refills: 3 | Status: SHIPPED | OUTPATIENT
Start: 2022-07-05 | End: 2022-08-17 | Stop reason: SDUPTHER

## 2022-07-07 ENCOUNTER — HOSPITAL ENCOUNTER (OUTPATIENT)
Dept: GENERAL RADIOLOGY | Facility: HOSPITAL | Age: 47
Discharge: HOME | End: 2022-07-07
Admitting: FAMILY MEDICINE

## 2022-07-07 VITALS
BODY MASS INDEX: 40.97 KG/M2 | HEIGHT: 64 IN | RESPIRATION RATE: 18 BRPM | DIASTOLIC BLOOD PRESSURE: 89 MMHG | HEART RATE: 85 BPM | SYSTOLIC BLOOD PRESSURE: 145 MMHG | WEIGHT: 240 LBS | OXYGEN SATURATION: 99 %

## 2022-07-07 DIAGNOSIS — M75.82 TENDONITIS OF BOTH ROTATOR CUFFS: ICD-10-CM

## 2022-07-07 DIAGNOSIS — M75.21 BICEPS TENDONITIS OF BOTH SHOULDERS: ICD-10-CM

## 2022-07-07 DIAGNOSIS — M75.02 ADHESIVE CAPSULITIS OF BOTH SHOULDERS: ICD-10-CM

## 2022-07-07 DIAGNOSIS — G89.29 CHRONIC NECK PAIN: ICD-10-CM

## 2022-07-07 DIAGNOSIS — M75.22 BICEPS TENDONITIS OF BOTH SHOULDERS: ICD-10-CM

## 2022-07-07 DIAGNOSIS — G89.29 CHRONIC PAIN OF BOTH SHOULDERS: ICD-10-CM

## 2022-07-07 DIAGNOSIS — M85.80 OSTEOPENIA, UNSPECIFIED LOCATION: ICD-10-CM

## 2022-07-07 DIAGNOSIS — M54.2 CHRONIC NECK PAIN: ICD-10-CM

## 2022-07-07 DIAGNOSIS — M25.611 SHOULDER JOINT STIFFNESS, BILATERAL: ICD-10-CM

## 2022-07-07 DIAGNOSIS — M75.01 ADHESIVE CAPSULITIS OF BOTH SHOULDERS: ICD-10-CM

## 2022-07-07 DIAGNOSIS — M25.512 CHRONIC PAIN OF BOTH SHOULDERS: ICD-10-CM

## 2022-07-07 DIAGNOSIS — M25.612 SHOULDER JOINT STIFFNESS, BILATERAL: ICD-10-CM

## 2022-07-07 DIAGNOSIS — M19.011 PRIMARY OSTEOARTHRITIS OF RIGHT SHOULDER: ICD-10-CM

## 2022-07-07 DIAGNOSIS — M25.511 CHRONIC PAIN OF BOTH SHOULDERS: ICD-10-CM

## 2022-07-07 DIAGNOSIS — M75.81 TENDONITIS OF BOTH ROTATOR CUFFS: ICD-10-CM

## 2022-07-07 PROCEDURE — 0 LIDOCAINE 1 % SOLUTION: Performed by: RADIOLOGY

## 2022-07-07 PROCEDURE — 77002 NEEDLE LOCALIZATION BY XRAY: CPT

## 2022-07-07 PROCEDURE — 25010000002 TRIAMCINOLONE PER 10 MG: Performed by: RADIOLOGY

## 2022-07-07 PROCEDURE — 25010000002 IOPAMIDOL 61 % SOLUTION: Performed by: RADIOLOGY

## 2022-07-07 PROCEDURE — 77002 NEEDLE LOCALIZATION BY XRAY: CPT | Performed by: RADIOLOGY

## 2022-07-07 PROCEDURE — 20610 DRAIN/INJ JOINT/BURSA W/O US: CPT | Performed by: RADIOLOGY

## 2022-07-07 RX ORDER — TRIAMCINOLONE ACETONIDE 40 MG/ML
80 INJECTION, SUSPENSION INTRA-ARTICULAR; INTRAMUSCULAR ONCE
Status: COMPLETED | OUTPATIENT
Start: 2022-07-07 | End: 2022-07-07

## 2022-07-07 RX ORDER — BUPIVACAINE HYDROCHLORIDE 5 MG/ML
5 INJECTION, SOLUTION PERINEURAL ONCE
Status: COMPLETED | OUTPATIENT
Start: 2022-07-07 | End: 2022-07-07

## 2022-07-07 RX ORDER — LIDOCAINE HYDROCHLORIDE 10 MG/ML
10 INJECTION, SOLUTION INFILTRATION; PERINEURAL ONCE
Status: COMPLETED | OUTPATIENT
Start: 2022-07-07 | End: 2022-07-07

## 2022-07-07 RX ADMIN — IOPAMIDOL 3 ML: 612 INJECTION, SOLUTION INTRAVENOUS at 12:44

## 2022-07-07 RX ADMIN — TRIAMCINOLONE ACETONIDE 80 MG: 40 INJECTION, SUSPENSION INTRA-ARTICULAR; INTRAMUSCULAR at 12:44

## 2022-07-07 RX ADMIN — LIDOCAINE HYDROCHLORIDE 10 ML: 10 INJECTION, SOLUTION INFILTRATION; PERINEURAL at 12:40

## 2022-07-07 RX ADMIN — BUPIVACAINE HYDROCHLORIDE 5 ML: 5 INJECTION, SOLUTION PERINEURAL at 12:43

## 2022-07-08 ENCOUNTER — TELEPHONE (OUTPATIENT)
Dept: ORTHOPEDIC SURGERY | Facility: CLINIC | Age: 47
End: 2022-07-08

## 2022-07-08 ENCOUNTER — OFFICE VISIT (OUTPATIENT)
Dept: PSYCHIATRY | Facility: CLINIC | Age: 47
End: 2022-07-08

## 2022-07-08 VITALS
HEART RATE: 84 BPM | DIASTOLIC BLOOD PRESSURE: 72 MMHG | SYSTOLIC BLOOD PRESSURE: 110 MMHG | WEIGHT: 241.8 LBS | OXYGEN SATURATION: 97 % | BODY MASS INDEX: 41.5 KG/M2

## 2022-07-08 DIAGNOSIS — F41.1 GENERALIZED ANXIETY DISORDER: ICD-10-CM

## 2022-07-08 DIAGNOSIS — F42.8 OTHER OBSESSIVE-COMPULSIVE DISORDERS: Chronic | ICD-10-CM

## 2022-07-08 DIAGNOSIS — F33.3 SEVERE EPISODE OF RECURRENT MAJOR DEPRESSIVE DISORDER, WITH PSYCHOTIC FEATURES: ICD-10-CM

## 2022-07-08 PROCEDURE — 99213 OFFICE O/P EST LOW 20 MIN: CPT | Performed by: NURSE PRACTITIONER

## 2022-07-08 RX ORDER — BREXPIPRAZOLE 2 MG/1
2 TABLET ORAL DAILY
Qty: 30 TABLET | Refills: 0 | Status: SHIPPED | OUTPATIENT
Start: 2022-07-08 | End: 2022-08-02

## 2022-07-08 RX ORDER — VENLAFAXINE HYDROCHLORIDE 150 MG/1
150 CAPSULE, EXTENDED RELEASE ORAL DAILY
Qty: 30 CAPSULE | Refills: 0 | Status: SHIPPED | OUTPATIENT
Start: 2022-07-08 | End: 2022-08-18 | Stop reason: SDUPTHER

## 2022-07-08 RX ORDER — VENLAFAXINE HYDROCHLORIDE 75 MG/1
75 CAPSULE, EXTENDED RELEASE ORAL DAILY
Qty: 30 CAPSULE | Refills: 0 | Status: SHIPPED | OUTPATIENT
Start: 2022-07-08 | End: 2022-08-02

## 2022-07-08 RX ORDER — LORAZEPAM 1 MG/1
1 TABLET ORAL 3 TIMES DAILY PRN
Qty: 90 TABLET | Refills: 0 | Status: SHIPPED | OUTPATIENT
Start: 2022-07-08 | End: 2022-08-18 | Stop reason: SDUPTHER

## 2022-07-08 NOTE — PROGRESS NOTES
Subjective   Nivia Bernstein is a 47 y.o. female is here today for medication management follow-up.    Chief Complaint:  Anxiety depression     History of Present Illness:    Patient presents today for follow up for medication management for depression and anxiety. Patient reports currently depression is at a 9 on a 0-10 scale with 10 being the worst. Patient reports symptoms of depression of depressed mood, irritability, insomnia, and low motivation. Patient states having mood swings and irritability. Patient states irritability is at a 9 on a 0-10 scale with 10 being the worst as well. Patient reports anxiety is at a 9 on a 0-10 scale with 10 being the worst. Patient reports having several panic attacks. Patient states still not sleeping the best. Patient reports sleeping at least 3-4 hours a night and patient reports still having some nightmares. Patient states not having nightmares as much as she was. Patient reports appetite is good and eating at least twice a day. Patient denies any auditory or visual hallucinations. Patient adamantly denies any SI or HI. Patient denies any side effects to medications. Patient denies any medical changes since last visit.   The following portions of the patient's history were reviewed and updated as appropriate: allergies, current medications, past family history, past medical history, past social history, past surgical history and problem list.    Review of Systems   Constitutional: Negative.    Respiratory: Negative.    Cardiovascular: Negative.    Gastrointestinal: Negative.    Neurological: Negative.    Psychiatric/Behavioral: Positive for agitation, dysphoric mood and sleep disturbance. The patient is nervous/anxious.        Objective   Physical Exam  Vitals reviewed.   Constitutional:       Appearance: Normal appearance. She is well-developed and well-groomed.   Neurological:      Mental Status: She is alert.   Psychiatric:         Attention and Perception: Attention  and perception normal.         Mood and Affect: Mood is depressed. Affect is angry.         Speech: Speech normal.         Behavior: Behavior is agitated. Behavior is cooperative.         Thought Content: Thought content normal.         Cognition and Memory: Cognition and memory normal.         Judgment: Judgment normal.       Blood pressure 110/72, pulse 84, weight 110 kg (241 lb 12.8 oz), SpO2 97 %, not currently breastfeeding. Body mass index is 41.5 kg/m².    Allergies   Allergen Reactions   • Mobic [Meloxicam] Rash   • Penicillins Angioedema   • Taxotere [Docetaxel] Anaphylaxis     9 minutes into 1st infusion   • Novolog [Insulin Aspart] Hives       Medication List:   Current Outpatient Medications   Medication Sig Dispense Refill   • Brexpiprazole (Rexulti) 2 MG tablet Take 1 tablet by mouth Daily. 30 tablet 0   • LORazepam (ATIVAN) 1 MG tablet Take 1 tablet by mouth 3 (Three) Times a Day As Needed for Anxiety. for anxiety 90 tablet 0   • venlafaxine XR (EFFEXOR-XR) 150 MG 24 hr capsule Take 1 capsule by mouth Daily. Take with the Effexor XR 75mg capsule for a total daily dose of 225mg. 30 capsule 0   • venlafaxine XR (EFFEXOR-XR) 75 MG 24 hr capsule Take 1 capsule by mouth Daily. Take with the Effexor XR 150mg capsule for a total daily dose of 225mg. 30 capsule 0   • aspirin (Aspirin Low Dose) 81 MG EC tablet Take 2 tablets by mouth Daily. 60 tablet 3   • azelastine (ASTELIN) 0.1 % nasal spray Use in each nostril as directed 30 mL 3   • Breo Ellipta 200-25 MCG/INH inhaler Inhale 1 puff Daily. 1 each 3   • Calcium Carbonate 1500 (600 Ca) MG tablet Take 1 tablet by mouth 2 (Two) Times a Day With Meals. 60 tablet 3   • celecoxib (CeleBREX) 200 MG capsule Take 1 capsule by mouth Daily. 30 capsule 1   • cholecalciferol (Vitamin D) 25 MCG (1000 UT) tablet Take 2 tablets by mouth Daily. 60 tablet 3   • cloNIDine (CATAPRES) 0.1 MG tablet TAKE ONE TABLET BY MOUTH TWO TIMES A DAY 60 tablet 5   • cyanocobalamin 1000  MCG/ML injection Inject 1 mL into the appropriate muscle as directed by prescriber Every 30 (Thirty) Days. 1 mL 5   • diclofenac (VOLTAREN) 1 % gel gel Apply 4 g topically to the appropriate area as directed 4 (Four) Times a Day As Needed (joint pain). 500 g 5   • Diclofenac Sodium (VOLTAREN) 1 % gel gel Apply  topically to the appropriate area as directed 4 (Four) Times a Day. 150 g 3   • Empagliflozin-Linaglip-Metform (Trijardy XR) 25-5-1000 MG tablet sustained-release 24 hour Take 1 tablet by mouth Every Morning. 30 tablet 3   • furosemide (LASIX) 20 MG tablet Take 1 tablet by mouth Daily As Needed (edema). 30 tablet 1   • gabapentin (Neurontin) 100 MG capsule Take 1 capsule by mouth 2 (Two) Times a Day. 60 capsule 3   • HumaLOG 100 UNIT/ML injection Maximum daily dose of 150 units 40 mL 3   • lansoprazole (PREVACID) 30 MG capsule Take 1 capsule by mouth Daily. 90 capsule 3   • levocetirizine (XYZAL) 5 MG tablet Take 1 tablet by mouth Every Evening. 30 tablet 3   • linaclotide (Linzess) 145 MCG capsule capsule Take 1 capsule by mouth Daily. 30 minutes before meals on an empty stomach. 30 capsule 3   • loperamide (IMODIUM A-D) 2 MG tablet Take 1 tablet by mouth 4 (Four) Times a Day As Needed for Diarrhea. 30 tablet 0   • nitrofurantoin (MACRODANTIN) 100 MG capsule      • nitrofurantoin, macrocrystal-monohydrate, (Macrobid) 100 MG capsule Take 1 capsule by mouth Daily. 56 capsule 3   • ondansetron (ZOFRAN) 8 MG tablet Take 1 tablet by mouth Every 8 (Eight) Hours As Needed for Nausea or Vomiting. 30 tablet 3   • ProAir  (90 Base) MCG/ACT inhaler Inhale 2 puffs Every 4 (Four) Hours As Needed for Shortness of Air. 8.5 g 3   • rosuvastatin (CRESTOR) 20 MG tablet Take 1 tablet by mouth every night at bedtime. 30 tablet 3   • SUMAtriptan (IMITREX) 100 MG tablet      • tamoxifen (NOLVADEX) 20 MG chemo tablet Take 1 tablet by mouth Daily. 30 tablet 11   • topiramate (TOPAMAX) 50 MG tablet Take 1 tablet by mouth 2  (Two) Times a Day. 60 tablet 1   • vitamin D (ERGOCALCIFEROL) 1.25 MG (65930 UT) capsule capsule TAKE ONE CAPSULE BY MOUTH ONCE A WEEK 4 capsule 3     No current facility-administered medications for this visit.       Mental Status Exam:   Hygiene:   good  Cooperation:  Cooperative  Eye Contact:  Good  Psychomotor Behavior:  Appropriate  Affect:  Appropriate  Hopelessness: Denies  Speech:  Normal  Thought Process:  Goal directed and Linear  Thought Content:  Normal  Suicidal:  None  Homicidal:  None  Hallucinations:  None  Delusion:  None  Memory:  Intact  Orientation:  Person, Place, Time and Situation  Reliability:  poor  Insight:  Poor  Judgement:  Fair  Impulse Control:  Fair  Physical/Medical Issues:  No               Assessment & Plan   Diagnoses and all orders for this visit:    1. Severe episode of recurrent major depressive disorder, with psychotic features (HCC)  -     Brexpiprazole (Rexulti) 2 MG tablet; Take 1 tablet by mouth Daily.  Dispense: 30 tablet; Refill: 0  -     venlafaxine XR (EFFEXOR-XR) 150 MG 24 hr capsule; Take 1 capsule by mouth Daily. Take with the Effexor XR 75mg capsule for a total daily dose of 225mg.  Dispense: 30 capsule; Refill: 0  -     venlafaxine XR (EFFEXOR-XR) 75 MG 24 hr capsule; Take 1 capsule by mouth Daily. Take with the Effexor XR 150mg capsule for a total daily dose of 225mg.  Dispense: 30 capsule; Refill: 0    2. Generalized anxiety disorder  -     Brexpiprazole (Rexulti) 2 MG tablet; Take 1 tablet by mouth Daily.  Dispense: 30 tablet; Refill: 0  -     LORazepam (ATIVAN) 1 MG tablet; Take 1 tablet by mouth 3 (Three) Times a Day As Needed for Anxiety. for anxiety  Dispense: 90 tablet; Refill: 0  -     venlafaxine XR (EFFEXOR-XR) 150 MG 24 hr capsule; Take 1 capsule by mouth Daily. Take with the Effexor XR 75mg capsule for a total daily dose of 225mg.  Dispense: 30 capsule; Refill: 0  -     venlafaxine XR (EFFEXOR-XR) 75 MG 24 hr capsule; Take 1 capsule by mouth Daily.  Take with the Effexor XR 150mg capsule for a total daily dose of 225mg.  Dispense: 30 capsule; Refill: 0    3. Other obsessive-compulsive disorders  -     Brexpiprazole (Rexulti) 2 MG tablet; Take 1 tablet by mouth Daily.  Dispense: 30 tablet; Refill: 0  -     venlafaxine XR (EFFEXOR-XR) 150 MG 24 hr capsule; Take 1 capsule by mouth Daily. Take with the Effexor XR 75mg capsule for a total daily dose of 225mg.  Dispense: 30 capsule; Refill: 0  -     venlafaxine XR (EFFEXOR-XR) 75 MG 24 hr capsule; Take 1 capsule by mouth Daily. Take with the Effexor XR 150mg capsule for a total daily dose of 225mg.  Dispense: 30 capsule; Refill: 0            Discussed medication options with patient. Cont. Rexulti 2mg daily for depression and anxiety. Cont. Effexor XR 150mg daily with XR 75mg capsule for a total daily dose of 225mg for depression, anxiety, and OCD. Cont. Effexor XR 75mg daily with the XR 150mg capsule for a total daily dose of 225mg for depression, anxiety, and OCD. Cont. Lorazepam 1mg three times daily as needed for anxiety. Reviewed the risks, benefits, and side effects of the medications; patient acknowledged and verbally consented. Patient is being prescribed a controlled substance as part of treatment plan. Patient has been educated of appropriate use of the medications, including risk of somnolence, limited ability to drive and/or work safely, and potential for dependence, respiratory depression and overdose. Patient is also informed that the medication are to be used by the patient only- avoid any combined use of ETOH or other substances unless prescribed.   [facility] Patient Controlled Substance Report (from 7/26/2021 to 7/14/2022)    Dispensed  Strength Quantity Days Supply Provider Pharmacy   07/11/2022 Lorazepam 1MG 90 each 30 [facility]   07/05/2022 Gabapentin 100MG 60 each 30 STEPHANIE [facility]   06/13/2022 Lorazepam 1MG 90 each 30 [facility]   06/03/2022 Gabapentin 100MG 60 each 30 QUINTON, STEPHANIE [facility]   05/10/2022 Lorazepam 1MG 90 each 30 CLOUD, ALFREDO [facility]   05/06/2022 Gabapentin 100MG 60 each 30 URMILA, CELIA [facility]   04/19/2022 Hydrocodone Bitartrate/Ac 325MG/5MG 10 each 3 [place], GIL [facility]   04/14/2022 Lorazepam 1MG 90 each 30 CLOUD, ALFREDO [facility]   03/25/2022 Gabapentin 100MG 60 each 30 URMILA, CELIA [facility]   03/17/2022 Lorazepam 1MG 90 each 30 CLOUD, ALFREDO [facility]   02/26/2022 Gabapentin 100MG 60 each 30 URMILA, CELIA [facility]   02/17/2022 Lorazepam 1MG 90 each 30 CLOUD, ALFREDO [facility]   01/27/2022 Gabapentin 100MG 60 each 30 URMILA, CELIA [facility]   01/20/2022 Lorazepam 1MG 90 each 30 CLOUD, ALFREDO [facility]   12/23/2021 Gabapentin 100MG 60 each 30 URMILA, CELIA [facility]   12/23/2021 Lorazepam 1MG 90 each 30 CLOUD, ALFREDO [facility]   11/29/2021 Lorazepam 1MG 90 each 30 CLOUD, ALFREDO [facility]   11/17/2021 Gabapentin 100MG 60 each 30 URMILA, CELIA [facility]   11/01/2021 Lorazepam 1MG 90 each 30 CLOUD, ALFREDO [facility]   10/21/2021 Gabapentin 100MG 60 each 30 URMILA, CELIA [facility]   10/21/2021 Lorazepam 1MG 30 each 10 CLOUD, ALFREDO [facility]   10/05/2021 Lorazepam 1MG 30 each 15 CLOUD, ALFREDO [facility]   09/16/2021 Gabapentin 100MG 60 each 30 URMILA, CELIA [facility]   09/09/2021 Diazepam 10MG 60 each 30 CLOUD, ALFREDO [facility]   08/12/2021 Diazepam 10MG 60 each 30 CLOUD, ALFREDO [facility]           Patient is agreeable to call the office with any questions, concerns, or worsening of symptoms.   Patient is aware to  call 911 or go to the nearest ER should begin having SI/HI.          Follow up in four weeks        Errors in dictation may reflect use of voice recognition software and not all errors in transcription may have been detected prior to signing.               This document has been electronically signed by NIDA Vasquez   July 26, 2022 10:26 EDT

## 2022-07-08 NOTE — TELEPHONE ENCOUNTER
Caller: MAJOR  Relationship to Patient: [facility]    Phone Number:   Reason for Call: [facility] CALLED STATING THAT AN ORDER WAS SENT OVER FOR PT BUT IT IS DATED 05/31/22 [facility] REQUEST AN UPDATED ORDER. PLEASE ADVISE    FAX: [phone]

## 2022-08-02 ENCOUNTER — OFFICE VISIT (OUTPATIENT)
Dept: ORTHOPEDIC SURGERY | Facility: CLINIC | Age: 47
End: 2022-08-02

## 2022-08-02 VITALS
HEART RATE: 91 BPM | BODY MASS INDEX: 41.32 KG/M2 | SYSTOLIC BLOOD PRESSURE: 120 MMHG | WEIGHT: 242 LBS | DIASTOLIC BLOOD PRESSURE: 77 MMHG | OXYGEN SATURATION: 94 % | HEIGHT: 64 IN

## 2022-08-02 DIAGNOSIS — G89.29 CHRONIC NECK PAIN: ICD-10-CM

## 2022-08-02 DIAGNOSIS — M19.011 PRIMARY OSTEOARTHRITIS OF RIGHT SHOULDER: ICD-10-CM

## 2022-08-02 DIAGNOSIS — F33.3 SEVERE EPISODE OF RECURRENT MAJOR DEPRESSIVE DISORDER, WITH PSYCHOTIC FEATURES: ICD-10-CM

## 2022-08-02 DIAGNOSIS — M75.02 ADHESIVE CAPSULITIS OF BOTH SHOULDERS: ICD-10-CM

## 2022-08-02 DIAGNOSIS — G89.29 CHRONIC PAIN OF BOTH SHOULDERS: ICD-10-CM

## 2022-08-02 DIAGNOSIS — M25.512 CHRONIC PAIN OF BOTH SHOULDERS: ICD-10-CM

## 2022-08-02 DIAGNOSIS — M25.511 CHRONIC PAIN OF BOTH SHOULDERS: ICD-10-CM

## 2022-08-02 DIAGNOSIS — M85.80 OSTEOPENIA, UNSPECIFIED LOCATION: ICD-10-CM

## 2022-08-02 DIAGNOSIS — M75.01 ADHESIVE CAPSULITIS OF BOTH SHOULDERS: ICD-10-CM

## 2022-08-02 DIAGNOSIS — M54.2 CHRONIC NECK PAIN: ICD-10-CM

## 2022-08-02 DIAGNOSIS — F41.1 GENERALIZED ANXIETY DISORDER: ICD-10-CM

## 2022-08-02 DIAGNOSIS — F42.8 OTHER OBSESSIVE-COMPULSIVE DISORDERS: Chronic | ICD-10-CM

## 2022-08-02 PROCEDURE — 99214 OFFICE O/P EST MOD 30 MIN: CPT | Performed by: FAMILY MEDICINE

## 2022-08-02 RX ORDER — VENLAFAXINE HYDROCHLORIDE 75 MG/1
CAPSULE, EXTENDED RELEASE ORAL
Qty: 30 CAPSULE | Refills: 0 | Status: SHIPPED | OUTPATIENT
Start: 2022-08-02 | End: 2022-08-18 | Stop reason: SDUPTHER

## 2022-08-02 RX ORDER — BREXPIPRAZOLE 2 MG/1
TABLET ORAL
Qty: 30 TABLET | Refills: 0 | Status: SHIPPED | OUTPATIENT
Start: 2022-08-02 | End: 2022-08-18 | Stop reason: SDUPTHER

## 2022-08-02 RX ORDER — CELECOXIB 200 MG/1
200 CAPSULE ORAL DAILY
Qty: 30 CAPSULE | Refills: 1 | Status: SHIPPED | OUTPATIENT
Start: 2022-08-02 | End: 2022-10-18 | Stop reason: SDUPTHER

## 2022-08-02 NOTE — PROGRESS NOTES
Follow Up Visit      Patient: Nivia Bernstein  YOB: 1975  Date of Encounter: 08/02/2022  PCP: Markus Menjivar MD  Referring Provider: No ref. provider found     Subjective   Nivia Bernstein is a 47 y.o. female who presents to the office today for evaluation of Follow-up of the Right Shoulder      Chief Complaint   Patient presents with   • Right Shoulder - Follow-up       HPI     The patient presents for follow up for right shoulder pain and capsulitis.    The patient states her bilateral shoulders are doing fairly well, but she is still experiencing soreness and pain. She states her pain was a 10 out of 10, but it is currently a 7 out of 10 on the pain scale. The patient had an injection administered in her left shoulder on 06/28/2022 and in her right shoulder on  07/07/2022. The patient states she feels like she is continuing to improve. The patient states she is experiencing neuropathy. The patient has been attending physical therapy for approximately 3-4 weeks. The patient has been taking Celebrex which has provided good relief especially when she experiences a flareup. The patient is taking gabapentin 100 mg. She will be attending physical therapy today, 08/02/2022. She is not currently taking a muscle relaxer. She is not interested in a muscle relaxer at this time.     Patient Active Problem List   Diagnosis   • DM type 2 with diabetic peripheral neuropathy (HCC)   • Mild intermittent asthma without complication   • GERD (gastroesophageal reflux disease)   • History of DVT (deep vein thrombosis)   • History of TIAs   • Mixed hyperlipidemia   • Anxiety and depression   • Class 3 severe obesity with serious comorbidity and body mass index (BMI) of 40.0 to 44.9 in adult (HCC)   • Elevated alkaline phosphatase level   • Chronic pain of left knee   • OCD (obsessive compulsive disorder)   • Renal insufficiency   • Accidental hypodermic needlestick injury with exposure to body fluid   •  Atherosclerosis of both carotid arteries   • Chronic allergic rhinitis   • Menopausal symptoms   • Vitamin D deficiency   • Malignant neoplasm of upper-outer quadrant of right breast in female, estrogen receptor negative (HCC)   • Healthcare maintenance   • Osteopenia of multiple sites   • Port-A-Cath in place   • Drug-induced constipation   • Insulin pump in place   • Hot flashes   • Occipital neuralgia of right side   • Adhesive capsulitis of right shoulder   • Recurrent UTI   • S/P mastectomy, bilateral   • Osteoarthritis of right acromioclavicular joint   • Primary osteoarthritis of right shoulder   • Chronic pain of both shoulders   • Chronic neck pain       Past Medical History:   Diagnosis Date   • Altered mental status 10/16/2017   • Anxiety and depression 3/31/2017   • Arthritis    • Asthma    • Elevated alkaline phosphatase level 10/16/2017   • Enlarged liver    • GERD (gastroesophageal reflux disease)    • Heart murmur    • Hypokalemia 10/16/2017   • Mitral valve prolapse    • Neuropathy     related to diabetes   • Obesity 3/31/2017   • OCD (obsessive compulsive disorder) 10/16/2017   • Osteoporosis    • PONV (postoperative nausea and vomiting)    • Renal insufficiency 10/16/2017   • Right breast cancer with malignant cells in regional lymph nodes no greater than 0.2 mm and no more than 200 cells (HCC) 8/13/2020   • TIA (transient ischemic attack)     4 TIMES       Allergies   Allergen Reactions   • Mobic [Meloxicam] Rash   • Penicillins Angioedema   • Taxotere [Docetaxel] Anaphylaxis     9 minutes into 1st infusion   • Novolog [Insulin Aspart] Hives     Review of Systems   Constitutional: Positive for activity change. Negative for fever.   Respiratory: Negative for shortness of breath and wheezing.    Cardiovascular: Negative for chest pain.   Musculoskeletal: Positive for arthralgias, myalgias, neck pain and neck stiffness.   Skin: Negative for color change and wound.   Neurological: Negative for  "weakness and numbness.       Visit Vitals  /77   Pulse 91   Ht 162.6 cm (64\")   Wt 110 kg (242 lb)   SpO2 94%   BMI 41.54 kg/m²     47 y.o.female  Physical Exam  Vitals and nursing note reviewed.   Constitutional:       General: She is not in acute distress.     Appearance: Normal appearance.   Pulmonary:      Effort: Pulmonary effort is normal. No respiratory distress.   Skin:     General: Skin is warm and dry.      Findings: No erythema.   Neurological:      General: No focal deficit present.      Mental Status: She is alert.      Sensory: No sensory deficit.      Motor: No weakness.     Left shoulder exam:  Very tender on the C-spine in the muscles.  A lot of spasm in the trapezius and the paraspinals.  Positive Spurling.  Tender on the AC, collarbone, anterior joint, deltoid, and in the back.  Pain on biceps testing, triceps, infraspinatus, and subscapularis.  Restricted range of motion in both shoulders and a capsular pattern, restricted in all directions with end range pain.    Right shoulder exam:  Very tender on the C-spine in the muscles.  A lot of spasm in the trapezius and the paraspinals.  Positive Spurling.  Tender on the AC, collarbone, anterior joint, deltoid, and in the back.  Pain on biceps testing, triceps, infraspinatus, and subscapularis.  Restricted range of motion in both shoulders and a capsular pattern, restricted in all directions with end range pain, worse on the right.     Radiology Results:    No radiology results for the last 30 days.    Assessment & Plan   Diagnoses and all orders for this visit:    1. Adhesive capsulitis of both shoulders  -     celecoxib (CeleBREX) 200 MG capsule; Take 1 capsule by mouth Daily.  Dispense: 30 capsule; Refill: 1    2. Chronic pain of both shoulders  -     celecoxib (CeleBREX) 200 MG capsule; Take 1 capsule by mouth Daily.  Dispense: 30 capsule; Refill: 1    3. Osteopenia, unspecified location  -     celecoxib (CeleBREX) 200 MG capsule; Take 1 " capsule by mouth Daily.  Dispense: 30 capsule; Refill: 1    4. Primary osteoarthritis of right shoulder  -     celecoxib (CeleBREX) 200 MG capsule; Take 1 capsule by mouth Daily.  Dispense: 30 capsule; Refill: 1    5. Chronic neck pain  -     celecoxib (CeleBREX) 200 MG capsule; Take 1 capsule by mouth Daily.  Dispense: 30 capsule; Refill: 1         MEDS ORDERED DURING VISIT:  New Medications Ordered This Visit   Medications   • celecoxib (CeleBREX) 200 MG capsule     Sig: Take 1 capsule by mouth Daily.     Dispense:  30 capsule     Refill:  1     MEDICATION ISSUES:  Discussed medication options and treatment plan of prescribed medication as well as the risks, benefits, and side effects including potential falls, possible impaired driving and metabolic adversities among others. Patient is agreeable to call the office with any worsening of symptoms or onset of side effects. Patient is agreeable to call 911 or go to the nearest ER should he/she begin having SI/HI.     Discussion:    I advised the patient to consult her PCP about adjusting her gabapentin dosage since it is not providing her any relief. I will order an MRI of the patient's neck and an EMG as well. She will continue physical therapy. She will continue taking Celebrex. She will follow up to discuss the results of the MRI.             Transcribed from ambient dictation for Guanaco Sheehan DO by LEIF PANG.  08/02/22   12:43 EDT    Patient verbalized consent to the visit recording.  I have personally performed the services described in this document as transcribed by the above individual, and it is both accurate and complete.  Guanaco Sheehan DO  8/9/2022  16:32 EDT

## 2022-08-03 ENCOUNTER — INFUSION (OUTPATIENT)
Dept: ONCOLOGY | Facility: HOSPITAL | Age: 47
End: 2022-08-03

## 2022-08-03 VITALS
BODY MASS INDEX: 42.14 KG/M2 | HEART RATE: 92 BPM | RESPIRATION RATE: 18 BRPM | TEMPERATURE: 96.9 F | OXYGEN SATURATION: 97 % | SYSTOLIC BLOOD PRESSURE: 149 MMHG | DIASTOLIC BLOOD PRESSURE: 86 MMHG | WEIGHT: 245.5 LBS

## 2022-08-03 DIAGNOSIS — Z95.828 PORT-A-CATH IN PLACE: Primary | ICD-10-CM

## 2022-08-03 PROCEDURE — 96523 IRRIG DRUG DELIVERY DEVICE: CPT

## 2022-08-03 PROCEDURE — 25010000002 HEPARIN LOCK FLUSH PER 10 UNITS: Performed by: INTERNAL MEDICINE

## 2022-08-03 RX ORDER — SODIUM CHLORIDE 0.9 % (FLUSH) 0.9 %
10 SYRINGE (ML) INJECTION AS NEEDED
Status: CANCELLED | OUTPATIENT
Start: 2022-09-20

## 2022-08-03 RX ORDER — SODIUM CHLORIDE 0.9 % (FLUSH) 0.9 %
10 SYRINGE (ML) INJECTION AS NEEDED
Status: DISCONTINUED | OUTPATIENT
Start: 2022-08-03 | End: 2022-08-03 | Stop reason: HOSPADM

## 2022-08-03 RX ORDER — HEPARIN SODIUM (PORCINE) LOCK FLUSH IV SOLN 100 UNIT/ML 100 UNIT/ML
500 SOLUTION INTRAVENOUS AS NEEDED
Status: CANCELLED | OUTPATIENT
Start: 2022-09-20

## 2022-08-03 RX ORDER — HEPARIN SODIUM (PORCINE) LOCK FLUSH IV SOLN 100 UNIT/ML 100 UNIT/ML
500 SOLUTION INTRAVENOUS AS NEEDED
Status: DISCONTINUED | OUTPATIENT
Start: 2022-08-03 | End: 2022-08-03 | Stop reason: HOSPADM

## 2022-08-03 RX ADMIN — HEPARIN SODIUM (PORCINE) LOCK FLUSH IV SOLN 100 UNIT/ML 500 UNITS: 100 SOLUTION at 14:38

## 2022-08-03 RX ADMIN — Medication 10 ML: at 14:38

## 2022-08-17 ENCOUNTER — OFFICE VISIT (OUTPATIENT)
Dept: FAMILY MEDICINE CLINIC | Facility: CLINIC | Age: 47
End: 2022-08-17

## 2022-08-17 DIAGNOSIS — R60.1 GENERALIZED EDEMA: ICD-10-CM

## 2022-08-17 DIAGNOSIS — M19.011 PRIMARY OSTEOARTHRITIS OF RIGHT SHOULDER: Chronic | ICD-10-CM

## 2022-08-17 DIAGNOSIS — J30.9 CHRONIC ALLERGIC RHINITIS: Chronic | ICD-10-CM

## 2022-08-17 DIAGNOSIS — J45.20 MILD INTERMITTENT ASTHMA WITHOUT COMPLICATION: Chronic | ICD-10-CM

## 2022-08-17 DIAGNOSIS — M85.89 OSTEOPENIA OF MULTIPLE SITES: Chronic | ICD-10-CM

## 2022-08-17 DIAGNOSIS — Z96.41 INSULIN PUMP IN PLACE: ICD-10-CM

## 2022-08-17 DIAGNOSIS — E11.42 DM TYPE 2 WITH DIABETIC PERIPHERAL NEUROPATHY: Primary | Chronic | ICD-10-CM

## 2022-08-17 DIAGNOSIS — R23.2 HOT FLASHES: ICD-10-CM

## 2022-08-17 DIAGNOSIS — R11.0 NAUSEA: ICD-10-CM

## 2022-08-17 DIAGNOSIS — E55.9 VITAMIN D DEFICIENCY: ICD-10-CM

## 2022-08-17 DIAGNOSIS — K21.9 GASTROESOPHAGEAL REFLUX DISEASE WITHOUT ESOPHAGITIS: Chronic | ICD-10-CM

## 2022-08-17 DIAGNOSIS — M54.81 OCCIPITAL NEURALGIA OF RIGHT SIDE: Chronic | ICD-10-CM

## 2022-08-17 DIAGNOSIS — E78.2 MIXED DYSLIPIDEMIA: Chronic | ICD-10-CM

## 2022-08-17 DIAGNOSIS — K59.03 DRUG-INDUCED CONSTIPATION: ICD-10-CM

## 2022-08-17 DIAGNOSIS — E53.8 VITAMIN B 12 DEFICIENCY: ICD-10-CM

## 2022-08-17 DIAGNOSIS — I65.23 ATHEROSCLEROSIS OF BOTH CAROTID ARTERIES: Chronic | ICD-10-CM

## 2022-08-17 PROCEDURE — 99214 OFFICE O/P EST MOD 30 MIN: CPT | Performed by: NURSE PRACTITIONER

## 2022-08-17 RX ORDER — INSULIN LISPRO 100 [IU]/ML
INJECTION, SOLUTION INTRAVENOUS; SUBCUTANEOUS
Qty: 40 ML | Refills: 3 | Status: SHIPPED | OUTPATIENT
Start: 2022-08-17 | End: 2022-09-29

## 2022-08-17 RX ORDER — FUROSEMIDE 20 MG/1
20 TABLET ORAL DAILY PRN
Qty: 30 TABLET | Refills: 1 | Status: SHIPPED | OUTPATIENT
Start: 2022-08-17 | End: 2022-10-06 | Stop reason: SDUPTHER

## 2022-08-17 RX ORDER — ATORVASTATIN CALCIUM 40 MG/1
40 TABLET, FILM COATED ORAL NIGHTLY
Qty: 30 TABLET | Refills: 0 | Status: SHIPPED | OUTPATIENT
Start: 2022-08-17 | End: 2022-08-31

## 2022-08-17 RX ORDER — ERGOCALCIFEROL 1.25 MG/1
50000 CAPSULE ORAL WEEKLY
Qty: 4 CAPSULE | Refills: 3 | Status: SHIPPED | OUTPATIENT
Start: 2022-08-17 | End: 2022-10-18

## 2022-08-17 RX ORDER — ONDANSETRON HYDROCHLORIDE 8 MG/1
8 TABLET, FILM COATED ORAL EVERY 8 HOURS PRN
Qty: 30 TABLET | Refills: 3 | Status: SHIPPED | OUTPATIENT
Start: 2022-08-17 | End: 2022-09-29

## 2022-08-17 RX ORDER — CYANOCOBALAMIN 1000 UG/ML
1000 INJECTION, SOLUTION INTRAMUSCULAR; SUBCUTANEOUS
Qty: 1 ML | Refills: 3 | Status: SHIPPED | OUTPATIENT
Start: 2022-08-17 | End: 2022-12-13

## 2022-08-17 RX ORDER — ASPIRIN 81 MG/1
162 TABLET ORAL DAILY
Qty: 60 TABLET | Refills: 3 | Status: SHIPPED | OUTPATIENT
Start: 2022-08-17 | End: 2022-09-29

## 2022-08-17 RX ORDER — LEVOCETIRIZINE DIHYDROCHLORIDE 5 MG/1
5 TABLET, FILM COATED ORAL EVERY EVENING
Qty: 30 TABLET | Refills: 3 | Status: SHIPPED | OUTPATIENT
Start: 2022-08-17 | End: 2022-11-14

## 2022-08-17 RX ORDER — CLONIDINE HYDROCHLORIDE 0.1 MG/1
0.1 TABLET ORAL 2 TIMES DAILY
Qty: 60 TABLET | Refills: 3 | Status: SHIPPED | OUTPATIENT
Start: 2022-08-17 | End: 2022-12-13

## 2022-08-17 RX ORDER — LANSOPRAZOLE 30 MG/1
30 CAPSULE, DELAYED RELEASE ORAL DAILY
Qty: 90 CAPSULE | Refills: 3 | Status: SHIPPED | OUTPATIENT
Start: 2022-08-17

## 2022-08-17 RX ORDER — EMPAGLIFLOZIN, LINAGLIPTIN, METFORMIN HYDROCHLORIDE 25; 5; 1000 MG/1; MG/1; MG/1
1 TABLET, EXTENDED RELEASE ORAL EVERY MORNING
Qty: 30 TABLET | Refills: 3 | Status: SHIPPED | OUTPATIENT
Start: 2022-08-17 | End: 2022-10-18

## 2022-08-17 RX ORDER — TOPIRAMATE 50 MG/1
50 TABLET, FILM COATED ORAL 2 TIMES DAILY
Qty: 60 TABLET | Refills: 1 | Status: SHIPPED | OUTPATIENT
Start: 2022-08-17 | End: 2023-02-22

## 2022-08-17 RX ORDER — SUMATRIPTAN 100 MG/1
TABLET, FILM COATED ORAL
Qty: 8 TABLET | Refills: 3 | Status: SHIPPED | OUTPATIENT
Start: 2022-08-17 | End: 2023-01-05 | Stop reason: SDUPTHER

## 2022-08-18 ENCOUNTER — LAB (OUTPATIENT)
Dept: FAMILY MEDICINE CLINIC | Facility: CLINIC | Age: 47
End: 2022-08-18

## 2022-08-18 ENCOUNTER — OFFICE VISIT (OUTPATIENT)
Dept: PSYCHIATRY | Facility: CLINIC | Age: 47
End: 2022-08-18

## 2022-08-18 VITALS
DIASTOLIC BLOOD PRESSURE: 74 MMHG | HEART RATE: 85 BPM | SYSTOLIC BLOOD PRESSURE: 138 MMHG | BODY MASS INDEX: 42.38 KG/M2 | WEIGHT: 247 LBS

## 2022-08-18 DIAGNOSIS — F42.8 OTHER OBSESSIVE-COMPULSIVE DISORDERS: Chronic | ICD-10-CM

## 2022-08-18 DIAGNOSIS — F33.3 SEVERE EPISODE OF RECURRENT MAJOR DEPRESSIVE DISORDER, WITH PSYCHOTIC FEATURES: Primary | ICD-10-CM

## 2022-08-18 DIAGNOSIS — Z79.899 HIGH RISK MEDICATION USE: ICD-10-CM

## 2022-08-18 DIAGNOSIS — F41.1 GENERALIZED ANXIETY DISORDER: ICD-10-CM

## 2022-08-18 PROCEDURE — 80306 DRUG TEST PRSMV INSTRMNT: CPT | Performed by: NURSE PRACTITIONER

## 2022-08-18 PROCEDURE — 99213 OFFICE O/P EST LOW 20 MIN: CPT | Performed by: NURSE PRACTITIONER

## 2022-08-18 RX ORDER — BREXPIPRAZOLE 2 MG/1
1 TABLET ORAL DAILY
Qty: 30 TABLET | Refills: 1 | Status: SHIPPED | OUTPATIENT
Start: 2022-08-18 | End: 2022-09-23 | Stop reason: SDUPTHER

## 2022-08-18 RX ORDER — VENLAFAXINE HYDROCHLORIDE 150 MG/1
150 CAPSULE, EXTENDED RELEASE ORAL DAILY
Qty: 30 CAPSULE | Refills: 1 | Status: SHIPPED | OUTPATIENT
Start: 2022-08-18 | End: 2022-09-23 | Stop reason: SDUPTHER

## 2022-08-18 RX ORDER — LORAZEPAM 1 MG/1
1 TABLET ORAL 3 TIMES DAILY PRN
Qty: 90 TABLET | Refills: 0 | Status: SHIPPED | OUTPATIENT
Start: 2022-08-18 | End: 2022-09-23 | Stop reason: SDUPTHER

## 2022-08-18 RX ORDER — VENLAFAXINE HYDROCHLORIDE 75 MG/1
75 CAPSULE, EXTENDED RELEASE ORAL DAILY
Qty: 30 CAPSULE | Refills: 1 | Status: SHIPPED | OUTPATIENT
Start: 2022-08-18 | End: 2022-09-23 | Stop reason: SDUPTHER

## 2022-08-18 NOTE — PROGRESS NOTES
Subjective   Nivia Bernstein is a 47 y.o. female is here today for medication management follow-up.    Chief Complaint:  Anxiety depression     History of Present Illness:    Patient presents today for a follow up for medication management for anxiety and depression with . Patient reports medication compliance and denies any side effects. Patient states getting some sleep and getting about four a night. Patient states still having some nightmares. Patient denies any panic attacks. Patient states appetite is good and eating at least two meals a day. Patient states she is starting to watch what she eats now and is allowed to lose 40lbs per cancer doctor. Patient denies any nausea or vomiting. Patient states had some migraines lately. Patient states anxiety is at a 10 on a 0-10 scale with 10 being the worst. Patient states depression is at a 10 on a 0-10 scale with 10 being the worst. Denies any thoughts to harm self or others. Denies any auditory or visual hallucinations. Denies any other medical changes since last visit.   The following portions of the patient's history were reviewed and updated as appropriate: allergies, current medications, past family history, past medical history, past social history, past surgical history and problem list.    Review of Systems   Constitutional: Negative.    Respiratory: Negative.    Cardiovascular: Negative.    Gastrointestinal: Negative.    Neurological: Negative.    Psychiatric/Behavioral: Positive for dysphoric mood and sleep disturbance. The patient is nervous/anxious.        Objective   Physical Exam  Vitals reviewed.   Constitutional:       Appearance: Normal appearance. She is well-developed and well-groomed.   Neurological:      Mental Status: She is alert.   Psychiatric:         Attention and Perception: Attention and perception normal.         Mood and Affect: Mood is depressed. Affect is angry.         Speech: Speech normal.         Behavior: Behavior is  agitated. Behavior is cooperative.         Thought Content: Thought content normal.         Cognition and Memory: Cognition and memory normal.         Judgment: Judgment normal.       Blood pressure 138/74, pulse 85, weight 112 kg (247 lb), not currently breastfeeding. Body mass index is 42.38 kg/m².    Allergies   Allergen Reactions   • Mobic [Meloxicam] Rash   • Penicillins Angioedema   • Taxotere [Docetaxel] Anaphylaxis     9 minutes into 1st infusion   • Novolog [Insulin Aspart] Hives   • Rosuvastatin Calcium GI Intolerance       Medication List:   Current Outpatient Medications   Medication Sig Dispense Refill   • Brexpiprazole (Rexulti) 2 MG tablet Take 1 tablet by mouth Daily. 30 tablet 1   • LORazepam (ATIVAN) 1 MG tablet Take 1 tablet by mouth 3 (Three) Times a Day As Needed for Anxiety. for anxiety 90 tablet 0   • venlafaxine XR (EFFEXOR-XR) 150 MG 24 hr capsule Take 1 capsule by mouth Daily. Take with the Effexor XR 75mg capsule for a total daily dose of 225mg. 30 capsule 1   • venlafaxine XR (EFFEXOR-XR) 75 MG 24 hr capsule Take 1 capsule by mouth Daily. 30 capsule 1   • aspirin (Aspirin Low Dose) 81 MG EC tablet Take 2 tablets by mouth Daily. 60 tablet 3   • atorvastatin (LIPITOR) 40 MG tablet TAKE 1 TABLET BY MOUTH EVERY NIGHT. 90 tablet 0   • azelastine (ASTELIN) 0.1 % nasal spray Use in each nostril as directed 30 mL 3   • Breo Ellipta 200-25 MCG/INH inhaler Inhale 1 puff Daily. 1 each 3   • Calcium Carbonate 1500 (600 Ca) MG tablet Take 1 tablet by mouth 2 (Two) Times a Day With Meals. 60 tablet 3   • celecoxib (CeleBREX) 200 MG capsule Take 1 capsule by mouth Daily. 30 capsule 1   • cholecalciferol (Vitamin D) 25 MCG (1000 UT) tablet Take 2 tablets by mouth Daily. 60 tablet 3   • cloNIDine (CATAPRES) 0.1 MG tablet Take 1 tablet by mouth 2 (Two) Times a Day. 60 tablet 3   • cyanocobalamin 1000 MCG/ML injection Inject 1 mL into the appropriate muscle as directed by prescriber Every 30 (Thirty) Days.  1 mL 3   • diclofenac (VOLTAREN) 1 % gel gel Apply 4 g topically to the appropriate area as directed 4 (Four) Times a Day As Needed (joint pain). 500 g 5   • Diclofenac Sodium (VOLTAREN) 1 % gel gel Apply  topically to the appropriate area as directed 4 (Four) Times a Day. 150 g 3   • Empagliflozin-Linaglip-Metform (Trijardy XR) 25-5-1000 MG tablet sustained-release 24 hour Take 1 tablet by mouth Every Morning. 30 tablet 3   • furosemide (LASIX) 20 MG tablet Take 1 tablet by mouth Daily As Needed (edema). 30 tablet 1   • gabapentin (Neurontin) 100 MG capsule Take 1 capsule by mouth 2 (Two) Times a Day. 60 capsule 3   • HumaLOG 100 UNIT/ML injection Maximum daily dose of 150 units 40 mL 3   • lansoprazole (PREVACID) 30 MG capsule Take 1 capsule by mouth Daily. 90 capsule 3   • levocetirizine (XYZAL) 5 MG tablet Take 1 tablet by mouth Every Evening. 30 tablet 3   • linaclotide (Linzess) 145 MCG capsule capsule Take 1 capsule by mouth Daily. 30 minutes before meals on an empty stomach. 30 capsule 3   • loperamide (IMODIUM A-D) 2 MG tablet Take 1 tablet by mouth 4 (Four) Times a Day As Needed for Diarrhea. 30 tablet 0   • ondansetron (ZOFRAN) 8 MG tablet Take 1 tablet by mouth Every 8 (Eight) Hours As Needed for Nausea or Vomiting. 30 tablet 3   • ProAir  (90 Base) MCG/ACT inhaler Inhale 2 puffs Every 4 (Four) Hours As Needed for Shortness of Air. 8.5 g 3   • SUMAtriptan (IMITREX) 100 MG tablet For Migraine 8 tablet 3   • tamoxifen (NOLVADEX) 20 MG chemo tablet Take 1 tablet by mouth Daily. 30 tablet 11   • topiramate (TOPAMAX) 50 MG tablet Take 1 tablet by mouth 2 (Two) Times a Day. 60 tablet 1   • vitamin D (ERGOCALCIFEROL) 1.25 MG (34471 UT) capsule capsule Take 1 capsule by mouth 1 (One) Time Per Week. 4 capsule 3     No current facility-administered medications for this visit.       Mental Status Exam:   Hygiene:   good  Cooperation:  Cooperative  Eye Contact:  Good  Psychomotor Behavior:  Aggitated  Affect:   Angry  Hopelessness: Denies  Speech:  Normal  Thought Process:  Goal directed and Linear  Thought Content:  Normal  Suicidal:  None  Homicidal:  None  Hallucinations:  None  Delusion:  None  Memory:  Intact  Orientation:  Person, Place, Time and Situation  Reliability:  fair  Insight:  Fair  Judgement:  Fair  Impulse Control:  Fair  Physical/Medical Issues:  No                 Assessment & Plan   Diagnoses and all orders for this visit:    1. Severe episode of recurrent major depressive disorder, with psychotic features (HCC) (Primary)  -     venlafaxine XR (EFFEXOR-XR) 75 MG 24 hr capsule; Take 1 capsule by mouth Daily.  Dispense: 30 capsule; Refill: 1  -     venlafaxine XR (EFFEXOR-XR) 150 MG 24 hr capsule; Take 1 capsule by mouth Daily. Take with the Effexor XR 75mg capsule for a total daily dose of 225mg.  Dispense: 30 capsule; Refill: 1  -     Brexpiprazole (Rexulti) 2 MG tablet; Take 1 tablet by mouth Daily.  Dispense: 30 tablet; Refill: 1    2. Generalized anxiety disorder  -     venlafaxine XR (EFFEXOR-XR) 75 MG 24 hr capsule; Take 1 capsule by mouth Daily.  Dispense: 30 capsule; Refill: 1  -     venlafaxine XR (EFFEXOR-XR) 150 MG 24 hr capsule; Take 1 capsule by mouth Daily. Take with the Effexor XR 75mg capsule for a total daily dose of 225mg.  Dispense: 30 capsule; Refill: 1  -     Brexpiprazole (Rexulti) 2 MG tablet; Take 1 tablet by mouth Daily.  Dispense: 30 tablet; Refill: 1  -     LORazepam (ATIVAN) 1 MG tablet; Take 1 tablet by mouth 3 (Three) Times a Day As Needed for Anxiety. for anxiety  Dispense: 90 tablet; Refill: 0    3. Other obsessive-compulsive disorders  -     venlafaxine XR (EFFEXOR-XR) 75 MG 24 hr capsule; Take 1 capsule by mouth Daily.  Dispense: 30 capsule; Refill: 1  -     venlafaxine XR (EFFEXOR-XR) 150 MG 24 hr capsule; Take 1 capsule by mouth Daily. Take with the Effexor XR 75mg capsule for a total daily dose of 225mg.  Dispense: 30 capsule; Refill: 1  -     Brexpiprazole  (Rexulti) 2 MG tablet; Take 1 tablet by mouth Daily.  Dispense: 30 tablet; Refill: 1    4. High risk medication use  -     Urine Drug Screen - Urine, Clean Catch; Future            Discussed medication options with patient and . Cont. Effexor XR 225mg daily for depression, anxiety, and OCD. Cont. Rexulti 2mg daily for depression and anxiety. Cont. Lorazepam 1mg three times daily as needed for anxiety. Reviewed the risks, benefits, and side effects of the medications; patient acknowledged and verbally consented. Patient is being prescribed a controlled substance as part of treatment plan. Patient has been educated of appropriate use of the medications, including risk of somnolence, limited ability to drive and/or work safely, and potential for dependence, respiratory depression and overdose. Patient is also informed that the medication are to be used by the patient only- avoid any combined use of ETOH or other substances unless prescribed. UDS ordered.   [facility] Patient Controlled Substance Report (from 8/18/2021 to 8/18/2022)    Dispensed  Strength Quantity Days Supply Provider Pharmacy   08/02/2022 Gabapentin 100MG 60 each 30 [facility]   07/11/2022 Lorazepam 1MG 90 each 30 [facility]   07/05/2022 Gabapentin 100MG 60 each 30 [facility]   06/13/2022 Lorazepam 1MG 90 each 30 [facility]   06/03/2022 Gabapentin 100MG 60 each [facility]   05/10/2022 Lorazepam 1MG 90 each 30 [facility]   05/06/2022 Gabapentin 100MG 60 each 30 URMILA[facility]   04/19/2022 Hydrocodone Bitartrate/Ac 325MG/5MG 10 each 3 [facility]   04/14/2022 Lorazepam 1MG 90 each 30 [facility]   03/25/2022 Gabapentin 100MG 60 each [facility]    03/17/2022 Lorazepam 1MG 90 each 30 [facility]   02/26/2022 Gabapentin 100MG 60 each 30 [facility]   02/17/2022 Lorazepam 1MG 90 each 30 [facility]   01/27/2022 Gabapentin 100MG 60 each 30 [facility]   01/20/2022 Lorazepam 1MG 90 each 30 [facility]   12/23/2021 Gabapentin 100MG 60 each 30 [facility]   12/23/2021 Lorazepam 1MG 90 each 30 [facility]   11/29/2021 Lorazepam 1MG 90 each 30 [facility]   11/17/2021 Gabapentin 100MG 60 each 30 URMILA[facility]   11/01/2021 Lorazepam 1MG 90 each 30 [facility]   10/21/2021 Gabapentin 100MG 60 each 30 [facility]RM[facility]   10/21/2021 Lorazepam 1MG 30 each 10 [facility]   10/05/2021 Lorazepam 1MG 30 each 15 [facility]   09/16/2021 Gabapentin 100MG 60 each 30 [facility]RM[facility]   09/09/2021 Diazepam 10MG 60 each 30 [facility]           Patient is agreeable to call the office with any questions, concerns, or worsening of symptoms. Patient is aware to call 911 or go to the nearest ER should begin having SI/HI.          Follow up in eight  weeks      Errors in dictation may reflect use of voice recognition software and not all errors in transcription may have been detected prior to signing.               This document has been electronically signed by NIDA Vasquez   August 31, 2022 09:17 EDT

## 2022-08-20 VITALS
HEIGHT: 64 IN | DIASTOLIC BLOOD PRESSURE: 80 MMHG | TEMPERATURE: 97.5 F | SYSTOLIC BLOOD PRESSURE: 120 MMHG | HEART RATE: 91 BPM | OXYGEN SATURATION: 96 % | BODY MASS INDEX: 41.66 KG/M2 | WEIGHT: 244 LBS | RESPIRATION RATE: 14 BRPM

## 2022-08-20 NOTE — PROGRESS NOTES
History of Present Illness  Nivia is a 48 y/o female who presents to the clinic today for follow up pertaining to her DM, type 2 and Dyslipidemia. In addition, she has been diagnosed with right breast Cancer and has undergone bilateral mastectomy and chemotherapy.     Diabetes   She has type 2 diabetes mellitus. The initial diagnosis of diabetes was made 20 years ago. Diabetic complications include peripheral neuropathy. Risk factors for coronary artery disease include post-menopausal, stress and dyslipidemia. She is currently utilizing insulin pump therapy and CGM. She did stop weekly Ozempic due to nausea. She is currently taking Trijardy.  She has had a previous visit with a dietitian An ACE inhibitor/angiotensin II receptor blocker is not  being taken at this time..       Lab Results   Component Value Date    HGBA1C 7.70 (H) 03/15/2022     Lab Results   Component Value Date    HGBA1C 7.40 (H) 06/22/2022     Dyslipidemia   The current episode started more than 1 year ago. Pertinent negatives include no chest pain or shortness of breath. She is taking Crestor 40 mg.   Lab Results   Component Value Date    CHOL 173 06/22/2022    CHLPL 175 03/15/2022    CHLPL 194 01/11/2022     Lab Results   Component Value Date    TRIG 302 (H) 06/22/2022    TRIG 192 (H) 03/15/2022    TRIG 133 01/11/2022     Lab Results   Component Value Date    HDL 32 (L) 06/22/2022    HDL 31 (L) 03/15/2022    HDL 39 (L) 01/11/2022     Lab Results   Component Value Date    LDL 91 06/22/2022     (H) 03/15/2022     (H) 01/11/2022     The following portions of the patient's history were reviewed and updated as appropriate: allergies, current medications, past family history, past medical history, past social history, past surgical history and problem list.    Review of Systems   Constitutional: Positive for fatigue. Negative for activity change, appetite change, chills, fever and unexpected weight change.   HENT: Negative for  "congestion.    Eyes: Positive for visual disturbance.   Respiratory: Positive for chest tightness. Negative for cough, shortness of breath and wheezing.    Cardiovascular: Positive for leg swelling (Intermittently). Negative for chest pain and palpitations.   Gastrointestinal: Negative for nausea and vomiting.   Endocrine: Negative for cold intolerance, heat intolerance, polydipsia, polyphagia and polyuria.   Musculoskeletal: Positive for arthralgias and back pain.   Skin: Negative for color change and rash.   Allergic/Immunologic: Positive for environmental allergies.   Neurological: Positive for numbness. Negative for dizziness, tremors, speech difficulty, weakness, light-headedness and headaches.   Hematological: Negative for adenopathy.   Psychiatric/Behavioral: Positive for sleep disturbance. Negative for confusion and decreased concentration. The patient is not nervous/anxious.    All other systems reviewed and are negative.    Vital signs:  /80 (BP Location: Left arm, Patient Position: Sitting, Cuff Size: Adult)   Pulse 91   Temp 97.5 °F (36.4 °C) (Temporal)   Resp 14   Ht 162.6 cm (64.02\")   Wt 111 kg (244 lb)   SpO2 96%   BMI 41.86 kg/m²     Physical Exam  Vitals and nursing note reviewed.   Constitutional:       General: She is not in acute distress.     Appearance: She is well-developed.      Interventions: Face mask in place.   HENT:      Head: Normocephalic.      Nose: Nose normal.   Eyes:      General: No scleral icterus.        Right eye: No discharge.         Left eye: No discharge.      Conjunctiva/sclera: Conjunctivae normal.   Neck:      Thyroid: No thyromegaly.      Vascular: No JVD.   Cardiovascular:      Rate and Rhythm: Normal rate and regular rhythm.      Heart sounds: S1 normal and S2 normal.   Pulmonary:      Effort: Pulmonary effort is normal.      Breath sounds: Normal breath sounds.   Abdominal:      Palpations: Abdomen is soft.      Tenderness: There is no abdominal " tenderness. There is no guarding.   Musculoskeletal:      Cervical back: Neck supple.      Right lower leg: No edema.      Left lower leg: No edema.   Feet:      Comments: Had both great toenails removed   Skin:     General: Skin is dry.      Capillary Refill: Capillary refill takes less than 2 seconds.   Neurological:      Mental Status: She is alert.      Cranial Nerves: Cranial nerves are intact.   Psychiatric:         Mood and Affect: Mood and affect normal.         Speech: Speech normal.         Behavior: Behavior is cooperative.         Cognition and Memory: Cognition and memory normal.     Result Review   Results for orders placed or performed in visit on 06/22/22   Comprehensive Metabolic Panel    Specimen: Blood   Result Value Ref Range    Glucose 213 (H) 65 - 99 mg/dL    BUN 14 6 - 20 mg/dL    Creatinine 0.95 0.57 - 1.00 mg/dL    Sodium 140 136 - 145 mmol/L    Potassium 3.5 3.5 - 5.2 mmol/L    Chloride 105 98 - 107 mmol/L    CO2 20.5 (L) 22.0 - 29.0 mmol/L    Calcium 9.0 8.6 - 10.5 mg/dL    Total Protein 6.8 6.0 - 8.5 g/dL    Albumin 3.91 3.50 - 5.20 g/dL    ALT (SGPT) 20 1 - 33 U/L    AST (SGOT) 14 1 - 32 U/L    Alkaline Phosphatase 108 39 - 117 U/L    Total Bilirubin 0.2 0.0 - 1.2 mg/dL    Globulin 2.9 gm/dL    A/G Ratio 1.4 g/dL    BUN/Creatinine Ratio 14.7 7.0 - 25.0    Anion Gap 14.5 5.0 - 15.0 mmol/L    eGFR 73.6 >60.0 mL/min/1.73   Lipid Panel    Specimen: Blood   Result Value Ref Range    Total Cholesterol 173 0 - 200 mg/dL    Triglycerides 302 (H) 0 - 150 mg/dL    HDL Cholesterol 32 (L) 40 - 60 mg/dL    LDL Cholesterol  91 0 - 100 mg/dL    VLDL Cholesterol 50 (H) 5 - 40 mg/dL    LDL/HDL Ratio 2.52    Uric Acid    Specimen: Blood   Result Value Ref Range    Uric Acid 4.8 2.4 - 5.7 mg/dL     Class 3 Severe Obesity (BMI >=40). Obesity-related health conditions include the following: hypertension, diabetes mellitus, dyslipidemias, GERD and osteoarthritis.   BMI  is above average; BMI management plan  is completed. We discussed portion control and increasing exercise.     Assessment & Plan     Diagnoses and all orders for this visit:    1. DM type 2 with diabetic peripheral neuropathy (HCC) (Primary)  Comments:  Continue insulin pump/CMG and Trijardy  Orders:  -     Empagliflozin-Linaglip-Metform (Trijardy XR) 25-5-1000 MG tablet sustained-release 24 hour; Take 1 tablet by mouth Every Morning.  Dispense: 30 tablet; Refill: 3  -     HumaLOG 100 UNIT/ML injection; Maximum daily dose of 150 units  Dispense: 40 mL; Refill: 3    2. Mixed dyslipidemia  Comments:  Intolerant to Rosuvastatin Will change to a trial of Atorvastatin   Orders:  -     atorvastatin (LIPITOR) 40 MG tablet; Take 1 tablet by mouth Every Night.  Dispense: 30 tablet; Refill: 0    3. Mild intermittent asthma without complication  Comments:  Continue asthma medications which include Breo and Proair  Orders:  -     Breo Ellipta 200-25 MCG/INH inhaler; Inhale 1 puff Daily.  Dispense: 1 each; Refill: 3  -     ProAir  (90 Base) MCG/ACT inhaler; Inhale 2 puffs Every 4 (Four) Hours As Needed for Shortness of Air.  Dispense: 8.5 g; Refill: 3    4. Gastroesophageal reflux disease without esophagitis  Comments:  Continue Prevacid   Orders:  -     lansoprazole (PREVACID) 30 MG capsule; Take 1 capsule by mouth Daily.  Dispense: 90 capsule; Refill: 3    5. Atherosclerosis of both carotid arteries  Comments:  Cardiovascular risk reduction modifications  Orders:  -     aspirin (Aspirin Low Dose) 81 MG EC tablet; Take 2 tablets by mouth Daily.  Dispense: 60 tablet; Refill: 3    6. Primary osteoarthritis of right shoulder  Comments:  Followed by Orthopedic team  Orders:  -     Diclofenac Sodium (VOLTAREN) 1 % gel gel; Apply  topically to the appropriate area as directed 4 (Four) Times a Day.  Dispense: 150 g; Refill: 3    7. Chronic allergic rhinitis  Comments:  Continue Xyzal   Orders:  -     levocetirizine (XYZAL) 5 MG tablet; Take 1 tablet by mouth Every  Evening.  Dispense: 30 tablet; Refill: 3    8. Osteopenia of multiple sites  Comments:  Bone Density ordered   Orders:  -     DEXA Bone Density Axial; Future    9. Occipital neuralgia of right side  Comments:  Continue Topamax  Orders:  -     SUMAtriptan (IMITREX) 100 MG tablet; For Migraine  Dispense: 8 tablet; Refill: 3  -     topiramate (TOPAMAX) 50 MG tablet; Take 1 tablet by mouth 2 (Two) Times a Day.  Dispense: 60 tablet; Refill: 1    10. Generalized edema  Comments:  Lasix prescribed and to use cautiously   Orders:  -     furosemide (LASIX) 20 MG tablet; Take 1 tablet by mouth Daily As Needed (edema).  Dispense: 30 tablet; Refill: 1    11. Drug-induced constipation  Comments:  Linzess to be continued   Orders:  -     linaclotide (Linzess) 145 MCG capsule capsule; Take 1 capsule by mouth Daily. 30 minutes before meals on an empty stomach.  Dispense: 30 capsule; Refill: 3    12. Hot flashes  Comments:  Continue Catapres   Orders:  -     cloNIDine (CATAPRES) 0.1 MG tablet; Take 1 tablet by mouth 2 (Two) Times a Day.  Dispense: 60 tablet; Refill: 3    13. Nausea  Comments:  Zofran prn   Orders:  -     ondansetron (ZOFRAN) 8 MG tablet; Take 1 tablet by mouth Every 8 (Eight) Hours As Needed for Nausea or Vomiting.  Dispense: 30 tablet; Refill: 3    14. Vitamin B 12 deficiency  Comments:  Daily supplement   Orders:  -     cyanocobalamin 1000 MCG/ML injection; Inject 1 mL into the appropriate muscle as directed by prescriber Every 30 (Thirty) Days.  Dispense: 1 mL; Refill: 3    15. Vitamin D deficiency  Comments:  Weekly Vit D   Orders:  -     vitamin D (ERGOCALCIFEROL) 1.25 MG (17828 UT) capsule capsule; Take 1 capsule by mouth 1 (One) Time Per Week.  Dispense: 4 capsule; Refill: 3    Follow Up in October  Findings and recommendations discussed with Nivia . Reviewed test and  Insulin pump upload results. Lifestyle modifications reinforced including nutrition and activity recommendations. Nivia will follow up in  October sooner if problems/concerns occur.  She was given instructions and counseling regarding her condition or for health maintenance advice. Please see specific information pulled into the AVS if appropriate    This document has been electronically signed by:

## 2022-08-30 DIAGNOSIS — E78.2 MIXED DYSLIPIDEMIA: Chronic | ICD-10-CM

## 2022-08-31 RX ORDER — ATORVASTATIN CALCIUM 40 MG/1
40 TABLET, FILM COATED ORAL NIGHTLY
Qty: 90 TABLET | Refills: 0 | Status: SHIPPED | OUTPATIENT
Start: 2022-08-31 | End: 2022-11-09 | Stop reason: SDUPTHER

## 2022-09-02 ENCOUNTER — APPOINTMENT (OUTPATIENT)
Dept: BONE DENSITY | Facility: HOSPITAL | Age: 47
End: 2022-09-02

## 2022-09-02 RX ORDER — CELECOXIB 200 MG/1
200 CAPSULE ORAL DAILY
Qty: 30 CAPSULE | Refills: 1 | Status: SHIPPED | OUTPATIENT
Start: 2022-09-02 | End: 2022-10-12 | Stop reason: SDUPTHER

## 2022-09-09 ENCOUNTER — HOSPITAL ENCOUNTER (OUTPATIENT)
Dept: BONE DENSITY | Facility: HOSPITAL | Age: 47
Discharge: HOME | End: 2022-09-09
Admitting: NURSE PRACTITIONER

## 2022-09-09 DIAGNOSIS — M85.89 OSTEOPENIA OF MULTIPLE SITES: Chronic | ICD-10-CM

## 2022-09-09 PROCEDURE — 77080 DXA BONE DENSITY AXIAL: CPT | Performed by: RADIOLOGY

## 2022-09-09 PROCEDURE — 77080 DXA BONE DENSITY AXIAL: CPT

## 2022-09-16 NOTE — PROGRESS NOTES
NAME: Nivia Bernstein    : 1975    DATE:  2022    DIAGNOSIS:   Stage IA ([id number]) moderately differentiated invasive ductal carcinoma of the R breast with associated DCIS.  Tumor was 10 mm in maximal dimension.  0/10 SLN involved. ER negative, UT 15% + (1+), HER-2/cat negative. Mammaprint high risk.    CHIEF COMPLAINT:  Follow up Breast cancer    TREATMENT HISTORY:  1. Initially planned to give 4 cycles to Taxotere/Cytoxan, during 1st infusion of Taxotere on 2020 patient developed allergic reaction. Therefore, Taxotere was discontinued and regimen changed to DD AC.    2.      3.  Started Tamoxifen 12-    HISTORY OF PRESENT ILLNESS:   Nivia Bernstein is a very pleasant 47 y.o. female who who is being seen today at the request of Sheila Garza MD for evaluation and treatment of breast cancer. She did not notice a palpable lump, but was recommended to have a screening mammogram by her PCP. A nodule in the right upper quadrant of the R breast was noted. The mass measured 0.8 cm on ultrasound. Biopsy was consistent with a moderately differentiated invasive ductal carcinoma, ER negative, UT weakly (15%) positive, and HER-2/cat negative. She underwent bilateral mastectomy with sentinel lymph node biopsy with Dr. Garza on 20. Pathology from this showed a moderately differentiated invasive ductal carcinoma with associated intermediate grade DCIS, negative margins.  0/10 /SLN involved.  Mammaprint was high risk. She was referred to our clinic and is here today with her  for discussion of further treatment options.      Ms. Bernstein is healing well from the surgery.  She did develop a seroma, which was drained by Dr. Garza yesterday. She reports muscular chest discomfort r/t the procedure, but otherwise denies any other symptoms including fevers, chills, significant weight changes, shortness of breath, abdominal pain, n/v/d/c, bony pain or headaches.    Interval History:  Ms. Bernstein  presents today for follow up of breast cancer.  She continues to take tamoxifen daily and is overall tolerating this well.  She complains of alopecia.  She says that she never saw the dermatologist, but would like to do so.  She tried biotin containing shampoo, but this caused her scalp to break out.  She has been taking B12 injections as prescribed by her PCP.  She also continues to see Dr. Sheehan.  She says she took injection therapy and also has been working with physical therapy.  She has significant improvement in range of motion about the left shoulder.  There is still some limitation in range of motion around the right shoulder although this is somewhat improved also.      PAST MEDICAL HISTORY:  Past Medical History:   Diagnosis Date   • Altered mental status 10/16/2017   • Anxiety and depression 3/31/2017   • Arthritis    • Asthma    • Elevated alkaline phosphatase level 10/16/2017   • Enlarged liver    • GERD (gastroesophageal reflux disease)    • Heart murmur    • Hypokalemia 10/16/2017   • Mitral valve prolapse    • Neuropathy     related to diabetes   • Obesity 3/31/2017   • OCD (obsessive compulsive disorder) 10/16/2017   • Osteoporosis    • PONV (postoperative nausea and vomiting)    • Renal insufficiency 10/16/2017   • Right breast cancer with malignant cells in regional lymph nodes no greater than 0.2 mm and no more than 200 cells (HCC) 8/13/2020   • TIA (transient ischemic attack)     4 TIMES       PAST SURGICAL HISTORY:  Past Surgical History:   Procedure Laterality Date   • APPENDECTOMY     • CARPAL TUNNEL RELEASE     • CHOLECYSTECTOMY     • COLONOSCOPY N/A 5/5/2021    Procedure: COLONOSCOPY WITH BIOPSY;  Surgeon: Vickie eHrrera MD;  Location: [facility];  Service: Gastroenterology;  Laterality: N/A;   • FINGER FUSION Left     3rd   • HYSTERECTOMY  2011    removed due to an ovarian cyst and dysmenorrhia. No cancer noted. Complete   • KNEE ARTHROSCOPY Right    • MASTECTOMY Left  8/18/2020    Procedure: Left mastectomy;  Surgeon: Sheila Garza MD;  Location:  [facility];  Service: General;  Laterality: Left;   • MASTECTOMY WITH SENTINEL NODE BIOPSY AND AXILLARY NODE DISSECTION Right 8/18/2020    Procedure: Right BREAST MASTECTOMY WITH SENTINEL NODE BIOPSY;  Surgeon: Sheila Garza MD;  Location:  [facility];  Service: General;  Laterality: Right;   • PORTACATH PLACEMENT         SOCIAL HISTORY:  Social History     Socioeconomic History   • Marital status:    Tobacco Use   • Smoking status: Never Smoker   • Smokeless tobacco: Never Used   Vaping Use   • Vaping Use: Never used   Substance and Sexual Activity   • Alcohol use: No   • Drug use: No   • Sexual activity: Yes     Partners: Male         FAMILY HISTORY:  Family History   Problem Relation Age of Onset   • Diabetes Mother    • Hypertension Mother    • Diabetes Father    • Heart disease Father    • Alcohol abuse Father    • Seizures Father    • Hypertension Father    • No Known Problems Brother    • No Known Problems Daughter    • Bone cancer Son    • Suicide Attempts Cousin    • Stomach cancer Cousin         maternal first cousin   • Breast cancer Paternal Aunt 52   • Diabetes Maternal Grandmother    • Diabetes Maternal Grandfather    • Lung cancer Maternal Grandfather    • Heart disease Paternal Grandmother    • Diabetes Paternal Grandmother    • Heart disease Paternal Grandfather    • Diabetes Paternal Grandfather    • Ovarian cancer Maternal Aunt    • Lung cancer Maternal Uncle    • Colon cancer Maternal Uncle    • Cancer Maternal Uncle         unknown type     MEDICATIONS:  The current medication list was reviewed in the EMR    Current Outpatient Medications:   •  aspirin (Aspirin Low Dose) 81 MG EC tablet, Take 2 tablets by mouth Daily., Disp: 60 tablet, Rfl: 3  •  atorvastatin (LIPITOR) 40 MG tablet, TAKE 1 TABLET BY MOUTH EVERY NIGHT., Disp: 90 tablet, Rfl: 0  •  azelastine (ASTELIN) 0.1 % nasal spray, Use in each  nostril as directed, Disp: 30 mL, Rfl: 3  •  Breo Ellipta 200-25 MCG/INH inhaler, Inhale 1 puff Daily., Disp: 1 each, Rfl: 3  •  Brexpiprazole (Rexulti) 2 MG tablet, Take 1 tablet by mouth Daily., Disp: 30 tablet, Rfl: 1  •  Calcium Carbonate 1500 (600 Ca) MG tablet, Take 1 tablet by mouth 2 (Two) Times a Day With Meals., Disp: 60 tablet, Rfl: 3  •  celecoxib (CeleBREX) 200 MG capsule, TAKE 1 CAPSULE BY MOUTH DAILY., Disp: 30 capsule, Rfl: 1  •  celecoxib (CeleBREX) 200 MG capsule, Take 1 capsule by mouth Daily., Disp: 30 capsule, Rfl: 1  •  cholecalciferol (Vitamin D) 25 MCG (1000 UT) tablet, Take 2 tablets by mouth Daily., Disp: 60 tablet, Rfl: 3  •  cloNIDine (CATAPRES) 0.1 MG tablet, Take 1 tablet by mouth 2 (Two) Times a Day., Disp: 60 tablet, Rfl: 3  •  cyanocobalamin 1000 MCG/ML injection, Inject 1 mL into the appropriate muscle as directed by prescriber Every 30 (Thirty) Days., Disp: 1 mL, Rfl: 3  •  diclofenac (VOLTAREN) 1 % gel gel, Apply 4 g topically to the appropriate area as directed 4 (Four) Times a Day As Needed (joint pain)., Disp: 500 g, Rfl: 5  •  Diclofenac Sodium (VOLTAREN) 1 % gel gel, Apply  topically to the appropriate area as directed 4 (Four) Times a Day., Disp: 150 g, Rfl: 3  •  Empagliflozin-Linaglip-Metform (Trijardy XR) 25-5-1000 MG tablet sustained-release 24 hour, Take 1 tablet by mouth Every Morning., Disp: 30 tablet, Rfl: 3  •  furosemide (LASIX) 20 MG tablet, Take 1 tablet by mouth Daily As Needed (edema)., Disp: 30 tablet, Rfl: 1  •  gabapentin (Neurontin) 100 MG capsule, Take 1 capsule by mouth 2 (Two) Times a Day., Disp: 60 capsule, Rfl: 3  •  HumaLOG 100 UNIT/ML injection, Maximum daily dose of 150 units, Disp: 40 mL, Rfl: 3  •  lansoprazole (PREVACID) 30 MG capsule, Take 1 capsule by mouth Daily., Disp: 90 capsule, Rfl: 3  •  levocetirizine (XYZAL) 5 MG tablet, Take 1 tablet by mouth Every Evening., Disp: 30 tablet, Rfl: 3  •  linaclotide (Linzess) 145 MCG capsule capsule,  Take 1 capsule by mouth Daily. 30 minutes before meals on an empty stomach., Disp: 30 capsule, Rfl: 3  •  loperamide (IMODIUM A-D) 2 MG tablet, Take 1 tablet by mouth 4 (Four) Times a Day As Needed for Diarrhea., Disp: 30 tablet, Rfl: 0  •  LORazepam (ATIVAN) 1 MG tablet, Take 1 tablet by mouth 3 (Three) Times a Day As Needed for Anxiety. for anxiety, Disp: 90 tablet, Rfl: 0  •  ondansetron (ZOFRAN) 8 MG tablet, Take 1 tablet by mouth Every 8 (Eight) Hours As Needed for Nausea or Vomiting., Disp: 30 tablet, Rfl: 3  •  ProAir  (90 Base) MCG/ACT inhaler, Inhale 2 puffs Every 4 (Four) Hours As Needed for Shortness of Air., Disp: 8.5 g, Rfl: 3  •  SUMAtriptan (IMITREX) 100 MG tablet, For Migraine, Disp: 8 tablet, Rfl: 3  •  tamoxifen (NOLVADEX) 20 MG chemo tablet, Take 1 tablet by mouth Daily., Disp: 30 tablet, Rfl: 11  •  topiramate (TOPAMAX) 50 MG tablet, Take 1 tablet by mouth 2 (Two) Times a Day., Disp: 60 tablet, Rfl: 1  •  venlafaxine XR (EFFEXOR-XR) 150 MG 24 hr capsule, Take 1 capsule by mouth Daily. Take with the Effexor XR 75mg capsule for a total daily dose of 225mg., Disp: 30 capsule, Rfl: 1  •  venlafaxine XR (EFFEXOR-XR) 75 MG 24 hr capsule, Take 1 capsule by mouth Daily., Disp: 30 capsule, Rfl: 1  •  vitamin D (ERGOCALCIFEROL) 1.25 MG (42307 UT) capsule capsule, Take 1 capsule by mouth 1 (One) Time Per Week., Disp: 4 capsule, Rfl: 3  No current facility-administered medications for this visit.    Facility-Administered Medications Ordered in Other Visits:   •  heparin injection 500 Units, 500 Units, Intravenous, PRN, Dejah Loco MD  •  sodium chloride 0.9 % flush 10 mL, 10 mL, Intravenous, PRN, Dejah Loco MD    ALLERGIES:    Allergies   Allergen Reactions   • Mobic [Meloxicam] Rash   • Penicillins Angioedema   • Taxotere [Docetaxel] Anaphylaxis     9 minutes into 1st infusion   • Novolog [Insulin Aspart] Hives   • Rosuvastatin Calcium GI Intolerance     REVIEW OF SYSTEMS:    A  comprehensive 14 point review of systems was performed.  Significant findings as mentioned above.  All other systems reviewed and are negative.      Physical Exam  Vital Signs:   Visit Vitals  /81   Pulse 96   Temp 97.3 °F (36.3 °C) (Temporal)   Resp 18   Wt 114 kg (251 lb 6.4 oz)   SpO2 96%   BMI 43.13 kg/m²     Pain Score    09/20/22 1301   PainSc:   6   PainLoc: Comment: arms and legs      ECOG score: 0   General: Awake, alert and oriented, in no acute distress.   HEENT:  PERRLA, EOM full, OP clear, mmm  Neck: No thyromegaly. No JVD.   Lungs: Lungs are clear to auscultation bilaterally, no crackles  Heart:  Regular rate and rhythm. No murmurs, rubs, or gallops.   Breast: S/p bilateral mastectomy and RSLNBx.  Surgical scars smooth and intact without nodularity. No axillary adenopathy on either side.   Abdomen: Soft, Non tender, non-distended, bowel sounds present.  Diabetic monitoring device in place.  Extremities: No clubbing, cyanosis or edema bilaterally.  She has limited ROM about the right shoulder, but she has markedly improved range of motion about the left shoulder..  She has a 6 mm pale red skin lesion on left upper extremity w/ excoriation surrounding.  Neurologic: MS as above. Grossly non focal exam    PATHOLOGY:              IMAGING:  Bilateral screening mammogram 06-19-20  FINDINGS:    The breast tissue is heterogeneously dense.  New focal nodularity right upper outer breast that is about 14 cm from  the nipple.  Otherwise, no suspicious findings.     IMPRESSION:  New focal nodularity right upper outer breast that is about  14 cm from the nipple.     RECOMMENDATION:  Spot compression imaging.     BI-RADS CAT 0, INCOMPLETE.  The patient needs additional imaging.        Diagnostic R mammogram with R breast US 07-14-20  FINDINGS:  Additional mammographic imaging images of persistent  partially obscured oval mass in the posterior right upper outer  quadrant.     Right breast ultrasound: Focused  sonographic evaluation of the right  breast demonstrates a 0.8 cm irregular hypoechoic mass with ill-defined  borders located at 10:00, 13 cm from the nipple. An additional area was  initially noted by the technologist in the 9:00 region which represents  an isolated fat lobule.     IMPRESSION:  0.8 cm irregular mass in the right 10:00 region. Recommend  ultrasound-guided core biopsy.     BI-RADS CATEGORY:  4, SUSPICIOUS     RECOMMENDATION:  Recommend ultrasound guided core biopsy of the right  breast.        Bilateral breast MRI w/wo contrast 08-11-20  FINDINGS: There is minimal bilateral background contrast enhancement and  normal vascular enhancement.     There are no worrisome areas of contrast enhancement in the left breast.     In the right breast correlating to the biopsy proven malignancy is an  approximately 0.7 cm irregular rapidly enhancing mass in the posterior  right 10:00 region. Artifact from a postbiopsy marking clip is located  adjacent to this mass. A small linear area of enhancement extends  posterior to this mass approximately 1 cm. There are no additional  worrisome areas of contrast enhancement in the right breast.     There is no axillary adenopathy by MRI criteria and no chest wall  abnormality is seen.     IMPRESSION:  1. Negative left breast MRI.     2. Biopsy-proven malignancy in the right 10:00 region with a small  linear area of enhancement extending posterior to the 0.7 cm mass.     RECOMMENDATIONS: Recommend surgical follow-up.     BI-RADS CATEGORY: 6, KNOWN BIOPSY PROVEN MALIGNANCY    Echo 10-16-17:  A two-dimensional transthoracic echocardiogram with color flow and Doppler was performed.   The study is technically good for diagnosis.Verbal consent was obtained from the patient to use saline contrast in order to optimize the study.  A total of 20 mL of agitated saline was administered to assess for cardiac shunting.   No adverse reaction to contrast was noted.   Echocardiogram  Findings    Left Ventricle Left ventricular systolic function is normal. Estimated EF appears to be in the range of 56 - 60%. Normal left ventricular cavity size and wall thickness noted. All left ventricular wall segments contract normally. Left ventricular diastolic function is normal.   Right Ventricle Normal right ventricular cavity size noted.   Left Atrium Normal left atrial size noted. Saline test results are negative.   Right Atrium Normal right atrial size noted.   Aortic Valve The aortic valve is structurally normal. Trace aortic valve regurgitation is present.   Mitral Valve The mitral valve is normal in structure. Trace mitral valve regurgitation is present.   Tricuspid Valve The tricuspid valve is normal.  Trace tricuspid valve regurgitation is present.   Pulmonic Valve The pulmonic valve is structurally normal. There is trace pulmonic valve regurgitation present.   Greater Vessels No dilation of the aortic root is present. No dilation of the sinuses of Valsalva is present.   Pericardium There is no evidence of pericardial effusion.       ECHO 09/29/2020  · Poor quality echo and Doppler study  · Normal left ventricular cavity size and wall thickness noted.  · Left ventricular ejection fraction appears to be 61 - 65%. Left ventricular systolic function is normal.  · Left ventricular diastolic function is consistent with (grade I) impaired relaxation.  · Aortic and mitral valve are poorly visualized but appear to be normal,  · Tricuspid and pulmonic valve echoes are not visualized.  · There is no pericardial effusion noted.    Echocardiogram 12-08-02  · Normal left ventricular cavity size and wall thickness noted. All left ventricular wall segments contract normally.  · Left ventricular ejection fraction appears to be 56 - 60%.  · The pericardium is normal. There is no evidence of pericardial effusion. .       MRI Brain w/wo contrast 01-28-21  FINDINGS:    Brain:  Unremarkable.  No mass.  No hemorrhage.   No acute infarct.    Ventricles:  Unremarkable.  No ventriculomegaly.    Bones/joints:  Unremarkable.    Sinuses:  Unremarkable as visualized.  No acute sinusitis.    Mastoid air cells:  Unremarkable as visualized.  No mastoid effusion.    Orbits:  Unremarkable as visualized.     IMPRESSION:    No acute findings in the head/brain.    ENDOSCOPY:    COLONOSCOPY PROCEDURE NOTE   5/5/2021 Dr. Aguilera       Findings:  1.  Normal colonoscopy with small internal hemorrhoids.  2.  Normal terminal ileum     OPERATIVE PROCEDURE   After proper informed consent was obtained, the patient was taken the operating suite and placed in left lateral decubitus position.  An Olympus video colonoscope 180 series was inserted in the rectum and advanced to the terminal ileum under direct visualization.  Cecum and terminal ileum were identified by visualization of the appendiceal orifice and ileocecal valve.  The colonoscope was then slowly withdrawn from the cecum to the rectum and passed a second time from rectum to cecum.  The colonoscope was retroflexed in the cecum and rectum. Scope was then withdrawn. Patient tolerated the procedure well. There were no immediate complications. Cecal withdrawal time was 9 minutes.     RECOMMENDATIONS:  1.  Repeat colonoscopy in 10 years for screening purposes.  2.  Continue Linzess.  3.  Anusol HC suppositories as needed for rectal bleeding.    RECENT LABS:  Lab Results   Component Value Date    WBC 14.31 (H) 09/20/2022    HGB 14.2 09/20/2022    HCT 44.6 09/20/2022    MCV 85.8 09/20/2022    RDW 13.9 09/20/2022     09/20/2022    NEUTRORELPCT 63.0 09/20/2022    LYMPHORELPCT 29.4 09/20/2022    MONORELPCT 5.7 09/20/2022    EOSRELPCT 1.0 09/20/2022    BASORELPCT 0.6 09/20/2022    NEUTROABS 9.01 (H) 09/20/2022    LYMPHSABS 4.21 (H) 09/20/2022       Lab Results   Component Value Date     09/20/2022    K 3.5 09/20/2022    CO2 22.8 09/20/2022     09/20/2022    BUN 16 09/20/2022    CREATININE  0.82 09/20/2022    EGFRIFNONA 89 01/11/2022    EGFRIFAFRI 108 01/11/2022    GLUCOSE 276 (H) 09/20/2022    CALCIUM 9.3 09/20/2022    ALKPHOS 130 (H) 09/20/2022    AST 11 09/20/2022    ALT 16 09/20/2022    BILITOT 0.2 09/20/2022    ALBUMIN 4.17 09/20/2022    PROTEINTOT 7.2 09/20/2022    MG 2.1 10/17/2017     Lab Results   Component Value Date    FDEVHHWW31 285 06/22/2022    FOLATE 15.19 10/16/2017        Lab Results   Component Value Date    CAION 1.28 10/16/2017    TSH 2.080 09/20/2022    MUTV79BW 41.8 06/22/2022         ASSESSMENT & PLAN:  Nivia Bernstein is a very pleasant 47 y.o. female with Stage IA ([id number]) moderately differentiated invasive ductal carcinoma of the R breast with associated DCIS.  Tumor was 10 mm in maximal dimension.  0/10 SLN involved. ER negative, WA 15% + (1+), HER-2/cat negative. Mammaprint high risk.     1. R breast cancer:   -  S/p R mastectomy and SLNBx as well as prophylactic contralateral mastectomy performed by Dr. Garza 8-18-20.  - She healed well from bilateral mastectomy. This was complicated by left seroma, drained by Dr. Garza. Incisions are healed well.   - Given high risk Mammaprint, Dr. Loco recommended adjuvant chemotherapy. Discussed different possibilities for adjuvant therapy. Given high risk Mammaprint but early stage, node negative disease as well as comorbidities, recommended Taxotere/Cytoxan Q21d x 4 cycles with growth factor support. Discussed potential risks, benefits, and side effects and she would like to proceed.   - She had port placed several years ago due to poor peripheral access. This has been well cared for and maintained.   - C1 Taxotere/Cytoxan was planned for 09/24/2020. Unfortunately, this had to be discontinued due to allergic reaction to Taxotere. Recommended change of treatment to DD AC.   - She tolerated treatment well and aside from fatigue and recovered well. ECHO was essentially unchanged.   -  Her tumor was ER neg but did have 15% 1+  positivity for progesterone receptor.  She is s/p complete hysterectomy and had evidence of osteopenia with T score -1.9 in June 2020.  Given all of this, recommended treatment with Tamoxifen over aromatase inhibitor.   - Exam and labs from today without cause for concern. Continue Tamoxifen daily.  - We will ask her return in 6 months for exam with CBC, CMP, TSH and vitamin D prior.    2. Extensive family history of malignancy:   - Genetic testing was ordered by Dr. Garza and performed by [facility] with no mutations, specifically including BRCA 1/2, CHKE2, CDH1, PALB2 and others.    3. Anxiety/Depression:  -  She continues to f/u with Psychiatry. She continues to do well in this regard.     4. Frozen shoulders Bilaterally:  - She has been seeing Dr. Sheehan of orthopedics.  She has received injection therapy as well as physical therapy.  She has significant improvement in the left shoulder.  She does still have some decreased range of motion about the right shoulder although it, too, is somewhat improved.  She continues to follow-up with Dr. Sheehan.  -  She has limitation of ROM and discomfort.  She was referred to ortho and they also recommended PT which she completed and reports was not successful.     5. Skin lesion:  -0.6 cm pale red lesion on left upper extremity with pruritus. Denies any changes.  Suspicious for dermatofibroma.  She would like to be evaluated by dermatology.  Previously referred her, but she notes she had several deaths in the family which kept her from being able to attend the appointment.  At this point she is also dealing with alopecia as below.  We will rerefer.    6.  Alopecia:  - Etiology uncertain.  B12 level was checked by her PCP in June and was borderline, and she was started on B12 injections.  - She denies any blood loss.  We will check ferritin and iron panel today.  TSH is within normal limits.  - We will place referral to dermatology.    7.  Prophylaxis:   -  She received  Prevnar 13 vaccine.  -  She had 2021 flu vaccine.  -  M. Uncle had colon cancer at age 50.  Referred to Dr. Aguilera for screening colonoscopy which was unremarkable and repeat exam recommended after 10-year interval..   - She has had COVID vaccine x2. Recommneded booster.     8.  Follow up:  - Continue Tamoxifen.  - Continue follow up with Dr. Sheehan.  -Rerefer to dermatology for evaluation of LUE skin lesion and alopecia.  - Check ferritin and iron panel today.  - MD follow up in 6 months with CBCD, CMP, TSH, Vit D prior.      9.  ACO / NII/Other  Quality measures  -  Nivia Bernstein received 2021 flu vaccine.  -  Nivia Bernstein reports a pain score of 6.  Given her pain assessment as noted, treatment options were discussed and the following options were decided upon as a follow-up plan to address the patient's pain: Continued follow-up with PCP for nonneoplasm related pain..  -  Current outpatient and discharge medications have been reconciled for the patient.  Reviewed by: Dejah Loco MD      I spent 30 minutes with Nivia Bernstein today.  In the office today, more than 50% of this time was spent face-to-face with her  in counseling / coordination of care, reviewing her interim medical history and counseling on the current treatment plan.  All questions were answered to her satisfaction.           Electronically Signed by: Dejah Loco MD  CC:   MD Otto Hager, aMrkus Garcia MD

## 2022-09-20 ENCOUNTER — INFUSION (OUTPATIENT)
Dept: ONCOLOGY | Facility: HOSPITAL | Age: 47
End: 2022-09-20

## 2022-09-20 ENCOUNTER — LAB (OUTPATIENT)
Dept: ONCOLOGY | Facility: HOSPITAL | Age: 47
End: 2022-09-20

## 2022-09-20 ENCOUNTER — OFFICE VISIT (OUTPATIENT)
Dept: ONCOLOGY | Facility: CLINIC | Age: 47
End: 2022-09-20

## 2022-09-20 VITALS
BODY MASS INDEX: 43.13 KG/M2 | TEMPERATURE: 97.3 F | BODY MASS INDEX: 43.13 KG/M2 | RESPIRATION RATE: 18 BRPM | SYSTOLIC BLOOD PRESSURE: 140 MMHG | SYSTOLIC BLOOD PRESSURE: 140 MMHG | WEIGHT: 251.4 LBS | HEART RATE: 96 BPM | WEIGHT: 251.4 LBS | HEART RATE: 96 BPM | TEMPERATURE: 97.3 F | DIASTOLIC BLOOD PRESSURE: 81 MMHG | DIASTOLIC BLOOD PRESSURE: 81 MMHG | OXYGEN SATURATION: 96 % | OXYGEN SATURATION: 96 % | RESPIRATION RATE: 18 BRPM

## 2022-09-20 DIAGNOSIS — Z95.828 PORT-A-CATH IN PLACE: Primary | ICD-10-CM

## 2022-09-20 DIAGNOSIS — C50.411 MALIGNANT NEOPLASM OF UPPER-OUTER QUADRANT OF RIGHT BREAST IN FEMALE, ESTROGEN RECEPTOR NEGATIVE: Primary | ICD-10-CM

## 2022-09-20 DIAGNOSIS — Z17.1 MALIGNANT NEOPLASM OF UPPER-OUTER QUADRANT OF RIGHT BREAST IN FEMALE, ESTROGEN RECEPTOR NEGATIVE: ICD-10-CM

## 2022-09-20 DIAGNOSIS — M75.01 ADHESIVE CAPSULITIS OF RIGHT SHOULDER: ICD-10-CM

## 2022-09-20 DIAGNOSIS — L65.9 ALOPECIA: ICD-10-CM

## 2022-09-20 DIAGNOSIS — C50.411 MALIGNANT NEOPLASM OF UPPER-OUTER QUADRANT OF RIGHT BREAST IN FEMALE, ESTROGEN RECEPTOR NEGATIVE: ICD-10-CM

## 2022-09-20 DIAGNOSIS — R53.83 FATIGUE, UNSPECIFIED TYPE: ICD-10-CM

## 2022-09-20 DIAGNOSIS — E55.9 VITAMIN D DEFICIENCY: ICD-10-CM

## 2022-09-20 DIAGNOSIS — Z17.1 MALIGNANT NEOPLASM OF UPPER-OUTER QUADRANT OF RIGHT BREAST IN FEMALE, ESTROGEN RECEPTOR NEGATIVE: Primary | ICD-10-CM

## 2022-09-20 DIAGNOSIS — L65.9 ALOPECIA: Primary | ICD-10-CM

## 2022-09-20 DIAGNOSIS — M85.89 OSTEOPENIA OF MULTIPLE SITES: ICD-10-CM

## 2022-09-20 LAB
ALBUMIN SERPL-MCNC: 4.17 G/DL (ref 3.5–5.2)
ALBUMIN/GLOB SERPL: 1.4 G/DL
ALP SERPL-CCNC: 130 U/L (ref 39–117)
ALT SERPL W P-5'-P-CCNC: 16 U/L (ref 1–33)
ANION GAP SERPL CALCULATED.3IONS-SCNC: 13.2 MMOL/L (ref 5–15)
AST SERPL-CCNC: 11 U/L (ref 1–32)
BASOPHILS # BLD AUTO: 0.09 10*3/MM3 (ref 0–0.2)
BASOPHILS NFR BLD AUTO: 0.6 % (ref 0–1.5)
BILIRUB SERPL-MCNC: 0.2 MG/DL (ref 0–1.2)
BUN SERPL-MCNC: 16 MG/DL (ref 6–20)
BUN/CREAT SERPL: 19.5 (ref 7–25)
CALCIUM SPEC-SCNC: 9.3 MG/DL (ref 8.6–10.5)
CHLORIDE SERPL-SCNC: 102 MMOL/L (ref 98–107)
CO2 SERPL-SCNC: 22.8 MMOL/L (ref 22–29)
CREAT SERPL-MCNC: 0.82 MG/DL (ref 0.57–1)
DEPRECATED RDW RBC AUTO: 43.4 FL (ref 37–54)
EGFRCR SERPLBLD CKD-EPI 2021: 87.8 ML/MIN/1.73
EOSINOPHIL # BLD AUTO: 0.14 10*3/MM3 (ref 0–0.4)
EOSINOPHIL NFR BLD AUTO: 1 % (ref 0.3–6.2)
ERYTHROCYTE [DISTWIDTH] IN BLOOD BY AUTOMATED COUNT: 13.9 % (ref 12.3–15.4)
FERRITIN SERPL-MCNC: 36.62 NG/ML (ref 13–150)
GLOBULIN UR ELPH-MCNC: 3 GM/DL
GLUCOSE SERPL-MCNC: 276 MG/DL (ref 65–99)
HCT VFR BLD AUTO: 44.6 % (ref 34–46.6)
HGB BLD-MCNC: 14.2 G/DL (ref 12–15.9)
IMM GRANULOCYTES # BLD AUTO: 0.05 10*3/MM3 (ref 0–0.05)
IMM GRANULOCYTES NFR BLD AUTO: 0.3 % (ref 0–0.5)
IRON 24H UR-MRATE: 46 MCG/DL (ref 37–145)
IRON SATN MFR SERPL: 10 % (ref 20–50)
LYMPHOCYTES # BLD AUTO: 4.21 10*3/MM3 (ref 0.7–3.1)
LYMPHOCYTES NFR BLD AUTO: 29.4 % (ref 19.6–45.3)
MCH RBC QN AUTO: 27.3 PG (ref 26.6–33)
MCHC RBC AUTO-ENTMCNC: 31.8 G/DL (ref 31.5–35.7)
MCV RBC AUTO: 85.8 FL (ref 79–97)
MONOCYTES # BLD AUTO: 0.81 10*3/MM3 (ref 0.1–0.9)
MONOCYTES NFR BLD AUTO: 5.7 % (ref 5–12)
NEUTROPHILS NFR BLD AUTO: 63 % (ref 42.7–76)
NEUTROPHILS NFR BLD AUTO: 9.01 10*3/MM3 (ref 1.7–7)
NRBC BLD AUTO-RTO: 0 /100 WBC (ref 0–0.2)
PLATELET # BLD AUTO: 299 10*3/MM3 (ref 140–450)
PMV BLD AUTO: 9.9 FL (ref 6–12)
POTASSIUM SERPL-SCNC: 3.5 MMOL/L (ref 3.5–5.2)
PROT SERPL-MCNC: 7.2 G/DL (ref 6–8.5)
RBC # BLD AUTO: 5.2 10*6/MM3 (ref 3.77–5.28)
SODIUM SERPL-SCNC: 138 MMOL/L (ref 136–145)
TIBC SERPL-MCNC: 459 MCG/DL (ref 298–536)
TRANSFERRIN SERPL-MCNC: 308 MG/DL (ref 200–360)
TSH SERPL DL<=0.05 MIU/L-ACNC: 2.08 UIU/ML (ref 0.27–4.2)
WBC NRBC COR # BLD: 14.31 10*3/MM3 (ref 3.4–10.8)

## 2022-09-20 PROCEDURE — 84443 ASSAY THYROID STIM HORMONE: CPT

## 2022-09-20 PROCEDURE — 99214 OFFICE O/P EST MOD 30 MIN: CPT | Performed by: INTERNAL MEDICINE

## 2022-09-20 PROCEDURE — 83540 ASSAY OF IRON: CPT

## 2022-09-20 PROCEDURE — 82306 VITAMIN D 25 HYDROXY: CPT

## 2022-09-20 PROCEDURE — 82728 ASSAY OF FERRITIN: CPT

## 2022-09-20 PROCEDURE — 36591 DRAW BLOOD OFF VENOUS DEVICE: CPT

## 2022-09-20 PROCEDURE — 80053 COMPREHEN METABOLIC PANEL: CPT

## 2022-09-20 PROCEDURE — 84466 ASSAY OF TRANSFERRIN: CPT

## 2022-09-20 PROCEDURE — 85025 COMPLETE CBC W/AUTO DIFF WBC: CPT

## 2022-09-20 PROCEDURE — 25010000002 HEPARIN LOCK FLUSH PER 10 UNITS: Performed by: INTERNAL MEDICINE

## 2022-09-20 RX ORDER — SODIUM CHLORIDE 0.9 % (FLUSH) 0.9 %
10 SYRINGE (ML) INJECTION AS NEEDED
Status: CANCELLED | OUTPATIENT
Start: 2022-11-04

## 2022-09-20 RX ORDER — SODIUM CHLORIDE 0.9 % (FLUSH) 0.9 %
10 SYRINGE (ML) INJECTION AS NEEDED
Status: DISCONTINUED | OUTPATIENT
Start: 2022-09-20 | End: 2022-09-20 | Stop reason: HOSPADM

## 2022-09-20 RX ORDER — HEPARIN SODIUM (PORCINE) LOCK FLUSH IV SOLN 100 UNIT/ML 100 UNIT/ML
500 SOLUTION INTRAVENOUS AS NEEDED
Status: CANCELLED | OUTPATIENT
Start: 2022-11-04

## 2022-09-20 RX ORDER — HEPARIN SODIUM (PORCINE) LOCK FLUSH IV SOLN 100 UNIT/ML 100 UNIT/ML
500 SOLUTION INTRAVENOUS AS NEEDED
Status: DISCONTINUED | OUTPATIENT
Start: 2022-09-20 | End: 2022-09-20 | Stop reason: HOSPADM

## 2022-09-20 RX ADMIN — Medication 500 UNITS: at 13:25

## 2022-09-20 RX ADMIN — Medication 10 ML: at 13:25

## 2022-09-21 LAB — 25(OH)D3 SERPL-MCNC: 50.3 NG/ML (ref 30–100)

## 2022-09-23 DIAGNOSIS — F42.8 OTHER OBSESSIVE-COMPULSIVE DISORDERS: Chronic | ICD-10-CM

## 2022-09-23 DIAGNOSIS — F33.3 SEVERE EPISODE OF RECURRENT MAJOR DEPRESSIVE DISORDER, WITH PSYCHOTIC FEATURES: ICD-10-CM

## 2022-09-23 DIAGNOSIS — F41.1 GENERALIZED ANXIETY DISORDER: ICD-10-CM

## 2022-09-23 RX ORDER — LORAZEPAM 1 MG/1
1 TABLET ORAL 3 TIMES DAILY PRN
Qty: 90 TABLET | Refills: 0 | Status: SHIPPED | OUTPATIENT
Start: 2022-09-23 | End: 2022-10-13 | Stop reason: SDUPTHER

## 2022-09-23 RX ORDER — VENLAFAXINE HYDROCHLORIDE 150 MG/1
150 CAPSULE, EXTENDED RELEASE ORAL DAILY
Qty: 30 CAPSULE | Refills: 1 | Status: SHIPPED | OUTPATIENT
Start: 2022-09-23 | End: 2022-10-13 | Stop reason: SDUPTHER

## 2022-09-23 RX ORDER — BREXPIPRAZOLE 2 MG/1
1 TABLET ORAL DAILY
Qty: 30 TABLET | Refills: 1 | Status: SHIPPED | OUTPATIENT
Start: 2022-09-23 | End: 2022-10-13 | Stop reason: SDUPTHER

## 2022-09-23 RX ORDER — VENLAFAXINE HYDROCHLORIDE 75 MG/1
75 CAPSULE, EXTENDED RELEASE ORAL DAILY
Qty: 30 CAPSULE | Refills: 1 | Status: SHIPPED | OUTPATIENT
Start: 2022-09-23 | End: 2022-10-13 | Stop reason: SDUPTHER

## 2022-09-27 ENCOUNTER — HOSPITAL ENCOUNTER (EMERGENCY)
Facility: HOSPITAL | Age: 47
Discharge: HOME | End: 2022-09-27
Attending: STUDENT IN AN ORGANIZED HEALTH CARE EDUCATION/TRAINING PROGRAM | Admitting: STUDENT IN AN ORGANIZED HEALTH CARE EDUCATION/TRAINING PROGRAM

## 2022-09-27 ENCOUNTER — OFFICE VISIT (OUTPATIENT)
Dept: FAMILY MEDICINE CLINIC | Facility: CLINIC | Age: 47
End: 2022-09-27

## 2022-09-27 ENCOUNTER — APPOINTMENT (OUTPATIENT)
Dept: CT IMAGING | Facility: HOSPITAL | Age: 47
End: 2022-09-27

## 2022-09-27 ENCOUNTER — APPOINTMENT (OUTPATIENT)
Dept: GENERAL RADIOLOGY | Facility: HOSPITAL | Age: 47
End: 2022-09-27

## 2022-09-27 VITALS
DIASTOLIC BLOOD PRESSURE: 81 MMHG | OXYGEN SATURATION: 98 % | RESPIRATION RATE: 20 BRPM | HEIGHT: 64 IN | SYSTOLIC BLOOD PRESSURE: 135 MMHG | BODY MASS INDEX: 41.83 KG/M2 | HEART RATE: 87 BPM | WEIGHT: 245 LBS | TEMPERATURE: 98 F

## 2022-09-27 VITALS
RESPIRATION RATE: 14 BRPM | HEIGHT: 64 IN | SYSTOLIC BLOOD PRESSURE: 120 MMHG | WEIGHT: 245 LBS | OXYGEN SATURATION: 92 % | HEART RATE: 98 BPM | TEMPERATURE: 97.8 F | BODY MASS INDEX: 41.83 KG/M2 | DIASTOLIC BLOOD PRESSURE: 88 MMHG

## 2022-09-27 DIAGNOSIS — R42 DIZZINESS: ICD-10-CM

## 2022-09-27 DIAGNOSIS — R42 DIZZINESS: Primary | ICD-10-CM

## 2022-09-27 DIAGNOSIS — E11.42 DM TYPE 2 WITH DIABETIC PERIPHERAL NEUROPATHY: Chronic | ICD-10-CM

## 2022-09-27 DIAGNOSIS — E78.5 DYSLIPIDEMIA: Chronic | ICD-10-CM

## 2022-09-27 DIAGNOSIS — Z96.41 INSULIN PUMP IN PLACE: ICD-10-CM

## 2022-09-27 DIAGNOSIS — F32.A ANXIETY AND DEPRESSION: Chronic | ICD-10-CM

## 2022-09-27 DIAGNOSIS — N30.00 ACUTE CYSTITIS WITHOUT HEMATURIA: Primary | ICD-10-CM

## 2022-09-27 DIAGNOSIS — F41.9 ANXIETY AND DEPRESSION: Chronic | ICD-10-CM

## 2022-09-27 LAB
ALBUMIN SERPL-MCNC: 4.03 G/DL (ref 3.5–5.2)
ALBUMIN/GLOB SERPL: 1.4 G/DL
ALP SERPL-CCNC: 115 U/L (ref 39–117)
ALT SERPL W P-5'-P-CCNC: 17 U/L (ref 1–33)
AMPHET+METHAMPHET UR QL: NEGATIVE
AMPHETAMINES UR QL: NEGATIVE
ANION GAP SERPL CALCULATED.3IONS-SCNC: 12.1 MMOL/L (ref 5–15)
AST SERPL-CCNC: 13 U/L (ref 1–32)
BACTERIA UR QL AUTO: ABNORMAL /HPF
BARBITURATES UR QL SCN: NEGATIVE
BASOPHILS # BLD AUTO: 0.07 10*3/MM3 (ref 0–0.2)
BASOPHILS NFR BLD AUTO: 0.5 % (ref 0–1.5)
BENZODIAZ UR QL SCN: POSITIVE
BILIRUB SERPL-MCNC: 0.3 MG/DL (ref 0–1.2)
BILIRUB UR QL STRIP: NEGATIVE
BUN SERPL-MCNC: 17 MG/DL (ref 6–20)
BUN/CREAT SERPL: 23.9 (ref 7–25)
BUPRENORPHINE SERPL-MCNC: NEGATIVE NG/ML
CALCIUM SPEC-SCNC: 8.9 MG/DL (ref 8.6–10.5)
CANNABINOIDS SERPL QL: NEGATIVE
CHLORIDE SERPL-SCNC: 107 MMOL/L (ref 98–107)
CLARITY UR: ABNORMAL
CO2 SERPL-SCNC: 21.9 MMOL/L (ref 22–29)
COCAINE UR QL: NEGATIVE
COLOR UR: YELLOW
CREAT SERPL-MCNC: 0.71 MG/DL (ref 0.57–1)
CRP SERPL-MCNC: 0.64 MG/DL (ref 0–0.5)
DEPRECATED RDW RBC AUTO: 43.6 FL (ref 37–54)
EGFRCR SERPLBLD CKD-EPI 2021: 104.4 ML/MIN/1.73
EOSINOPHIL # BLD AUTO: 0.16 10*3/MM3 (ref 0–0.4)
EOSINOPHIL NFR BLD AUTO: 1.2 % (ref 0.3–6.2)
ERYTHROCYTE [DISTWIDTH] IN BLOOD BY AUTOMATED COUNT: 14 % (ref 12.3–15.4)
FLUAV SUBTYP SPEC NAA+PROBE: NOT DETECTED
FLUBV RNA ISLT QL NAA+PROBE: NOT DETECTED
GLOBULIN UR ELPH-MCNC: 2.9 GM/DL
GLUCOSE SERPL-MCNC: 206 MG/DL (ref 65–99)
GLUCOSE UR STRIP-MCNC: ABNORMAL MG/DL
HCT VFR BLD AUTO: 43.6 % (ref 34–46.6)
HGB BLD-MCNC: 13.8 G/DL (ref 12–15.9)
HGB UR QL STRIP.AUTO: NEGATIVE
HYALINE CASTS UR QL AUTO: ABNORMAL /LPF
IMM GRANULOCYTES # BLD AUTO: 0.05 10*3/MM3 (ref 0–0.05)
IMM GRANULOCYTES NFR BLD AUTO: 0.4 % (ref 0–0.5)
KETONES UR QL STRIP: NEGATIVE
LEUKOCYTE ESTERASE UR QL STRIP.AUTO: ABNORMAL
LYMPHOCYTES # BLD AUTO: 4.42 10*3/MM3 (ref 0.7–3.1)
LYMPHOCYTES NFR BLD AUTO: 33.3 % (ref 19.6–45.3)
MCH RBC QN AUTO: 27 PG (ref 26.6–33)
MCHC RBC AUTO-ENTMCNC: 31.7 G/DL (ref 31.5–35.7)
MCV RBC AUTO: 85.3 FL (ref 79–97)
METHADONE UR QL SCN: NEGATIVE
MONOCYTES # BLD AUTO: 0.95 10*3/MM3 (ref 0.1–0.9)
MONOCYTES NFR BLD AUTO: 7.2 % (ref 5–12)
NEUTROPHILS NFR BLD AUTO: 57.4 % (ref 42.7–76)
NEUTROPHILS NFR BLD AUTO: 7.63 10*3/MM3 (ref 1.7–7)
NITRITE UR QL STRIP: NEGATIVE
NRBC BLD AUTO-RTO: 0 /100 WBC (ref 0–0.2)
NT-PROBNP SERPL-MCNC: 59.1 PG/ML (ref 0–450)
OPIATES UR QL: NEGATIVE
OXYCODONE UR QL SCN: NEGATIVE
PCP UR QL SCN: NEGATIVE
PH UR STRIP.AUTO: 5.5 [PH] (ref 5–8)
PLATELET # BLD AUTO: 281 10*3/MM3 (ref 140–450)
PMV BLD AUTO: 9.8 FL (ref 6–12)
POTASSIUM SERPL-SCNC: 3.4 MMOL/L (ref 3.5–5.2)
PROPOXYPH UR QL: NEGATIVE
PROT SERPL-MCNC: 6.9 G/DL (ref 6–8.5)
PROT UR QL STRIP: NEGATIVE
QT INTERVAL: 376 MS
QTC INTERVAL: 449 MS
RBC # BLD AUTO: 5.11 10*6/MM3 (ref 3.77–5.28)
RBC # UR STRIP: ABNORMAL /HPF
REF LAB TEST METHOD: ABNORMAL
SARS-COV-2 RNA PNL SPEC NAA+PROBE: NOT DETECTED
SODIUM SERPL-SCNC: 141 MMOL/L (ref 136–145)
SP GR UR STRIP: 1.03 (ref 1–1.03)
SQUAMOUS #/AREA URNS HPF: ABNORMAL /HPF
TRICYCLICS UR QL SCN: NEGATIVE
TROPONIN T SERPL-MCNC: <0.01 NG/ML (ref 0–0.03)
UROBILINOGEN UR QL STRIP: ABNORMAL
WBC # UR STRIP: ABNORMAL /HPF
WBC NRBC COR # BLD: 13.28 10*3/MM3 (ref 3.4–10.8)

## 2022-09-27 PROCEDURE — 81001 URINALYSIS AUTO W/SCOPE: CPT | Performed by: PHYSICIAN ASSISTANT

## 2022-09-27 PROCEDURE — 83880 ASSAY OF NATRIURETIC PEPTIDE: CPT | Performed by: PHYSICIAN ASSISTANT

## 2022-09-27 PROCEDURE — 87636 SARSCOV2 & INF A&B AMP PRB: CPT | Performed by: PHYSICIAN ASSISTANT

## 2022-09-27 PROCEDURE — 80053 COMPREHEN METABOLIC PANEL: CPT | Performed by: PHYSICIAN ASSISTANT

## 2022-09-27 PROCEDURE — 80306 DRUG TEST PRSMV INSTRMNT: CPT | Performed by: PHYSICIAN ASSISTANT

## 2022-09-27 PROCEDURE — 93005 ELECTROCARDIOGRAM TRACING: CPT | Performed by: PHYSICIAN ASSISTANT

## 2022-09-27 PROCEDURE — 25010000002 HEPARIN LOCK FLUSH PER 10 UNITS

## 2022-09-27 PROCEDURE — 85025 COMPLETE CBC W/AUTO DIFF WBC: CPT | Performed by: PHYSICIAN ASSISTANT

## 2022-09-27 PROCEDURE — 86140 C-REACTIVE PROTEIN: CPT | Performed by: PHYSICIAN ASSISTANT

## 2022-09-27 PROCEDURE — 36415 COLL VENOUS BLD VENIPUNCTURE: CPT

## 2022-09-27 PROCEDURE — 70450 CT HEAD/BRAIN W/O DYE: CPT

## 2022-09-27 PROCEDURE — 84484 ASSAY OF TROPONIN QUANT: CPT | Performed by: PHYSICIAN ASSISTANT

## 2022-09-27 PROCEDURE — 71045 X-RAY EXAM CHEST 1 VIEW: CPT

## 2022-09-27 PROCEDURE — 99214 OFFICE O/P EST MOD 30 MIN: CPT | Performed by: NURSE PRACTITIONER

## 2022-09-27 PROCEDURE — 99284 EMERGENCY DEPT VISIT MOD MDM: CPT

## 2022-09-27 RX ORDER — NITROFURANTOIN 25; 75 MG/1; MG/1
100 CAPSULE ORAL ONCE
Status: COMPLETED | OUTPATIENT
Start: 2022-09-27 | End: 2022-09-27

## 2022-09-27 RX ORDER — SODIUM CHLORIDE 0.9 % (FLUSH) 0.9 %
10 SYRINGE (ML) INJECTION AS NEEDED
Status: DISCONTINUED | OUTPATIENT
Start: 2022-09-27 | End: 2022-09-28 | Stop reason: HOSPADM

## 2022-09-27 RX ORDER — MECLIZINE HYDROCHLORIDE 25 MG/1
25 TABLET ORAL 3 TIMES DAILY PRN
Qty: 30 TABLET | Refills: 0 | Status: SHIPPED | OUTPATIENT
Start: 2022-09-27 | End: 2022-10-12

## 2022-09-27 RX ORDER — POTASSIUM CHLORIDE 20 MEQ/1
20 TABLET, EXTENDED RELEASE ORAL ONCE
Status: COMPLETED | OUTPATIENT
Start: 2022-09-27 | End: 2022-09-27

## 2022-09-27 RX ORDER — HEPARIN SODIUM (PORCINE) LOCK FLUSH IV SOLN 100 UNIT/ML 100 UNIT/ML
SOLUTION INTRAVENOUS
Status: COMPLETED
Start: 2022-09-27 | End: 2022-09-27

## 2022-09-27 RX ORDER — HEPARIN SODIUM (PORCINE) LOCK FLUSH IV SOLN 100 UNIT/ML 100 UNIT/ML
300 SOLUTION INTRAVENOUS ONCE
Status: COMPLETED | OUTPATIENT
Start: 2022-09-27 | End: 2022-09-27

## 2022-09-27 RX ORDER — NITROFURANTOIN 25; 75 MG/1; MG/1
100 CAPSULE ORAL 2 TIMES DAILY
Qty: 10 CAPSULE | Refills: 0 | Status: SHIPPED | OUTPATIENT
Start: 2022-09-27 | End: 2022-10-12

## 2022-09-27 RX ORDER — MECLIZINE HCL 12.5 MG/1
25 TABLET ORAL ONCE
Status: COMPLETED | OUTPATIENT
Start: 2022-09-27 | End: 2022-09-27

## 2022-09-27 RX ADMIN — POTASSIUM CHLORIDE 20 MEQ: 20 TABLET, EXTENDED RELEASE ORAL at 20:02

## 2022-09-27 RX ADMIN — SODIUM CHLORIDE 1000 ML: 9 INJECTION, SOLUTION INTRAVENOUS at 19:05

## 2022-09-27 RX ADMIN — NITROFURANTOIN MONOHYDRATE/MACROCRYSTALLINE 100 MG: 25; 75 CAPSULE ORAL at 21:11

## 2022-09-27 RX ADMIN — Medication 300 UNITS: at 23:16

## 2022-09-27 RX ADMIN — HEPARIN SODIUM (PORCINE) LOCK FLUSH IV SOLN 100 UNIT/ML 300 UNITS: 100 SOLUTION at 23:16

## 2022-09-27 RX ADMIN — MECLIZINE HCL 12.5 MG 25 MG: 12.5 TABLET ORAL at 22:21

## 2022-09-28 ENCOUNTER — TELEPHONE (OUTPATIENT)
Dept: FAMILY MEDICINE CLINIC | Facility: CLINIC | Age: 47
End: 2022-09-28

## 2022-09-28 DIAGNOSIS — R11.0 NAUSEA: ICD-10-CM

## 2022-09-28 DIAGNOSIS — I65.23 ATHEROSCLEROSIS OF BOTH CAROTID ARTERIES: Chronic | ICD-10-CM

## 2022-09-28 DIAGNOSIS — M19.011 PRIMARY OSTEOARTHRITIS OF RIGHT SHOULDER: Chronic | ICD-10-CM

## 2022-09-28 DIAGNOSIS — E11.42 DM TYPE 2 WITH DIABETIC PERIPHERAL NEUROPATHY: Chronic | ICD-10-CM

## 2022-09-28 NOTE — TELEPHONE ENCOUNTER
Josh wanted me to call and check on the patient and I spoke with her . He said that she still has a slight headache and she still is dizzy. He said she just got the medicine from the pharmacy that the ER gave her last night and she just took it. I told him I will let Josh know and he is in understanding.

## 2022-09-29 ENCOUNTER — PATIENT OUTREACH (OUTPATIENT)
Dept: CASE MANAGEMENT | Facility: OTHER | Age: 47
End: 2022-09-29

## 2022-09-29 ENCOUNTER — TELEPHONE (OUTPATIENT)
Dept: FAMILY MEDICINE CLINIC | Facility: CLINIC | Age: 47
End: 2022-09-29

## 2022-09-29 RX ORDER — ASPIRIN 81 MG/1
TABLET, COATED ORAL
Qty: 60 TABLET | Refills: 3 | Status: SHIPPED | OUTPATIENT
Start: 2022-09-29 | End: 2023-01-25

## 2022-09-29 RX ORDER — ONDANSETRON HYDROCHLORIDE 8 MG/1
TABLET, FILM COATED ORAL
Qty: 30 TABLET | Refills: 3 | Status: SHIPPED | OUTPATIENT
Start: 2022-09-29 | End: 2023-01-05

## 2022-09-29 RX ORDER — INSULIN LISPRO 100 [IU]/ML
INJECTION, SOLUTION INTRAVENOUS; SUBCUTANEOUS
Qty: 40 ML | Refills: 3 | Status: SHIPPED | OUTPATIENT
Start: 2022-09-29 | End: 2023-01-23

## 2022-09-29 RX ORDER — DIMENHYDRINATE 50 MG/1
50 TABLET ORAL 3 TIMES DAILY PRN
Qty: 20 TABLET | Refills: 0 | Status: SHIPPED | OUTPATIENT
Start: 2022-09-29 | End: 2022-10-12

## 2022-09-29 NOTE — TELEPHONE ENCOUNTER
Patient called wanting to know should she take the dramamine that Josh sent in for her today. I let her know that since she has taken 2 of the meclizine, I told her that she should take the dramamine tomorrow. Patient is in understanding.

## 2022-09-29 NOTE — OUTREACH NOTE
AMBULATORY CASE MANAGEMENT NOTE    Name and Relationship of Patient/Support Person: Nivia Bernstein - Self    Patient Outreach    [facility] ED visit 9/27/2022:Acute cystitis without hematuria, dizziness. Discharge prescriptions: Meclizine and Macrobid, patient taking as directed.  Patient reports she remains dizzy despite taking medication. Experiencing nausea, taking ondansetron as directed.  Complains of migraine headache that started today, has taken Imitrex prescribed dose.  Patient reminded of upcoming MD visit.  After hours contact numbers provided.  Role or ACM explained to patient, service declined.        MARY MCKEON  Ambulatory Case Management    9/29/2022, 14:45 EDT

## 2022-09-30 ENCOUNTER — TELEPHONE (OUTPATIENT)
Dept: FAMILY MEDICINE CLINIC | Facility: CLINIC | Age: 47
End: 2022-09-30

## 2022-09-30 RX ORDER — SCOLOPAMINE TRANSDERMAL SYSTEM 1 MG/1
1 PATCH, EXTENDED RELEASE TRANSDERMAL
Qty: 4 PATCH | Refills: 1 | Status: SHIPPED | OUTPATIENT
Start: 2022-09-30 | End: 2022-10-18

## 2022-09-30 NOTE — TELEPHONE ENCOUNTER
Josh spoke to patient about this.    ----- Message from NIDA Betancourt sent at 9/30/2022  1:53 PM EDT -----  She definitely does not need to get dehydrated How bad is it and does she have Zofran? The dizziness may continue but if it worsens she probably needs to go to ED especially if diarrhea is severe due to dehydration they can hydrate with IVs   ----- Message -----  From: Danelle Henson MA  Sent: 9/30/2022   1:43 PM EDT  To: NIDA Betancourt    She called and said that she was still having the dizziness and headache, but she is having diarrhea now. She wants to know what she should do?

## 2022-10-02 ENCOUNTER — HOSPITAL ENCOUNTER (EMERGENCY)
Facility: HOSPITAL | Age: 47
Discharge: HOME | End: 2022-10-02
Attending: EMERGENCY MEDICINE | Admitting: EMERGENCY MEDICINE

## 2022-10-02 ENCOUNTER — APPOINTMENT (OUTPATIENT)
Dept: CT IMAGING | Facility: HOSPITAL | Age: 47
End: 2022-10-02

## 2022-10-02 VITALS
HEART RATE: 88 BPM | TEMPERATURE: 97.8 F | RESPIRATION RATE: 18 BRPM | WEIGHT: 245 LBS | HEIGHT: 64 IN | SYSTOLIC BLOOD PRESSURE: 108 MMHG | DIASTOLIC BLOOD PRESSURE: 68 MMHG | OXYGEN SATURATION: 98 % | BODY MASS INDEX: 41.83 KG/M2

## 2022-10-02 DIAGNOSIS — G43.001 MIGRAINE WITHOUT AURA AND WITH STATUS MIGRAINOSUS, NOT INTRACTABLE: Primary | ICD-10-CM

## 2022-10-02 DIAGNOSIS — R42 DIZZINESS: ICD-10-CM

## 2022-10-02 LAB
ALBUMIN SERPL-MCNC: 4.25 G/DL (ref 3.5–5.2)
ALBUMIN/GLOB SERPL: 1.4 G/DL
ALP SERPL-CCNC: 116 U/L (ref 39–117)
ALT SERPL W P-5'-P-CCNC: 19 U/L (ref 1–33)
ANION GAP SERPL CALCULATED.3IONS-SCNC: 12.1 MMOL/L (ref 5–15)
AST SERPL-CCNC: 14 U/L (ref 1–32)
BASOPHILS # BLD AUTO: 0.08 10*3/MM3 (ref 0–0.2)
BASOPHILS NFR BLD AUTO: 0.6 % (ref 0–1.5)
BILIRUB SERPL-MCNC: 0.3 MG/DL (ref 0–1.2)
BUN SERPL-MCNC: 14 MG/DL (ref 6–20)
BUN/CREAT SERPL: 17.3 (ref 7–25)
CALCIUM SPEC-SCNC: 9.5 MG/DL (ref 8.6–10.5)
CHLORIDE SERPL-SCNC: 104 MMOL/L (ref 98–107)
CO2 SERPL-SCNC: 22.9 MMOL/L (ref 22–29)
CREAT SERPL-MCNC: 0.81 MG/DL (ref 0.57–1)
CRP SERPL-MCNC: 0.36 MG/DL (ref 0–0.5)
DEPRECATED RDW RBC AUTO: 43 FL (ref 37–54)
EGFRCR SERPLBLD CKD-EPI 2021: 89.1 ML/MIN/1.73
EOSINOPHIL # BLD AUTO: 0.13 10*3/MM3 (ref 0–0.4)
EOSINOPHIL NFR BLD AUTO: 1 % (ref 0.3–6.2)
ERYTHROCYTE [DISTWIDTH] IN BLOOD BY AUTOMATED COUNT: 13.9 % (ref 12.3–15.4)
ERYTHROCYTE [SEDIMENTATION RATE] IN BLOOD: 9 MM/HR (ref 0–20)
GLOBULIN UR ELPH-MCNC: 3 GM/DL
GLUCOSE SERPL-MCNC: 194 MG/DL (ref 65–99)
HCT VFR BLD AUTO: 46.8 % (ref 34–46.6)
HGB BLD-MCNC: 14.7 G/DL (ref 12–15.9)
IMM GRANULOCYTES # BLD AUTO: 0.06 10*3/MM3 (ref 0–0.05)
IMM GRANULOCYTES NFR BLD AUTO: 0.4 % (ref 0–0.5)
LYMPHOCYTES # BLD AUTO: 4.3 10*3/MM3 (ref 0.7–3.1)
LYMPHOCYTES NFR BLD AUTO: 31.8 % (ref 19.6–45.3)
MCH RBC QN AUTO: 27 PG (ref 26.6–33)
MCHC RBC AUTO-ENTMCNC: 31.4 G/DL (ref 31.5–35.7)
MCV RBC AUTO: 85.9 FL (ref 79–97)
MONOCYTES # BLD AUTO: 0.79 10*3/MM3 (ref 0.1–0.9)
MONOCYTES NFR BLD AUTO: 5.8 % (ref 5–12)
NEUTROPHILS NFR BLD AUTO: 60.4 % (ref 42.7–76)
NEUTROPHILS NFR BLD AUTO: 8.17 10*3/MM3 (ref 1.7–7)
NRBC BLD AUTO-RTO: 0 /100 WBC (ref 0–0.2)
PLATELET # BLD AUTO: 286 10*3/MM3 (ref 140–450)
PMV BLD AUTO: 9.5 FL (ref 6–12)
POTASSIUM SERPL-SCNC: 3.8 MMOL/L (ref 3.5–5.2)
PROT SERPL-MCNC: 7.2 G/DL (ref 6–8.5)
RBC # BLD AUTO: 5.45 10*6/MM3 (ref 3.77–5.28)
SODIUM SERPL-SCNC: 139 MMOL/L (ref 136–145)
WBC NRBC COR # BLD: 13.53 10*3/MM3 (ref 3.4–10.8)

## 2022-10-02 PROCEDURE — 96374 THER/PROPH/DIAG INJ IV PUSH: CPT

## 2022-10-02 PROCEDURE — 25010000002 PROCHLORPERAZINE 10 MG/2ML SOLUTION: Performed by: PHYSICIAN ASSISTANT

## 2022-10-02 PROCEDURE — 70450 CT HEAD/BRAIN W/O DYE: CPT

## 2022-10-02 PROCEDURE — 25010000002 DEXAMETHASONE SODIUM PHOSPHATE 10 MG/ML SOLUTION: Performed by: PHYSICIAN ASSISTANT

## 2022-10-02 PROCEDURE — 25010000002 HEPARIN LOCK FLUSH PER 10 UNITS: Performed by: EMERGENCY MEDICINE

## 2022-10-02 PROCEDURE — 36415 COLL VENOUS BLD VENIPUNCTURE: CPT

## 2022-10-02 PROCEDURE — 96375 TX/PRO/DX INJ NEW DRUG ADDON: CPT

## 2022-10-02 PROCEDURE — 85652 RBC SED RATE AUTOMATED: CPT | Performed by: PHYSICIAN ASSISTANT

## 2022-10-02 PROCEDURE — 80053 COMPREHEN METABOLIC PANEL: CPT | Performed by: PHYSICIAN ASSISTANT

## 2022-10-02 PROCEDURE — 86140 C-REACTIVE PROTEIN: CPT | Performed by: PHYSICIAN ASSISTANT

## 2022-10-02 PROCEDURE — 85025 COMPLETE CBC W/AUTO DIFF WBC: CPT | Performed by: PHYSICIAN ASSISTANT

## 2022-10-02 PROCEDURE — 25010000002 HYDROMORPHONE 1 MG/ML SOLUTION: Performed by: EMERGENCY MEDICINE

## 2022-10-02 PROCEDURE — 99283 EMERGENCY DEPT VISIT LOW MDM: CPT

## 2022-10-02 RX ORDER — PROCHLORPERAZINE EDISYLATE 5 MG/ML
10 INJECTION INTRAMUSCULAR; INTRAVENOUS ONCE
Status: COMPLETED | OUTPATIENT
Start: 2022-10-02 | End: 2022-10-02

## 2022-10-02 RX ORDER — SODIUM CHLORIDE 0.9 % (FLUSH) 0.9 %
10 SYRINGE (ML) INJECTION AS NEEDED
Status: DISCONTINUED | OUTPATIENT
Start: 2022-10-02 | End: 2022-10-02 | Stop reason: HOSPADM

## 2022-10-02 RX ORDER — DEXAMETHASONE SODIUM PHOSPHATE 10 MG/ML
10 INJECTION, SOLUTION INTRAMUSCULAR; INTRAVENOUS ONCE
Status: COMPLETED | OUTPATIENT
Start: 2022-10-02 | End: 2022-10-02

## 2022-10-02 RX ORDER — HYDROCODONE BITARTRATE AND ACETAMINOPHEN 7.5; 325 MG/1; MG/1
1 TABLET ORAL EVERY 4 HOURS PRN
Qty: 12 TABLET | Refills: 0 | Status: SHIPPED | OUTPATIENT
Start: 2022-10-02 | End: 2023-04-04

## 2022-10-02 RX ORDER — HEPARIN SODIUM (PORCINE) LOCK FLUSH IV SOLN 100 UNIT/ML 100 UNIT/ML
300 SOLUTION INTRAVENOUS ONCE
Status: COMPLETED | OUTPATIENT
Start: 2022-10-02 | End: 2022-10-02

## 2022-10-02 RX ADMIN — HYDROMORPHONE HYDROCHLORIDE 1 MG: 1 INJECTION, SOLUTION INTRAMUSCULAR; INTRAVENOUS; SUBCUTANEOUS at 13:13

## 2022-10-02 RX ADMIN — SODIUM CHLORIDE 1000 ML: 9 INJECTION, SOLUTION INTRAVENOUS at 12:37

## 2022-10-02 RX ADMIN — PROCHLORPERAZINE EDISYLATE 10 MG: 5 INJECTION INTRAMUSCULAR; INTRAVENOUS at 14:44

## 2022-10-02 RX ADMIN — DEXAMETHASONE SODIUM PHOSPHATE 10 MG: 10 INJECTION INTRAMUSCULAR; INTRAVENOUS at 14:43

## 2022-10-02 RX ADMIN — Medication 300 UNITS: at 15:43

## 2022-10-02 NOTE — ED PROVIDER NOTES
Subjective   History of Present Illness  This is a 47 year old female patient who presents to the ER with chief complaint of dizziness, Headache. PMH significant for migraines, breast cancer status post mastectomy currently on tamoxifen, status post hysterectomy. Yesterday, the patient had a migraine that eventually resolved with OTC treatments but today she woke up feeling dizzy and weak. NO chest pain or SOB.        History provided by:  Patient   used: No    Headache  Pain location:  Generalized  Quality:  Sharp and stabbing  Radiates to:  Does not radiate  Severity at highest:  8/10  Onset quality:  Gradual  Timing:  Intermittent  Progression:  Worsening  Chronicity:  New  Similar to prior headaches: no    Context: not activity, not exposure to bright light, not caffeine, not coughing, not eating, not stress, not exposure to cold air, not intercourse, not loud noise and not straining    Relieved by:  Nothing  Worsened by:  Nothing  Associated symptoms: back pain and dizziness    Associated symptoms: no abdominal pain, no congestion, no cough, no diarrhea, no drainage, no ear pain, no eye pain, no fatigue, no nausea, no numbness and no seizures    Risk factors: no anger, no family hx of SAH, does not have insomnia and lifestyle not sedentary        Review of Systems   Constitutional: Negative for activity change, appetite change, chills, diaphoresis and fatigue.   HENT: Negative for congestion, drooling, ear discharge, ear pain, facial swelling and postnasal drip.    Eyes: Negative.  Negative for pain, discharge, redness, itching and visual disturbance.   Respiratory: Negative.  Negative for cough, choking, shortness of breath and stridor.    Cardiovascular: Negative.  Negative for chest pain, palpitations and leg swelling.   Gastrointestinal: Negative.  Negative for abdominal pain, anal bleeding, blood in stool, constipation, diarrhea and nausea.   Endocrine: Negative.  Negative for heat  intolerance, polydipsia and polyphagia.   Genitourinary: Negative.  Negative for difficulty urinating, dyspareunia, dysuria, enuresis, flank pain, frequency, genital sores, menstrual problem, pelvic pain and urgency.   Musculoskeletal: Positive for arthralgias and back pain.   Skin: Negative.  Negative for color change, pallor, rash and wound.   Neurological: Positive for dizziness and headaches. Negative for seizures and numbness.   Hematological: Negative.  Does not bruise/bleed easily.   Psychiatric/Behavioral: Positive for agitation. Negative for confusion, decreased concentration, dysphoric mood and hallucinations.   All other systems reviewed and are negative.      Past Medical History:   Diagnosis Date   • Altered mental status 10/16/2017   • Anxiety and depression 3/31/2017   • Arthritis    • Asthma    • Elevated alkaline phosphatase level 10/16/2017   • Enlarged liver    • GERD (gastroesophageal reflux disease)    • Heart murmur    • Hypokalemia 10/16/2017   • Mitral valve prolapse    • Neuropathy     related to diabetes   • Obesity 3/31/2017   • OCD (obsessive compulsive disorder) 10/16/2017   • Osteoporosis    • PONV (postoperative nausea and vomiting)    • Renal insufficiency 10/16/2017   • Right breast cancer with malignant cells in regional lymph nodes no greater than 0.2 mm and no more than 200 cells (HCC) 8/13/2020   • TIA (transient ischemic attack)     4 TIMES       Allergies   Allergen Reactions   • Mobic [Meloxicam] Rash   • Penicillins Angioedema   • Taxotere [Docetaxel] Anaphylaxis     9 minutes into 1st infusion   • Novolog [Insulin Aspart] Hives   • Rosuvastatin Calcium GI Intolerance       Past Surgical History:   Procedure Laterality Date   • APPENDECTOMY     • CARPAL TUNNEL RELEASE     • CHOLECYSTECTOMY     • COLONOSCOPY N/A 5/5/2021    Procedure: COLONOSCOPY WITH BIOPSY;  Surgeon: Vickie Herrera MD;  Location: [facility];  Service: Gastroenterology;  Laterality: N/A;   • FINGER  FUSION Left     3rd   • HYSTERECTOMY  2011    removed due to an ovarian cyst and dysmenorrhia. No cancer noted. Complete   • KNEE ARTHROSCOPY Right    • MASTECTOMY Left 8/18/2020    Procedure: Left mastectomy;  Surgeon: Sheila Garza MD;  Location: [facility] OR;  Service: General;  Laterality: Left;   • MASTECTOMY WITH SENTINEL NODE BIOPSY AND AXILLARY NODE DISSECTION Right 8/18/2020    Procedure: Right BREAST MASTECTOMY WITH SENTINEL NODE BIOPSY;  Surgeon: Sheila Garza MD;  Location: [facility] OR;  Service: General;  Laterality: Right;   • PORTACATH PLACEMENT         Family History   Problem Relation Age of Onset   • Diabetes Mother    • Hypertension Mother    • Diabetes Father    • Heart disease Father    • Alcohol abuse Father    • Seizures Father    • Hypertension Father    • No Known Problems Brother    • No Known Problems Daughter    • Bone cancer Son    • Suicide Attempts Cousin    • Stomach cancer Cousin         maternal first cousin   • Breast cancer Paternal Aunt 52   • Diabetes Maternal Grandmother    • Diabetes Maternal Grandfather    • Lung cancer Maternal Grandfather    • Heart disease Paternal Grandmother    • Diabetes Paternal Grandmother    • Heart disease Paternal Grandfather    • Diabetes Paternal Grandfather    • Ovarian cancer Maternal Aunt    • Lung cancer Maternal Uncle    • Colon cancer Maternal Uncle    • Cancer Maternal Uncle         unknown type       Social History     Socioeconomic History   • Marital status:    Tobacco Use   • Smoking status: Never   • Smokeless tobacco: Never   Vaping Use   • Vaping Use: Never used   Substance and Sexual Activity   • Alcohol use: No   • Drug use: No   • Sexual activity: Yes     Partners: Male           Objective   Physical Exam  Vitals and nursing note reviewed.   Constitutional:       General: She is not in acute distress.     Appearance: Normal appearance. She is normal weight. She is not ill-appearing, toxic-appearing or diaphoretic.    HENT:      Head: Normocephalic and atraumatic.      Right Ear: Tympanic membrane, ear canal and external ear normal. There is no impacted cerumen.      Left Ear: Tympanic membrane, ear canal and external ear normal. There is no impacted cerumen.      Nose: Nose normal. No congestion or rhinorrhea.      Mouth/Throat:      Mouth: Mucous membranes are moist.      Pharynx: Oropharynx is clear. No oropharyngeal exudate or posterior oropharyngeal erythema.   Eyes:      General: No scleral icterus.        Right eye: No discharge.         Left eye: No discharge.      Extraocular Movements: Extraocular movements intact.      Conjunctiva/sclera: Conjunctivae normal.      Pupils: Pupils are equal, round, and reactive to light.   Neck:      Vascular: No carotid bruit.   Cardiovascular:      Rate and Rhythm: Normal rate and regular rhythm.      Pulses: Normal pulses.      Heart sounds: Normal heart sounds. No murmur heard.    No friction rub. No gallop.   Pulmonary:      Effort: Pulmonary effort is normal. No respiratory distress.      Breath sounds: Normal breath sounds. No stridor. No wheezing, rhonchi or rales.   Chest:      Chest wall: No tenderness.   Abdominal:      General: Abdomen is flat. Bowel sounds are normal. There is no distension.      Palpations: Abdomen is soft. There is no mass.      Tenderness: There is no abdominal tenderness. There is no right CVA tenderness, left CVA tenderness, guarding or rebound.      Hernia: No hernia is present.   Musculoskeletal:         General: No swelling, tenderness, deformity or signs of injury. Normal range of motion.      Cervical back: Normal range of motion and neck supple. No rigidity or tenderness.      Right lower leg: No edema.      Left lower leg: No edema.   Lymphadenopathy:      Cervical: No cervical adenopathy.   Skin:     General: Skin is warm and dry.      Capillary Refill: Capillary refill takes less than 2 seconds.      Coloration: Skin is not jaundiced or pale.       Findings: No bruising, erythema, lesion or rash.   Neurological:      General: No focal deficit present.      Mental Status: She is alert and oriented to person, place, and time. Mental status is at baseline.      Cranial Nerves: No cranial nerve deficit.      Sensory: No sensory deficit.      Motor: No weakness.      Coordination: Coordination normal.      Gait: Gait normal.      Deep Tendon Reflexes: Reflexes normal.   Psychiatric:         Mood and Affect: Mood normal.         Behavior: Behavior normal.         Thought Content: Thought content normal.         Judgment: Judgment normal.         Procedures           ED Course  ED Course as of 10/19/22 1603   Sun Oct 02, 2022   1354   IMPRESSION:  No acute intracranial findings.     No significant interval change from 9/27/2022.    [BH]      ED Course User Index  [] Antoine Sen PA-C                                           [facility]    Final diagnoses:   Migraine without aura and with status migrainosus, not intractable   Dizziness       ED Disposition  ED Disposition     ED Disposition   Discharge    Condition   Stable    Comment   --             Markus Menjivar MD  [address] [facility] [address] [phone]    Call in 1 day           Medication List      New Prescriptions    HYDROcodone-acetaminophen 7.5-325 MG per tablet  Commonly known as: NORCO  Take 1 tablet by mouth Every 4 (Four) Hours As Needed for Moderate Pain.           Where to Get Your Medications      These medications were sent to [facility] - [place], [place] - [address] [facility] - [phone] [facility] [phone]   [address] [facility] [address]    Phone: [phone]   · HYDROcodone-acetaminophen 7.5-325 MG per tablet          Antoine Sen PA-C  10/02/22 1409       Antoine Sen PA-C  10/19/22 7663       Antoine Sen PA-C  10/19/22 3251       Philipp Sousa MD  10/20/22 6983

## 2022-10-02 NOTE — ED PROVIDER NOTES
Subjective   History of Present Illness    Review of Systems    Past Medical History:   Diagnosis Date   • Altered mental status 10/16/2017   • Anxiety and depression 3/31/2017   • Arthritis    • Asthma    • Elevated alkaline phosphatase level 10/16/2017   • Enlarged liver    • GERD (gastroesophageal reflux disease)    • Heart murmur    • Hypokalemia 10/16/2017   • Mitral valve prolapse    • Neuropathy     related to diabetes   • Obesity 3/31/2017   • OCD (obsessive compulsive disorder) 10/16/2017   • Osteoporosis    • PONV (postoperative nausea and vomiting)    • Renal insufficiency 10/16/2017   • Right breast cancer with malignant cells in regional lymph nodes no greater than 0.2 mm and no more than 200 cells (HCC) 8/13/2020   • TIA (transient ischemic attack)     4 TIMES       Allergies   Allergen Reactions   • Mobic [Meloxicam] Rash   • Penicillins Angioedema   • Taxotere [Docetaxel] Anaphylaxis     9 minutes into 1st infusion   • Novolog [Insulin Aspart] Hives   • Rosuvastatin Calcium GI Intolerance       Past Surgical History:   Procedure Laterality Date   • APPENDECTOMY     • CARPAL TUNNEL RELEASE     • CHOLECYSTECTOMY     • COLONOSCOPY N/A 5/5/2021    Procedure: COLONOSCOPY WITH BIOPSY;  Surgeon: Vickie Herrera MD;  Location: [facility] OR;  Service: Gastroenterology;  Laterality: N/A;   • FINGER FUSION Left     3rd   • HYSTERECTOMY  2011    removed due to an ovarian cyst and dysmenorrhia. No cancer noted. Complete   • KNEE ARTHROSCOPY Right    • MASTECTOMY Left 8/18/2020    Procedure: Left mastectomy;  Surgeon: Sheila Garza MD;  Location: [facility] OR;  Service: General;  Laterality: Left;   • MASTECTOMY WITH SENTINEL NODE BIOPSY AND AXILLARY NODE DISSECTION Right 8/18/2020    Procedure: Right BREAST MASTECTOMY WITH SENTINEL NODE BIOPSY;  Surgeon: Sheila Garza MD;  Location: [facility] OR;  Service: General;  Laterality: Right;   • PORTACATH PLACEMENT         Family History   Problem Relation  Age of Onset   • Diabetes Mother    • Hypertension Mother    • Diabetes Father    • Heart disease Father    • Alcohol abuse Father    • Seizures Father    • Hypertension Father    • No Known Problems Brother    • No Known Problems Daughter    • Bone cancer Son    • Suicide Attempts Cousin    • Stomach cancer Cousin         maternal first cousin   • Breast cancer Paternal Aunt 52   • Diabetes Maternal Grandmother    • Diabetes Maternal Grandfather    • Lung cancer Maternal Grandfather    • Heart disease Paternal Grandmother    • Diabetes Paternal Grandmother    • Heart disease Paternal Grandfather    • Diabetes Paternal Grandfather    • Ovarian cancer Maternal Aunt    • Lung cancer Maternal Uncle    • Colon cancer Maternal Uncle    • Cancer Maternal Uncle         unknown type       Social History     Socioeconomic History   • Marital status:    Tobacco Use   • Smoking status: Never Smoker   • Smokeless tobacco: Never Used   Vaping Use   • Vaping Use: Never used   Substance and Sexual Activity   • Alcohol use: No   • Drug use: No   • Sexual activity: Yes     Partners: Male           Objective   Physical Exam    Procedures           ED Course  ED Course as of 10/02/22 1535   Sun Oct 02, 2022   1354   IMPRESSION:  No acute intracranial findings.     No significant interval change from 9/27/2022.    [BH]      ED Course User Index  [] Antoine Sen PA-C                                           [facility]    Final diagnoses:   Migraine without aura and with status migrainosus, not intractable   Dizziness       ED Disposition  ED Disposition     ED Disposition   Discharge    Condition   Stable    Comment   --             Markus Menjivar MD  [address] [id number] [phone]    Call in 1 day           Medication List      New Prescriptions    HYDROcodone-acetaminophen 7.5-325 MG per tablet  Commonly known as: NORCO  Take 1 tablet by mouth Every 4 (Four) Hours As Needed for Moderate Pain.            Where to Get Your Medications      These medications were sent to [facility] - [place], [place] - [address] [place] [address] - [phone]  - [phone]   [address] [facility] [address]    Phone: [phone]   · HYDROcodone-acetaminophen 7.5-325 MG per tablet          Philipp Sousa MD  10/20/22 0793

## 2022-10-03 DIAGNOSIS — R23.2 HOT FLASHES DUE TO TAMOXIFEN: ICD-10-CM

## 2022-10-03 DIAGNOSIS — T45.1X5A HOT FLASHES DUE TO TAMOXIFEN: ICD-10-CM

## 2022-10-03 RX ORDER — FLUCONAZOLE 150 MG/1
TABLET ORAL
Qty: 3 TABLET | Refills: 1 | Status: SHIPPED | OUTPATIENT
Start: 2022-10-03 | End: 2022-10-12

## 2022-10-03 RX ORDER — GABAPENTIN 100 MG/1
CAPSULE ORAL
Qty: 60 CAPSULE | Refills: 3 | Status: SHIPPED | OUTPATIENT
Start: 2022-10-03 | End: 2023-01-25

## 2022-10-05 DIAGNOSIS — J45.20 MILD INTERMITTENT ASTHMA WITHOUT COMPLICATION: Chronic | ICD-10-CM

## 2022-10-06 ENCOUNTER — LAB (OUTPATIENT)
Dept: FAMILY MEDICINE CLINIC | Facility: CLINIC | Age: 47
End: 2022-10-06

## 2022-10-06 DIAGNOSIS — E55.9 VITAMIN D DEFICIENCY: ICD-10-CM

## 2022-10-06 DIAGNOSIS — F32.A ANXIETY AND DEPRESSION: ICD-10-CM

## 2022-10-06 DIAGNOSIS — E78.2 MIXED DYSLIPIDEMIA: ICD-10-CM

## 2022-10-06 DIAGNOSIS — F41.9 ANXIETY AND DEPRESSION: ICD-10-CM

## 2022-10-06 DIAGNOSIS — R42 VERTIGO: ICD-10-CM

## 2022-10-06 DIAGNOSIS — R60.1 GENERALIZED EDEMA: ICD-10-CM

## 2022-10-06 DIAGNOSIS — E78.5 DYSLIPIDEMIA: ICD-10-CM

## 2022-10-06 DIAGNOSIS — M19.011 OSTEOARTHRITIS OF RIGHT ACROMIOCLAVICULAR JOINT: ICD-10-CM

## 2022-10-06 DIAGNOSIS — E11.42 DM TYPE 2 WITH DIABETIC PERIPHERAL NEUROPATHY: ICD-10-CM

## 2022-10-06 DIAGNOSIS — K21.9 GASTROESOPHAGEAL REFLUX DISEASE WITHOUT ESOPHAGITIS: ICD-10-CM

## 2022-10-06 DIAGNOSIS — N28.9 RENAL INSUFFICIENCY: ICD-10-CM

## 2022-10-06 DIAGNOSIS — E66.01 CLASS 3 SEVERE OBESITY WITH SERIOUS COMORBIDITY AND BODY MASS INDEX (BMI) OF 40.0 TO 44.9 IN ADULT, UNSPECIFIED OBESITY TYPE: ICD-10-CM

## 2022-10-06 DIAGNOSIS — E11.42 DM TYPE 2 WITH DIABETIC PERIPHERAL NEUROPATHY: Primary | ICD-10-CM

## 2022-10-06 DIAGNOSIS — E78.2 MIXED HYPERLIPIDEMIA: Primary | ICD-10-CM

## 2022-10-06 RX ORDER — FUROSEMIDE 20 MG/1
20 TABLET ORAL DAILY PRN
Qty: 30 TABLET | Refills: 1 | Status: SHIPPED | OUTPATIENT
Start: 2022-10-06 | End: 2022-12-13

## 2022-10-07 LAB
25(OH)D3+25(OH)D2 SERPL-MCNC: 44.8 NG/ML (ref 30–100)
ALBUMIN SERPL-MCNC: 4.6 G/DL (ref 3.5–5.2)
ALBUMIN/GLOB SERPL: 2.3 G/DL
ALP SERPL-CCNC: 107 U/L (ref 39–117)
ALT SERPL-CCNC: 20 U/L (ref 1–33)
AST SERPL-CCNC: 12 U/L (ref 1–32)
BASOPHILS # BLD AUTO: 0.07 10*3/MM3 (ref 0–0.2)
BASOPHILS NFR BLD AUTO: 0.5 % (ref 0–1.5)
BILIRUB SERPL-MCNC: 0.2 MG/DL (ref 0–1.2)
BUN SERPL-MCNC: 15 MG/DL (ref 6–20)
BUN/CREAT SERPL: 17.6 (ref 7–25)
CALCIUM SERPL-MCNC: 9.5 MG/DL (ref 8.6–10.5)
CHLORIDE SERPL-SCNC: 104 MMOL/L (ref 98–107)
CHOLEST SERPL-MCNC: 113 MG/DL (ref 0–200)
CO2 SERPL-SCNC: 23.3 MMOL/L (ref 22–29)
CREAT SERPL-MCNC: 0.85 MG/DL (ref 0.57–1)
EGFRCR SERPLBLD CKD-EPI 2021: 84.1 ML/MIN/1.73
EOSINOPHIL # BLD AUTO: 0.14 10*3/MM3 (ref 0–0.4)
EOSINOPHIL NFR BLD AUTO: 1.1 % (ref 0.3–6.2)
ERYTHROCYTE [DISTWIDTH] IN BLOOD BY AUTOMATED COUNT: 13.4 % (ref 12.3–15.4)
GLOBULIN SER CALC-MCNC: 2 GM/DL
GLUCOSE SERPL-MCNC: 176 MG/DL (ref 65–99)
HBA1C MFR BLD: 7.9 % (ref 4.8–5.6)
HCT VFR BLD AUTO: 45.5 % (ref 34–46.6)
HDLC SERPL-MCNC: 31 MG/DL (ref 40–60)
HGB BLD-MCNC: 14.7 G/DL (ref 12–15.9)
IMM GRANULOCYTES # BLD AUTO: 0.06 10*3/MM3 (ref 0–0.05)
IMM GRANULOCYTES NFR BLD AUTO: 0.5 % (ref 0–0.5)
LDLC SERPL CALC-MCNC: 52 MG/DL (ref 0–100)
LYMPHOCYTES # BLD AUTO: 3.75 10*3/MM3 (ref 0.7–3.1)
LYMPHOCYTES NFR BLD AUTO: 29.3 % (ref 19.6–45.3)
MAGNESIUM SERPL-MCNC: 2.4 MG/DL (ref 1.6–2.6)
MCH RBC QN AUTO: 27 PG (ref 26.6–33)
MCHC RBC AUTO-ENTMCNC: 32.3 G/DL (ref 31.5–35.7)
MCV RBC AUTO: 83.5 FL (ref 79–97)
MICROALBUMIN UR-MCNC: <3 UG/ML
MONOCYTES # BLD AUTO: 0.71 10*3/MM3 (ref 0.1–0.9)
MONOCYTES NFR BLD AUTO: 5.6 % (ref 5–12)
NEUTROPHILS # BLD AUTO: 8.06 10*3/MM3 (ref 1.7–7)
NEUTROPHILS NFR BLD AUTO: 63 % (ref 42.7–76)
NRBC BLD AUTO-RTO: 0 /100 WBC (ref 0–0.2)
PLATELET # BLD AUTO: 319 10*3/MM3 (ref 140–450)
POTASSIUM SERPL-SCNC: 3.8 MMOL/L (ref 3.5–5.2)
PROT SERPL-MCNC: 6.6 G/DL (ref 6–8.5)
RBC # BLD AUTO: 5.45 10*6/MM3 (ref 3.77–5.28)
SODIUM SERPL-SCNC: 140 MMOL/L (ref 136–145)
TRIGL SERPL-MCNC: 183 MG/DL (ref 0–150)
TSH SERPL DL<=0.005 MIU/L-ACNC: 2.03 UIU/ML (ref 0.27–4.2)
URATE SERPL-MCNC: 4.8 MG/DL (ref 2.4–5.7)
VLDLC SERPL CALC-MCNC: 30 MG/DL (ref 5–40)
WBC # BLD AUTO: 12.79 10*3/MM3 (ref 3.4–10.8)

## 2022-10-12 ENCOUNTER — TELEPHONE (OUTPATIENT)
Dept: FAMILY MEDICINE CLINIC | Facility: CLINIC | Age: 47
End: 2022-10-12

## 2022-10-12 ENCOUNTER — OFFICE VISIT (OUTPATIENT)
Dept: FAMILY MEDICINE CLINIC | Facility: CLINIC | Age: 47
End: 2022-10-12

## 2022-10-12 VITALS
HEART RATE: 83 BPM | SYSTOLIC BLOOD PRESSURE: 100 MMHG | DIASTOLIC BLOOD PRESSURE: 70 MMHG | OXYGEN SATURATION: 98 % | BODY MASS INDEX: 41.66 KG/M2 | HEIGHT: 64 IN | RESPIRATION RATE: 14 BRPM | TEMPERATURE: 97.9 F | WEIGHT: 244 LBS

## 2022-10-12 DIAGNOSIS — Z23 ENCOUNTER FOR IMMUNIZATION: ICD-10-CM

## 2022-10-12 DIAGNOSIS — M85.89 OSTEOPENIA OF MULTIPLE SITES: Chronic | ICD-10-CM

## 2022-10-12 DIAGNOSIS — J30.9 CHRONIC ALLERGIC RHINITIS: Chronic | ICD-10-CM

## 2022-10-12 DIAGNOSIS — K21.9 GASTROESOPHAGEAL REFLUX DISEASE WITHOUT ESOPHAGITIS: Chronic | ICD-10-CM

## 2022-10-12 DIAGNOSIS — E11.42 DM TYPE 2 WITH DIABETIC PERIPHERAL NEUROPATHY: Primary | Chronic | ICD-10-CM

## 2022-10-12 DIAGNOSIS — Z96.41 INSULIN PUMP IN PLACE: ICD-10-CM

## 2022-10-12 DIAGNOSIS — G43.511 INTRACTABLE PERSISTENT MIGRAINE AURA WITHOUT CEREBRAL INFARCTION AND WITH STATUS MIGRAINOSUS: Chronic | ICD-10-CM

## 2022-10-12 DIAGNOSIS — M85.89 OSTEOPENIA OF MULTIPLE SITES: Primary | ICD-10-CM

## 2022-10-12 DIAGNOSIS — E78.5 DYSLIPIDEMIA: Chronic | ICD-10-CM

## 2022-10-12 PROBLEM — E87.6 HYPOKALEMIA: Status: RESOLVED | Noted: 2017-10-16 | Resolved: 2022-10-12

## 2022-10-12 PROBLEM — N28.9 RENAL INSUFFICIENCY: Status: RESOLVED | Noted: 2017-10-16 | Resolved: 2022-10-12

## 2022-10-12 PROBLEM — N39.0 RECURRENT UTI: Status: RESOLVED | Noted: 2021-11-16 | Resolved: 2022-10-12

## 2022-10-12 PROBLEM — G43.501: Status: ACTIVE | Noted: 2022-10-12

## 2022-10-12 PROCEDURE — 95251 CONT GLUC MNTR ANALYSIS I&R: CPT | Performed by: NURSE PRACTITIONER

## 2022-10-12 PROCEDURE — 90471 IMMUNIZATION ADMIN: CPT | Performed by: NURSE PRACTITIONER

## 2022-10-12 PROCEDURE — 99215 OFFICE O/P EST HI 40 MIN: CPT | Performed by: NURSE PRACTITIONER

## 2022-10-12 PROCEDURE — 90686 IIV4 VACC NO PRSV 0.5 ML IM: CPT | Performed by: NURSE PRACTITIONER

## 2022-10-12 RX ORDER — TIRZEPATIDE 2.5 MG/.5ML
2.5 INJECTION, SOLUTION SUBCUTANEOUS WEEKLY
Qty: 2 ML | Refills: 0 | Status: SHIPPED | OUTPATIENT
Start: 2022-10-12 | End: 2022-10-12

## 2022-10-12 RX ORDER — SEMAGLUTIDE 1.34 MG/ML
0.25 INJECTION, SOLUTION SUBCUTANEOUS WEEKLY
Qty: 2 ML | Refills: 0 | Status: SHIPPED | OUTPATIENT
Start: 2022-10-12 | End: 2022-11-17 | Stop reason: DRUGHIGH

## 2022-10-12 RX ORDER — ALENDRONATE SODIUM 35 MG/1
35 TABLET ORAL
Qty: 4 TABLET | Refills: 3 | Status: SHIPPED | OUTPATIENT
Start: 2022-10-12 | End: 2023-02-20

## 2022-10-12 NOTE — PROGRESS NOTES
History of Present Illness  Nivia is a 48 y/o female who presents to the clinic today for follow up pertaining to her DM, type 2 and Dyslipidemia. She has been diagnosed with right breast Cancer and has undergone bilateral mastectomy and chemotherapy. In addition, she has GERD, mitral valve prolapse, Osteopenia/arthritis, migraine headaches and anxiety depression.  Nivia has had an episode with an ongoing migraine which started in late September.  Noted she has had several ED visits and office visit.  CT of the brain has been negative. She does see neurology at [facility] and recently started on Emgality.     Diabetes   She has type 2 diabetes mellitus. The initial diagnosis of diabetes was made 20 years ago. Diabetic complications include peripheral neuropathy. Risk factors for coronary artery disease include post-menopausal, stress and dyslipidemia. She is currently utilizing insulin pump therapy and CGM. She did stop weekly Ozempic due to nausea. She is currently taking Trijardy.  She has had a previous visit with a dietitian An ACE inhibitor/angiotensin II receptor blocker is not  being taken at this time..       Lab Results   Component Value Date    HGBA1C 7.70 (H) 03/15/2022     Lab Results   Component Value Date    HGBA1C 7.40 (H) 06/22/2022     Lab Results   Component Value Date    HGBA1C 7.90 (H) 10/06/2022     Dyslipidemia   The current episode started more than 1 year ago. Pertinent negatives include no chest pain or shortness of breath. She is taking Crestor 40 mg.   Lab Results   Component Value Date    CHLPL 113 10/06/2022    CHLPL 175 03/15/2022    CHLPL 194 01/11/2022     Lab Results   Component Value Date    TRIG 183 (H) 10/06/2022    TRIG 302 (H) 06/22/2022    TRIG 192 (H) 03/15/2022     Lab Results   Component Value Date    HDL 31 (L) 10/06/2022    HDL 32 (L) 06/22/2022    HDL 31 (L) 03/15/2022     Lab Results   Component Value Date    LDL 52 10/06/2022    LDL 91 06/22/2022    LDL  "110 (H) 03/15/2022       The following portions of the patient's history were reviewed and updated as appropriate: allergies, current medications, past family history, past medical history, past social history, past surgical history and problem list.    Review of Systems   Constitutional: Positive for activity change and fatigue. Negative for appetite change, chills, fever and unexpected weight change.   HENT: Negative for congestion.    Eyes: Negative for visual disturbance.   Respiratory: Negative for cough, shortness of breath and wheezing.    Cardiovascular: Positive for leg swelling (Intermittently). Negative for chest pain and palpitations.   Gastrointestinal: Negative for nausea and vomiting.        GERD   Endocrine: Negative for cold intolerance, heat intolerance, polydipsia, polyphagia and polyuria.   Musculoskeletal: Positive for arthralgias and back pain.   Skin: Negative for color change and rash.   Allergic/Immunologic: Positive for environmental allergies.   Neurological: Positive for dizziness, weakness, numbness and headaches (Migraines). Negative for tremors, speech difficulty and light-headedness.   Hematological: Negative for adenopathy.   Psychiatric/Behavioral: Negative for confusion, decreased concentration and sleep disturbance. The patient is not nervous/anxious.    All other systems reviewed and are negative.    Vital signs:  /70 (BP Location: Left arm, Patient Position: Sitting, Cuff Size: Adult)   Pulse 83   Temp 97.9 °F (36.6 °C) (Temporal)   Resp 14   Ht 162.6 cm (64\")   Wt 111 kg (244 lb)   SpO2 98%   BMI 41.88 kg/m²     Physical Exam  Vitals and nursing note reviewed.   Constitutional:       General: She is not in acute distress.     Appearance: She is well-developed.      Interventions: Face mask in place.      Comments:  accompanies her today   HENT:      Head: Normocephalic.      Nose: Nose normal.   Eyes:      General: No scleral icterus.        Right eye: No " discharge.         Left eye: No discharge.      Conjunctiva/sclera: Conjunctivae normal.   Neck:      Thyroid: No thyromegaly.      Vascular: No JVD.   Cardiovascular:      Rate and Rhythm: Normal rate and regular rhythm.      Heart sounds: S1 normal and S2 normal.   Pulmonary:      Effort: Pulmonary effort is normal.      Breath sounds: Normal breath sounds.   Abdominal:      Palpations: Abdomen is soft.      Tenderness: There is no abdominal tenderness. There is no guarding.   Musculoskeletal:      Cervical back: Neck supple.      Right lower leg: No edema.      Left lower leg: No edema.   Feet:      Comments: Had both great toenails removed   Skin:     General: Skin is dry.      Capillary Refill: Capillary refill takes less than 2 seconds.   Neurological:      Mental Status: She is alert.      Motor: Weakness present.   Psychiatric:         Mood and Affect: Mood and affect normal.         Speech: Speech normal.         Behavior: Behavior is cooperative.         Thought Content: Thought content normal.       Result Review : [employer] Insulin pump and CGM uploaded and reviewed with Nivia.                                  Results for orders placed or performed in visit on 10/06/22   Magnesium    Specimen: Hand, Left; Blood    Blood  Release to genevieve   Result Value Ref Range    Magnesium 2.4 1.6 - 2.6 mg/dL   MicroAlbumin, Urine, Random - Urine, Clean Catch    Specimen: Urine, Clean Catch    Urine  Release to genevieve   Result Value Ref Range    Microalbumin, Urine <3.0 Not Estab. ug/mL   Uric acid    Specimen: Hand, Left; Blood    Blood  Release to genevieve   Result Value Ref Range    Uric Acid 4.8 2.4 - 5.7 mg/dL   Vitamin D 25 hydroxy    Specimen: Hand, Left; Blood    Blood  Release to genevieve   Result Value Ref Range    25 Hydroxy, Vitamin D 44.8 30.0 - 100.0 ng/ml   Hemoglobin A1c    Specimen: Hand, Left; Blood    Blood  Release to genevieve   Result Value Ref Range    Hemoglobin A1C 7.90 (H) 4.80 - 5.60 %   TSH    Specimen:  Hand, Left; Blood    Blood  Release to genevieve   Result Value Ref Range    TSH 2.030 0.270 - 4.200 uIU/mL   Lipid panel    Specimen: Hand, Left; Blood    Blood  Release to genevieve   Result Value Ref Range    Total Cholesterol 113 0 - 200 mg/dL    Triglycerides 183 (H) 0 - 150 mg/dL    HDL Cholesterol 31 (L) 40 - 60 mg/dL    VLDL Cholesterol Harley 30 5 - 40 mg/dL    LDL Chol Calc (NIH) 52 0 - 100 mg/dL   Comprehensive metabolic panel    Specimen: Hand, Left; Blood    Blood  Release to genevieve   Result Value Ref Range    Glucose 176 (H) 65 - 99 mg/dL    BUN 15 6 - 20 mg/dL    Creatinine 0.85 0.57 - 1.00 mg/dL    EGFR Result 84.1 >60.0 mL/min/1.73    BUN/Creatinine Ratio 17.6 7.0 - 25.0    Sodium 140 136 - 145 mmol/L    Potassium 3.8 3.5 - 5.2 mmol/L    Chloride 104 98 - 107 mmol/L    Total CO2 23.3 22.0 - 29.0 mmol/L    Calcium 9.5 8.6 - 10.5 mg/dL    Total Protein 6.6 6.0 - 8.5 g/dL    Albumin 4.60 3.50 - 5.20 g/dL    Globulin 2.0 gm/dL    A/G Ratio 2.3 g/dL    Total Bilirubin 0.2 0.0 - 1.2 mg/dL    Alkaline Phosphatase 107 39 - 117 U/L    AST (SGOT) 12 1 - 32 U/L    ALT (SGPT) 20 1 - 33 U/L   CBC & Differential    Specimen: Hand, Left; Blood    Blood  Release to genevieve   Result Value Ref Range    WBC 12.79 (H) 3.40 - 10.80 10*3/mm3    RBC 5.45 (H) 3.77 - 5.28 10*6/mm3    Hemoglobin 14.7 12.0 - 15.9 g/dL    Hematocrit 45.5 34.0 - 46.6 %    MCV 83.5 79.0 - 97.0 fL    MCH 27.0 26.6 - 33.0 pg    MCHC 32.3 31.5 - 35.7 g/dL    RDW 13.4 12.3 - 15.4 %    Platelets 319 140 - 450 10*3/mm3    Neutrophil Rel % 63.0 42.7 - 76.0 %    Lymphocyte Rel % 29.3 19.6 - 45.3 %    Monocyte Rel % 5.6 5.0 - 12.0 %    Eosinophil Rel % 1.1 0.3 - 6.2 %    Basophil Rel % 0.5 0.0 - 1.5 %    Neutrophils Absolute 8.06 (H) 1.70 - 7.00 10*3/mm3    Lymphocytes Absolute 3.75 (H) 0.70 - 3.10 10*3/mm3    Monocytes Absolute 0.71 0.10 - 0.90 10*3/mm3    Eosinophils Absolute 0.14 0.00 - 0.40 10*3/mm3    Basophils Absolute 0.07 0.00 - 0.20 10*3/mm3    Immature  Granulocyte Rel % 0.5 0.0 - 0.5 %    Immature Grans Absolute 0.06 (H) 0.00 - 0.05 10*3/mm3    nRBC 0.0 0.0 - 0.2 /100 WBC     Class 3 Severe Obesity (BMI >=40). Obesity-related health conditions include the following: diabetes mellitus, dyslipidemias and GERD. Obesity is unchanged. BMI is is above average; BMI management plan is completed.  Provided information on portion control, increasing exercise and pharmacologic options including Mounjaro .     Assessment & Plan     Diagnoses and all orders for this visit:    1. DM type 2 with diabetic peripheral neuropathy (HCC) (Primary)  Comments:  Reviewed her recent labs and  discussed her A1c results  Orders:  -     Discontinue: Tirzepatide (Mounjaro) 2.5 MG/0.5ML solution pen-injector; Inject 2.5 mg under the skin into the appropriate area as directed 1 (One) Time Per Week.  Dispense: 2 mL; Refill: 0  -     Semaglutide,0.25 or 0.5MG/DOS, (Ozempic, 0.25 or 0.5 MG/DOSE,) 2 MG/1.5ML solution pen-injector; Inject 0.25 mg under the skin into the appropriate area as directed 1 (One) Time Per Week.  Dispense: 2 mL; Refill: 0    2. Insulin pump in place  Comments:  Insulin pump and CGM uploaded and reviewed.  Discussed her spike after breakfast.  Discussed her typical breakfast foods.  Recommended adding a protein with her    3. Dyslipidemia  Comments:  Much improved lipid panel.  Continue atorvastatin 40 mg    4. Gastroesophageal reflux disease without esophagitis  Comments:  Continue Prevacid    5. Chronic allergic rhinitis  Comments:  Continue Astelin nasal spray and Xyzal    6. Osteopenia of multiple sites  Comments:  Reviewed DEXA with her and treatment options.  Consulting pharmacy regarding if there is a contraindication due to her cancer treatment.    7. Intractable persistent migraine aura without cerebral infarction and with status migrainosus  Comments:  Recommended she contact the neurology NP she is seeing and discuss options with her    8. Encounter for  immunization  Comments:  Influenza vaccination given today  Orders:  -     FluLaval/Fluzone >6 mos ([id number])    I spent 40 minutes caring for Nivia on this date of service. This time includes time spent by me in the following activities:preparing for the visit, reviewing tests, obtaining and/or reviewing a separately obtained history, performing a medically appropriate examination and/or evaluation , counseling and educating the patient/family/caregiver, ordering medications, tests, or procedures, referring and communicating with other health care professionals , documenting information in the medical record, independently interpreting results and communicating that information with the patient/family/caregiver and care coordination  Follow Up in November  Findings and recommendations discussed with Nivia . Reviewed test results and treatment options. Lifestyle modifications reinforced including nutrition and activity recommendations. She will follow up in November sooner if problems/concerns occur.  Nivia was given instructions and counseling regarding her condition or for health maintenance advice. Please see specific information pulled into the AVS if appropriate    This document has been electronically signed by:

## 2022-10-12 NOTE — TELEPHONE ENCOUNTER
Patient is in understanding.    ----- Message from NIDA Betancourt sent at 10/12/2022  1:04 PM EDT -----  Please let her know I am checking with one of  the pharmacists who works in Chemotherapy to confirm it would be safe for her to take it due to the medications she is taking   ----- Message -----  From: Danelle Henson MA  Sent: 10/12/2022  12:47 PM EDT  To: NIDA Betancourt    Patient called and said that the osteoporosis medication you were supposed to prescribe was not sent in to the pharmacy.

## 2022-10-12 NOTE — PROGRESS NOTES
Injection  Injection performed in left deltoid by Danelle Henson MA. Patient tolerated the procedure well without complications.  10/12/22   Danelle Henson MA'

## 2022-10-13 ENCOUNTER — OFFICE VISIT (OUTPATIENT)
Dept: PSYCHIATRY | Facility: CLINIC | Age: 47
End: 2022-10-13

## 2022-10-13 ENCOUNTER — TELEPHONE (OUTPATIENT)
Dept: FAMILY MEDICINE CLINIC | Facility: CLINIC | Age: 47
End: 2022-10-13

## 2022-10-13 ENCOUNTER — LAB (OUTPATIENT)
Dept: FAMILY MEDICINE CLINIC | Facility: CLINIC | Age: 47
End: 2022-10-13

## 2022-10-13 ENCOUNTER — PRIOR AUTHORIZATION (OUTPATIENT)
Dept: FAMILY MEDICINE CLINIC | Facility: CLINIC | Age: 47
End: 2022-10-13

## 2022-10-13 VITALS
SYSTOLIC BLOOD PRESSURE: 110 MMHG | DIASTOLIC BLOOD PRESSURE: 60 MMHG | HEART RATE: 89 BPM | BODY MASS INDEX: 41.99 KG/M2 | WEIGHT: 244.6 LBS

## 2022-10-13 DIAGNOSIS — Z79.899 HIGH RISK MEDICATION USE: ICD-10-CM

## 2022-10-13 DIAGNOSIS — F41.1 GENERALIZED ANXIETY DISORDER: ICD-10-CM

## 2022-10-13 DIAGNOSIS — F33.1 MODERATE EPISODE OF RECURRENT MAJOR DEPRESSIVE DISORDER: Primary | ICD-10-CM

## 2022-10-13 DIAGNOSIS — F42.8 OTHER OBSESSIVE-COMPULSIVE DISORDERS: Chronic | ICD-10-CM

## 2022-10-13 PROCEDURE — 99213 OFFICE O/P EST LOW 20 MIN: CPT | Performed by: NURSE PRACTITIONER

## 2022-10-13 PROCEDURE — 80306 DRUG TEST PRSMV INSTRMNT: CPT | Performed by: NURSE PRACTITIONER

## 2022-10-13 RX ORDER — LORAZEPAM 1 MG/1
1 TABLET ORAL 3 TIMES DAILY PRN
Qty: 90 TABLET | Refills: 0 | Status: SHIPPED | OUTPATIENT
Start: 2022-10-13 | End: 2022-11-17 | Stop reason: SDUPTHER

## 2022-10-13 RX ORDER — BREXPIPRAZOLE 2 MG/1
1 TABLET ORAL DAILY
Qty: 30 TABLET | Refills: 1 | Status: SHIPPED | OUTPATIENT
Start: 2022-10-13 | End: 2022-11-17 | Stop reason: SDUPTHER

## 2022-10-13 RX ORDER — VENLAFAXINE HYDROCHLORIDE 150 MG/1
150 CAPSULE, EXTENDED RELEASE ORAL DAILY
Qty: 30 CAPSULE | Refills: 1 | Status: SHIPPED | OUTPATIENT
Start: 2022-10-13 | End: 2022-11-17 | Stop reason: SDUPTHER

## 2022-10-13 RX ORDER — VENLAFAXINE HYDROCHLORIDE 75 MG/1
75 CAPSULE, EXTENDED RELEASE ORAL DAILY
Qty: 30 CAPSULE | Refills: 1 | Status: SHIPPED | OUTPATIENT
Start: 2022-10-13 | End: 2022-11-17 | Stop reason: SDUPTHER

## 2022-10-13 RX ORDER — GALCANEZUMAB 120 MG/ML
INJECTION, SOLUTION SUBCUTANEOUS
COMMUNITY
Start: 2022-10-04

## 2022-10-13 NOTE — PROGRESS NOTES
"      Raquel Bernstein is a 47 y.o. female is here today for medication management follow-up.    Chief Complaint:  Depression anger anxiety     History of Present Illness:    Patient presents today for a follow up for medication management for depression, anxiety, and anger. Patient states having a lot of dizziness, headaches, and upset stomach. Patient states her PCP is starting her on a new medication for glucose. Patient states her A1C is still high. Patient states she just had lab work done a week ago. Patient states otherwise is about the same. Patient states getting some sleep and getting about 4 hours a night. Patient states sometimes taking nap during the days. Patient states still having the same dreams. Patient states blood pressure has been running lower and yesterday was 90/85. Patient states appetite is good and eating at least two meals a day. Patient states depression is at a 9-10 on a 0-10 scale with 10 being the worst. Denies any thoughts to harm self or others. Patient states its coming up on two year since dad passed. Patient states just found out an aunt has cancer. Patient states anger has been \"fair\". Patient states anxiety ist at 9-10 on a 0-10 scale with 10 being the worst. Patient denies any panic attacks. Denies any auditory or visual hallucinations. Denies any medical changes since last visit. Patient reports medication compliance and denies any side effects.   The following portions of the patient's history were reviewed and updated as appropriate: allergies, current medications, past family history, past medical history, past social history, past surgical history and problem list.    Review of Systems   Constitutional: Negative.    Respiratory: Negative.    Cardiovascular: Negative.    Gastrointestinal: Negative.    Neurological: Negative.    Psychiatric/Behavioral: Positive for agitation, dysphoric mood and sleep disturbance. The patient is nervous/anxious.        Objective "   Physical Exam  Vitals reviewed.   Constitutional:       Appearance: Normal appearance. She is well-developed and well-groomed.   Neurological:      Mental Status: She is alert.   Psychiatric:         Attention and Perception: Attention and perception normal.         Mood and Affect: Mood is depressed. Affect is angry.         Speech: Speech normal.         Behavior: Behavior normal. Behavior is cooperative.         Thought Content: Thought content normal.         Cognition and Memory: Cognition and memory normal.         Judgment: Judgment normal.       Blood pressure 110/60, pulse 89, weight 111 kg (244 lb 9.6 oz), not currently breastfeeding. Body mass index is 41.99 kg/m².    Allergies   Allergen Reactions   • Mobic [Meloxicam] Rash   • Penicillins Angioedema   • Taxotere [Docetaxel] Anaphylaxis     9 minutes into 1st infusion   • Novolog [Insulin Aspart] Hives   • Rosuvastatin Calcium GI Intolerance       Medication List:   Current Outpatient Medications   Medication Sig Dispense Refill   • Brexpiprazole (Rexulti) 2 MG tablet Take 1 tablet by mouth Daily. 30 tablet 1   • LORazepam (ATIVAN) 1 MG tablet Take 1 tablet by mouth 3 (Three) Times a Day As Needed for Anxiety. for anxiety 90 tablet 0   • venlafaxine XR (EFFEXOR-XR) 150 MG 24 hr capsule Take 1 capsule by mouth Daily. Take with the Effexor XR 75mg capsule for a total daily dose of 225mg. 30 capsule 1   • venlafaxine XR (EFFEXOR-XR) 75 MG 24 hr capsule Take 1 capsule by mouth Daily. 30 capsule 1   • alendronate (FOSAMAX) 35 MG tablet Take 1 tablet by mouth Every 7 (Seven) Days. Take with water, 30 minutes before first food/drink/medication, avoid lying down for 30 minutes after taking 4 tablet 3   • Aspirin Low Dose 81 MG EC tablet TAKE 2 TABLETS BY MOUTH DAILY. 60 tablet 3   • atorvastatin (LIPITOR) 40 MG tablet TAKE 1 TABLET BY MOUTH EVERY NIGHT. 90 tablet 0   • azelastine (ASTELIN) 0.1 % nasal spray Use in each nostril as directed 30 mL 3   • Breo  Ellipta 200-25 MCG/INH inhaler Inhale 1 puff Daily. 1 each 3   • celecoxib (CeleBREX) 200 MG capsule Take 1 capsule by mouth Daily. 30 capsule 1   • cloNIDine (CATAPRES) 0.1 MG tablet Take 1 tablet by mouth 2 (Two) Times a Day. 60 tablet 3   • cyanocobalamin 1000 MCG/ML injection Inject 1 mL into the appropriate muscle as directed by prescriber Every 30 (Thirty) Days. 1 mL 3   • diclofenac (VOLTAREN) 1 % gel gel Apply 4 g topically to the appropriate area as directed 4 (Four) Times a Day As Needed (joint pain). 500 g 5   • Emgality 120 MG/ML auto-injector pen INJECT 120mg ONCE MONTHLY     • Empagliflozin-Linaglip-Metform (Trijardy XR) 25-5-1000 MG tablet sustained-release 24 hour Take 1 tablet by mouth Every Morning. 30 tablet 3   • furosemide (LASIX) 20 MG tablet Take 1 tablet by mouth Daily As Needed (edema). 30 tablet 1   • gabapentin (NEURONTIN) 100 MG capsule TAKE ONE CAPSULE BY MOUTH TWO TIMES A DAY 60 capsule 3   • HumaLOG 100 UNIT/ML injection INJECT AS DIRECTED.MAX DAILY DOSE  UNITS DAILY 40 mL 3   • HYDROcodone-acetaminophen (NORCO) 7.5-325 MG per tablet Take 1 tablet by mouth Every 4 (Four) Hours As Needed for Moderate Pain. 12 tablet 0   • lansoprazole (PREVACID) 30 MG capsule Take 1 capsule by mouth Daily. 90 capsule 3   • levocetirizine (XYZAL) 5 MG tablet Take 1 tablet by mouth Every Evening. 30 tablet 3   • linaclotide (Linzess) 145 MCG capsule capsule Take 1 capsule by mouth Daily. 30 minutes before meals on an empty stomach. 30 capsule 3   • ondansetron (ZOFRAN) 8 MG tablet TAKE 1 TABLET BY MOUTH EVERY 8 HOURS AS NEEDED FOR NAUSEA OR VOMITING 30 tablet 3   • ProAir  (90 Base) MCG/ACT inhaler INHALE 2 PUFFS BY MOUTH EVERY 4 HOURS AS NEEDED FOR SHORTNESS OF BREATH 8.5 g 5   • Scopolamine (Transderm-Scop, 1.5 MG,) 1 MG/3DAYS patch Place 1 patch on the skin as directed by provider Every 72 (Seventy-Two) Hours. 4 patch 1   • Semaglutide,0.25 or 0.5MG/DOS, (Ozempic, 0.25 or 0.5 MG/DOSE,) 2  MG/1.5ML solution pen-injector Inject 0.25 mg under the skin into the appropriate area as directed 1 (One) Time Per Week. 2 mL 0   • SUMAtriptan (IMITREX) 100 MG tablet For Migraine 8 tablet 3   • tamoxifen (NOLVADEX) 20 MG chemo tablet Take 1 tablet by mouth Daily. 30 tablet 11   • topiramate (TOPAMAX) 50 MG tablet Take 1 tablet by mouth 2 (Two) Times a Day. 60 tablet 1   • vitamin D (ERGOCALCIFEROL) 1.25 MG (95289 UT) capsule capsule Take 1 capsule by mouth 1 (One) Time Per Week. 4 capsule 3     No current facility-administered medications for this visit.       Mental Status Exam:   Hygiene:   good  Cooperation:  Cooperative  Eye Contact:  Good  Psychomotor Behavior:  Appropriate  Affect:  Angry  Hopelessness: Denies  Speech:  Normal  Thought Process:  Goal directed and Linear  Thought Content:  Normal  Suicidal:  None  Homicidal:  None  Hallucinations:  None  Delusion:  None  Memory:  Intact  Orientation:  Person, Place, Time and Situation  Reliability:  fair  Insight:  Fair  Judgement:  Fair  Impulse Control:  Fair  Physical/Medical Issues:  No               Assessment & Plan   Diagnoses and all orders for this visit:    1. Moderate episode of recurrent major depressive disorder (HCC) (Primary)  -     Brexpiprazole (Rexulti) 2 MG tablet; Take 1 tablet by mouth Daily.  Dispense: 30 tablet; Refill: 1  -     venlafaxine XR (EFFEXOR-XR) 150 MG 24 hr capsule; Take 1 capsule by mouth Daily. Take with the Effexor XR 75mg capsule for a total daily dose of 225mg.  Dispense: 30 capsule; Refill: 1  -     venlafaxine XR (EFFEXOR-XR) 75 MG 24 hr capsule; Take 1 capsule by mouth Daily.  Dispense: 30 capsule; Refill: 1    2. Generalized anxiety disorder  -     Brexpiprazole (Rexulti) 2 MG tablet; Take 1 tablet by mouth Daily.  Dispense: 30 tablet; Refill: 1  -     LORazepam (ATIVAN) 1 MG tablet; Take 1 tablet by mouth 3 (Three) Times a Day As Needed for Anxiety. for anxiety  Dispense: 90 tablet; Refill: 0  -     venlafaxine XR  (EFFEXOR-XR) 150 MG 24 hr capsule; Take 1 capsule by mouth Daily. Take with the Effexor XR 75mg capsule for a total daily dose of 225mg.  Dispense: 30 capsule; Refill: 1  -     venlafaxine XR (EFFEXOR-XR) 75 MG 24 hr capsule; Take 1 capsule by mouth Daily.  Dispense: 30 capsule; Refill: 1    3. Other obsessive-compulsive disorders  -     Brexpiprazole (Rexulti) 2 MG tablet; Take 1 tablet by mouth Daily.  Dispense: 30 tablet; Refill: 1  -     venlafaxine XR (EFFEXOR-XR) 150 MG 24 hr capsule; Take 1 capsule by mouth Daily. Take with the Effexor XR 75mg capsule for a total daily dose of 225mg.  Dispense: 30 capsule; Refill: 1  -     venlafaxine XR (EFFEXOR-XR) 75 MG 24 hr capsule; Take 1 capsule by mouth Daily.  Dispense: 30 capsule; Refill: 1    4. High risk medication use  -     Urine Drug Screen - Urine, Clean Catch; Future            Discussed medication options with patient. Cont. Rexulti 2mg daily for depression and anxiety. Cont. Effexor XR 150mg daily with 75mg for depression and anxiety. Cont. Effexor XR 75mg daily with the 150mg for depression and anxiety. Cont. Lorazepam 1mg three times daily as needed for anxiety. Discussed with patient obtaining Genesight test. Reviewed the risks, benefits, and side effects of the medications; patient acknowledged and verbally consented. Patient is being prescribed a controlled substance as part of treatment plan. Patient has been educated of appropriate use of the medications, including risk of somnolence, limited ability to drive and/or work safely, and potential for dependence, respiratory depression and overdose. Patient is also informed that the medication are to be used by the patient only- avoid any combined use of ETOH or other substances unless prescribed.   UDS ordered.   [facility] Patient Controlled Substance Report (from 10/13/2021 to 10/13/2022)    Dispensed  Strength Quantity Days Supply Provider Pharmacy   10/06/2022 Gabapentin 100MG 60 each 30 STEPHANIE ALCANTARA  [facility]   10/03/2022 Hydrocodone/Acetaminophen 325MG/7.5MG 12 each 2 [facility]   09/28/2022 Lorazepam 1MG 90 each 30 CLOUD,ALFREDO [facility]   08/30/2022 Gabapentin 100MG 60 each 30 QUINTON,STEPHANIE [facility]   08/30/2022 Lorazepam 1MG 90 each 30 CLOUD[facility]ALFREDO [facility]   08/02/2022 Gabapentin 100MG 60 each 30 QUINTON,STEPHANIE [facility]   07/11/2022 Lorazepam 1MG 90 each 30 CLOUD[facility]FREDO [facility]   07/05/2022 Gabapentin 100MG 60 each 30 QUINTON,STEPHANIE [facility]   06/13/2022 Lorazepam 1MG 90 each 30 CLOUD[facility]ALFREDO [facility]   06/03/2022 Gabapentin 100MG 60 each 30 QUINTON,STEPHANIE [facility]   05/10/2022 Lorazepam 1MG 90 each 30 CLOUD,ALFREDO [facility]   05/06/2022 Gabapentin 100MG 60 each 30 URMILA,CELIA [facility]   04/19/2022 Hydrocodone Bitartrate/Ac 325MG/5MG 10 each 3 SCHAUMBURG,GIL [facility]   04/14/2022 Lorazepam 1MG 90 each 30 CLOUD,ALFREDO [facility]   03/25/2022 Gabapentin 100MG 60 each 30 URMILA,CELIA [facility]   03/17/2022 Lorazepam 1MG 90 each 30 CLOUD,ALFREDO [facility]   02/26/2022 Gabapentin 100MG 60 each 30 URMILA,CELIA [facility]   02/17/2022 Lorazepam 1MG 90 each 30 CLOUD,ALFREDO [facility]   01/27/2022 Gabapentin 100MG 60 each 30 URMILA,CELIA [facility]   01/20/2022 Lorazepam 1MG 90 each 30 CLOUD,ALFREDO [facility]   12/23/2021 Gabapentin 100MG 60 each 30 URMILA,CELIA [facility]   12/23/2021 Lorazepam 1MG 90 each 30 CLOUD,ALFREDO [facility]   11/29/2021 Lorazepam 1MG 90 each 30 CLOUD[facility]ALFREDO [facility]   11/17/2021 Gabapentin 100MG 60 each 30 URMILA,CELIA [facility]   11/01/2021 Lorazepam 1MG 90 each 30  [facility]   10/21/2021 Gabapentin 100MG 60 each 30 URMILA[facility]CELIA [facility]   10/21/2021 Lorazepam 1MG 30 each 10 [facility]          Patient was instructed on medication side effects, benefits, and also of no treatment.  Patient was given an explanation regarding potential for increased risk of diabetes, lipids, and weight gain.  Labs will be assessed as clinically indicated.  Diet was discussed especially healthy diet choices and increasing activity and exercise.  Patient was strongly urged to continue weight maintance or weight loss efforts.  Patient reported verbalized understanding of instructions.  Labs reviewed from 10/06/22 including A1C, lipid panel, CBC, CMP, Vit D, and TSH.    Patient is agreeable to call the office with any questions, concerns, or worsening of symptoms. Patient is aware to call 911 or go to the nearest ER should begin having SI/HI.          Follow up in four weeks        Errors in dictation may reflect use of voice recognition software and not all errors in transcription may have been detected prior to signing.              This document has been electronically signed by NIDA Vasquez   October 13, 2022 09:35 EDT

## 2022-10-16 DIAGNOSIS — E55.9 VITAMIN D DEFICIENCY: ICD-10-CM

## 2022-10-16 DIAGNOSIS — R42 DIZZINESS: ICD-10-CM

## 2022-10-18 ENCOUNTER — OFFICE VISIT (OUTPATIENT)
Dept: ORTHOPEDIC SURGERY | Facility: CLINIC | Age: 47
End: 2022-10-18

## 2022-10-18 VITALS
HEIGHT: 64 IN | HEART RATE: 87 BPM | BODY MASS INDEX: 41.83 KG/M2 | DIASTOLIC BLOOD PRESSURE: 79 MMHG | SYSTOLIC BLOOD PRESSURE: 128 MMHG | OXYGEN SATURATION: 97 % | WEIGHT: 245 LBS

## 2022-10-18 DIAGNOSIS — M25.511 CHRONIC PAIN OF BOTH SHOULDERS: ICD-10-CM

## 2022-10-18 DIAGNOSIS — G89.29 CHRONIC PAIN OF BOTH SHOULDERS: ICD-10-CM

## 2022-10-18 DIAGNOSIS — E11.42 DM TYPE 2 WITH DIABETIC PERIPHERAL NEUROPATHY: Chronic | ICD-10-CM

## 2022-10-18 DIAGNOSIS — M75.22 BICEPS TENDONITIS OF BOTH SHOULDERS: ICD-10-CM

## 2022-10-18 DIAGNOSIS — M25.612 SHOULDER JOINT STIFFNESS, BILATERAL: ICD-10-CM

## 2022-10-18 DIAGNOSIS — F33.1 MODERATE EPISODE OF RECURRENT MAJOR DEPRESSIVE DISORDER: ICD-10-CM

## 2022-10-18 DIAGNOSIS — M54.2 CHRONIC NECK PAIN: ICD-10-CM

## 2022-10-18 DIAGNOSIS — M25.611 SHOULDER JOINT STIFFNESS, BILATERAL: ICD-10-CM

## 2022-10-18 DIAGNOSIS — F41.1 GENERALIZED ANXIETY DISORDER: ICD-10-CM

## 2022-10-18 DIAGNOSIS — M19.011 OSTEOARTHRITIS OF RIGHT ACROMIOCLAVICULAR JOINT: ICD-10-CM

## 2022-10-18 DIAGNOSIS — F42.8 OTHER OBSESSIVE-COMPULSIVE DISORDERS: Chronic | ICD-10-CM

## 2022-10-18 DIAGNOSIS — G89.29 CHRONIC NECK PAIN: ICD-10-CM

## 2022-10-18 DIAGNOSIS — M75.81 TENDONITIS OF BOTH ROTATOR CUFFS: ICD-10-CM

## 2022-10-18 DIAGNOSIS — M75.21 BICEPS TENDONITIS OF BOTH SHOULDERS: ICD-10-CM

## 2022-10-18 DIAGNOSIS — M75.01 ADHESIVE CAPSULITIS OF RIGHT SHOULDER: Primary | ICD-10-CM

## 2022-10-18 DIAGNOSIS — M85.80 OSTEOPENIA, UNSPECIFIED LOCATION: ICD-10-CM

## 2022-10-18 DIAGNOSIS — M19.011 PRIMARY OSTEOARTHRITIS OF RIGHT SHOULDER: ICD-10-CM

## 2022-10-18 DIAGNOSIS — M75.82 TENDONITIS OF BOTH ROTATOR CUFFS: ICD-10-CM

## 2022-10-18 DIAGNOSIS — M25.512 CHRONIC PAIN OF BOTH SHOULDERS: ICD-10-CM

## 2022-10-18 PROCEDURE — 99214 OFFICE O/P EST MOD 30 MIN: CPT | Performed by: FAMILY MEDICINE

## 2022-10-18 RX ORDER — EMPAGLIFLOZIN, LINAGLIPTIN, METFORMIN HYDROCHLORIDE 25; 5; 1000 MG/1; MG/1; MG/1
TABLET, EXTENDED RELEASE ORAL
Qty: 30 TABLET | Refills: 5 | Status: SHIPPED | OUTPATIENT
Start: 2022-10-18

## 2022-10-18 RX ORDER — SCOLOPAMINE TRANSDERMAL SYSTEM 1 MG/1
PATCH, EXTENDED RELEASE TRANSDERMAL
Qty: 4 PATCH | Refills: 1 | Status: SHIPPED | OUTPATIENT
Start: 2022-10-18 | End: 2022-11-17 | Stop reason: ALTCHOICE

## 2022-10-18 RX ORDER — CELECOXIB 200 MG/1
200 CAPSULE ORAL DAILY
Qty: 30 CAPSULE | Refills: 2 | Status: SHIPPED | OUTPATIENT
Start: 2022-10-18 | End: 2022-11-14

## 2022-10-18 RX ORDER — ERGOCALCIFEROL 1.25 MG/1
CAPSULE ORAL
Qty: 4 CAPSULE | Refills: 3 | Status: SHIPPED | OUTPATIENT
Start: 2022-10-18 | End: 2023-02-20

## 2022-10-18 RX ORDER — BREXPIPRAZOLE 2 MG/1
1 TABLET ORAL DAILY
Qty: 30 TABLET | Refills: 1 | OUTPATIENT
Start: 2022-10-18

## 2022-10-18 RX ORDER — VENLAFAXINE HYDROCHLORIDE 150 MG/1
CAPSULE, EXTENDED RELEASE ORAL
Qty: 30 CAPSULE | Refills: 1 | OUTPATIENT
Start: 2022-10-18

## 2022-10-18 RX ORDER — VENLAFAXINE HYDROCHLORIDE 75 MG/1
CAPSULE, EXTENDED RELEASE ORAL
Qty: 30 CAPSULE | Refills: 1 | OUTPATIENT
Start: 2022-10-18

## 2022-10-18 NOTE — PROGRESS NOTES
Follow Up Visit      Patient: Nivia Bernstein  YOB: 1975  Date of Encounter: 10/18/2022  PCP: Markus Menjivar MD  Referring Provider: No ref. provider found     Subjective   Nivia Bernstein is a 47 y.o. female who presents to the office today for evaluation of Pain and Follow-up of the Right Shoulder (Stiffness)      Chief Complaint   Patient presents with   • Right Shoulder - Pain, Follow-up     Stiffness       HPI     The patient presents for follow-up regarding right shoulder pain.     She reports resolution of her pain in the left shoulder but continued pain, stiffness and limited range of motion of the right shoulder. She is not out of Celebrex and reports success with the medication. The patient has 5 sessions of physical therapy left. She states the prior injection to the right shoulder performed in the hospital provided some relief for approximately 30 days. The patient's symptoms have been ongoing for over 1 year. She does not take Tylenol because it gives her migraines and uses Voltaren gel for her neuropathy. Hasn't tried it on her shoulder yet    Patient Active Problem List   Diagnosis   • DM type 2 with diabetic peripheral neuropathy (HCC)   • Mild intermittent asthma without complication   • GERD (gastroesophageal reflux disease)   • History of DVT (deep vein thrombosis)   • History of TIAs   • Mixed hyperlipidemia   • Anxiety and depression   • Class 3 severe obesity with serious comorbidity and body mass index (BMI) of 40.0 to 44.9 in adult (HCC)   • Elevated alkaline phosphatase level   • Chronic pain of left knee   • OCD (obsessive compulsive disorder)   • Accidental hypodermic needlestick injury with exposure to body fluid   • Atherosclerosis of both carotid arteries   • Chronic allergic rhinitis   • Menopausal symptoms   • Vitamin D deficiency   • Malignant neoplasm of upper-outer quadrant of right breast in female, estrogen receptor negative (HCC)   • Healthcare maintenance  "  • Osteopenia of multiple sites   • Port-A-Cath in place   • Drug-induced constipation   • Insulin pump in place   • Hot flashes   • Occipital neuralgia of right side   • Adhesive capsulitis of right shoulder   • S/P mastectomy, bilateral   • Osteoarthritis of right acromioclavicular joint   • Primary osteoarthritis of right shoulder   • Chronic pain of both shoulders   • Chronic neck pain   • Persistent migraine aura without cerebral infarction and with status migrainosus       Past Medical History:   Diagnosis Date   • Altered mental status 10/16/2017   • Anxiety and depression 3/31/2017   • Arthritis    • Asthma    • Elevated alkaline phosphatase level 10/16/2017   • Enlarged liver    • GERD (gastroesophageal reflux disease)    • Heart murmur    • Hypokalemia 10/16/2017   • Mitral valve prolapse    • Neuropathy     related to diabetes   • Obesity 3/31/2017   • OCD (obsessive compulsive disorder) 10/16/2017   • Osteoporosis    • PONV (postoperative nausea and vomiting)    • Renal insufficiency 10/16/2017   • Right breast cancer with malignant cells in regional lymph nodes no greater than 0.2 mm and no more than 200 cells (HCC) 8/13/2020   • TIA (transient ischemic attack)     4 TIMES       Allergies   Allergen Reactions   • Mobic [Meloxicam] Rash   • Penicillins Angioedema   • Taxotere [Docetaxel] Anaphylaxis     9 minutes into 1st infusion   • Novolog [Insulin Aspart] Hives   • Rosuvastatin Calcium GI Intolerance     Review of Systems   Constitutional: Positive for activity change. Negative for fever.   Respiratory: Negative for shortness of breath and wheezing.    Cardiovascular: Negative for chest pain.   Musculoskeletal: Positive for arthralgias, myalgias, neck pain and neck stiffness.   Skin: Negative for color change and wound.   Neurological: Negative for weakness and numbness.       Visit Vitals  /79   Pulse 87   Ht 162.6 cm (64\")   Wt 111 kg (245 lb)   SpO2 97%   BMI 42.05 kg/m²     47 " y.o.female  Physical Exam  Vitals and nursing note reviewed.   Constitutional:       General: She is not in acute distress.     Appearance: Normal appearance.   Pulmonary:      Effort: Pulmonary effort is normal. No respiratory distress.   Musculoskeletal:      Comments: Bilateral shoulder exam:  Left shoulder is not tender.  Right shoulder is tender on the AC.  Tender biceps tendon.  Tender anterior joint.  Tender coracoid.  Tender deltoid distribution.  Tender posterior joint.  Normal pinch, , and intrinsic strength bilaterally.  Intact radial pulses.  Very restricted flexion on the right with end range pain.  Essentially full left shoulder range of motion without pain.  Right shoulder very restricted in flexion, abduction, and internal rotation with significant pain.  Pain on supraspinatus and Faucett test on the right.  Pain on biceps testing with mild weakness on the right.  Pain on entire rotator cuff, biceps, triceps.  Pain on AC crossover.   Skin:     General: Skin is warm and dry.      Findings: No erythema.   Neurological:      General: No focal deficit present.      Mental Status: She is alert.      Sensory: No sensory deficit.      Motor: No weakness.         Radiology Results:        Assessment & Plan   Diagnoses and all orders for this visit:    1. Adhesive capsulitis of right shoulder (Primary)  -     MRI Shoulder Right Without Contrast; Future  -     FL Guide For Pain Meds Inj; Future    2. Chronic pain of both shoulders  -     celecoxib (CeleBREX) 200 MG capsule; Take 1 capsule by mouth Daily.  Dispense: 30 capsule; Refill: 2  -     MRI Shoulder Right Without Contrast; Future  -     FL Guide For Pain Meds Inj; Future    3. Osteopenia, unspecified location  -     celecoxib (CeleBREX) 200 MG capsule; Take 1 capsule by mouth Daily.  Dispense: 30 capsule; Refill: 2  -     MRI Shoulder Right Without Contrast; Future  -     FL Guide For Pain Meds Inj; Future    4. Primary osteoarthritis of right  shoulder  -     celecoxib (CeleBREX) 200 MG capsule; Take 1 capsule by mouth Daily.  Dispense: 30 capsule; Refill: 2  -     MRI Shoulder Right Without Contrast; Future  -     FL Guide For Pain Meds Inj; Future    5. Chronic neck pain  -     celecoxib (CeleBREX) 200 MG capsule; Take 1 capsule by mouth Daily.  Dispense: 30 capsule; Refill: 2  -     MRI Shoulder Right Without Contrast; Future  -     FL Guide For Pain Meds Inj; Future    6. Biceps tendonitis of both shoulders  -     MRI Shoulder Right Without Contrast; Future  -     FL Guide For Pain Meds Inj; Future    7. Tendonitis of both rotator cuffs  -     MRI Shoulder Right Without Contrast; Future  -     FL Guide For Pain Meds Inj; Future    8. Shoulder joint stiffness, bilateral  -     MRI Shoulder Right Without Contrast; Future  -     FL Guide For Pain Meds Inj; Future    9. Osteoarthritis of right acromioclavicular joint         MEDS ORDERED DURING VISIT:  New Medications Ordered This Visit   Medications   • celecoxib (CeleBREX) 200 MG capsule     Sig: Take 1 capsule by mouth Daily.     Dispense:  30 capsule     Refill:  2     MEDICATION ISSUES:  Discussed medication options and treatment plan of prescribed medication as well as the risks, benefits, and side effects including potential falls, possible impaired driving and metabolic adversities among others. Patient is agreeable to call the office with any worsening of symptoms or onset of side effects. Patient is agreeable to call 911 or go to the nearest ER should he/she begin having SI/HI.     Discussion:    - The patient's left shoulder adhesive capsulitis and pain have completely resolved. The right shoulder is still stiff, painful, and restricted in range of motion and has been ongoing for over 1 year. She has attempted extensive physical therapy, multiple injections, NSAIDs, and home exercises without success and MRI is recommended to evaluate for possible damage to the labrum, rotator cuff, or biceps.  An injection in the glenohumeral joint with imaging guidance has been ordered and she will follow-up in 2-3 weeks for MRI results           Transcribed from ambient dictation for Guanaco Sheehan DO by Joel Conway.  10/18/22   12:10 EDT    Patient or patient representative verbalized consent to the visit recording.  I have personally performed the services described in this document as transcribed by the above individual, and it is both accurate and complete.

## 2022-10-18 NOTE — TELEPHONE ENCOUNTER
Pt was last seen on 5/27/2021. Can you all schedule her for a follow up with Dr. Menjivar please.

## 2022-11-01 ENCOUNTER — APPOINTMENT (OUTPATIENT)
Dept: ONCOLOGY | Facility: HOSPITAL | Age: 47
End: 2022-11-01

## 2022-11-04 ENCOUNTER — APPOINTMENT (OUTPATIENT)
Dept: ONCOLOGY | Facility: HOSPITAL | Age: 47
End: 2022-11-04

## 2022-11-09 ENCOUNTER — OFFICE VISIT (OUTPATIENT)
Dept: FAMILY MEDICINE CLINIC | Facility: CLINIC | Age: 47
End: 2022-11-09

## 2022-11-09 VITALS
SYSTOLIC BLOOD PRESSURE: 100 MMHG | HEIGHT: 64 IN | OXYGEN SATURATION: 95 % | TEMPERATURE: 97.1 F | BODY MASS INDEX: 39.95 KG/M2 | DIASTOLIC BLOOD PRESSURE: 68 MMHG | WEIGHT: 234 LBS | HEART RATE: 94 BPM

## 2022-11-09 DIAGNOSIS — Z17.1 MALIGNANT NEOPLASM OF UPPER-OUTER QUADRANT OF RIGHT BREAST IN FEMALE, ESTROGEN RECEPTOR NEGATIVE: ICD-10-CM

## 2022-11-09 DIAGNOSIS — Z96.41 INSULIN PUMP IN PLACE: ICD-10-CM

## 2022-11-09 DIAGNOSIS — C50.411 MALIGNANT NEOPLASM OF UPPER-OUTER QUADRANT OF RIGHT BREAST IN FEMALE, ESTROGEN RECEPTOR NEGATIVE: ICD-10-CM

## 2022-11-09 DIAGNOSIS — E55.9 VITAMIN D DEFICIENCY: ICD-10-CM

## 2022-11-09 DIAGNOSIS — J45.20 MILD INTERMITTENT ASTHMA WITHOUT COMPLICATION: Chronic | ICD-10-CM

## 2022-11-09 DIAGNOSIS — E78.2 MIXED DYSLIPIDEMIA: Chronic | ICD-10-CM

## 2022-11-09 DIAGNOSIS — J30.9 CHRONIC ALLERGIC RHINITIS: Chronic | ICD-10-CM

## 2022-11-09 DIAGNOSIS — E11.42 DM TYPE 2 WITH DIABETIC PERIPHERAL NEUROPATHY: Primary | Chronic | ICD-10-CM

## 2022-11-09 PROCEDURE — 95251 CONT GLUC MNTR ANALYSIS I&R: CPT | Performed by: NURSE PRACTITIONER

## 2022-11-09 PROCEDURE — 99214 OFFICE O/P EST MOD 30 MIN: CPT | Performed by: NURSE PRACTITIONER

## 2022-11-09 RX ORDER — ATORVASTATIN CALCIUM 40 MG/1
40 TABLET, FILM COATED ORAL NIGHTLY
Qty: 90 TABLET | Refills: 0 | Status: SHIPPED | OUTPATIENT
Start: 2022-11-09 | End: 2023-03-01

## 2022-11-09 NOTE — PATIENT INSTRUCTIONS
Type 2 Diabetes Mellitus, Self-Care, Adult  When you have type 2 diabetes (type 2 diabetes mellitus), you must make sure your blood sugar (glucose) stays in a healthy range. You can do this with:  Nutrition.  Exercise.  Lifestyle changes.  Medicines or insulin, if needed.  Support from your doctors and others.  What are the risks?  Having type 2 diabetes can raise your risk for other long-term (chronic) health problems. You may get medicines to help prevent these problems.  How to stay aware of your blood sugar    Check your blood sugar level every day, as often as told.  Have your A1C (hemoglobin A1C) level checked two or more times a year. Have it checked more often if told.  Your doctor will set personal treatment goals for you. In general, you should have these blood sugar levels:  Before meals:  mg/dL (4.4-7.2 mmol/L).  After meals: below 180 mg/dL (10 mmol/L).  A1C: less than 7%.  How to manage high and low blood sugar  Symptoms of high blood sugar  High blood sugar is also called hyperglycemia. Know the symptoms of high blood sugar. These may include:  More thirst.  Hunger.  Feeling very tired.  Needing to pee (urinate) more often than normal.  Seeing things blurry.  Symptoms of low blood sugar  Low blood sugar is also called hypoglycemia. This is when blood sugar is at or below 70 mg/dL (3.9 mmol/L). Symptoms may include:  Hunger.  Feeling worried or nervous (anxious).  Feeling sweaty and cold to the touch (clammy).  Being dizzy or light-headed.  Feeling sleepy.  A fast heartbeat.  Feeling grouchy (irritable).  Tingling or loss of feeling (numbness) around your mouth, lips, or tongue.  Restless sleep.  Diabetes medicines can cause low blood sugar. You are more at risk:  While you exercise.  After exercise.  During sleep.  When you are sick.  When you skip meals or do not eat for a long time.  Treating low blood sugar  If you think you have low blood sugar, eat or drink something sugary right away. Keep  15 grams of a fast-acting carb (carbohydrate) with you all the time. Make sure your family and friends know how to treat you if you cannot treat yourself.  Treating very low blood sugar  Severe hypoglycemia is when your blood sugar is at or below 54 mg/dL (3 mmol/L).  Severe hypoglycemia is an emergency. Get medical help right away. Call your local emergency services (911 in the [place]  Do not wait to see if the symptoms will go away.  Do not drive yourself to the hospital.  You may need a glucagon shot if you have very low blood sugar and you cannot eat or drink. Have a family member or friend learn how to check your blood sugar and how to give you a glucagon shot. Ask your doctor if you should have a kit for glucagon shots.  Follow these instructions at home:  Medicines  Take prescribed insulin or diabetes medicines as told by your health care provider.  Do not run out of insulin or other medicines. Plan ahead.  If you use insulin, change the amount you take based on how active you are and what foods you eat. Your doctor will tell you how to do this.  Take over-the-counter and prescription medicines only as told by your doctor.  Eating and drinking    Eat healthy foods. These include:  Low-fat (lean) proteins.  Complex carbs, such as whole grains.  Fresh fruits and vegetables.  Low-fat dairy products.  Healthy fats.  Meet with a food expert (dietitian) to make an eating plan.  Follow instructions from your doctor about what you cannot eat or drink.  Drink enough fluid to keep your pee (urine) pale yellow.  Keep track of carbs that you eat. Read food labels and learn serving sizes of foods.  Follow your sick-day plan when you cannot eat or drink as normal. Make this plan with your doctor so it is ready to use.  Activity  Exercise as told by your doctor. You may need to:  Do stretching and strength exercises two or more times a week.  Do 150 minutes or more of exercise each week that makes your heart beat faster and  makes you sweat.  Spread out your exercise over 3 or more days a week.  Do not go more than 2 days in a row without exercise.  Talk with your doctor before you start a new exercise. Your doctor may tell you to change:  How much insulin or medicines you take.  How much food you eat.  Lifestyle  Do not smoke or use any products that contain nicotine or tobacco. If you need help quitting, ask your doctor.  If you drink alcohol and your doctor says that it is safe for you:  Limit how much you have to:  0-1 drink a day for women who are not pregnant.  0-2 drinks a day for men.  Know how much alcohol is in your drink. In the [place] one drink equals one 12 oz bottle of beer (355 mL), one 5 oz glass of wine (148 mL), or one 1½ oz glass of hard liquor (44 mL).  Learn to deal with stress. If you need help, ask your doctor.  Body care    Stay up to date with your shots (immunizations).  Have your eyes and feet checked by a doctor as often as told.  Check your skin and feet every day. Check for cuts, bruises, redness, blisters, or sores.  Brush your teeth and gums two times a day. Floss one or more times a day.  Go to the dentist one or more times every 6 months.  Stay at a healthy weight.  General instructions  Share your diabetes care plan with:  Your work or school.  People you live with.  Carry a card or wear jewelry that says you have diabetes.  Keep all follow-up visits.  Questions to ask your doctor  Do I need to meet with a certified expert in diabetes education and care?  Where can I find a support group?  Where to find more information  For help and guidance and more information about diabetes, please go to:  American Diabetes Association: www.diabetes.org  American Association of Diabetes Care and Education Specialists: www.diabeteseducator.org  International Diabetes Federation: www.idf.org  Summary  When you have type 2 diabetes, you must make sure your blood sugar (glucose) stays in a healthy range. You can do this  with nutrition, exercise, medicines and insulin, and support from doctors and others.  Check your blood sugar every day, or as often as told.  Having diabetes can raise your risk for other long-term health problems. You may get medicines to help prevent these problems.  Share your diabetes management plan with people at work, school, and home.  Keep all follow-up visits.  This information is not intended to replace advice given to you by your health care provider. Make sure you discuss any questions you have with your health care provider.  Document Revised: 03/14/2022 Document Reviewed: 03/14/2022  Elsevier Patient Education © 2022 [employer]

## 2022-11-09 NOTE — PROGRESS NOTES
History of Present Illness  Nivia is a 48 y/o female who presents to the clinic today for follow up pertaining to her DM, type 2 and Dyslipidemia. She has been diagnosed with right breast Cancer and has undergone mastectomy and chemotherapy. In addition, she has GERD, mitral valve prolapse, Osteopenia/arthritis, migraine headaches and anxiety depression.    Diabetes   She has type 2 diabetes mellitus. The initial diagnosis of diabetes was made 20 years ago. Diabetic complications include peripheral neuropathy. Risk factors for coronary artery disease include post-menopausal, stress and dyslipidemia. She is currently utilizing insulin pump therapy and CGM. In addition, she is currently taking Trijardy and Ozempic.  She has had a previous visit with a dietitian An ACE inhibitor/angiotensin II receptor blocker is not  being taken at this time..      Lab Results   Component Value Date    HGBA1C 7.40 (H) 06/22/2022     Lab Results   Component Value Date    HGBA1C 7.90 (H) 10/06/2022     Dyslipidemia   The current episode started more than 1 year ago. Pertinent negatives include no chest pain or shortness of breath. She is taking Atorvastatin 40 mg.   Lab Results   Component Value Date    CHOL 173 06/22/2022    CHLPL 113 10/06/2022    CHLPL 175 03/15/2022     Lab Results   Component Value Date    TRIG 183 (H) 10/06/2022    TRIG 302 (H) 06/22/2022    TRIG 192 (H) 03/15/2022     Lab Results   Component Value Date    HDL 31 (L) 10/06/2022    HDL 32 (L) 06/22/2022    HDL 31 (L) 03/15/2022     Lab Results   Component Value Date    LDL 52 10/06/2022    LDL 91 06/22/2022     (H) 03/15/2022       The following portions of the patient's history were reviewed and updated as appropriate: allergies, current medications, past family history, past medical history, past social history, past surgical history and problem list.    Review of Systems   Constitutional: Negative for activity change, appetite change, chills, fatigue,  "fever and unexpected weight change.   HENT: Negative for congestion.    Eyes: Negative for visual disturbance.   Respiratory: Negative for cough, shortness of breath and wheezing.    Cardiovascular: Positive for leg swelling (Intermittent). Negative for chest pain and palpitations.   Gastrointestinal: Negative for nausea and vomiting.        GERD   Endocrine: Negative for cold intolerance, heat intolerance, polydipsia, polyphagia and polyuria.   Musculoskeletal: Positive for arthralgias and back pain.   Skin: Negative for color change and rash.   Allergic/Immunologic: Positive for environmental allergies.   Neurological: Positive for dizziness (Improving), numbness and headaches (Migraines). Negative for tremors, speech difficulty, weakness and light-headedness.   Hematological: Negative for adenopathy.   Psychiatric/Behavioral: Negative for confusion, decreased concentration and sleep disturbance. The patient is not nervous/anxious.    All other systems reviewed and are negative.    Vital signs:  /68 (BP Location: Left arm, Patient Position: Sitting, Cuff Size: Adult)   Pulse 94   Temp 97.1 °F (36.2 °C) (Temporal)   Ht 162.6 cm (64.02\")   Wt 106 kg (234 lb)   SpO2 95%   BMI 40.15 kg/m²     Physical Exam  Vitals and nursing note reviewed.   Constitutional:       General: She is not in acute distress.     Appearance: She is well-developed.      Interventions: Face mask in place.   HENT:      Head: Normocephalic.      Nose: Nose normal.   Eyes:      General: No scleral icterus.        Right eye: No discharge.         Left eye: No discharge.      Conjunctiva/sclera: Conjunctivae normal.   Neck:      Thyroid: No thyromegaly.      Vascular: No JVD.   Cardiovascular:      Rate and Rhythm: Normal rate and regular rhythm.      Heart sounds: S1 normal and S2 normal.   Pulmonary:      Effort: Pulmonary effort is normal.      Breath sounds: Normal breath sounds.   Abdominal:      Palpations: Abdomen is soft.      " Tenderness: There is no abdominal tenderness. There is no guarding.   Musculoskeletal:      Cervical back: Neck supple.      Right lower leg: No edema.      Left lower leg: No edema.   Skin:     General: Skin is dry.      Capillary Refill: Capillary refill takes less than 2 seconds.   Neurological:      Mental Status: She is alert.   Psychiatric:         Mood and Affect: Mood and affect normal.         Speech: Speech normal.         Behavior: Behavior is cooperative.         Thought Content: Thought content normal.       Result Review :[employer] Insulin Pump and CGM report from 10/27/22- 11/09/2022:    Time in Range 96% 4 % in High which were post meal. We discussed portion size and adding protein to breakfast      No hypoglycemia episodes     No adjustments to insulin pump parameters     Class 3 Severe Obesity (BMI >=40). Obesity-related health conditions include the following: diabetes mellitus, dyslipidemias, GERD and osteoarthritis. Obesity is improving with lifestyle modifications. BMI  is above average; BMI management plan is completed. We discussed portion control and increasing exercise.     Assessment & Plan     Diagnoses and all orders for this visit:    1. DM type 2 with diabetic peripheral neuropathy (HCC) (Primary)  Comments:  Diabetes self management skills reinforced   Orders:  -     Comprehensive Metabolic Panel; Future  -     TSH; Future  -     Lipid Panel; Future  -     Hemoglobin A1c; Future  -     MicroAlbumin, Urine, Random - Urine, Clean Catch; Future  -     Vitamin B12; Future    2. Insulin pump in place  Comments:  Reviewed insulin pump and CGM upload with Nivia. Counseled regarding nutrition choices     3. Mixed dyslipidemia  Comments:  Intolerant to Rosuvastatin tolerating Atorvastatin   Orders:  -     atorvastatin (LIPITOR) 40 MG tablet; Take 1 tablet by mouth Every Night.  Dispense: 90 tablet; Refill: 0  -     Comprehensive Metabolic Panel; Future  -     TSH; Future  -     Lipid Panel;  Future    4. Mild intermittent asthma without complication  Comments:  Stable with current regimen which she will continue     5. Chronic allergic rhinitis  Comments:  Continue Astelin nasal spray, Xyzal.  Avoidance of allergens is much as possible  Orders:  -     CBC & Differential; Future    6. Malignant neoplasm of upper-outer quadrant of right breast in female, estrogen receptor negative (HCC)  Comments:  Follow-up with oncology    7. Vitamin D deficiency  -     Vitamin D,25-Hydroxy; Future    Follow Up In January  Findings and recommendations discussed with Nivia. Reviewed insulin pump and CGM results which were outstanding.  Lifestyle modifications reinforced including nutrition and activity recommendations.  Nivia will follow up in January sooner if problems/concerns occur.  She was given instructions and counseling regarding her condition or for health maintenance advice. Please see specific information pulled into the AVS if appropriate    This document has been electronically signed by:

## 2022-11-11 ENCOUNTER — INFUSION (OUTPATIENT)
Dept: ONCOLOGY | Facility: HOSPITAL | Age: 47
End: 2022-11-11

## 2022-11-11 ENCOUNTER — HOSPITAL ENCOUNTER (OUTPATIENT)
Dept: MRI IMAGING | Facility: HOSPITAL | Age: 47
Discharge: HOME | End: 2022-11-11
Admitting: FAMILY MEDICINE

## 2022-11-11 VITALS
HEART RATE: 100 BPM | WEIGHT: 239.9 LBS | RESPIRATION RATE: 18 BRPM | SYSTOLIC BLOOD PRESSURE: 113 MMHG | TEMPERATURE: 97.7 F | BODY MASS INDEX: 41.16 KG/M2 | DIASTOLIC BLOOD PRESSURE: 77 MMHG | OXYGEN SATURATION: 94 %

## 2022-11-11 DIAGNOSIS — M54.2 CHRONIC NECK PAIN: ICD-10-CM

## 2022-11-11 DIAGNOSIS — M75.21 BICEPS TENDONITIS OF BOTH SHOULDERS: ICD-10-CM

## 2022-11-11 DIAGNOSIS — M85.80 OSTEOPENIA, UNSPECIFIED LOCATION: ICD-10-CM

## 2022-11-11 DIAGNOSIS — M19.011 PRIMARY OSTEOARTHRITIS OF RIGHT SHOULDER: ICD-10-CM

## 2022-11-11 DIAGNOSIS — M75.01 ADHESIVE CAPSULITIS OF RIGHT SHOULDER: ICD-10-CM

## 2022-11-11 DIAGNOSIS — M25.612 SHOULDER JOINT STIFFNESS, BILATERAL: ICD-10-CM

## 2022-11-11 DIAGNOSIS — G89.29 CHRONIC NECK PAIN: ICD-10-CM

## 2022-11-11 DIAGNOSIS — M75.81 TENDONITIS OF BOTH ROTATOR CUFFS: ICD-10-CM

## 2022-11-11 DIAGNOSIS — G89.29 CHRONIC PAIN OF BOTH SHOULDERS: ICD-10-CM

## 2022-11-11 DIAGNOSIS — Z95.828 PORT-A-CATH IN PLACE: Primary | ICD-10-CM

## 2022-11-11 DIAGNOSIS — M75.22 BICEPS TENDONITIS OF BOTH SHOULDERS: ICD-10-CM

## 2022-11-11 DIAGNOSIS — M25.611 SHOULDER JOINT STIFFNESS, BILATERAL: ICD-10-CM

## 2022-11-11 DIAGNOSIS — M75.82 TENDONITIS OF BOTH ROTATOR CUFFS: ICD-10-CM

## 2022-11-11 DIAGNOSIS — M25.511 CHRONIC PAIN OF BOTH SHOULDERS: ICD-10-CM

## 2022-11-11 DIAGNOSIS — M25.512 CHRONIC PAIN OF BOTH SHOULDERS: ICD-10-CM

## 2022-11-11 PROCEDURE — 96523 IRRIG DRUG DELIVERY DEVICE: CPT

## 2022-11-11 PROCEDURE — 73221 MRI JOINT UPR EXTREM W/O DYE: CPT | Performed by: RADIOLOGY

## 2022-11-11 PROCEDURE — 73221 MRI JOINT UPR EXTREM W/O DYE: CPT

## 2022-11-11 PROCEDURE — 25010000002 HEPARIN LOCK FLUSH PER 10 UNITS: Performed by: INTERNAL MEDICINE

## 2022-11-11 RX ORDER — HEPARIN SODIUM (PORCINE) LOCK FLUSH IV SOLN 100 UNIT/ML 100 UNIT/ML
500 SOLUTION INTRAVENOUS AS NEEDED
Status: DISCONTINUED | OUTPATIENT
Start: 2022-11-11 | End: 2022-11-11 | Stop reason: HOSPADM

## 2022-11-11 RX ORDER — HEPARIN SODIUM (PORCINE) LOCK FLUSH IV SOLN 100 UNIT/ML 100 UNIT/ML
500 SOLUTION INTRAVENOUS AS NEEDED
Status: CANCELLED | OUTPATIENT
Start: 2022-12-22

## 2022-11-11 RX ORDER — SODIUM CHLORIDE 0.9 % (FLUSH) 0.9 %
10 SYRINGE (ML) INJECTION AS NEEDED
Status: CANCELLED | OUTPATIENT
Start: 2022-12-22

## 2022-11-11 RX ORDER — SODIUM CHLORIDE 0.9 % (FLUSH) 0.9 %
10 SYRINGE (ML) INJECTION AS NEEDED
Status: DISCONTINUED | OUTPATIENT
Start: 2022-11-11 | End: 2022-11-11 | Stop reason: HOSPADM

## 2022-11-11 RX ADMIN — Medication 10 ML: at 14:45

## 2022-11-11 RX ADMIN — SODIUM CHLORIDE, PRESERVATIVE FREE 500 UNITS: 5 INJECTION INTRAVENOUS at 14:45

## 2022-11-14 DIAGNOSIS — M25.512 CHRONIC PAIN OF BOTH SHOULDERS: ICD-10-CM

## 2022-11-14 DIAGNOSIS — G89.29 CHRONIC PAIN OF BOTH SHOULDERS: ICD-10-CM

## 2022-11-14 DIAGNOSIS — M85.80 OSTEOPENIA, UNSPECIFIED LOCATION: ICD-10-CM

## 2022-11-14 DIAGNOSIS — M25.511 CHRONIC PAIN OF BOTH SHOULDERS: ICD-10-CM

## 2022-11-14 DIAGNOSIS — M54.2 CHRONIC NECK PAIN: ICD-10-CM

## 2022-11-14 DIAGNOSIS — J30.9 CHRONIC ALLERGIC RHINITIS: Chronic | ICD-10-CM

## 2022-11-14 DIAGNOSIS — M19.011 PRIMARY OSTEOARTHRITIS OF RIGHT SHOULDER: ICD-10-CM

## 2022-11-14 DIAGNOSIS — G89.29 CHRONIC NECK PAIN: ICD-10-CM

## 2022-11-14 RX ORDER — LEVOCETIRIZINE DIHYDROCHLORIDE 5 MG/1
5 TABLET, FILM COATED ORAL EVERY EVENING
Qty: 30 TABLET | Refills: 3 | Status: SHIPPED | OUTPATIENT
Start: 2022-11-14 | End: 2023-03-20

## 2022-11-14 RX ORDER — CELECOXIB 200 MG/1
200 CAPSULE ORAL DAILY
Qty: 30 CAPSULE | Refills: 1 | Status: SHIPPED | OUTPATIENT
Start: 2022-11-14 | End: 2023-01-25

## 2022-11-17 ENCOUNTER — OFFICE VISIT (OUTPATIENT)
Dept: PSYCHIATRY | Facility: CLINIC | Age: 47
End: 2022-11-17

## 2022-11-17 ENCOUNTER — HOSPITAL ENCOUNTER (OUTPATIENT)
Dept: GENERAL RADIOLOGY | Facility: HOSPITAL | Age: 47
Discharge: HOME | End: 2022-11-17
Admitting: FAMILY MEDICINE

## 2022-11-17 VITALS
BODY MASS INDEX: 39.61 KG/M2 | SYSTOLIC BLOOD PRESSURE: 124 MMHG | OXYGEN SATURATION: 99 % | HEIGHT: 64 IN | HEART RATE: 93 BPM | WEIGHT: 232 LBS | RESPIRATION RATE: 18 BRPM | DIASTOLIC BLOOD PRESSURE: 81 MMHG

## 2022-11-17 VITALS
HEART RATE: 81 BPM | SYSTOLIC BLOOD PRESSURE: 118 MMHG | BODY MASS INDEX: 40.45 KG/M2 | DIASTOLIC BLOOD PRESSURE: 72 MMHG | WEIGHT: 235.8 LBS | OXYGEN SATURATION: 98 %

## 2022-11-17 DIAGNOSIS — M75.22 BICEPS TENDONITIS OF BOTH SHOULDERS: ICD-10-CM

## 2022-11-17 DIAGNOSIS — M25.511 CHRONIC PAIN OF BOTH SHOULDERS: ICD-10-CM

## 2022-11-17 DIAGNOSIS — M19.011 PRIMARY OSTEOARTHRITIS OF RIGHT SHOULDER: ICD-10-CM

## 2022-11-17 DIAGNOSIS — M25.512 CHRONIC PAIN OF BOTH SHOULDERS: ICD-10-CM

## 2022-11-17 DIAGNOSIS — G89.29 CHRONIC NECK PAIN: ICD-10-CM

## 2022-11-17 DIAGNOSIS — M75.82 TENDONITIS OF BOTH ROTATOR CUFFS: ICD-10-CM

## 2022-11-17 DIAGNOSIS — G89.29 CHRONIC PAIN OF BOTH SHOULDERS: ICD-10-CM

## 2022-11-17 DIAGNOSIS — M54.2 CHRONIC NECK PAIN: ICD-10-CM

## 2022-11-17 DIAGNOSIS — M75.01 ADHESIVE CAPSULITIS OF RIGHT SHOULDER: ICD-10-CM

## 2022-11-17 DIAGNOSIS — M75.81 TENDONITIS OF BOTH ROTATOR CUFFS: ICD-10-CM

## 2022-11-17 DIAGNOSIS — F41.1 GENERALIZED ANXIETY DISORDER: ICD-10-CM

## 2022-11-17 DIAGNOSIS — M25.612 SHOULDER JOINT STIFFNESS, BILATERAL: ICD-10-CM

## 2022-11-17 DIAGNOSIS — M75.21 BICEPS TENDONITIS OF BOTH SHOULDERS: ICD-10-CM

## 2022-11-17 DIAGNOSIS — M25.611 SHOULDER JOINT STIFFNESS, BILATERAL: ICD-10-CM

## 2022-11-17 DIAGNOSIS — F42.8 OTHER OBSESSIVE-COMPULSIVE DISORDERS: Chronic | ICD-10-CM

## 2022-11-17 DIAGNOSIS — M85.80 OSTEOPENIA, UNSPECIFIED LOCATION: ICD-10-CM

## 2022-11-17 DIAGNOSIS — F33.1 MODERATE EPISODE OF RECURRENT MAJOR DEPRESSIVE DISORDER: ICD-10-CM

## 2022-11-17 DIAGNOSIS — E11.42 DM TYPE 2 WITH DIABETIC PERIPHERAL NEUROPATHY: Primary | ICD-10-CM

## 2022-11-17 PROCEDURE — 20610 DRAIN/INJ JOINT/BURSA W/O US: CPT | Performed by: RADIOLOGY

## 2022-11-17 PROCEDURE — 99213 OFFICE O/P EST LOW 20 MIN: CPT | Performed by: NURSE PRACTITIONER

## 2022-11-17 PROCEDURE — 77002 NEEDLE LOCALIZATION BY XRAY: CPT | Performed by: RADIOLOGY

## 2022-11-17 PROCEDURE — 25010000002 IOPAMIDOL 61 % SOLUTION: Performed by: RADIOLOGY

## 2022-11-17 PROCEDURE — 77002 NEEDLE LOCALIZATION BY XRAY: CPT

## 2022-11-17 PROCEDURE — 25010000002 TRIAMCINOLONE PER 10 MG: Performed by: RADIOLOGY

## 2022-11-17 PROCEDURE — 0 LIDOCAINE 1 % SOLUTION: Performed by: RADIOLOGY

## 2022-11-17 RX ORDER — BREXPIPRAZOLE 2 MG/1
1 TABLET ORAL DAILY
Qty: 30 TABLET | Refills: 1 | Status: SHIPPED | OUTPATIENT
Start: 2022-11-17 | End: 2022-12-05 | Stop reason: SDUPTHER

## 2022-11-17 RX ORDER — VENLAFAXINE HYDROCHLORIDE 75 MG/1
75 CAPSULE, EXTENDED RELEASE ORAL DAILY
Qty: 30 CAPSULE | Refills: 1 | Status: SHIPPED | OUTPATIENT
Start: 2022-11-17 | End: 2022-12-05 | Stop reason: SDUPTHER

## 2022-11-17 RX ORDER — LORAZEPAM 1 MG/1
1 TABLET ORAL 3 TIMES DAILY PRN
Qty: 90 TABLET | Refills: 0 | Status: SHIPPED | OUTPATIENT
Start: 2022-11-17 | End: 2022-12-05 | Stop reason: SDUPTHER

## 2022-11-17 RX ORDER — BUPIVACAINE HYDROCHLORIDE 5 MG/ML
5 INJECTION, SOLUTION PERINEURAL ONCE
Status: COMPLETED | OUTPATIENT
Start: 2022-11-17 | End: 2022-11-17

## 2022-11-17 RX ORDER — TRIAMCINOLONE ACETONIDE 40 MG/ML
80 INJECTION, SUSPENSION INTRA-ARTICULAR; INTRAMUSCULAR ONCE
Status: COMPLETED | OUTPATIENT
Start: 2022-11-17 | End: 2022-11-17

## 2022-11-17 RX ORDER — VENLAFAXINE HYDROCHLORIDE 150 MG/1
150 CAPSULE, EXTENDED RELEASE ORAL DAILY
Qty: 30 CAPSULE | Refills: 1 | Status: SHIPPED | OUTPATIENT
Start: 2022-11-17 | End: 2022-12-05 | Stop reason: SDUPTHER

## 2022-11-17 RX ORDER — MECLIZINE HYDROCHLORIDE 25 MG/1
TABLET ORAL
COMMUNITY
Start: 2022-10-25 | End: 2023-04-04

## 2022-11-17 RX ORDER — LIDOCAINE HYDROCHLORIDE 10 MG/ML
10 INJECTION, SOLUTION INFILTRATION; PERINEURAL ONCE
Status: COMPLETED | OUTPATIENT
Start: 2022-11-17 | End: 2022-11-17

## 2022-11-17 RX ADMIN — TRIAMCINOLONE ACETONIDE 80 MG: 40 INJECTION, SUSPENSION INTRA-ARTICULAR; INTRAMUSCULAR at 12:47

## 2022-11-17 RX ADMIN — BUPIVACAINE HYDROCHLORIDE 5 ML: 5 INJECTION, SOLUTION PERINEURAL at 12:48

## 2022-11-17 RX ADMIN — LIDOCAINE HYDROCHLORIDE 10 ML: 10 INJECTION, SOLUTION INFILTRATION; PERINEURAL at 12:46

## 2022-11-17 RX ADMIN — IOPAMIDOL 3 ML: 612 INJECTION, SOLUTION INTRAVENOUS at 12:47

## 2022-11-17 NOTE — PROGRESS NOTES
Subjective   Nivia Bernstein is a 47 y.o. female is here today for medication management follow-up.    Chief Complaint:  Anxiety depression     History of Present Illness:    Patient presents today for a follow up for medication management for anxiety and depression. Patient states still taking care of mom. Patient states not sleeping good but getting at least 3-4 hours a night. Denies any nightmares in a week due to not getting a lot of sleep. Patient states son is having to sleep with her due to cast on and also sleeping in same room as mom. Patient states appetite is good and eating at least two meals a day. Patient states still doing the shot for diabetes. Patient states mood has been not good and been more depressed. Patient states missing dad more and holidays coming up. Patient states depression is at a 10 on a 0-10 scale with 10 being the worst. Denies any thoughts to harm self or others. Patient states last week had a lot of irritability. Denies any panic attacks. Patient states anxiety is at a 9-10 on a 0-10 scale with 10 being the worst. Patient denies auditory or visual hallucinations. Denies any medical changes since last visit. Patient reports medication compliance and denies any side effects.   The following portions of the patient's history were reviewed and updated as appropriate: allergies, current medications, past family history, past medical history, past social history, past surgical history and problem list.    Review of Systems   Constitutional: Negative.    Respiratory: Negative.    Cardiovascular: Negative.    Gastrointestinal: Negative.    Neurological: Negative.    Psychiatric/Behavioral: Positive for agitation, dysphoric mood and sleep disturbance. The patient is nervous/anxious.        Objective   Physical Exam  Vitals reviewed.   Constitutional:       Appearance: Normal appearance. She is well-developed and well-groomed.   Neurological:      Mental Status: She is alert.    Psychiatric:         Attention and Perception: Attention and perception normal.         Mood and Affect: Affect normal. Mood is depressed.         Speech: Speech normal.         Behavior: Behavior normal. Behavior is cooperative.         Thought Content: Thought content normal.         Cognition and Memory: Cognition and memory normal.         Judgment: Judgment normal.       Blood pressure 118/72, pulse 81, weight 107 kg (235 lb 12.8 oz), SpO2 98 %, not currently breastfeeding.Body mass index is 40.45 kg/m².    Allergies   Allergen Reactions   • Mobic [Meloxicam] Rash   • Penicillins Angioedema   • Taxotere [Docetaxel] Anaphylaxis     9 minutes into 1st infusion   • Novolog [Insulin Aspart] Hives   • Rosuvastatin Calcium GI Intolerance       Medication List:   Current Outpatient Medications   Medication Sig Dispense Refill   • Brexpiprazole (Rexulti) 2 MG tablet Take 1 tablet by mouth Daily. 30 tablet 1   • LORazepam (ATIVAN) 1 MG tablet Take 1 tablet by mouth 3 (Three) Times a Day As Needed for Anxiety. for anxiety 90 tablet 0   • venlafaxine XR (EFFEXOR-XR) 150 MG 24 hr capsule Take 1 capsule by mouth Daily. Take with the Effexor XR 75mg capsule for a total daily dose of 225mg. 30 capsule 1   • venlafaxine XR (EFFEXOR-XR) 75 MG 24 hr capsule Take 1 capsule by mouth Daily. 30 capsule 1   • alendronate (FOSAMAX) 35 MG tablet Take 1 tablet by mouth Every 7 (Seven) Days. Take with water, 30 minutes before first food/drink/medication, avoid lying down for 30 minutes after taking 4 tablet 3   • Aspirin Low Dose 81 MG EC tablet TAKE 2 TABLETS BY MOUTH DAILY. 60 tablet 3   • atorvastatin (LIPITOR) 40 MG tablet Take 1 tablet by mouth Every Night. 90 tablet 0   • azelastine (ASTELIN) 0.1 % nasal spray Use in each nostril as directed 30 mL 3   • Breo Ellipta 200-25 MCG/INH inhaler Inhale 1 puff Daily. 1 each 3   • celecoxib (CeleBREX) 200 MG capsule TAKE 1 CAPSULE BY MOUTH DAILY. 30 capsule 1   • cloNIDine (CATAPRES) 0.1  MG tablet Take 1 tablet by mouth 2 (Two) Times a Day. 60 tablet 3   • cyanocobalamin 1000 MCG/ML injection Inject 1 mL into the appropriate muscle as directed by prescriber Every 30 (Thirty) Days. 1 mL 3   • diclofenac (VOLTAREN) 1 % gel gel Apply 4 g topically to the appropriate area as directed 4 (Four) Times a Day As Needed (joint pain). 500 g 5   • Emgality 120 MG/ML auto-injector pen INJECT 120mg ONCE MONTHLY     • furosemide (LASIX) 20 MG tablet Take 1 tablet by mouth Daily As Needed (edema). 30 tablet 1   • gabapentin (NEURONTIN) 100 MG capsule TAKE ONE CAPSULE BY MOUTH TWO TIMES A DAY 60 capsule 3   • HumaLOG 100 UNIT/ML injection INJECT AS DIRECTED.MAX DAILY DOSE  UNITS DAILY 40 mL 3   • HYDROcodone-acetaminophen (NORCO) 7.5-325 MG per tablet Take 1 tablet by mouth Every 4 (Four) Hours As Needed for Moderate Pain. 12 tablet 0   • lansoprazole (PREVACID) 30 MG capsule Take 1 capsule by mouth Daily. 90 capsule 3   • levocetirizine (XYZAL) 5 MG tablet TAKE 1 TABLET BY MOUTH EVERY EVENING. 30 tablet 3   • linaclotide (Linzess) 145 MCG capsule capsule Take 1 capsule by mouth Daily. 30 minutes before meals on an empty stomach. 30 capsule 3   • meclizine (ANTIVERT) 25 MG tablet TAKE 2 TABLETS BY MOUTH EVERY 6 HOURS FOR DIZZINESS     • ondansetron (ZOFRAN) 8 MG tablet TAKE 1 TABLET BY MOUTH EVERY 8 HOURS AS NEEDED FOR NAUSEA OR VOMITING 30 tablet 3   • ProAir  (90 Base) MCG/ACT inhaler INHALE 2 PUFFS BY MOUTH EVERY 4 HOURS AS NEEDED FOR SHORTNESS OF BREATH 8.5 g 5   • Semaglutide,0.25 or 0.5MG/DOS, (Ozempic, 0.25 or 0.5 MG/DOSE,) 2 MG/1.5ML solution pen-injector Inject 0.25 mg under the skin into the appropriate area as directed 1 (One) Time Per Week. 2 mL 0   • SUMAtriptan (IMITREX) 100 MG tablet For Migraine 8 tablet 3   • tamoxifen (NOLVADEX) 20 MG chemo tablet Take 1 tablet by mouth Daily. 30 tablet 11   • topiramate (TOPAMAX) 50 MG tablet Take 1 tablet by mouth 2 (Two) Times a Day. 60 tablet 1   •  Trijardy XR 25-5-1000 MG tablet sustained-release 24 hour TAKE ONE TABLET BY MOUTH EVERY MORNING 30 tablet 5   • vitamin D (ERGOCALCIFEROL) 1.25 MG (42889 UT) capsule capsule TAKE ONE CAPSULE BY MOUTH ONCE A WEEK 4 capsule 3     No current facility-administered medications for this visit.       Mental Status Exam:   Hygiene:   good  Cooperation:  Cooperative  Eye Contact:  Good  Psychomotor Behavior:  Appropriate  Affect:  Appropriate  Hopelessness: Denies  Speech:  Normal  Thought Process:  Goal directed and Linear  Thought Content:  Normal  Suicidal:  None  Homicidal:  None  Hallucinations:  None  Delusion:  None  Memory:  Intact  Orientation:  Person, Place, Time and Situation  Reliability:  fair  Insight:  Fair  Judgement:  Fair  Impulse Control:  Fair  Physical/Medical Issues:  No               Assessment & Plan   Diagnoses and all orders for this visit:    1. Generalized anxiety disorder  -     Brexpiprazole (Rexulti) 2 MG tablet; Take 1 tablet by mouth Daily.  Dispense: 30 tablet; Refill: 1  -     LORazepam (ATIVAN) 1 MG tablet; Take 1 tablet by mouth 3 (Three) Times a Day As Needed for Anxiety. for anxiety  Dispense: 90 tablet; Refill: 0  -     venlafaxine XR (EFFEXOR-XR) 150 MG 24 hr capsule; Take 1 capsule by mouth Daily. Take with the Effexor XR 75mg capsule for a total daily dose of 225mg.  Dispense: 30 capsule; Refill: 1  -     venlafaxine XR (EFFEXOR-XR) 75 MG 24 hr capsule; Take 1 capsule by mouth Daily.  Dispense: 30 capsule; Refill: 1    2. Other obsessive-compulsive disorders  -     Brexpiprazole (Rexulti) 2 MG tablet; Take 1 tablet by mouth Daily.  Dispense: 30 tablet; Refill: 1  -     venlafaxine XR (EFFEXOR-XR) 150 MG 24 hr capsule; Take 1 capsule by mouth Daily. Take with the Effexor XR 75mg capsule for a total daily dose of 225mg.  Dispense: 30 capsule; Refill: 1  -     venlafaxine XR (EFFEXOR-XR) 75 MG 24 hr capsule; Take 1 capsule by mouth Daily.  Dispense: 30 capsule; Refill: 1    3.  Moderate episode of recurrent major depressive disorder (HCC)  -     Brexpiprazole (Rexulti) 2 MG tablet; Take 1 tablet by mouth Daily.  Dispense: 30 tablet; Refill: 1  -     venlafaxine XR (EFFEXOR-XR) 150 MG 24 hr capsule; Take 1 capsule by mouth Daily. Take with the Effexor XR 75mg capsule for a total daily dose of 225mg.  Dispense: 30 capsule; Refill: 1  -     venlafaxine XR (EFFEXOR-XR) 75 MG 24 hr capsule; Take 1 capsule by mouth Daily.  Dispense: 30 capsule; Refill: 1            Discussed medication options with patient and . Cont. Effexor XR 150mg daily for depression, anxiety, and OCD. Cont. Effexor XR 75mg daily for depression, anxiety, and OCD. Cont. Lorazepam 1mg three times daily as needed for anxiety. Cont. Rexulti 2mg daily for depression and anxiety. Reviewed the risks, benefits, and side effects of the medications; patient acknowledged and verbally consented. Patient is being prescribed a controlled substance as part of treatment plan. Patient has been educated of appropriate use of the medications, including risk of somnolence, limited ability to drive and/or work safely, and potential for dependence, respiratory depression and overdose. Patient is also informed that the medication are to be used by the patient only- avoid any combined use of ETOH or other substances unless prescribed.   [facility] Patient Controlled Substance Report (from 11/17/2021 to 11/17/2022)    Dispensed  Strength Quantity Days Supply Provider Pharmacy   11/05/2022 Gabapentin 100MG 60 each 30 [facility]   10/25/2022 Lorazepam 1MG 90 each 30 CLOUD[facility]FREDO [facility]   10/06/2022 Gabapentin 100MG 60 each 30 QUINTONSTEPHANIE [facility]   10/03/2022 Hydrocodone/Acetaminophen 325MG/7.5MG 12 each 2 [facility]ERV[facility]   09/28/2022 Lorazepam 1MG 90 each 30 CLOUD[facility]   08/30/2022 Gabapentin 100MG 60 each 30 [facility] ALC[facility]ARA  [facility]   08/30/2022 Lorazepam 1MG 90 each 30 CLOUD[facility]   08/02/2022 Gabapentin 100MG 60 each 30 QUINTON,STEPHANIE [facility]   07/11/2022 Lorazepam 1MG 90 each 30 CLOUD[facility]   07/05/2022 Gabapentin 100MG 60 each 30 QUINTON[facility]STEPHANIE [facility]   06/13/2022 Lorazepam 1MG 90 each 30 CLOUD[facility]   06/03/2022 Gabapentin 100MG 60 each 30 QUINTON[facility]STEPHANIE [facility]   05/10/2022 Lorazepam 1MG 90 each 30 CLOUD[facility]EDO [facility]   05/06/2022 Gabapentin 100MG 60 each 30 URMILA,CELIA [facility]   04/19/2022 Hydrocodone Bitartrate/Ac 325MG/5MG 10 each 3 SCHAUMBURG,GIL [facility]   04/14/2022 Lorazepam 1MG 90 each 30 CLOUD[facility]ALFREDO [facility]   03/25/2022 Gabapentin 100MG 60 each 30 URMILA,CELIA [facility]   03/17/2022 Lorazepam 1MG 90 each 30 CLOUD[facility]FREDO [facility]   02/26/2022 Gabapentin 100MG 60 each 30 URMILA,CELIA [facility]   02/17/2022 Lorazepam 1MG 90 each 30 CLOUD[facility]ALFREDO [facility]   01/27/2022 Gabapentin 100MG 60 each 30 URMILA,CELIA [facility]   01/20/2022 Lorazepam 1MG 90 each 30 CLOUD[facility]ALFREDO [facility]   12/23/2021 Gabapentin 100MG 60 each 30 URMILA,CELIA [facility]   12/23/2021 Lorazepam 1MG 90 each 30 CLOUD[facility]ALFREDO [facility]   11/29/2021 Lorazepam 1MG 90 each 30 CLOUD[facility]FREDO [facility]           Patient is agreeable to call the office with any questions, concerns, or worsening of symptoms. Patient is aware to call 911 or go to the nearest ER should begin having SI/HI.          Follow up in four weeks      Errors in dictation may reflect use of voice recognition software and not all errors in transcription may have been detected prior to signing.              This document  has been electronically signed by NIDA Vasquez   November 17, 2022 10:04 EST

## 2022-11-20 DIAGNOSIS — E78.2 MIXED DYSLIPIDEMIA: Chronic | ICD-10-CM

## 2022-11-21 RX ORDER — ATORVASTATIN CALCIUM 40 MG/1
TABLET, FILM COATED ORAL
Qty: 90 TABLET | Refills: 0 | OUTPATIENT
Start: 2022-11-21

## 2022-11-22 ENCOUNTER — TELEPHONE (OUTPATIENT)
Dept: FAMILY MEDICINE CLINIC | Facility: CLINIC | Age: 47
End: 2022-11-22

## 2022-11-22 RX ORDER — SULFAMETHOXAZOLE AND TRIMETHOPRIM 800; 160 MG/1; MG/1
1 TABLET ORAL 2 TIMES DAILY
Qty: 10 TABLET | Refills: 0 | Status: SHIPPED | OUTPATIENT
Start: 2022-11-22 | End: 2022-11-27

## 2022-11-22 NOTE — TELEPHONE ENCOUNTER
Caller: Nivia Bernstein    Relationship: Self    Best call back number: [phone]    What are your current symptoms:   KIDNEY INFECTION, ITCHING AND BURNING URINATION     How long have you been experiencing symptoms:    TWO DAYS     Have you had these symptoms before:    [x] Yes  [] No    Have you been treated for these symptoms before:   [x] Yes  [] No    If a prescription is needed, what is your preferred pharmacy and phone number:      [facility] - [place], [place] - [address] - [phone]  - [phone]   [phone]    Additional notes:  PATIENT WOULD LIKE FOR A MEDICATION TO BE CALLED INTO THE PHARMACY TO HELP WITH A KIDNEY INFECTION

## 2022-12-05 DIAGNOSIS — M19.011 PRIMARY OSTEOARTHRITIS OF RIGHT SHOULDER: ICD-10-CM

## 2022-12-05 DIAGNOSIS — M54.2 CHRONIC NECK PAIN: ICD-10-CM

## 2022-12-05 DIAGNOSIS — G89.29 CHRONIC PAIN OF BOTH SHOULDERS: ICD-10-CM

## 2022-12-05 DIAGNOSIS — F42.8 OTHER OBSESSIVE-COMPULSIVE DISORDERS: Chronic | ICD-10-CM

## 2022-12-05 DIAGNOSIS — M25.511 CHRONIC PAIN OF BOTH SHOULDERS: ICD-10-CM

## 2022-12-05 DIAGNOSIS — F41.1 GENERALIZED ANXIETY DISORDER: ICD-10-CM

## 2022-12-05 DIAGNOSIS — G89.29 CHRONIC NECK PAIN: ICD-10-CM

## 2022-12-05 DIAGNOSIS — M25.512 CHRONIC PAIN OF BOTH SHOULDERS: ICD-10-CM

## 2022-12-05 DIAGNOSIS — F33.1 MODERATE EPISODE OF RECURRENT MAJOR DEPRESSIVE DISORDER: ICD-10-CM

## 2022-12-05 DIAGNOSIS — M85.80 OSTEOPENIA, UNSPECIFIED LOCATION: ICD-10-CM

## 2022-12-05 RX ORDER — LORAZEPAM 1 MG/1
1 TABLET ORAL 3 TIMES DAILY PRN
Qty: 90 TABLET | Refills: 0 | Status: SHIPPED | OUTPATIENT
Start: 2022-12-05 | End: 2023-02-02

## 2022-12-05 RX ORDER — VENLAFAXINE HYDROCHLORIDE 75 MG/1
75 CAPSULE, EXTENDED RELEASE ORAL DAILY
Qty: 30 CAPSULE | Refills: 1 | Status: SHIPPED | OUTPATIENT
Start: 2022-12-05 | End: 2023-01-05 | Stop reason: SDUPTHER

## 2022-12-05 RX ORDER — VENLAFAXINE HYDROCHLORIDE 150 MG/1
150 CAPSULE, EXTENDED RELEASE ORAL DAILY
Qty: 30 CAPSULE | Refills: 1 | Status: SHIPPED | OUTPATIENT
Start: 2022-12-05 | End: 2023-01-05 | Stop reason: SDUPTHER

## 2022-12-05 RX ORDER — BREXPIPRAZOLE 2 MG/1
1 TABLET ORAL DAILY
Qty: 30 TABLET | Refills: 1 | Status: SHIPPED | OUTPATIENT
Start: 2022-12-05 | End: 2023-01-05 | Stop reason: SDUPTHER

## 2022-12-13 DIAGNOSIS — F42.8 OTHER OBSESSIVE-COMPULSIVE DISORDERS: Chronic | ICD-10-CM

## 2022-12-13 DIAGNOSIS — R23.2 HOT FLASHES: ICD-10-CM

## 2022-12-13 DIAGNOSIS — R60.1 GENERALIZED EDEMA: ICD-10-CM

## 2022-12-13 DIAGNOSIS — F41.1 GENERALIZED ANXIETY DISORDER: ICD-10-CM

## 2022-12-13 DIAGNOSIS — E53.8 VITAMIN B 12 DEFICIENCY: ICD-10-CM

## 2022-12-13 DIAGNOSIS — F33.1 MODERATE EPISODE OF RECURRENT MAJOR DEPRESSIVE DISORDER: ICD-10-CM

## 2022-12-13 RX ORDER — VENLAFAXINE HYDROCHLORIDE 150 MG/1
CAPSULE, EXTENDED RELEASE ORAL
Qty: 30 CAPSULE | Refills: 1 | OUTPATIENT
Start: 2022-12-13

## 2022-12-13 RX ORDER — VENLAFAXINE HYDROCHLORIDE 75 MG/1
CAPSULE, EXTENDED RELEASE ORAL
Qty: 30 CAPSULE | Refills: 1 | OUTPATIENT
Start: 2022-12-13

## 2022-12-13 RX ORDER — FUROSEMIDE 20 MG/1
20 TABLET ORAL DAILY PRN
Qty: 30 TABLET | Refills: 1 | Status: SHIPPED | OUTPATIENT
Start: 2022-12-13 | End: 2023-02-22

## 2022-12-13 RX ORDER — CLONIDINE HYDROCHLORIDE 0.1 MG/1
TABLET ORAL
Qty: 60 TABLET | Refills: 5 | Status: SHIPPED | OUTPATIENT
Start: 2022-12-13

## 2022-12-13 RX ORDER — BREXPIPRAZOLE 2 MG/1
TABLET ORAL
Qty: 30 TABLET | Refills: 1 | OUTPATIENT
Start: 2022-12-13

## 2022-12-13 RX ORDER — CYANOCOBALAMIN 1000 UG/ML
INJECTION, SOLUTION INTRAMUSCULAR; SUBCUTANEOUS
Qty: 1 ML | Refills: 5 | Status: SHIPPED | OUTPATIENT
Start: 2022-12-13

## 2022-12-13 NOTE — TELEPHONE ENCOUNTER
Pt was last seen on 5/27/2021.   Will you all reach out to her and see if she is going to continue seeing Dr. Menjivar or switching her care to Josh. She hasn't seen Dr. Menjivar since last year.

## 2022-12-15 NOTE — PROGRESS NOTES
Charlotte SMITH from [facility] called to f/u on pt's pos chlamydia and syphilis results. Writer told RN that pt came in with c/o rash all over body and that pt and mom already informed of pos results; all questions answered.    Nurse Navigator spoke with patient on this date.  Pt aware she will more than likely lose her hair due to chemotherapy.  Pt interested in a wig.  Pt given the opportunity to look through donated wigs.  Wig provided to patient.  Pt verbalized appreciation.  Nurse Navigator will continue to assist and follow as needed.

## 2022-12-16 DIAGNOSIS — E11.42 DM TYPE 2 WITH DIABETIC PERIPHERAL NEUROPATHY: ICD-10-CM

## 2022-12-16 NOTE — TELEPHONE ENCOUNTER
Caller: Nivia Bernstein    Relationship: Self    Best call back number: [phone]    Requested Prescriptions:   Requested Prescriptions     Pending Prescriptions Disp Refills   • Semaglutide, 1 MG/DOSE, (OZEMPIC) 2 MG/1.5ML solution pen-injector 3 mL 0     Sig: Inject 1 mg under the skin into the appropriate area as directed 1 (One) Time Per Week.        Pharmacy where request should be sent: [facility] [address] - [phone] [facility] [phone] FX     Additional details provided by patient:  PATIENT OUT OF MEDICATION.      Does the patient have less than a 3 day supply:  [x] Yes  [] No    Would you like a call back once the refill request has been completed: [] Yes [x] No    If the office needs to give you a call back, can they leave a voicemail: [] Yes [x] No    Lizbeth Wright   12/16/22 09:58 EST

## 2022-12-22 ENCOUNTER — TELEPHONE (OUTPATIENT)
Dept: ONCOLOGY | Facility: HOSPITAL | Age: 47
End: 2022-12-22

## 2022-12-22 NOTE — TELEPHONE ENCOUNTER
Pt called regarding missed appointment for port flush today. Pt aware of new appointment date/time.

## 2023-01-05 ENCOUNTER — OFFICE VISIT (OUTPATIENT)
Dept: PSYCHIATRY | Facility: CLINIC | Age: 48
End: 2023-01-05
Payer: MEDICAID

## 2023-01-05 ENCOUNTER — LAB (OUTPATIENT)
Dept: FAMILY MEDICINE CLINIC | Facility: CLINIC | Age: 48
End: 2023-01-05
Payer: MEDICAID

## 2023-01-05 ENCOUNTER — INFUSION (OUTPATIENT)
Dept: ONCOLOGY | Facility: HOSPITAL | Age: 48
End: 2023-01-05
Payer: MEDICAID

## 2023-01-05 VITALS
OXYGEN SATURATION: 96 % | TEMPERATURE: 97.1 F | DIASTOLIC BLOOD PRESSURE: 83 MMHG | HEART RATE: 100 BPM | SYSTOLIC BLOOD PRESSURE: 146 MMHG | RESPIRATION RATE: 18 BRPM | BODY MASS INDEX: 39.39 KG/M2 | WEIGHT: 229.5 LBS

## 2023-01-05 VITALS
WEIGHT: 227.2 LBS | DIASTOLIC BLOOD PRESSURE: 70 MMHG | BODY MASS INDEX: 39 KG/M2 | HEART RATE: 90 BPM | SYSTOLIC BLOOD PRESSURE: 120 MMHG

## 2023-01-05 DIAGNOSIS — F41.1 GENERALIZED ANXIETY DISORDER: Primary | ICD-10-CM

## 2023-01-05 DIAGNOSIS — Z79.899 HIGH RISK MEDICATION USE: ICD-10-CM

## 2023-01-05 DIAGNOSIS — F42.8 OTHER OBSESSIVE-COMPULSIVE DISORDERS: Chronic | ICD-10-CM

## 2023-01-05 DIAGNOSIS — F32.A ANXIETY AND DEPRESSION: ICD-10-CM

## 2023-01-05 DIAGNOSIS — F33.1 MODERATE EPISODE OF RECURRENT MAJOR DEPRESSIVE DISORDER: ICD-10-CM

## 2023-01-05 DIAGNOSIS — Z95.828 PORT-A-CATH IN PLACE: Primary | ICD-10-CM

## 2023-01-05 DIAGNOSIS — R11.0 NAUSEA: ICD-10-CM

## 2023-01-05 DIAGNOSIS — Z79.899 HIGH RISK MEDICATION USE: Primary | ICD-10-CM

## 2023-01-05 DIAGNOSIS — F41.9 ANXIETY AND DEPRESSION: ICD-10-CM

## 2023-01-05 DIAGNOSIS — M19.011 PRIMARY OSTEOARTHRITIS OF RIGHT SHOULDER: Chronic | ICD-10-CM

## 2023-01-05 LAB
AMPHET+METHAMPHET UR QL: NEGATIVE
AMPHETAMINES UR QL: POSITIVE
BARBITURATES UR QL SCN: NEGATIVE
BENZODIAZ UR QL SCN: POSITIVE
BUPRENORPHINE SERPL-MCNC: NEGATIVE NG/ML
CANNABINOIDS SERPL QL: NEGATIVE
COCAINE UR QL: NEGATIVE
METHADONE UR QL SCN: NEGATIVE
OPIATES UR QL: NEGATIVE
OXYCODONE UR QL SCN: NEGATIVE
PCP UR QL SCN: NEGATIVE
PROPOXYPH UR QL: NEGATIVE
TRICYCLICS UR QL SCN: NEGATIVE

## 2023-01-05 PROCEDURE — 80306 DRUG TEST PRSMV INSTRMNT: CPT | Performed by: NURSE PRACTITIONER

## 2023-01-05 PROCEDURE — 99213 OFFICE O/P EST LOW 20 MIN: CPT | Performed by: NURSE PRACTITIONER

## 2023-01-05 PROCEDURE — 96523 IRRIG DRUG DELIVERY DEVICE: CPT

## 2023-01-05 PROCEDURE — G0480 DRUG TEST DEF 1-7 CLASSES: HCPCS | Performed by: NURSE PRACTITIONER

## 2023-01-05 PROCEDURE — 25010000002 HEPARIN LOCK FLUSH PER 10 UNITS: Performed by: NURSE PRACTITIONER

## 2023-01-05 RX ORDER — LORAZEPAM 1 MG/1
1 TABLET ORAL 3 TIMES DAILY PRN
Qty: 90 TABLET | Refills: 0 | Status: CANCELLED | OUTPATIENT
Start: 2023-01-05

## 2023-01-05 RX ORDER — UBROGEPANT 100 MG/1
TABLET ORAL
COMMUNITY
Start: 2022-12-27

## 2023-01-05 RX ORDER — VENLAFAXINE HYDROCHLORIDE 75 MG/1
75 CAPSULE, EXTENDED RELEASE ORAL DAILY
Qty: 30 CAPSULE | Refills: 1 | Status: SHIPPED | OUTPATIENT
Start: 2023-01-05 | End: 2023-02-10 | Stop reason: SDUPTHER

## 2023-01-05 RX ORDER — SODIUM CHLORIDE 0.9 % (FLUSH) 0.9 %
10 SYRINGE (ML) INJECTION AS NEEDED
Status: DISCONTINUED | OUTPATIENT
Start: 2023-01-05 | End: 2023-01-05 | Stop reason: HOSPADM

## 2023-01-05 RX ORDER — VENLAFAXINE HYDROCHLORIDE 150 MG/1
150 CAPSULE, EXTENDED RELEASE ORAL DAILY
Qty: 30 CAPSULE | Refills: 1 | Status: SHIPPED | OUTPATIENT
Start: 2023-01-05 | End: 2023-02-10 | Stop reason: SDUPTHER

## 2023-01-05 RX ORDER — HEPARIN SODIUM (PORCINE) LOCK FLUSH IV SOLN 100 UNIT/ML 100 UNIT/ML
500 SOLUTION INTRAVENOUS AS NEEDED
Status: DISCONTINUED | OUTPATIENT
Start: 2023-01-05 | End: 2023-01-05 | Stop reason: HOSPADM

## 2023-01-05 RX ORDER — ONDANSETRON HYDROCHLORIDE 8 MG/1
TABLET, FILM COATED ORAL
Qty: 30 TABLET | Refills: 3 | Status: SHIPPED | OUTPATIENT
Start: 2023-01-05

## 2023-01-05 RX ORDER — BREXPIPRAZOLE 2 MG/1
1 TABLET ORAL DAILY
Qty: 30 TABLET | Refills: 1 | Status: SHIPPED | OUTPATIENT
Start: 2023-01-05 | End: 2023-02-10 | Stop reason: SDUPTHER

## 2023-01-05 RX ADMIN — Medication 500 UNITS: at 15:11

## 2023-01-05 RX ADMIN — Medication 10 ML: at 15:11

## 2023-01-05 NOTE — PROGRESS NOTES
Subjective   Nivia Bernstein is a 47 y.o. female is here today for medication management follow-up.    Chief Complaint:  Anxiety depression irritability    History of Present Illness:    Patient presents today for a follow up for medication management for anxiety, depression, and irritability. Patient states they had a good holiday and was able to visit with family. Patient states she has been doing alright. Patient states she has been dealing with sickness the last three to four weeks. Patient states she has had stomach problems for the last three to four weeks. Patient states appetite is decreased and eating about once a day. Patient states sleeping has been fair and getting about 4-5 hours. Patient denies any nightmares. Patient states depression is at a 8-9 on a 0-10 scale with 10 being the worst. Patient states anxiety is at a 10 on a 0-10 scale with 10 being the worst. Denies any panic attacks. Denies any thoughts to harm self or others. Denies any auditory or visual hallucinations. Denies any other medical changes since last visit. Patient reports medication compliance and denies any side effects.   The following portions of the patient's history were reviewed and updated as appropriate: allergies, current medications, past family history, past medical history, past social history, past surgical history and problem list.    Review of Systems   Constitutional: Positive for appetite change.   Respiratory: Negative.    Cardiovascular: Negative.    Gastrointestinal: Negative.    Neurological: Negative.    Psychiatric/Behavioral: Positive for agitation, dysphoric mood and sleep disturbance. The patient is nervous/anxious.        Objective   Physical Exam  Vitals reviewed.   Constitutional:       Appearance: Normal appearance. She is well-developed and well-groomed.   Neurological:      Mental Status: She is alert.   Psychiatric:         Attention and Perception: Attention and perception normal.         Mood  and Affect: Affect normal. Mood is depressed.         Speech: Speech normal.         Behavior: Behavior normal. Behavior is cooperative.         Thought Content: Thought content normal.         Cognition and Memory: Cognition and memory normal.         Judgment: Judgment normal.       Blood pressure 120/70, pulse 90, weight 103 kg (227 lb 3.2 oz), not currently breastfeeding. Body mass index is 39 kg/m².    Allergies   Allergen Reactions   • Mobic [Meloxicam] Rash   • Penicillins Angioedema   • Taxotere [Docetaxel] Anaphylaxis     9 minutes into 1st infusion   • Novolog [Insulin Aspart] Hives   • Rosuvastatin Calcium GI Intolerance       Medication List:   Current Outpatient Medications   Medication Sig Dispense Refill   • Brexpiprazole (Rexulti) 2 MG tablet Take 1 tablet by mouth Daily. 30 tablet 1   • venlafaxine XR (EFFEXOR-XR) 150 MG 24 hr capsule Take 1 capsule by mouth Daily. Take with the Effexor XR 75mg capsule for a total daily dose of 225mg. 30 capsule 1   • venlafaxine XR (EFFEXOR-XR) 75 MG 24 hr capsule Take 1 capsule by mouth Daily. 30 capsule 1   • alendronate (FOSAMAX) 35 MG tablet Take 1 tablet by mouth Every 7 (Seven) Days. Take with water, 30 minutes before first food/drink/medication, avoid lying down for 30 minutes after taking 4 tablet 3   • Aspirin Low Dose 81 MG EC tablet TAKE 2 TABLETS BY MOUTH DAILY. 60 tablet 3   • atorvastatin (LIPITOR) 40 MG tablet Take 1 tablet by mouth Every Night. 90 tablet 0   • azelastine (ASTELIN) 0.1 % nasal spray Use in each nostril as directed 30 mL 3   • Breo Ellipta 200-25 MCG/INH inhaler Inhale 1 puff Daily. 1 each 3   • celecoxib (CeleBREX) 200 MG capsule TAKE 1 CAPSULE BY MOUTH DAILY. 30 capsule 1   • cloNIDine (CATAPRES) 0.1 MG tablet TAKE ONE TABLET BY MOUTH TWO TIMES A DAY 60 tablet 5   • cyanocobalamin 1000 MCG/ML injection INJECT 1ML INTO TO APPROPRAITE MUSCLE EVERY 30 DAYS 1 mL 5   • diclofenac (VOLTAREN) 1 % gel gel Apply 4 g topically to the  appropriate area as directed 4 (Four) Times a Day As Needed (joint pain). 500 g 5   • Diclofenac Sodium (VOLTAREN) 1 % gel gel APPLY 4 GRAMS TO THE AFFECTED AREA FOUR TIMES A  g 3   • Emgality 120 MG/ML auto-injector pen INJECT 120mg ONCE MONTHLY     • furosemide (LASIX) 20 MG tablet Take 1 tablet by mouth Daily As Needed (edema). 30 tablet 1   • gabapentin (NEURONTIN) 100 MG capsule TAKE ONE CAPSULE BY MOUTH TWO TIMES A DAY 60 capsule 3   • HumaLOG 100 UNIT/ML injection INJECT AS DIRECTED.MAX DAILY DOSE  UNITS DAILY 40 mL 3   • HYDROcodone-acetaminophen (NORCO) 7.5-325 MG per tablet Take 1 tablet by mouth Every 4 (Four) Hours As Needed for Moderate Pain. 12 tablet 0   • lansoprazole (PREVACID) 30 MG capsule Take 1 capsule by mouth Daily. 90 capsule 3   • levocetirizine (XYZAL) 5 MG tablet TAKE 1 TABLET BY MOUTH EVERY EVENING. 30 tablet 3   • linaclotide (Linzess) 145 MCG capsule capsule Take 1 capsule by mouth Daily. 30 minutes before meals on an empty stomach. 30 capsule 3   • LORazepam (ATIVAN) 1 MG tablet Take 1 tablet by mouth 3 (Three) Times a Day As Needed for Anxiety. for anxiety 90 tablet 0   • meclizine (ANTIVERT) 25 MG tablet TAKE 2 TABLETS BY MOUTH EVERY 6 HOURS FOR DIZZINESS     • ondansetron (ZOFRAN) 8 MG tablet TAKE 1 TABLET BY MOUTH EVERY 8 HOURS AS NEEDED FOR NAUSEA OR VOMITING 30 tablet 3   • ProAir  (90 Base) MCG/ACT inhaler INHALE 2 PUFFS BY MOUTH EVERY 4 HOURS AS NEEDED FOR SHORTNESS OF BREATH 8.5 g 5   • Semaglutide, 1 MG/DOSE, (OZEMPIC) 2 MG/1.5ML solution pen-injector Inject 1 mg under the skin into the appropriate area as directed 1 (One) Time Per Week. 3 mL 0   • tamoxifen (NOLVADEX) 20 MG chemo tablet Take 1 tablet by mouth Daily. 30 tablet 11   • topiramate (TOPAMAX) 50 MG tablet Take 1 tablet by mouth 2 (Two) Times a Day. 60 tablet 1   • Trijardy XR 25-5-1000 MG tablet sustained-release 24 hour TAKE ONE TABLET BY MOUTH EVERY MORNING 30 tablet 5   • ubrogepant 100 MG  tablet      • vitamin D (ERGOCALCIFEROL) 1.25 MG (82406 UT) capsule capsule TAKE ONE CAPSULE BY MOUTH ONCE A WEEK 4 capsule 3     No current facility-administered medications for this visit.       Mental Status Exam:   Hygiene:   good  Cooperation:  Cooperative  Eye Contact:  Good  Psychomotor Behavior:  Appropriate  Affect:  Appropriate  Hopelessness: Denies  Speech:  Normal  Thought Process:  Goal directed and Linear  Thought Content:  Normal  Suicidal:  None  Homicidal:  None  Hallucinations:  None  Delusion:  None  Memory:  Intact  Orientation:  Person, Place, Time and Situation  Reliability:  fair  Insight:  Fair  Judgement:  Fair  Impulse Control:  Fair  Physical/Medical Issues:  No               Assessment & Plan   Diagnoses and all orders for this visit:    1. Generalized anxiety disorder (Primary)  -     Brexpiprazole (Rexulti) 2 MG tablet; Take 1 tablet by mouth Daily.  Dispense: 30 tablet; Refill: 1  -     venlafaxine XR (EFFEXOR-XR) 150 MG 24 hr capsule; Take 1 capsule by mouth Daily. Take with the Effexor XR 75mg capsule for a total daily dose of 225mg.  Dispense: 30 capsule; Refill: 1  -     venlafaxine XR (EFFEXOR-XR) 75 MG 24 hr capsule; Take 1 capsule by mouth Daily.  Dispense: 30 capsule; Refill: 1    2. Other obsessive-compulsive disorders  -     Brexpiprazole (Rexulti) 2 MG tablet; Take 1 tablet by mouth Daily.  Dispense: 30 tablet; Refill: 1  -     venlafaxine XR (EFFEXOR-XR) 150 MG 24 hr capsule; Take 1 capsule by mouth Daily. Take with the Effexor XR 75mg capsule for a total daily dose of 225mg.  Dispense: 30 capsule; Refill: 1  -     venlafaxine XR (EFFEXOR-XR) 75 MG 24 hr capsule; Take 1 capsule by mouth Daily.  Dispense: 30 capsule; Refill: 1    3. Moderate episode of recurrent major depressive disorder (HCC)  -     Brexpiprazole (Rexulti) 2 MG tablet; Take 1 tablet by mouth Daily.  Dispense: 30 tablet; Refill: 1  -     venlafaxine XR (EFFEXOR-XR) 150 MG 24 hr capsule; Take 1 capsule by  mouth Daily. Take with the Effexor XR 75mg capsule for a total daily dose of 225mg.  Dispense: 30 capsule; Refill: 1  -     venlafaxine XR (EFFEXOR-XR) 75 MG 24 hr capsule; Take 1 capsule by mouth Daily.  Dispense: 30 capsule; Refill: 1    4. High risk medication use  -     Urine Drug Screen - Urine, Clean Catch; Future            Discussed medication options with patient and . Cont. Rexulti 2mg daily for depression. Cont. Effexor XR 150mg daily for depression and anxiety. Cont. Effexor XR 75mg daily for depression and anxiety. Cont. Lorazepam 1mg three times daily as needed for anxiety. Reviewed the risks, benefits, and side effects of the medications; patient acknowledged and verbally consented.  Patient is being prescribed a controlled substance as part of treatment plan. Patient has been educated of appropriate use of the medications, including risk of somnolence, limited ability to drive and/or work safely, and potential for dependence, respiratory depression and overdose. Patient is also informed that the medication are to be used by the patient only- avoid any combined use of ETOH or other substances unless prescribed.   UDS ordered.   [facility] Patient Controlled Substance Report (from 1/16/2022 to 1/16/2023)    Dispensed  Strength Quantity Days Supply Provider Pharmacy   01/05/2023 Gabapentin 100MG 60 each 30 [facility]   01/05/2023 Lorazepam 1MG 90 each 30 [facility]   12/09/2022 Gabapentin 100MG 60 each 30 [facility]   11/23/2022 Lorazepam 1MG 90 each 30 [facility]   11/05/2022 Gabapentin 100MG 60 each 30 [facility]   10/25/2022 Lorazepam 1MG 90 each 30 [facility]   10/06/2022 Gabapentin 100MG 60 each 30 [facility]   10/03/2022 Hydrocodone/Acetaminophen 325MG/7.5MG 12 each 2 [facility]   09/28/2022 Lorazepam 1MG 90 each 30 [facility]   08/30/2022 Gabapentin 100MG 60 each 30 [facility]   08/30/2022 Lorazepam 1MG 90 each 30 [facility]   08/02/2022 Gabapentin 100MG 60 each 30 [facility]   07/11/2022 Lorazepam 1MG 90 each 30 [facility]   07/05/2022 Gabapentin 100MG 60 each 30 [facility]   06/13/2022 Lorazepam 1MG 90 each 30 [facility]   06/03/2022 Gabapentin 100MG 60 each 30 [facility]PHANIE [facility]   05/10/2022 Lorazepam 1MG 90 each 30 [facility]   05/06/2022 Gabapentin 100MG 60 each 30 URMILA[facility]CELIA [facility]   04/19/2022 Hydrocodone Bitartrate/Ac 325MG/5MG 10 each 3 SCHAUMBURG,GIL [facility]   04/14/2022 Lorazepam 1MG 90 each 30 [facility]   03/25/2022 Gabapentin 100MG 60 each 30 [facility]RM[facility]CELIA [facility]   03/17/2022 Lorazepam 1MG 90 each 30 [facility]   02/26/2022 Gabapentin 100MG 60 each 30 [facility]RM[facility]CELIA [facility]   02/17/2022 Lorazepam 1MG 90 each 30 [facility]   01/27/2022 Gabapentin 100MG 60 each 30 URMILA[facility]CELIA [facility]   01/20/2022 Lorazepam 1MG 90 each 30 [facility]          Patient is agreeable to call the office with any questions, concerns, or worsening of symptoms. Patient is aware to call 911 or go to the nearest ER should begin having SI/HI.          Follow up in four weeks        Errors in dictation may reflect use of voice recognition software and not all errors in transcription may have been detected prior to signing.              This document has been electronically signed by NDIA Vasquez   January 16, 2023  12:38 EST

## 2023-01-05 NOTE — PROGRESS NOTES
Can you call and make sure there has been no new medications the patient has been taking that she didn't inform us of at visit?

## 2023-01-06 ENCOUNTER — TELEPHONE (OUTPATIENT)
Dept: PSYCHIATRY | Facility: CLINIC | Age: 48
End: 2023-01-06
Payer: MEDICAID

## 2023-01-06 NOTE — TELEPHONE ENCOUNTER
----- Message from NIDA Vasquez sent at 1/6/2023  9:12 AM EST -----  Can you call her pharmacy and verify if there is a prescription on hold for ativan? Please confirm when she filled Ativan last?  Thank you

## 2023-01-06 NOTE — TELEPHONE ENCOUNTER
Called [facility] and they stated that patient did have one on hold but it was filled yesterday. She will not need another script sent in till next month.

## 2023-01-10 ENCOUNTER — TELEPHONE (OUTPATIENT)
Dept: PSYCHIATRY | Facility: CLINIC | Age: 48
End: 2023-01-10
Payer: MEDICAID

## 2023-01-10 LAB
DEXTROMETHAMPHETAMINE [MASS/VOLUME] IN URINE: NORMAL %
L-METHAMPHET MFR UR: NORMAL %

## 2023-01-10 NOTE — TELEPHONE ENCOUNTER
----- Message from NIDA Vasquez sent at 1/10/2023  4:22 PM EST -----  Let her know we need her to come in for a repeat UDS with Doris this week and the confirmation that was sent on the last UDS was not able to differentiate due to insufficient levels.   Thank you

## 2023-01-10 NOTE — TELEPHONE ENCOUNTER
Called and spoke to patient and made her aware I would call her tomorrow morning once Doris was here.     She verbalized understanding.

## 2023-01-11 ENCOUNTER — LAB (OUTPATIENT)
Dept: FAMILY MEDICINE CLINIC | Facility: CLINIC | Age: 48
End: 2023-01-11
Payer: MEDICAID

## 2023-01-16 ENCOUNTER — TELEPHONE (OUTPATIENT)
Dept: PSYCHIATRY | Facility: CLINIC | Age: 48
End: 2023-01-16
Payer: MEDICAID

## 2023-01-16 NOTE — TELEPHONE ENCOUNTER
----- Message from NIDA Vasquez sent at 1/16/2023 10:49 AM EST -----  Please let her know that her UDS came back normal and to let us know if she runs out of medication before next appointment.   Thank you

## 2023-01-18 ENCOUNTER — OFFICE VISIT (OUTPATIENT)
Dept: FAMILY MEDICINE CLINIC | Facility: CLINIC | Age: 48
End: 2023-01-18
Payer: MEDICAID

## 2023-01-18 DIAGNOSIS — F41.9 ANXIETY AND DEPRESSION: Chronic | ICD-10-CM

## 2023-01-18 DIAGNOSIS — F32.A ANXIETY AND DEPRESSION: Chronic | ICD-10-CM

## 2023-01-18 DIAGNOSIS — E11.42 DM TYPE 2 WITH DIABETIC PERIPHERAL NEUROPATHY: Primary | Chronic | ICD-10-CM

## 2023-01-18 DIAGNOSIS — E78.5 DYSLIPIDEMIA: Chronic | ICD-10-CM

## 2023-01-18 DIAGNOSIS — K21.9 GASTROESOPHAGEAL REFLUX DISEASE WITHOUT ESOPHAGITIS: Chronic | ICD-10-CM

## 2023-01-18 DIAGNOSIS — J45.20 MILD INTERMITTENT ASTHMA WITHOUT COMPLICATION: Chronic | ICD-10-CM

## 2023-01-18 DIAGNOSIS — M85.89 OSTEOPENIA OF MULTIPLE SITES: ICD-10-CM

## 2023-01-18 DIAGNOSIS — Z79.899 HIGH RISK MEDICATION USE: ICD-10-CM

## 2023-01-18 DIAGNOSIS — Z46.81 COUNSELING FOR INSULIN PUMP: ICD-10-CM

## 2023-01-18 DIAGNOSIS — G43.511 INTRACTABLE PERSISTENT MIGRAINE AURA WITHOUT CEREBRAL INFARCTION AND WITH STATUS MIGRAINOSUS: Chronic | ICD-10-CM

## 2023-01-18 PROCEDURE — 95251 CONT GLUC MNTR ANALYSIS I&R: CPT | Performed by: NURSE PRACTITIONER

## 2023-01-18 PROCEDURE — 99214 OFFICE O/P EST MOD 30 MIN: CPT | Performed by: NURSE PRACTITIONER

## 2023-01-18 NOTE — PROGRESS NOTES
History of Present Illness  Nivia is a 48 y/o female who presents to the clinic today for follow up pertaining to her DM, type 2 and Dyslipidemia. She has been diagnosed with right breast Cancer and has undergone mastectomy and chemotherapy. In addition, she has GERD, mitral valve prolapse, Osteopenia/arthritis, migraine headaches and anxiety depression.    Diabetes   She has type 2 diabetes mellitus. The initial diagnosis of diabetes was made 20 years ago. Diabetic complications include peripheral neuropathy. Risk factors for coronary artery disease include post-menopausal, stress and dyslipidemia. She is currently utilizing insulin pump therapy and CGM. In addition, she is currently taking Trijardy and Ozempic.  She has had a previous visit with a dietitian An ACE inhibitor/angiotensin II receptor blocker is not  being taken at this time..      Lab Results   Component Value Date    HGBA1C 7.40 (H) 06/22/2022     Lab Results   Component Value Date    HGBA1C 7.90 (H) 10/06/2022     Dyslipidemia   The current episode started more than 1 year ago. Pertinent negatives include no chest pain or shortness of breath. She is taking Atorvastatin 40 mg.   Lab Results   Component Value Date    CHOL 173 06/22/2022    CHLPL 113 10/06/2022    CHLPL 175 03/15/2022     Lab Results   Component Value Date    TRIG 183 (H) 10/06/2022    TRIG 302 (H) 06/22/2022    TRIG 192 (H) 03/15/2022     Lab Results   Component Value Date    HDL 31 (L) 10/06/2022    HDL 32 (L) 06/22/2022    HDL 31 (L) 03/15/2022     Lab Results   Component Value Date    LDL 52 10/06/2022    LDL 91 06/22/2022     (H) 03/15/2022     The following portions of the patient's history were reviewed and updated as appropriate: allergies, current medications, past family history, past medical history, past social history, past surgical history and problem list.    Review of Systems   Constitutional: Negative for activity change, appetite change, chills, fatigue,  "fever and unexpected weight change.   HENT: Negative for congestion.    Eyes: Negative for visual disturbance.   Respiratory: Negative for cough, shortness of breath and wheezing.    Cardiovascular: Positive for leg swelling (Intermittent). Negative for chest pain and palpitations.   Gastrointestinal: Negative for nausea and vomiting.        GERD   Endocrine: Negative for cold intolerance, heat intolerance, polydipsia, polyphagia and polyuria.   Musculoskeletal: Positive for arthralgias and back pain.   Skin: Negative for color change and rash.   Allergic/Immunologic: Positive for environmental allergies.   Neurological: Positive for dizziness (Improving), numbness and headaches (Migraines). Negative for tremors, speech difficulty, weakness and light-headedness.   Hematological: Negative for adenopathy.   Psychiatric/Behavioral: Negative for confusion, decreased concentration and sleep disturbance. The patient is not nervous/anxious.    All other systems reviewed and are negative.    Vital signs:  /80 (BP Location: Left arm, Patient Position: Sitting, Cuff Size: Adult)   Pulse 95   Temp 97 °F (36.1 °C) (Temporal)   Resp 14   Ht 162.6 cm (64.02\")   Wt 102 kg (225 lb)   SpO2 96%   BMI 38.60 kg/m²     Physical Exam  Vitals and nursing note reviewed.   Constitutional:       General: She is not in acute distress.     Appearance: She is well-developed.      Interventions: Face mask in place.   HENT:      Head: Normocephalic.      Nose: Nose normal.   Eyes:      General: No scleral icterus.        Right eye: No discharge.         Left eye: No discharge.      Conjunctiva/sclera: Conjunctivae normal.   Neck:      Thyroid: No thyromegaly.      Vascular: No JVD.   Cardiovascular:      Rate and Rhythm: Normal rate and regular rhythm.      Heart sounds: S1 normal and S2 normal.   Pulmonary:      Effort: Pulmonary effort is normal.      Breath sounds: Normal breath sounds.   Abdominal:      Palpations: Abdomen is " soft.      Tenderness: There is no abdominal tenderness. There is no guarding.   Musculoskeletal:      Cervical back: Neck supple.      Right lower leg: No edema.      Left lower leg: No edema.   Skin:     General: Skin is dry.      Capillary Refill: Capillary refill takes less than 2 seconds.   Neurological:      Mental Status: She is alert.   Psychiatric:         Mood and Affect: Mood and affect normal.         Speech: Speech normal.         Behavior: Behavior is cooperative.         Thought Content: Thought content normal.     Result Review : Medtronic MiniMed 630 G and Guardian CGM upload  Dates 1/05//2023 through 01/18/2023  Time in range:  In target range 93%  In high range 7% of the time  Average blood glucose 143±42 mg/dL    Class 2 Severe Obesity (BMI >=35 and <=39.9). Obesity-related health conditions include the following: diabetes mellitus, dyslipidemias, GERD and osteoarthritis. Obesity is improving with lifestyle modifications. BMI  is above average; BMI management plan is completed.  Provided information on portion control and increasing exercise.    Assessment & Plan     Diagnoses and all orders for this visit:    1. DM type 2 with diabetic peripheral neuropathy (HCC) (Primary)  Comments:  Insulin pump and CGM uploaded and reviewed with Lilo.  She is doing a wonderful job with her insulin pump and CGM.  Orders:  -     Semaglutide, 1 MG/DOSE, (OZEMPIC) 2 MG/1.5ML solution pen-injector; Inject 1 mg under the skin into the appropriate area as directed 1 (One) Time Per Week.  Dispense: 3 mL; Refill: 0    2. Counseling for insulin pump  Comments:  Reviewed upload with her with noting elevation after breakfast.  She does report having a high carbohydrate breakfast.  Encouraged her to add protein to the ludmila    3. Dyslipidemia  Comments:  Continue atorvastatin    4. Mild intermittent asthma without complication  Comments:  Continue Breo and albuterol as needed.  Avoidance of triggers strongly  encouraged    5. Intractable persistent migraine aura without cerebral infarction and with status migrainosus  Comments:  Follow-up with neurologist.  Continue Aimovig and avoidance of migraine triggers is much as possible    6. Anxiety and depression  Comments:  Follow-up with behavioral health.  Continue Effexor    7. Gastroesophageal reflux disease without esophagitis    8. Osteopenia of multiple sites  Comments:  Tolerating Fosamax well which she will continue    9. High risk medication use  Comments:  Urine drug screen today per behavioral health  Orders:  -     Urine Drug Screen - Urine, Clean Catch    Follow Up in April  Findings and recommendations discussed with Nivia. Reviewed insulin pump and CGM  results.  Praise given.  Lifestyle modifications reinforced including nutrition and activity recommendations.  Lilo will follow up in April sooner if problems/concerns occur.  She will return to have labs completed next week.  Lilo was given instructions and counseling regarding her condition or for health maintenance advice. Please see specific information pulled into the AVS if appropriate    This document has been electronically signed by:

## 2023-01-19 DIAGNOSIS — F33.1 MODERATE EPISODE OF RECURRENT MAJOR DEPRESSIVE DISORDER: Primary | ICD-10-CM

## 2023-01-19 DIAGNOSIS — Z79.899 HIGH RISK MEDICATION USE: ICD-10-CM

## 2023-01-21 VITALS
RESPIRATION RATE: 14 BRPM | WEIGHT: 225 LBS | HEART RATE: 95 BPM | BODY MASS INDEX: 38.41 KG/M2 | OXYGEN SATURATION: 96 % | SYSTOLIC BLOOD PRESSURE: 110 MMHG | DIASTOLIC BLOOD PRESSURE: 80 MMHG | HEIGHT: 64 IN | TEMPERATURE: 97 F

## 2023-01-21 DIAGNOSIS — E11.42 DM TYPE 2 WITH DIABETIC PERIPHERAL NEUROPATHY: Chronic | ICD-10-CM

## 2023-01-23 RX ORDER — INSULIN LISPRO 100 [IU]/ML
INJECTION, SOLUTION INTRAVENOUS; SUBCUTANEOUS
Qty: 40 ML | Refills: 3 | Status: SHIPPED | OUTPATIENT
Start: 2023-01-23

## 2023-01-25 DIAGNOSIS — M25.512 CHRONIC PAIN OF BOTH SHOULDERS: ICD-10-CM

## 2023-01-25 DIAGNOSIS — M19.011 PRIMARY OSTEOARTHRITIS OF RIGHT SHOULDER: ICD-10-CM

## 2023-01-25 DIAGNOSIS — G89.29 CHRONIC NECK PAIN: ICD-10-CM

## 2023-01-25 DIAGNOSIS — M85.80 OSTEOPENIA, UNSPECIFIED LOCATION: ICD-10-CM

## 2023-01-25 DIAGNOSIS — M25.511 CHRONIC PAIN OF BOTH SHOULDERS: ICD-10-CM

## 2023-01-25 DIAGNOSIS — G89.29 CHRONIC PAIN OF BOTH SHOULDERS: ICD-10-CM

## 2023-01-25 DIAGNOSIS — M54.2 CHRONIC NECK PAIN: ICD-10-CM

## 2023-01-25 DIAGNOSIS — I65.23 ATHEROSCLEROSIS OF BOTH CAROTID ARTERIES: Chronic | ICD-10-CM

## 2023-01-25 DIAGNOSIS — R23.2 HOT FLASHES DUE TO TAMOXIFEN: ICD-10-CM

## 2023-01-25 DIAGNOSIS — T45.1X5A HOT FLASHES DUE TO TAMOXIFEN: ICD-10-CM

## 2023-01-25 RX ORDER — ASPIRIN 81 MG/1
TABLET, COATED ORAL
Qty: 60 TABLET | Refills: 3 | Status: SHIPPED | OUTPATIENT
Start: 2023-01-25

## 2023-01-25 RX ORDER — GABAPENTIN 100 MG/1
CAPSULE ORAL
Qty: 60 CAPSULE | Refills: 2 | Status: SHIPPED | OUTPATIENT
Start: 2023-01-25

## 2023-01-25 RX ORDER — CELECOXIB 200 MG/1
200 CAPSULE ORAL DAILY
Qty: 30 CAPSULE | Refills: 0 | Status: SHIPPED | OUTPATIENT
Start: 2023-01-25

## 2023-01-25 RX ORDER — SUMATRIPTAN 100 MG/1
TABLET, FILM COATED ORAL
Qty: 9 TABLET | Refills: 3 | Status: SHIPPED | OUTPATIENT
Start: 2023-01-25 | End: 2023-04-04

## 2023-02-02 DIAGNOSIS — F41.1 GENERALIZED ANXIETY DISORDER: ICD-10-CM

## 2023-02-02 RX ORDER — LORAZEPAM 1 MG/1
TABLET ORAL
Qty: 90 TABLET | Refills: 0 | Status: SHIPPED | OUTPATIENT
Start: 2023-02-02 | End: 2023-02-10 | Stop reason: SDUPTHER

## 2023-02-10 ENCOUNTER — OFFICE VISIT (OUTPATIENT)
Dept: PSYCHIATRY | Facility: CLINIC | Age: 48
End: 2023-02-10
Payer: MEDICAID

## 2023-02-10 VITALS
BODY MASS INDEX: 38.94 KG/M2 | SYSTOLIC BLOOD PRESSURE: 102 MMHG | WEIGHT: 227 LBS | OXYGEN SATURATION: 95 % | DIASTOLIC BLOOD PRESSURE: 60 MMHG | HEART RATE: 88 BPM

## 2023-02-10 DIAGNOSIS — F33.1 MODERATE EPISODE OF RECURRENT MAJOR DEPRESSIVE DISORDER: ICD-10-CM

## 2023-02-10 DIAGNOSIS — F42.8 OTHER OBSESSIVE-COMPULSIVE DISORDERS: Chronic | ICD-10-CM

## 2023-02-10 DIAGNOSIS — F41.1 GENERALIZED ANXIETY DISORDER: ICD-10-CM

## 2023-02-10 PROCEDURE — 99213 OFFICE O/P EST LOW 20 MIN: CPT | Performed by: NURSE PRACTITIONER

## 2023-02-10 RX ORDER — ASPIRIN 81 MG/1
162 TABLET ORAL DAILY
COMMUNITY
Start: 2022-11-23 | End: 2023-02-10 | Stop reason: SDUPTHER

## 2023-02-10 RX ORDER — CYANOCOBALAMIN 1000 UG/ML
1000 INJECTION, SOLUTION INTRAMUSCULAR; SUBCUTANEOUS
COMMUNITY
Start: 2022-11-23 | End: 2023-02-10 | Stop reason: SDUPTHER

## 2023-02-10 RX ORDER — CLONIDINE HYDROCHLORIDE 0.1 MG/1
0.1 TABLET ORAL
COMMUNITY
Start: 2022-11-23 | End: 2023-02-10 | Stop reason: SDUPTHER

## 2023-02-10 RX ORDER — GABAPENTIN 100 MG/1
100 CAPSULE ORAL
COMMUNITY
Start: 2022-12-09 | End: 2023-04-04

## 2023-02-10 RX ORDER — ATORVASTATIN CALCIUM 40 MG/1
40 TABLET, FILM COATED ORAL DAILY
COMMUNITY
Start: 2022-11-23 | End: 2023-02-10 | Stop reason: SDUPTHER

## 2023-02-10 RX ORDER — ONDANSETRON HYDROCHLORIDE 8 MG/1
1 TABLET, FILM COATED ORAL EVERY 8 HOURS PRN
COMMUNITY
Start: 2022-09-29 | End: 2023-02-10 | Stop reason: SDUPTHER

## 2023-02-10 NOTE — PROGRESS NOTES
Subjective   Nivia Bernstein is a 47 y.o. female is here today for medication management follow-up.    Chief Complaint:  Anxiety depression     History of Present Illness:   Patient presents today for a follow up for medication management for anxiety and depression. Patient states everything is going about the same. Patient reports not been out of the house much due to weather. Patient denies any panic attacks. Patient states her nightmares have calmed down. Patient states she has been sleeping at least 4-5 hours a night. Patient states appetite is good and eating at 2-3 meals a day. Patient reports her anxiety is at a 8 on a 0-10 scale with 10 being the worst. Patient states depression is at a 9 on a 0-10 scale with 10 being the worst. Denies any thoughts to harm self or others. Patient states having some irritability. Denies any aggression. Denies any auditory or visual hallucinations. Denies any medical changes since last visit. Patient reports medication compliance and denies any side effects.   The following portions of the patient's history were reviewed and updated as appropriate: allergies, current medications, past family history, past medical history, past social history, past surgical history and problem list.    Review of Systems   Constitutional: Negative.    Respiratory: Negative.    Cardiovascular: Negative.    Gastrointestinal: Negative.    Neurological: Negative.    Psychiatric/Behavioral: Positive for agitation, dysphoric mood and sleep disturbance. The patient is nervous/anxious.        Objective   Physical Exam  Vitals reviewed.   Constitutional:       Appearance: Normal appearance. She is well-developed and well-groomed.   Neurological:      Mental Status: She is alert.   Psychiatric:         Attention and Perception: Attention and perception normal.         Mood and Affect: Mood is depressed. Affect is angry.         Speech: Speech normal.         Behavior: Behavior is agitated. Behavior is  cooperative.         Thought Content: Thought content normal.         Cognition and Memory: Cognition and memory normal.         Judgment: Judgment normal.       Blood pressure 102/60, pulse 88, weight 103 kg (227 lb), SpO2 95 %, not currently breastfeeding. Body mass index is 38.94 kg/m².    Allergies   Allergen Reactions   • Mobic [Meloxicam] Rash   • Penicillins Angioedema   • Taxotere [Docetaxel] Anaphylaxis     9 minutes into 1st infusion   • Novolog [Insulin Aspart] Hives   • Rosuvastatin Calcium GI Intolerance       Medication List:   Current Outpatient Medications   Medication Sig Dispense Refill   • Brexpiprazole (Rexulti) 2 MG tablet Take 1 tablet by mouth Daily. 30 tablet 1   • gabapentin (NEURONTIN) 100 MG capsule Take 100 mg by mouth.     • LORazepam (ATIVAN) 1 MG tablet Take 1 tablet by mouth 3 (Three) Times a Day As Needed for Anxiety. for anxiety 90 tablet 0   • venlafaxine XR (EFFEXOR-XR) 150 MG 24 hr capsule Take 1 capsule by mouth Daily. Take with the Effexor XR 75mg capsule for a total daily dose of 225mg. 30 capsule 1   • venlafaxine XR (EFFEXOR-XR) 75 MG 24 hr capsule Take 1 capsule by mouth Daily. 30 capsule 1   • alendronate (FOSAMAX) 35 MG tablet Take 1 tablet by mouth Every 7 (Seven) Days. Take with water, 30 minutes before first food/drink/medication, avoid lying down for 30 minutes after taking 4 tablet 3   • Aspirin Low Dose 81 MG EC tablet TAKE 2 TABLETS BY MOUTH DAILY. 60 tablet 3   • atorvastatin (LIPITOR) 40 MG tablet Take 1 tablet by mouth Every Night. 90 tablet 0   • azelastine (ASTELIN) 0.1 % nasal spray Use in each nostril as directed 30 mL 3   • Breo Ellipta 200-25 MCG/INH inhaler Inhale 1 puff Daily. 1 each 3   • celecoxib (CeleBREX) 200 MG capsule TAKE 1 CAPSULE BY MOUTH DAILY. 30 capsule 0   • cloNIDine (CATAPRES) 0.1 MG tablet TAKE ONE TABLET BY MOUTH TWO TIMES A DAY 60 tablet 5   • cyanocobalamin 1000 MCG/ML injection INJECT 1ML INTO TO APPROPRAITE MUSCLE EVERY 30 DAYS 1 mL  5   • diclofenac (VOLTAREN) 1 % gel gel Apply 4 g topically to the appropriate area as directed 4 (Four) Times a Day As Needed (joint pain). 500 g 5   • Diclofenac Sodium (VOLTAREN) 1 % gel gel APPLY 4 GRAMS TO THE AFFECTED AREA FOUR TIMES A  g 3   • Emgality 120 MG/ML auto-injector pen INJECT 120mg ONCE MONTHLY     • furosemide (LASIX) 20 MG tablet Take 1 tablet by mouth Daily As Needed (edema). 30 tablet 1   • gabapentin (NEURONTIN) 100 MG capsule TAKE ONE CAPSULE BY MOUTH TWO TIMES A DAY 60 capsule 2   • HYDROcodone-acetaminophen (NORCO) 7.5-325 MG per tablet Take 1 tablet by mouth Every 4 (Four) Hours As Needed for Moderate Pain. 12 tablet 0   • Insulin Lispro (humaLOG) 100 UNIT/ML injection INJECT AS DIRECTED.MAX DAILY DOSE  UNITS DAILY 40 mL 3   • lansoprazole (PREVACID) 30 MG capsule Take 1 capsule by mouth Daily. 90 capsule 3   • levocetirizine (XYZAL) 5 MG tablet TAKE 1 TABLET BY MOUTH EVERY EVENING. 30 tablet 3   • linaclotide (Linzess) 145 MCG capsule capsule Take 1 capsule by mouth Daily. 30 minutes before meals on an empty stomach. 30 capsule 3   • meclizine (ANTIVERT) 25 MG tablet TAKE 2 TABLETS BY MOUTH EVERY 6 HOURS FOR DIZZINESS     • ondansetron (ZOFRAN) 8 MG tablet TAKE 1 TABLET BY MOUTH EVERY 8 HOURS AS NEEDED FOR NAUSEA OR VOMITING 30 tablet 3   • ProAir  (90 Base) MCG/ACT inhaler INHALE 2 PUFFS BY MOUTH EVERY 4 HOURS AS NEEDED FOR SHORTNESS OF BREATH 8.5 g 5   • Semaglutide, 1 MG/DOSE, (OZEMPIC) 2 MG/1.5ML solution pen-injector Inject 1 mg under the skin into the appropriate area as directed 1 (One) Time Per Week. 3 mL 0   • SUMAtriptan (IMITREX) 100 MG tablet TAKE 1 TABLET BY MOUTH AS NEEDED FOR MIGRAINE 9 tablet 3   • tamoxifen (NOLVADEX) 20 MG chemo tablet Take 1 tablet by mouth Daily. 30 tablet 11   • topiramate (TOPAMAX) 50 MG tablet Take 1 tablet by mouth 2 (Two) Times a Day. 60 tablet 1   • Trijardy XR 25-5-1000 MG tablet sustained-release 24 hour TAKE ONE TABLET BY  MOUTH EVERY MORNING 30 tablet 5   • ubrogepant 100 MG tablet      • vitamin D (ERGOCALCIFEROL) 1.25 MG (12750 UT) capsule capsule TAKE ONE CAPSULE BY MOUTH ONCE A WEEK 4 capsule 0     No current facility-administered medications for this visit.       Mental Status Exam:   Hygiene:   good  Cooperation:  Cooperative  Eye Contact:  Fair  Psychomotor Behavior:  Aggitated  Affect:  Angry  Hopelessness: Denies  Speech:  Normal  Thought Process:  Goal directed and Linear  Thought Content:  Normal  Suicidal:  None  Homicidal:  None  Hallucinations:  None  Delusion:  None  Memory:  Intact  Orientation:  Person, Place, Time and Situation  Reliability:  fair  Insight:  Fair  Judgement:  Fair  Impulse Control:  Fair  Physical/Medical Issues:  No               Assessment & Plan   Diagnoses and all orders for this visit:    1. Generalized anxiety disorder  -     Brexpiprazole (Rexulti) 2 MG tablet; Take 1 tablet by mouth Daily.  Dispense: 30 tablet; Refill: 1  -     LORazepam (ATIVAN) 1 MG tablet; Take 1 tablet by mouth 3 (Three) Times a Day As Needed for Anxiety. for anxiety  Dispense: 90 tablet; Refill: 0  -     venlafaxine XR (EFFEXOR-XR) 150 MG 24 hr capsule; Take 1 capsule by mouth Daily. Take with the Effexor XR 75mg capsule for a total daily dose of 225mg.  Dispense: 30 capsule; Refill: 1  -     venlafaxine XR (EFFEXOR-XR) 75 MG 24 hr capsule; Take 1 capsule by mouth Daily.  Dispense: 30 capsule; Refill: 1    2. Other obsessive-compulsive disorders  -     Brexpiprazole (Rexulti) 2 MG tablet; Take 1 tablet by mouth Daily.  Dispense: 30 tablet; Refill: 1  -     venlafaxine XR (EFFEXOR-XR) 150 MG 24 hr capsule; Take 1 capsule by mouth Daily. Take with the Effexor XR 75mg capsule for a total daily dose of 225mg.  Dispense: 30 capsule; Refill: 1  -     venlafaxine XR (EFFEXOR-XR) 75 MG 24 hr capsule; Take 1 capsule by mouth Daily.  Dispense: 30 capsule; Refill: 1    3. Moderate episode of recurrent major depressive disorder  (HCC)  -     Brexpiprazole (Rexulti) 2 MG tablet; Take 1 tablet by mouth Daily.  Dispense: 30 tablet; Refill: 1  -     venlafaxine XR (EFFEXOR-XR) 150 MG 24 hr capsule; Take 1 capsule by mouth Daily. Take with the Effexor XR 75mg capsule for a total daily dose of 225mg.  Dispense: 30 capsule; Refill: 1  -     venlafaxine XR (EFFEXOR-XR) 75 MG 24 hr capsule; Take 1 capsule by mouth Daily.  Dispense: 30 capsule; Refill: 1            Discussed medication options with patient. Cont. Rexulti 2mg daily for depression and anxiety. Cont. Ativan 1mg three times daily as needed for anxiety. Cont. Effexor XR 150mg daily for depression and anxiety. Cont. Effexor XR 75mg daily for depression and anxiety. Reviewed the risks, benefits, and side effects of the medications; patient acknowledged and verbally consented. Patient is being prescribed a controlled substance as part of treatment plan. Patient has been educated of appropriate use of the medications, including risk of somnolence, limited ability to drive and/or work safely, and potential for dependence, respiratory depression and overdose. Patient is also informed that the medication are to be used by the patient only- avoid any combined use of ETOH or other substances unless prescribed.   [facility] Patient Controlled Substance Report (from 2/21/2022 to 2/20/2023)    Dispensed  Strength Quantity Days Supply Provider Pharmacy   02/02/2023 Gabapentin 100MG 60 each 30 [facility]   02/02/2023 Lorazepam 1MG 90 each 30 [facility]   01/05/2023 Gabapentin 100MG 60 each 30 QUINTON[facility]STEPHANIE [facility]   01/05/2023 Lorazepam 1MG 90 each 30 [facility]   12/09/2022 Gabapentin 100MG 60 each 30 [facility]   11/23/2022 Lorazepam 1MG 90 each 30 [facility]   11/05/2022 Gabapentin 100MG 60 each 30 [facility]    10/25/2022 Lorazepam 1MG 90 each 30 [facility]   10/06/2022 Gabapentin 100MG 60 each 30 QUINTON,STEPHANIE [facility]   10/03/2022 Hydrocodone/Acetaminophen 325MG/7.5MG 12 each 2 [facility]ANY [facility]   09/28/2022 Lorazepam 1MG 90 each 30 [facility]OUD[facility]   08/30/2022 Gabapentin 100MG 60 each 30 QUINTON,STEPHANIE [facility]   08/30/2022 Lorazepam 1MG 90 each 30 [facility]   08/02/2022 Gabapentin 100MG 60 each 30 QUINTON,STEPHANIE [facility]   07/11/2022 Lorazepam 1MG 90 each 30 [facility]   07/05/2022 Gabapentin 100MG 60 each 30 QUINTON,STEPHANIE [facility]   06/13/2022 Lorazepam 1MG 90 each 30 [facility]   06/03/2022 Gabapentin 100MG 60 each 30 QUINTON,STEPHANIE [facility]   05/10/2022 Lorazepam 1MG 90 each 30 [facility]OUD[facility]EDO [facility]   05/06/2022 Gabapentin 100MG 60 each 30 URMILA,CELIA [facility]   04/19/2022 Hydrocodone Bitartrate/Ac 325MG/5MG 10 each 3 SCHAUMBURGGIL [facility]   04/14/2022 Lorazepam 1MG 90 each 30 CLOUD[facility]ALFREDO [facility]   03/25/2022 Gabapentin 100MG 60 each 30 URMILA,CELIA [facility]   03/17/2022 Lorazepam 1MG 90 each 30 CLOUD[facility]AL[facility]ED[facility]   02/26/2022 Gabapentin 100MG 60 each 30 URMILA,CELIA [facility]          Patient was instructed on medication side effects, benefits, and also of no treatment.  Patient was given an explanation regarding potential for increased risk of diabetes, lipids, and weight gain.  Labs will be assessed as clinically indicated.  Diet was discussed especially healthy diet choices and increasing activity and exercise.  Patient was strongly urged to continue weight maintance or weight loss efforts.  Patient reported verbalized understanding of  instructions.   Patient is agreeable to call the office with any questions, concerns, or worsening of symptoms. Patient is aware to call 911 or go to the nearest ER should begin having SI/HI.          Follow up in four weeks          Errors in dictation may reflect use of voice recognition software and not all errors in transcription may have been detected prior to signing.                This document has been electronically signed by NIDA Vasquez   February 21, 2023 09:35 EST

## 2023-02-16 ENCOUNTER — INFUSION (OUTPATIENT)
Dept: ONCOLOGY | Facility: HOSPITAL | Age: 48
End: 2023-02-16
Payer: MEDICARE

## 2023-02-16 VITALS
SYSTOLIC BLOOD PRESSURE: 116 MMHG | TEMPERATURE: 97.3 F | RESPIRATION RATE: 18 BRPM | BODY MASS INDEX: 38.94 KG/M2 | DIASTOLIC BLOOD PRESSURE: 73 MMHG | OXYGEN SATURATION: 96 % | HEART RATE: 95 BPM | WEIGHT: 227 LBS

## 2023-02-16 DIAGNOSIS — Z95.828 PORT-A-CATH IN PLACE: Primary | ICD-10-CM

## 2023-02-16 PROCEDURE — 25010000002 HEPARIN LOCK FLUSH PER 10 UNITS: Performed by: NURSE PRACTITIONER

## 2023-02-16 PROCEDURE — 96523 IRRIG DRUG DELIVERY DEVICE: CPT

## 2023-02-16 RX ORDER — SODIUM CHLORIDE 0.9 % (FLUSH) 0.9 %
10 SYRINGE (ML) INJECTION AS NEEDED
Status: DISCONTINUED | OUTPATIENT
Start: 2023-02-16 | End: 2023-02-16 | Stop reason: HOSPADM

## 2023-02-16 RX ORDER — HEPARIN SODIUM (PORCINE) LOCK FLUSH IV SOLN 100 UNIT/ML 100 UNIT/ML
500 SOLUTION INTRAVENOUS AS NEEDED
Status: DISCONTINUED | OUTPATIENT
Start: 2023-02-16 | End: 2023-02-16 | Stop reason: HOSPADM

## 2023-02-16 RX ADMIN — Medication 10 ML: at 15:07

## 2023-02-16 RX ADMIN — Medication 500 UNITS: at 15:07

## 2023-02-17 RX ORDER — VENLAFAXINE HYDROCHLORIDE 75 MG/1
75 CAPSULE, EXTENDED RELEASE ORAL DAILY
Qty: 30 CAPSULE | Refills: 1 | Status: SHIPPED | OUTPATIENT
Start: 2023-02-17 | End: 2023-03-16 | Stop reason: SDUPTHER

## 2023-02-17 RX ORDER — LORAZEPAM 1 MG/1
1 TABLET ORAL 3 TIMES DAILY PRN
Qty: 90 TABLET | Refills: 0 | Status: SHIPPED | OUTPATIENT
Start: 2023-02-17 | End: 2023-03-16 | Stop reason: SDUPTHER

## 2023-02-17 RX ORDER — BREXPIPRAZOLE 2 MG/1
1 TABLET ORAL DAILY
Qty: 30 TABLET | Refills: 1 | Status: SHIPPED | OUTPATIENT
Start: 2023-02-17 | End: 2023-03-16 | Stop reason: SDUPTHER

## 2023-02-17 RX ORDER — VENLAFAXINE HYDROCHLORIDE 150 MG/1
150 CAPSULE, EXTENDED RELEASE ORAL DAILY
Qty: 30 CAPSULE | Refills: 1 | Status: SHIPPED | OUTPATIENT
Start: 2023-02-17 | End: 2023-03-16 | Stop reason: SDUPTHER

## 2023-02-20 DIAGNOSIS — E55.9 VITAMIN D DEFICIENCY: ICD-10-CM

## 2023-02-20 DIAGNOSIS — M85.89 OSTEOPENIA OF MULTIPLE SITES: ICD-10-CM

## 2023-02-20 RX ORDER — ERGOCALCIFEROL 1.25 MG/1
CAPSULE ORAL
Qty: 4 CAPSULE | Refills: 0 | Status: SHIPPED | OUTPATIENT
Start: 2023-02-20

## 2023-02-20 RX ORDER — ALENDRONATE SODIUM 35 MG/1
35 TABLET ORAL
Qty: 4 TABLET | Refills: 3 | Status: SHIPPED | OUTPATIENT
Start: 2023-02-20

## 2023-02-21 RX ORDER — HEPARIN SODIUM (PORCINE) LOCK FLUSH IV SOLN 100 UNIT/ML 100 UNIT/ML
500 SOLUTION INTRAVENOUS AS NEEDED
Status: CANCELLED | OUTPATIENT
Start: 2023-03-28

## 2023-02-21 RX ORDER — SODIUM CHLORIDE 0.9 % (FLUSH) 0.9 %
10 SYRINGE (ML) INJECTION AS NEEDED
Status: CANCELLED | OUTPATIENT
Start: 2023-03-28

## 2023-02-22 DIAGNOSIS — R60.1 GENERALIZED EDEMA: ICD-10-CM

## 2023-02-22 DIAGNOSIS — F41.1 GENERALIZED ANXIETY DISORDER: ICD-10-CM

## 2023-02-22 DIAGNOSIS — M54.81 OCCIPITAL NEURALGIA OF RIGHT SIDE: Chronic | ICD-10-CM

## 2023-02-22 DIAGNOSIS — F42.8 OTHER OBSESSIVE-COMPULSIVE DISORDERS: Chronic | ICD-10-CM

## 2023-02-22 DIAGNOSIS — F33.1 MODERATE EPISODE OF RECURRENT MAJOR DEPRESSIVE DISORDER: ICD-10-CM

## 2023-02-22 RX ORDER — VENLAFAXINE HYDROCHLORIDE 150 MG/1
CAPSULE, EXTENDED RELEASE ORAL
Qty: 30 CAPSULE | Refills: 1 | OUTPATIENT
Start: 2023-02-22

## 2023-02-22 RX ORDER — VENLAFAXINE HYDROCHLORIDE 75 MG/1
CAPSULE, EXTENDED RELEASE ORAL
Qty: 30 CAPSULE | Refills: 1 | OUTPATIENT
Start: 2023-02-22

## 2023-02-22 RX ORDER — BREXPIPRAZOLE 2 MG/1
TABLET ORAL
Qty: 30 TABLET | Refills: 1 | OUTPATIENT
Start: 2023-02-22

## 2023-02-22 RX ORDER — FUROSEMIDE 20 MG/1
TABLET ORAL
Qty: 30 TABLET | Refills: 1 | Status: SHIPPED | OUTPATIENT
Start: 2023-02-22

## 2023-02-22 RX ORDER — TOPIRAMATE 50 MG/1
TABLET, FILM COATED ORAL
Qty: 60 TABLET | Refills: 3 | Status: SHIPPED | OUTPATIENT
Start: 2023-02-22

## 2023-02-27 ENCOUNTER — TELEPHONE (OUTPATIENT)
Dept: FAMILY MEDICINE CLINIC | Facility: CLINIC | Age: 48
End: 2023-02-27

## 2023-02-27 DIAGNOSIS — E11.42 DM TYPE 2 WITH DIABETIC PERIPHERAL NEUROPATHY: Primary | Chronic | ICD-10-CM

## 2023-02-27 NOTE — TELEPHONE ENCOUNTER
Caller: Nivia Bernstein    Relationship: Self    Best call back number: [phone]    Requested Prescriptions:   Requested Prescriptions      No prescriptions requested or ordered in this encounter      Semaglutide, 1 MG/DOSE, (OZEMPIC) 2 MG/1.5ML solution pen-injector    Pharmacy where request should be sent:      [facility] [address] - [phone] [facility] [phone] FX     Does the patient have less than a 3 day supply:  [] Yes  [x] No    Would you like a call back once the refill request has been completed: [] Yes [x] No    If the office needs to give you a call back, can they leave a voicemail: [] Yes [x] No    Jesusita Hardy, PCT   02/27/23 10:33 EST

## 2023-02-28 DIAGNOSIS — E78.2 MIXED DYSLIPIDEMIA: Chronic | ICD-10-CM

## 2023-03-01 RX ORDER — ATORVASTATIN CALCIUM 40 MG/1
TABLET, FILM COATED ORAL
Qty: 90 TABLET | Refills: 0 | Status: SHIPPED | OUTPATIENT
Start: 2023-03-01

## 2023-03-16 ENCOUNTER — OFFICE VISIT (OUTPATIENT)
Dept: PSYCHIATRY | Facility: CLINIC | Age: 48
End: 2023-03-16
Payer: MEDICARE

## 2023-03-16 VITALS
HEART RATE: 94 BPM | DIASTOLIC BLOOD PRESSURE: 80 MMHG | SYSTOLIC BLOOD PRESSURE: 124 MMHG | BODY MASS INDEX: 38.77 KG/M2 | WEIGHT: 226 LBS

## 2023-03-16 DIAGNOSIS — F42.8 OTHER OBSESSIVE-COMPULSIVE DISORDERS: Chronic | ICD-10-CM

## 2023-03-16 DIAGNOSIS — Z79.899 HIGH RISK MEDICATION USE: ICD-10-CM

## 2023-03-16 DIAGNOSIS — F41.1 GENERALIZED ANXIETY DISORDER: Primary | ICD-10-CM

## 2023-03-16 DIAGNOSIS — F33.1 MODERATE EPISODE OF RECURRENT MAJOR DEPRESSIVE DISORDER: ICD-10-CM

## 2023-03-16 PROCEDURE — 99213 OFFICE O/P EST LOW 20 MIN: CPT | Performed by: NURSE PRACTITIONER

## 2023-03-16 PROCEDURE — 1159F MED LIST DOCD IN RCRD: CPT | Performed by: NURSE PRACTITIONER

## 2023-03-16 PROCEDURE — 1160F RVW MEDS BY RX/DR IN RCRD: CPT | Performed by: NURSE PRACTITIONER

## 2023-03-16 RX ORDER — BREXPIPRAZOLE 1 MG/1
1 TABLET ORAL DAILY
Qty: 30 TABLET | Refills: 0 | Status: SHIPPED | OUTPATIENT
Start: 2023-03-16

## 2023-03-16 RX ORDER — VENLAFAXINE HYDROCHLORIDE 150 MG/1
150 CAPSULE, EXTENDED RELEASE ORAL DAILY
Qty: 30 CAPSULE | Refills: 1 | Status: SHIPPED | OUTPATIENT
Start: 2023-03-16

## 2023-03-16 RX ORDER — VENLAFAXINE HYDROCHLORIDE 75 MG/1
75 CAPSULE, EXTENDED RELEASE ORAL DAILY
Qty: 30 CAPSULE | Refills: 1 | Status: SHIPPED | OUTPATIENT
Start: 2023-03-16

## 2023-03-16 RX ORDER — LORAZEPAM 1 MG/1
1 TABLET ORAL 3 TIMES DAILY PRN
Qty: 90 TABLET | Refills: 0 | Status: SHIPPED | OUTPATIENT
Start: 2023-03-16

## 2023-03-16 NOTE — PROGRESS NOTES
Subjective   Nivia Bernstein is a 48 y.o. female is here today for medication management follow-up.    Chief Complaint:  Depression anxiety     History of Present Illness:   Patient presents today for a follow up for medication management for depression and anxiety. Patient states doing about the same. Patient states taking a lot of power naps during the day and sleeping some of the night. Patient states sleeping about three to four hours a night. Patient states still having some nightmares. Patient states having a lot of shaking in left hand. Patient states appetite is ok and eating at least twice a day. Patient states depression is at a 10 on a 0-10 scale with 10 being the worst. Denies any thoughts to harm self or others. Patient states anxiety is at a 10 on a 0-10 scale with 10 being the worst. Denies any auditory or visual hallucinations. Denies any medical changes since last visit. Patient reports medication compliance and denies any side effects.   The following portions of the patient's history were reviewed and updated as appropriate: allergies, current medications, past family history, past medical history, past social history, past surgical history and problem list.    Review of Systems   Constitutional: Negative.    Respiratory: Negative.    Cardiovascular: Negative.    Gastrointestinal: Negative.    Neurological: Positive for tremors.   Psychiatric/Behavioral: Positive for dysphoric mood and sleep disturbance. The patient is nervous/anxious.        Objective   Physical Exam  Vitals reviewed.   Constitutional:       Appearance: Normal appearance. She is well-developed and well-groomed.   Neurological:      Mental Status: She is alert.   Psychiatric:         Attention and Perception: Attention and perception normal.         Mood and Affect: Mood is depressed. Affect is angry.         Speech: Speech normal.         Behavior: Behavior is agitated. Behavior is cooperative.         Thought Content:  Thought content normal.         Cognition and Memory: Cognition and memory normal.         Judgment: Judgment normal.       Blood pressure 124/80, pulse 94, weight 103 kg (226 lb), not currently breastfeeding. Body mass index is 38.77 kg/m².    Allergies   Allergen Reactions   • Mobic [Meloxicam] Rash   • Penicillins Angioedema   • Taxotere [Docetaxel] Anaphylaxis     9 minutes into 1st infusion   • Novolog [Insulin Aspart] Hives   • Rosuvastatin Calcium GI Intolerance       Medication List:   Current Outpatient Medications   Medication Sig Dispense Refill   • LORazepam (ATIVAN) 1 MG tablet Take 1 tablet by mouth 3 (Three) Times a Day As Needed for Anxiety. for anxiety 90 tablet 0   • venlafaxine XR (EFFEXOR-XR) 150 MG 24 hr capsule Take 1 capsule by mouth Daily. Take with the Effexor XR 75mg capsule for a total daily dose of 225mg. 30 capsule 1   • venlafaxine XR (EFFEXOR-XR) 75 MG 24 hr capsule Take 1 capsule by mouth Daily. 30 capsule 1   • alendronate (FOSAMAX) 35 MG tablet Take 1 tablet by mouth Every 7 (Seven) Days. Take with water, 30 minutes before first food/drink/medication, avoid lying down for 30 minutes after taking 4 tablet 3   • Aspirin Low Dose 81 MG EC tablet TAKE 2 TABLETS BY MOUTH DAILY. 60 tablet 3   • atorvastatin (LIPITOR) 40 MG tablet TAKE ONE TABLET BY MOUTH EVERY night 90 tablet 0   • azelastine (ASTELIN) 0.1 % nasal spray Use in each nostril as directed 30 mL 3   • Breo Ellipta 200-25 MCG/INH inhaler Inhale 1 puff Daily. 1 each 3   • Brexpiprazole (Rexulti) 1 MG tablet Take 1 mg by mouth Daily. 30 tablet 0   • celecoxib (CeleBREX) 200 MG capsule TAKE 1 CAPSULE BY MOUTH DAILY. 30 capsule 0   • cloNIDine (CATAPRES) 0.1 MG tablet TAKE ONE TABLET BY MOUTH TWO TIMES A DAY 60 tablet 5   • cyanocobalamin 1000 MCG/ML injection INJECT 1ML INTO TO APPROPRAITE MUSCLE EVERY 30 DAYS 1 mL 5   • diclofenac (VOLTAREN) 1 % gel gel Apply 4 g topically to the appropriate area as directed 4 (Four) Times a Day  As Needed (joint pain). 500 g 5   • Diclofenac Sodium (VOLTAREN) 1 % gel gel APPLY 4 GRAMS TO THE AFFECTED AREA FOUR TIMES A  g 3   • Emgality 120 MG/ML auto-injector pen INJECT 120mg ONCE MONTHLY     • furosemide (LASIX) 20 MG tablet TAKE ONE TABLET BY MOUTH EVERY DAY AS NEEDED FOR EDEMA 30 tablet 1   • gabapentin (NEURONTIN) 100 MG capsule TAKE ONE CAPSULE BY MOUTH TWO TIMES A DAY 60 capsule 2   • gabapentin (NEURONTIN) 100 MG capsule Take 1 capsule by mouth.     • HYDROcodone-acetaminophen (NORCO) 7.5-325 MG per tablet Take 1 tablet by mouth Every 4 (Four) Hours As Needed for Moderate Pain. 12 tablet 0   • Insulin Lispro (humaLOG) 100 UNIT/ML injection INJECT AS DIRECTED.MAX DAILY DOSE  UNITS DAILY 40 mL 3   • lansoprazole (PREVACID) 30 MG capsule Take 1 capsule by mouth Daily. 90 capsule 3   • levocetirizine (XYZAL) 5 MG tablet TAKE ONE TABLET BY MOUTH EVERY EVENING 30 tablet 3   • Linzess 145 MCG capsule capsule TAKE 1 CAPSULE BY MOUTH DAILY. 30 MINUTES BEFORE MEALS ON AN EMPTY STOMACH. 30 capsule 3   • meclizine (ANTIVERT) 25 MG tablet TAKE 2 TABLETS BY MOUTH EVERY 6 HOURS FOR DIZZINESS     • nitrofurantoin, macrocrystal-monohydrate, (Macrobid) 100 MG capsule Take 1 capsule by mouth 2 (Two) Times a Day. 20 capsule 0   • ondansetron (ZOFRAN) 8 MG tablet TAKE 1 TABLET BY MOUTH EVERY 8 HOURS AS NEEDED FOR NAUSEA OR VOMITING 30 tablet 3   • Ozempic, 1 MG/DOSE, 4 MG/3ML solution pen-injector inject 1 mg under the skin into appropriate area once weekly AS DIRECTED 3 mL 0   • ProAir  (90 Base) MCG/ACT inhaler INHALE 2 PUFFS BY MOUTH EVERY 4 HOURS AS NEEDED FOR SHORTNESS OF BREATH 8.5 g 5   • SUMAtriptan (IMITREX) 100 MG tablet TAKE 1 TABLET BY MOUTH AS NEEDED FOR MIGRAINE 9 tablet 3   • tamoxifen (NOLVADEX) 20 MG chemo tablet Take 1 tablet by mouth Daily. 30 tablet 11   • topiramate (TOPAMAX) 50 MG tablet TAKE 1 TABLET BY MOUTH TWO TIMES DAY 60 tablet 3   • Trijardy XR 25-5-1000 MG tablet  sustained-release 24 hour TAKE ONE TABLET BY MOUTH EVERY MORNING 30 tablet 5   • ubrogepant 100 MG tablet      • vitamin D (ERGOCALCIFEROL) 1.25 MG (26144 UT) capsule capsule TAKE ONE CAPSULE BY MOUTH ONCE A WEEK 4 capsule 0     No current facility-administered medications for this visit.       Mental Status Exam:   Hygiene:   good  Cooperation:  Cooperative  Eye Contact:  Fair  Psychomotor Behavior:  Aggitated  Affect:  Angry  Hopelessness: Denies  Speech:  Normal  Thought Process:  Goal directed and Linear  Thought Content:  Normal  Suicidal:  None  Homicidal:  None  Hallucinations:  None  Delusion:  None  Memory:  Intact  Orientation:  Person, Place, Time and Situation  Reliability:  fair  Insight:  Fair  Judgement:  Fair  Impulse Control:  Fair  Physical/Medical Issues:  No               Assessment & Plan   Diagnoses and all orders for this visit:    1. Generalized anxiety disorder (Primary)  -     LORazepam (ATIVAN) 1 MG tablet; Take 1 tablet by mouth 3 (Three) Times a Day As Needed for Anxiety. for anxiety  Dispense: 90 tablet; Refill: 0  -     venlafaxine XR (EFFEXOR-XR) 150 MG 24 hr capsule; Take 1 capsule by mouth Daily. Take with the Effexor XR 75mg capsule for a total daily dose of 225mg.  Dispense: 30 capsule; Refill: 1  -     venlafaxine XR (EFFEXOR-XR) 75 MG 24 hr capsule; Take 1 capsule by mouth Daily.  Dispense: 30 capsule; Refill: 1  -     Brexpiprazole (Rexulti) 1 MG tablet; Take 1 mg by mouth Daily.  Dispense: 30 tablet; Refill: 0    2. Other obsessive-compulsive disorders  -     venlafaxine XR (EFFEXOR-XR) 150 MG 24 hr capsule; Take 1 capsule by mouth Daily. Take with the Effexor XR 75mg capsule for a total daily dose of 225mg.  Dispense: 30 capsule; Refill: 1  -     venlafaxine XR (EFFEXOR-XR) 75 MG 24 hr capsule; Take 1 capsule by mouth Daily.  Dispense: 30 capsule; Refill: 1    3. Moderate episode of recurrent major depressive disorder (HCC)  -     venlafaxine XR (EFFEXOR-XR) 150 MG 24 hr  capsule; Take 1 capsule by mouth Daily. Take with the Effexor XR 75mg capsule for a total daily dose of 225mg.  Dispense: 30 capsule; Refill: 1  -     venlafaxine XR (EFFEXOR-XR) 75 MG 24 hr capsule; Take 1 capsule by mouth Daily.  Dispense: 30 capsule; Refill: 1  -     Brexpiprazole (Rexulti) 1 MG tablet; Take 1 mg by mouth Daily.  Dispense: 30 tablet; Refill: 0    4. High risk medication use  -     Urine Drug Screen - Urine, Clean Catch; Future  -     Urine Drug Screen - Urine, Clean Catch          Discussed medication options with patient. Decrease Rexulti 1mg daily for depression and anxiety. Cont. Effexor XR 150mg daily for depression and anxiety. Cont. Effexor XR 75mg daily for depression and anxiety. Cont. Ativan 1mg three times daily as needed for anxiety. Reviewed the risks, benefits, and side effects of the medications; patient acknowledged and verbally consented. Patient is being prescribed a controlled substance as part of treatment plan. Patient has been educated of appropriate use of the medications, including risk of somnolence, limited ability to drive and/or work safely, and potential for dependence, respiratory depression and overdose. Patient is also informed that the medication are to be used by the patient only- avoid any combined use of ETOH or other substances unless prescribed.   UDS ordered.   [facility] Patient Controlled Substance Report (from 3/31/2022 to 3/29/2023)    Dispensed  Strength Quantity Days Supply Provider Pharmacy   02/28/2023 Gabapentin 100MG 60 each 30 [facility]   02/28/2023 Lorazepam 1MG 90 each 30 [facility]   02/02/2023 Gabapentin 100MG 60 each 30 [facility]   02/02/2023 Lorazepam 1MG 90 each 30 [facility]   01/05/2023 Gabapentin 100MG 60 each 30 [facility]   01/05/2023 Lorazepam 1MG 90 each 30 [facility]   12/09/2022 Gabapentin 100MG 60 each 30 [facility]PHAN[facility]   11/23/2022 Lorazepam 1MG 90 each 30 [facility]   11/05/2022 Gabapentin 100MG 60 each 30 [facility]PHAN[facility]   10/25/2022 Lorazepam 1MG 90 each 30 [facility]   10/06/2022 Gabapentin 100MG 60 each 30 [facility]PHANIE [facility]   10/03/2022 Hydrocodone/Acetaminophen 325MG/7.5MG 12 each 2 [facility]   09/28/2022 Lorazepam 1MG 90 each 30 [facility]   08/30/2022 Gabapentin 100MG 60 each 30 [facility]PHAN[facility]   08/30/2022 Lorazepam 1MG 90 each 30 [facility]   08/02/2022 Gabapentin 100MG 60 each 30 [facility]ON[facility]STEPHANIE [facility]   07/11/2022 Lorazepam 1MG 90 each 30 [facility]   07/05/2022 Gabapentin 100MG 60 each 30 [facility]STEPHANIE [facility]   06/13/2022 Lorazepam 1MG 90 each 30 [facility]   06/03/2022 Gabapentin 100MG 60 each 30 [facility]STEPHANIE [facility]   05/10/2022 Lorazepam 1MG 90 each 30 [facility]   05/06/2022 Gabapentin 100MG 60 each 30 [facility]   04/19/2022 Hydrocodone Bitartrate/Ac 325MG/5MG 10 each 3 SCHAUMBGIL [facility]ORRISON [facility]   04/14/2022 Lorazepam 1MG 90 each 30 [facility]ED[facility]           Patient is agreeable to call the office with any questions, concerns, or worsening of symptoms. Patient is aware to call 911 or go to the nearest ER should begin having SI/HI.          Follow up in four weeks        Errors in dictation may reflect use of voice recognition software and not all errors in transcription may have been detected prior to signing.              This document has been electronically signed by NIDA Vasquez   March 31, 2023  23:12 EDT

## 2023-03-20 DIAGNOSIS — K59.03 DRUG-INDUCED CONSTIPATION: ICD-10-CM

## 2023-03-20 DIAGNOSIS — J30.9 CHRONIC ALLERGIC RHINITIS: Chronic | ICD-10-CM

## 2023-03-20 RX ORDER — LINACLOTIDE 145 UG/1
CAPSULE, GELATIN COATED ORAL
Qty: 30 CAPSULE | Refills: 3 | Status: SHIPPED | OUTPATIENT
Start: 2023-03-20

## 2023-03-20 RX ORDER — LEVOCETIRIZINE DIHYDROCHLORIDE 5 MG/1
TABLET, FILM COATED ORAL
Qty: 30 TABLET | Refills: 3 | Status: SHIPPED | OUTPATIENT
Start: 2023-03-20

## 2023-03-22 ENCOUNTER — LAB (OUTPATIENT)
Dept: FAMILY MEDICINE CLINIC | Facility: CLINIC | Age: 48
End: 2023-03-22
Payer: MEDICARE

## 2023-03-22 ENCOUNTER — TELEPHONE (OUTPATIENT)
Dept: FAMILY MEDICINE CLINIC | Facility: CLINIC | Age: 48
End: 2023-03-22
Payer: MEDICARE

## 2023-03-22 DIAGNOSIS — R39.9 LOWER URINARY TRACT SYMPTOMS (LUTS): Primary | ICD-10-CM

## 2023-03-22 LAB
BILIRUB BLD-MCNC: ABNORMAL MG/DL
CLARITY, POC: ABNORMAL
COLOR UR: ABNORMAL
EXPIRATION DATE: ABNORMAL
GLUCOSE UR STRIP-MCNC: ABNORMAL MG/DL
KETONES UR QL: NEGATIVE
LEUKOCYTE EST, POC: ABNORMAL
Lab: ABNORMAL
NITRITE UR-MCNC: NEGATIVE MG/ML
PH UR: 6 [PH] (ref 5–8)
PROT UR STRIP-MCNC: ABNORMAL MG/DL
RBC # UR STRIP: ABNORMAL /UL
SP GR UR: 1.01 (ref 1–1.03)
UROBILINOGEN UR QL: ABNORMAL

## 2023-03-23 DIAGNOSIS — R39.9 LOWER URINARY TRACT SYMPTOMS (LUTS): Primary | ICD-10-CM

## 2023-03-23 RX ORDER — NITROFURANTOIN 25; 75 MG/1; MG/1
100 CAPSULE ORAL 2 TIMES DAILY
Qty: 20 CAPSULE | Refills: 0 | Status: SHIPPED | OUTPATIENT
Start: 2023-03-23 | End: 2023-04-04

## 2023-03-24 NOTE — PROGRESS NOTES
NAME: Nivia Bernstein    : 1975    DATE:  3/28/2023    DIAGNOSIS:   Stage IA ([id number]) moderately differentiated invasive ductal carcinoma of the R breast with associated DCIS.  Tumor was 10 mm in maximal dimension.  0/10 SLN involved. ER negative, DE 15% + (1+), HER-2/cat negative. Mammaprint high risk.    CHIEF COMPLAINT:  Follow up Breast cancer    TREATMENT HISTORY:  1. Initially planned to give 4 cycles to Taxotere/Cytoxan, during 1st infusion of Taxotere on 2020 patient developed allergic reaction. Therefore, Taxotere was discontinued and regimen changed to DD AC.    2.      3.  Started Tamoxifen 12-    HISTORY OF PRESENT ILLNESS:   Nivia Bernstein is a very pleasant 48 y.o. female who who is being seen today at the request of Sheila Garza MD for evaluation and treatment of breast cancer. She did not notice a palpable lump, but was recommended to have a screening mammogram by her PCP. A nodule in the right upper quadrant of the R breast was noted. The mass measured 0.8 cm on ultrasound. Biopsy was consistent with a moderately differentiated invasive ductal carcinoma, ER negative, DE weakly (15%) positive, and HER-2/cat negative. She underwent bilateral mastectomy with sentinel lymph node biopsy with Dr. Garza on 20. Pathology from this showed a moderately differentiated invasive ductal carcinoma with associated intermediate grade DCIS, negative margins.  0/10 /SLN involved.  Mammaprint was high risk. She was referred to our clinic and is here today with her  for discussion of further treatment options.      Ms. Bernstein is healing well from the surgery.  She did develop a seroma, which was drained by Dr. Garza yesterday. She reports muscular chest discomfort r/t the procedure, but otherwise denies any other symptoms including fevers, chills, significant weight changes, shortness of breath, abdominal pain, n/v/d/c, bony pain or headaches.    Interval History:  Ms. Bernstein  presents today for follow up of breast cancer.  Since her last visit, she says she has overall been doing well.  She continues to take Tamoxifen which she is tolerating well and asks for refill today.  She says she never went to f/u with the dermatologist but the lesion on her LUE hasn't really changed.  She continues to see Dr. Sheehan and is taking injections with improvement in her ROM about the shoulders       PAST MEDICAL HISTORY:  Past Medical History:   Diagnosis Date   • Altered mental status 10/16/2017   • Anxiety and depression 3/31/2017   • Arthritis    • Asthma    • Elevated alkaline phosphatase level 10/16/2017   • Enlarged liver    • GERD (gastroesophageal reflux disease)    • Heart murmur    • Hypokalemia 10/16/2017   • Mitral valve prolapse    • Neuropathy     related to diabetes   • Obesity 3/31/2017   • OCD (obsessive compulsive disorder) 10/16/2017   • Osteoporosis    • PONV (postoperative nausea and vomiting)    • Renal insufficiency 10/16/2017   • Right breast cancer with malignant cells in regional lymph nodes no greater than 0.2 mm and no more than 200 cells (HCC) 8/13/2020   • TIA (transient ischemic attack)     4 TIMES       PAST SURGICAL HISTORY:  Past Surgical History:   Procedure Laterality Date   • APPENDECTOMY     • CARPAL TUNNEL RELEASE     • CHOLECYSTECTOMY     • COLONOSCOPY N/A 5/5/2021    Procedure: COLONOSCOPY WITH BIOPSY;  Surgeon: Vickie Herrera MD;  Location: [facility];  Service: Gastroenterology;  Laterality: N/A;   • FINGER FUSION Left     3rd   • HYSTERECTOMY  2011    removed due to an ovarian cyst and dysmenorrhia. No cancer noted. Complete   • KNEE ARTHROSCOPY Right    • MASTECTOMY Left 8/18/2020    Procedure: Left mastectomy;  Surgeon: Sheila Garza MD;  Location: [facility];  Service: General;  Laterality: Left;   • MASTECTOMY WITH SENTINEL NODE BIOPSY AND AXILLARY NODE DISSECTION Right 8/18/2020    Procedure: Right BREAST MASTECTOMY WITH SENTINEL NODE  BIOPSY;  Surgeon: Sheila Garza MD;  Location[facility];  Service: General;  Laterality: Right;   • PORTACATH PLACEMENT         SOCIAL HISTORY:  Social History     Socioeconomic History   • Marital status:    Tobacco Use   • Smoking status: Never   • Smokeless tobacco: Never   Vaping Use   • Vaping Use: Never used   Substance and Sexual Activity   • Alcohol use: No   • Drug use: No   • Sexual activity: Yes     Partners: Male         FAMILY HISTORY:  Family History   Problem Relation Age of Onset   • Diabetes Mother    • Hypertension Mother    • Diabetes Father    • Heart disease Father    • Alcohol abuse Father    • Seizures Father    • Hypertension Father    • No Known Problems Brother    • No Known Problems Daughter    • Bone cancer Son    • Suicide Attempts Cousin    • Stomach cancer Cousin         maternal first cousin   • Breast cancer Paternal Aunt 52   • Diabetes Maternal Grandmother    • Diabetes Maternal Grandfather    • Lung cancer Maternal Grandfather    • Heart disease Paternal Grandmother    • Diabetes Paternal Grandmother    • Heart disease Paternal Grandfather    • Diabetes Paternal Grandfather    • Ovarian cancer Maternal Aunt    • Lung cancer Maternal Uncle    • Colon cancer Maternal Uncle    • Cancer Maternal Uncle         unknown type     MEDICATIONS:  The current medication list was reviewed in the EMR    Current Outpatient Medications:   •  alendronate (FOSAMAX) 35 MG tablet, Take 1 tablet by mouth Every 7 (Seven) Days. Take with water, 30 minutes before first food/drink/medication, avoid lying down for 30 minutes after taking, Disp: 4 tablet, Rfl: 3  •  Aspirin Low Dose 81 MG EC tablet, TAKE 2 TABLETS BY MOUTH DAILY., Disp: 60 tablet, Rfl: 3  •  atorvastatin (LIPITOR) 40 MG tablet, TAKE ONE TABLET BY MOUTH EVERY night, Disp: 90 tablet, Rfl: 0  •  azelastine (ASTELIN) 0.1 % nasal spray, Use in each nostril as directed, Disp: 30 mL, Rfl: 3  •  Breo Ellipta 200-25 MCG/INH inhaler,  Inhale 1 puff Daily., Disp: 1 each, Rfl: 3  •  Brexpiprazole (Rexulti) 1 MG tablet, Take 1 mg by mouth Daily., Disp: 30 tablet, Rfl: 0  •  celecoxib (CeleBREX) 200 MG capsule, TAKE 1 CAPSULE BY MOUTH DAILY., Disp: 30 capsule, Rfl: 0  •  cloNIDine (CATAPRES) 0.1 MG tablet, TAKE ONE TABLET BY MOUTH TWO TIMES A DAY, Disp: 60 tablet, Rfl: 5  •  cyanocobalamin 1000 MCG/ML injection, INJECT 1ML INTO TO APPROPRAITE MUSCLE EVERY 30 DAYS, Disp: 1 mL, Rfl: 5  •  diclofenac (VOLTAREN) 1 % gel gel, Apply 4 g topically to the appropriate area as directed 4 (Four) Times a Day As Needed (joint pain)., Disp: 500 g, Rfl: 5  •  Diclofenac Sodium (VOLTAREN) 1 % gel gel, APPLY 4 GRAMS TO THE AFFECTED AREA FOUR TIMES A DAY, Disp: 100 g, Rfl: 3  •  Emgality 120 MG/ML auto-injector pen, INJECT 120mg ONCE MONTHLY, Disp: , Rfl:   •  furosemide (LASIX) 20 MG tablet, TAKE ONE TABLET BY MOUTH EVERY DAY AS NEEDED FOR EDEMA, Disp: 30 tablet, Rfl: 1  •  gabapentin (NEURONTIN) 100 MG capsule, TAKE ONE CAPSULE BY MOUTH TWO TIMES A DAY, Disp: 60 capsule, Rfl: 2  •  gabapentin (NEURONTIN) 100 MG capsule, Take 1 capsule by mouth., Disp: , Rfl:   •  HYDROcodone-acetaminophen (NORCO) 7.5-325 MG per tablet, Take 1 tablet by mouth Every 4 (Four) Hours As Needed for Moderate Pain., Disp: 12 tablet, Rfl: 0  •  Insulin Lispro (humaLOG) 100 UNIT/ML injection, INJECT AS DIRECTED.MAX DAILY DOSE  UNITS DAILY, Disp: 40 mL, Rfl: 3  •  lansoprazole (PREVACID) 30 MG capsule, Take 1 capsule by mouth Daily., Disp: 90 capsule, Rfl: 3  •  levocetirizine (XYZAL) 5 MG tablet, TAKE ONE TABLET BY MOUTH EVERY EVENING, Disp: 30 tablet, Rfl: 3  •  Linzess 145 MCG capsule capsule, TAKE 1 CAPSULE BY MOUTH DAILY. 30 MINUTES BEFORE MEALS ON AN EMPTY STOMACH., Disp: 30 capsule, Rfl: 3  •  LORazepam (ATIVAN) 1 MG tablet, Take 1 tablet by mouth 3 (Three) Times a Day As Needed for Anxiety. for anxiety, Disp: 90 tablet, Rfl: 0  •  meclizine (ANTIVERT) 25 MG tablet, TAKE 2 TABLETS  BY MOUTH EVERY 6 HOURS FOR DIZZINESS, Disp: , Rfl:   •  nitrofurantoin, macrocrystal-monohydrate, (Macrobid) 100 MG capsule, Take 1 capsule by mouth 2 (Two) Times a Day., Disp: 20 capsule, Rfl: 0  •  ondansetron (ZOFRAN) 8 MG tablet, TAKE 1 TABLET BY MOUTH EVERY 8 HOURS AS NEEDED FOR NAUSEA OR VOMITING, Disp: 30 tablet, Rfl: 3  •  ProAir  (90 Base) MCG/ACT inhaler, INHALE 2 PUFFS BY MOUTH EVERY 4 HOURS AS NEEDED FOR SHORTNESS OF BREATH, Disp: 8.5 g, Rfl: 5  •  Semaglutide, 1 MG/DOSE, (OZEMPIC) 2 MG/1.5ML solution pen-injector, Inject 1 mg under the skin into the appropriate area as directed 1 (One) Time Per Week., Disp: 3 mL, Rfl: 0  •  SUMAtriptan (IMITREX) 100 MG tablet, TAKE 1 TABLET BY MOUTH AS NEEDED FOR MIGRAINE, Disp: 9 tablet, Rfl: 3  •  tamoxifen (NOLVADEX) 20 MG chemo tablet, Take 1 tablet by mouth Daily., Disp: 30 tablet, Rfl: 11  •  topiramate (TOPAMAX) 50 MG tablet, TAKE 1 TABLET BY MOUTH TWO TIMES DAY, Disp: 60 tablet, Rfl: 3  •  Trijardy XR 25-5-1000 MG tablet sustained-release 24 hour, TAKE ONE TABLET BY MOUTH EVERY MORNING, Disp: 30 tablet, Rfl: 5  •  ubrogepant 100 MG tablet, , Disp: , Rfl:   •  venlafaxine XR (EFFEXOR-XR) 150 MG 24 hr capsule, Take 1 capsule by mouth Daily. Take with the Effexor XR 75mg capsule for a total daily dose of 225mg., Disp: 30 capsule, Rfl: 1  •  venlafaxine XR (EFFEXOR-XR) 75 MG 24 hr capsule, Take 1 capsule by mouth Daily., Disp: 30 capsule, Rfl: 1  •  vitamin D (ERGOCALCIFEROL) 1.25 MG (78370 UT) capsule capsule, TAKE ONE CAPSULE BY MOUTH ONCE A WEEK, Disp: 4 capsule, Rfl: 0  No current facility-administered medications for this visit.    ALLERGIES:    Allergies   Allergen Reactions   • Mobic [Meloxicam] Rash   • Penicillins Angioedema   • Taxotere [Docetaxel] Anaphylaxis     9 minutes into 1st infusion   • Novolog [Insulin Aspart] Hives   • Rosuvastatin Calcium GI Intolerance     REVIEW OF SYSTEMS:    A comprehensive 14 point review of systems was performed.   "Significant findings as mentioned above.  All other systems reviewed and are negative.      Physical Exam  Vital Signs:   Visit Vitals  /76   Pulse 94   Temp 97.1 °F (36.2 °C) (Temporal)   Resp 18   Ht 162.6 cm (64\")   Wt 103 kg (227 lb 9.6 oz)   SpO2 98%   BMI 39.07 kg/m²     Pain Score    03/28/23 1355   PainSc:   7   PainLoc: Leg      ECOG score: 0   General: Awake, alert and oriented, in no acute distress.   HEENT:  PERRLA, EOM full, OP clear, mucous membranes moist  Neck: No thyromegaly. No JVD.   Lungs: Lungs are clear to auscultation bilaterally, no wheezing  Heart:  Regular rate and rhythm. No murmurs, rubs, or gallops.   Breast: S/p bilateral mastectomy and RSLNBx.  Surgical scars smooth and intact  without nodularity. No axillary adenopathy on either side.   Abdomen: Soft, Non tender, non-distended, bowel sounds present.  Diabetic monitoring device in place.  Extremities: No clubbing, cyanosis or LE edema .  She improved ROM about the shoulders bilaterally..  She has a 6 mm pale erythematous skin lesion on left upper extremity, stable from her last exam  Neurologic: MS as above. Grossly non focal exam    PATHOLOGY:              IMAGING:  Bilateral screening mammogram 06-19-20  FINDINGS:    The breast tissue is heterogeneously dense.  New focal nodularity right upper outer breast that is about 14 cm from  the nipple.  Otherwise, no suspicious findings.     IMPRESSION:  New focal nodularity right upper outer breast that is about  14 cm from the nipple.     RECOMMENDATION:  Spot compression imaging.     BI-RADS CAT 0, INCOMPLETE.  The patient needs additional imaging.        Diagnostic R mammogram with R breast US 07-14-20  FINDINGS:  Additional mammographic imaging images of persistent  partially obscured oval mass in the posterior right upper outer  quadrant.     Right breast ultrasound: Focused sonographic evaluation of the right  breast demonstrates a 0.8 cm irregular hypoechoic mass with " ill-defined  borders located at 10:00, 13 cm from the nipple. An additional area was  initially noted by the technologist in the 9:00 region which represents  an isolated fat lobule.     IMPRESSION:  0.8 cm irregular mass in the right 10:00 region. Recommend  ultrasound-guided core biopsy.     BI-RADS CATEGORY:  4, SUSPICIOUS     RECOMMENDATION:  Recommend ultrasound guided core biopsy of the right  breast.        Bilateral breast MRI w/wo contrast 08-11-20  FINDINGS: There is minimal bilateral background contrast enhancement and  normal vascular enhancement.     There are no worrisome areas of contrast enhancement in the left breast.     In the right breast correlating to the biopsy proven malignancy is an  approximately 0.7 cm irregular rapidly enhancing mass in the posterior  right 10:00 region. Artifact from a postbiopsy marking clip is located  adjacent to this mass. A small linear area of enhancement extends  posterior to this mass approximately 1 cm. There are no additional  worrisome areas of contrast enhancement in the right breast.     There is no axillary adenopathy by MRI criteria and no chest wall  abnormality is seen.     IMPRESSION:  1. Negative left breast MRI.     2. Biopsy-proven malignancy in the right 10:00 region with a small  linear area of enhancement extending posterior to the 0.7 cm mass.     RECOMMENDATIONS: Recommend surgical follow-up.     BI-RADS CATEGORY: 6, KNOWN BIOPSY PROVEN MALIGNANCY    Echo 10-16-17:  A two-dimensional transthoracic echocardiogram with color flow and Doppler was performed.   The study is technically good for diagnosis.Verbal consent was obtained from the patient to use saline contrast in order to optimize the study.  A total of 20 mL of agitated saline was administered to assess for cardiac shunting.   No adverse reaction to contrast was noted.   Echocardiogram Findings    Left Ventricle Left ventricular systolic function is normal. Estimated EF appears to be in  the range of 56 - 60%. Normal left ventricular cavity size and wall thickness noted. All left ventricular wall segments contract normally. Left ventricular diastolic function is normal.   Right Ventricle Normal right ventricular cavity size noted.   Left Atrium Normal left atrial size noted. Saline test results are negative.   Right Atrium Normal right atrial size noted.   Aortic Valve The aortic valve is structurally normal. Trace aortic valve regurgitation is present.   Mitral Valve The mitral valve is normal in structure. Trace mitral valve regurgitation is present.   Tricuspid Valve The tricuspid valve is normal.  Trace tricuspid valve regurgitation is present.   Pulmonic Valve The pulmonic valve is structurally normal. There is trace pulmonic valve regurgitation present.   Greater Vessels No dilation of the aortic root is present. No dilation of the sinuses of Valsalva is present.   Pericardium There is no evidence of pericardial effusion.       ECHO 09/29/2020  · Poor quality echo and Doppler study  · Normal left ventricular cavity size and wall thickness noted.  · Left ventricular ejection fraction appears to be 61 - 65%. Left ventricular systolic function is normal.  · Left ventricular diastolic function is consistent with (grade I) impaired relaxation.  · Aortic and mitral valve are poorly visualized but appear to be normal,  · Tricuspid and pulmonic valve echoes are not visualized.  · There is no pericardial effusion noted.    Echocardiogram 12-08-02  · Normal left ventricular cavity size and wall thickness noted. All left ventricular wall segments contract normally.  · Left ventricular ejection fraction appears to be 56 - 60%.  · The pericardium is normal. There is no evidence of pericardial effusion. .       MRI Brain w/wo contrast 01-28-21  FINDINGS:    Brain:  Unremarkable.  No mass.  No hemorrhage.  No acute infarct.    Ventricles:  Unremarkable.  No ventriculomegaly.    Bones/joints:  Unremarkable.     Sinuses:  Unremarkable as visualized.  No acute sinusitis.    Mastoid air cells:  Unremarkable as visualized.  No mastoid effusion.    Orbits:  Unremarkable as visualized.     IMPRESSION:    No acute findings in the head/brain.    ENDOSCOPY:    COLONOSCOPY PROCEDURE NOTE   5/5/2021 Dr. Aguilera       Findings:  1.  Normal colonoscopy with small internal hemorrhoids.  2.  Normal terminal ileum     OPERATIVE PROCEDURE   After proper informed consent was obtained, the patient was taken the operating suite and placed in left lateral decubitus position.  An Olympus video colonoscope 180 series was inserted in the rectum and advanced to the terminal ileum under direct visualization.  Cecum and terminal ileum were identified by visualization of the appendiceal orifice and ileocecal valve.  The colonoscope was then slowly withdrawn from the cecum to the rectum and passed a second time from rectum to cecum.  The colonoscope was retroflexed in the cecum and rectum. Scope was then withdrawn. Patient tolerated the procedure well. There were no immediate complications. Cecal withdrawal time was 9 minutes.     RECOMMENDATIONS:  1.  Repeat colonoscopy in 10 years for screening purposes.  2.  Continue Linzess.  3.  Anusol HC suppositories as needed for rectal bleeding.    RECENT LABS:  Lab Results   Component Value Date    WBC 12.39 (H) 03/28/2023    HGB 13.8 03/28/2023    HCT 43.8 03/28/2023    MCV 88.1 03/28/2023    RDW 13.4 03/28/2023     03/28/2023    NEUTRORELPCT 60.1 03/28/2023    LYMPHORELPCT 32.1 03/28/2023    MONORELPCT 5.6 03/28/2023    EOSRELPCT 1.4 03/28/2023    BASORELPCT 0.6 03/28/2023    NEUTROABS 7.44 (H) 03/28/2023    LYMPHSABS 3.98 (H) 03/28/2023       Lab Results   Component Value Date     03/28/2023    K 3.4 (L) 03/28/2023    CO2 24.4 03/28/2023     03/28/2023    BUN 9 03/28/2023    CREATININE 0.73 03/28/2023    EGFRIFNONA 89 01/11/2022    EGFRIFAFRI 108 01/11/2022    GLUCOSE 130 (H)  03/28/2023    CALCIUM 8.9 03/28/2023    ALKPHOS 98 03/28/2023    AST 14 03/28/2023    ALT 15 03/28/2023    BILITOT 0.3 03/28/2023    ALBUMIN 3.8 03/28/2023    PROTEINTOT 7.4 03/28/2023    MG 2.4 10/06/2022     Lab Results   Component Value Date    FERRITIN 36.62 09/20/2022    IRON 46 09/20/2022    TIBC 459 09/20/2022    LABIRON 10 (L) 09/20/2022    RCPNEMZZ19 285 06/22/2022    FOLATE 15.19 10/16/2017        Lab Results   Component Value Date    CAION 1.28 10/16/2017    TSH 2.610 03/28/2023    RTZU29OF 44.8 10/06/2022         ASSESSMENT & PLAN:  Nivia Bernstein is a very pleasant 48 y.o. female with Stage IA ([id number]) moderately differentiated invasive ductal carcinoma of the R breast with associated DCIS.  Tumor was 10 mm in maximal dimension.  0/10 SLN involved. ER negative, MN 15% + (1+), HER-2/cat negative. Mammaprint high risk.     1. R breast cancer:   -  S/p R mastectomy and SLNBx as well as prophylactic contralateral mastectomy performed by Dr. Garza 8-18-20.  - She healed well from bilateral mastectomy. This was complicated by left seroma, drained by Dr. Garza. Incisions are healed well.   - Given high risk Mammaprint, Dr. Loco recommended adjuvant chemotherapy. Discussed different possibilities for adjuvant therapy. Given high risk Mammaprint but early stage, node negative disease as well as comorbidities, recommended Taxotere/Cytoxan Q21d x 4 cycles with growth factor support. Discussed potential risks, benefits, and side effects and she would like to proceed.   - She had port placed several years ago due to poor peripheral access. This has been well cared for and maintained.   - C1 Taxotere/Cytoxan was planned for 09/24/2020. Unfortunately, this had to be discontinued due to allergic reaction to Taxotere. Recommended change of treatment to DD AC.   - She tolerated treatment well and aside from fatigue and recovered well. ECHO was essentially unchanged.   -  Her tumor was ER neg but did have 15% 1+  positivity for progesterone receptor.  She is s/p complete hysterectomy and had evidence of osteopenia with T score -1.9 in June 2020.  Given all of this, recommended treatment with Tamoxifen over aromatase inhibitor.   - Exam and labs from today without cause for concern. Continue Tamoxifen daily, which she is tolerating well (refilled toda).  - We will ask her return in 6 months for exam with CBC, CMP, TSH and vitamin D prior.    2. Extensive family history of malignancy:   - Genetic testing was ordered by Dr. Garza and performed by [facility] with no mutations, specifically including BRCA 1/2, CHKE2, CDH1, PALB2 and others.    3. Anxiety/Depression:  -  She continues to f/u with Psychiatry. She continues to do well in this regard.     4. Frozen shoulders Bilaterally:  - She has been seeing Dr. Sheehan of orthopedics.  She has received injection therapy as well as physical therapy.  She has significant improvement in the ROM in her nicole shoulders.  She continues to follow-up with Dr. Sheehan.    5. Skin lesion:  -0.6 cm pale red lesion on left upper extremity with pruritus. Denies any changes.  Suspicious for dermatofibroma although could represent a squamous cell skin cancer.  I recommended referral to dermatology and she has asked me to place referral twice but did not go to her appt either time.  Recommended she call to make appt when she is able to attend.    6.  Alopecia:  - Etiology uncertain.  Perhaps this was related to B12 deficiency.  B12 level was checked by her PCP in June and was borderline, and she was started on B12 injections.  - Alopecia now improved.    7.  Prophylaxis:   -  She received Prevnar 13 vaccine.  -  She had 2022 flu vaccine.  -  M. Uncle had colon cancer at age 50.  Referred to Dr. Aguilera for screening colonoscopy which was unremarkable and repeat exam recommended after 10-year interval..   - She has had COVID vaccine x2. Recommneded booster.     8.  Follow up:  - Continue Tamoxifen  which was refilled today.  - Continue follow up with Dr. Sheehan.  -  Recommended she reschdule appt w/ dermatology for evaluation of LUE skin lesion.  - MD follow up in 6 months with CBCD, CMP, TSH, Vit D prior.      9.  ACO / NII/Other  Quality measures  -  Nivia Bernstein received 2022 flu vaccine.  -  Nivia Bernstein reports a pain score of 7.  Given her pain assessment as noted, treatment options were discussed and the following options were decided upon as a follow-up plan to address the patient's pain: f/u with PCP for non-neoplasm related pain..  -  Current outpatient and discharge medications have been reconciled for the patient.  Reviewed by: Dejah Loco MD      I spent 20 minutes with Nivia Bernstein today.  In the office today, more than 50% of this time was spent face-to-face with her  in counseling / coordination of care, reviewing her interim medical history and counseling on the current treatment plan.  All questions were answered to her satisfaction.           Electronically Signed by: Dejah Loco MD  CC:   MD Truong Hager Sherelene S, APRN

## 2023-03-25 LAB
BACTERIA UR CULT: ABNORMAL
BACTERIA UR CULT: ABNORMAL
OTHER ANTIBIOTIC SUSC ISLT: ABNORMAL

## 2023-03-28 ENCOUNTER — INFUSION (OUTPATIENT)
Dept: ONCOLOGY | Facility: HOSPITAL | Age: 48
End: 2023-03-28
Payer: MEDICARE

## 2023-03-28 ENCOUNTER — OFFICE VISIT (OUTPATIENT)
Dept: ONCOLOGY | Facility: CLINIC | Age: 48
End: 2023-03-28
Payer: MEDICARE

## 2023-03-28 ENCOUNTER — LAB (OUTPATIENT)
Dept: ONCOLOGY | Facility: HOSPITAL | Age: 48
End: 2023-03-28
Payer: MEDICARE

## 2023-03-28 VITALS
OXYGEN SATURATION: 99 % | RESPIRATION RATE: 18 BRPM | HEART RATE: 96 BPM | BODY MASS INDEX: 38.81 KG/M2 | SYSTOLIC BLOOD PRESSURE: 127 MMHG | WEIGHT: 226.2 LBS | DIASTOLIC BLOOD PRESSURE: 75 MMHG

## 2023-03-28 VITALS
RESPIRATION RATE: 18 BRPM | SYSTOLIC BLOOD PRESSURE: 108 MMHG | TEMPERATURE: 97.1 F | HEART RATE: 94 BPM | WEIGHT: 227.6 LBS | OXYGEN SATURATION: 98 % | BODY MASS INDEX: 38.86 KG/M2 | DIASTOLIC BLOOD PRESSURE: 76 MMHG | HEIGHT: 64 IN

## 2023-03-28 DIAGNOSIS — C50.411 MALIGNANT NEOPLASM OF UPPER-OUTER QUADRANT OF RIGHT BREAST IN FEMALE, ESTROGEN RECEPTOR NEGATIVE: ICD-10-CM

## 2023-03-28 DIAGNOSIS — R53.83 FATIGUE, UNSPECIFIED TYPE: ICD-10-CM

## 2023-03-28 DIAGNOSIS — Z17.1 MALIGNANT NEOPLASM OF UPPER-OUTER QUADRANT OF RIGHT BREAST IN FEMALE, ESTROGEN RECEPTOR NEGATIVE: Primary | ICD-10-CM

## 2023-03-28 DIAGNOSIS — E55.9 VITAMIN D DEFICIENCY: ICD-10-CM

## 2023-03-28 DIAGNOSIS — C50.411 MALIGNANT NEOPLASM OF UPPER-OUTER QUADRANT OF RIGHT BREAST IN FEMALE, ESTROGEN RECEPTOR NEGATIVE: Primary | ICD-10-CM

## 2023-03-28 DIAGNOSIS — L98.9 SKIN LESION: ICD-10-CM

## 2023-03-28 DIAGNOSIS — M75.01 ADHESIVE CAPSULITIS OF RIGHT SHOULDER: ICD-10-CM

## 2023-03-28 DIAGNOSIS — M85.89 OSTEOPENIA OF MULTIPLE SITES: ICD-10-CM

## 2023-03-28 DIAGNOSIS — Z17.1 MALIGNANT NEOPLASM OF UPPER-OUTER QUADRANT OF RIGHT BREAST IN FEMALE, ESTROGEN RECEPTOR NEGATIVE: ICD-10-CM

## 2023-03-28 DIAGNOSIS — Z95.828 PORT-A-CATH IN PLACE: Primary | ICD-10-CM

## 2023-03-28 LAB
ALBUMIN SERPL-MCNC: 3.8 G/DL (ref 3.5–5.2)
ALBUMIN/GLOB SERPL: 1.1 G/DL
ALP SERPL-CCNC: 98 U/L (ref 39–117)
ALT SERPL W P-5'-P-CCNC: 15 U/L (ref 1–33)
ANION GAP SERPL CALCULATED.3IONS-SCNC: 10.6 MMOL/L (ref 5–15)
AST SERPL-CCNC: 14 U/L (ref 1–32)
BASOPHILS # BLD AUTO: 0.08 10*3/MM3 (ref 0–0.2)
BASOPHILS NFR BLD AUTO: 0.6 % (ref 0–1.5)
BILIRUB SERPL-MCNC: 0.3 MG/DL (ref 0–1.2)
BUN SERPL-MCNC: 9 MG/DL (ref 6–20)
BUN/CREAT SERPL: 12.3 (ref 7–25)
CALCIUM SPEC-SCNC: 8.9 MG/DL (ref 8.6–10.5)
CHLORIDE SERPL-SCNC: 104 MMOL/L (ref 98–107)
CO2 SERPL-SCNC: 24.4 MMOL/L (ref 22–29)
CREAT SERPL-MCNC: 0.73 MG/DL (ref 0.57–1)
DEPRECATED RDW RBC AUTO: 43.1 FL (ref 37–54)
EGFRCR SERPLBLD CKD-EPI 2021: 100.3 ML/MIN/1.73
EOSINOPHIL # BLD AUTO: 0.17 10*3/MM3 (ref 0–0.4)
EOSINOPHIL NFR BLD AUTO: 1.4 % (ref 0.3–6.2)
ERYTHROCYTE [DISTWIDTH] IN BLOOD BY AUTOMATED COUNT: 13.4 % (ref 12.3–15.4)
GLOBULIN UR ELPH-MCNC: 3.6 GM/DL
GLUCOSE SERPL-MCNC: 130 MG/DL (ref 65–99)
HCT VFR BLD AUTO: 43.8 % (ref 34–46.6)
HGB BLD-MCNC: 13.8 G/DL (ref 12–15.9)
IMM GRANULOCYTES # BLD AUTO: 0.03 10*3/MM3 (ref 0–0.05)
IMM GRANULOCYTES NFR BLD AUTO: 0.2 % (ref 0–0.5)
LYMPHOCYTES # BLD AUTO: 3.98 10*3/MM3 (ref 0.7–3.1)
LYMPHOCYTES NFR BLD AUTO: 32.1 % (ref 19.6–45.3)
MCH RBC QN AUTO: 27.8 PG (ref 26.6–33)
MCHC RBC AUTO-ENTMCNC: 31.5 G/DL (ref 31.5–35.7)
MCV RBC AUTO: 88.1 FL (ref 79–97)
MONOCYTES # BLD AUTO: 0.69 10*3/MM3 (ref 0.1–0.9)
MONOCYTES NFR BLD AUTO: 5.6 % (ref 5–12)
NEUTROPHILS NFR BLD AUTO: 60.1 % (ref 42.7–76)
NEUTROPHILS NFR BLD AUTO: 7.44 10*3/MM3 (ref 1.7–7)
NRBC BLD AUTO-RTO: 0 /100 WBC (ref 0–0.2)
PLATELET # BLD AUTO: 319 10*3/MM3 (ref 140–450)
PMV BLD AUTO: 9.3 FL (ref 6–12)
POTASSIUM SERPL-SCNC: 3.4 MMOL/L (ref 3.5–5.2)
PROT SERPL-MCNC: 7.4 G/DL (ref 6–8.5)
RBC # BLD AUTO: 4.97 10*6/MM3 (ref 3.77–5.28)
SODIUM SERPL-SCNC: 139 MMOL/L (ref 136–145)
TSH SERPL DL<=0.05 MIU/L-ACNC: 2.61 UIU/ML (ref 0.27–4.2)
WBC NRBC COR # BLD: 12.39 10*3/MM3 (ref 3.4–10.8)

## 2023-03-28 PROCEDURE — 85025 COMPLETE CBC W/AUTO DIFF WBC: CPT

## 2023-03-28 PROCEDURE — 36591 DRAW BLOOD OFF VENOUS DEVICE: CPT

## 2023-03-28 PROCEDURE — 82306 VITAMIN D 25 HYDROXY: CPT

## 2023-03-28 PROCEDURE — 84443 ASSAY THYROID STIM HORMONE: CPT

## 2023-03-28 PROCEDURE — 80053 COMPREHEN METABOLIC PANEL: CPT

## 2023-03-28 PROCEDURE — 25010000002 HEPARIN LOCK FLUSH PER 10 UNITS: Performed by: INTERNAL MEDICINE

## 2023-03-28 PROCEDURE — 1125F AMNT PAIN NOTED PAIN PRSNT: CPT | Performed by: INTERNAL MEDICINE

## 2023-03-28 PROCEDURE — 99213 OFFICE O/P EST LOW 20 MIN: CPT | Performed by: INTERNAL MEDICINE

## 2023-03-28 RX ORDER — TAMOXIFEN CITRATE 20 MG/1
20 TABLET ORAL DAILY
Qty: 30 TABLET | Refills: 11 | Status: SHIPPED | OUTPATIENT
Start: 2023-03-28

## 2023-03-28 RX ORDER — SODIUM CHLORIDE 0.9 % (FLUSH) 0.9 %
10 SYRINGE (ML) INJECTION AS NEEDED
OUTPATIENT
Start: 2023-03-28

## 2023-03-28 RX ORDER — SODIUM CHLORIDE 0.9 % (FLUSH) 0.9 %
10 SYRINGE (ML) INJECTION AS NEEDED
Status: DISCONTINUED | OUTPATIENT
Start: 2023-03-28 | End: 2023-03-28 | Stop reason: HOSPADM

## 2023-03-28 RX ORDER — HEPARIN SODIUM (PORCINE) LOCK FLUSH IV SOLN 100 UNIT/ML 100 UNIT/ML
500 SOLUTION INTRAVENOUS AS NEEDED
OUTPATIENT
Start: 2023-03-28

## 2023-03-28 RX ORDER — HEPARIN SODIUM (PORCINE) LOCK FLUSH IV SOLN 100 UNIT/ML 100 UNIT/ML
500 SOLUTION INTRAVENOUS AS NEEDED
Status: DISCONTINUED | OUTPATIENT
Start: 2023-03-28 | End: 2023-03-28 | Stop reason: HOSPADM

## 2023-03-28 RX ADMIN — Medication 10 ML: at 13:33

## 2023-03-28 RX ADMIN — Medication 500 UNITS: at 13:33

## 2023-03-29 DIAGNOSIS — E11.42 DM TYPE 2 WITH DIABETIC PERIPHERAL NEUROPATHY: Chronic | ICD-10-CM

## 2023-03-29 LAB — 25(OH)D3 SERPL-MCNC: 51.6 NG/ML (ref 30–100)

## 2023-03-29 RX ORDER — SEMAGLUTIDE 1.34 MG/ML
INJECTION, SOLUTION SUBCUTANEOUS
Qty: 3 ML | Refills: 0 | Status: SHIPPED | OUTPATIENT
Start: 2023-03-29

## 2023-04-04 ENCOUNTER — OFFICE VISIT (OUTPATIENT)
Dept: FAMILY MEDICINE CLINIC | Facility: CLINIC | Age: 48
End: 2023-04-04
Payer: MEDICARE

## 2023-04-04 DIAGNOSIS — M85.89 OSTEOPENIA OF MULTIPLE SITES: ICD-10-CM

## 2023-04-04 DIAGNOSIS — Z00.00 HEALTHCARE MAINTENANCE: ICD-10-CM

## 2023-04-04 DIAGNOSIS — F32.A ANXIETY AND DEPRESSION: Chronic | ICD-10-CM

## 2023-04-04 DIAGNOSIS — E78.2 MIXED HYPERLIPIDEMIA: ICD-10-CM

## 2023-04-04 DIAGNOSIS — K21.9 GASTROESOPHAGEAL REFLUX DISEASE WITHOUT ESOPHAGITIS: Chronic | ICD-10-CM

## 2023-04-04 DIAGNOSIS — Z17.1 MALIGNANT NEOPLASM OF UPPER-OUTER QUADRANT OF RIGHT BREAST IN FEMALE, ESTROGEN RECEPTOR NEGATIVE: ICD-10-CM

## 2023-04-04 DIAGNOSIS — C50.411 MALIGNANT NEOPLASM OF UPPER-OUTER QUADRANT OF RIGHT BREAST IN FEMALE, ESTROGEN RECEPTOR NEGATIVE: ICD-10-CM

## 2023-04-04 DIAGNOSIS — E66.01 CLASS 3 SEVERE OBESITY WITH SERIOUS COMORBIDITY AND BODY MASS INDEX (BMI) OF 40.0 TO 44.9 IN ADULT, UNSPECIFIED OBESITY TYPE: ICD-10-CM

## 2023-04-04 DIAGNOSIS — Z86.718 HISTORY OF DVT (DEEP VEIN THROMBOSIS): ICD-10-CM

## 2023-04-04 DIAGNOSIS — I65.23 ATHEROSCLEROSIS OF BOTH CAROTID ARTERIES: ICD-10-CM

## 2023-04-04 DIAGNOSIS — J45.20 MILD INTERMITTENT ASTHMA WITHOUT COMPLICATION: ICD-10-CM

## 2023-04-04 DIAGNOSIS — J30.9 CHRONIC ALLERGIC RHINITIS: Primary | ICD-10-CM

## 2023-04-04 DIAGNOSIS — J45.20 MILD INTERMITTENT ASTHMA WITHOUT COMPLICATION: Chronic | ICD-10-CM

## 2023-04-04 DIAGNOSIS — E11.42 DM TYPE 2 WITH DIABETIC PERIPHERAL NEUROPATHY: ICD-10-CM

## 2023-04-04 DIAGNOSIS — Z86.73 HISTORY OF TIAS: ICD-10-CM

## 2023-04-04 DIAGNOSIS — R06.83 SNORING: ICD-10-CM

## 2023-04-04 DIAGNOSIS — G43.511 INTRACTABLE PERSISTENT MIGRAINE AURA WITHOUT CEREBRAL INFARCTION AND WITH STATUS MIGRAINOSUS: ICD-10-CM

## 2023-04-04 DIAGNOSIS — G47.33 OBSTRUCTIVE SLEEP APNEA (ADULT) (PEDIATRIC): ICD-10-CM

## 2023-04-04 DIAGNOSIS — F41.9 ANXIETY AND DEPRESSION: Chronic | ICD-10-CM

## 2023-04-04 DIAGNOSIS — E55.9 VITAMIN D DEFICIENCY: ICD-10-CM

## 2023-04-04 DIAGNOSIS — N95.1 MENOPAUSAL SYMPTOMS: ICD-10-CM

## 2023-04-04 RX ORDER — ZOSTER VACCINE RECOMBINANT, ADJUVANTED 50 MCG/0.5
50 KIT INTRAMUSCULAR SEE ADMIN INSTRUCTIONS
Qty: 1 EACH | Refills: 1 | Status: SHIPPED | OUTPATIENT
Start: 2023-04-04

## 2023-04-04 RX ORDER — FLUTICASONE FUROATE AND VILANTEROL 200; 25 UG/1; UG/1
1 POWDER RESPIRATORY (INHALATION) DAILY
Qty: 1 EACH | Refills: 3 | Status: SHIPPED | OUTPATIENT
Start: 2023-04-04

## 2023-04-04 NOTE — PROGRESS NOTES
The ABCs of the Annual Wellness Visit  Subsequent Medicare Wellness Visit    Chief Complaint  Subsequent Medicare Wellness Visit    Subjective    History of Present Illness:  Nivia Bernstein is a 48 y.o. female who presents for a Subsequent Medicare Wellness Visit.    Breast Cancer  She underwent an ultrasound guided biopsy of a right upper outer quadrant breast mass on 7/21/2019.  The pathology was consistent with an invasive ductal carcinoma and hormonal markers eventually returned ER negative MD weakly positive and HER-2 negative.  She underwent a bilateral mastectomy with sentinel node biopsy on 8/18/2020. The pathology was consistent with a moderately differentiated invasive ductal carcinoma with associated intermediate grade DCIS but negative margins and lymph nodes.  MammaPrint was high risk.  She was started on Taxotere/Cytoxan but developed a significant reaction with the first infusion and was changed to DD AC.  She is now on tamoxifen.  She experienced persistent hot flashes that interfered with her daytime activities and sleep.  These have improved significantly with clonidine and to date she has not experienced any side effects.  She continues to be followed closely by oncology and is scheduled to undergo a reassessment with Dr. Loco on 9/28/2023    Chronic Anxiety  She has a history of OCD and admits to persistent nervousness, worrying, difficulty sleeping, and daytime fatigue.  She has been under considerable stress and admits to intermittent depression.  She continues to deny any loss of interest in activities and there is no history of any suicidal ideation. She remains on venlafaxine and diazepam.  She continues to be followed by psychiatry and is scheduled to undergo a reassessment with Gale ALVA on 4/21/2023    Headaches  She continues to have intermittent headaches.  She feels that these started with chemotherapy.  She describes the pain as a right occipital throb that soon extends to  the right hemicranium.  To date, she has not experienced any warning symptoms however when severe her headaches have been associated with nausea and occasionally photophobia.  She continues to deny any other associated symptoms and there is no history of any other visual disturbances, any changes in her strength or sensation, or any difficulty with her coordination or speech. She has been unable to identify any precipitating or exacerbating factors.  MRI of the brain performed on 1/28/2021 was unremarkable.  She has noted a significant improvement in their frequency and severity with emgality and topiramate and most really promptly with ubrelvy    History of TIA  She gives a history of previous TIA.  MRI of the brain performed on 1/28/2021 was unremarkable.  Duplex Doppler ultrasound of the carotid arteries performed 10/16/2017 revealed homogenous plaque bilaterally but no evidence of stenosis.  Echocardiogram performed the same day was reported as showing trace mitral, aortic, and pulmonic valve regurgitation but no significant abnormalities with an estimated EF of 56 to 60% and a negative saline test for intracardiac shunting. Echocardiogram performed on 9/29/2020 was a limited study but reported as showing grade 1 diastolic dysfunction but normal systolic function with an estimated EF of 61 to 65%. She remains on ASA.  She gives a history of a previous right DVT following arthroscopic knee surgery on that side.  There is no family history of DVT or PE   Lab Results   Component Value Date    WBC 12.39 (H) 03/28/2023    HGB 13.8 03/28/2023    HCT 43.8 03/28/2023    MCV 88.1 03/28/2023     03/28/2023     Type 2 Diabetes Mellitus  She admits to paresthesias of the feet.  She continues to deny any visual disturbances, polydipsia, polyuria or foot ulcerations and has had no recent hypoglycemia. Evaluation to date has been: fasting blood sugar and hemoglobin A1C. Home sugars: BGs consistently in an acceptable  range. Current treatments: metformin, linagliptin, empagliflozin (together as trijardy XR), and an insulin pump.  She is scheduled to undergo a reassessment with NIDA Starr/DOUG on 4/20/2023  Lab Results   Component Value Date    HGBA1C 7.90 (H) 10/06/2022     Lab Results   Component Value Date    MICROALBUR <3.0 10/06/2022     Hyperlipidemia  Her compliance with treatment has been fairly good.  She is active about the house and is taking rosuvastatin with no apparent side effects  Lab Results   Component Value Date    CHOL 173 06/22/2022    CHLPL 113 10/06/2022    TRIG 183 (H) 10/06/2022    HDL 31 (L) 10/06/2022    LDL 52 10/06/2022     Labs  Most recent vitamin D 51.6   Lab Results   Component Value Date    TSH 2.610 03/28/2023     The following portions of the patient's history were reviewed and   updated as appropriate: allergies, current medications, past family history, past medical history, past social history, past surgical history and problem list.    Compared to one year ago, the patient feels her physical   health is the same.    Compared to one year ago, the patient feels her mental   health is the same.    Recent Hospitalizations:  She was not admitted to the hospital during the last year.     Current Medical Providers:  Patient Care Team:  Josh Guerin APRN as PCP - General (Family Medicine)    Outpatient Medications Prior to Visit   Medication Sig Dispense Refill   • alendronate (FOSAMAX) 35 MG tablet Take 1 tablet by mouth Every 7 (Seven) Days. Take with water, 30 minutes before first food/drink/medication, avoid lying down for 30 minutes after taking 4 tablet 3   • Aspirin Low Dose 81 MG EC tablet TAKE 2 TABLETS BY MOUTH DAILY. 60 tablet 3   • atorvastatin (LIPITOR) 40 MG tablet TAKE ONE TABLET BY MOUTH EVERY night 90 tablet 0   • azelastine (ASTELIN) 0.1 % nasal spray Use in each nostril as directed 30 mL 3   • Brexpiprazole (Rexulti) 1 MG tablet Take 1 mg by mouth Daily. 30  tablet 0   • celecoxib (CeleBREX) 200 MG capsule TAKE 1 CAPSULE BY MOUTH DAILY. 30 capsule 0   • cloNIDine (CATAPRES) 0.1 MG tablet TAKE ONE TABLET BY MOUTH TWO TIMES A DAY 60 tablet 5   • cyanocobalamin 1000 MCG/ML injection INJECT 1ML INTO TO APPROPRAITE MUSCLE EVERY 30 DAYS 1 mL 5   • Diclofenac Sodium (VOLTAREN) 1 % gel gel APPLY 4 GRAMS TO THE AFFECTED AREA FOUR TIMES A  g 3   • Emgality 120 MG/ML auto-injector pen INJECT 120mg ONCE MONTHLY     • furosemide (LASIX) 20 MG tablet TAKE ONE TABLET BY MOUTH EVERY DAY AS NEEDED FOR EDEMA 30 tablet 1   • gabapentin (NEURONTIN) 100 MG capsule TAKE ONE CAPSULE BY MOUTH TWO TIMES A DAY 60 capsule 2   • Insulin Lispro (humaLOG) 100 UNIT/ML injection INJECT AS DIRECTED.MAX DAILY DOSE  UNITS DAILY 40 mL 3   • lansoprazole (PREVACID) 30 MG capsule Take 1 capsule by mouth Daily. 90 capsule 3   • levocetirizine (XYZAL) 5 MG tablet TAKE ONE TABLET BY MOUTH EVERY EVENING 30 tablet 3   • Linzess 145 MCG capsule capsule TAKE 1 CAPSULE BY MOUTH DAILY. 30 MINUTES BEFORE MEALS ON AN EMPTY STOMACH. 30 capsule 3   • LORazepam (ATIVAN) 1 MG tablet Take 1 tablet by mouth 3 (Three) Times a Day As Needed for Anxiety. for anxiety 90 tablet 0   • ondansetron (ZOFRAN) 8 MG tablet TAKE 1 TABLET BY MOUTH EVERY 8 HOURS AS NEEDED FOR NAUSEA OR VOMITING 30 tablet 3   • Ozempic, 1 MG/DOSE, 4 MG/3ML solution pen-injector inject 1 mg under the skin into appropriate area once weekly AS DIRECTED 3 mL 0   • ProAir  (90 Base) MCG/ACT inhaler INHALE 2 PUFFS BY MOUTH EVERY 4 HOURS AS NEEDED FOR SHORTNESS OF BREATH 8.5 g 5   • tamoxifen (NOLVADEX) 20 MG chemo tablet Take 1 tablet by mouth Daily. 30 tablet 11   • topiramate (TOPAMAX) 50 MG tablet TAKE 1 TABLET BY MOUTH TWO TIMES DAY 60 tablet 3   • Trijardy XR 25-5-1000 MG tablet sustained-release 24 hour TAKE ONE TABLET BY MOUTH EVERY MORNING 30 tablet 5   • ubrogepant 100 MG tablet      • venlafaxine XR (EFFEXOR-XR) 150 MG 24 hr capsule  Take 1 capsule by mouth Daily. Take with the Effexor XR 75mg capsule for a total daily dose of 225mg. 30 capsule 1   • venlafaxine XR (EFFEXOR-XR) 75 MG 24 hr capsule Take 1 capsule by mouth Daily. 30 capsule 1   • vitamin D (ERGOCALCIFEROL) 1.25 MG (26369 UT) capsule capsule TAKE ONE CAPSULE BY MOUTH ONCE A WEEK 4 capsule 0   • Breo Ellipta 200-25 MCG/INH inhaler Inhale 1 puff Daily. 1 each 3   • diclofenac (VOLTAREN) 1 % gel gel Apply 4 g topically to the appropriate area as directed 4 (Four) Times a Day As Needed (joint pain). 500 g 5   • gabapentin (NEURONTIN) 100 MG capsule Take 1 capsule by mouth.     • HYDROcodone-acetaminophen (NORCO) 7.5-325 MG per tablet Take 1 tablet by mouth Every 4 (Four) Hours As Needed for Moderate Pain. 12 tablet 0   • meclizine (ANTIVERT) 25 MG tablet TAKE 2 TABLETS BY MOUTH EVERY 6 HOURS FOR DIZZINESS     • nitrofurantoin, macrocrystal-monohydrate, (Macrobid) 100 MG capsule Take 1 capsule by mouth 2 (Two) Times a Day. 20 capsule 0   • SUMAtriptan (IMITREX) 100 MG tablet TAKE 1 TABLET BY MOUTH AS NEEDED FOR MIGRAINE 9 tablet 3     No facility-administered medications prior to visit.     No opioid medication identified on active medication list. I have reviewed chart for other potential  high risk medication/s and harmful drug interactions in the elderly.          Aspirin is on active medication list. Aspirin use is indicated based on review of current medical condition/s. Pros and cons of this therapy have been discussed today. Benefits of this medication outweigh potential harm.  Patient has been encouraged to continue taking this medication.  .    Patient Active Problem List   Diagnosis   • DM type 2 with diabetic peripheral neuropathy   • Mild intermittent asthma without complication   • GERD (gastroesophageal reflux disease)   • History of DVT (deep vein thrombosis)   • History of TIAs   • Mixed hyperlipidemia   • Anxiety and depression   • Class 3 severe obesity with serious  comorbidity and body mass index (BMI) of 40.0 to 44.9 in adult   • Elevated alkaline phosphatase level   • Chronic pain of left knee   • OCD (obsessive compulsive disorder)   • Accidental hypodermic needlestick injury with exposure to body fluid   • Atherosclerosis of both carotid arteries   • Chronic allergic rhinitis   • Menopausal symptoms   • Vitamin D deficiency   • Malignant neoplasm of upper-outer quadrant of right breast in female, estrogen receptor negative   • Healthcare maintenance   • Osteopenia of multiple sites   • Port-A-Cath in place   • Drug-induced constipation   • Insulin pump in place   • Hot flashes   • Occipital neuralgia of right side   • Adhesive capsulitis of right shoulder   • S/P mastectomy, bilateral   • Osteoarthritis of right acromioclavicular joint   • Primary osteoarthritis of right shoulder   • Chronic pain of both shoulders   • Chronic neck pain   • Persistent migraine aura without cerebral infarction and with status migrainosus   • Snoring     Advance Care Planning  Advance Directive is not on file.  ACP discussion was held with the patient during this visit. Patient does not have an advance directive, information provided.    Review of Systems   Constitutional: Positive for fatigue. Negative for appetite change, chills, diaphoresis and fever.   HENT: Positive for congestion and rhinorrhea. Negative for ear pain, postnasal drip, sinus pressure, sneezing, sore throat, tinnitus and voice change.    Eyes: Negative for visual disturbance.   Respiratory: Negative for cough, shortness of breath and wheezing.         Persistent snoring   Cardiovascular: Negative for chest pain, palpitations and leg swelling.   Gastrointestinal: Positive for constipation. Negative for abdominal pain, blood in stool, diarrhea, nausea and vomiting.   Endocrine: Negative for polydipsia and polyuria.   Genitourinary: Negative for dysuria, frequency, hematuria and urgency.   Musculoskeletal: Positive for  "arthralgias and back pain. Negative for joint swelling and myalgias.   Skin: Negative for rash.   Neurological: Positive for numbness. Negative for weakness and confusion.   Psychiatric/Behavioral: Positive for sleep disturbance. Negative for decreased concentration, dysphoric mood and suicidal ideas. The patient is nervous/anxious.         Objective    Vitals:    04/04/23 1626   BP: 125/68   Pulse: 100   Resp: 14   Temp: 98.6 °F (37 °C)   TempSrc: Temporal   SpO2: 97%   Weight: 102 kg (225 lb)   Height: 162.6 cm (64\")     Estimated body mass index is 38.62 kg/m² as calculated from the following:    Height as of this encounter: 162.6 cm (64\").    Weight as of this encounter: 102 kg (225 lb).    Class 2 Severe Obesity (BMI >=35 and <=39.9). Obesity-related health conditions include the following: diabetes mellitus, dyslipidemias, GERD and osteoarthritis. Obesity is improving with lifestyle modifications. BMI is is above average; BMI management plan is completed. We discussed portion control and increasing exercise.    Does the patient have evidence of cognitive impairment? No    Physical Exam  Constitutional:       General: She is not in acute distress.     Appearance: Normal appearance. She is well-developed. She is not diaphoretic.      Comments: Bright and in good spirits. No apparent distress. No pallor, jaundice, diaphoresis, or cyanosis.   HENT:      Head: Atraumatic.      Right Ear: Tympanic membrane, ear canal and external ear normal.      Left Ear: Tympanic membrane, ear canal and external ear normal.      Mouth/Throat:      Lips: No lesions.      Mouth: Mucous membranes are moist. No oral lesions.      Pharynx: No oropharyngeal exudate or posterior oropharyngeal erythema.   Eyes:      General: Lids are normal.      Extraocular Movements: Extraocular movements intact.      Conjunctiva/sclera: Conjunctivae normal.      Pupils: Pupils are equal.   Neck:      Thyroid: No thyroid mass or thyromegaly.      " Vascular: No carotid bruit or JVD.      Trachea: Trachea normal. No tracheal deviation.   Cardiovascular:      Rate and Rhythm: Normal rate and regular rhythm.      Heart sounds: Normal heart sounds, S1 normal and S2 normal. No murmur heard.    No gallop.   Pulmonary:      Effort: Pulmonary effort is normal.      Breath sounds: Normal breath sounds.   Abdominal:      General: Bowel sounds are normal. There is no distension or abdominal bruit.      Palpations: Abdomen is soft. There is no hepatomegaly, splenomegaly or mass.      Tenderness: There is no abdominal tenderness.      Hernia: No hernia is present.   Musculoskeletal:      Right lower leg: No edema.      Left lower leg: No edema.      Comments: No peripheral joint redness or warmth.   Lymphadenopathy:      Head:      Right side of head: No submental, submandibular, tonsillar, preauricular, posterior auricular or occipital adenopathy.      Left side of head: No submental, submandibular, tonsillar, preauricular, posterior auricular or occipital adenopathy.      Cervical: No cervical adenopathy.      Upper Body:      Right upper body: No supraclavicular adenopathy.      Left upper body: No supraclavicular adenopathy.   Skin:     General: Skin is warm.      Coloration: Skin is not cyanotic, jaundiced or pale.      Findings: No rash.      Nails: There is no clubbing.   Neurological:      Mental Status: She is alert and oriented to person, place, and time.      Cranial Nerves: No cranial nerve deficit, dysarthria or facial asymmetry.      Sensory: Sensory deficit (decreased vibration sense toes of both feet) present.      Motor: No tremor.      Coordination: Coordination normal.      Gait: Gait normal.   Psychiatric:         Attention and Perception: Attention normal.         Mood and Affect: Mood normal.         Speech: Speech normal.         Behavior: Behavior normal.         Thought Content: Thought content normal. Thought content does not include suicidal  ideation.       HEALTH RISK ASSESSMENT    Smoking Status:  Social History     Tobacco Use   Smoking Status Never   • Passive exposure: Never   Smokeless Tobacco Never     Alcohol Consumption:  Social History     Substance and Sexual Activity   Alcohol Use No     Fall Risk Screen:  NEHEMIASADI Fall Risk Assessment was completed, and patient is at LOW risk for falls.Assessment completed on:4/4/2023    Depression Screening:  PHQ-2/PHQ-9 Depression Screening 4/4/2023   Retired PHQ-9 Total Score -   Retired Total Score -   Little Interest or Pleasure in Doing Things 0-->not at all   Feeling Down, Depressed or Hopeless 0-->not at all   PHQ-9: Brief Depression Severity Measure Score 0     Health Habits and Functional and Cognitive Screening:  Functional & Cognitive Status 4/4/2023   Do you have difficulty preparing food and eating? No   Do you have difficulty bathing yourself, getting dressed or grooming yourself? No   Do you have difficulty using the toilet? No   Do you have difficulty moving around from place to place? No   Do you have trouble with steps or getting out of a bed or a chair? No   Current Diet Well Balanced Diet   Dental Exam Up to date   Eye Exam Up to date   Exercise (times per week) 7 times per week   Current Exercises Include Walking   Current Exercise Activities Include -   Do you need help using the phone?  No   Are you deaf or do you have serious difficulty hearing?  No   Do you need help with transportation? No   Do you need help shopping? No   Do you need help preparing meals?  No   Do you need help with housework?  No   Do you need help with laundry? No   Do you need help taking your medications? No   Do you need help managing money? No   Do you ever drive or ride in a car without wearing a seat belt? No   Have you felt unusual stress, anger or loneliness in the last month? No   Who do you live with? Spouse   If you need help, do you have trouble finding someone available to you? No   Have you been  bothered in the last four weeks by sexual problems? No   Do you have difficulty concentrating, remembering or making decisions? No     Age-appropriate Screening Schedule:  Refer to the list below for future screening recommendations based on patient's age, sex and/or medical conditions. Orders for these recommended tests are listed in the plan section. The patient has been provided with a written plan.    Health Maintenance   Topic Date Due   • Hepatitis B (3 of 3 - 3-dose series) 03/24/2020   • COVID-19 Vaccine (3 - Booster for Moderna series) 06/02/2021   • DIABETIC EYE EXAM  05/03/2022   • ZOSTER VACCINE (1 of 2) Never done   • HEMOGLOBIN A1C  04/06/2023   • DIABETIC FOOT EXAM  05/18/2023   • INFLUENZA VACCINE  08/01/2023   • LIPID PANEL  10/06/2023   • URINE MICROALBUMIN  10/06/2023   • ANNUAL WELLNESS VISIT  04/04/2024   • DXA SCAN  09/09/2024   • TDAP/TD VACCINES (2 - Td or Tdap) 06/19/2029   • COLORECTAL CANCER SCREENING  05/05/2031   • Pneumococcal Vaccine 0-64 (3 - PPSV23 if available, else PCV20) 02/24/2038   • HEPATITIS C SCREENING  Completed   • PAP SMEAR  Discontinued        Assessment & Plan   CMS Preventative Services Quick Reference  Risk Factors Identified During Encounter  Chronic Pain: Natural history and expected course discussed. Questions answered.  Depression/Dysphoria: Current medication is effective, no change recommended  Immunizations Discussed/Encouraged: Shingrix and COVID19  The above risks/problems have been discussed with the patient.  Follow up actions/plans if indicated are seen below in the Assessment/Plan Section.  Pertinent information has been shared with the patient in the After Visit Summary.    Diagnoses and all orders for this visit:    1. Chronic allergic rhinitis     2. Mixed hyperlipidemia  Encouraged to continue to work on her diet and exercise plan.  Continue current medication    3. Atherosclerosis of both carotid arteries  Reminded regarding the importance of risk  factor modification.  Continue ASA    4. History of DVT (deep vein thrombosis)    5. DM type 2 with diabetic peripheral neuropathy  Diabetes mellitus Type II, under good control.   Encouraged to continue to pursue ADA diet  Encouraged aerobic exercise.  Continue current medication  Follow up with MELL Starr    6. Class 3 severe obesity with serious comorbidity and body mass index (BMI) of 40.0 to 44.9 in adult, unspecified obesity type    7. Vitamin D deficiency    8. Gastroesophageal reflux disease without esophagitis    9. Menopausal symptoms    10. Healthcare maintenance  Recommended an updated bivalent COVID-19 vaccination.  Reviewed the potential benefits of Shingrix.  Prescription emailed to her pharmacy  -     Zoster Vac Recomb Adjuvanted (Shingrix) 50 MCG/0.5ML reconstituted suspension; Inject 0.5 mL into the appropriate muscle as directed by prescriber See Admin Instructions. Repeat in 2-6 months  Dispense: 1 each; Refill: 1    11. Malignant neoplasm of upper-outer quadrant of right breast in female, estrogen receptor negative  Supportive therapy  Continue current medication  Follow up with oncology    12. Anxiety and depression  Stable.  Supportive therapy.   Continue current medication.  Follow up with psychiatry    13. Osteopenia of multiple sites    14. History of TIAs  As above.   Continue current medication.    15. Intractable persistent migraine aura without cerebral infarction and with status migrainosus  Doing well at present  Continue current medication  Encouraged to report if this should change.    16. Mild intermittent asthma without complication  Mild persistent asthma. The patient is not currently having an exacerbation. In general, the patient's disease is well controlled.  Continue current medication  -     Fluticasone Furoate-Vilanterol (Breo Ellipta) 200-25 MCG/ACT inhaler; Inhale 1 puff Daily.  Dispense: 1 each; Refill: 3    17. Snoring  New problem  Recommended a sleep  study.  Patient would like to proceed and arrangements will be made  -     Home Sleep Study; Future        Follow Up:   No follow-ups on file.     An After Visit Summary and PPPS were made available to the patient.

## 2023-04-05 VITALS
SYSTOLIC BLOOD PRESSURE: 125 MMHG | HEIGHT: 64 IN | TEMPERATURE: 98.6 F | BODY MASS INDEX: 38.41 KG/M2 | WEIGHT: 225 LBS | HEART RATE: 100 BPM | DIASTOLIC BLOOD PRESSURE: 68 MMHG | OXYGEN SATURATION: 97 % | RESPIRATION RATE: 14 BRPM

## 2023-04-05 DIAGNOSIS — J45.20 MILD INTERMITTENT ASTHMA WITHOUT COMPLICATION: Chronic | ICD-10-CM

## 2023-04-05 RX ORDER — ALBUTEROL SULFATE 90 UG/1
AEROSOL, METERED RESPIRATORY (INHALATION)
Qty: 18 G | Refills: 5 | Status: SHIPPED | OUTPATIENT
Start: 2023-04-05

## 2023-04-06 ENCOUNTER — LAB (OUTPATIENT)
Dept: FAMILY MEDICINE CLINIC | Facility: CLINIC | Age: 48
End: 2023-04-06
Payer: MEDICARE

## 2023-04-11 ENCOUNTER — OFFICE VISIT (OUTPATIENT)
Dept: FAMILY MEDICINE CLINIC | Facility: CLINIC | Age: 48
End: 2023-04-11
Payer: MEDICARE

## 2023-04-11 VITALS
OXYGEN SATURATION: 95 % | TEMPERATURE: 97.7 F | BODY MASS INDEX: 38.58 KG/M2 | HEART RATE: 97 BPM | SYSTOLIC BLOOD PRESSURE: 110 MMHG | WEIGHT: 226 LBS | DIASTOLIC BLOOD PRESSURE: 70 MMHG | RESPIRATION RATE: 14 BRPM | HEIGHT: 64 IN

## 2023-04-11 DIAGNOSIS — J45.20 MILD INTERMITTENT ASTHMA WITHOUT COMPLICATION: Primary | Chronic | ICD-10-CM

## 2023-04-11 DIAGNOSIS — E11.42 DM TYPE 2 WITH DIABETIC PERIPHERAL NEUROPATHY: Chronic | ICD-10-CM

## 2023-04-11 DIAGNOSIS — J01.01 ACUTE RECURRENT MAXILLARY SINUSITIS: ICD-10-CM

## 2023-04-11 DIAGNOSIS — B00.1 RECURRENT COLD SORES: ICD-10-CM

## 2023-04-11 DIAGNOSIS — Z96.41 INSULIN PUMP IN PLACE: ICD-10-CM

## 2023-04-11 RX ORDER — CEFDINIR 300 MG/1
300 CAPSULE ORAL 2 TIMES DAILY
Qty: 20 CAPSULE | Refills: 0 | Status: SHIPPED | OUTPATIENT
Start: 2023-04-11 | End: 2023-04-21

## 2023-04-11 RX ORDER — VALACYCLOVIR HYDROCHLORIDE 1 G/1
1000 TABLET, FILM COATED ORAL 2 TIMES DAILY
Qty: 4 TABLET | Refills: 2 | Status: SHIPPED | OUTPATIENT
Start: 2023-04-11

## 2023-04-11 RX ORDER — FLUCONAZOLE 150 MG/1
150 TABLET ORAL DAILY
Qty: 2 TABLET | Refills: 0 | Status: SHIPPED | OUTPATIENT
Start: 2023-04-11

## 2023-04-11 RX ORDER — NEBULIZER ACCESSORIES
1 KIT MISCELLANEOUS TAKE AS DIRECTED
Qty: 1 EACH | Refills: 1 | Status: SHIPPED | OUTPATIENT
Start: 2023-04-11

## 2023-04-11 RX ORDER — IPRATROPIUM BROMIDE AND ALBUTEROL SULFATE 2.5; .5 MG/3ML; MG/3ML
3 SOLUTION RESPIRATORY (INHALATION) EVERY 4 HOURS PRN
Qty: 150 ML | Refills: 1 | Status: SHIPPED | OUTPATIENT
Start: 2023-04-11

## 2023-04-11 NOTE — PROGRESS NOTES
History of Present Illness  Nivia Bernsteni is a 48 y.o. female who presents to this clinic today c/o upper respiratory symptoms which started within the week and worsening. She has been diagnosed with chronic allergic rhinitis and mild intermittent asthma.    Upper Respiratory Symptoms   This is a new problem. The current episode started in the past 7 days. The problem has been gradually worsening. Associated symptoms include congestion, coughing, ear pain, rhinorrhea, sinus pain and a sore throat. Pertinent negatives include no nausea, rash, sneezing, swollen glands or vomiting.She is prescribed Astelin NS and Xyzal,   The treatment provided mild relief.     Asthma  She complains of cough. There is no difficulty breathing, hemoptysis or shortness of breath. The cough is non-productive. Associated symptoms include appetite change, ear pain, nasal congestion, rhinorrhea and a sore throat. Pertinent negatives include no dyspnea on exertion, sneezing or trouble swallowing. Her symptoms are not significantly alleviated by beta-agonist. Her past medical history is significant for asthma and bronchitis.     The following portions of the patient's history were reviewed and updated as appropriate: allergies, current medications, past family history, past medical history, past social history, past surgical history and problem list.    Review of Systems   Constitutional: Positive for activity change, appetite change, chills, fatigue and fever.   HENT: Positive for congestion, ear pain, mouth sores (Cold sores), rhinorrhea, sinus pressure, sinus pain and sore throat. Negative for sneezing and trouble swallowing.    Eyes: Negative for pain, discharge, redness and itching.   Respiratory: Positive for cough. Negative for hemoptysis and shortness of breath.    Cardiovascular: Negative for dyspnea on exertion.   Gastrointestinal: Negative for nausea and vomiting.   Skin: Negative for rash.   Allergic/Immunologic: Positive for  "environmental allergies.   Hematological: Negative for adenopathy.   Psychiatric/Behavioral: Positive for sleep disturbance (Due to cough).     Vital signs:  /70 (BP Location: Left arm, Patient Position: Sitting, Cuff Size: Adult)   Pulse 97   Temp 97.7 °F (36.5 °C) (Temporal)   Resp 14   Ht 162.6 cm (64.02\")   Wt 103 kg (226 lb)   SpO2 95%   BMI 38.77 kg/m²     Physical Exam  Vitals and nursing note reviewed.   Constitutional:       General: She is not in acute distress.     Appearance: She is well-developed.   HENT:      Head: Normocephalic.      Right Ear: A middle ear effusion is present. There is mastoid tenderness. Tympanic membrane is bulging. Tympanic membrane is not erythematous.      Left Ear: A middle ear effusion is present. There is mastoid tenderness. Tympanic membrane is bulging. Tympanic membrane is not erythematous.      Nose: Congestion and rhinorrhea present.      Right Turbinates: Swollen.      Left Turbinates: Swollen.      Right Sinus: Maxillary sinus tenderness present. No frontal sinus tenderness.      Left Sinus: Maxillary sinus tenderness present. No frontal sinus tenderness.      Mouth/Throat:      Comments: Multiple vesicular lesions on both upper and lower lips  Eyes:      General: No scleral icterus.        Right eye: No discharge.         Left eye: No discharge.      Conjunctiva/sclera: Conjunctivae normal.   Neck:      Thyroid: No thyromegaly.      Vascular: No JVD.   Cardiovascular:      Rate and Rhythm: Normal rate and regular rhythm.      Heart sounds: S1 normal and S2 normal.   Pulmonary:      Effort: Pulmonary effort is normal.      Breath sounds: Decreased breath sounds present. No wheezing, rhonchi or rales.   Abdominal:      Palpations: Abdomen is soft.      Tenderness: There is no abdominal tenderness. There is no guarding.   Musculoskeletal:      Cervical back: Neck supple.      Right lower leg: No edema.      Left lower leg: No edema.   Skin:     General: Skin " is dry.      Capillary Refill: Capillary refill takes less than 2 seconds.   Neurological:      Mental Status: She is alert.   Psychiatric:         Mood and Affect: Mood and affect normal.         Speech: Speech normal.         Behavior: Behavior is cooperative.         Thought Content: Thought content normal.       Result Review :  Class 2 Severe Obesity (BMI >=35 and <=39.9). Obesity-related health conditions include the following: diabetes mellitus, dyslipidemias, GERD and osteoarthritis. Obesity is unchanged. BMI  is above average; BMI management plan is completed. We discussed portion control and increasing exercise.       Assessment & Plan     Diagnoses and all orders for this visit:    1. Mild intermittent asthma with exacerbation (Primary)  Comments:  Reviewed treatment options. Has access to a nebulizer and supplies in her to have. Postpone prednisone if possible  Orders:  -     albuterol (PROVENTIL,VENTOLIN) 2 MG/5ML syrup; Take 5-10 mL by mouth Every 6 (Six) Hours As Needed (cough).  Dispense: 60 mL; Refill: 0  -     Respiratory Therapy Supplies (Nebulizer/Tubing/Mouthpiece) kit; 1 each Take As Directed.  Dispense: 1 each; Refill: 1  -     ipratropium-albuterol (DUO-NEB) 0.5-2.5 mg/3 ml nebulizer; Take 3 mL by nebulization Every 4 (Four) Hours As Needed for Shortness of Air.  Dispense: 150 mL; Refill: 1    2. Acute recurrent maxillary sinusitis  Comments:  Reviewed treatment options. Cefdinir 300 mg bid for ten days   Orders:  -     cefdinir (OMNICEF) 300 MG capsule; Take 1 capsule by mouth 2 (Two) Times a Day for 10 days.  Dispense: 20 capsule; Refill: 0    3. Recurrent cold sores  Comments:  Valtrex 1000 mg bid for two days. Counseled regarding supportive care measures  Orders:  -     valACYclovir (Valtrex) 1000 MG tablet; Take 1 tablet by mouth 2 (Two) Times a Day.  Dispense: 4 tablet; Refill: 2    4. DM type 2 with diabetic peripheral neuropathy  Comments:  Encouraged her to closely monitor blood  sugars during this illness.     5. Insulin pump in place  Comments:  Continue insulin pump therapy     Other orders  -     fluconazole (Diflucan) 150 MG tablet; Take 1 tablet by mouth Daily.  Dispense: 2 tablet; Refill: 0    I spent 40 minutes caring for Nivia on this date of service. This time includes time spent by me in the following activities:preparing for the visit, obtaining and/or reviewing a separately obtained history, performing a medically appropriate examination and/or evaluation , counseling and educating the patient/family/caregiver, ordering medications, tests, or procedures, documenting information in the medical record and care coordination    Follow Up April 19th     Findings and recommendations discussed with Nivia . Reviewed treatment options. Counseled regarding supportive care measures. Signs and symptoms of concern reviewed and if occur to seek further evaluation or if symptoms worsen or do not improve.  Lifestyle modifications reinforced including nutrition and activity recommendations. Nivia will follow up in April 19 th  sooner if problems/concerns occur.  She was given instructions and counseling regarding her condition or for health maintenance advice. Please see specific information pulled into the AVS if appropriate    This document has been electronically signed by:

## 2023-04-18 ENCOUNTER — LAB (OUTPATIENT)
Dept: FAMILY MEDICINE CLINIC | Facility: CLINIC | Age: 48
End: 2023-04-18
Payer: MEDICARE

## 2023-04-18 DIAGNOSIS — J30.9 CHRONIC ALLERGIC RHINITIS: Chronic | ICD-10-CM

## 2023-04-18 DIAGNOSIS — G89.29 CHRONIC PAIN OF BOTH SHOULDERS: ICD-10-CM

## 2023-04-18 DIAGNOSIS — E11.42 DM TYPE 2 WITH DIABETIC PERIPHERAL NEUROPATHY: Chronic | ICD-10-CM

## 2023-04-18 DIAGNOSIS — F42.8 OTHER OBSESSIVE-COMPULSIVE DISORDERS: Chronic | ICD-10-CM

## 2023-04-18 DIAGNOSIS — M85.80 OSTEOPENIA, UNSPECIFIED LOCATION: ICD-10-CM

## 2023-04-18 DIAGNOSIS — F33.1 MODERATE EPISODE OF RECURRENT MAJOR DEPRESSIVE DISORDER: ICD-10-CM

## 2023-04-18 DIAGNOSIS — M54.2 CHRONIC NECK PAIN: ICD-10-CM

## 2023-04-18 DIAGNOSIS — E55.9 VITAMIN D DEFICIENCY: ICD-10-CM

## 2023-04-18 DIAGNOSIS — M19.011 PRIMARY OSTEOARTHRITIS OF RIGHT SHOULDER: ICD-10-CM

## 2023-04-18 DIAGNOSIS — R60.1 GENERALIZED EDEMA: ICD-10-CM

## 2023-04-18 DIAGNOSIS — E78.2 MIXED DYSLIPIDEMIA: Chronic | ICD-10-CM

## 2023-04-18 DIAGNOSIS — M25.511 CHRONIC PAIN OF BOTH SHOULDERS: ICD-10-CM

## 2023-04-18 DIAGNOSIS — M25.512 CHRONIC PAIN OF BOTH SHOULDERS: ICD-10-CM

## 2023-04-18 DIAGNOSIS — G89.29 CHRONIC NECK PAIN: ICD-10-CM

## 2023-04-18 DIAGNOSIS — F41.1 GENERALIZED ANXIETY DISORDER: ICD-10-CM

## 2023-04-18 RX ORDER — VENLAFAXINE HYDROCHLORIDE 150 MG/1
CAPSULE, EXTENDED RELEASE ORAL
Qty: 30 CAPSULE | Refills: 1 | Status: SHIPPED | OUTPATIENT
Start: 2023-04-18 | End: 2023-04-21 | Stop reason: SDUPTHER

## 2023-04-18 RX ORDER — VENLAFAXINE HYDROCHLORIDE 75 MG/1
CAPSULE, EXTENDED RELEASE ORAL
Qty: 30 CAPSULE | Refills: 1 | Status: SHIPPED | OUTPATIENT
Start: 2023-04-18 | End: 2023-04-21 | Stop reason: SDUPTHER

## 2023-04-18 RX ORDER — EMPAGLIFLOZIN, LINAGLIPTIN, METFORMIN HYDROCHLORIDE 25; 5; 1000 MG/1; MG/1; MG/1
TABLET, EXTENDED RELEASE ORAL
Qty: 30 TABLET | Refills: 0 | Status: SHIPPED | OUTPATIENT
Start: 2023-04-18

## 2023-04-18 RX ORDER — BREXPIPRAZOLE 2 MG/1
TABLET ORAL
Qty: 30 TABLET | Refills: 1 | OUTPATIENT
Start: 2023-04-18

## 2023-04-19 ENCOUNTER — OFFICE VISIT (OUTPATIENT)
Dept: FAMILY MEDICINE CLINIC | Facility: CLINIC | Age: 48
End: 2023-04-19
Payer: MEDICARE

## 2023-04-19 VITALS
SYSTOLIC BLOOD PRESSURE: 120 MMHG | DIASTOLIC BLOOD PRESSURE: 70 MMHG | TEMPERATURE: 97.7 F | OXYGEN SATURATION: 95 % | HEIGHT: 64 IN | BODY MASS INDEX: 38.07 KG/M2 | HEART RATE: 100 BPM | WEIGHT: 223 LBS

## 2023-04-19 DIAGNOSIS — E11.42 DM TYPE 2 WITH DIABETIC PERIPHERAL NEUROPATHY: Primary | Chronic | ICD-10-CM

## 2023-04-19 DIAGNOSIS — M85.89 OSTEOPENIA OF MULTIPLE SITES: ICD-10-CM

## 2023-04-19 DIAGNOSIS — Z46.81 COUNSELING FOR INSULIN PUMP: ICD-10-CM

## 2023-04-19 DIAGNOSIS — J30.9 CHRONIC ALLERGIC RHINITIS: Chronic | ICD-10-CM

## 2023-04-19 DIAGNOSIS — E55.9 VITAMIN D DEFICIENCY: ICD-10-CM

## 2023-04-19 DIAGNOSIS — K21.9 GASTROESOPHAGEAL REFLUX DISEASE WITHOUT ESOPHAGITIS: Chronic | ICD-10-CM

## 2023-04-19 DIAGNOSIS — J45.20 MILD INTERMITTENT ASTHMA WITHOUT COMPLICATION: Chronic | ICD-10-CM

## 2023-04-19 DIAGNOSIS — E78.5 DYSLIPIDEMIA: Chronic | ICD-10-CM

## 2023-04-19 LAB
25(OH)D3+25(OH)D2 SERPL-MCNC: 53 NG/ML (ref 30–100)
ALBUMIN SERPL-MCNC: 4.3 G/DL (ref 3.5–5.2)
ALBUMIN UR-MCNC: <1.2 MG/DL
ALBUMIN/GLOB SERPL: 1.7 G/DL
ALP SERPL-CCNC: 114 U/L (ref 39–117)
ALT SERPL-CCNC: 15 U/L (ref 1–33)
AST SERPL-CCNC: 12 U/L (ref 1–32)
BASOPHILS # BLD AUTO: 0.08 10*3/MM3 (ref 0–0.2)
BASOPHILS NFR BLD AUTO: 0.7 % (ref 0–1.5)
BILIRUB SERPL-MCNC: 0.2 MG/DL (ref 0–1.2)
BUN SERPL-MCNC: 11 MG/DL (ref 6–20)
BUN/CREAT SERPL: 12.8 (ref 7–25)
CALCIUM SERPL-MCNC: 9.2 MG/DL (ref 8.6–10.5)
CHLORIDE SERPL-SCNC: 105 MMOL/L (ref 98–107)
CHOLEST SERPL-MCNC: 110 MG/DL (ref 0–200)
CO2 SERPL-SCNC: 21.1 MMOL/L (ref 22–29)
CREAT SERPL-MCNC: 0.86 MG/DL (ref 0.57–1)
EGFRCR SERPLBLD CKD-EPI 2021: 82.4 ML/MIN/1.73
EOSINOPHIL # BLD AUTO: 0.14 10*3/MM3 (ref 0–0.4)
EOSINOPHIL NFR BLD AUTO: 1.2 % (ref 0.3–6.2)
ERYTHROCYTE [DISTWIDTH] IN BLOOD BY AUTOMATED COUNT: 12.6 % (ref 12.3–15.4)
GLOBULIN SER CALC-MCNC: 2.5 GM/DL
GLUCOSE SERPL-MCNC: 165 MG/DL (ref 65–99)
HBA1C MFR BLD: 6.8 % (ref 4.8–5.6)
HCT VFR BLD AUTO: 41.8 % (ref 34–46.6)
HDLC SERPL-MCNC: 29 MG/DL (ref 40–60)
HGB BLD-MCNC: 14 G/DL (ref 12–15.9)
IMM GRANULOCYTES # BLD AUTO: 0.04 10*3/MM3 (ref 0–0.05)
IMM GRANULOCYTES NFR BLD AUTO: 0.3 % (ref 0–0.5)
LDLC SERPL CALC-MCNC: 40 MG/DL (ref 0–100)
LYMPHOCYTES # BLD AUTO: 3.75 10*3/MM3 (ref 0.7–3.1)
LYMPHOCYTES NFR BLD AUTO: 32.2 % (ref 19.6–45.3)
MCH RBC QN AUTO: 27.4 PG (ref 26.6–33)
MCHC RBC AUTO-ENTMCNC: 33.5 G/DL (ref 31.5–35.7)
MCV RBC AUTO: 81.8 FL (ref 79–97)
MONOCYTES # BLD AUTO: 0.63 10*3/MM3 (ref 0.1–0.9)
MONOCYTES NFR BLD AUTO: 5.4 % (ref 5–12)
NEUTROPHILS # BLD AUTO: 7.02 10*3/MM3 (ref 1.7–7)
NEUTROPHILS NFR BLD AUTO: 60.2 % (ref 42.7–76)
NRBC BLD AUTO-RTO: 0 /100 WBC (ref 0–0.2)
PLATELET # BLD AUTO: 363 10*3/MM3 (ref 140–450)
POTASSIUM SERPL-SCNC: 4 MMOL/L (ref 3.5–5.2)
PROT SERPL-MCNC: 6.8 G/DL (ref 6–8.5)
RBC # BLD AUTO: 5.11 10*6/MM3 (ref 3.77–5.28)
SODIUM SERPL-SCNC: 139 MMOL/L (ref 136–145)
TRIGL SERPL-MCNC: 262 MG/DL (ref 0–150)
TSH SERPL DL<=0.005 MIU/L-ACNC: 3.29 UIU/ML (ref 0.27–4.2)
VIT B12 SERPL-MCNC: 428 PG/ML (ref 211–946)
VLDLC SERPL CALC-MCNC: 41 MG/DL (ref 5–40)
WBC # BLD AUTO: 11.66 10*3/MM3 (ref 3.4–10.8)

## 2023-04-19 PROCEDURE — 82043 UR ALBUMIN QUANTITATIVE: CPT | Performed by: NURSE PRACTITIONER

## 2023-04-19 NOTE — PROGRESS NOTES
History of Present Illness  Nivia is a 49 y/o female who presents to the clinic today for follow up pertaining to her DM, type 2 and Dyslipidemia. She has been diagnosed with right breast Cancer and has undergone mastectomy and chemotherapy. In addition, she has GERD, mitral valve prolapse, Osteopenia/arthritis, migraine headaches and anxiety/ depression.    Diabetes   She has type 2 diabetes mellitus. The initial diagnosis of diabetes was made 20 years ago. Diabetic complications include peripheral neuropathy. Risk factors for coronary artery disease include post-menopausal, stress and dyslipidemia. She is currently utilizing insulin pump therapy and CGM. In addition, she is currently taking Trijardy and Ozempic.  She has had a previous visit with a dietitian An ACE inhibitor/angiotensin II receptor blocker is not  being taken at this time..      Lab Results   Component Value Date    HGBA1C 7.90 (H) 10/06/2022     Lab Results   Component Value Date    HGBA1C 6.80 (H) 04/18/2023     Dyslipidemia   The current episode started more than 1 year ago. Pertinent negatives include no chest pain or shortness of breath. She is taking Atorvastatin 40 mg.   Lab Results   Component Value Date    CHLPL 110 04/18/2023    CHLPL 113 10/06/2022    CHLPL 175 03/15/2022     Lab Results   Component Value Date    TRIG 262 (H) 04/18/2023    TRIG 183 (H) 10/06/2022    TRIG 302 (H) 06/22/2022     Lab Results   Component Value Date    HDL 29 (L) 04/18/2023    HDL 31 (L) 10/06/2022    HDL 32 (L) 06/22/2022     Lab Results   Component Value Date    LDL 40 04/18/2023    LDL 52 10/06/2022    LDL 91 06/22/2022       The following portions of the patient's history were reviewed and updated as appropriate: allergies, current medications, past family history, past medical history, past social history, past surgical history and problem list.    Review of Systems   Constitutional: Negative for activity change, appetite change, chills, fatigue,  "fever and unexpected weight change.   HENT: Negative for congestion.    Eyes: Negative for visual disturbance.   Respiratory: Negative for cough, shortness of breath and wheezing.    Cardiovascular: Positive for leg swelling (Intermittent). Negative for chest pain and palpitations.   Gastrointestinal: Negative for nausea and vomiting.        GERD   Endocrine: Negative for cold intolerance, heat intolerance, polydipsia, polyphagia and polyuria.   Musculoskeletal: Positive for arthralgias and back pain.   Skin: Negative for color change and rash.   Allergic/Immunologic: Positive for environmental allergies.   Neurological: Positive for dizziness (Improving), numbness and headaches (Migraines). Negative for tremors, speech difficulty, weakness and light-headedness.   Hematological: Negative for adenopathy.   Psychiatric/Behavioral: Negative for confusion, decreased concentration and sleep disturbance. The patient is not nervous/anxious.    All other systems reviewed and are negative.    Vital signs:  /70 (BP Location: Left arm, Patient Position: Sitting, Cuff Size: Adult)   Pulse 100   Temp 97.7 °F (36.5 °C) (Temporal)   Ht 162.6 cm (64.02\")   Wt 101 kg (223 lb)   SpO2 95%   BMI 38.26 kg/m²     Physical Exam  Vitals and nursing note reviewed.   Constitutional:       General: She is not in acute distress.     Appearance: She is well-developed.      Interventions: Face mask in place.   HENT:      Head: Normocephalic.      Nose: Nose normal.   Eyes:      General: No scleral icterus.        Right eye: No discharge.         Left eye: No discharge.      Conjunctiva/sclera: Conjunctivae normal.   Neck:      Thyroid: No thyromegaly.      Vascular: No JVD.   Cardiovascular:      Rate and Rhythm: Normal rate and regular rhythm.      Heart sounds: S1 normal and S2 normal.   Pulmonary:      Effort: Pulmonary effort is normal.      Breath sounds: Normal breath sounds.   Abdominal:      Palpations: Abdomen is soft.      " Tenderness: There is no abdominal tenderness. There is no guarding.   Musculoskeletal:      Cervical back: Neck supple.      Right lower leg: No edema.      Left lower leg: No edema.   Skin:     General: Skin is dry.      Capillary Refill: Capillary refill takes less than 2 seconds.   Neurological:      Mental Status: She is alert.   Psychiatric:         Mood and Affect: Mood and affect normal.         Speech: Speech normal.         Behavior: Behavior is cooperative.         Thought Content: Thought content normal.       Result Review : CGM Summary Upload from April 6, 2023 through April 19, 2023:  Time in Range:   1% low  97% in target range  2% high  Average blood sugar 136±29    Results for orders placed or performed in visit on 04/18/23   Vitamin B12    Specimen: Blood    Blood  Release to genevieve   Result Value Ref Range    Vitamin B-12 428 211 - 946 pg/mL   Vitamin D,25-Hydroxy    Specimen: Blood    Blood  Release to genevieve   Result Value Ref Range    25 Hydroxy, Vitamin D 53.0 30.0 - 100.0 ng/ml   Hemoglobin A1c    Specimen: Blood    Blood  Release to genevieve   Result Value Ref Range    Hemoglobin A1C 6.80 (H) 4.80 - 5.60 %   Lipid Panel    Specimen: Blood    Blood  Release to genevieve   Result Value Ref Range    Total Cholesterol 110 0 - 200 mg/dL    Triglycerides 262 (H) 0 - 150 mg/dL    HDL Cholesterol 29 (L) 40 - 60 mg/dL    VLDL Cholesterol Harley 41 (H) 5 - 40 mg/dL    LDL Chol Calc (NIH) 40 0 - 100 mg/dL   TSH    Specimen: Blood    Blood  Release to genevieve   Result Value Ref Range    TSH 3.290 0.270 - 4.200 uIU/mL   Comprehensive Metabolic Panel    Specimen: Blood    Blood  Release to genevieve   Result Value Ref Range    Glucose 165 (H) 65 - 99 mg/dL    BUN 11 6 - 20 mg/dL    Creatinine 0.86 0.57 - 1.00 mg/dL    EGFR Result 82.4 >60.0 mL/min/1.73    BUN/Creatinine Ratio 12.8 7.0 - 25.0    Sodium 139 136 - 145 mmol/L    Potassium 4.0 3.5 - 5.2 mmol/L    Chloride 105 98 - 107 mmol/L    Total CO2 21.1 (L) 22.0 - 29.0 mmol/L     Calcium 9.2 8.6 - 10.5 mg/dL    Total Protein 6.8 6.0 - 8.5 g/dL    Albumin 4.3 3.5 - 5.2 g/dL    Globulin 2.5 gm/dL    A/G Ratio 1.7 g/dL    Total Bilirubin 0.2 0.0 - 1.2 mg/dL    Alkaline Phosphatase 114 39 - 117 U/L    AST (SGOT) 12 1 - 32 U/L    ALT (SGPT) 15 1 - 33 U/L   MicroAlbumin, Urine, Random - Urine, Clean Catch    Specimen: Urine, Clean Catch   Result Value Ref Range    Microalbumin, Urine <1.2 mg/dL   CBC & Differential    Specimen: Blood    Blood  Release to genevieve   Result Value Ref Range    WBC 11.66 (H) 3.40 - 10.80 10*3/mm3    RBC 5.11 3.77 - 5.28 10*6/mm3    Hemoglobin 14.0 12.0 - 15.9 g/dL    Hematocrit 41.8 34.0 - 46.6 %    MCV 81.8 79.0 - 97.0 fL    MCH 27.4 26.6 - 33.0 pg    MCHC 33.5 31.5 - 35.7 g/dL    RDW 12.6 12.3 - 15.4 %    Platelets 363 140 - 450 10*3/mm3    Neutrophil Rel % 60.2 42.7 - 76.0 %    Lymphocyte Rel % 32.2 19.6 - 45.3 %    Monocyte Rel % 5.4 5.0 - 12.0 %    Eosinophil Rel % 1.2 0.3 - 6.2 %    Basophil Rel % 0.7 0.0 - 1.5 %    Neutrophils Absolute 7.02 (H) 1.70 - 7.00 10*3/mm3    Lymphocytes Absolute 3.75 (H) 0.70 - 3.10 10*3/mm3    Monocytes Absolute 0.63 0.10 - 0.90 10*3/mm3    Eosinophils Absolute 0.14 0.00 - 0.40 10*3/mm3    Basophils Absolute 0.08 0.00 - 0.20 10*3/mm3    Immature Granulocyte Rel % 0.3 0.0 - 0.5 %    Immature Grans Absolute 0.04 0.00 - 0.05 10*3/mm3    nRBC 0.0 0.0 - 0.2 /100 WBC     *Note: Due to a large number of results and/or encounters for the requested time period, some results have not been displayed. A complete set of results can be found in Results Review.     Class 2 Severe Obesity (BMI >=35 and <=39.9). Obesity-related health conditions include the following: diabetes mellitus, dyslipidemias, GERD and osteoarthritis. Obesity is improving with lifestyle modifications. BMI  is above average; BMI management plan is completed.Provided information on portion control and increase in exercise.     Assessment & Plan     Diagnoses and all orders for  this visit:    1. DM type 2 with diabetic peripheral neuropathy (Primary)  Comments:  Reviewed recent labs with Lilo which included an A1c of 6.8.  Praise given for her great accomplishment.  Continue insulin pump therapy, Trijardy and Ozempic  Orders:  -     Semaglutide, 2 MG/DOSE, (OZEMPIC) 8 MG/3ML solution pen-injector; Inject 2 mg under the skin into the appropriate area as directed Every 7 (Seven) Days.  Dispense: 3 mL; Refill: 0  -     Comprehensive Metabolic Panel; Future  -     Lipid Panel; Future  -     TSH; Future  -     Hemoglobin A1c; Future  -     Vitamin B12; Future    2. Counseling for insulin pump  Comments:  Reviewed her insulin pump and CGM upload with her.  Outstanding glycemic control    3. Dyslipidemia  Comments:  Lipid panel reviewed.  Continue atorvastatin  Orders:  -     Comprehensive Metabolic Panel; Future  -     Lipid Panel; Future  -     TSH; Future    4. Mild intermittent asthma without complication  Comments:  Continue Breo.  Avoidance of triggers    5. Gastroesophageal reflux disease without esophagitis  Comments:  Stable.  Continue lifestyle changes and as needed PPI  Orders:  -     CBC & Differential; Future    6. Chronic allergic rhinitis  Comments:  Continue Astelin nasal spray, Xyzal and avoidance of irritants    7. Vitamin D deficiency  Comments:  Continue weekly vitamin D  Orders:  -     Vitamin D,25-Hydroxy; Future    8. Osteopenia of multiple sites  -     Uric Acid; Future    Follow Up In July  Findings and recommendations discussed with Nivia. Reviewed CGM and  test results. Lifestyle modifications reinforced including nutrition and activity recommendations. She will follow up in July  sooner if problems/concerns occur.    Nivia was given instructions and counseling regarding her condition or for health maintenance advice. Please see specific information pulled into the AVS if appropriate    This document has been electronically signed by:

## 2023-04-21 ENCOUNTER — OFFICE VISIT (OUTPATIENT)
Dept: PSYCHIATRY | Facility: CLINIC | Age: 48
End: 2023-04-21
Payer: MEDICARE

## 2023-04-21 VITALS
WEIGHT: 242 LBS | DIASTOLIC BLOOD PRESSURE: 76 MMHG | BODY MASS INDEX: 41.52 KG/M2 | SYSTOLIC BLOOD PRESSURE: 128 MMHG | HEART RATE: 97 BPM

## 2023-04-21 DIAGNOSIS — F33.1 MODERATE EPISODE OF RECURRENT MAJOR DEPRESSIVE DISORDER: ICD-10-CM

## 2023-04-21 DIAGNOSIS — F42.8 OTHER OBSESSIVE-COMPULSIVE DISORDERS: Chronic | ICD-10-CM

## 2023-04-21 DIAGNOSIS — F41.1 GENERALIZED ANXIETY DISORDER: Primary | ICD-10-CM

## 2023-04-21 DIAGNOSIS — Z79.899 HIGH RISK MEDICATION USE: ICD-10-CM

## 2023-04-21 RX ORDER — BREXPIPRAZOLE 1 MG/1
0.5 TABLET ORAL DAILY
Qty: 3 TABLET | Refills: 0
Start: 2023-04-21 | End: 2023-04-21

## 2023-04-21 RX ORDER — VENLAFAXINE HYDROCHLORIDE 150 MG/1
150 CAPSULE, EXTENDED RELEASE ORAL DAILY
Qty: 30 CAPSULE | Refills: 1 | Status: SHIPPED | OUTPATIENT
Start: 2023-04-21

## 2023-04-21 RX ORDER — ALPRAZOLAM 0.5 MG/1
0.5 TABLET ORAL 2 TIMES DAILY PRN
Qty: 30 TABLET | Refills: 0 | Status: SHIPPED | OUTPATIENT
Start: 2023-04-21

## 2023-04-21 RX ORDER — BREXPIPRAZOLE 1 MG/1
1 TABLET ORAL DAILY
Qty: 7 TABLET | Refills: 0
Start: 2023-04-21

## 2023-04-21 RX ORDER — VENLAFAXINE HYDROCHLORIDE 75 MG/1
75 CAPSULE, EXTENDED RELEASE ORAL DAILY
Qty: 30 CAPSULE | Refills: 1 | Status: SHIPPED | OUTPATIENT
Start: 2023-04-21

## 2023-04-21 NOTE — PROGRESS NOTES
Subjective   Nivia Bernstein is a 48 y.o. female is here today for medication management follow-up.    Chief Complaint:  Anxiety depression     History of Present Illness:    Patient presents today for follow up for medication management for depression and anxiety. Patient states feeling miserable most of the time. Denies any mood swings. Patient reports currently depression is at a 10 on a 0-10 scale with 10 being the worst. Patient reports symptoms of depression of overwhelmed, tired, decreased appetite, decreased energy, and depressed mood. Patient reports anxiety is at a 10 on a 0-10 scale with 10 being the worst. Patient states feeling overwhelmed. Patient denies any panic attacks. Patient states dealing with a lot of stress from caring for mom. Patient states taking power naps during the day due to being wore out. Patient states PCP doing sleep study at home. Patient states sleeping a few hours a night. Having some nightmares, but not much. Patient reports appetite is better and eating at least twice a day. Patient denies any auditory or visual hallucinations. Patient adamantly denies any thoughts to harm self or others. Patient denies any medical changes since last visit. Patient states the Rexulti she has been taking is a green pill and possibly still the 2mg. Patient states still having hand tremors and restlesss legs that hasn't gotten worse but not got any better. Patient states mainly only left hand and legs jerk some.   The following portions of the patient's history were reviewed and updated as appropriate: allergies, current medications, past family history, past medical history, past social history, past surgical history and problem list.    Review of Systems   Constitutional: Negative.    Respiratory: Negative.    Cardiovascular: Negative.    Gastrointestinal: Negative.    Neurological: Negative.    Psychiatric/Behavioral: Positive for agitation, dysphoric mood and sleep disturbance. The patient  is nervous/anxious.        Objective   Physical Exam  Vitals reviewed.   Constitutional:       Appearance: Normal appearance. She is well-developed and well-groomed.   Neurological:      Mental Status: She is alert.   Psychiatric:         Attention and Perception: Attention and perception normal.         Mood and Affect: Mood is anxious. Affect is flat.         Speech: Speech normal.         Behavior: Behavior normal. Behavior is cooperative.         Thought Content: Thought content normal.         Cognition and Memory: Cognition and memory normal.         Judgment: Judgment normal.       Blood pressure 128/76, pulse 97, weight 110 kg (242 lb), not currently breastfeeding. Body mass index is 41.52 kg/m².    Allergies   Allergen Reactions   • Mobic [Meloxicam] Rash   • Penicillins Angioedema   • Taxotere [Docetaxel] Anaphylaxis     9 minutes into 1st infusion   • Novolog [Insulin Aspart] Hives   • Rosuvastatin Calcium GI Intolerance       Medication List:   Current Outpatient Medications   Medication Sig Dispense Refill   • Brexpiprazole (Rexulti) 1 MG tablet Take 1 mg by mouth Daily. For one week then discontinue 7 tablet 0   • venlafaxine XR (EFFEXOR-XR) 150 MG 24 hr capsule Take 1 capsule by mouth Daily. 30 capsule 1   • venlafaxine XR (EFFEXOR-XR) 75 MG 24 hr capsule Take 1 capsule by mouth Daily. 30 capsule 1   • albuterol (PROVENTIL,VENTOLIN) 2 MG/5ML syrup Take 5-10 mL by mouth Every 6 (Six) Hours As Needed (cough). 60 mL 0   • albuterol sulfate  (90 Base) MCG/ACT inhaler INHALE 2 PUFFS BY MOUTH EVERY 4 HOURS AS NEEDED FOR SHORTNESS OF BREATH 18 g 5   • alendronate (FOSAMAX) 35 MG tablet Take 1 tablet by mouth Every 7 (Seven) Days. Take with water, 30 minutes before first food/drink/medication, avoid lying down for 30 minutes after taking 4 tablet 3   • ALPRAZolam (XANAX) 0.5 MG tablet Take 1 tablet by mouth 2 (Two) Times a Day As Needed for Anxiety. 30 tablet 0   • Aspirin Low Dose 81 MG EC tablet TAKE  2 TABLETS BY MOUTH DAILY. 60 tablet 3   • atorvastatin (LIPITOR) 40 MG tablet TAKE ONE TABLET BY MOUTH EVERY night 90 tablet 0   • azelastine (ASTELIN) 0.1 % nasal spray Use in each nostril as directed 30 mL 3   • cefdinir (OMNICEF) 300 MG capsule Take 1 capsule by mouth 2 (Two) Times a Day for 10 days. 20 capsule 0   • celecoxib (CeleBREX) 200 MG capsule TAKE 1 CAPSULE BY MOUTH DAILY. 30 capsule 0   • cloNIDine (CATAPRES) 0.1 MG tablet TAKE ONE TABLET BY MOUTH TWO TIMES A DAY 60 tablet 5   • cyanocobalamin 1000 MCG/ML injection INJECT 1ML INTO TO APPROPRAITE MUSCLE EVERY 30 DAYS 1 mL 5   • Diclofenac Sodium (VOLTAREN) 1 % gel gel APPLY 4 GRAMS TO THE AFFECTED AREA FOUR TIMES A  g 3   • Emgality 120 MG/ML auto-injector pen INJECT 120mg ONCE MONTHLY     • fluconazole (Diflucan) 150 MG tablet Take 1 tablet by mouth Daily. 2 tablet 0   • Fluticasone Furoate-Vilanterol (Breo Ellipta) 200-25 MCG/ACT inhaler Inhale 1 puff Daily. 1 each 3   • furosemide (LASIX) 20 MG tablet TAKE ONE TABLET BY MOUTH EVERY DAY AS NEEDED FOR EDEMA 30 tablet 1   • gabapentin (NEURONTIN) 100 MG capsule TAKE ONE CAPSULE BY MOUTH TWO TIMES A DAY 60 capsule 2   • Insulin Lispro (humaLOG) 100 UNIT/ML injection INJECT AS DIRECTED.MAX DAILY DOSE  UNITS DAILY 40 mL 3   • ipratropium-albuterol (DUO-NEB) 0.5-2.5 mg/3 ml nebulizer Take 3 mL by nebulization Every 4 (Four) Hours As Needed for Shortness of Air. 150 mL 1   • lansoprazole (PREVACID) 30 MG capsule Take 1 capsule by mouth Daily. 90 capsule 3   • levocetirizine (XYZAL) 5 MG tablet TAKE ONE TABLET BY MOUTH EVERY EVENING 30 tablet 3   • Linzess 145 MCG capsule capsule TAKE 1 CAPSULE BY MOUTH DAILY. 30 MINUTES BEFORE MEALS ON AN EMPTY STOMACH. 30 capsule 3   • ondansetron (ZOFRAN) 8 MG tablet TAKE 1 TABLET BY MOUTH EVERY 8 HOURS AS NEEDED FOR NAUSEA OR VOMITING 30 tablet 3   • Respiratory Therapy Supplies (Nebulizer/Tubing/Mouthpiece) kit 1 each Take As Directed. 1 each 1   •  Semaglutide, 2 MG/DOSE, (OZEMPIC) 8 MG/3ML solution pen-injector Inject 2 mg under the skin into the appropriate area as directed Every 7 (Seven) Days. 3 mL 0   • tamoxifen (NOLVADEX) 20 MG chemo tablet Take 1 tablet by mouth Daily. 30 tablet 11   • topiramate (TOPAMAX) 50 MG tablet TAKE 1 TABLET BY MOUTH TWO TIMES DAY 60 tablet 3   • Trijardy XR 25-5-1000 MG tablet sustained-release 24 hour TAKE ONE TABLET BY MOUTH EVERY MORNING 30 tablet 0   • ubrogepant 100 MG tablet      • valACYclovir (Valtrex) 1000 MG tablet Take 1 tablet by mouth 2 (Two) Times a Day. 4 tablet 2   • vitamin D (ERGOCALCIFEROL) 1.25 MG (68743 UT) capsule capsule TAKE ONE CAPSULE BY MOUTH ONCE A WEEK 4 capsule 0   • Zoster Vac Recomb Adjuvanted (Shingrix) 50 MCG/0.5ML reconstituted suspension Inject 0.5 mL into the appropriate muscle as directed by prescriber See Admin Instructions. Repeat in 2-6 months 1 each 1     No current facility-administered medications for this visit.       Mental Status Exam:   Hygiene:   good  Cooperation:  Cooperative  Eye Contact:  Fair  Psychomotor Behavior:  Appropriate  Affect:  Appropriate  Hopelessness: Denies  Speech:  Normal  Thought Process:  Goal directed and Linear  Thought Content:  Normal  Suicidal:  None  Homicidal:  None  Hallucinations:  None  Delusion:  None  Memory:  Intact  Orientation:  Person, Place, Time and Situation  Reliability:  fair  Insight:  Fair  Judgement:  Fair  Impulse Control:  Fair  Physical/Medical Issues:  No               Assessment & Plan   Diagnoses and all orders for this visit:    1. Generalized anxiety disorder (Primary)  -     Discontinue: Brexpiprazole (Rexulti) 1 MG tablet; Take 0.5 mg by mouth Daily. For one week then discontinue  Dispense: 3 tablet; Refill: 0  -     venlafaxine XR (EFFEXOR-XR) 150 MG 24 hr capsule; Take 1 capsule by mouth Daily.  Dispense: 30 capsule; Refill: 1  -     venlafaxine XR (EFFEXOR-XR) 75 MG 24 hr capsule; Take 1 capsule by mouth Daily.   Dispense: 30 capsule; Refill: 1  -     ALPRAZolam (XANAX) 0.5 MG tablet; Take 1 tablet by mouth 2 (Two) Times a Day As Needed for Anxiety.  Dispense: 30 tablet; Refill: 0  -     Brexpiprazole (Rexulti) 1 MG tablet; Take 1 mg by mouth Daily. For one week then discontinue  Dispense: 7 tablet; Refill: 0    2. Moderate episode of recurrent major depressive disorder  -     Discontinue: Brexpiprazole (Rexulti) 1 MG tablet; Take 0.5 mg by mouth Daily. For one week then discontinue  Dispense: 3 tablet; Refill: 0  -     venlafaxine XR (EFFEXOR-XR) 150 MG 24 hr capsule; Take 1 capsule by mouth Daily.  Dispense: 30 capsule; Refill: 1  -     venlafaxine XR (EFFEXOR-XR) 75 MG 24 hr capsule; Take 1 capsule by mouth Daily.  Dispense: 30 capsule; Refill: 1  -     Brexpiprazole (Rexulti) 1 MG tablet; Take 1 mg by mouth Daily. For one week then discontinue  Dispense: 7 tablet; Refill: 0    3. Other obsessive-compulsive disorders  -     venlafaxine XR (EFFEXOR-XR) 150 MG 24 hr capsule; Take 1 capsule by mouth Daily.  Dispense: 30 capsule; Refill: 1  -     venlafaxine XR (EFFEXOR-XR) 75 MG 24 hr capsule; Take 1 capsule by mouth Daily.  Dispense: 30 capsule; Refill: 1  -     ALPRAZolam (XANAX) 0.5 MG tablet; Take 1 tablet by mouth 2 (Two) Times a Day As Needed for Anxiety.  Dispense: 30 tablet; Refill: 0    4. High risk medication use  -     Urine Drug Screen - Urine, Clean Catch; Future            Discussed medication options with patient and . Discont. Ativan. Start Alprazolam 0.5mg twice daily as needed for worsening anxiety. Decrease Rexulti to 1mg daily for one week then discontinue due to side effects. Cont. Effexor XR 150mg daily for depression and anxiety. Cont. Effexor XR 75mg daily for depression and anxiety.  Reviewed the risks, benefits, and side effects of the medications; patient acknowledged and verbally consented. Patient is being prescribed a controlled substance as part of treatment plan. Patient has been  educated of appropriate use of the medications, including risk of somnolence, limited ability to drive and/or work safely, and potential for dependence, respiratory depression and overdose. Patient is also informed that the medication are to be used by the patient only- avoid any combined use of ETOH or other substances unless prescribed.   UDS ordered.   [facility] Patient Controlled Substance Report (from 4/21/2022 to 4/21/2023)    Dispensed  Strength Quantity Days Supply Provider Pharmacy   03/29/2023 Gabapentin 100MG 60 each 30 [facility]   03/29/2023 Lorazepam 1MG 90 each 30 [facility]   02/28/2023 Gabapentin 100MG 60 each 30 [facility]   02/28/2023 Lorazepam 1MG 90 each 30 [facility]   02/02/2023 Gabapentin 100MG 60 each 30 [facility]   02/02/2023 Lorazepam 1MG 90 each 30 [facility]   01/05/2023 Gabapentin 100MG 60 each 30 [facility]   01/05/2023 Lorazepam 1MG 90 each 30 [facility]   12/09/2022 Gabapentin 100MG 60 each 30 [facility]   11/23/2022 Lorazepam 1MG 90 each 30 [facility]   11/05/2022 Gabapentin 100MG 60 each 30 [facility]   10/25/2022 Lorazepam 1MG 90 each 30 [facility]   10/06/2022 Gabapentin 100MG 60 each 30 [facility]   10/03/2022 Hydrocodone/Acetaminophen 325MG/7.5MG 12 each 2 [facility]   09/28/2022 Lorazepam 1MG 90 each 30 [facility]   08/30/2022 Gabapentin 100MG 60 each 30 [facility]INTON[facility]   08/30/2022 Lorazepam 1MG 90 each 30 [facility]   08/02/2022 Gabapentin 100MG 60 each 30 [facility]    07/11/2022 Lorazepam 1MG 90 each 30 [facility]   07/05/2022 Gabapentin 100MG 60 each 30 [facility]   06/13/2022 Lorazepam 1MG 90 each 30 [facility]   06/03/2022 Gabapentin 100MG 60 each 30 [facility]   05/10/2022 Lorazepam 1MG 90 each 30 [facility]   05/06/2022 Gabapentin 100MG 60 each 30 URMILA[facility]CELIA [facility]           Patient is agreeable to call the office with any questions, concerns, or worsening of symptoms. Patient is aware to call 911 or go to the nearest ER should begin having SI/HI.          Follow up in four weeks        Errors in dictation may reflect use of voice recognition software and not all errors in transcription may have been detected prior to signing.               This document has been electronically signed by NIDA Vasquez   April 21, 2023 09:27 EDT

## 2023-04-25 ENCOUNTER — PROCEDURE VISIT (OUTPATIENT)
Dept: FAMILY MEDICINE CLINIC | Facility: CLINIC | Age: 48
End: 2023-04-25
Payer: MEDICARE

## 2023-04-25 VITALS
DIASTOLIC BLOOD PRESSURE: 72 MMHG | SYSTOLIC BLOOD PRESSURE: 100 MMHG | BODY MASS INDEX: 38.24 KG/M2 | HEART RATE: 102 BPM | TEMPERATURE: 97.8 F | WEIGHT: 224 LBS | OXYGEN SATURATION: 99 % | HEIGHT: 64 IN

## 2023-04-25 DIAGNOSIS — D48.5 NEOPLASM OF UNCERTAIN BEHAVIOR OF SKIN OF UPPER ARM: Primary | ICD-10-CM

## 2023-04-25 DIAGNOSIS — J45.41 MODERATE PERSISTENT ASTHMA WITH EXACERBATION: ICD-10-CM

## 2023-04-25 RX ORDER — AZITHROMYCIN 250 MG/1
TABLET, FILM COATED ORAL
Qty: 6 TABLET | Refills: 0 | Status: SHIPPED | OUTPATIENT
Start: 2023-04-25

## 2023-04-25 RX ORDER — METHYLPREDNISOLONE ACETATE 80 MG/ML
80 INJECTION, SUSPENSION INTRA-ARTICULAR; INTRALESIONAL; INTRAMUSCULAR; SOFT TISSUE ONCE
Status: COMPLETED | OUTPATIENT
Start: 2023-04-25 | End: 2023-04-25

## 2023-04-25 RX ADMIN — METHYLPREDNISOLONE ACETATE 80 MG: 80 INJECTION, SUSPENSION INTRA-ARTICULAR; INTRALESIONAL; INTRAMUSCULAR; SOFT TISSUE at 15:21

## 2023-04-25 NOTE — PATIENT INSTRUCTIONS
Carbohydrate Counting for Diabetes Mellitus, Adult  Carbohydrate counting is a method of keeping track of how many carbohydrates you eat. Eating carbohydrates increases the amount of sugar (glucose) in the blood. Counting how many carbohydrates you eat improves how well you manage your blood glucose. This, in turn, helps you manage your diabetes.  Carbohydrates are measured in grams (g) per serving. It is important to know how many carbohydrates (in grams or by serving size) you can have in each meal. This is different for every person. A dietitian can help you make a meal plan and calculate how many carbohydrates you should have at each meal and snack.  What foods contain carbohydrates?  Carbohydrates are found in the following foods:  Grains, such as breads and cereals.  Dried beans and soy products.  Starchy vegetables, such as potatoes, peas, and corn.  Fruit and fruit juices.  Milk and yogurt.  Sweets and snack foods, such as cake, cookies, candy, chips, and soft drinks.  How do I count carbohydrates in foods?  There are two ways to count carbohydrates in food. You can read food labels or learn standard serving sizes of foods. You can use either of these methods or a combination of both.  Using the Nutrition Facts label  The Nutrition Facts list is included on the labels of almost all packaged foods and beverages in the [place]. It includes:  The serving size.  Information about nutrients in each serving, including the grams of carbohydrate per serving.  To use the Nutrition Facts, decide how many servings you will have. Then, multiply the number of servings by the number of carbohydrates per serving. The resulting number is the total grams of carbohydrates that you will be having.  Learning the standard serving sizes of foods  When you eat carbohydrate foods that are not packaged or do not include Nutrition Facts on the label, you need to measure the servings in order to count the grams of  carbohydrates.  Measure the foods that you will eat with a food scale or measuring cup, if needed.  Decide how many standard-size servings you will eat.  Multiply the number of servings by 15. For foods that contain carbohydrates, one serving equals 15 g of carbohydrates.  For example, if you eat 2 cups or 10 oz (300 g) of strawberries, you will have eaten 2 servings and 30 g of carbohydrates (2 servings x 15 g = 30 g).  For foods that have more than one food mixed, such as soups and casseroles, you must count the carbohydrates in each food that is included.  The following list contains standard serving sizes of common carbohydrate-rich foods. Each of these servings has about 15 g of carbohydrates:  1 slice of bread.  1 six-inch (15 cm) tortilla.  ? cup or 2 oz (53 g) cooked rice or pasta.  ½ cup or 3 oz (85 g) cooked or canned, drained and rinsed beans or lentils.  ½ cup or 3 oz (85 g) starchy vegetable, such as peas, corn, or squash.  ½ cup or 4 oz (120 g) hot cereal.  ½ cup or 3 oz (85 g) boiled or mashed potatoes, or ¼ or 3 oz (85 g) of a large baked potato.  ½ cup or 4 fl oz (118 mL) fruit juice.  1 cup or 8 fl oz (237 mL) milk.  1 small or 4 oz (106 g) apple.  ½ or 2 oz (63 g) of a medium banana.  1 cup or 5 oz (150 g) strawberries.  3 cups or 1 oz (28.3 g) popped popcorn.  What is an example of carbohydrate counting?  To calculate the grams of carbohydrates in this sample meal, follow the steps shown below.  Sample meal  3 oz (85 g) chicken breast.  ? cup or 4 oz (106 g) brown rice.  ½ cup or 3 oz (85 g) corn.  1 cup or 8 fl oz (237 mL) milk.  1 cup or 5 oz (150 g) strawberries with sugar-free whipped topping.  Carbohydrate calculation  Identify the foods that contain carbohydrates:  Rice.  Corn.  Milk.  Strawberries.  Calculate how many servings you have of each food:  2 servings rice.  1 serving corn.  1 serving milk.  1 serving strawberries.  Multiply each number of servings by 15  servings rice x 15  g = 30 g.  1 serving corn x 15 g = 15 g.  1 serving milk x 15 g = 15 g.  1 serving strawberries x 15 g = 15 g.  Add together all of the amounts to find the total grams of carbohydrates eaten:  30 g + 15 g + 15 g + 15 g = 75 g of carbohydrates total.  What are tips for following this plan?  Shopping  Develop a meal plan and then make a shopping list.  Buy fresh and frozen vegetables, fresh and frozen fruit, dairy, eggs, beans, lentils, and whole grains.  Look at food labels. Choose foods that have more fiber and less sugar.  Avoid processed foods and foods with added sugars.  Meal planning  Aim to have the same number of grams of carbohydrates at each meal and for each snack time.  Plan to have regular, balanced meals and snacks.  Where to find more information  American Diabetes Association: diabetes.org  Centers for Disease Control and Prevention: cdc.gov  Academy of Nutrition and Dietetics: eatright.org  Association of Diabetes Care & Education Specialists: diabeteseducator.org  Summary  Carbohydrate counting is a method of keeping track of how many carbohydrates you eat.  Eating carbohydrates increases the amount of sugar (glucose) in your blood.  Counting how many carbohydrates you eat improves how well you manage your blood glucose. This helps you manage your diabetes.  A dietitian can help you make a meal plan and calculate how many carbohydrates you should have at each meal and snack.  This information is not intended to replace advice given to you by your health care provider. Make sure you discuss any questions you have with your health care provider.  Document Revised: 07/21/2021 Document Reviewed: 07/21/2021  [employer] Patient Education © 2022 [employer]  Wound Care, Adult  Taking care of your wound properly can help to prevent pain, infection, and scarring. It can also help your wound heal more quickly. Follow instructions from your health care provider about how to care for your wound.  Supplies  needed:  Soap and water.  Wound cleanser, saline, or germ-free (sterile) water.  Gauze.  If needed, a clean bandage (dressing) or other type of wound dressing material to cover or place in the wound. Follow your health care provider's instructions about what dressing supplies to use.  Cream or topical ointment to apply to the wound, if told by your health care provider.  How to care for your wound  Cleaning the wound  Ask your health care provider how to clean the wound. This may include:  Using mild soap and water, a wound cleanser, saline, or sterile water.  Using a clean gauze to pat the wound dry after cleaning it. Do not rub or scrub the wound.  Dressing care  Wash your hands with soap and water for at least 20 seconds before and after you change the dressing. If soap and water are not available, use hand .  Change your dressing as told by your health care provider. This may include:  Cleaning or rinsing out (irrigating) the wound.  Application of cream or topical ointment, if told by your health care provider.  Placing a dressing over the wound or in the wound (packing).  Covering the wound with an outer dressing.  Leave stitches (sutures), staples, skin glue, or adhesive strips in place. These skin closures may need to stay in place for 2 weeks or longer. If adhesive strip edges start to loosen and curl up, you may trim the loose edges. Do not remove adhesive strips completely unless your health care provider tells you to do that.  Ask your health care provider when you can leave the wound uncovered.  Checking for infection  Check your wound area every day for signs of infection. Check for:  More redness, swelling, or pain.  Fluid or blood.  Warmth.  Pus or a bad smell.    Follow these instructions at home  Medicines  If you were prescribed an antibiotic medicine, cream, or ointment, take or apply it as told by your health care provider. Do not stop using the antibiotic even if your condition  improves.  If you were prescribed pain medicine, take it 30 minutes before you do any wound care or as told by your health care provider.  Take over-the-counter and prescription medicines only as told by your health care provider.  Eating and drinking  Eat a diet that includes protein, vitamin A, vitamin C, and other nutrient-rich foods to help the wound heal.  Foods rich in protein include meat, fish, eggs, dairy, beans, and nuts.  Foods rich in vitamin A include carrots and dark green, leafy vegetables.  Foods rich in vitamin C include citrus fruits, tomatoes, broccoli, and peppers.  Drink enough fluid to keep your urine pale yellow.  General instructions  Do not take baths, swim, or use a hot tub until your health care provider approves. Ask your health care provider if you may take showers. You may only be allowed to take sponge baths.  Do not scratch or pick at the wound. Keep it covered as told by your health care provider.  Return to your normal activities as told by your health care provider. Ask your health care provider what activities are safe for you.  Protect your wound from the sun when you are outside for the first 6 months, or for as long as told by your health care provider. Cover up the scar area or apply sunscreen that has an SPF of at least 30.  Do not use any products that contain nicotine or tobacco. These products include cigarettes, chewing tobacco, and vaping devices, such as e-cigarettes. If you need help quitting, ask your health care provider.  Keep all follow-up visits. This is important.  Contact a health care provider if:  You received a tetanus shot and you have swelling, severe pain, redness, or bleeding at the injection site.  Your pain is not controlled with medicine.  You have any of these signs of infection:  More redness, swelling, or pain around the wound.  Fluid or blood coming from the wound.  Warmth coming from the wound.  A fever or chills.  You are nauseous or you  vomit.  You are dizzy.  You have a new rash or hardness around the wound.  Get help right away if:  You have a red streak of skin near the area around your wound.  Pus or a bad smell coming from the wound.  Your wound has been closed with staples, sutures, skin glue, or adhesive strips and it begins to open up and separate.  Your wound is bleeding, and the bleeding does not stop with gentle pressure.  These symptoms may represent a serious problem that is an emergency. Do not wait to see if the symptoms will go away. Get medical help right away. Call your local emergency services (911 in the [place] Do not drive yourself to the hospital.  Summary  Always wash your hands with soap and water for at least 20 seconds before and after changing your dressing.  Change your dressing as told by your health care provider.  To help with healing, eat foods that are rich in protein, vitamin A, vitamin C, and other nutrients.  Check your wound every day for signs of infection. Contact your health care provider if you think that your wound is infected.  This information is not intended to replace advice given to you by your health care provider. Make sure you discuss any questions you have with your health care provider.  Document Revised: 04/26/2022 Document Reviewed: 04/26/2022  [employer] Patient Education © 2022 [employer]
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
## 2023-04-26 DIAGNOSIS — F41.1 GENERALIZED ANXIETY DISORDER: ICD-10-CM

## 2023-04-26 DIAGNOSIS — M19.011 PRIMARY OSTEOARTHRITIS OF RIGHT SHOULDER: Chronic | ICD-10-CM

## 2023-04-26 DIAGNOSIS — R11.0 NAUSEA: ICD-10-CM

## 2023-04-27 RX ORDER — ONDANSETRON HYDROCHLORIDE 8 MG/1
TABLET, FILM COATED ORAL
Qty: 30 TABLET | Refills: 3 | Status: SHIPPED | OUTPATIENT
Start: 2023-04-27

## 2023-04-27 RX ORDER — LORAZEPAM 1 MG/1
TABLET ORAL
Qty: 90 TABLET | Refills: 0 | OUTPATIENT
Start: 2023-04-27

## 2023-04-28 RX ORDER — CELECOXIB 200 MG/1
CAPSULE ORAL
Qty: 30 CAPSULE | Refills: 0 | Status: SHIPPED | OUTPATIENT
Start: 2023-04-28

## 2023-05-01 DIAGNOSIS — F41.1 GENERALIZED ANXIETY DISORDER: ICD-10-CM

## 2023-05-01 DIAGNOSIS — F42.8 OTHER OBSESSIVE-COMPULSIVE DISORDERS: Chronic | ICD-10-CM

## 2023-05-01 RX ORDER — ALPRAZOLAM 0.5 MG/1
0.5 TABLET ORAL 3 TIMES DAILY PRN
Qty: 60 TABLET | Refills: 0 | Status: SHIPPED | OUTPATIENT
Start: 2023-05-01
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
## 2023-05-04 ENCOUNTER — INFUSION (OUTPATIENT)
Dept: ONCOLOGY | Facility: HOSPITAL | Age: 48
End: 2023-05-04
Payer: MEDICARE

## 2023-05-04 VITALS
BODY MASS INDEX: 38.43 KG/M2 | OXYGEN SATURATION: 97 % | TEMPERATURE: 96.9 F | HEART RATE: 91 BPM | WEIGHT: 224 LBS | DIASTOLIC BLOOD PRESSURE: 70 MMHG | SYSTOLIC BLOOD PRESSURE: 125 MMHG | RESPIRATION RATE: 18 BRPM

## 2023-05-04 DIAGNOSIS — Z95.828 PORT-A-CATH IN PLACE: Primary | ICD-10-CM

## 2023-05-04 PROCEDURE — 96523 IRRIG DRUG DELIVERY DEVICE: CPT

## 2023-05-04 PROCEDURE — 25010000002 HEPARIN LOCK FLUSH PER 10 UNITS: Performed by: INTERNAL MEDICINE

## 2023-05-04 RX ORDER — HEPARIN SODIUM (PORCINE) LOCK FLUSH IV SOLN 100 UNIT/ML 100 UNIT/ML
500 SOLUTION INTRAVENOUS AS NEEDED
Status: DISCONTINUED | OUTPATIENT
Start: 2023-05-04 | End: 2023-05-04 | Stop reason: HOSPADM

## 2023-05-04 RX ORDER — SODIUM CHLORIDE 0.9 % (FLUSH) 0.9 %
10 SYRINGE (ML) INJECTION AS NEEDED
Status: DISCONTINUED | OUTPATIENT
Start: 2023-05-04 | End: 2023-05-04 | Stop reason: HOSPADM

## 2023-05-04 RX ORDER — SODIUM CHLORIDE 0.9 % (FLUSH) 0.9 %
10 SYRINGE (ML) INJECTION AS NEEDED
OUTPATIENT
Start: 2023-05-04

## 2023-05-04 RX ORDER — HEPARIN SODIUM (PORCINE) LOCK FLUSH IV SOLN 100 UNIT/ML 100 UNIT/ML
500 SOLUTION INTRAVENOUS AS NEEDED
OUTPATIENT
Start: 2023-05-04

## 2023-05-04 RX ADMIN — Medication 10 ML: at 15:52

## 2023-05-04 RX ADMIN — Medication 500 UNITS: at 15:52

## 2023-05-05 ENCOUNTER — OFFICE VISIT (OUTPATIENT)
Dept: FAMILY MEDICINE CLINIC | Facility: CLINIC | Age: 48
End: 2023-05-05
Payer: MEDICARE

## 2023-05-05 VITALS
HEIGHT: 64 IN | HEART RATE: 90 BPM | OXYGEN SATURATION: 97 % | DIASTOLIC BLOOD PRESSURE: 60 MMHG | TEMPERATURE: 97.4 F | BODY MASS INDEX: 37.56 KG/M2 | WEIGHT: 220 LBS | SYSTOLIC BLOOD PRESSURE: 110 MMHG

## 2023-05-05 DIAGNOSIS — E11.42 DM TYPE 2 WITH DIABETIC PERIPHERAL NEUROPATHY: Primary | Chronic | ICD-10-CM

## 2023-05-05 DIAGNOSIS — Z96.41 INSULIN PUMP IN PLACE: ICD-10-CM

## 2023-05-05 DIAGNOSIS — Z48.02 ENCOUNTER FOR REMOVAL OF SUTURES: ICD-10-CM

## 2023-05-05 DIAGNOSIS — E78.5 DYSLIPIDEMIA: Chronic | ICD-10-CM

## 2023-05-05 NOTE — PROGRESS NOTES
Nivia is a 49 y/o female who presents to the clinic today for follow up pertaining to her DM, type 2 and Dyslipidemia. She has been diagnosed with right breast Cancer and has undergone mastectomy and chemotherapy. In addition, she has GERD, mitral valve prolapse, Osteopenia/arthritis, migraine headaches and anxiety/ depression.  She recently had a skin lesion removed from her left upper arm per Joan Rodriguez PA-C, which was negative for malignancy.  Sutures are due to be removed today    Diabetes   She has type 2 diabetes mellitus. The initial diagnosis of diabetes was made 20 years ago. Diabetic complications include peripheral neuropathy. Risk factors for coronary artery disease include post-menopausal, stress and dyslipidemia. She is currently utilizing insulin pump therapy and CGM. In addition, she is currently taking Trijardy and Ozempic.  She has had a previous visit with a dietitian An ACE inhibitor/angiotensin II receptor blocker is not  being taken at this time..  At her last visit, her Ozempic was increased from 1 mg to 2 mg every 7 days.  She does report that she is tolerating this well with noted even more improvement in her glycemic control.     Lab Results   Component Value Date    HGBA1C 7.90 (H) 10/06/2022     Lab Results   Component Value Date    HGBA1C 6.80 (H) 04/18/2023     Dyslipidemia   The current episode started more than 1 year ago.  She is taking Atorvastatin 40 mg.   Lab Results   Component Value Date    CHLPL 110 04/18/2023    CHLPL 113 10/06/2022    CHLPL 175 03/15/2022     Lab Results   Component Value Date    TRIG 262 (H) 04/18/2023    TRIG 183 (H) 10/06/2022    TRIG 302 (H) 06/22/2022     Lab Results   Component Value Date    HDL 29 (L) 04/18/2023    HDL 31 (L) 10/06/2022    HDL 32 (L) 06/22/2022     Lab Results   Component Value Date    LDL 40 04/18/2023    LDL 52 10/06/2022    LDL 91 06/22/2022     Suture / Staple Removal  The sutures were placed 7 to 10 days ago. There has been  "no drainage from the wound. There is no redness present. There is no swelling present. The pain has improved.     The following portions of the patient's history were reviewed and updated as appropriate: allergies, current medications, past family history, past medical history, past social history, past surgical history and problem list.    Review of Systems   Constitutional: Negative for activity change, appetite change, chills, fatigue, fever and unexpected weight change.   HENT: Negative for congestion.    Eyes: Negative for visual disturbance.   Respiratory: Negative for cough, shortness of breath and wheezing.    Cardiovascular: Positive for leg swelling (Intermittent). Negative for chest pain and palpitations.   Gastrointestinal: Negative for nausea and vomiting.        GERD   Endocrine: Negative for cold intolerance, heat intolerance, polydipsia, polyphagia and polyuria.   Musculoskeletal: Positive for arthralgias (Much improved with weight loss) and back pain.   Skin: Negative for color change and rash.        Left upper arm surgical incision   Allergic/Immunologic: Positive for environmental allergies.   Neurological: Positive for dizziness (Improving), numbness and headaches (Migraines). Negative for tremors, speech difficulty, weakness and light-headedness.   Hematological: Negative for adenopathy.   Psychiatric/Behavioral: Negative for confusion, decreased concentration and sleep disturbance. The patient is not nervous/anxious.    All other systems reviewed and are negative.    Vital signs:  /60 (BP Location: Left arm, Patient Position: Sitting, Cuff Size: Adult)   Pulse 90   Temp 97.4 °F (36.3 °C) (Temporal)   Ht 162.6 cm (64.02\")   Wt 99.8 kg (220 lb)   SpO2 97%   BMI 37.74 kg/m²     Physical Exam  Vitals and nursing note reviewed.   Constitutional:       General: She is not in acute distress.     Appearance: She is well-developed.   HENT:      Head: Normocephalic.      Nose: Nose normal. "   Eyes:      General: No scleral icterus.        Right eye: No discharge.         Left eye: No discharge.      Conjunctiva/sclera: Conjunctivae normal.   Neck:      Thyroid: No thyromegaly.      Vascular: No JVD.   Cardiovascular:      Rate and Rhythm: Normal rate and regular rhythm.      Heart sounds: S1 normal and S2 normal.   Pulmonary:      Effort: Pulmonary effort is normal.      Breath sounds: Normal breath sounds.   Abdominal:      Palpations: Abdomen is soft.      Tenderness: There is no abdominal tenderness. There is no guarding.   Musculoskeletal:      Cervical back: Neck supple.      Right lower leg: No edema.      Left lower leg: No edema.   Skin:     General: Skin is dry.      Capillary Refill: Capillary refill takes less than 2 seconds.          Neurological:      Mental Status: She is alert.   Psychiatric:         Mood and Affect: Mood and affect normal.         Speech: Speech normal.         Behavior: Behavior is cooperative.         Thought Content: Thought content normal.     Result Review :  Class 2 Severe Obesity (BMI >=35 and <=39.9). Obesity-related health conditions include the following: obstructive sleep apnea, diabetes mellitus, dyslipidemias and osteoarthritis. Obesity is improving with lifestyle modifications. BMI  is above average; BMI management plan is completedProvided information on portion control and increasing exercise.     Assessment & Plan     Diagnoses and all orders for this visit:    1. DM type 2 with diabetic peripheral neuropathy (Primary)  Comments:  Continue Ozempic 2 mg every 7 days, Trijardy and insulin pump therapy    2. Insulin pump in place  Comments:  Reports no issues with hypoglycemia.  Continue current insulin pump parameters    3. Dyslipidemia  Comments:  Continue atorvastatin    4. Body mass index (BMI) of 37.0 to 37.9 in adult  Comments:  Praise  given for her accomplishment with weight removal and decrease BMI    5. Encounter for removal of  sutures  Comments:  7 sutures removed from left upper arm surgical incision    Follow Up in July  Findings and recommendations discussed with Nivia . Reviewed test results. Lifestyle modifications reinforced including nutrition and activity recommendations.  She will follow up in July sooner if problems/concerns occur.  Lilo was given instructions and counseling regarding her condition or for health maintenance advice. Please see specific information pulled into the AVS if appropriate    This document has been electronically signed by:

## 2023-05-12 DIAGNOSIS — E11.42 DM TYPE 2 WITH DIABETIC PERIPHERAL NEUROPATHY: Chronic | ICD-10-CM

## 2023-05-12 RX ORDER — INSULIN LISPRO 100 [IU]/ML
INJECTION, SOLUTION INTRAVENOUS; SUBCUTANEOUS
Qty: 40 ML | Refills: 3 | Status: SHIPPED | OUTPATIENT
Start: 2023-05-12

## 2023-05-16 DIAGNOSIS — T45.1X5A HOT FLASHES DUE TO TAMOXIFEN: ICD-10-CM

## 2023-05-16 DIAGNOSIS — R23.2 HOT FLASHES DUE TO TAMOXIFEN: ICD-10-CM

## 2023-05-16 RX ORDER — GABAPENTIN 100 MG/1
CAPSULE ORAL
Qty: 60 CAPSULE | Refills: 0 | Status: SHIPPED | OUTPATIENT
Start: 2023-05-16

## 2023-05-30 ENCOUNTER — OFFICE VISIT (OUTPATIENT)
Dept: PSYCHIATRY | Facility: CLINIC | Age: 48
End: 2023-05-30

## 2023-05-30 VITALS — WEIGHT: 216 LBS | HEIGHT: 64 IN | BODY MASS INDEX: 36.88 KG/M2

## 2023-05-30 DIAGNOSIS — E11.42 DM TYPE 2 WITH DIABETIC PERIPHERAL NEUROPATHY: Chronic | ICD-10-CM

## 2023-05-30 DIAGNOSIS — F33.1 MODERATE EPISODE OF RECURRENT MAJOR DEPRESSIVE DISORDER: ICD-10-CM

## 2023-05-30 DIAGNOSIS — F41.1 GENERALIZED ANXIETY DISORDER: Primary | ICD-10-CM

## 2023-05-30 DIAGNOSIS — Z79.899 HIGH RISK MEDICATION USE: ICD-10-CM

## 2023-05-30 DIAGNOSIS — F42.8 OTHER OBSESSIVE-COMPULSIVE DISORDERS: Chronic | ICD-10-CM

## 2023-05-30 RX ORDER — ALPRAZOLAM 0.5 MG/1
0.5 TABLET ORAL 3 TIMES DAILY PRN
Qty: 60 TABLET | Refills: 0 | Status: SHIPPED | OUTPATIENT
Start: 2023-05-30

## 2023-05-30 RX ORDER — IMIPRAMINE HYDROCHLORIDE 10 MG/1
10 TABLET, FILM COATED ORAL NIGHTLY
Qty: 30 TABLET | Refills: 0 | Status: SHIPPED | OUTPATIENT
Start: 2023-05-30 | End: 2024-05-29

## 2023-05-30 RX ORDER — VENLAFAXINE HYDROCHLORIDE 150 MG/1
150 CAPSULE, EXTENDED RELEASE ORAL DAILY
Qty: 30 CAPSULE | Refills: 1 | Status: SHIPPED | OUTPATIENT
Start: 2023-05-30
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
## 2023-05-31 DIAGNOSIS — E78.2 MIXED DYSLIPIDEMIA: Chronic | ICD-10-CM

## 2023-05-31 RX ORDER — ATORVASTATIN CALCIUM 40 MG/1
TABLET, FILM COATED ORAL
Qty: 90 TABLET | Refills: 1 | Status: SHIPPED | OUTPATIENT
Start: 2023-05-31

## 2023-06-06 ENCOUNTER — OFFICE VISIT (OUTPATIENT)
Dept: FAMILY MEDICINE CLINIC | Facility: CLINIC | Age: 48
End: 2023-06-06
Payer: MEDICARE

## 2023-06-06 VITALS
DIASTOLIC BLOOD PRESSURE: 80 MMHG | HEIGHT: 64 IN | HEART RATE: 87 BPM | OXYGEN SATURATION: 97 % | SYSTOLIC BLOOD PRESSURE: 105 MMHG | BODY MASS INDEX: 36.43 KG/M2 | TEMPERATURE: 97.6 F | WEIGHT: 213.4 LBS

## 2023-06-06 DIAGNOSIS — H10.9 BACTERIAL CONJUNCTIVITIS OF BOTH EYES: Primary | ICD-10-CM

## 2023-06-06 DIAGNOSIS — J06.9 UPPER RESPIRATORY TRACT INFECTION, UNSPECIFIED TYPE: ICD-10-CM

## 2023-06-06 DIAGNOSIS — B96.89 BACTERIAL CONJUNCTIVITIS OF BOTH EYES: Primary | ICD-10-CM

## 2023-06-06 DIAGNOSIS — J30.9 CHRONIC ALLERGIC RHINITIS: Chronic | ICD-10-CM

## 2023-06-06 PROCEDURE — 99213 OFFICE O/P EST LOW 20 MIN: CPT | Performed by: PHYSICIAN ASSISTANT

## 2023-06-06 PROCEDURE — 3044F HG A1C LEVEL LT 7.0%: CPT | Performed by: PHYSICIAN ASSISTANT

## 2023-06-06 RX ORDER — LEVOCETIRIZINE DIHYDROCHLORIDE 5 MG/1
5 TABLET, FILM COATED ORAL EVERY EVENING
Qty: 30 TABLET | Refills: 1 | Status: SHIPPED | OUTPATIENT
Start: 2023-06-06

## 2023-06-06 RX ORDER — OFLOXACIN 3 MG/ML
1 SOLUTION/ DROPS OPHTHALMIC 4 TIMES DAILY
Qty: 5 ML | Refills: 0 | Status: SHIPPED | OUTPATIENT
Start: 2023-06-06 | End: 2023-06-13

## 2023-06-06 RX ORDER — CEFDINIR 300 MG/1
300 CAPSULE ORAL 2 TIMES DAILY
Qty: 20 CAPSULE | Refills: 0 | Status: SHIPPED | OUTPATIENT
Start: 2023-06-06 | End: 2023-06-16

## 2023-06-06 NOTE — PROGRESS NOTES
Subjective        Chief Complaint  Eye Problem (Very red, said her eyes were matted together this morning, ), Nasal Congestion, and Sore Throat    Subjective      History of Present Illness  Nivia Bernstein is a 48 y.o. female who presents today to [facility] for Eye Problem (Very red, said her eyes were matted together this morning, ), Nasal Congestion, and Sore Throat.  Medical history is significant for anxiety, depression, asthma, GERD, MVP, obesity per BMI, OCD, osteoporosis, breast cancer, and history of TIA.    Eye Problem (Very red, said her eyes were matted together this morning, ), Nasal Congestion, and Sore Throat:  Lilo reports 2 days of bilateral eye redness, irritation, soreness, and matting.  It started in the left eye, then moved to the right.  She has also had some runny nose and sore throat along with these symptoms.  She has also had a cough which is mostly dry.  No fever or chills.  No known foreign body or foreign body sensation.      Current Outpatient Medications:     albuterol (PROVENTIL,VENTOLIN) 2 MG/5ML syrup, Take 5-10 mL by mouth Every 6 (Six) Hours As Needed (cough)., Disp: 60 mL, Rfl: 0    albuterol sulfate  (90 Base) MCG/ACT inhaler, INHALE 2 PUFFS BY MOUTH EVERY 4 HOURS AS NEEDED FOR SHORTNESS OF BREATH, Disp: 18 g, Rfl: 5    alendronate (FOSAMAX) 35 MG tablet, Take 1 tablet by mouth Every 7 (Seven) Days. Take with water, 30 minutes before first food/drink/medication, avoid lying down for 30 minutes after taking, Disp: 4 tablet, Rfl: 3    ALPRAZolam (XANAX) 0.5 MG tablet, Take 1 tablet by mouth 3 (Three) Times a Day As Needed for Anxiety., Disp: 60 tablet, Rfl: 0    Aspirin Low Dose 81 MG EC tablet, TAKE 2 TABLETS BY MOUTH DAILY., Disp: 60 tablet, Rfl: 3    atorvastatin (LIPITOR) 40 MG tablet, TAKE ONE TABLET BY MOUTH EVERY night, Disp: 90 tablet, Rfl: 1    azelastine (ASTELIN) 0.1 % nasal spray, Use in each nostril as directed, Disp: 30  mL, Rfl: 3    azithromycin (Zithromax Z-Norbert) 250 MG tablet, Take 2 tablets by mouth on day 1, then 1 tablet daily on days 2-5, Disp: 6 tablet, Rfl: 0    celecoxib (CeleBREX) 200 MG capsule, TAKE ONE CAPSULE BY MOUTH EVERY DAY, Disp: 30 capsule, Rfl: 0    cloNIDine (CATAPRES) 0.1 MG tablet, TAKE ONE TABLET BY MOUTH TWO TIMES A DAY, Disp: 60 tablet, Rfl: 5    cyanocobalamin 1000 MCG/ML injection, INJECT 1ML INTO TO APPROPRAITE MUSCLE EVERY 30 DAYS, Disp: 1 mL, Rfl: 5    Diclofenac Sodium (VOLTAREN) 1 % gel gel, APPLY 4 GRAMS TO THE AFFECTED AREA FOUR TIMES A DAY, Disp: 100 g, Rfl: 3    Emgality 120 MG/ML auto-injector pen, INJECT 120mg ONCE MONTHLY, Disp: , Rfl:     Fluticasone Furoate-Vilanterol (Breo Ellipta) 200-25 MCG/ACT inhaler, Inhale 1 puff Daily., Disp: 1 each, Rfl: 3    furosemide (LASIX) 20 MG tablet, TAKE ONE TABLET BY MOUTH EVERY DAY AS NEEDED FOR EDEMA, Disp: 30 tablet, Rfl: 1    gabapentin (NEURONTIN) 100 MG capsule, TAKE ONE CAPSULE BY MOUTH TWICE DAILY, Disp: 60 capsule, Rfl: 0    imipramine (TOFRANIL) 10 MG tablet, Take 1 tablet by mouth Every Night., Disp: 30 tablet, Rfl: 0    Insulin Lispro (humaLOG) 100 UNIT/ML injection, INJECT AS DIRECTED.MAX DAILY DOSE  UNITS DAILY, Disp: 40 mL, Rfl: 3    ipratropium-albuterol (DUO-NEB) 0.5-2.5 mg/3 ml nebulizer, Take 3 mL by nebulization Every 4 (Four) Hours As Needed for Shortness of Air., Disp: 150 mL, Rfl: 1    lansoprazole (PREVACID) 30 MG capsule, Take 1 capsule by mouth Daily., Disp: 90 capsule, Rfl: 3    levocetirizine (XYZAL) 5 MG tablet, Take 1 tablet by mouth Every Evening., Disp: 30 tablet, Rfl: 1    Linzess 145 MCG capsule capsule, TAKE 1 CAPSULE BY MOUTH DAILY. 30 MINUTES BEFORE MEALS ON AN EMPTY STOMACH., Disp: 30 capsule, Rfl: 3    ondansetron (ZOFRAN) 8 MG tablet, TAKE 1 TABLET BY MOUTH EVERY 8 HOURS AS NEEDED FOR NAUSEA OR VOMITING, Disp: 30 tablet, Rfl: 3    Respiratory Therapy Supplies (Nebulizer/Tubing/Mouthpiece) kit, 1 each Take As  "Directed., Disp: 1 each, Rfl: 1    Semaglutide, 2 MG/DOSE, (OZEMPIC) 8 MG/3ML solution pen-injector, Inject 2 mg under the skin into the appropriate area as directed Every 7 (Seven) Days., Disp: 3 mL, Rfl: 1    tamoxifen (NOLVADEX) 20 MG chemo tablet, Take 1 tablet by mouth Daily., Disp: 30 tablet, Rfl: 11    topiramate (TOPAMAX) 50 MG tablet, TAKE 1 TABLET BY MOUTH TWO TIMES DAY, Disp: 60 tablet, Rfl: 3    Trijardy XR 25-5-1000 MG tablet sustained-release 24 hour, TAKE ONE TABLET BY MOUTH EVERY MORNING, Disp: 30 tablet, Rfl: 0    ubrogepant 100 MG tablet, , Disp: , Rfl:     valACYclovir (Valtrex) 1000 MG tablet, Take 1 tablet by mouth 2 (Two) Times a Day., Disp: 4 tablet, Rfl: 2    venlafaxine XR (EFFEXOR-XR) 150 MG 24 hr capsule, Take 1 capsule by mouth Daily., Disp: 30 capsule, Rfl: 1    vitamin D (ERGOCALCIFEROL) 1.25 MG (60155 UT) capsule capsule, TAKE ONE CAPSULE BY MOUTH ONCE A WEEK, Disp: 4 capsule, Rfl: 0    cefdinir (OMNICEF) 300 MG capsule, Take 1 capsule by mouth 2 (Two) Times a Day for 10 days., Disp: 20 capsule, Rfl: 0    ofloxacin (Ocuflox) 0.3 % ophthalmic solution, Administer 1 drop to both eyes 4 (Four) Times a Day for 7 days., Disp: 5 mL, Rfl: 0      Allergies   Allergen Reactions    Mobic [Meloxicam] Rash    Penicillins Angioedema    Taxotere [Docetaxel] Anaphylaxis     9 minutes into 1st infusion    Novolog [Insulin Aspart] Hives    Rosuvastatin Calcium GI Intolerance     Objective     Objective   Vital Signs:  Blood Pressure 105/80 Comment: left arm.  Pulse 87   Temperature 97.6 °F (36.4 °C) (Temporal)   Height 162.6 cm (64.02\")   Weight 96.8 kg (213 lb 6.4 oz)   Oxygen Saturation 97%   Body Mass Index 36.61 kg/m²   Estimated body mass index is 36.61 kg/m² as calculated from the following:    Height as of this encounter: 162.6 cm (64.02\").    Weight as of this encounter: 96.8 kg (213 lb 6.4 oz).    [unfilled]  Past Medical History:   Diagnosis Date    Altered mental status " 10/16/2017    Anxiety and depression 3/31/2017    Arthritis     Asthma     Elevated alkaline phosphatase level 10/16/2017    Enlarged liver     GERD (gastroesophageal reflux disease)     Heart murmur     Hypokalemia 10/16/2017    Mitral valve prolapse     Neuropathy     related to diabetes    Obesity 3/31/2017    OCD (obsessive compulsive disorder) 10/16/2017    Osteoporosis     PONV (postoperative nausea and vomiting)     Renal insufficiency 10/16/2017    Right breast cancer with malignant cells in regional lymph nodes no greater than 0.2 mm and no more than 200 cells 8/13/2020    TIA (transient ischemic attack)     4 TIMES     Past Surgical History:   Procedure Laterality Date    APPENDECTOMY      CARPAL TUNNEL RELEASE      CHOLECYSTECTOMY      COLONOSCOPY N/A 5/5/2021    Procedure: COLONOSCOPY WITH BIOPSY;  Surgeon: Vickie Herrera MD;  Location: [facility];  Service: Gastroenterology;  Laterality: N/A;    FINGER FUSION Left     3rd    HYSTERECTOMY  2011    removed due to an ovarian cyst and dysmenorrhia. No cancer noted. Complete    KNEE ARTHROSCOPY Right     MASTECTOMY Left 8/18/2020    Procedure: Left mastectomy;  Surgeon: Sheila Garza MD;  Location: [facility];  Service: General;  Laterality: Left;    MASTECTOMY WITH SENTINEL NODE BIOPSY AND AXILLARY NODE DISSECTION Right 8/18/2020    Procedure: Right BREAST MASTECTOMY WITH SENTINEL NODE BIOPSY;  Surgeon: Sheila Garza MD;  Location: [facility];  Service: General;  Laterality: Right;    PORTACATH PLACEMENT       Social History     Socioeconomic History    Marital status:    Tobacco Use    Smoking status: Never     Passive exposure: Never    Smokeless tobacco: Never   Vaping Use    Vaping Use: Never used   Substance and Sexual Activity    Alcohol use: No    Drug use: No    Sexual activity: Yes     Partners: Male      Physical Exam  Vitals and nursing note reviewed.   Constitutional:       General: She is not in acute distress.      Appearance: She is well-developed. She is not diaphoretic.   HENT:      Head: Normocephalic and atraumatic.      Ears:      Comments: Mild erythema of the TMs bilaterally.      Nose: Congestion and rhinorrhea present.   Eyes:      General: No scleral icterus.        Right eye: Discharge present.         Left eye: Discharge present.     Conjunctiva/sclera: Conjunctivae normal.      Comments: Bilateral conjunctivitis with erythema and some drainage. Mild puffy upper and lower lids bilaterally.    Cardiovascular:      Rate and Rhythm: Normal rate and regular rhythm.      Heart sounds: Normal heart sounds. No murmur heard.    No friction rub. No gallop.   Pulmonary:      Effort: Pulmonary effort is normal. No respiratory distress.      Breath sounds: Normal breath sounds. No wheezing or rales.   Chest:      Chest wall: No tenderness.   Musculoskeletal:         General: Normal range of motion.      Cervical back: Normal range of motion and neck supple.   Skin:     General: Skin is warm and dry.      Coloration: Skin is not pale.      Findings: No erythema or rash.   Neurological:      Mental Status: She is alert and oriented to person, place, and time.   Psychiatric:         Behavior: Behavior normal.      Result Review :  The following data was reviewed by: LANIE Rich on 06/06/2023:  Hemoglobin A1C   Date Value Ref Range Status   04/18/2023 6.80 (H) 4.80 - 5.60 % Final     Comment:     Hemoglobin A1C Ranges:  Increased Risk for Diabetes  5.7% to 6.4%  Diabetes                     >= 6.5%  Diabetic Goal                < 7.0%     06/22/2022 7.40 (H) 4.80 - 5.60 % Final     TSH   Date Value Ref Range Status   04/18/2023 3.290 0.270 - 4.200 uIU/mL Final   03/28/2023 2.610 0.270 - 4.200 uIU/mL Final     HDL Cholesterol   Date Value Ref Range Status   04/18/2023 29 (L) 40 - 60 mg/dL Final   06/22/2022 32 (L) 40 - 60 mg/dL Final     LDL Chol Calc (NIH)   Date Value Ref Range Status   04/18/2023 40 0 - 100 mg/dL Final      Triglycerides   Date Value Ref Range Status   04/18/2023 262 (H) 0 - 150 mg/dL Final   06/22/2022 302 (H) 0 - 150 mg/dL Final     Total Cholesterol   Date Value Ref Range Status   04/18/2023 110 0 - 200 mg/dL Final                   Assessment / Plan         Assessment   Diagnoses and all orders for this visit:    1. Bacterial conjunctivitis of both eyes (Primary)  2. Upper respiratory tract infection, unspecified type  3. Chronic allergic rhinitis  Encouraged use of warm compresses for 15 to 20 minutes 3 times a day.  Add ofloxacin ophthalmic solution 1 drop both eyes 4 times daily x7 days.  Continue xyzal, refill supplied.   Appears to be developing some early otitis media bilaterally.  She reports previously tolerating cefdinir.  Add cefdinir 300 mg twice daily x10 days.  Return to clinic if no improvement noted or if symptoms are worsening.   -     levocetirizine (XYZAL) 5 MG tablet; Take 1 tablet by mouth Every Evening.  Dispense: 30 tablet; Refill: 1  -     ofloxacin (Ocuflox) 0.3 % ophthalmic solution; Administer 1 drop to both eyes 4 (Four) Times a Day for 7 days.  Dispense: 5 mL; Refill: 0  -     cefdinir (OMNICEF) 300 MG capsule; Take 1 capsule by mouth 2 (Two) Times a Day for 10 days.  Dispense: 20 capsule; Refill: 0     New Medications Ordered This Visit   Medications    ofloxacin (Ocuflox) 0.3 % ophthalmic solution     Sig: Administer 1 drop to both eyes 4 (Four) Times a Day for 7 days.     Dispense:  5 mL     Refill:  0    cefdinir (OMNICEF) 300 MG capsule     Sig: Take 1 capsule by mouth 2 (Two) Times a Day for 10 days.     Dispense:  20 capsule     Refill:  0    levocetirizine (XYZAL) 5 MG tablet     Sig: Take 1 tablet by mouth Every Evening.     Dispense:  30 tablet     Refill:  1     This prescription was filled on 2/28/2023. Any refills authorized will be placed on file.       Follow Up   Return if symptoms worsen or fail to improve.    Patient was given instructions and counseling regarding  her condition or for health maintenance advice. Please see specific information pulled into the AVS if appropriate.       This document has been electronically signed by LANIE Rich   June 6, 2023 15:03 EDT    Dictated Utilizing Dragon Dictation: Part of this note may be an electronic transcription/translation of spoken language to printed text using the Dragon Dictation System.

## 2023-06-11 DIAGNOSIS — M85.89 OSTEOPENIA OF MULTIPLE SITES: ICD-10-CM

## 2023-06-12 RX ORDER — ALENDRONATE SODIUM 35 MG/1
TABLET ORAL
Qty: 4 TABLET | Refills: 3 | Status: SHIPPED | OUTPATIENT
Start: 2023-06-12

## 2023-06-13 ENCOUNTER — INFUSION (OUTPATIENT)
Dept: ONCOLOGY | Facility: HOSPITAL | Age: 48
End: 2023-06-13
Payer: MEDICARE

## 2023-06-13 ENCOUNTER — TELEPHONE (OUTPATIENT)
Dept: FAMILY MEDICINE CLINIC | Facility: CLINIC | Age: 48
End: 2023-06-13

## 2023-06-13 VITALS
DIASTOLIC BLOOD PRESSURE: 60 MMHG | OXYGEN SATURATION: 97 % | RESPIRATION RATE: 18 BRPM | TEMPERATURE: 97.5 F | SYSTOLIC BLOOD PRESSURE: 100 MMHG | HEART RATE: 90 BPM

## 2023-06-13 DIAGNOSIS — Z95.828 PORT-A-CATH IN PLACE: Primary | ICD-10-CM

## 2023-06-13 DIAGNOSIS — R23.2 HOT FLASHES: ICD-10-CM

## 2023-06-13 DIAGNOSIS — F42.8 OTHER OBSESSIVE-COMPULSIVE DISORDERS: Chronic | ICD-10-CM

## 2023-06-13 DIAGNOSIS — E53.8 VITAMIN B 12 DEFICIENCY: ICD-10-CM

## 2023-06-13 DIAGNOSIS — F33.1 MODERATE EPISODE OF RECURRENT MAJOR DEPRESSIVE DISORDER: ICD-10-CM

## 2023-06-13 DIAGNOSIS — F41.1 GENERALIZED ANXIETY DISORDER: ICD-10-CM

## 2023-06-13 DIAGNOSIS — M54.81 OCCIPITAL NEURALGIA OF RIGHT SIDE: Chronic | ICD-10-CM

## 2023-06-13 DIAGNOSIS — I65.23 ATHEROSCLEROSIS OF BOTH CAROTID ARTERIES: Chronic | ICD-10-CM

## 2023-06-13 PROCEDURE — 25010000002 HEPARIN LOCK FLUSH PER 10 UNITS: Performed by: INTERNAL MEDICINE

## 2023-06-13 PROCEDURE — 96523 IRRIG DRUG DELIVERY DEVICE: CPT

## 2023-06-13 RX ORDER — VENLAFAXINE HYDROCHLORIDE 75 MG/1
CAPSULE, EXTENDED RELEASE ORAL
Qty: 30 CAPSULE | Refills: 1 | OUTPATIENT
Start: 2023-06-13

## 2023-06-13 RX ORDER — TOPIRAMATE 50 MG/1
TABLET, FILM COATED ORAL
Qty: 60 TABLET | Refills: 3 | Status: SHIPPED | OUTPATIENT
Start: 2023-06-13

## 2023-06-13 RX ORDER — CYANOCOBALAMIN 1000 UG/ML
INJECTION, SOLUTION INTRAMUSCULAR; SUBCUTANEOUS
Qty: 1 ML | Refills: 5 | Status: SHIPPED | OUTPATIENT
Start: 2023-06-13

## 2023-06-13 RX ORDER — CLONIDINE HYDROCHLORIDE 0.1 MG/1
TABLET ORAL
Qty: 60 TABLET | Refills: 5 | Status: SHIPPED | OUTPATIENT
Start: 2023-06-13

## 2023-06-13 RX ORDER — ASPIRIN 81 MG/1
TABLET, COATED ORAL
Qty: 60 TABLET | Refills: 4 | Status: SHIPPED | OUTPATIENT
Start: 2023-06-13

## 2023-06-13 RX ORDER — SODIUM CHLORIDE 0.9 % (FLUSH) 0.9 %
10 SYRINGE (ML) INJECTION AS NEEDED
Status: DISCONTINUED | OUTPATIENT
Start: 2023-06-13 | End: 2023-06-13 | Stop reason: HOSPADM

## 2023-06-13 RX ORDER — SODIUM CHLORIDE 0.9 % (FLUSH) 0.9 %
10 SYRINGE (ML) INJECTION AS NEEDED
OUTPATIENT
Start: 2023-06-13

## 2023-06-13 RX ORDER — HEPARIN SODIUM (PORCINE) LOCK FLUSH IV SOLN 100 UNIT/ML 100 UNIT/ML
500 SOLUTION INTRAVENOUS AS NEEDED
Status: DISCONTINUED | OUTPATIENT
Start: 2023-06-13 | End: 2023-06-13 | Stop reason: HOSPADM

## 2023-06-13 RX ORDER — HEPARIN SODIUM (PORCINE) LOCK FLUSH IV SOLN 100 UNIT/ML 100 UNIT/ML
500 SOLUTION INTRAVENOUS AS NEEDED
OUTPATIENT
Start: 2023-06-13

## 2023-06-13 RX ADMIN — Medication 500 UNITS: at 14:40

## 2023-06-13 RX ADMIN — Medication 10 ML: at 14:40

## 2023-06-13 NOTE — TELEPHONE ENCOUNTER
Caller: Nivia Bernstein    Relationship: Self    Best call back number: [phone]    What is the best time to reach you:ANYTIME    Who are you requesting to speak with (clinical staff, provider,  specific staff member): PROVIDER OR CLINICAL STAFF    What was the call regarding: PATIENT STATES THAT SHE IS HAVING ISSUES GETTING HER INSULIN SUPPLIES AND IS REQUESTING SOMEONE TO GET IN CONTACT WITH [facility] TO FIND A PLACE. PLEASE ADVISE

## 2023-07-12 PROBLEM — M54.81 OCCIPITAL NEURALGIA OF RIGHT SIDE: Status: RESOLVED | Noted: 2021-05-27 | Resolved: 2023-07-12

## 2023-07-20 DIAGNOSIS — M25.512 CHRONIC PAIN OF BOTH SHOULDERS: ICD-10-CM

## 2023-07-20 DIAGNOSIS — G89.29 CHRONIC PAIN OF BOTH SHOULDERS: ICD-10-CM

## 2023-07-20 DIAGNOSIS — M19.011 PRIMARY OSTEOARTHRITIS OF RIGHT SHOULDER: ICD-10-CM

## 2023-07-20 DIAGNOSIS — F33.1 MODERATE EPISODE OF RECURRENT MAJOR DEPRESSIVE DISORDER: ICD-10-CM

## 2023-07-20 DIAGNOSIS — F41.1 GENERALIZED ANXIETY DISORDER: ICD-10-CM

## 2023-07-20 DIAGNOSIS — G89.29 CHRONIC NECK PAIN: ICD-10-CM

## 2023-07-20 DIAGNOSIS — M85.80 OSTEOPENIA, UNSPECIFIED LOCATION: ICD-10-CM

## 2023-07-20 DIAGNOSIS — M54.2 CHRONIC NECK PAIN: ICD-10-CM

## 2023-07-20 DIAGNOSIS — M25.511 CHRONIC PAIN OF BOTH SHOULDERS: ICD-10-CM

## 2023-07-20 RX ORDER — IMIPRAMINE HYDROCHLORIDE 10 MG/1
TABLET, FILM COATED ORAL
Qty: 30 TABLET | Refills: 0 | OUTPATIENT
Start: 2023-07-20

## 2023-07-20 NOTE — TELEPHONE ENCOUNTER
Please call and find out if patient is still taking this medication. Chart is showing PCP discontinued it.

## 2023-07-24 ENCOUNTER — OFFICE VISIT (OUTPATIENT)
Dept: PSYCHIATRY | Facility: CLINIC | Age: 48
End: 2023-07-24
Payer: MEDICARE

## 2023-07-24 VITALS — BODY MASS INDEX: 33.8 KG/M2 | OXYGEN SATURATION: 97 % | WEIGHT: 197 LBS | HEART RATE: 78 BPM

## 2023-07-24 DIAGNOSIS — Z79.899 HIGH RISK MEDICATION USE: ICD-10-CM

## 2023-07-24 DIAGNOSIS — F41.1 GENERALIZED ANXIETY DISORDER: Primary | ICD-10-CM

## 2023-07-24 DIAGNOSIS — F33.1 MODERATE EPISODE OF RECURRENT MAJOR DEPRESSIVE DISORDER: ICD-10-CM

## 2023-07-24 DIAGNOSIS — F42.8 OTHER OBSESSIVE-COMPULSIVE DISORDERS: Chronic | ICD-10-CM

## 2023-07-24 PROCEDURE — 99214 OFFICE O/P EST MOD 30 MIN: CPT | Performed by: NURSE PRACTITIONER

## 2023-07-24 PROCEDURE — 1159F MED LIST DOCD IN RCRD: CPT | Performed by: NURSE PRACTITIONER

## 2023-07-24 PROCEDURE — 1160F RVW MEDS BY RX/DR IN RCRD: CPT | Performed by: NURSE PRACTITIONER

## 2023-07-24 RX ORDER — ALPRAZOLAM 0.5 MG/1
0.5 TABLET ORAL 3 TIMES DAILY PRN
Qty: 90 TABLET | Refills: 0 | Status: SHIPPED | OUTPATIENT
Start: 2023-07-24

## 2023-07-24 RX ORDER — VENLAFAXINE HYDROCHLORIDE 37.5 MG/1
37.5 CAPSULE, EXTENDED RELEASE ORAL DAILY
Qty: 30 CAPSULE | Refills: 1 | Status: SHIPPED | OUTPATIENT
Start: 2023-07-24

## 2023-07-24 RX ORDER — VENLAFAXINE HYDROCHLORIDE 150 MG/1
150 CAPSULE, EXTENDED RELEASE ORAL DAILY
Qty: 30 CAPSULE | Refills: 1 | Status: SHIPPED | OUTPATIENT
Start: 2023-07-24

## 2023-07-24 NOTE — PROGRESS NOTES
"      Raquel Bernstein is a 48 y.o. female is here today for medication management follow-up.    Chief Complaint:  anxiety depression     History of Present Illness:    Patient presents today for follow up for medication management for depression and anxiety. Patient states she wasn't able to take the imipramine due to causing chest pains in the middle of the night. Patient states took for about a week then stopped it. Patient states still not moved into house and will probably be another month. Patient states staying irritable and everything setting her off. Patient reports currently depression is at a 10 on a 0-10 scale with 10 being the worst. Patient states irritable all the time and can't stand self. Patient reports anxiety is at a 10 on a 0-10 scale with 10 being the worst. Patient reports almost daily panic attacks. Patient states last panic attack was yesterday and it lasted a while. Patient states getting to the point having chest pains and feel like going to stroke out. Patient states \"fair amount\" of sleep but nerves are too high. Patient reports sleeping at least 4-5 hours a night and patient reports still having nightmares. Patient reports appetite is good and eating something at least twice a day. Patient denies any auditory or visual hallucinations. Patient adamantly denies any thoughts to harm self or others. Patient denies any other side effects to medications. Patient denies any medical changes since last visit.   The following portions of the patient's history were reviewed and updated as appropriate: allergies, current medications, past family history, past medical history, past social history, past surgical history, and problem list.    Review of Systems   Constitutional: Negative.    Respiratory: Negative.     Cardiovascular: Negative.    Gastrointestinal: Negative.    Neurological: Negative.    Psychiatric/Behavioral:  Positive for agitation, dysphoric mood and sleep disturbance. The " patient is nervous/anxious.      Objective   Physical Exam  Vitals reviewed.   Constitutional:       Appearance: Normal appearance. She is well-developed and well-groomed.   Neurological:      Mental Status: She is alert.   Psychiatric:         Attention and Perception: Attention and perception normal.         Mood and Affect: Affect normal. Mood is anxious.         Speech: Speech normal.         Behavior: Behavior normal. Behavior is cooperative.         Thought Content: Thought content normal.         Cognition and Memory: Cognition and memory normal.         Judgment: Judgment normal.     Pulse 78, weight 89.4 kg (197 lb), SpO2 97 %, not currently breastfeeding.Body mass index is 33.8 kg/m².    Allergies   Allergen Reactions    Imipramine Other (See Comments)     Chest pain    Mobic [Meloxicam] Rash    Penicillins Angioedema    Taxotere [Docetaxel] Anaphylaxis     9 minutes into 1st infusion    Novolog [Insulin Aspart] Hives    Rosuvastatin Calcium GI Intolerance       Medication List:   Current Outpatient Medications   Medication Sig Dispense Refill    ALPRAZolam (XANAX) 0.5 MG tablet Take 1 tablet by mouth 3 (Three) Times a Day As Needed for Anxiety. for anxiety 90 tablet 0    venlafaxine XR (EFFEXOR-XR) 150 MG 24 hr capsule Take 1 capsule by mouth Daily. With the Effexor XR 37.5mg capsule for a total daily dose of 187.5mg 30 capsule 1    albuterol sulfate  (90 Base) MCG/ACT inhaler INHALE 2 PUFFS BY MOUTH EVERY 4 HOURS AS NEEDED FOR SHORTNESS OF BREATH 18 g 5    alendronate (FOSAMAX) 35 MG tablet TAKE ONE TABLET BY MOUTH every 7 days. take with water 30 minutes before first food/drink/medication. avoid lying down for 30 minutes after taking 4 tablet 3    Aspirin Low Dose 81 MG EC tablet TAKE TWO TABLETS BY MOUTH EVERY DAY 60 tablet 4    atorvastatin (LIPITOR) 40 MG tablet TAKE ONE TABLET BY MOUTH EVERY night 90 tablet 1    azelastine (ASTELIN) 0.1 % nasal spray Use in each nostril as directed 30 mL 3     cloNIDine (CATAPRES) 0.1 MG tablet TAKE ONE TABLET BY MOUTH TWICE DAILY 60 tablet 5    cyanocobalamin 1000 MCG/ML injection INJECT 1ML INTO TO APPROPRAITE MUSCLE EVERY 30 DAYS 1 mL 5    Diclofenac Sodium (VOLTAREN) 1 % gel gel APPLY 4 GRAMS TO THE AFFECTED AREA FOUR TIMES A  g 3    Emgality 120 MG/ML auto-injector pen INJECT 120mg ONCE MONTHLY      Empagliflozin-metFORMIN HCl (Synjardy) 12.5-1000 MG tablet Take 1 tablet by mouth Every Morning. 30 tablet 0    Fluticasone Furoate-Vilanterol (Breo Ellipta) 200-25 MCG/ACT inhaler Inhale 1 puff Daily. 1 each 3    furosemide (LASIX) 20 MG tablet TAKE ONE TABLET BY MOUTH EVERY DAY AS NEEDED FOR EDEMA 30 tablet 1    gabapentin (NEURONTIN) 100 MG capsule TAKE ONE CAPSULE BY MOUTH TWICE DAILY 60 capsule 1    Insulin Lispro (humaLOG) 100 UNIT/ML injection INJECT AS DIRECTED.MAX DAILY DOSE  UNITS DAILY 40 mL 3    ipratropium-albuterol (DUO-NEB) 0.5-2.5 mg/3 ml nebulizer Take 3 mL by nebulization Every 4 (Four) Hours As Needed for Shortness of Air. 150 mL 1    lansoprazole (PREVACID) 30 MG capsule TAKE 1 CAPSULE BY MOUTH DAILY. 90 capsule 0    levocetirizine (XYZAL) 5 MG tablet TAKE 1 TABLET BY MOUTH EVERY EVENING. 30 tablet 3    Linzess 145 MCG capsule capsule TAKE 1 CAPSULE BY MOUTH DAILY. 30 MINUTES BEFORE MEALS ON AN EMPTY STOMACH. 30 capsule 3    ondansetron (ZOFRAN) 8 MG tablet TAKE 1 TABLET BY MOUTH EVERY 8 HOURS AS NEEDED FOR NAUSEA OR VOMITING 30 tablet 3    Respiratory Therapy Supplies (Nebulizer/Tubing/Mouthpiece) kit 1 each Take As Directed. 1 each 1    Semaglutide, 2 MG/DOSE, (Ozempic, 2 MG/DOSE,) 8 MG/3ML solution pen-injector Inject 2 mg under the skin into the appropriate area as directed Every 7 (Seven) Days. 3 mL 2    tamoxifen (NOLVADEX) 20 MG chemo tablet Take 1 tablet by mouth Daily. 30 tablet 11    topiramate (TOPAMAX) 50 MG tablet TAKE ONE TABLET BY MOUTH TWICE DAILY 60 tablet 3    ubrogepant 100 MG tablet       venlafaxine XR (Effexor XR)  37.5 MG 24 hr capsule Take 1 capsule by mouth Daily. With the Effexor XR 150mg capsule for a total daily dose of 187.5mg 30 capsule 1    vitamin D (ERGOCALCIFEROL) 1.25 MG (86956 UT) capsule capsule TAKE 1 CAPSULE BY MOUTH ONCE WEEKLY 4 capsule 3     No current facility-administered medications for this visit.       Mental Status Exam:   Hygiene:   good  Cooperation:  Cooperative  Eye Contact:  Fair  Psychomotor Behavior:  Appropriate  Affect:  Appropriate  Hopelessness: Denies  Speech:  Normal  Thought Process:  Goal directed and Linear  Thought Content:  Normal  Suicidal:  None  Homicidal:  None  Hallucinations:  None  Delusion:  None  Memory:  Intact  Orientation:  Person, Place, Time, and Situation  Reliability:  fair  Insight:  Fair  Judgement:  Fair  Impulse Control:  Fair  Physical/Medical Issues:  No                 Assessment & Plan   Diagnoses and all orders for this visit:    1. Generalized anxiety disorder (Primary)  -     venlafaxine XR (EFFEXOR-XR) 150 MG 24 hr capsule; Take 1 capsule by mouth Daily. With the Effexor XR 37.5mg capsule for a total daily dose of 187.5mg  Dispense: 30 capsule; Refill: 1  -     venlafaxine XR (Effexor XR) 37.5 MG 24 hr capsule; Take 1 capsule by mouth Daily. With the Effexor XR 150mg capsule for a total daily dose of 187.5mg  Dispense: 30 capsule; Refill: 1  -     ALPRAZolam (XANAX) 0.5 MG tablet; Take 1 tablet by mouth 3 (Three) Times a Day As Needed for Anxiety. for anxiety  Dispense: 90 tablet; Refill: 0    2. Moderate episode of recurrent major depressive disorder  -     venlafaxine XR (EFFEXOR-XR) 150 MG 24 hr capsule; Take 1 capsule by mouth Daily. With the Effexor XR 37.5mg capsule for a total daily dose of 187.5mg  Dispense: 30 capsule; Refill: 1  -     venlafaxine XR (Effexor XR) 37.5 MG 24 hr capsule; Take 1 capsule by mouth Daily. With the Effexor XR 150mg capsule for a total daily dose of 187.5mg  Dispense: 30 capsule; Refill: 1    3. Other obsessive-compulsive  disorders  -     venlafaxine XR (EFFEXOR-XR) 150 MG 24 hr capsule; Take 1 capsule by mouth Daily. With the Effexor XR 37.5mg capsule for a total daily dose of 187.5mg  Dispense: 30 capsule; Refill: 1  -     ALPRAZolam (XANAX) 0.5 MG tablet; Take 1 tablet by mouth 3 (Three) Times a Day As Needed for Anxiety. for anxiety  Dispense: 90 tablet; Refill: 0    4. High risk medication use  -     Urine Drug Screen - Urine, Clean Catch; Future            Discussed medication options with patient and .  Start Effexor XR 37.5 mg daily for worsening depression and anxiety.  Discontinue imipramine.  Continue Effexor  mg daily for depression and anxiety.  Continue alprazolam 0.5 mg 3 times a day as needed for anxiety.  Reviewed the risks, benefits, and side effects of the medications; patient and  acknowledged and verbally consented. Patient is being prescribed a controlled substance as part of treatment plan. Patient has been educated of appropriate use of the medications, including risk of somnolence, limited ability to drive and/or work safely, and potential for dependence, respiratory depression and overdose. Patient is also informed that the medication are to be used by the patient only- avoid any combined use of ETOH or other substances unless prescribed.   UDS ordered  [facility] Patient Controlled Substance Report (from 7/24/2022 to 7/24/2023)    Dispensed  Strength Quantity Days Supply Provider Pharmacy   06/30/2023 Alprazolam 0.5MG 90 each 30 [facility]   06/22/2023 Gabapentin 100MG 60 each 30 [facility]   05/31/2023 Alprazolam 0.5MG 60 each 20 [facility]   05/24/2023 Gabapentin 100MG 60 each 30 [facility]   05/01/2023 Alprazolam 0.5MG 60 each 20 [facility]   04/26/2023 Gabapentin 100MG 60 each 30 [facility]   04/21/2023  Alprazolam 0.5MG 30 each 15 [facility]   03/29/2023 Gabapentin 100MG 60 each 30 [facility]INTON[facility]   03/29/2023 Lorazepam 1MG 90 each 30 [facility]   02/28/2023 Gabapentin 100MG 60 each 30 [facility]   02/28/2023 Lorazepam 1MG 90 each 30 [facility]   02/02/2023 Gabapentin 100MG 60 each 30 [facility]   02/02/2023 Lorazepam 1MG 90 each 30 [facility]   01/05/2023 Gabapentin 100MG 60 each 30 [facility]   01/05/2023 Lorazepam 1MG 90 each 30 [facility]   12/09/2022 Gabapentin 100MG 60 each 30 [facility]   11/23/2022 Lorazepam 1MG 90 each 30 [facility]   11/05/2022 Gabapentin 100MG 60 each 30 [facility]   10/25/2022 Lorazepam 1MG 90 each 30 [facility]   10/06/2022 Gabapentin 100MG 60 each 30 [facility]   10/03/2022 Hydrocodone/Acetaminophen 325MG/7.5MG 12 each 2 [facility]   09/28/2022 Lorazepam 1MG 90 each 30 [facility]   08/30/2022 Gabapentin 100MG 60 each 30 [facility]ON[facility]   08/30/2022 Lorazepam 1MG 90 each 30 [facility]   08/02/2022 Gabapentin 100MG 60 each 30 [facility]         Disclaimer    *The information in this report is based upon Schedule II through V controlled substance records reported by dispensers. Data should appear on [facility] reports within two to three business days after dispensing.   *The records listed in the report are based on the patient identification information entered by the report requestor, and if not sufficiently unique may result in the report including records for  multiple patients. Please verify the information in the report by contacting the prescribers and/or dispensers listed.   *If the controlled substance records on this report appear to be in error, the patient or provider should contact the dispenser to determine if the information was reported accurately. If the dispenser certifies the information was reported accurately, the dispenser can contact the Drug Enforcement and Professional Practices Branch at [phone] to investigate the error.   *The information in this report is intended for informational use only by the person authorized to request the report. Intentional disclosure of the report or data to someone not authorized to obtain the data is a Class B Misdemeanor.      Report Restrictions - A practitioner or pharmacist may share the report with the patient or person authorized to act on the patient's behalf and place the report in the patient's medical record, with the report then being deemed a medical record subject to the same disclosure terms and conditions as an ordinary medical record. ([place]RS 218[place].202)        Patient and  is agreeable to call the office with any questions, concerns, or worsening of symptoms. Patient is aware to call 911 or go to the nearest ER should begin having SI/HI.          Follow-up in 4 weeks          Errors in dictation may reflect use of voice recognition software and not all errors in transcription may have been detected prior to signing.              This document has been electronically signed by NIDA Vasquez   July 24, 2023 15:03 EDT

## 2023-07-25 DIAGNOSIS — E11.42 DM TYPE 2 WITH DIABETIC PERIPHERAL NEUROPATHY: Primary | ICD-10-CM

## 2023-07-26 ENCOUNTER — INFUSION (OUTPATIENT)
Dept: ONCOLOGY | Facility: HOSPITAL | Age: 48
End: 2023-07-26
Payer: MEDICARE

## 2023-07-26 VITALS
RESPIRATION RATE: 18 BRPM | OXYGEN SATURATION: 98 % | DIASTOLIC BLOOD PRESSURE: 67 MMHG | SYSTOLIC BLOOD PRESSURE: 100 MMHG | WEIGHT: 198.9 LBS | HEART RATE: 87 BPM | BODY MASS INDEX: 34.12 KG/M2 | TEMPERATURE: 96.8 F

## 2023-07-26 DIAGNOSIS — Z95.828 PORT-A-CATH IN PLACE: Primary | ICD-10-CM

## 2023-07-26 PROCEDURE — 25010000002 HEPARIN LOCK FLUSH PER 10 UNITS: Performed by: INTERNAL MEDICINE

## 2023-07-26 PROCEDURE — 96523 IRRIG DRUG DELIVERY DEVICE: CPT

## 2023-07-26 RX ORDER — HEPARIN SODIUM (PORCINE) LOCK FLUSH IV SOLN 100 UNIT/ML 100 UNIT/ML
500 SOLUTION INTRAVENOUS AS NEEDED
Status: DISCONTINUED | OUTPATIENT
Start: 2023-07-26 | End: 2023-07-26 | Stop reason: HOSPADM

## 2023-07-26 RX ORDER — SODIUM CHLORIDE 0.9 % (FLUSH) 0.9 %
10 SYRINGE (ML) INJECTION AS NEEDED
Status: DISCONTINUED | OUTPATIENT
Start: 2023-07-26 | End: 2023-07-26 | Stop reason: HOSPADM

## 2023-07-26 RX ORDER — HEPARIN SODIUM (PORCINE) LOCK FLUSH IV SOLN 100 UNIT/ML 100 UNIT/ML
500 SOLUTION INTRAVENOUS AS NEEDED
OUTPATIENT
Start: 2023-07-26

## 2023-07-26 RX ORDER — SODIUM CHLORIDE 0.9 % (FLUSH) 0.9 %
10 SYRINGE (ML) INJECTION AS NEEDED
OUTPATIENT
Start: 2023-07-26

## 2023-07-26 RX ADMIN — Medication 500 UNITS: at 14:00

## 2023-07-26 RX ADMIN — Medication 10 ML: at 14:00

## 2023-07-27 ENCOUNTER — CLINICAL SUPPORT (OUTPATIENT)
Dept: FAMILY MEDICINE CLINIC | Facility: CLINIC | Age: 48
End: 2023-07-27
Payer: MEDICARE

## 2023-08-01 RX ORDER — CELECOXIB 200 MG/1
CAPSULE ORAL
Qty: 30 CAPSULE | Refills: 0 | OUTPATIENT
Start: 2023-08-01

## 2023-08-02 DIAGNOSIS — E11.42 DM TYPE 2 WITH DIABETIC PERIPHERAL NEUROPATHY: Primary | ICD-10-CM

## 2023-08-07 ENCOUNTER — LAB (OUTPATIENT)
Dept: FAMILY MEDICINE CLINIC | Facility: CLINIC | Age: 48
End: 2023-08-07
Payer: MEDICARE

## 2023-08-07 DIAGNOSIS — E78.5 DYSLIPIDEMIA: Chronic | ICD-10-CM

## 2023-08-07 DIAGNOSIS — E55.9 VITAMIN D DEFICIENCY: ICD-10-CM

## 2023-08-07 DIAGNOSIS — E11.42 DM TYPE 2 WITH DIABETIC PERIPHERAL NEUROPATHY: Chronic | ICD-10-CM

## 2023-08-07 DIAGNOSIS — M85.89 OSTEOPENIA OF MULTIPLE SITES: ICD-10-CM

## 2023-08-07 DIAGNOSIS — E11.42 DM TYPE 2 WITH DIABETIC PERIPHERAL NEUROPATHY: ICD-10-CM

## 2023-08-07 PROCEDURE — 84681 ASSAY OF C-PEPTIDE: CPT | Performed by: NURSE PRACTITIONER

## 2023-08-07 PROCEDURE — 82607 VITAMIN B-12: CPT | Performed by: NURSE PRACTITIONER

## 2023-08-07 PROCEDURE — 84550 ASSAY OF BLOOD/URIC ACID: CPT | Performed by: NURSE PRACTITIONER

## 2023-08-07 PROCEDURE — 80053 COMPREHEN METABOLIC PANEL: CPT | Performed by: NURSE PRACTITIONER

## 2023-08-07 PROCEDURE — 84443 ASSAY THYROID STIM HORMONE: CPT | Performed by: NURSE PRACTITIONER

## 2023-08-07 PROCEDURE — 86337 INSULIN ANTIBODIES: CPT | Performed by: NURSE PRACTITIONER

## 2023-08-07 PROCEDURE — 80061 LIPID PANEL: CPT | Performed by: NURSE PRACTITIONER

## 2023-08-07 PROCEDURE — 82306 VITAMIN D 25 HYDROXY: CPT | Performed by: NURSE PRACTITIONER

## 2023-08-07 PROCEDURE — 86341 ISLET CELL ANTIBODY: CPT | Performed by: NURSE PRACTITIONER

## 2023-08-07 PROCEDURE — 36415 COLL VENOUS BLD VENIPUNCTURE: CPT | Performed by: NURSE PRACTITIONER

## 2023-08-07 PROCEDURE — 83036 HEMOGLOBIN GLYCOSYLATED A1C: CPT | Performed by: NURSE PRACTITIONER

## 2023-08-08 LAB
25(OH)D3 SERPL-MCNC: 71 NG/ML (ref 30–100)
ALBUMIN SERPL-MCNC: 4.4 G/DL (ref 3.5–5.2)
ALBUMIN/GLOB SERPL: 1.5 G/DL
ALP SERPL-CCNC: 80 U/L (ref 39–117)
ALT SERPL W P-5'-P-CCNC: 12 U/L (ref 1–33)
ANION GAP SERPL CALCULATED.3IONS-SCNC: 10 MMOL/L (ref 5–15)
AST SERPL-CCNC: 14 U/L (ref 1–32)
BILIRUB SERPL-MCNC: 0.4 MG/DL (ref 0–1.2)
BUN SERPL-MCNC: 12 MG/DL (ref 6–20)
BUN/CREAT SERPL: 13.2 (ref 7–25)
C PEPTIDE SERPL-MCNC: 0.6 NG/ML (ref 1.1–4.4)
CALCIUM SPEC-SCNC: 9.4 MG/DL (ref 8.6–10.5)
CHLORIDE SERPL-SCNC: 111 MMOL/L (ref 98–107)
CHOLEST SERPL-MCNC: 110 MG/DL (ref 0–200)
CO2 SERPL-SCNC: 22 MMOL/L (ref 22–29)
CREAT SERPL-MCNC: 0.91 MG/DL (ref 0.57–1)
EGFRCR SERPLBLD CKD-EPI 2021: 77 ML/MIN/1.73
GLOBULIN UR ELPH-MCNC: 3 GM/DL
GLUCOSE SERPL-MCNC: 66 MG/DL (ref 65–99)
HBA1C MFR BLD: 5.2 % (ref 4.8–5.6)
HDLC SERPL-MCNC: 35 MG/DL (ref 40–60)
LDLC SERPL CALC-MCNC: 48 MG/DL (ref 0–100)
LDLC/HDLC SERPL: 1.25 {RATIO}
POTASSIUM SERPL-SCNC: 4.1 MMOL/L (ref 3.5–5.2)
PROT SERPL-MCNC: 7.4 G/DL (ref 6–8.5)
SODIUM SERPL-SCNC: 143 MMOL/L (ref 136–145)
TRIGL SERPL-MCNC: 156 MG/DL (ref 0–150)
TSH SERPL DL<=0.05 MIU/L-ACNC: 3.13 UIU/ML (ref 0.27–4.2)
URATE SERPL-MCNC: 3.9 MG/DL (ref 2.4–5.7)
VIT B12 BLD-MCNC: 371 PG/ML (ref 211–946)
VLDLC SERPL-MCNC: 27 MG/DL (ref 5–40)

## 2023-08-10 DIAGNOSIS — E11.42 DM TYPE 2 WITH DIABETIC PERIPHERAL NEUROPATHY: Chronic | ICD-10-CM

## 2023-08-10 LAB — GAD65 AB SER IA-ACNC: <5 U/ML (ref 0–5)

## 2023-08-10 RX ORDER — EMPAGLIFLOZIN AND METFORMIN HYDROCHLORIDE 12.5; 1 MG/1; MG/1
TABLET ORAL
Qty: 30 TABLET | Refills: 0 | Status: SHIPPED | OUTPATIENT
Start: 2023-08-10

## 2023-08-12 LAB — INSULIN AB SER-ACNC: <5 UU/ML

## 2023-08-13 DIAGNOSIS — E11.42 DM TYPE 2 WITH DIABETIC PERIPHERAL NEUROPATHY: ICD-10-CM

## 2023-08-14 RX ORDER — SEMAGLUTIDE 1.34 MG/ML
INJECTION, SOLUTION SUBCUTANEOUS
Qty: 3 ML | Refills: 0 | Status: SHIPPED | OUTPATIENT
Start: 2023-08-14

## 2023-08-16 DIAGNOSIS — R11.0 NAUSEA: ICD-10-CM

## 2023-08-16 RX ORDER — ONDANSETRON HYDROCHLORIDE 8 MG/1
TABLET, FILM COATED ORAL
Qty: 30 TABLET | Refills: 3 | Status: SHIPPED | OUTPATIENT
Start: 2023-08-16

## 2023-08-21 ENCOUNTER — INFUSION (OUTPATIENT)
Dept: ONCOLOGY | Facility: HOSPITAL | Age: 48
End: 2023-08-21
Payer: MEDICARE

## 2023-08-21 VITALS
DIASTOLIC BLOOD PRESSURE: 63 MMHG | HEART RATE: 90 BPM | TEMPERATURE: 97.5 F | SYSTOLIC BLOOD PRESSURE: 96 MMHG | RESPIRATION RATE: 18 BRPM | OXYGEN SATURATION: 98 %

## 2023-08-21 DIAGNOSIS — Z95.828 PORT-A-CATH IN PLACE: Primary | ICD-10-CM

## 2023-08-21 PROCEDURE — 25010000002 HEPARIN LOCK FLUSH PER 10 UNITS: Performed by: INTERNAL MEDICINE

## 2023-08-21 PROCEDURE — 96523 IRRIG DRUG DELIVERY DEVICE: CPT

## 2023-08-21 RX ORDER — HEPARIN SODIUM (PORCINE) LOCK FLUSH IV SOLN 100 UNIT/ML 100 UNIT/ML
500 SOLUTION INTRAVENOUS AS NEEDED
OUTPATIENT
Start: 2023-08-21

## 2023-08-21 RX ORDER — SODIUM CHLORIDE 0.9 % (FLUSH) 0.9 %
10 SYRINGE (ML) INJECTION AS NEEDED
Status: DISCONTINUED | OUTPATIENT
Start: 2023-08-21 | End: 2023-08-21 | Stop reason: HOSPADM

## 2023-08-21 RX ORDER — HEPARIN SODIUM (PORCINE) LOCK FLUSH IV SOLN 100 UNIT/ML 100 UNIT/ML
500 SOLUTION INTRAVENOUS AS NEEDED
Status: DISCONTINUED | OUTPATIENT
Start: 2023-08-21 | End: 2023-08-21 | Stop reason: HOSPADM

## 2023-08-21 RX ORDER — SODIUM CHLORIDE 0.9 % (FLUSH) 0.9 %
10 SYRINGE (ML) INJECTION AS NEEDED
OUTPATIENT
Start: 2023-08-21

## 2023-08-21 RX ADMIN — Medication 500 UNITS: at 15:45

## 2023-08-21 RX ADMIN — Medication 10 ML: at 15:45

## 2023-08-22 NOTE — THERAPY DISCHARGE NOTE
Acute Care - Physical Therapy Initial Eval/Discharge  [facility]     Patient Name: Lilo Bernstein  : 1975  MRN: [id number]  Today's Date: 3/20/2017   Onset of Illness/Injury or Date of Surgery Date: 17  Date of Referral to PT: 17  Referring Physician: rosita ALVA      Admit Date: 3/19/2017    Visit Dx:    ICD-10-CM ICD-9-CM   1. Acute right-sided weakness M62.89 728.87   2. Elevated glucose R73.09 790.29   3. Impaired mobility and ADLs Z74.09 799.89   4. Impaired functional mobility, balance, gait, and endurance Z74.09 V49.89     Patient Active Problem List   Diagnosis   • Acute right-sided weakness   • Type 2 diabetes mellitus   • Asthma   • Mitral valve prolapse   • GERD (gastroesophageal reflux disease)   • History of DVT (deep vein thrombosis)   • History of TIA (transient ischemic attack)     Past Medical History   Diagnosis Date   • Diabetes mellitus    • H/O blood clots    • Heart murmur    • Hyperlipidemia    • Mitral valve prolapse    • Neuropathy    • TIA (transient ischemic attack)      4 TIMES     Past Surgical History   Procedure Laterality Date   • Knee arthroplasty     • Hysterectomy     • Appendectomy     • Cholecystectomy     • Finger fusion            PT ASSESSMENT (last 72 hours)      PT Evaluation       17 1305 17 2107    Rehab Evaluation    Document Type discharge evaluation/summary  -SJ     Subjective Information no complaints;agree to therapy  -SJ     Patient Effort, Rehab Treatment good  -SJ     Symptoms Noted During/After Treatment none  -SJ     General Information    Patient Profile Review yes  -SJ     Onset of Illness/Injury or Date of Surgery Date 17  -SJ     Referring Physician rosita ALVA  -SJ     General Observations Pt supine in bed, asleep, wakes easily. Pt with tele,  present  -SJ     Pertinent History Of Current Problem 42 y.o.F flown to ED due to R-sided weakness and numbness. both CT and MRI (-) for acute changes. Concern for  somatoform disorder. Pt has hx of TIA's.  -SJ     Precautions/Limitations fall precautions;other (see comments)   wears knee brace to walk  -SJ     Prior Level of Function independent:;all household mobility;community mobility;gait;transfer;bed mobility;wound care;work;driving  -SJ     Equipment Currently Used at Home --   knee brace  -SJ     Plans/Goals Discussed With patient;spouse/S.O.;agreed upon  -SJ     Risks Reviewed patient:;spouse/S.O.:;LOB;dizziness;increased discomfort;change in vital signs  -SJ     Benefits Reviewed patient:;spouse/S.O.:;improve function;increase independence;increase strength;increase balance;increase knowledge  -SJ     Barriers to Rehab previous functional deficit   states it took 3mos to recover after last similar event  -SJ     Living Environment    Lives With spouse;child(romi), dependent  -SJ spouse;child(romi), dependent  -MS    Living Arrangements house  -SJ house  -MS    Home Accessibility no concerns;ramps present at home  -SJ no concerns  -MS    Stair Railings at Home  none  -MS    Type of Financial/Environmental Concern  none  -MS    Transportation Available  car  -MS    Clinical Impression    Date of Referral to PT 03/19/17  -SJ     PT Diagnosis impaired mobility  -SJ     Patient/Family Goals Statement return to work and home  -SJ     Criteria for Skilled Therapeutic Interventions Met yes;treatment indicated  -SJ     Pathology/Pathophysiology Noted (Describe Specifically for Each System) musculoskeletal;neuromuscular  -SJ     Impairments Found (describe specific impairments) gait, locomotion, and balance;muscle performance  -SJ     Functional Limitations in Following Categories (Describe Specific Limitations) self-care;home management;work  -SJ     Predicted Duration of Therapy Intervention (days/wks) 1x due to d/c this afternoon  -SJ     Vital Signs    Pre Systolic BP Rehab 126  -SJ     Pre Treatment Diastolic BP 92  -SJ     Post Systolic BP Rehab 109  -SJ     Post Treatment  Diastolic BP 70  -SJ     Pretreatment Heart Rate (beats/min) 108  -SJ     Posttreatment Heart Rate (beats/min) 91  -SJ     Pre SpO2 (%) 97  -SJ     O2 Delivery Pre Treatment room air  -SJ     Post SpO2 (%) 96  -SJ     O2 Delivery Post Treatment room air  -SJ     Pre Patient Position Supine  -SJ     Intra Patient Position Standing  -SJ     Post Patient Position Supine  -SJ     Pain Assessment    Pain Assessment No/denies pain  -     Cognitive Assessment/Intervention    Current Cognitive/Communication Assessment functional  -     Orientation Status oriented x 4  -SJ     Follows Commands/Answers Questions 100% of the time;able to follow multi-step instructions  -     Personal Safety WNL/WFL  -     Personal Safety Interventions gait belt;muscle strengthening facilitated;nonskid shoes/slippers when out of bed  -     ROM (Range of Motion)    General ROM no range of motion deficits identified  -     MMT (Manual Muscle Testing)    General MMT Assessment lower extremity strength deficits identified  -     General MMT Assessment Detail RLE grossly 4-/5, LLE grossly 5/5  -SJ     Bed Mobility, Assessment/Treatment    Bed Mobility, Assistive Device head of bed elevated;bed rails  -     Bed Mob, Supine to Sit, [place] supervision required  -SJ     Bed Mob, Sit to Supine, [place] supervision required  -SJ     Bed Mobility, Impairments coordination impaired;strength decreased  -     Bed Mobility, Comment no safety concerns  -     Transfer Assessment/Treatment    Transfers, Sit-Stand [place] supervision required  -SJ     Transfers, Stand-Sit [place] supervision required  -SJ     Transfer, Safety Issues weight-shifting ability decreased  -     Transfer, Impairments strength decreased;impaired balance  -     Gait Assessment/Treatment    Gait, [place] Level contact guard assist;verbal cues required  -     Gait, Assistive Device rolling walker  -     Gait, Distance (Feet) 180  -SJ      Gait, Gait Pattern Analysis swing-to gait  -     Gait, Gait Deviations martita decreased;right:;decreased heel strike;toe-to-floor clearance decreased  -     Gait, Safety Issues step length decreased;balance decreased during turns;weight-shifting ability decreased  -     Gait, Impairments strength decreased;impaired balance;coordination impaired  -     Gait, Comment Pt dragging RLE behind, unable to clear floor. Pt req several standing rests and 1 sitting rest break.  -     Motor Skills/Interventions    Additional Documentation Balance Skills Training (Group)  -     Balance Skills Training    Sitting-Level of Assistance Independent  -     Standing-Level of Assistance Close supervision  -     Static Standing Balance Support assistive device  -     Gait Balance-Level of Assistance Contact guard  -     Gait Balance Support assistive device  -     Positioning and Restraints    Pre-Treatment Position in bed  -     Post Treatment Position bed  -     In Bed supine;call light within reach;encouraged to call for assist;with family/caregiver;with other staff;SCD pump applied  -       03/19/17 2101       General Information    Equipment Currently Used at Home other (see comments)   Knee Brace  -MS       User Key  (r) = Recorded By, (t) = Taken By, (c) = Cosigned By    Initials Name Provider Type    KRISHNA Layne PT Physical Therapist    MS Maliha Leon, RN Registered Nurse          Physical Therapy Education     Title: PT OT SLP Therapies (Done)     Topic: Physical Therapy (Done)     Point: Mobility training (Done)    Learning Progress Summary    Learner Readiness Method Response Comment Documented by Status   Patient Acceptance E [facility] 03/20/17 1352 Done   Significant Other Acceptance E [facility] 03/20/17 1355 Done               Point: Body mechanics (Done)    Learning Progress Summary    Learner Readiness Method Response Comment Documented by Status   Patient Acceptance E [facility] 03/20/17 2695  Done   Significant Other Acceptance E [facility]   03/20/17 1358 Done                      User Key     Initials Effective Dates Name Provider Type Discipline     06/19/15 -  Doris Layne PT Physical Therapist PT                PT Recommendation and Plan  Anticipated Equipment Needs At Discharge: front wheeled walker  Anticipated Discharge Disposition: home with outpatient services  PT Frequency: evaluation only  Plan of Care Review  Plan Of Care Reviewed With: patient, spouse  Outcome Summary/Follow up Plan: PT eval completed. No goals established as pt to d/c home today. Pt dem RLE weakness and gait abnormality, limiting independence with functional moblity and return to work. Pt amb 180 ft with RW with CGA, needing several standing and sitting rest breaks. PT recommends d/c home with outpatient PT services as well as RW.             Time Calculation:         PT Charges       03/20/17 1359          Time Calculation    Start Time 1305  -      PT Received On 03/20/17  -        User Key  (r) = Recorded By, (t) = Taken By, (c) = Cosigned By    Initials Name Provider Type     Doris Layne PT Physical Therapist          Therapy Charges for Today     Code Description Service Date Service Provider Modifiers Qty    40770849913 HC PT MOBILITY CURRENT 3/20/2017 Doris Layne, PT GP, CJ 1    38730767824 HC PT MOBILITY PROJECTED 3/20/2017 Doris Layne PT GP, CJ 1    50718858965 HC PT MOBILITY DISCHARGE 3/20/2017 Doris Layne PT GP,  1    16688115915 HC PT EVAL MOD COMPLEXITY 3 3/20/2017 Doris Layne PT GP 1          PT G-Codes  Functional Limitation: Mobility: Walking and moving around  Mobility: Walking and Moving Around Current Status (): At least 20 percent but less than 40 percent impaired, limited or restricted  Mobility: Walking and Moving Around Goal Status (): At least 20 percent but less than 40 percent impaired, limited or restricted  Mobility: Walking and Moving Around Discharge  Status (): At least 20 percent but less than 40 percent impaired, limited or restricted    PT Discharge Summary  Anticipated Discharge Disposition: home with outpatient services    Doris Layne, PT  3/20/2017        Resident

## 2023-08-24 ENCOUNTER — OFFICE VISIT (OUTPATIENT)
Dept: PSYCHIATRY | Facility: CLINIC | Age: 48
End: 2023-08-24
Payer: MEDICARE

## 2023-08-24 VITALS
BODY MASS INDEX: 32.39 KG/M2 | WEIGHT: 188.8 LBS | HEART RATE: 57 BPM | DIASTOLIC BLOOD PRESSURE: 60 MMHG | SYSTOLIC BLOOD PRESSURE: 100 MMHG | OXYGEN SATURATION: 95 %

## 2023-08-24 DIAGNOSIS — F41.1 GENERALIZED ANXIETY DISORDER: ICD-10-CM

## 2023-08-24 DIAGNOSIS — F42.8 OTHER OBSESSIVE-COMPULSIVE DISORDERS: Chronic | ICD-10-CM

## 2023-08-24 DIAGNOSIS — F33.1 MODERATE EPISODE OF RECURRENT MAJOR DEPRESSIVE DISORDER: ICD-10-CM

## 2023-08-24 RX ORDER — VENLAFAXINE HYDROCHLORIDE 75 MG/1
225 CAPSULE, EXTENDED RELEASE ORAL DAILY
Qty: 90 CAPSULE | Refills: 1 | Status: SHIPPED | OUTPATIENT
Start: 2023-08-24

## 2023-08-24 RX ORDER — ALPRAZOLAM 1 MG/1
1 TABLET ORAL 2 TIMES DAILY PRN
Qty: 60 TABLET | Refills: 0 | Status: SHIPPED | OUTPATIENT
Start: 2023-08-24

## 2023-08-24 NOTE — PROGRESS NOTES
Subjective   Nivia Bernstein is a 48 y.o. female is here today for medication management follow-up.    Chief Complaint:  anxiety depression     History of Present Illness:    Patient presents today for follow up for medication management for depression and anxiety. Patient states still at daughters house with mom. Patient states house should be ready in a couple weeks. Patient states no big difference. Patient reports currently depression is at a 10 on a 0-10 scale with 10 being the worst. Patient reports symptoms of depression of depressed mood, irritability, no energy, and insomnia. Patient reports anxiety is at a 10 on a 0-10 scale with 10 being the worst. Patient reports having some panic attacks. Patient reports panic attacks last a few minutes and during an attacks patient has chest pain, shortness of breath, irritability, and panic feeling. Patient reports sleeping 4-5 hours a night and patient reports still having some nightmares. Patient reports appetite is ok and eating about once a day. Patient denies any auditory or visual hallucinations. Patient adamantly denies any thoughts to harm self or others. Patient denies any side effects to medications. Patient denies any medical changes since last visit.   The following portions of the patient's history were reviewed and updated as appropriate: allergies, current medications, past family history, past medical history, past social history, past surgical history, and problem list.    Review of Systems   Constitutional: Negative.    Respiratory: Negative.     Cardiovascular: Negative.    Gastrointestinal: Negative.    Neurological: Negative.    Psychiatric/Behavioral:  Positive for agitation, dysphoric mood and sleep disturbance. The patient is nervous/anxious.      Objective   Physical Exam  Vitals reviewed.   Constitutional:       Appearance: Normal appearance. She is well-developed and well-groomed.   Neurological:      Mental Status: She is alert.    Psychiatric:         Attention and Perception: Attention and perception normal.         Mood and Affect: Mood is anxious. Affect is angry.         Speech: Speech normal.         Behavior: Behavior normal. Behavior is cooperative.         Thought Content: Thought content normal.         Cognition and Memory: Cognition and memory normal.         Judgment: Judgment normal.     Blood pressure 100/60, pulse 57, weight 85.6 kg (188 lb 12.8 oz), SpO2 95 %, not currently breastfeeding.Body mass index is 32.39 kg/mý.    Allergies   Allergen Reactions    Imipramine Other (See Comments)     Chest pain    Mobic [Meloxicam] Rash    Penicillins Angioedema    Taxotere [Docetaxel] Anaphylaxis     9 minutes into 1st infusion    Novolog [Insulin Aspart] Hives    Rosuvastatin Calcium GI Intolerance       Medication List:   Current Outpatient Medications   Medication Sig Dispense Refill    ALPRAZolam (XANAX) 1 MG tablet Take 1 tablet by mouth 2 (Two) Times a Day As Needed for Anxiety. 60 tablet 0    venlafaxine XR (EFFEXOR-XR) 75 MG 24 hr capsule Take 3 capsules by mouth Daily. 90 capsule 1    albuterol sulfate  (90 Base) MCG/ACT inhaler INHALE 2 PUFFS BY MOUTH EVERY 4 HOURS AS NEEDED FOR SHORTNESS OF BREATH 18 g 5    alendronate (FOSAMAX) 35 MG tablet TAKE ONE TABLET BY MOUTH every 7 days. take with water 30 minutes before first food/drink/medication. avoid lying down for 30 minutes after taking 4 tablet 3    Aspirin Low Dose 81 MG EC tablet TAKE TWO TABLETS BY MOUTH EVERY DAY 60 tablet 4    atorvastatin (LIPITOR) 40 MG tablet TAKE ONE TABLET BY MOUTH EVERY night 90 tablet 1    azelastine (ASTELIN) 0.1 % nasal spray Use in each nostril as directed 30 mL 3    cloNIDine (CATAPRES) 0.1 MG tablet TAKE ONE TABLET BY MOUTH TWICE DAILY 60 tablet 5    cyanocobalamin 1000 MCG/ML injection INJECT 1ML INTO TO APPROPRAITE MUSCLE EVERY 30 DAYS 1 mL 5    Diclofenac Sodium (VOLTAREN) 1 % gel gel APPLY 4 GRAMS TO THE AFFECTED  AREA FOUR TIMES A  g 3    Emgality 120 MG/ML auto-injector pen INJECT 120mg ONCE MONTHLY      Fluticasone Furoate-Vilanterol (Breo Ellipta) 200-25 MCG/ACT inhaler Inhale 1 puff Daily. 1 each 3    furosemide (LASIX) 20 MG tablet TAKE ONE TABLET BY MOUTH EVERY DAY AS NEEDED FOR EDEMA 30 tablet 1    gabapentin (NEURONTIN) 100 MG capsule TAKE ONE CAPSULE BY MOUTH TWICE DAILY 60 capsule 1    Insulin Lispro (humaLOG) 100 UNIT/ML injection INJECT AS DIRECTED.MAX DAILY DOSE  UNITS DAILY 40 mL 3    ipratropium-albuterol (DUO-NEB) 0.5-2.5 mg/3 ml nebulizer Take 3 mL by nebulization Every 4 (Four) Hours As Needed for Shortness of Air. 150 mL 1    lansoprazole (PREVACID) 30 MG capsule TAKE 1 CAPSULE BY MOUTH DAILY. 90 capsule 0    levocetirizine (XYZAL) 5 MG tablet TAKE 1 TABLET BY MOUTH EVERY EVENING. 30 tablet 3    Linzess 145 MCG capsule capsule TAKE 1 CAPSULE BY MOUTH DAILY. 30 MINUTES BEFORE MEALS ON AN EMPTY STOMACH. 30 capsule 3    ondansetron (ZOFRAN) 8 MG tablet TAKE 1 TABLET BY MOUTH EVERY 8 HOURS AS NEEDED FOR NAUSEA OR VOMITING 30 tablet 3    Ozempic, 1 MG/DOSE, 4 MG/3ML solution pen-injector INJECT 1MG SUBCUTANEOUSLY INTO THE APPROPRIATE AREA AS DIRECTED EVERY 7 DAYS 3 mL 0    Respiratory Therapy Supplies (Nebulizer/Tubing/Mouthpiece) kit 1 each Take As Directed. 1 each 1    Semaglutide, 2 MG/DOSE, (Ozempic, 2 MG/DOSE,) 8 MG/3ML solution pen-injector Inject 2 mg under the skin into the appropriate area as directed Every 7 (Seven) Days. 3 mL 2    Synjardy 12.5-1000 MG tablet TAKE ONE TABLET BY MOUTH EVERY MORNING 30 tablet 0    tamoxifen (NOLVADEX) 20 MG chemo tablet Take 1 tablet by mouth Daily. 30 tablet 11    topiramate (TOPAMAX) 50 MG tablet TAKE ONE TABLET BY MOUTH TWICE DAILY 60 tablet 3    ubrogepant 100 MG tablet       vitamin D (ERGOCALCIFEROL) 1.25 MG (36545 UT) capsule capsule TAKE 1 CAPSULE BY MOUTH ONCE WEEKLY 4 capsule 3     No current facility-administered medications  for this visit.       Mental Status Exam:   Hygiene:   good  Cooperation:  Cooperative  Eye Contact:  Fair  Psychomotor Behavior:  Appropriate  Affect:  Angry  Hopelessness: Denies  Speech:  Normal  Thought Process:  Goal directed and Linear  Thought Content:  Normal  Suicidal:  None  Homicidal:  None  Hallucinations:  None  Delusion:  None  Memory:  Intact  Orientation:  Person, Place, Time, and Situation  Reliability:  fair  Insight:  Fair  Judgement:  Fair  Impulse Control:  Fair  Physical/Medical Issues:  No               Assessment & Plan   Diagnoses and all orders for this visit:    1. Generalized anxiety disorder  -     ALPRAZolam (XANAX) 1 MG tablet; Take 1 tablet by mouth 2 (Two) Times a Day As Needed for Anxiety.  Dispense: 60 tablet; Refill: 0  -     venlafaxine XR (EFFEXOR-XR) 75 MG 24 hr capsule; Take 3 capsules by mouth Daily.  Dispense: 90 capsule; Refill: 1    2. Other obsessive-compulsive disorders  -     ALPRAZolam (XANAX) 1 MG tablet; Take 1 tablet by mouth 2 (Two) Times a Day As Needed for Anxiety.  Dispense: 60 tablet; Refill: 0    3. Moderate episode of recurrent major depressive disorder  -     venlafaxine XR (EFFEXOR-XR) 75 MG 24 hr capsule; Take 3 capsules by mouth Daily.  Dispense: 90 capsule; Refill: 1            Discussed medication options with patient and . Increase Effexor XR 75 to three capsules daily for worsening depression and anxiety. Increase Alprazolam to 1mg twice daily as needed for worsening anxiety. Reviewed the risks, benefits, and side effects of the medications; patient acknowledged and verbally consented. Patient is being prescribed a controlled substance as part of treatment plan. Patient has been educated of appropriate use of the medications, including risk of somnolence, limited ability to drive and/or work safely, and potential for dependence, respiratory depression and overdose. Patient is also informed that the medication are to be used by the patient only-  avoid any combined use of ETOH or other substances unless prescribed.   Reviewed UDS from 07/31/23.    [facility] Patient Controlled Substance Report (from 8/24/2022 to 8/24/2023)    Dispensed  Strength Quantity Days Supply Provider Pharmacy   08/10/2023 Alprazolam 0.5MG 90 each 30 [facility]ED[facility]   07/21/2023 Gabapentin 100MG 60 each 30 TIFFANIE[facility]SONALI [facility]   06/30/2023 Alprazolam 0.5MG 90 each 30 [facility]   06/22/2023 Gabapentin 100MG 60 each 30 TIFFANIE[facility]SONALI [facility]   05/31/2023 Alprazolam 0.5MG 60 each 20 [facility]   05/24/2023 Gabapentin 100MG 60 each [facility]   05/01/2023 Alprazolam 0.5MG 60 each 20 [facility]   04/26/2023 Gabapentin 100MG 60 each 30 QUINTON[facility]STEPHANIE [facility]   04/21/2023 Alprazolam 0.5MG 30 each 15 [facility]   03/29/2023 Gabapentin 100MG 60 each 30 QUINTON[facility]STEPHANIE [facility]   03/29/2023 Lorazepam 1MG 90 each 30 [facility]   02/28/2023 Gabapentin 100MG 60 each 30 QUINTON[facility]STEPHANIE [facility]   02/28/2023 Lorazepam 1MG 90 each 30 [facility]   02/02/2023 Gabapentin 100MG 60 each 30 QUINTON[facility]STEPHANIE [facility]   02/02/2023 Lorazepam 1MG 90 each 30 [facility]   01/05/2023 Gabapentin 100MG 60 each 30 QUINTON[facility]STEPHANIE [facility]   01/05/2023 Lorazepam 1MG 90 each 30 [facility]   12/09/2022 Gabapentin 100MG 60 each 30 QUINOTN[facility]STEPHANIE [facility]   11/23/2022 Lorazepam 1MG 90 each 30 [facility]   11/05/2022 Gabapentin 100MG 60 each 30 [facility]   10/25/2022 Lorazepam 1MG 90 each 30 [facility]   10/06/2022  Gabapentin 100MG 60 each 30 QUNITON,STEPHANIE [facility]   10/03/2022 Hydrocodone/Acetaminophen 325MG/7.5MG 12 each 2 [facility]ANY [facility]   09/28/2022 Lorazepam 1MG 90 each 30 [facility]   08/30/2022 Gabapentin 100MG 60 each 30 QUINTON[facility]STEPHANIE [facility]   08/30/2022 Lorazepam 1MG 90 each 30 [facility]         Disclaimer    *The information in this report is based upon Schedule II through V controlled substance records reported by dispensers. Data should appear on [facility] reports within two to three business days after dispensing.   *The records listed in the report are based on the patient identification information entered by the report requestor, and if not sufficiently unique may result in the report including records for multiple patients. Please verify the information in the report by contacting the prescribers and/or dispensers listed.   *If the controlled substance records on this report appear to be in error, the patient or provider should contact the dispenser to determine if the information was reported accurately. If the dispenser certifies the information was reported accurately, the dispenser can contact the Drug Enforcement and Professional Practices Branch at [phone] to investigate the error.   *The information in this report is intended for informational use only by the person authorized to request the report. Intentional disclosure of the report or data to someone not authorized to obtain the data is a Class B Misdemeanor.      Report Restrictions - A practitioner or pharmacist may share the report with the patient or person authorized to act on the patient's behalf and place the report in the patient's medical record, with the report then being deemed a medical record subject to the same disclosure terms and conditions as an ordinary medical record. ([place]RS 218[place].202)       Patient and  is  agreeable to call the office with any questions, concerns, or worsening of symptoms. Patient and  is aware to call 911 or go to the nearest ER should begin having SI/HI.          Follow up in four to six weeks          Errors in dictation may reflect use of voice recognition software and not all errors in transcription may have been detected prior to signing.              This document has been electronically signed by NIDA Vasquez   August 24, 2023 11:56 EDT

## 2023-08-31 ENCOUNTER — OFFICE VISIT (OUTPATIENT)
Dept: FAMILY MEDICINE CLINIC | Facility: CLINIC | Age: 48
End: 2023-08-31
Payer: MEDICARE

## 2023-08-31 VITALS
BODY MASS INDEX: 31.76 KG/M2 | HEIGHT: 64 IN | OXYGEN SATURATION: 97 % | WEIGHT: 186 LBS | HEART RATE: 87 BPM | SYSTOLIC BLOOD PRESSURE: 100 MMHG | TEMPERATURE: 96.4 F | DIASTOLIC BLOOD PRESSURE: 60 MMHG

## 2023-08-31 DIAGNOSIS — C50.411 MALIGNANT NEOPLASM OF UPPER-OUTER QUADRANT OF RIGHT BREAST IN FEMALE, ESTROGEN RECEPTOR NEGATIVE: ICD-10-CM

## 2023-08-31 DIAGNOSIS — E53.8 VITAMIN B 12 DEFICIENCY: Chronic | ICD-10-CM

## 2023-08-31 DIAGNOSIS — E55.9 VITAMIN D DEFICIENCY: Chronic | ICD-10-CM

## 2023-08-31 DIAGNOSIS — F41.9 ANXIETY AND DEPRESSION: Chronic | ICD-10-CM

## 2023-08-31 DIAGNOSIS — F32.A ANXIETY AND DEPRESSION: Chronic | ICD-10-CM

## 2023-08-31 DIAGNOSIS — Z17.1 MALIGNANT NEOPLASM OF UPPER-OUTER QUADRANT OF RIGHT BREAST IN FEMALE, ESTROGEN RECEPTOR NEGATIVE: ICD-10-CM

## 2023-08-31 DIAGNOSIS — E11.42 DM TYPE 2 WITH DIABETIC PERIPHERAL NEUROPATHY: Primary | Chronic | ICD-10-CM

## 2023-08-31 DIAGNOSIS — E78.5 DYSLIPIDEMIA: Chronic | ICD-10-CM

## 2023-08-31 DIAGNOSIS — E53.8 VITAMIN B 12 DEFICIENCY: ICD-10-CM

## 2023-08-31 DIAGNOSIS — Z96.41 INSULIN PUMP IN PLACE: ICD-10-CM

## 2023-08-31 DIAGNOSIS — Z97.8 USES SELF-APPLIED CONTINUOUS GLUCOSE MONITORING DEVICE: ICD-10-CM

## 2023-08-31 DIAGNOSIS — E55.9 VITAMIN D DEFICIENCY: ICD-10-CM

## 2023-08-31 NOTE — PROGRESS NOTES
History of Present Illness  Nivia is a 49 y/o female who presents to the clinic today for follow up pertaining to her DM, type 2 and Dyslipidemia. She has been diagnosed with right Breast Cancer. In addition, she has GERD, mitral valve prolapse, Osteopenia/arthritis, migraine headaches and anxiety/ depression.  Recent labs will be reviewed with her today.    Diabetes   She has type 2 diabetes mellitus. The initial diagnosis of diabetes was made 20 years ago. Diabetic complications include peripheral neuropathy. Risk factors for coronary artery disease include post-menopausal, stress and dyslipidemia. She is currently utilizing insulin pump therapy and CGM. In addition, she is currently taking Synardy and Ozempic.  She has had a previous visit with a dietitian An ACE inhibitor/angiotensin II receptor blocker is not  being taken at this time.     Lab Results   Component Value Date    HGBA1C 7.90 (H) 10/06/2022     Lab Results   Component Value Date    HGBA1C 6.80 (H) 04/18/2023      Lab Results   Component Value Date    HGBA1C 5.20 08/07/2023      Dyslipidemia   The current episode started more than 1 year ago.  She is taking Atorvastatin 40 mg and aspirin 81 mg..   Lab Results   Component Value Date    CHOL 110 08/07/2023    CHLPL 110 04/18/2023    CHLPL 113 10/06/2022     Lab Results   Component Value Date    TRIG 156 (H) 08/07/2023    TRIG 262 (H) 04/18/2023    TRIG 183 (H) 10/06/2022     Lab Results   Component Value Date    HDL 35 (L) 08/07/2023    HDL 29 (L) 04/18/2023    HDL 31 (L) 10/06/2022     Lab Results   Component Value Date    LDL 48 08/07/2023    LDL 40 04/18/2023    LDL 52 10/06/2022     Breast Cancer  Malignant neoplasm of upper outer quadrant of right breast, estrogen receptor negative which was treated with mastectomy and followed by chemotherapy.  Continues to take tamoxifen 20 mg and followed by oncology.      GERD  Stable with lansoprazole.    Anxiety/Depression  Stable and followed by  "behavioral health    The following portions of the patient's history were reviewed and updated as appropriate: allergies, current medications, past family history, past medical history, past social history, past surgical history and problem list.    Review of Systems   Constitutional:  Negative for activity change, appetite change, chills, fatigue, fever and unexpected weight change.   HENT:  Negative for congestion.    Eyes:  Negative for visual disturbance.   Respiratory:  Negative for cough, shortness of breath and wheezing.    Cardiovascular:  Positive for leg swelling (Intermittent). Negative for chest pain and palpitations.   Gastrointestinal:  Negative for nausea and vomiting.        GERD   Endocrine: Negative for cold intolerance, heat intolerance, polydipsia, polyphagia and polyuria.   Musculoskeletal:  Positive for arthralgias (Much improved with weight loss) and back pain.   Skin:  Negative for color change and rash.   Allergic/Immunologic: Positive for environmental allergies.   Neurological:  Positive for dizziness (Improving), numbness and headaches (Migraines). Negative for tremors, speech difficulty, weakness and light-headedness.   Hematological:  Negative for adenopathy.   Psychiatric/Behavioral:  Negative for confusion, decreased concentration and sleep disturbance. The patient is not nervous/anxious.    All other systems reviewed and are negative.      Vital signs:  /60 (BP Location: Left arm)   Pulse 87   Temp 96.4 øF (35.8 øC) (Temporal)   Ht 162.6 cm (64.02\")   Wt 84.4 kg (186 lb)   SpO2 97%   BMI 31.91 kg/mý     Physical Exam  Vitals and nursing note reviewed.   Constitutional:       General: She is not in acute distress.     Appearance: She is well-developed.      Comments:  accompanies her today   HENT:      Head: Normocephalic.      Nose: Nose normal.   Eyes:      General: No scleral icterus.        Right eye: No discharge.         Left eye: No discharge.      " Conjunctiva/sclera: Conjunctivae normal.   Neck:      Thyroid: No thyromegaly.      Vascular: No JVD.   Cardiovascular:      Rate and Rhythm: Normal rate and regular rhythm.      Heart sounds: S1 normal and S2 normal.   Pulmonary:      Effort: Pulmonary effort is normal.      Breath sounds: Normal breath sounds.   Abdominal:      Palpations: Abdomen is soft.      Tenderness: There is no abdominal tenderness. There is no guarding.   Musculoskeletal:      Cervical back: Neck supple.      Right lower leg: No edema.      Left lower leg: No edema.   Skin:     General: Skin is dry.      Capillary Refill: Capillary refill takes less than 2 seconds.   Neurological:      Mental Status: She is alert.   Psychiatric:         Mood and Affect: Mood and affect normal.         Speech: Speech normal.         Behavior: Behavior is cooperative.         Thought Content: Thought content normal.     Result Review :  Results for orders placed or performed in visit on 08/07/23   Comprehensive Metabolic Panel    Specimen: Arm, Left; Blood   Result Value Ref Range    Glucose 66 65 - 99 mg/dL    BUN 12 6 - 20 mg/dL    Creatinine 0.91 0.57 - 1.00 mg/dL    Sodium 143 136 - 145 mmol/L    Potassium 4.1 3.5 - 5.2 mmol/L    Chloride 111 (H) 98 - 107 mmol/L    CO2 22.0 22.0 - 29.0 mmol/L    Calcium 9.4 8.6 - 10.5 mg/dL    Total Protein 7.4 6.0 - 8.5 g/dL    Albumin 4.4 3.5 - 5.2 g/dL    ALT (SGPT) 12 1 - 33 U/L    AST (SGOT) 14 1 - 32 U/L    Alkaline Phosphatase 80 39 - 117 U/L    Total Bilirubin 0.4 0.0 - 1.2 mg/dL    Globulin 3.0 gm/dL    A/G Ratio 1.5 g/dL    BUN/Creatinine Ratio 13.2 7.0 - 25.0    Anion Gap 10.0 5.0 - 15.0 mmol/L    eGFR 77.0 >60.0 mL/min/1.73   Lipid Panel    Specimen: Arm, Left; Blood   Result Value Ref Range    Total Cholesterol 110 0 - 200 mg/dL    Triglycerides 156 (H) 0 - 150 mg/dL    HDL Cholesterol 35 (L) 40 - 60 mg/dL    LDL Cholesterol  48 0 - 100 mg/dL    VLDL Cholesterol 27 5 - 40 mg/dL    LDL/HDL Ratio 1.25    TSH     Specimen: Arm, Left; Blood   Result Value Ref Range    TSH 3.130 0.270 - 4.200 uIU/mL   Hemoglobin A1c    Specimen: Arm, Left; Blood   Result Value Ref Range    Hemoglobin A1C 5.20 4.80 - 5.60 %   Vitamin D,25-Hydroxy    Specimen: Arm, Left; Blood   Result Value Ref Range    25 Hydroxy, Vitamin D 71.0 30.0 - 100.0 ng/ml   Uric Acid    Specimen: Arm, Left; Blood   Result Value Ref Range    Uric Acid 3.9 2.4 - 5.7 mg/dL   Vitamin B12    Specimen: Arm, Left; Blood   Result Value Ref Range    Vitamin B-12 371 211 - 946 pg/mL   C-Peptide    Specimen: Arm, Left; Blood   Result Value Ref Range    C-Peptide 0.6 (L) 1.1 - 4.4 ng/mL   Glutamic Acid Decarboxylase    Specimen: Arm, Left; Blood   Result Value Ref Range    TRISH-65 <5.0 0.0 - 5.0 U/mL   Insulin Antibody    Specimen: Arm, Left; Blood   Result Value Ref Range    Insulin AutoAb <5.0 uU/mL     *Note: Due to a large number of results and/or encounters for the requested time period, some results have not been displayed. A complete set of results can be found in Results Review.   BMI is >= 30 and <35. (Class 1 Obesity). The following options were offered after discussion;: weight loss educational material (shared in after visit summary)     [employer] MiniMed 630 G and Guardian CGM upload from Aug18, 2023 through Aug 31 2023  Time in range  92% in target range  8% in low range  Average blood glucose 106 ñ12 mg/dL    Assessment & Plan     Diagnoses and all orders for this visit:    1. DM type 2 with diabetic peripheral neuropathy (Primary)  Comments:  Continue insulin pump therapy utilizing her new 780 G and Guardian 4 CGM.  We will continue Synjardy and Ozempic but consider reducing Synjardy.  Orders:              glucagon (GLUCAGEN) 1 MG injection; Inject 1 mg under the skin into the appropriate                    area as directed 1 (One) Time As Needed for Low Blood Sugar.             Please disregard previous  prescription                          2. Insulin pump in  place  Comments:  Educated today on her Medtronic 780 G Guardian 4 CGM by Joanna Hobbs RN, clinical specialist from [employer]    3. Uses self-applied continuous glucose monitoring device  Comments:  Her Medtronic 680 G and Guardian CGM were uploaded and reviewed.    4. Dyslipidemia  Comments:  Continue atorvastatin    5. Malignant neoplasm of upper-outer quadrant of right breast in female, estrogen receptor negative  Comments:  Continue tamoxifen and follow-up with oncology    6. Anxiety and depression  Comments:  Stable.  Continue to follow-up with behavioral health    7. Vitamin D deficiency  Comments:  Decrease vitamin D to monthly    8. Vitamin B 12 deficiency  Comments:  Vitamin B12 injection every other week    Follow Up September 13, 2023   Findings and recommendations discussed with Nivia and her . Reviewed lab result and insulin pump/CGM upload.  Lilo is transitioning to the Medtronic 780 G with Guardian 4 CGM.  Lilo and her  were counseled regarding the Medtronic 780 by Joanna Hobbs RN, clinical specialist from [employer].  Lifestyle modifications reinforced including nutrition and activity recommendations.  Lilo will follow up on September 13 sooner if problems/concerns occur.   Recommended Lilo decrease her vitamin D to monthly.  Additional recommendation vitamin B12 injection every other week.  Due to her low blood pressure recommended she decrease Lasix to 10 mg and consider utilizing it for only as needed for edema.  She was given instructions and counseling regarding her condition or for health maintenance advice. Please see specific information pulled into the AVS if appropriate    This document has been electronically signed by:

## 2023-09-01 DIAGNOSIS — E11.42 DM TYPE 2 WITH DIABETIC PERIPHERAL NEUROPATHY: Chronic | ICD-10-CM

## 2023-09-01 RX ORDER — INSULIN LISPRO 100 [IU]/ML
INJECTION, SOLUTION INTRAVENOUS; SUBCUTANEOUS
Qty: 40 ML | Refills: 3 | Status: SHIPPED | OUTPATIENT
Start: 2023-09-01

## 2023-09-02 PROBLEM — E66.811 CLASS 1 OBESITY WITH BODY MASS INDEX (BMI) OF 30.0 TO 30.9 IN ADULT: Status: ACTIVE | Noted: 2017-03-31

## 2023-09-02 PROBLEM — E53.8 VITAMIN B 12 DEFICIENCY: Status: ACTIVE | Noted: 2023-09-02

## 2023-09-02 RX ORDER — FUROSEMIDE 20 MG/1
10 TABLET ORAL DAILY
Qty: 30 TABLET | Refills: 0 | Status: SHIPPED | OUTPATIENT
Start: 2023-09-02

## 2023-09-02 RX ORDER — ERGOCALCIFEROL 1.25 MG/1
50000 CAPSULE ORAL
Qty: 4 CAPSULE | Refills: 0 | Status: SHIPPED | OUTPATIENT
Start: 2023-09-02

## 2023-09-02 RX ORDER — CYANOCOBALAMIN 1000 UG/ML
1000 INJECTION, SOLUTION INTRAMUSCULAR; SUBCUTANEOUS
Qty: 2 ML | Refills: 5 | Status: SHIPPED | OUTPATIENT
Start: 2023-09-02

## 2023-09-05 DIAGNOSIS — K59.03 DRUG-INDUCED CONSTIPATION: ICD-10-CM

## 2023-09-05 RX ORDER — LINACLOTIDE 145 UG/1
CAPSULE, GELATIN COATED ORAL
Qty: 30 CAPSULE | Refills: 3 | Status: SHIPPED | OUTPATIENT
Start: 2023-09-05

## 2023-09-09 DIAGNOSIS — E11.42 DM TYPE 2 WITH DIABETIC PERIPHERAL NEUROPATHY: Chronic | ICD-10-CM

## 2023-09-11 RX ORDER — EMPAGLIFLOZIN AND METFORMIN HYDROCHLORIDE 12.5; 1 MG/1; MG/1
TABLET ORAL
Qty: 30 TABLET | Refills: 0 | Status: SHIPPED | OUTPATIENT
Start: 2023-09-11

## 2023-09-13 ENCOUNTER — OFFICE VISIT (OUTPATIENT)
Dept: FAMILY MEDICINE CLINIC | Facility: CLINIC | Age: 48
End: 2023-09-13
Payer: MEDICARE

## 2023-09-13 VITALS
TEMPERATURE: 97.2 F | HEART RATE: 90 BPM | OXYGEN SATURATION: 96 % | BODY MASS INDEX: 31.24 KG/M2 | HEIGHT: 64 IN | DIASTOLIC BLOOD PRESSURE: 78 MMHG | SYSTOLIC BLOOD PRESSURE: 124 MMHG | WEIGHT: 183 LBS | RESPIRATION RATE: 14 BRPM

## 2023-09-13 DIAGNOSIS — E78.5 DYSLIPIDEMIA: Chronic | ICD-10-CM

## 2023-09-13 DIAGNOSIS — F32.A ANXIETY AND DEPRESSION: Chronic | ICD-10-CM

## 2023-09-13 DIAGNOSIS — K21.9 GASTROESOPHAGEAL REFLUX DISEASE WITHOUT ESOPHAGITIS: Chronic | ICD-10-CM

## 2023-09-13 DIAGNOSIS — Z17.1 MALIGNANT NEOPLASM OF UPPER-OUTER QUADRANT OF RIGHT BREAST IN FEMALE, ESTROGEN RECEPTOR NEGATIVE: ICD-10-CM

## 2023-09-13 DIAGNOSIS — J45.20 MILD INTERMITTENT ASTHMA WITHOUT COMPLICATION: Chronic | ICD-10-CM

## 2023-09-13 DIAGNOSIS — Z97.8 USES SELF-APPLIED CONTINUOUS GLUCOSE MONITORING DEVICE: ICD-10-CM

## 2023-09-13 DIAGNOSIS — E13.9 DIABETES MELLITUS TYPE 1.5, MANAGED AS TYPE 1: Primary | Chronic | ICD-10-CM

## 2023-09-13 DIAGNOSIS — Z23 ENCOUNTER FOR IMMUNIZATION: ICD-10-CM

## 2023-09-13 DIAGNOSIS — G43.009 MIGRAINE WITHOUT AURA AND WITHOUT STATUS MIGRAINOSUS, NOT INTRACTABLE: Chronic | ICD-10-CM

## 2023-09-13 DIAGNOSIS — E55.9 VITAMIN D DEFICIENCY: ICD-10-CM

## 2023-09-13 DIAGNOSIS — C50.411 MALIGNANT NEOPLASM OF UPPER-OUTER QUADRANT OF RIGHT BREAST IN FEMALE, ESTROGEN RECEPTOR NEGATIVE: ICD-10-CM

## 2023-09-13 DIAGNOSIS — Z96.41 INSULIN PUMP IN PLACE: ICD-10-CM

## 2023-09-13 DIAGNOSIS — F41.9 ANXIETY AND DEPRESSION: Chronic | ICD-10-CM

## 2023-09-13 PROBLEM — G44.89 CHRONIC MIXED HEADACHE SYNDROME: Status: ACTIVE | Noted: 2022-10-12

## 2023-09-13 RX ORDER — SEMAGLUTIDE 1.34 MG/ML
1 INJECTION, SOLUTION SUBCUTANEOUS
Qty: 3 ML | Refills: 1 | Status: SHIPPED | OUTPATIENT
Start: 2023-09-13

## 2023-09-13 RX ORDER — BLOOD SUGAR DIAGNOSTIC
1 STRIP MISCELLANEOUS 3 TIMES DAILY
Qty: 100 EACH | Refills: 3 | Status: SHIPPED | OUTPATIENT
Start: 2023-09-13

## 2023-09-13 RX ORDER — LANSOPRAZOLE 30 MG/1
CAPSULE, DELAYED RELEASE ORAL
Qty: 90 CAPSULE | Refills: 0 | Status: SHIPPED | OUTPATIENT
Start: 2023-09-13

## 2023-09-13 RX ORDER — LANCETS
1 EACH MISCELLANEOUS 3 TIMES DAILY
Qty: 100 EACH | Refills: 5 | Status: SHIPPED | OUTPATIENT
Start: 2023-09-13

## 2023-09-13 NOTE — PROGRESS NOTES
History of Present Illness  Nivia is a 47 y/o female who presents to the clinic today for follow up pertaining to her DM, type 2 and Dyslipidemia. She has been diagnosed with right Breast Cancer. In addition, she has Dyslipidemia, GERD, Osteopenia/arthritis, migraine headaches and anxiety/ depression.      Diabetes   She has type 2 diabetes mellitus. The initial diagnosis of diabetes was made 20 years ago. Diabetic complications include peripheral neuropathy. Risk factors for coronary artery disease include post-menopausal, stress and dyslipidemia. She is currently utilizing insulin pump therapy and CGM. In addition, she is currently taking Synardy and Ozempic.  She has had a previous visit with a dietitian An ACE inhibitor/angiotensin II receptor blocker is not  being taken at this time.     Lab Results   Component Value Date    HGBA1C 7.90 (H) 10/06/2022     Lab Results   Component Value Date    HGBA1C 6.80 (H) 04/18/2023      Lab Results   Component Value Date    HGBA1C 5.20 08/07/2023      Dyslipidemia   The current episode started more than 1 year ago.  She is taking Atorvastatin 40 mg and aspirin 81 mg..   Lab Results   Component Value Date    CHOL 110 08/07/2023    CHLPL 110 04/18/2023    CHLPL 113 10/06/2022     Lab Results   Component Value Date    TRIG 156 (H) 08/07/2023    TRIG 262 (H) 04/18/2023    TRIG 183 (H) 10/06/2022     Lab Results   Component Value Date    HDL 35 (L) 08/07/2023    HDL 29 (L) 04/18/2023    HDL 31 (L) 10/06/2022     Lab Results   Component Value Date    LDL 48 08/07/2023    LDL 40 04/18/2023    LDL 52 10/06/2022     Breast Cancer  Malignant neoplasm of upper outer quadrant of right breast, estrogen receptor negative which was treated with mastectomy and followed by chemotherapy.  Continues to take tamoxifen 20 mg and followed by oncology.      GERD  Stable with lansoprazole.      Anxiety/Depression  Stable and followed by behavioral health  Lab Results   Component Value Date     "TSH 3.130 08/07/2023      The following portions of the patient's history were reviewed and updated as appropriate: allergies, current medications, past family history, past medical history, past social history, past surgical history and problem list.    Review of Systems   Constitutional:  Negative for activity change, appetite change, chills, fatigue, fever and unexpected weight change.   HENT:  Negative for congestion.    Eyes:  Negative for visual disturbance.   Respiratory:  Negative for cough, shortness of breath and wheezing.    Cardiovascular:  Positive for leg swelling (Intermittent). Negative for chest pain and palpitations.   Gastrointestinal:  Negative for nausea and vomiting.        GERD   Endocrine: Negative for cold intolerance, heat intolerance, polydipsia, polyphagia and polyuria.   Musculoskeletal:  Positive for arthralgias (Much improved with weight loss) and back pain.   Skin:  Negative for color change and rash.   Allergic/Immunologic: Positive for environmental allergies.   Neurological:  Positive for dizziness (Improving), numbness and headaches (Migraines). Negative for tremors, speech difficulty, weakness and light-headedness.   Hematological:  Negative for adenopathy.   Psychiatric/Behavioral:  Negative for confusion, decreased concentration and sleep disturbance. The patient is not nervous/anxious.    All other systems reviewed and are negative.    Vital signs:  /78 (BP Location: Left arm, Patient Position: Sitting, Cuff Size: Adult)   Pulse 90   Temp 97.2 °F (36.2 °C) (Temporal)   Resp 14   Ht 162.6 cm (64\")   Wt 83 kg (183 lb)   SpO2 96%   BMI 31.41 kg/m²     Physical Exam  Vitals and nursing note reviewed.   Constitutional:       General: She is not in acute distress.     Appearance: She is well-developed.      Comments:  accompanies her today   HENT:      Head: Normocephalic.      Nose: Nose normal.   Eyes:      General: No scleral icterus.        Right eye: No " discharge.         Left eye: No discharge.      Conjunctiva/sclera: Conjunctivae normal.   Neck:      Thyroid: No thyromegaly.      Vascular: No JVD.   Cardiovascular:      Rate and Rhythm: Normal rate and regular rhythm.      Heart sounds: S1 normal and S2 normal.   Pulmonary:      Effort: Pulmonary effort is normal.      Breath sounds: Normal breath sounds.   Abdominal:      Palpations: Abdomen is soft.      Tenderness: There is no abdominal tenderness. There is no guarding.   Musculoskeletal:      Cervical back: Neck supple.      Right lower leg: No edema.      Left lower leg: No edema.   Skin:     General: Skin is dry.      Capillary Refill: Capillary refill takes less than 2 seconds.   Neurological:      Mental Status: She is alert.      Cranial Nerves: Cranial nerves 2-12 are intact.   Psychiatric:         Mood and Affect: Mood and affect normal.         Speech: Speech normal.         Behavior: Behavior is cooperative.         Thought Content: Thought content normal.         Cognition and Memory: Cognition and memory normal.     Result Review : [employer] CGM Upload from August 31, 2023 through September 13, 2023  Time in range  1% low  99% time in range  Average ±25 mg/dL  Refer to scanned document      BMI is >= 30 and <35. (Class 1 Obesity). The following options were offered after discussion;: weight loss educational material (shared in after visit summary)   Assessment & Plan     Diagnoses and all orders for this visit:    1. Diabetes mellitus type 1.5, managed as type 1 (Primary)  Comments:  Continue insulin pump therapy with [employer] CGM, Synjardy and Ozempic  Orders:  -     Semaglutide, 1 MG/DOSE, (Ozempic, 1 MG/DOSE,) 4 MG/3ML solution pen-injector; Inject 1 mg under the skin into the appropriate area as directed Every 7 (Seven) Days.  Dispense: 3 mL; Refill: 1  -     Accu-Chek FastClix Lancets misc; Use 1 Device 3 (Three) Times a Day.  Dispense: 100 each; Refill: 5  -     glucose  blood (Accu-Chek Guide) test strip; 1 each by Other route 3 (Three) Times a Day.  Dispense: 100 each; Refill: 3  -     Comprehensive Metabolic Panel; Future  -     TSH; Future  -     Hemoglobin A1c; Future  -     Vitamin B12; Future    2. Insulin pump in place  Comments:  Insulin pump/CGM upload reviewed    3. Uses self-applied continuous glucose monitoring device  Comments:  Upload reviewed with excellent glycemic control    4. Dyslipidemia  Comments:  Continue atorvastatin  Orders:  -     Comprehensive Metabolic Panel; Future  -     TSH; Future  -     Lipid Panel; Future    5. Mild intermittent asthma without complication  Comments:  Continue albuterol H FA daily, Breo inhaler and avoidance of triggers  Orders:  -     CBC & Differential; Future    6. Anxiety and depression  Comments:  Stable with current regimen which she will continue and follow-up with behavioral health    7. Migraine without aura and without status migrainosus, not intractable  Comments:  Followed by neurologist from [place].  Continue Emgality and Topamax    8. Gastroesophageal reflux disease without esophagitis  Comments:  Continue pravastatin  Orders:  -     CBC & Differential; Future    9. Malignant neoplasm of upper-outer quadrant of right breast in female, estrogen receptor negative  Comments:  Continue tamoxifen and follow-up with oncologist    10. Vitamin D deficiency  Comments:  Weekly vitamin D continued  Orders:  -     Vitamin D,25-Hydroxy; Future    11. Encounter for immunization  Comments:  Influenza vaccination given today  Orders:  -     Fluzone (or Fluarix & Flulaval for VFC) >6 Mos ([id number])      I spent 60 minutes caring for Nivia on this date of service. This time includes time spent by me in the following activities:preparing for the visit, reviewing tests, obtaining and/or reviewing a separately obtained history, performing a medically appropriate examination and/or evaluation , counseling and educating the  patient/family/caregiver, ordering medications, tests, or procedures, referring and communicating with other health care professionals , documenting information in the medical record, and independently interpreting results and communicating that information with the patient/family/caregiver  Follow Up In November  Findings and recommendations discussed with Nivia. Reviewed upload results.  Praise given for her excellent glycemic management.  Lifestyle modifications reinforced including nutrition and activity recommendations.  Lilo will follow up in November sooner if problems/concerns occur.   She was given instructions and counseling regarding her condition or for health maintenance advice. Please see specific information pulled into the AVS if appropriate      This document has been electronically signed by:

## 2023-09-20 DIAGNOSIS — J45.20 MILD INTERMITTENT ASTHMA WITHOUT COMPLICATION: Chronic | ICD-10-CM

## 2023-09-20 RX ORDER — ALBUTEROL SULFATE 90 UG/1
AEROSOL, METERED RESPIRATORY (INHALATION)
Qty: 18 G | Refills: 5 | Status: SHIPPED | OUTPATIENT
Start: 2023-09-20

## 2023-09-26 ENCOUNTER — TELEPHONE (OUTPATIENT)
Dept: FAMILY MEDICINE CLINIC | Facility: CLINIC | Age: 48
End: 2023-09-26
Payer: MEDICARE

## 2023-09-26 DIAGNOSIS — R39.9 LOWER URINARY TRACT SYMPTOMS (LUTS): Primary | ICD-10-CM

## 2023-09-26 RX ORDER — NITROFURANTOIN 25; 75 MG/1; MG/1
100 CAPSULE ORAL 2 TIMES DAILY
Qty: 20 CAPSULE | Refills: 0 | Status: SHIPPED | OUTPATIENT
Start: 2023-09-26

## 2023-09-26 RX ORDER — FLUCONAZOLE 150 MG/1
150 TABLET ORAL DAILY
Qty: 2 TABLET | Refills: 0 | Status: SHIPPED | OUTPATIENT
Start: 2023-09-26

## 2023-09-26 NOTE — TELEPHONE ENCOUNTER
Spoke with patient and she is in understanding.    ----- Message from NIDA Betancourt sent at 9/26/2023  4:35 PM EDT -----  I sent in Macrobid for her . I also sent in Diflucan if needed for yeast   ----- Message -----  From: Danelle Henson MA  Sent: 9/26/2023   2:50 PM EDT  To: NIDA Betancourt    She called asking if you could prescribe her something for a UTI? She said she is urinating blood and having pressure like she always does with a UTI. I asked her if she could come for a urine sample and she said her vehicle is messed up and she cannot make it down here. She said she would have to have someone pick the medicine up for her.

## 2023-09-27 NOTE — PROGRESS NOTES
NAME: Nivia Bernstein    : 1975    DATE:  2023    DIAGNOSIS:   Stage IA ([id number]) moderately differentiated invasive ductal carcinoma of the R breast with associated DCIS.  Tumor was 10 mm in maximal dimension.  0/10 SLN involved. ER negative, OK 15% + (1+), HER-2/cat negative. Mammaprint high risk.    CHIEF COMPLAINT:  Follow up Breast cancer    TREATMENT HISTORY:  1. Initially planned to give 4 cycles to Taxotere/Cytoxan, during 1st infusion of Taxotere on 2020 patient developed allergic reaction. Therefore, Taxotere was discontinued and regimen changed to DD AC.    2.      3.  Started Tamoxifen 12-    HISTORY OF PRESENT ILLNESS:   Nivia Bernstein is a very pleasant 48 y.o. female who who is being seen today at the request of Sheila Garza MD for evaluation and treatment of breast cancer. She did not notice a palpable lump, but was recommended to have a screening mammogram by her PCP. A nodule in the right upper quadrant of the R breast was noted. The mass measured 0.8 cm on ultrasound. Biopsy was consistent with a moderately differentiated invasive ductal carcinoma, ER negative, OK weakly (15%) positive, and HER-2/cat negative. She underwent bilateral mastectomy with sentinel lymph node biopsy with Dr. Garza on 20. Pathology from this showed a moderately differentiated invasive ductal carcinoma with associated intermediate grade DCIS, negative margins.  0/10 /SLN involved.  Mammaprint was high risk. She was referred to our clinic and is here today with her  for discussion of further treatment options.      Ms. Bernstein is healing well from the surgery.  She did develop a seroma, which was drained by Dr. Garza yesterday. She reports muscular chest discomfort r/t the procedure, but otherwise denies any other symptoms including fevers, chills, significant weight changes, shortness of breath, abdominal pain, n/v/d/c, bony pain or headaches.    Interval History:  Ms. Bernstein  presents today for follow up of breast cancer.  Since her last visit, she has overall been well.  She was started on Ozempic and says that she is lost 70 pounds and her hemoglobin A1c came down to less than 5.  She continues to take tamoxifen and says she is tolerating this well.  She had been seeing Dr. Sheehan for bilateral frozen shoulder and was receiving injection therapy which she said was quite helpful.  Unfortunately her shoulders have again started to become more tender and stiff.  Since her last visit, she was seen by dermatology who remove the lesion on her arm and she says this was benign.  I recommended that she return to see Dr. Sheehan (she says been about 2 months since her last visit).  She had her 2023 influenza vaccine.  She is otherwise well without complaint or concern today.       PAST MEDICAL HISTORY:  Past Medical History:   Diagnosis Date    Altered mental status 10/16/2017    Anxiety and depression 3/31/2017    Arthritis     Asthma     Elevated alkaline phosphatase level 10/16/2017    Enlarged liver     GERD (gastroesophageal reflux disease)     Heart murmur     Hypokalemia 10/16/2017    Mitral valve prolapse     Neuropathy     related to diabetes    Obesity 3/31/2017    OCD (obsessive compulsive disorder) 10/16/2017    Osteoporosis     PONV (postoperative nausea and vomiting)     Renal insufficiency 10/16/2017    Right breast cancer with malignant cells in regional lymph nodes no greater than 0.2 mm and no more than 200 cells 8/13/2020    TIA (transient ischemic attack)     4 TIMES       PAST SURGICAL HISTORY:  Past Surgical History:   Procedure Laterality Date    APPENDECTOMY      CARPAL TUNNEL RELEASE      CHOLECYSTECTOMY      COLONOSCOPY N/A 5/5/2021    Procedure: COLONOSCOPY WITH BIOPSY;  Surgeon: Vickie Herrera MD;  Location: [facility];  Service: Gastroenterology;  Laterality: N/A;    FINGER FUSION Left     3rd    HYSTERECTOMY  2011    removed due to an ovarian cyst and  dysmenorrhia. No cancer noted. Complete    KNEE ARTHROSCOPY Right     MASTECTOMY Left 8/18/2020    Procedure: Left mastectomy;  Surgeon: Sheila Garza MD;  Location: [facility] OR;  Service: General;  Laterality: Left;    MASTECTOMY WITH SENTINEL NODE BIOPSY AND AXILLARY NODE DISSECTION Right 8/18/2020    Procedure: Right BREAST MASTECTOMY WITH SENTINEL NODE BIOPSY;  Surgeon: Sheila Garza MD;  Location:  [facility] OR;  Service: General;  Laterality: Right;    PORTACATH PLACEMENT         SOCIAL HISTORY:  Social History     Socioeconomic History    Marital status:    Tobacco Use    Smoking status: Never     Passive exposure: Never    Smokeless tobacco: Never   Vaping Use    Vaping Use: Never used   Substance and Sexual Activity    Alcohol use: No    Drug use: No    Sexual activity: Yes     Partners: Male         FAMILY HISTORY:  Family History   Problem Relation Age of Onset    Diabetes Mother     Hypertension Mother     Diabetes Father     Heart disease Father     Alcohol abuse Father     Seizures Father     Hypertension Father     No Known Problems Brother     No Known Problems Daughter     Bone cancer Son     Suicide Attempts Cousin     Stomach cancer Cousin         maternal first cousin    Breast cancer Paternal Aunt 52    Diabetes Maternal Grandmother     Diabetes Maternal Grandfather     Lung cancer Maternal Grandfather     Heart disease Paternal Grandmother     Diabetes Paternal Grandmother     Heart disease Paternal Grandfather     Diabetes Paternal Grandfather     Ovarian cancer Maternal Aunt     Lung cancer Maternal Uncle     Colon cancer Maternal Uncle     Cancer Maternal Uncle         unknown type     MEDICATIONS:  The current medication list was reviewed in the EMR    Current Outpatient Medications:     Accu-Chek FastClix Lancets misc, Use 1 Device 3 (Three) Times a Day., Disp: 100 each, Rfl: 5    alendronate (FOSAMAX) 35 MG tablet, TAKE ONE TABLET BY MOUTH every 7 days. take with water 30  minutes before first food/drink/medication. avoid lying down for 30 minutes after taking, Disp: 4 tablet, Rfl: 3    ALPRAZolam (XANAX) 1 MG tablet, Take 1 tablet by mouth 2 (Two) Times a Day As Needed for Anxiety., Disp: 60 tablet, Rfl: 0    Aspirin Low Dose 81 MG EC tablet, TAKE TWO TABLETS BY MOUTH EVERY DAY, Disp: 60 tablet, Rfl: 4    atorvastatin (LIPITOR) 40 MG tablet, TAKE ONE TABLET BY MOUTH EVERY night, Disp: 90 tablet, Rfl: 1    azelastine (ASTELIN) 0.1 % nasal spray, Use in each nostril as directed, Disp: 30 mL, Rfl: 3    cloNIDine (CATAPRES) 0.1 MG tablet, TAKE ONE TABLET BY MOUTH TWICE DAILY, Disp: 60 tablet, Rfl: 5    cyanocobalamin 1000 MCG/ML injection, Inject 1 mL under the skin into the appropriate area as directed Every 14 (Fourteen) Days., Disp: 2 mL, Rfl: 5    Diclofenac Sodium (VOLTAREN) 1 % gel gel, APPLY 4 GRAMS TO THE AFFECTED AREA FOUR TIMES A DAY, Disp: 100 g, Rfl: 3    Emgality 120 MG/ML auto-injector pen, INJECT 120mg ONCE MONTHLY, Disp: , Rfl:     Fluticasone Furoate-Vilanterol (Breo Ellipta) 200-25 MCG/ACT inhaler, Inhale 1 puff Daily., Disp: 1 each, Rfl: 3    furosemide (LASIX) 20 MG tablet, Take 0.5 tablets by mouth Daily., Disp: 30 tablet, Rfl: 0    gabapentin (NEURONTIN) 100 MG capsule, TAKE ONE CAPSULE BY MOUTH TWICE DAILY, Disp: 60 capsule, Rfl: 1    glucagon (GLUCAGEN) 1 MG injection, Inject 1 mg under the skin into the appropriate area as directed 1 (One) Time As Needed for Low Blood Sugar. Please disregard the previous prescription, Disp: 1 kit, Rfl: 3    glucose blood (Accu-Chek Guide) test strip, 1 each by Other route 3 (Three) Times a Day., Disp: 100 each, Rfl: 3    Insulin Lispro (humaLOG) 100 UNIT/ML injection, INJECT AS DIRECTED. MAX DAILY DOSE  UNITS ONCE DAILY, Disp: 40 mL, Rfl: 3    ipratropium-albuterol (DUO-NEB) 0.5-2.5 mg/3 ml nebulizer, Take 3 mL by nebulization Every 4 (Four) Hours As Needed for Shortness of Air., Disp: 150 mL, Rfl: 1    lansoprazole  (PREVACID) 30 MG capsule, TAKE ONE CAPSULE BY MOUTH EVERY DAY, Disp: 90 capsule, Rfl: 0    levocetirizine (XYZAL) 5 MG tablet, TAKE 1 TABLET BY MOUTH EVERY EVENING., Disp: 30 tablet, Rfl: 3    Linzess 145 MCG capsule capsule, TAKE ONE CAPSULE BY MOUTH EVERY DAY 30 MINUTES BEFORE MEALS ON AN EMPTY STOMACH, Disp: 30 capsule, Rfl: 3    nitrofurantoin, macrocrystal-monohydrate, (Macrobid) 100 MG capsule, Take 1 capsule by mouth 2 (Two) Times a Day., Disp: 20 capsule, Rfl: 0    ondansetron (ZOFRAN) 8 MG tablet, TAKE 1 TABLET BY MOUTH EVERY 8 HOURS AS NEEDED FOR NAUSEA OR VOMITING, Disp: 30 tablet, Rfl: 3    Respiratory Therapy Supplies (Nebulizer/Tubing/Mouthpiece) kit, 1 each Take As Directed., Disp: 1 each, Rfl: 1    Semaglutide, 1 MG/DOSE, (Ozempic, 1 MG/DOSE,) 4 MG/3ML solution pen-injector, Inject 1 mg under the skin into the appropriate area as directed Every 7 (Seven) Days., Disp: 3 mL, Rfl: 1    Semaglutide, 2 MG/DOSE, (Ozempic, 2 MG/DOSE,) 8 MG/3ML solution pen-injector, Inject 2 mg under the skin into the appropriate area as directed Every 7 (Seven) Days., Disp: 3 mL, Rfl: 2    Synjardy 12.5-1000 MG tablet, TAKE ONE TABLET BY MOUTH EVERY MORNING, Disp: 30 tablet, Rfl: 0    tamoxifen (NOLVADEX) 20 MG chemo tablet, Take 1 tablet by mouth Daily., Disp: 30 tablet, Rfl: 11    topiramate (TOPAMAX) 50 MG tablet, TAKE ONE TABLET BY MOUTH TWICE DAILY, Disp: 60 tablet, Rfl: 3    ubrogepant 100 MG tablet, , Disp: , Rfl:     venlafaxine XR (EFFEXOR-XR) 75 MG 24 hr capsule, Take 3 capsules by mouth Daily., Disp: 90 capsule, Rfl: 1    Ventolin  (90 Base) MCG/ACT inhaler, inhale TWO puffs BY MOUTH EVERY 4 HOURS AS NEEDED FOR SHORTNESS OF BREATH, Disp: 18 g, Rfl: 5    vitamin D (ERGOCALCIFEROL) 1.25 MG (03631 UT) capsule capsule, Take 1 capsule by mouth Every 30 (Thirty) Days., Disp: 4 capsule, Rfl: 0    fluconazole (Diflucan) 150 MG tablet, Take 1 tablet by mouth Daily. (Patient not taking: Reported on 9/28/2023),  "Disp: 2 tablet, Rfl: 0  No current facility-administered medications for this visit.    ALLERGIES:    Allergies   Allergen Reactions    Imipramine Other (See Comments)     Chest pain    Mobic [Meloxicam] Rash    Penicillins Angioedema    Taxotere [Docetaxel] Anaphylaxis     9 minutes into 1st infusion    Novolog [Insulin Aspart] Hives    Rosuvastatin Calcium GI Intolerance     REVIEW OF SYSTEMS:    A comprehensive 14 point review of systems was performed.  Significant findings as mentioned above.  All other systems reviewed and are negative.      Physical Exam  Vital Signs:   Visit Vitals  BP 93/60   Pulse 94   Temp 98.2 °F (36.8 °C) (Temporal)   Resp 18   Ht 162.6 cm (64\")   Wt 81.5 kg (179 lb 9.6 oz)   SpO2 99%   BMI 30.83 kg/m²     Pain Score    09/28/23 1434   PainSc:   6   PainLoc: Leg      ECOG score: 0   General: Awake, alert and oriented, in no acute distress.   HEENT:  PERRLA, EOM full, OP clear, mucous membranes moist  Neck: No thyromegaly. No JVD.   Lungs: Lungs are clear to auscultation bilaterally, no crackles  Heart:  Regular rate and rhythm. No murmurs, rubs, or gallops.   Breast: S/p bilateral mastectomy and R ALNBx.  Surgical scars smooth without nodularity..  She has no axillary lymphadenopathy on either side.  Abdomen: Soft, Non tender, non-distended, bowel sounds present.  No palpable organomegaly or masses..  Diabetic monitoring device in place.  Extremities: No clubbing, cyanosis, no lower extremity edema.  S  Neurologic: MS as above. Grossly non focal exam    PATHOLOGY:              IMAGING:  Bilateral screening mammogram 06-19-20  FINDINGS:    The breast tissue is heterogeneously dense.  New focal nodularity right upper outer breast that is about 14 cm from  the nipple.  Otherwise, no suspicious findings.     IMPRESSION:  New focal nodularity right upper outer breast that is about  14 cm from the nipple.     RECOMMENDATION:  Spot compression imaging.     BI-RADS CAT 0, INCOMPLETE.  The " patient needs additional imaging.        Diagnostic R mammogram with R breast US 07-14-20  FINDINGS:  Additional mammographic imaging images of persistent  partially obscured oval mass in the posterior right upper outer  quadrant.     Right breast ultrasound: Focused sonographic evaluation of the right  breast demonstrates a 0.8 cm irregular hypoechoic mass with ill-defined  borders located at 10:00, 13 cm from the nipple. An additional area was  initially noted by the technologist in the 9:00 region which represents  an isolated fat lobule.     IMPRESSION:  0.8 cm irregular mass in the right 10:00 region. Recommend  ultrasound-guided core biopsy.     BI-RADS CATEGORY:  4, SUSPICIOUS     RECOMMENDATION:  Recommend ultrasound guided core biopsy of the right  breast.        Bilateral breast MRI w/wo contrast 08-11-20  FINDINGS: There is minimal bilateral background contrast enhancement and  normal vascular enhancement.     There are no worrisome areas of contrast enhancement in the left breast.     In the right breast correlating to the biopsy proven malignancy is an  approximately 0.7 cm irregular rapidly enhancing mass in the posterior  right 10:00 region. Artifact from a postbiopsy marking clip is located  adjacent to this mass. A small linear area of enhancement extends  posterior to this mass approximately 1 cm. There are no additional  worrisome areas of contrast enhancement in the right breast.     There is no axillary adenopathy by MRI criteria and no chest wall  abnormality is seen.     IMPRESSION:  1. Negative left breast MRI.     2. Biopsy-proven malignancy in the right 10:00 region with a small  linear area of enhancement extending posterior to the 0.7 cm mass.     RECOMMENDATIONS: Recommend surgical follow-up.     BI-RADS CATEGORY: 6, KNOWN BIOPSY PROVEN MALIGNANCY    Echo 10-16-17:  A two-dimensional transthoracic echocardiogram with color flow and Doppler was performed.   The study is technically good  for diagnosis.Verbal consent was obtained from the patient to use saline contrast in order to optimize the study.  A total of 20 mL of agitated saline was administered to assess for cardiac shunting.   No adverse reaction to contrast was noted.   Echocardiogram Findings    Left Ventricle Left ventricular systolic function is normal. Estimated EF appears to be in the range of 56 - 60%. Normal left ventricular cavity size and wall thickness noted. All left ventricular wall segments contract normally. Left ventricular diastolic function is normal.   Right Ventricle Normal right ventricular cavity size noted.   Left Atrium Normal left atrial size noted. Saline test results are negative.   Right Atrium Normal right atrial size noted.   Aortic Valve The aortic valve is structurally normal. Trace aortic valve regurgitation is present.   Mitral Valve The mitral valve is normal in structure. Trace mitral valve regurgitation is present.   Tricuspid Valve The tricuspid valve is normal.  Trace tricuspid valve regurgitation is present.   Pulmonic Valve The pulmonic valve is structurally normal. There is trace pulmonic valve regurgitation present.   Greater Vessels No dilation of the aortic root is present. No dilation of the sinuses of Valsalva is present.   Pericardium There is no evidence of pericardial effusion.       ECHO 09/29/2020  Poor quality echo and Doppler study  Normal left ventricular cavity size and wall thickness noted.  Left ventricular ejection fraction appears to be 61 - 65%. Left ventricular systolic function is normal.  Left ventricular diastolic function is consistent with (grade I) impaired relaxation.  Aortic and mitral valve are poorly visualized but appear to be normal,  Tricuspid and pulmonic valve echoes are not visualized.  There is no pericardial effusion noted.    Echocardiogram 12-08-02  Normal left ventricular cavity size and wall thickness noted. All left ventricular wall segments contract  normally.  Left ventricular ejection fraction appears to be 56 - 60%.  The pericardium is normal. There is no evidence of pericardial effusion. .       MRI Brain w/wo contrast 01-28-21  FINDINGS:    Brain:  Unremarkable.  No mass.  No hemorrhage.  No acute infarct.    Ventricles:  Unremarkable.  No ventriculomegaly.    Bones/joints:  Unremarkable.    Sinuses:  Unremarkable as visualized.  No acute sinusitis.    Mastoid air cells:  Unremarkable as visualized.  No mastoid effusion.    Orbits:  Unremarkable as visualized.     IMPRESSION:    No acute findings in the head/brain.    ENDOSCOPY:    COLONOSCOPY PROCEDURE NOTE   5/5/2021 Dr. Aguilera       Findings:  1.  Normal colonoscopy with small internal hemorrhoids.  2.  Normal terminal ileum     OPERATIVE PROCEDURE   After proper informed consent was obtained, the patient was taken the operating suite and placed in left lateral decubitus position.  An Olympus video colonoscope 180 series was inserted in the rectum and advanced to the terminal ileum under direct visualization.  Cecum and terminal ileum were identified by visualization of the appendiceal orifice and ileocecal valve.  The colonoscope was then slowly withdrawn from the cecum to the rectum and passed a second time from rectum to cecum.  The colonoscope was retroflexed in the cecum and rectum. Scope was then withdrawn. Patient tolerated the procedure well. There were no immediate complications. Cecal withdrawal time was 9 minutes.     RECOMMENDATIONS:  1.  Repeat colonoscopy in 10 years for screening purposes.  2.  Continue Linzess.  3.  Anusol HC suppositories as needed for rectal bleeding.    RECENT LABS:  Lab Results   Component Value Date    WBC 10.71 09/28/2023    HGB 12.8 09/28/2023    HCT 40.7 09/28/2023    MCV 88.5 09/28/2023    RDW 14.6 09/28/2023     09/28/2023    NEUTRORELPCT 51.7 09/28/2023    LYMPHORELPCT 40.8 09/28/2023    MONORELPCT 5.2 09/28/2023    EOSRELPCT 1.3 09/28/2023     BASORELPCT 0.7 09/28/2023    NEUTROABS 5.53 09/28/2023    LYMPHSABS 4.37 (H) 09/28/2023       Lab Results   Component Value Date     09/28/2023    K 3.7 09/28/2023    CO2 20.4 (L) 09/28/2023     (H) 09/28/2023    BUN 9 09/28/2023    CREATININE 0.80 09/28/2023    EGFRIFNONA 89 01/11/2022    EGFRIFAFRI 108 01/11/2022    GLUCOSE 138 (H) 09/28/2023    CALCIUM 9.1 09/28/2023    ALKPHOS 80 09/28/2023    AST 15 09/28/2023    ALT 10 09/28/2023    BILITOT 0.3 09/28/2023    ALBUMIN 4.1 09/28/2023    PROTEINTOT 7.3 09/28/2023    MG 2.4 10/06/2022     Lab Results   Component Value Date    FERRITIN 36.62 09/20/2022    IRON 46 09/20/2022    TIBC 459 09/20/2022    LABIRON 10 (L) 09/20/2022    [id number] 371 08/07/2023    FOLATE 15.19 10/16/2017        Lab Results   Component Value Date    CAION 1.28 10/16/2017    TSH 2.280 09/28/2023    CQRT53QL 71.0 08/07/2023         ASSESSMENT & PLAN:  Nivia Bernstein is a very pleasant 48 y.o. female with Stage IA ([id number]) moderately differentiated invasive ductal carcinoma of the R breast with associated DCIS.  Tumor was 10 mm in maximal dimension.  0/10 SLN involved. ER negative, NC 15% + (1+), HER-2/cat negative. Mammaprint high risk.     1. R breast cancer:   -  S/p R mastectomy and SLNBx as well as prophylactic contralateral mastectomy performed by Dr. Garza 8-18-20.  - She healed well from bilateral mastectomy. This was complicated by left seroma, drained by Dr. Garza. Incisions are healed well.   - Given high risk Mammaprint, Dr. Loco recommended adjuvant chemotherapy. Discussed different possibilities for adjuvant therapy. Given high risk Mammaprint but early stage, node negative disease as well as comorbidities, recommended Taxotere/Cytoxan Q21d x 4 cycles with growth factor support. Discussed potential risks, benefits, and side effects and she would like to proceed.   - She had port placed several years ago due to poor peripheral access. This has been well cared for  and maintained.   - C1 Taxotere/Cytoxan was planned for 09/24/2020. Unfortunately, this had to be discontinued due to allergic reaction to Taxotere. Recommended change of treatment to DD AC.   - She tolerated treatment well and aside from fatigue and recovered well. ECHO was essentially unchanged.   -  Her tumor was ER neg but did have 15% 1+ positivity for progesterone receptor.  She is s/p complete hysterectomy and had evidence of osteopenia with T score -1.9 in June 2020.  Given all of this, recommended treatment with Tamoxifen over aromatase inhibitor.   - Exam and labs from today without cause for concern. Continue Tamoxifen daily, which she is tolerating well (refilled today).  - We will ask her return in 6 months for exam with CBC, CMP, TSH and vitamin D prior.    2. Extensive family history of malignancy:   - Genetic testing was ordered by Dr. Garza [facility], specifically including BRCA 1/2, CHKE2, CDH1, PALB2 and others.    3. Anxiety/Depression:  -  She continues to f/u with Psychiatry. She continues to do well in this regard.     4. Frozen shoulders Bilaterally:  - She had been seeing Dr. Sheehan of orthopedics.  She received injection therapy as well as physical therapy.  She had significant improvement in the ROM in her nicole shoulders.  Unfortunately, she has had temporary worsening of her symptoms.  She has not seen Dr. Sheehan in approximately 2 months.  I recommended she call his office for follow-up and evaluation.    5. Skin lesion:  -0.6 cm pale red lesion on left upper extremity with pruritus. Denies any changes.  Suspicious for dermatofibroma although could represent a squamous cell skin cancer.  I recommended referral to dermatology.  She was seen and had this removed.  She says it was benign.    6.  Alopecia:  - Etiology uncertain.  Perhaps this was related to B12 deficiency.  B12 level was checked by her PCP in June and was borderline, and she was started on  B12 injections.  - Alopecia now improved.  We will check vitamin B12 level when she returns.    7.  Prophylaxis:   -  She received Prevnar 13 vaccine.  -  She had 2023 flu vaccine.  -  M. Uncle had colon cancer at age 50.  Referred to Dr. Aguilera for screening colonoscopy which was unremarkable and repeat exam recommended after 10-year interval..   - She has had COVID vaccine x2.  Recommended fall COVID booster to her.    8.  Follow up:  - Continue Tamoxifen which was refilled today.  -Recommended she return to see Dr. Sheehan for reworsening of bilateral shoulder pain  - Return to see me in 6 months with CBC, CMP, TSH, vitamin D and vitamin B12 level.  -Recommended fall COVID booster vaccine      9.  ACO / NII/Other  Quality measures  -  Nivia Bernstein received 2023 flu vaccine.  -  Nivia Bernstein reports a pain score of 6.  Given her pain assessment as noted, treatment options were discussed and the following options were decided upon as a follow-up plan to address the patient's pain:  Follow up with PCP for non-neoplasm related pain .  -  Current outpatient and discharge medications have been reconciled for the patient.  Reviewed by: Dejah Loco MD        I spent 30 minutes with Nivia Bernstein today.  In the office today, more than 50% of this time was spent face-to-face with her  in counseling / coordination of care, reviewing her interim medical history and counseling on the current treatment plan.  All questions were answered to her satisfaction.           Electronically Signed by: Dejah Loco MD  CC:   MD Truong Hager Sherelene S, APRN

## 2023-09-28 ENCOUNTER — APPOINTMENT (OUTPATIENT)
Dept: ONCOLOGY | Facility: HOSPITAL | Age: 48
End: 2023-09-28
Payer: MEDICARE

## 2023-09-28 ENCOUNTER — OFFICE VISIT (OUTPATIENT)
Dept: ONCOLOGY | Facility: CLINIC | Age: 48
End: 2023-09-28
Payer: MEDICARE

## 2023-09-28 ENCOUNTER — INFUSION (OUTPATIENT)
Dept: ONCOLOGY | Facility: HOSPITAL | Age: 48
End: 2023-09-28
Payer: MEDICARE

## 2023-09-28 VITALS
TEMPERATURE: 98.2 F | HEIGHT: 64 IN | OXYGEN SATURATION: 99 % | RESPIRATION RATE: 18 BRPM | SYSTOLIC BLOOD PRESSURE: 93 MMHG | BODY MASS INDEX: 30.66 KG/M2 | HEART RATE: 94 BPM | WEIGHT: 179.6 LBS | DIASTOLIC BLOOD PRESSURE: 60 MMHG

## 2023-09-28 VITALS
OXYGEN SATURATION: 95 % | BODY MASS INDEX: 30.73 KG/M2 | TEMPERATURE: 96.9 F | RESPIRATION RATE: 18 BRPM | HEART RATE: 84 BPM | SYSTOLIC BLOOD PRESSURE: 118 MMHG | WEIGHT: 179 LBS | DIASTOLIC BLOOD PRESSURE: 64 MMHG

## 2023-09-28 DIAGNOSIS — R53.83 FATIGUE, UNSPECIFIED TYPE: ICD-10-CM

## 2023-09-28 DIAGNOSIS — C50.411 MALIGNANT NEOPLASM OF UPPER-OUTER QUADRANT OF RIGHT BREAST IN FEMALE, ESTROGEN RECEPTOR NEGATIVE: Primary | ICD-10-CM

## 2023-09-28 DIAGNOSIS — Z17.1 MALIGNANT NEOPLASM OF UPPER-OUTER QUADRANT OF RIGHT BREAST IN FEMALE, ESTROGEN RECEPTOR NEGATIVE: Primary | ICD-10-CM

## 2023-09-28 DIAGNOSIS — Z17.1 MALIGNANT NEOPLASM OF UPPER-OUTER QUADRANT OF RIGHT BREAST IN FEMALE, ESTROGEN RECEPTOR NEGATIVE: ICD-10-CM

## 2023-09-28 DIAGNOSIS — C50.411 MALIGNANT NEOPLASM OF UPPER-OUTER QUADRANT OF RIGHT BREAST IN FEMALE, ESTROGEN RECEPTOR NEGATIVE: ICD-10-CM

## 2023-09-28 DIAGNOSIS — E55.9 VITAMIN D DEFICIENCY: ICD-10-CM

## 2023-09-28 DIAGNOSIS — E53.8 B12 DEFICIENCY: ICD-10-CM

## 2023-09-28 DIAGNOSIS — Z95.828 PORT-A-CATH IN PLACE: Primary | ICD-10-CM

## 2023-09-28 LAB
ALBUMIN SERPL-MCNC: 4.1 G/DL (ref 3.5–5.2)
ALBUMIN/GLOB SERPL: 1.3 G/DL
ALP SERPL-CCNC: 80 U/L (ref 39–117)
ALT SERPL W P-5'-P-CCNC: 10 U/L (ref 1–33)
ANION GAP SERPL CALCULATED.3IONS-SCNC: 11.6 MMOL/L (ref 5–15)
AST SERPL-CCNC: 15 U/L (ref 1–32)
BASOPHILS # BLD AUTO: 0.08 10*3/MM3 (ref 0–0.2)
BASOPHILS NFR BLD AUTO: 0.7 % (ref 0–1.5)
BILIRUB SERPL-MCNC: 0.3 MG/DL (ref 0–1.2)
BUN SERPL-MCNC: 9 MG/DL (ref 6–20)
BUN/CREAT SERPL: 11.3 (ref 7–25)
CALCIUM SPEC-SCNC: 9.1 MG/DL (ref 8.6–10.5)
CHLORIDE SERPL-SCNC: 111 MMOL/L (ref 98–107)
CO2 SERPL-SCNC: 20.4 MMOL/L (ref 22–29)
CREAT SERPL-MCNC: 0.8 MG/DL (ref 0.57–1)
DEPRECATED RDW RBC AUTO: 47.8 FL (ref 37–54)
EGFRCR SERPLBLD CKD-EPI 2021: 89.9 ML/MIN/1.73
EOSINOPHIL # BLD AUTO: 0.14 10*3/MM3 (ref 0–0.4)
EOSINOPHIL NFR BLD AUTO: 1.3 % (ref 0.3–6.2)
ERYTHROCYTE [DISTWIDTH] IN BLOOD BY AUTOMATED COUNT: 14.6 % (ref 12.3–15.4)
GLOBULIN UR ELPH-MCNC: 3.2 GM/DL
GLUCOSE SERPL-MCNC: 138 MG/DL (ref 65–99)
HCT VFR BLD AUTO: 40.7 % (ref 34–46.6)
HGB BLD-MCNC: 12.8 G/DL (ref 12–15.9)
IMM GRANULOCYTES # BLD AUTO: 0.03 10*3/MM3 (ref 0–0.05)
IMM GRANULOCYTES NFR BLD AUTO: 0.3 % (ref 0–0.5)
LYMPHOCYTES # BLD AUTO: 4.37 10*3/MM3 (ref 0.7–3.1)
LYMPHOCYTES NFR BLD AUTO: 40.8 % (ref 19.6–45.3)
MCH RBC QN AUTO: 27.8 PG (ref 26.6–33)
MCHC RBC AUTO-ENTMCNC: 31.4 G/DL (ref 31.5–35.7)
MCV RBC AUTO: 88.5 FL (ref 79–97)
MONOCYTES # BLD AUTO: 0.56 10*3/MM3 (ref 0.1–0.9)
MONOCYTES NFR BLD AUTO: 5.2 % (ref 5–12)
NEUTROPHILS NFR BLD AUTO: 5.53 10*3/MM3 (ref 1.7–7)
NEUTROPHILS NFR BLD AUTO: 51.7 % (ref 42.7–76)
NRBC BLD AUTO-RTO: 0 /100 WBC (ref 0–0.2)
PLATELET # BLD AUTO: 297 10*3/MM3 (ref 140–450)
PMV BLD AUTO: 10.1 FL (ref 6–12)
POTASSIUM SERPL-SCNC: 3.7 MMOL/L (ref 3.5–5.2)
PROT SERPL-MCNC: 7.3 G/DL (ref 6–8.5)
RBC # BLD AUTO: 4.6 10*6/MM3 (ref 3.77–5.28)
SODIUM SERPL-SCNC: 143 MMOL/L (ref 136–145)
TSH SERPL DL<=0.05 MIU/L-ACNC: 2.28 UIU/ML (ref 0.27–4.2)
WBC NRBC COR # BLD: 10.71 10*3/MM3 (ref 3.4–10.8)

## 2023-09-28 PROCEDURE — 85025 COMPLETE CBC W/AUTO DIFF WBC: CPT

## 2023-09-28 PROCEDURE — 25010000002 HEPARIN LOCK FLUSH PER 10 UNITS: Performed by: INTERNAL MEDICINE

## 2023-09-28 PROCEDURE — 1126F AMNT PAIN NOTED NONE PRSNT: CPT | Performed by: INTERNAL MEDICINE

## 2023-09-28 PROCEDURE — 36591 DRAW BLOOD OFF VENOUS DEVICE: CPT

## 2023-09-28 PROCEDURE — 80053 COMPREHEN METABOLIC PANEL: CPT

## 2023-09-28 PROCEDURE — 99214 OFFICE O/P EST MOD 30 MIN: CPT | Performed by: INTERNAL MEDICINE

## 2023-09-28 PROCEDURE — 82306 VITAMIN D 25 HYDROXY: CPT

## 2023-09-28 PROCEDURE — 84443 ASSAY THYROID STIM HORMONE: CPT

## 2023-09-28 RX ORDER — SODIUM CHLORIDE 0.9 % (FLUSH) 0.9 %
10 SYRINGE (ML) INJECTION AS NEEDED
Status: DISCONTINUED | OUTPATIENT
Start: 2023-09-28 | End: 2023-09-28 | Stop reason: HOSPADM

## 2023-09-28 RX ORDER — HEPARIN SODIUM (PORCINE) LOCK FLUSH IV SOLN 100 UNIT/ML 100 UNIT/ML
500 SOLUTION INTRAVENOUS AS NEEDED
OUTPATIENT
Start: 2023-09-28

## 2023-09-28 RX ORDER — TAMOXIFEN CITRATE 20 MG/1
20 TABLET ORAL DAILY
Qty: 30 TABLET | Refills: 11 | Status: SHIPPED | OUTPATIENT
Start: 2023-09-28

## 2023-09-28 RX ORDER — SODIUM CHLORIDE 0.9 % (FLUSH) 0.9 %
10 SYRINGE (ML) INJECTION AS NEEDED
OUTPATIENT
Start: 2023-09-28

## 2023-09-28 RX ORDER — HEPARIN SODIUM (PORCINE) LOCK FLUSH IV SOLN 100 UNIT/ML 100 UNIT/ML
500 SOLUTION INTRAVENOUS AS NEEDED
Status: DISCONTINUED | OUTPATIENT
Start: 2023-09-28 | End: 2023-09-28 | Stop reason: HOSPADM

## 2023-09-28 RX ADMIN — Medication 10 ML: at 14:16

## 2023-09-28 RX ADMIN — Medication 500 UNITS: at 14:16

## 2023-09-29 LAB — 25(OH)D3 SERPL-MCNC: 71.5 NG/ML (ref 30–100)

## 2023-10-01 DIAGNOSIS — M85.89 OSTEOPENIA OF MULTIPLE SITES: ICD-10-CM

## 2023-10-02 RX ORDER — ALENDRONATE SODIUM 35 MG/1
TABLET ORAL
Qty: 4 TABLET | Refills: 3 | Status: SHIPPED | OUTPATIENT
Start: 2023-10-02

## 2023-10-03 ENCOUNTER — OFFICE VISIT (OUTPATIENT)
Dept: PSYCHIATRY | Facility: CLINIC | Age: 48
End: 2023-10-03
Payer: MEDICARE

## 2023-10-03 VITALS — OXYGEN SATURATION: 87 % | HEIGHT: 64 IN | BODY MASS INDEX: 29.98 KG/M2 | HEART RATE: 97 BPM | WEIGHT: 175.6 LBS

## 2023-10-03 DIAGNOSIS — F42.8 OTHER OBSESSIVE-COMPULSIVE DISORDERS: Chronic | ICD-10-CM

## 2023-10-03 DIAGNOSIS — F41.1 GENERALIZED ANXIETY DISORDER: ICD-10-CM

## 2023-10-03 DIAGNOSIS — F33.1 MODERATE EPISODE OF RECURRENT MAJOR DEPRESSIVE DISORDER: ICD-10-CM

## 2023-10-03 DIAGNOSIS — Z79.899 HIGH RISK MEDICATION USE: ICD-10-CM

## 2023-10-03 DIAGNOSIS — F41.1 GENERALIZED ANXIETY DISORDER: Primary | ICD-10-CM

## 2023-10-03 RX ORDER — VENLAFAXINE HYDROCHLORIDE 75 MG/1
225 CAPSULE, EXTENDED RELEASE ORAL DAILY
Qty: 90 CAPSULE | Refills: 1 | Status: SHIPPED | OUTPATIENT
Start: 2023-10-03

## 2023-10-03 RX ORDER — ALPRAZOLAM 1 MG/1
1 TABLET ORAL 2 TIMES DAILY PRN
Qty: 60 TABLET | Refills: 0 | Status: SHIPPED | OUTPATIENT
Start: 2023-10-03

## 2023-10-03 RX ORDER — VENLAFAXINE HYDROCHLORIDE 150 MG/1
CAPSULE, EXTENDED RELEASE ORAL
Qty: 30 CAPSULE | Refills: 1 | OUTPATIENT
Start: 2023-10-03

## 2023-10-03 NOTE — PROGRESS NOTES
"      Raquel Bernstein is a 48 y.o. female is here today for medication management follow-up.    Chief Complaint:  depression anxiety     History of Present Illness:    Patient presents today for a follow up for medication management for depression and anxiety. Patient states she has been feeling better last couple weeks overall. Patient states feeling tired but doing good. Patient states her A1C has came down to 5.0 and glucose is staying within range. Patient states appetite is about the same and eating about twice a day. Patient states mood has been doing good the last couple weeks. Patient states had a really bad week and thought was going to hospital. Patient states had a couple panic attacks in the last two weeks but not major. Patient states had one incident in which \"flipped out on landlord\". Patient states sleeping somewhat better and getting about 4-5 hours a night. Patient states still some nightmares. Denies any thoughts to harm self or others. Denies any auditory or visual hallucinations. Depression at a 7 on a 0/10 scale with 10 the worst. Anxiety at a 8-9 on a 0/10 scale with 10 the worst. Denies any side effects to medications and reports compliance.   The following portions of the patient's history were reviewed and updated as appropriate: allergies, current medications, past family history, past medical history, past social history, past surgical history, and problem list.    Review of Systems   Constitutional: Negative.    Respiratory: Negative.     Cardiovascular: Negative.    Gastrointestinal: Negative.    Neurological: Negative.    Psychiatric/Behavioral:  Positive for agitation, dysphoric mood and sleep disturbance. The patient is nervous/anxious.      Objective   Physical Exam  Vitals reviewed.   Constitutional:       Appearance: Normal appearance. She is well-developed and well-groomed.   Neurological:      Mental Status: She is alert.   Psychiatric:         Attention and " "Perception: Attention and perception normal.         Mood and Affect: Affect normal. Mood is anxious.         Speech: Speech normal.         Behavior: Behavior normal. Behavior is cooperative.         Thought Content: Thought content normal.         Cognition and Memory: Cognition and memory normal.         Judgment: Judgment normal.     Pulse 97, height 162.6 cm (64\"), weight 79.7 kg (175 lb 9.6 oz), SpO2 (!) 87 %, not currently breastfeeding.Body mass index is 30.14 kg/mý.    Allergies   Allergen Reactions    Imipramine Other (See Comments)     Chest pain    Mobic [Meloxicam] Rash    Penicillins Angioedema    Taxotere [Docetaxel] Anaphylaxis     9 minutes into 1st infusion    Novolog [Insulin Aspart] Hives    Rosuvastatin Calcium GI Intolerance       Medication List:   Current Outpatient Medications   Medication Sig Dispense Refill    ALPRAZolam (XANAX) 1 MG tablet Take 1 tablet by mouth 2 (Two) Times a Day As Needed for Anxiety. 60 tablet 0    venlafaxine XR (EFFEXOR-XR) 75 MG 24 hr capsule Take 3 capsules by mouth Daily. 90 capsule 1    Accu-Chek FastClix Lancets misc Use 1 Device 3 (Three) Times a Day. 100 each 5    alendronate (FOSAMAX) 35 MG tablet TAKE ONE TABLET BY MOUTH every 7 days. take with water 30 minutes before first food/drink/medication. avoid lying down for 30 minutes after taking 4 tablet 3    Aspirin Low Dose 81 MG EC tablet TAKE TWO TABLETS BY MOUTH EVERY DAY 60 tablet 4    atorvastatin (LIPITOR) 40 MG tablet TAKE ONE TABLET BY MOUTH EVERY night 90 tablet 1    azelastine (ASTELIN) 0.1 % nasal spray Use in each nostril as directed 30 mL 3    cloNIDine (CATAPRES) 0.1 MG tablet TAKE ONE TABLET BY MOUTH TWICE DAILY 60 tablet 5    cyanocobalamin 1000 MCG/ML injection Inject 1 mL under the skin into the appropriate area as directed Every 14 (Fourteen) Days. 2 mL 5    Diclofenac Sodium (VOLTAREN) 1 % gel gel APPLY 4 GRAMS TO THE AFFECTED AREA FOUR TIMES A  g 3    Emgality 120 MG/ML " auto-injector pen INJECT 120mg ONCE MONTHLY      fluconazole (Diflucan) 150 MG tablet Take 1 tablet by mouth Daily. (Patient not taking: Reported on 9/28/2023) 2 tablet 0    Fluticasone Furoate-Vilanterol (Breo Ellipta) 200-25 MCG/ACT inhaler Inhale 1 puff Daily. 1 each 3    furosemide (LASIX) 20 MG tablet Take 0.5 tablets by mouth Daily. 30 tablet 0    gabapentin (NEURONTIN) 100 MG capsule TAKE ONE CAPSULE BY MOUTH TWICE DAILY 60 capsule 1    glucagon (GLUCAGEN) 1 MG injection Inject 1 mg under the skin into the appropriate area as directed 1 (One) Time As Needed for Low Blood Sugar. Please disregard the previous prescription 1 kit 3    glucose blood (Accu-Chek Guide) test strip 1 each by Other route 3 (Three) Times a Day. 100 each 3    Insulin Lispro (humaLOG) 100 UNIT/ML injection INJECT AS DIRECTED. MAX DAILY DOSE  UNITS ONCE DAILY 40 mL 3    ipratropium-albuterol (DUO-NEB) 0.5-2.5 mg/3 ml nebulizer Take 3 mL by nebulization Every 4 (Four) Hours As Needed for Shortness of Air. 150 mL 1    lansoprazole (PREVACID) 30 MG capsule TAKE ONE CAPSULE BY MOUTH EVERY DAY 90 capsule 0    levocetirizine (XYZAL) 5 MG tablet TAKE 1 TABLET BY MOUTH EVERY EVENING. 30 tablet 3    Linzess 145 MCG capsule capsule TAKE ONE CAPSULE BY MOUTH EVERY DAY 30 MINUTES BEFORE MEALS ON AN EMPTY STOMACH 30 capsule 3    nitrofurantoin, macrocrystal-monohydrate, (Macrobid) 100 MG capsule Take 1 capsule by mouth 2 (Two) Times a Day. 20 capsule 0    ondansetron (ZOFRAN) 8 MG tablet TAKE 1 TABLET BY MOUTH EVERY 8 HOURS AS NEEDED FOR NAUSEA OR VOMITING 30 tablet 3    Respiratory Therapy Supplies (Nebulizer/Tubing/Mouthpiece) kit 1 each Take As Directed. 1 each 1    Semaglutide, 1 MG/DOSE, (Ozempic, 1 MG/DOSE,) 4 MG/3ML solution pen-injector Inject 1 mg under the skin into the appropriate area as directed Every 7 (Seven) Days. 3 mL 1    Semaglutide, 2 MG/DOSE, (Ozempic, 2 MG/DOSE,) 8 MG/3ML solution pen-injector Inject 2 mg under the skin into  the appropriate area as directed Every 7 (Seven) Days. 3 mL 2    Synjardy 12.5-1000 MG tablet TAKE ONE TABLET BY MOUTH EVERY MORNING 30 tablet 0    tamoxifen (NOLVADEX) 20 MG chemo tablet Take 1 tablet by mouth Daily. 30 tablet 11    topiramate (TOPAMAX) 50 MG tablet TAKE ONE TABLET BY MOUTH TWICE DAILY 60 tablet 3    ubrogepant 100 MG tablet       Ventolin  (90 Base) MCG/ACT inhaler inhale TWO puffs BY MOUTH EVERY 4 HOURS AS NEEDED FOR SHORTNESS OF BREATH 18 g 5    vitamin D (ERGOCALCIFEROL) 1.25 MG (01970 UT) capsule capsule Take 1 capsule by mouth Every 30 (Thirty) Days. 4 capsule 0     No current facility-administered medications for this visit.       Mental Status Exam:   Hygiene:   good  Cooperation:  Cooperative  Eye Contact:  Good  Psychomotor Behavior:  Appropriate  Affect:  Appropriate  Hopelessness: Denies  Speech:  Normal  Thought Process:  Goal directed and Linear  Thought Content:  Normal  Suicidal:  None  Homicidal:  None  Hallucinations:  None  Delusion:  None  Memory:  Intact  Orientation:  Person, Place, Time, and Situation  Reliability:  fair  Insight:  Fair  Judgement:  Fair  Impulse Control:  Fair  Physical/Medical Issues:  No               Assessment & Plan   Diagnoses and all orders for this visit:    1. Generalized anxiety disorder (Primary)  -     ALPRAZolam (XANAX) 1 MG tablet; Take 1 tablet by mouth 2 (Two) Times a Day As Needed for Anxiety.  Dispense: 60 tablet; Refill: 0  -     venlafaxine XR (EFFEXOR-XR) 75 MG 24 hr capsule; Take 3 capsules by mouth Daily.  Dispense: 90 capsule; Refill: 1    2. Moderate episode of recurrent major depressive disorder  -     venlafaxine XR (EFFEXOR-XR) 75 MG 24 hr capsule; Take 3 capsules by mouth Daily.  Dispense: 90 capsule; Refill: 1    3. Other obsessive-compulsive disorders  -     ALPRAZolam (XANAX) 1 MG tablet; Take 1 tablet by mouth 2 (Two) Times a Day As Needed for Anxiety.  Dispense: 60 tablet; Refill: 0    4. High risk medication use  -      Urine Drug Screen - Urine, Clean Catch; Future            Discussed medication options with patient. Cont. Effexor XR 75mg three capsules daily for depression and anxiety. Cont. Alprazolam 1 mg twice daily as needed for anxiety. Reviewed the risks, benefits, and side effects of the medications; patient acknowledged and verbally consented. Patient is being prescribed a controlled substance as part of treatment plan. Patient has been educated of appropriate use of the medications, including risk of somnolence, limited ability to drive and/or work safely, and potential for dependence, respiratory depression and overdose. Patient is also informed that the medication are to be used by the patient only- avoid any combined use of ETOH or other substances unless prescribed.   UDS Ordered.     [facility] Patient Controlled Substance Report (from 10/3/2022 to 10/3/2023)    Dispensed  Strength Quantity Days Supply Provider Pharmacy   08/25/2023 Alprazolam 1MG 60 each 30 [facility]   08/17/2023 Gabapentin 100MG 60 each 30 TIFFANIE[facility]I [facility]   08/10/2023 Alprazolam 0.5MG 90 each 30 [facility]   07/21/2023 Gabapentin 100MG 60 each 30 [facility]   06/30/2023 Alprazolam 0.5MG 90 each 30 [facility]   06/22/2023 Gabapentin 100MG 60 each 30 [facility]FFANIE[facility]   05/31/2023 Alprazolam 0.5MG 60 each 20 [facility]   05/24/2023 Gabapentin 100MG 60 each 30 SO[facility]ADANIEL [facility]   05/01/2023 Alprazolam 0.5MG 60 each 20 [facility]   04/26/2023 Gabapentin 100MG 60 each 30 QUINTON[facility]STEPHANIE [facility]   04/21/2023 Alprazolam 0.5MG 30 each 15 [facility]   03/29/2023 Gabapentin 100MG 60 each 30 [facility]INTON[facility]PHAN[facility]   03/29/2023 Lorazepam 1MG 90  each 30 [facility]   02/28/2023 Gabapentin 100MG 60 each 30 [facility]INTON[facility]PHANIE [facility]   02/28/2023 Lorazepam 1MG 90 each 30 [facility]   02/02/2023 Gabapentin 100MG 60 each 30 [facility]INTON[facility]STEPHANIE [facility]   02/02/2023 Lorazepam 1MG 90 each 30 [facility]   01/05/2023 Gabapentin 100MG 60 each 30 [facility]INTON[facility]STEPHANIE [facility]   01/05/2023 Lorazepam 1MG 90 each 30 [facility]   12/09/2022 Gabapentin 100MG 60 each 30 [facility]INTON[facility]PHANIE [facility]   11/23/2022 Lorazepam 1MG 90 each 30 [facility]   11/05/2022 Gabapentin 100MG 60 each 30 [facility]ON[facility]PHANIE [facility]   10/25/2022 Lorazepam 1MG 90 each 30 [facility]   10/06/2022 Gabapentin 100MG 60 each 30 [facility]ON[facility]         Disclaimer    *The information in this report is based upon Schedule II through V controlled substance records reported by dispensers. Data should appear on [facility] reports within two to three business days after dispensing.   *The records listed in the report are based on the patient identification information entered by the report requestor, and if not sufficiently unique may result in the report including records for multiple patients. Please verify the information in the report by contacting the prescribers and/or dispensers listed.   *If the controlled substance records on this report appear to be in error, the patient or provider should contact the dispenser to determine if the information was reported accurately. If the dispenser certifies the information was reported accurately, the dispenser can contact the Drug Enforcement and Professional Practices Branch at [phone] to investigate the error.   *The information in this report is intended for informational use only by the person authorized  to request the report. Intentional disclosure of the report or data to someone not authorized to obtain the data is a Class B Misdemeanor.      Report Restrictions - A practitioner or pharmacist may share the report with the patient or person authorized to act on the patient's behalf and place the report in the patient's medical record, with the report then being deemed a medical record subject to the same disclosure terms and conditions as an ordinary medical record. ([place]202)       Patient is agreeable to call the office with any questions, concerns, or worsening of symptoms. Patient is aware to call 911 or go to the nearest ER should begin having SI/HI.          Follow up in two months        Errors in dictation may reflect use of voice recognition software and not all errors in transcription may have been detected prior to signing.              This document has been electronically signed by NIDA Vasquez   October 3, 2023 11:33 EDT

## 2023-10-12 DIAGNOSIS — E11.42 DM TYPE 2 WITH DIABETIC PERIPHERAL NEUROPATHY: Chronic | ICD-10-CM

## 2023-10-12 RX ORDER — EMPAGLIFLOZIN AND METFORMIN HYDROCHLORIDE 12.5; 1 MG/1; MG/1
TABLET ORAL
Qty: 30 TABLET | Refills: 0 | Status: SHIPPED | OUTPATIENT
Start: 2023-10-12

## 2023-10-18 DIAGNOSIS — R23.2 HOT FLASHES DUE TO TAMOXIFEN: ICD-10-CM

## 2023-10-18 DIAGNOSIS — T45.1X5A HOT FLASHES DUE TO TAMOXIFEN: ICD-10-CM

## 2023-10-18 RX ORDER — GABAPENTIN 100 MG/1
100 CAPSULE ORAL 2 TIMES DAILY
Qty: 60 CAPSULE | Refills: 2 | Status: SHIPPED | OUTPATIENT
Start: 2023-10-18

## 2023-10-30 ENCOUNTER — PRIOR AUTHORIZATION (OUTPATIENT)
Dept: FAMILY MEDICINE CLINIC | Facility: CLINIC | Age: 48
End: 2023-10-30
Payer: MEDICARE

## 2023-10-30 DIAGNOSIS — E55.9 VITAMIN D DEFICIENCY: ICD-10-CM

## 2023-11-01 DIAGNOSIS — I65.23 ATHEROSCLEROSIS OF BOTH CAROTID ARTERIES: Chronic | ICD-10-CM

## 2023-11-01 DIAGNOSIS — J30.9 CHRONIC ALLERGIC RHINITIS: Chronic | ICD-10-CM

## 2023-11-01 RX ORDER — ASPIRIN 81 MG/1
TABLET, COATED ORAL
Qty: 60 TABLET | Refills: 4 | Status: SHIPPED | OUTPATIENT
Start: 2023-11-01

## 2023-11-01 RX ORDER — ERGOCALCIFEROL 1.25 MG/1
50000 CAPSULE ORAL WEEKLY
Qty: 4 CAPSULE | Refills: 3 | Status: SHIPPED | OUTPATIENT
Start: 2023-11-01

## 2023-11-01 RX ORDER — LEVOCETIRIZINE DIHYDROCHLORIDE 5 MG/1
5 TABLET, FILM COATED ORAL EVERY EVENING
Qty: 30 TABLET | Refills: 3 | Status: SHIPPED | OUTPATIENT
Start: 2023-11-01

## 2023-11-03 DIAGNOSIS — E13.9 DIABETES MELLITUS TYPE 1.5, MANAGED AS TYPE 1: Chronic | ICD-10-CM

## 2023-11-03 RX ORDER — SEMAGLUTIDE 1.34 MG/ML
INJECTION, SOLUTION SUBCUTANEOUS
Qty: 3 ML | Refills: 0 | Status: SHIPPED | OUTPATIENT
Start: 2023-11-03

## 2023-11-07 ENCOUNTER — LAB (OUTPATIENT)
Dept: FAMILY MEDICINE CLINIC | Facility: CLINIC | Age: 48
End: 2023-11-07
Payer: MEDICARE

## 2023-11-07 DIAGNOSIS — E55.9 VITAMIN D DEFICIENCY: ICD-10-CM

## 2023-11-07 DIAGNOSIS — J45.20 MILD INTERMITTENT ASTHMA WITHOUT COMPLICATION: ICD-10-CM

## 2023-11-07 DIAGNOSIS — E11.42 DM TYPE 2 WITH DIABETIC PERIPHERAL NEUROPATHY: Chronic | ICD-10-CM

## 2023-11-07 DIAGNOSIS — K21.9 GASTROESOPHAGEAL REFLUX DISEASE WITHOUT ESOPHAGITIS: ICD-10-CM

## 2023-11-07 DIAGNOSIS — E78.5 DYSLIPIDEMIA: Chronic | ICD-10-CM

## 2023-11-07 DIAGNOSIS — E13.9 DIABETES MELLITUS TYPE 1.5, MANAGED AS TYPE 1: Chronic | ICD-10-CM

## 2023-11-07 LAB
25(OH)D3 SERPL-MCNC: 79.9 NG/ML (ref 30–100)
ALBUMIN SERPL-MCNC: 4 G/DL (ref 3.5–5.2)
ALBUMIN/GLOB SERPL: 1.3 G/DL
ALP SERPL-CCNC: 65 U/L (ref 39–117)
ALT SERPL W P-5'-P-CCNC: 9 U/L (ref 1–33)
ANION GAP SERPL CALCULATED.3IONS-SCNC: 11.6 MMOL/L (ref 5–15)
AST SERPL-CCNC: 13 U/L (ref 1–32)
BASOPHILS # BLD AUTO: 0.07 10*3/MM3 (ref 0–0.2)
BASOPHILS NFR BLD AUTO: 0.9 % (ref 0–1.5)
BILIRUB SERPL-MCNC: 0.5 MG/DL (ref 0–1.2)
BUN SERPL-MCNC: 9 MG/DL (ref 6–20)
BUN/CREAT SERPL: 13.8 (ref 7–25)
CALCIUM SPEC-SCNC: 9.2 MG/DL (ref 8.6–10.5)
CHLORIDE SERPL-SCNC: 112 MMOL/L (ref 98–107)
CHOLEST SERPL-MCNC: 124 MG/DL (ref 0–200)
CO2 SERPL-SCNC: 19.4 MMOL/L (ref 22–29)
CREAT SERPL-MCNC: 0.65 MG/DL (ref 0.57–1)
DEPRECATED RDW RBC AUTO: 42 FL (ref 37–54)
EGFRCR SERPLBLD CKD-EPI 2021: 107.4 ML/MIN/1.73
EOSINOPHIL # BLD AUTO: 0.09 10*3/MM3 (ref 0–0.4)
EOSINOPHIL NFR BLD AUTO: 1.2 % (ref 0.3–6.2)
ERYTHROCYTE [DISTWIDTH] IN BLOOD BY AUTOMATED COUNT: 13.6 % (ref 12.3–15.4)
GLOBULIN UR ELPH-MCNC: 3.1 GM/DL
GLUCOSE SERPL-MCNC: 106 MG/DL (ref 65–99)
HBA1C MFR BLD: 5.4 % (ref 4.8–5.6)
HCT VFR BLD AUTO: 41.5 % (ref 34–46.6)
HDLC SERPL-MCNC: 31 MG/DL (ref 40–60)
HGB BLD-MCNC: 13.8 G/DL (ref 12–15.9)
IMM GRANULOCYTES # BLD AUTO: 0.01 10*3/MM3 (ref 0–0.05)
IMM GRANULOCYTES NFR BLD AUTO: 0.1 % (ref 0–0.5)
LDLC SERPL CALC-MCNC: 60 MG/DL (ref 0–100)
LDLC/HDLC SERPL: 1.72 {RATIO}
LYMPHOCYTES # BLD AUTO: 3.21 10*3/MM3 (ref 0.7–3.1)
LYMPHOCYTES NFR BLD AUTO: 42.1 % (ref 19.6–45.3)
MCH RBC QN AUTO: 28.5 PG (ref 26.6–33)
MCHC RBC AUTO-ENTMCNC: 33.3 G/DL (ref 31.5–35.7)
MCV RBC AUTO: 85.6 FL (ref 79–97)
MONOCYTES # BLD AUTO: 0.41 10*3/MM3 (ref 0.1–0.9)
MONOCYTES NFR BLD AUTO: 5.4 % (ref 5–12)
NEUTROPHILS NFR BLD AUTO: 3.84 10*3/MM3 (ref 1.7–7)
NEUTROPHILS NFR BLD AUTO: 50.3 % (ref 42.7–76)
NRBC BLD AUTO-RTO: 0.1 /100 WBC (ref 0–0.2)
PLATELET # BLD AUTO: 280 10*3/MM3 (ref 140–450)
PMV BLD AUTO: 11.1 FL (ref 6–12)
POTASSIUM SERPL-SCNC: 3.9 MMOL/L (ref 3.5–5.2)
PROT SERPL-MCNC: 7.1 G/DL (ref 6–8.5)
RBC # BLD AUTO: 4.85 10*6/MM3 (ref 3.77–5.28)
SODIUM SERPL-SCNC: 143 MMOL/L (ref 136–145)
TRIGL SERPL-MCNC: 198 MG/DL (ref 0–150)
TSH SERPL DL<=0.05 MIU/L-ACNC: 2.48 UIU/ML (ref 0.27–4.2)
VIT B12 BLD-MCNC: 360 PG/ML (ref 211–946)
VLDLC SERPL-MCNC: 33 MG/DL (ref 5–40)
WBC NRBC COR # BLD: 7.63 10*3/MM3 (ref 3.4–10.8)

## 2023-11-07 PROCEDURE — 82607 VITAMIN B-12: CPT | Performed by: NURSE PRACTITIONER

## 2023-11-07 PROCEDURE — 85025 COMPLETE CBC W/AUTO DIFF WBC: CPT | Performed by: NURSE PRACTITIONER

## 2023-11-07 PROCEDURE — 82306 VITAMIN D 25 HYDROXY: CPT | Performed by: NURSE PRACTITIONER

## 2023-11-07 PROCEDURE — 84443 ASSAY THYROID STIM HORMONE: CPT | Performed by: NURSE PRACTITIONER

## 2023-11-07 PROCEDURE — 83036 HEMOGLOBIN GLYCOSYLATED A1C: CPT | Performed by: NURSE PRACTITIONER

## 2023-11-07 PROCEDURE — 80061 LIPID PANEL: CPT | Performed by: NURSE PRACTITIONER

## 2023-11-07 PROCEDURE — 80053 COMPREHEN METABOLIC PANEL: CPT | Performed by: NURSE PRACTITIONER

## 2023-11-08 ENCOUNTER — INFUSION (OUTPATIENT)
Dept: ONCOLOGY | Facility: HOSPITAL | Age: 48
End: 2023-11-08
Payer: MEDICARE

## 2023-11-08 ENCOUNTER — OFFICE VISIT (OUTPATIENT)
Dept: FAMILY MEDICINE CLINIC | Facility: CLINIC | Age: 48
End: 2023-11-08
Payer: MEDICARE

## 2023-11-08 VITALS
HEIGHT: 64 IN | BODY MASS INDEX: 28.17 KG/M2 | HEART RATE: 74 BPM | OXYGEN SATURATION: 96 % | WEIGHT: 165 LBS | DIASTOLIC BLOOD PRESSURE: 70 MMHG | SYSTOLIC BLOOD PRESSURE: 120 MMHG | TEMPERATURE: 97.2 F

## 2023-11-08 VITALS
RESPIRATION RATE: 18 BRPM | HEART RATE: 94 BPM | OXYGEN SATURATION: 97 % | TEMPERATURE: 97.1 F | DIASTOLIC BLOOD PRESSURE: 70 MMHG | SYSTOLIC BLOOD PRESSURE: 106 MMHG

## 2023-11-08 DIAGNOSIS — Z96.41 INSULIN PUMP IN PLACE: ICD-10-CM

## 2023-11-08 DIAGNOSIS — E55.9 VITAMIN D DEFICIENCY: ICD-10-CM

## 2023-11-08 DIAGNOSIS — Z97.8 USES SELF-APPLIED CONTINUOUS GLUCOSE MONITORING DEVICE: ICD-10-CM

## 2023-11-08 DIAGNOSIS — F41.1 GENERALIZED ANXIETY DISORDER: ICD-10-CM

## 2023-11-08 DIAGNOSIS — F42.8 OTHER OBSESSIVE-COMPULSIVE DISORDERS: Chronic | ICD-10-CM

## 2023-11-08 DIAGNOSIS — K21.9 GASTROESOPHAGEAL REFLUX DISEASE WITHOUT ESOPHAGITIS: Chronic | ICD-10-CM

## 2023-11-08 DIAGNOSIS — J30.9 CHRONIC ALLERGIC RHINITIS: ICD-10-CM

## 2023-11-08 DIAGNOSIS — Z95.828 PORT-A-CATH IN PLACE: Primary | ICD-10-CM

## 2023-11-08 DIAGNOSIS — E11.42 DM TYPE 2 WITH DIABETIC PERIPHERAL NEUROPATHY: Primary | Chronic | ICD-10-CM

## 2023-11-08 DIAGNOSIS — E78.2 MIXED HYPERLIPIDEMIA: ICD-10-CM

## 2023-11-08 DIAGNOSIS — E53.8 VITAMIN B 12 DEFICIENCY: ICD-10-CM

## 2023-11-08 PROBLEM — E66.9 CLASS 1 OBESITY WITH BODY MASS INDEX (BMI) OF 30.0 TO 30.9 IN ADULT: Status: RESOLVED | Noted: 2017-03-31 | Resolved: 2023-11-08

## 2023-11-08 PROBLEM — E66.811 CLASS 1 OBESITY WITH BODY MASS INDEX (BMI) OF 30.0 TO 30.9 IN ADULT: Status: RESOLVED | Noted: 2017-03-31 | Resolved: 2023-11-08

## 2023-11-08 PROCEDURE — 95251 CONT GLUC MNTR ANALYSIS I&R: CPT | Performed by: NURSE PRACTITIONER

## 2023-11-08 PROCEDURE — 99214 OFFICE O/P EST MOD 30 MIN: CPT | Performed by: NURSE PRACTITIONER

## 2023-11-08 PROCEDURE — 1159F MED LIST DOCD IN RCRD: CPT | Performed by: NURSE PRACTITIONER

## 2023-11-08 PROCEDURE — 96523 IRRIG DRUG DELIVERY DEVICE: CPT

## 2023-11-08 PROCEDURE — 3044F HG A1C LEVEL LT 7.0%: CPT | Performed by: NURSE PRACTITIONER

## 2023-11-08 PROCEDURE — 1160F RVW MEDS BY RX/DR IN RCRD: CPT | Performed by: NURSE PRACTITIONER

## 2023-11-08 PROCEDURE — 25010000002 HEPARIN LOCK FLUSH PER 10 UNITS: Performed by: INTERNAL MEDICINE

## 2023-11-08 RX ORDER — GLUCOSAMINE HCL/CHONDROITIN SU 500-400 MG
1 CAPSULE ORAL 2 TIMES DAILY WITH MEALS
Qty: 60 TABLET | Refills: 3 | Status: SHIPPED | OUTPATIENT
Start: 2023-11-08

## 2023-11-08 RX ORDER — LANOLIN ALCOHOL/MO/W.PET/CERES
1000 CREAM (GRAM) TOPICAL DAILY
Qty: 30 TABLET | Refills: 3 | Status: SHIPPED | OUTPATIENT
Start: 2023-11-08

## 2023-11-08 RX ORDER — HEPARIN SODIUM (PORCINE) LOCK FLUSH IV SOLN 100 UNIT/ML 100 UNIT/ML
500 SOLUTION INTRAVENOUS AS NEEDED
Status: DISCONTINUED | OUTPATIENT
Start: 2023-11-08 | End: 2023-11-08 | Stop reason: HOSPADM

## 2023-11-08 RX ORDER — SODIUM CHLORIDE 0.9 % (FLUSH) 0.9 %
10 SYRINGE (ML) INJECTION AS NEEDED
Status: DISCONTINUED | OUTPATIENT
Start: 2023-11-08 | End: 2023-11-08 | Stop reason: HOSPADM

## 2023-11-08 RX ORDER — SODIUM CHLORIDE 0.9 % (FLUSH) 0.9 %
10 SYRINGE (ML) INJECTION AS NEEDED
OUTPATIENT
Start: 2023-11-08

## 2023-11-08 RX ORDER — EMPAGLIFLOZIN AND METFORMIN HYDROCHLORIDE 12.5; 1 MG/1; MG/1
TABLET ORAL
Qty: 30 TABLET | Refills: 0 | OUTPATIENT
Start: 2023-11-08

## 2023-11-08 RX ORDER — EMPAGLIFLOZIN AND METFORMIN HYDROCHLORIDE 12.5; 1 MG/1; MG/1
1 TABLET ORAL EVERY MORNING
Qty: 30 TABLET | Refills: 3 | Status: SHIPPED | OUTPATIENT
Start: 2023-11-08

## 2023-11-08 RX ORDER — HEPARIN SODIUM (PORCINE) LOCK FLUSH IV SOLN 100 UNIT/ML 100 UNIT/ML
500 SOLUTION INTRAVENOUS AS NEEDED
OUTPATIENT
Start: 2023-11-08

## 2023-11-08 RX ADMIN — Medication 10 ML: at 15:51

## 2023-11-08 RX ADMIN — Medication 500 UNITS: at 15:51

## 2023-11-08 NOTE — PROGRESS NOTES
History of Present Illness  Nivia is a 49 y/o female who presents to the clinic today for follow up pertaining to her DM, type 2 and Dyslipidemia. She has been diagnosed with right Breast Cancer. In addition, she has Dyslipidemia, GERD, Osteopenia/arthritis, migraine headaches and anxiety/ depression.      Diabetes   She has type 2 diabetes mellitus. The initial diagnosis of diabetes was made 20 years ago. Diabetic complications include peripheral neuropathy. Risk factors for coronary artery disease include post-menopausal, stress and dyslipidemia. She is currently utilizing insulin pump therapy and CGM. In addition, she is currently taking Synardy and Ozempic.  She has had a previous visit with a dietitian An ACE inhibitor/angiotensin II receptor blocker is not  being taken at this time.     Lab Results   Component Value Date    HGBA1C 6.80 (H) 04/18/2023      Lab Results   Component Value Date    HGBA1C 5.20 08/07/2023      Lab Results   Component Value Date    HGBA1C 5.40 11/07/2023      Dyslipidemia   The current episode started more than 1 year ago.  She is taking Atorvastatin 40 mg and aspirin 81 mg..   Lab Results   Component Value Date    CHOL 124 11/07/2023    CHOL 110 08/07/2023    CHLPL 110 04/18/2023     Lab Results   Component Value Date    TRIG 198 (H) 11/07/2023    TRIG 156 (H) 08/07/2023    TRIG 262 (H) 04/18/2023     Lab Results   Component Value Date    HDL 31 (L) 11/07/2023    HDL 35 (L) 08/07/2023    HDL 29 (L) 04/18/2023     Lab Results   Component Value Date    LDL 60 11/07/2023    LDL 48 08/07/2023    LDL 40 04/18/2023        Breast Cancer  Malignant neoplasm of upper outer quadrant of right breast, estrogen receptor negative which was treated with mastectomy and followed by chemotherapy.  Continues to take tamoxifen 20 mg and followed by oncology.      GERD  Stable with lansoprazole.  Lab Results   Component Value Date    WBC 7.63 11/07/2023    HGB 13.8 11/07/2023    HCT 41.5 11/07/2023  "   MCV 85.6 11/07/2023     11/07/2023      Anxiety/Depression  Stable and followed by behavioral health  Lab Results   Component Value Date    TSH 2.480 11/07/2023      The following portions of the patient's history were reviewed and updated as appropriate: allergies, current medications, past family history, past medical history, past social history, past surgical history and problem list.    Review of Systems   Constitutional:  Negative for activity change, appetite change, chills, fatigue, fever and unexpected weight change.   HENT:  Negative for congestion.    Eyes:  Negative for visual disturbance.   Respiratory:  Negative for cough, shortness of breath and wheezing.    Cardiovascular:  Negative for chest pain and palpitations. Leg swelling: Intermittent.  Gastrointestinal:  Negative for nausea and vomiting.        GERD   Endocrine: Negative for cold intolerance, heat intolerance, polydipsia, polyphagia and polyuria.   Musculoskeletal:  Positive for arthralgias (Much improved with weight loss) and back pain.   Skin:  Negative for color change and rash.   Allergic/Immunologic: Positive for environmental allergies.   Neurological:  Positive for dizziness (Improving), numbness and headaches (Migraines). Negative for tremors, speech difficulty, weakness and light-headedness.   Hematological:  Negative for adenopathy.   Psychiatric/Behavioral:  Negative for confusion, decreased concentration and sleep disturbance. The patient is not nervous/anxious.    All other systems reviewed and are negative.    Vital signs:  /70 (BP Location: Left arm, Patient Position: Sitting, Cuff Size: Adult)   Pulse 74   Temp 97.2 °F (36.2 °C) (Temporal)   Ht 162.6 cm (64.02\")   Wt 74.8 kg (165 lb)   SpO2 96%   BMI 28.31 kg/m²     Physical Exam  Vitals and nursing note reviewed.   Constitutional:       General: She is not in acute distress.     Appearance: She is well-developed.      Comments:  accompanies her " today   HENT:      Head: Normocephalic.      Nose: Nose normal.   Eyes:      General: No scleral icterus.        Right eye: No discharge.         Left eye: No discharge.      Conjunctiva/sclera: Conjunctivae normal.   Neck:      Thyroid: No thyromegaly.      Vascular: No JVD.   Cardiovascular:      Rate and Rhythm: Normal rate and regular rhythm.      Heart sounds: S1 normal and S2 normal.   Pulmonary:      Effort: Pulmonary effort is normal.      Breath sounds: Normal breath sounds.   Abdominal:      Palpations: Abdomen is soft.      Tenderness: There is no abdominal tenderness. There is no guarding.   Musculoskeletal:      Cervical back: Neck supple.      Right lower leg: No edema.      Left lower leg: No edema.   Skin:     General: Skin is dry.      Capillary Refill: Capillary refill takes less than 2 seconds.   Neurological:      Mental Status: She is alert.      Cranial Nerves: Cranial nerves 2-12 are intact.   Psychiatric:         Mood and Affect: Mood and affect normal.         Speech: Speech normal.         Behavior: Behavior is cooperative.         Thought Content: Thought content normal.         Cognition and Memory: Cognition and memory normal.       Result Review :  [employer] Guardian CGM summary from October 26 through November 8, 2023  Time in range   0% very low  1% low  99% in range   0% high   0% very high  Average blood glucose 139 ±45 mg/dL    Results for orders placed or performed in visit on 11/07/23   Comprehensive Metabolic Panel    Specimen: Arm, Right; Blood   Result Value Ref Range    Glucose 106 (H) 65 - 99 mg/dL    BUN 9 6 - 20 mg/dL    Creatinine 0.65 0.57 - 1.00 mg/dL    Sodium 143 136 - 145 mmol/L    Potassium 3.9 3.5 - 5.2 mmol/L    Chloride 112 (H) 98 - 107 mmol/L    CO2 19.4 (L) 22.0 - 29.0 mmol/L    Calcium 9.2 8.6 - 10.5 mg/dL    Total Protein 7.1 6.0 - 8.5 g/dL    Albumin 4.0 3.5 - 5.2 g/dL    ALT (SGPT) 9 1 - 33 U/L    AST (SGOT) 13 1 - 32 U/L    Alkaline Phosphatase 65 39 - 117  U/L    Total Bilirubin 0.5 0.0 - 1.2 mg/dL    Globulin 3.1 gm/dL    A/G Ratio 1.3 g/dL    BUN/Creatinine Ratio 13.8 7.0 - 25.0    Anion Gap 11.6 5.0 - 15.0 mmol/L    eGFR 107.4 >60.0 mL/min/1.73   TSH    Specimen: Arm, Right; Blood   Result Value Ref Range    TSH 2.480 0.270 - 4.200 uIU/mL   Hemoglobin A1c    Specimen: Arm, Right; Blood   Result Value Ref Range    Hemoglobin A1C 5.40 4.80 - 5.60 %   Vitamin D,25-Hydroxy    Specimen: Arm, Right; Blood   Result Value Ref Range    25 Hydroxy, Vitamin D 79.9 30.0 - 100.0 ng/ml   Vitamin B12    Specimen: Arm, Right; Blood   Result Value Ref Range    Vitamin B-12 360 211 - 946 pg/mL   Lipid Panel    Specimen: Arm, Right; Blood   Result Value Ref Range    Total Cholesterol 124 0 - 200 mg/dL    Triglycerides 198 (H) 0 - 150 mg/dL    HDL Cholesterol 31 (L) 40 - 60 mg/dL    LDL Cholesterol  60 0 - 100 mg/dL    VLDL Cholesterol 33 5 - 40 mg/dL    LDL/HDL Ratio 1.72    CBC Auto Differential    Specimen: Arm, Right; Blood   Result Value Ref Range    WBC 7.63 3.40 - 10.80 10*3/mm3    RBC 4.85 3.77 - 5.28 10*6/mm3    Hemoglobin 13.8 12.0 - 15.9 g/dL    Hematocrit 41.5 34.0 - 46.6 %    MCV 85.6 79.0 - 97.0 fL    MCH 28.5 26.6 - 33.0 pg    MCHC 33.3 31.5 - 35.7 g/dL    RDW 13.6 12.3 - 15.4 %    RDW-SD 42.0 37.0 - 54.0 fl    MPV 11.1 6.0 - 12.0 fL    Platelets 280 140 - 450 10*3/mm3    Neutrophil % 50.3 42.7 - 76.0 %    Lymphocyte % 42.1 19.6 - 45.3 %    Monocyte % 5.4 5.0 - 12.0 %    Eosinophil % 1.2 0.3 - 6.2 %    Basophil % 0.9 0.0 - 1.5 %    Immature Grans % 0.1 0.0 - 0.5 %    Neutrophils, Absolute 3.84 1.70 - 7.00 10*3/mm3    Lymphocytes, Absolute 3.21 (H) 0.70 - 3.10 10*3/mm3    Monocytes, Absolute 0.41 0.10 - 0.90 10*3/mm3    Eosinophils, Absolute 0.09 0.00 - 0.40 10*3/mm3    Basophils, Absolute 0.07 0.00 - 0.20 10*3/mm3    Immature Grans, Absolute 0.01 0.00 - 0.05 10*3/mm3    nRBC 0.1 0.0 - 0.2 /100 WBC     *Note: Due to a large number of results and/or encounters for the  requested time period, some results have not been displayed. A complete set of results can be found in Results Review.     Assessment & Plan     Diagnoses and all orders for this visit:    1. DM type 2 with diabetic peripheral neuropathy (Primary)  Comments:  Continue insulin pump therapy/CGM and Ozempic. ReducedTrijyusuf to Synjardy.  Praise given for her great accomplishment.  Orders:  -     Empagliflozin-metFORMIN HCl (Synjardy) 12.5-1000 MG tablet; Take 1 tablet by mouth Every Morning.  Dispense: 30 tablet; Refill: 3    2. Insulin pump in place  Comments:  Doing great with 780 G    3. Uses self-applied continuous glucose monitoring device  Comments:  Upload reviewed    4. Mixed hyperlipidemia    5. Chronic allergic rhinitis    6. Gastroesophageal reflux disease without esophagitis    7. Vitamin D deficiency  -     Calcium Carb-Cholecalciferol (Calcium+D3) 500-15 MG-MCG tablet; Take 1 tablet by mouth 2 (Two) Times a Day With Meals.  Dispense: 60 tablet; Refill: 3    8. Vitamin B 12 deficiency  -     vitamin B-12 (CYANOCOBALAMIN) 1000 MCG tablet; Take 1 tablet by mouth Daily.  Dispense: 30 tablet; Refill: 3      Follow Up In February  Findings and recommendations discussed with Nivia. Reviewed test results. Lifestyle modifications reinforced including nutrition and activity recommendations. Will follow up in months sooner if problems/concerns occur. Patient was given instructions and counseling regarding his condition or for health maintenance advice. Please see specific information pulled into the AVS if appropriate    [facility] Patient Controlled Substance Report (from 11/8/2022 to 11/7/2023)    Dispensed  Strength Quantity Days Supply Provider Pharmacy   10/18/2023 Gabapentin 100MG 60 each 30 [facility]   10/04/2023 Alprazolam 1MG 60 each 30 [facility]   08/25/2023 Alprazolam 1MG 60 each 30 [facility]   08/17/2023  Gabapentin 100MG 60 each 30 [facility]   08/10/2023 Alprazolam 0.5MG 90 each 30 CLOUD[facility]FREDO [facility]   07/21/2023 Gabapentin 100MG 60 each 30 [facility]IE[facility]I [facility]   06/30/2023 Alprazolam 0.5MG 90 each 30 [facility]   06/22/2023 Gabapentin 100MG 60 each 30 [facility]ANIE[facility]I [facility]   05/31/2023 Alprazolam 0.5MG 60 each 20 [facility]   05/24/2023 Gabapentin 100MG 60 each 30 [facility]   05/01/2023 Alprazolam 0.5MG 60 each 20 [facility]   04/26/2023 Gabapentin 100MG 60 each 30 QUINTON[facility]STEPHANIE [facility]   04/21/2023 Alprazolam 0.5MG 30 each 15 [facility]   03/29/2023 Gabapentin 100MG 60 each 30 QUINTON[facility]STEPHANIE [facility]   03/29/2023 Lorazepam 1MG 90 each 30 [facility]   02/28/2023 Gabapentin 100MG 60 each 30 QUINTON[facility]STEPHANIE [facility]   02/28/2023 Lorazepam 1MG 90 each 30 [facility]   02/02/2023 Gabapentin 100MG 60 each 30 [facility]ON[facility]IE [facility]   02/02/2023 Lorazepam 1MG 90 each 30 [facility]   01/05/2023 Gabapentin 100MG 60 each 30 [facility]ON[facility]PH[facility]IE [facility]   01/05/2023 Lorazepam 1MG 90 each 30 [facility]   12/09/2022 Gabapentin 100MG 60 each 30 [facility]ON[facility]STEPHANIE [facility]   11/23/2022 Lorazepam 1MG 90 each 30 [facility]        This document has been electronically signed by:

## 2023-11-09 RX ORDER — ALPRAZOLAM 1 MG/1
1 TABLET ORAL 2 TIMES DAILY PRN
Qty: 60 TABLET | Refills: 0 | Status: SHIPPED | OUTPATIENT
Start: 2023-11-09

## 2023-11-27 ENCOUNTER — OFFICE VISIT (OUTPATIENT)
Dept: PSYCHIATRY | Facility: CLINIC | Age: 48
End: 2023-11-27
Payer: MEDICARE

## 2023-11-27 VITALS — OXYGEN SATURATION: 97 % | WEIGHT: 164 LBS | BODY MASS INDEX: 28.14 KG/M2 | HEART RATE: 74 BPM

## 2023-11-27 DIAGNOSIS — F41.1 GENERALIZED ANXIETY DISORDER: ICD-10-CM

## 2023-11-27 DIAGNOSIS — R23.2 HOT FLASHES: ICD-10-CM

## 2023-11-27 DIAGNOSIS — E13.9 DIABETES MELLITUS TYPE 1.5, MANAGED AS TYPE 1: Chronic | ICD-10-CM

## 2023-11-27 DIAGNOSIS — E53.8 VITAMIN B 12 DEFICIENCY: ICD-10-CM

## 2023-11-27 DIAGNOSIS — F42.8 OTHER OBSESSIVE-COMPULSIVE DISORDERS: Chronic | ICD-10-CM

## 2023-11-27 DIAGNOSIS — F33.1 MODERATE EPISODE OF RECURRENT MAJOR DEPRESSIVE DISORDER: ICD-10-CM

## 2023-11-27 DIAGNOSIS — E78.2 MIXED DYSLIPIDEMIA: Chronic | ICD-10-CM

## 2023-11-27 RX ORDER — CYANOCOBALAMIN 1000 UG/ML
INJECTION, SOLUTION INTRAMUSCULAR; SUBCUTANEOUS
Qty: 1 ML | Refills: 5 | Status: SHIPPED | OUTPATIENT
Start: 2023-11-27

## 2023-11-27 RX ORDER — ATORVASTATIN CALCIUM 40 MG/1
TABLET, FILM COATED ORAL
Qty: 90 TABLET | Refills: 1 | Status: SHIPPED | OUTPATIENT
Start: 2023-11-27

## 2023-11-27 RX ORDER — CLONIDINE HYDROCHLORIDE 0.1 MG/1
TABLET ORAL
Qty: 60 TABLET | Refills: 5 | Status: SHIPPED | OUTPATIENT
Start: 2023-11-27

## 2023-11-27 RX ORDER — SEMAGLUTIDE 1.34 MG/ML
INJECTION, SOLUTION SUBCUTANEOUS
Qty: 3 ML | Refills: 0 | Status: SHIPPED | OUTPATIENT
Start: 2023-11-27

## 2023-11-27 RX ORDER — ALPRAZOLAM 1 MG/1
1 TABLET ORAL 2 TIMES DAILY PRN
Qty: 60 TABLET | Refills: 0 | Status: SHIPPED | OUTPATIENT
Start: 2023-11-27

## 2023-11-27 RX ORDER — VENLAFAXINE HYDROCHLORIDE 75 MG/1
225 CAPSULE, EXTENDED RELEASE ORAL DAILY
Qty: 90 CAPSULE | Refills: 1 | OUTPATIENT
Start: 2023-11-27

## 2023-11-27 RX ORDER — VENLAFAXINE HYDROCHLORIDE 75 MG/1
225 CAPSULE, EXTENDED RELEASE ORAL DAILY
Qty: 90 CAPSULE | Refills: 1 | Status: SHIPPED | OUTPATIENT
Start: 2023-11-27

## 2023-11-27 NOTE — PROGRESS NOTES
Subjective   Nivia Bernstein is a 48 y.o. female is here today for medication management follow-up.    Chief Complaint:  depression anxiety     History of Present Illness:    Patient presents today for a follow up for medication management for depression and anxiety with . Patient states some stress with son and family. Depression at a 10 on a 0/10 scale with 10 the worst. Anxiety at a 10 on a 0/10 scale with 10 the worst. Denies any panic attacks. Patient reports having some irritability and mood swings. Sleeping about 4 hours a night. Patient reports still having nightmares. Appetite is ok and eating one good meal then one snack. Denies any thoughts to harm self or others. Denies any auditory or visual hallucinations. Patient reports still just sitting and waiting until cancer comes back. Patient states also really worried about son. Denies any medical changes since last visit. Patient reports medication compliance and denies any side effects.   The following portions of the patient's history were reviewed and updated as appropriate: allergies, current medications, past family history, past medical history, past social history, past surgical history, and problem list.    Review of Systems   Constitutional:  Positive for appetite change.   Respiratory: Negative.     Cardiovascular: Negative.    Gastrointestinal: Negative.    Neurological: Negative.    Psychiatric/Behavioral:  Positive for agitation, dysphoric mood and sleep disturbance. The patient is nervous/anxious.        Objective   Physical Exam  Vitals reviewed.   Constitutional:       Appearance: Normal appearance. She is well-developed and well-groomed.   Neurological:      Mental Status: She is alert.   Psychiatric:         Attention and Perception: Attention and perception normal.         Mood and Affect: Affect normal. Mood is depressed.         Speech: Speech normal.         Behavior: Behavior normal. Behavior is cooperative.         Thought  Content: Thought content normal.         Cognition and Memory: Cognition and memory normal.         Judgment: Judgment normal.       Pulse 74, weight 74.4 kg (164 lb), SpO2 97%, not currently breastfeeding.Body mass index is 28.14 kg/m².    Allergies   Allergen Reactions    Imipramine Other (See Comments)     Chest pain    Mobic [Meloxicam] Rash    Penicillins Angioedema    Taxotere [Docetaxel] Anaphylaxis     9 minutes into 1st infusion    Novolog [Insulin Aspart] Hives    Rosuvastatin Calcium GI Intolerance       Medication List:   Current Outpatient Medications   Medication Sig Dispense Refill    Accu-Chek FastClix Lancets misc Use 1 Device 3 (Three) Times a Day. 100 each 5    alendronate (FOSAMAX) 35 MG tablet TAKE ONE TABLET BY MOUTH every 7 days. take with water 30 minutes before first food/drink/medication. avoid lying down for 30 minutes after taking 4 tablet 3    ALPRAZolam (XANAX) 1 MG tablet TAKE ONE TABLET BY MOUTH TWICE DAILY AS NEEDED FOR ANXIETY 60 tablet 0    Aspirin Low Dose 81 MG EC tablet TAKE TWO TABLETS BY MOUTH EVERY DAY 60 tablet 4    atorvastatin (LIPITOR) 40 MG tablet TAKE ONE TABLET BY MOUTH EVERY night 90 tablet 1    azelastine (ASTELIN) 0.1 % nasal spray Use in each nostril as directed 30 mL 3    Calcium Carb-Cholecalciferol (Calcium+D3) 500-15 MG-MCG tablet Take 1 tablet by mouth 2 (Two) Times a Day With Meals. 60 tablet 3    cloNIDine (CATAPRES) 0.1 MG tablet TAKE ONE TABLET BY MOUTH TWICE DAILY 60 tablet 5    cyanocobalamin 1000 MCG/ML injection Inject 1 mL under the skin into the appropriate area as directed Every 14 (Fourteen) Days. 2 mL 5    Diclofenac Sodium (VOLTAREN) 1 % gel gel APPLY 4 GRAMS TO THE AFFECTED AREA FOUR TIMES A  g 3    Emgality 120 MG/ML auto-injector pen INJECT 120mg ONCE MONTHLY      Empagliflozin-metFORMIN HCl (Synjardy) 12.5-1000 MG tablet Take 1 tablet by mouth Every Morning. 30 tablet 3    fluconazole (Diflucan) 150 MG tablet Take 1 tablet by mouth  Daily. 2 tablet 0    Fluticasone Furoate-Vilanterol (Breo Ellipta) 200-25 MCG/ACT inhaler Inhale 1 puff Daily. 1 each 3    furosemide (LASIX) 20 MG tablet Take 0.5 tablets by mouth Daily. 30 tablet 0    gabapentin (NEURONTIN) 100 MG capsule Take 1 capsule by mouth 2 (Two) Times a Day. 60 capsule 2    glucagon (GLUCAGEN) 1 MG injection Inject 1 mg under the skin into the appropriate area as directed 1 (One) Time As Needed for Low Blood Sugar. Please disregard the previous prescription 1 kit 3    glucose blood (Accu-Chek Guide) test strip 1 each by Other route 3 (Three) Times a Day. 100 each 3    Insulin Lispro (humaLOG) 100 UNIT/ML injection INJECT AS DIRECTED. MAX DAILY DOSE  UNITS ONCE DAILY 40 mL 3    ipratropium-albuterol (DUO-NEB) 0.5-2.5 mg/3 ml nebulizer Take 3 mL by nebulization Every 4 (Four) Hours As Needed for Shortness of Air. 150 mL 1    lansoprazole (PREVACID) 30 MG capsule TAKE ONE CAPSULE BY MOUTH EVERY DAY 90 capsule 0    levocetirizine (XYZAL) 5 MG tablet TAKE 1 TABLET BY MOUTH EVERY EVENING. 30 tablet 3    Linzess 145 MCG capsule capsule TAKE ONE CAPSULE BY MOUTH EVERY DAY 30 MINUTES BEFORE MEALS ON AN EMPTY STOMACH 30 capsule 3    nitrofurantoin, macrocrystal-monohydrate, (Macrobid) 100 MG capsule Take 1 capsule by mouth 2 (Two) Times a Day. 20 capsule 0    ondansetron (ZOFRAN) 8 MG tablet TAKE 1 TABLET BY MOUTH EVERY 8 HOURS AS NEEDED FOR NAUSEA OR VOMITING 30 tablet 3    Ozempic, 1 MG/DOSE, 4 MG/3ML solution pen-injector INJECT 1 MG SUBCUTANEOUSLY INTO THE APPROPRIATE AREA AS DIRECTED EVERY 7 DAYS 3 mL 0    Respiratory Therapy Supplies (Nebulizer/Tubing/Mouthpiece) kit 1 each Take As Directed. 1 each 1    tamoxifen (NOLVADEX) 20 MG chemo tablet Take 1 tablet by mouth Daily. 30 tablet 11    topiramate (TOPAMAX) 50 MG tablet TAKE ONE TABLET BY MOUTH TWICE DAILY 60 tablet 3    ubrogepant 100 MG tablet       venlafaxine XR (EFFEXOR-XR) 75 MG 24 hr capsule Take 3 capsules by mouth Daily. 90  capsule 1    Ventolin  (90 Base) MCG/ACT inhaler inhale TWO puffs BY MOUTH EVERY 4 HOURS AS NEEDED FOR SHORTNESS OF BREATH 18 g 5    vitamin B-12 (CYANOCOBALAMIN) 1000 MCG tablet Take 1 tablet by mouth Daily. 30 tablet 3    vitamin D (ERGOCALCIFEROL) 1.25 MG (00216 UT) capsule capsule TAKE 1 CAPSULE BY MOUTH ONCE WEEKLY 4 capsule 3     No current facility-administered medications for this visit.       Mental Status Exam:   Hygiene:   good  Cooperation:  Cooperative  Eye Contact:  Fair  Psychomotor Behavior:  Appropriate  Affect:  Appropriate  Hopelessness: Denies  Speech:  Normal  Thought Process:  Goal directed and Linear  Thought Content:  Normal  Suicidal:  None  Homicidal:  None  Hallucinations:  None  Delusion:  None  Memory:  Intact  Orientation:  Person, Place, Time, and Situation  Reliability:  fair  Insight:  Fair  Judgement:  Fair  Impulse Control:  Fair  Physical/Medical Issues:  No               Assessment & Plan   Diagnoses and all orders for this visit:    1. Generalized anxiety disorder  -     ALPRAZolam (XANAX) 1 MG tablet; Take 1 tablet by mouth 2 (Two) Times a Day As Needed for Anxiety. for anxiety  Dispense: 60 tablet; Refill: 0  -     venlafaxine XR (EFFEXOR-XR) 75 MG 24 hr capsule; Take 3 capsules by mouth Daily.  Dispense: 90 capsule; Refill: 1    2. Other obsessive-compulsive disorders  -     ALPRAZolam (XANAX) 1 MG tablet; Take 1 tablet by mouth 2 (Two) Times a Day As Needed for Anxiety. for anxiety  Dispense: 60 tablet; Refill: 0    3. Moderate episode of recurrent major depressive disorder  -     venlafaxine XR (EFFEXOR-XR) 75 MG 24 hr capsule; Take 3 capsules by mouth Daily.  Dispense: 90 capsule; Refill: 1            Discussed medication options with patient. Cont. Effexor XR 75 mg three capsules daily for depression and anxiety. Cont. Alprazolam 1 mg twice daily as needed for anxiety. Reviewed the risks, benefits, and side effects of the medications; patient acknowledged and  verbally consented. Patient is being prescribed a controlled substance as part of treatment plan. Patient has been educated of appropriate use of the medications, including risk of somnolence, limited ability to drive and/or work safely, and potential for dependence, respiratory depression and overdose. Patient is also informed that the medication are to be used by the patient only- avoid any combined use of ETOH or other substances unless prescribed.      [facility] Patient Controlled Substance Report (from 12/5/2022 to 11/27/2023)    Dispensed  Strength Quantity Days Supply Provider Pharmacy   11/14/2023 Gabapentin 100MG 60 each 30 [facility]FFANIE,[facility]I [facility]   11/09/2023 Alprazolam 1MG 60 each 30 [facility]EDO [facility]   10/18/2023 Gabapentin 100MG 60 each 30 [facility]FFANIE[facility]SON[facility]I [facility]   10/04/2023 Alprazolam 1MG 60 each 30 [facility]   08/25/2023 Alprazolam 1MG 60 each 30 [facility]   08/17/2023 Gabapentin 100MG 60 each 30 [facility]ANIE[facility]I [facility]   08/10/2023 Alprazolam 0.5MG 90 each 30 [facility]ED[facility]   07/21/2023 Gabapentin 100MG 60 each 30 TIFFANIE,SHERELENMARIETTA [facility]   06/30/2023 Alprazolam 0.5MG 90 each 30 [facility]EDO [facility]   06/22/2023 Gabapentin 100MG 60 each 30 TIFFANIE[facility]SHERELENE [facility]   05/31/2023 Alprazolam 0.5MG 60 each 20 [facility]ED[facility]   05/24/2023 Gabapentin 100MG 60 each 30 DANIEL BLACK [facility]   05/01/2023 Alprazolam 0.5MG 60 each 20 [facility]   04/26/2023 Gabapentin 100MG 60 each 30 QUINTONSTEPHANIE MERCADO [facility]   04/21/2023 Alprazolam 0.5MG 30 each 15 [facility]EDO [facility]   03/29/2023 Gabapentin 100MG 60 each 30 [facility]PH[facility]IE [facility]   03/29/2023 Lorazepam 1MG 90 each 30  [facility]RFEDO [facility]   02/28/2023 Gabapentin 100MG 60 each 30 QUINTON[facility]STEPHANIE [facility]   02/28/2023 Lorazepam 1MG 90 each 30 [facility]ED[facility]   02/02/2023 Gabapentin 100MG 60 each 30 QUINTON[facility]STEPHANIE [facility]   02/02/2023 Lorazepam 1MG 90 each 30 [facility]ED[facility]   01/05/2023 Gabapentin 100MG 60 each 30 QUINTON[facility]STEPHANIE [facility]   01/05/2023 Lorazepam 1MG 90 each 30 [facility]   12/09/2022 Gabapentin 100MG 60 each 30 QUINTON[facility]STEPHANIE [facility]         Disclaimer    *The information in this report is based upon Schedule II through V controlled substance records reported by dispensers. Data should appear on [facility] reports within two to three business days after dispensing.   *The records listed in the report are based on the patient identification information entered by the report requestor, and if not sufficiently unique may result in the report including records for multiple patients. Please verify the information in the report by contacting the prescribers and/or dispensers listed.   *If the controlled substance records on this report appear to be in error, the patient or provider should contact the dispenser to determine if the information was reported accurately. If the dispenser certifies the information was reported accurately, the dispenser can contact the Drug Enforcement and Professional Practices Branch at [phone] to investigate the error.   *The information in this report is intended for informational use only by the person authorized to request the report. Intentional disclosure of the report or data to someone not authorized to obtain the data is a Class B Misdemeanor.      Report Restrictions - A practitioner or pharmacist may share the report with the patient or person authorized to act on the patient's behalf and place the report in the patient's  medical record, with the report then being deemed a medical record subject to the same disclosure terms and conditions as an ordinary medical record. ([place]RS [place].202)         Patient is agreeable to call the office with any questions, concerns, or worsening of symptoms. Patient is aware to call 911 or go to the nearest ER should begin having any thoughts to harm self or others.          Follow up in six weeks        Errors in dictation may reflect use of voice recognition software and not all errors in transcription may have been detected prior to signing.              This document has been electronically signed by NIDA Vasquez   November 27, 2023 09:00 EST

## 2023-12-06 DIAGNOSIS — R11.0 NAUSEA: ICD-10-CM

## 2023-12-06 RX ORDER — ONDANSETRON HYDROCHLORIDE 8 MG/1
TABLET, FILM COATED ORAL
Qty: 30 TABLET | Refills: 3 | Status: SHIPPED | OUTPATIENT
Start: 2023-12-06

## 2023-12-15 DIAGNOSIS — E13.9 DIABETES MELLITUS TYPE 1.5, MANAGED AS TYPE 1: Primary | Chronic | ICD-10-CM

## 2023-12-15 RX ORDER — LANCETS
1 EACH MISCELLANEOUS 3 TIMES DAILY
Qty: 100 EACH | Refills: 5 | Status: SHIPPED | OUTPATIENT
Start: 2023-12-15

## 2023-12-20 ENCOUNTER — INFUSION (OUTPATIENT)
Dept: ONCOLOGY | Facility: HOSPITAL | Age: 48
End: 2023-12-20
Payer: MEDICARE

## 2023-12-20 VITALS
BODY MASS INDEX: 28.33 KG/M2 | RESPIRATION RATE: 18 BRPM | SYSTOLIC BLOOD PRESSURE: 102 MMHG | TEMPERATURE: 97.8 F | OXYGEN SATURATION: 98 % | WEIGHT: 165.1 LBS | HEART RATE: 95 BPM | DIASTOLIC BLOOD PRESSURE: 68 MMHG

## 2023-12-20 DIAGNOSIS — Z95.828 PORT-A-CATH IN PLACE: Primary | ICD-10-CM

## 2023-12-20 PROCEDURE — 25010000002 HEPARIN LOCK FLUSH PER 10 UNITS: Performed by: INTERNAL MEDICINE

## 2023-12-20 PROCEDURE — 96523 IRRIG DRUG DELIVERY DEVICE: CPT

## 2023-12-20 RX ORDER — SODIUM CHLORIDE 0.9 % (FLUSH) 0.9 %
10 SYRINGE (ML) INJECTION AS NEEDED
Status: DISCONTINUED | OUTPATIENT
Start: 2023-12-20 | End: 2023-12-20 | Stop reason: HOSPADM

## 2023-12-20 RX ORDER — HEPARIN SODIUM (PORCINE) LOCK FLUSH IV SOLN 100 UNIT/ML 100 UNIT/ML
500 SOLUTION INTRAVENOUS AS NEEDED
OUTPATIENT
Start: 2023-12-20

## 2023-12-20 RX ORDER — HEPARIN SODIUM (PORCINE) LOCK FLUSH IV SOLN 100 UNIT/ML 100 UNIT/ML
500 SOLUTION INTRAVENOUS AS NEEDED
Status: DISCONTINUED | OUTPATIENT
Start: 2023-12-20 | End: 2023-12-20 | Stop reason: HOSPADM

## 2023-12-20 RX ORDER — SODIUM CHLORIDE 0.9 % (FLUSH) 0.9 %
10 SYRINGE (ML) INJECTION AS NEEDED
OUTPATIENT
Start: 2023-12-20

## 2023-12-20 RX ADMIN — Medication 10 ML: at 14:43

## 2023-12-20 RX ADMIN — Medication 500 UNITS: at 14:43

## 2023-12-23 DIAGNOSIS — E11.42 DM TYPE 2 WITH DIABETIC PERIPHERAL NEUROPATHY: Chronic | ICD-10-CM

## 2023-12-26 DIAGNOSIS — J30.9 CHRONIC ALLERGIC RHINITIS: Chronic | ICD-10-CM

## 2023-12-26 DIAGNOSIS — K59.03 DRUG-INDUCED CONSTIPATION: ICD-10-CM

## 2023-12-26 RX ORDER — LEVOCETIRIZINE DIHYDROCHLORIDE 5 MG/1
5 TABLET, FILM COATED ORAL EVERY EVENING
Qty: 30 TABLET | Refills: 1 | Status: SHIPPED | OUTPATIENT
Start: 2023-12-26

## 2023-12-26 RX ORDER — INSULIN LISPRO 100 [IU]/ML
INJECTION, SOLUTION INTRAVENOUS; SUBCUTANEOUS
Qty: 40 ML | Refills: 3 | Status: SHIPPED | OUTPATIENT
Start: 2023-12-26

## 2023-12-26 RX ORDER — LINACLOTIDE 145 UG/1
CAPSULE, GELATIN COATED ORAL
Qty: 30 CAPSULE | Refills: 3 | Status: SHIPPED | OUTPATIENT
Start: 2023-12-26

## 2023-12-29 DIAGNOSIS — E13.9 DIABETES MELLITUS TYPE 1.5, MANAGED AS TYPE 1: Chronic | ICD-10-CM

## 2023-12-29 RX ORDER — BLOOD SUGAR DIAGNOSTIC
STRIP MISCELLANEOUS
Qty: 100 EACH | Refills: 3 | Status: SHIPPED | OUTPATIENT
Start: 2023-12-29

## 2024-01-02 DIAGNOSIS — R23.2 HOT FLASHES DUE TO TAMOXIFEN: ICD-10-CM

## 2024-01-02 DIAGNOSIS — T45.1X5A HOT FLASHES DUE TO TAMOXIFEN: ICD-10-CM

## 2024-01-02 RX ORDER — GABAPENTIN 100 MG/1
100 CAPSULE ORAL 2 TIMES DAILY
Qty: 60 CAPSULE | Refills: 2 | Status: SHIPPED | OUTPATIENT
Start: 2024-01-02

## 2024-01-10 DIAGNOSIS — F42.8 OTHER OBSESSIVE-COMPULSIVE DISORDERS: Chronic | ICD-10-CM

## 2024-01-10 DIAGNOSIS — F41.1 GENERALIZED ANXIETY DISORDER: ICD-10-CM

## 2024-01-10 DIAGNOSIS — K21.9 GASTROESOPHAGEAL REFLUX DISEASE WITHOUT ESOPHAGITIS: Chronic | ICD-10-CM

## 2024-01-10 RX ORDER — LANSOPRAZOLE 30 MG/1
CAPSULE, DELAYED RELEASE ORAL
Qty: 90 CAPSULE | Refills: 0 | Status: SHIPPED | OUTPATIENT
Start: 2024-01-10

## 2024-01-11 RX ORDER — ALPRAZOLAM 1 MG/1
1 TABLET ORAL 2 TIMES DAILY PRN
Qty: 60 TABLET | Refills: 0 | Status: SHIPPED | OUTPATIENT
Start: 2024-01-11

## 2024-01-22 ENCOUNTER — TELEPHONE (OUTPATIENT)
Dept: FAMILY MEDICINE CLINIC | Facility: CLINIC | Age: 49
End: 2024-01-22
Payer: MEDICARE

## 2024-01-22 ENCOUNTER — OFFICE VISIT (OUTPATIENT)
Dept: PSYCHIATRY | Facility: CLINIC | Age: 49
End: 2024-01-22
Payer: MEDICARE

## 2024-01-22 VITALS — HEART RATE: 87 BPM | WEIGHT: 162.4 LBS | BODY MASS INDEX: 27.72 KG/M2 | OXYGEN SATURATION: 94 % | HEIGHT: 64 IN

## 2024-01-22 DIAGNOSIS — F41.1 GENERALIZED ANXIETY DISORDER: Primary | ICD-10-CM

## 2024-01-22 DIAGNOSIS — F33.1 MODERATE EPISODE OF RECURRENT MAJOR DEPRESSIVE DISORDER: ICD-10-CM

## 2024-01-22 DIAGNOSIS — F42.8 OTHER OBSESSIVE-COMPULSIVE DISORDERS: Chronic | ICD-10-CM

## 2024-01-22 DIAGNOSIS — Z79.899 HIGH RISK MEDICATION USE: ICD-10-CM

## 2024-01-22 DIAGNOSIS — E11.42 DM TYPE 2 WITH DIABETIC PERIPHERAL NEUROPATHY: Primary | ICD-10-CM

## 2024-01-22 PROCEDURE — 1159F MED LIST DOCD IN RCRD: CPT | Performed by: NURSE PRACTITIONER

## 2024-01-22 PROCEDURE — 99214 OFFICE O/P EST MOD 30 MIN: CPT | Performed by: NURSE PRACTITIONER

## 2024-01-22 PROCEDURE — 1160F RVW MEDS BY RX/DR IN RCRD: CPT | Performed by: NURSE PRACTITIONER

## 2024-01-22 RX ORDER — VENLAFAXINE HYDROCHLORIDE 75 MG/1
225 CAPSULE, EXTENDED RELEASE ORAL DAILY
Qty: 90 CAPSULE | Refills: 1 | Status: SHIPPED | OUTPATIENT
Start: 2024-01-22

## 2024-01-22 RX ORDER — ALPRAZOLAM 1 MG/1
1 TABLET ORAL 2 TIMES DAILY PRN
Qty: 60 TABLET | Refills: 0 | Status: SHIPPED | OUTPATIENT
Start: 2024-01-22

## 2024-01-22 NOTE — TELEPHONE ENCOUNTER
Spoke with patient's , who is on verbal and let him know Josh accidentally sent her Ozempic to [facility] instead of [facility]. I spoke with [facility] and they haven't received the prescription yet but will watch for it to have it transferred. I told him to have the patient call as well to make sure this has been done. He is in understanding and will let her know.    ----- Message from NIDA Betancourt sent at 1/22/2024 12:55 PM EST -----  I sent in 2 mg to [facility] if she can have it transferred. Also, remind her of the click option which she can lower her dose.   ----- Message -----  From: Danelle Henson MA  Sent: 1/22/2024  11:17 AM EST  To: NIDA Betancourt    She is wanting the 2 mg of the Ozempic sent to [facility] since they don't have 1 mg in stock.

## 2024-01-22 NOTE — PROGRESS NOTES
Subjective   Nivia Bernstein is a 48 y.o. female is here today for medication management follow-up.    Chief Complaint:  anxiety PTSD depression     History of Present Illness:    Patient presents today for a follow up for medication management for anxiety, PTSD, and depression. Denies any medical changes since last visit. Patient states sleeping some and getting at least 2-3 hours a night. Patient reports still having nightmares. Patient states follow up with cancer provider in March. Patient states appetite is good and eating at least twice a day. Depression at a 8 on a 0/10 scale with 10 the worst. Denies any thoughts to harm self or others. Patient states having more dreams lately about grandfather and things. Denies any flashbacks. Anxiety at a 7 on a 0/10 scale with 10 the worst. Patient states going to center in [place] for about 6 hours a day and that is helping mood. Denies any auditory or visual hallucinations. Patient states had a couple meltdowns or panic attacks but not too bad. Patient states the lasted no more than 30 mins. Patient reports medication compliance and denies any side effects.   The following portions of the patient's history were reviewed and updated as appropriate: allergies, current medications, past family history, past medical history, past social history, past surgical history, and problem list.    Review of Systems   Constitutional: Negative.    Respiratory: Negative.     Cardiovascular: Negative.    Gastrointestinal: Negative.    Neurological: Negative.    Psychiatric/Behavioral:  Positive for dysphoric mood and sleep disturbance. The patient is nervous/anxious.        Objective   Physical Exam  Vitals reviewed.   Constitutional:       Appearance: Normal appearance. She is well-developed and well-groomed.   Neurological:      Mental Status: She is alert.   Psychiatric:         Attention and Perception: Attention and perception normal.         Mood and Affect: Affect normal.  "Mood is anxious.         Speech: Speech normal.         Behavior: Behavior normal. Behavior is cooperative.         Thought Content: Thought content normal.         Cognition and Memory: Cognition and memory normal.         Judgment: Judgment normal.       Pulse 87, height 162.6 cm (64.02\"), weight 73.7 kg (162 lb 6.4 oz), SpO2 94%, not currently breastfeeding.Body mass index is 27.86 kg/m².    Allergies   Allergen Reactions    Imipramine Other (See Comments)     Chest pain    Mobic [Meloxicam] Rash    Penicillins Angioedema    Taxotere [Docetaxel] Anaphylaxis     9 minutes into 1st infusion    Novolog [Insulin Aspart] Hives    Rosuvastatin Calcium GI Intolerance       Medication List:   Current Outpatient Medications   Medication Sig Dispense Refill    ALPRAZolam (XANAX) 1 MG tablet Take 1 tablet by mouth 2 (Two) Times a Day As Needed for Anxiety. for anxiety 60 tablet 0    venlafaxine XR (EFFEXOR-XR) 75 MG 24 hr capsule Take 3 capsules by mouth Daily. 90 capsule 1    Accu-Chek FastClix Lancets misc Use 1 Device 3 (Three) Times a Day. 100 each 5    alendronate (FOSAMAX) 35 MG tablet TAKE ONE TABLET BY MOUTH every 7 days. take with water 30 minutes before first food/drink/medication. avoid lying down for 30 minutes after taking 4 tablet 3    Aspirin Low Dose 81 MG EC tablet TAKE TWO TABLETS BY MOUTH EVERY DAY 60 tablet 4    atorvastatin (LIPITOR) 40 MG tablet TAKE ONE TABLET BY MOUTH EVERY night 90 tablet 1    azelastine (ASTELIN) 0.1 % nasal spray Use in each nostril as directed 30 mL 3    Calcium Carb-Cholecalciferol (Calcium+D3) 500-15 MG-MCG tablet Take 1 tablet by mouth 2 (Two) Times a Day With Meals. 60 tablet 3    cloNIDine (CATAPRES) 0.1 MG tablet TAKE ONE TABLET BY MOUTH TWICE DAILY 60 tablet 5    cyanocobalamin 1000 MCG/ML injection INJECT 1ML INTO THE APPROPRIATE MUSCLE EVERY 30 DAYS 1 mL 5    Diclofenac Sodium (VOLTAREN) 1 % gel gel APPLY 4 GRAMS TO THE AFFECTED AREA FOUR TIMES A  g 3    Emgality " 120 MG/ML auto-injector pen INJECT 120mg ONCE MONTHLY      Empagliflozin-metFORMIN HCl (Synjardy) 12.5-1000 MG tablet Take 1 tablet by mouth Every Morning. 30 tablet 3    fluconazole (Diflucan) 150 MG tablet Take 1 tablet by mouth Daily. 2 tablet 0    Fluticasone Furoate-Vilanterol (Breo Ellipta) 200-25 MCG/ACT inhaler Inhale 1 puff Daily. 1 each 3    furosemide (LASIX) 20 MG tablet Take 0.5 tablets by mouth Daily. 30 tablet 0    gabapentin (NEURONTIN) 100 MG capsule TAKE ONE CAPSULE BY MOUTH TWICE DAILY 60 capsule 2    glucagon (GLUCAGEN) 1 MG injection Inject 1 mg under the skin into the appropriate area as directed 1 (One) Time As Needed for Low Blood Sugar. Please disregard the previous prescription 1 kit 3    glucose blood (Accu-Chek Guide) test strip USE TO TEST BLOOD GLUCOSE THREE TIMES DAILY 100 each 3    Insulin Lispro (humaLOG) 100 UNIT/ML injection INJECT AS DIRECTED. MAX DAILY DOSE  UNITS DAILY 40 mL 3    ipratropium-albuterol (DUO-NEB) 0.5-2.5 mg/3 ml nebulizer Take 3 mL by nebulization Every 4 (Four) Hours As Needed for Shortness of Air. 150 mL 1    lansoprazole (PREVACID) 30 MG capsule TAKE ONE CAPSULE BY MOUTH EVERY DAY 90 capsule 0    levocetirizine (XYZAL) 5 MG tablet Take 1 tablet by mouth Every Evening. 30 tablet 1    Linzess 145 MCG capsule capsule TAKE ONE CAPSULE BY MOUTH ONCE daily 30 minutes BEFORE meal ON an EMPTY stomach 30 capsule 3    nitrofurantoin, macrocrystal-monohydrate, (Macrobid) 100 MG capsule Take 1 capsule by mouth 2 (Two) Times a Day. 20 capsule 0    ondansetron (ZOFRAN) 8 MG tablet TAKE 1 TABLET BY MOUTH EVERY 8 HOURS AS NEEDED FOR NAUSEA OR VOMITING 30 tablet 3    Respiratory Therapy Supplies (Nebulizer/Tubing/Mouthpiece) kit 1 each Take As Directed. 1 each 1    Semaglutide, 2 MG/DOSE, (OZEMPIC) 8 MG/3ML solution pen-injector Inject 2 mg under the skin into the appropriate area as directed 1 (One) Time Per Week. 3 mL 0    tamoxifen (NOLVADEX) 20 MG chemo tablet Take 1  tablet by mouth Daily. 30 tablet 11    topiramate (TOPAMAX) 50 MG tablet TAKE ONE TABLET BY MOUTH TWICE DAILY 60 tablet 3    ubrogepant 100 MG tablet       Ventolin  (90 Base) MCG/ACT inhaler inhale TWO puffs BY MOUTH EVERY 4 HOURS AS NEEDED FOR SHORTNESS OF BREATH 18 g 5    vitamin B-12 (CYANOCOBALAMIN) 1000 MCG tablet Take 1 tablet by mouth Daily. 30 tablet 3    vitamin D (ERGOCALCIFEROL) 1.25 MG (09020 UT) capsule capsule TAKE 1 CAPSULE BY MOUTH ONCE WEEKLY 4 capsule 3     No current facility-administered medications for this visit.       Mental Status Exam:   Hygiene:   good  Cooperation:  Cooperative  Eye Contact:  Good  Psychomotor Behavior:  Appropriate  Affect:  Appropriate  Hopelessness: Denies  Speech:  Normal  Thought Process:  Goal directed and Linear  Thought Content:  Normal  Suicidal:  None  Homicidal:  None  Hallucinations:  None  Delusion:  None  Memory:  Intact  Orientation:  Person, Place, Time, and Situation  Reliability:  fair  Insight:  Fair  Judgement:  Fair  Impulse Control:  Fair  Physical/Medical Issues:  No               Assessment & Plan   Diagnoses and all orders for this visit:    1. Generalized anxiety disorder (Primary)  -     ALPRAZolam (XANAX) 1 MG tablet; Take 1 tablet by mouth 2 (Two) Times a Day As Needed for Anxiety. for anxiety  Dispense: 60 tablet; Refill: 0  -     venlafaxine XR (EFFEXOR-XR) 75 MG 24 hr capsule; Take 3 capsules by mouth Daily.  Dispense: 90 capsule; Refill: 1    2. Other obsessive-compulsive disorders  -     ALPRAZolam (XANAX) 1 MG tablet; Take 1 tablet by mouth 2 (Two) Times a Day As Needed for Anxiety. for anxiety  Dispense: 60 tablet; Refill: 0    3. Moderate episode of recurrent major depressive disorder  -     venlafaxine XR (EFFEXOR-XR) 75 MG 24 hr capsule; Take 3 capsules by mouth Daily.  Dispense: 90 capsule; Refill: 1    4. High risk medication use  -     Urine Drug Screen - Urine, Clean Catch; Future            Discussed medication options  with patient. Cont. Effexor XR 75 mg three capsules daily for anxiety and depression. Cont. Alprazolam 1 mg twice daily as needed for anxiety. Reviewed the risks, benefits, and side effects of the medications; patient acknowledged and verbally consented. Patient is being prescribed a controlled substance as part of treatment plan. Patient has been educated of appropriate use of the medications, including risk of somnolence, limited ability to drive and/or work safely, and potential for dependence, respiratory depression and overdose. Patient is also informed that the medication are to be used by the patient only- avoid any combined use of ETOH or other substances unless prescribed.   UDS ordered.      [facility] Patient Controlled Substance Report (from 1/22/2023 to 1/22/2024)    Dispensed  Strength Quantity Days Supply Provider Pharmacy   01/10/2024 Gabapentin 100MG 60 each 30 [facility]FFANIE,[facility]I [facility]   12/13/2023 Gabapentin 100MG 60 each 30 [facility]FFANIE,SONALI [facility]   12/06/2023 Alprazolam 1MG 60 each 30 CLOUD,ALFREDO [facility]   11/14/2023 Gabapentin 100MG 60 each 30 TIFFANIE,SONALI [facility]   11/09/2023 Alprazolam 1MG 60 each 30 CLOUD,ALFREDO [facility]   10/18/2023 Gabapentin 100MG 60 each 30 TIFFANIE,SONALI [facility]   10/04/2023 Alprazolam 1MG 60 each 30 CLOUD,ALFREDO [facility]   08/25/2023 Alprazolam 1MG 60 each 30 CLOUD,[facility]FREDO [facility]   08/17/2023 Gabapentin 100MG 60 each 30 TIFFANIE,SONALI [facility]   08/10/2023 Alprazolam 0.5MG 90 each 30 CLOUD,ALFREDO [facility]   07/21/2023 Gabapentin 100MG 60 each 30 TIFFANIE,SONALI [facility]   06/30/2023 Alprazolam 0.5MG 90 each 30 CLOUD,ALFREDO [facility]   06/22/2023 Gabapentin 100MG 60 each 30 TIFFANIE,HARRIOSNELENE [facility]   05/31/2023 Alprazolam 0.5MG  60 each 20 CLOUD,ALFREDO [facility]   05/24/2023 Gabapentin 100MG 60 each 30 SOFIA,DANIEL [facility]   05/01/2023 Alprazolam 0.5MG 60 each 20 CLOUD,ALFREDO [facility]   04/26/2023 Gabapentin 100MG 60 each 30 QUINTON,STEPHANIE [facility]   04/21/2023 Alprazolam 0.5MG 30 each 15 CLOUD,ALFREDO [facility]   03/29/2023 Gabapentin 100MG 60 each 30 QUINTON,STEPHANIE [facility]   03/29/2023 Lorazepam 1MG 90 each 30 CLOUD,ALFREDO [facility]   02/28/2023 Gabapentin 100MG 60 each 30 QIUNTON,STEPHANIE [facility]   02/28/2023 Lorazepam 1MG 90 each 30 CLOUD,ALFREDO [facility]   02/02/2023 Gabapentin 100MG 60 each 30 QUINTON,STEPHANIE [facility]   02/02/2023 Lorazepam 1MG 90 each 30 CLOUD,ALFREDO [facility]         Disclaimer    *The information in this report is based upon Schedule II through V controlled substance records reported by dispensers. Data should appear on [facility] reports within two to three business days after dispensing.   *The records listed in the report are based on the patient identification information entered by the report requestor, and if not sufficiently unique may result in the report including records for multiple patients. Please verify the information in the report by contacting the prescribers and/or dispensers listed.   *If the controlled substance records on this report appear to be in error, the patient or provider should contact the dispenser to determine if the information was reported accurately. If the dispenser certifies the information was reported accurately, the dispenser can contact the Drug Enforcement and Professional Practices Branch at [phone] to investigate the error.   *The information in this report is intended for informational use only by the person authorized to request the report. Intentional disclosure of the report or data to  someone not authorized to obtain the data is a Class B Misdemeanor.      Report Restrictions - A practitioner or pharmacist may share the report with the patient or person authorized to act on the patient's behalf and place the report in the patient's medical record, with the report then being deemed a medical record subject to the same disclosure terms and conditions as an ordinary medical record. ([place]RS 218A.202)      Patient is agreeable to call the office with any questions, concerns, or worsening of symptoms. Patient is aware to call 911 or go to the nearest ER should begin having any thoughts to harm self or others.          Follow up in four weeks          Errors in dictation may reflect use of voice recognition software and not all errors in transcription may have been detected prior to signing.              This document has been electronically signed by NIDA Vasquez   January 22, 2024 13:25 EST

## 2024-01-24 ENCOUNTER — TELEPHONE (OUTPATIENT)
Dept: ONCOLOGY | Facility: CLINIC | Age: 49
End: 2024-01-24
Payer: MEDICARE

## 2024-01-24 NOTE — TELEPHONE ENCOUNTER
Caller: Nivia Bernstein    Relationship to patient: Self    Best call back number: [phone]     Chief complaint: PATIENT TO RESCHEDULE 2/1/24 PORT FLUSH    Type of visit: VAD FLUSH    Requested date: 1/31/24 AROUND 10 AM

## 2024-01-28 DIAGNOSIS — M85.89 OSTEOPENIA OF MULTIPLE SITES: ICD-10-CM

## 2024-01-29 RX ORDER — ALENDRONATE SODIUM 35 MG/1
TABLET ORAL
Qty: 4 TABLET | Refills: 3 | Status: SHIPPED | OUTPATIENT
Start: 2024-01-29

## 2024-01-31 ENCOUNTER — INFUSION (OUTPATIENT)
Dept: ONCOLOGY | Facility: HOSPITAL | Age: 49
End: 2024-01-31
Payer: MEDICARE

## 2024-01-31 VITALS
OXYGEN SATURATION: 97 % | TEMPERATURE: 97.3 F | DIASTOLIC BLOOD PRESSURE: 60 MMHG | BODY MASS INDEX: 27.77 KG/M2 | RESPIRATION RATE: 18 BRPM | HEART RATE: 85 BPM | SYSTOLIC BLOOD PRESSURE: 100 MMHG | WEIGHT: 161.9 LBS

## 2024-01-31 DIAGNOSIS — Z95.828 PORT-A-CATH IN PLACE: Primary | ICD-10-CM

## 2024-01-31 PROCEDURE — 96523 IRRIG DRUG DELIVERY DEVICE: CPT

## 2024-01-31 PROCEDURE — 25010000002 HEPARIN LOCK FLUSH PER 10 UNITS: Performed by: INTERNAL MEDICINE

## 2024-01-31 RX ORDER — HEPARIN SODIUM (PORCINE) LOCK FLUSH IV SOLN 100 UNIT/ML 100 UNIT/ML
500 SOLUTION INTRAVENOUS AS NEEDED
OUTPATIENT
Start: 2024-01-31

## 2024-01-31 RX ORDER — SODIUM CHLORIDE 0.9 % (FLUSH) 0.9 %
10 SYRINGE (ML) INJECTION AS NEEDED
Status: DISCONTINUED | OUTPATIENT
Start: 2024-01-31 | End: 2024-01-31 | Stop reason: HOSPADM

## 2024-01-31 RX ORDER — SODIUM CHLORIDE 0.9 % (FLUSH) 0.9 %
10 SYRINGE (ML) INJECTION AS NEEDED
OUTPATIENT
Start: 2024-01-31

## 2024-01-31 RX ORDER — HEPARIN SODIUM (PORCINE) LOCK FLUSH IV SOLN 100 UNIT/ML 100 UNIT/ML
500 SOLUTION INTRAVENOUS AS NEEDED
Status: DISCONTINUED | OUTPATIENT
Start: 2024-01-31 | End: 2024-01-31 | Stop reason: HOSPADM

## 2024-01-31 RX ADMIN — Medication 500 UNITS: at 11:05

## 2024-01-31 RX ADMIN — Medication 10 ML: at 11:05

## 2024-02-26 DIAGNOSIS — J30.9 CHRONIC ALLERGIC RHINITIS: Chronic | ICD-10-CM

## 2024-02-26 DIAGNOSIS — E11.42 DM TYPE 2 WITH DIABETIC PERIPHERAL NEUROPATHY: Chronic | ICD-10-CM

## 2024-02-26 RX ORDER — EMPAGLIFLOZIN AND METFORMIN HYDROCHLORIDE 12.5; 1 MG/1; MG/1
1 TABLET ORAL EVERY MORNING
Qty: 30 TABLET | Refills: 1 | Status: SHIPPED | OUTPATIENT
Start: 2024-02-26

## 2024-02-26 RX ORDER — LEVOCETIRIZINE DIHYDROCHLORIDE 5 MG/1
5 TABLET, FILM COATED ORAL EVERY EVENING
Qty: 30 TABLET | Refills: 1 | Status: SHIPPED | OUTPATIENT
Start: 2024-02-26

## 2024-03-05 DIAGNOSIS — F41.1 GENERALIZED ANXIETY DISORDER: ICD-10-CM

## 2024-03-05 DIAGNOSIS — F42.8 OTHER OBSESSIVE-COMPULSIVE DISORDERS: Chronic | ICD-10-CM

## 2024-03-05 RX ORDER — ALPRAZOLAM 1 MG/1
1 TABLET ORAL 2 TIMES DAILY PRN
Qty: 60 TABLET | Refills: 0 | Status: SHIPPED | OUTPATIENT
Start: 2024-03-05

## 2024-03-12 DIAGNOSIS — J45.20 MILD INTERMITTENT ASTHMA WITHOUT COMPLICATION: Chronic | ICD-10-CM

## 2024-03-12 RX ORDER — ALBUTEROL SULFATE 90 UG/1
AEROSOL, METERED RESPIRATORY (INHALATION)
Qty: 18 G | Refills: 5 | Status: SHIPPED | OUTPATIENT
Start: 2024-03-12

## 2024-03-15 DIAGNOSIS — F42.8 OTHER OBSESSIVE-COMPULSIVE DISORDERS: Chronic | ICD-10-CM

## 2024-03-15 DIAGNOSIS — F33.1 MODERATE EPISODE OF RECURRENT MAJOR DEPRESSIVE DISORDER: ICD-10-CM

## 2024-03-15 DIAGNOSIS — F41.1 GENERALIZED ANXIETY DISORDER: ICD-10-CM

## 2024-03-15 RX ORDER — ALPRAZOLAM 1 MG/1
1 TABLET ORAL 2 TIMES DAILY PRN
Qty: 60 TABLET | Refills: 0 | Status: SHIPPED | OUTPATIENT
Start: 2024-03-15

## 2024-03-15 RX ORDER — VENLAFAXINE HYDROCHLORIDE 75 MG/1
225 CAPSULE, EXTENDED RELEASE ORAL DAILY
Qty: 90 CAPSULE | Refills: 1 | Status: SHIPPED | OUTPATIENT
Start: 2024-03-15

## 2024-03-21 ENCOUNTER — TELEPHONE (OUTPATIENT)
Dept: ONCOLOGY | Facility: CLINIC | Age: 49
End: 2024-03-21
Payer: MEDICARE

## 2024-03-21 NOTE — TELEPHONE ENCOUNTER
Caller: Nivia Bernstein    Relationship to patient: Self    Best call back number: [phone]    Type of visit: PORT FLUSH, LAB, FOLLOW UP    Requested date: PLEASE CALL TO R/S.     If rescheduling, when is the original appointment: 03/28

## 2024-03-22 NOTE — TELEPHONE ENCOUNTER
Appointment has been rescheduled to 3/29/2024. Coordinated with infusion to get port flush and lab appointment moved to 11:00, OV with Dr. Looc at 1130. PT is aware of the new appointment date and time.

## 2024-03-25 DIAGNOSIS — T45.1X5A HOT FLASHES DUE TO TAMOXIFEN: ICD-10-CM

## 2024-03-25 DIAGNOSIS — M54.81 OCCIPITAL NEURALGIA OF RIGHT SIDE: Chronic | ICD-10-CM

## 2024-03-25 DIAGNOSIS — R23.2 HOT FLASHES DUE TO TAMOXIFEN: ICD-10-CM

## 2024-03-25 RX ORDER — GABAPENTIN 100 MG/1
100 CAPSULE ORAL 2 TIMES DAILY
Qty: 60 CAPSULE | Refills: 0 | Status: SHIPPED | OUTPATIENT
Start: 2024-03-25

## 2024-03-25 RX ORDER — TOPIRAMATE 50 MG/1
TABLET, FILM COATED ORAL
Qty: 60 TABLET | Refills: 3 | Status: SHIPPED | OUTPATIENT
Start: 2024-03-25

## 2024-03-26 ENCOUNTER — TELEPHONE (OUTPATIENT)
Dept: ONCOLOGY | Facility: CLINIC | Age: 49
End: 2024-03-26
Payer: MEDICARE

## 2024-03-26 NOTE — TELEPHONE ENCOUNTER
Caller: Nivia Bernstein    Relationship: Self    Best call back number: [phone]     What is the best time to reach you: ASAP    Who are you requesting to speak with (clinical staff, provider,  specific staff member): CLINICAL    What was the call regarding: PATIENT'S DAUGHTER HAS TESTED POSITIVE FOR COVID TODAY, HAS BEEN EXPOSED.  PATIENT DOES NOT CURRENTLY HAVE ANY SYMPTOMS BUT ASKING WHAT SHE SHOULD DO AND IF SHE SHOULD KEEP OR RESCHEDULE FRIDAY'S APPTS 3/29.  PLEASE CALL PATIENT TO ADVISE.

## 2024-03-26 NOTE — TELEPHONE ENCOUNTER
PT HAS BEEN RESCHEDULED TO 04/11 @ 10:45 FLUSH/LABS & 11:00 OV WITH MD DUE TO PATIENT'S SCHEDULE AVAILABILITY.

## 2024-04-02 DIAGNOSIS — R11.0 NAUSEA: ICD-10-CM

## 2024-04-02 DIAGNOSIS — K21.9 GASTROESOPHAGEAL REFLUX DISEASE WITHOUT ESOPHAGITIS: Chronic | ICD-10-CM

## 2024-04-02 RX ORDER — ONDANSETRON HYDROCHLORIDE 8 MG/1
TABLET, FILM COATED ORAL
Qty: 30 TABLET | Refills: 3 | Status: SHIPPED | OUTPATIENT
Start: 2024-04-02

## 2024-04-02 RX ORDER — LANSOPRAZOLE 30 MG/1
CAPSULE, DELAYED RELEASE ORAL
Qty: 90 CAPSULE | Refills: 0 | Status: SHIPPED | OUTPATIENT
Start: 2024-04-02

## 2024-04-11 ENCOUNTER — INFUSION (OUTPATIENT)
Dept: ONCOLOGY | Facility: HOSPITAL | Age: 49
End: 2024-04-11
Payer: MEDICARE

## 2024-04-11 ENCOUNTER — APPOINTMENT (OUTPATIENT)
Dept: ONCOLOGY | Facility: HOSPITAL | Age: 49
End: 2024-04-11
Payer: MEDICARE

## 2024-04-11 ENCOUNTER — OFFICE VISIT (OUTPATIENT)
Dept: ONCOLOGY | Facility: CLINIC | Age: 49
End: 2024-04-11
Payer: MEDICARE

## 2024-04-11 VITALS
SYSTOLIC BLOOD PRESSURE: 94 MMHG | WEIGHT: 160 LBS | TEMPERATURE: 98.5 F | HEART RATE: 99 BPM | RESPIRATION RATE: 18 BRPM | BODY MASS INDEX: 27.45 KG/M2 | OXYGEN SATURATION: 98 % | DIASTOLIC BLOOD PRESSURE: 64 MMHG

## 2024-04-11 VITALS
HEART RATE: 99 BPM | RESPIRATION RATE: 18 BRPM | DIASTOLIC BLOOD PRESSURE: 64 MMHG | SYSTOLIC BLOOD PRESSURE: 94 MMHG | BODY MASS INDEX: 27.53 KG/M2 | WEIGHT: 160.5 LBS | TEMPERATURE: 98.5 F | OXYGEN SATURATION: 98 %

## 2024-04-11 DIAGNOSIS — R53.83 FATIGUE, UNSPECIFIED TYPE: ICD-10-CM

## 2024-04-11 DIAGNOSIS — E55.9 VITAMIN D DEFICIENCY: ICD-10-CM

## 2024-04-11 DIAGNOSIS — Z17.1 MALIGNANT NEOPLASM OF UPPER-OUTER QUADRANT OF RIGHT BREAST IN FEMALE, ESTROGEN RECEPTOR NEGATIVE: ICD-10-CM

## 2024-04-11 DIAGNOSIS — D64.9 ANEMIA, UNSPECIFIED TYPE: ICD-10-CM

## 2024-04-11 DIAGNOSIS — E53.8 VITAMIN B 12 DEFICIENCY: ICD-10-CM

## 2024-04-11 DIAGNOSIS — C50.411 MALIGNANT NEOPLASM OF UPPER-OUTER QUADRANT OF RIGHT BREAST IN FEMALE, ESTROGEN RECEPTOR NEGATIVE: ICD-10-CM

## 2024-04-11 DIAGNOSIS — Z95.828 PORT-A-CATH IN PLACE: Primary | ICD-10-CM

## 2024-04-11 DIAGNOSIS — C50.411 MALIGNANT NEOPLASM OF UPPER-OUTER QUADRANT OF RIGHT BREAST IN FEMALE, ESTROGEN RECEPTOR NEGATIVE: Primary | ICD-10-CM

## 2024-04-11 DIAGNOSIS — D64.9 ANEMIA, UNSPECIFIED TYPE: Primary | ICD-10-CM

## 2024-04-11 DIAGNOSIS — Z17.1 MALIGNANT NEOPLASM OF UPPER-OUTER QUADRANT OF RIGHT BREAST IN FEMALE, ESTROGEN RECEPTOR NEGATIVE: Primary | ICD-10-CM

## 2024-04-11 LAB
25(OH)D3 SERPL-MCNC: 51.9 NG/ML (ref 30–100)
ALBUMIN SERPL-MCNC: 4.3 G/DL (ref 3.5–5.2)
ALBUMIN/GLOB SERPL: 1.5 G/DL
ALP SERPL-CCNC: 88 U/L (ref 39–117)
ALT SERPL W P-5'-P-CCNC: 6 U/L (ref 1–33)
ANION GAP SERPL CALCULATED.3IONS-SCNC: 11 MMOL/L (ref 5–15)
AST SERPL-CCNC: 12 U/L (ref 1–32)
BASOPHILS # BLD AUTO: 0.05 10*3/MM3 (ref 0–0.2)
BASOPHILS NFR BLD AUTO: 0.7 % (ref 0–1.5)
BILIRUB SERPL-MCNC: 0.3 MG/DL (ref 0–1.2)
BUN SERPL-MCNC: 11 MG/DL (ref 6–20)
BUN/CREAT SERPL: 16.2 (ref 7–25)
CALCIUM SPEC-SCNC: 9 MG/DL (ref 8.6–10.5)
CHLORIDE SERPL-SCNC: 108 MMOL/L (ref 98–107)
CO2 SERPL-SCNC: 24 MMOL/L (ref 22–29)
CREAT SERPL-MCNC: 0.68 MG/DL (ref 0.57–1)
DEPRECATED RDW RBC AUTO: 44.9 FL (ref 37–54)
EGFRCR SERPLBLD CKD-EPI 2021: 105.6 ML/MIN/1.73
EOSINOPHIL # BLD AUTO: 0.08 10*3/MM3 (ref 0–0.4)
EOSINOPHIL NFR BLD AUTO: 1.1 % (ref 0.3–6.2)
ERYTHROCYTE [DISTWIDTH] IN BLOOD BY AUTOMATED COUNT: 13.5 % (ref 12.3–15.4)
FERRITIN SERPL-MCNC: 112.1 NG/ML (ref 13–150)
FOLATE SERPL-MCNC: 4.73 NG/ML (ref 4.78–24.2)
GLOBULIN UR ELPH-MCNC: 2.8 GM/DL
GLUCOSE SERPL-MCNC: 106 MG/DL (ref 65–99)
HCT VFR BLD AUTO: 25.2 % (ref 34–46.6)
HGB BLD-MCNC: 7.8 G/DL (ref 12–15.9)
IMM GRANULOCYTES # BLD AUTO: 0.03 10*3/MM3 (ref 0–0.05)
IMM GRANULOCYTES NFR BLD AUTO: 0.4 % (ref 0–0.5)
IRON 24H UR-MRATE: 43 MCG/DL (ref 37–145)
IRON SATN MFR SERPL: 14 % (ref 20–50)
LYMPHOCYTES # BLD AUTO: 2.94 10*3/MM3 (ref 0.7–3.1)
LYMPHOCYTES NFR BLD AUTO: 41.8 % (ref 19.6–45.3)
MCH RBC QN AUTO: 28.3 PG (ref 26.6–33)
MCHC RBC AUTO-ENTMCNC: 31 G/DL (ref 31.5–35.7)
MCV RBC AUTO: 91.3 FL (ref 79–97)
MONOCYTES # BLD AUTO: 0.34 10*3/MM3 (ref 0.1–0.9)
MONOCYTES NFR BLD AUTO: 4.8 % (ref 5–12)
NEUTROPHILS NFR BLD AUTO: 3.6 10*3/MM3 (ref 1.7–7)
NEUTROPHILS NFR BLD AUTO: 51.2 % (ref 42.7–76)
NRBC BLD AUTO-RTO: 0 /100 WBC (ref 0–0.2)
PLATELET # BLD AUTO: 167 10*3/MM3 (ref 140–450)
PMV BLD AUTO: 9.5 FL (ref 6–12)
POTASSIUM SERPL-SCNC: 3.5 MMOL/L (ref 3.5–5.2)
PROT SERPL-MCNC: 7.1 G/DL (ref 6–8.5)
RBC # BLD AUTO: 2.76 10*6/MM3 (ref 3.77–5.28)
RETICS # AUTO: 0.04 10*6/MM3 (ref 0.02–0.13)
RETICS/RBC NFR AUTO: 1.57 % (ref 0.7–1.9)
SODIUM SERPL-SCNC: 143 MMOL/L (ref 136–145)
TIBC SERPL-MCNC: 314 MCG/DL (ref 298–536)
TRANSFERRIN SERPL-MCNC: 211 MG/DL (ref 200–360)
TSH SERPL DL<=0.05 MIU/L-ACNC: 2.27 UIU/ML (ref 0.27–4.2)
VIT B12 BLD-MCNC: 432 PG/ML (ref 211–946)
WBC NRBC COR # BLD AUTO: 7.04 10*3/MM3 (ref 3.4–10.8)

## 2024-04-11 PROCEDURE — 82607 VITAMIN B-12: CPT

## 2024-04-11 PROCEDURE — 84466 ASSAY OF TRANSFERRIN: CPT

## 2024-04-11 PROCEDURE — 36591 DRAW BLOOD OFF VENOUS DEVICE: CPT

## 2024-04-11 PROCEDURE — 99214 OFFICE O/P EST MOD 30 MIN: CPT | Performed by: INTERNAL MEDICINE

## 2024-04-11 PROCEDURE — 1126F AMNT PAIN NOTED NONE PRSNT: CPT | Performed by: INTERNAL MEDICINE

## 2024-04-11 PROCEDURE — 82746 ASSAY OF FOLIC ACID SERUM: CPT

## 2024-04-11 PROCEDURE — 80053 COMPREHEN METABOLIC PANEL: CPT

## 2024-04-11 PROCEDURE — 83540 ASSAY OF IRON: CPT

## 2024-04-11 PROCEDURE — 82306 VITAMIN D 25 HYDROXY: CPT

## 2024-04-11 PROCEDURE — 25010000002 HEPARIN LOCK FLUSH PER 10 UNITS: Performed by: INTERNAL MEDICINE

## 2024-04-11 PROCEDURE — 85025 COMPLETE CBC W/AUTO DIFF WBC: CPT

## 2024-04-11 PROCEDURE — 84443 ASSAY THYROID STIM HORMONE: CPT

## 2024-04-11 PROCEDURE — 82728 ASSAY OF FERRITIN: CPT

## 2024-04-11 PROCEDURE — 85045 AUTOMATED RETICULOCYTE COUNT: CPT

## 2024-04-11 RX ORDER — SODIUM CHLORIDE 0.9 % (FLUSH) 0.9 %
10 SYRINGE (ML) INJECTION AS NEEDED
Status: DISCONTINUED | OUTPATIENT
Start: 2024-04-11 | End: 2024-04-11 | Stop reason: HOSPADM

## 2024-04-11 RX ORDER — CYANOCOBALAMIN 1000 UG/ML
1000 INJECTION, SOLUTION INTRAMUSCULAR; SUBCUTANEOUS
Qty: 1 ML | Refills: 5 | Status: SHIPPED | OUTPATIENT
Start: 2024-04-11

## 2024-04-11 RX ORDER — HEPARIN SODIUM (PORCINE) LOCK FLUSH IV SOLN 100 UNIT/ML 100 UNIT/ML
500 SOLUTION INTRAVENOUS AS NEEDED
OUTPATIENT
Start: 2024-04-11

## 2024-04-11 RX ORDER — TAMOXIFEN CITRATE 20 MG/1
20 TABLET ORAL DAILY
Qty: 30 TABLET | Refills: 11 | Status: SHIPPED | OUTPATIENT
Start: 2024-04-11

## 2024-04-11 RX ORDER — HEPARIN SODIUM (PORCINE) LOCK FLUSH IV SOLN 100 UNIT/ML 100 UNIT/ML
500 SOLUTION INTRAVENOUS AS NEEDED
Status: DISCONTINUED | OUTPATIENT
Start: 2024-04-11 | End: 2024-04-11 | Stop reason: HOSPADM

## 2024-04-11 RX ORDER — SODIUM CHLORIDE 0.9 % (FLUSH) 0.9 %
10 SYRINGE (ML) INJECTION AS NEEDED
OUTPATIENT
Start: 2024-04-11

## 2024-04-11 RX ORDER — ERGOCALCIFEROL 1.25 MG/1
50000 CAPSULE ORAL WEEKLY
Qty: 4 CAPSULE | Refills: 3 | Status: SHIPPED | OUTPATIENT
Start: 2024-04-11

## 2024-04-11 RX ADMIN — Medication 500 UNITS: at 11:11

## 2024-04-11 RX ADMIN — Medication 10 ML: at 11:15

## 2024-04-12 ENCOUNTER — TELEPHONE (OUTPATIENT)
Dept: ONCOLOGY | Facility: CLINIC | Age: 49
End: 2024-04-12
Payer: MEDICARE

## 2024-04-12 RX ORDER — FOLIC ACID 1 MG/1
1 TABLET ORAL DAILY
Qty: 90 TABLET | Refills: 3 | Status: SHIPPED | OUTPATIENT
Start: 2024-04-12

## 2024-04-12 NOTE — TELEPHONE ENCOUNTER
Under the instruction of Dr. Loco, RN returned patient's call. RN let the patient know that her iron levels were normal, and her B12 was mid-range so Dr. Loco recommends taking her B12 injection monthly, with her first dose being ASAP. RN told the patient her folate level was low, however, and being that it is a B vitamin that helps create red blood cells, it can be cause for her anemia/low hemoglobin. RN informed patient that Dr. Loco sent folic acid supplements to her pharmacy.   No further questions or concerns voiced at this time.

## 2024-04-16 DIAGNOSIS — E11.42 DM TYPE 2 WITH DIABETIC PERIPHERAL NEUROPATHY: ICD-10-CM

## 2024-04-16 RX ORDER — SEMAGLUTIDE 2.68 MG/ML
INJECTION, SOLUTION SUBCUTANEOUS
Qty: 3 ML | Refills: 0 | Status: SHIPPED | OUTPATIENT
Start: 2024-04-16

## 2024-04-18 ENCOUNTER — OFFICE VISIT (OUTPATIENT)
Dept: PSYCHIATRY | Facility: CLINIC | Age: 49
End: 2024-04-18
Payer: MEDICARE

## 2024-04-18 VITALS — HEIGHT: 64 IN | BODY MASS INDEX: 27.49 KG/M2 | HEART RATE: 112 BPM | WEIGHT: 161 LBS | OXYGEN SATURATION: 98 %

## 2024-04-18 DIAGNOSIS — F42.8 OTHER OBSESSIVE-COMPULSIVE DISORDERS: Chronic | ICD-10-CM

## 2024-04-18 DIAGNOSIS — Z79.899 HIGH RISK MEDICATION USE: ICD-10-CM

## 2024-04-18 DIAGNOSIS — F41.1 GENERALIZED ANXIETY DISORDER: Primary | ICD-10-CM

## 2024-04-18 DIAGNOSIS — F33.1 MODERATE EPISODE OF RECURRENT MAJOR DEPRESSIVE DISORDER: ICD-10-CM

## 2024-04-18 RX ORDER — ALPRAZOLAM 1 MG/1
1 TABLET ORAL 2 TIMES DAILY PRN
Qty: 60 TABLET | Refills: 0 | Status: SHIPPED | OUTPATIENT
Start: 2024-04-18

## 2024-04-18 RX ORDER — VENLAFAXINE HYDROCHLORIDE 75 MG/1
225 CAPSULE, EXTENDED RELEASE ORAL DAILY
Qty: 90 CAPSULE | Refills: 1 | Status: SHIPPED | OUTPATIENT
Start: 2024-04-18

## 2024-04-18 NOTE — PROGRESS NOTES
Subjective   Nivia Bernstein is a 49 y.o. female is here today for medication management follow-up.    Chief Complaint:  anxiety depression     History of Present Illness:    Patient presents today for follow up for medication management for depression and anxiety. Patient deneis any hospital visits, but went to see cancer provider and she found something. Patient states she is losing blood somewhere so trying to find the cause. Patient states also had an argument with mom. Patient states worried about something else going on cancer wise. Patient states looking at scopes and GI first if lab work doesn't come back up. Patient states get agitated later in the day. Patient reports currently depression is at a 10  on a 0-10 scale with 10 being the worst. Patient reports anxiety is at a 10 on a 0-10 scale with 10 being the worst. Patient reports still having some panic attacks. Patient states irritability has been bad during the attacks. Patient reports sleeping 2-3 hours a night and patient reports still having same nightmares. Patient reports having trouble sleeping due to worrying. Patient reports appetite is ok and eating at least once a day. Denies any nausea or vomiting. Patient denies any auditory or visual hallucinations. Patient adamantly denies any thoughts to harm self or others . Patient reports medication compliance and denies any side effects to medications. Patient states she feels like medication is doing well but dealing with situational things at this time.   The following portions of the patient's history were reviewed and updated as appropriate: allergies, current medications, past family history, past medical history, past social history, past surgical history, and problem list.    Review of Systems   Constitutional: Negative.    Respiratory: Negative.     Cardiovascular: Negative.    Gastrointestinal: Negative.    Neurological: Negative.    Psychiatric/Behavioral:  Positive for agitation,  "dysphoric mood and sleep disturbance. The patient is nervous/anxious.        Objective   Physical Exam  Vitals reviewed.   Constitutional:       Appearance: Normal appearance. She is well-developed and well-groomed.   Neurological:      Mental Status: She is alert.   Psychiatric:         Attention and Perception: Attention and perception normal.         Mood and Affect: Affect normal. Mood is depressed.         Speech: Speech normal.         Behavior: Behavior normal. Behavior is cooperative.         Thought Content: Thought content normal.         Cognition and Memory: Cognition and memory normal.         Judgment: Judgment normal.       Pulse 112, height 162.6 cm (64.02\"), weight 73 kg (161 lb), SpO2 98%, not currently breastfeeding.Body mass index is 27.62 kg/m².    Allergies   Allergen Reactions    Imipramine Other (See Comments)     Chest pain    Mobic [Meloxicam] Rash    Penicillins Angioedema    Taxotere [Docetaxel] Anaphylaxis     9 minutes into 1st infusion    Novolog [Insulin Aspart] Hives    Rosuvastatin Calcium GI Intolerance       Medication List:   Current Outpatient Medications   Medication Sig Dispense Refill    ALPRAZolam (XANAX) 1 MG tablet Take 1 tablet by mouth 2 (Two) Times a Day As Needed for Anxiety. for anxiety 60 tablet 0    venlafaxine XR (EFFEXOR-XR) 75 MG 24 hr capsule Take 3 capsules by mouth Daily. 90 capsule 1    Accu-Chek FastClix Lancets misc Use 1 Device 3 (Three) Times a Day. 100 each 5    albuterol sulfate  (90 Base) MCG/ACT inhaler INHALE TWO PUFFS BY MOUTH EVERY FOUR HOURS HOURS AS NEEDED FOR SHORTNESS OF BREATH 18 g 5    alendronate (FOSAMAX) 35 MG tablet TAKE ONE TABLET BY MOUTH EVERY 7 DAYS. TAKE WITH WATER 30 MINUTES BEFORE THE FIRST FOOD, DRINK OR MEDICATION AND AVOID LYING DOWN FOR 30 MINUTES AFTER TAKING 4 tablet 3    Aspirin Low Dose 81 MG EC tablet TAKE TWO TABLETS BY MOUTH EVERY DAY 60 tablet 4    atorvastatin (LIPITOR) 40 MG tablet TAKE ONE TABLET BY MOUTH " EVERY night 90 tablet 1    azelastine (ASTELIN) 0.1 % nasal spray Use in each nostril as directed 30 mL 3    Calcium Carb-Cholecalciferol (Calcium+D3) 500-15 MG-MCG tablet Take 1 tablet by mouth 2 (Two) Times a Day With Meals. 60 tablet 3    cloNIDine (CATAPRES) 0.1 MG tablet TAKE ONE TABLET BY MOUTH TWICE DAILY 60 tablet 5    cyanocobalamin 1000 MCG/ML injection Inject 1 mL under the skin into the appropriate area as directed Every 30 (Thirty) Days. 1 mL 5    Emgality 120 MG/ML auto-injector pen INJECT 120mg ONCE MONTHLY      Empagliflozin-metFORMIN HCl (Synjardy) 12.5-1000 MG tablet TAKE ONE TABLET BY MOUTH EVERY MORNING 30 tablet 1    fluconazole (Diflucan) 150 MG tablet Take 1 tablet by mouth Daily. 2 tablet 0    Fluticasone Furoate-Vilanterol (Breo Ellipta) 200-25 MCG/ACT inhaler Inhale 1 puff Daily. 1 each 3    folic acid (FOLVITE) 1 MG tablet Take 1 tablet by mouth Daily. 90 tablet 3    furosemide (LASIX) 20 MG tablet Take 0.5 tablets by mouth Daily. 30 tablet 0    gabapentin (NEURONTIN) 100 MG capsule TAKE ONE CAPSULE BY MOUTH TWICE DAILY 60 capsule 0    glucagon (GLUCAGEN) 1 MG injection Inject 1 mg under the skin into the appropriate area as directed 1 (One) Time As Needed for Low Blood Sugar. Please disregard the previous prescription 1 kit 3    glucose blood (Accu-Chek Guide) test strip USE TO TEST BLOOD GLUCOSE THREE TIMES DAILY 100 each 3    Insulin Lispro (humaLOG) 100 UNIT/ML injection INJECT AS DIRECTED. MAX DAILY DOSE  UNITS DAILY 40 mL 3    ipratropium-albuterol (DUO-NEB) 0.5-2.5 mg/3 ml nebulizer Take 3 mL by nebulization Every 4 (Four) Hours As Needed for Shortness of Air. 150 mL 1    lansoprazole (PREVACID) 30 MG capsule TAKE ONE CAPSULE BY MOUTH EVERY DAY 90 capsule 0    levocetirizine (XYZAL) 5 MG tablet TAKE ONE TABLET BY MOUTH EVERY EVENING 30 tablet 1    Linzess 145 MCG capsule capsule TAKE ONE CAPSULE BY MOUTH ONCE daily 30 minutes BEFORE meal ON an EMPTY stomach 30 capsule 3     nitrofurantoin, macrocrystal-monohydrate, (Macrobid) 100 MG capsule Take 1 capsule by mouth 2 (Two) Times a Day. 20 capsule 0    ondansetron (ZOFRAN) 8 MG tablet TAKE 1 TABLET BY MOUTH EVERY 8 HOURS AS NEEDED FOR NAUSEA OR VOMITING 30 tablet 3    Ozempic, 2 MG/DOSE, 8 MG/3ML solution pen-injector INJECT 2MG SUBCUTANEOUSLY ONCE WEEKLY 3 mL 0    Respiratory Therapy Supplies (Nebulizer/Tubing/Mouthpiece) kit 1 each Take As Directed. 1 each 1    tamoxifen (NOLVADEX) 20 MG chemo tablet Take 1 tablet by mouth Daily. 30 tablet 11    topiramate (TOPAMAX) 50 MG tablet TAKE ONE TABLET BY MOUTH TWICE DAILY 60 tablet 3    ubrogepant 100 MG tablet       vitamin D (ERGOCALCIFEROL) 1.25 MG (41985 UT) capsule capsule Take 1 capsule by mouth 1 (One) Time Per Week. 4 capsule 3     No current facility-administered medications for this visit.       Mental Status Exam:   Hygiene:   good  Cooperation:  Cooperative  Eye Contact:  Downcast  Psychomotor Behavior:  Appropriate  Affect:  Appropriate  Hopelessness: Denies  Speech:  Normal  Thought Process:  Goal directed and Linear  Thought Content:  Normal  Suicidal:  None  Homicidal:  None  Hallucinations:  None  Delusion:  None  Memory:  Intact  Orientation:  Person, Place, Time, and Situation  Reliability:  fair  Insight:  Fair  Judgement:  Fair  Impulse Control:  Fair  Physical/Medical Issues:  No              Assessment & Plan   Diagnoses and all orders for this visit:    1. Generalized anxiety disorder (Primary)  -     ALPRAZolam (XANAX) 1 MG tablet; Take 1 tablet by mouth 2 (Two) Times a Day As Needed for Anxiety. for anxiety  Dispense: 60 tablet; Refill: 0  -     venlafaxine XR (EFFEXOR-XR) 75 MG 24 hr capsule; Take 3 capsules by mouth Daily.  Dispense: 90 capsule; Refill: 1    2. Other obsessive-compulsive disorders  -     ALPRAZolam (XANAX) 1 MG tablet; Take 1 tablet by mouth 2 (Two) Times a Day As Needed for Anxiety. for anxiety  Dispense: 60 tablet; Refill: 0    3. Moderate  episode of recurrent major depressive disorder  -     venlafaxine XR (EFFEXOR-XR) 75 MG 24 hr capsule; Take 3 capsules by mouth Daily.  Dispense: 90 capsule; Refill: 1    4. High risk medication use  -     Urine Drug Screen - Urine, Clean Catch; Future            Discussed medication options with patient. Cont. Effexor XR 75 mg three capsules daily for depression, OCD, and anxiety. Cont. Alprazolam 1 mg twice daily as needed for anxiety. Reviewed the risks, benefits, and side effects of the medications; patient acknowledged and verbally consented. Patient is being prescribed a controlled substance as part of treatment plan. Patient has been educated of appropriate use of the medications, including risk of somnolence, limited ability to drive and/or work safely, and potential for dependence, respiratory depression and overdose. Patient is also informed that the medication are to be used by the patient only- avoid any combined use of ETOH or other substances unless prescribed.   UDS Ordered.     [facility] Patient Controlled Substance Report (from 4/19/2023 to 4/18/2024)    Dispensed  Strength Quantity Days Supply Provider Pharmacy   04/02/2024 Alprazolam 1MG 60 each 30 [facility]   04/02/2024 Gabapentin 100MG 60 each 30 [facility]   03/05/2024 Alprazolam 1MG 60 each 30 [facility]   03/05/2024 Gabapentin 100MG 60 each 30 [facility]   02/06/2024 Alprazolam 1MG 60 each 30 [facility]   02/06/2024 Gabapentin 100MG 60 each 30 [facility]   01/10/2024 Gabapentin 100MG 60 each 30 [facility]   12/13/2023 Gabapentin 100MG 60 each 30 [facility]   12/06/2023 Alprazolam 1MG 60 each 30 [facility]   11/14/2023 Gabapentin 100MG 60 each 30 [facility]   11/09/2023 Alprazolam 1MG 60 each 30 [facility]   10/18/2023 Gabapentin 100MG 60 each 30 [facility]AN[facility]ERELENE [facility]   10/04/2023 Alprazolam 1MG 60 each 30 [facility]   08/25/2023 Alprazolam 1MG 60 each 30 [facility]   08/17/2023 Gabapentin 100MG 60 each 30 TIFFANIE[facility]SHERELENE [facility]   08/10/2023 Alprazolam 0.5MG 90 each 30 [facility]   07/21/2023 Gabapentin 100MG 60 each 30 TIFFANIE[facility]ERELENE [facility]   06/30/2023 Alprazolam 0.5MG 90 each 30 [facility]   06/22/2023 Gabapentin 100MG 60 each 30 TIFFANIE,HARRISONELENMARIETTA [facility]   05/31/2023 Alprazolam 0.5MG 60 each 20 [facility]   05/24/2023 Gabapentin 100MG 60 each 30 SOFIA[facility]DANIEL [facility]   05/01/2023 Alprazolam 0.5MG 60 each 20 [facility]   04/26/2023 Gabapentin 100MG 60 each 30 QUINTONSTEPHANIE [facility]   04/21/2023 Alprazolam 0.5MG 30 each 15 [facility]         Disclaimer    *The information in this report is based upon Schedule II through V controlled substance records reported by dispensers. Data should appear on [facility] reports within two to three business days after dispensing.   *The records listed in the report are based on the patient identification information entered by the report requestor, and if not sufficiently unique may result in the report including records for multiple patients. Please verify the information in the report by contacting the prescribers and/or dispensers listed.   *If the controlled substance records on this report appear to be in error, the patient or provider should contact the dispenser to determine if the information was reported accurately. If the dispenser certifies the information was  reported accurately, the dispenser can contact the Drug Enforcement and Professional Practices Branch at [phone] to investigate the error.   *The information in this report is intended for informational use only by the person authorized to request the report. Intentional disclosure of the report or data to someone not authorized to obtain the data is a Class B Misdemeanor.      Report Restrictions - A practitioner or pharmacist may share the report with the patient or person authorized to act on the patient's behalf and place the report in the patient's medical record, with the report then being deemed a medical record subject to the same disclosure terms and conditions as an ordinary medical record. ([place]202)      Patient is agreeable to call the office with any questions, concerns, or worsening of symptoms.  Patient is aware to call 911 or go to the nearest ER should begin having any thoughts to harm self or others.          Follow up in two months          Errors in dictation may reflect use of voice recognition software and not all errors in transcription may have been detected prior to signing.              This document has been electronically signed by NIDA Vasquez   April 18, 2024 09:49 EDT

## 2024-04-19 DIAGNOSIS — E11.42 DM TYPE 2 WITH DIABETIC PERIPHERAL NEUROPATHY: Chronic | ICD-10-CM

## 2024-04-19 RX ORDER — INSULIN LISPRO 100 [IU]/ML
INJECTION, SOLUTION INTRAVENOUS; SUBCUTANEOUS
Qty: 40 ML | Refills: 3 | Status: SHIPPED | OUTPATIENT
Start: 2024-04-19

## 2024-04-22 DIAGNOSIS — J30.9 CHRONIC ALLERGIC RHINITIS: Chronic | ICD-10-CM

## 2024-04-22 DIAGNOSIS — K59.03 DRUG-INDUCED CONSTIPATION: ICD-10-CM

## 2024-04-22 DIAGNOSIS — E11.42 DM TYPE 2 WITH DIABETIC PERIPHERAL NEUROPATHY: Chronic | ICD-10-CM

## 2024-04-22 RX ORDER — LINACLOTIDE 145 UG/1
CAPSULE, GELATIN COATED ORAL
Qty: 30 CAPSULE | Refills: 0 | Status: SHIPPED | OUTPATIENT
Start: 2024-04-22

## 2024-04-22 RX ORDER — EMPAGLIFLOZIN AND METFORMIN HYDROCHLORIDE 12.5; 1 MG/1; MG/1
1 TABLET ORAL EVERY MORNING
Qty: 30 TABLET | Refills: 0 | Status: SHIPPED | OUTPATIENT
Start: 2024-04-22

## 2024-04-22 RX ORDER — LEVOCETIRIZINE DIHYDROCHLORIDE 5 MG/1
5 TABLET, FILM COATED ORAL EVERY EVENING
Qty: 30 TABLET | Refills: 0 | Status: SHIPPED | OUTPATIENT
Start: 2024-04-22

## 2024-04-24 ENCOUNTER — INFUSION (OUTPATIENT)
Dept: ONCOLOGY | Facility: HOSPITAL | Age: 49
End: 2024-04-24
Payer: MEDICARE

## 2024-04-24 ENCOUNTER — APPOINTMENT (OUTPATIENT)
Dept: ONCOLOGY | Facility: HOSPITAL | Age: 49
End: 2024-04-24
Payer: MEDICARE

## 2024-04-24 ENCOUNTER — OFFICE VISIT (OUTPATIENT)
Dept: ONCOLOGY | Facility: CLINIC | Age: 49
End: 2024-04-24
Payer: MEDICARE

## 2024-04-24 VITALS
BODY MASS INDEX: 27.6 KG/M2 | RESPIRATION RATE: 18 BRPM | OXYGEN SATURATION: 97 % | DIASTOLIC BLOOD PRESSURE: 61 MMHG | HEART RATE: 87 BPM | WEIGHT: 160.9 LBS | SYSTOLIC BLOOD PRESSURE: 98 MMHG | TEMPERATURE: 98.2 F

## 2024-04-24 DIAGNOSIS — C50.411 MALIGNANT NEOPLASM OF UPPER-OUTER QUADRANT OF RIGHT BREAST IN FEMALE, ESTROGEN RECEPTOR NEGATIVE: Primary | ICD-10-CM

## 2024-04-24 DIAGNOSIS — C50.411 MALIGNANT NEOPLASM OF UPPER-OUTER QUADRANT OF RIGHT BREAST IN FEMALE, ESTROGEN RECEPTOR NEGATIVE: ICD-10-CM

## 2024-04-24 DIAGNOSIS — E53.8 VITAMIN B 12 DEFICIENCY: ICD-10-CM

## 2024-04-24 DIAGNOSIS — E55.9 VITAMIN D DEFICIENCY: ICD-10-CM

## 2024-04-24 DIAGNOSIS — I95.9 HYPOTENSION, UNSPECIFIED HYPOTENSION TYPE: ICD-10-CM

## 2024-04-24 DIAGNOSIS — Z95.828 PORT-A-CATH IN PLACE: Primary | ICD-10-CM

## 2024-04-24 DIAGNOSIS — Z17.1 MALIGNANT NEOPLASM OF UPPER-OUTER QUADRANT OF RIGHT BREAST IN FEMALE, ESTROGEN RECEPTOR NEGATIVE: ICD-10-CM

## 2024-04-24 DIAGNOSIS — Z17.1 MALIGNANT NEOPLASM OF UPPER-OUTER QUADRANT OF RIGHT BREAST IN FEMALE, ESTROGEN RECEPTOR NEGATIVE: Primary | ICD-10-CM

## 2024-04-24 DIAGNOSIS — R53.83 FATIGUE, UNSPECIFIED TYPE: ICD-10-CM

## 2024-04-24 DIAGNOSIS — D64.9 ANEMIA, UNSPECIFIED TYPE: ICD-10-CM

## 2024-04-24 LAB
ALBUMIN SERPL-MCNC: 4.2 G/DL (ref 3.5–5.2)
ALBUMIN/GLOB SERPL: 1.4 G/DL
ALP SERPL-CCNC: 79 U/L (ref 39–117)
ALT SERPL W P-5'-P-CCNC: 10 U/L (ref 1–33)
ANION GAP SERPL CALCULATED.3IONS-SCNC: 11.6 MMOL/L (ref 5–15)
AST SERPL-CCNC: 12 U/L (ref 1–32)
BASOPHILS # BLD AUTO: 0.06 10*3/MM3 (ref 0–0.2)
BASOPHILS NFR BLD AUTO: 0.6 % (ref 0–1.5)
BILIRUB SERPL-MCNC: 0.2 MG/DL (ref 0–1.2)
BUN SERPL-MCNC: 11 MG/DL (ref 6–20)
BUN/CREAT SERPL: 15.1 (ref 7–25)
CALCIUM SPEC-SCNC: 8.9 MG/DL (ref 8.6–10.5)
CHLORIDE SERPL-SCNC: 106 MMOL/L (ref 98–107)
CO2 SERPL-SCNC: 22.4 MMOL/L (ref 22–29)
CREAT SERPL-MCNC: 0.73 MG/DL (ref 0.57–1)
DEPRECATED RDW RBC AUTO: 43.4 FL (ref 37–54)
EGFRCR SERPLBLD CKD-EPI 2021: 99.7 ML/MIN/1.73
EOSINOPHIL # BLD AUTO: 0.14 10*3/MM3 (ref 0–0.4)
EOSINOPHIL NFR BLD AUTO: 1.4 % (ref 0.3–6.2)
ERYTHROCYTE [DISTWIDTH] IN BLOOD BY AUTOMATED COUNT: 13.3 % (ref 12.3–15.4)
FERRITIN SERPL-MCNC: 136.9 NG/ML (ref 13–150)
GLOBULIN UR ELPH-MCNC: 3 GM/DL
GLUCOSE SERPL-MCNC: 124 MG/DL (ref 65–99)
HCT VFR BLD AUTO: 41.9 % (ref 34–46.6)
HGB BLD-MCNC: 13.4 G/DL (ref 12–15.9)
IMM GRANULOCYTES # BLD AUTO: 0.02 10*3/MM3 (ref 0–0.05)
IMM GRANULOCYTES NFR BLD AUTO: 0.2 % (ref 0–0.5)
IRON 24H UR-MRATE: 75 MCG/DL (ref 37–145)
IRON SATN MFR SERPL: 23 % (ref 20–50)
LYMPHOCYTES # BLD AUTO: 4.68 10*3/MM3 (ref 0.7–3.1)
LYMPHOCYTES NFR BLD AUTO: 45.7 % (ref 19.6–45.3)
MCH RBC QN AUTO: 28.4 PG (ref 26.6–33)
MCHC RBC AUTO-ENTMCNC: 32 G/DL (ref 31.5–35.7)
MCV RBC AUTO: 88.8 FL (ref 79–97)
MONOCYTES # BLD AUTO: 0.51 10*3/MM3 (ref 0.1–0.9)
MONOCYTES NFR BLD AUTO: 5 % (ref 5–12)
NEUTROPHILS NFR BLD AUTO: 4.82 10*3/MM3 (ref 1.7–7)
NEUTROPHILS NFR BLD AUTO: 47.1 % (ref 42.7–76)
NRBC BLD AUTO-RTO: 0 /100 WBC (ref 0–0.2)
PLATELET # BLD AUTO: 284 10*3/MM3 (ref 140–450)
PMV BLD AUTO: 10 FL (ref 6–12)
POTASSIUM SERPL-SCNC: 3.5 MMOL/L (ref 3.5–5.2)
PROT SERPL-MCNC: 7.2 G/DL (ref 6–8.5)
RBC # BLD AUTO: 4.72 10*6/MM3 (ref 3.77–5.28)
RETICS # AUTO: 0.06 10*6/MM3 (ref 0.02–0.13)
RETICS/RBC NFR AUTO: 1.25 % (ref 0.7–1.9)
SODIUM SERPL-SCNC: 140 MMOL/L (ref 136–145)
TIBC SERPL-MCNC: 332 MCG/DL (ref 298–536)
TRANSFERRIN SERPL-MCNC: 223 MG/DL (ref 200–360)
VIT B12 BLD-MCNC: 594 PG/ML (ref 211–946)
WBC NRBC COR # BLD AUTO: 10.23 10*3/MM3 (ref 3.4–10.8)

## 2024-04-24 PROCEDURE — 80053 COMPREHEN METABOLIC PANEL: CPT

## 2024-04-24 PROCEDURE — 85025 COMPLETE CBC W/AUTO DIFF WBC: CPT

## 2024-04-24 PROCEDURE — 84466 ASSAY OF TRANSFERRIN: CPT

## 2024-04-24 PROCEDURE — 96360 HYDRATION IV INFUSION INIT: CPT

## 2024-04-24 PROCEDURE — 25010000002 HEPARIN LOCK FLUSH PER 10 UNITS: Performed by: INTERNAL MEDICINE

## 2024-04-24 PROCEDURE — 25810000003 SODIUM CHLORIDE 0.9 % SOLUTION

## 2024-04-24 PROCEDURE — 82728 ASSAY OF FERRITIN: CPT

## 2024-04-24 PROCEDURE — 82607 VITAMIN B-12: CPT

## 2024-04-24 PROCEDURE — 85045 AUTOMATED RETICULOCYTE COUNT: CPT

## 2024-04-24 PROCEDURE — 83540 ASSAY OF IRON: CPT

## 2024-04-24 RX ORDER — SODIUM CHLORIDE 0.9 % (FLUSH) 0.9 %
10 SYRINGE (ML) INJECTION AS NEEDED
OUTPATIENT
Start: 2024-04-24

## 2024-04-24 RX ORDER — SODIUM CHLORIDE 0.9 % (FLUSH) 0.9 %
10 SYRINGE (ML) INJECTION AS NEEDED
Status: DISCONTINUED | OUTPATIENT
Start: 2024-04-24 | End: 2024-04-24 | Stop reason: HOSPADM

## 2024-04-24 RX ORDER — HEPARIN SODIUM (PORCINE) LOCK FLUSH IV SOLN 100 UNIT/ML 100 UNIT/ML
500 SOLUTION INTRAVENOUS AS NEEDED
Status: DISCONTINUED | OUTPATIENT
Start: 2024-04-24 | End: 2024-04-24 | Stop reason: HOSPADM

## 2024-04-24 RX ORDER — HEPARIN SODIUM (PORCINE) LOCK FLUSH IV SOLN 100 UNIT/ML 100 UNIT/ML
500 SOLUTION INTRAVENOUS AS NEEDED
OUTPATIENT
Start: 2024-04-24

## 2024-04-24 RX ADMIN — SODIUM CHLORIDE 1000 ML: 9 INJECTION, SOLUTION INTRAVENOUS at 10:47

## 2024-04-24 RX ADMIN — Medication 500 UNITS: at 11:50

## 2024-04-24 RX ADMIN — Medication 10 ML: at 11:50

## 2024-04-24 NOTE — PROGRESS NOTES
NAME: Nivia Bernstein    : 1975    DATE:  2024    DIAGNOSIS:   Stage IA ([id number]) moderately differentiated invasive ductal carcinoma of the R breast with associated DCIS.  Tumor was 10 mm in maximal dimension.  0/10 SLN involved. ER negative, RI 15% + (1+), HER-2/cat negative. Mammaprint high risk.    CHIEF COMPLAINT:  Follow up Breast cancer    TREATMENT HISTORY:  1. Initially planned to give 4 cycles to Taxotere/Cytoxan, during 1st infusion of Taxotere on 2020 patient developed allergic reaction. Therefore, Taxotere was discontinued and regimen changed to DD AC.    2.      3.  Started Tamoxifen 12-    HISTORY OF PRESENT ILLNESS:   Nivia Bernstein is a very pleasant 49 y.o. female who who is being seen today at the request of Sheila Garza MD for evaluation and treatment of breast cancer. She did not notice a palpable lump, but was recommended to have a screening mammogram by her PCP. A nodule in the right upper quadrant of the R breast was noted. The mass measured 0.8 cm on ultrasound. Biopsy was consistent with a moderately differentiated invasive ductal carcinoma, ER negative, RI weakly (15%) positive, and HER-2/cat negative. She underwent bilateral mastectomy with sentinel lymph node biopsy with Dr. Garza on 20. Pathology from this showed a moderately differentiated invasive ductal carcinoma with associated intermediate grade DCIS, negative margins.  0/10 /SLN involved.  Mammaprint was high risk. She was referred to our clinic and is here today with her  for discussion of further treatment options.      Ms. Bernstein is healing well from the surgery.  She did develop a seroma, which was drained by Dr. Garza yesterday. She reports muscular chest discomfort r/t the procedure, but otherwise denies any other symptoms including fevers, chills, significant weight changes, shortness of breath, abdominal pain, n/v/d/c, bony pain or headaches.    Interval History:  Ms. Bernstein  presents today for follow up of breast cancer.  Since seeing her last she has been doing overall well.  She has been having continued fatigue.  Dr. Loco started her on B12 injections and folic acid at her last appointment she has been taking these, but states that her fatigue has been persistent.  She continues to take tamoxifen and is tolerating well.  She continues to see Dr. Sheehan for bilateral frozen shoulder.  Other than this her blood pressure is low today at 84/59.  Otherwise she is without specific complaints today.      PAST MEDICAL HISTORY:  Past Medical History:   Diagnosis Date    Altered mental status 10/16/2017    Anxiety and depression 3/31/2017    Arthritis     Asthma     Elevated alkaline phosphatase level 10/16/2017    Enlarged liver     GERD (gastroesophageal reflux disease)     Heart murmur     Hypokalemia 10/16/2017    Mitral valve prolapse     Neuropathy     related to diabetes    Obesity 3/31/2017    OCD (obsessive compulsive disorder) 10/16/2017    Osteoporosis     PONV (postoperative nausea and vomiting)     Renal insufficiency 10/16/2017    Right breast cancer with malignant cells in regional lymph nodes no greater than 0.2 mm and no more than 200 cells 8/13/2020    TIA (transient ischemic attack)     4 TIMES       PAST SURGICAL HISTORY:  Past Surgical History:   Procedure Laterality Date    APPENDECTOMY      CARPAL TUNNEL RELEASE      CHOLECYSTECTOMY      COLONOSCOPY N/A 5/5/2021    Procedure: COLONOSCOPY WITH BIOPSY;  Surgeon: Vickie Herrera MD;  Location: [facility];  Service: Gastroenterology;  Laterality: N/A;    FINGER FUSION Left     3rd    HYSTERECTOMY  2011    removed due to an ovarian cyst and dysmenorrhia. No cancer noted. Complete    KNEE ARTHROSCOPY Right     MASTECTOMY Left 8/18/2020    Procedure: Left mastectomy;  Surgeon: Sheila Garza MD;  Location: [facility];  Service: General;  Laterality: Left;    MASTECTOMY WITH SENTINEL NODE BIOPSY AND AXILLARY NODE  DISSECTION Right 8/18/2020    Procedure: Right BREAST MASTECTOMY WITH SENTINEL NODE BIOPSY;  Surgeon: Sheila Garza MD;  Location: [facility];  Service: General;  Laterality: Right;    PORTACATH PLACEMENT         SOCIAL HISTORY:  Social History     Socioeconomic History    Marital status:    Tobacco Use    Smoking status: Never     Passive exposure: Never    Smokeless tobacco: Never   Vaping Use    Vaping status: Never Used   Substance and Sexual Activity    Alcohol use: No    Drug use: No    Sexual activity: Yes     Partners: Male         FAMILY HISTORY:  Family History   Problem Relation Age of Onset    Diabetes Mother     Hypertension Mother     Diabetes Father     Heart disease Father     Alcohol abuse Father     Seizures Father     Hypertension Father     No Known Problems Brother     No Known Problems Daughter     Bone cancer Son     Suicide Attempts Cousin     Stomach cancer Cousin         maternal first cousin    Breast cancer Paternal Aunt 52    Diabetes Maternal Grandmother     Diabetes Maternal Grandfather     Lung cancer Maternal Grandfather     Heart disease Paternal Grandmother     Diabetes Paternal Grandmother     Heart disease Paternal Grandfather     Diabetes Paternal Grandfather     Ovarian cancer Maternal Aunt     Lung cancer Maternal Uncle     Colon cancer Maternal Uncle     Cancer Maternal Uncle         unknown type     MEDICATIONS:  The current medication list was reviewed in the EMR    Current Outpatient Medications:     Accu-Chek FastClix Lancets misc, Use 1 Device 3 (Three) Times a Day., Disp: 100 each, Rfl: 5    albuterol sulfate  (90 Base) MCG/ACT inhaler, INHALE TWO PUFFS BY MOUTH EVERY FOUR HOURS HOURS AS NEEDED FOR SHORTNESS OF BREATH, Disp: 18 g, Rfl: 5    alendronate (FOSAMAX) 35 MG tablet, TAKE ONE TABLET BY MOUTH EVERY 7 DAYS. TAKE WITH WATER 30 MINUTES BEFORE THE FIRST FOOD, DRINK OR MEDICATION AND AVOID LYING DOWN FOR 30 MINUTES AFTER TAKING, Disp: 4 tablet, Rfl:  3    ALPRAZolam (XANAX) 1 MG tablet, Take 1 tablet by mouth 2 (Two) Times a Day As Needed for Anxiety. for anxiety, Disp: 60 tablet, Rfl: 0    Aspirin Low Dose 81 MG EC tablet, TAKE TWO TABLETS BY MOUTH EVERY DAY, Disp: 60 tablet, Rfl: 4    atorvastatin (LIPITOR) 40 MG tablet, TAKE ONE TABLET BY MOUTH EVERY night, Disp: 90 tablet, Rfl: 1    azelastine (ASTELIN) 0.1 % nasal spray, Use in each nostril as directed, Disp: 30 mL, Rfl: 3    Calcium Carb-Cholecalciferol (Calcium+D3) 500-15 MG-MCG tablet, Take 1 tablet by mouth 2 (Two) Times a Day With Meals., Disp: 60 tablet, Rfl: 3    cloNIDine (CATAPRES) 0.1 MG tablet, TAKE ONE TABLET BY MOUTH TWICE DAILY, Disp: 60 tablet, Rfl: 5    cyanocobalamin 1000 MCG/ML injection, Inject 1 mL under the skin into the appropriate area as directed Every 30 (Thirty) Days., Disp: 1 mL, Rfl: 5    Emgality 120 MG/ML auto-injector pen, INJECT 120mg ONCE MONTHLY, Disp: , Rfl:     Empagliflozin-metFORMIN HCl (Synjardy) 12.5-1000 MG tablet, TAKE ONE TABLET BY MOUTH EVERY MORNING, Disp: 30 tablet, Rfl: 0    fluconazole (Diflucan) 150 MG tablet, Take 1 tablet by mouth Daily., Disp: 2 tablet, Rfl: 0    Fluticasone Furoate-Vilanterol (Breo Ellipta) 200-25 MCG/ACT inhaler, Inhale 1 puff Daily., Disp: 1 each, Rfl: 3    folic acid (FOLVITE) 1 MG tablet, Take 1 tablet by mouth Daily., Disp: 90 tablet, Rfl: 3    furosemide (LASIX) 20 MG tablet, Take 0.5 tablets by mouth Daily., Disp: 30 tablet, Rfl: 0    gabapentin (NEURONTIN) 100 MG capsule, TAKE ONE CAPSULE BY MOUTH TWICE DAILY, Disp: 60 capsule, Rfl: 0    glucagon (GLUCAGEN) 1 MG injection, Inject 1 mg under the skin into the appropriate area as directed 1 (One) Time As Needed for Low Blood Sugar. Please disregard the previous prescription, Disp: 1 kit, Rfl: 3    glucose blood (Accu-Chek Guide) test strip, USE TO TEST BLOOD GLUCOSE THREE TIMES DAILY, Disp: 100 each, Rfl: 3    Insulin Lispro (humaLOG) 100 UNIT/ML injection, INJECT AS DIRECTED. MAX  DAILY DOSE  UNITS DAILY, Disp: 40 mL, Rfl: 3    ipratropium-albuterol (DUO-NEB) 0.5-2.5 mg/3 ml nebulizer, Take 3 mL by nebulization Every 4 (Four) Hours As Needed for Shortness of Air., Disp: 150 mL, Rfl: 1    lansoprazole (PREVACID) 30 MG capsule, TAKE ONE CAPSULE BY MOUTH EVERY DAY, Disp: 90 capsule, Rfl: 0    levocetirizine (XYZAL) 5 MG tablet, TAKE ONE TABLET BY MOUTH EVERY EVENING, Disp: 30 tablet, Rfl: 0    linaclotide (Linzess) 145 MCG capsule capsule, TAKE ONE CAPSULE BY MOUTH ONCE daily 30 minutes BEFORE meal ON an EMPTY stomach, Disp: 30 capsule, Rfl: 0    nitrofurantoin, macrocrystal-monohydrate, (Macrobid) 100 MG capsule, Take 1 capsule by mouth 2 (Two) Times a Day., Disp: 20 capsule, Rfl: 0    ondansetron (ZOFRAN) 8 MG tablet, TAKE 1 TABLET BY MOUTH EVERY 8 HOURS AS NEEDED FOR NAUSEA OR VOMITING, Disp: 30 tablet, Rfl: 3    Ozempic, 2 MG/DOSE, 8 MG/3ML solution pen-injector, INJECT 2MG SUBCUTANEOUSLY ONCE WEEKLY, Disp: 3 mL, Rfl: 0    Respiratory Therapy Supplies (Nebulizer/Tubing/Mouthpiece) kit, 1 each Take As Directed., Disp: 1 each, Rfl: 1    tamoxifen (NOLVADEX) 20 MG chemo tablet, Take 1 tablet by mouth Daily., Disp: 30 tablet, Rfl: 11    topiramate (TOPAMAX) 50 MG tablet, TAKE ONE TABLET BY MOUTH TWICE DAILY, Disp: 60 tablet, Rfl: 3    ubrogepant 100 MG tablet, , Disp: , Rfl:     venlafaxine XR (EFFEXOR-XR) 75 MG 24 hr capsule, Take 3 capsules by mouth Daily., Disp: 90 capsule, Rfl: 1    vitamin D (ERGOCALCIFEROL) 1.25 MG (95751 UT) capsule capsule, Take 1 capsule by mouth 1 (One) Time Per Week., Disp: 4 capsule, Rfl: 3  No current facility-administered medications for this visit.    Facility-Administered Medications Ordered in Other Visits:     heparin injection 500 Units, 500 Units, Intravenous, PRN, Dejah Loco MD    sodium chloride 0.9 % bolus 1,000 mL, 1,000 mL, Intravenous, Once, Hanny Brennan, NIDA, Last Rate: 1,000 mL/hr at 04/24/24 1047, 1,000 mL at 04/24/24 1047     sodium chloride 0.9 % flush 10 mL, 10 mL, Intravenous, PRN, Dejah Loco MD    ALLERGIES:    Allergies   Allergen Reactions    Imipramine Other (See Comments)     Chest pain    Mobic [Meloxicam] Rash    Penicillins Angioedema    Taxotere [Docetaxel] Anaphylaxis     9 minutes into 1st infusion    Novolog [Insulin Aspart] Hives    Rosuvastatin Calcium GI Intolerance     REVIEW OF SYSTEMS:    A comprehensive 14 point review of systems was performed.  Significant findings as mentioned above.  All other systems reviewed and are negative.      Physical Exam  Vital Signs:    04/24/24   Temp 98.2 °F (36.8 °C)   Heart Rate 87   Resp 18   BP 84/59 (L)   SpO2 97 %   Weight 73 kg (160 lb 14.4 oz)   Pain Score 8   Note: Showing the most recent values for these dates. There are additional values that can be seen in Synopsis.  (L): Data is abnormally low      General: Awake, alert and oriented, in no acute distress.   HEENT:  PERRLA, EOM full, OP clear, mucous membranes moist  Neck: No thyromegaly. No JVD.   Lungs: Lungs are clear to auscultation bilaterally, no wheezing.  Heart:  Regular rate and rhythm. No murmurs, rubs, or gallops.   Breast: Deferred today, previously: S/p bilateral mastectomy and R ALNBx.  Surgical scars smooth without nodularity..  She has no axillary lymphadenopathy on either side.  Abdomen: Soft, Non tender, non-distended, bowel sounds present.  No palpable organomegaly or masses..  Diabetic monitoring device in place.  Extremities: No clubbing, cyanosis, no lower extremity edema.  S  Neurologic: MS as above. Grossly non focal exam    PATHOLOGY:              IMAGING:  Bilateral screening mammogram 06-19-20  FINDINGS:    The breast tissue is heterogeneously dense.  New focal nodularity right upper outer breast that is about 14 cm from  the nipple.  Otherwise, no suspicious findings.     IMPRESSION:  New focal nodularity right upper outer breast that is about  14 cm from the nipple.     RECOMMENDATION:   Spot compression imaging.     BI-RADS CAT 0, INCOMPLETE.  The patient needs additional imaging.        Diagnostic R mammogram with R breast US 07-14-20  FINDINGS:  Additional mammographic imaging images of persistent  partially obscured oval mass in the posterior right upper outer  quadrant.     Right breast ultrasound: Focused sonographic evaluation of the right  breast demonstrates a 0.8 cm irregular hypoechoic mass with ill-defined  borders located at 10:00, 13 cm from the nipple. An additional area was  initially noted by the technologist in the 9:00 region which represents  an isolated fat lobule.     IMPRESSION:  0.8 cm irregular mass in the right 10:00 region. Recommend  ultrasound-guided core biopsy.     BI-RADS CATEGORY:  4, SUSPICIOUS     RECOMMENDATION:  Recommend ultrasound guided core biopsy of the right  breast.        Bilateral breast MRI w/wo contrast 08-11-20  FINDINGS: There is minimal bilateral background contrast enhancement and  normal vascular enhancement.     There are no worrisome areas of contrast enhancement in the left breast.     In the right breast correlating to the biopsy proven malignancy is an  approximately 0.7 cm irregular rapidly enhancing mass in the posterior  right 10:00 region. Artifact from a postbiopsy marking clip is located  adjacent to this mass. A small linear area of enhancement extends  posterior to this mass approximately 1 cm. There are no additional  worrisome areas of contrast enhancement in the right breast.     There is no axillary adenopathy by MRI criteria and no chest wall  abnormality is seen.     IMPRESSION:  1. Negative left breast MRI.     2. Biopsy-proven malignancy in the right 10:00 region with a small  linear area of enhancement extending posterior to the 0.7 cm mass.     RECOMMENDATIONS: Recommend surgical follow-up.     BI-RADS CATEGORY: 6, KNOWN BIOPSY PROVEN MALIGNANCY    Echo 10-16-17:  A two-dimensional transthoracic echocardiogram with color  flow and Doppler was performed.   The study is technically good for diagnosis.Verbal consent was obtained from the patient to use saline contrast in order to optimize the study.  A total of 20 mL of agitated saline was administered to assess for cardiac shunting.   No adverse reaction to contrast was noted.   Echocardiogram Findings    Left Ventricle Left ventricular systolic function is normal. Estimated EF appears to be in the range of 56 - 60%. Normal left ventricular cavity size and wall thickness noted. All left ventricular wall segments contract normally. Left ventricular diastolic function is normal.   Right Ventricle Normal right ventricular cavity size noted.   Left Atrium Normal left atrial size noted. Saline test results are negative.   Right Atrium Normal right atrial size noted.   Aortic Valve The aortic valve is structurally normal. Trace aortic valve regurgitation is present.   Mitral Valve The mitral valve is normal in structure. Trace mitral valve regurgitation is present.   Tricuspid Valve The tricuspid valve is normal.  Trace tricuspid valve regurgitation is present.   Pulmonic Valve The pulmonic valve is structurally normal. There is trace pulmonic valve regurgitation present.   Greater Vessels No dilation of the aortic root is present. No dilation of the sinuses of Valsalva is present.   Pericardium There is no evidence of pericardial effusion.       ECHO 09/29/2020  Poor quality echo and Doppler study  Normal left ventricular cavity size and wall thickness noted.  Left ventricular ejection fraction appears to be 61 - 65%. Left ventricular systolic function is normal.  Left ventricular diastolic function is consistent with (grade I) impaired relaxation.  Aortic and mitral valve are poorly visualized but appear to be normal,  Tricuspid and pulmonic valve echoes are not visualized.  There is no pericardial effusion noted.    Echocardiogram 12-08-02  Normal left ventricular cavity size and wall  thickness noted. All left ventricular wall segments contract normally.  Left ventricular ejection fraction appears to be 56 - 60%.  The pericardium is normal. There is no evidence of pericardial effusion. .       MRI Brain w/wo contrast 01-28-21  FINDINGS:    Brain:  Unremarkable.  No mass.  No hemorrhage.  No acute infarct.    Ventricles:  Unremarkable.  No ventriculomegaly.    Bones/joints:  Unremarkable.    Sinuses:  Unremarkable as visualized.  No acute sinusitis.    Mastoid air cells:  Unremarkable as visualized.  No mastoid effusion.    Orbits:  Unremarkable as visualized.     IMPRESSION:    No acute findings in the head/brain.    ENDOSCOPY:    COLONOSCOPY PROCEDURE NOTE   5/5/2021 Dr. Aguilera       Findings:  1.  Normal colonoscopy with small internal hemorrhoids.  2.  Normal terminal ileum     OPERATIVE PROCEDURE   After proper informed consent was obtained, the patient was taken the operating suite and placed in left lateral decubitus position.  An Olympus video colonoscope 180 series was inserted in the rectum and advanced to the terminal ileum under direct visualization.  Cecum and terminal ileum were identified by visualization of the appendiceal orifice and ileocecal valve.  The colonoscope was then slowly withdrawn from the cecum to the rectum and passed a second time from rectum to cecum.  The colonoscope was retroflexed in the cecum and rectum. Scope was then withdrawn. Patient tolerated the procedure well. There were no immediate complications. Cecal withdrawal time was 9 minutes.     RECOMMENDATIONS:  1.  Repeat colonoscopy in 10 years for screening purposes.  2.  Continue Linzess.  3.  Anusol HC suppositories as needed for rectal bleeding.    RECENT LABS:  Lab Results   Component Value Date    WBC 10.23 04/24/2024    HGB 13.4 04/24/2024    HCT 41.9 04/24/2024    MCV 88.8 04/24/2024    RDW 13.3 04/24/2024     04/24/2024    NEUTRORELPCT 47.1 04/24/2024    LYMPHORELPCT 45.7 (H) 04/24/2024     MONORELPCT 5.0 04/24/2024    EOSRELPCT 1.4 04/24/2024    BASORELPCT 0.6 04/24/2024    NEUTROABS 4.82 04/24/2024    LYMPHSABS 4.68 (H) 04/24/2024       Lab Results   Component Value Date     04/24/2024    K 3.5 04/24/2024    CO2 22.4 04/24/2024     04/24/2024    BUN 11 04/24/2024    CREATININE 0.73 04/24/2024    EGFRIFNONA 89 01/11/2022    EGFRIFAFRI 108 01/11/2022    GLUCOSE 124 (H) 04/24/2024    CALCIUM 8.9 04/24/2024    ALKPHOS 79 04/24/2024    AST 12 04/24/2024    ALT 10 04/24/2024    BILITOT 0.2 04/24/2024    ALBUMIN 4.2 04/24/2024    PROTEINTOT 7.2 04/24/2024    MG 2.4 10/06/2022     Lab Results   Component Value Date    FERRITIN 136.90 04/24/2024    IRON 75 04/24/2024    TIBC 332 04/24/2024    LABIRON 23 04/24/2024    [id number] 432 04/11/2024    FOLATE 4.73 (L) 04/11/2024        Lab Results   Component Value Date    CAION 1.28 10/16/2017    TSH 2.270 04/11/2024    PVGH72YU 51.9 04/11/2024         ASSESSMENT & PLAN:  Nivia Bernstein is a very pleasant 49 y.o. female with Stage IA ([id number]) moderately differentiated invasive ductal carcinoma of the R breast with associated DCIS.  Tumor was 10 mm in maximal dimension.  0/10 SLN involved. ER negative, NE 15% + (1+), HER-2/cat negative. Mammaprint high risk.     1. R breast cancer:   -  S/p R mastectomy and SLNBx as well as prophylactic contralateral mastectomy performed by Dr. Garza 8-18-20.  - She healed well from bilateral mastectomy. This was complicated by left seroma, drained by Dr. Garza. Incisions are healed well.   - Given high risk Mammaprint, Dr. Loco recommended adjuvant chemotherapy. Discussed different possibilities for adjuvant therapy. Given high risk Mammaprint but early stage, node negative disease as well as comorbidities, recommended Taxotere/Cytoxan Q21d x 4 cycles with growth factor support. Discussed potential risks, benefits, and side effects and she would like to proceed.   - She had port placed several years ago due to poor  peripheral access. This has been well cared for and maintained.   - C1 Taxotere/Cytoxan was planned for 09/24/2020. Unfortunately, this had to be discontinued due to allergic reaction to Taxotere. Recommended change of treatment to DD AC.   - She tolerated treatment well and aside from fatigue and recovered well. ECHO was essentially unchanged.   -  Her tumor was ER neg but did have 15% 1+ positivity for progesterone receptor.  She is s/p complete hysterectomy and had evidence of osteopenia with T score -1.9 in June 2020.  Given all of this, recommended treatment with Tamoxifen over aromatase inhibitor.   - Exam and labs from today without cause for concern. Continue Tamoxifen daily, which she is tolerating well.  She continues to have fatigue.  - Patient to return on 5/10/2024 with MD for exam with CBC, CMP, TSH and vitamin D prior.    2. Extensive family history of malignancy:   - Genetic testing was ordered by Dr. Garza and performed by [facility] with no mutations, specifically including BRCA 1/2, CHKE2, CDH1, PALB2 and others.    3. Anxiety/Depression:  -  She continues to f/u with Psychiatry. She continues to do well in this regard.     4. Frozen shoulders Bilaterally:  - She had been seeing Dr. Sheehan of orthopedics.  She received injection therapy as well as physical therapy.  She had significant improvement in the ROM in her nicole shoulders.  Unfortunately, she has had temporary worsening of her symptoms.  She has not seen Dr. Sheehan in approximately 2 months.  Recommended follow-up and evaluation.    5. Skin lesion:  -0.6 cm pale red lesion on left upper extremity with pruritus. Denies any changes.  Suspicious for dermatofibroma although could represent a squamous cell skin cancer.  Recommended referral to dermatology.  She was seen and had this removed.  She says it was benign.    6.  Alopecia:  - Etiology uncertain.  Perhaps this was related to B12 deficiency.  B12 level was checked by her PCP in June and  was borderline, and she was started on B12 injections.  - Alopecia now improved.  We will check vitamin B12 level when she returns.    7.  Prophylaxis:   -  She received Prevnar 13 vaccine.  -  She had 2023 flu vaccine.  -  M. Uncle had colon cancer at age 50.  Referred to Dr. Aguilera for screening colonoscopy which was unremarkable and repeat exam recommended after 10-year interval..   - She has had COVID vaccine x2.  Recommended fall COVID booster to her.    8.  Hypotension  9.  Fatigue  -Patient had been complaining of fatigue at her last visit with Dr. Loco on 4/11/2024.  On that day she was found to be anemic with hemoglobin of 7.8.  She was started on B12 injections and folic acid with return to clinic in 2 weeks, which is today's appointment.  -Patient's blood pressure today is 84/59.  According to past blood pressures in our office she runs in the low/normal range.  She did take clonidine this morning as prescribed.  -1 L of IV fluids given today in infusion center.  -Patient continues to have fatigue which was initially thought to be due to her anemia.  However, anemia has resolved today with hemoglobin of 13.4/hematocrit of 41.9.  Iron profile normal.  Low BP may be the cause of her continued fatigue.  Instructed patient to obtain a blood pressure cuff so that she can log blood pressures at home.  She needs to follow-up with her PCP for further management of her blood pressure medication.    10.  Follow up:  - Continue Tamoxifen.  - Return to see Dr. Loco as scheduled on 5/10/2024 with CBC, CMP, TSH, vitamin D and vitamin B12 level.      11.  ACO / NII/Other  Quality measures  -  Nivia Bernstein received 2023 flu vaccine.  -  Nivia Bernstein reports a pain score of 8.  Given her pain assessment as noted, treatment options were discussed and the following options were decided upon as a follow-up plan to address the patient's pain: continuation of current treatment plan for pain.  -  Current outpatient and  discharge medications have been reconciled for the patient.  Reviewed by: NIDA Schmid        I spent 30 minutes with Nivia Bernstein today.  In the office today, more than 50% of this time was spent face-to-face with her  in counseling / coordination of care, reviewing her interim medical history and counseling on the current treatment plan.  All questions were answered to her satisfaction.           Electronically Signed by: NIDA Schmid  CC:   MD Truong Hager, NIDA Umaña

## 2024-05-07 DIAGNOSIS — R23.2 HOT FLASHES DUE TO TAMOXIFEN: ICD-10-CM

## 2024-05-07 DIAGNOSIS — T45.1X5A HOT FLASHES DUE TO TAMOXIFEN: ICD-10-CM

## 2024-05-09 DIAGNOSIS — E55.9 VITAMIN D DEFICIENCY: Primary | ICD-10-CM

## 2024-05-09 DIAGNOSIS — R53.83 FATIGUE, UNSPECIFIED TYPE: ICD-10-CM

## 2024-05-09 RX ORDER — GABAPENTIN 100 MG/1
100 CAPSULE ORAL 2 TIMES DAILY
Qty: 60 CAPSULE | Refills: 0 | Status: SHIPPED | OUTPATIENT
Start: 2024-05-09

## 2024-05-10 ENCOUNTER — LAB (OUTPATIENT)
Dept: ONCOLOGY | Facility: HOSPITAL | Age: 49
End: 2024-05-10
Payer: MEDICARE

## 2024-05-10 ENCOUNTER — INFUSION (OUTPATIENT)
Dept: ONCOLOGY | Facility: HOSPITAL | Age: 49
End: 2024-05-10
Payer: MEDICARE

## 2024-05-10 ENCOUNTER — OFFICE VISIT (OUTPATIENT)
Dept: ONCOLOGY | Facility: CLINIC | Age: 49
End: 2024-05-10
Payer: MEDICARE

## 2024-05-10 VITALS
WEIGHT: 158.6 LBS | SYSTOLIC BLOOD PRESSURE: 93 MMHG | HEART RATE: 85 BPM | TEMPERATURE: 97.8 F | OXYGEN SATURATION: 98 % | RESPIRATION RATE: 17 BRPM | DIASTOLIC BLOOD PRESSURE: 68 MMHG | BODY MASS INDEX: 27.21 KG/M2

## 2024-05-10 VITALS
TEMPERATURE: 97.7 F | HEART RATE: 84 BPM | RESPIRATION RATE: 18 BRPM | WEIGHT: 159.6 LBS | OXYGEN SATURATION: 98 % | DIASTOLIC BLOOD PRESSURE: 68 MMHG | SYSTOLIC BLOOD PRESSURE: 99 MMHG | BODY MASS INDEX: 27.38 KG/M2

## 2024-05-10 DIAGNOSIS — E53.8 FOLATE DEFICIENCY: Primary | ICD-10-CM

## 2024-05-10 DIAGNOSIS — C50.411 MALIGNANT NEOPLASM OF UPPER-OUTER QUADRANT OF RIGHT BREAST IN FEMALE, ESTROGEN RECEPTOR NEGATIVE: Primary | ICD-10-CM

## 2024-05-10 DIAGNOSIS — Z17.1 MALIGNANT NEOPLASM OF UPPER-OUTER QUADRANT OF RIGHT BREAST IN FEMALE, ESTROGEN RECEPTOR NEGATIVE: Primary | ICD-10-CM

## 2024-05-10 DIAGNOSIS — E55.9 VITAMIN D DEFICIENCY: ICD-10-CM

## 2024-05-10 DIAGNOSIS — Z95.828 PORT-A-CATH IN PLACE: Primary | ICD-10-CM

## 2024-05-10 DIAGNOSIS — C50.411 MALIGNANT NEOPLASM OF UPPER-OUTER QUADRANT OF RIGHT BREAST IN FEMALE, ESTROGEN RECEPTOR NEGATIVE: ICD-10-CM

## 2024-05-10 DIAGNOSIS — D64.9 ANEMIA, UNSPECIFIED TYPE: ICD-10-CM

## 2024-05-10 DIAGNOSIS — E53.8 FOLATE DEFICIENCY: ICD-10-CM

## 2024-05-10 DIAGNOSIS — R53.83 FATIGUE, UNSPECIFIED TYPE: ICD-10-CM

## 2024-05-10 DIAGNOSIS — E53.8 VITAMIN B 12 DEFICIENCY: ICD-10-CM

## 2024-05-10 DIAGNOSIS — Z17.1 MALIGNANT NEOPLASM OF UPPER-OUTER QUADRANT OF RIGHT BREAST IN FEMALE, ESTROGEN RECEPTOR NEGATIVE: ICD-10-CM

## 2024-05-10 LAB
25(OH)D3 SERPL-MCNC: 52.9 NG/ML (ref 30–100)
ALBUMIN SERPL-MCNC: 4.1 G/DL (ref 3.5–5.2)
ALBUMIN/GLOB SERPL: 1.5 G/DL
ALP SERPL-CCNC: 73 U/L (ref 39–117)
ALT SERPL W P-5'-P-CCNC: <5 U/L (ref 1–33)
ANION GAP SERPL CALCULATED.3IONS-SCNC: 10.9 MMOL/L (ref 5–15)
AST SERPL-CCNC: 13 U/L (ref 1–32)
BASOPHILS # BLD AUTO: 0.09 10*3/MM3 (ref 0–0.2)
BASOPHILS NFR BLD AUTO: 0.8 % (ref 0–1.5)
BILIRUB SERPL-MCNC: 0.2 MG/DL (ref 0–1.2)
BUN SERPL-MCNC: 10 MG/DL (ref 6–20)
BUN/CREAT SERPL: 14.3 (ref 7–25)
CALCIUM SPEC-SCNC: 8.4 MG/DL (ref 8.6–10.5)
CHLORIDE SERPL-SCNC: 110 MMOL/L (ref 98–107)
CO2 SERPL-SCNC: 20.1 MMOL/L (ref 22–29)
CREAT SERPL-MCNC: 0.7 MG/DL (ref 0.57–1)
DEPRECATED RDW RBC AUTO: 44.8 FL (ref 37–54)
EGFRCR SERPLBLD CKD-EPI 2021: 104.9 ML/MIN/1.73
EOSINOPHIL # BLD AUTO: 0.13 10*3/MM3 (ref 0–0.4)
EOSINOPHIL NFR BLD AUTO: 1.2 % (ref 0.3–6.2)
ERYTHROCYTE [DISTWIDTH] IN BLOOD BY AUTOMATED COUNT: 13.8 % (ref 12.3–15.4)
FERRITIN SERPL-MCNC: 88.63 NG/ML (ref 13–150)
GLOBULIN UR ELPH-MCNC: 2.8 GM/DL
GLUCOSE SERPL-MCNC: 128 MG/DL (ref 65–99)
HCT VFR BLD AUTO: 41.4 % (ref 34–46.6)
HGB BLD-MCNC: 13.3 G/DL (ref 12–15.9)
IMM GRANULOCYTES # BLD AUTO: 0.03 10*3/MM3 (ref 0–0.05)
IMM GRANULOCYTES NFR BLD AUTO: 0.3 % (ref 0–0.5)
IRON 24H UR-MRATE: 43 MCG/DL (ref 37–145)
IRON SATN MFR SERPL: 14 % (ref 20–50)
LYMPHOCYTES # BLD AUTO: 4.96 10*3/MM3 (ref 0.7–3.1)
LYMPHOCYTES NFR BLD AUTO: 45.9 % (ref 19.6–45.3)
MCH RBC QN AUTO: 28.4 PG (ref 26.6–33)
MCHC RBC AUTO-ENTMCNC: 32.1 G/DL (ref 31.5–35.7)
MCV RBC AUTO: 88.3 FL (ref 79–97)
MONOCYTES # BLD AUTO: 0.5 10*3/MM3 (ref 0.1–0.9)
MONOCYTES NFR BLD AUTO: 4.6 % (ref 5–12)
NEUTROPHILS NFR BLD AUTO: 47.2 % (ref 42.7–76)
NEUTROPHILS NFR BLD AUTO: 5.09 10*3/MM3 (ref 1.7–7)
NRBC BLD AUTO-RTO: 0 /100 WBC (ref 0–0.2)
PLATELET # BLD AUTO: 297 10*3/MM3 (ref 140–450)
PMV BLD AUTO: 9.4 FL (ref 6–12)
POTASSIUM SERPL-SCNC: 3.6 MMOL/L (ref 3.5–5.2)
PROT SERPL-MCNC: 6.9 G/DL (ref 6–8.5)
RBC # BLD AUTO: 4.69 10*6/MM3 (ref 3.77–5.28)
RETICS # AUTO: 0.08 10*6/MM3 (ref 0.02–0.13)
RETICS/RBC NFR AUTO: 1.69 % (ref 0.7–1.9)
SODIUM SERPL-SCNC: 141 MMOL/L (ref 136–145)
TIBC SERPL-MCNC: 311 MCG/DL (ref 298–536)
TRANSFERRIN SERPL-MCNC: 209 MG/DL (ref 200–360)
TSH SERPL DL<=0.05 MIU/L-ACNC: 2.38 UIU/ML (ref 0.27–4.2)
VIT B12 BLD-MCNC: 367 PG/ML (ref 211–946)
WBC NRBC COR # BLD AUTO: 10.8 10*3/MM3 (ref 3.4–10.8)

## 2024-05-10 PROCEDURE — 99214 OFFICE O/P EST MOD 30 MIN: CPT | Performed by: INTERNAL MEDICINE

## 2024-05-10 PROCEDURE — 83540 ASSAY OF IRON: CPT

## 2024-05-10 PROCEDURE — 36591 DRAW BLOOD OFF VENOUS DEVICE: CPT

## 2024-05-10 PROCEDURE — 82607 VITAMIN B-12: CPT

## 2024-05-10 PROCEDURE — 85045 AUTOMATED RETICULOCYTE COUNT: CPT

## 2024-05-10 PROCEDURE — 80053 COMPREHEN METABOLIC PANEL: CPT

## 2024-05-10 PROCEDURE — 82746 ASSAY OF FOLIC ACID SERUM: CPT

## 2024-05-10 PROCEDURE — 84443 ASSAY THYROID STIM HORMONE: CPT

## 2024-05-10 PROCEDURE — 82306 VITAMIN D 25 HYDROXY: CPT

## 2024-05-10 PROCEDURE — 25010000002 HEPARIN LOCK FLUSH PER 10 UNITS: Performed by: INTERNAL MEDICINE

## 2024-05-10 PROCEDURE — 1125F AMNT PAIN NOTED PAIN PRSNT: CPT | Performed by: INTERNAL MEDICINE

## 2024-05-10 PROCEDURE — 84466 ASSAY OF TRANSFERRIN: CPT

## 2024-05-10 PROCEDURE — 82728 ASSAY OF FERRITIN: CPT

## 2024-05-10 PROCEDURE — 85025 COMPLETE CBC W/AUTO DIFF WBC: CPT

## 2024-05-10 RX ORDER — SODIUM CHLORIDE 0.9 % (FLUSH) 0.9 %
10 SYRINGE (ML) INJECTION AS NEEDED
OUTPATIENT
Start: 2024-05-10

## 2024-05-10 RX ORDER — HEPARIN SODIUM (PORCINE) LOCK FLUSH IV SOLN 100 UNIT/ML 100 UNIT/ML
500 SOLUTION INTRAVENOUS AS NEEDED
Status: DISCONTINUED | OUTPATIENT
Start: 2024-05-10 | End: 2024-05-10 | Stop reason: HOSPADM

## 2024-05-10 RX ORDER — HEPARIN SODIUM (PORCINE) LOCK FLUSH IV SOLN 100 UNIT/ML 100 UNIT/ML
500 SOLUTION INTRAVENOUS AS NEEDED
OUTPATIENT
Start: 2024-05-10

## 2024-05-10 RX ORDER — SODIUM CHLORIDE 0.9 % (FLUSH) 0.9 %
10 SYRINGE (ML) INJECTION AS NEEDED
Status: DISCONTINUED | OUTPATIENT
Start: 2024-05-10 | End: 2024-05-10 | Stop reason: HOSPADM

## 2024-05-10 RX ADMIN — Medication 10 ML: at 10:11

## 2024-05-10 RX ADMIN — Medication 500 UNITS: at 10:11

## 2024-05-11 LAB — FOLATE SERPL-MCNC: 11.9 NG/ML (ref 4.78–24.2)

## 2024-05-15 ENCOUNTER — OFFICE VISIT (OUTPATIENT)
Dept: FAMILY MEDICINE CLINIC | Facility: CLINIC | Age: 49
End: 2024-05-15
Payer: MEDICARE

## 2024-05-15 DIAGNOSIS — E11.42 DM TYPE 2 WITH DIABETIC PERIPHERAL NEUROPATHY: Chronic | ICD-10-CM

## 2024-05-15 DIAGNOSIS — E55.9 VITAMIN D DEFICIENCY: Chronic | ICD-10-CM

## 2024-05-15 DIAGNOSIS — J30.9 CHRONIC ALLERGIC RHINITIS: Chronic | ICD-10-CM

## 2024-05-15 DIAGNOSIS — K21.9 GASTROESOPHAGEAL REFLUX DISEASE WITHOUT ESOPHAGITIS: Chronic | ICD-10-CM

## 2024-05-15 DIAGNOSIS — Z97.8 USES SELF-APPLIED CONTINUOUS GLUCOSE MONITORING DEVICE: ICD-10-CM

## 2024-05-15 DIAGNOSIS — Z17.1 MALIGNANT NEOPLASM OF UPPER-OUTER QUADRANT OF RIGHT BREAST IN FEMALE, ESTROGEN RECEPTOR NEGATIVE: Chronic | ICD-10-CM

## 2024-05-15 DIAGNOSIS — G43.009 MIGRAINE WITHOUT AURA AND WITHOUT STATUS MIGRAINOSUS, NOT INTRACTABLE: Chronic | ICD-10-CM

## 2024-05-15 DIAGNOSIS — C50.411 MALIGNANT NEOPLASM OF UPPER-OUTER QUADRANT OF RIGHT BREAST IN FEMALE, ESTROGEN RECEPTOR NEGATIVE: Chronic | ICD-10-CM

## 2024-05-15 DIAGNOSIS — E78.2 MIXED HYPERLIPIDEMIA: Chronic | ICD-10-CM

## 2024-05-15 DIAGNOSIS — E53.8 VITAMIN B 12 DEFICIENCY: Chronic | ICD-10-CM

## 2024-05-15 DIAGNOSIS — F32.A ANXIETY AND DEPRESSION: Chronic | ICD-10-CM

## 2024-05-15 DIAGNOSIS — Z79.899 LONG-TERM USE OF HIGH-RISK MEDICATION: ICD-10-CM

## 2024-05-15 DIAGNOSIS — M85.89 OSTEOPENIA OF MULTIPLE SITES: Chronic | ICD-10-CM

## 2024-05-15 DIAGNOSIS — J45.20 MILD INTERMITTENT ASTHMA WITHOUT COMPLICATION: Chronic | ICD-10-CM

## 2024-05-15 DIAGNOSIS — R60.1 GENERALIZED EDEMA: ICD-10-CM

## 2024-05-15 DIAGNOSIS — F41.9 ANXIETY AND DEPRESSION: Chronic | ICD-10-CM

## 2024-05-15 DIAGNOSIS — Z00.00 MEDICARE ANNUAL WELLNESS VISIT, SUBSEQUENT: Primary | ICD-10-CM

## 2024-05-15 PROCEDURE — 80061 LIPID PANEL: CPT | Performed by: NURSE PRACTITIONER

## 2024-05-15 PROCEDURE — 82043 UR ALBUMIN QUANTITATIVE: CPT | Performed by: NURSE PRACTITIONER

## 2024-05-15 PROCEDURE — 83735 ASSAY OF MAGNESIUM: CPT | Performed by: NURSE PRACTITIONER

## 2024-05-15 PROCEDURE — 80053 COMPREHEN METABOLIC PANEL: CPT | Performed by: NURSE PRACTITIONER

## 2024-05-15 PROCEDURE — 83036 HEMOGLOBIN GLYCOSYLATED A1C: CPT | Performed by: NURSE PRACTITIONER

## 2024-05-15 RX ORDER — GLUCAGON INJECTION, SOLUTION 1 MG/.2ML
1 INJECTION, SOLUTION SUBCUTANEOUS AS NEEDED
Qty: 0.4 ML | Refills: 3 | Status: SHIPPED | OUTPATIENT
Start: 2024-05-15

## 2024-05-15 RX ORDER — FUROSEMIDE 20 MG/1
10 TABLET ORAL DAILY
Qty: 30 TABLET | Refills: 0 | Status: SHIPPED | OUTPATIENT
Start: 2024-05-15

## 2024-05-15 RX ORDER — DAPAGLIFLOZIN 5 MG/1
5 TABLET, FILM COATED ORAL EVERY MORNING
Qty: 30 TABLET | Refills: 2 | Status: SHIPPED | OUTPATIENT
Start: 2024-05-15

## 2024-05-15 NOTE — PROGRESS NOTES
Subsequent Medicare Wellness Visit    Nivia Bernstein is a 49 y.o. female who presents for a Subsequent Medicare Wellness Visit.    The following portions of the patient's history were reviewed and   updated as appropriate: allergies, current medications, past family history, past medical history, past social history, past surgical history, and problem list.    Compared to one year ago, the patient feels her physical   health is the same.    Compared to one year ago, the patient feels her mental   health is worse.    Recent Hospitalizations:  She was not admitted to the hospital during the last year.     Current Medical Providers:  Patient Care Team:  Josh Guerin APRN as PCP - General (Family Medicine)  Dejah Loco MD Hematology and Oncology  Jory Starr APRN Neurology  Gale Aguilar APRN: Behavioral Health  Outpatient Medications Prior to Visit   Medication Sig Dispense Refill    Accu-Chek FastClix Lancets misc Use 1 Device 3 (Three) Times a Day. 100 each 5    albuterol sulfate  (90 Base) MCG/ACT inhaler INHALE TWO PUFFS BY MOUTH EVERY FOUR HOURS HOURS AS NEEDED FOR SHORTNESS OF BREATH 18 g 5    alendronate (FOSAMAX) 35 MG tablet TAKE ONE TABLET BY MOUTH EVERY 7 DAYS. TAKE WITH WATER 30 MINUTES BEFORE THE FIRST FOOD, DRINK OR MEDICATION AND AVOID LYING DOWN FOR 30 MINUTES AFTER TAKING 4 tablet 3    ALPRAZolam (XANAX) 1 MG tablet Take 1 tablet by mouth 2 (Two) Times a Day As Needed for Anxiety. for anxiety 60 tablet 0    Aspirin Low Dose 81 MG EC tablet TAKE TWO TABLETS BY MOUTH EVERY DAY 60 tablet 4    atorvastatin (LIPITOR) 40 MG tablet TAKE ONE TABLET BY MOUTH EVERY night 90 tablet 1    azelastine (ASTELIN) 0.1 % nasal spray Use in each nostril as directed 30 mL 3    Calcium Carb-Cholecalciferol (Calcium+D3) 500-15 MG-MCG tablet Take 1 tablet by mouth 2 (Two) Times a Day With Meals. 60 tablet 3    cloNIDine (CATAPRES) 0.1 MG tablet TAKE ONE TABLET BY MOUTH TWICE DAILY 60 tablet 5     cyanocobalamin 1000 MCG/ML injection Inject 1 mL under the skin into the appropriate area as directed Every 30 (Thirty) Days. 1 mL 5    Emgality 120 MG/ML auto-injector pen INJECT 120mg ONCE MONTHLY      Empagliflozin-metFORMIN HCl (Synjardy) 12.5-1000 MG tablet TAKE ONE TABLET BY MOUTH EVERY MORNING 30 tablet 0    Fluticasone Furoate-Vilanterol (Breo Ellipta) 200-25 MCG/ACT inhaler Inhale 1 puff Daily. 1 each 3    folic acid (FOLVITE) 1 MG tablet Take 1 tablet by mouth Daily. 90 tablet 3    furosemide (LASIX) 20 MG tablet Take 0.5 tablets by mouth Daily. 30 tablet 0    gabapentin (NEURONTIN) 100 MG capsule TAKE ONE CAPSULE BY MOUTH TWICE DAILY 60 capsule 0    glucagon (GLUCAGEN) 1 MG injection Inject 1 mg under the skin into the appropriate area as directed 1 (One) Time As Needed for Low Blood Sugar. Please disregard the previous prescription 1 kit 3    glucose blood (Accu-Chek Guide) test strip USE TO TEST BLOOD GLUCOSE THREE TIMES DAILY 100 each 3    Insulin Lispro (humaLOG) 100 UNIT/ML injection INJECT AS DIRECTED. MAX DAILY DOSE  UNITS DAILY 40 mL 3    ipratropium-albuterol (DUO-NEB) 0.5-2.5 mg/3 ml nebulizer Take 3 mL by nebulization Every 4 (Four) Hours As Needed for Shortness of Air. 150 mL 1    lansoprazole (PREVACID) 30 MG capsule TAKE ONE CAPSULE BY MOUTH EVERY DAY 90 capsule 0    levocetirizine (XYZAL) 5 MG tablet TAKE ONE TABLET BY MOUTH EVERY EVENING 30 tablet 0    linaclotide (Linzess) 145 MCG capsule capsule TAKE ONE CAPSULE BY MOUTH ONCE daily 30 minutes BEFORE meal ON an EMPTY stomach 30 capsule 0    ondansetron (ZOFRAN) 8 MG tablet TAKE 1 TABLET BY MOUTH EVERY 8 HOURS AS NEEDED FOR NAUSEA OR VOMITING 30 tablet 3    Ozempic, 2 MG/DOSE, 8 MG/3ML solution pen-injector INJECT 2MG SUBCUTANEOUSLY ONCE WEEKLY 3 mL 0    Respiratory Therapy Supplies (Nebulizer/Tubing/Mouthpiece) kit 1 each Take As Directed. 1 each 1    tamoxifen (NOLVADEX) 20 MG chemo tablet Take 1 tablet by mouth Daily. 30 tablet  11    topiramate (TOPAMAX) 50 MG tablet TAKE ONE TABLET BY MOUTH TWICE DAILY 60 tablet 3    ubrogepant 100 MG tablet       venlafaxine XR (EFFEXOR-XR) 75 MG 24 hr capsule Take 3 capsules by mouth Daily. 90 capsule 1    vitamin D (ERGOCALCIFEROL) 1.25 MG (49678 UT) capsule capsule Take 1 capsule by mouth 1 (One) Time Per Week. 4 capsule 3     No facility-administered medications prior to visit.     No opioid medication identified on active medication list. I have reviewed chart for other potential  high risk medication/s and harmful drug interactions in the elderly.    Aspirin is on active medication list. Aspirin use is indicated based on review of current medical condition/s. Pros and cons of this therapy have been discussed today. Benefits of this medication outweigh potential harm.  Patient has been encouraged to continue taking this medication.  .      Patient Active Problem List   Diagnosis    Mild intermittent asthma without complication    GERD (gastroesophageal reflux disease)    History of DVT (deep vein thrombosis)    History of TIAs    Mixed hyperlipidemia    Anxiety and depression    OCD (obsessive compulsive disorder)    Accidental hypodermic needlestick injury with exposure to body fluid    Atherosclerosis of both carotid arteries    Chronic allergic rhinitis    Menopausal symptoms    Vitamin D deficiency    Malignant neoplasm of upper-outer quadrant of right breast in female, estrogen receptor negative    Osteopenia of multiple sites    Port-A-Cath in place    Drug-induced constipation    Insulin pump in place    Hot flashes    Adhesive capsulitis of right shoulder    S/P mastectomy, bilateral    Osteoarthritis of right acromioclavicular joint    Chronic pain of both shoulders    Chronic neck pain    Chronic mixed headache syndrome    Vitamin B 12 deficiency    Migraine without aura and without status migrainosus, not intractable    Uses self-applied continuous glucose monitoring device     Advance  "Care Planning      Advance Directive is not on file.  ACP discussion was held with the patient during this visit. Patient does not have an advance directive, information provided.        Vitals:    05/15/24 0846   BP: 132/90   Pulse: 88   Resp: 14   Temp: 98.4 °F (36.9 °C)   TempSrc: Temporal   SpO2: 98%   Weight: 71.7 kg (158 lb)   Height: 162.6 cm (64.02\")   PainSc:   7   PainLoc: Shoulder     Estimated body mass index is 27.11 kg/m² as calculated from the following:    Height as of this encounter: 162.6 cm (64.02\").    Weight as of this encounter: 71.7 kg (158 lb).  Vision Screening    Right eye Left eye Both eyes   Without correction      With correction 20/20 20/25 20/20      Does the patient have evidence of cognitive impairment?  Intermittent   Lab Results   Component Value Date    TRIG 132 05/15/2024    HDL 43 05/15/2024    LDL 79 05/15/2024    VLDL 23 05/15/2024    HGBA1C 6.10 (H) 05/15/2024        HEALTH RISK ASSESSMENT    Smoking Status:  Social History     Tobacco Use   Smoking Status Never    Passive exposure: Never   Smokeless Tobacco Never     Alcohol Consumption:  Social History     Substance and Sexual Activity   Alcohol Use No     Fall Risk Screen:    STEADI Fall Risk Assessment was completed, and patient is at LOW risk for falls.Assessment completed on:5/15/2024    Depression Screenin/15/2024     8:54 AM   PHQ-2/PHQ-9 Depression Screening   Little Interest or Pleasure in Doing Things 1-->several days   Feeling Down, Depressed or Hopeless 1-->several days   Trouble Falling or Staying Asleep, or Sleeping Too Much 1-->several days   Feeling Tired or Having Little Energy 1-->several days   Poor Appetite or Overeating 0-->not at all   Feeling Bad about Yourself - or that You are a Failure or Have Let Yourself or Your Family Down 1-->several days   Trouble Concentrating on Things, Such as Reading the Newspaper or Watching Television 0-->not at all   Moving or Speaking So Slowly that Other " People Could Have Noticed? Or the Opposite - Being So Fidgety 0-->not at all   Thoughts that You Would be Better Off Dead or of Hurting Yourself in Some Way 0-->not at all   PHQ-9: Brief Depression Severity Measure Score 5   If You Checked Off Any Problems, How Difficult Have These Problems Made It For You to Do Your Work, Take Care of Things at Home, or Get Along with Other People? not difficult at all       Health Habits and Functional and Cognitive Screenin/15/2024     8:56 AM   Functional & Cognitive Status   Do you have difficulty preparing food and eating? No   Do you have difficulty bathing yourself, getting dressed or grooming yourself? No   Do you have difficulty using the toilet? No   Do you have difficulty moving around from place to place? No   Do you have trouble with steps or getting out of a bed or a chair? No   Current Diet Well Balanced Diet   Dental Exam Up to date   Eye Exam Up to date   Exercise (times per week) 7 times per week   Current Exercises Include Walking   Do you need help using the phone?  No   Are you deaf or do you have serious difficulty hearing?  No   Do you need help to go to places out of walking distance? No   Do you need help shopping? No   Do you need help preparing meals?  No   Do you need help with housework?  No   Do you need help with laundry? No   Do you need help taking your medications? No   Do you need help managing money? No   Do you ever drive or ride in a car without wearing a seat belt? No   Have you felt unusual stress, anger or loneliness in the last month? No   Who do you live with? Spouse   If you need help, do you have trouble finding someone available to you? No   Have you been bothered in the last four weeks by sexual problems? No   Do you have difficulty concentrating, remembering or making decisions? No     Age-appropriate Screening Schedule:  Refer to the list below for future screening recommendations based on patient's age, sex and/or medical  conditions. Orders for these recommended tests are listed in the plan section. The patient has been provided with a written plan.    Health Maintenance   Topic Date Due    Hepatitis B (3 of 3 - 19+ 3-dose series) Completed    ZOSTER VACCINE (1 of 2) Never done    DIABETIC FOOT EXAM  05/15/2025    COVID-19 Vaccine (3 - 2023-24 season) 09/01/2023    ANNUAL WELLNESS VISIT  04/04/2024    URINE MICROALBUMIN  04/19/2024    HEMOGLOBIN A1C  05/07/2024    INFLUENZA VACCINE  08/01/2024    DXA SCAN  09/09/2024    LIPID PANEL  11/07/2024    DIABETIC EYE EXAM  12/19/2024    BMI FOLLOWUP  04/16/2025    TDAP/TD VACCINES (2 - Td or Tdap) 06/19/2029    COLORECTAL CANCER SCREENING  05/05/2031    Pneumococcal Vaccine 0-64 (3 of 3 - PPSV23 or PCV20) 02/24/2038    HEPATITIS C SCREENING  Completed    PAP SMEAR  Discontinued          CMS Preventative Services Quick Reference  Risk Factors Identified During Encounter  Depression: Information on Depression Shared in After Visit Summary  Advanced Directives: Information provided in After Visit Summary   The above risks/problems have been discussed with the patient.  Pertinent information has been shared with the patient in the After Visit Summary.  An After Visit Summary and PPPS were made available to the patient.  Follow Up:   In three months  Next Medicare Wellness visit to be scheduled in 1 year.     Additional E&M Note during same encounter follows:  Patient has multiple medical problems which are significant and separately identifiable that require additional work above and beyond the Medicare Wellness Visit.      JEREL Bernstein is also being seen today  pertaining to her DM, type 2, Dyslipidemia. GERD, Osteopenia/arthritis, migraine headaches and anxiety/ depression.  She has been diagnosed with right Breast Cancer and followed by Hematology/Oncology.    Diabetes   She has type 2 diabetes mellitus. The initial diagnosis of diabetes was made 20+ years ago. Diabetic complications  "include peripheral neuropathy. Risk factors for coronary artery disease include post-menopausal, stress and dyslipidemia. She is currently utilizing insulin pump therapy and CGM. In addition, she is currently taking Synardy and Ozempic.  She has had a previous visit with a dietitian An ACE inhibitor/angiotensin II receptor blocker is not  being taken at this time.     Lab Results   Component Value Date    HGBA1C 5.40 11/07/2023     Lab Results   Component Value Date    HGBA1C 6.10 (H) 05/15/2024       Dyslipidemia   The current episode started more than 1 year ago.  She is taking Atorvastatin 40 mg and aspirin 81 mg..   Lab Results   Component Value Date    CHOL 145 05/15/2024    CHOL 124 11/07/2023    CHOL 110 08/07/2023     Lab Results   Component Value Date    TRIG 132 05/15/2024    TRIG 198 (H) 11/07/2023    TRIG 156 (H) 08/07/2023     Lab Results   Component Value Date    HDL 43 05/15/2024    HDL 31 (L) 11/07/2023    HDL 35 (L) 08/07/2023     Lab Results   Component Value Date    LDL 79 05/15/2024    LDL 60 11/07/2023    LDL 48 08/07/2023        Breast Cancer  Malignant neoplasm of upper outer quadrant of right breast, estrogen receptor negative which was treated with bilateral mastectomy and followed by chemotherapy.  Continues to take tamoxifen 20 mg and followed by oncology.      GERD  Stable with lansoprazole.  Lab Results   Component Value Date    WBC 10.80 05/10/2024    HGB 13.3 05/10/2024    HCT 41.4 05/10/2024    MCV 88.3 05/10/2024     05/10/2024     Anxiety/Depression  Stable and followed by behavioral health    Lab Results   Component Value Date    TSH 2.380 05/10/2024      Vital Signs:  /90   Pulse 88   Temp 98.4 °F (36.9 °C) (Temporal)   Resp 14   Ht 162.6 cm (64.02\")   Wt 71.7 kg (158 lb)   SpO2 98%   BMI 27.11 kg/m²     Physical Exam  Vitals and nursing note reviewed.   Constitutional:       General: She is not in acute distress.     Appearance: She is well-developed.      " Comments:  accompanies her today   HENT:      Head: Normocephalic.      Nose: Nose normal.   Eyes:      General: No scleral icterus.        Right eye: No discharge.         Left eye: No discharge.      Conjunctiva/sclera: Conjunctivae normal.   Neck:      Thyroid: No thyromegaly.      Vascular: No JVD.   Cardiovascular:      Rate and Rhythm: Normal rate and regular rhythm.      Heart sounds: S1 normal and S2 normal.   Pulmonary:      Effort: Pulmonary effort is normal. No respiratory distress.      Breath sounds: Normal breath sounds. No wheezing.   Abdominal:      Palpations: Abdomen is soft.      Tenderness: There is no abdominal tenderness. There is no guarding.   Musculoskeletal:      Cervical back: Neck supple.      Right lower leg: No edema.      Left lower leg: No edema.   Feet:      Right foot:      Skin integrity: Callus and dry skin present. No ulcer, blister, skin breakdown, erythema, warmth or fissure.      Toenail Condition: Fungal disease present.     Left foot:      Skin integrity: Callus and dry skin present. No ulcer, blister, skin breakdown, erythema, warmth or fissure.      Toenail Condition: Fungal disease present.     Comments: Great toe nails removed secondary to fungal disease   Skin:     General: Skin is dry.      Capillary Refill: Capillary refill takes less than 2 seconds.   Neurological:      Mental Status: She is alert.      Cranial Nerves: Cranial nerves 2-12 are intact.   Psychiatric:         Mood and Affect: Mood and affect normal.         Speech: Speech normal.         Behavior: Behavior is cooperative.         Thought Content: Thought content normal.         Cognition and Memory: Cognition and memory normal.        Results for orders placed or performed in visit on 05/15/24   Comprehensive Metabolic Panel    Specimen: Arm, Right; Blood   Result Value Ref Range    Glucose 79 65 - 99 mg/dL    BUN 13 6 - 20 mg/dL    Creatinine 0.80 0.57 - 1.00 mg/dL    Sodium 138 136 - 145 mmol/L     Potassium 4.2 3.5 - 5.2 mmol/L    Chloride 106 98 - 107 mmol/L    CO2 20.3 (L) 22.0 - 29.0 mmol/L    Calcium 9.3 8.6 - 10.5 mg/dL    Total Protein 7.5 6.0 - 8.5 g/dL    Albumin 4.4 3.5 - 5.2 g/dL    ALT (SGPT) 13 1 - 33 U/L    AST (SGOT) 14 1 - 32 U/L    Alkaline Phosphatase 71 39 - 117 U/L    Total Bilirubin 0.6 0.0 - 1.2 mg/dL    Globulin 3.1 gm/dL    A/G Ratio 1.4 g/dL    BUN/Creatinine Ratio 16.3 7.0 - 25.0    Anion Gap 11.7 5.0 - 15.0 mmol/L    eGFR 89.3 >60.0 mL/min/1.73   Lipid Panel    Specimen: Arm, Right; Blood   Result Value Ref Range    Total Cholesterol 145 0 - 200 mg/dL    Triglycerides 132 0 - 150 mg/dL    HDL Cholesterol 43 40 - 60 mg/dL    LDL Cholesterol  79 0 - 100 mg/dL    VLDL Cholesterol 23 5 - 40 mg/dL    LDL/HDL Ratio 1.76    Hemoglobin A1c    Specimen: Blood   Result Value Ref Range    Hemoglobin A1C 6.10 (H) 4.80 - 5.60 %   MicroAlbumin, Urine, Random - Urine, Clean Catch    Specimen: Urine, Clean Catch   Result Value Ref Range    Microalbumin, Urine <1.2 mg/dL   Magnesium    Specimen: Arm, Right; Blood   Result Value Ref Range    Magnesium 2.6 1.6 - 2.6 mg/dL     *Note: Due to a large number of results and/or encounters for the requested time period, some results have not been displayed. A complete set of results can be found in Results Review.   CGM summary from May 2 through May 15, 2024  Time in range  0% very low  0% low  97% time in range  3% high  0% very high range  Average blood glucose 142 ±26 mg/dL         Assessment and Plan   Diagnoses and all orders for this visit:    1. Medicare annual wellness visit, subsequent (Primary)  Comments:  Findings and recommendations discussed with Nivia and her     2. DM type 2 with diabetic peripheral neuropathy  Comments:  Continue current diabetes regimen with noted A1c of 6.1  Orders:  -     Semaglutide, 1 MG/DOSE, (OZEMPIC) 4 MG/3ML solution pen-injector; Inject 1 mg under the skin into the appropriate area as directed Every 7  (Seven) Days.  Dispense: 3 mL; Refill: 0  -     dapagliflozin (FARXIGA) 5 MG tablet tablet; Take 1 tablet by mouth Every Morning.  Dispense: 30 tablet; Refill: 2  -     Glucagon (Gvoke HypoPen 2-Pack) 1 MG/0.2ML solution auto-injector; Inject 1 mg under the skin into the appropriate area as directed As Needed (Severe Hypoglycemia).  Dispense: 0.4 mL; Refill: 3  -     Comprehensive Metabolic Panel  -     Lipid Panel  -     Hemoglobin A1c  -     MicroAlbumin, Urine, Random - Urine, Clean Catch    3. Uses self-applied continuous glucose monitoring device  Comments:  CGM uploaded and reviewed    4. Mixed hyperlipidemia  Comments:  Continue atorvastatin    5. Malignant neoplasm of upper-outer quadrant of right breast in female, estrogen receptor negative  Comments:  Continue Nolvadex and follow-up with hematology/oncology    6. Gastroesophageal reflux disease without esophagitis  Comments:  Continue lansoprazole    7. Anxiety and depression  Comments:  Continue follow-up with behavioral health    8. Migraine without aura and without status migrainosus, not intractable  Comments:  Continue Emgality and Topamax    9. Mild intermittent asthma without complication  Comments:  Stable.  Continue albuterol HFA, Breo Inh.    10. Chronic allergic rhinitis  Comments:  Continue Astelin nasal spray    11. Osteopenia of multiple sites  Comments:  Continue Fosamax 35 mg every 7 days.  Consider increasing to 70 mg    12. Vitamin D deficiency  Comments:  Continue vitamin D every 7 days    13. Vitamin B 12 deficiency  Comments:  Continue monthly injection    14. Generalized edema  Comments:  Lasix prescribed and to use cautiously   Orders:  -     furosemide (LASIX) 20 MG tablet; Take 0.5 tablets by mouth Daily.  Dispense: 30 tablet; Refill: 0  -     Magnesium    15. Long-term use of high-risk medication  Comments:  UDS today  Orders:  -     Urine Drug Screen - Urine, Clean Catch; Future         Follow Up in August  Findings and  recommendations discussed with Nivia and her . Reviewed CGM results. Lifestyle modifications reinforced including nutrition and activity recommendations.  She will follow up in August sooner if problems/concerns occur.   She was given instructions and counseling regarding her condition or for health maintenance advice. Please see specific information pulled into the AVS if appropriate.

## 2024-05-16 VITALS
WEIGHT: 158 LBS | DIASTOLIC BLOOD PRESSURE: 90 MMHG | TEMPERATURE: 98.4 F | HEART RATE: 88 BPM | RESPIRATION RATE: 14 BRPM | BODY MASS INDEX: 26.98 KG/M2 | HEIGHT: 64 IN | SYSTOLIC BLOOD PRESSURE: 132 MMHG | OXYGEN SATURATION: 98 %

## 2024-05-16 PROBLEM — R74.8 ELEVATED ALKALINE PHOSPHATASE LEVEL: Status: RESOLVED | Noted: 2017-10-16 | Resolved: 2024-05-16

## 2024-05-16 LAB
ALBUMIN SERPL-MCNC: 4.4 G/DL (ref 3.5–5.2)
ALBUMIN UR-MCNC: <1.2 MG/DL
ALBUMIN/GLOB SERPL: 1.4 G/DL
ALP SERPL-CCNC: 71 U/L (ref 39–117)
ALT SERPL W P-5'-P-CCNC: 13 U/L (ref 1–33)
ANION GAP SERPL CALCULATED.3IONS-SCNC: 11.7 MMOL/L (ref 5–15)
AST SERPL-CCNC: 14 U/L (ref 1–32)
BILIRUB SERPL-MCNC: 0.6 MG/DL (ref 0–1.2)
BUN SERPL-MCNC: 13 MG/DL (ref 6–20)
BUN/CREAT SERPL: 16.3 (ref 7–25)
CALCIUM SPEC-SCNC: 9.3 MG/DL (ref 8.6–10.5)
CHLORIDE SERPL-SCNC: 106 MMOL/L (ref 98–107)
CHOLEST SERPL-MCNC: 145 MG/DL (ref 0–200)
CO2 SERPL-SCNC: 20.3 MMOL/L (ref 22–29)
CREAT SERPL-MCNC: 0.8 MG/DL (ref 0.57–1)
EGFRCR SERPLBLD CKD-EPI 2021: 89.3 ML/MIN/1.73
GLOBULIN UR ELPH-MCNC: 3.1 GM/DL
GLUCOSE SERPL-MCNC: 79 MG/DL (ref 65–99)
HBA1C MFR BLD: 6.1 % (ref 4.8–5.6)
HDLC SERPL-MCNC: 43 MG/DL (ref 40–60)
LDLC SERPL CALC-MCNC: 79 MG/DL (ref 0–100)
LDLC/HDLC SERPL: 1.76 {RATIO}
MAGNESIUM SERPL-MCNC: 2.6 MG/DL (ref 1.6–2.6)
POTASSIUM SERPL-SCNC: 4.2 MMOL/L (ref 3.5–5.2)
PROT SERPL-MCNC: 7.5 G/DL (ref 6–8.5)
SODIUM SERPL-SCNC: 138 MMOL/L (ref 136–145)
TRIGL SERPL-MCNC: 132 MG/DL (ref 0–150)
VLDLC SERPL-MCNC: 23 MG/DL (ref 5–40)

## 2024-05-21 ENCOUNTER — PRIOR AUTHORIZATION (OUTPATIENT)
Dept: FAMILY MEDICINE CLINIC | Facility: CLINIC | Age: 49
End: 2024-05-21
Payer: MEDICARE

## 2024-05-25 DIAGNOSIS — M85.89 OSTEOPENIA OF MULTIPLE SITES: ICD-10-CM

## 2024-05-27 DIAGNOSIS — F41.1 GENERALIZED ANXIETY DISORDER: ICD-10-CM

## 2024-05-27 DIAGNOSIS — R23.2 HOT FLASHES: ICD-10-CM

## 2024-05-27 DIAGNOSIS — E78.2 MIXED DYSLIPIDEMIA: Chronic | ICD-10-CM

## 2024-05-27 DIAGNOSIS — F33.1 MODERATE EPISODE OF RECURRENT MAJOR DEPRESSIVE DISORDER: ICD-10-CM

## 2024-05-28 RX ORDER — VENLAFAXINE HYDROCHLORIDE 75 MG/1
225 CAPSULE, EXTENDED RELEASE ORAL DAILY
Qty: 90 CAPSULE | Refills: 1 | OUTPATIENT
Start: 2024-05-28

## 2024-05-28 RX ORDER — ALENDRONATE SODIUM 35 MG/1
TABLET ORAL
Qty: 4 TABLET | Refills: 3 | Status: SHIPPED | OUTPATIENT
Start: 2024-05-28

## 2024-05-28 RX ORDER — ATORVASTATIN CALCIUM 40 MG/1
TABLET, FILM COATED ORAL
Qty: 90 TABLET | Refills: 0 | Status: SHIPPED | OUTPATIENT
Start: 2024-05-28

## 2024-05-28 RX ORDER — CLONIDINE HYDROCHLORIDE 0.1 MG/1
TABLET ORAL
Qty: 60 TABLET | Refills: 2 | Status: SHIPPED | OUTPATIENT
Start: 2024-05-28

## 2024-05-30 DIAGNOSIS — E13.9 DIABETES MELLITUS TYPE 1.5, MANAGED AS TYPE 1: Chronic | ICD-10-CM

## 2024-05-30 RX ORDER — BLOOD SUGAR DIAGNOSTIC
STRIP MISCELLANEOUS
Qty: 100 EACH | Refills: 3 | Status: SHIPPED | OUTPATIENT
Start: 2024-05-30

## 2024-06-04 DIAGNOSIS — F42.8 OTHER OBSESSIVE-COMPULSIVE DISORDERS: Chronic | ICD-10-CM

## 2024-06-04 DIAGNOSIS — K59.03 DRUG-INDUCED CONSTIPATION: ICD-10-CM

## 2024-06-04 DIAGNOSIS — F41.1 GENERALIZED ANXIETY DISORDER: ICD-10-CM

## 2024-06-04 DIAGNOSIS — E11.42 DM TYPE 2 WITH DIABETIC PERIPHERAL NEUROPATHY: Chronic | ICD-10-CM

## 2024-06-04 RX ORDER — ALPRAZOLAM 1 MG/1
1 TABLET ORAL 2 TIMES DAILY PRN
Qty: 60 TABLET | Refills: 0 | Status: SHIPPED | OUTPATIENT
Start: 2024-06-04

## 2024-06-07 ENCOUNTER — TELEPHONE (OUTPATIENT)
Dept: FAMILY MEDICINE CLINIC | Facility: CLINIC | Age: 49
End: 2024-06-07
Payer: MEDICARE

## 2024-06-07 RX ORDER — LINACLOTIDE 145 UG/1
CAPSULE, GELATIN COATED ORAL
Qty: 30 CAPSULE | Refills: 0 | Status: SHIPPED | OUTPATIENT
Start: 2024-06-07

## 2024-06-07 RX ORDER — EMPAGLIFLOZIN AND METFORMIN HYDROCHLORIDE 12.5; 1 MG/1; MG/1
1 TABLET ORAL EVERY MORNING
Qty: 30 TABLET | Refills: 0 | Status: SHIPPED | OUTPATIENT
Start: 2024-06-07

## 2024-06-07 NOTE — TELEPHONE ENCOUNTER
Faxed to [facility]. Left patient a voicemail letting her know this has been done.    ----- Message from Josh Guerin sent at 6/7/2024 10:05 AM EDT -----  Regarding: Statement  I will and glad she is going to Physical Therapy  ----- Message -----  From: Danelle Henson MA  Sent: 6/6/2024   9:32 AM EDT  To: NIDA Betancourt    She is wanting to know if you can write her out a statement saying that she isn't able to drive anymore due to the shoulder pain and her loss of range of motion? She said it needs to have the diagnosis as well. She is going to [facility] and will be riding [employer] and this is why she needs the statement.

## 2024-06-12 DIAGNOSIS — T45.1X5A HOT FLASHES DUE TO TAMOXIFEN: ICD-10-CM

## 2024-06-12 DIAGNOSIS — R23.2 HOT FLASHES DUE TO TAMOXIFEN: ICD-10-CM

## 2024-06-12 RX ORDER — GABAPENTIN 100 MG/1
100 CAPSULE ORAL 2 TIMES DAILY
Qty: 60 CAPSULE | Refills: 2 | Status: SHIPPED | OUTPATIENT
Start: 2024-06-12

## 2024-06-13 ENCOUNTER — TELEPHONE (OUTPATIENT)
Dept: FAMILY MEDICINE CLINIC | Facility: CLINIC | Age: 49
End: 2024-06-13

## 2024-06-13 ENCOUNTER — OFFICE VISIT (OUTPATIENT)
Dept: PSYCHIATRY | Facility: CLINIC | Age: 49
End: 2024-06-13
Payer: MEDICARE

## 2024-06-13 VITALS
HEART RATE: 87 BPM | WEIGHT: 159.4 LBS | HEIGHT: 64 IN | OXYGEN SATURATION: 100 % | SYSTOLIC BLOOD PRESSURE: 102 MMHG | DIASTOLIC BLOOD PRESSURE: 62 MMHG | BODY MASS INDEX: 27.21 KG/M2

## 2024-06-13 DIAGNOSIS — Z79.899 ENCOUNTER FOR LONG-TERM (CURRENT) USE OF OTHER MEDICATIONS: ICD-10-CM

## 2024-06-13 DIAGNOSIS — F41.1 GENERALIZED ANXIETY DISORDER: ICD-10-CM

## 2024-06-13 DIAGNOSIS — F51.05 INSOMNIA DUE TO OTHER MENTAL DISORDER: Primary | ICD-10-CM

## 2024-06-13 DIAGNOSIS — Z51.81 ENCOUNTER FOR THERAPEUTIC DRUG MONITORING: ICD-10-CM

## 2024-06-13 DIAGNOSIS — F99 INSOMNIA DUE TO OTHER MENTAL DISORDER: Primary | ICD-10-CM

## 2024-06-13 DIAGNOSIS — F33.1 MODERATE EPISODE OF RECURRENT MAJOR DEPRESSIVE DISORDER: ICD-10-CM

## 2024-06-13 DIAGNOSIS — F42.8 OTHER OBSESSIVE-COMPULSIVE DISORDERS: Chronic | ICD-10-CM

## 2024-06-13 RX ORDER — ALPRAZOLAM 1 MG/1
1 TABLET ORAL 2 TIMES DAILY PRN
Qty: 60 TABLET | Refills: 0 | Status: SHIPPED | OUTPATIENT
Start: 2024-06-13

## 2024-06-13 RX ORDER — VENLAFAXINE HYDROCHLORIDE 75 MG/1
225 CAPSULE, EXTENDED RELEASE ORAL DAILY
Qty: 90 CAPSULE | Refills: 1 | Status: SHIPPED | OUTPATIENT
Start: 2024-06-13

## 2024-06-13 NOTE — PROGRESS NOTES
Subjective   Nivia Bernstein is a 49 y.o. female is here today for medication management follow-up.    Chief Complaint:  anxiety depression     History of Present Illness:    Patient presents today for a follow up for medication management for anxiety and depression. Denies any medical changes since last visit. Patient states not sleeping and feeling tired during the day. Patient states glucose has been running good. Sleeping at least 3-4 hours a night. Patient reports still having nightmares. Patient states taking the alprazolam twice a day with once being at night. Patient states doing ok overall and managing. Patient states having some panic attacks but not a lot. Anxiety at a 8 on a 0/10 scale with 10 the worst. Depression at a 9 on a 0/10 scale with 10 the worst. Denies any thoughts to harm self or others. Patient states had some irritability. Denies any aggression. Patient states eating at least twice a day. Appetite is ok. Denies any auditory or visual hallucinations. Patient states they mentioned taking her pump and medicine due to glucose running so good. Patient reports she followed up with oncology and PCP and everything went good. Patient reports medication compliance and denies any side effects.   The following portions of the patient's history were reviewed and updated as appropriate: allergies, current medications, past family history, past medical history, past social history, past surgical history, and problem list.    Review of Systems   Constitutional:  Positive for fatigue.   Respiratory: Negative.     Cardiovascular: Negative.    Gastrointestinal: Negative.    Neurological: Negative.    Psychiatric/Behavioral:  Positive for agitation, dysphoric mood and sleep disturbance. The patient is nervous/anxious.        Objective   Physical Exam  Vitals reviewed.   Constitutional:       Appearance: Normal appearance. She is well-developed and well-groomed.   Neurological:      Mental Status: She is  "alert.   Psychiatric:         Attention and Perception: Attention and perception normal.         Mood and Affect: Mood is depressed. Affect is angry.         Speech: Speech normal.         Behavior: Behavior is agitated. Behavior is cooperative.         Thought Content: Thought content normal.         Cognition and Memory: Cognition and memory normal.         Judgment: Judgment normal.       Blood pressure 102/62, pulse 87, height 162.6 cm (64.02\"), weight 72.3 kg (159 lb 6.4 oz), SpO2 100%, not currently breastfeeding.Body mass index is 27.34 kg/m².    Allergies   Allergen Reactions    Imipramine Other (See Comments)     Chest pain    Mobic [Meloxicam] Rash    Penicillins Angioedema    Taxotere [Docetaxel] Anaphylaxis     9 minutes into 1st infusion    Novolog [Insulin Aspart] Hives    Rosuvastatin Calcium GI Intolerance       Medication List:   Current Outpatient Medications   Medication Sig Dispense Refill    ALPRAZolam (XANAX) 1 MG tablet Take 1 tablet by mouth 2 (Two) Times a Day As Needed for Anxiety. for anxiety 60 tablet 0    venlafaxine XR (EFFEXOR-XR) 75 MG 24 hr capsule Take 3 capsules by mouth Daily. 90 capsule 1    Accu-Chek FastClix Lancets misc Use 1 Device 3 (Three) Times a Day. 100 each 5    albuterol sulfate  (90 Base) MCG/ACT inhaler INHALE TWO PUFFS BY MOUTH EVERY FOUR HOURS HOURS AS NEEDED FOR SHORTNESS OF BREATH 18 g 5    alendronate (FOSAMAX) 35 MG tablet TAKE ONE TABLET BY MOUTH EVERY 7 DAYS WITH WATER 30 MINUTES BEFORE THE FIRST FOOD, DRINK, OR MEDICATION AND AVOID LYING DOWN 30 MINUTES AFTER TAKING 4 tablet 3    Aspirin Low Dose 81 MG EC tablet TAKE TWO TABLETS BY MOUTH EVERY DAY 60 tablet 4    atorvastatin (LIPITOR) 40 MG tablet TAKE ONE TABLET BY MOUTH EVERY night 90 tablet 0    azelastine (ASTELIN) 0.1 % nasal spray Use in each nostril as directed 30 mL 3    Calcium Carb-Cholecalciferol (Calcium+D3) 500-15 MG-MCG tablet Take 1 tablet by mouth 2 (Two) Times a Day With Meals. 60 " tablet 3    cloNIDine (CATAPRES) 0.1 MG tablet TAKE ONE TABLET BY MOUTH TWICE DAILY 60 tablet 2    cyanocobalamin 1000 MCG/ML injection Inject 1 mL under the skin into the appropriate area as directed Every 30 (Thirty) Days. 1 mL 5    dapagliflozin (FARXIGA) 5 MG tablet tablet Take 1 tablet by mouth Every Morning. 30 tablet 2    Emgality 120 MG/ML auto-injector pen INJECT 120mg ONCE MONTHLY      fluconazole (Diflucan) 150 MG tablet Take 1 tablet by mouth Daily. 2 tablet 0    Fluticasone Furoate-Vilanterol (Breo Ellipta) 200-25 MCG/ACT inhaler Inhale 1 puff Daily. 1 each 3    folic acid (FOLVITE) 1 MG tablet Take 1 tablet by mouth Daily. 90 tablet 3    furosemide (LASIX) 20 MG tablet Take 0.5 tablets by mouth Daily. 30 tablet 0    gabapentin (NEURONTIN) 100 MG capsule TAKE ONE CAPSULE BY MOUTH TWICE DAILY 60 capsule 2    Glucagon (Gvoke HypoPen 2-Pack) 1 MG/0.2ML solution auto-injector Inject 1 mg under the skin into the appropriate area as directed As Needed (Severe Hypoglycemia). 0.4 mL 3    glucose blood (Accu-Chek Guide) test strip USE TO TEST BLOOD GLUCOSE THREE TIMES DAILY 100 each 3    Insulin Lispro (humaLOG) 100 UNIT/ML injection INJECT AS DIRECTED. MAX DAILY DOSE  UNITS DAILY 40 mL 3    ipratropium-albuterol (DUO-NEB) 0.5-2.5 mg/3 ml nebulizer Take 3 mL by nebulization Every 4 (Four) Hours As Needed for Shortness of Air. 150 mL 1    lansoprazole (PREVACID) 30 MG capsule TAKE ONE CAPSULE BY MOUTH EVERY DAY 90 capsule 0    levocetirizine (XYZAL) 5 MG tablet TAKE ONE TABLET BY MOUTH EVERY EVENING 30 tablet 0    Linzess 145 MCG capsule capsule TAKE ONE CAPSULE BY MOUTH ONCE daily 30 minutes BEFORE meal ON an EMPTY stomach 30 capsule 0    ondansetron (ZOFRAN) 8 MG tablet TAKE 1 TABLET BY MOUTH EVERY 8 HOURS AS NEEDED FOR NAUSEA OR VOMITING 30 tablet 3    Respiratory Therapy Supplies (Nebulizer/Tubing/Mouthpiece) kit 1 each Take As Directed. 1 each 1    Semaglutide, 1 MG/DOSE, (OZEMPIC) 4 MG/3ML solution  pen-injector Inject 1 mg under the skin into the appropriate area as directed Every 7 (Seven) Days. 3 mL 0    Synjardy 12.5-1000 MG tablet TAKE ONE TABLET BY MOUTH EVERY MORNING 30 tablet 0    tamoxifen (NOLVADEX) 20 MG chemo tablet Take 1 tablet by mouth Daily. 30 tablet 11    topiramate (TOPAMAX) 50 MG tablet TAKE ONE TABLET BY MOUTH TWICE DAILY 60 tablet 3    ubrogepant 100 MG tablet       vitamin D (ERGOCALCIFEROL) 1.25 MG (73964 UT) capsule capsule Take 1 capsule by mouth 1 (One) Time Per Week. 4 capsule 3     No current facility-administered medications for this visit.       Mental Status Exam:   Hygiene:   good  Cooperation:  Cooperative  Eye Contact:  Fair  Psychomotor Behavior:  Aggitated  Affect:  Angry  Hopelessness: Denies  Speech:  Normal  Thought Process:  Goal directed and Linear  Thought Content:  Normal  Suicidal:  None  Homicidal:  None  Hallucinations:  None  Delusion:  None  Memory:  Intact  Orientation:  Person, Place, Time, and Situation  Reliability:  fair  Insight:  Fair  Judgement:  Fair  Impulse Control:  Fair  Physical/Medical Issues:  No        PHQ-9 Depression Screening  Little interest or pleasure in doing things? 2-->more than half the days   Feeling down, depressed, or hopeless? 2-->more than half the days   Trouble falling or staying asleep, or sleeping too much? 2-->more than half the days   Feeling tired or having little energy? 1-->several days   Poor appetite or overeating? 2-->more than half the days   Feeling bad about yourself - or that you are a failure or have let yourself or your family down? 2-->more than half the days   Trouble concentrating on things, such as reading the newspaper or watching television? 2-->more than half the days   Moving or speaking so slowly that other people could have noticed? Or the opposite - being so fidgety or restless that you have been moving around a lot more than usual? 1-->several days   Thoughts that you would be better off dead, or of  hurting yourself in some way? 0-->not at all   PHQ-9 Total Score 14   If you checked off any problems, how difficult have these problems made it for you to do your work, take care of things at home, or get along with other people? very difficult              Assessment & Plan   Diagnoses and all orders for this visit:    1. Insomnia due to other mental disorder (Primary)    2. Generalized anxiety disorder  -     ALPRAZolam (XANAX) 1 MG tablet; Take 1 tablet by mouth 2 (Two) Times a Day As Needed for Anxiety. for anxiety  Dispense: 60 tablet; Refill: 0  -     venlafaxine XR (EFFEXOR-XR) 75 MG 24 hr capsule; Take 3 capsules by mouth Daily.  Dispense: 90 capsule; Refill: 1    3. Other obsessive-compulsive disorders  -     ALPRAZolam (XANAX) 1 MG tablet; Take 1 tablet by mouth 2 (Two) Times a Day As Needed for Anxiety. for anxiety  Dispense: 60 tablet; Refill: 0    4. Moderate episode of recurrent major depressive disorder  -     venlafaxine XR (EFFEXOR-XR) 75 MG 24 hr capsule; Take 3 capsules by mouth Daily.  Dispense: 90 capsule; Refill: 1    5. Encounter for therapeutic drug monitoring  -     Urine Drug Screen - Urine, Clean Catch; Future    6. Encounter for long-term (current) use of other medications  -     Urine Drug Screen - Urine, Clean Catch; Future            Discussed medication options with patient. Cont. Alprazolam 1 mg twice daily as needed for anxiety. Cont. Effexor XR 75 mg three capsules daily for depression, anxiety, and OCD. Discussed with patient adjusting medication for insomnia, depression, and anxiety. Patient declined any medication changes at this time. Patient states she would contact office if she felt anything needed to be adjusted. Reviewed the risks, benefits, and side effects of the medications; patient acknowledged and verbally consented. Patient is being prescribed a controlled substance as part of treatment plan. Patient has been educated of appropriate use of the medications, including  risk of somnolence, limited ability to drive and/or work safely, and potential for dependence, respiratory depression and overdose. Patient is also informed that the medication are to be used by the patient only- avoid any combined use of ETOH or other substances unless prescribed.   UDS Ordered.     [facility] Patient Controlled Substance Report (from 6/14/2023 to 6/12/2024)    Dispensed  Strength Quantity Days Supply Provider Pharmacy   05/09/2024 Gabapentin 100MG 60 each 30 [facility]IE[facility]   05/07/2024 Alprazolam 1MG 60 each 30 [facility]   04/02/2024 Alprazolam 1MG 60 each 30 [facility]FREDO [facility]   04/02/2024 Gabapentin 100MG 60 each 30 TIFFANIE,SONALI [facility]   03/05/2024 Alprazolam 1MG 60 each 30 CLOUD[facility]FREDO [facility]   03/05/2024 Gabapentin 100MG 60 each 30 TIFFANIE,SONALI [facility]   02/06/2024 Alprazolam 1MG 60 each 30 [facility]   02/06/2024 Gabapentin 100MG 60 each 30 TIFFANIE,SHERCHAYA [facility]   01/10/2024 Gabapentin 100MG 60 each 30 TIFFANIE[facility]SHERCHAYA [facility]   12/13/2023 Gabapentin 100MG 60 each 30 TIFFANIE,SONALI [facility]   12/06/2023 Alprazolam 1MG 60 each 30 [facility]   11/14/2023 Gabapentin 100MG 60 each 30 TIFFANIE[facility]SHERCHAYA [facility]   11/09/2023 Alprazolam 1MG 60 each 30 [facility]   10/18/2023 Gabapentin 100MG 60 each 30 TIFFANIE[facility]SONALI [facility]   10/04/2023 Alprazolam 1MG 60 each 30 [facility]   08/25/2023 Alprazolam 1MG 60 each 30 [facility]   08/17/2023 Gabapentin 100MG 60 each 30 [facility]   08/10/2023 Alprazolam 0.5MG 90 each 30 [facility]   07/21/2023 Gabapentin 100MG 60  each 30 [facility]   06/30/2023 Alprazolam 0.5MG 90 each 30 [facility]   06/22/2023 Gabapentin 100MG 60 each 30 [facility]         Disclaimer    *The information in this report is based upon Schedule II through V controlled substance records reported by dispensers. Data should appear on [facility] reports within two to three business days after dispensing.   *The records listed in the report are based on the patient identification information entered by the report requestor, and if not sufficiently unique may result in the report including records for multiple patients. Please verify the information in the report by contacting the prescribers and/or dispensers listed.   *If the controlled substance records on this report appear to be in error, the patient or provider should contact the dispenser to determine if the information was reported accurately. If the dispenser certifies the information was reported accurately, the dispenser can contact the Drug Enforcement and Professional Practices Branch at [phone] to investigate the error.   *The information in this report is intended for informational use only by the person authorized to request the report. Intentional disclosure of the report or data to someone not authorized to obtain the data is a Class B Misdemeanor.      Report Restrictions - A practitioner or pharmacist may share the report with the patient or person authorized to act on the patient's behalf and place the report in the patient's medical record, with the report then being deemed a medical record subject to the same disclosure terms and conditions as an ordinary medical record. ([place] 218[place].202)           Patient is agreeable to call the office with any questions, concerns, or worsening of symptoms. Patient is aware to call 911 or go to the nearest ER should begin having any thoughts to harm self or others.           Follow up in two months          Errors in dictation may reflect use of voice recognition software and not all errors in transcription may have been detected prior to signing.              This document has been electronically signed by NIDA Vasquez   June 13, 2024 10:47 EDT

## 2024-06-13 NOTE — TELEPHONE ENCOUNTER
Caller: KRISTIN    Relationship:     Best call back number: [phone]    What form or medical record are you requesting: MEDICATION LIST     Who is requesting this form or medical record from you: [facility]    How would you like to receive the form or medical records (pick-up, mail, fax): FAX  If fax, what is the fax number: [phone]

## 2024-06-18 ENCOUNTER — TELEPHONE (OUTPATIENT)
Dept: FAMILY MEDICINE CLINIC | Facility: CLINIC | Age: 49
End: 2024-06-18

## 2024-06-18 NOTE — TELEPHONE ENCOUNTER
Caller: NEGRITO    Relationship: [facility] [facility]    Best call back number:  [phone]    What orders are you requesting (i.e. lab or imaging): CHUCKS, GLOVES AND PADS.    In what timeframe would the patient need to come in: ASAP    Where will you receive your lab/imaging services: HOME [facility]    Additional notes: PATIENT NEEDS CHUCKS, PADS AND GLOVES ON  NUMBER ON FORM THAT WAS FAXED TO US 3 TIMES ALREADY.  PLEASE ADVISE NEGRITO IF YOU HAVE/HAVEN'T RECEIVED THE FORM.   THANK YOU

## 2024-06-19 ENCOUNTER — INFUSION (OUTPATIENT)
Dept: ONCOLOGY | Facility: HOSPITAL | Age: 49
End: 2024-06-19
Payer: MEDICARE

## 2024-06-19 VITALS
RESPIRATION RATE: 18 BRPM | BODY MASS INDEX: 27.17 KG/M2 | DIASTOLIC BLOOD PRESSURE: 68 MMHG | TEMPERATURE: 97.1 F | HEART RATE: 84 BPM | OXYGEN SATURATION: 99 % | WEIGHT: 158.4 LBS | SYSTOLIC BLOOD PRESSURE: 95 MMHG

## 2024-06-19 DIAGNOSIS — Z95.828 PORT-A-CATH IN PLACE: Primary | ICD-10-CM

## 2024-06-19 PROCEDURE — 25010000002 HEPARIN LOCK FLUSH PER 10 UNITS: Performed by: INTERNAL MEDICINE

## 2024-06-19 PROCEDURE — 96523 IRRIG DRUG DELIVERY DEVICE: CPT

## 2024-06-19 RX ORDER — HEPARIN SODIUM (PORCINE) LOCK FLUSH IV SOLN 100 UNIT/ML 100 UNIT/ML
500 SOLUTION INTRAVENOUS AS NEEDED
OUTPATIENT
Start: 2024-06-19

## 2024-06-19 RX ORDER — SODIUM CHLORIDE 0.9 % (FLUSH) 0.9 %
10 SYRINGE (ML) INJECTION AS NEEDED
Status: DISCONTINUED | OUTPATIENT
Start: 2024-06-19 | End: 2024-06-19 | Stop reason: HOSPADM

## 2024-06-19 RX ORDER — SODIUM CHLORIDE 0.9 % (FLUSH) 0.9 %
10 SYRINGE (ML) INJECTION AS NEEDED
OUTPATIENT
Start: 2024-06-19

## 2024-06-19 RX ORDER — HEPARIN SODIUM (PORCINE) LOCK FLUSH IV SOLN 100 UNIT/ML 100 UNIT/ML
500 SOLUTION INTRAVENOUS AS NEEDED
Status: DISCONTINUED | OUTPATIENT
Start: 2024-06-19 | End: 2024-06-19 | Stop reason: HOSPADM

## 2024-06-19 RX ADMIN — Medication 10 ML: at 10:36

## 2024-06-19 RX ADMIN — HEPARIN 500 UNITS: 100 SYRINGE at 10:36

## 2024-06-27 DIAGNOSIS — E13.9 DIABETES MELLITUS TYPE 1.5, MANAGED AS TYPE 1: Chronic | ICD-10-CM

## 2024-06-27 RX ORDER — LANCETS
1 EACH MISCELLANEOUS 3 TIMES DAILY
Qty: 102 EACH | Refills: 5 | Status: SHIPPED | OUTPATIENT
Start: 2024-06-27

## 2024-07-02 DIAGNOSIS — K59.03 DRUG-INDUCED CONSTIPATION: ICD-10-CM

## 2024-07-02 DIAGNOSIS — E11.42 DM TYPE 2 WITH DIABETIC PERIPHERAL NEUROPATHY: Chronic | ICD-10-CM

## 2024-07-02 DIAGNOSIS — K21.9 GASTROESOPHAGEAL REFLUX DISEASE WITHOUT ESOPHAGITIS: Chronic | ICD-10-CM

## 2024-07-03 ENCOUNTER — TELEPHONE (OUTPATIENT)
Dept: FAMILY MEDICINE CLINIC | Facility: CLINIC | Age: 49
End: 2024-07-03

## 2024-07-03 RX ORDER — LANSOPRAZOLE 30 MG/1
CAPSULE, DELAYED RELEASE ORAL
Qty: 90 CAPSULE | Refills: 0 | Status: SHIPPED | OUTPATIENT
Start: 2024-07-03

## 2024-07-03 RX ORDER — LINACLOTIDE 145 UG/1
CAPSULE, GELATIN COATED ORAL
Qty: 30 CAPSULE | Refills: 1 | Status: SHIPPED | OUTPATIENT
Start: 2024-07-03

## 2024-07-03 RX ORDER — EMPAGLIFLOZIN AND METFORMIN HYDROCHLORIDE 12.5; 1 MG/1; MG/1
1 TABLET ORAL EVERY MORNING
Qty: 30 TABLET | Refills: 1 | Status: SHIPPED | OUTPATIENT
Start: 2024-07-03

## 2024-07-03 NOTE — TELEPHONE ENCOUNTER
Caller: MARISOL Langston [facility]    Relationship:     Best call back number: [phone]    OPTION 4     What form or medical record are you requesting:     FORM FOR PUMP SUPPLIES    How would you like to receive the form or medical records (pick-up, mail, fax): FAX  If fax, what is the fax number:     [phone]    Additional notes:     ICD 10 CODE IS NOT VALID.  THEY NEED AN E-10 OR E-11    THEY ARE SENDING A NEW FORM

## 2024-07-05 RX ORDER — BLOOD-GLUCOSE SENSOR
EACH MISCELLANEOUS
Qty: 5 EACH | Refills: 12 | Status: SHIPPED | OUTPATIENT
Start: 2024-07-05

## 2024-07-22 DIAGNOSIS — F33.1 MODERATE EPISODE OF RECURRENT MAJOR DEPRESSIVE DISORDER: ICD-10-CM

## 2024-07-22 DIAGNOSIS — M54.81 OCCIPITAL NEURALGIA OF RIGHT SIDE: Chronic | ICD-10-CM

## 2024-07-22 DIAGNOSIS — F41.1 GENERALIZED ANXIETY DISORDER: ICD-10-CM

## 2024-07-22 RX ORDER — VENLAFAXINE HYDROCHLORIDE 75 MG/1
CAPSULE, EXTENDED RELEASE ORAL
Qty: 90 CAPSULE | Refills: 1 | Status: SHIPPED | OUTPATIENT
Start: 2024-07-22

## 2024-07-22 RX ORDER — TOPIRAMATE 50 MG/1
TABLET, FILM COATED ORAL
Qty: 60 TABLET | Refills: 3 | Status: SHIPPED | OUTPATIENT
Start: 2024-07-22

## 2024-07-26 DIAGNOSIS — E11.42 DM TYPE 2 WITH DIABETIC PERIPHERAL NEUROPATHY: Chronic | ICD-10-CM

## 2024-07-26 RX ORDER — SEMAGLUTIDE 1.34 MG/ML
INJECTION, SOLUTION SUBCUTANEOUS
Qty: 3 ML | Refills: 0 | Status: SHIPPED | OUTPATIENT
Start: 2024-07-26

## 2024-07-31 ENCOUNTER — LAB (OUTPATIENT)
Dept: ONCOLOGY | Facility: HOSPITAL | Age: 49
End: 2024-07-31
Payer: MEDICARE

## 2024-07-31 ENCOUNTER — INFUSION (OUTPATIENT)
Dept: ONCOLOGY | Facility: HOSPITAL | Age: 49
End: 2024-07-31
Payer: MEDICARE

## 2024-07-31 ENCOUNTER — OFFICE VISIT (OUTPATIENT)
Dept: ONCOLOGY | Facility: CLINIC | Age: 49
End: 2024-07-31
Payer: MEDICARE

## 2024-07-31 VITALS
WEIGHT: 158.9 LBS | BODY MASS INDEX: 27.13 KG/M2 | HEIGHT: 64 IN | DIASTOLIC BLOOD PRESSURE: 76 MMHG | SYSTOLIC BLOOD PRESSURE: 105 MMHG | TEMPERATURE: 97.2 F | HEART RATE: 80 BPM | OXYGEN SATURATION: 99 % | RESPIRATION RATE: 16 BRPM

## 2024-07-31 VITALS
WEIGHT: 158.6 LBS | SYSTOLIC BLOOD PRESSURE: 91 MMHG | TEMPERATURE: 97.1 F | DIASTOLIC BLOOD PRESSURE: 62 MMHG | BODY MASS INDEX: 27.21 KG/M2 | HEART RATE: 91 BPM | RESPIRATION RATE: 18 BRPM | OXYGEN SATURATION: 98 %

## 2024-07-31 DIAGNOSIS — R53.83 FATIGUE, UNSPECIFIED TYPE: ICD-10-CM

## 2024-07-31 DIAGNOSIS — E53.8 FOLATE DEFICIENCY: ICD-10-CM

## 2024-07-31 DIAGNOSIS — E53.8 VITAMIN B 12 DEFICIENCY: ICD-10-CM

## 2024-07-31 DIAGNOSIS — Z17.1 MALIGNANT NEOPLASM OF UPPER-OUTER QUADRANT OF RIGHT BREAST IN FEMALE, ESTROGEN RECEPTOR NEGATIVE: ICD-10-CM

## 2024-07-31 DIAGNOSIS — D64.9 ANEMIA, UNSPECIFIED TYPE: ICD-10-CM

## 2024-07-31 DIAGNOSIS — C50.411 MALIGNANT NEOPLASM OF UPPER-OUTER QUADRANT OF RIGHT BREAST IN FEMALE, ESTROGEN RECEPTOR NEGATIVE: ICD-10-CM

## 2024-07-31 DIAGNOSIS — Z17.1 MALIGNANT NEOPLASM OF UPPER-OUTER QUADRANT OF RIGHT BREAST IN FEMALE, ESTROGEN RECEPTOR NEGATIVE: Primary | ICD-10-CM

## 2024-07-31 DIAGNOSIS — C50.411 MALIGNANT NEOPLASM OF UPPER-OUTER QUADRANT OF RIGHT BREAST IN FEMALE, ESTROGEN RECEPTOR NEGATIVE: Primary | ICD-10-CM

## 2024-07-31 DIAGNOSIS — E55.9 VITAMIN D DEFICIENCY: ICD-10-CM

## 2024-07-31 DIAGNOSIS — Z95.828 PORT-A-CATH IN PLACE: Primary | ICD-10-CM

## 2024-07-31 LAB
25(OH)D3 SERPL-MCNC: 73.8 NG/ML (ref 30–100)
ALBUMIN SERPL-MCNC: 4.3 G/DL (ref 3.5–5.2)
ALBUMIN/GLOB SERPL: 1.7 G/DL
ALP SERPL-CCNC: 67 U/L (ref 39–117)
ALT SERPL W P-5'-P-CCNC: 7 U/L (ref 1–33)
ANION GAP SERPL CALCULATED.3IONS-SCNC: 9.8 MMOL/L (ref 5–15)
AST SERPL-CCNC: 13 U/L (ref 1–32)
BASOPHILS # BLD AUTO: 0.07 10*3/MM3 (ref 0–0.2)
BASOPHILS NFR BLD AUTO: 0.7 % (ref 0–1.5)
BILIRUB SERPL-MCNC: 0.5 MG/DL (ref 0–1.2)
BUN SERPL-MCNC: 14 MG/DL (ref 6–20)
BUN/CREAT SERPL: 17.9 (ref 7–25)
CALCIUM SPEC-SCNC: 9 MG/DL (ref 8.6–10.5)
CHLORIDE SERPL-SCNC: 110 MMOL/L (ref 98–107)
CO2 SERPL-SCNC: 22.2 MMOL/L (ref 22–29)
CREAT SERPL-MCNC: 0.78 MG/DL (ref 0.57–1)
DEPRECATED RDW RBC AUTO: 43 FL (ref 37–54)
EGFRCR SERPLBLD CKD-EPI 2021: 92.1 ML/MIN/1.73
EOSINOPHIL # BLD AUTO: 0.13 10*3/MM3 (ref 0–0.4)
EOSINOPHIL NFR BLD AUTO: 1.2 % (ref 0.3–6.2)
ERYTHROCYTE [DISTWIDTH] IN BLOOD BY AUTOMATED COUNT: 13.2 % (ref 12.3–15.4)
FERRITIN SERPL-MCNC: 111.7 NG/ML (ref 13–150)
FOLATE SERPL-MCNC: 15.1 NG/ML (ref 4.78–24.2)
GLOBULIN UR ELPH-MCNC: 2.6 GM/DL
GLUCOSE SERPL-MCNC: 112 MG/DL (ref 65–99)
HCT VFR BLD AUTO: 41.9 % (ref 34–46.6)
HGB BLD-MCNC: 13.8 G/DL (ref 12–15.9)
IMM GRANULOCYTES # BLD AUTO: 0.02 10*3/MM3 (ref 0–0.05)
IMM GRANULOCYTES NFR BLD AUTO: 0.2 % (ref 0–0.5)
IRON 24H UR-MRATE: 105 MCG/DL (ref 37–145)
IRON SATN MFR SERPL: 30 % (ref 20–50)
LYMPHOCYTES # BLD AUTO: 4.45 10*3/MM3 (ref 0.7–3.1)
LYMPHOCYTES NFR BLD AUTO: 41.6 % (ref 19.6–45.3)
MCH RBC QN AUTO: 29 PG (ref 26.6–33)
MCHC RBC AUTO-ENTMCNC: 32.9 G/DL (ref 31.5–35.7)
MCV RBC AUTO: 88 FL (ref 79–97)
MONOCYTES # BLD AUTO: 0.55 10*3/MM3 (ref 0.1–0.9)
MONOCYTES NFR BLD AUTO: 5.1 % (ref 5–12)
NEUTROPHILS NFR BLD AUTO: 5.47 10*3/MM3 (ref 1.7–7)
NEUTROPHILS NFR BLD AUTO: 51.2 % (ref 42.7–76)
NRBC BLD AUTO-RTO: 0 /100 WBC (ref 0–0.2)
PLATELET # BLD AUTO: 251 10*3/MM3 (ref 140–450)
PMV BLD AUTO: 9.7 FL (ref 6–12)
POTASSIUM SERPL-SCNC: 3.9 MMOL/L (ref 3.5–5.2)
PROT SERPL-MCNC: 6.9 G/DL (ref 6–8.5)
RBC # BLD AUTO: 4.76 10*6/MM3 (ref 3.77–5.28)
SODIUM SERPL-SCNC: 142 MMOL/L (ref 136–145)
TIBC SERPL-MCNC: 352 MCG/DL (ref 298–536)
TRANSFERRIN SERPL-MCNC: 236 MG/DL (ref 200–360)
TSH SERPL DL<=0.05 MIU/L-ACNC: 3 UIU/ML (ref 0.27–4.2)
VIT B12 BLD-MCNC: 360 PG/ML (ref 211–946)
WBC NRBC COR # BLD AUTO: 10.69 10*3/MM3 (ref 3.4–10.8)

## 2024-07-31 PROCEDURE — 82728 ASSAY OF FERRITIN: CPT

## 2024-07-31 PROCEDURE — 82746 ASSAY OF FOLIC ACID SERUM: CPT

## 2024-07-31 PROCEDURE — 82306 VITAMIN D 25 HYDROXY: CPT

## 2024-07-31 PROCEDURE — 99214 OFFICE O/P EST MOD 30 MIN: CPT | Performed by: NURSE PRACTITIONER

## 2024-07-31 PROCEDURE — 84443 ASSAY THYROID STIM HORMONE: CPT

## 2024-07-31 PROCEDURE — 1159F MED LIST DOCD IN RCRD: CPT | Performed by: NURSE PRACTITIONER

## 2024-07-31 PROCEDURE — 80053 COMPREHEN METABOLIC PANEL: CPT

## 2024-07-31 PROCEDURE — 36591 DRAW BLOOD OFF VENOUS DEVICE: CPT

## 2024-07-31 PROCEDURE — 1160F RVW MEDS BY RX/DR IN RCRD: CPT | Performed by: NURSE PRACTITIONER

## 2024-07-31 PROCEDURE — 85025 COMPLETE CBC W/AUTO DIFF WBC: CPT

## 2024-07-31 PROCEDURE — 82607 VITAMIN B-12: CPT

## 2024-07-31 PROCEDURE — 1125F AMNT PAIN NOTED PAIN PRSNT: CPT | Performed by: NURSE PRACTITIONER

## 2024-07-31 PROCEDURE — 25010000002 HEPARIN LOCK FLUSH PER 10 UNITS: Performed by: INTERNAL MEDICINE

## 2024-07-31 PROCEDURE — 84466 ASSAY OF TRANSFERRIN: CPT

## 2024-07-31 PROCEDURE — 83540 ASSAY OF IRON: CPT

## 2024-07-31 RX ORDER — HEPARIN SODIUM (PORCINE) LOCK FLUSH IV SOLN 100 UNIT/ML 100 UNIT/ML
500 SOLUTION INTRAVENOUS AS NEEDED
OUTPATIENT
Start: 2024-07-31

## 2024-07-31 RX ORDER — SODIUM CHLORIDE 0.9 % (FLUSH) 0.9 %
10 SYRINGE (ML) INJECTION AS NEEDED
OUTPATIENT
Start: 2024-07-31

## 2024-07-31 RX ORDER — HEPARIN SODIUM (PORCINE) LOCK FLUSH IV SOLN 100 UNIT/ML 100 UNIT/ML
500 SOLUTION INTRAVENOUS AS NEEDED
Status: DISCONTINUED | OUTPATIENT
Start: 2024-07-31 | End: 2024-07-31 | Stop reason: HOSPADM

## 2024-07-31 RX ORDER — SODIUM CHLORIDE 0.9 % (FLUSH) 0.9 %
10 SYRINGE (ML) INJECTION AS NEEDED
Status: DISCONTINUED | OUTPATIENT
Start: 2024-07-31 | End: 2024-07-31 | Stop reason: HOSPADM

## 2024-07-31 RX ADMIN — HEPARIN 500 UNITS: 100 SYRINGE at 08:52

## 2024-07-31 RX ADMIN — Medication 10 ML: at 08:52

## 2024-07-31 NOTE — PROGRESS NOTES
NAME: Nivia Bernstein    : 1975    DATE:  2024    DIAGNOSIS:    Stage IA ([id number]) moderately differentiated invasive ductal carcinoma of the R breast with associated DCIS.  Tumor was 10 mm in maximal dimension.  0/10 SLN involved. ER negative, [place] 15% + (1+), HER-2/cat negative. Mammaprint high risk.    2. Pernicious Anemia    3. Folate Deficiency    CHIEF COMPLAINT:  Follow up Breast Cancer    TREATMENT HISTORY:  1. Initially planned to give 4 cycles to Taxotere/Cytoxan, during 1st infusion of Taxotere on 2020 patient developed allergic reaction. Therefore, Taxotere was discontinued and regimen changed to DD AC.    2.      3.  Started Tamoxifen 12-    HISTORY OF PRESENT ILLNESS:   Nivia Bernstein is a very pleasant 49 y.o. female who who is being seen today at the request of Sheila Garza MD for evaluation and treatment of breast cancer. She did not notice a palpable lump, but was recommended to have a screening mammogram by her PCP. A nodule in the right upper quadrant of the R breast was noted. The mass measured 0.8 cm on ultrasound. Biopsy was consistent with a moderately differentiated invasive ductal carcinoma, ER negative, SC weakly (15%) positive, and HER-2/cat negative. She underwent bilateral mastectomy with sentinel lymph node biopsy with Dr. Garza on 20. Pathology from this showed a moderately differentiated invasive ductal carcinoma with associated intermediate grade DCIS, negative margins.  0/10 /SLN involved.  Mammaprint was high risk. She was referred to our clinic and is here today with her  for discussion of further treatment options.      Ms. Bernstein is healing well from the surgery.  She did develop a seroma, which was drained by Dr. Garza yesterday. She reports muscular chest discomfort r/t the procedure, but otherwise denies any other symptoms including fevers, chills, significant weight changes, shortness of breath, abdominal pain, n/v/d/c, bony pain or  headaches.    Interval History:  Ms. Bernstein presents today for follow up of breast cancer. Overall, she reports that she has been doing well since her last visit. She takes Tamoxifen daily and is tolerating this well without any noticeable side effects. She continues to take B12 injections monthly and Folic Acid daily. She continues to follow with psychiatry. She is without any specific complaints today.             PAST MEDICAL HISTORY:  Past Medical History:   Diagnosis Date    Altered mental status 10/16/2017    Anxiety and depression 3/31/2017    Arthritis     Asthma     Elevated alkaline phosphatase level 10/16/2017    Enlarged liver     GERD (gastroesophageal reflux disease)     Heart murmur     Hypokalemia 10/16/2017    Mitral valve prolapse     Neuropathy     related to diabetes    Obesity 3/31/2017    OCD (obsessive compulsive disorder) 10/16/2017    Osteoporosis     PONV (postoperative nausea and vomiting)     Renal insufficiency 10/16/2017    Right breast cancer with malignant cells in regional lymph nodes no greater than 0.2 mm and no more than 200 cells 8/13/2020    TIA (transient ischemic attack)     4 TIMES       PAST SURGICAL HISTORY:  Past Surgical History:   Procedure Laterality Date    APPENDECTOMY      CARPAL TUNNEL RELEASE      CHOLECYSTECTOMY      COLONOSCOPY N/A 5/5/2021    Procedure: COLONOSCOPY WITH BIOPSY;  Surgeon: Vickie Herrera MD;  Location: [facility];  Service: Gastroenterology;  Laterality: N/A;    FINGER FUSION Left     3rd    HYSTERECTOMY  2011    removed due to an ovarian cyst and dysmenorrhia. No cancer noted. Complete    KNEE ARTHROSCOPY Right     MASTECTOMY Left 8/18/2020    Procedure: Left mastectomy;  Surgeon: Sheila Garza MD;  Location: [facility];  Service: General;  Laterality: Left;    MASTECTOMY WITH SENTINEL NODE BIOPSY AND AXILLARY NODE DISSECTION Right 8/18/2020    Procedure: Right BREAST MASTECTOMY WITH SENTINEL NODE BIOPSY;  Surgeon: Sheila Garza  MD DONNY;  Location: [facility];  Service: General;  Laterality: Right;    PORTACATH PLACEMENT         SOCIAL HISTORY:  Social History     Socioeconomic History    Marital status:    Tobacco Use    Smoking status: Never     Passive exposure: Never    Smokeless tobacco: Never   Vaping Use    Vaping status: Never Used   Substance and Sexual Activity    Alcohol use: No    Drug use: No    Sexual activity: Yes     Partners: Male         FAMILY HISTORY:  Family History   Problem Relation Age of Onset    Diabetes Mother     Hypertension Mother     Diabetes Father     Heart disease Father     Alcohol abuse Father     Seizures Father     Hypertension Father     No Known Problems Brother     No Known Problems Daughter     Bone cancer Son     Suicide Attempts Cousin     Stomach cancer Cousin         maternal first cousin    Breast cancer Paternal Aunt 52    Diabetes Maternal Grandmother     Diabetes Maternal Grandfather     Lung cancer Maternal Grandfather     Heart disease Paternal Grandmother     Diabetes Paternal Grandmother     Heart disease Paternal Grandfather     Diabetes Paternal Grandfather     Ovarian cancer Maternal Aunt     Lung cancer Maternal Uncle     Colon cancer Maternal Uncle     Cancer Maternal Uncle         unknown type     MEDICATIONS:  The current medication list was reviewed in the EMR    Current Outpatient Medications:     Accu-Chek FastClix Lancets misc, USE TO test blood sugar THREE TIMES DAILY, Disp: 102 each, Rfl: 5    albuterol sulfate  (90 Base) MCG/ACT inhaler, INHALE TWO PUFFS BY MOUTH EVERY FOUR HOURS HOURS AS NEEDED FOR SHORTNESS OF BREATH, Disp: 18 g, Rfl: 5    alendronate (FOSAMAX) 35 MG tablet, TAKE ONE TABLET BY MOUTH EVERY 7 DAYS WITH WATER 30 MINUTES BEFORE THE FIRST FOOD, DRINK, OR MEDICATION AND AVOID LYING DOWN 30 MINUTES AFTER TAKING, Disp: 4 tablet, Rfl: 3    ALPRAZolam (XANAX) 1 MG tablet, Take 1 tablet by mouth 2 (Two) Times a Day As Needed for Anxiety. for anxiety,  Disp: 60 tablet, Rfl: 0    Aspirin Low Dose 81 MG EC tablet, TAKE TWO TABLETS BY MOUTH EVERY DAY, Disp: 60 tablet, Rfl: 4    atorvastatin (LIPITOR) 40 MG tablet, TAKE ONE TABLET BY MOUTH EVERY night, Disp: 90 tablet, Rfl: 0    azelastine (ASTELIN) 0.1 % nasal spray, Use in each nostril as directed, Disp: 30 mL, Rfl: 3    Calcium Carb-Cholecalciferol (Calcium+D3) 500-15 MG-MCG tablet, Take 1 tablet by mouth 2 (Two) Times a Day With Meals., Disp: 60 tablet, Rfl: 3    cloNIDine (CATAPRES) 0.1 MG tablet, TAKE ONE TABLET BY MOUTH TWICE DAILY, Disp: 60 tablet, Rfl: 2    Continuous Glucose Sensor (Guardian 4 Glucose Sensor) misc, CHANGE sensor every 5-7 DAYS as directed, Disp: 5 each, Rfl: 12    cyanocobalamin 1000 MCG/ML injection, Inject 1 mL under the skin into the appropriate area as directed Every 30 (Thirty) Days., Disp: 1 mL, Rfl: 5    dapagliflozin (FARXIGA) 5 MG tablet tablet, Take 1 tablet by mouth Every Morning., Disp: 30 tablet, Rfl: 2    Emgality 120 MG/ML auto-injector pen, INJECT 120mg ONCE MONTHLY, Disp: , Rfl:     Empagliflozin-metFORMIN HCl (Synjardy) 12.5-1000 MG tablet, TAKE ONE TABLET BY MOUTH EVERY MORNING, Disp: 30 tablet, Rfl: 1    fluconazole (Diflucan) 150 MG tablet, Take 1 tablet by mouth Daily., Disp: 2 tablet, Rfl: 0    Fluticasone Furoate-Vilanterol (Breo Ellipta) 200-25 MCG/ACT inhaler, Inhale 1 puff Daily., Disp: 1 each, Rfl: 3    folic acid (FOLVITE) 1 MG tablet, Take 1 tablet by mouth Daily., Disp: 90 tablet, Rfl: 3    furosemide (LASIX) 20 MG tablet, Take 0.5 tablets by mouth Daily., Disp: 30 tablet, Rfl: 0    gabapentin (NEURONTIN) 100 MG capsule, TAKE ONE CAPSULE BY MOUTH TWICE DAILY, Disp: 60 capsule, Rfl: 2    Glucagon (Gvoke HypoPen 2-Pack) 1 MG/0.2ML solution auto-injector, Inject 1 mg under the skin into the appropriate area as directed As Needed (Severe Hypoglycemia)., Disp: 0.4 mL, Rfl: 3    glucose blood (Accu-Chek Guide) test strip, USE TO TEST BLOOD GLUCOSE THREE TIMES DAILY,  Disp: 100 each, Rfl: 3    Insulin Lispro (humaLOG) 100 UNIT/ML injection, INJECT AS DIRECTED. MAX DAILY DOSE  UNITS DAILY, Disp: 40 mL, Rfl: 3    ipratropium-albuterol (DUO-NEB) 0.5-2.5 mg/3 ml nebulizer, Take 3 mL by nebulization Every 4 (Four) Hours As Needed for Shortness of Air., Disp: 150 mL, Rfl: 1    lansoprazole (PREVACID) 30 MG capsule, TAKE ONE CAPSULE BY MOUTH EVERY DAY, Disp: 90 capsule, Rfl: 0    levocetirizine (XYZAL) 5 MG tablet, TAKE ONE TABLET BY MOUTH EVERY EVENING, Disp: 30 tablet, Rfl: 0    linaclotide (Linzess) 145 MCG capsule capsule, TAKE ONE CAPSULE BY MOUTH EVERY DAY 30 MINUTES BEFORE A MEAL ON AN EMPTY STOMACH, Disp: 30 capsule, Rfl: 1    ondansetron (ZOFRAN) 8 MG tablet, TAKE 1 TABLET BY MOUTH EVERY 8 HOURS AS NEEDED FOR NAUSEA OR VOMITING, Disp: 30 tablet, Rfl: 3    Ozempic, 1 MG/DOSE, 4 MG/3ML solution pen-injector, INJECT 1 MG SUBCUTANEOUSLY WEEKLY, Disp: 3 mL, Rfl: 0    Respiratory Therapy Supplies (Nebulizer/Tubing/Mouthpiece) kit, 1 each Take As Directed., Disp: 1 each, Rfl: 1    tamoxifen (NOLVADEX) 20 MG chemo tablet, Take 1 tablet by mouth Daily., Disp: 30 tablet, Rfl: 11    topiramate (TOPAMAX) 50 MG tablet, TAKE ONE TABLET BY MOUTH TWICE DAILY, Disp: 60 tablet, Rfl: 3    ubrogepant 100 MG tablet, , Disp: , Rfl:     venlafaxine XR (EFFEXOR-XR) 75 MG 24 hr capsule, TAKE THREE CAPSULE BY MOUTH ONCE DAILY, Disp: 90 capsule, Rfl: 1    vitamin D (ERGOCALCIFEROL) 1.25 MG (24270 UT) capsule capsule, Take 1 capsule by mouth 1 (One) Time Per Week., Disp: 4 capsule, Rfl: 3  No current facility-administered medications for this visit.    Facility-Administered Medications Ordered in Other Visits:     heparin injection 500 Units, 500 Units, Intravenous, PRN, Dejah Loco MD, 500 Units at 07/31/24 0852    sodium chloride 0.9 % flush 10 mL, 10 mL, Intravenous, PRN, Dejah Loco MD, 10 mL at 07/31/24 2388    ALLERGIES:    Allergies   Allergen Reactions    Imipramine Other (See  "Comments)     Chest pain    Mobic [Meloxicam] Rash    Penicillins Angioedema    Taxotere [Docetaxel] Anaphylaxis     9 minutes into 1st infusion    Novolog [Insulin Aspart] Hives    Rosuvastatin Calcium GI Intolerance     REVIEW OF SYSTEMS:    A comprehensive 14 point review of systems was performed.  Significant findings as mentioned above.  All other systems reviewed and are negative.      PHYSICAL EXAM:  Vitals:    07/31/24 0955   BP: 105/76   Pulse: 80   Resp: 16   Temp: 97.2 °F (36.2 °C)   TempSrc: Temporal   SpO2: 99%   Weight: 72.1 kg (158 lb 14.4 oz)   Height: 162.6 cm (64\")   PainSc:   7     Body surface area is 1.77 meters squared.  Body mass index is 27.28 kg/m².  ECOG score: 0   General: Awake, alert and oriented, in no acute distress.   HEENT:  PERRLA, EOM full, OP clear, mucous membranes moist  Neck: No thyromegaly. No JVD.   Lungs: Lungs are clear to auscultation bilaterally, no crackles  Heart:  Regular rate and rhythm. No murmurs, rubs, or gallops.   Breast: S/p bilateral mastectomy and R ALNBx.  Surgical scars smooth without nodularity.  She has no axillary lymphadenopathy on either side.  Abdomen: Soft, Non tender, non-distended, bowel sounds present.  No palpable organomegaly or masses..  Diabetic monitoring device in place.  Extremities: No clubbing, cyanosis, no lower extremity edema.    Neurologic: MS as above. Grossly non focal exam    PATHOLOGY:              IMAGING:  Bilateral screening mammogram 06-19-20  FINDINGS:    The breast tissue is heterogeneously dense.  New focal nodularity right upper outer breast that is about 14 cm from  the nipple.  Otherwise, no suspicious findings.     IMPRESSION:  New focal nodularity right upper outer breast that is about  14 cm from the nipple.     RECOMMENDATION:  Spot compression imaging.     BI-RADS CAT 0, INCOMPLETE.  The patient needs additional imaging.        Diagnostic R mammogram with R breast US 07-14-20  FINDINGS:  Additional mammographic " imaging images of persistent  partially obscured oval mass in the posterior right upper outer  quadrant.     Right breast ultrasound: Focused sonographic evaluation of the right  breast demonstrates a 0.8 cm irregular hypoechoic mass with ill-defined  borders located at 10:00, 13 cm from the nipple. An additional area was  initially noted by the technologist in the 9:00 region which represents  an isolated fat lobule.     IMPRESSION:  0.8 cm irregular mass in the right 10:00 region. Recommend  ultrasound-guided core biopsy.     BI-RADS CATEGORY:  4, SUSPICIOUS     RECOMMENDATION:  Recommend ultrasound guided core biopsy of the right  breast.        Bilateral breast MRI w/wo contrast 08-11-20  FINDINGS: There is minimal bilateral background contrast enhancement and  normal vascular enhancement.     There are no worrisome areas of contrast enhancement in the left breast.     In the right breast correlating to the biopsy proven malignancy is an  approximately 0.7 cm irregular rapidly enhancing mass in the posterior  right 10:00 region. Artifact from a postbiopsy marking clip is located  adjacent to this mass. A small linear area of enhancement extends  posterior to this mass approximately 1 cm. There are no additional  worrisome areas of contrast enhancement in the right breast.     There is no axillary adenopathy by MRI criteria and no chest wall  abnormality is seen.     IMPRESSION:  1. Negative left breast MRI.     2. Biopsy-proven malignancy in the right 10:00 region with a small  linear area of enhancement extending posterior to the 0.7 cm mass.     RECOMMENDATIONS: Recommend surgical follow-up.     BI-RADS CATEGORY: 6, KNOWN BIOPSY PROVEN MALIGNANCY    Echo 10-16-17:  A two-dimensional transthoracic echocardiogram with color flow and Doppler was performed.   The study is technically good for diagnosis.Verbal consent was obtained from the patient to use saline contrast in order to optimize the study.  A total of  20 mL of agitated saline was administered to assess for cardiac shunting.   No adverse reaction to contrast was noted.   Echocardiogram Findings    Left Ventricle Left ventricular systolic function is normal. Estimated EF appears to be in the range of 56 - 60%. Normal left ventricular cavity size and wall thickness noted. All left ventricular wall segments contract normally. Left ventricular diastolic function is normal.   Right Ventricle Normal right ventricular cavity size noted.   Left Atrium Normal left atrial size noted. Saline test results are negative.   Right Atrium Normal right atrial size noted.   Aortic Valve The aortic valve is structurally normal. Trace aortic valve regurgitation is present.   Mitral Valve The mitral valve is normal in structure. Trace mitral valve regurgitation is present.   Tricuspid Valve The tricuspid valve is normal.  Trace tricuspid valve regurgitation is present.   Pulmonic Valve The pulmonic valve is structurally normal. There is trace pulmonic valve regurgitation present.   Greater Vessels No dilation of the aortic root is present. No dilation of the sinuses of Valsalva is present.   Pericardium There is no evidence of pericardial effusion.       ECHO 09/29/2020  Poor quality echo and Doppler study  Normal left ventricular cavity size and wall thickness noted.  Left ventricular ejection fraction appears to be 61 - 65%. Left ventricular systolic function is normal.  Left ventricular diastolic function is consistent with (grade I) impaired relaxation.  Aortic and mitral valve are poorly visualized but appear to be normal,  Tricuspid and pulmonic valve echoes are not visualized.  There is no pericardial effusion noted.    Echocardiogram 12-08-02  Normal left ventricular cavity size and wall thickness noted. All left ventricular wall segments contract normally.  Left ventricular ejection fraction appears to be 56 - 60%.  The pericardium is normal. There is no evidence of  pericardial effusion. .       MRI Brain w/wo contrast 01-28-21  FINDINGS:    Brain:  Unremarkable.  No mass.  No hemorrhage.  No acute infarct.    Ventricles:  Unremarkable.  No ventriculomegaly.    Bones/joints:  Unremarkable.    Sinuses:  Unremarkable as visualized.  No acute sinusitis.    Mastoid air cells:  Unremarkable as visualized.  No mastoid effusion.    Orbits:  Unremarkable as visualized.     IMPRESSION:    No acute findings in the head/brain.    ENDOSCOPY:    COLONOSCOPY PROCEDURE NOTE   5/5/2021 Dr. Aguilera       Findings:  1.  Normal colonoscopy with small internal hemorrhoids.  2.  Normal terminal ileum     OPERATIVE PROCEDURE   After proper informed consent was obtained, the patient was taken the operating suite and placed in left lateral decubitus position.  An [employer] video colonoscope 180 series was inserted in the rectum and advanced to the terminal ileum under direct visualization.  Cecum and terminal ileum were identified by visualization of the appendiceal orifice and ileocecal valve.  The colonoscope was then slowly withdrawn from the cecum to the rectum and passed a second time from rectum to cecum.  The colonoscope was retroflexed in the cecum and rectum. Scope was then withdrawn. Patient tolerated the procedure well. There were no immediate complications. Cecal withdrawal time was 9 minutes.     RECOMMENDATIONS:  1.  Repeat colonoscopy in 10 years for screening purposes.  2.  Continue Linzess.  3.  Anusol HC suppositories as needed for rectal bleeding.    RECENT LABS:  Lab Results   Component Value Date    WBC 10.69 07/31/2024    HGB 13.8 07/31/2024    HCT 41.9 07/31/2024    MCV 88.0 07/31/2024    RDW 13.2 07/31/2024     07/31/2024    NEUTRORELPCT 51.2 07/31/2024    LYMPHORELPCT 41.6 07/31/2024    MONORELPCT 5.1 07/31/2024    EOSRELPCT 1.2 07/31/2024    BASORELPCT 0.7 07/31/2024    NEUTROABS 5.47 07/31/2024    LYMPHSABS 4.45 (H) 07/31/2024       Lab Results   Component Value Date      07/31/2024    K 3.9 07/31/2024    CO2 22.2 07/31/2024     (H) 07/31/2024    BUN 14 07/31/2024    CREATININE 0.78 07/31/2024    EGFRIFNONA 89 01/11/2022    EGFRIFAFRI 108 01/11/2022    GLUCOSE 112 (H) 07/31/2024    CALCIUM 9.0 07/31/2024    ALKPHOS 67 07/31/2024    AST 13 07/31/2024    ALT 7 07/31/2024    BILITOT 0.5 07/31/2024    ALBUMIN 4.3 07/31/2024    PROTEINTOT 6.9 07/31/2024    MG 2.6 05/15/2024     Lab Results   Component Value Date    FERRITIN 111.70 07/31/2024    IRON 105 07/31/2024    TIBC 352 07/31/2024    LABIRON 30 07/31/2024    JNSIJHJE92 367 05/10/2024    FOLATE 11.90 05/10/2024     Lab Results   Component Value Date    RETICCTPCT 1.69 05/10/2024     Uric Acid   Date Value Ref Range Status   08/07/2023 3.9 2.4 - 5.7 mg/dL Final        Lab Results   Component Value Date    CAION 1.28 10/16/2017    TSH 3.000 07/31/2024    VBYA21RC 52.9 05/10/2024         ASSESSMENT & PLAN:  Nivia Bernstein is a very pleasant 49 y.o. female with Stage IA ([id number]) moderately differentiated invasive ductal carcinoma of the R breast with associated DCIS.  Tumor was 10 mm in maximal dimension.  0/10 SLN involved. ER negative, [place] 15% + (1+), HER-2/cat negative. Mammaprint high risk.     1. R breast cancer:   -  S/p R mastectomy and SLNBx as well as prophylactic contralateral mastectomy performed by Dr. Garza 8-18-20.  - She healed well from bilateral mastectomy. This was complicated by left seroma, drained by Dr. Garza. Incisions are healed well.   - Given high risk Mammaprint, Dr. Loco recommended adjuvant chemotherapy. Discussed different possibilities for adjuvant therapy. Given high risk Mammaprint but early stage, node negative disease as well as comorbidities, recommended Taxotere/Cytoxan Q21d x 4 cycles with growth factor support. Discussed potential risks, benefits, and side effects and she would like to proceed.   - She had port placed several years ago due to poor peripheral access. This has been  well cared for and maintained.   - C1 Taxotere/Cytoxan was planned for 09/24/2020. Unfortunately, this had to be discontinued due to allergic reaction to Taxotere. Recommended change of treatment to DD AC.   - She tolerated treatment well and aside from fatigue and recovered well. ECHO was essentially unchanged.   -  Her tumor was ER neg but did have 15% 1+ positivity for progesterone receptor.  She is s/p complete hysterectomy and had evidence of osteopenia with T score -1.9 in June 2020.  Given all of this, recommended treatment with Tamoxifen over aromatase inhibitor.   - Exam and labs from today without cause for concern. Will continue Tamoxifen daily.  - Will follow up with MD as scheduled with CBCD, CMP, Iron panel, Ferritin, B12, Folate, Vitamin D and TSH.       2. Extensive family history of malignancy:   - Genetic testing was ordered by Dr. Garza and performed by [facility] with no mutations, specifically including BRCA 1/2, CHKE2, CDH1, PALB2 and others.    3. Anxiety/Depression:  -  She continues to f/u with Psychiatry. She reports chronic fatigue due to poor sleep which she attributes to chronic worry / poor sleep. Encouraged her to speak with Psychiatry about this with next follow up. Also recommended Melatonin gummy as needed.     4. Frozen shoulders Bilaterally:  - She had been seeing Dr. Sheehan of orthopedics which was helpful to her.    5. Anemia due to B12, Folate deficiency:  -  Continue B12 1000 mcg SQ monthly.  -  Continue daily folate suppplement.  -  Iron panel and Ferritin are replete today. B12 and Folate are pending from today.   - Will continue to closely monitor.       6.  Prophylaxis:   -  She received Prevnar 13 vaccine.  -  She had 2023 flu vaccine.  -  M. Uncle had colon cancer at age 50.  Referred to Dr. Aguilera for screening colonoscopy which was unremarkable and repeat exam recommended after 10-year interval.  - She has had COVID vaccine x2.  Recommended fall COVID booster to  her.    7.  Follow up:  - Continue Tamoxifen.  - Continue monthly B12 and Folic Acid daily.   -  Continue to follow with psychiatry.   - With MD as scheduled with CBCD, CMP, Iron panel, Ferritin, B12, Folate, Vitamin D and TSH.       ACO / NII/Other  Quality measures  -  Nivia Bernstein received 2023 flu vaccine.  -  Nivia Bernstein reports a pain score of 7.  Given her pain assessment as noted, treatment options were discussed and the following options were decided upon as a follow-up plan to address the patient's pain: continuation of current treatment plan for pain and referral to Primary Care for assistance in pain treatment guidance.  -  Current outpatient and discharge medications have been reconciled for the patient.  Reviewed by: NIDA Corbin      I spent 30 minutes caring for Nivia on this date of service. This time includes time spent by me in the following activities: preparing for the visit, reviewing tests, performing a medically appropriate examination and/or evaluation, counseling and educating the patient/family/caregiver, referring and communicating with other health care professionals, documenting information in the medical record, independently interpreting results and communicating that information with the patient/family/caregiver, care coordination, ordering medications, obtaining a separately obtained history, and reviewing a separately obtained history.                     Electronically Signed by: NIDA Corbin    CC:   MD Truong Hager Sherelene S, APRN

## 2024-08-05 ENCOUNTER — LAB (OUTPATIENT)
Dept: FAMILY MEDICINE CLINIC | Facility: CLINIC | Age: 49
End: 2024-08-05
Payer: MEDICARE

## 2024-08-05 DIAGNOSIS — Z17.1 MALIGNANT NEOPLASM OF UPPER-OUTER QUADRANT OF RIGHT BREAST IN FEMALE, ESTROGEN RECEPTOR NEGATIVE: ICD-10-CM

## 2024-08-05 DIAGNOSIS — E78.2 MIXED DYSLIPIDEMIA: ICD-10-CM

## 2024-08-05 DIAGNOSIS — F41.1 GENERALIZED ANXIETY DISORDER: ICD-10-CM

## 2024-08-05 DIAGNOSIS — R60.1 GENERALIZED EDEMA: ICD-10-CM

## 2024-08-05 DIAGNOSIS — M85.89 OSTEOPENIA OF MULTIPLE SITES: ICD-10-CM

## 2024-08-05 DIAGNOSIS — R23.2 HOT FLASHES DUE TO TAMOXIFEN: ICD-10-CM

## 2024-08-05 DIAGNOSIS — R23.2 HOT FLASHES: ICD-10-CM

## 2024-08-05 DIAGNOSIS — E53.8 VITAMIN B 12 DEFICIENCY: ICD-10-CM

## 2024-08-05 DIAGNOSIS — K59.03 DRUG-INDUCED CONSTIPATION: ICD-10-CM

## 2024-08-05 DIAGNOSIS — E11.42 DM TYPE 2 WITH DIABETIC PERIPHERAL NEUROPATHY: Primary | ICD-10-CM

## 2024-08-05 DIAGNOSIS — C50.411 MALIGNANT NEOPLASM OF UPPER-OUTER QUADRANT OF RIGHT BREAST IN FEMALE, ESTROGEN RECEPTOR NEGATIVE: ICD-10-CM

## 2024-08-05 DIAGNOSIS — E55.9 VITAMIN D DEFICIENCY: ICD-10-CM

## 2024-08-05 DIAGNOSIS — F33.1 MODERATE EPISODE OF RECURRENT MAJOR DEPRESSIVE DISORDER: ICD-10-CM

## 2024-08-05 DIAGNOSIS — T45.1X5A HOT FLASHES DUE TO TAMOXIFEN: ICD-10-CM

## 2024-08-05 DIAGNOSIS — E78.2 MIXED HYPERLIPIDEMIA: ICD-10-CM

## 2024-08-05 DIAGNOSIS — K21.9 GASTROESOPHAGEAL REFLUX DISEASE WITHOUT ESOPHAGITIS: ICD-10-CM

## 2024-08-05 LAB
25(OH)D3 SERPL-MCNC: 67.6 NG/ML (ref 30–100)
ALBUMIN SERPL-MCNC: 4.2 G/DL (ref 3.5–5.2)
ALBUMIN/GLOB SERPL: 1.6 G/DL
ALP SERPL-CCNC: 81 U/L (ref 39–117)
ALT SERPL W P-5'-P-CCNC: 10 U/L (ref 1–33)
ANION GAP SERPL CALCULATED.3IONS-SCNC: 11.5 MMOL/L (ref 5–15)
AST SERPL-CCNC: 12 U/L (ref 1–32)
BASOPHILS # BLD AUTO: 0.09 10*3/MM3 (ref 0–0.2)
BASOPHILS NFR BLD AUTO: 0.9 % (ref 0–1.5)
BILIRUB SERPL-MCNC: 0.2 MG/DL (ref 0–1.2)
BUN SERPL-MCNC: 12 MG/DL (ref 6–20)
BUN/CREAT SERPL: 14.8 (ref 7–25)
CALCIUM SPEC-SCNC: 9.1 MG/DL (ref 8.6–10.5)
CHLORIDE SERPL-SCNC: 110 MMOL/L (ref 98–107)
CHOLEST SERPL-MCNC: 109 MG/DL (ref 0–200)
CO2 SERPL-SCNC: 21.5 MMOL/L (ref 22–29)
CREAT SERPL-MCNC: 0.81 MG/DL (ref 0.57–1)
DEPRECATED RDW RBC AUTO: 41.9 FL (ref 37–54)
EGFRCR SERPLBLD CKD-EPI 2021: 88 ML/MIN/1.73
EOSINOPHIL # BLD AUTO: 0.14 10*3/MM3 (ref 0–0.4)
EOSINOPHIL NFR BLD AUTO: 1.4 % (ref 0.3–6.2)
ERYTHROCYTE [DISTWIDTH] IN BLOOD BY AUTOMATED COUNT: 13.4 % (ref 12.3–15.4)
GLOBULIN UR ELPH-MCNC: 2.7 GM/DL
GLUCOSE SERPL-MCNC: 114 MG/DL (ref 65–99)
HBA1C MFR BLD: 6.1 % (ref 4.8–5.6)
HCT VFR BLD AUTO: 41.4 % (ref 34–46.6)
HDLC SERPL-MCNC: 35 MG/DL (ref 40–60)
HGB BLD-MCNC: 13.8 G/DL (ref 12–15.9)
IMM GRANULOCYTES # BLD AUTO: 0.02 10*3/MM3 (ref 0–0.05)
IMM GRANULOCYTES NFR BLD AUTO: 0.2 % (ref 0–0.5)
LDLC SERPL CALC-MCNC: 45 MG/DL (ref 0–100)
LDLC/HDLC SERPL: 1.12 {RATIO}
LYMPHOCYTES # BLD AUTO: 4.18 10*3/MM3 (ref 0.7–3.1)
LYMPHOCYTES NFR BLD AUTO: 41.3 % (ref 19.6–45.3)
MCH RBC QN AUTO: 28.8 PG (ref 26.6–33)
MCHC RBC AUTO-ENTMCNC: 33.3 G/DL (ref 31.5–35.7)
MCV RBC AUTO: 86.3 FL (ref 79–97)
MONOCYTES # BLD AUTO: 0.56 10*3/MM3 (ref 0.1–0.9)
MONOCYTES NFR BLD AUTO: 5.5 % (ref 5–12)
NEUTROPHILS NFR BLD AUTO: 5.13 10*3/MM3 (ref 1.7–7)
NEUTROPHILS NFR BLD AUTO: 50.7 % (ref 42.7–76)
NRBC BLD AUTO-RTO: 0 /100 WBC (ref 0–0.2)
PLATELET # BLD AUTO: 292 10*3/MM3 (ref 140–450)
PMV BLD AUTO: 10.4 FL (ref 6–12)
POTASSIUM SERPL-SCNC: 4.3 MMOL/L (ref 3.5–5.2)
PROT SERPL-MCNC: 6.9 G/DL (ref 6–8.5)
RBC # BLD AUTO: 4.8 10*6/MM3 (ref 3.77–5.28)
SODIUM SERPL-SCNC: 143 MMOL/L (ref 136–145)
TRIGL SERPL-MCNC: 174 MG/DL (ref 0–150)
TSH SERPL DL<=0.05 MIU/L-ACNC: 3.43 UIU/ML (ref 0.27–4.2)
VIT B12 BLD-MCNC: 360 PG/ML (ref 211–946)
VLDLC SERPL-MCNC: 29 MG/DL (ref 5–40)
WBC NRBC COR # BLD AUTO: 10.12 10*3/MM3 (ref 3.4–10.8)

## 2024-08-05 PROCEDURE — 82306 VITAMIN D 25 HYDROXY: CPT | Performed by: NURSE PRACTITIONER

## 2024-08-05 PROCEDURE — 84443 ASSAY THYROID STIM HORMONE: CPT | Performed by: NURSE PRACTITIONER

## 2024-08-05 PROCEDURE — 80061 LIPID PANEL: CPT | Performed by: NURSE PRACTITIONER

## 2024-08-05 PROCEDURE — 80053 COMPREHEN METABOLIC PANEL: CPT | Performed by: NURSE PRACTITIONER

## 2024-08-05 PROCEDURE — 36415 COLL VENOUS BLD VENIPUNCTURE: CPT | Performed by: NURSE PRACTITIONER

## 2024-08-05 PROCEDURE — 85025 COMPLETE CBC W/AUTO DIFF WBC: CPT | Performed by: NURSE PRACTITIONER

## 2024-08-05 PROCEDURE — 83036 HEMOGLOBIN GLYCOSYLATED A1C: CPT | Performed by: NURSE PRACTITIONER

## 2024-08-05 PROCEDURE — 82607 VITAMIN B-12: CPT | Performed by: NURSE PRACTITIONER

## 2024-08-07 DIAGNOSIS — F42.8 OTHER OBSESSIVE-COMPULSIVE DISORDERS: Chronic | ICD-10-CM

## 2024-08-07 DIAGNOSIS — F41.1 GENERALIZED ANXIETY DISORDER: ICD-10-CM

## 2024-08-07 DIAGNOSIS — R11.0 NAUSEA: ICD-10-CM

## 2024-08-07 RX ORDER — ONDANSETRON HYDROCHLORIDE 8 MG/1
TABLET, FILM COATED ORAL
Qty: 30 TABLET | Refills: 3 | Status: SHIPPED | OUTPATIENT
Start: 2024-08-07

## 2024-08-08 ENCOUNTER — OFFICE VISIT (OUTPATIENT)
Dept: PSYCHIATRY | Facility: CLINIC | Age: 49
End: 2024-08-08
Payer: MEDICARE

## 2024-08-08 VITALS
WEIGHT: 160 LBS | HEART RATE: 68 BPM | SYSTOLIC BLOOD PRESSURE: 102 MMHG | HEIGHT: 64 IN | DIASTOLIC BLOOD PRESSURE: 62 MMHG | BODY MASS INDEX: 27.31 KG/M2 | OXYGEN SATURATION: 65 %

## 2024-08-08 DIAGNOSIS — F41.1 GENERALIZED ANXIETY DISORDER: Primary | ICD-10-CM

## 2024-08-08 DIAGNOSIS — F33.1 MODERATE EPISODE OF RECURRENT MAJOR DEPRESSIVE DISORDER: ICD-10-CM

## 2024-08-08 DIAGNOSIS — F42.8 OTHER OBSESSIVE-COMPULSIVE DISORDERS: Chronic | ICD-10-CM

## 2024-08-08 DIAGNOSIS — Z79.899 ENCOUNTER FOR LONG-TERM (CURRENT) USE OF OTHER MEDICATIONS: ICD-10-CM

## 2024-08-08 DIAGNOSIS — Z51.81 ENCOUNTER FOR THERAPEUTIC DRUG MONITORING: ICD-10-CM

## 2024-08-08 PROCEDURE — 99214 OFFICE O/P EST MOD 30 MIN: CPT | Performed by: NURSE PRACTITIONER

## 2024-08-08 PROCEDURE — 1159F MED LIST DOCD IN RCRD: CPT | Performed by: NURSE PRACTITIONER

## 2024-08-08 PROCEDURE — 1160F RVW MEDS BY RX/DR IN RCRD: CPT | Performed by: NURSE PRACTITIONER

## 2024-08-08 RX ORDER — ALPRAZOLAM 1 MG/1
1 TABLET ORAL 2 TIMES DAILY PRN
Qty: 60 TABLET | Refills: 0 | Status: SHIPPED | OUTPATIENT
Start: 2024-08-08

## 2024-08-08 RX ORDER — VENLAFAXINE HYDROCHLORIDE 75 MG/1
225 CAPSULE, EXTENDED RELEASE ORAL DAILY
Qty: 90 CAPSULE | Refills: 1 | Status: SHIPPED | OUTPATIENT
Start: 2024-08-08

## 2024-08-08 RX ORDER — ALPRAZOLAM 1 MG/1
TABLET ORAL
Qty: 60 TABLET | Refills: 0 | OUTPATIENT
Start: 2024-08-08

## 2024-08-08 NOTE — PROGRESS NOTES
Subjective   Nivia Bernstein is a 49 y.o. female is here today for medication management follow-up.    Chief Complaint:  anxiety depression insomnia     History of Present Illness:    Patient presents today for a follow up for medication management for anxiety, depression, and insomnia. Denies any medical changes since last visit. Patient reports medication compliance and denies any side effects. Patient states sleeping better than was. Patient states getting a few hours of sleep a night. Patient reports still talking in sleep and having dreams. Patient states glucose still running good and still have insulin pump right now. Patient states supposed to see PCP next week to go over results. Appetite is good and eating twice a day. Patient states sometimes have panic attacks but not as often. Patient states the alprazolam helping some. Anxiety at a 9 on a 0/10 scale with 10 the worst. Depression at a 8 on a 0/10 scale with 10 the worst. Denies any thoughts to harm self or others. Irritability/Anger at a 8 on a 0/10 scale with 10 the worst. Denies any auditory or visual hallucinations.   The following portions of the patient's history were reviewed and updated as appropriate: allergies, current medications, past family history, past medical history, past social history, past surgical history, and problem list.    Review of Systems   Constitutional: Negative.    Respiratory: Negative.     Cardiovascular: Negative.    Gastrointestinal: Negative.    Neurological: Negative.    Psychiatric/Behavioral:  Positive for agitation, dysphoric mood and sleep disturbance. The patient is nervous/anxious.        Objective   Physical Exam  Vitals reviewed.   Constitutional:       Appearance: Normal appearance. She is well-developed and well-groomed.   Neurological:      Mental Status: She is alert.   Psychiatric:         Attention and Perception: Attention and perception normal.         Mood and Affect: Affect normal. Mood is  "anxious.         Speech: Speech normal.         Behavior: Behavior is agitated. Behavior is cooperative.         Thought Content: Thought content normal.         Cognition and Memory: Cognition and memory normal.         Judgment: Judgment normal.       Blood pressure 102/62, pulse 68, height 162.6 cm (64\"), weight 72.6 kg (160 lb), SpO2 (!) 65%, not currently breastfeeding.Body mass index is 27.46 kg/m².    Allergies   Allergen Reactions    Imipramine Other (See Comments)     Chest pain    Mobic [Meloxicam] Rash    Penicillins Angioedema    Taxotere [Docetaxel] Anaphylaxis     9 minutes into 1st infusion    Novolog [Insulin Aspart] Hives    Rosuvastatin Calcium GI Intolerance     r  Medication List:   Current Outpatient Medications   Medication Sig Dispense Refill    ALPRAZolam (XANAX) 1 MG tablet Take 1 tablet by mouth 2 (Two) Times a Day As Needed for Anxiety. for anxiety 60 tablet 0    venlafaxine XR (EFFEXOR-XR) 75 MG 24 hr capsule Take 3 capsules by mouth Daily. 90 capsule 1    Accu-Chek FastClix Lancets misc USE TO test blood sugar THREE TIMES DAILY 102 each 5    albuterol sulfate  (90 Base) MCG/ACT inhaler INHALE TWO PUFFS BY MOUTH EVERY FOUR HOURS HOURS AS NEEDED FOR SHORTNESS OF BREATH 18 g 5    alendronate (FOSAMAX) 35 MG tablet TAKE ONE TABLET BY MOUTH EVERY 7 DAYS WITH WATER 30 MINUTES BEFORE THE FIRST FOOD, DRINK, OR MEDICATION AND AVOID LYING DOWN 30 MINUTES AFTER TAKING 4 tablet 3    Aspirin Low Dose 81 MG EC tablet TAKE TWO TABLETS BY MOUTH EVERY DAY 60 tablet 4    atorvastatin (LIPITOR) 40 MG tablet TAKE ONE TABLET BY MOUTH EVERY night 90 tablet 0    azelastine (ASTELIN) 0.1 % nasal spray Use in each nostril as directed 30 mL 3    Calcium Carb-Cholecalciferol (Calcium+D3) 500-15 MG-MCG tablet Take 1 tablet by mouth 2 (Two) Times a Day With Meals. 60 tablet 3    cloNIDine (CATAPRES) 0.1 MG tablet TAKE ONE TABLET BY MOUTH TWICE DAILY 60 tablet 2    Continuous Glucose Sensor (Guardian 4 Glucose " Sensor) misc CHANGE sensor every 5-7 DAYS as directed 5 each 12    cyanocobalamin 1000 MCG/ML injection Inject 1 mL under the skin into the appropriate area as directed Every 30 (Thirty) Days. 1 mL 5    Emgality 120 MG/ML auto-injector pen INJECT 120mg ONCE MONTHLY      Empagliflozin-metFORMIN HCl (Synjardy) 12.5-1000 MG tablet TAKE ONE TABLET BY MOUTH EVERY MORNING 30 tablet 1    fluconazole (Diflucan) 150 MG tablet Take 1 tablet by mouth Daily. 2 tablet 0    Fluticasone Furoate-Vilanterol (Breo Ellipta) 200-25 MCG/ACT inhaler Inhale 1 puff Daily. 1 each 3    folic acid (FOLVITE) 1 MG tablet Take 1 tablet by mouth Daily. 90 tablet 3    furosemide (LASIX) 20 MG tablet Take 0.5 tablets by mouth Daily. 30 tablet 0    gabapentin (NEURONTIN) 100 MG capsule TAKE ONE CAPSULE BY MOUTH TWICE DAILY 60 capsule 2    Glucagon (Gvoke HypoPen 2-Pack) 1 MG/0.2ML solution auto-injector Inject 1 mg under the skin into the appropriate area as directed As Needed (Severe Hypoglycemia). 0.4 mL 3    glucose blood (Accu-Chek Guide) test strip USE TO TEST BLOOD GLUCOSE THREE TIMES DAILY 100 each 3    Insulin Lispro (humaLOG) 100 UNIT/ML injection INJECT AS DIRECTED. MAX DAILY DOSE  UNITS DAILY 40 mL 3    ipratropium-albuterol (DUO-NEB) 0.5-2.5 mg/3 ml nebulizer Take 3 mL by nebulization Every 4 (Four) Hours As Needed for Shortness of Air. 150 mL 1    lansoprazole (PREVACID) 30 MG capsule TAKE ONE CAPSULE BY MOUTH EVERY DAY 90 capsule 0    levocetirizine (XYZAL) 5 MG tablet TAKE ONE TABLET BY MOUTH EVERY EVENING 30 tablet 0    linaclotide (Linzess) 145 MCG capsule capsule TAKE ONE CAPSULE BY MOUTH EVERY DAY 30 MINUTES BEFORE A MEAL ON AN EMPTY STOMACH 30 capsule 1    montelukast (Singulair) 10 MG tablet Take 1 tablet by mouth Every Night. 90 tablet 0    nitrofurantoin, macrocrystal-monohydrate, (Macrobid) 100 MG capsule Take 1 capsule by mouth 2 (Two) Times a Day. 14 capsule 0    ondansetron (ZOFRAN) 8 MG tablet TAKE 1 TABLET BY MOUTH  EVERY 8 HOURS AS NEEDED FOR NAUSEA OR VOMITING 30 tablet 3    Ozempic, 1 MG/DOSE, 4 MG/3ML solution pen-injector INJECT 1 MG SUBCUTANEOUSLY WEEKLY 3 mL 0    Respiratory Therapy Supplies (Nebulizer/Tubing/Mouthpiece) kit 1 each Take As Directed. 1 each 1    tamoxifen (NOLVADEX) 20 MG chemo tablet Take 1 tablet by mouth Daily. 30 tablet 11    topiramate (TOPAMAX) 50 MG tablet TAKE ONE TABLET BY MOUTH TWICE DAILY 60 tablet 3    ubrogepant 100 MG tablet       vitamin D (ERGOCALCIFEROL) 1.25 MG (07325 UT) capsule capsule Take 1 capsule by mouth 1 (One) Time Per Week. 4 capsule 3     No current facility-administered medications for this visit.       Mental Status Exam:   Hygiene:   good  Cooperation:  Cooperative  Eye Contact:  Good  Psychomotor Behavior:  Aggitated  Affect:  Appropriate  Hopelessness: Denies  Speech:  Normal  Thought Process:  Goal directed and Linear  Thought Content:  Normal  Suicidal:  None  Homicidal:  None  Hallucinations:  None  Delusion:  None  Memory:  Intact  Orientation:  Person, Place, Time, and Situation  Reliability:  fair  Insight:  Fair  Judgement:  Fair  Impulse Control:  Fair  Physical/Medical Issues:  No               Assessment & Plan   Diagnoses and all orders for this visit:    1. Generalized anxiety disorder (Primary)  -     ALPRAZolam (XANAX) 1 MG tablet; Take 1 tablet by mouth 2 (Two) Times a Day As Needed for Anxiety. for anxiety  Dispense: 60 tablet; Refill: 0  -     venlafaxine XR (EFFEXOR-XR) 75 MG 24 hr capsule; Take 3 capsules by mouth Daily.  Dispense: 90 capsule; Refill: 1    2. Other obsessive-compulsive disorders  -     ALPRAZolam (XANAX) 1 MG tablet; Take 1 tablet by mouth 2 (Two) Times a Day As Needed for Anxiety. for anxiety  Dispense: 60 tablet; Refill: 0    3. Moderate episode of recurrent major depressive disorder  -     venlafaxine XR (EFFEXOR-XR) 75 MG 24 hr capsule; Take 3 capsules by mouth Daily.  Dispense: 90 capsule; Refill: 1    4. Encounter for therapeutic  drug monitoring  -     Urine Drug Screen - Urine, Clean Catch; Future    5. Encounter for long-term (current) use of other medications  -     Urine Drug Screen - Urine, Clean Catch; Future            Discussed medication options with patient. Cont. Effexor XR 75 mg three capsules daily for depression, OCD, and anxiety. Cont. Alprazolam 1 mg twice daily as needed for anxiety. Reviewed the risks, benefits, and side effects of the medications; patient acknowledged and verbally consented. Patient is being prescribed a controlled substance as part of treatment plan. Patient has been educated of appropriate use of the medications, including risk of somnolence, limited ability to drive and/or work safely, and potential for dependence, respiratory depression and overdose. Patient is also informed that the medication are to be used by the patient only- avoid any combined use of ETOH or other substances unless prescribed.   UDS Ordered.     [facility] Patient Controlled Substance Report (from 8/21/2023 to 8/16/2024)    Dispensed  Strength Quantity Days Supply Provider Pharmacy   08/09/2024 Gabapentin 100MG 60 each 30 [facility]ANIE[facility]   08/08/2024 Alprazolam 1MG 60 each 30 CLOUD,ALFREDO [facility]   06/13/2024 Gabapentin 100MG 60 each 30 [facility]FFANIE[facility]SONALI [facility]   06/05/2024 Alprazolam 1MG 60 each 30 CLOUD[facility]FREDO [facility]   05/09/2024 Gabapentin 100MG 60 each 30 TIFFANIE,SONALI [facility]   05/07/2024 Alprazolam 1MG 60 each 30 [facility]   04/02/2024 Alprazolam 1MG 60 each 30 [facility]   04/02/2024 Gabapentin 100MG 60 each 30 TIFFANIE[facility]SONALI [facility]   03/05/2024 Alprazolam 1MG 60 each 30 [facility]OUD[facility]FRED[facility]   03/05/2024 Gabapentin 100MG 60 each 30 [facility]ANIE[facility]I [facility]   02/06/2024 Alprazolam 1MG 60 each 30  CLOUD[facility]FRED[facility]   02/06/2024 Gabapentin 100MG 60 each 30 TIFFANIE,SHERELENE [facility]   01/10/2024 Gabapentin 100MG 60 each 30 TIFFANIE,SHERELENE [facility]   12/13/2023 Gabapentin 100MG 60 each 30 TIFFANIE,SHERELENE [facility]   12/06/2023 Alprazolam 1MG 60 each 30 CLOUD[facility]FRED[facility]   11/14/2023 Gabapentin 100MG 60 each 30 TIFFANIE,SHERELENE [facility]   11/09/2023 Alprazolam 1MG 60 each 30 CLOUD[facility]FRED[facility]   10/18/2023 Gabapentin 100MG 60 each 30 TIFFANIE,SHERELENE [facility]   10/04/2023 Alprazolam 1MG 60 each 30 CLOUD[facility]ED[facility]   08/25/2023 Alprazolam 1MG 60 each 30 [facility]OUD[facility]         Disclaimer    *The information in this report is based upon Schedule II through V controlled substance records reported by dispensers. Data should appear on [facility] reports within two to three business days after dispensing.   *The records listed in the report are based on the patient identification information entered by the report requestor, and if not sufficiently unique may result in the report including records for multiple patients. Please verify the information in the report by contacting the prescribers and/or dispensers listed.   *If the controlled substance records on this report appear to be in error, the patient or provider should contact the dispenser to determine if the information was reported accurately. If the dispenser certifies the information was reported accurately, the dispenser can contact the Drug Enforcement and Professional Practices Branch at [phone] to investigate the error.   *The information in this report is intended for informational use only by the person authorized to request the report. Intentional disclosure of the report or data to someone not authorized to obtain the data is a Class B Misdemeanor.       Report Restrictions - A practitioner or pharmacist may share the report with the patient or person authorized to act on the patient's behalf and place the report in the patient's medical record, with the report then being deemed a medical record subject to the same disclosure terms and conditions as an ordinary medical record. ([place]RS 218A.202)        Patient is agreeable to call the office with any questions, concerns, or worsening of symptoms. Patient is aware to call 911 or go to the nearest ER should begin having any thoughts to harm self or others.           Follow up in four to six weeks          Errors in dictation may reflect use of voice recognition software and not all errors in transcription may have been detected prior to signing.              This document has been electronically signed by NIDA Vasquez   August 20, 2024 10:23 EDT

## 2024-08-14 ENCOUNTER — OFFICE VISIT (OUTPATIENT)
Dept: FAMILY MEDICINE CLINIC | Facility: CLINIC | Age: 49
End: 2024-08-14
Payer: MEDICARE

## 2024-08-14 VITALS
HEIGHT: 64 IN | BODY MASS INDEX: 27.14 KG/M2 | OXYGEN SATURATION: 92 % | HEART RATE: 81 BPM | DIASTOLIC BLOOD PRESSURE: 60 MMHG | SYSTOLIC BLOOD PRESSURE: 120 MMHG | WEIGHT: 159 LBS | TEMPERATURE: 96.8 F | RESPIRATION RATE: 14 BRPM

## 2024-08-14 DIAGNOSIS — E78.2 MIXED HYPERLIPIDEMIA: Chronic | ICD-10-CM

## 2024-08-14 DIAGNOSIS — F32.A ANXIETY AND DEPRESSION: Chronic | ICD-10-CM

## 2024-08-14 DIAGNOSIS — E55.9 VITAMIN D DEFICIENCY: Chronic | ICD-10-CM

## 2024-08-14 DIAGNOSIS — F41.9 ANXIETY AND DEPRESSION: Chronic | ICD-10-CM

## 2024-08-14 DIAGNOSIS — J30.9 CHRONIC ALLERGIC RHINITIS: Chronic | ICD-10-CM

## 2024-08-14 DIAGNOSIS — C50.411 MALIGNANT NEOPLASM OF UPPER-OUTER QUADRANT OF RIGHT BREAST IN FEMALE, ESTROGEN RECEPTOR NEGATIVE: Chronic | ICD-10-CM

## 2024-08-14 DIAGNOSIS — Z96.41 INSULIN PUMP IN PLACE: ICD-10-CM

## 2024-08-14 DIAGNOSIS — E11.42 DM TYPE 2 WITH DIABETIC PERIPHERAL NEUROPATHY: Primary | Chronic | ICD-10-CM

## 2024-08-14 DIAGNOSIS — Z17.1 MALIGNANT NEOPLASM OF UPPER-OUTER QUADRANT OF RIGHT BREAST IN FEMALE, ESTROGEN RECEPTOR NEGATIVE: Chronic | ICD-10-CM

## 2024-08-14 DIAGNOSIS — M85.89 OSTEOPENIA OF MULTIPLE SITES: Chronic | ICD-10-CM

## 2024-08-14 DIAGNOSIS — Z97.8 USES SELF-APPLIED CONTINUOUS GLUCOSE MONITORING DEVICE: ICD-10-CM

## 2024-08-14 DIAGNOSIS — J45.20 MILD INTERMITTENT ASTHMA WITHOUT COMPLICATION: Chronic | ICD-10-CM

## 2024-08-14 PROCEDURE — 99215 OFFICE O/P EST HI 40 MIN: CPT | Performed by: NURSE PRACTITIONER

## 2024-08-14 PROCEDURE — G2211 COMPLEX E/M VISIT ADD ON: HCPCS | Performed by: NURSE PRACTITIONER

## 2024-08-14 PROCEDURE — 1160F RVW MEDS BY RX/DR IN RCRD: CPT | Performed by: NURSE PRACTITIONER

## 2024-08-14 PROCEDURE — 1125F AMNT PAIN NOTED PAIN PRSNT: CPT | Performed by: NURSE PRACTITIONER

## 2024-08-14 PROCEDURE — 1159F MED LIST DOCD IN RCRD: CPT | Performed by: NURSE PRACTITIONER

## 2024-08-14 PROCEDURE — 3044F HG A1C LEVEL LT 7.0%: CPT | Performed by: NURSE PRACTITIONER

## 2024-08-14 RX ORDER — MONTELUKAST SODIUM 10 MG/1
10 TABLET ORAL NIGHTLY
Qty: 90 TABLET | Refills: 0 | Status: SHIPPED | OUTPATIENT
Start: 2024-08-14

## 2024-08-14 NOTE — PROGRESS NOTES
History of Present Illness  Nivia is a 50 y/o female who presents to [facility]  today for follow up pertaining to her DM and Dyslipidemia. She has been diagnosed with right Breast Cancer, GERD, Osteopenia/arthritis, migraine headaches and anxiety/ depression.      Diabetes   The initial diagnosis of diabetes was made 20 years ago. Diabetic complications include peripheral neuropathy. Risk factors for coronary artery disease include post-menopausal, stress and dyslipidemia. She is currently utilizing insulin pump therapy and CGM. In addition, she is currently taking Synardy and Ozempic.  She has had a previous visit with a dietitian An ACE inhibitor/angiotensin II receptor blocker is not  being taken at this time.     Lab Results   Component Value Date    HGBA1C 6.10 (H) 08/05/2024      Dyslipidemia   The current episode started more than 1 year ago.  She is taking Atorvastatin 40 mg and aspirin 81 mg..   Lab Results   Component Value Date    CHOL 109 08/05/2024    CHOL 145 05/15/2024    CHOL 124 11/07/2023     Lab Results   Component Value Date    TRIG 174 (H) 08/05/2024    TRIG 132 05/15/2024    TRIG 198 (H) 11/07/2023     Lab Results   Component Value Date    HDL 35 (L) 08/05/2024    HDL 43 05/15/2024    HDL 31 (L) 11/07/2023     Lab Results   Component Value Date    LDL 45 08/05/2024    LDL 79 05/15/2024    LDL 60 11/07/2023      Breast Cancer  Malignant neoplasm of upper outer quadrant of right breast, estrogen receptor negative which was treated with mastectomy and followed by chemotherapy.  Continues to take tamoxifen 20 mg and followed by oncology.      GERD  Stable with lansoprazole.  Lab Results   Component Value Date    WBC 10.12 08/05/2024    HGB 13.8 08/05/2024    HCT 41.4 08/05/2024    MCV 86.3 08/05/2024     08/05/2024     Anxiety/Depression  Stable and followed by behavioral health  Lab Results   Component Value Date    TSH 3.430 08/05/2024      The  "following portions of the patient's history were reviewed and updated as appropriate: allergies, current medications, past family history, past medical history, past social history, past surgical history and problem list.    Review of Systems   Constitutional:  Negative for activity change, appetite change, chills, fatigue, fever and unexpected weight change.   HENT:  Negative for congestion.    Eyes:  Negative for visual disturbance.   Respiratory:  Negative for cough, shortness of breath and wheezing.    Cardiovascular:  Negative for chest pain and palpitations. Leg swelling: Intermittent.  Gastrointestinal:  Negative for nausea and vomiting.        GERD   Endocrine: Negative for cold intolerance, heat intolerance, polydipsia, polyphagia and polyuria.   Musculoskeletal:  Positive for arthralgias (Much improved with weight loss) and back pain.   Skin:  Negative for color change and rash.   Allergic/Immunologic: Positive for environmental allergies.   Neurological:  Positive for numbness and headaches (Migraines). Negative for tremors, speech difficulty, weakness and light-headedness. Dizziness: Improving.  Hematological:  Negative for adenopathy.   Psychiatric/Behavioral:  Negative for confusion, decreased concentration and sleep disturbance. The patient is not nervous/anxious.    All other systems reviewed and are negative.      Vital signs:  /60 (BP Location: Left arm, Patient Position: Sitting, Cuff Size: Adult)   Pulse 81   Temp 96.8 °F (36 °C) (Temporal)   Resp 14   Ht 162.6 cm (64.02\")   Wt 72.1 kg (159 lb)   SpO2 92%   BMI 27.28 kg/m²     Physical Exam  Vitals and nursing note reviewed.   Constitutional:       General: She is not in acute distress.     Appearance: She is well-developed.   HENT:      Head: Normocephalic.      Nose: Nose normal.   Eyes:      General: No scleral icterus.        Right eye: No discharge.         Left eye: No discharge.      Conjunctiva/sclera: Conjunctivae normal. "   Neck:      Thyroid: No thyromegaly.      Vascular: No JVD.   Cardiovascular:      Rate and Rhythm: Normal rate and regular rhythm.      Heart sounds: S1 normal and S2 normal.   Pulmonary:      Effort: Pulmonary effort is normal.      Breath sounds: Normal breath sounds.   Abdominal:      Palpations: Abdomen is soft.      Tenderness: There is no abdominal tenderness. There is no guarding.   Musculoskeletal:      Cervical back: Neck supple.      Right lower leg: No edema.      Left lower leg: No edema.   Skin:     General: Skin is dry.      Capillary Refill: Capillary refill takes less than 2 seconds.   Neurological:      Mental Status: She is alert.      Cranial Nerves: Cranial nerves 2-12 are intact.   Psychiatric:         Mood and Affect: Mood and affect normal.         Speech: Speech normal.         Behavior: Behavior is cooperative.         Thought Content: Thought content normal.       Result Review :    Guardian CGM summary from 8/01 through 8/14/2024  Time in range  0% very low  0% low  97% in target range  3% high  0% very high  Average blood glucose 161 ±34 mg/dL    Results for orders placed or performed in visit on 08/05/24   Comprehensive Metabolic Panel    Specimen: Arm, Right; Blood   Result Value Ref Range    Glucose 114 (H) 65 - 99 mg/dL    BUN 12 6 - 20 mg/dL    Creatinine 0.81 0.57 - 1.00 mg/dL    Sodium 143 136 - 145 mmol/L    Potassium 4.3 3.5 - 5.2 mmol/L    Chloride 110 (H) 98 - 107 mmol/L    CO2 21.5 (L) 22.0 - 29.0 mmol/L    Calcium 9.1 8.6 - 10.5 mg/dL    Total Protein 6.9 6.0 - 8.5 g/dL    Albumin 4.2 3.5 - 5.2 g/dL    ALT (SGPT) 10 1 - 33 U/L    AST (SGOT) 12 1 - 32 U/L    Alkaline Phosphatase 81 39 - 117 U/L    Total Bilirubin 0.2 0.0 - 1.2 mg/dL    Globulin 2.7 gm/dL    A/G Ratio 1.6 g/dL    BUN/Creatinine Ratio 14.8 7.0 - 25.0    Anion Gap 11.5 5.0 - 15.0 mmol/L    eGFR 88.0 >60.0 mL/min/1.73   Hemoglobin A1c    Specimen: Arm, Right; Blood   Result Value Ref Range    Hemoglobin A1C 6.10  (H) 4.80 - 5.60 %   Lipid Panel    Specimen: Arm, Right; Blood   Result Value Ref Range    Total Cholesterol 109 0 - 200 mg/dL    Triglycerides 174 (H) 0 - 150 mg/dL    HDL Cholesterol 35 (L) 40 - 60 mg/dL    LDL Cholesterol  45 0 - 100 mg/dL    VLDL Cholesterol 29 5 - 40 mg/dL    LDL/HDL Ratio 1.12    TSH    Specimen: Arm, Right; Blood   Result Value Ref Range    TSH 3.430 0.270 - 4.200 uIU/mL   Vitamin B12    Specimen: Arm, Right; Blood   Result Value Ref Range    Vitamin B-12 360 211 - 946 pg/mL   Vitamin D,25-Hydroxy    Specimen: Arm, Right; Blood   Result Value Ref Range    25 Hydroxy, Vitamin D 67.6 30.0 - 100.0 ng/ml   CBC Auto Differential    Specimen: Arm, Right; Blood   Result Value Ref Range    WBC 10.12 3.40 - 10.80 10*3/mm3    RBC 4.80 3.77 - 5.28 10*6/mm3    Hemoglobin 13.8 12.0 - 15.9 g/dL    Hematocrit 41.4 34.0 - 46.6 %    MCV 86.3 79.0 - 97.0 fL    MCH 28.8 26.6 - 33.0 pg    MCHC 33.3 31.5 - 35.7 g/dL    RDW 13.4 12.3 - 15.4 %    RDW-SD 41.9 37.0 - 54.0 fl    MPV 10.4 6.0 - 12.0 fL    Platelets 292 140 - 450 10*3/mm3    Neutrophil % 50.7 42.7 - 76.0 %    Lymphocyte % 41.3 19.6 - 45.3 %    Monocyte % 5.5 5.0 - 12.0 %    Eosinophil % 1.4 0.3 - 6.2 %    Basophil % 0.9 0.0 - 1.5 %    Immature Grans % 0.2 0.0 - 0.5 %    Neutrophils, Absolute 5.13 1.70 - 7.00 10*3/mm3    Lymphocytes, Absolute 4.18 (H) 0.70 - 3.10 10*3/mm3    Monocytes, Absolute 0.56 0.10 - 0.90 10*3/mm3    Eosinophils, Absolute 0.14 0.00 - 0.40 10*3/mm3    Basophils, Absolute 0.09 0.00 - 0.20 10*3/mm3    Immature Grans, Absolute 0.02 0.00 - 0.05 10*3/mm3    nRBC 0.0 0.0 - 0.2 /100 WBC     *Note: Due to a large number of results and/or encounters for the requested time period, some results have not been displayed. A complete set of results can be found in Results Review.     BMI is >= 25 and <30. (Overweight) The following options were offered after discussion;: Information on healthy weight added to patient's after visit  summary.    Assessment & Plan     Diagnoses and all orders for this visit:    1. DM type 2 with diabetic peripheral neuropathy (Primary)  Comments:  Continue insulin pump therapy with Guardian CGM, Synjardy and Ozempic  Orders:  -     Comprehensive Metabolic Panel; Future  -     Lipid Panel; Future  -     Hemoglobin A1c; Future    2. Insulin pump in place  Comments:  Continue current insulin pump parameters    3. Uses self-applied continuous glucose monitoring device  Comments:  Guardian CGM uploaded and reviewed with her.  Excellent glycemic control with current regimen    4. Mixed hyperlipidemia  Comments:  Reviewed lipid panel.  Continue atorvastatin.  Discussed elevated triglycerides.  Orders:  -     Comprehensive Metabolic Panel; Future  -     Lipid Panel; Future    5. Anxiety and depression  Comments:  Stable.  Continue clonidine, venlafaxine and alprazolam    6. Mild intermittent asthma without complication  Comments:  Continue Breo and albuterol H FA.  Added montelukast.  Will consider referral to pulmonology and or inhaler change    7. Chronic allergic rhinitis  Comments:  Continue Astelin nasal spray, levocetirizine.  Added montelukast  Orders:  -     montelukast (Singulair) 10 MG tablet; Take 1 tablet by mouth Every Night.  Dispense: 90 tablet; Refill: 0    8. Malignant neoplasm of upper-outer quadrant of right breast in female, estrogen receptor negative  Comments:  Continue tamoxifen and follow-up with oncology    9. Vitamin D deficiency  Comments:  Decrease vitamin D to every 14 days    10. Osteopenia of multiple sites  Comments:  DEXA ordered for September 2024.  Continue Fosamax  Orders:  -     DEXA Bone Density Axial; Future  -     Sedimentation Rate; Future  -     C-reactive Protein; Future    Other orders  -     Cancel: Urine Drug Screen - Urine, Clean Catch; Future      I spent 60 minutes caring for Nivia on this date of service. This time includes time spent by me in the following  activities:preparing for the visit, reviewing tests, obtaining and/or reviewing a separately obtained history, performing a medically appropriate examination and/or evaluation , counseling and educating the patient/family/caregiver, ordering medications, tests, or procedures, documenting information in the medical record, independently interpreting results and communicating that information with the patient/family/caregiver, and care coordination    Follow Up In November  Findings and recommendations discussed with Nivia. Reviewed test results and CGM upload. Lifestyle modifications reinforced including nutrition and activity recommendations.  She will follow up in November sooner if problems/concerns occur.  Nivia was given instructions and counseling regarding her condition or for health maintenance advice. Please see specific information pulled into the AVS if appropriate.    This document has been electronically signed by:

## 2024-08-16 ENCOUNTER — LAB (OUTPATIENT)
Dept: FAMILY MEDICINE CLINIC | Facility: CLINIC | Age: 49
End: 2024-08-16
Payer: MEDICARE

## 2024-08-16 ENCOUNTER — TELEPHONE (OUTPATIENT)
Dept: FAMILY MEDICINE CLINIC | Facility: CLINIC | Age: 49
End: 2024-08-16

## 2024-08-16 DIAGNOSIS — C50.411 MALIGNANT NEOPLASM OF UPPER-OUTER QUADRANT OF RIGHT BREAST IN FEMALE, ESTROGEN RECEPTOR NEGATIVE: ICD-10-CM

## 2024-08-16 DIAGNOSIS — R39.9 LOWER URINARY TRACT SYMPTOMS (LUTS): Primary | ICD-10-CM

## 2024-08-16 DIAGNOSIS — E11.42 DM TYPE 2 WITH DIABETIC PERIPHERAL NEUROPATHY: Chronic | ICD-10-CM

## 2024-08-16 DIAGNOSIS — Z17.1 MALIGNANT NEOPLASM OF UPPER-OUTER QUADRANT OF RIGHT BREAST IN FEMALE, ESTROGEN RECEPTOR NEGATIVE: ICD-10-CM

## 2024-08-16 DIAGNOSIS — E78.2 MIXED HYPERLIPIDEMIA: Chronic | ICD-10-CM

## 2024-08-16 DIAGNOSIS — M85.89 OSTEOPENIA OF MULTIPLE SITES: Chronic | ICD-10-CM

## 2024-08-16 DIAGNOSIS — D64.9 ANEMIA, UNSPECIFIED TYPE: ICD-10-CM

## 2024-08-16 LAB
BILIRUB BLD-MCNC: NEGATIVE MG/DL
CLARITY, POC: ABNORMAL
COLOR UR: YELLOW
EXPIRATION DATE: ABNORMAL
GLUCOSE UR STRIP-MCNC: ABNORMAL MG/DL
KETONES UR QL: NEGATIVE
LEUKOCYTE EST, POC: NEGATIVE
Lab: ABNORMAL
NITRITE UR-MCNC: NEGATIVE MG/ML
PH UR: 7 [PH] (ref 5–8)
PROT UR STRIP-MCNC: ABNORMAL MG/DL
RBC # UR STRIP: ABNORMAL /UL
SP GR UR: 1.01 (ref 1–1.03)
UROBILINOGEN UR QL: ABNORMAL

## 2024-08-16 PROCEDURE — 87077 CULTURE AEROBIC IDENTIFY: CPT | Performed by: NURSE PRACTITIONER

## 2024-08-16 PROCEDURE — 87186 SC STD MICRODIL/AGAR DIL: CPT | Performed by: NURSE PRACTITIONER

## 2024-08-16 PROCEDURE — 81003 URINALYSIS AUTO W/O SCOPE: CPT | Performed by: NURSE PRACTITIONER

## 2024-08-16 PROCEDURE — 87086 URINE CULTURE/COLONY COUNT: CPT | Performed by: NURSE PRACTITIONER

## 2024-08-16 RX ORDER — NITROFURANTOIN 25; 75 MG/1; MG/1
100 CAPSULE ORAL 2 TIMES DAILY
Qty: 14 CAPSULE | Refills: 0 | Status: SHIPPED | OUTPATIENT
Start: 2024-08-16

## 2024-08-16 NOTE — TELEPHONE ENCOUNTER
Spoke with patient and she is in understanding.     ----- Message from Josh Guerin sent at 8/16/2024 10:04 AM EDT -----  Regarding: UA  Her UA did not show Bacteria but did show Protein. I will send in Macrobid for her to start. Will send urine for Culture and will let her know results. She does need to hydrate well with water.  ----- Message -----  From: Danelle Henson MA  Sent: 8/16/2024   8:43 AM EDT  To: NIDA Betancourt    She came in for her dip and culture this morning.  ----- Message -----  From: Josh Guerin APRN  Sent: 8/15/2024   4:15 PM EDT  To: Danelle Henson MA    She needs to come in for a test prior to starting an antibiotic because it would give us a false reading  ----- Message -----  From: Danelle Henson MA  Sent: 8/15/2024   4:14 PM EDT  To: NIDA Betancourt    Patient called wanting to know if you could send her something for a UTI to [place]? She said she would come for a dip in the morning.

## 2024-08-18 LAB — BACTERIA SPEC AEROBE CULT: ABNORMAL

## 2024-08-24 DIAGNOSIS — E11.42 DM TYPE 2 WITH DIABETIC PERIPHERAL NEUROPATHY: Chronic | ICD-10-CM

## 2024-08-26 RX ORDER — INSULIN LISPRO 100 [IU]/ML
INJECTION, SOLUTION INTRAVENOUS; SUBCUTANEOUS
Qty: 40 ML | Refills: 3 | Status: SHIPPED | OUTPATIENT
Start: 2024-08-26

## 2024-08-27 DIAGNOSIS — R23.2 HOT FLASHES: ICD-10-CM

## 2024-08-27 DIAGNOSIS — E11.42 DM TYPE 2 WITH DIABETIC PERIPHERAL NEUROPATHY: Chronic | ICD-10-CM

## 2024-08-27 DIAGNOSIS — K59.03 DRUG-INDUCED CONSTIPATION: ICD-10-CM

## 2024-08-27 RX ORDER — CLONIDINE HYDROCHLORIDE 0.1 MG/1
TABLET ORAL
Qty: 60 TABLET | Refills: 2 | Status: SHIPPED | OUTPATIENT
Start: 2024-08-27

## 2024-08-27 RX ORDER — EMPAGLIFLOZIN AND METFORMIN HYDROCHLORIDE 12.5; 1 MG/1; MG/1
1 TABLET ORAL EVERY MORNING
Qty: 30 TABLET | Refills: 1 | Status: SHIPPED | OUTPATIENT
Start: 2024-08-27

## 2024-08-27 RX ORDER — LINACLOTIDE 145 UG/1
CAPSULE, GELATIN COATED ORAL
Qty: 30 CAPSULE | Refills: 1 | Status: SHIPPED | OUTPATIENT
Start: 2024-08-27

## 2024-08-29 DIAGNOSIS — T45.1X5A HOT FLASHES DUE TO TAMOXIFEN: ICD-10-CM

## 2024-08-29 DIAGNOSIS — R23.2 HOT FLASHES DUE TO TAMOXIFEN: ICD-10-CM

## 2024-08-29 RX ORDER — GABAPENTIN 100 MG/1
100 CAPSULE ORAL 2 TIMES DAILY
Qty: 60 CAPSULE | Refills: 2 | Status: SHIPPED | OUTPATIENT
Start: 2024-08-29

## 2024-09-03 ENCOUNTER — TELEPHONE (OUTPATIENT)
Dept: FAMILY MEDICINE CLINIC | Facility: CLINIC | Age: 49
End: 2024-09-03
Payer: MEDICARE

## 2024-09-03 NOTE — TELEPHONE ENCOUNTER
This has been emailed.      ----- Message from Josh Guerin sent at 9/3/2024  9:24 AM EDT -----  I printed this to the printer there  ----- Message -----  From: Danelle Henson MA  Sent: 8/29/2024  10:53 AM EDT  To: NIDA Betancourt    She needs a notice written out that states her insulin goes into her pump, and it's not injected. She needs it for the center she goes to.

## 2024-09-05 DIAGNOSIS — E78.2 MIXED DYSLIPIDEMIA: Chronic | ICD-10-CM

## 2024-09-05 DIAGNOSIS — R60.1 GENERALIZED EDEMA: ICD-10-CM

## 2024-09-05 DIAGNOSIS — F42.8 OTHER OBSESSIVE-COMPULSIVE DISORDERS: Chronic | ICD-10-CM

## 2024-09-05 DIAGNOSIS — F41.1 GENERALIZED ANXIETY DISORDER: ICD-10-CM

## 2024-09-05 RX ORDER — FUROSEMIDE 20 MG
10 TABLET ORAL DAILY
Qty: 30 TABLET | Refills: 0 | Status: SHIPPED | OUTPATIENT
Start: 2024-09-05

## 2024-09-05 RX ORDER — ATORVASTATIN CALCIUM 40 MG/1
TABLET, FILM COATED ORAL
Qty: 90 TABLET | Refills: 0 | Status: SHIPPED | OUTPATIENT
Start: 2024-09-05

## 2024-09-05 RX ORDER — ALPRAZOLAM 1 MG
1 TABLET ORAL 2 TIMES DAILY PRN
Qty: 60 TABLET | Refills: 0 | Status: SHIPPED | OUTPATIENT
Start: 2024-09-05

## 2024-09-07 DIAGNOSIS — E11.42 DM TYPE 2 WITH DIABETIC PERIPHERAL NEUROPATHY: Chronic | ICD-10-CM

## 2024-09-09 RX ORDER — SEMAGLUTIDE 1.34 MG/ML
INJECTION, SOLUTION SUBCUTANEOUS
Qty: 3 ML | Refills: 0 | Status: SHIPPED | OUTPATIENT
Start: 2024-09-09

## 2024-09-12 DIAGNOSIS — J45.20 MILD INTERMITTENT ASTHMA WITHOUT COMPLICATION: Chronic | ICD-10-CM

## 2024-09-12 RX ORDER — ALBUTEROL SULFATE 90 UG/1
AEROSOL, METERED RESPIRATORY (INHALATION)
Qty: 18 G | Refills: 5 | Status: SHIPPED | OUTPATIENT
Start: 2024-09-12

## 2024-09-14 NOTE — TELEPHONE ENCOUNTER
DENIED      Pa sent in for asiprin    Nanci Colón is a 64 y.o. female on day 3 of admission presenting with No Principal Problem: There is no principal problem currently on the Problem List. Please update the Problem List and refresh..      Subjective   Nanci Colón is a 64 y.o. female with a past medical history of non-insulin-dependent diabetes, hypothyroidism, dyslipidemia, hypertension who presented to the [facility] ED for evaluation of abdominal discomfort and scleral icterus at the request of her PCP.  She reported a 1 month history of abdominal discomfort, nausea and poor by mouth intake for liquids and solids.  She notes weakness and weight loss.  She continued her outpatient medications which included Celebrex and 5 mg of lisinopril.     In the ED, labs were notable for an acute kidney injury having a creatinine of 4.21 mg/deciliter from a previous normal baseline in October 2023.  There was transaminitis, and elevated alkaline phosphatase, T. bili of 10.6, lipase of 1477 with a normal lactate.  CT imaging without contrast showed findings concerning for pancreatic head mass.  He was transferred to [facility] for GI evaluation.  She is status post EUS/FNA with findings of an irregular mass in the pancreas with fine-needle biopsy obtained.  Bile and pancreatic ducts were obstructed.  Underwent ERCP with biliary sphincterotomy and common bile duct stent placement.  Nephrology is consulted for acute kidney injury.  On a soft diet, feeling better overall.       Objective          Vitals 24HR  Heart Rate:  [59-70]   Temp:  [36.4 °C (97.6 °F)-37.8 °C (100 °F)]   Resp:  [16-18]   BP: (139-159)/(71-85)   SpO2:  [94 %-99 %]     Intake/Output last 3 Shifts:    Intake/Output Summary (Last 24 hours) at 9/14/2024 1203  Last data filed at 9/14/2024 1033  Gross per 24 hour   Intake 3190 ml   Output 1400 ml   Net 1790 ml       Physical Exam  Constitutional:       Appearance: Awake, alert and oriented x 3.  In no acute distress.  HENT:      Head:  Normocephalic and atraumatic.      Mouth: Mucous membranes are moist.   Eyes:      General: Scleral icterus present.      Pupils: Pupils are equal, round, and reactive to light.   Cardiovascular:      Rate and Rhythm: Normal rate and regular rhythm.      Pulses: Normal pulses.      Heart sounds: Normal heart sounds.   Pulmonary:      Effort: Pulmonary effort is normal.      Breath sounds: Normal breath sounds.   Abdominal:      General: Bowel sounds are normal.      Palpations: Abdomen is soft.  Nontender, no rebound or guarding.  Musculoskeletal:         General: Normal range of motion.  No synovitis.  Skin:     General: Skin is warm and dry.  Positive jaundice.   Neurological:      General: No focal deficit present.      Mental Status: She is alert and oriented to person, place, and time.   Psychiatric:         Mood and Affect: Mood normal.         Behavior: Behavior normal.        Scheduled Medications  doxepin, 10 mg, oral, Nightly  heparin (porcine), 5,000 Units, subcutaneous, q8h LAMONTE  insulin lispro, 0-10 Units, subcutaneous, q6h  levothyroxine, 75 mcg, oral, Daily  metoprolol succinate XL, 50 mg, oral, Daily  sennosides, 2 tablet, oral, BID      Continuous medications  lactated Ringer's, 50 mL/hr, Last Rate: 50 mL/hr (09/14/24 0531)        PRN medications: dextrose, dextrose, glucagon, glucagon, HYDROmorphone, melatonin, ondansetron ODT **OR** ondansetron, oxyCODONE, prochlorperazine **OR** prochlorperazine **OR** prochlorperazine     Relevant Results  Results from last 7 days   Lab Units 09/14/24  0641 09/13/24  0655 09/12/24  0548 09/11/24  0209 09/09/24  1826 09/09/24  1145   WBC AUTO x10*3/uL 6.7 7.1 5.4 7.0 6.6 6.5   HEMOGLOBIN g/dL 10.3* 11.3* 11.1* 11.1* 12.6 12.9   HEMATOCRIT % 31.1* 34.0* 33.3* 32.9* 37.3 39.5   PLATELETS AUTO x10*3/uL 118* 125* 119* 119* 125* 135*   NEUTROS PCT AUTO %  --   --   --  65.6 66.8 72.3   LYMPHS PCT AUTO %  --   --   --  15.5 17.1 12.2   MONOS PCT AUTO %  --   --   --   14.2 12.7 11.0   EOS PCT AUTO %  --   --   --  3.7 2.6 3.4     Results from last 7 days   Lab Units 09/14/24  0641 09/13/24  0655 09/12/24  0548   SODIUM mmol/L 136 139 136   POTASSIUM mmol/L 3.3* 3.6 4.4   CHLORIDE mmol/L 104 108* 107   CO2 mmol/L 20* 20* 15*   BUN mg/dL 38* 40* 45*   CREATININE mg/dL 3.04* 3.48* 3.86*   GLUCOSE mg/dL 125* 106* 179*   CALCIUM mg/dL 8.5* 9.2 9.1       FL GI ERCP   Final Result      US renal complete   Final Result   No hydronephrosis.        MACRO:   None        Signed by: Asher Knutson 9/11/2024 8:06 AM   Dictation workstation:   [id number]               Assessment/Plan      Nanci Colón is a 64 y.o. female with a past medical history of non-insulin-dependent diabetes, hypothyroidism, dyslipidemia, hypertension who presented to the [facility] ED for evaluation of abdominal discomfort and scleral icterus at the request of her PCP.  She reported a 1 month history of abdominal discomfort, nausea and poor by mouth intake for liquids and solids.  She notes weakness and weight loss.  She continued her outpatient medications which included Celebrex and 5 mg of lisinopril.     In the ED, labs were notable for an acute kidney injury having a creatinine of 4.21 mg/deciliter from a previous normal baseline in October 2023.  There was transaminitis, and elevated alkaline phosphatase, T. bili of 10.6, lipase of 1477 with a normal lactate.  CT imaging without contrast showed findings concerning for pancreatic head mass. She was transferred to [facility] for GI evaluation.  She is status post EUS/FNA with findings of an irregular mass in the pancreas with fine-needle biopsy obtained.  Bile and pancreatic ducts were obstructed.  Underwent ERCP with biliary sphincterotomy and common bile duct stent placement.  CA 19-9 was 2,960. Nephrology is consulted for acute kidney injury.     Acute kidney injury is due to acute tubular necrosis from prolonged poor by mouth intake with concomitant use of  lisinopril and Celebrex. Her creatinine is sluggishly improving with IV volume expansion. Blood pressures are not low. She is starting to retain fluid. She is currently NPO thus I will not stop the LR but cut her fluids back to 50 ml/hr to run overnight. Her ACE inhibitor and anti-inflammatory are held. She is non oliguric. Renal ultrasound was unremarkable. Urinalysis is pending. UP:CR ratio of 1.69, possibly related to MARK vs DM.  The MRI was not done, I would not be against gadolinium as long as it was gadoteridol.  Pathology is pending.  No renal barriers to discharge.  Blood pressures look okay.  She will need guidance regarding her diet, hoping she can go back to her prior diet if possible.  It would help if she had an oncology appointment set up prior to discharge, possible.  Please have her do a renal function panel, hepatic panel, and CBC on Monday and have it sent to me at [phone].        Active Problems:  There are no active Hospital Problems.       I spent 50 minutes in the professional and overall care of this patient.      José Miguel Jacobo MD

## 2024-09-17 ENCOUNTER — INFUSION (OUTPATIENT)
Dept: ONCOLOGY | Facility: HOSPITAL | Age: 49
End: 2024-09-17
Payer: MEDICARE

## 2024-09-17 ENCOUNTER — HOSPITAL ENCOUNTER (OUTPATIENT)
Dept: BONE DENSITY | Facility: HOSPITAL | Age: 49
Discharge: HOME | End: 2024-09-17
Payer: MEDICARE

## 2024-09-17 VITALS
DIASTOLIC BLOOD PRESSURE: 71 MMHG | RESPIRATION RATE: 18 BRPM | BODY MASS INDEX: 27.62 KG/M2 | SYSTOLIC BLOOD PRESSURE: 98 MMHG | OXYGEN SATURATION: 98 % | HEART RATE: 96 BPM | WEIGHT: 161 LBS | TEMPERATURE: 97.1 F

## 2024-09-17 DIAGNOSIS — Z95.828 PORT-A-CATH IN PLACE: Primary | ICD-10-CM

## 2024-09-17 DIAGNOSIS — M85.89 OSTEOPENIA OF MULTIPLE SITES: Chronic | ICD-10-CM

## 2024-09-17 PROCEDURE — 96523 IRRIG DRUG DELIVERY DEVICE: CPT

## 2024-09-17 PROCEDURE — 25010000002 HEPARIN LOCK FLUSH PER 10 UNITS: Performed by: INTERNAL MEDICINE

## 2024-09-17 PROCEDURE — 77080 DXA BONE DENSITY AXIAL: CPT

## 2024-09-17 RX ORDER — SODIUM CHLORIDE 0.9 % (FLUSH) 0.9 %
10 SYRINGE (ML) INJECTION AS NEEDED
Status: DISCONTINUED | OUTPATIENT
Start: 2024-09-17 | End: 2024-09-17 | Stop reason: HOSPADM

## 2024-09-17 RX ORDER — HEPARIN SODIUM (PORCINE) LOCK FLUSH IV SOLN 100 UNIT/ML 100 UNIT/ML
500 SOLUTION INTRAVENOUS AS NEEDED
Status: DISCONTINUED | OUTPATIENT
Start: 2024-09-17 | End: 2024-09-17 | Stop reason: HOSPADM

## 2024-09-17 RX ORDER — HEPARIN SODIUM (PORCINE) LOCK FLUSH IV SOLN 100 UNIT/ML 100 UNIT/ML
500 SOLUTION INTRAVENOUS AS NEEDED
OUTPATIENT
Start: 2024-09-17

## 2024-09-17 RX ORDER — SODIUM CHLORIDE 0.9 % (FLUSH) 0.9 %
10 SYRINGE (ML) INJECTION AS NEEDED
OUTPATIENT
Start: 2024-09-17

## 2024-09-17 RX ADMIN — HEPARIN 500 UNITS: 100 SYRINGE at 10:42

## 2024-09-17 RX ADMIN — Medication 10 ML: at 10:42

## 2024-09-20 ENCOUNTER — OFFICE VISIT (OUTPATIENT)
Dept: PSYCHIATRY | Facility: CLINIC | Age: 49
End: 2024-09-20
Payer: MEDICARE

## 2024-09-20 VITALS
SYSTOLIC BLOOD PRESSURE: 100 MMHG | DIASTOLIC BLOOD PRESSURE: 66 MMHG | WEIGHT: 161 LBS | OXYGEN SATURATION: 99 % | HEART RATE: 82 BPM | BODY MASS INDEX: 27.62 KG/M2

## 2024-09-20 DIAGNOSIS — F33.1 MODERATE EPISODE OF RECURRENT MAJOR DEPRESSIVE DISORDER: ICD-10-CM

## 2024-09-20 DIAGNOSIS — F42.8 OTHER OBSESSIVE-COMPULSIVE DISORDERS: Chronic | ICD-10-CM

## 2024-09-20 DIAGNOSIS — F41.1 GENERALIZED ANXIETY DISORDER: ICD-10-CM

## 2024-09-20 RX ORDER — ALPRAZOLAM 1 MG
1 TABLET ORAL 2 TIMES DAILY PRN
Qty: 60 TABLET | Refills: 0 | Status: SHIPPED | OUTPATIENT
Start: 2024-09-20

## 2024-09-20 RX ORDER — VENLAFAXINE HYDROCHLORIDE 75 MG/1
225 CAPSULE, EXTENDED RELEASE ORAL DAILY
Qty: 90 CAPSULE | Refills: 1 | Status: SHIPPED | OUTPATIENT
Start: 2024-09-20

## 2024-09-20 NOTE — PROGRESS NOTES
Subjective   Nivia Bernstein is a 49 y.o. female is here today for medication management follow-up.    Chief Complaint:  anxiety depression insomnia     History of Present Illness:    Patient presents today for a follow up for medication management for anxiety, depression, and insomnia. Denies any medical changes since last visit. Patient reports medication compliance and denies any side effects. Sleeping about about 4 hours a night. Patient reports still having a lot of nightmares. Patient states appetite is about the same. Patient eating at least once or twice a day. Patient reports still doing the Ozempic and still have insulin pump. Denies any panic attacks. Patient states still having agitation at times. Denies any aggression. Anxiety at a 9 on a 0/10 scale with 10 the worst. Depression at a 8 on a 0/10 scale with 10 the worst. Denies any thoughts to harm self or others. Denies any auditory or visual hallucinations.  The following portions of the patient's history were reviewed and updated as appropriate: allergies, current medications, past family history, past medical history, past social history, past surgical history, and problem list.    Review of Systems   Constitutional: Negative.    Respiratory: Negative.     Cardiovascular: Negative.    Gastrointestinal: Negative.    Neurological: Negative.    Psychiatric/Behavioral:  Positive for agitation, dysphoric mood and sleep disturbance. The patient is nervous/anxious.        Objective   Physical Exam  Vitals reviewed.   Constitutional:       Appearance: Normal appearance. She is well-developed and well-groomed.   Neurological:      Mental Status: She is alert.   Psychiatric:         Attention and Perception: Attention and perception normal.         Mood and Affect: Mood is anxious. Affect is angry.         Speech: Speech normal.         Behavior: Behavior normal. Behavior is cooperative.         Thought Content: Thought content normal.         Cognition  and Memory: Cognition and memory normal.         Judgment: Judgment normal.       Blood pressure 100/66, pulse 82, weight 73 kg (161 lb), SpO2 99%, not currently breastfeeding.Body mass index is 27.62 kg/m².    Allergies   Allergen Reactions    Imipramine Other (See Comments)     Chest pain    Mobic [Meloxicam] Rash    Penicillins Angioedema    Taxotere [Docetaxel] Anaphylaxis     9 minutes into 1st infusion    Novolog [Insulin Aspart] Hives    Rosuvastatin Calcium GI Intolerance       Medication List:   Current Outpatient Medications   Medication Sig Dispense Refill    Accu-Chek FastClix Lancets misc USE TO test blood sugar THREE TIMES DAILY 102 each 5    albuterol sulfate  (90 Base) MCG/ACT inhaler INHALE TWO PUFFS BY MOUTH EVERY FOUR HOURS AS NEEDED FOR SHORTNESS OF BREATH 18 g 5    alendronate (FOSAMAX) 35 MG tablet TAKE ONE TABLET BY MOUTH EVERY 7 DAYS WITH WATER 30 MINUTES BEFORE THE FIRST FOOD, DRINK, OR MEDICATION AND AVOID LYING DOWN 30 MINUTES AFTER TAKING 4 tablet 3    ALPRAZolam (XANAX) 1 MG tablet TAKE ONE TABLET BY MOUTH TWICE DAILY AS NEEDED FOR anxiety 60 tablet 0    Aspirin Low Dose 81 MG EC tablet TAKE TWO TABLETS BY MOUTH EVERY DAY 60 tablet 4    atorvastatin (LIPITOR) 40 MG tablet TAKE ONE TABLET BY MOUTH EVERY night 90 tablet 0    azelastine (ASTELIN) 0.1 % nasal spray Use in each nostril as directed 30 mL 3    Calcium Carb-Cholecalciferol (Calcium+D3) 500-15 MG-MCG tablet Take 1 tablet by mouth 2 (Two) Times a Day With Meals. 60 tablet 3    cloNIDine (CATAPRES) 0.1 MG tablet TAKE ONE TABLET BY MOUTH TWICE DAILY 60 tablet 2    Continuous Glucose Sensor (Guardian 4 Glucose Sensor) misc CHANGE sensor every 5-7 DAYS as directed 5 each 12    cyanocobalamin 1000 MCG/ML injection Inject 1 mL under the skin into the appropriate area as directed Every 30 (Thirty) Days. 1 mL 5    Emgality 120 MG/ML auto-injector pen INJECT 120mg ONCE MONTHLY      fluconazole (Diflucan) 150 MG tablet Take 1 tablet  by mouth Daily. 2 tablet 0    Fluticasone Furoate-Vilanterol (Breo Ellipta) 200-25 MCG/ACT inhaler Inhale 1 puff Daily. 1 each 3    folic acid (FOLVITE) 1 MG tablet Take 1 tablet by mouth Daily. 90 tablet 3    furosemide (LASIX) 20 MG tablet TAKE ONE-HALF TABLET BY MOUTH DAILY 30 tablet 0    gabapentin (NEURONTIN) 100 MG capsule TAKE ONE CAPSULE BY MOUTH TWICE DAILY 60 capsule 2    Glucagon (Gvoke HypoPen 2-Pack) 1 MG/0.2ML solution auto-injector Inject 1 mg under the skin into the appropriate area as directed As Needed (Severe Hypoglycemia). 0.4 mL 3    glucose blood (Accu-Chek Guide) test strip USE TO TEST BLOOD GLUCOSE THREE TIMES DAILY 100 each 3    Insulin Lispro (humaLOG) 100 UNIT/ML injection INJECT AS DIRECTED MAX DAILY DOSE  UNITS DAILY 40 mL 3    ipratropium-albuterol (DUO-NEB) 0.5-2.5 mg/3 ml nebulizer Take 3 mL by nebulization Every 4 (Four) Hours As Needed for Shortness of Air. 150 mL 1    lansoprazole (PREVACID) 30 MG capsule TAKE ONE CAPSULE BY MOUTH EVERY DAY 90 capsule 0    levocetirizine (XYZAL) 5 MG tablet TAKE ONE TABLET BY MOUTH EVERY EVENING 30 tablet 0    Linzess 145 MCG capsule capsule TAKE ONE CAPSULE BY MOUTH EVERY DAY 30 MINUTES BEFORE A MEAL ON AN EMPTY STOMACH 30 capsule 1    montelukast (Singulair) 10 MG tablet Take 1 tablet by mouth Every Night. 90 tablet 0    nitrofurantoin, macrocrystal-monohydrate, (Macrobid) 100 MG capsule Take 1 capsule by mouth 2 (Two) Times a Day. 14 capsule 0    ondansetron (ZOFRAN) 8 MG tablet TAKE 1 TABLET BY MOUTH EVERY 8 HOURS AS NEEDED FOR NAUSEA OR VOMITING 30 tablet 3    Ozempic, 1 MG/DOSE, 4 MG/3ML solution pen-injector INJECT 1MG SUBCUTANEOUSLY ONCE A WEEK 3 mL 0    Respiratory Therapy Supplies (Nebulizer/Tubing/Mouthpiece) kit 1 each Take As Directed. 1 each 1    Synjardy 12.5-1000 MG tablet TAKE ONE TABLET BY MOUTH EVERY MORNING 30 tablet 1    tamoxifen (NOLVADEX) 20 MG chemo tablet Take 1 tablet by mouth Daily. 30 tablet 11    topiramate  (TOPAMAX) 50 MG tablet TAKE ONE TABLET BY MOUTH TWICE DAILY 60 tablet 3    ubrogepant 100 MG tablet       venlafaxine XR (EFFEXOR-XR) 75 MG 24 hr capsule Take 3 capsules by mouth Daily. 90 capsule 1    vitamin D (ERGOCALCIFEROL) 1.25 MG (45381 UT) capsule capsule Take 1 capsule by mouth 1 (One) Time Per Week. 4 capsule 3     No current facility-administered medications for this visit.       Mental Status Exam:   Hygiene:   good  Cooperation:  Cooperative  Eye Contact:  Good  Psychomotor Behavior:  Appropriate  Affect:  Appropriate  Hopelessness: Denies  Speech:  Normal  Thought Process:  Goal directed and Linear  Thought Content:  Normal  Suicidal:  None  Homicidal:  None  Hallucinations:  None  Delusion:  None  Memory:  Intact  Orientation:  Person, Place, Time, and Situation  Reliability:  fair  Insight:  Fair  Judgement:  Fair  Impulse Control:  Fair  Physical/Medical Issues:  No               Assessment & Plan   Diagnoses and all orders for this visit:    1. Generalized anxiety disorder  -     venlafaxine XR (EFFEXOR-XR) 75 MG 24 hr capsule; Take 3 capsules by mouth Daily.  Dispense: 90 capsule; Refill: 1  -     ALPRAZolam (XANAX) 1 MG tablet; Take 1 tablet by mouth 2 (Two) Times a Day As Needed for Anxiety. for anxiety  Dispense: 60 tablet; Refill: 0    2. Moderate episode of recurrent major depressive disorder  -     venlafaxine XR (EFFEXOR-XR) 75 MG 24 hr capsule; Take 3 capsules by mouth Daily.  Dispense: 90 capsule; Refill: 1    3. Other obsessive-compulsive disorders  -     ALPRAZolam (XANAX) 1 MG tablet; Take 1 tablet by mouth 2 (Two) Times a Day As Needed for Anxiety. for anxiety  Dispense: 60 tablet; Refill: 0            Discussed medication options with patient. Cont. Effexor XR 75 mg three capsules daily for depression and anxiety. Cont. Alprazolam 1 mg twice daily as needed for anxiety. Reviewed the risks, benefits, and side effects of the medications; patient acknowledged and verbally consented.  Patient is being prescribed a controlled substance as part of treatment plan. Patient has been educated of appropriate use of the medications, including risk of somnolence, limited ability to drive and/or work safely, and potential for dependence, respiratory depression and overdose. Patient is also informed that the medication are to be used by the patient only- avoid any combined use of ETOH or other substances unless prescribed.      [facility] Patient Controlled Substance Report (from 10/4/2023 to 10/3/2024)    Dispensed  Strength Quantity Days Supply Provider Pharmacy   09/05/2024 Alprazolam 1MG 60 each 30 [facility]   09/05/2024 Gabapentin 100MG 60 each 30 TIFFANIE[facility]I [facility]   08/09/2024 Gabapentin 100MG 60 each 30 [facility]AN[facility]   08/08/2024 Alprazolam 1MG 60 each 30 [facility]O [facility]   06/13/2024 Gabapentin 100MG 60 each 30 [facility]FFANIE[facility]SON[facility]I [facility]   06/05/2024 Alprazolam 1MG 60 each 30 [facility]   05/09/2024 Gabapentin 100MG 60 each 30 [facility]FFANIE[facility]I [facility]   05/07/2024 Alprazolam 1MG 60 each 30 [facility]   04/02/2024 Alprazolam 1MG 60 each 30 [facility]   04/02/2024 Gabapentin 100MG 60 each 30 TIFFANIE[facility]I [facility]   03/05/2024 Alprazolam 1MG 60 each 30 [facility]   03/05/2024 Gabapentin 100MG 60 each 30 [facility]ANIE[facility]I [facility]   02/06/2024 Alprazolam 1MG 60 each 30 [facility]   02/06/2024 Gabapentin 100MG 60 each 30 [facility]ANIE[facility]I [facility]   01/10/2024 Gabapentin 100MG 60 each 30 [facility]   12/13/2023 Gabapentin 100MG 60 each 30 [facility]   12/06/2023 Alprazolam 1MG 60 each 30 CLOUD[facility]ALFREDO  [facility]   11/14/2023 Gabapentin 100MG 60 each 30 TIFFANIESONALI SOMMERS [facility]   11/09/2023 Alprazolam 1MG 60 each 30 ALFREDO MELVIN [facility]   10/18/2023 Gabapentin 100MG 60 each 30 TIFFANIESONALI SOMMERS [facility]         Disclaimer    *The information in this report is based upon Schedule II through V controlled substance records reported by dispensers. Data should appear on [facility] reports within two to three business days after dispensing.   *The records listed in the report are based on the patient identification information entered by the report requestor, and if not sufficiently unique may result in the report including records for multiple patients. Please verify the information in the report by contacting the prescribers and/or dispensers listed.   *If the controlled substance records on this report appear to be in error, the patient or provider should contact the dispenser to determine if the information was reported accurately. If the dispenser certifies the information was reported accurately, the dispenser can contact the Drug Enforcement and Professional Practices Branch at [phone] to investigate the error.   *The information in this report is intended for informational use only by the person authorized to request the report. Intentional disclosure of the report or data to someone not authorized to obtain the data is a Class B Misdemeanor.      Report Restrictions - A practitioner or pharmacist may share the report with the patient or person authorized to act on the patient's behalf and place the report in the patient's medical record, with the report then being deemed a medical record subject to the same disclosure terms and conditions as an ordinary medical record. ([place]RS 218A.202)      Patient is agreeable to call the office with any questions, concerns, or worsening of symptoms. Patient is aware to call 911 or go to the nearest ER should  begin having any thoughts to harm self or others.          Follow up in four to six weeks          Errors in dictation may reflect use of voice recognition software and not all errors in transcription may have been detected prior to signing.              This document has been electronically signed by NIDA Vasquez   September 20, 2024 09:36 EDT

## 2024-09-23 DIAGNOSIS — M85.89 OSTEOPENIA OF MULTIPLE SITES: ICD-10-CM

## 2024-09-23 RX ORDER — ALENDRONATE SODIUM 35 MG/1
TABLET ORAL
Qty: 12 TABLET | Refills: 0 | Status: SHIPPED | OUTPATIENT
Start: 2024-09-23

## 2024-09-25 DIAGNOSIS — J30.9 CHRONIC ALLERGIC RHINITIS: Chronic | ICD-10-CM

## 2024-09-25 DIAGNOSIS — F41.1 GENERALIZED ANXIETY DISORDER: ICD-10-CM

## 2024-09-25 DIAGNOSIS — F33.1 MODERATE EPISODE OF RECURRENT MAJOR DEPRESSIVE DISORDER: ICD-10-CM

## 2024-09-25 RX ORDER — LEVOCETIRIZINE DIHYDROCHLORIDE 5 MG/1
5 TABLET, FILM COATED ORAL EVERY EVENING
Qty: 90 TABLET | Refills: 0 | Status: SHIPPED | OUTPATIENT
Start: 2024-09-25

## 2024-09-25 RX ORDER — VENLAFAXINE HYDROCHLORIDE 75 MG/1
225 CAPSULE, EXTENDED RELEASE ORAL DAILY
Qty: 90 CAPSULE | Refills: 1 | OUTPATIENT
Start: 2024-09-25

## 2024-09-28 DIAGNOSIS — E13.9 DIABETES MELLITUS TYPE 1.5, MANAGED AS TYPE 1: Chronic | ICD-10-CM

## 2024-09-30 RX ORDER — BLOOD SUGAR DIAGNOSTIC
STRIP MISCELLANEOUS
Qty: 100 EACH | Refills: 3 | Status: SHIPPED | OUTPATIENT
Start: 2024-09-30

## 2024-10-01 ENCOUNTER — PRIOR AUTHORIZATION (OUTPATIENT)
Dept: FAMILY MEDICINE CLINIC | Facility: CLINIC | Age: 49
End: 2024-10-01
Payer: MEDICARE

## 2024-10-03 DIAGNOSIS — K21.9 GASTROESOPHAGEAL REFLUX DISEASE WITHOUT ESOPHAGITIS: Chronic | ICD-10-CM

## 2024-10-03 RX ORDER — LANSOPRAZOLE 30 MG/1
CAPSULE, DELAYED RELEASE ORAL
Qty: 90 CAPSULE | Refills: 0 | Status: SHIPPED | OUTPATIENT
Start: 2024-10-03

## 2024-10-08 DIAGNOSIS — E11.42 DM TYPE 2 WITH DIABETIC PERIPHERAL NEUROPATHY: Chronic | ICD-10-CM

## 2024-10-08 RX ORDER — SEMAGLUTIDE 1.34 MG/ML
INJECTION, SOLUTION SUBCUTANEOUS
Qty: 3 ML | Refills: 0 | Status: SHIPPED | OUTPATIENT
Start: 2024-10-08

## 2024-10-23 DIAGNOSIS — K59.03 DRUG-INDUCED CONSTIPATION: ICD-10-CM

## 2024-10-23 DIAGNOSIS — E11.42 DM TYPE 2 WITH DIABETIC PERIPHERAL NEUROPATHY: Chronic | ICD-10-CM

## 2024-10-23 RX ORDER — EMPAGLIFLOZIN AND METFORMIN HYDROCHLORIDE 12.5; 1 MG/1; MG/1
1 TABLET ORAL EVERY MORNING
Qty: 30 TABLET | Refills: 0 | Status: SHIPPED | OUTPATIENT
Start: 2024-10-23

## 2024-10-23 RX ORDER — LINACLOTIDE 145 UG/1
CAPSULE, GELATIN COATED ORAL
Qty: 30 CAPSULE | Refills: 0 | Status: SHIPPED | OUTPATIENT
Start: 2024-10-23

## 2024-10-30 ENCOUNTER — INFUSION (OUTPATIENT)
Dept: ONCOLOGY | Facility: HOSPITAL | Age: 49
End: 2024-10-30
Payer: MEDICARE

## 2024-10-30 VITALS
HEART RATE: 76 BPM | TEMPERATURE: 97.1 F | RESPIRATION RATE: 18 BRPM | SYSTOLIC BLOOD PRESSURE: 107 MMHG | OXYGEN SATURATION: 99 % | DIASTOLIC BLOOD PRESSURE: 61 MMHG

## 2024-10-30 DIAGNOSIS — Z95.828 PORT-A-CATH IN PLACE: Primary | ICD-10-CM

## 2024-10-30 PROCEDURE — 96523 IRRIG DRUG DELIVERY DEVICE: CPT

## 2024-10-30 PROCEDURE — 25010000002 HEPARIN LOCK FLUSH PER 10 UNITS: Performed by: INTERNAL MEDICINE

## 2024-10-30 RX ORDER — SODIUM CHLORIDE 0.9 % (FLUSH) 0.9 %
10 SYRINGE (ML) INJECTION AS NEEDED
OUTPATIENT
Start: 2024-10-30

## 2024-10-30 RX ORDER — SODIUM CHLORIDE 0.9 % (FLUSH) 0.9 %
10 SYRINGE (ML) INJECTION AS NEEDED
Status: DISCONTINUED | OUTPATIENT
Start: 2024-10-30 | End: 2024-10-30 | Stop reason: HOSPADM

## 2024-10-30 RX ORDER — HEPARIN SODIUM (PORCINE) LOCK FLUSH IV SOLN 100 UNIT/ML 100 UNIT/ML
500 SOLUTION INTRAVENOUS AS NEEDED
OUTPATIENT
Start: 2024-10-30

## 2024-10-30 RX ORDER — HEPARIN SODIUM (PORCINE) LOCK FLUSH IV SOLN 100 UNIT/ML 100 UNIT/ML
500 SOLUTION INTRAVENOUS AS NEEDED
Status: DISCONTINUED | OUTPATIENT
Start: 2024-10-30 | End: 2024-10-30 | Stop reason: HOSPADM

## 2024-10-30 RX ADMIN — Medication 10 ML: at 10:09

## 2024-10-30 RX ADMIN — HEPARIN 500 UNITS: 100 SYRINGE at 10:09

## 2024-10-31 DIAGNOSIS — F42.8 OTHER OBSESSIVE-COMPULSIVE DISORDERS: Chronic | ICD-10-CM

## 2024-10-31 DIAGNOSIS — F41.1 GENERALIZED ANXIETY DISORDER: ICD-10-CM

## 2024-10-31 RX ORDER — ALPRAZOLAM 1 MG/1
1 TABLET ORAL 2 TIMES DAILY PRN
Qty: 60 TABLET | Refills: 0 | Status: SHIPPED | OUTPATIENT
Start: 2024-10-31

## 2024-11-04 DIAGNOSIS — J30.9 CHRONIC ALLERGIC RHINITIS: Chronic | ICD-10-CM

## 2024-11-04 DIAGNOSIS — R60.1 GENERALIZED EDEMA: ICD-10-CM

## 2024-11-04 RX ORDER — MONTELUKAST SODIUM 10 MG/1
10 TABLET ORAL
Qty: 90 TABLET | Refills: 0 | Status: SHIPPED | OUTPATIENT
Start: 2024-11-04

## 2024-11-04 RX ORDER — FUROSEMIDE 20 MG/1
10 TABLET ORAL DAILY
Qty: 30 TABLET | Refills: 0 | Status: SHIPPED | OUTPATIENT
Start: 2024-11-04

## 2024-11-05 ENCOUNTER — OFFICE VISIT (OUTPATIENT)
Dept: PSYCHIATRY | Facility: CLINIC | Age: 49
End: 2024-11-05
Payer: MEDICARE

## 2024-11-05 VITALS
BODY MASS INDEX: 28.24 KG/M2 | DIASTOLIC BLOOD PRESSURE: 64 MMHG | HEART RATE: 76 BPM | WEIGHT: 164.6 LBS | OXYGEN SATURATION: 99 % | SYSTOLIC BLOOD PRESSURE: 100 MMHG

## 2024-11-05 DIAGNOSIS — F41.1 GENERALIZED ANXIETY DISORDER: ICD-10-CM

## 2024-11-05 DIAGNOSIS — F42.8 OTHER OBSESSIVE-COMPULSIVE DISORDERS: Chronic | ICD-10-CM

## 2024-11-05 DIAGNOSIS — F33.1 MODERATE EPISODE OF RECURRENT MAJOR DEPRESSIVE DISORDER: ICD-10-CM

## 2024-11-05 PROCEDURE — 99214 OFFICE O/P EST MOD 30 MIN: CPT | Performed by: NURSE PRACTITIONER

## 2024-11-05 PROCEDURE — 96127 BRIEF EMOTIONAL/BEHAV ASSMT: CPT | Performed by: NURSE PRACTITIONER

## 2024-11-05 PROCEDURE — 1160F RVW MEDS BY RX/DR IN RCRD: CPT | Performed by: NURSE PRACTITIONER

## 2024-11-05 PROCEDURE — 1159F MED LIST DOCD IN RCRD: CPT | Performed by: NURSE PRACTITIONER

## 2024-11-05 RX ORDER — ALPRAZOLAM 1 MG/1
1 TABLET ORAL 2 TIMES DAILY PRN
Qty: 60 TABLET | Refills: 0 | Status: SHIPPED | OUTPATIENT
Start: 2024-11-05

## 2024-11-05 RX ORDER — VENLAFAXINE HYDROCHLORIDE 75 MG/1
225 CAPSULE, EXTENDED RELEASE ORAL DAILY
Qty: 90 CAPSULE | Refills: 1 | Status: SHIPPED | OUTPATIENT
Start: 2024-11-05

## 2024-11-05 NOTE — PROGRESS NOTES
Subjective   Nivia Bernstein is a 49 y.o. female is here today for medication management follow-up.    Chief Complaint:  anxiety depression insomnia     History of Present Illness:    Patient presents today for a follow up for medication management for anxiety, depression, and insomnia. Denies any medical changes since last visit. Patient states still have insulin pump but glucose is doing well. Patient reports everything is going about the same. Patient states sleeping about 4-5 hours a night. Patient reports still having nightmares and same ones always have. Patient states appetite is good and eating at least twice a day. Patient states having one to two panic attacks. Patient states she is still taking the alprazolam and helping some but not a lot. Depression at a 7-8 on a 0/10 scale with 10 the worst. Anxiety at a 7-8 on a 0/10 scale with 10 the worst. Denies any thoughts to harm self or others. Denies any auditory or visual hallucinations. Patient reports medication compliance and denies any side effects.   The following portions of the patient's history were reviewed and updated as appropriate: allergies, current medications, past family history, past medical history, past social history, past surgical history, and problem list.    Review of Systems   Constitutional: Negative.    Respiratory: Negative.     Cardiovascular: Negative.    Gastrointestinal: Negative.    Neurological: Negative.    Psychiatric/Behavioral:  Positive for dysphoric mood and sleep disturbance. The patient is nervous/anxious.        Objective   Physical Exam  Vitals reviewed.   Constitutional:       Appearance: Normal appearance. She is well-developed and well-groomed.   Neurological:      Mental Status: She is alert.   Psychiatric:         Attention and Perception: Attention and perception normal.         Mood and Affect: Affect normal. Mood is anxious.         Speech: Speech normal.         Behavior: Behavior normal. Behavior is  cooperative.         Thought Content: Thought content normal.         Cognition and Memory: Cognition and memory normal.         Judgment: Judgment normal.       Blood pressure 100/64, pulse 76, weight 74.7 kg (164 lb 9.6 oz), SpO2 99%, not currently breastfeeding.Body mass index is 28.24 kg/m².    Allergies   Allergen Reactions    Imipramine Other (See Comments)     Chest pain    Mobic [Meloxicam] Rash    Penicillins Angioedema    Taxotere [Docetaxel] Anaphylaxis     9 minutes into 1st infusion    Novolog [Insulin Aspart] Hives    Rosuvastatin Calcium GI Intolerance       Medication List:   Current Outpatient Medications   Medication Sig Dispense Refill    ALPRAZolam (XANAX) 1 MG tablet Take 1 tablet by mouth 2 (Two) Times a Day As Needed for Anxiety. for anxiety 60 tablet 0    venlafaxine XR (EFFEXOR-XR) 75 MG 24 hr capsule Take 3 capsules by mouth Daily. 90 capsule 1    Accu-Chek FastClix Lancets misc USE TO test blood sugar THREE TIMES DAILY 102 each 5    albuterol sulfate  (90 Base) MCG/ACT inhaler INHALE TWO PUFFS BY MOUTH EVERY FOUR HOURS AS NEEDED FOR SHORTNESS OF BREATH 18 g 5    alendronate (FOSAMAX) 35 MG tablet TAKE ONE TABLET BY MOUTH EVERY 7 DAYS WITH WATER 30 MINUTES BEFORE THE FIRST FOOD, DRINK, OR MEDICATION AND AVOID LYING DOWN 30 MINUTES AFTER TAKING 12 tablet 0    Aspirin Low Dose 81 MG EC tablet TAKE TWO TABLETS BY MOUTH EVERY DAY 60 tablet 4    atorvastatin (LIPITOR) 40 MG tablet TAKE ONE TABLET BY MOUTH EVERY night 90 tablet 0    azelastine (ASTELIN) 0.1 % nasal spray Use in each nostril as directed 30 mL 3    Calcium Carb-Cholecalciferol (Calcium+D3) 500-15 MG-MCG tablet Take 1 tablet by mouth 2 (Two) Times a Day With Meals. 60 tablet 3    cloNIDine (CATAPRES) 0.1 MG tablet TAKE ONE TABLET BY MOUTH TWICE DAILY 60 tablet 2    Continuous Glucose Sensor (Guardian 4 Glucose Sensor) misc CHANGE sensor every 5-7 DAYS as directed 5 each 12    cyanocobalamin 1000 MCG/ML injection Inject 1 mL  under the skin into the appropriate area as directed Every 30 (Thirty) Days. 1 mL 5    Emgality 120 MG/ML auto-injector pen INJECT 120mg ONCE MONTHLY      empagliflozin (JARDIANCE) 10 MG tablet tablet Take 1 tablet by mouth Daily. 30 tablet 2    fluconazole (Diflucan) 150 MG tablet Take 1 tablet by mouth Daily. 2 tablet 0    Fluticasone Furoate-Vilanterol (Breo Ellipta) 200-25 MCG/ACT inhaler Inhale 1 puff Daily. 1 each 3    folic acid (FOLVITE) 1 MG tablet Take 1 tablet by mouth Daily. 90 tablet 3    furosemide (LASIX) 20 MG tablet TAKE ONE-HALF TABLET BY MOUTH DAILY 30 tablet 0    gabapentin (NEURONTIN) 100 MG capsule TAKE ONE CAPSULE BY MOUTH TWICE DAILY 60 capsule 2    Glucagon (Gvoke HypoPen 2-Pack) 1 MG/0.2ML solution auto-injector Inject 1 mg under the skin into the appropriate area as directed As Needed (Severe Hypoglycemia). 0.4 mL 3    glucose blood (Accu-Chek Guide) test strip USE TO TEST BLOOD GLUCOSE THREE TIMES DAILY 100 each 3    Insulin Lispro (humaLOG) 100 UNIT/ML injection INJECT AS DIRECTED MAX DAILY DOSE  UNITS DAILY 40 mL 3    ipratropium-albuterol (DUO-NEB) 0.5-2.5 mg/3 ml nebulizer Take 3 mL by nebulization Every 4 (Four) Hours As Needed for Shortness of Air. 150 mL 1    lansoprazole (PREVACID) 30 MG capsule TAKE ONE CAPSULE BY MOUTH EVERY DAY 90 capsule 0    levocetirizine (XYZAL) 5 MG tablet TAKE ONE TABLET BY MOUTH EVERY EVENING 90 tablet 0    linaclotide (Linzess) 145 MCG capsule capsule TAKE ONE CAPSULE BY MOUTH EVERY DAY 30 MINUTES BEFORE A MEAL ON AN EMPTY STOMACH 30 capsule 0    montelukast (SINGULAIR) 10 MG tablet TAKE ONE TABLET BY MOUTH EVERY night 90 tablet 0    nitrofurantoin, macrocrystal-monohydrate, (Macrobid) 100 MG capsule Take 1 capsule by mouth 2 (Two) Times a Day. 14 capsule 0    ondansetron (ZOFRAN) 8 MG tablet TAKE 1 TABLET BY MOUTH EVERY 8 HOURS AS NEEDED FOR NAUSEA OR VOMITING 30 tablet 3    Ozempic, 1 MG/DOSE, 4 MG/3ML solution pen-injector INJECT 1 MG  SUBCUTANEOUSLY ONCE WEEKLY 3 mL 0    Respiratory Therapy Supplies (Nebulizer/Tubing/Mouthpiece) kit 1 each Take As Directed. 1 each 1    tamoxifen (NOLVADEX) 20 MG chemo tablet Take 1 tablet by mouth Daily. 30 tablet 11    topiramate (TOPAMAX) 50 MG tablet TAKE ONE TABLET BY MOUTH TWICE DAILY 60 tablet 3    ubrogepant 100 MG tablet       vitamin D (ERGOCALCIFEROL) 1.25 MG (95412 UT) capsule capsule Take 1 capsule by mouth 1 (One) Time Per Week. 4 capsule 3     No current facility-administered medications for this visit.       Mental Status Exam:   Hygiene:   good  Cooperation:  Cooperative  Eye Contact:  Good  Psychomotor Behavior:  Appropriate  Affect:  Appropriate  Hopelessness: Denies  Speech:  Normal  Thought Process:  Goal directed and Linear  Thought Content:  Normal  Suicidal:  None  Homicidal:  None  Hallucinations:  None  Delusion:  None  Memory:  Intact  Orientation:  Person, Place, Time, and Situation  Reliability:  fair  Insight:  Fair  Judgement:  Fair  Impulse Control:  Fair  Physical/Medical Issues:  No     PHQ-9 Depression Screening  Little interest or pleasure in doing things? Several days   Feeling down, depressed, or hopeless? Over half   PHQ-2 Total Score 3   Trouble falling or staying asleep, or sleeping too much? Several days   Feeling tired or having little energy? Several days   Poor appetite or overeating? Several days   Feeling bad about yourself - or that you are a failure or have let yourself or your family down? Several days   Trouble concentrating on things, such as reading the newspaper or watching television? Several days   Moving or speaking so slowly that other people could have noticed? Or the opposite - being so fidgety or restless that you have been moving around a lot more than usual? Not at all   Thoughts that you would be better off dead, or of hurting yourself in some way? Not at all   PHQ-9 Total Score 8   If you checked off any problems, how difficult have these problems  made it for you to do your work, take care of things at home, or get along with other people? Somewhat difficult     Patient screened positive for depression based on a PHQ-9 score of 8 on 11/5/2024. Follow-up recommendations include: Prescribed antidepressant medication treatment.           Assessment & Plan   Diagnoses and all orders for this visit:    1. Generalized anxiety disorder  -     ALPRAZolam (XANAX) 1 MG tablet; Take 1 tablet by mouth 2 (Two) Times a Day As Needed for Anxiety. for anxiety  Dispense: 60 tablet; Refill: 0  -     venlafaxine XR (EFFEXOR-XR) 75 MG 24 hr capsule; Take 3 capsules by mouth Daily.  Dispense: 90 capsule; Refill: 1    2. Other obsessive-compulsive disorders  -     ALPRAZolam (XANAX) 1 MG tablet; Take 1 tablet by mouth 2 (Two) Times a Day As Needed for Anxiety. for anxiety  Dispense: 60 tablet; Refill: 0    3. Moderate episode of recurrent major depressive disorder  -     venlafaxine XR (EFFEXOR-XR) 75 MG 24 hr capsule; Take 3 capsules by mouth Daily.  Dispense: 90 capsule; Refill: 1            Discussed medication options with patient. Cont. Effexor XR 75 mg three capsules daily for depression and anxiety. Cont. Alprazolam 1 mg twice daily for as needed for anxiety. Reviewed the risks, benefits, and side effects of the medications; patient acknowledged and verbally consented.   Patient is being prescribed a controlled substance as part of treatment plan. Patient has been educated of appropriate use of the medications, including risk of somnolence, limited ability to drive and/or work safely, and potential for dependence, respiratory depression and overdose. Patient is also informed that the medication are to be used by the patient only- avoid any combined use of ETOH or other substances unless prescribed.       [facility] Patient Controlled Substance Report (from 11/19/2023 to 11/18/2024)    Dispensed  Strength Quantity Days Supply Provider Pharmacy   11/07/2024 Alprazolam 1MG 60 each  30 [facility]   10/31/2024 Gabapentin 100MG 60 each 30 TIFFANIE[facility]   10/03/2024 Alprazolam 1MG 60 each 30 [facility]   10/03/2024 Gabapentin 100MG 60 each 30 [facility]   09/05/2024 Alprazolam 1MG 60 each 30 [facility]   09/05/2024 Gabapentin 100MG 60 each 30 [facility]   08/09/2024 Gabapentin 100MG 60 each 30 [facility]   08/08/2024 Alprazolam 1MG 60 each 30 [facility]   06/13/2024 Gabapentin 100MG 60 each 30 [facility]   06/05/2024 Alprazolam 1MG 60 each 30 [facility]   05/09/2024 Gabapentin 100MG 60 each 30 [facility]   05/07/2024 Alprazolam 1MG 60 each 30 [facility]   04/02/2024 Alprazolam 1MG 60 each 30 [facility]   04/02/2024 Gabapentin 100MG 60 each 30 [facility]   03/05/2024 Alprazolam 1MG 60 each 30 [facility]   03/05/2024 Gabapentin 100MG 60 each 30 [facility]   02/06/2024 Alprazolam 1MG 60 each 30 [facility]   02/06/2024 Gabapentin 100MG 60 each 30 [facility]   01/10/2024 Gabapentin 100MG 60 each 30 [facility]   12/13/2023 Gabapentin 100MG 60 each 30 [facility]   12/06/2023 Alprazolam 1MG 60 each 30 [facility]         Disclaimer    *The information in this report is based upon Schedule II through V controlled substance records reported by dispensers. Data should appear on [facility] reports within two to three business days after dispensing.   *The records listed in the  report are based on the patient identification information entered by the report requestor, and if not sufficiently unique may result in the report including records for multiple patients. Please verify the information in the report by contacting the prescribers and/or dispensers listed.   *If the controlled substance records on this report appear to be in error, the patient or provider should contact the dispenser to determine if the information was reported accurately. If the dispenser certifies the information was reported accurately, the dispenser can contact the Drug Enforcement and Professional Practices Branch at [phone] to investigate the error.   *The information in this report is intended for informational use only by the person authorized to request the report. Intentional disclosure of the report or data to someone not authorized to obtain the data is a Class B Misdemeanor.      Report Restrictions - A practitioner or pharmacist may share the report with the patient or person authorized to act on the patient's behalf and place the report in the patient's medical record, with the report then being deemed a medical record subject to the same disclosure terms and conditions as an ordinary medical record. ([place]RS 218[place].202)      Patient is agreeable to call the office with any questions, concerns, or worsening of symptoms. Patient is aware to call 911 or go to the nearest ER should begin having any thoughts to harm self or others.           Follow up in four to six weeks        Errors in dictation may reflect use of voice recognition software and not all errors in transcription may have been detected prior to signing.              This document has been electronically signed by NIDA Vasquez   November 18, 2024 12:55 EST

## 2024-11-07 DIAGNOSIS — K21.9 GASTROESOPHAGEAL REFLUX DISEASE WITHOUT ESOPHAGITIS: Chronic | ICD-10-CM

## 2024-11-07 DIAGNOSIS — E11.42 DM TYPE 2 WITH DIABETIC PERIPHERAL NEUROPATHY: ICD-10-CM

## 2024-11-07 DIAGNOSIS — E55.9 VITAMIN D DEFICIENCY: Primary | ICD-10-CM

## 2024-11-08 DIAGNOSIS — E11.42 DM TYPE 2 WITH DIABETIC PERIPHERAL NEUROPATHY: Chronic | ICD-10-CM

## 2024-11-08 RX ORDER — SEMAGLUTIDE 1.34 MG/ML
INJECTION, SOLUTION SUBCUTANEOUS
Qty: 3 ML | Refills: 0 | Status: SHIPPED | OUTPATIENT
Start: 2024-11-08

## 2024-11-13 ENCOUNTER — OFFICE VISIT (OUTPATIENT)
Dept: FAMILY MEDICINE CLINIC | Facility: CLINIC | Age: 49
End: 2024-11-13
Payer: MEDICARE

## 2024-11-13 DIAGNOSIS — E53.8 VITAMIN B 12 DEFICIENCY: Chronic | ICD-10-CM

## 2024-11-13 DIAGNOSIS — Z96.41 INSULIN PUMP IN PLACE: ICD-10-CM

## 2024-11-13 DIAGNOSIS — K59.03 DRUG-INDUCED CONSTIPATION: Chronic | ICD-10-CM

## 2024-11-13 DIAGNOSIS — Z51.81 ENCOUNTER FOR THERAPEUTIC DRUG MONITORING: ICD-10-CM

## 2024-11-13 DIAGNOSIS — C50.411 MALIGNANT NEOPLASM OF UPPER-OUTER QUADRANT OF RIGHT BREAST IN FEMALE, ESTROGEN RECEPTOR NEGATIVE: Chronic | ICD-10-CM

## 2024-11-13 DIAGNOSIS — Z17.1 MALIGNANT NEOPLASM OF UPPER-OUTER QUADRANT OF RIGHT BREAST IN FEMALE, ESTROGEN RECEPTOR NEGATIVE: Chronic | ICD-10-CM

## 2024-11-13 DIAGNOSIS — F32.A ANXIETY AND DEPRESSION: Chronic | ICD-10-CM

## 2024-11-13 DIAGNOSIS — J45.20 MILD INTERMITTENT ASTHMA WITHOUT COMPLICATION: Chronic | ICD-10-CM

## 2024-11-13 DIAGNOSIS — F41.9 ANXIETY AND DEPRESSION: Chronic | ICD-10-CM

## 2024-11-13 DIAGNOSIS — Z97.8 USES SELF-APPLIED CONTINUOUS GLUCOSE MONITORING DEVICE: ICD-10-CM

## 2024-11-13 DIAGNOSIS — E78.2 MIXED HYPERLIPIDEMIA: Chronic | ICD-10-CM

## 2024-11-13 DIAGNOSIS — E55.9 VITAMIN D DEFICIENCY: Chronic | ICD-10-CM

## 2024-11-13 DIAGNOSIS — G43.009 MIGRAINE WITHOUT AURA AND WITHOUT STATUS MIGRAINOSUS, NOT INTRACTABLE: Chronic | ICD-10-CM

## 2024-11-13 DIAGNOSIS — E11.42 DM TYPE 2 WITH DIABETIC PERIPHERAL NEUROPATHY: Primary | Chronic | ICD-10-CM

## 2024-11-13 DIAGNOSIS — K21.9 GASTROESOPHAGEAL REFLUX DISEASE WITHOUT ESOPHAGITIS: Chronic | ICD-10-CM

## 2024-11-13 DIAGNOSIS — Z91.89 AT HIGH RISK FOR INFECTION: ICD-10-CM

## 2024-11-13 NOTE — PROGRESS NOTES
History of Present Illness  Nivia is a 50 y/o female who presents to [facility] today pertaining to her DM and Dyslipidemia. She has been diagnosed with right Breast Cancer, GERD, Osteopenia/arthritis, migraine headaches and anxiety/ depression.      Diabetes   The initial diagnosis of diabetes was made 20 years ago. Diabetic complications include peripheral neuropathy. Risk factors for coronary artery disease include post-menopausal, stress and dyslipidemia. She is currently utilizing insulin pump therapy and CGM. In addition, she is currently taking Synardy and Ozempic.  She has had a previous visit with a dietitian An ACE inhibitor/angiotensin II receptor blocker is not  being taken at this time.     Lab Results   Component Value Date    HGBA1C 6.10 (H) 08/05/2024      Dyslipidemia   The current episode started more than 1 year ago.  She is taking Atorvastatin 40 mg and aspirin 81 mg..   Lab Results   Component Value Date    CHOL 109 08/05/2024    CHOL 145 05/15/2024    CHOL 124 11/07/2023     Lab Results   Component Value Date    TRIG 174 (H) 08/05/2024    TRIG 132 05/15/2024    TRIG 198 (H) 11/07/2023     Lab Results   Component Value Date    HDL 35 (L) 08/05/2024    HDL 43 05/15/2024    HDL 31 (L) 11/07/2023     Lab Results   Component Value Date    LDL 45 08/05/2024    LDL 79 05/15/2024    LDL 60 11/07/2023      Breast Cancer  Malignant neoplasm of upper outer quadrant of right breast, estrogen receptor negative which was treated with mastectomy and followed by chemotherapy.  Continues to take tamoxifen 20 mg and followed by oncology.      GERD  Stable with lansoprazole.  Lab Results   Component Value Date    WBC 10.12 08/05/2024    HGB 13.8 08/05/2024    HCT 41.4 08/05/2024    MCV 86.3 08/05/2024     08/05/2024     Anxiety/Depression  Stable and followed by behavioral health  Lab Results   Component Value Date    TSH 3.430 08/05/2024     The following portions  "of the patient's history were reviewed and updated as appropriate: allergies, current medications, past family history, past medical history, past social history, past surgical history and problem list.    Review of Systems   Constitutional:  Negative for activity change, appetite change, chills, fatigue, fever and unexpected weight change.   HENT:  Negative for congestion.    Eyes:  Negative for visual disturbance.   Respiratory:  Negative for cough, shortness of breath and wheezing.    Cardiovascular:  Negative for chest pain and palpitations. Leg swelling: Intermittent.  Gastrointestinal:  Negative for nausea and vomiting.        GERD   Endocrine: Negative for cold intolerance, heat intolerance, polydipsia, polyphagia and polyuria.   Musculoskeletal:  Positive for arthralgias (Much improved with weight loss) and back pain.   Skin:  Negative for color change and rash.   Allergic/Immunologic: Positive for environmental allergies.   Neurological:  Positive for dizziness, numbness (Neuropathy) and headaches (Migraines). Negative for tremors, speech difficulty, weakness and light-headedness.   Hematological:  Negative for adenopathy.   Psychiatric/Behavioral:  Negative for confusion, decreased concentration and sleep disturbance. The patient is not nervous/anxious.    All other systems reviewed and are negative.    Vital signs:  /60 (BP Location: Left arm, Patient Position: Sitting, Cuff Size: Adult)   Pulse 87   Temp 98.2 °F (36.8 °C) (Temporal)   Ht 162.6 cm (64.02\")   Wt 73 kg (161 lb)   SpO2 93%   BMI 27.62 kg/m²     Physical Exam  Vitals and nursing note reviewed.   Constitutional:       General: She is not in acute distress.     Appearance: She is well-developed.   HENT:      Head: Normocephalic.      Nose: Nose normal.   Eyes:      General: No scleral icterus.        Right eye: No discharge.         Left eye: No discharge.      Conjunctiva/sclera: Conjunctivae normal.   Neck:      Thyroid: No " thyromegaly.      Vascular: No JVD.   Cardiovascular:      Rate and Rhythm: Normal rate and regular rhythm.      Heart sounds: S1 normal and S2 normal.   Pulmonary:      Effort: Pulmonary effort is normal.      Breath sounds: Normal breath sounds.   Abdominal:      Palpations: Abdomen is soft.      Tenderness: There is no abdominal tenderness. There is no guarding.   Musculoskeletal:      Cervical back: Neck supple.      Right lower leg: No edema.      Left lower leg: No edema.   Skin:     General: Skin is dry.      Capillary Refill: Capillary refill takes less than 2 seconds.   Neurological:      Mental Status: She is alert.      Cranial Nerves: Cranial nerves 2-12 are intact.   Psychiatric:         Mood and Affect: Mood and affect normal.         Speech: Speech normal.         Behavior: Behavior is cooperative.         Thought Content: Thought content normal.       Result Review :   Guardian for CGM upload from October 31 through November 13, 2024  Time in range  0% very low  0% low  97% in range  3% high  0% very high  Average blood glucose 134 mg/dL    BMI is >= 25 and <30. (Overweight) The following options were offered after discussion;: Information on healthy weight added to patient's after visit summary.    Assessment & Plan     Diagnoses and all orders for this visit:    1. DM type 2 with diabetic peripheral neuropathy (Primary)  Comments:  Reviewed insulin pump upload with her.  Will discontinue Synjardy and add Jardiance.  Continue Jardiance and current insulin pump parameters  Orders:  -     empagliflozin (JARDIANCE) 10 MG tablet tablet; Take 1 tablet by mouth Daily.  Dispense: 30 tablet; Refill: 2    2. Insulin pump in place  Comments:  Continue current insulin pump parameters    3. Uses self-applied continuous glucose monitoring device  Comments:  CGM upload reviewed with her showing excellent glycemic control    4. Mixed hyperlipidemia  Comments:  Continue atorvastatin    5. Drug-induced  constipation  Comments:  Stable with Linzess  Orders:  -     Magnesium; Future    6. Gastroesophageal reflux disease without esophagitis  Comments:  Continue lansoprazole    7. Mild intermittent asthma without complication  Comments:  Stable with current regimen which she will continue including montelukast, Breo and albuterol H FA    8. Vitamin D deficiency  Comments:  Continue vitamin D    9. Vitamin B 12 deficiency  Comments:  Continue vitamin B12 injection every 30 days    10. Anxiety and depression  Comments:  Stable with current regimen of Xanax 1 mg twice daily as needed, clonidine 0.1 mg and Effexor    11. Migraine without aura and without status migrainosus, not intractable  Comments:  Continue Emgality and Topamax.  Ubrelvy as needed for breakthrough    12. Malignant neoplasm of upper-outer quadrant of right breast in female, estrogen receptor negative  Comments:  Continue follow-up with oncology    13. Encounter for therapeutic drug monitoring  Comments:  UDS today  Orders:  -     Urine Drug Screen - Urine, Clean Catch; Future    14. At high risk for infection  Comments:  RSV vaccination ordered  Orders:  -     RSVPreF3 Vac Recomb Adjuvanted (AREXVY) 120 MCG/0.5ML reconstituted suspension injection; Inject 0.5 mL into the appropriate muscle as directed by prescriber 1 (One) Time for 1 dose.  Dispense: 0.5 mL; Refill: 0    Follow Up In February    Findings and recommendations discussed with Nivia. Reviewed CGM summary results. Lifestyle modifications reinforced including nutrition and activity recommendations.  She will follow up in February sooner if problems/concerns occur.  Nivia was given instructions and counseling regarding her condition or for health maintenance advice. Please see specific information pulled into the AVS if appropriate.    As part of the patient's treatment plan they are being prescribed a controlled substance/ substances with potential for abuse.  This patient has been made  aware of the appropriate use of such medications, including potential risk of somnolence, limited ability to drive and/or work safely, and potential for overdose.  It has also been made clear these medications are for use by the patient only, without concomitant use of alcohol or other substances unless prescribed/advised by medical provider.    Patient has completed prescribing agreement detailing terms of continued prescribing of controlled substances including monitoring [facility] reports, urine drug screens, and pill counts.  The patient is aware [facility] reports are reviewed on a regular basis and scanned into the chart.    History and physical exam exhibit continued safe and appropriate use of controlled substances.     This document has been electronically signed by:

## 2024-11-16 VITALS
DIASTOLIC BLOOD PRESSURE: 60 MMHG | HEART RATE: 87 BPM | WEIGHT: 161 LBS | TEMPERATURE: 98.2 F | SYSTOLIC BLOOD PRESSURE: 110 MMHG | OXYGEN SATURATION: 93 % | HEIGHT: 64 IN | BODY MASS INDEX: 27.49 KG/M2

## 2024-11-18 DIAGNOSIS — E55.9 VITAMIN D DEFICIENCY: ICD-10-CM

## 2024-11-18 RX ORDER — ERGOCALCIFEROL 1.25 MG/1
50000 CAPSULE, LIQUID FILLED ORAL WEEKLY
Qty: 12 CAPSULE | Refills: 0 | Status: SHIPPED | OUTPATIENT
Start: 2024-11-18

## 2024-11-20 DIAGNOSIS — M54.81 OCCIPITAL NEURALGIA OF RIGHT SIDE: Chronic | ICD-10-CM

## 2024-11-20 DIAGNOSIS — T45.1X5A HOT FLASHES DUE TO TAMOXIFEN: ICD-10-CM

## 2024-11-20 DIAGNOSIS — R23.2 HOT FLASHES: ICD-10-CM

## 2024-11-20 DIAGNOSIS — R23.2 HOT FLASHES DUE TO TAMOXIFEN: ICD-10-CM

## 2024-11-20 RX ORDER — TOPIRAMATE 50 MG/1
TABLET, FILM COATED ORAL
Qty: 60 TABLET | Refills: 3 | Status: SHIPPED | OUTPATIENT
Start: 2024-11-20

## 2024-11-20 RX ORDER — CLONIDINE HYDROCHLORIDE 0.1 MG/1
TABLET ORAL
Qty: 60 TABLET | Refills: 2 | Status: SHIPPED | OUTPATIENT
Start: 2024-11-20

## 2024-11-20 RX ORDER — GABAPENTIN 100 MG/1
100 CAPSULE ORAL 2 TIMES DAILY
Qty: 60 CAPSULE | Refills: 2 | Status: SHIPPED | OUTPATIENT
Start: 2024-11-20

## 2024-11-27 DIAGNOSIS — R11.0 NAUSEA: ICD-10-CM

## 2024-11-28 RX ORDER — ONDANSETRON 8 MG/1
TABLET, FILM COATED ORAL
Qty: 30 TABLET | Refills: 3 | Status: SHIPPED | OUTPATIENT
Start: 2024-11-28

## 2024-11-29 DIAGNOSIS — K59.03 DRUG-INDUCED CONSTIPATION: ICD-10-CM

## 2024-11-29 DIAGNOSIS — E78.2 MIXED DYSLIPIDEMIA: Chronic | ICD-10-CM

## 2024-11-29 DIAGNOSIS — E11.42 DM TYPE 2 WITH DIABETIC PERIPHERAL NEUROPATHY: Chronic | ICD-10-CM

## 2024-12-03 RX ORDER — EMPAGLIFLOZIN AND METFORMIN HYDROCHLORIDE 12.5; 1 MG/1; MG/1
1 TABLET ORAL EVERY MORNING
Qty: 30 TABLET | Refills: 0 | OUTPATIENT
Start: 2024-12-03

## 2024-12-03 RX ORDER — ATORVASTATIN CALCIUM 40 MG/1
TABLET, FILM COATED ORAL
Qty: 90 TABLET | Refills: 0 | Status: SHIPPED | OUTPATIENT
Start: 2024-12-03

## 2024-12-03 RX ORDER — LINACLOTIDE 145 UG/1
CAPSULE, GELATIN COATED ORAL
Qty: 30 CAPSULE | Refills: 0 | Status: SHIPPED | OUTPATIENT
Start: 2024-12-03

## 2024-12-12 ENCOUNTER — INFUSION (OUTPATIENT)
Dept: ONCOLOGY | Facility: HOSPITAL | Age: 49
End: 2024-12-12
Payer: MEDICARE

## 2024-12-12 ENCOUNTER — OFFICE VISIT (OUTPATIENT)
Dept: ONCOLOGY | Facility: CLINIC | Age: 49
End: 2024-12-12
Payer: MEDICARE

## 2024-12-12 ENCOUNTER — APPOINTMENT (OUTPATIENT)
Dept: ONCOLOGY | Facility: HOSPITAL | Age: 49
End: 2024-12-12
Payer: MEDICARE

## 2024-12-12 VITALS
DIASTOLIC BLOOD PRESSURE: 72 MMHG | WEIGHT: 168.5 LBS | HEART RATE: 91 BPM | OXYGEN SATURATION: 98 % | TEMPERATURE: 97.5 F | RESPIRATION RATE: 18 BRPM | BODY MASS INDEX: 28.9 KG/M2 | SYSTOLIC BLOOD PRESSURE: 107 MMHG

## 2024-12-12 VITALS
RESPIRATION RATE: 18 BRPM | WEIGHT: 168 LBS | SYSTOLIC BLOOD PRESSURE: 107 MMHG | TEMPERATURE: 97.5 F | OXYGEN SATURATION: 98 % | HEART RATE: 91 BPM | DIASTOLIC BLOOD PRESSURE: 72 MMHG | BODY MASS INDEX: 28.82 KG/M2

## 2024-12-12 DIAGNOSIS — E53.8 VITAMIN B 12 DEFICIENCY: ICD-10-CM

## 2024-12-12 DIAGNOSIS — E53.8 FOLATE DEFICIENCY: ICD-10-CM

## 2024-12-12 DIAGNOSIS — Z95.828 PORT-A-CATH IN PLACE: Primary | ICD-10-CM

## 2024-12-12 DIAGNOSIS — R53.83 FATIGUE, UNSPECIFIED TYPE: Primary | ICD-10-CM

## 2024-12-12 DIAGNOSIS — K21.9 GASTROESOPHAGEAL REFLUX DISEASE WITHOUT ESOPHAGITIS: ICD-10-CM

## 2024-12-12 DIAGNOSIS — E55.9 VITAMIN D DEFICIENCY: ICD-10-CM

## 2024-12-12 DIAGNOSIS — K59.03 DRUG-INDUCED CONSTIPATION: Chronic | ICD-10-CM

## 2024-12-12 DIAGNOSIS — E11.42 DM TYPE 2 WITH DIABETIC PERIPHERAL NEUROPATHY: ICD-10-CM

## 2024-12-12 DIAGNOSIS — C50.411 MALIGNANT NEOPLASM OF UPPER-OUTER QUADRANT OF RIGHT BREAST IN FEMALE, ESTROGEN RECEPTOR NEGATIVE: ICD-10-CM

## 2024-12-12 DIAGNOSIS — Z17.1 MALIGNANT NEOPLASM OF UPPER-OUTER QUADRANT OF RIGHT BREAST IN FEMALE, ESTROGEN RECEPTOR NEGATIVE: ICD-10-CM

## 2024-12-12 DIAGNOSIS — R53.83 FATIGUE, UNSPECIFIED TYPE: ICD-10-CM

## 2024-12-12 DIAGNOSIS — D64.9 ANEMIA, UNSPECIFIED TYPE: ICD-10-CM

## 2024-12-12 LAB
25(OH)D3 SERPL-MCNC: 52.7 NG/ML (ref 30–100)
ALBUMIN SERPL-MCNC: 4.2 G/DL (ref 3.5–5.2)
ALBUMIN/GLOB SERPL: 1.6 G/DL
ALP SERPL-CCNC: 79 U/L (ref 39–117)
ALT SERPL W P-5'-P-CCNC: 9 U/L (ref 1–33)
AMPHET+METHAMPHET UR QL: NEGATIVE
AMPHETAMINES UR QL: NEGATIVE
ANION GAP SERPL CALCULATED.3IONS-SCNC: 12 MMOL/L (ref 5–15)
AST SERPL-CCNC: 14 U/L (ref 1–32)
BARBITURATES UR QL SCN: NEGATIVE
BASOPHILS # BLD AUTO: 0.07 10*3/MM3 (ref 0–0.2)
BASOPHILS NFR BLD AUTO: 0.6 % (ref 0–1.5)
BENZODIAZ UR QL SCN: POSITIVE
BILIRUB SERPL-MCNC: 0.2 MG/DL (ref 0–1.2)
BUN SERPL-MCNC: 16 MG/DL (ref 6–20)
BUN/CREAT SERPL: 22.5 (ref 7–25)
BUPRENORPHINE SERPL-MCNC: NEGATIVE NG/ML
CALCIUM SPEC-SCNC: 8.8 MG/DL (ref 8.6–10.5)
CANNABINOIDS SERPL QL: NEGATIVE
CHLORIDE SERPL-SCNC: 108 MMOL/L (ref 98–107)
CHOLEST SERPL-MCNC: 129 MG/DL (ref 0–200)
CO2 SERPL-SCNC: 21 MMOL/L (ref 22–29)
COCAINE UR QL: NEGATIVE
CREAT SERPL-MCNC: 0.71 MG/DL (ref 0.57–1)
DEPRECATED RDW RBC AUTO: 42.5 FL (ref 37–54)
EGFRCR SERPLBLD CKD-EPI 2021: 103.1 ML/MIN/1.73
EOSINOPHIL # BLD AUTO: 0.14 10*3/MM3 (ref 0–0.4)
EOSINOPHIL NFR BLD AUTO: 1.2 % (ref 0.3–6.2)
ERYTHROCYTE [DISTWIDTH] IN BLOOD BY AUTOMATED COUNT: 12.9 % (ref 12.3–15.4)
FENTANYL UR-MCNC: NEGATIVE NG/ML
FERRITIN SERPL-MCNC: 86.58 NG/ML (ref 13–150)
FOLATE SERPL-MCNC: 11 NG/ML (ref 4.78–24.2)
GLOBULIN UR ELPH-MCNC: 2.7 GM/DL
GLUCOSE SERPL-MCNC: 115 MG/DL (ref 65–99)
HBA1C MFR BLD: 6.4 % (ref 4.8–5.6)
HCT VFR BLD AUTO: 42.4 % (ref 34–46.6)
HDLC SERPL-MCNC: 49 MG/DL (ref 40–60)
HGB BLD-MCNC: 13.5 G/DL (ref 12–15.9)
IMM GRANULOCYTES # BLD AUTO: 0.02 10*3/MM3 (ref 0–0.05)
IMM GRANULOCYTES NFR BLD AUTO: 0.2 % (ref 0–0.5)
IRON 24H UR-MRATE: 67 MCG/DL (ref 37–145)
IRON SATN MFR SERPL: 19 % (ref 20–50)
LDLC SERPL CALC-MCNC: 60 MG/DL (ref 0–100)
LDLC/HDLC SERPL: 1.18 {RATIO}
LYMPHOCYTES # BLD AUTO: 4.89 10*3/MM3 (ref 0.7–3.1)
LYMPHOCYTES NFR BLD AUTO: 42 % (ref 19.6–45.3)
MAGNESIUM SERPL-MCNC: 2.4 MG/DL (ref 1.6–2.6)
MCH RBC QN AUTO: 28.6 PG (ref 26.6–33)
MCHC RBC AUTO-ENTMCNC: 31.8 G/DL (ref 31.5–35.7)
MCV RBC AUTO: 89.8 FL (ref 79–97)
METHADONE UR QL SCN: NEGATIVE
MONOCYTES # BLD AUTO: 0.64 10*3/MM3 (ref 0.1–0.9)
MONOCYTES NFR BLD AUTO: 5.5 % (ref 5–12)
NEUTROPHILS NFR BLD AUTO: 5.89 10*3/MM3 (ref 1.7–7)
NEUTROPHILS NFR BLD AUTO: 50.5 % (ref 42.7–76)
NRBC BLD AUTO-RTO: 0 /100 WBC (ref 0–0.2)
OPIATES UR QL: NEGATIVE
OXYCODONE UR QL SCN: NEGATIVE
PCP UR QL SCN: NEGATIVE
PLATELET # BLD AUTO: 255 10*3/MM3 (ref 140–450)
PMV BLD AUTO: 9.4 FL (ref 6–12)
POTASSIUM SERPL-SCNC: 3.7 MMOL/L (ref 3.5–5.2)
PROT SERPL-MCNC: 6.9 G/DL (ref 6–8.5)
RBC # BLD AUTO: 4.72 10*6/MM3 (ref 3.77–5.28)
SODIUM SERPL-SCNC: 141 MMOL/L (ref 136–145)
T4 FREE SERPL-MCNC: 0.88 NG/DL (ref 0.92–1.68)
TIBC SERPL-MCNC: 358 MCG/DL (ref 298–536)
TRANSFERRIN SERPL-MCNC: 240 MG/DL (ref 200–360)
TRICYCLICS UR QL SCN: NEGATIVE
TRIGL SERPL-MCNC: 110 MG/DL (ref 0–150)
TSH SERPL DL<=0.05 MIU/L-ACNC: 4.25 UIU/ML (ref 0.27–4.2)
VIT B12 BLD-MCNC: 278 PG/ML (ref 211–946)
VLDLC SERPL-MCNC: 20 MG/DL (ref 5–40)
WBC NRBC COR # BLD AUTO: 11.65 10*3/MM3 (ref 3.4–10.8)

## 2024-12-12 PROCEDURE — 82746 ASSAY OF FOLIC ACID SERUM: CPT

## 2024-12-12 PROCEDURE — 80307 DRUG TEST PRSMV CHEM ANLYZR: CPT | Performed by: NURSE PRACTITIONER

## 2024-12-12 PROCEDURE — 80061 LIPID PANEL: CPT

## 2024-12-12 PROCEDURE — 82306 VITAMIN D 25 HYDROXY: CPT

## 2024-12-12 PROCEDURE — 85025 COMPLETE CBC W/AUTO DIFF WBC: CPT

## 2024-12-12 PROCEDURE — 1159F MED LIST DOCD IN RCRD: CPT | Performed by: NURSE PRACTITIONER

## 2024-12-12 PROCEDURE — 80053 COMPREHEN METABOLIC PANEL: CPT

## 2024-12-12 PROCEDURE — 82728 ASSAY OF FERRITIN: CPT

## 2024-12-12 PROCEDURE — 83540 ASSAY OF IRON: CPT

## 2024-12-12 PROCEDURE — 25010000002 HEPARIN LOCK FLUSH PER 10 UNITS: Performed by: INTERNAL MEDICINE

## 2024-12-12 PROCEDURE — 99214 OFFICE O/P EST MOD 30 MIN: CPT | Performed by: NURSE PRACTITIONER

## 2024-12-12 PROCEDURE — 83735 ASSAY OF MAGNESIUM: CPT

## 2024-12-12 PROCEDURE — 36591 DRAW BLOOD OFF VENOUS DEVICE: CPT

## 2024-12-12 PROCEDURE — 84443 ASSAY THYROID STIM HORMONE: CPT

## 2024-12-12 PROCEDURE — 1125F AMNT PAIN NOTED PAIN PRSNT: CPT | Performed by: NURSE PRACTITIONER

## 2024-12-12 PROCEDURE — 82607 VITAMIN B-12: CPT

## 2024-12-12 PROCEDURE — 83036 HEMOGLOBIN GLYCOSYLATED A1C: CPT

## 2024-12-12 PROCEDURE — 1160F RVW MEDS BY RX/DR IN RCRD: CPT | Performed by: NURSE PRACTITIONER

## 2024-12-12 PROCEDURE — 84439 ASSAY OF FREE THYROXINE: CPT

## 2024-12-12 PROCEDURE — 84466 ASSAY OF TRANSFERRIN: CPT

## 2024-12-12 RX ORDER — SODIUM CHLORIDE 0.9 % (FLUSH) 0.9 %
10 SYRINGE (ML) INJECTION AS NEEDED
OUTPATIENT
Start: 2024-12-12

## 2024-12-12 RX ORDER — HEPARIN SODIUM (PORCINE) LOCK FLUSH IV SOLN 100 UNIT/ML 100 UNIT/ML
500 SOLUTION INTRAVENOUS AS NEEDED
OUTPATIENT
Start: 2024-12-12

## 2024-12-12 RX ORDER — HEPARIN SODIUM (PORCINE) LOCK FLUSH IV SOLN 100 UNIT/ML 100 UNIT/ML
500 SOLUTION INTRAVENOUS AS NEEDED
Status: DISCONTINUED | OUTPATIENT
Start: 2024-12-12 | End: 2024-12-12 | Stop reason: HOSPADM

## 2024-12-12 RX ORDER — SODIUM CHLORIDE 0.9 % (FLUSH) 0.9 %
10 SYRINGE (ML) INJECTION AS NEEDED
Status: DISCONTINUED | OUTPATIENT
Start: 2024-12-12 | End: 2024-12-12 | Stop reason: HOSPADM

## 2024-12-12 RX ADMIN — HEPARIN 500 UNITS: 100 SYRINGE at 11:02

## 2024-12-12 RX ADMIN — Medication 10 ML: at 11:02

## 2024-12-12 NOTE — PROGRESS NOTES
NAME: Nivia Bernstein    : 1975    DATE:  2024    DIAGNOSIS:    Stage IA ([id number]) moderately differentiated invasive ductal carcinoma of the R breast with associated DCIS.  Tumor was 10 mm in maximal dimension.  0/10 SLN involved. ER negative, TX 15% + (1+), HER-2/cat negative. Mammaprint high risk.    2. Pernicious Anemia    3. Folate Deficiency    CHIEF COMPLAINT:  Follow up Breast Cancer    TREATMENT HISTORY:  1. Initially planned to give 4 cycles to Taxotere/Cytoxan, during 1st infusion of Taxotere on 2020 patient developed allergic reaction. Therefore, Taxotere was discontinued and regimen changed to DD AC.    2.      3.  Started Tamoxifen 12-    HISTORY OF PRESENT ILLNESS:   Nivia Bernstein is a very pleasant 49 y.o. female who who is being seen today at the request of Sheila Garza MD for evaluation and treatment of breast cancer. She did not notice a palpable lump, but was recommended to have a screening mammogram by her PCP. A nodule in the right upper quadrant of the R breast was noted. The mass measured 0.8 cm on ultrasound. Biopsy was consistent with a moderately differentiated invasive ductal carcinoma, ER negative, TX weakly (15%) positive, and HER-2/cat negative. She underwent bilateral mastectomy with sentinel lymph node biopsy with Dr. Garza on 20. Pathology from this showed a moderately differentiated invasive ductal carcinoma with associated intermediate grade DCIS, negative margins.  0/10 /SLN involved.  Mammaprint was high risk. She was referred to our clinic and is here today with her  for discussion of further treatment options.      Ms. Bernstein is healing well from the surgery.  She did develop a seroma, which was drained by Dr. Garza yesterday. She reports muscular chest discomfort r/t the procedure, but otherwise denies any other symptoms including fevers, chills, significant weight changes, shortness of breath, abdominal pain, n/v/d/c, bony pain or  headaches.    Interval History:  Ms. Bernstein presents today for follow up of breast cancer. Overall, she reports that she has been doing well since her last visit. She continues to take Tamoxifen daily and is tolerating this well without any noticeable side effects. She also continues to take B12 injections monthly and Folic Acid daily. She continues to follow with psychiatry. She is without any specific complaints today.             PAST MEDICAL HISTORY:  Past Medical History:   Diagnosis Date    Altered mental status 10/16/2017    Anxiety and depression 3/31/2017    Arthritis     Asthma     Elevated alkaline phosphatase level 10/16/2017    Enlarged liver     GERD (gastroesophageal reflux disease)     Heart murmur     Hypokalemia 10/16/2017    Mitral valve prolapse     Neuropathy     related to diabetes    Obesity 3/31/2017    OCD (obsessive compulsive disorder) 10/16/2017    Osteoporosis     PONV (postoperative nausea and vomiting)     Renal insufficiency 10/16/2017    Right breast cancer with malignant cells in regional lymph nodes no greater than 0.2 mm and no more than 200 cells 8/13/2020    TIA (transient ischemic attack)     4 TIMES       PAST SURGICAL HISTORY:  Past Surgical History:   Procedure Laterality Date    APPENDECTOMY      CARPAL TUNNEL RELEASE      CHOLECYSTECTOMY      COLONOSCOPY N/A 5/5/2021    Procedure: COLONOSCOPY WITH BIOPSY;  Surgeon: Vickie Herrera MD;  Location: [facility];  Service: Gastroenterology;  Laterality: N/A;    FINGER FUSION Left     3rd    HYSTERECTOMY  2011    removed due to an ovarian cyst and dysmenorrhia. No cancer noted. Complete    KNEE ARTHROSCOPY Right     MASTECTOMY Left 8/18/2020    Procedure: Left mastectomy;  Surgeon: Sheila Garza MD;  Location: [facility];  Service: General;  Laterality: Left;    MASTECTOMY WITH SENTINEL NODE BIOPSY AND AXILLARY NODE DISSECTION Right 8/18/2020    Procedure: Right BREAST MASTECTOMY WITH SENTINEL NODE BIOPSY;  Surgeon:  Sheila Garza MD;  Location: [facility];  Service: General;  Laterality: Right;    PORTACATH PLACEMENT         SOCIAL HISTORY:  Social History     Socioeconomic History    Marital status:    Tobacco Use    Smoking status: Never     Passive exposure: Never    Smokeless tobacco: Never   Vaping Use    Vaping status: Never Used   Substance and Sexual Activity    Alcohol use: No    Drug use: No    Sexual activity: Yes     Partners: Male         FAMILY HISTORY:  Family History   Problem Relation Age of Onset    Diabetes Mother     Hypertension Mother     Diabetes Father     Heart disease Father     Alcohol abuse Father     Seizures Father     Hypertension Father     No Known Problems Brother     No Known Problems Daughter     Bone cancer Son     Suicide Attempts Cousin     Stomach cancer Cousin         maternal first cousin    Breast cancer Paternal Aunt 52    Diabetes Maternal Grandmother     Diabetes Maternal Grandfather     Lung cancer Maternal Grandfather     Heart disease Paternal Grandmother     Diabetes Paternal Grandmother     Heart disease Paternal Grandfather     Diabetes Paternal Grandfather     Ovarian cancer Maternal Aunt     Lung cancer Maternal Uncle     Colon cancer Maternal Uncle     Cancer Maternal Uncle         unknown type     MEDICATIONS:  The current medication list was reviewed in the EMR    Current Outpatient Medications:     Accu-Chek FastClix Lancets misc, USE TO test blood sugar THREE TIMES DAILY, Disp: 102 each, Rfl: 5    albuterol sulfate  (90 Base) MCG/ACT inhaler, INHALE TWO PUFFS BY MOUTH EVERY FOUR HOURS AS NEEDED FOR SHORTNESS OF BREATH, Disp: 18 g, Rfl: 5    alendronate (FOSAMAX) 35 MG tablet, TAKE ONE TABLET BY MOUTH EVERY 7 DAYS WITH WATER 30 MINUTES BEFORE THE FIRST FOOD, DRINK, OR MEDICATION AND AVOID LYING DOWN 30 MINUTES AFTER TAKING, Disp: 12 tablet, Rfl: 0    ALPRAZolam (XANAX) 1 MG tablet, Take 1 tablet by mouth 2 (Two) Times a Day As Needed for Anxiety. for  anxiety, Disp: 60 tablet, Rfl: 0    Aspirin Low Dose 81 MG EC tablet, TAKE TWO TABLETS BY MOUTH EVERY DAY, Disp: 60 tablet, Rfl: 4    atorvastatin (LIPITOR) 40 MG tablet, TAKE ONE TABLET BY MOUTH EVERY night, Disp: 90 tablet, Rfl: 0    azelastine (ASTELIN) 0.1 % nasal spray, Use in each nostril as directed, Disp: 30 mL, Rfl: 3    Calcium Carb-Cholecalciferol (Calcium+D3) 500-15 MG-MCG tablet, Take 1 tablet by mouth 2 (Two) Times a Day With Meals., Disp: 60 tablet, Rfl: 3    cloNIDine (CATAPRES) 0.1 MG tablet, TAKE ONE TABLET BY MOUTH TWICE DAILY, Disp: 60 tablet, Rfl: 2    Continuous Glucose Sensor (Guardian 4 Glucose Sensor) misc, CHANGE sensor every 5-7 DAYS as directed, Disp: 5 each, Rfl: 12    cyanocobalamin 1000 MCG/ML injection, Inject 1 mL under the skin into the appropriate area as directed Every 30 (Thirty) Days., Disp: 1 mL, Rfl: 5    Emgality 120 MG/ML auto-injector pen, INJECT 120mg ONCE MONTHLY, Disp: , Rfl:     empagliflozin (JARDIANCE) 10 MG tablet tablet, Take 1 tablet by mouth Daily., Disp: 30 tablet, Rfl: 2    fluconazole (Diflucan) 150 MG tablet, Take 1 tablet by mouth Daily., Disp: 2 tablet, Rfl: 0    Fluticasone Furoate-Vilanterol (Breo Ellipta) 200-25 MCG/ACT inhaler, Inhale 1 puff Daily., Disp: 1 each, Rfl: 3    folic acid (FOLVITE) 1 MG tablet, Take 1 tablet by mouth Daily., Disp: 90 tablet, Rfl: 3    furosemide (LASIX) 20 MG tablet, TAKE ONE-HALF TABLET BY MOUTH DAILY, Disp: 30 tablet, Rfl: 0    gabapentin (NEURONTIN) 100 MG capsule, TAKE ONE CAPSULE BY MOUTH TWICE DAILY, Disp: 60 capsule, Rfl: 2    Glucagon (Gvoke HypoPen 2-Pack) 1 MG/0.2ML solution auto-injector, Inject 1 mg under the skin into the appropriate area as directed As Needed (Severe Hypoglycemia)., Disp: 0.4 mL, Rfl: 3    glucose blood (Accu-Chek Guide) test strip, USE TO TEST BLOOD GLUCOSE THREE TIMES DAILY, Disp: 100 each, Rfl: 3    Insulin Lispro (humaLOG) 100 UNIT/ML injection, INJECT AS DIRECTED MAX DAILY DOSE  UNITS  DAILY, Disp: 40 mL, Rfl: 3    ipratropium-albuterol (DUO-NEB) 0.5-2.5 mg/3 ml nebulizer, Take 3 mL by nebulization Every 4 (Four) Hours As Needed for Shortness of Air., Disp: 150 mL, Rfl: 1    lansoprazole (PREVACID) 30 MG capsule, TAKE ONE CAPSULE BY MOUTH EVERY DAY, Disp: 90 capsule, Rfl: 0    levocetirizine (XYZAL) 5 MG tablet, TAKE ONE TABLET BY MOUTH EVERY EVENING, Disp: 90 tablet, Rfl: 0    Linzess 145 MCG capsule capsule, TAKE ONE CAPSULE BY MOUTH EVERY DAY 30 MINUTES BEFORE A MEAL ON AN EMPTY STOMACH, Disp: 30 capsule, Rfl: 0    montelukast (SINGULAIR) 10 MG tablet, TAKE ONE TABLET BY MOUTH EVERY night, Disp: 90 tablet, Rfl: 0    nitrofurantoin, macrocrystal-monohydrate, (Macrobid) 100 MG capsule, Take 1 capsule by mouth 2 (Two) Times a Day., Disp: 14 capsule, Rfl: 0    ondansetron (ZOFRAN) 8 MG tablet, TAKE 1 TABLET BY MOUTH EVERY 8 HOURS AS NEEDED FOR NAUSEA OR VOMITING, Disp: 30 tablet, Rfl: 3    Ozempic, 1 MG/DOSE, 4 MG/3ML solution pen-injector, INJECT 1 MG SUBCUTANEOUSLY ONCE WEEKLY, Disp: 3 mL, Rfl: 0    Respiratory Therapy Supplies (Nebulizer/Tubing/Mouthpiece) kit, 1 each Take As Directed., Disp: 1 each, Rfl: 1    tamoxifen (NOLVADEX) 20 MG chemo tablet, Take 1 tablet by mouth Daily., Disp: 30 tablet, Rfl: 11    topiramate (TOPAMAX) 50 MG tablet, TAKE ONE TABLET BY MOUTH TWICE DAILY, Disp: 60 tablet, Rfl: 3    ubrogepant 100 MG tablet, , Disp: , Rfl:     venlafaxine XR (EFFEXOR-XR) 75 MG 24 hr capsule, Take 3 capsules by mouth Daily., Disp: 90 capsule, Rfl: 1    vitamin D (ERGOCALCIFEROL) 1.25 MG (27612 UT) capsule capsule, TAKE ONE CAPSULE BY MOUTH ONCE WEEKLY, Disp: 12 capsule, Rfl: 0  No current facility-administered medications for this visit.    ALLERGIES:    Allergies   Allergen Reactions    Imipramine Other (See Comments)     Chest pain    Mobic [Meloxicam] Rash    Penicillins Angioedema    Taxotere [Docetaxel] Anaphylaxis     9 minutes into 1st infusion    Novolog [Insulin Aspart (Human  Analog)] Hives    Rosuvastatin Calcium GI Intolerance     REVIEW OF SYSTEMS:    A comprehensive 14 point review of systems was performed.  Significant findings as mentioned above.  All other systems reviewed and are negative.      PHYSICAL EXAM:  Vitals:    12/12/24 1122   BP: 107/72   Pulse: 91   Resp: 18   Temp: 97.5 °F (36.4 °C)   TempSrc: Temporal   SpO2: 98%   Weight: 76.2 kg (168 lb)   PainSc:   7       Body surface area is 1.82 meters squared.  Body mass index is 28.82 kg/m².  ECOG score: 0   General: Awake, alert and oriented, in no acute distress.   HEENT:  PERRLA, EOM full, OP clear, mucous membranes moist  Neck: No thyromegaly. No JVD.   Lungs: Lungs are clear to auscultation bilaterally, no crackles  Heart:  Regular rate and rhythm. No murmurs, rubs, or gallops.   Breast: S/p bilateral mastectomy and R ALNBx.  Surgical scars smooth without nodularity.  She has no axillary lymphadenopathy on either side.  Abdomen: Soft, Non tender, non-distended, bowel sounds present.  No palpable organomegaly or masses..  Diabetic monitoring device in place.  Extremities: No clubbing, cyanosis, no lower extremity edema.    Neurologic: MS as above. Grossly non focal exam    PATHOLOGY:              IMAGING:  Bilateral screening mammogram 06-19-20  FINDINGS:    The breast tissue is heterogeneously dense.  New focal nodularity right upper outer breast that is about 14 cm from  the nipple.  Otherwise, no suspicious findings.     IMPRESSION:  New focal nodularity right upper outer breast that is about  14 cm from the nipple.     RECOMMENDATION:  Spot compression imaging.     BI-RADS CAT 0, INCOMPLETE.  The patient needs additional imaging.        Diagnostic R mammogram with R breast US 07-14-20  FINDINGS:  Additional mammographic imaging images of persistent  partially obscured oval mass in the posterior right upper outer  quadrant.     Right breast ultrasound: Focused sonographic evaluation of the right  breast demonstrates a  0.8 cm irregular hypoechoic mass with ill-defined  borders located at 10:00, 13 cm from the nipple. An additional area was  initially noted by the technologist in the 9:00 region which represents  an isolated fat lobule.     IMPRESSION:  0.8 cm irregular mass in the right 10:00 region. Recommend  ultrasound-guided core biopsy.     BI-RADS CATEGORY:  4, SUSPICIOUS     RECOMMENDATION:  Recommend ultrasound guided core biopsy of the right  breast.        Bilateral breast MRI w/wo contrast 08-11-20  FINDINGS: There is minimal bilateral background contrast enhancement and  normal vascular enhancement.     There are no worrisome areas of contrast enhancement in the left breast.     In the right breast correlating to the biopsy proven malignancy is an  approximately 0.7 cm irregular rapidly enhancing mass in the posterior  right 10:00 region. Artifact from a postbiopsy marking clip is located  adjacent to this mass. A small linear area of enhancement extends  posterior to this mass approximately 1 cm. There are no additional  worrisome areas of contrast enhancement in the right breast.     There is no axillary adenopathy by MRI criteria and no chest wall  abnormality is seen.     IMPRESSION:  1. Negative left breast MRI.     2. Biopsy-proven malignancy in the right 10:00 region with a small  linear area of enhancement extending posterior to the 0.7 cm mass.     RECOMMENDATIONS: Recommend surgical follow-up.     BI-RADS CATEGORY: 6, KNOWN BIOPSY PROVEN MALIGNANCY    Echo 10-16-17:  A two-dimensional transthoracic echocardiogram with color flow and Doppler was performed.   The study is technically good for diagnosis.Verbal consent was obtained from the patient to use saline contrast in order to optimize the study.  A total of 20 mL of agitated saline was administered to assess for cardiac shunting.   No adverse reaction to contrast was noted.   Echocardiogram Findings    Left Ventricle Left ventricular systolic function is  normal. Estimated EF appears to be in the range of 56 - 60%. Normal left ventricular cavity size and wall thickness noted. All left ventricular wall segments contract normally. Left ventricular diastolic function is normal.   Right Ventricle Normal right ventricular cavity size noted.   Left Atrium Normal left atrial size noted. Saline test results are negative.   Right Atrium Normal right atrial size noted.   Aortic Valve The aortic valve is structurally normal. Trace aortic valve regurgitation is present.   Mitral Valve The mitral valve is normal in structure. Trace mitral valve regurgitation is present.   Tricuspid Valve The tricuspid valve is normal.  Trace tricuspid valve regurgitation is present.   Pulmonic Valve The pulmonic valve is structurally normal. There is trace pulmonic valve regurgitation present.   Greater Vessels No dilation of the aortic root is present. No dilation of the sinuses of Valsalva is present.   Pericardium There is no evidence of pericardial effusion.       ECHO 09/29/2020  Poor quality echo and Doppler study  Normal left ventricular cavity size and wall thickness noted.  Left ventricular ejection fraction appears to be 61 - 65%. Left ventricular systolic function is normal.  Left ventricular diastolic function is consistent with (grade I) impaired relaxation.  Aortic and mitral valve are poorly visualized but appear to be normal,  Tricuspid and pulmonic valve echoes are not visualized.  There is no pericardial effusion noted.    Echocardiogram 12-08-02  Normal left ventricular cavity size and wall thickness noted. All left ventricular wall segments contract normally.  Left ventricular ejection fraction appears to be 56 - 60%.  The pericardium is normal. There is no evidence of pericardial effusion. .       MRI Brain w/wo contrast 01-28-21  FINDINGS:    Brain:  Unremarkable.  No mass.  No hemorrhage.  No acute infarct.    Ventricles:  Unremarkable.  No ventriculomegaly.     Bones/joints:  Unremarkable.    Sinuses:  Unremarkable as visualized.  No acute sinusitis.    Mastoid air cells:  Unremarkable as visualized.  No mastoid effusion.    Orbits:  Unremarkable as visualized.     IMPRESSION:    No acute findings in the head/brain.    ENDOSCOPY:    COLONOSCOPY PROCEDURE NOTE   5/5/2021 Dr. Aguilera       Findings:  1.  Normal colonoscopy with small internal hemorrhoids.  2.  Normal terminal ileum     OPERATIVE PROCEDURE   After proper informed consent was obtained, the patient was taken the operating suite and placed in left lateral decubitus position.  An Olympus video colonoscope 180 series was inserted in the rectum and advanced to the terminal ileum under direct visualization.  Cecum and terminal ileum were identified by visualization of the appendiceal orifice and ileocecal valve.  The colonoscope was then slowly withdrawn from the cecum to the rectum and passed a second time from rectum to cecum.  The colonoscope was retroflexed in the cecum and rectum. Scope was then withdrawn. Patient tolerated the procedure well. There were no immediate complications. Cecal withdrawal time was 9 minutes.     RECOMMENDATIONS:  1.  Repeat colonoscopy in 10 years for screening purposes.  2.  Continue Linzess.  3.  Anusol HC suppositories as needed for rectal bleeding.    RECENT LABS:  Lab Results   Component Value Date    WBC 11.65 (H) 12/12/2024    HGB 13.5 12/12/2024    HCT 42.4 12/12/2024    MCV 89.8 12/12/2024    RDW 12.9 12/12/2024     12/12/2024    NEUTRORELPCT 50.5 12/12/2024    LYMPHORELPCT 42.0 12/12/2024    MONORELPCT 5.5 12/12/2024    EOSRELPCT 1.2 12/12/2024    BASORELPCT 0.6 12/12/2024    NEUTROABS 5.89 12/12/2024    LYMPHSABS 4.89 (H) 12/12/2024       Lab Results   Component Value Date     12/12/2024    K 3.7 12/12/2024    CO2 21.0 (L) 12/12/2024     (H) 12/12/2024    BUN 16 12/12/2024    CREATININE 0.71 12/12/2024    EGFRIFNONA 89 01/11/2022    EGFRIFAFRI 108  01/11/2022    GLUCOSE 115 (H) 12/12/2024    CALCIUM 8.8 12/12/2024    ALKPHOS 79 12/12/2024    AST 14 12/12/2024    ALT 9 12/12/2024    BILITOT 0.2 12/12/2024    ALBUMIN 4.2 12/12/2024    PROTEINTOT 6.9 12/12/2024    MG 2.4 12/12/2024     Lab Results   Component Value Date    FERRITIN 86.58 12/12/2024    IRON 67 12/12/2024    TIBC 358 12/12/2024    LABIRON 19 (L) 12/12/2024    JLRGTYKL10 360 08/05/2024    FOLATE 15.10 07/31/2024     Lab Results   Component Value Date    RETICCTPCT 1.69 05/10/2024     Uric Acid   Date Value Ref Range Status   08/07/2023 3.9 2.4 - 5.7 mg/dL Final        Lab Results   Component Value Date    CAION 1.28 10/16/2017    TSH 4.250 (H) 12/12/2024    WDRI86XN 67.6 08/05/2024         ASSESSMENT & PLAN:  Nivia Bernstein is a very pleasant 49 y.o. female with Stage IA ([id number]) moderately differentiated invasive ductal carcinoma of the R breast with associated DCIS.  Tumor was 10 mm in maximal dimension.  0/10 SLN involved. ER negative, AR 15% + (1+), HER-2/cat negative. Mammaprint high risk.     1. R breast cancer:   -  S/p R mastectomy and SLNBx as well as prophylactic contralateral mastectomy performed by Dr. Garza 8-18-20.  - She healed well from bilateral mastectomy. This was complicated by left seroma, drained by Dr. Garza. Incisions are healed well.   - Given high risk Mammaprint, Dr. Loco recommended adjuvant chemotherapy. Discussed different possibilities for adjuvant therapy. Given high risk Mammaprint but early stage, node negative disease as well as comorbidities, recommended Taxotere/Cytoxan Q21d x 4 cycles with growth factor support. Discussed potential risks, benefits, and side effects and she would like to proceed.   - She had port placed several years ago due to poor peripheral access. This has been well cared for and maintained.   - C1 Taxotere/Cytoxan was planned for 09/24/2020. Unfortunately, this had to be discontinued due to allergic reaction to Taxotere. Recommended  change of treatment to DD AC.   - She tolerated treatment well and aside from fatigue and recovered well. ECHO was essentially unchanged.   -  Her tumor was ER neg but did have 15% 1+ positivity for progesterone receptor.  She is s/p complete hysterectomy and had evidence of osteopenia with T score -1.9 in June 2020.  Given all of this, recommended treatment with Tamoxifen over aromatase inhibitor.   - Exam and labs from today without cause for concern. Will continue Tamoxifen daily.  - She will follow up with MD in 6 months with CBCD, CMP, Iron panel, Ferritin, B12, Folate, Vitamin D and TSH.       2. Extensive family history of malignancy:   - Genetic testing was ordered by Dr. Garza and performed by [facility] with no mutations, specifically including BRCA 1/2, CHKE2, CDH1, PALB2 and others.    3. Anxiety/Depression/Insmonia:  -  She continues to f/u with Psychiatry.    4. Frozen shoulders Bilaterally:  - She had been seeing Dr. Sheehan of orthopedics which was helpful to her.    5. Anemia due to B12, Folate deficiency:  -  Continue B12 1000 mcg SQ monthly.  -  Continue daily folate suppplement.  -  Iron panel and Ferritin are replete today. B12 and Folate are pending from today.   - Will continue to closely monitor.     6. Hypothyroidism:  - TSH elevated and T4  is low today. RX provided for Synthroid 25 mcg daily. She was advised to take on empty stomach 30-60 minutes prior to other medications/food. Recommended that she follow up with PCP in 4-6 weeks for repeat testing and ongoing management. Lab results were sent to PCP.     7.  Prophylaxis:   -  She received Prevnar 13 vaccine.  -  She had 2024 flu vaccine.  -  M. Uncle had colon cancer at age 50.  Referred to Dr. Aguilera for screening colonoscopy which was unremarkable and repeat exam recommended after 10-year interval.  - She has had COVID vaccine x2.  Recommended fall COVID booster to her.    8.  Follow up:  - Start Synthroid as directed. Advised to follow  up with PCP in 4-6 weeks with repeat lab testing and ongoing management.   - Continue Tamoxifen.  - Continue monthly B12 and Folic Acid daily.   -  Continue to follow with psychiatry.   - With MD in 6 months with CBCD, CMP, Iron panel, Ferritin, B12, Folate, Vitamin D and TSH.         ACO / NII/Other  Quality measures  -  Nivia Bernstein received 2024 flu vaccine.  -  Nivia Bernstein reports a pain score of 7.  Given her pain assessment as noted, treatment options were discussed and the following options were decided upon as a follow-up plan to address the patient's pain: continuation of current treatment plan for pain.  -  Current outpatient and discharge medications have been reconciled for the patient.  Reviewed by: NIDA Corbin                  I spent 30 minutes caring for Nivia on this date of service. This time includes time spent by me in the following activities: preparing for the visit, reviewing tests, performing a medically appropriate examination and/or evaluation, counseling and educating the patient/family/caregiver, referring and communicating with other health care professionals, documenting information in the medical record, independently interpreting results and communicating that information with the patient/family/caregiver, care coordination, ordering medications, obtaining a separately obtained history, and reviewing a separately obtained history.                     Electronically Signed by: NIDA Corbin    CC:   MD Truong Hager, NIDA Umaña

## 2024-12-13 DIAGNOSIS — E11.42 DM TYPE 2 WITH DIABETIC PERIPHERAL NEUROPATHY: Chronic | ICD-10-CM

## 2024-12-13 RX ORDER — INSULIN LISPRO 100 [IU]/ML
INJECTION, SOLUTION INTRAVENOUS; SUBCUTANEOUS
Qty: 40 ML | Refills: 0 | Status: SHIPPED | OUTPATIENT
Start: 2024-12-13

## 2024-12-13 RX ORDER — LEVOTHYROXINE SODIUM 25 UG/1
25 TABLET ORAL DAILY
Qty: 30 TABLET | Refills: 1 | Status: SHIPPED | OUTPATIENT
Start: 2024-12-13

## 2024-12-17 ENCOUNTER — OFFICE VISIT (OUTPATIENT)
Dept: PSYCHIATRY | Facility: CLINIC | Age: 49
End: 2024-12-17
Payer: MEDICARE

## 2024-12-17 ENCOUNTER — TELEPHONE (OUTPATIENT)
Dept: ONCOLOGY | Facility: CLINIC | Age: 49
End: 2024-12-17
Payer: MEDICARE

## 2024-12-17 VITALS
BODY MASS INDEX: 28.13 KG/M2 | WEIGHT: 164 LBS | OXYGEN SATURATION: 96 % | HEART RATE: 80 BPM | DIASTOLIC BLOOD PRESSURE: 64 MMHG | SYSTOLIC BLOOD PRESSURE: 106 MMHG

## 2024-12-17 DIAGNOSIS — F42.8 OTHER OBSESSIVE-COMPULSIVE DISORDERS: Chronic | ICD-10-CM

## 2024-12-17 DIAGNOSIS — F41.1 GENERALIZED ANXIETY DISORDER: ICD-10-CM

## 2024-12-17 DIAGNOSIS — F33.1 MODERATE EPISODE OF RECURRENT MAJOR DEPRESSIVE DISORDER: ICD-10-CM

## 2024-12-17 DIAGNOSIS — E53.8 VITAMIN B 12 DEFICIENCY: ICD-10-CM

## 2024-12-17 RX ORDER — VENLAFAXINE HYDROCHLORIDE 75 MG/1
225 CAPSULE, EXTENDED RELEASE ORAL DAILY
Qty: 90 CAPSULE | Refills: 1 | Status: SHIPPED | OUTPATIENT
Start: 2024-12-17

## 2024-12-17 RX ORDER — CYANOCOBALAMIN 1000 UG/ML
1000 INJECTION, SOLUTION INTRAMUSCULAR; SUBCUTANEOUS
Qty: 2 ML | Refills: 5 | Status: SHIPPED | OUTPATIENT
Start: 2024-12-17

## 2024-12-17 RX ORDER — ALPRAZOLAM 1 MG/1
1 TABLET ORAL 2 TIMES DAILY PRN
Qty: 60 TABLET | Refills: 0 | Status: SHIPPED | OUTPATIENT
Start: 2024-12-17

## 2024-12-17 NOTE — TELEPHONE ENCOUNTER
RN received call back from patient.   RN informed patient that her B12 level has fallen and per Dr. Loco she needs to begin taking her B12 injections every 2 weeks. Patient verbalized understanding and requests refill.

## 2024-12-17 NOTE — PROGRESS NOTES
Subjective   Nivia Bernstein is a 49 y.o. female is here today for medication management follow-up.    Chief Complaint:  anxiety insomnia depression     History of Present Illness:    Patient presents today for follow up for medication management for insomnia, depression and anxiety. Patient states still going to the adult center during the day. Patient states having Mehdi dinner and getting ready for that. Patient reports worry regarding  health. Patient reports currently depression is at a 10 on a 0-10 scale with 10 being the worst. Patient reports anxiety is at a 10 on a 0-10 scale with 10 being the worst. Patient reports having some panic attacks worrying about husbands health. Patient reports sleeping 4 hours a night and patient reports still having the same nightmares. Patient reports appetite is ok and eating at least twice a day. Patient denies any auditory or visual hallucinations. Patient adamantly denies any thoughts to harm self or others. Patient denies any side effects to medications. Patient denies any medical changes since last visit.   The following portions of the patient's history were reviewed and updated as appropriate: allergies, current medications, past family history, past medical history, past social history, past surgical history, and problem list.    Review of Systems   Constitutional: Negative.    Respiratory: Negative.     Cardiovascular: Negative.    Gastrointestinal: Negative.    Neurological: Negative.    Psychiatric/Behavioral:  Positive for agitation, dysphoric mood and sleep disturbance. The patient is nervous/anxious.        Objective   Physical Exam  Vitals reviewed.   Constitutional:       Appearance: Normal appearance. She is well-developed and well-groomed.   Neurological:      Mental Status: She is alert.   Psychiatric:         Attention and Perception: Attention and perception normal.         Mood and Affect: Affect normal. Mood is anxious.         Speech:  Speech normal.         Behavior: Behavior normal. Behavior is cooperative.         Thought Content: Thought content normal.         Cognition and Memory: Cognition and memory normal.         Judgment: Judgment normal.     Blood pressure 106/64, pulse 80, weight 74.4 kg (164 lb), SpO2 96%, not currently breastfeeding.Body mass index is 28.13 kg/m².    Allergies   Allergen Reactions    Imipramine Other (See Comments)     Chest pain    Mobic [Meloxicam] Rash    Penicillins Angioedema    Taxotere [Docetaxel] Anaphylaxis     9 minutes into 1st infusion    Novolog [Insulin Aspart (Human Analog)] Hives    Rosuvastatin Calcium GI Intolerance       Medication List:   Current Outpatient Medications   Medication Sig Dispense Refill    Accu-Chek FastClix Lancets misc USE TO test blood sugar THREE TIMES DAILY 102 each 5    albuterol sulfate  (90 Base) MCG/ACT inhaler INHALE TWO PUFFS BY MOUTH EVERY FOUR HOURS AS NEEDED FOR SHORTNESS OF BREATH 18 g 5    alendronate (FOSAMAX) 35 MG tablet TAKE ONE TABLET BY MOUTH EVERY 7 DAYS WITH WATER 30 MINUTES BEFORE THE FIRST FOOD, DRINK, OR MEDICATION AND AVOID LYING DOWN 30 MINUTES AFTER TAKING 12 tablet 0    ALPRAZolam (XANAX) 1 MG tablet Take 1 tablet by mouth 2 (Two) Times a Day As Needed for Anxiety. for anxiety 60 tablet 0    Aspirin Low Dose 81 MG EC tablet TAKE TWO TABLETS BY MOUTH EVERY DAY 60 tablet 4    atorvastatin (LIPITOR) 40 MG tablet TAKE ONE TABLET BY MOUTH EVERY night 90 tablet 0    azelastine (ASTELIN) 0.1 % nasal spray Use in each nostril as directed 30 mL 3    Calcium Carb-Cholecalciferol (Calcium+D3) 500-15 MG-MCG tablet Take 1 tablet by mouth 2 (Two) Times a Day With Meals. 60 tablet 3    cloNIDine (CATAPRES) 0.1 MG tablet TAKE ONE TABLET BY MOUTH TWICE DAILY 60 tablet 2    Continuous Glucose Sensor (Guardian 4 Glucose Sensor) misc CHANGE sensor every 5-7 DAYS as directed 5 each 12    cyanocobalamin 1000 MCG/ML injection Inject 1 mL under the skin into the  appropriate area as directed Every 30 (Thirty) Days. 1 mL 5    Emgality 120 MG/ML auto-injector pen INJECT 120mg ONCE MONTHLY      empagliflozin (JARDIANCE) 10 MG tablet tablet Take 1 tablet by mouth Daily. 30 tablet 2    fluconazole (Diflucan) 150 MG tablet Take 1 tablet by mouth Daily. 2 tablet 0    Fluticasone Furoate-Vilanterol (Breo Ellipta) 200-25 MCG/ACT inhaler Inhale 1 puff Daily. 1 each 3    folic acid (FOLVITE) 1 MG tablet Take 1 tablet by mouth Daily. 90 tablet 3    furosemide (LASIX) 20 MG tablet TAKE ONE-HALF TABLET BY MOUTH DAILY 30 tablet 0    gabapentin (NEURONTIN) 100 MG capsule TAKE ONE CAPSULE BY MOUTH TWICE DAILY 60 capsule 2    Glucagon (Gvoke HypoPen 2-Pack) 1 MG/0.2ML solution auto-injector Inject 1 mg under the skin into the appropriate area as directed As Needed (Severe Hypoglycemia). 0.4 mL 3    glucose blood (Accu-Chek Guide) test strip USE TO TEST BLOOD GLUCOSE THREE TIMES DAILY 100 each 3    Insulin Lispro (humaLOG) 100 UNIT/ML injection INJECT AS DIRECTED MAX DAILY DOSE  UNITS DAILY 40 mL 0    ipratropium-albuterol (DUO-NEB) 0.5-2.5 mg/3 ml nebulizer Take 3 mL by nebulization Every 4 (Four) Hours As Needed for Shortness of Air. 150 mL 1    lansoprazole (PREVACID) 30 MG capsule TAKE ONE CAPSULE BY MOUTH EVERY DAY 90 capsule 0    levocetirizine (XYZAL) 5 MG tablet TAKE ONE TABLET BY MOUTH EVERY EVENING 90 tablet 0    levothyroxine (SYNTHROID, LEVOTHROID) 25 MCG tablet Take 1 tablet by mouth Daily. 30 tablet 1    Linzess 145 MCG capsule capsule TAKE ONE CAPSULE BY MOUTH EVERY DAY 30 MINUTES BEFORE A MEAL ON AN EMPTY STOMACH 30 capsule 0    montelukast (SINGULAIR) 10 MG tablet TAKE ONE TABLET BY MOUTH EVERY night 90 tablet 0    nitrofurantoin, macrocrystal-monohydrate, (Macrobid) 100 MG capsule Take 1 capsule by mouth 2 (Two) Times a Day. 14 capsule 0    ondansetron (ZOFRAN) 8 MG tablet TAKE 1 TABLET BY MOUTH EVERY 8 HOURS AS NEEDED FOR NAUSEA OR VOMITING 30 tablet 3    Ozempic, 1  MG/DOSE, 4 MG/3ML solution pen-injector INJECT 1 MG SUBCUTANEOUSLY ONCE WEEKLY 3 mL 0    Respiratory Therapy Supplies (Nebulizer/Tubing/Mouthpiece) kit 1 each Take As Directed. 1 each 1    tamoxifen (NOLVADEX) 20 MG chemo tablet Take 1 tablet by mouth Daily. 30 tablet 11    topiramate (TOPAMAX) 50 MG tablet TAKE ONE TABLET BY MOUTH TWICE DAILY 60 tablet 3    ubrogepant 100 MG tablet       venlafaxine XR (EFFEXOR-XR) 75 MG 24 hr capsule Take 3 capsules by mouth Daily. 90 capsule 1    vitamin D (ERGOCALCIFEROL) 1.25 MG (01025 UT) capsule capsule TAKE ONE CAPSULE BY MOUTH ONCE WEEKLY 12 capsule 0     No current facility-administered medications for this visit.       Mental Status Exam:   Hygiene:   good  Cooperation:  Cooperative  Eye Contact:  Good  Psychomotor Behavior:  Appropriate  Affect:  Appropriate  Hopelessness: Denies  Speech:  Normal  Thought Process:  Goal directed and Linear  Thought Content:  Normal  Suicidal:  None  Homicidal:  None  Hallucinations:  None  Delusion:  None  Memory:  Intact  Orientation:  Person, Place, Time, and Situation  Reliability:  fair  Insight:  Fair  Judgement:  Fair  Impulse Control:  Fair  Physical/Medical Issues:  No               Assessment & Plan   Diagnoses and all orders for this visit:    1. Generalized anxiety disorder  -     ALPRAZolam (XANAX) 1 MG tablet; Take 1 tablet by mouth 2 (Two) Times a Day As Needed for Anxiety. for anxiety  Dispense: 60 tablet; Refill: 0  -     venlafaxine XR (EFFEXOR-XR) 75 MG 24 hr capsule; Take 3 capsules by mouth Daily.  Dispense: 90 capsule; Refill: 1    2. Other obsessive-compulsive disorders  -     ALPRAZolam (XANAX) 1 MG tablet; Take 1 tablet by mouth 2 (Two) Times a Day As Needed for Anxiety. for anxiety  Dispense: 60 tablet; Refill: 0    3. Moderate episode of recurrent major depressive disorder  -     venlafaxine XR (EFFEXOR-XR) 75 MG 24 hr capsule; Take 3 capsules by mouth Daily.  Dispense: 90 capsule; Refill: 1            Discussed  medication options with patient. Cont. Effexor XR 75 mg three capsules daily for depression and anxiety. Cont. Alprazolam 1 mg twice daily as needed for anxiety. Reviewed the risks, benefits, and side effects of the medications; patient acknowledged and verbally consented.   Patient is being prescribed a controlled substance as part of treatment plan. Patient has been educated of appropriate use of the medications, including risk of somnolence, limited ability to drive and/or work safely, and potential for dependence, respiratory depression and overdose. Patient is also informed that the medication are to be used by the patient only- avoid any combined use of ETOH or other substances unless prescribed.   UDS reviewed from 11/13/24.     [facility] Patient Controlled Substance Report (from 12/18/2023 to 12/17/2024)    Dispensed  Strength Quantity Days Supply Provider Pharmacy   11/27/2024 Gabapentin 100MG 60 each 30 [facility]   11/07/2024 Alprazolam 1MG 60 each 30 [facility]   10/31/2024 Gabapentin 100MG 60 each 30 [facility]   10/03/2024 Alprazolam 1MG 60 each 30 [facility]   10/03/2024 Gabapentin 100MG 60 each 30 [facility]AN[facility]   09/05/2024 Alprazolam 1MG 60 each 30 [facility]   09/05/2024 Gabapentin 100MG 60 each 30 [facility]AN[facility]   08/09/2024 Gabapentin 100MG 60 each 30 [facility]   08/08/2024 Alprazolam 1MG 60 each 30 [facility]   06/13/2024 Gabapentin 100MG 60 each 30 [facility]   06/05/2024 Alprazolam 1MG 60 each 30 [facility]   05/09/2024 Gabapentin 100MG 60 each 30 [facility]   05/07/2024 Alprazolam 1MG 60 each 30 [facility]   04/02/2024 Alprazolam 1MG 60 each 30 [facility]   04/02/2024 Gabapentin 100MG 60 each 30 [facility]FFANIE[facility]ELENE [facility]   03/05/2024 Alprazolam 1MG 60 each 30 [facility]   03/05/2024 Gabapentin 100MG 60 each 30 [facility]ENE [facility]   02/06/2024 Alprazolam 1MG 60 each 30 [facility]   02/06/2024 Gabapentin 100MG 60 each 30 [facility]   01/10/2024 Gabapentin 100MG 60 each 30 [facility]         Disclaimer    *The information in this report is based upon Schedule II through V controlled substance records reported by dispensers. Data should appear on [facility] reports within two to three business days after dispensing.   *The records listed in the report are based on the patient identification information entered by the report requestor, and if not sufficiently unique may result in the report including records for multiple patients. Please verify the information in the report by contacting the prescribers and/or dispensers listed.   *If the controlled substance records on this report appear to be in error, the patient or provider should contact the dispenser to determine if the information was reported accurately. If the dispenser certifies the information was reported accurately, the dispenser can contact the Drug Enforcement and Professional Practices Branch at [phone] to investigate the error.   *The information in this report is intended for informational use only by the person authorized to request the report. Intentional disclosure of the report or data to someone not authorized to obtain the data is a Class B Misdemeanor.      Report Restrictions - A practitioner or pharmacist may share the report with the patient or person authorized to act on the patient's behalf and place the report in the patient's medical record,  with the report then being deemed a medical record subject to the same disclosure terms and conditions as an ordinary medical record. ([place]RS 218A.202)      Patient is agreeable to call the office with any questions, concerns, or worsening of symptoms. Patient is aware to call 911 or go to the nearest ER should begin having any thoughts to harm self or others.           Follow up in six weeks        Errors in dictation may reflect use of voice recognition software and not all errors in transcription may have been detected prior to signing.              This document has been electronically signed by NIDA Vasquez   December 17, 2024 08:47 EST

## 2024-12-17 NOTE — TELEPHONE ENCOUNTER
RN attempted to call patient to discuss B12 results per instruction of Dr. Loco. No answer, LVM with call back info.

## 2024-12-17 NOTE — TELEPHONE ENCOUNTER
Emergency Department TeleTriage Encounter Note      CHIEF COMPLAINT    Chief Complaint   Patient presents with    Dizziness     Starting around 1300 today. Patient states she had a near syncopal episode while getting out of her car.        VITAL SIGNS   Initial Vitals [12/17/24 1437]   BP Pulse Resp Temp SpO2   (!) 148/84 64 18 98.4 °F (36.9 °C) 100 %      MAP       --            ALLERGIES    Review of patient's allergies indicates:   Allergen Reactions    Opioids - morphine analogues Itching       PROVIDER TRIAGE NOTE  Verbal consent for the teletriage evaluation was given by the patient at the start of the evaluation.  All efforts will be made to maintain patient's privacy during the evaluation.      This is a teletriage evaluation of a 56 y.o. female presenting to the ED with c/o dizziness that started at 1 pm today. Limited physical exam via telehealth: The patient is awake, alert, answering questions appropriately and is not in respiratory distress.  As the Teletriage provider, I performed an initial assessment and ordered appropriate labs and imaging studies, if any, to facilitate the patient's care once placed in the ED. Once a room is available, care and a full evaluation will be completed by an alternate ED provider.  Any additional orders and the final disposition will be determined by that provider.  All imaging and labs will not be followed-up by the Teletriage Team, including myself.      Dr. Duong, placed orders for Stroke Activation.  RN notified.    ORDERS  Labs Reviewed   HEPATITIS C ANTIBODY   HIV 1 / 2 ANTIBODY   MAGNESIUM   CBC W/ AUTO DIFFERENTIAL   COMPREHENSIVE METABOLIC PANEL   TROPONIN I HIGH SENSITIVITY   B-TYPE NATRIURETIC PEPTIDE       ED Orders (720h ago, onward)      Start Ordered     Status Ordering Provider    12/17/24 1646 12/17/24 1446  Troponin I High Sensitivity #2  Once Timed         Ordered DENVER DUONG    12/17/24 1447 12/17/24 1446  CT Head Without Contrast  1 time imaging  Called patient and made her aware that [facility] would be sending in a very low dose of risperDAL for her to pharmacy. Made patient aware that medication could possibly make her a little drowsy, but not to the point it should effect her daily life. Made patient aware she should not drive after taking medication until she knows for sure how drowsy it will make her.     Made patient aware that if she began having any reactions to medication to stop it and seek the ED. Patient was made aware twice that if she began having any HI or SI to seek [facility] ED for monitoring and to ensure that she was safe. Stressed the fact that the ED is safe place even with the current pandemic of C19 and that staff would do their best to ensure she was not exposed.     Patient stated she understood and would call the clinic with any questions should she have them.                  Ordered DENVER DELAROSA    12/17/24 1446 12/17/24 1445  Hepatitis C Antibody  STAT         Ordered DENVER DELAROSA    12/17/24 1446 12/17/24 1445  HIV 1/2 Ag/Ab (4th Gen)  STAT         Ordered DENVER DELAROSA    12/17/24 1446 12/17/24 1446  Magnesium  STAT         Ordered DENVER DELAROSA    12/17/24 1446 12/17/24 1446  Vital signs  Every 15 min         Ordered DENVER DELAROSA    12/17/24 1446 12/17/24 1446  Cardiac Monitoring - Adult  Continuous        Comments: Notify Physician If:    Ordered DENVER DELAROSA    12/17/24 1446 12/17/24 1446  Pulse Oximetry Continuous  Continuous         Ordered DENVER DELAROSA    12/17/24 1446 12/17/24 1446  Diet NPO  Diet effective now         Ordered DENVER DELAROSA    12/17/24 1446 12/17/24 1446  Saline lock IV  Once         Ordered DENVER DELAROSA    12/17/24 1446 12/17/24 1446  EKG 12-lead  Once        Comments: Do not perform if previously done during this visit/ triage    Ordered DENVER DELAROSA    12/17/24 1446 12/17/24 1446  CBC auto differential  STAT         Ordered DENVER DELAROSA    12/17/24 1446 12/17/24 1446  Comprehensive metabolic panel  STAT         Ordered DENVER DELAROSA    12/17/24 1446 12/17/24 1446  Troponin I High Sensitivity #1  STAT         Ordered DENVER DELAROSA    12/17/24 1446 12/17/24 1446  B-Type natriuretic peptide (BNP)  STAT         Ordered DENVER DELAROSA    12/17/24 1446 12/17/24 1446  X-Ray Chest AP Portable  1 time imaging         Ordered DENVER DELAROSA              Virtual Visit Note: The provider triage portion of this emergency department evaluation and documentation was performed via [employer], a HIPAA-compliant telemedicine application, in concert with a tele-presenter in the room. A face to face patient evaluation with one of my colleagues will occur once the patient is placed in an emergency department room.      DISCLAIMER: This note was prepared with [employer] voice recognition transcription software. Garbled syntax, mangled  pronouns, and other bizarre constructions may be attributed to that software system.

## 2024-12-20 DIAGNOSIS — E13.9 DIABETES MELLITUS TYPE 1.5, MANAGED AS TYPE 1: Chronic | ICD-10-CM

## 2024-12-20 RX ORDER — LANCETS
1 EACH MISCELLANEOUS 3 TIMES DAILY
Qty: 102 EACH | Refills: 5 | Status: SHIPPED | OUTPATIENT
Start: 2024-12-20

## 2024-12-26 DIAGNOSIS — K21.9 GASTROESOPHAGEAL REFLUX DISEASE WITHOUT ESOPHAGITIS: Chronic | ICD-10-CM

## 2024-12-26 DIAGNOSIS — M85.89 OSTEOPENIA OF MULTIPLE SITES: ICD-10-CM

## 2024-12-26 DIAGNOSIS — J30.9 CHRONIC ALLERGIC RHINITIS: Chronic | ICD-10-CM

## 2024-12-26 DIAGNOSIS — E55.9 VITAMIN D DEFICIENCY: ICD-10-CM

## 2024-12-27 RX ORDER — ERGOCALCIFEROL 1.25 MG/1
50000 CAPSULE, LIQUID FILLED ORAL
Qty: 6 CAPSULE | Refills: 0 | Status: SHIPPED | OUTPATIENT
Start: 2024-12-27

## 2024-12-27 RX ORDER — LEVOCETIRIZINE DIHYDROCHLORIDE 5 MG/1
5 TABLET, FILM COATED ORAL EVERY EVENING
Qty: 90 TABLET | Refills: 0 | Status: SHIPPED | OUTPATIENT
Start: 2024-12-27

## 2024-12-27 RX ORDER — LANSOPRAZOLE 30 MG/1
CAPSULE, DELAYED RELEASE ORAL
Qty: 90 CAPSULE | Refills: 0 | Status: SHIPPED | OUTPATIENT
Start: 2024-12-27

## 2024-12-27 RX ORDER — ALENDRONATE SODIUM 35 MG/1
TABLET ORAL
Qty: 12 TABLET | Refills: 0 | Status: SHIPPED | OUTPATIENT
Start: 2024-12-27

## 2025-01-04 DIAGNOSIS — E11.42 DM TYPE 2 WITH DIABETIC PERIPHERAL NEUROPATHY: Chronic | ICD-10-CM

## 2025-01-06 RX ORDER — SEMAGLUTIDE 1.34 MG/ML
1 INJECTION, SOLUTION SUBCUTANEOUS WEEKLY
Qty: 3 ML | Refills: 0 | Status: SHIPPED | OUTPATIENT
Start: 2025-01-06

## 2025-01-15 DIAGNOSIS — F41.1 GENERALIZED ANXIETY DISORDER: ICD-10-CM

## 2025-01-15 DIAGNOSIS — F33.1 MODERATE EPISODE OF RECURRENT MAJOR DEPRESSIVE DISORDER: ICD-10-CM

## 2025-01-16 RX ORDER — VENLAFAXINE HYDROCHLORIDE 75 MG/1
225 CAPSULE, EXTENDED RELEASE ORAL DAILY
Qty: 90 CAPSULE | Refills: 1 | Status: SHIPPED | OUTPATIENT
Start: 2025-01-16

## 2025-01-18 DIAGNOSIS — E13.9 DIABETES MELLITUS TYPE 1.5, MANAGED AS TYPE 1: Chronic | ICD-10-CM

## 2025-01-20 ENCOUNTER — INFUSION (OUTPATIENT)
Dept: ONCOLOGY | Facility: HOSPITAL | Age: 50
End: 2025-01-20
Payer: MEDICARE

## 2025-01-20 VITALS
SYSTOLIC BLOOD PRESSURE: 122 MMHG | BODY MASS INDEX: 29.4 KG/M2 | RESPIRATION RATE: 18 BRPM | OXYGEN SATURATION: 98 % | WEIGHT: 171.4 LBS | DIASTOLIC BLOOD PRESSURE: 83 MMHG | HEART RATE: 88 BPM | TEMPERATURE: 97.5 F

## 2025-01-20 DIAGNOSIS — Z95.828 PORT-A-CATH IN PLACE: Primary | ICD-10-CM

## 2025-01-20 PROCEDURE — 25010000002 HEPARIN LOCK FLUSH PER 10 UNITS: Performed by: INTERNAL MEDICINE

## 2025-01-20 PROCEDURE — 96523 IRRIG DRUG DELIVERY DEVICE: CPT

## 2025-01-20 RX ORDER — SODIUM CHLORIDE 0.9 % (FLUSH) 0.9 %
10 SYRINGE (ML) INJECTION AS NEEDED
OUTPATIENT
Start: 2025-01-20

## 2025-01-20 RX ORDER — BLOOD SUGAR DIAGNOSTIC
STRIP MISCELLANEOUS
Qty: 100 EACH | Refills: 3 | Status: SHIPPED | OUTPATIENT
Start: 2025-01-20

## 2025-01-20 RX ORDER — HEPARIN SODIUM (PORCINE) LOCK FLUSH IV SOLN 100 UNIT/ML 100 UNIT/ML
500 SOLUTION INTRAVENOUS AS NEEDED
Status: DISCONTINUED | OUTPATIENT
Start: 2025-01-20 | End: 2025-01-20 | Stop reason: HOSPADM

## 2025-01-20 RX ORDER — HEPARIN SODIUM (PORCINE) LOCK FLUSH IV SOLN 100 UNIT/ML 100 UNIT/ML
500 SOLUTION INTRAVENOUS AS NEEDED
OUTPATIENT
Start: 2025-01-20

## 2025-01-20 RX ORDER — SODIUM CHLORIDE 0.9 % (FLUSH) 0.9 %
10 SYRINGE (ML) INJECTION AS NEEDED
Status: DISCONTINUED | OUTPATIENT
Start: 2025-01-20 | End: 2025-01-20 | Stop reason: HOSPADM

## 2025-01-20 RX ADMIN — HEPARIN 500 UNITS: 100 SYRINGE at 13:30

## 2025-01-20 RX ADMIN — Medication 10 ML: at 13:30

## 2025-01-28 ENCOUNTER — OFFICE VISIT (OUTPATIENT)
Dept: PSYCHIATRY | Facility: CLINIC | Age: 50
End: 2025-01-28
Payer: MEDICARE

## 2025-01-28 VITALS
SYSTOLIC BLOOD PRESSURE: 100 MMHG | BODY MASS INDEX: 29.2 KG/M2 | OXYGEN SATURATION: 98 % | DIASTOLIC BLOOD PRESSURE: 60 MMHG | HEART RATE: 82 BPM | WEIGHT: 170.2 LBS

## 2025-01-28 DIAGNOSIS — F42.8 OTHER OBSESSIVE-COMPULSIVE DISORDERS: Chronic | ICD-10-CM

## 2025-01-28 DIAGNOSIS — F41.1 GENERALIZED ANXIETY DISORDER: ICD-10-CM

## 2025-01-28 DIAGNOSIS — F33.1 MODERATE EPISODE OF RECURRENT MAJOR DEPRESSIVE DISORDER: ICD-10-CM

## 2025-01-28 RX ORDER — ALPRAZOLAM 1 MG/1
1 TABLET ORAL 3 TIMES DAILY PRN
Qty: 90 TABLET | Refills: 0 | Status: SHIPPED | OUTPATIENT
Start: 2025-01-28

## 2025-01-28 RX ORDER — VENLAFAXINE HYDROCHLORIDE 75 MG/1
225 CAPSULE, EXTENDED RELEASE ORAL DAILY
Qty: 90 CAPSULE | Refills: 1 | Status: SHIPPED | OUTPATIENT
Start: 2025-01-28

## 2025-01-28 RX ORDER — ATOGEPANT 30 MG/1
30 TABLET ORAL DAILY
COMMUNITY

## 2025-01-28 NOTE — PROGRESS NOTES
Subjective   Nivia Bernstein is a 49 y.o. female is here today for medication management follow-up.    Chief Complaint:  anxiety irritability depression     History of Present Illness:    Patient presents today for follow up for medication management for depression, irritability, and anxiety with . Patient states everything is aggravating her and dealing with moodiness.  reports having a lot of mood swings lately with the irritability and depression. Patient states dealing with stress with family and things. Patient states stressed out about son wanting to play football as well as home stuff. Patient reports feeling really stressed out. Patient reports she is having headaches everyday so they have adjusted her migraine medication. Patient states having a lot of irritability. Denies any aggression. Patient reports mood has been worse than last visit and anxiety been a lot worse with all the stress. Patient reports dealing with a lot of irritability and agitation. Patient reports having multiple panic attacks. Denies any changes in sleep. Patient still having nightmares. Patient reports appetite is about the same and denies any changes. Patient denies any auditory or visual hallucinations. Patient adamantly denies any thoughts to harm self or others. Patient denies any side effects to medications. Patient denies any medical changes since last visit.   The following portions of the patient's history were reviewed and updated as appropriate: allergies, current medications, past family history, past medical history, past social history, past surgical history, and problem list.    Review of Systems   Constitutional: Negative.    Respiratory: Negative.     Cardiovascular: Negative.    Gastrointestinal: Negative.    Neurological:  Positive for headaches.   Psychiatric/Behavioral:  Positive for agitation, dysphoric mood and sleep disturbance. The patient is nervous/anxious.        Objective   Physical  Exam  Vitals reviewed.   Constitutional:       Appearance: Normal appearance. She is well-developed and well-groomed.   Neurological:      Mental Status: She is alert.   Psychiatric:         Attention and Perception: Attention and perception normal.         Mood and Affect: Mood is anxious. Affect is angry.         Speech: Speech normal.         Behavior: Behavior is agitated. Behavior is cooperative.         Thought Content: Thought content normal.         Cognition and Memory: Cognition and memory normal.         Judgment: Judgment normal.     Blood pressure 100/60, pulse 82, weight 77.2 kg (170 lb 3.2 oz), SpO2 98%, not currently breastfeeding.Body mass index is 29.2 kg/m².    Allergies   Allergen Reactions    Imipramine Other (See Comments)     Chest pain    Mobic [Meloxicam] Rash    Penicillins Angioedema    Taxotere [Docetaxel] Anaphylaxis     9 minutes into 1st infusion    Novolog [Insulin Aspart (Human Analog)] Hives    Rosuvastatin Calcium GI Intolerance       Medication List:   Current Outpatient Medications   Medication Sig Dispense Refill    ALPRAZolam (XANAX) 1 MG tablet Take 1 tablet by mouth 3 (Three) Times a Day As Needed for Anxiety. 90 tablet 0    venlafaxine XR (EFFEXOR-XR) 75 MG 24 hr capsule Take 3 capsules by mouth Daily. 90 capsule 1    Accu-Chek FastClix Lancets misc USE TO test blood sugar THREE TIMES DAILY 102 each 5    albuterol sulfate  (90 Base) MCG/ACT inhaler INHALE TWO PUFFS BY MOUTH EVERY FOUR HOURS AS NEEDED FOR SHORTNESS OF BREATH 18 g 5    alendronate (FOSAMAX) 35 MG tablet TAKE ONE TABLET BY MOUTH EVERY 7 DAYS WITH WATER 30 MINUTES BEFORE THE 1ST FOOD, DRINK, OR MEDICATION AND AVOID LYING DOWN 30 MINUTES AFTER TAKING 12 tablet 0    Aspirin Low Dose 81 MG EC tablet TAKE TWO TABLETS BY MOUTH EVERY DAY 60 tablet 4    atorvastatin (LIPITOR) 40 MG tablet TAKE ONE TABLET BY MOUTH EVERY night 90 tablet 0    azelastine (ASTELIN) 0.1 % nasal spray Use in each nostril as directed 30  mL 3    Calcium Carb-Cholecalciferol (Calcium+D3) 500-15 MG-MCG tablet Take 1 tablet by mouth 2 (Two) Times a Day With Meals. 60 tablet 3    cloNIDine (CATAPRES) 0.1 MG tablet TAKE ONE TABLET BY MOUTH TWICE DAILY 60 tablet 2    Continuous Glucose Sensor (Guardian 4 Glucose Sensor) misc CHANGE sensor every 5-7 DAYS as directed 5 each 12    cyanocobalamin 1000 MCG/ML injection Inject 1 mL under the skin into the appropriate area as directed Every 14 (Fourteen) Days. 2 mL 5    Emgality 120 MG/ML auto-injector pen INJECT 120mg ONCE MONTHLY      empagliflozin (JARDIANCE) 10 MG tablet tablet Take 1 tablet by mouth Daily. 30 tablet 2    fluconazole (Diflucan) 150 MG tablet Take 1 tablet by mouth Daily. 2 tablet 0    Fluticasone Furoate-Vilanterol (Breo Ellipta) 200-25 MCG/ACT inhaler Inhale 1 puff Daily. 1 each 3    folic acid (FOLVITE) 1 MG tablet Take 1 tablet by mouth Daily. 90 tablet 3    furosemide (LASIX) 20 MG tablet TAKE ONE-HALF TABLET BY MOUTH DAILY 30 tablet 0    gabapentin (NEURONTIN) 100 MG capsule TAKE ONE CAPSULE BY MOUTH TWICE DAILY 60 capsule 2    Glucagon (Gvoke HypoPen 2-Pack) 1 MG/0.2ML solution auto-injector Inject 1 mg under the skin into the appropriate area as directed As Needed (Severe Hypoglycemia). 0.4 mL 3    glucose blood (Accu-Chek Guide Test) test strip USE TO TEST BLOOD GLUCOSE THREE TIMES DAILY 100 each 3    Insulin Lispro (humaLOG) 100 UNIT/ML injection INJECT AS DIRECTED MAX DAILY DOSE  UNITS DAILY 40 mL 0    ipratropium-albuterol (DUO-NEB) 0.5-2.5 mg/3 ml nebulizer Take 3 mL by nebulization Every 4 (Four) Hours As Needed for Shortness of Air. 150 mL 1    lansoprazole (PREVACID) 30 MG capsule TAKE ONE CAPSULE BY MOUTH EVERY DAY 90 capsule 0    levocetirizine (XYZAL) 5 MG tablet TAKE ONE TABLET BY MOUTH EVERY EVENING 90 tablet 0    levothyroxine (SYNTHROID, LEVOTHROID) 25 MCG tablet Take 1 tablet by mouth Daily. 30 tablet 1    Linzess 145 MCG capsule capsule TAKE ONE CAPSULE BY MOUTH  EVERY DAY 30 MINUTES BEFORE A MEAL ON AN EMPTY STOMACH 30 capsule 0    montelukast (SINGULAIR) 10 MG tablet TAKE ONE TABLET BY MOUTH EVERY night 90 tablet 0    nitrofurantoin, macrocrystal-monohydrate, (Macrobid) 100 MG capsule Take 1 capsule by mouth 2 (Two) Times a Day. 14 capsule 0    ondansetron (ZOFRAN) 8 MG tablet TAKE 1 TABLET BY MOUTH EVERY 8 HOURS AS NEEDED FOR NAUSEA OR VOMITING 30 tablet 3    Ozempic, 1 MG/DOSE, 4 MG/3ML solution pen-injector INJECT 1 MG SUBCUTANEOUSLY ONCE WEEKLY 3 mL 0    Qulipta 30 MG tablet Take 1 tablet by mouth Daily.      Respiratory Therapy Supplies (Nebulizer/Tubing/Mouthpiece) kit 1 each Take As Directed. 1 each 1    tamoxifen (NOLVADEX) 20 MG chemo tablet Take 1 tablet by mouth Daily. 30 tablet 11    topiramate (TOPAMAX) 50 MG tablet TAKE ONE TABLET BY MOUTH TWICE DAILY 60 tablet 3    ubrogepant 100 MG tablet       vitamin D (ERGOCALCIFEROL) 1.25 MG (84105 UT) capsule capsule Take 1 capsule by mouth Every 14 (Fourteen) Days. 6 capsule 0     No current facility-administered medications for this visit.       Mental Status Exam:   Hygiene:   good  Cooperation:  Cooperative  Eye Contact:  Good  Psychomotor Behavior:  Appropriate  Affect:  Appropriate  Hopelessness: Denies  Speech:  Normal  Thought Process:  Goal directed and Linear  Thought Content:  Normal  Suicidal:  None  Homicidal:  None  Hallucinations:  None  Delusion:  None  Memory:  Intact  Orientation:  Person, Place, Time, and Situation  Reliability:  fair  Insight:  Fair  Judgement:  Fair  Impulse Control:  Fair  Physical/Medical Issues:  No              Assessment & Plan   Diagnoses and all orders for this visit:    1. Generalized anxiety disorder  -     ALPRAZolam (XANAX) 1 MG tablet; Take 1 tablet by mouth 3 (Three) Times a Day As Needed for Anxiety.  Dispense: 90 tablet; Refill: 0  -     venlafaxine XR (EFFEXOR-XR) 75 MG 24 hr capsule; Take 3 capsules by mouth Daily.  Dispense: 90 capsule; Refill: 1    2. Other  obsessive-compulsive disorders  -     ALPRAZolam (XANAX) 1 MG tablet; Take 1 tablet by mouth 3 (Three) Times a Day As Needed for Anxiety.  Dispense: 90 tablet; Refill: 0    3. Moderate episode of recurrent major depressive disorder  -     venlafaxine XR (EFFEXOR-XR) 75 MG 24 hr capsule; Take 3 capsules by mouth Daily.  Dispense: 90 capsule; Refill: 1            Discussed medication options with patient. Increase Alprazolam to 1 mg three times daily as needed for worsening anxiety. Cont. Effexor XR 75 mg three capsules daily for depression and anxiety. Reviewed the risks, benefits, and side effects of the medications; patient acknowledged and verbally consented.   Patient is being prescribed a controlled substance as part of treatment plan. Patient has been educated of appropriate use of the medications, including risk of somnolence, limited ability to drive and/or work safely, and potential for dependence, respiratory depression and overdose. Patient is also informed that the medication are to be used by the patient only- avoid any combined use of ETOH or other substances unless prescribed.   UDS reviewed from 12/12/24    [facility] Patient Controlled Substance Report (from 1/29/2024 to 1/28/2025)    Dispensed  Strength Quantity Days Supply Provider Pharmacy   01/06/2025 Alprazolam 1MG 60 each 30 [facility]   12/26/2024 Gabapentin 100MG 60 each 30 [facility]FFANIE[facility]   11/27/2024 Gabapentin 100MG 60 each 30 [facility]   11/07/2024 Alprazolam 1MG 60 each 30 [facility]   10/31/2024 Gabapentin 100MG 60 each 30 [facility]   10/03/2024 Alprazolam 1MG 60 each 30 [facility]   10/03/2024 Gabapentin 100MG 60 each 30 [facility]   09/05/2024 Alprazolam 1MG 60 each 30 [facility]   09/05/2024  Gabapentin 100MG 60 each 30 [facility]   08/09/2024 Gabapentin 100MG 60 each 30 [facility]   08/08/2024 Alprazolam 1MG 60 each 30 [facility]   06/13/2024 Gabapentin 100MG 60 each 30 [facility]   06/05/2024 Alprazolam 1MG 60 each 30 [facility]   05/09/2024 Gabapentin 100MG 60 each 30 [facility]   05/07/2024 Alprazolam 1MG 60 each 30 [facility]   04/02/2024 Alprazolam 1MG 60 each 30 [facility]   04/02/2024 Gabapentin 100MG 60 each 30 [facility]AN[facility]ENE [facility]   03/05/2024 Alprazolam 1MG 60 each 30 [facility]   03/05/2024 Gabapentin 100MG 60 each 30 [facility]   02/06/2024 Alprazolam 1MG 60 each 30 [facility]   02/06/2024 Gabapentin 100MG 60 each 30 [facility]         Disclaimer    *The information in this report is based upon Schedule II through V controlled substance records reported by dispensers. Data should appear on [facility] reports within two to three business days after dispensing.   *The records listed in the report are based on the patient identification information entered by the report requestor, and if not sufficiently unique may result in the report including records for multiple patients. Please verify the information in the report by contacting the prescribers and/or dispensers listed.   *If the controlled substance records on this report appear to be in error, the patient or provider should contact the dispenser to determine if the information was reported accurately. If the dispenser certifies the information was reported accurately, the dispenser can contact the Drug Enforcement and Professional Practices Branch at  [phone] to investigate the error.   *The information in this report is intended for informational use only by the person authorized to request the report. Intentional disclosure of the report or data to someone not authorized to obtain the data is a Class B Misdemeanor.      Report Restrictions - A practitioner or pharmacist may share the report with the patient or person authorized to act on the patient's behalf and place the report in the patient's medical record, with the report then being deemed a medical record subject to the same disclosure terms and conditions as an ordinary medical record. ([place]202)        Patient is agreeable to call the office with any questions, concerns, or worsening of symptoms. Patient is aware to call 911 or go to the nearest ER should begin having any thoughts to harm self or others.           Follow up in two months          Errors in dictation may reflect use of voice recognition software and not all errors in transcription may have been detected prior to signing.              This document has been electronically signed by NIDA Vasquez   January 28, 2025 09:22 EST

## 2025-01-30 DIAGNOSIS — E11.42 DM TYPE 2 WITH DIABETIC PERIPHERAL NEUROPATHY: Chronic | ICD-10-CM

## 2025-01-30 RX ORDER — INSULIN LISPRO 100 [IU]/ML
INJECTION, SOLUTION INTRAVENOUS; SUBCUTANEOUS
Qty: 40 ML | Refills: 0 | Status: SHIPPED | OUTPATIENT
Start: 2025-01-30

## 2025-02-05 RX ORDER — LEVOTHYROXINE SODIUM 25 UG/1
25 TABLET ORAL DAILY
Qty: 30 TABLET | Refills: 1 | Status: SHIPPED | OUTPATIENT
Start: 2025-02-05

## 2025-02-17 DIAGNOSIS — J30.9 CHRONIC ALLERGIC RHINITIS: Chronic | ICD-10-CM

## 2025-02-17 RX ORDER — MONTELUKAST SODIUM 10 MG/1
10 TABLET ORAL
Qty: 90 TABLET | Refills: 0 | Status: SHIPPED | OUTPATIENT
Start: 2025-02-17

## 2025-02-19 DIAGNOSIS — R23.2 HOT FLASHES: ICD-10-CM

## 2025-02-19 DIAGNOSIS — T45.1X5A HOT FLASHES DUE TO TAMOXIFEN: ICD-10-CM

## 2025-02-19 DIAGNOSIS — R23.2 HOT FLASHES DUE TO TAMOXIFEN: ICD-10-CM

## 2025-02-19 RX ORDER — CLONIDINE HYDROCHLORIDE 0.1 MG/1
TABLET ORAL
Qty: 60 TABLET | Refills: 0 | Status: SHIPPED | OUTPATIENT
Start: 2025-02-19

## 2025-02-19 RX ORDER — GABAPENTIN 100 MG/1
100 CAPSULE ORAL 2 TIMES DAILY
Qty: 60 CAPSULE | Refills: 0 | Status: SHIPPED | OUTPATIENT
Start: 2025-02-19

## 2025-02-26 DIAGNOSIS — E11.42 DM TYPE 2 WITH DIABETIC PERIPHERAL NEUROPATHY: Primary | ICD-10-CM

## 2025-03-03 ENCOUNTER — INFUSION (OUTPATIENT)
Dept: ONCOLOGY | Facility: HOSPITAL | Age: 50
End: 2025-03-03
Payer: MEDICARE

## 2025-03-03 VITALS
DIASTOLIC BLOOD PRESSURE: 67 MMHG | OXYGEN SATURATION: 98 % | HEART RATE: 98 BPM | BODY MASS INDEX: 29.69 KG/M2 | TEMPERATURE: 98.1 F | SYSTOLIC BLOOD PRESSURE: 111 MMHG | WEIGHT: 173.1 LBS | RESPIRATION RATE: 16 BRPM

## 2025-03-03 DIAGNOSIS — Z95.828 PORT-A-CATH IN PLACE: Primary | ICD-10-CM

## 2025-03-03 PROCEDURE — 25010000002 HEPARIN LOCK FLUSH PER 10 UNITS: Performed by: INTERNAL MEDICINE

## 2025-03-03 PROCEDURE — 96523 IRRIG DRUG DELIVERY DEVICE: CPT

## 2025-03-03 RX ORDER — SODIUM CHLORIDE 0.9 % (FLUSH) 0.9 %
10 SYRINGE (ML) INJECTION AS NEEDED
Status: DISCONTINUED | OUTPATIENT
Start: 2025-03-03 | End: 2025-03-03 | Stop reason: HOSPADM

## 2025-03-03 RX ORDER — SODIUM CHLORIDE 0.9 % (FLUSH) 0.9 %
10 SYRINGE (ML) INJECTION AS NEEDED
OUTPATIENT
Start: 2025-03-03

## 2025-03-03 RX ORDER — HEPARIN SODIUM (PORCINE) LOCK FLUSH IV SOLN 100 UNIT/ML 100 UNIT/ML
500 SOLUTION INTRAVENOUS AS NEEDED
Status: DISCONTINUED | OUTPATIENT
Start: 2025-03-03 | End: 2025-03-03 | Stop reason: HOSPADM

## 2025-03-03 RX ORDER — HEPARIN SODIUM (PORCINE) LOCK FLUSH IV SOLN 100 UNIT/ML 100 UNIT/ML
500 SOLUTION INTRAVENOUS AS NEEDED
OUTPATIENT
Start: 2025-03-03

## 2025-03-03 RX ADMIN — Medication 10 ML: at 13:15

## 2025-03-03 RX ADMIN — Medication 500 UNITS: at 13:15

## 2025-03-04 DIAGNOSIS — E11.42 DM TYPE 2 WITH DIABETIC PERIPHERAL NEUROPATHY: Chronic | ICD-10-CM

## 2025-03-04 RX ORDER — SEMAGLUTIDE 1.34 MG/ML
INJECTION, SOLUTION SUBCUTANEOUS
Qty: 3 ML | Refills: 0 | Status: SHIPPED | OUTPATIENT
Start: 2025-03-04

## 2025-03-06 ENCOUNTER — PRIOR AUTHORIZATION (OUTPATIENT)
Dept: FAMILY MEDICINE CLINIC | Facility: CLINIC | Age: 50
End: 2025-03-06
Payer: MEDICARE

## 2025-03-10 DIAGNOSIS — E11.42 DM TYPE 2 WITH DIABETIC PERIPHERAL NEUROPATHY: Chronic | ICD-10-CM

## 2025-03-10 DIAGNOSIS — E78.2 MIXED DYSLIPIDEMIA: Chronic | ICD-10-CM

## 2025-03-10 DIAGNOSIS — F42.8 OTHER OBSESSIVE-COMPULSIVE DISORDERS: Chronic | ICD-10-CM

## 2025-03-10 DIAGNOSIS — F41.1 GENERALIZED ANXIETY DISORDER: ICD-10-CM

## 2025-03-10 RX ORDER — ALPRAZOLAM 1 MG/1
1 TABLET ORAL 3 TIMES DAILY PRN
Qty: 90 TABLET | Refills: 0 | Status: SHIPPED | OUTPATIENT
Start: 2025-03-10

## 2025-03-12 DIAGNOSIS — E13.9 DIABETES MELLITUS TYPE 1.5, MANAGED AS TYPE 1: Primary | ICD-10-CM

## 2025-03-12 RX ORDER — ATORVASTATIN CALCIUM 40 MG/1
40 TABLET, FILM COATED ORAL DAILY
Qty: 90 TABLET | Refills: 0 | Status: SHIPPED | OUTPATIENT
Start: 2025-03-12

## 2025-03-12 RX ORDER — INSULIN LISPRO 100 [IU]/ML
INJECTION, SOLUTION INTRAVENOUS; SUBCUTANEOUS
Qty: 40 ML | Refills: 0 | Status: SHIPPED | OUTPATIENT
Start: 2025-03-12

## 2025-03-17 DIAGNOSIS — J45.20 MILD INTERMITTENT ASTHMA WITHOUT COMPLICATION: Chronic | ICD-10-CM

## 2025-03-17 RX ORDER — ALBUTEROL SULFATE 90 UG/1
AEROSOL, METERED RESPIRATORY (INHALATION)
Qty: 18 G | Refills: 5 | Status: SHIPPED | OUTPATIENT
Start: 2025-03-17

## 2025-03-27 ENCOUNTER — CLINICAL SUPPORT (OUTPATIENT)
Dept: FAMILY MEDICINE CLINIC | Facility: CLINIC | Age: 50
End: 2025-03-27
Payer: MEDICARE

## 2025-03-27 DIAGNOSIS — E13.9 DIABETES MELLITUS TYPE 1.5, MANAGED AS TYPE 1: ICD-10-CM

## 2025-03-27 PROCEDURE — 82570 ASSAY OF URINE CREATININE: CPT | Performed by: NURSE PRACTITIONER

## 2025-03-27 PROCEDURE — 82043 UR ALBUMIN QUANTITATIVE: CPT | Performed by: NURSE PRACTITIONER

## 2025-03-28 LAB
ALBUMIN UR-MCNC: <1.2 MG/DL
CREAT UR-MCNC: 35 MG/DL
MICROALBUMIN/CREAT UR: NORMAL MG/G{CREAT}

## 2025-03-30 DIAGNOSIS — E11.42 DM TYPE 2 WITH DIABETIC PERIPHERAL NEUROPATHY: Chronic | ICD-10-CM

## 2025-03-30 DIAGNOSIS — F41.1 GENERALIZED ANXIETY DISORDER: ICD-10-CM

## 2025-03-30 DIAGNOSIS — F33.1 MODERATE EPISODE OF RECURRENT MAJOR DEPRESSIVE DISORDER: ICD-10-CM

## 2025-03-30 DIAGNOSIS — M54.81 OCCIPITAL NEURALGIA OF RIGHT SIDE: Chronic | ICD-10-CM

## 2025-03-31 RX ORDER — VENLAFAXINE HYDROCHLORIDE 75 MG/1
225 CAPSULE, EXTENDED RELEASE ORAL DAILY
Qty: 90 CAPSULE | Refills: 1 | Status: SHIPPED | OUTPATIENT
Start: 2025-03-31

## 2025-03-31 RX ORDER — LEVOTHYROXINE SODIUM 25 UG/1
25 TABLET ORAL DAILY
Qty: 30 TABLET | Refills: 1 | Status: SHIPPED | OUTPATIENT
Start: 2025-03-31

## 2025-04-03 DIAGNOSIS — E11.42 DM TYPE 2 WITH DIABETIC PERIPHERAL NEUROPATHY: Chronic | ICD-10-CM

## 2025-04-03 RX ORDER — SEMAGLUTIDE 1.34 MG/ML
1 INJECTION, SOLUTION SUBCUTANEOUS WEEKLY
Qty: 3 ML | Refills: 0 | Status: SHIPPED | OUTPATIENT
Start: 2025-04-03

## 2025-04-03 RX ORDER — EMPAGLIFLOZIN 10 MG/1
10 TABLET, FILM COATED ORAL DAILY
Qty: 30 TABLET | Refills: 2 | Status: SHIPPED | OUTPATIENT
Start: 2025-04-03

## 2025-04-03 RX ORDER — TOPIRAMATE 50 MG/1
50 TABLET, FILM COATED ORAL EVERY 12 HOURS SCHEDULED
Qty: 60 TABLET | Refills: 2 | Status: SHIPPED | OUTPATIENT
Start: 2025-04-03

## 2025-04-07 DIAGNOSIS — J30.9 CHRONIC ALLERGIC RHINITIS: Chronic | ICD-10-CM

## 2025-04-07 DIAGNOSIS — E11.42 DM TYPE 2 WITH DIABETIC PERIPHERAL NEUROPATHY: Chronic | ICD-10-CM

## 2025-04-07 DIAGNOSIS — R23.2 HOT FLASHES: ICD-10-CM

## 2025-04-07 DIAGNOSIS — R23.2 HOT FLASHES DUE TO TAMOXIFEN: ICD-10-CM

## 2025-04-07 DIAGNOSIS — M85.89 OSTEOPENIA OF MULTIPLE SITES: ICD-10-CM

## 2025-04-07 DIAGNOSIS — K21.9 GASTROESOPHAGEAL REFLUX DISEASE WITHOUT ESOPHAGITIS: Chronic | ICD-10-CM

## 2025-04-07 DIAGNOSIS — T45.1X5A HOT FLASHES DUE TO TAMOXIFEN: ICD-10-CM

## 2025-04-07 RX ORDER — CLONIDINE HYDROCHLORIDE 0.1 MG/1
0.1 TABLET ORAL EVERY 12 HOURS SCHEDULED
Qty: 60 TABLET | Refills: 0 | Status: SHIPPED | OUTPATIENT
Start: 2025-04-07

## 2025-04-07 RX ORDER — INSULIN LISPRO 100 [IU]/ML
INJECTION, SOLUTION INTRAVENOUS; SUBCUTANEOUS
Qty: 40 ML | Refills: 0 | Status: SHIPPED | OUTPATIENT
Start: 2025-04-07

## 2025-04-07 RX ORDER — ALENDRONATE SODIUM 35 MG/1
TABLET ORAL
Qty: 12 TABLET | Refills: 0 | Status: SHIPPED | OUTPATIENT
Start: 2025-04-07

## 2025-04-07 RX ORDER — GABAPENTIN 100 MG/1
100 CAPSULE ORAL 2 TIMES DAILY
Qty: 60 CAPSULE | Refills: 0 | Status: SHIPPED | OUTPATIENT
Start: 2025-04-07

## 2025-04-07 RX ORDER — LEVOCETIRIZINE DIHYDROCHLORIDE 5 MG/1
5 TABLET, FILM COATED ORAL EVERY EVENING
Qty: 90 TABLET | Refills: 0 | Status: SHIPPED | OUTPATIENT
Start: 2025-04-07

## 2025-04-07 RX ORDER — LANSOPRAZOLE 30 MG/1
30 CAPSULE, DELAYED RELEASE ORAL DAILY
Qty: 90 CAPSULE | Refills: 0 | Status: SHIPPED | OUTPATIENT
Start: 2025-04-07

## 2025-04-08 ENCOUNTER — PRIOR AUTHORIZATION (OUTPATIENT)
Dept: FAMILY MEDICINE CLINIC | Facility: CLINIC | Age: 50
End: 2025-04-08
Payer: MEDICARE

## 2025-04-11 ENCOUNTER — INFUSION (OUTPATIENT)
Dept: ONCOLOGY | Facility: HOSPITAL | Age: 50
End: 2025-04-11
Payer: MEDICARE

## 2025-04-11 VITALS
TEMPERATURE: 98.2 F | BODY MASS INDEX: 30.45 KG/M2 | OXYGEN SATURATION: 97 % | RESPIRATION RATE: 18 BRPM | SYSTOLIC BLOOD PRESSURE: 104 MMHG | DIASTOLIC BLOOD PRESSURE: 77 MMHG | HEART RATE: 94 BPM | WEIGHT: 177.5 LBS

## 2025-04-11 DIAGNOSIS — Z95.828 PORT-A-CATH IN PLACE: Primary | ICD-10-CM

## 2025-04-11 PROCEDURE — 96523 IRRIG DRUG DELIVERY DEVICE: CPT

## 2025-04-11 PROCEDURE — 25010000002 HEPARIN LOCK FLUSH PER 10 UNITS: Performed by: INTERNAL MEDICINE

## 2025-04-11 RX ORDER — HEPARIN SODIUM (PORCINE) LOCK FLUSH IV SOLN 100 UNIT/ML 100 UNIT/ML
500 SOLUTION INTRAVENOUS AS NEEDED
OUTPATIENT
Start: 2025-04-11

## 2025-04-11 RX ORDER — SODIUM CHLORIDE 0.9 % (FLUSH) 0.9 %
10 SYRINGE (ML) INJECTION AS NEEDED
Status: DISCONTINUED | OUTPATIENT
Start: 2025-04-11 | End: 2025-04-11 | Stop reason: HOSPADM

## 2025-04-11 RX ORDER — SODIUM CHLORIDE 0.9 % (FLUSH) 0.9 %
10 SYRINGE (ML) INJECTION AS NEEDED
OUTPATIENT
Start: 2025-04-11

## 2025-04-11 RX ORDER — HEPARIN SODIUM (PORCINE) LOCK FLUSH IV SOLN 100 UNIT/ML 100 UNIT/ML
500 SOLUTION INTRAVENOUS AS NEEDED
Status: DISCONTINUED | OUTPATIENT
Start: 2025-04-11 | End: 2025-04-11 | Stop reason: HOSPADM

## 2025-04-11 RX ADMIN — Medication 10 ML: at 13:10

## 2025-04-11 RX ADMIN — HEPARIN 500 UNITS: 100 SYRINGE at 13:10

## 2025-04-17 ENCOUNTER — OFFICE VISIT (OUTPATIENT)
Dept: PSYCHIATRY | Facility: CLINIC | Age: 50
End: 2025-04-17
Payer: MEDICARE

## 2025-04-17 VITALS
BODY MASS INDEX: 30.26 KG/M2 | OXYGEN SATURATION: 97 % | HEART RATE: 92 BPM | SYSTOLIC BLOOD PRESSURE: 102 MMHG | WEIGHT: 176.4 LBS | DIASTOLIC BLOOD PRESSURE: 68 MMHG

## 2025-04-17 DIAGNOSIS — G44.89 CHRONIC MIXED HEADACHE SYNDROME: ICD-10-CM

## 2025-04-17 DIAGNOSIS — M85.89 OSTEOPENIA OF MULTIPLE SITES: ICD-10-CM

## 2025-04-17 DIAGNOSIS — Z51.81 ENCOUNTER FOR THERAPEUTIC DRUG MONITORING: Primary | ICD-10-CM

## 2025-04-17 DIAGNOSIS — F32.A ANXIETY AND DEPRESSION: ICD-10-CM

## 2025-04-17 DIAGNOSIS — M25.562 CHRONIC PAIN OF LEFT KNEE: ICD-10-CM

## 2025-04-17 DIAGNOSIS — G89.29 CHRONIC NECK PAIN: ICD-10-CM

## 2025-04-17 DIAGNOSIS — F41.9 ANXIETY AND DEPRESSION: ICD-10-CM

## 2025-04-17 DIAGNOSIS — M54.2 CHRONIC NECK PAIN: ICD-10-CM

## 2025-04-17 DIAGNOSIS — F42.8 OTHER OBSESSIVE-COMPULSIVE DISORDERS: ICD-10-CM

## 2025-04-17 DIAGNOSIS — F41.1 GENERALIZED ANXIETY DISORDER: ICD-10-CM

## 2025-04-17 DIAGNOSIS — M25.511 CHRONIC PAIN OF BOTH SHOULDERS: ICD-10-CM

## 2025-04-17 DIAGNOSIS — Z79.899 ENCOUNTER FOR LONG-TERM (CURRENT) USE OF HIGH-RISK MEDICATION: ICD-10-CM

## 2025-04-17 DIAGNOSIS — M25.512 CHRONIC PAIN OF BOTH SHOULDERS: ICD-10-CM

## 2025-04-17 DIAGNOSIS — G43.009 MIGRAINE WITHOUT AURA AND WITHOUT STATUS MIGRAINOSUS, NOT INTRACTABLE: ICD-10-CM

## 2025-04-17 DIAGNOSIS — M19.011 PRIMARY OSTEOARTHRITIS OF RIGHT SHOULDER: ICD-10-CM

## 2025-04-17 DIAGNOSIS — G89.29 CHRONIC PAIN OF LEFT KNEE: ICD-10-CM

## 2025-04-17 DIAGNOSIS — G89.29 CHRONIC PAIN OF BOTH SHOULDERS: ICD-10-CM

## 2025-04-17 DIAGNOSIS — F33.1 MODERATE EPISODE OF RECURRENT MAJOR DEPRESSIVE DISORDER: ICD-10-CM

## 2025-04-17 DIAGNOSIS — M19.011 OSTEOARTHRITIS OF RIGHT ACROMIOCLAVICULAR JOINT: ICD-10-CM

## 2025-04-17 RX ORDER — VENLAFAXINE HYDROCHLORIDE 75 MG/1
225 CAPSULE, EXTENDED RELEASE ORAL DAILY
Qty: 90 CAPSULE | Refills: 1 | Status: SHIPPED | OUTPATIENT
Start: 2025-04-17

## 2025-04-17 RX ORDER — ALPRAZOLAM 1 MG/1
1 TABLET ORAL 3 TIMES DAILY PRN
Qty: 90 TABLET | Refills: 0 | Status: SHIPPED | OUTPATIENT
Start: 2025-04-17

## 2025-04-17 NOTE — PROGRESS NOTES
Subjective   Nivia Bernstein is a 50 y.o. female is here today for medication management follow-up.    Chief Complaint:  anxiety OCD depression     History of Present Illness:    Patient presents today for follow up for medication management for anxiety, OCD, and depression. Patient states stressed with going to court with son. Patient states staying aggravated at  smacking his lips. Patient reports currently depression is at a 9 on a 0-10 scale with 10 being the worst. Patient reports anxiety is at a 9 on a 0-10 scale with 10 being the worst. Patient states had a final blow up with mom and told mom how she feels. Patient reports having multiple panic attacks. Patient states she has only been taking the Alprazolam twice daily and has not increased to three times a day yet. Patient reports sleeping at least 3-4 hours a night. Denies any nightmares. Patient reports appetite is ok. Patient states eating 2-3 meals a day. Patient denies any auditory or visual hallucinations. Patient adamantly denies any thoughts to harm self or others. Patient denies any side effects to medications. Patient denies any medical changes since last visit.   The following portions of the patient's history were reviewed and updated as appropriate: allergies, current medications, past family history, past medical history, past social history, past surgical history, and problem list.    Review of Systems   Constitutional: Negative.    Respiratory: Negative.     Cardiovascular: Negative.    Gastrointestinal: Negative.    Neurological: Negative.    Psychiatric/Behavioral:  Positive for agitation, dysphoric mood and sleep disturbance. The patient is nervous/anxious.        Objective   Physical Exam  Vitals reviewed.   Constitutional:       Appearance: Normal appearance. She is well-developed and well-groomed.   Neurological:      Mental Status: She is alert.   Psychiatric:         Attention and Perception: Attention and perception  normal.         Mood and Affect: Affect normal. Mood is anxious.         Speech: Speech normal.         Behavior: Behavior is agitated. Behavior is cooperative.         Thought Content: Thought content normal.         Cognition and Memory: Cognition and memory normal.         Judgment: Judgment normal.       Blood pressure 102/68, pulse 92, weight 80 kg (176 lb 6.4 oz), SpO2 97%, not currently breastfeeding.Body mass index is 30.26 kg/m².    Allergies   Allergen Reactions    Imipramine Other (See Comments)     Chest pain    Mobic [Meloxicam] Rash    Penicillins Angioedema    Taxotere [Docetaxel] Anaphylaxis     9 minutes into 1st infusion    Novolog [Insulin Aspart (Human Analog)] Hives    Rosuvastatin Calcium GI Intolerance       Medication List:   Current Outpatient Medications   Medication Sig Dispense Refill    ALPRAZolam (XANAX) 1 MG tablet Take 1 tablet by mouth 3 (Three) Times a Day As Needed for Anxiety. for anxiety 90 tablet 0    venlafaxine XR (EFFEXOR-XR) 75 MG 24 hr capsule Take 3 capsules by mouth Daily. 90 capsule 1    Accu-Chek FastClix Lancets misc USE TO test blood sugar THREE TIMES DAILY 102 each 5    alendronate (FOSAMAX) 35 MG tablet TAKE ONE TABLET BY MOUTH EVERY 7 DAYS WITH WATER 30 MINUTES BEFORE THE 1ST FOOD, DRINK, OR MEDICATION AND AVOID LYING DOWN 30 MINUTES AFTER TAKING 12 tablet 0    Aspirin Low Dose 81 MG EC tablet TAKE TWO TABLETS BY MOUTH EVERY DAY 60 tablet 4    atorvastatin (LIPITOR) 40 MG tablet Take 1 tablet by mouth Daily. Please schedule a follow up appointment and if you can have your labs before would be great 90 tablet 0    azelastine (ASTELIN) 0.1 % nasal spray Use in each nostril as directed 30 mL 3    Calcium Carb-Cholecalciferol (Calcium+D3) 500-15 MG-MCG tablet Take 1 tablet by mouth 2 (Two) Times a Day With Meals. 60 tablet 3    cloNIDine (CATAPRES) 0.1 MG tablet TAKE ONE TABLET BY MOUTH TWICE DAILY 60 tablet 0    Continuous Glucose Sensor (Guardian 4 Glucose Sensor)  misc CHANGE sensor every 5-7 DAYS as directed 5 each 12    cyanocobalamin 1000 MCG/ML injection Inject 1 mL under the skin into the appropriate area as directed Every 14 (Fourteen) Days. 2 mL 5    Emgality 120 MG/ML auto-injector pen INJECT 120mg ONCE MONTHLY      fluconazole (Diflucan) 150 MG tablet Take 1 tablet by mouth Daily. 2 tablet 0    Fluticasone Furoate-Vilanterol (Breo Ellipta) 200-25 MCG/ACT inhaler Inhale 1 puff Daily. 1 each 3    folic acid (FOLVITE) 1 MG tablet Take 1 tablet by mouth Daily. 90 tablet 3    furosemide (LASIX) 20 MG tablet TAKE ONE-HALF TABLET BY MOUTH DAILY 30 tablet 0    gabapentin (NEURONTIN) 100 MG capsule Take 1 capsule by mouth 2 (Two) Times a Day. 60 capsule 0    Glucagon (Gvoke HypoPen 2-Pack) 1 MG/0.2ML solution auto-injector Inject 1 mg under the skin into the appropriate area as directed As Needed (Severe Hypoglycemia). 0.4 mL 3    glucose blood (Accu-Chek Guide Test) test strip USE TO TEST BLOOD GLUCOSE THREE TIMES DAILY 100 each 3    Insulin Lispro (humaLOG) 100 UNIT/ML injection INJECT AS DIRECTED MAX DAILY DOSE  UNITS DAILY 40 mL 0    ipratropium-albuterol (DUO-NEB) 0.5-2.5 mg/3 ml nebulizer Take 3 mL by nebulization Every 4 (Four) Hours As Needed for Shortness of Air. 150 mL 1    Jardiance 10 MG tablet tablet TAKE ONE TABLET BY MOUTH DAILY 30 tablet 2    lansoprazole (PREVACID) 30 MG capsule TAKE ONE CAPSULE BY MOUTH EVERY DAY 90 capsule 0    levocetirizine (XYZAL) 5 MG tablet TAKE ONE TABLET BY MOUTH EVERY EVENING 90 tablet 0    levothyroxine (SYNTHROID, LEVOTHROID) 25 MCG tablet TAKE ONE TABLET BY MOUTH EVERY DAY 30 tablet 1    Linzess 145 MCG capsule capsule TAKE ONE CAPSULE BY MOUTH EVERY DAY 30 MINUTES BEFORE A MEAL ON AN EMPTY STOMACH 30 capsule 0    montelukast (SINGULAIR) 10 MG tablet TAKE ONE TABLET BY MOUTH EVERY night 90 tablet 0    nitrofurantoin, macrocrystal-monohydrate, (Macrobid) 100 MG capsule Take 1 capsule by mouth 2 (Two) Times a Day. 14 capsule  0    ondansetron (ZOFRAN) 8 MG tablet TAKE 1 TABLET BY MOUTH EVERY 8 HOURS AS NEEDED FOR NAUSEA OR VOMITING 30 tablet 3    Ozempic, 1 MG/DOSE, 4 MG/3ML solution pen-injector INJECT 1MG SUBCUTANEOUSLY ONCE WEEKLY 3 mL 0    Qulipta 30 MG tablet Take 1 tablet by mouth Daily.      Respiratory Therapy Supplies (Nebulizer/Tubing/Mouthpiece) kit 1 each Take As Directed. 1 each 1    tamoxifen (NOLVADEX) 20 MG chemo tablet Take 1 tablet by mouth Daily. 30 tablet 11    topiramate (TOPAMAX) 50 MG tablet TAKE ONE TABLET BY MOUTH TWICE DAILY 60 tablet 2    ubrogepant 100 MG tablet       Ventolin  (90 Base) MCG/ACT inhaler INHALE TWO PUFFS BY MOUTH EVERY FOUR HOURS AS NEEDED FOR SHORTNESS OF BREATH 18 g 5    vitamin D (ERGOCALCIFEROL) 1.25 MG (11012 UT) capsule capsule Take 1 capsule by mouth Every 14 (Fourteen) Days. 6 capsule 0     No current facility-administered medications for this visit.       Mental Status Exam:   Hygiene:   good  Cooperation:  Cooperative  Eye Contact:  Good  Psychomotor Behavior:  Aggitated  Affect:  Appropriate  Hopelessness: Denies  Speech:  Normal  Thought Process:  Goal directed and Linear  Thought Content:  Normal  Suicidal:  None  Homicidal:  None  Hallucinations:  None  Delusion:  None  Memory:  Intact  Orientation:  Person, Place, Time, and Situation  Reliability:  fair  Insight:  Fair  Judgement:  Fair  Impulse Control:  Fair  Physical/Medical Issues:  No               Assessment & Plan   Diagnoses and all orders for this visit:    1. Generalized anxiety disorder (Primary)  -     ALPRAZolam (XANAX) 1 MG tablet; Take 1 tablet by mouth 3 (Three) Times a Day As Needed for Anxiety. for anxiety  Dispense: 90 tablet; Refill: 0  -     venlafaxine XR (EFFEXOR-XR) 75 MG 24 hr capsule; Take 3 capsules by mouth Daily.  Dispense: 90 capsule; Refill: 1    2. Other obsessive-compulsive disorders  -     ALPRAZolam (XANAX) 1 MG tablet; Take 1 tablet by mouth 3 (Three) Times a Day As Needed for Anxiety.  for anxiety  Dispense: 90 tablet; Refill: 0    3. Moderate episode of recurrent major depressive disorder  -     venlafaxine XR (EFFEXOR-XR) 75 MG 24 hr capsule; Take 3 capsules by mouth Daily.  Dispense: 90 capsule; Refill: 1    4. Encounter for therapeutic drug monitoring  -     Urine Drug Screen - Urine, Clean Catch; Future            Discussed medication options with the patient.  Increase alprazolam 1 mg to 1 tablet by mouth 3 times daily as needed for worsening anxiety.  Continue Effexor XR 75 mg 3 capsules by mouth daily for depression, OCD, and anxiety.  Reviewed the risks, benefits, and side effects of the medications; patient acknowledged and verbally consented. Patient is being prescribed a controlled substance as part of treatment plan. Patient has been educated of appropriate use of the medications, including risk of somnolence, limited ability to drive and/or work safely, and potential for dependence, respiratory depression and overdose. Patient is also informed that the medication are to be used by the patient only- avoid any combined use of ETOH or other substances unless prescribed.   UDS ordered.    [facility] Patient Controlled Substance Report (from 4/17/2024 to 4/17/2025)    Dispensed  Strength Quantity Days Supply Provider Pharmacy   02/17/2025 Alprazolam 1MG 90 each 30 [facility]   01/30/2025 Gabapentin 100MG 60 each 30 [facility]   01/06/2025 Alprazolam 1MG 60 each 30 [facility]   12/26/2024 Gabapentin 100MG 60 each 30 [facility]   11/27/2024 Gabapentin 100MG 60 each 30 [facility]   11/07/2024 Alprazolam 1MG 60 each 30 [facility]   10/31/2024 Gabapentin 100MG 60 each 30 [facility]   10/03/2024 Alprazolam 1MG 60 each 30 [facility]   10/03/2024 Gabapentin  100MG 60 each 30 TIFFANIE[facility]ERELENE [facility]   09/05/2024 Alprazolam 1MG 60 each 30 [facility]   09/05/2024 Gabapentin 100MG 60 each 30 [facility]AN[facility]   08/09/2024 Gabapentin 100MG 60 each 30 [facility]   08/08/2024 Alprazolam 1MG 60 each 30 [facility]   06/13/2024 Gabapentin 100MG 60 each 30 [facility]   06/05/2024 Alprazolam 1MG 60 each 30 [facility]   05/09/2024 Gabapentin 100MG 60 each 30 [facility]AN[facility]   05/07/2024 Alprazolam 1MG 60 each 30 [facility]         Disclaimer    *The information in this report is based upon Schedule II through V controlled substance records reported by dispensers. Data should appear on [facility] reports within two to three business days after dispensing.   *The records listed in the report are based on the patient identification information entered by the report requestor, and if not sufficiently unique may result in the report including records for multiple patients. Please verify the information in the report by contacting the prescribers and/or dispensers listed.   *If the controlled substance records on this report appear to be in error, the patient or provider should contact the dispenser to determine if the information was reported accurately. If the dispenser certifies the information was reported accurately, the dispenser can contact the Drug Enforcement and Professional Practices Branch at [phone] to investigate the error.   *The information in this report is intended for informational use only by the person authorized to request the report. Intentional disclosure of the report or data to someone not authorized to obtain the data is a Class B Misdemeanor.      Report Restrictions - A practitioner or pharmacist may share  the report with the patient or person authorized to act on the patient's behalf and place the report in the patient's medical record, with the report then being deemed a medical record subject to the same disclosure terms and conditions as an ordinary medical record. ([place] 218[place].202)        Patient is agreeable to call the office with any questions, concerns, or worsening of symptoms.  Patient is aware to call 911 or go to the nearest ER should begin having thoughts to harm self or others.          Follow-up in 6 weeks            Errors in dictation may reflect use of voice recognition software and not all errors in transcription may have been detected prior to signing.                This document has been electronically signed by NIDA Vasquez   April 17, 2025 10:21 EDT

## 2025-04-19 LAB — REF LAB TEST METHOD: NORMAL

## 2025-04-21 RX ORDER — BLOOD-GLUCOSE SENSOR
EACH MISCELLANEOUS
Qty: 5 EACH | Refills: 12 | Status: SHIPPED | OUTPATIENT
Start: 2025-04-21

## 2025-04-22 DIAGNOSIS — K59.03 DRUG-INDUCED CONSTIPATION: ICD-10-CM

## 2025-04-22 LAB
6-ACETYLMORPHINE: NOT DETECTED NG/ML
7- AMINOCLONAZEPAM: NOT DETECTED NG/ML
ALCOHOL, ETHYL: NOT DETECTED NG/ML
ALPHA-HYDROXYALPRAZOLAM: NORMAL NG/ML
ALPRAZ SPEC-MCNC: NORMAL NG/ML
AMPHETAMINE: NOT DETECTED NG/ML
BENZODIAZ BLD QL: DETECTED NG/ML
BUPRENORPHINE SAL CFM-MCNC: NOT DETECTED NG/ML
CLONAZEPAM: NOT DETECTED NG/ML
COCAINE METABOLITE: NOT DETECTED NG/ML
DIAZEPAM: NOT DETECTED NG/ML
EDDP SERPL QL: NOT DETECTED NG/ML
FENTANYL-EIA: NOT DETECTED NG/ML
FLUNITRAZEPAM SERPLBLD-MCNC: NOT DETECTED NG/ML
FLURAZEPAM BLD CFM-MCNC: NOT DETECTED NG/ML
LORAZEPAM: NOT DETECTED NG/ML
METHAMPHETAMINE: NOT DETECTED NG/ML
MIDAZOLAM, URINE: NOT DETECTED NG/ML
NORDIAZEPAM: NOT DETECTED NG/ML
OPIATES: NOT DETECTED NG/ML
OXAZEPAM: NOT DETECTED NG/ML
OXYCODONE: NOT DETECTED NG/ML
PCP: NOT DETECTED NG/ML
TEMAZEPAM: NOT DETECTED NG/ML
THC: NOT DETECTED NG/ML
TRICYCLIC ANTIDEPRESSANTS: NOT DETECTED NG/ML

## 2025-04-22 RX ORDER — LINACLOTIDE 145 UG/1
CAPSULE, GELATIN COATED ORAL
Qty: 30 CAPSULE | Refills: 0 | Status: SHIPPED | OUTPATIENT
Start: 2025-04-22

## 2025-05-10 ENCOUNTER — DOCUMENTATION (OUTPATIENT)
Dept: FAMILY MEDICINE CLINIC | Facility: CLINIC | Age: 50
End: 2025-05-10
Payer: MEDICARE

## 2025-05-10 RX ORDER — FLUCONAZOLE 150 MG/1
150 TABLET ORAL DAILY
Qty: 7 TABLET | Refills: 0 | Status: SHIPPED | OUTPATIENT
Start: 2025-05-10

## 2025-05-12 ENCOUNTER — LAB (OUTPATIENT)
Dept: FAMILY MEDICINE CLINIC | Facility: CLINIC | Age: 50
End: 2025-05-12
Payer: MEDICARE

## 2025-05-12 DIAGNOSIS — E11.42 DM TYPE 2 WITH DIABETIC PERIPHERAL NEUROPATHY: Primary | ICD-10-CM

## 2025-05-12 DIAGNOSIS — J30.9 CHRONIC ALLERGIC RHINITIS: Chronic | ICD-10-CM

## 2025-05-12 DIAGNOSIS — R23.2 HOT FLASHES DUE TO TAMOXIFEN: ICD-10-CM

## 2025-05-12 DIAGNOSIS — T45.1X5A HOT FLASHES DUE TO TAMOXIFEN: ICD-10-CM

## 2025-05-12 DIAGNOSIS — E78.2 MIXED DYSLIPIDEMIA: ICD-10-CM

## 2025-05-12 DIAGNOSIS — E11.42 DM TYPE 2 WITH DIABETIC PERIPHERAL NEUROPATHY: Chronic | ICD-10-CM

## 2025-05-12 DIAGNOSIS — K21.9 GASTROESOPHAGEAL REFLUX DISEASE WITHOUT ESOPHAGITIS: ICD-10-CM

## 2025-05-12 DIAGNOSIS — R23.2 HOT FLASHES: ICD-10-CM

## 2025-05-12 DIAGNOSIS — E53.8 VITAMIN B 12 DEFICIENCY: ICD-10-CM

## 2025-05-12 DIAGNOSIS — E55.9 VITAMIN D DEFICIENCY: ICD-10-CM

## 2025-05-12 LAB
25(OH)D3 SERPL-MCNC: 56.8 NG/ML (ref 30–100)
ALBUMIN SERPL-MCNC: 4.2 G/DL (ref 3.5–5.2)
ALBUMIN/GLOB SERPL: 1.3 G/DL
ALP SERPL-CCNC: 92 U/L (ref 39–117)
ALT SERPL W P-5'-P-CCNC: 15 U/L (ref 1–33)
ANION GAP SERPL CALCULATED.3IONS-SCNC: 8.4 MMOL/L (ref 5–15)
AST SERPL-CCNC: 16 U/L (ref 1–32)
BASOPHILS # BLD AUTO: 0.06 10*3/MM3 (ref 0–0.2)
BASOPHILS NFR BLD AUTO: 0.6 % (ref 0–1.5)
BILIRUB SERPL-MCNC: 0.3 MG/DL (ref 0–1.2)
BUN SERPL-MCNC: 14 MG/DL (ref 6–20)
BUN/CREAT SERPL: 16.1 (ref 7–25)
CALCIUM SPEC-SCNC: 9.1 MG/DL (ref 8.6–10.5)
CHLORIDE SERPL-SCNC: 109 MMOL/L (ref 98–107)
CHOLEST SERPL-MCNC: 139 MG/DL (ref 0–200)
CO2 SERPL-SCNC: 22.6 MMOL/L (ref 22–29)
CREAT SERPL-MCNC: 0.87 MG/DL (ref 0.57–1)
DEPRECATED RDW RBC AUTO: 36.9 FL (ref 37–54)
EGFRCR SERPLBLD CKD-EPI 2021: 80.3 ML/MIN/1.73
EOSINOPHIL # BLD AUTO: 0.1 10*3/MM3 (ref 0–0.4)
EOSINOPHIL NFR BLD AUTO: 1 % (ref 0.3–6.2)
ERYTHROCYTE [DISTWIDTH] IN BLOOD BY AUTOMATED COUNT: 12.2 % (ref 12.3–15.4)
GLOBULIN UR ELPH-MCNC: 3.3 GM/DL
GLUCOSE SERPL-MCNC: 153 MG/DL (ref 65–99)
HBA1C MFR BLD: 7 % (ref 4.8–5.6)
HCT VFR BLD AUTO: 44.3 % (ref 34–46.6)
HDLC SERPL-MCNC: 39 MG/DL (ref 40–60)
HGB BLD-MCNC: 14.9 G/DL (ref 12–15.9)
IMM GRANULOCYTES # BLD AUTO: 0.03 10*3/MM3 (ref 0–0.05)
IMM GRANULOCYTES NFR BLD AUTO: 0.3 % (ref 0–0.5)
LDLC SERPL CALC-MCNC: 63 MG/DL (ref 0–100)
LDLC/HDLC SERPL: 1.41 {RATIO}
LYMPHOCYTES # BLD AUTO: 3.08 10*3/MM3 (ref 0.7–3.1)
LYMPHOCYTES NFR BLD AUTO: 30.1 % (ref 19.6–45.3)
MCH RBC QN AUTO: 28.4 PG (ref 26.6–33)
MCHC RBC AUTO-ENTMCNC: 33.6 G/DL (ref 31.5–35.7)
MCV RBC AUTO: 84.5 FL (ref 79–97)
MONOCYTES # BLD AUTO: 0.5 10*3/MM3 (ref 0.1–0.9)
MONOCYTES NFR BLD AUTO: 4.9 % (ref 5–12)
NEUTROPHILS NFR BLD AUTO: 6.46 10*3/MM3 (ref 1.7–7)
NEUTROPHILS NFR BLD AUTO: 63.1 % (ref 42.7–76)
NRBC BLD AUTO-RTO: 0 /100 WBC (ref 0–0.2)
PLATELET # BLD AUTO: 304 10*3/MM3 (ref 140–450)
PMV BLD AUTO: 10.5 FL (ref 6–12)
POTASSIUM SERPL-SCNC: 4.4 MMOL/L (ref 3.5–5.2)
PROT SERPL-MCNC: 7.5 G/DL (ref 6–8.5)
RBC # BLD AUTO: 5.24 10*6/MM3 (ref 3.77–5.28)
SODIUM SERPL-SCNC: 140 MMOL/L (ref 136–145)
TRIGL SERPL-MCNC: 226 MG/DL (ref 0–150)
TSH SERPL DL<=0.05 MIU/L-ACNC: 1.89 UIU/ML (ref 0.27–4.2)
VIT B12 BLD-MCNC: 320 PG/ML (ref 211–946)
VLDLC SERPL-MCNC: 37 MG/DL (ref 5–40)
WBC NRBC COR # BLD AUTO: 10.23 10*3/MM3 (ref 3.4–10.8)

## 2025-05-12 PROCEDURE — 85025 COMPLETE CBC W/AUTO DIFF WBC: CPT | Performed by: NURSE PRACTITIONER

## 2025-05-12 PROCEDURE — 83036 HEMOGLOBIN GLYCOSYLATED A1C: CPT | Performed by: NURSE PRACTITIONER

## 2025-05-12 PROCEDURE — 36415 COLL VENOUS BLD VENIPUNCTURE: CPT | Performed by: NURSE PRACTITIONER

## 2025-05-12 PROCEDURE — 80053 COMPREHEN METABOLIC PANEL: CPT | Performed by: NURSE PRACTITIONER

## 2025-05-12 PROCEDURE — 84443 ASSAY THYROID STIM HORMONE: CPT | Performed by: NURSE PRACTITIONER

## 2025-05-12 PROCEDURE — 80061 LIPID PANEL: CPT | Performed by: NURSE PRACTITIONER

## 2025-05-12 PROCEDURE — 82306 VITAMIN D 25 HYDROXY: CPT | Performed by: NURSE PRACTITIONER

## 2025-05-12 PROCEDURE — 82607 VITAMIN B-12: CPT | Performed by: NURSE PRACTITIONER

## 2025-05-12 RX ORDER — TAMOXIFEN CITRATE 20 MG/1
20 TABLET ORAL DAILY
Qty: 30 TABLET | Refills: 11 | Status: SHIPPED | OUTPATIENT
Start: 2025-05-12

## 2025-05-14 ENCOUNTER — OFFICE VISIT (OUTPATIENT)
Dept: FAMILY MEDICINE CLINIC | Facility: CLINIC | Age: 50
End: 2025-05-14
Payer: MEDICARE

## 2025-05-14 VITALS
HEART RATE: 93 BPM | TEMPERATURE: 97.6 F | WEIGHT: 175 LBS | OXYGEN SATURATION: 97 % | RESPIRATION RATE: 14 BRPM | SYSTOLIC BLOOD PRESSURE: 100 MMHG | DIASTOLIC BLOOD PRESSURE: 60 MMHG | BODY MASS INDEX: 30.02 KG/M2

## 2025-05-14 DIAGNOSIS — R60.1 GENERALIZED EDEMA: ICD-10-CM

## 2025-05-14 DIAGNOSIS — I65.23 ATHEROSCLEROSIS OF BOTH CAROTID ARTERIES: Chronic | ICD-10-CM

## 2025-05-14 DIAGNOSIS — J45.20 MILD INTERMITTENT ASTHMA WITHOUT COMPLICATION: Chronic | ICD-10-CM

## 2025-05-14 DIAGNOSIS — K21.9 GASTROESOPHAGEAL REFLUX DISEASE WITHOUT ESOPHAGITIS: Chronic | ICD-10-CM

## 2025-05-14 DIAGNOSIS — E53.8 VITAMIN B 12 DEFICIENCY: Chronic | ICD-10-CM

## 2025-05-14 DIAGNOSIS — E55.9 VITAMIN D DEFICIENCY: Chronic | ICD-10-CM

## 2025-05-14 DIAGNOSIS — K59.03 DRUG-INDUCED CONSTIPATION: Chronic | ICD-10-CM

## 2025-05-14 DIAGNOSIS — J30.9 CHRONIC ALLERGIC RHINITIS: Chronic | ICD-10-CM

## 2025-05-14 DIAGNOSIS — E11.42 DM TYPE 2 WITH DIABETIC PERIPHERAL NEUROPATHY: Primary | Chronic | ICD-10-CM

## 2025-05-14 DIAGNOSIS — M54.81 OCCIPITAL NEURALGIA OF RIGHT SIDE: Chronic | ICD-10-CM

## 2025-05-14 DIAGNOSIS — E78.2 MIXED DYSLIPIDEMIA: Chronic | ICD-10-CM

## 2025-05-14 RX ORDER — GABAPENTIN 100 MG/1
100 CAPSULE ORAL 3 TIMES DAILY
Qty: 90 CAPSULE | Refills: 2 | Status: SHIPPED | OUTPATIENT
Start: 2025-05-14

## 2025-05-14 RX ORDER — ERGOCALCIFEROL 1.25 MG/1
50000 CAPSULE, LIQUID FILLED ORAL
Qty: 12 CAPSULE | Refills: 0 | Status: SHIPPED | OUTPATIENT
Start: 2025-05-14

## 2025-05-14 RX ORDER — INSULIN LISPRO 100 [IU]/ML
INJECTION, SOLUTION INTRAVENOUS; SUBCUTANEOUS
Qty: 40 ML | Refills: 1 | Status: SHIPPED | OUTPATIENT
Start: 2025-05-14

## 2025-05-14 RX ORDER — ATORVASTATIN CALCIUM 40 MG/1
40 TABLET, FILM COATED ORAL DAILY
Qty: 90 TABLET | Refills: 0 | Status: SHIPPED | OUTPATIENT
Start: 2025-05-14

## 2025-05-14 RX ORDER — MONTELUKAST SODIUM 10 MG/1
10 TABLET ORAL
Qty: 90 TABLET | Refills: 0 | Status: SHIPPED | OUTPATIENT
Start: 2025-05-14

## 2025-05-14 RX ORDER — TOPIRAMATE 50 MG/1
50 TABLET, FILM COATED ORAL 2 TIMES DAILY
Qty: 60 TABLET | Refills: 2 | Status: SHIPPED | OUTPATIENT
Start: 2025-05-14

## 2025-05-14 RX ORDER — SEMAGLUTIDE 1.34 MG/ML
1 INJECTION, SOLUTION SUBCUTANEOUS WEEKLY
Qty: 3 ML | Refills: 1 | Status: SHIPPED | OUTPATIENT
Start: 2025-05-14

## 2025-05-14 RX ORDER — LANSOPRAZOLE 30 MG/1
30 CAPSULE, DELAYED RELEASE ORAL DAILY
Qty: 90 CAPSULE | Refills: 0 | Status: SHIPPED | OUTPATIENT
Start: 2025-05-14

## 2025-05-14 RX ORDER — LEVOCETIRIZINE DIHYDROCHLORIDE 5 MG/1
5 TABLET, FILM COATED ORAL EVERY EVENING
Qty: 90 TABLET | Refills: 0 | Status: SHIPPED | OUTPATIENT
Start: 2025-05-14

## 2025-05-14 RX ORDER — FUROSEMIDE 20 MG/1
10 TABLET ORAL DAILY
Qty: 30 TABLET | Refills: 1 | Status: SHIPPED | OUTPATIENT
Start: 2025-05-14

## 2025-05-14 RX ORDER — CLONIDINE HYDROCHLORIDE 0.1 MG/1
0.1 TABLET ORAL EVERY 12 HOURS SCHEDULED
Qty: 60 TABLET | Refills: 2 | Status: SHIPPED | OUTPATIENT
Start: 2025-05-14

## 2025-05-14 RX ORDER — ASPIRIN 81 MG/1
162 TABLET ORAL DAILY
Qty: 90 TABLET | Refills: 0 | Status: SHIPPED | OUTPATIENT
Start: 2025-05-14

## 2025-05-14 RX ORDER — ALBUTEROL SULFATE 90 UG/1
2 INHALANT RESPIRATORY (INHALATION) EVERY 6 HOURS PRN
Qty: 18 G | Refills: 5 | Status: SHIPPED | OUTPATIENT
Start: 2025-05-14

## 2025-05-14 RX ORDER — GLUCOSAMINE HCL/CHONDROITIN SU 500-400 MG
1 CAPSULE ORAL 2 TIMES DAILY WITH MEALS
Qty: 60 TABLET | Refills: 3 | Status: SHIPPED | OUTPATIENT
Start: 2025-05-14

## 2025-05-14 RX ORDER — CYANOCOBALAMIN 1000 UG/ML
1000 INJECTION, SOLUTION INTRAMUSCULAR; SUBCUTANEOUS
Qty: 4 ML | Refills: 2 | Status: SHIPPED | OUTPATIENT
Start: 2025-05-14

## 2025-05-14 NOTE — PROGRESS NOTES
.   History of Present Illness  Nivia is a 49 y/o female who presents to [facility] today pertaining to her DM and Dyslipidemia. She has been diagnosed with right Breast Cancer, GERD, Osteopenia/arthritis, migraine headaches and anxiety/ depression.      Diabetes   The initial diagnosis of diabetes was made 20 years ago. Diabetic complications include peripheral neuropathy. Risk factors for coronary artery disease include post-menopausal, stress and dyslipidemia. She is currently utilizing insulin pump therapy and CGM. In addition, she is currently taking Synardy and Ozempic.  She has had a previous visit with a dietitian An ACE inhibitor/angiotensin II receptor blocker is not  being taken at this time.     Lab Results   Component Value Date    HGBA1C 7.00 (H) 05/12/2025      Dyslipidemia   The current episode started more than 1 year ago.  She is taking Atorvastatin 40 mg and aspirin 81 mg..   Lab Results   Component Value Date    CHOL 139 05/12/2025    CHOL 129 12/12/2024    CHOL 109 08/05/2024     Lab Results   Component Value Date    TRIG 226 (H) 05/12/2025    TRIG 110 12/12/2024    TRIG 174 (H) 08/05/2024     Lab Results   Component Value Date    HDL 39 (L) 05/12/2025    HDL 49 12/12/2024    HDL 35 (L) 08/05/2024     Lab Results   Component Value Date    LDL 63 05/12/2025    LDL 60 12/12/2024    LDL 45 08/05/2024         Breast Cancer  Malignant neoplasm of upper outer quadrant of right breast, estrogen receptor negative which was treated with mastectomy and followed by chemotherapy.  Continues to take tamoxifen 20 mg and followed by oncology.      GERD  Stable with lansoprazole.  Lab Results   Component Value Date    WBC 10.23 05/12/2025    HGB 14.9 05/12/2025    HCT 44.3 05/12/2025    MCV 84.5 05/12/2025     05/12/2025      Anxiety/Depression  Stable and followed by behavioral health  Lab Results   Component Value Date    TSH 1.890 05/12/2025      The following  portions of the patient's history were reviewed and updated as appropriate: allergies, current medications, past family history, past medical history, past social history, past surgical history and problem list.    Review of Systems    Vital signs:  /60 (BP Location: Right arm, Patient Position: Sitting, Cuff Size: Adult)   Pulse 93   Temp 97.6 °F (36.4 °C) (Temporal)   Resp 14   Wt 79.4 kg (175 lb)   SpO2 97%   BMI 30.02 kg/m²     Physical Exam  Vitals and nursing note reviewed.   Constitutional:       General: She is not in acute distress.     Appearance: She is well-developed.   HENT:      Head: Normocephalic.      Nose: Nose normal.   Eyes:      General: No scleral icterus.        Right eye: No discharge.         Left eye: No discharge.      Conjunctiva/sclera: Conjunctivae normal.   Neck:      Thyroid: No thyromegaly.      Vascular: No JVD.   Cardiovascular:      Rate and Rhythm: Normal rate and regular rhythm.      Heart sounds: S1 normal and S2 normal.   Pulmonary:      Effort: Pulmonary effort is normal.      Breath sounds: Normal breath sounds.   Abdominal:      Palpations: Abdomen is soft.      Tenderness: There is no abdominal tenderness. There is no guarding.   Musculoskeletal:      Cervical back: Neck supple.      Right lower leg: No edema.      Left lower leg: No edema.      Right foot: Normal range of motion. No deformity, bunion, Charcot foot or foot drop.      Left foot: Normal range of motion. No deformity, bunion, Charcot foot or foot drop.   Feet:      Right foot:      Protective Sensation: 8 sites tested.  5 sites sensed.      Skin integrity: Callus (Heel) present.      Toenail Condition: Right toenails are normal.      Left foot:      Protective Sensation: 8 sites tested.  5 sites sensed.      Skin integrity: Callus (Heel) present.      Toenail Condition: Left toenails are normal.      Comments: Bilateral Great toe nails have been removed  Skin:     General: Skin is dry.       Capillary Refill: Capillary refill takes less than 2 seconds.   Neurological:      Mental Status: She is alert.      Cranial Nerves: Cranial nerves 2-12 are intact.   Psychiatric:         Mood and Affect: Mood and affect normal.         Speech: Speech normal.         Behavior: Behavior is cooperative.         Thought Content: Thought content normal.       Result Review :  CGM results from 5/1 to 5/14/2025  Time in range  0% very low  0% low  92%  in range  8% high  0% very high    Average blood glucose 123 ±34 mg/dL    Results for orders placed or performed in visit on 05/12/25   Hemoglobin A1c    Collection Time: 05/12/25  8:30 AM    Specimen: Arm, Left; Blood   Result Value Ref Range    Hemoglobin A1C 7.00 (H) 4.80 - 5.60 %   Lipid panel    Collection Time: 05/12/25  8:30 AM    Specimen: Arm, Left; Blood   Result Value Ref Range    Total Cholesterol 139 0 - 200 mg/dL    Triglycerides 226 (H) 0 - 150 mg/dL    HDL Cholesterol 39 (L) 40 - 60 mg/dL    LDL Cholesterol  63 0 - 100 mg/dL    VLDL Cholesterol 37 5 - 40 mg/dL    LDL/HDL Ratio 1.41    Comprehensive metabolic panel    Collection Time: 05/12/25  8:30 AM    Specimen: Arm, Left; Blood   Result Value Ref Range    Glucose 153 (H) 65 - 99 mg/dL    BUN 14 6 - 20 mg/dL    Creatinine 0.87 0.57 - 1.00 mg/dL    Sodium 140 136 - 145 mmol/L    Potassium 4.4 3.5 - 5.2 mmol/L    Chloride 109 (H) 98 - 107 mmol/L    CO2 22.6 22.0 - 29.0 mmol/L    Calcium 9.1 8.6 - 10.5 mg/dL    Total Protein 7.5 6.0 - 8.5 g/dL    Albumin 4.2 3.5 - 5.2 g/dL    ALT (SGPT) 15 1 - 33 U/L    AST (SGOT) 16 1 - 32 U/L    Alkaline Phosphatase 92 39 - 117 U/L    Total Bilirubin 0.3 0.0 - 1.2 mg/dL    Globulin 3.3 gm/dL    A/G Ratio 1.3 g/dL    BUN/Creatinine Ratio 16.1 7.0 - 25.0    Anion Gap 8.4 5.0 - 15.0 mmol/L    eGFR 80.3 >60.0 mL/min/1.73   TSH    Collection Time: 05/12/25  8:30 AM    Specimen: Arm, Left; Blood   Result Value Ref Range    TSH 1.890 0.270 - 4.200 uIU/mL   Vitamin B12     Collection Time: 05/12/25  8:30 AM    Specimen: Arm, Left; Blood   Result Value Ref Range    Vitamin B-12 320 211 - 946 pg/mL   Vitamin D 25 hydroxy    Collection Time: 05/12/25  8:30 AM    Specimen: Arm, Left; Blood   Result Value Ref Range    25 Hydroxy, Vitamin D 56.8 30.0 - 100.0 ng/ml   CBC Auto Differential    Collection Time: 05/12/25  8:30 AM    Specimen: Arm, Left; Blood   Result Value Ref Range    WBC 10.23 3.40 - 10.80 10*3/mm3    RBC 5.24 3.77 - 5.28 10*6/mm3    Hemoglobin 14.9 12.0 - 15.9 g/dL    Hematocrit 44.3 34.0 - 46.6 %    MCV 84.5 79.0 - 97.0 fL    MCH 28.4 26.6 - 33.0 pg    MCHC 33.6 31.5 - 35.7 g/dL    RDW 12.2 (L) 12.3 - 15.4 %    RDW-SD 36.9 (L) 37.0 - 54.0 fl    MPV 10.5 6.0 - 12.0 fL    Platelets 304 140 - 450 10*3/mm3    Neutrophil % 63.1 42.7 - 76.0 %    Lymphocyte % 30.1 19.6 - 45.3 %    Monocyte % 4.9 (L) 5.0 - 12.0 %    Eosinophil % 1.0 0.3 - 6.2 %    Basophil % 0.6 0.0 - 1.5 %    Immature Grans % 0.3 0.0 - 0.5 %    Neutrophils, Absolute 6.46 1.70 - 7.00 10*3/mm3    Lymphocytes, Absolute 3.08 0.70 - 3.10 10*3/mm3    Monocytes, Absolute 0.50 0.10 - 0.90 10*3/mm3    Eosinophils, Absolute 0.10 0.00 - 0.40 10*3/mm3    Basophils, Absolute 0.06 0.00 - 0.20 10*3/mm3    Immature Grans, Absolute 0.03 0.00 - 0.05 10*3/mm3    nRBC 0.0 0.0 - 0.2 /100 WBC     *Note: Due to a large number of results and/or encounters for the requested time period, some results have not been displayed. A complete set of results can be found in Results Review.      Assessment & Plan     Diagnoses and all orders for this visit:    1. DM type 2 with diabetic peripheral neuropathy (Primary)  Comments:  Continue current diabetes regimen with noted A1c of 6.1  Orders:  -     empagliflozin (Jardiance) 25 MG tablet tablet; Take 1 tablet by mouth Every Morning.  Dispense: 90 tablet; Refill: 0  -     Semaglutide, 1 MG/DOSE, (Ozempic, 1 MG/DOSE,) 4 MG/3ML solution pen-injector; Inject 1 mg under the skin into the appropriate  area as directed 1 (One) Time Per Week.  Dispense: 3 mL; Refill: 1  -     Comprehensive Metabolic Panel; Future  -     Lipid Panel; Future  -     TSH; Future  -     Hemoglobin A1c; Future    2. Mixed dyslipidemia  Comments:  Intolerant to Rosuvastatin tolerating Atorvastatin   Orders:  -     atorvastatin (LIPITOR) 40 MG tablet; Take 1 tablet by mouth Daily.  Dispense: 90 tablet; Refill: 0  -     Comprehensive Metabolic Panel; Future  -     Lipid Panel; Future  -     TSH; Future    3. Atherosclerosis of both carotid arteries  Comments:  Cardiovascular risk reduction modifications  Orders:  -     aspirin (Aspirin Low Dose) 81 MG EC tablet; Take 2 tablets by mouth Daily.  Dispense: 90 tablet; Refill: 0    4. Mild intermittent asthma without complication  Comments:  Continue asthma medications which include montelukast and albuterol inhaler  Orders:  -     albuterol sulfate HFA (Ventolin HFA) 108 (90 Base) MCG/ACT inhaler; Inhale 2 puffs Every 6 (Six) Hours As Needed for Wheezing.  Dispense: 18 g; Refill: 5    5. Gastroesophageal reflux disease without esophagitis  Comments:  Continue Prevacid   Orders:  -     lansoprazole (PREVACID) 30 MG capsule; Take 1 capsule by mouth Daily.  Dispense: 90 capsule; Refill: 0  -     CBC & Differential; Future    6. Vitamin B 12 deficiency  Comments:  Will increase vitamin B12 injections to weekly x 4 then every other week  Orders:  -     cyanocobalamin 1000 MCG/ML injection; Inject 1 mL under the skin into the appropriate area as directed Every 7 (Seven) Days.  Dispense: 4 mL; Refill: 2  -     Vitamin B12; Future    7. Chronic allergic rhinitis  Comments:  Continue Xyzal, montelukast  Orders:  -     levocetirizine (XYZAL) 5 MG tablet; Take 1 tablet by mouth Every Evening.  Dispense: 90 tablet; Refill: 0    8. Drug-induced constipation  Comments:  Linzess to be continued   Orders:  -     linaclotide (Linzess) 145 MCG capsule capsule; Take 1 capsule by mouth Every Morning Before  Breakfast.  Dispense: 90 capsule; Refill: 0    9. Occipital neuralgia of right side  Comments:  Continue Topamax  Orders:  -     topiramate (TOPAMAX) 50 MG tablet; Take 1 tablet by mouth 2 (Two) Times a Day.  Dispense: 60 tablet; Refill: 2  -     Sedimentation Rate; Future  -     C-reactive Protein    10. Vitamin D deficiency  Comments:  Weekly Vit D   Orders:  -     Calcium Carb-Cholecalciferol (Calcium+D3) 500-15 MG-MCG tablet; Take 1 tablet by mouth 2 (Two) Times a Day With Meals.  Dispense: 60 tablet; Refill: 3  -     vitamin D (ERGOCALCIFEROL) 1.25 MG (85633 UT) capsule capsule; Take 1 capsule by mouth Every 14 (Fourteen) Days.  Dispense: 12 capsule; Refill: 0  -     Vitamin D,25-Hydroxy; Future    11. Generalized edema  Comments:  Lasix prescribed and to use cautiously   Orders:  -     furosemide (LASIX) 20 MG tablet; Take 0.5 tablets by mouth Daily.  Dispense: 30 tablet; Refill: 1    I spent 59 minutes caring for Nivia on this date of service. This time includes time spent by me in the following activities:preparing for the visit, reviewing tests, obtaining and/or reviewing a separately obtained history, performing a medically appropriate examination and/or evaluation , counseling and educating the patient/family/caregiver, ordering medications, tests, or procedures, documenting information in the medical record, independently interpreting results and communicating that information with the patient/family/caregiver, and care coordination  Follow Up In August  Findings and recommendations discussed with Nivia. Reviewed test and CGM summary results. Lifestyle modifications reinforced including nutrition and activity recommendations.  She will follow up in August sooner if problems/concerns occur.  Nivia was given instructions and counseling regarding her condition or for health maintenance advice. Please see specific information pulled into the AVS if appropriate.      This document has been  electronically signed by:

## 2025-05-19 ENCOUNTER — INFUSION (OUTPATIENT)
Dept: ONCOLOGY | Facility: HOSPITAL | Age: 50
End: 2025-05-19
Payer: MEDICARE

## 2025-05-19 VITALS
SYSTOLIC BLOOD PRESSURE: 119 MMHG | TEMPERATURE: 97.6 F | WEIGHT: 174.9 LBS | HEART RATE: 99 BPM | DIASTOLIC BLOOD PRESSURE: 82 MMHG | BODY MASS INDEX: 30 KG/M2 | RESPIRATION RATE: 18 BRPM | OXYGEN SATURATION: 98 %

## 2025-05-19 DIAGNOSIS — Z95.828 PORT-A-CATH IN PLACE: Primary | ICD-10-CM

## 2025-05-19 PROCEDURE — 25010000002 HEPARIN LOCK FLUSH PER 10 UNITS: Performed by: INTERNAL MEDICINE

## 2025-05-19 PROCEDURE — 96523 IRRIG DRUG DELIVERY DEVICE: CPT

## 2025-05-19 RX ORDER — SODIUM CHLORIDE 0.9 % (FLUSH) 0.9 %
10 SYRINGE (ML) INJECTION AS NEEDED
OUTPATIENT
Start: 2025-05-19

## 2025-05-19 RX ORDER — HEPARIN SODIUM (PORCINE) LOCK FLUSH IV SOLN 100 UNIT/ML 100 UNIT/ML
500 SOLUTION INTRAVENOUS AS NEEDED
Status: DISCONTINUED | OUTPATIENT
Start: 2025-05-19 | End: 2025-05-19 | Stop reason: HOSPADM

## 2025-05-19 RX ORDER — HEPARIN SODIUM (PORCINE) LOCK FLUSH IV SOLN 100 UNIT/ML 100 UNIT/ML
500 SOLUTION INTRAVENOUS AS NEEDED
OUTPATIENT
Start: 2025-05-19

## 2025-05-19 RX ORDER — SODIUM CHLORIDE 0.9 % (FLUSH) 0.9 %
10 SYRINGE (ML) INJECTION AS NEEDED
Status: DISCONTINUED | OUTPATIENT
Start: 2025-05-19 | End: 2025-05-19 | Stop reason: HOSPADM

## 2025-05-19 RX ADMIN — Medication 10 ML: at 15:08

## 2025-05-19 RX ADMIN — HEPARIN 500 UNITS: 100 SYRINGE at 15:08

## 2025-05-21 ENCOUNTER — PRIOR AUTHORIZATION (OUTPATIENT)
Dept: FAMILY MEDICINE CLINIC | Facility: CLINIC | Age: 50
End: 2025-05-21
Payer: MEDICARE

## 2025-05-23 ENCOUNTER — OFFICE VISIT (OUTPATIENT)
Dept: PSYCHIATRY | Facility: CLINIC | Age: 50
End: 2025-05-23
Payer: MEDICARE

## 2025-05-23 VITALS
SYSTOLIC BLOOD PRESSURE: 98 MMHG | BODY MASS INDEX: 30.09 KG/M2 | DIASTOLIC BLOOD PRESSURE: 56 MMHG | HEART RATE: 86 BPM | WEIGHT: 175.4 LBS | OXYGEN SATURATION: 98 %

## 2025-05-23 DIAGNOSIS — F41.1 GENERALIZED ANXIETY DISORDER: ICD-10-CM

## 2025-05-23 DIAGNOSIS — F42.8 OTHER OBSESSIVE-COMPULSIVE DISORDERS: ICD-10-CM

## 2025-05-23 DIAGNOSIS — F33.1 MODERATE EPISODE OF RECURRENT MAJOR DEPRESSIVE DISORDER: ICD-10-CM

## 2025-05-23 RX ORDER — ALPRAZOLAM 1 MG/1
1 TABLET ORAL 3 TIMES DAILY PRN
Qty: 90 TABLET | Refills: 0 | Status: SHIPPED | OUTPATIENT
Start: 2025-05-23

## 2025-05-23 RX ORDER — VENLAFAXINE HYDROCHLORIDE 75 MG/1
225 CAPSULE, EXTENDED RELEASE ORAL DAILY
Qty: 90 CAPSULE | Refills: 1 | Status: SHIPPED | OUTPATIENT
Start: 2025-05-23

## 2025-05-23 NOTE — PROGRESS NOTES
Subjective   Nivia Bernstein is a 50 y.o. female is here today for medication management follow-up.    Chief Complaint:  anxiety depression     History of Present Illness:    Patient presents today for follow up for medication management for depression and anxiety. Patient states didn't have a great Mothers Day and didn't get to see kids. Patient states still stressed over the court son with son. Patient reports currently depression is at a 8-9 on a 0-10 scale with 10 being the worst. Patient reports anxiety is at a 8-9 on a 0-10 scale with 10 being the worst. Patient denies any panic attacks. Patient reports sleeping 2-3 hours a night. Patient states still having some nightmares. Patient reports appetite is good. Patient states eating 2-3  meals a day. Patient denies any auditory or visual hallucinations. Patient adamantly denies any thoughts to harm self or others. Patient denies any side effects to medications. Patient denies any medical changes since last visit.   The following portions of the patient's history were reviewed and updated as appropriate: allergies, current medications, past family history, past medical history, past social history, past surgical history, and problem list.    Review of Systems   Constitutional: Negative.    Respiratory: Negative.     Cardiovascular: Negative.    Gastrointestinal: Negative.    Neurological: Negative.    Psychiatric/Behavioral:  Positive for agitation and sleep disturbance. The patient is nervous/anxious.        Objective   Physical Exam  Vitals reviewed.   Constitutional:       Appearance: Normal appearance. She is well-developed and well-groomed.   Neurological:      Mental Status: She is alert.   Psychiatric:         Attention and Perception: Attention and perception normal.         Mood and Affect: Affect normal. Mood is anxious.         Speech: Speech normal.         Behavior: Behavior normal. Behavior is cooperative.         Thought Content: Thought content  normal.         Cognition and Memory: Cognition and memory normal.         Judgment: Judgment normal.       Blood pressure 98/56, pulse 86, weight 79.6 kg (175 lb 6.4 oz), SpO2 98%, not currently breastfeeding.Body mass index is 30.09 kg/m².    Allergies   Allergen Reactions    Imipramine Other (See Comments)     Chest pain    Mobic [Meloxicam] Rash    Penicillins Angioedema    Taxotere [Docetaxel] Anaphylaxis     9 minutes into 1st infusion    Novolog [Insulin Aspart (Human Analog)] Hives    Rosuvastatin Calcium GI Intolerance       Medication List:   Current Outpatient Medications   Medication Sig Dispense Refill    Accu-Chek FastClix Lancets misc USE TO test blood sugar THREE TIMES DAILY 102 each 5    albuterol sulfate HFA (Ventolin HFA) 108 (90 Base) MCG/ACT inhaler Inhale 2 puffs Every 6 (Six) Hours As Needed for Wheezing. 18 g 5    alendronate (FOSAMAX) 35 MG tablet TAKE ONE TABLET BY MOUTH EVERY 7 DAYS WITH WATER 30 MINUTES BEFORE THE 1ST FOOD, DRINK, OR MEDICATION AND AVOID LYING DOWN 30 MINUTES AFTER TAKING 12 tablet 0    ALPRAZolam (XANAX) 1 MG tablet Take 1 tablet by mouth 3 (Three) Times a Day As Needed for Anxiety. for anxiety 90 tablet 0    aspirin (Aspirin Low Dose) 81 MG EC tablet Take 2 tablets by mouth Daily. 90 tablet 0    atorvastatin (LIPITOR) 40 MG tablet Take 1 tablet by mouth Daily. 90 tablet 0    azelastine (ASTELIN) 0.1 % nasal spray Use in each nostril as directed 30 mL 3    Calcium Carb-Cholecalciferol (Calcium+D3) 500-15 MG-MCG tablet Take 1 tablet by mouth 2 (Two) Times a Day With Meals. 60 tablet 3    cloNIDine (CATAPRES) 0.1 MG tablet TAKE ONE TABLET BY MOUTH TWICE DAILY 60 tablet 2    Continuous Glucose Sensor (Guardian 4 Glucose Sensor) misc CHANGE sensor every 5-7 DAYS as directed 5 each 12    cyanocobalamin 1000 MCG/ML injection Inject 1 mL under the skin into the appropriate area as directed Every 7 (Seven) Days. 4 mL 2    Emgality 120 MG/ML auto-injector pen INJECT 120mg ONCE  MONTHLY      empagliflozin (Jardiance) 25 MG tablet tablet Take 1 tablet by mouth Every Morning. 90 tablet 0    Fluticasone Furoate-Vilanterol (Breo Ellipta) 200-25 MCG/ACT inhaler Inhale 1 puff Daily. 1 each 3    folic acid (FOLVITE) 1 MG tablet Take 1 tablet by mouth Daily. 90 tablet 3    furosemide (LASIX) 20 MG tablet Take 0.5 tablets by mouth Daily. 30 tablet 1    gabapentin (NEURONTIN) 100 MG capsule Take 1 capsule by mouth 3 (Three) Times a Day. 90 capsule 2    Glucagon (Gvoke HypoPen 2-Pack) 1 MG/0.2ML solution auto-injector Inject 1 mg under the skin into the appropriate area as directed As Needed (Severe Hypoglycemia). 0.4 mL 3    glucose blood (Accu-Chek Guide Test) test strip USE TO TEST BLOOD GLUCOSE THREE TIMES DAILY 100 each 3    Insulin Lispro (humaLOG) 100 UNIT/ML injection INJECT AS DIRECTED MAX DAILY DOSE  UNITS DAILY 40 mL 1    ipratropium-albuterol (DUO-NEB) 0.5-2.5 mg/3 ml nebulizer Take 3 mL by nebulization Every 4 (Four) Hours As Needed for Shortness of Air. 150 mL 1    lansoprazole (PREVACID) 30 MG capsule Take 1 capsule by mouth Daily. 90 capsule 0    levocetirizine (XYZAL) 5 MG tablet Take 1 tablet by mouth Every Evening. 90 tablet 0    levothyroxine (SYNTHROID, LEVOTHROID) 25 MCG tablet TAKE ONE TABLET BY MOUTH EVERY DAY 30 tablet 1    linaclotide (Linzess) 145 MCG capsule capsule Take 1 capsule by mouth Every Morning Before Breakfast. 90 capsule 0    montelukast (SINGULAIR) 10 MG tablet TAKE ONE TABLET BY MOUTH EVERY night 90 tablet 0    ondansetron (ZOFRAN) 8 MG tablet TAKE 1 TABLET BY MOUTH EVERY 8 HOURS AS NEEDED FOR NAUSEA OR VOMITING 30 tablet 3    Qulipta 30 MG tablet Take 1 tablet by mouth Daily.      Respiratory Therapy Supplies (Nebulizer/Tubing/Mouthpiece) kit 1 each Take As Directed. 1 each 1    Semaglutide, 1 MG/DOSE, (Ozempic, 1 MG/DOSE,) 4 MG/3ML solution pen-injector Inject 1 mg under the skin into the appropriate area as directed 1 (One) Time Per Week. 3 mL 1     tamoxifen (NOLVADEX) 20 MG chemo tablet TAKE ONE TABLET BY MOUTH EVERY DAY 30 tablet 11    topiramate (TOPAMAX) 50 MG tablet Take 1 tablet by mouth 2 (Two) Times a Day. 60 tablet 2    ubrogepant 100 MG tablet       venlafaxine XR (EFFEXOR-XR) 75 MG 24 hr capsule Take 3 capsules by mouth Daily. 90 capsule 1    vitamin D (ERGOCALCIFEROL) 1.25 MG (92126 UT) capsule capsule Take 1 capsule by mouth Every 14 (Fourteen) Days. 12 capsule 0     No current facility-administered medications for this visit.       Mental Status Exam:   Hygiene:   good  Cooperation:  Cooperative  Eye Contact:  Good  Psychomotor Behavior:  Appropriate  Affect:  Appropriate  Hopelessness: Denies  Speech:  Normal  Thought Process:  Goal directed and Linear  Thought Content:  Normal  Suicidal:  None  Homicidal:  None  Hallucinations:  None  Delusion:  None  Memory:  Intact  Orientation:  Person, Place, Time, and Situation  Reliability:  fair  Insight:  Fair  Judgement:  Fair  Impulse Control:  Fair  Physical/Medical Issues:  No               Assessment & Plan   Diagnoses and all orders for this visit:    1. Generalized anxiety disorder  -     ALPRAZolam (XANAX) 1 MG tablet; Take 1 tablet by mouth 3 (Three) Times a Day As Needed for Anxiety. for anxiety  Dispense: 90 tablet; Refill: 0  -     venlafaxine XR (EFFEXOR-XR) 75 MG 24 hr capsule; Take 3 capsules by mouth Daily.  Dispense: 90 capsule; Refill: 1    2. Other obsessive-compulsive disorders  -     ALPRAZolam (XANAX) 1 MG tablet; Take 1 tablet by mouth 3 (Three) Times a Day As Needed for Anxiety. for anxiety  Dispense: 90 tablet; Refill: 0    3. Moderate episode of recurrent major depressive disorder  -     venlafaxine XR (EFFEXOR-XR) 75 MG 24 hr capsule; Take 3 capsules by mouth Daily.  Dispense: 90 capsule; Refill: 1            Discussed medication options with patient. Cont. Alprazolam 1 mg three times daily as needed for anxiety. Cont. Effexor XR 75 mg three capsules daily for depression, OCD,  and anxiety. Reviewed the risks, benefits, and side effects of the medications; patient acknowledged and verbally consented.   Patient is being prescribed a controlled substance as part of treatment plan. Patient has been educated of appropriate use of the medications, including risk of somnolence, limited ability to drive and/or work safely, and potential for dependence, respiratory depression and overdose. Patient is also informed that the medication are to be used by the patient only- avoid any combined use of ETOH or other substances unless prescribed.      [facility] Patient Controlled Substance Report (from 5/31/2024 to 5/31/2025)    Dispensed  Strength Quantity Days Supply Provider Pharmacy   05/14/2025 Gabapentin 100MG 90 each 30 [facility]ANIE[facility]   05/12/2025 Alprazolam 1MG 90 each 30 CLOUD,ALFREDO [facility]   04/07/2025 Alprazolam 1MG 90 each 30 [facility]OUD[facility]FR[facility]   04/07/2025 Gabapentin 100MG 60 each 30 TIFFANIE[facility]SONALI [facility]   02/17/2025 Alprazolam 1MG 90 each 30 [facility]   01/30/2025 Gabapentin 100MG 60 each 30 [facility]FFANIE[facility]SONALI [facility]   01/06/2025 Alprazolam 1MG 60 each 30 [facility]ED[facility]   12/26/2024 Gabapentin 100MG 60 each 30 [facility]FFANIE,SONALI [facility]   11/27/2024 Gabapentin 100MG 60 each 30 [facility]ANIE[facility]I [facility]   11/07/2024 Alprazolam 1MG 60 each 30 [facility]ED[facility]   10/31/2024 Gabapentin 100MG 60 each 30 [facility]ANIE[facility]I [facility]   10/03/2024 Alprazolam 1MG 60 each 30 [facility]ED[facility]   10/03/2024 Gabapentin 100MG 60 each 30 [facility]FFANIE[facility]ALI [facility]   09/05/2024 Alprazolam 1MG 60 each 30 [facility]OUD[facility]ED[facility]   09/05/2024 Gabapentin 100MG 60 each 30 [facility]   08/09/2024  Gabapentin 100MG 60 each 30 TIFFANIE[facility]SHERELENE [facility]   08/08/2024 Alprazolam 1MG 60 each 30 [facility]OUD[facility]FRED[facility]   06/13/2024 Gabapentin 100MG 60 each 30 TIFFANIE[facility]SHERELENE [facility]   06/05/2024 Alprazolam 1MG 60 each 30 [facility]OUD[facility]ED[facility]         Disclaimer    *The information in this report is based upon Schedule II through V controlled substance records reported by dispensers. Data should appear on [facility] reports within two to three business days after dispensing.   *The records listed in the report are based on the patient identification information entered by the report requestor, and if not sufficiently unique may result in the report including records for multiple patients. Please verify the information in the report by contacting the prescribers and/or dispensers listed.   *If the controlled substance records on this report appear to be in error, the patient or provider should contact the dispenser to determine if the information was reported accurately. If the dispenser certifies the information was reported accurately, the dispenser can contact the Drug Enforcement and Professional Practices Branch at [phone] to investigate the error.   *The information in this report is intended for informational use only by the person authorized to request the report. Intentional disclosure of the report or data to someone not authorized to obtain the data is a Class B Misdemeanor.      Report Restrictions - A practitioner or pharmacist may share the report with the patient or person authorized to act on the patient's behalf and place the report in the patient's medical record, with the report then being deemed a medical record subject to the same disclosure terms and conditions as an ordinary medical record. ([place]RS [place].202)      Patient is agreeable to call the office with any questions, concerns, or worsening of symptoms. Patient is aware to  call 911 or go to the nearest ER should begin having thoughts to harm self or others.          Follow up in four weeks        Errors in dictation may reflect use of voice recognition software and not all errors in transcription may have been detected prior to signing.              This document has been electronically signed by NIDA Vasquez   May 23, 2025 08:33 EDT

## 2025-06-01 DIAGNOSIS — F33.1 MODERATE EPISODE OF RECURRENT MAJOR DEPRESSIVE DISORDER: ICD-10-CM

## 2025-06-01 DIAGNOSIS — F41.1 GENERALIZED ANXIETY DISORDER: ICD-10-CM

## 2025-06-02 DIAGNOSIS — E11.42 DM TYPE 2 WITH DIABETIC PERIPHERAL NEUROPATHY: Chronic | ICD-10-CM

## 2025-06-02 RX ORDER — LEVOTHYROXINE SODIUM 25 UG/1
25 TABLET ORAL DAILY
Qty: 30 TABLET | Refills: 1 | Status: SHIPPED | OUTPATIENT
Start: 2025-06-02

## 2025-06-02 RX ORDER — VENLAFAXINE HYDROCHLORIDE 75 MG/1
225 CAPSULE, EXTENDED RELEASE ORAL DAILY
Qty: 90 CAPSULE | Refills: 1 | OUTPATIENT
Start: 2025-06-02

## 2025-06-03 RX ORDER — SEMAGLUTIDE 1.34 MG/ML
1 INJECTION, SOLUTION SUBCUTANEOUS WEEKLY
Qty: 3 ML | Refills: 0 | Status: SHIPPED | OUTPATIENT
Start: 2025-06-03

## 2025-06-12 ENCOUNTER — APPOINTMENT (OUTPATIENT)
Dept: ONCOLOGY | Facility: HOSPITAL | Age: 50
End: 2025-06-12
Payer: MEDICARE

## 2025-06-12 ENCOUNTER — INFUSION (OUTPATIENT)
Dept: ONCOLOGY | Facility: HOSPITAL | Age: 50
End: 2025-06-12
Payer: MEDICARE

## 2025-06-12 ENCOUNTER — OFFICE VISIT (OUTPATIENT)
Dept: ONCOLOGY | Facility: CLINIC | Age: 50
End: 2025-06-12
Payer: MEDICARE

## 2025-06-12 VITALS
OXYGEN SATURATION: 98 % | BODY MASS INDEX: 29.78 KG/M2 | HEART RATE: 83 BPM | SYSTOLIC BLOOD PRESSURE: 118 MMHG | WEIGHT: 173.6 LBS | DIASTOLIC BLOOD PRESSURE: 73 MMHG | TEMPERATURE: 98.3 F | RESPIRATION RATE: 18 BRPM

## 2025-06-12 VITALS
HEIGHT: 64 IN | WEIGHT: 175.2 LBS | HEART RATE: 84 BPM | RESPIRATION RATE: 18 BRPM | OXYGEN SATURATION: 98 % | TEMPERATURE: 97.3 F | SYSTOLIC BLOOD PRESSURE: 127 MMHG | DIASTOLIC BLOOD PRESSURE: 80 MMHG | BODY MASS INDEX: 29.91 KG/M2

## 2025-06-12 DIAGNOSIS — Z95.828 PORT-A-CATH IN PLACE: ICD-10-CM

## 2025-06-12 DIAGNOSIS — C50.411 MALIGNANT NEOPLASM OF UPPER-OUTER QUADRANT OF RIGHT BREAST IN FEMALE, ESTROGEN RECEPTOR NEGATIVE: Primary | ICD-10-CM

## 2025-06-12 DIAGNOSIS — C50.411 MALIGNANT NEOPLASM OF UPPER-OUTER QUADRANT OF RIGHT BREAST IN FEMALE, ESTROGEN RECEPTOR NEGATIVE: ICD-10-CM

## 2025-06-12 DIAGNOSIS — E53.8 FOLATE DEFICIENCY: ICD-10-CM

## 2025-06-12 DIAGNOSIS — E53.8 VITAMIN B 12 DEFICIENCY: ICD-10-CM

## 2025-06-12 DIAGNOSIS — Z17.1 MALIGNANT NEOPLASM OF UPPER-OUTER QUADRANT OF RIGHT BREAST IN FEMALE, ESTROGEN RECEPTOR NEGATIVE: Primary | ICD-10-CM

## 2025-06-12 DIAGNOSIS — Z17.1 MALIGNANT NEOPLASM OF UPPER-OUTER QUADRANT OF RIGHT BREAST IN FEMALE, ESTROGEN RECEPTOR NEGATIVE: ICD-10-CM

## 2025-06-12 DIAGNOSIS — Z95.828 PORT-A-CATH IN PLACE: Primary | ICD-10-CM

## 2025-06-12 DIAGNOSIS — E55.9 VITAMIN D DEFICIENCY: ICD-10-CM

## 2025-06-12 DIAGNOSIS — R53.83 FATIGUE, UNSPECIFIED TYPE: ICD-10-CM

## 2025-06-12 DIAGNOSIS — D64.9 ANEMIA, UNSPECIFIED TYPE: ICD-10-CM

## 2025-06-12 LAB
25(OH)D3 SERPL-MCNC: 55.6 NG/ML (ref 30–100)
ALBUMIN SERPL-MCNC: 4.3 G/DL (ref 3.5–5.2)
ALBUMIN/GLOB SERPL: 1.6 G/DL
ALP SERPL-CCNC: 90 U/L (ref 39–117)
ALT SERPL W P-5'-P-CCNC: 12 U/L (ref 1–33)
ANION GAP SERPL CALCULATED.3IONS-SCNC: 10.1 MMOL/L (ref 5–15)
AST SERPL-CCNC: 16 U/L (ref 1–32)
BASOPHILS # BLD AUTO: 0.07 10*3/MM3 (ref 0–0.2)
BASOPHILS NFR BLD AUTO: 0.7 % (ref 0–1.5)
BILIRUB SERPL-MCNC: 0.3 MG/DL (ref 0–1.2)
BUN SERPL-MCNC: 7.7 MG/DL (ref 6–20)
BUN/CREAT SERPL: 11.8 (ref 7–25)
CALCIUM SPEC-SCNC: 8.7 MG/DL (ref 8.6–10.5)
CHLORIDE SERPL-SCNC: 109 MMOL/L (ref 98–107)
CO2 SERPL-SCNC: 22.9 MMOL/L (ref 22–29)
CREAT SERPL-MCNC: 0.65 MG/DL (ref 0.57–1)
DEPRECATED RDW RBC AUTO: 42.7 FL (ref 37–54)
EGFRCR SERPLBLD CKD-EPI 2021: 106.1 ML/MIN/1.73
EOSINOPHIL # BLD AUTO: 0.11 10*3/MM3 (ref 0–0.4)
EOSINOPHIL NFR BLD AUTO: 1.1 % (ref 0.3–6.2)
ERYTHROCYTE [DISTWIDTH] IN BLOOD BY AUTOMATED COUNT: 13.2 % (ref 12.3–15.4)
FERRITIN SERPL-MCNC: 104 NG/ML (ref 13–150)
FOLATE SERPL-MCNC: 13.8 NG/ML (ref 4.78–24.2)
GLOBULIN UR ELPH-MCNC: 2.7 GM/DL
GLUCOSE SERPL-MCNC: 115 MG/DL (ref 65–99)
HCT VFR BLD AUTO: 43.3 % (ref 34–46.6)
HGB BLD-MCNC: 14 G/DL (ref 12–15.9)
IMM GRANULOCYTES # BLD AUTO: 0.01 10*3/MM3 (ref 0–0.05)
IMM GRANULOCYTES NFR BLD AUTO: 0.1 % (ref 0–0.5)
IRON 24H UR-MRATE: 56 MCG/DL (ref 37–145)
IRON SATN MFR SERPL: 16 % (ref 20–50)
LYMPHOCYTES # BLD AUTO: 3.72 10*3/MM3 (ref 0.7–3.1)
LYMPHOCYTES NFR BLD AUTO: 38.3 % (ref 19.6–45.3)
MCH RBC QN AUTO: 28.5 PG (ref 26.6–33)
MCHC RBC AUTO-ENTMCNC: 32.3 G/DL (ref 31.5–35.7)
MCV RBC AUTO: 88 FL (ref 79–97)
MONOCYTES # BLD AUTO: 0.62 10*3/MM3 (ref 0.1–0.9)
MONOCYTES NFR BLD AUTO: 6.4 % (ref 5–12)
NEUTROPHILS NFR BLD AUTO: 5.19 10*3/MM3 (ref 1.7–7)
NEUTROPHILS NFR BLD AUTO: 53.4 % (ref 42.7–76)
NRBC BLD AUTO-RTO: 0 /100 WBC (ref 0–0.2)
PLATELET # BLD AUTO: 272 10*3/MM3 (ref 140–450)
PMV BLD AUTO: 9.7 FL (ref 6–12)
POTASSIUM SERPL-SCNC: 3.7 MMOL/L (ref 3.5–5.2)
PROT SERPL-MCNC: 7 G/DL (ref 6–8.5)
RBC # BLD AUTO: 4.92 10*6/MM3 (ref 3.77–5.28)
SODIUM SERPL-SCNC: 142 MMOL/L (ref 136–145)
TIBC SERPL-MCNC: 352 MCG/DL (ref 298–536)
TRANSFERRIN SERPL-MCNC: 236 MG/DL (ref 200–360)
TSH SERPL DL<=0.05 MIU/L-ACNC: 2.6 UIU/ML (ref 0.27–4.2)
VIT B12 BLD-MCNC: 336 PG/ML (ref 211–946)
WBC NRBC COR # BLD AUTO: 9.72 10*3/MM3 (ref 3.4–10.8)

## 2025-06-12 PROCEDURE — 82746 ASSAY OF FOLIC ACID SERUM: CPT

## 2025-06-12 PROCEDURE — 83540 ASSAY OF IRON: CPT

## 2025-06-12 PROCEDURE — 85025 COMPLETE CBC W/AUTO DIFF WBC: CPT

## 2025-06-12 PROCEDURE — 82607 VITAMIN B-12: CPT

## 2025-06-12 PROCEDURE — 84466 ASSAY OF TRANSFERRIN: CPT

## 2025-06-12 PROCEDURE — 84443 ASSAY THYROID STIM HORMONE: CPT

## 2025-06-12 PROCEDURE — 36591 DRAW BLOOD OFF VENOUS DEVICE: CPT

## 2025-06-12 PROCEDURE — 82728 ASSAY OF FERRITIN: CPT

## 2025-06-12 PROCEDURE — 82306 VITAMIN D 25 HYDROXY: CPT

## 2025-06-12 PROCEDURE — 80053 COMPREHEN METABOLIC PANEL: CPT

## 2025-06-12 PROCEDURE — 25010000002 HEPARIN LOCK FLUSH PER 10 UNITS: Performed by: INTERNAL MEDICINE

## 2025-06-12 RX ORDER — HEPARIN SODIUM (PORCINE) LOCK FLUSH IV SOLN 100 UNIT/ML 100 UNIT/ML
500 SOLUTION INTRAVENOUS AS NEEDED
Status: DISCONTINUED | OUTPATIENT
Start: 2025-06-12 | End: 2025-06-12 | Stop reason: HOSPADM

## 2025-06-12 RX ORDER — SODIUM CHLORIDE 0.9 % (FLUSH) 0.9 %
10 SYRINGE (ML) INJECTION AS NEEDED
Status: DISCONTINUED | OUTPATIENT
Start: 2025-06-12 | End: 2025-06-12 | Stop reason: HOSPADM

## 2025-06-12 RX ORDER — SODIUM CHLORIDE 0.9 % (FLUSH) 0.9 %
10 SYRINGE (ML) INJECTION AS NEEDED
OUTPATIENT
Start: 2025-06-12

## 2025-06-12 RX ORDER — HEPARIN SODIUM (PORCINE) LOCK FLUSH IV SOLN 100 UNIT/ML 100 UNIT/ML
500 SOLUTION INTRAVENOUS AS NEEDED
OUTPATIENT
Start: 2025-06-12

## 2025-06-12 RX ADMIN — HEPARIN 500 UNITS: 100 SYRINGE at 09:51

## 2025-06-12 RX ADMIN — Medication 10 ML: at 09:51

## 2025-06-12 NOTE — PROGRESS NOTES
NAME: Nivia Bernstein    : 1975    DATE:  2025    DIAGNOSIS:    Stage IA ([id number]) moderately differentiated invasive ductal carcinoma of the R breast with associated DCIS.  Tumor was 10 mm in maximal dimension.  0/10 SLN involved. ER negative, ND 15% + (1+), HER-2/cat negative. Mammaprint high risk.    2. Pernicious Anemia    3. Folate Deficiency    CHIEF COMPLAINT:  Follow up Breast Cancer    TREATMENT HISTORY:  1. Initially planned to give 4 cycles to Taxotere/Cytoxan, during 1st infusion of Taxotere on 2020 patient developed allergic reaction. Therefore, Taxotere was discontinued and regimen changed to DD AC.    2.      3.  Started Tamoxifen 12-    HISTORY OF PRESENT ILLNESS:   Nivia Bernstein is a very pleasant 50 y.o. female who who is being seen today at the request of Sheila Garza MD for evaluation and treatment of breast cancer. She did not notice a palpable lump, but was recommended to have a screening mammogram by her PCP. A nodule in the right upper quadrant of the R breast was noted. The mass measured 0.8 cm on ultrasound. Biopsy was consistent with a moderately differentiated invasive ductal carcinoma, ER negative, ND weakly (15%) positive, and HER-2/cat negative. She underwent bilateral mastectomy with sentinel lymph node biopsy with Dr. Garza on 20. Pathology from this showed a moderately differentiated invasive ductal carcinoma with associated intermediate grade DCIS, negative margins.  0/10 /SLN involved.  Mammaprint was high risk. She was referred to our clinic and is here today with her  for discussion of further treatment options.      Ms. Bernstein is healing well from the surgery.  She did develop a seroma, which was drained by Dr. Garza yesterday. She reports muscular chest discomfort r/t the procedure, but otherwise denies any other symptoms including fevers, chills, significant weight changes, shortness of breath, abdominal pain, n/v/d/c, bony pain or  headaches.    Interval History:  Ms. Bernstein presents today for follow up of breast cancer. Overall, she reports that she has been doing well since her last visit. She continues to take Tamoxifen daily and is tolerating this well without any noticeable side effects. She is taking B12 injections twice monthly at the instruction of her primary care provider. She reports fatigue today. She is without any specific complaints today.             PAST MEDICAL HISTORY:  Past Medical History:   Diagnosis Date    Altered mental status 10/16/2017    Anxiety and depression 3/31/2017    Arthritis     Asthma     Elevated alkaline phosphatase level 10/16/2017    Enlarged liver     GERD (gastroesophageal reflux disease)     Heart murmur     Hypokalemia 10/16/2017    Mitral valve prolapse     Neuropathy     related to diabetes    Obesity 3/31/2017    OCD (obsessive compulsive disorder) 10/16/2017    Osteoporosis     PONV (postoperative nausea and vomiting)     Renal insufficiency 10/16/2017    Right breast cancer with malignant cells in regional lymph nodes no greater than 0.2 mm and no more than 200 cells 8/13/2020    TIA (transient ischemic attack)     4 TIMES       PAST SURGICAL HISTORY:  Past Surgical History:   Procedure Laterality Date    APPENDECTOMY      CARPAL TUNNEL RELEASE      CHOLECYSTECTOMY      COLONOSCOPY N/A 5/5/2021    Procedure: COLONOSCOPY WITH BIOPSY;  Surgeon: Vickie Herrera MD;  Location: [facility];  Service: Gastroenterology;  Laterality: N/A;    FINGER FUSION Left     3rd    HYSTERECTOMY  2011    removed due to an ovarian cyst and dysmenorrhia. No cancer noted. Complete    KNEE ARTHROSCOPY Right     MASTECTOMY Left 8/18/2020    Procedure: Left mastectomy;  Surgeon: Sheila Garza MD;  Location: [facility];  Service: General;  Laterality: Left;    MASTECTOMY WITH SENTINEL NODE BIOPSY AND AXILLARY NODE DISSECTION Right 8/18/2020    Procedure: Right BREAST MASTECTOMY WITH SENTINEL NODE BIOPSY;   Surgeon: Sheila Garza MD;  Location: [facility];  Service: General;  Laterality: Right;    PORTACATH PLACEMENT         SOCIAL HISTORY:  Social History     Socioeconomic History    Marital status:    Tobacco Use    Smoking status: Never     Passive exposure: Never    Smokeless tobacco: Never   Vaping Use    Vaping status: Never Used   Substance and Sexual Activity    Alcohol use: No    Drug use: No    Sexual activity: Yes     Partners: Male         FAMILY HISTORY:  Family History   Problem Relation Age of Onset    Diabetes Mother     Hypertension Mother     Diabetes Father     Heart disease Father     Alcohol abuse Father     Seizures Father     Hypertension Father     No Known Problems Brother     No Known Problems Daughter     Bone cancer Son     Suicide Attempts Cousin     Stomach cancer Cousin         maternal first cousin    Breast cancer Paternal Aunt 52    Diabetes Maternal Grandmother     Diabetes Maternal Grandfather     Lung cancer Maternal Grandfather     Heart disease Paternal Grandmother     Diabetes Paternal Grandmother     Heart disease Paternal Grandfather     Diabetes Paternal Grandfather     Ovarian cancer Maternal Aunt     Lung cancer Maternal Uncle     Colon cancer Maternal Uncle     Cancer Maternal Uncle         unknown type     MEDICATIONS:  The current medication list was reviewed in the EMR    Current Outpatient Medications:     Accu-Chek FastClix Lancets misc, USE TO test blood sugar THREE TIMES DAILY, Disp: 102 each, Rfl: 5    albuterol sulfate HFA (Ventolin HFA) 108 (90 Base) MCG/ACT inhaler, Inhale 2 puffs Every 6 (Six) Hours As Needed for Wheezing., Disp: 18 g, Rfl: 5    alendronate (FOSAMAX) 35 MG tablet, TAKE ONE TABLET BY MOUTH EVERY 7 DAYS WITH WATER 30 MINUTES BEFORE THE 1ST FOOD, DRINK, OR MEDICATION AND AVOID LYING DOWN 30 MINUTES AFTER TAKING, Disp: 12 tablet, Rfl: 0    ALPRAZolam (XANAX) 1 MG tablet, Take 1 tablet by mouth 3 (Three) Times a Day As Needed for Anxiety.  for anxiety, Disp: 90 tablet, Rfl: 0    aspirin (Aspirin Low Dose) 81 MG EC tablet, Take 2 tablets by mouth Daily., Disp: 90 tablet, Rfl: 0    atorvastatin (LIPITOR) 40 MG tablet, Take 1 tablet by mouth Daily., Disp: 90 tablet, Rfl: 0    azelastine (ASTELIN) 0.1 % nasal spray, Use in each nostril as directed, Disp: 30 mL, Rfl: 3    Calcium Carb-Cholecalciferol (Calcium+D3) 500-15 MG-MCG tablet, Take 1 tablet by mouth 2 (Two) Times a Day With Meals., Disp: 60 tablet, Rfl: 3    cloNIDine (CATAPRES) 0.1 MG tablet, TAKE ONE TABLET BY MOUTH TWICE DAILY, Disp: 60 tablet, Rfl: 2    Continuous Glucose Sensor (Guardian 4 Glucose Sensor) misc, CHANGE sensor every 5-7 DAYS as directed, Disp: 5 each, Rfl: 12    cyanocobalamin 1000 MCG/ML injection, Inject 1 mL under the skin into the appropriate area as directed Every 7 (Seven) Days., Disp: 4 mL, Rfl: 2    Emgality 120 MG/ML auto-injector pen, INJECT 120mg ONCE MONTHLY, Disp: , Rfl:     empagliflozin (Jardiance) 25 MG tablet tablet, Take 1 tablet by mouth Every Morning., Disp: 90 tablet, Rfl: 0    Fluticasone Furoate-Vilanterol (Breo Ellipta) 200-25 MCG/ACT inhaler, Inhale 1 puff Daily., Disp: 1 each, Rfl: 3    folic acid (FOLVITE) 1 MG tablet, Take 1 tablet by mouth Daily., Disp: 90 tablet, Rfl: 3    furosemide (LASIX) 20 MG tablet, Take 0.5 tablets by mouth Daily., Disp: 30 tablet, Rfl: 1    gabapentin (NEURONTIN) 100 MG capsule, Take 1 capsule by mouth 3 (Three) Times a Day., Disp: 90 capsule, Rfl: 2    Glucagon (Gvoke HypoPen 2-Pack) 1 MG/0.2ML solution auto-injector, Inject 1 mg under the skin into the appropriate area as directed As Needed (Severe Hypoglycemia)., Disp: 0.4 mL, Rfl: 3    glucose blood (Accu-Chek Guide Test) test strip, USE TO TEST BLOOD GLUCOSE THREE TIMES DAILY, Disp: 100 each, Rfl: 3    Insulin Lispro (humaLOG) 100 UNIT/ML injection, INJECT AS DIRECTED MAX DAILY DOSE  UNITS DAILY, Disp: 40 mL, Rfl: 1    ipratropium-albuterol (DUO-NEB) 0.5-2.5 mg/3  ml nebulizer, Take 3 mL by nebulization Every 4 (Four) Hours As Needed for Shortness of Air., Disp: 150 mL, Rfl: 1    lansoprazole (PREVACID) 30 MG capsule, Take 1 capsule by mouth Daily., Disp: 90 capsule, Rfl: 0    levocetirizine (XYZAL) 5 MG tablet, Take 1 tablet by mouth Every Evening., Disp: 90 tablet, Rfl: 0    levothyroxine (SYNTHROID, LEVOTHROID) 25 MCG tablet, TAKE ONE TABLET BY MOUTH EVERY DAY, Disp: 30 tablet, Rfl: 1    linaclotide (Linzess) 145 MCG capsule capsule, Take 1 capsule by mouth Every Morning Before Breakfast., Disp: 90 capsule, Rfl: 0    montelukast (SINGULAIR) 10 MG tablet, TAKE ONE TABLET BY MOUTH EVERY night, Disp: 90 tablet, Rfl: 0    ondansetron (ZOFRAN) 8 MG tablet, TAKE 1 TABLET BY MOUTH EVERY 8 HOURS AS NEEDED FOR NAUSEA OR VOMITING, Disp: 30 tablet, Rfl: 3    Ozempic, 1 MG/DOSE, 4 MG/3ML solution pen-injector, INJECT 1MG  SUBCUTANEOUSLY ONCE A WEEK, Disp: 3 mL, Rfl: 0    Qulipta 30 MG tablet, Take 1 tablet by mouth Daily., Disp: , Rfl:     Respiratory Therapy Supplies (Nebulizer/Tubing/Mouthpiece) kit, 1 each Take As Directed., Disp: 1 each, Rfl: 1    tamoxifen (NOLVADEX) 20 MG chemo tablet, TAKE ONE TABLET BY MOUTH EVERY DAY, Disp: 30 tablet, Rfl: 11    topiramate (TOPAMAX) 50 MG tablet, Take 1 tablet by mouth 2 (Two) Times a Day., Disp: 60 tablet, Rfl: 2    ubrogepant 100 MG tablet, , Disp: , Rfl:     venlafaxine XR (EFFEXOR-XR) 75 MG 24 hr capsule, Take 3 capsules by mouth Daily., Disp: 90 capsule, Rfl: 1    vitamin D (ERGOCALCIFEROL) 1.25 MG (12519 UT) capsule capsule, Take 1 capsule by mouth Every 14 (Fourteen) Days., Disp: 12 capsule, Rfl: 0  No current facility-administered medications for this visit.    ALLERGIES:    Allergies   Allergen Reactions    Imipramine Other (See Comments)     Chest pain    Mobic [Meloxicam] Rash    Penicillins Angioedema    Taxotere [Docetaxel] Anaphylaxis     9 minutes into 1st infusion    Novolog [Insulin Aspart (Human Analog)] Hives     "Rosuvastatin Calcium GI Intolerance     REVIEW OF SYSTEMS:    A comprehensive 14 point review of systems was performed.  Significant findings as mentioned above.  All other systems reviewed and are negative.      PHYSICAL EXAM:  Vitals:    06/12/25 1002   BP: 127/80   Pulse: 84   Resp: 18   Temp: 97.3 °F (36.3 °C)   TempSrc: Temporal   SpO2: 98%   Weight: 79.5 kg (175 lb 3.2 oz)   Height: 162.6 cm (64\")   PainSc: 5    PainLoc: Generalized       Body surface area is 1.85 meters squared.  Body mass index is 30.07 kg/m².  ECOG score: 0   General: Awake, alert and oriented, in no acute distress.   HEENT:  PERRLA, EOM full, OP clear, mucous membranes moist  Neck: No thyromegaly. No JVD.   Lungs: Lungs are clear to auscultation bilaterally, no crackles  Heart:  Regular rate and rhythm. No murmurs, rubs, or gallops.   Breast: S/p bilateral mastectomy and R ALNBx.  Surgical scars smooth without nodularity.  She has no axillary lymphadenopathy on either side.  Abdomen: Soft, Non tender, non-distended, bowel sounds present.  No palpable organomegaly or masses..  Diabetic monitoring device in place.  Extremities: No clubbing, cyanosis, no lower extremity edema.    Neurologic: MS as above. Grossly non focal exam    PATHOLOGY:              IMAGING:  Bilateral screening mammogram 06-19-20  FINDINGS:    The breast tissue is heterogeneously dense.  New focal nodularity right upper outer breast that is about 14 cm from  the nipple.  Otherwise, no suspicious findings.     IMPRESSION:  New focal nodularity right upper outer breast that is about  14 cm from the nipple.     RECOMMENDATION:  Spot compression imaging.     BI-RADS CAT 0, INCOMPLETE.  The patient needs additional imaging.        Diagnostic R mammogram with R breast US 07-14-20  FINDINGS:  Additional mammographic imaging images of persistent  partially obscured oval mass in the posterior right upper outer  quadrant.     Right breast ultrasound: Focused sonographic evaluation " of the right  breast demonstrates a 0.8 cm irregular hypoechoic mass with ill-defined  borders located at 10:00, 13 cm from the nipple. An additional area was  initially noted by the technologist in the 9:00 region which represents  an isolated fat lobule.     IMPRESSION:  0.8 cm irregular mass in the right 10:00 region. Recommend  ultrasound-guided core biopsy.     BI-RADS CATEGORY:  4, SUSPICIOUS     RECOMMENDATION:  Recommend ultrasound guided core biopsy of the right  breast.        Bilateral breast MRI w/wo contrast 08-11-20  FINDINGS: There is minimal bilateral background contrast enhancement and  normal vascular enhancement.     There are no worrisome areas of contrast enhancement in the left breast.     In the right breast correlating to the biopsy proven malignancy is an  approximately 0.7 cm irregular rapidly enhancing mass in the posterior  right 10:00 region. Artifact from a postbiopsy marking clip is located  adjacent to this mass. A small linear area of enhancement extends  posterior to this mass approximately 1 cm. There are no additional  worrisome areas of contrast enhancement in the right breast.     There is no axillary adenopathy by MRI criteria and no chest wall  abnormality is seen.     IMPRESSION:  1. Negative left breast MRI.     2. Biopsy-proven malignancy in the right 10:00 region with a small  linear area of enhancement extending posterior to the 0.7 cm mass.     RECOMMENDATIONS: Recommend surgical follow-up.     BI-RADS CATEGORY: 6, KNOWN BIOPSY PROVEN MALIGNANCY    Echo 10-16-17:  A two-dimensional transthoracic echocardiogram with color flow and Doppler was performed.   The study is technically good for diagnosis.Verbal consent was obtained from the patient to use saline contrast in order to optimize the study.  A total of 20 mL of agitated saline was administered to assess for cardiac shunting.   No adverse reaction to contrast was noted.   Echocardiogram Findings    Left Ventricle  Left ventricular systolic function is normal. Estimated EF appears to be in the range of 56 - 60%. Normal left ventricular cavity size and wall thickness noted. All left ventricular wall segments contract normally. Left ventricular diastolic function is normal.   Right Ventricle Normal right ventricular cavity size noted.   Left Atrium Normal left atrial size noted. Saline test results are negative.   Right Atrium Normal right atrial size noted.   Aortic Valve The aortic valve is structurally normal. Trace aortic valve regurgitation is present.   Mitral Valve The mitral valve is normal in structure. Trace mitral valve regurgitation is present.   Tricuspid Valve The tricuspid valve is normal.  Trace tricuspid valve regurgitation is present.   Pulmonic Valve The pulmonic valve is structurally normal. There is trace pulmonic valve regurgitation present.   Greater Vessels No dilation of the aortic root is present. No dilation of the sinuses of Valsalva is present.   Pericardium There is no evidence of pericardial effusion.       ECHO 09/29/2020  Poor quality echo and Doppler study  Normal left ventricular cavity size and wall thickness noted.  Left ventricular ejection fraction appears to be 61 - 65%. Left ventricular systolic function is normal.  Left ventricular diastolic function is consistent with (grade I) impaired relaxation.  Aortic and mitral valve are poorly visualized but appear to be normal,  Tricuspid and pulmonic valve echoes are not visualized.  There is no pericardial effusion noted.    Echocardiogram 12-08-02  Normal left ventricular cavity size and wall thickness noted. All left ventricular wall segments contract normally.  Left ventricular ejection fraction appears to be 56 - 60%.  The pericardium is normal. There is no evidence of pericardial effusion. .       MRI Brain w/wo contrast 01-28-21  FINDINGS:    Brain:  Unremarkable.  No mass.  No hemorrhage.  No acute infarct.    Ventricles:  Unremarkable.   No ventriculomegaly.    Bones/joints:  Unremarkable.    Sinuses:  Unremarkable as visualized.  No acute sinusitis.    Mastoid air cells:  Unremarkable as visualized.  No mastoid effusion.    Orbits:  Unremarkable as visualized.     IMPRESSION:    No acute findings in the head/brain.    ENDOSCOPY:    COLONOSCOPY PROCEDURE NOTE   5/5/2021 Dr. Aguilera       Findings:  1.  Normal colonoscopy with small internal hemorrhoids.  2.  Normal terminal ileum     OPERATIVE PROCEDURE   After proper informed consent was obtained, the patient was taken the operating suite and placed in left lateral decubitus position.  An Olympus video colonoscope 180 series was inserted in the rectum and advanced to the terminal ileum under direct visualization.  Cecum and terminal ileum were identified by visualization of the appendiceal orifice and ileocecal valve.  The colonoscope was then slowly withdrawn from the cecum to the rectum and passed a second time from rectum to cecum.  The colonoscope was retroflexed in the cecum and rectum. Scope was then withdrawn. Patient tolerated the procedure well. There were no immediate complications. Cecal withdrawal time was 9 minutes.     RECOMMENDATIONS:  1.  Repeat colonoscopy in 10 years for screening purposes.  2.  Continue Linzess.  3.  Anusol HC suppositories as needed for rectal bleeding.    RECENT LABS:  Lab Results   Component Value Date    WBC 9.72 06/12/2025    HGB 14.0 06/12/2025    HCT 43.3 06/12/2025    MCV 88.0 06/12/2025    RDW 13.2 06/12/2025     06/12/2025    NEUTRORELPCT 53.4 06/12/2025    LYMPHORELPCT 38.3 06/12/2025    MONORELPCT 6.4 06/12/2025    EOSRELPCT 1.1 06/12/2025    BASORELPCT 0.7 06/12/2025    NEUTROABS 5.19 06/12/2025    LYMPHSABS 3.72 (H) 06/12/2025       Lab Results   Component Value Date     05/12/2025    K 4.4 05/12/2025    CO2 22.6 05/12/2025     (H) 05/12/2025    BUN 14 05/12/2025    CREATININE 0.87 05/12/2025    EGFRIFNONA 89 01/11/2022     EGFRIFAFRI 108 01/11/2022    GLUCOSE 153 (H) 05/12/2025    CALCIUM 9.1 05/12/2025    ALKPHOS 92 05/12/2025    AST 16 05/12/2025    ALT 15 05/12/2025    BILITOT 0.3 05/12/2025    ALBUMIN 4.2 05/12/2025    PROTEINTOT 7.5 05/12/2025    MG 2.4 12/12/2024     Lab Results   Component Value Date    FERRITIN 86.58 12/12/2024    IRON 67 12/12/2024    TIBC 358 12/12/2024    LABIRON 19 (L) 12/12/2024    SJEKHXET88 320 05/12/2025    FOLATE 11.00 12/12/2024     Lab Results   Component Value Date    RETICCTPCT 1.69 05/10/2024     Uric Acid   Date Value Ref Range Status   08/07/2023 3.9 2.4 - 5.7 mg/dL Final        Lab Results   Component Value Date    CAION 1.28 10/16/2017    TSH 1.890 05/12/2025    [id number] 56.8 05/12/2025         ASSESSMENT & PLAN:  Nivia Bernstein is a very pleasant 50 y.o. female with Stage IA ([id number]) moderately differentiated invasive ductal carcinoma of the R breast with associated DCIS.  Tumor was 10 mm in maximal dimension.  0/10 SLN involved. ER negative, AK 15% + (1+), HER-2/cat negative. Mammaprint high risk.     1. R breast cancer:   -  S/p R mastectomy and SLNBx as well as prophylactic contralateral mastectomy performed by Dr. Garza 8-18-20.  - She healed well from bilateral mastectomy. This was complicated by left seroma, drained by Dr. Garza. Incisions are healed well.   - Given high risk Mammaprint, Dr. Loco recommended adjuvant chemotherapy. Discussed different possibilities for adjuvant therapy. Given high risk Mammaprint but early stage, node negative disease as well as comorbidities, recommended Taxotere/Cytoxan Q21d x 4 cycles with growth factor support. Discussed potential risks, benefits, and side effects and she would like to proceed.   - She had port placed several years ago due to poor peripheral access. This has been well cared for and maintained.   - C1 Taxotere/Cytoxan was planned for 09/24/2020. Unfortunately, this had to be discontinued due to allergic reaction to Taxotere.  Recommended change of treatment to DD AC.   - She tolerated treatment well and aside from fatigue and recovered well. ECHO was essentially unchanged.   -  Her tumor was ER neg but did have 15% 1+ positivity for progesterone receptor.  She is s/p complete hysterectomy and had evidence of osteopenia with T score -1.9 in June 2020.  Given all of this, recommended treatment with Tamoxifen over aromatase inhibitor.   - Exam and labs from today without cause for concern. Will continue Tamoxifen daily.  - She will follow up with Dr. Loco in 6 months with CBCD, CMP, TSH, vitamin D, iron profile, ferritin, vitamin B12 and folate.     2. Extensive family history of malignancy:   - Genetic testing was ordered by Dr. Garza and performed by [employer] with no mutations, specifically including BRCA 1/2, CHKE2, CDH1, PALB2 and others.    3. Anxiety/Depression/Insmonia:  -  She continues to f/u with Psychiatry.    4. Frozen shoulders Bilaterally:  - She had been seeing Dr. Sheehan of orthopedics which was helpful to her.    5. Anemia due to B12, Folate deficiency:  -  Continue B12 1000 mcg SQ twice per month.  -  Continue daily folate suppplement.  -  Iron panel and Ferritin are replete today. B12 and folate are pending.  -  Will continue to closely monitor.     6. Hypothyroidism:  - Previously noted to have elevated TSH with appropriately low T4 and started on Synthroid 25 mcg daily which she continues. Ongoing management per her PCP.     7.  Prophylaxis:   -  She received Prevnar 13 vaccine.  -  She had 2024 flu vaccine.  -  M. Uncle had colon cancer at age 50.  Referred to Dr. Aguilera for screening colonoscopy which was unremarkable and repeat exam recommended after 10-year interval.  - She has had COVID vaccine x2.  Recommended fall COVID booster to her.    8.  Follow up:  - Continue Tamoxifen.  - Continue twice monthly B12 and folic acid daily.   - Continue to follow with psychiatry.   - Follow up with Dr. Loco in 6 months  with CBCD, CMP, iron profile, ferritin, vitamin B12, folate, TSH and vitamin D.         ACO / NII/Other  Quality measures  -  Nivia Bernstein received 2024 flu vaccine.  -  Nivia Bernstein reports a pain score of 5.  Given her pain assessment as noted, treatment options were discussed and the following options were decided upon as a follow-up plan to address the patient's pain: continuation of current treatment plan for pain.  -  Current outpatient and discharge medications have been reconciled for the patient.  Reviewed by: NIDA Medina      I spent 30 minutes with Nivia Bernstein today.  In the office today, more than 50% of this time was spent face-to-face with her  in counseling / coordination of care, reviewing her interim medical history and counseling on the current treatment plan.  All questions were answered to her satisfaction.           Electronically Signed by: NIDA Medina         CC:   MD Truong Hager Sherelene S, APRN

## 2025-06-20 ENCOUNTER — HOSPITAL ENCOUNTER (OUTPATIENT)
Facility: HOSPITAL | Age: 50
Discharge: HOME | End: 2025-06-20
Payer: MEDICARE

## 2025-06-20 ENCOUNTER — OFFICE VISIT (OUTPATIENT)
Age: 50
End: 2025-06-20
Payer: MEDICARE

## 2025-06-20 VITALS
BODY MASS INDEX: 29.88 KG/M2 | WEIGHT: 175 LBS | HEIGHT: 64 IN | HEART RATE: 82 BPM | SYSTOLIC BLOOD PRESSURE: 139 MMHG | DIASTOLIC BLOOD PRESSURE: 69 MMHG | OXYGEN SATURATION: 100 %

## 2025-06-20 DIAGNOSIS — F33.1 MODERATE EPISODE OF RECURRENT MAJOR DEPRESSIVE DISORDER: ICD-10-CM

## 2025-06-20 DIAGNOSIS — F41.1 GENERALIZED ANXIETY DISORDER: Primary | ICD-10-CM

## 2025-06-20 DIAGNOSIS — Z51.81 ENCOUNTER FOR THERAPEUTIC DRUG MONITORING: ICD-10-CM

## 2025-06-20 DIAGNOSIS — F42.8 OTHER OBSESSIVE-COMPULSIVE DISORDERS: ICD-10-CM

## 2025-06-20 PROBLEM — B35.1 ONYCHOMYCOSIS OF TOENAIL: Status: ACTIVE | Noted: 2022-07-19

## 2025-06-20 PROBLEM — L98.0 PYOGENIC GRANULOMA OF SKIN: Status: ACTIVE | Noted: 2022-07-20

## 2025-06-20 PROBLEM — I73.9 PERIPHERAL VASCULAR DISEASE: Status: ACTIVE | Noted: 2022-07-19

## 2025-06-20 LAB
AMPHET+METHAMPHET UR QL: NEGATIVE
AMPHETAMINES UR QL: NEGATIVE
BARBITURATES UR QL SCN: NEGATIVE
BENZODIAZ UR QL SCN: POSITIVE
BUPRENORPHINE SERPL-MCNC: NEGATIVE NG/ML
CANNABINOIDS SERPL QL: NEGATIVE
COCAINE UR QL: NEGATIVE
FENTANYL UR-MCNC: NEGATIVE NG/ML
METHADONE UR QL SCN: NEGATIVE
OPIATES UR QL: NEGATIVE
OXYCODONE UR QL SCN: NEGATIVE
PCP UR QL SCN: NEGATIVE
TRICYCLICS UR QL SCN: NEGATIVE

## 2025-06-20 PROCEDURE — 80307 DRUG TEST PRSMV CHEM ANLYZR: CPT | Performed by: NURSE PRACTITIONER

## 2025-06-20 RX ORDER — ALPRAZOLAM 1 MG/1
1 TABLET ORAL 3 TIMES DAILY PRN
Qty: 90 TABLET | Refills: 0 | Status: SHIPPED | OUTPATIENT
Start: 2025-06-20

## 2025-06-20 RX ORDER — VENLAFAXINE HYDROCHLORIDE 75 MG/1
225 CAPSULE, EXTENDED RELEASE ORAL DAILY
Qty: 90 CAPSULE | Refills: 1 | Status: SHIPPED | OUTPATIENT
Start: 2025-06-20

## 2025-06-20 NOTE — PROGRESS NOTES
Subjective   Nivia Bernstein is a 50 y.o. female is here today for medication management follow-up.    Chief Complaint:  anxiety depression insomnia    History of Present Illness:    Patient presents today for follow up for medication management for insomnia, depression and anxiety. Patient states had a lot of aggravation with losing wallet and washer/dryer quit. Patient reports had a lot going on these past couple weeks and been stressful. Patient states going back to court for son in Oct. Patient states having some irritability. Denies any aggression. Patient reports currently depression is at a 7-8 on a 0-10 scale with 10 being the worst. Patient reports anxiety is at a 9 on a 0-10 scale with 10 being the worst. Patient reports having a few panic attacks. Patient reports sleeping 2-3 hours a night. Denies any nightmares. Patient reports appetite is good. Patient states eating at least twice a day. Patient denies any auditory or visual hallucinations. Patient adamantly denies any thoughts to harm self or others. Patient denies any side effects to medications. Patient denies any medical changes since last visit.   The following portions of the patient's history were reviewed and updated as appropriate: allergies, current medications, past family history, past medical history, past social history, past surgical history, and problem list.    Review of Systems   Constitutional: Negative.    Respiratory: Negative.     Cardiovascular: Negative.    Gastrointestinal: Negative.    Neurological: Negative.    Psychiatric/Behavioral:  Positive for agitation and sleep disturbance. The patient is nervous/anxious.        Objective   Physical Exam  Vitals reviewed.   Constitutional:       Appearance: Normal appearance. She is well-developed and well-groomed.   Neurological:      Mental Status: She is alert.   Psychiatric:         Attention and Perception: Attention and perception normal.         Mood and Affect: Affect  "normal. Mood is anxious.         Speech: Speech normal.         Behavior: Behavior normal. Behavior is cooperative.         Thought Content: Thought content normal.         Cognition and Memory: Cognition and memory normal.         Judgment: Judgment normal.       Blood pressure 139/69, pulse 82, height 162.6 cm (64.02\"), weight 79.4 kg (175 lb), SpO2 100%, not currently breastfeeding.Body mass index is 30.02 kg/m².    Allergies   Allergen Reactions    Imipramine Other (See Comments)     Chest pain    Mobic [Meloxicam] Rash    Penicillins Angioedema    Taxotere [Docetaxel] Anaphylaxis     9 minutes into 1st infusion    Novolog [Insulin Aspart (Human Analog)] Hives    Rosuvastatin Calcium GI Intolerance       Medication List:   Current Outpatient Medications   Medication Sig Dispense Refill    ALPRAZolam (XANAX) 1 MG tablet Take 1 tablet by mouth 3 (Three) Times a Day As Needed for Anxiety. for anxiety 90 tablet 0    venlafaxine XR (EFFEXOR-XR) 75 MG 24 hr capsule Take 3 capsules by mouth Daily. 90 capsule 1    Accu-Chek FastClix Lancets misc USE TO test blood sugar THREE TIMES DAILY 102 each 5    albuterol sulfate HFA (Ventolin HFA) 108 (90 Base) MCG/ACT inhaler Inhale 2 puffs Every 6 (Six) Hours As Needed for Wheezing. 18 g 5    alendronate (FOSAMAX) 35 MG tablet TAKE ONE TABLET BY MOUTH EVERY 7 DAYS WITH WATER 30 MINUTES BEFORE THE 1ST FOOD, DRINK, OR MEDICATION AND AVOID LYING DOWN 30 MINUTES AFTER TAKING 12 tablet 0    aspirin (Aspirin Low Dose) 81 MG EC tablet Take 2 tablets by mouth Daily. 90 tablet 0    atorvastatin (LIPITOR) 40 MG tablet Take 1 tablet by mouth Daily. 90 tablet 0    azelastine (ASTELIN) 0.1 % nasal spray Use in each nostril as directed 30 mL 3    Calcium Carb-Cholecalciferol (Calcium+D3) 500-15 MG-MCG tablet Take 1 tablet by mouth 2 (Two) Times a Day With Meals. 60 tablet 3    cloNIDine (CATAPRES) 0.1 MG tablet TAKE ONE TABLET BY MOUTH TWICE DAILY 60 tablet 2    Continuous Glucose Sensor " (Guardian 4 Glucose Sensor) misc CHANGE sensor every 5-7 DAYS as directed 5 each 12    cyanocobalamin 1000 MCG/ML injection Inject 1 mL under the skin into the appropriate area as directed Every 7 (Seven) Days. 4 mL 2    Emgality 120 MG/ML auto-injector pen INJECT 120mg ONCE MONTHLY      empagliflozin (Jardiance) 25 MG tablet tablet Take 1 tablet by mouth Every Morning. 90 tablet 0    Fluticasone Furoate-Vilanterol (Breo Ellipta) 200-25 MCG/ACT inhaler Inhale 1 puff Daily. 1 each 3    folic acid (FOLVITE) 1 MG tablet Take 1 tablet by mouth Daily. 90 tablet 3    furosemide (LASIX) 20 MG tablet Take 0.5 tablets by mouth Daily. 30 tablet 1    gabapentin (NEURONTIN) 100 MG capsule Take 1 capsule by mouth 3 (Three) Times a Day. 90 capsule 2    Glucagon (Gvoke HypoPen 2-Pack) 1 MG/0.2ML solution auto-injector Inject 1 mg under the skin into the appropriate area as directed As Needed (Severe Hypoglycemia). 0.4 mL 3    glucose blood (Accu-Chek Guide Test) test strip USE TO TEST BLOOD GLUCOSE THREE TIMES DAILY 100 each 3    Insulin Lispro (humaLOG) 100 UNIT/ML injection INJECT AS DIRECTED MAX DAILY DOSE  UNITS DAILY 40 mL 1    ipratropium-albuterol (DUO-NEB) 0.5-2.5 mg/3 ml nebulizer Take 3 mL by nebulization Every 4 (Four) Hours As Needed for Shortness of Air. 150 mL 1    lansoprazole (PREVACID) 30 MG capsule Take 1 capsule by mouth Daily. 90 capsule 0    levocetirizine (XYZAL) 5 MG tablet Take 1 tablet by mouth Every Evening. 90 tablet 0    levothyroxine (SYNTHROID, LEVOTHROID) 25 MCG tablet TAKE ONE TABLET BY MOUTH EVERY DAY 30 tablet 1    linaclotide (Linzess) 145 MCG capsule capsule Take 1 capsule by mouth Every Morning Before Breakfast. 90 capsule 0    montelukast (SINGULAIR) 10 MG tablet TAKE ONE TABLET BY MOUTH EVERY night 90 tablet 0    ondansetron (ZOFRAN) 8 MG tablet TAKE 1 TABLET BY MOUTH EVERY 8 HOURS AS NEEDED FOR NAUSEA OR VOMITING 30 tablet 3    Ozempic, 1 MG/DOSE, 4 MG/3ML solution pen-injector INJECT  1MG  SUBCUTANEOUSLY ONCE A WEEK 3 mL 0    Qulipta 30 MG tablet Take 1 tablet by mouth Daily.      Respiratory Therapy Supplies (Nebulizer/Tubing/Mouthpiece) kit 1 each Take As Directed. 1 each 1    tamoxifen (NOLVADEX) 20 MG chemo tablet TAKE ONE TABLET BY MOUTH EVERY DAY 30 tablet 11    topiramate (TOPAMAX) 50 MG tablet Take 1 tablet by mouth 2 (Two) Times a Day. 60 tablet 2    ubrogepant 100 MG tablet       vitamin D (ERGOCALCIFEROL) 1.25 MG (17357 UT) capsule capsule Take 1 capsule by mouth Every 14 (Fourteen) Days. 12 capsule 0     No current facility-administered medications for this visit.       Mental Status Exam:   Hygiene:   good  Cooperation:  Cooperative  Eye Contact:  Good  Psychomotor Behavior:  Appropriate  Affect:  Appropriate  Hopelessness: Denies  Speech:  Normal  Thought Process:  Goal directed and Linear  Thought Content:  Normal  Suicidal:  None  Homicidal:  None  Hallucinations:  None  Delusion:  None  Memory:  Intact  Orientation:  Person, Place, Time, and Situation  Reliability:  fair  Insight:  Fair  Judgement:  Fair  Impulse Control:  Fair  Physical/Medical Issues:  No               Assessment & Plan   Diagnoses and all orders for this visit:    1. Generalized anxiety disorder (Primary)  -     ALPRAZolam (XANAX) 1 MG tablet; Take 1 tablet by mouth 3 (Three) Times a Day As Needed for Anxiety. for anxiety  Dispense: 90 tablet; Refill: 0  -     venlafaxine XR (EFFEXOR-XR) 75 MG 24 hr capsule; Take 3 capsules by mouth Daily.  Dispense: 90 capsule; Refill: 1    2. Other obsessive-compulsive disorders  -     ALPRAZolam (XANAX) 1 MG tablet; Take 1 tablet by mouth 3 (Three) Times a Day As Needed for Anxiety. for anxiety  Dispense: 90 tablet; Refill: 0    3. Moderate episode of recurrent major depressive disorder  -     venlafaxine XR (EFFEXOR-XR) 75 MG 24 hr capsule; Take 3 capsules by mouth Daily.  Dispense: 90 capsule; Refill: 1    4. Encounter for therapeutic drug monitoring  -     Urine Drug  Screen - Urine, Clean Catch; Future            Discussed medication options with patient.  Continue Effexor XR 75 mg 3 capsules by mouth daily for depression, OCD, and anxiety.  Continue Alprazolam 1 mg take 1 tablet by mouth 3 times daily as needed for anxiety.  Reviewed the risks, benefits, and side effects of the medications; patient acknowledged and verbally consented.   Patient is being prescribed a controlled substance as part of treatment plan. Patient has been educated of appropriate use of the medications, including risk of somnolence, limited ability to drive and/or work safely, and potential for dependence, respiratory depression and overdose. Patient is also informed that the medication are to be used by the patient only- avoid any combined use of ETOH or other substances unless prescribed.   UDS ordered.     [facility] Patient Controlled Substance Report (from 6/20/2024 to 6/20/2025)    Dispensed  Strength Quantity Days Supply Provider Pharmacy   06/10/2025 Alprazolam 1MG 90 each 30 [facility]   06/10/2025 Gabapentin 100MG 90 each 30 TIFFANIE,SONALI [facility]   05/14/2025 Gabapentin 100MG 90 each 30 [facility]FFANIE[facility]I [facility]   05/12/2025 Alprazolam 1MG 90 each 30 [facility]OUD[facility]   04/07/2025 Alprazolam 1MG 90 each 30 [facility]   04/07/2025 Gabapentin 100MG 60 each 30 TIFFANIE[facility]SONALI [facility]   02/17/2025 Alprazolam 1MG 90 each 30 [facility]   01/30/2025 Gabapentin 100MG 60 each 30 TIFFANIE[facility]SONALI [facility]   01/06/2025 Alprazolam 1MG 60 each 30 [facility]OU[facility]   12/26/2024 Gabapentin 100MG 60 each 30 [facility]FFANIE[facility]SON[facility]I [facility]   11/27/2024 Gabapentin 100MG 60 each 30 [facility]ANIE[facility]I [facility]   11/07/2024 Alprazolam 1MG 60 each 30 [facility]   10/31/2024 Gabapentin 100MG 60 each 30 TIFFANIE,SHERELENE [facility]   10/03/2024 Alprazolam 1MG 60 each 30 [facility]OUD[facility]   10/03/2024 Gabapentin 100MG 60 each 30 TIFFANIE,SHERELENE [facility]   09/05/2024 Alprazolam 1MG 60 each 30 [facility]OUD[facility]   09/05/2024 Gabapentin 100MG 60 each 30 TIFFANIE,SHERELENE [facility]   08/09/2024 Gabapentin 100MG 60 each 30 TIFFANIE[facility]ERELENE [facility]   08/08/2024 Alprazolam 1MG 60 each 30 [facility]OUD[facility]         Disclaimer    *The information in this report is based upon Schedule II through V controlled substance records reported by dispensers. Data should appear on [facility] reports within two to three business days after dispensing.   *The records listed in the report are based on the patient identification information entered by the report requestor, and if not sufficiently unique may result in the report including records for multiple patients. Please verify the information in the report by contacting the prescribers and/or dispensers listed.   *If the controlled substance records on this report appear to be in error, the patient or provider should contact the dispenser to determine if the information was reported accurately. If the dispenser certifies the information was reported accurately, the dispenser can contact the Drug Enforcement and Professional Practices Branch at [phone] to investigate the error.   *The information in this report is intended for informational use only by the person authorized to request the report. Intentional disclosure of the report or data to someone not authorized to obtain the data is a Class B Misdemeanor.      Report Restrictions - A practitioner or pharmacist may share the report with the patient or person authorized to act on the patient's behalf and place the report in the patient's medical record,  with the report then being deemed a medical record subject to the same disclosure terms and conditions as an ordinary medical record. ([place].202)        Patient is agreeable to call the office with any questions, concerns, or worsening of symptoms.  Patient is aware to call 911 or go to the nearest ER should begin having thoughts to harm self or others.          Follow-up in 4 weeks        Errors in dictation may reflect use of voice recognition software and not all errors in transcription may have been detected prior to signing.              This document has been electronically signed by NIDA Vasquez   June 20, 2025 09:42 EDT

## 2025-06-27 DIAGNOSIS — E11.42 DM TYPE 2 WITH DIABETIC PERIPHERAL NEUROPATHY: Chronic | ICD-10-CM

## 2025-06-27 RX ORDER — INSULIN LISPRO 100 [IU]/ML
INJECTION, SOLUTION INTRAVENOUS; SUBCUTANEOUS
Qty: 40 ML | Refills: 1 | Status: SHIPPED | OUTPATIENT
Start: 2025-06-27

## 2025-06-30 DIAGNOSIS — E11.42 DM TYPE 2 WITH DIABETIC PERIPHERAL NEUROPATHY: Chronic | ICD-10-CM

## 2025-06-30 RX ORDER — SEMAGLUTIDE 1.34 MG/ML
1 INJECTION, SOLUTION SUBCUTANEOUS WEEKLY
Qty: 3 ML | Refills: 0 | Status: SHIPPED | OUTPATIENT
Start: 2025-06-30

## 2025-07-11 ENCOUNTER — TELEPHONE (OUTPATIENT)
Age: 50
End: 2025-07-11
Payer: MEDICARE

## 2025-07-15 ENCOUNTER — OFFICE VISIT (OUTPATIENT)
Dept: FAMILY MEDICINE CLINIC | Facility: CLINIC | Age: 50
End: 2025-07-15
Payer: MEDICARE

## 2025-07-15 VITALS
OXYGEN SATURATION: 95 % | BODY MASS INDEX: 29.94 KG/M2 | HEART RATE: 91 BPM | WEIGHT: 175.4 LBS | RESPIRATION RATE: 18 BRPM | SYSTOLIC BLOOD PRESSURE: 100 MMHG | HEIGHT: 64 IN | DIASTOLIC BLOOD PRESSURE: 72 MMHG

## 2025-07-15 DIAGNOSIS — Q82.8 ACCESSORY SKIN TAGS: Primary | ICD-10-CM

## 2025-07-15 PROCEDURE — 1160F RVW MEDS BY RX/DR IN RCRD: CPT | Performed by: NURSE PRACTITIONER

## 2025-07-15 PROCEDURE — 1125F AMNT PAIN NOTED PAIN PRSNT: CPT | Performed by: NURSE PRACTITIONER

## 2025-07-15 PROCEDURE — 11200 RMVL SKIN TAGS UP TO&INC 15: CPT | Performed by: NURSE PRACTITIONER

## 2025-07-15 PROCEDURE — 1159F MED LIST DOCD IN RCRD: CPT | Performed by: NURSE PRACTITIONER

## 2025-07-15 PROCEDURE — 99212 OFFICE O/P EST SF 10 MIN: CPT | Performed by: NURSE PRACTITIONER

## 2025-07-15 PROCEDURE — 3051F HG A1C>EQUAL 7.0%<8.0%: CPT | Performed by: NURSE PRACTITIONER

## 2025-07-15 NOTE — PROGRESS NOTES
Subjective   Nivia Bernstein is a 50 y.o. female.     Chief Complaint   Patient presents with    Skin tags       History of Present Illness     Skin Tags-on the front of her neck she reports that she is getting them caught on her hair.  She has also tore some of them and caused them to bleed.  She feels that they have gotten bigger.  At times they get sore.  She would like to have removed.      The following portions of the patient's history were reviewed and updated as appropriate: CC, ROS, allergies, current medications, past family history, past medical history, past social history, past surgical history and problem list.      Review of Systems   Constitutional:  Positive for fatigue. Negative for appetite change and fever.   HENT:  Negative for congestion, ear pain, nosebleeds, postnasal drip, rhinorrhea, sore throat, tinnitus, trouble swallowing and voice change.    Eyes:  Negative for blurred vision, photophobia and visual disturbance.   Respiratory:  Negative for cough, chest tightness, shortness of breath and wheezing.    Cardiovascular:  Negative for chest pain and palpitations.   Gastrointestinal:  Positive for nausea. Negative for abdominal pain, blood in stool, constipation, diarrhea and GERD.   Endocrine: Negative for cold intolerance, heat intolerance and polydipsia.   Genitourinary:  Negative for decreased urine volume, difficulty urinating, dysuria and hematuria.   Musculoskeletal:  Negative for arthralgias, back pain, gait problem and myalgias.   Skin:  Negative for color change, pallor and wound.        Skin tags on front of neck   Allergic/Immunologic: Negative.    Neurological:  Positive for headache (some increase.  has had a recent medication change). Negative for dizziness, syncope and numbness.   Hematological: Negative.    Psychiatric/Behavioral:  Negative for decreased concentration, sleep disturbance, suicidal ideas and depressed mood. The patient is not nervous/anxious.    All other systems  "reviewed and are negative.      Objective     /72   Pulse 91   Resp 18   Ht 162.6 cm (64\")   Wt 79.6 kg (175 lb 6.4 oz)   SpO2 95%   BMI 30.11 kg/m²     Physical Exam  Vitals reviewed.   Constitutional:       General: She is not in acute distress.     Appearance: Normal appearance. She is well-developed.   HENT:      Head: Normocephalic and atraumatic.      Right Ear: Ear canal and external ear normal.      Left Ear: Tympanic membrane, ear canal and external ear normal.      Nose: Nose normal.      Mouth/Throat:      Mouth: Mucous membranes are moist.      Pharynx: Oropharynx is clear.   Eyes:      General: No scleral icterus.     Pupils: Pupils are equal, round, and reactive to light.   Neck:      Thyroid: No thyromegaly.      Vascular: No JVD.   Cardiovascular:      Rate and Rhythm: Normal rate and regular rhythm.      Heart sounds: Normal heart sounds.   Pulmonary:      Effort: Pulmonary effort is normal.      Breath sounds: Normal breath sounds.   Abdominal:      General: Bowel sounds are normal.      Palpations: Abdomen is soft.      Tenderness: There is no abdominal tenderness.   Musculoskeletal:      Cervical back: Normal range of motion and neck supple.   Skin:     General: Skin is warm and dry.      Capillary Refill: Capillary refill takes less than 2 seconds.      Comments: Multiple skin tags on anterior neck   Neurological:      Mental Status: She is alert and oriented to person, place, and time.      Cranial Nerves: No cranial nerve deficit.      Coordination: Coordination normal.      Gait: Gait normal.   Psychiatric:         Behavior: Behavior normal.         Thought Content: Thought content normal.         Judgment: Judgment normal.         Diagnoses and all orders for this visit:    1. Accessory skin tags (Primary)         Understands disease processes and need for medications.  Understands reasons for urgent and emergent care.  Patient (& family) verbalized agreement for treatment plan. "   Emotional support and active listening provided.  Patient provided time to verbalize feelings.    RTC PRN    PROCEDURE NOTE:    Procedure:Skin tag removal x's 5 tags of various sizes  The procedure risks and benefits were explained to patient and the patient gave informed verbal consent to have the procedure performed.  Indication:  Patient reports skin tags are irritated by clothing rubbing on them.  Skin tags are painful.   Provider: NIDA Elliott, KAILYN  Description: The anterior neck was prepped and draped in sterile fashion.  Aerosol Lidocaine used to for anesthesia.    Skin tags are snipped off after using Betadine followed by alcohol for cleansing and sterile scissors.These pathognomonic lesions are not sent for pathology.  Silver nitrate was used on any bleeding lesion to cauterize spot, sterile spot bandages were applied.  Pressure was held and a sterile dressing was placed.  No consultations  Estimated blood loss: Minimal  Patient tolerated the procedure well.          This document has been electronically signed by:  NIDA Cutler, FNP-C    Dragon disclaimer:  Part of this note may be an electronic transcription/translation of spoken language to printed text using the Dragon Dictation System.

## 2025-07-23 ENCOUNTER — INFUSION (OUTPATIENT)
Dept: ONCOLOGY | Facility: HOSPITAL | Age: 50
End: 2025-07-23
Payer: MEDICARE

## 2025-07-23 VITALS
HEART RATE: 84 BPM | SYSTOLIC BLOOD PRESSURE: 130 MMHG | OXYGEN SATURATION: 97 % | RESPIRATION RATE: 18 BRPM | DIASTOLIC BLOOD PRESSURE: 85 MMHG | WEIGHT: 174.9 LBS | TEMPERATURE: 98 F | BODY MASS INDEX: 30.02 KG/M2

## 2025-07-23 DIAGNOSIS — Z95.828 PORT-A-CATH IN PLACE: Primary | ICD-10-CM

## 2025-07-23 PROCEDURE — 25010000002 HEPARIN LOCK FLUSH PER 10 UNITS: Performed by: INTERNAL MEDICINE

## 2025-07-23 PROCEDURE — 96523 IRRIG DRUG DELIVERY DEVICE: CPT

## 2025-07-23 RX ORDER — SODIUM CHLORIDE 0.9 % (FLUSH) 0.9 %
10 SYRINGE (ML) INJECTION AS NEEDED
Status: DISCONTINUED | OUTPATIENT
Start: 2025-07-23 | End: 2025-07-23 | Stop reason: HOSPADM

## 2025-07-23 RX ORDER — HEPARIN SODIUM (PORCINE) LOCK FLUSH IV SOLN 100 UNIT/ML 100 UNIT/ML
500 SOLUTION INTRAVENOUS AS NEEDED
OUTPATIENT
Start: 2025-07-23

## 2025-07-23 RX ORDER — HEPARIN SODIUM (PORCINE) LOCK FLUSH IV SOLN 100 UNIT/ML 100 UNIT/ML
500 SOLUTION INTRAVENOUS AS NEEDED
Status: DISCONTINUED | OUTPATIENT
Start: 2025-07-23 | End: 2025-07-23 | Stop reason: HOSPADM

## 2025-07-23 RX ORDER — SODIUM CHLORIDE 0.9 % (FLUSH) 0.9 %
10 SYRINGE (ML) INJECTION AS NEEDED
OUTPATIENT
Start: 2025-07-23

## 2025-07-23 RX ADMIN — HEPARIN 500 UNITS: 100 SYRINGE at 08:15

## 2025-07-23 RX ADMIN — Medication 10 ML: at 08:15

## 2025-07-25 ENCOUNTER — OFFICE VISIT (OUTPATIENT)
Age: 50
End: 2025-07-25
Payer: MEDICARE

## 2025-07-25 VITALS
OXYGEN SATURATION: 78 % | WEIGHT: 174.1 LBS | HEIGHT: 64 IN | HEART RATE: 97 BPM | DIASTOLIC BLOOD PRESSURE: 78 MMHG | BODY MASS INDEX: 29.72 KG/M2 | SYSTOLIC BLOOD PRESSURE: 118 MMHG

## 2025-07-25 DIAGNOSIS — F41.1 GENERALIZED ANXIETY DISORDER: ICD-10-CM

## 2025-07-25 DIAGNOSIS — F33.1 MODERATE EPISODE OF RECURRENT MAJOR DEPRESSIVE DISORDER: ICD-10-CM

## 2025-07-25 DIAGNOSIS — F42.8 OTHER OBSESSIVE-COMPULSIVE DISORDERS: ICD-10-CM

## 2025-07-25 PROCEDURE — 99214 OFFICE O/P EST MOD 30 MIN: CPT | Performed by: NURSE PRACTITIONER

## 2025-07-25 PROCEDURE — 1159F MED LIST DOCD IN RCRD: CPT | Performed by: NURSE PRACTITIONER

## 2025-07-25 PROCEDURE — 1160F RVW MEDS BY RX/DR IN RCRD: CPT | Performed by: NURSE PRACTITIONER

## 2025-07-25 RX ORDER — ALPRAZOLAM 1 MG/1
1 TABLET ORAL 3 TIMES DAILY PRN
Qty: 90 TABLET | Refills: 0 | Status: SHIPPED | OUTPATIENT
Start: 2025-07-25

## 2025-07-25 RX ORDER — VENLAFAXINE HYDROCHLORIDE 75 MG/1
225 CAPSULE, EXTENDED RELEASE ORAL DAILY
Qty: 90 CAPSULE | Refills: 1 | Status: SHIPPED | OUTPATIENT
Start: 2025-07-25

## 2025-07-25 RX ORDER — RIMEGEPANT SULFATE 75 MG/75MG
TABLET, ORALLY DISINTEGRATING ORAL
COMMUNITY
Start: 2025-07-20

## 2025-07-25 NOTE — PROGRESS NOTES
Subjective   Nivia Bernstein is a 50 y.o. female is here today for medication management follow-up.    Chief Complaint:  anxiety depression     History of Present Illness:    Patient presents today for follow up for medication management for depression and anxiety. Patient states waiting on court date in October for son. Patient states worried about  and his health. Patient states worried about school starting back and son having to go back to school in September. Patient reports currently depression is at a 10 on a 0-10 scale with 10 being the worst. Patient reports anxiety is at a 10 on a 0-10 scale with 10 being the worst. Patient states had some anxiety attacks. Patient states the alprazolam is still helping and not having to take it three times a day everyday. Patient reports having a few panic attacks. Patient reports sleeping 3-4 hours a night. Patient states still having nightmares. Patient reports appetite is about the same. Patient states glucose is running good and still using the pump. Patient states eating at least twice a day. Patient denies any auditory or visual hallucinations. Patient adamantly denies any thoughts to harm self or others. Patient reports medication compliance and denies any side effects to medications. Patient denies any medical changes since last visit.   The following portions of the patient's history were reviewed and updated as appropriate: allergies, current medications, past family history, past medical history, past social history, past surgical history, and problem list.    Review of Systems   Constitutional: Negative.    Respiratory: Negative.     Cardiovascular: Negative.    Gastrointestinal: Negative.    Neurological: Negative.    Psychiatric/Behavioral:  Positive for agitation and sleep disturbance. The patient is nervous/anxious.        Objective   Physical Exam  Vitals reviewed.   Constitutional:       Appearance: Normal appearance. She is well-developed and  "well-groomed.   Neurological:      Mental Status: She is alert.   Psychiatric:         Attention and Perception: Attention and perception normal.         Mood and Affect: Affect normal. Mood is anxious.         Speech: Speech normal.         Behavior: Behavior normal. Behavior is cooperative.         Thought Content: Thought content normal.         Cognition and Memory: Cognition and memory normal.         Judgment: Judgment normal.       Blood pressure 118/78, pulse 97, height 162.6 cm (64.02\"), weight 79 kg (174 lb 1.6 oz), SpO2 (!) 78%, not currently breastfeeding.Body mass index is 29.87 kg/m².    Allergies   Allergen Reactions    Imipramine Other (See Comments)     Chest pain    Mobic [Meloxicam] Rash    Penicillins Angioedema    Taxotere [Docetaxel] Anaphylaxis     9 minutes into 1st infusion    Novolog [Insulin Aspart (Human Analog) (Yeast)] Hives    Rosuvastatin Calcium GI Intolerance       Medication List:   Current Outpatient Medications   Medication Sig Dispense Refill    Accu-Chek FastClix Lancets misc USE TO test blood sugar THREE TIMES DAILY 102 each 5    albuterol sulfate HFA (Ventolin HFA) 108 (90 Base) MCG/ACT inhaler Inhale 2 puffs Every 6 (Six) Hours As Needed for Wheezing. 18 g 5    alendronate (FOSAMAX) 35 MG tablet TAKE ONE TABLET BY MOUTH EVERY 7 DAYS WITH WATER 30 MINUTES BEFORE THE 1ST FOOD, DRINK, OR MEDICATION AND AVOID LYING DOWN 30 MINUTES AFTER TAKING 12 tablet 0    ALPRAZolam (XANAX) 1 MG tablet Take 1 tablet by mouth 3 (Three) Times a Day As Needed for Anxiety. for anxiety 90 tablet 0    aspirin (Aspirin Low Dose) 81 MG EC tablet Take 2 tablets by mouth Daily. 90 tablet 0    atorvastatin (LIPITOR) 40 MG tablet Take 1 tablet by mouth Daily. 90 tablet 0    azelastine (ASTELIN) 0.1 % nasal spray Use in each nostril as directed 30 mL 3    Calcium Carb-Cholecalciferol (Calcium+D3) 500-15 MG-MCG tablet Take 1 tablet by mouth 2 (Two) Times a Day With Meals. 60 tablet 3    cloNIDine " (CATAPRES) 0.1 MG tablet TAKE ONE TABLET BY MOUTH TWICE DAILY 60 tablet 2    Continuous Glucose Sensor (Guardian 4 Glucose Sensor) misc CHANGE sensor every 5-7 DAYS as directed 5 each 12    cyanocobalamin 1000 MCG/ML injection Inject 1 mL under the skin into the appropriate area as directed Every 7 (Seven) Days. 4 mL 2    Emgality 120 MG/ML auto-injector pen INJECT 120mg ONCE MONTHLY      empagliflozin (Jardiance) 25 MG tablet tablet Take 1 tablet by mouth Every Morning. 90 tablet 0    Fluticasone Furoate-Vilanterol (Breo Ellipta) 200-25 MCG/ACT inhaler Inhale 1 puff Daily. 1 each 3    folic acid (FOLVITE) 1 MG tablet Take 1 tablet by mouth Daily. 90 tablet 3    furosemide (LASIX) 20 MG tablet Take 0.5 tablets by mouth Daily. 30 tablet 1    gabapentin (NEURONTIN) 100 MG capsule Take 1 capsule by mouth 3 (Three) Times a Day. 90 capsule 2    Glucagon (Gvoke HypoPen 2-Pack) 1 MG/0.2ML solution auto-injector Inject 1 mg under the skin into the appropriate area as directed As Needed (Severe Hypoglycemia). 0.4 mL 3    glucose blood (Accu-Chek Guide Test) test strip USE TO TEST BLOOD GLUCOSE THREE TIMES DAILY 100 each 3    Insulin Lispro (humaLOG) 100 UNIT/ML injection INJECT AS DIRECTED MAX DAILY DOSE  UNITS DAILY 40 mL 1    ipratropium-albuterol (DUO-NEB) 0.5-2.5 mg/3 ml nebulizer Take 3 mL by nebulization Every 4 (Four) Hours As Needed for Shortness of Air. 150 mL 1    lansoprazole (PREVACID) 30 MG capsule Take 1 capsule by mouth Daily. 90 capsule 0    levocetirizine (XYZAL) 5 MG tablet Take 1 tablet by mouth Every Evening. 90 tablet 0    levothyroxine (SYNTHROID, LEVOTHROID) 25 MCG tablet TAKE ONE TABLET BY MOUTH EVERY DAY 30 tablet 1    linaclotide (Linzess) 145 MCG capsule capsule Take 1 capsule by mouth Every Morning Before Breakfast. 90 capsule 0    montelukast (SINGULAIR) 10 MG tablet TAKE ONE TABLET BY MOUTH EVERY night 90 tablet 0    Nurtec 75 MG dispersible tablet TAKE 1 TABLET BY MOUTH AT ONSET OF  MIGRANE. (MAX DOSE 1 TABLET/24 HOURS, NO MORE THAN 2-3 DAYS PER WEEK.)      ondansetron (ZOFRAN) 8 MG tablet TAKE 1 TABLET BY MOUTH EVERY 8 HOURS AS NEEDED FOR NAUSEA OR VOMITING 30 tablet 3    Ozempic, 1 MG/DOSE, 4 MG/3ML solution pen-injector INJECT 1MG SUBCUTANEOUSLY ONCE WEEKLY 3 mL 0    Qulipta 30 MG tablet Take 1 tablet by mouth Daily.      Respiratory Therapy Supplies (Nebulizer/Tubing/Mouthpiece) kit 1 each Take As Directed. 1 each 1    tamoxifen (NOLVADEX) 20 MG chemo tablet TAKE ONE TABLET BY MOUTH EVERY DAY 30 tablet 11    topiramate (TOPAMAX) 50 MG tablet Take 1 tablet by mouth 2 (Two) Times a Day. 60 tablet 2    ubrogepant 100 MG tablet       venlafaxine XR (EFFEXOR-XR) 75 MG 24 hr capsule Take 3 capsules by mouth Daily. 90 capsule 1    vitamin D (ERGOCALCIFEROL) 1.25 MG (23309 UT) capsule capsule Take 1 capsule by mouth Every 14 (Fourteen) Days. 12 capsule 0     No current facility-administered medications for this visit.       Mental Status Exam:   Hygiene:   good  Cooperation:  Cooperative  Eye Contact:  Good  Psychomotor Behavior:  Appropriate  Affect:  Appropriate  Hopelessness: Denies  Speech:  Normal  Thought Process:  Goal directed and Linear  Thought Content:  Normal  Suicidal:  None  Homicidal:  None  Hallucinations:  None  Delusion:  None  Memory:  Intact  Orientation:  Person, Place, Time, and Situation  Reliability:  fair  Insight:  Fair  Judgement:  Fair  Impulse Control:  Fair  Physical/Medical Issues:  No       Patient screened positive for depression based on a PHQ-9 score of 8 on 11/5/2024. Follow-up recommendations include: Prescribed antidepressant medication treatment.         Assessment & Plan   Diagnoses and all orders for this visit:    1. Generalized anxiety disorder  -     ALPRAZolam (XANAX) 1 MG tablet; Take 1 tablet by mouth 3 (Three) Times a Day As Needed for Anxiety. for anxiety  Dispense: 90 tablet; Refill: 0  -     venlafaxine XR (EFFEXOR-XR) 75 MG 24 hr capsule; Take 3  capsules by mouth Daily.  Dispense: 90 capsule; Refill: 1    2. Other obsessive-compulsive disorders  -     ALPRAZolam (XANAX) 1 MG tablet; Take 1 tablet by mouth 3 (Three) Times a Day As Needed for Anxiety. for anxiety  Dispense: 90 tablet; Refill: 0    3. Moderate episode of recurrent major depressive disorder  -     venlafaxine XR (EFFEXOR-XR) 75 MG 24 hr capsule; Take 3 capsules by mouth Daily.  Dispense: 90 capsule; Refill: 1            Discussed medication options with patient. Cont. Effexor XR 75 mg three capsules daily for depression, OCD, and anxiety. Cont. Alprazolam 1 mg one tablet by mouth three times daily as needed for anxiety. Reviewed the risks, benefits, and side effects of the medications; patient acknowledged and verbally consented.    Patient is being prescribed a controlled substance as part of treatment plan. Patient has been educated of appropriate use of the medications, including risk of somnolence, limited ability to drive and/or work safely, and potential for dependence, respiratory depression and overdose. Patient is also informed that the medication are to be used by the patient only- avoid any combined use of ETOH or other substances unless prescribed.   Reviewed UDS from 06/20/25    [facility] Patient Controlled Substance Report (from 7/28/2024 to 7/28/2025)    Dispensed  Strength Quantity Days Supply Provider Pharmacy   07/25/2025 Alprazolam 1MG 90 each 30 CLOUD,ALFREDO [facility]    07/09/2025 Gabapentin 100MG 90 each 30 TIFFANIE,[facility]    06/10/2025 Alprazolam 1MG 90 each 30 [facility]OUD,[facility]   06/10/2025 Gabapentin 100MG 90 each 30 TIFFANIE,SONALI [facility]   05/14/2025 Gabapentin 100MG 90 each [facility]   05/12/2025 Alprazolam 1MG 90 each 30 CLOUD,[facility]   04/07/2025 Alprazolam 1MG 90 each 30 [facility]    04/07/2025 Gabapentin 100MG 60 each 30 TIFFANIE,[facility]   02/17/2025 Alprazolam 1MG 90 each 30 CLOUD,[facility]   01/30/2025 Gabapentin 100MG 60 each 30 TIFFANIE,[facility]   01/06/2025 Alprazolam 1MG 60 each 30 CLOUD,[facility]   12/26/2024 Gabapentin 100MG 60 each 30 TIFFANIE,[facility]   11/27/2024 Gabapentin 100MG 60 each 30 TIFFANIE,[facility]   11/07/2024 Alprazolam 1MG 60 each 30 CLOUD,[facility]   10/31/2024 Gabapentin 100MG 60 each 30 TIFFANIE,[facility]   10/03/2024 Alprazolam 1MG 60 each 30 CLOUD,[facility]   10/03/2024 Gabapentin 100MG 60 each 30 TIFFANIE,[facility]   09/05/2024 Alprazolam 1MG 60 each 30 CLOUD,[facility]   09/05/2024 Gabapentin 100MG 60 each 30 TIFFANIE,[facility]   08/09/2024 Gabapentin 100MG 60 each 30 TIFFANIE,[facility]   08/08/2024 Alprazolam 1MG 60 each 30 CLOUD,[facility]         Disclaimer    *The information in this report is based upon Schedule II through V controlled substance records reported by dispensers. Data should appear on [facility] reports within two to three business days after dispensing.   *The records listed in the report are based on the patient identification information entered by the report requestor, and if not sufficiently unique may result in the report including records for multiple patients. Please verify the information in the report by contacting the prescribers and/or dispensers listed.   *If the controlled substance records on this report appear to be in error, the patient or provider should contact the dispenser to determine if the information was reported accurately. If the dispenser certifies the information was reported  accurately, the dispenser can contact the Drug Enforcement and Professional Practices Branch at [phone] to investigate the error.   *The information in this report is intended for informational use only by the person authorized to request the report. Intentional disclosure of the report or data to someone not authorized to obtain the data is a Class B Misdemeanor.      Report Restrictions - A practitioner or pharmacist may share the report with the patient or person authorized to act on the patient's behalf and place the report in the patient's medical record, with the report then being deemed a medical record subject to the same disclosure terms and conditions as an ordinary medical record. ([place]202)       Patient is agreeable to call the office with any questions, concerns, or worsening of symptoms. Patient is aware to call 911 or go to the nearest ER should they begin having any thoughts to harm self or others.          Follow up in four weeks          Errors in dictation may reflect use of voice recognition software and not all errors in transcription may have been detected prior to signing.              This document has been electronically signed by NIDA Vasquez   July 25, 2025 08:06 EDT

## 2025-08-13 ENCOUNTER — OFFICE VISIT (OUTPATIENT)
Dept: FAMILY MEDICINE CLINIC | Facility: CLINIC | Age: 50
End: 2025-08-13
Payer: MEDICARE

## 2025-08-13 ENCOUNTER — HOSPITAL ENCOUNTER (OUTPATIENT)
Facility: HOSPITAL | Age: 50
Discharge: HOME | End: 2025-08-13
Admitting: NURSE PRACTITIONER
Payer: MEDICARE

## 2025-08-13 VITALS
WEIGHT: 175 LBS | HEIGHT: 64 IN | TEMPERATURE: 97.4 F | DIASTOLIC BLOOD PRESSURE: 60 MMHG | BODY MASS INDEX: 29.88 KG/M2 | SYSTOLIC BLOOD PRESSURE: 100 MMHG | OXYGEN SATURATION: 97 % | RESPIRATION RATE: 14 BRPM | HEART RATE: 88 BPM

## 2025-08-13 DIAGNOSIS — Z97.8 USES SELF-APPLIED CONTINUOUS GLUCOSE MONITORING DEVICE: ICD-10-CM

## 2025-08-13 DIAGNOSIS — C50.411 MALIGNANT NEOPLASM OF UPPER-OUTER QUADRANT OF RIGHT BREAST IN FEMALE, ESTROGEN RECEPTOR NEGATIVE: Chronic | ICD-10-CM

## 2025-08-13 DIAGNOSIS — R07.81 RIB PAIN ON RIGHT SIDE: Primary | ICD-10-CM

## 2025-08-13 DIAGNOSIS — Z96.41 INSULIN PUMP IN PLACE: ICD-10-CM

## 2025-08-13 DIAGNOSIS — R07.81 RIB PAIN ON RIGHT SIDE: ICD-10-CM

## 2025-08-13 DIAGNOSIS — F41.9 ANXIETY AND DEPRESSION: Chronic | ICD-10-CM

## 2025-08-13 DIAGNOSIS — F32.A ANXIETY AND DEPRESSION: Chronic | ICD-10-CM

## 2025-08-13 DIAGNOSIS — E11.42 DM TYPE 2 WITH DIABETIC PERIPHERAL NEUROPATHY: Chronic | ICD-10-CM

## 2025-08-13 DIAGNOSIS — E78.2 MIXED HYPERLIPIDEMIA: Chronic | ICD-10-CM

## 2025-08-13 DIAGNOSIS — Z17.1 MALIGNANT NEOPLASM OF UPPER-OUTER QUADRANT OF RIGHT BREAST IN FEMALE, ESTROGEN RECEPTOR NEGATIVE: Chronic | ICD-10-CM

## 2025-08-13 DIAGNOSIS — K21.9 GASTROESOPHAGEAL REFLUX DISEASE WITHOUT ESOPHAGITIS: Chronic | ICD-10-CM
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
## 2025-08-13 PROBLEM — Z77.21: Status: RESOLVED | Noted: 2019-12-19 | Resolved: 2025-08-13

## 2025-08-13 PROCEDURE — 71101 X-RAY EXAM UNILAT RIBS/CHEST: CPT

## 2025-08-13 PROCEDURE — 71101 X-RAY EXAM UNILAT RIBS/CHEST: CPT | Performed by: RADIOLOGY

## 2025-08-13 RX ORDER — LIDOCAINE 50 MG/G
1 PATCH TOPICAL EVERY 24 HOURS
Qty: 30 PATCH | Refills: 1 | Status: SHIPPED | OUTPATIENT
Start: 2025-08-13

## 2025-08-20 ENCOUNTER — OFFICE VISIT (OUTPATIENT)
Dept: FAMILY MEDICINE CLINIC | Facility: CLINIC | Age: 50
End: 2025-08-20
Payer: MEDICARE

## 2025-08-20 VITALS
RESPIRATION RATE: 14 BRPM | BODY MASS INDEX: 29.88 KG/M2 | HEIGHT: 64 IN | WEIGHT: 175 LBS | SYSTOLIC BLOOD PRESSURE: 100 MMHG | OXYGEN SATURATION: 100 % | HEART RATE: 95 BPM | DIASTOLIC BLOOD PRESSURE: 60 MMHG | TEMPERATURE: 97 F

## 2025-08-20 DIAGNOSIS — N95.1 MENOPAUSAL SYMPTOMS: ICD-10-CM

## 2025-08-20 DIAGNOSIS — M85.89 OSTEOPENIA OF MULTIPLE SITES: Chronic | ICD-10-CM

## 2025-08-20 DIAGNOSIS — E11.42 DM TYPE 2 WITH DIABETIC PERIPHERAL NEUROPATHY: Chronic | ICD-10-CM

## 2025-08-20 DIAGNOSIS — E78.2 MIXED DYSLIPIDEMIA: Chronic | ICD-10-CM

## 2025-08-20 DIAGNOSIS — K21.9 GASTROESOPHAGEAL REFLUX DISEASE WITHOUT ESOPHAGITIS: Chronic | ICD-10-CM

## 2025-08-20 DIAGNOSIS — J45.20 MILD INTERMITTENT ASTHMA WITHOUT COMPLICATION: Chronic | ICD-10-CM

## 2025-08-20 DIAGNOSIS — E55.9 VITAMIN D DEFICIENCY: Chronic | ICD-10-CM

## 2025-08-20 DIAGNOSIS — J30.9 CHRONIC ALLERGIC RHINITIS: Chronic | ICD-10-CM

## 2025-08-20 DIAGNOSIS — R60.1 GENERALIZED EDEMA: ICD-10-CM

## 2025-08-20 DIAGNOSIS — M54.81 OCCIPITAL NEURALGIA OF RIGHT SIDE: Chronic | ICD-10-CM

## 2025-08-20 DIAGNOSIS — Z00.00 MEDICARE ANNUAL WELLNESS VISIT, SUBSEQUENT: Primary | ICD-10-CM

## 2025-08-20 DIAGNOSIS — K59.03 DRUG-INDUCED CONSTIPATION: Chronic | ICD-10-CM

## 2025-08-20 DIAGNOSIS — E53.8 VITAMIN B 12 DEFICIENCY: Chronic | ICD-10-CM

## 2025-08-20 DIAGNOSIS — Z96.41 INSULIN PUMP IN PLACE: ICD-10-CM

## 2025-08-20 DIAGNOSIS — R11.0 NAUSEA: ICD-10-CM

## 2025-08-20 DIAGNOSIS — R07.81 RIB PAIN ON RIGHT SIDE: ICD-10-CM

## 2025-08-20 PROCEDURE — 83036 HEMOGLOBIN GLYCOSYLATED A1C: CPT | Performed by: NURSE PRACTITIONER

## 2025-08-20 PROCEDURE — 86140 C-REACTIVE PROTEIN: CPT | Performed by: NURSE PRACTITIONER

## 2025-08-20 PROCEDURE — 80053 COMPREHEN METABOLIC PANEL: CPT | Performed by: NURSE PRACTITIONER

## 2025-08-20 PROCEDURE — 80061 LIPID PANEL: CPT | Performed by: NURSE PRACTITIONER

## 2025-08-20 PROCEDURE — 84443 ASSAY THYROID STIM HORMONE: CPT | Performed by: NURSE PRACTITIONER

## 2025-08-20 PROCEDURE — 82306 VITAMIN D 25 HYDROXY: CPT | Performed by: NURSE PRACTITIONER

## 2025-08-20 PROCEDURE — 85652 RBC SED RATE AUTOMATED: CPT | Performed by: NURSE PRACTITIONER

## 2025-08-20 PROCEDURE — 82607 VITAMIN B-12: CPT | Performed by: NURSE PRACTITIONER

## 2025-08-20 PROCEDURE — 85025 COMPLETE CBC W/AUTO DIFF WBC: CPT | Performed by: NURSE PRACTITIONER

## 2025-08-20 RX ORDER — ALENDRONATE SODIUM 35 MG/1
35 TABLET ORAL
Qty: 12 TABLET | Refills: 0 | Status: SHIPPED | OUTPATIENT
Start: 2025-08-20

## 2025-08-20 RX ORDER — LANSOPRAZOLE 30 MG/1
30 CAPSULE, DELAYED RELEASE ORAL DAILY
Qty: 90 CAPSULE | Refills: 0 | Status: SHIPPED | OUTPATIENT
Start: 2025-08-20

## 2025-08-20 RX ORDER — MONTELUKAST SODIUM 10 MG/1
10 TABLET ORAL
Qty: 90 TABLET | Refills: 0 | Status: SHIPPED | OUTPATIENT
Start: 2025-08-20

## 2025-08-20 RX ORDER — GLUCOSAMINE HCL/CHONDROITIN SU 500-400 MG
1 CAPSULE ORAL 2 TIMES DAILY WITH MEALS
Qty: 60 TABLET | Refills: 3 | Status: SHIPPED | OUTPATIENT
Start: 2025-08-20

## 2025-08-20 RX ORDER — INSULIN LISPRO 100 [IU]/ML
INJECTION, SOLUTION INTRAVENOUS; SUBCUTANEOUS
Qty: 80 ML | Refills: 0 | OUTPATIENT
Start: 2025-08-20

## 2025-08-20 RX ORDER — ATORVASTATIN CALCIUM 40 MG/1
40 TABLET, FILM COATED ORAL DAILY
Qty: 60 TABLET | Refills: 0 | OUTPATIENT
Start: 2025-08-20

## 2025-08-20 RX ORDER — SEMAGLUTIDE 1.34 MG/ML
1 INJECTION, SOLUTION SUBCUTANEOUS WEEKLY
Qty: 9 ML | Refills: 0 | Status: SHIPPED | OUTPATIENT
Start: 2025-08-20

## 2025-08-20 RX ORDER — ALBUTEROL SULFATE 90 UG/1
2 INHALANT RESPIRATORY (INHALATION) EVERY 6 HOURS PRN
Qty: 18 G | Refills: 5 | Status: SHIPPED | OUTPATIENT
Start: 2025-08-20

## 2025-08-20 RX ORDER — ONDANSETRON 8 MG/1
8 TABLET, FILM COATED ORAL EVERY 8 HOURS PRN
Qty: 30 TABLET | Refills: 3 | Status: SHIPPED | OUTPATIENT
Start: 2025-08-20

## 2025-08-20 RX ORDER — ASPIRIN 81 MG/1
162 TABLET ORAL DAILY
Qty: 90 TABLET | Refills: 0 | Status: SHIPPED | OUTPATIENT
Start: 2025-08-20

## 2025-08-20 RX ORDER — LIDOCAINE 50 MG/G
1 PATCH TOPICAL EVERY 24 HOURS
Qty: 30 PATCH | Refills: 1 | Status: SHIPPED | OUTPATIENT
Start: 2025-08-20

## 2025-08-20 RX ORDER — FUROSEMIDE 20 MG/1
10 TABLET ORAL DAILY
Qty: 30 TABLET | Refills: 1 | Status: SHIPPED | OUTPATIENT
Start: 2025-08-20

## 2025-08-20 RX ORDER — CYANOCOBALAMIN 1000 UG/ML
1000 INJECTION, SOLUTION INTRAMUSCULAR; SUBCUTANEOUS
Qty: 4 ML | Refills: 2 | Status: SHIPPED | OUTPATIENT
Start: 2025-08-20

## 2025-08-20 RX ORDER — LEVOCETIRIZINE DIHYDROCHLORIDE 5 MG/1
5 TABLET, FILM COATED ORAL EVERY EVENING
Qty: 90 TABLET | Refills: 0 | Status: SHIPPED | OUTPATIENT
Start: 2025-08-20

## 2025-08-20 RX ORDER — GLUCAGON INJECTION, SOLUTION 1 MG/.2ML
1 INJECTION, SOLUTION SUBCUTANEOUS AS NEEDED
Qty: 0.4 ML | Refills: 3 | Status: SHIPPED | OUTPATIENT
Start: 2025-08-20

## 2025-08-20 RX ORDER — ATORVASTATIN CALCIUM 40 MG/1
40 TABLET, FILM COATED ORAL DAILY
Qty: 90 TABLET | Refills: 0 | Status: SHIPPED | OUTPATIENT
Start: 2025-08-20

## 2025-08-20 RX ORDER — TOPIRAMATE 50 MG/1
50 TABLET, FILM COATED ORAL 2 TIMES DAILY
Qty: 60 TABLET | Refills: 2 | Status: SHIPPED | OUTPATIENT
Start: 2025-08-20

## 2025-08-20 RX ORDER — CLONIDINE HYDROCHLORIDE 0.1 MG/1
0.1 TABLET ORAL EVERY 12 HOURS SCHEDULED
Qty: 60 TABLET | Refills: 2 | Status: SHIPPED | OUTPATIENT
Start: 2025-08-20

## 2025-08-20 RX ORDER — ERGOCALCIFEROL 1.25 MG/1
50000 CAPSULE, LIQUID FILLED ORAL
Qty: 12 CAPSULE | Refills: 0 | Status: SHIPPED | OUTPATIENT
Start: 2025-08-20

## 2025-08-20 RX ORDER — GABAPENTIN 100 MG/1
100 CAPSULE ORAL 3 TIMES DAILY
Qty: 90 CAPSULE | Refills: 2 | Status: SHIPPED | OUTPATIENT
Start: 2025-08-20

## 2025-08-20 RX ORDER — AZELASTINE 1 MG/ML
SPRAY, METERED NASAL
Qty: 30 ML | Refills: 3 | Status: SHIPPED | OUTPATIENT
Start: 2025-08-20

## 2025-08-20 RX ORDER — INSULIN LISPRO 100 [IU]/ML
INJECTION, SOLUTION INTRAVENOUS; SUBCUTANEOUS
Qty: 40 ML | Refills: 1 | Status: SHIPPED | OUTPATIENT
Start: 2025-08-20

## 2025-08-21 LAB
25(OH)D3 SERPL-MCNC: 63.3 NG/ML (ref 30–100)
ALBUMIN SERPL-MCNC: 4.1 G/DL (ref 3.5–5.2)
ALBUMIN/GLOB SERPL: 1.4 G/DL
ALP SERPL-CCNC: 77 U/L (ref 39–117)
ALT SERPL W P-5'-P-CCNC: 14 U/L (ref 1–33)
ANION GAP SERPL CALCULATED.3IONS-SCNC: 12.7 MMOL/L (ref 5–15)
AST SERPL-CCNC: 17 U/L (ref 1–32)
BASOPHILS # BLD AUTO: 0.08 10*3/MM3 (ref 0–0.2)
BASOPHILS NFR BLD AUTO: 0.6 % (ref 0–1.5)
BILIRUB SERPL-MCNC: 0.3 MG/DL (ref 0–1.2)
BUN SERPL-MCNC: 9 MG/DL (ref 6–20)
BUN/CREAT SERPL: 13.2 (ref 7–25)
CALCIUM SPEC-SCNC: 9 MG/DL (ref 8.6–10.5)
CHLORIDE SERPL-SCNC: 107 MMOL/L (ref 98–107)
CHOLEST SERPL-MCNC: 120 MG/DL (ref 0–200)
CO2 SERPL-SCNC: 20.3 MMOL/L (ref 22–29)
CREAT SERPL-MCNC: 0.68 MG/DL (ref 0.57–1)
CRP SERPL-MCNC: <0.3 MG/DL (ref 0–0.5)
DEPRECATED RDW RBC AUTO: 40.4 FL (ref 37–54)
EGFRCR SERPLBLD CKD-EPI 2021: 104.9 ML/MIN/1.73
EOSINOPHIL # BLD AUTO: 0.12 10*3/MM3 (ref 0–0.4)
EOSINOPHIL NFR BLD AUTO: 1 % (ref 0.3–6.2)
ERYTHROCYTE [DISTWIDTH] IN BLOOD BY AUTOMATED COUNT: 13 % (ref 12.3–15.4)
ERYTHROCYTE [SEDIMENTATION RATE] IN BLOOD: 2 MM/HR (ref 0–30)
GLOBULIN UR ELPH-MCNC: 3 GM/DL
GLUCOSE SERPL-MCNC: 109 MG/DL (ref 65–99)
HBA1C MFR BLD: 6.9 % (ref 4.8–5.6)
HCT VFR BLD AUTO: 42.1 % (ref 34–46.6)
HDLC SERPL-MCNC: 36 MG/DL (ref 40–60)
HGB BLD-MCNC: 14.1 G/DL (ref 12–15.9)
IMM GRANULOCYTES # BLD AUTO: 0.02 10*3/MM3 (ref 0–0.05)
IMM GRANULOCYTES NFR BLD AUTO: 0.2 % (ref 0–0.5)
LDLC SERPL CALC-MCNC: 62 MG/DL (ref 0–100)
LDLC/HDLC SERPL: 1.66 {RATIO}
LYMPHOCYTES # BLD AUTO: 4.78 10*3/MM3 (ref 0.7–3.1)
LYMPHOCYTES NFR BLD AUTO: 38.5 % (ref 19.6–45.3)
MCH RBC QN AUTO: 28.8 PG (ref 26.6–33)
MCHC RBC AUTO-ENTMCNC: 33.5 G/DL (ref 31.5–35.7)
MCV RBC AUTO: 85.9 FL (ref 79–97)
MONOCYTES # BLD AUTO: 0.67 10*3/MM3 (ref 0.1–0.9)
MONOCYTES NFR BLD AUTO: 5.4 % (ref 5–12)
NEUTROPHILS NFR BLD AUTO: 54.3 % (ref 42.7–76)
NEUTROPHILS NFR BLD AUTO: 6.74 10*3/MM3 (ref 1.7–7)
NRBC BLD AUTO-RTO: 0 /100 WBC (ref 0–0.2)
PLATELET # BLD AUTO: 310 10*3/MM3 (ref 140–450)
PMV BLD AUTO: 10.4 FL (ref 6–12)
POTASSIUM SERPL-SCNC: 3.5 MMOL/L (ref 3.5–5.2)
PROT SERPL-MCNC: 7.1 G/DL (ref 6–8.5)
RBC # BLD AUTO: 4.9 10*6/MM3 (ref 3.77–5.28)
SODIUM SERPL-SCNC: 140 MMOL/L (ref 136–145)
TRIGL SERPL-MCNC: 121 MG/DL (ref 0–150)
TSH SERPL DL<=0.05 MIU/L-ACNC: 2.68 UIU/ML (ref 0.27–4.2)
VIT B12 BLD-MCNC: 291 PG/ML (ref 211–946)
VLDLC SERPL-MCNC: 22 MG/DL (ref 5–40)
WBC NRBC COR # BLD AUTO: 12.41 10*3/MM3 (ref 3.4–10.8)

## 2025-08-22 ENCOUNTER — OFFICE VISIT (OUTPATIENT)
Age: 50
End: 2025-08-22
Payer: MEDICARE

## 2025-08-22 VITALS
OXYGEN SATURATION: 99 % | HEART RATE: 80 BPM | BODY MASS INDEX: 29.45 KG/M2 | WEIGHT: 172.5 LBS | DIASTOLIC BLOOD PRESSURE: 71 MMHG | HEIGHT: 64 IN | SYSTOLIC BLOOD PRESSURE: 106 MMHG

## 2025-08-22 DIAGNOSIS — F42.8 OTHER OBSESSIVE-COMPULSIVE DISORDERS: ICD-10-CM

## 2025-08-22 DIAGNOSIS — F33.1 MODERATE EPISODE OF RECURRENT MAJOR DEPRESSIVE DISORDER: ICD-10-CM

## 2025-08-22 DIAGNOSIS — F41.1 GENERALIZED ANXIETY DISORDER: Primary | ICD-10-CM

## 2025-08-22 DIAGNOSIS — Z51.81 ENCOUNTER FOR THERAPEUTIC DRUG MONITORING: ICD-10-CM

## 2025-08-22 RX ORDER — VENLAFAXINE HYDROCHLORIDE 75 MG/1
225 CAPSULE, EXTENDED RELEASE ORAL DAILY
Qty: 90 CAPSULE | Refills: 1 | Status: SHIPPED | OUTPATIENT
Start: 2025-08-22

## 2025-08-22 RX ORDER — ALPRAZOLAM 1 MG/1
1 TABLET ORAL 3 TIMES DAILY PRN
Qty: 90 TABLET | Refills: 0 | Status: SHIPPED | OUTPATIENT
Start: 2025-08-22

## 2025-08-24 LAB — REF LAB TEST METHOD: NORMAL

## (undated) DEVICE — BIOPATCH™ ANTIMICROBIAL DRESSING WITH CHLORHEXIDINE GLUCONATE IS A HYDROPHILLIC POLYURETHANE ABSORPTIVE FOAM WITH CHLORHEXIDINE GLUCONATE (CHG) WHICH INHIBITS BACTERIAL GROWTH UNDER THE DRESSING. THE DRESSING IS INTENDED TO BE USED TO ABSORB EXUDATE, COVER A WOUND CAUSED BY VASCULAR AND NONVASCULAR PERCUTANEOUS MEDICAL DEVICES DURING SURGERY, AS WELL AS REDUCE LOCAL INFECTION AND COLONIZATION OF MICROORGANISMS.: Brand: BIOPATCH

## (undated) DEVICE — INTENDED FOR TISSUE SEPARATION, AND OTHER PROCEDURES THAT REQUIRE A SHARP SURGICAL BLADE TO PUNCTURE OR CUT.: Brand: BARD-PARKER ® STAINLESS STEEL BLADES

## (undated) DEVICE — STCKNT IMPERV 9X36IN STRL

## (undated) DEVICE — DRSNG WND BORDR/ADHS NONADHR/GZ LF 4X14IN STRL

## (undated) DEVICE — SPNG LAP PREWSH SFTPK 18X18IN STRL PK/5

## (undated) DEVICE — BNDG ELAS CO-FLEX SLF ADHR 4IN5YD LF STRL

## (undated) DEVICE — ADHS LIQ MASTISOL 2/3ML

## (undated) DEVICE — APPL CHLORAPREP HI/LITE 26ML ORNG

## (undated) DEVICE — SUT MNCRYL 4/0 PS2 18 IN

## (undated) DEVICE — SYR LUERLOK 30CC

## (undated) DEVICE — DRAPE,T,LAPARO,TRANS,STERILE: Brand: MEDLINE

## (undated) DEVICE — SUT SILK 2/0 FS BLK 18IN 685G

## (undated) DEVICE — GLV SURG PREMIERPRO MIC LTX PF SZ7 BRN

## (undated) DEVICE — PAD GRND REM POLYHESIVE A/ DISP

## (undated) DEVICE — HOLDER: Brand: DEROYAL

## (undated) DEVICE — SUT VIC 3/0 SH 27IN J416H

## (undated) DEVICE — PROXIMATE RH ROTATING HEAD SKIN STAPLERS (35 WIDE) CONTAINS 35 STAINLESS STEEL STAPLES: Brand: PROXIMATE

## (undated) DEVICE — POWERLOC 20G X 1 INCH WITH Y-SITE: Brand: PORT ACCESS NEEDLE

## (undated) DEVICE — Device

## (undated) DEVICE — SUCTION CANISTER, 1500CC, RIGID: Brand: DEROYAL

## (undated) DEVICE — CAMISOLE MASTCTMY W/ DRN MGMT 2XL BGE

## (undated) DEVICE — TRY DRSNG CENTRL LN UC44 FCP

## (undated) DEVICE — GOWN,REINF,POLY,ECL,PP SLV,XL: Brand: MEDLINE

## (undated) DEVICE — Device: Brand: DEFENDO AIR/WATER/SUCTION AND BIOPSY VALVE

## (undated) DEVICE — JACKSON-PRATT 100CC BULB RESERVOIR: Brand: CARDINAL HEALTH

## (undated) DEVICE — PK BASIC 70

## (undated) DEVICE — ELECTRD BLD EDGE/INSUL1P SFTY SLV 2.75IN

## (undated) DEVICE — SUT VIC 4/0 P3 18IN J494G

## (undated) DEVICE — SINGLE PORT MANIFOLD: Brand: NEPTUNE 2

## (undated) DEVICE — SUT SILK 2/0 TIES 30IN SA85H

## (undated) DEVICE — CONN Y IRR DISP 1P/U

## (undated) DEVICE — DRAPE,UTILTY,TAPE,15X26, 4EA/PK: Brand: MEDLINE

## (undated) DEVICE — CONTAINER,SPECIMEN,OR STERILE,4OZ: Brand: MEDLINE

## (undated) DEVICE — TUBING, SUCTION, 1/4" X 20', STRAIGHT: Brand: MEDLINE INDUSTRIES, INC.

## (undated) DEVICE — ROSEBUD DISSECTORS: Brand: DEROYAL

## (undated) DEVICE — SHEET,DRAPE,53X77,STERILE: Brand: MEDLINE

## (undated) DEVICE — BNDG ADHS CURAD FLX/FABRC 2X4IN STRL LF

## (undated) DEVICE — ENDOGATOR AUXILIARY WATER JET CONNECTOR: Brand: ENDOGATOR

## (undated) DEVICE — DRN WND HUBLSS FLUT FULL PERF SIL10MM

## (undated) DEVICE — 3M™ STERI-STRIP™ REINFORCED ADHESIVE SKIN CLOSURES, R1547, 1/2 IN X 4 IN (12 MM X 100 MM), 6 STRIPS/ENVELOPE: Brand: 3M™ STERI-STRIP™

## (undated) DEVICE — CVR PROB 96IN LF STRL